# Patient Record
Sex: MALE | Race: WHITE | NOT HISPANIC OR LATINO | Employment: OTHER | URBAN - METROPOLITAN AREA
[De-identification: names, ages, dates, MRNs, and addresses within clinical notes are randomized per-mention and may not be internally consistent; named-entity substitution may affect disease eponyms.]

---

## 2017-01-09 RX ORDER — LOSARTAN POTASSIUM 100 MG/1
TABLET ORAL
Qty: 30 TABLET | Refills: 5 | Status: SHIPPED | OUTPATIENT
Start: 2017-01-09 | End: 2017-06-15 | Stop reason: SDUPTHER

## 2017-01-12 RX ORDER — RANOLAZINE 500 MG/1
TABLET, FILM COATED, EXTENDED RELEASE ORAL
Qty: 60 TABLET | Refills: 5 | Status: SHIPPED | OUTPATIENT
Start: 2017-01-12 | End: 2017-08-03

## 2017-01-25 ENCOUNTER — ANTICOAGULATION VISIT (OUTPATIENT)
Dept: PHARMACY | Facility: HOSPITAL | Age: 72
End: 2017-01-25

## 2017-01-25 DIAGNOSIS — I48.91 ATRIAL FIBRILLATION, UNSPECIFIED TYPE (HCC): Primary | ICD-10-CM

## 2017-01-25 LAB — INR PPP: 2 (ref 0.9–1.1)

## 2017-01-25 PROCEDURE — G0463 HOSPITAL OUTPT CLINIC VISIT: HCPCS

## 2017-01-25 PROCEDURE — 36416 COLLJ CAPILLARY BLOOD SPEC: CPT

## 2017-01-25 PROCEDURE — 85610 PROTHROMBIN TIME: CPT

## 2017-01-25 NOTE — MR AVS SNAPSHOT
Marco Antonio Montemayor   1/25/2017 3:00 PM   Anticoagulation Visit    Dept Phone:  [phone]   Encounter #:  [id number]    Provider:  [facility]   Department:  [facility]ICOAGUL[facility]IC                Your Full Care Plan              Your Updated Medication List          This list is accurate as of: 1/25/17  3:17 PM.  Always use your most recent med list.                amLODIPine 5 MG tablet   Commonly known as:  NORVASC   Take 1 tablet by mouth daily.       aspirin 81 MG tablet       atorvastatin 40 MG tablet   Commonly known as:  LIPITOR   TAKE ONE TABLET BY MOUTH DAILY       carvedilol 25 MG tablet   Commonly known as:  COREG   Take 1 tablet by mouth 2 (two) times a day.       cholecalciferol 1000 UNITS tablet   Commonly known as:  VITAMIN D3       doxazosin 4 MG tablet   Commonly known as:  CARDURA   TAKE ONE TABLET BY MOUTH TWICE A DAY       Iron 325 (65 FE) MG tablet       losartan 100 MG tablet   Commonly known as:  COZAAR   TAKE ONE TABLET BY MOUTH DAILY       RANEXA 500 MG 12 hr tablet   Generic drug:  ranolazine   TAKE ONE TABLET BY MOUTH TWICE A DAY       TIKOSYN 250 MCG capsule   Generic drug:  dofetilide   TAKE ONE CAPSULE BY MOUTH TWICE A DAY       torsemide 20 MG tablet   Commonly known as:  DEMADEX   TAKE ONE TABLET BY MOUTH TWICE A DAY       vitamin B-12 1000 MCG tablet   Commonly known as:  CYANOCOBALAMIN       warfarin 7.5 MG tablet   Commonly known as:  COUMADIN   TAKE ONE TABLET BY MOUTH EVERY EVENING OR AS DIRECTED               We Performed the Following     POC INR       You Were Diagnosed With        Codes Comments    Atrial fibrillation, unspecified type    -  Primary ICD-10-CM: I48.91  ICD-9-CM: 427.31       Instructions     None    Patient Instructions History      Anticoagulation Summary as of 1/25/2017     INR goal 2.0-3.0   Today's INR 2.00   Next INR check 2/22/2017    Indications   Atrial fibrillation [I48.91]         January 2017  Details    Sun Mon Tue Wed Thu Fri Sat     1               2               3               4               5               6               7                 8               9               10               11               12               13               14                 15               16               17               18               19               20               21                 22               23               24               25      7.5 mg   See details      26      3.75 mg         27      7.5 mg         28      3.75 mg           29      3.75 mg         30      7.5 mg         31      3.75 mg              Date Details   01/25 This INR check                 February 2017 Details    Sun Mon Tue Wed Thu Fri Sat        1      7.5 mg         2      3.75 mg         3      7.5 mg         4      3.75 mg           5      3.75 mg         6      7.5 mg         7      3.75 mg         8      7.5 mg         9      3.75 mg         10      7.5 mg         11      3.75 mg           12      3.75 mg         13      7.5 mg         14      3.75 mg         15      7.5 mg         16      3.75 mg         17      7.5 mg         18      3.75 mg           19      3.75 mg         20      7.5 mg         21      3.75 mg         22      7.5 mg         23               24               25                 26               27               28                    Date Details   No additional details    Date of next INR:  2/22/2017           Upcoming Appointments     Visit Type Date Time Department    ANTI [facility]AG - FOLLOW UP 1/25/2017  3:00 PM  [facility]    [facility] - FOLLOW UP 2/22/2017  2:30 PM  [facility]    FOLLOW UP 3/22/2017  9:15 AM [facility]    FOLLOW UP 6/27/2017  8:45 AM [facility]      MyChart Signup     Our records indicate that you have an active [employer] account.    You can view your After Visit Summary by going to [email or web address] and logging in with your  [employer] username and password.  If you don't have a [employer] username and password but a parent or guardian has access to your record, the parent or guardian should login with their own [employer] username and password and access your record to view the After Visit Summary.    If you have questions, you can email [email or web address] or call [phone] to talk to our [employer] staff.  Remember, [employer] is NOT to be used for urgent needs.  For medical emergencies, dial 911.               Other Info from Your Visit           Your Appointments     Feb 22, 2017  2:30 PM EST   Anti [facility]ag - Follow Up with [facility]   [facility] ANTICOAGULATION [facility]INIC (--)    [address] [place] [address] [address] [place] [id number]   [phone]            Mar 22, 2017  9:15 AM EDT   Follow Up with Dustin Mehta MD   [facility] CARDIOLOGY (--)    [address] [address]   [phone]           Arrive 15 minutes prior to appointment.            Jun 27, 2017  8:45 AM EDT   Follow Up with Johnathan Taylor MD   [facility] CARDIOLOGY (--)    [address] [place] [id number]   [phone]           Arrive 15 minutes prior to appointment.              Allergies     No Known Allergies      Vital Signs     Smoking Status                   Never Smoker           Problems and Diagnoses Noted     Atrial fibrillation (irregular heartbeat)      Results     POC INR      Component Value Standard Range & Units    INR 2.00 0.9 - 1.1

## 2017-02-22 ENCOUNTER — ANTICOAGULATION VISIT (OUTPATIENT)
Dept: PHARMACY | Facility: HOSPITAL | Age: 72
End: 2017-02-22

## 2017-02-22 DIAGNOSIS — I48.91 ATRIAL FIBRILLATION, UNSPECIFIED TYPE (HCC): ICD-10-CM

## 2017-02-22 LAB — INR PPP: 3.1 (ref 0.9–1.1)

## 2017-02-22 PROCEDURE — 36416 COLLJ CAPILLARY BLOOD SPEC: CPT

## 2017-02-22 PROCEDURE — G0463 HOSPITAL OUTPT CLINIC VISIT: HCPCS

## 2017-02-22 PROCEDURE — 85610 PROTHROMBIN TIME: CPT

## 2017-02-22 NOTE — PROGRESS NOTES
Anticoagulation Clinic Progress Note  Indication: afib  Referring Provider: Claudia Mehta  Initial Warfarin Start Date: 2007  Goal INR: 2-3  Current Drug Interactions: aspirin      Anticoagulation Clinic INR History:  Date 11/14 12/6 1/25 2/22        Total Weekly Dose 37.5 mg 37.5 mg 37.5mg 37.5 mg        INR 2.4 2.1 2.0 3.1            Clinic Interview:  Tablet Strength: pt has 7.5mg tablets  Current Dose: pt verified dose of 3.75mg daily except 7.5mg MWF  Patient Findings      Negatives Signs/symptoms of thrombosis, Signs/symptoms of bleeding, Laboratory test error suspected, Change in health, Change in alcohol use, Change in activity, Upcoming invasive procedure, Emergency department visit, Upcoming dental procedure, Missed doses, Extra doses, Change in medications, Change in diet/appetite, Hospital admission, Bruising, Other complaints             Plan:  1. INR is supratherapeutic today. Pt has not reported any change in diet or medications. Since was previously therapeutic x 2 months, will instruct patient to eat a salad tonight and continue current regimen of 7.5 mg MWF and 3.75 mg every other day.  2. RTC in 4 weeks after appt with Dr Mehta.  3. No recent changes to medications.  4. Pt declines refills.  5. Verbal and written information provided. Pt expresses understanding and has no further questions at this time.      Antonio Roblero, PharmD Candidate 2017  Ann Cowden, PharmD  2/22/2017  2:38 PM

## 2017-03-08 RX ORDER — DOFETILIDE 0.25 MG/1
CAPSULE ORAL
Qty: 60 CAPSULE | Refills: 5 | Status: SHIPPED | OUTPATIENT
Start: 2017-03-08 | End: 2017-09-08 | Stop reason: SDUPTHER

## 2017-03-08 RX ORDER — DOXAZOSIN MESYLATE 4 MG/1
TABLET ORAL
Qty: 60 TABLET | Refills: 4 | Status: SHIPPED | OUTPATIENT
Start: 2017-03-08 | End: 2017-06-15 | Stop reason: SDUPTHER

## 2017-03-22 ENCOUNTER — ANTICOAGULATION VISIT (OUTPATIENT)
Dept: PHARMACY | Facility: HOSPITAL | Age: 72
End: 2017-03-22

## 2017-03-22 ENCOUNTER — OFFICE VISIT (OUTPATIENT)
Dept: CARDIOLOGY | Facility: CLINIC | Age: 72
End: 2017-03-22

## 2017-03-22 VITALS
DIASTOLIC BLOOD PRESSURE: 75 MMHG | HEART RATE: 75 BPM | SYSTOLIC BLOOD PRESSURE: 139 MMHG | WEIGHT: 300 LBS | HEIGHT: 70 IN | BODY MASS INDEX: 42.95 KG/M2

## 2017-03-22 DIAGNOSIS — E66.01 MORBID OBESITY DUE TO EXCESS CALORIES (HCC): ICD-10-CM

## 2017-03-22 DIAGNOSIS — I11.9 HYPERTENSIVE HEART DISEASE WITHOUT HEART FAILURE: ICD-10-CM

## 2017-03-22 DIAGNOSIS — I10 ESSENTIAL HYPERTENSION: ICD-10-CM

## 2017-03-22 DIAGNOSIS — I49.5 SSS (SICK SINUS SYNDROME) (HCC): ICD-10-CM

## 2017-03-22 DIAGNOSIS — E78.5 DYSLIPIDEMIA: ICD-10-CM

## 2017-03-22 DIAGNOSIS — I48.0 PAROXYSMAL ATRIAL FIBRILLATION (HCC): Primary | ICD-10-CM

## 2017-03-22 LAB
INR PPP: 2.4 (ref 0.91–1.09)
PROTHROMBIN TIME: 28.4 SECONDS (ref 10–13.8)

## 2017-03-22 PROCEDURE — G0463 HOSPITAL OUTPT CLINIC VISIT: HCPCS

## 2017-03-22 PROCEDURE — 85610 PROTHROMBIN TIME: CPT

## 2017-03-22 PROCEDURE — 36416 COLLJ CAPILLARY BLOOD SPEC: CPT

## 2017-03-22 PROCEDURE — 93000 ELECTROCARDIOGRAM COMPLETE: CPT | Performed by: INTERNAL MEDICINE

## 2017-03-22 PROCEDURE — 99213 OFFICE O/P EST LOW 20 MIN: CPT | Performed by: INTERNAL MEDICINE

## 2017-03-22 NOTE — PROGRESS NOTES
Anticoagulation Clinic Progress Note  Indication: afib  Referring Provider: Claudia Mehta  Initial Warfarin Start Date: 2007  Goal INR: 2-3  Current Drug Interactions: aspirin      Anticoagulation Clinic INR History:  Date 11/14 12/6 1/25 2/22 3/22       Total Weekly Dose 37.5 mg 37.5 mg 37.5mg 37.5 mg 37.5 mg       INR 2.4 2.1 2.0 3.1 2.4           Clinic Interview:  Tablet Strength: pt has 7.5mg tablets  Current Dose: pt verified dose of 3.75mg daily except 7.5mg MWF  Patient Findings      Negatives Signs/symptoms of thrombosis, Signs/symptoms of bleeding, Laboratory test error suspected, Change in health, Change in alcohol use, Change in activity, Upcoming invasive procedure, Emergency department visit, Upcoming dental procedure, Missed doses, Extra doses, Change in medications, Change in diet/appetite, Hospital admission, Bruising, Other complaints             Plan:  1. INR is therapeutic today. Will instruct patient to continue current regimen of 3.75mg daily except 7.5 mg MWF.  2. RTC in 3 weeks after appt with Dr Martinez.   3. No recent changes to medications.  4. Pt declines refills.  5. Verbal and written information provided. Pt expresses understanding and has no further questions at this time.      Ann Cowden, PharmD  3/22/2017  9:43 AM

## 2017-03-22 NOTE — PROGRESS NOTES
Subjective:     Encounter Date:03/22/2017      Patient ID: Marco Antonio Montemayor is a 71 y.o.  white male,  from [place], [place].     PHYSICIAN: EDMUND Rosado MD  ELECTROPHYSIOLOGIST: Johnathan Taylor MD, [facility], [facility]  INTERVENTIONAL CARDIOLOGIST: Antonio Escamilla MD    Chief Complaint:   Chief Complaint   Patient presents with   • Palpitations     Problem List:  1. 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 Remote acceptable intravenous adenosine Quantitative SPECT gated Cardiolite study, LVEF 0.50, April 1999.  b. Echocardiogram showing mild LVH with estimated ejection fraction 0.58 with mild aortic valve cusp sclerosis, and mild TR, 09/23/2014.   c. Remote abnormal preoperative Quantitative SPECT gated Cardiolite study with mild “fixed” inferoposterior hypoperfusion and mild LV enlargement with mild global hypokinesia with reduced LVEF 0.40 in the setting of abnormal EKG with subsequent diagnostic coronary angiography demonstrating mild-to-moderate nonobstructive coronary artery atherosclerosis with mild compensated left ventricular dysfunction, LVEF 0.52, and continued medical therapy felt warranted, June 2006.  d. Remote hospitalization for symptomatic atrial fibrillation with rapid ventricular response and mild congestive heart failure requiring BRYANNA and DC cardioversion, June 2009.  e. Left heart catheterization with essentially normal coronary arteries with mild luminal irregularities with an EF of 0.60 by Dr. Escamilla for angina and elevated troponin, 06/05/2015.   f. Residual class I symptoms.  2. Chronic essential hypertension with recent progressive hypertension and blood pressure readings and normal selective bilateral renal angiography, June 2006.  3. Dyslipidemia.  4. Chronic atrial tachyarrhythmias:  a. Intermittent chronic paroxysmal atrial fibrillation with electrophysiologic study with RFA for atrial flutter, October 1999.  b. Recurrent apparent transient atrial  fibrillation, resolved with acceptable [facility] emergency department evaluation, June 2003.  c. Acceptable combination Doppler echocardiogram, July 2003, with apparent acceptable 24-hour Holter monitor, May 2006.  d. Recurrent asymptomatic atrial fibrillation with RVR, January 2007, subsequent Coumadin therapy and Tikosyn therapy with successful internal cardioversion of atrial fibrillation and marked bradyarrhythmias requiring DDDR pacemaker implant, [employer] [place] device, data not available, March 2007.  e. Recent hospitalization for symptomatic rapid atrial fibrillation/BRYANNA DC cardioversion with subsequent acceptable pacemaker interrogation, June/July 2009, as well as acceptable interrogation without reprogramming, July 2011.  f. Acceptable combination Doppler echocardiogram, December 2010, with residual class I symptoms.  g. Acceptable device interrogation following recent [facility] ED evaluation for chest pressure and shortness of breath, data deficit, May 2011, February 2013, February 2016, December 2016.  5. Morbid obesity, BMI 43.1.  6. Ichthyosis.  7. Chronic lower tract obstructive symptoms, probable BPH.  8. Dyslipidemia.  9. Nasal polyps with deviated nasal septum with apparent subsequent nasal septoplasty/polypectomy, data deficit, July 2006.  10. Abdominal pain with apparent small bowel obstruction with exploratory laparotomy.  11. Appendectomy with subsequent delayed hospitalization for bleeding peptic ulcer disease/probable duodenal ulcer, November 2003.  12. Recurrent asymptomatic atrial fibrillation with rapid ventricular response, July 2007, with subsequent Tikosyn therapy and DDDR pacemaker implant with intermittent recurrent breakthrough arrhythmias, including remote DC cardioversion, May 2008.  13. Obstructive sleep apnea with treatment.     No Known Allergies    Current Outpatient Prescriptions:   •  amLODIPine (NORVASC) 5 MG tablet, Take 1 tablet by mouth daily., Disp: 90 tablet, Rfl: 3  •  aspirin 81 MG  "tablet, Take 81 mg by mouth daily., Disp: , Rfl:   •  atorvastatin (LIPITOR) 40 MG tablet, TAKE ONE TABLET BY MOUTH DAILY, Disp: 90 tablet, Rfl: 1  •  carvedilol (COREG) 25 MG tablet, Take 1 tablet by mouth 2 (two) times a day., Disp: 60 tablet, Rfl: 11  •  cholecalciferol (VITAMIN D3) 1000 UNITS tablet, Take 1,000 Units by mouth daily., Disp: , Rfl:   •  dofetilide (TIKOSYN) 250 MCG capsule, TAKE ONE CAPSULE BY MOUTH TWICE A DAY, Disp: 60 capsule, Rfl: 5  •  doxazosin (CARDURA) 4 MG tablet, TAKE ONE TABLET BY MOUTH TWICE A DAY, Disp: 60 tablet, Rfl: 4  •  Ferrous Sulfate (IRON) 325 (65 FE) MG tablet, Take 65 mg by mouth Daily., Disp: , Rfl:   •  losartan (COZAAR) 100 MG tablet, TAKE ONE TABLET BY MOUTH DAILY, Disp: 30 tablet, Rfl: 5  •  PHARMACY TO DOSE WARFARIN, Continuous As Needed (patient's warfarin is being managed by the [facility] ([phone]))., Disp: , Rfl:   •  RANEXA 500 MG 12 hr tablet, TAKE ONE TABLET BY MOUTH TWICE A DAY, Disp: 60 tablet, Rfl: 5  •  torsemide (DEMADEX) 20 MG tablet, TAKE ONE TABLET BY MOUTH TWICE A DAY, Disp: 180 tablet, Rfl: 1  •  vitamin B-12 (CYANOCOBALAMIN) 1000 MCG tablet, Take 2,000 mcg by mouth daily., Disp: , Rfl:   •  warfarin (COUMADIN) 7.5 MG tablet, TAKE ONE TABLET BY MOUTH EVERY EVENING OR AS DIRECTED, Disp: 45 tablet, Rfl: 1    History of Present Illness Patient returns for scheduled 8-month followup. He is \"doing pretty fair,\" with no problems cardiac wise other than an occasional \"little flutter\" with his atrial fibrillation.  He had a pacemaker check with Dr. Taylor in December 2016, and he says that Dr. Taylor was \"a little concerned\" due to his frequency of atrial fibrillation being increased.  He gets a little shortness of breath and weakness when he has his episodes of atrial fibrillation, but he never knows what starts it. Patient otherwise denies any symptoms of chest pain, CHF, TIA, edema, or claudication.  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        ROS   Obtained and negative except as outlined in problem list and HPI.      ECG 12 Lead  Date/Time: 3/22/2017 10:45 AM  Performed by: PAULINE AUSTIN  Authorized by: PAULINE AUSTIN   Comparison: not compared with previous ECG   BPM: 75  Clinical impression: abnormal ECG  Comments: Nonspecific ST-T changes  QTc 410 ms               Objective:       Vitals:    03/22/17 0910 03/22/17 0913   BP: 136/67 139/75   BP Location: Left arm Left arm   Patient Position: Standing Sitting   Pulse: 75 75   Weight: 300 lb (136 kg)    Height: 70\" (177.8 cm)    BMI - 43.1  Last weight:  297 lbs.    Physical Exam   Constitutional: He appears well-developed and well-nourished.   HENT:   Head: Normocephalic and atraumatic.   Mouth/Throat: Oropharynx is clear and moist.   Neck: Neck supple. No JVD present. Carotid bruit is not present. No thyromegaly present.   Cardiovascular: Normal rate and regular rhythm.  Exam reveals no gallop, no S3 and no friction rub.    No murmur heard.  Pulses:       Carotid pulses are 1+ on the right side, and 1+ on the left side.       Radial pulses are 1+ on the right side, and 1+ on the left side.        Femoral pulses are 1+ on the right side, and 1+ on the left side.       Popliteal pulses are 1+ on the right side, and 1+ on the left side.        Dorsalis pedis pulses are 1+ on the right side, and 1+ on the left side.        Posterior tibial pulses are 1+ on the right side, and 1+ on the left side.   Pulmonary/Chest: Effort normal. He has decreased breath sounds.   Abdominal: Soft. He exhibits no mass. There is no hepatosplenomegaly. There is no tenderness.   Lymphadenopathy:     He has no cervical adenopathy.   Neurological: He is alert.   Skin: Skin is warm, dry and intact.   Left precordial pacemaker site nominal       Lab Review:   Results for orders placed or performed in " visit on 02/22/17   POC INR   Result Value Ref Range    INR 3.10 (A) 0.9 - 1.1           Assessment:   Overall continued acceptable course with no interim cardiopulmonary complaints with good functional status. We will defer additional diagnostic or therapeutic intervention from a cardiac perspective at this time.       Diagnosis Plan   1. Paroxysmal atrial fibrillation     2. SSS (sick sinus syndrome)     3. Essential hypertension     4. Hypertensive heart disease without heart failure     5. Morbid obesity due to excess calories     6. Dyslipidemia            Plan:     1. Patient to continue current medications and close follow up with the above providers.  2. Tentative cardiology follow up in November 2017, or patient may return sooner PRN.      Transcribed by Raeann Newton for Dr. Dustin Mehta at 9:25 AM on 03/22/2017      I, Dustin Mehta MD, [facility], personally performed the services described in this documentation as scribed by the above named individual in my presence, and it is both accurate and complete. At 10:46 AM on 03/22/2017

## 2017-04-11 ENCOUNTER — ANTICOAGULATION VISIT (OUTPATIENT)
Dept: PHARMACY | Facility: HOSPITAL | Age: 72
End: 2017-04-11

## 2017-04-11 ENCOUNTER — OFFICE VISIT (OUTPATIENT)
Dept: SLEEP MEDICINE | Facility: HOSPITAL | Age: 72
End: 2017-04-11

## 2017-04-11 VITALS
SYSTOLIC BLOOD PRESSURE: 142 MMHG | DIASTOLIC BLOOD PRESSURE: 72 MMHG | BODY MASS INDEX: 43.09 KG/M2 | OXYGEN SATURATION: 90 % | WEIGHT: 301 LBS | HEIGHT: 70 IN | HEART RATE: 75 BPM

## 2017-04-11 DIAGNOSIS — G47.33 SEVERE OBSTRUCTIVE SLEEP APNEA: ICD-10-CM

## 2017-04-11 DIAGNOSIS — G47.33 OBSTRUCTIVE SLEEP APNEA, ADULT: Primary | ICD-10-CM

## 2017-04-11 DIAGNOSIS — E66.01 MORBID OBESITY DUE TO EXCESS CALORIES (HCC): ICD-10-CM

## 2017-04-11 DIAGNOSIS — Z99.81 DEPENDENCE ON NOCTURNAL OXYGEN THERAPY: ICD-10-CM

## 2017-04-11 LAB
INR PPP: 2.6 (ref 0.91–1.09)
PROTHROMBIN TIME: 30.9 SECONDS (ref 10–13.8)

## 2017-04-11 PROCEDURE — 85610 PROTHROMBIN TIME: CPT

## 2017-04-11 PROCEDURE — 99214 OFFICE O/P EST MOD 30 MIN: CPT | Performed by: INTERNAL MEDICINE

## 2017-04-11 PROCEDURE — 36416 COLLJ CAPILLARY BLOOD SPEC: CPT

## 2017-04-11 PROCEDURE — G0463 HOSPITAL OUTPT CLINIC VISIT: HCPCS

## 2017-04-11 NOTE — PROGRESS NOTES
Subjective:     Chief Complaint:   Chief Complaint   Patient presents with   • Follow-up       HPI:    Marco Antonio Montemayor is a 72 y.o. male here for follow-up of obstructive sleep apnea.    He has seen Dr. Santiago in the past.  He is here for a follow-up visit.  He is on auto BiPAP  And oxygen and doing overall well.  Further details are as follows:    Since starting PAP sleep problem has: remained the same  Currently using PAP: yes Hours of usage during the night: 7    Amount of sleep per night : 7 hours  Average length of time it takes to fall asleep : 15 minutes  Number of awakenings per night : 2     He feels fatigue (tiredness, exhaustion, lethargy) in the daytime even when not sleepy? No  He feels sleepy (or struggles to stay awake) in the daytime? No    [place] Scale scored as 10/24.    Type of mask: full face mask    He (since starting PAP or since last visit) has problems with the following:   Pressure from the mask 0 - No Problem  Skin irritation from the mask 0 - No Problem  Mask coming off face 0 - No Problem  Air leaks from the mask 0 - No Problem  Dry mouth or throat 1 - Mild Problems  Nasal congestion 0 - No Problem    I reviewed his PAP download:  Average pressure: 12/11  Average AHI:  2  Average minutes in large leak per night: 5      Current medications are:   Current Outpatient Prescriptions:   •  amLODIPine (NORVASC) 5 MG tablet, Take 1 tablet by mouth daily., Disp: 90 tablet, Rfl: 3  •  aspirin 81 MG tablet, Take 81 mg by mouth daily., Disp: , Rfl:   •  atorvastatin (LIPITOR) 40 MG tablet, TAKE ONE TABLET BY MOUTH DAILY, Disp: 90 tablet, Rfl: 1  •  carvedilol (COREG) 25 MG tablet, Take 1 tablet by mouth 2 (two) times a day., Disp: 60 tablet, Rfl: 11  •  cholecalciferol (VITAMIN D3) 1000 UNITS tablet, Take 1,000 Units by mouth daily., Disp: , Rfl:   •  dofetilide (TIKOSYN) 250 MCG capsule, TAKE ONE CAPSULE BY MOUTH TWICE A DAY, Disp: 60 capsule, Rfl: 5  •  doxazosin (CARDURA) 4 MG tablet,  TAKE ONE TABLET BY MOUTH TWICE A DAY, Disp: 60 tablet, Rfl: 4  •  Ferrous Sulfate (IRON) 325 (65 FE) MG tablet, Take 65 mg by mouth Daily., Disp: , Rfl:   •  losartan (COZAAR) 100 MG tablet, TAKE ONE TABLET BY MOUTH DAILY, Disp: 30 tablet, Rfl: 5  •  PHARMACY TO DOSE WARFARIN, Continuous As Needed (patient's warfarin is being managed by the [facility] ([phone]))., Disp: , Rfl:   •  RANEXA 500 MG 12 hr tablet, TAKE ONE TABLET BY MOUTH TWICE A DAY, Disp: 60 tablet, Rfl: 5  •  torsemide (DEMADEX) 20 MG tablet, TAKE ONE TABLET BY MOUTH TWICE A DAY, Disp: 180 tablet, Rfl: 1  •  vitamin B-12 (CYANOCOBALAMIN) 1000 MCG tablet, Take 2,000 mcg by mouth daily., Disp: , Rfl:   •  warfarin (COUMADIN) 7.5 MG tablet, TAKE ONE TABLET BY MOUTH EVERY EVENING OR AS DIRECTED, Disp: 45 tablet, Rfl: 1.      The patient's relevant past medical, surgical, family and social history were reviewed and updated in Epic as appropriate.     ROS:    Review of Systems   HENT: Negative for congestion.    Respiratory: Positive for apnea.    Psychiatric/Behavioral: Positive for sleep disturbance.         Objective:    Physical Exam   Constitutional: He is oriented to person, place, and time. He appears well-developed and well-nourished.   HENT:   Head: Normocephalic and atraumatic.   Mouth/Throat: Oropharynx is clear and moist.   Class III airway   Neck: Neck supple. No thyromegaly present.   Cardiovascular: Normal rate and regular rhythm.  Exam reveals no gallop and no friction rub.    No murmur heard.  Pulmonary/Chest: Effort normal. No respiratory distress. He has no wheezes. He has no rales.   Musculoskeletal: He exhibits edema.   Chronic venous stasis changes and dry skin   Neurological: He is alert and oriented to person, place, and time.   Skin: Skin is warm and dry.   Psychiatric: He has a normal mood and affect. His behavior is normal.   Vitals reviewed.      Data:    Patient's PAP download was  personally reviewed including raw data and results.    Assessment:    Problem List Items Addressed This Visit        Pulmonary Problems    Severe obstructive sleep apnea    Overview     Auto BIPAP with O2            Other    Dependence on nocturnal oxygen therapy    Overview     7L with BIPAP         Morbid obesity      Other Visit Diagnoses     Obstructive sleep apnea, adult    -  Primary          He is tolerating BiPAP well.  His download is acceptable.  He denies any daytime somnolence and his sleep quality is good.  He has had no additional cardiac issues.  See no need to make any significant changes in his therapy.    Plan:     No change in PAP settings.  No change in his mask size or type.  Continued efforts at further weight loss.  I urged 7-8 hours of sleep per night on average.  I renewed supplies for the next year.  Follow-up scheduled.  If problems in the interim he knows how to contact us or his [employer] company.  Discussed the above in detail with the patient and any family present.        Signed by  Panfilo Martinez MD

## 2017-04-11 NOTE — PROGRESS NOTES
Anticoagulation Clinic Progress Note  Indication: afib  Referring Provider: Claudia Mehta  Initial Warfarin Start Date: 2007  Goal INR: 2-3  Current Drug Interactions: aspirin      Anticoagulation Clinic INR History:  Date 11/14 12/6 1/25 2/22 3/22 4/11      Total Weekly Dose 37.5 mg 37.5 mg 37.5 mg 37.5 mg 37.5 mg 37.5 mg      INR 2.4 2.1 2.0 3.1 2.4 2.6          Clinic Interview:  Tablet Strength: pt has 7.5mg tablets  Current Dose: pt verified dose of 3.75mg daily except 7.5mg MWF  Patient Findings      Negatives Signs/symptoms of thrombosis, Signs/symptoms of bleeding, Laboratory test error suspected, Change in health, Change in alcohol use, Change in activity, Upcoming invasive procedure, Emergency department visit, Upcoming dental procedure, Missed doses, Extra doses, Change in medications, Change in diet/appetite, Hospital admission, Bruising, Other complaints             Plan:  1. INR is therapeutic again today. Will instruct patient to continue current regimen of 3.75mg daily except 7.5 mg MWF.  2. RTC in 4 weeks.  3. No recent changes to medications.  4. Pt declines refills.  5. Verbal and written information provided. Pt expresses understanding and has no further questions at this time.      Ann Cowden, PharmD  4/11/2017  10:21 AM

## 2017-04-19 RX ORDER — WARFARIN SODIUM 7.5 MG/1
TABLET ORAL
Qty: 45 TABLET | Refills: 1 | Status: SHIPPED | OUTPATIENT
Start: 2017-04-19 | End: 2017-05-09 | Stop reason: SDUPTHER

## 2017-05-09 ENCOUNTER — ANTICOAGULATION VISIT (OUTPATIENT)
Dept: PHARMACY | Facility: HOSPITAL | Age: 72
End: 2017-05-09

## 2017-05-09 DIAGNOSIS — I48.91 ATRIAL FIBRILLATION, UNSPECIFIED TYPE (HCC): ICD-10-CM

## 2017-05-09 LAB
INR PPP: 2.7 (ref 0.91–1.09)
PROTHROMBIN TIME: 32.2 SECONDS (ref 10–13.8)

## 2017-05-09 PROCEDURE — G0463 HOSPITAL OUTPT CLINIC VISIT: HCPCS

## 2017-05-09 PROCEDURE — 36416 COLLJ CAPILLARY BLOOD SPEC: CPT

## 2017-05-09 PROCEDURE — 85610 PROTHROMBIN TIME: CPT

## 2017-05-09 RX ORDER — WARFARIN SODIUM 7.5 MG/1
TABLET ORAL
Qty: 30 TABLET | Refills: 3 | OUTPATIENT
Start: 2017-05-09 | End: 2018-01-25 | Stop reason: SDUPTHER

## 2017-06-08 ENCOUNTER — ANTICOAGULATION VISIT (OUTPATIENT)
Dept: PHARMACY | Facility: HOSPITAL | Age: 72
End: 2017-06-08

## 2017-06-08 LAB
INR PPP: 2.2 (ref 0.91–1.09)
PROTHROMBIN TIME: 26.1 SECONDS (ref 10–13.8)

## 2017-06-08 PROCEDURE — 85610 PROTHROMBIN TIME: CPT

## 2017-06-08 PROCEDURE — 36416 COLLJ CAPILLARY BLOOD SPEC: CPT

## 2017-06-08 PROCEDURE — G0463 HOSPITAL OUTPT CLINIC VISIT: HCPCS

## 2017-06-08 NOTE — PROGRESS NOTES
Anticoagulation Clinic Progress Note  Indication: afib  Referring Provider: Claudia Mehta  Initial Warfarin Start Date: 2007  Goal INR: 2-3  Current Drug Interactions: aspirin    Anticoagulation Clinic INR History:  Date 11/14 12/6 1/25 2/22 3/22 4/11 5/9 6/8    Total Weekly Dose 37.5 mg 37.5 mg 37.5 mg 37.5 mg 37.5 mg 37.5 mg 37.5 mg 37.5mg    INR 2.4 2.1 2.0 3.1 2.4 2.6 2.7 2.2    Notes                Clinic Interview:  Tablet Strength: pt has 7.5mg tablets  Current Dose: pt verified dose of 3.75mg daily except 7.5mg MWF  Patient Findings      Positives Upcoming invasive procedure     Negatives Signs/symptoms of thrombosis, Signs/symptoms of bleeding, Laboratory test error suspected, Change in health, Change in alcohol use, Change in activity, Emergency department visit, Upcoming dental procedure, Missed doses, Extra doses, Change in medications, Change in diet/appetite, Hospital admission, Bruising, Other complaints     Comments Patient had biopsy at end May at  [facility] with Dr. Yoanna Marks. 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 They are planning to have a more invasive procedure but patient has not made his appointment. Patient will call the clinic when he has an appointment scheduled. SELENAM with Guillermo at [facility] to determine if patient will require a dose hold prior to the procedure. Patient is seeing Jessica Dunaway with cardiology at the end of June.        Plan:  1. INR is therapeutic again today. Will instruct patient to continue current regimen of warfarin 3.75mg daily except 7.5 mg MWF.  2. RTC in 3 weeks when comes for appointment with Cardiology.   3. No recent changes to medications.  4. Pt denies refills.  5. Verbal and written information provided. Pt expresses understanding and has no further questions at this time.      Evelina Quan, PharmD  6/8/2017  8:38 AM

## 2017-06-15 RX ORDER — LOSARTAN POTASSIUM 100 MG/1
100 TABLET ORAL DAILY
Qty: 30 TABLET | Refills: 6 | Status: SHIPPED | OUTPATIENT
Start: 2017-06-15 | End: 2018-01-29 | Stop reason: SDUPTHER

## 2017-06-15 RX ORDER — DOXAZOSIN MESYLATE 4 MG/1
4 TABLET ORAL 2 TIMES DAILY
Qty: 60 TABLET | Refills: 6 | Status: SHIPPED | OUTPATIENT
Start: 2017-06-15 | End: 2018-04-18 | Stop reason: SDUPTHER

## 2017-06-18 DIAGNOSIS — I48.0 PAROXYSMAL ATRIAL FIBRILLATION (HCC): ICD-10-CM

## 2017-06-19 RX ORDER — CARVEDILOL 25 MG/1
TABLET ORAL
Qty: 60 TABLET | Refills: 5 | Status: SHIPPED | OUTPATIENT
Start: 2017-06-19 | End: 2017-06-22 | Stop reason: SDUPTHER

## 2017-06-22 DIAGNOSIS — I48.0 PAROXYSMAL ATRIAL FIBRILLATION (HCC): ICD-10-CM

## 2017-06-22 RX ORDER — CARVEDILOL 25 MG/1
25 TABLET ORAL 2 TIMES DAILY WITH MEALS
Qty: 180 TABLET | Refills: 3 | Status: SHIPPED | OUTPATIENT
Start: 2017-06-22 | End: 2018-07-05 | Stop reason: SDUPTHER

## 2017-06-27 NOTE — PROGRESS NOTES
Anticoagulation Clinic Progress Note  Indication: afib  Referring Provider: Claudia Mehta  Initial Warfarin Start Date: 2007  Goal INR: 2-3  Current Drug Interactions: aspirin      Anticoagulation Clinic INR History:  Date 11/14 12/6 1/25         Total Weekly Dose 37.5 mg 37.5 mg 37.5mg         INR 2.4 2.1 2.0             Clinic Interview:  Tablet Strength: pt has 7.5mg tablets  Current Dose: pt verified dose of 3.75mg daily except 7.5mg MWF  Patient Findings      Negatives Signs/symptoms of thrombosis, Signs/symptoms of bleeding, Laboratory test error suspected, Change in health, Change in alcohol use, Change in activity, Upcoming invasive procedure, Emergency department visit, Upcoming dental procedure, Missed doses, Extra doses, Change in medications, Change in diet/appetite, Hospital admission, Bruising, Other complaints             Plan:  1. INR is therapeutic again today. Pt takes warfarin in the orning. He took his higher dose this morning so INR will likely increase even more. Instructed pt to continue current dose of 3.75mg daily except for 7.5mg on MWF.  2. RTC in 4 weeks.  3. No recent changes to medications.  4. Pt declines refills.  5. Verbal and written information provided. Pt expresses understanding and has no further questions at this time.      Evelina Quan, PharmD  1/25/2017  3:09 PM      
94

## 2017-06-29 ENCOUNTER — OFFICE VISIT (OUTPATIENT)
Dept: CARDIOLOGY | Facility: CLINIC | Age: 72
End: 2017-06-29

## 2017-06-29 ENCOUNTER — ANTICOAGULATION VISIT (OUTPATIENT)
Dept: PHARMACY | Facility: HOSPITAL | Age: 72
End: 2017-06-29

## 2017-06-29 VITALS
SYSTOLIC BLOOD PRESSURE: 132 MMHG | DIASTOLIC BLOOD PRESSURE: 78 MMHG | WEIGHT: 297.8 LBS | HEART RATE: 72 BPM | BODY MASS INDEX: 44.11 KG/M2 | HEIGHT: 69 IN

## 2017-06-29 DIAGNOSIS — I48.91 ATRIAL FIBRILLATION, UNSPECIFIED TYPE (HCC): ICD-10-CM

## 2017-06-29 DIAGNOSIS — I49.5 SSS (SICK SINUS SYNDROME) (HCC): ICD-10-CM

## 2017-06-29 DIAGNOSIS — I48.0 PAROXYSMAL ATRIAL FIBRILLATION (HCC): Primary | ICD-10-CM

## 2017-06-29 LAB
INR PPP: 2.5 (ref 0.91–1.09)
PROTHROMBIN TIME: 29.7 SECONDS (ref 10–13.8)

## 2017-06-29 PROCEDURE — 36416 COLLJ CAPILLARY BLOOD SPEC: CPT

## 2017-06-29 PROCEDURE — 93280 PM DEVICE PROGR EVAL DUAL: CPT | Performed by: PHYSICIAN ASSISTANT

## 2017-06-29 PROCEDURE — 99213 OFFICE O/P EST LOW 20 MIN: CPT | Performed by: PHYSICIAN ASSISTANT

## 2017-06-29 PROCEDURE — 85610 PROTHROMBIN TIME: CPT

## 2017-06-29 PROCEDURE — G0463 HOSPITAL OUTPT CLINIC VISIT: HCPCS

## 2017-06-29 NOTE — PROGRESS NOTES
Anticoagulation Clinic Progress Note  Indication: afib  Referring Provider: Claudia Mehta  Initial Warfarin Start Date: 2007  Goal INR: 2-3  Current Drug Interactions: aspirin    Anticoagulation Clinic INR History:  Date 11/14 12/6 1/25 2/22 3/22 4/11 5/9 6/8 6/29   Total Weekly Dose 37.5 mg 37.5 mg 37.5 mg 37.5 mg 37.5 mg 37.5 mg 37.5 mg 37.5mg 37.5 mg   INR 2.4 2.1 2.0 3.1 2.4 2.6 2.7 2.2 2.5   Notes                Clinic Interview:  Tablet Strength: pt has 7.5mg tablets  Current Dose: pt verified dose of 3.75mg daily except 7.5mg MWF  Patient Findings      Positives Upcoming invasive procedure     Negatives Signs/symptoms of thrombosis, Signs/symptoms of bleeding, Laboratory test error suspected, Change in health, Change in alcohol use, Change in activity, Emergency department visit, Upcoming dental procedure, Missed doses, Extra doses, Change in medications, Change in diet/appetite, Hospital admission, Bruising, Other complaints     Comments Pt is still needing surgical procedure for skin cancer on right finger + left cheek ([facility], Dr. Yoanna Marks). He will call the clinic once this is scheduled. Do not think pt will need to hold warfarin, but will check back once specifics of procedure/date are determined.       Plan:  1. INR is therapeutic today. Will instruct patient to continue current regimen of warfarin 3.75mg daily except 7.5 mg MWF.  2. RTC in 5 weeks.  3. No recent changes to medications.  4. Pt declines refills.  5. Verbal and written information provided. Pt expresses understanding and has no further questions at this time.      Ann Cowden Mayer, PharmD  6/29/2017  12:14 PM

## 2017-06-29 NOTE — PROGRESS NOTES
Marco Antonio LITTLE Sim  1945  [phone]  [phone]    06/29/2017    [facility] CARDIOLOGY     Emil Rosado MD  [address]  [place] [address]    Chief Complaint   Patient presents with   • Atrial Fibrillation   • Slow Heart Rate     PROBLEM LIST:  1. Paroxysmal atrial fibrillation:  a. History of 2 external cardioversions to normal sinus rhythm, last being 08/19/1999.   b. Rythmol therapy since 1999, chronic Coumadin.   c. Echocardiogram on 07/14/2003 with LA 4.2, trace MR, TR. EF 68%.   d. Initiation of Tikosyn therapy, February 2007, after successful internal cardioversion of atrial fibrillation on 02/15/2007.  e. ECV restoring normal sinus rhythm on 05/08/2008.   f. Recurrent atrial fibrillation with subsequent external cardioversion, 6/08/2009.  g. Echocardiogram, 06/05/2015: EF 60% to 65%. Diastolic dysfunction. Left atrium mild to moderately dilated. No valvular abnormalities.  2. Atrial flutter:  a. EP study with RFA in October 1999, Dr. Rubalcava.   3. Hypertensive cardiovascular disease:  a. 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   4. Hypertension.   5. Dyslipidemia.   6. BPH.   7. ZULEIKA on CPAP/BiPAP  8. Peptic ulcer disease.   9. Surgical history:  a. Appendectomy.  b. Nasal polyp.  10. Acute kidney injury, April 2014.   Allergies  No Known Allergies    Current Medications    Current Outpatient Prescriptions:   •  amLODIPine (NORVASC) 5 MG tablet, Take 1 tablet by mouth daily., Disp: 90 tablet, Rfl: 3  •  aspirin 81 MG tablet, Take 81 mg by mouth daily., Disp: , Rfl:   •  atorvastatin (LIPITOR) 40 MG tablet, TAKE ONE TABLET BY MOUTH DAILY, Disp: 90 tablet, Rfl: 1  •  carvedilol (COREG) 25 MG tablet, Take 1 tablet by mouth 2 (Two) Times a Day With Meals., Disp: 180 tablet, Rfl: 3  •  cholecalciferol (VITAMIN D3) 1000 UNITS tablet, Take 1,000 Units by mouth daily., Disp: , Rfl:   •  dofetilide (TIKOSYN) 250 MCG capsule, TAKE ONE  "CAPSULE BY MOUTH TWICE A DAY, Disp: 60 capsule, Rfl: 5  •  doxazosin (CARDURA) 4 MG tablet, Take 1 tablet by mouth 2 (Two) Times a Day., Disp: 60 tablet, Rfl: 6  •  Ferrous Sulfate (IRON) 325 (65 FE) MG tablet, Take 65 mg by mouth Daily., Disp: , Rfl:   •  losartan (COZAAR) 100 MG tablet, Take 1 tablet by mouth Daily., Disp: 30 tablet, Rfl: 6  •  PHARMACY TO DOSE WARFARIN, Continuous As Needed (patient's warfarin is being managed by the [facility] ([phone]))., Disp: , Rfl:   •  RANEXA 500 MG 12 hr tablet, TAKE ONE TABLET BY MOUTH TWICE A DAY, Disp: 60 tablet, Rfl: 5  •  torsemide (DEMADEX) 20 MG tablet, TAKE ONE TABLET BY MOUTH TWICE A DAY, Disp: 180 tablet, Rfl: 1  •  vitamin B-12 (CYANOCOBALAMIN) 1000 MCG tablet, Take 2,000 mcg by mouth daily., Disp: , Rfl:   •  warfarin (COUMADIN) 7.5 MG tablet, Take 1/2 to 1 tablet by mouth daily, as directed by the anticoagulation pharmacist, Disp: 30 tablet, Rfl: 3    History of Present Illness   HPI    Pt presents for follow up of atrial fibrillation, bradycardia, and PM check. Since we last saw the pt, pt has felt a couple episodes of AF episodes but were not overall bothersome to him, no SOB, CP, LH, and dizziness. Denies any hospitalizations, ER visits, bleeding, or TIA/CVA symptoms. Overall feels well. INR levels have been normal. He has some skin cancers that need to be removed in the near future.     ROS:  General:  + fatigue, - weight gain or loss  Cardiovascular:  Denies CP, PND, syncope, near syncope, edema or palpitations.  Pulmonary:  Denies BLACKWELL, cough, or wheezing      Vitals:    06/29/17 1229   BP: 132/78   BP Location: Left arm   Patient Position: Sitting   Pulse: 72   Weight: 297 lb 12.8 oz (135 kg)   Height: 69\" (175.3 cm)     PE:  General: NAD  Neck: no JVD, no carotid bruits, no TM  Heart RRR, NL S1, S2, S4 present, no rubs, murmurs  Lungs: CTA, no wheezes, rhonchi, or rales  Abd: soft, non-tender, NL BS  Ext: No " musculoskeletal deformities, no edema, cyanosis, or clubbing  Psych: normal mood and affect    Diagnostic Data:    ECG 12 Lead  Date/Time: 6/29/2017 1:04 PM  Performed by: GEORGIANA CHIN  Authorized by: GEORGIANA CHIN   Rhythm: sinus rhythm and paced  BPM: 75          QTc is normal     PM check: normal function. Mode switch 8.4% compared to 10% in December. Changed RV pulse width from 0.8 to 1.0 ms. Battery 1 - 1.25 years.     1. Paroxysmal atrial fibrillation    2. SSS (sick sinus syndrome)          Plan:  1) Paroxysmal Atrial Fibrillation- 8.4% mode switched on Tikosyn compared to 10% in December. QT is ok today. Will stop Ranexa as it is contraindicated with Tikosyn.   Continue present medications.   2) Anticoagulation: CHADSVASc = 2  Continue warfarin  3) Bradycardia- normal PM function. Changes made as above  Wt loss, exercise, salt reduction    F/up in 8 months    [facility]  6/29/2017   1:04 PM  I saw this patient independently.

## 2017-08-03 ENCOUNTER — ANTICOAGULATION VISIT (OUTPATIENT)
Dept: PHARMACY | Facility: HOSPITAL | Age: 72
End: 2017-08-03

## 2017-08-03 DIAGNOSIS — I48.91 ATRIAL FIBRILLATION, UNSPECIFIED TYPE (HCC): ICD-10-CM

## 2017-08-03 LAB
INR PPP: 3 (ref 0.91–1.09)
PROTHROMBIN TIME: 36.5 SECONDS (ref 10–13.8)

## 2017-08-03 PROCEDURE — 36416 COLLJ CAPILLARY BLOOD SPEC: CPT

## 2017-08-03 PROCEDURE — 85610 PROTHROMBIN TIME: CPT

## 2017-08-03 PROCEDURE — G0463 HOSPITAL OUTPT CLINIC VISIT: HCPCS

## 2017-08-03 NOTE — PROGRESS NOTES
Anticoagulation Clinic Progress Note  Indication: afib  Referring Provider: Claudia Mehta  Initial Warfarin Start Date: 2007  Goal INR: 2-3  Current Drug Interactions: aspirin    Anticoagulation Clinic INR History:  Date 11/14 12/6 1/25 2/22 3/22 4/11 5/9 6/8 6/29 8/3      Total Weekly Dose 37.5 mg 37.5 mg 37.5 mg 37.5 mg 37.5 mg 37.5 mg 37.5 mg 37.5mg 37.5 mg 37.5 mg      INR 2.4 2.1 2.0 3.1 2.4 2.6 2.7 2.2 2.5 3.0      Notes                    Clinic Interview:  Tablet Strength: pt has 7.5mg tablets  Current Dose: pt verified dose of 3.75mg daily except 7.5mg MWF  Patient Findings      Positives Bruising     Negatives Signs/symptoms of thrombosis, Signs/symptoms of bleeding, Laboratory test error suspected, Change in health, Change in alcohol use, Change in activity, Upcoming invasive procedure, Emergency department visit, Upcoming dental procedure, Missed doses, Extra doses, Change in medications, Change in diet/appetite, Hospital admission, Other complaints     Comments Dermatology procedures on finger and cheek scheduled for 8/14/17 and 8/21/17. Operative cheek area was bleeding at night with use of CPAP machine. Do not anticipate stopping coumadin for skin procedures. Bruising on arm this morning from dog scratch.            Plan:  1. INR is therapeutic today. Will instruct patient to continue current regimen of warfarin 3.75mg daily except 7.5 mg MWF.  2. RTC in 4 weeks.  3. Patient recently stopped taking ranexa after starting tikosyn.  4. Pt declines refills.  5. Verbal and written information provided. Pt expresses understanding and has no further questions at this time.      Parker Mueller, PharmD  Pharmacy Resident  8/3/2017  12:13 PM

## 2017-08-31 ENCOUNTER — ANTICOAGULATION VISIT (OUTPATIENT)
Dept: PHARMACY | Facility: HOSPITAL | Age: 72
End: 2017-08-31

## 2017-08-31 DIAGNOSIS — I48.91 ATRIAL FIBRILLATION, UNSPECIFIED TYPE (HCC): ICD-10-CM

## 2017-08-31 LAB
INR PPP: 1.7 (ref 0.91–1.09)
PROTHROMBIN TIME: 20.2 SECONDS (ref 10–13.8)

## 2017-08-31 PROCEDURE — G0463 HOSPITAL OUTPT CLINIC VISIT: HCPCS

## 2017-08-31 PROCEDURE — 36416 COLLJ CAPILLARY BLOOD SPEC: CPT

## 2017-08-31 PROCEDURE — 85610 PROTHROMBIN TIME: CPT

## 2017-09-08 RX ORDER — ATORVASTATIN CALCIUM 40 MG/1
TABLET, FILM COATED ORAL
Qty: 90 TABLET | Refills: 0 | Status: SHIPPED | OUTPATIENT
Start: 2017-09-08 | End: 2017-12-15 | Stop reason: SDUPTHER

## 2017-09-08 RX ORDER — AMLODIPINE BESYLATE 5 MG/1
TABLET ORAL
Qty: 90 TABLET | Refills: 2 | Status: SHIPPED | OUTPATIENT
Start: 2017-09-08 | End: 2018-06-15 | Stop reason: SDUPTHER

## 2017-09-08 RX ORDER — DOFETILIDE 0.25 MG/1
CAPSULE ORAL
Qty: 60 CAPSULE | Refills: 4 | Status: SHIPPED | OUTPATIENT
Start: 2017-09-08 | End: 2018-02-05 | Stop reason: SDUPTHER

## 2017-09-14 ENCOUNTER — ANTICOAGULATION VISIT (OUTPATIENT)
Dept: PHARMACY | Facility: HOSPITAL | Age: 72
End: 2017-09-14

## 2017-09-14 DIAGNOSIS — I48.91 ATRIAL FIBRILLATION, UNSPECIFIED TYPE (HCC): ICD-10-CM

## 2017-09-14 LAB
INR PPP: 3.2 (ref 0.91–1.09)
PROTHROMBIN TIME: 38.6 SECONDS (ref 10–13.8)

## 2017-09-14 PROCEDURE — 36416 COLLJ CAPILLARY BLOOD SPEC: CPT

## 2017-09-14 PROCEDURE — G0463 HOSPITAL OUTPT CLINIC VISIT: HCPCS

## 2017-09-14 PROCEDURE — 85610 PROTHROMBIN TIME: CPT

## 2017-09-14 NOTE — PROGRESS NOTES
Anticoagulation Clinic Progress Note  Indication: afib  Referring Provider: Claudia Mehta  Initial Warfarin Start Date: 2007  Goal INR: 2-3  Current Drug Interactions: aspirin    Diet: leafy head lettuce salads every now and then (thinks non-iceberg)    Anticoagulation Clinic INR History:  Date 11/14 12/6 1/25 2/22 3/22 4/11 5/9 6/8 6/29 8/3 8/31 9/14    Total Weekly Dose 37.5 mg 37.5 mg 37.5 mg 37.5 mg 37.5 mg 37.5 mg 37.5 mg 37.5mg 37.5 mg 37.5 mg 26.25mg     INR 2.4 2.1 2.0 3.1 2.4 2.6 2.7 2.2 2.5 3.0 1.7     Notes                  Clinic Interview:  Tablet Strength: pt has 7.5mg tablets  Current Maintenance Dose: 3.75mg daily except 7.5mg MWF  Takes warfarin in the AM  Patient Findings      Negatives Signs/symptoms of thrombosis, Signs/symptoms of bleeding, Laboratory test error suspected, Change in health, Change in alcohol use, Change in activity, Upcoming invasive procedure, Emergency department visit, Upcoming dental procedure, Missed doses, Extra doses, Change in medications, Change in diet/appetite, Hospital admission, Bruising, Other complaints     Plan:  1. INR is slightly supratherapeutic today, uncertain the cause. Given prior INR stabillity, will instruct Mr. Montemayor to have an extra serving of GLV today and continue his current regimen of warfarin 3.75mg daily except 7.5 mg MWF.  2. RTC in 2 weeks to ensure INR normalizes. If it remains slightly elevated, will consider dose reduction at that time.  3. Pt denies recent medication changes, declines warfarin refills.  4. Verbal and written information provided. Pt expresses understanding and has no further questions at this time.    Ann Cowden Mayer, PharmD  9/14/2017  10:08 AM

## 2017-09-28 ENCOUNTER — ANTICOAGULATION VISIT (OUTPATIENT)
Dept: PHARMACY | Facility: HOSPITAL | Age: 72
End: 2017-09-28

## 2017-09-28 LAB
INR PPP: 2.3 (ref 0.91–1.09)
PROTHROMBIN TIME: 27.2 SECONDS (ref 10–13.8)

## 2017-09-28 PROCEDURE — 85610 PROTHROMBIN TIME: CPT

## 2017-09-28 PROCEDURE — G0463 HOSPITAL OUTPT CLINIC VISIT: HCPCS

## 2017-09-28 PROCEDURE — 36416 COLLJ CAPILLARY BLOOD SPEC: CPT

## 2017-09-28 NOTE — PROGRESS NOTES
Anticoagulation Clinic Progress Note  Indication: afib  Referring Provider: Claudia Mehta  Initial Warfarin Start Date: 2007  Goal INR: 2-3  Current Drug Interactions: aspirin    Diet: leafy head lettuce salads every now and then (thinks non-iceberg)    Anticoagulation Clinic INR History:  Date 11/14 12/6 1/25 2/22 3/22 4/11 5/9 6/8 6/29 8/3 8/31 9/14 9/28   Total Weekly Dose 37.5 mg 37.5 mg 37.5 mg 37.5 mg 37.5 mg 37.5 mg 37.5 mg 37.5mg 37.5 mg 37.5 mg 26.25mg 37.5mg 37.5mg   INR 2.4 2.1 2.0 3.1 2.4 2.6 2.7 2.2 2.5 3.0 1.7 3.2 2.3   Notes                  Clinic Interview:  Tablet Strength: pt has 7.5mg tablets  Current Maintenance Dose: 3.75mg daily except 7.5mg MWF  Takes warfarin in the AM  Patient Findings      Negatives Signs/symptoms of thrombosis, Signs/symptoms of bleeding, Laboratory test error suspected, Change in health, Change in alcohol use, Change in activity, Upcoming invasive procedure, Emergency department visit, Upcoming dental procedure, Missed doses, Extra doses, Change in medications, Change in diet/appetite, Hospital admission, Bruising, Other complaints             Plan:  1. INR is therapeutic today at 2.3. Instructed Mr. Montemayor to continue warfarin 3.75mg daily except 7.5 mg MWF.  2. RTC in ~3 weeks, given previous INR stability on home regimen.   3. Pt denies recent medication changes, declines warfarin refills.  4. Verbal and written information provided. Pt expresses understanding and has no further questions at this time.    Agustin Crockett, PharmD  Pharmacy Resident  9/28/2017  10:23 AM

## 2017-10-23 ENCOUNTER — ANTICOAGULATION VISIT (OUTPATIENT)
Dept: PHARMACY | Facility: HOSPITAL | Age: 72
End: 2017-10-23

## 2017-10-23 LAB
INR PPP: 2.5 (ref 0.91–1.09)
PROTHROMBIN TIME: 30.3 SECONDS (ref 10–13.8)

## 2017-10-23 PROCEDURE — G0463 HOSPITAL OUTPT CLINIC VISIT: HCPCS

## 2017-10-23 PROCEDURE — 85610 PROTHROMBIN TIME: CPT

## 2017-10-23 PROCEDURE — 36416 COLLJ CAPILLARY BLOOD SPEC: CPT

## 2017-10-23 NOTE — PROGRESS NOTES
Anticoagulation Clinic Progress Note  Indication: afib  Referring Provider: Claudia Mehta  Initial Warfarin Start Date: 2007  Goal INR: 2-3  Current Drug Interactions: aspirin    Diet: leafy head lettuce salads every now and then (thinks non-iceberg)    Anticoagulation Clinic INR History:  Date 11/14 12/6 1/25 2/22 3/22 4/11 5/9 6/8 6/29 8/3 8/31 9/14 9/28   Total Weekly Dose 37.5 mg 37.5 mg 37.5 mg 37.5 mg 37.5 mg 37.5 mg 37.5 mg 37.5mg 37.5 mg 37.5 mg 26.25mg 37.5mg 37.5mg   INR 2.4 2.1 2.0 3.1 2.4 2.6 2.7 2.2 2.5 3.0 1.7 3.2 2.3   Notes                  Date 10/23               Total Weekly Dose 37.5mg               INR 2.5               Notes                  Clinic Interview:  Tablet Strength: pt has 7.5mg tablets  Current Maintenance Dose: 3.75mg daily except 7.5mg MWF  Takes warfarin in the AM  Patient Findings      Negatives Signs/symptoms of thrombosis, Signs/symptoms of bleeding, Laboratory test error suspected, Change in health, Change in alcohol use, Change in activity, Upcoming invasive procedure, Emergency department visit, Upcoming dental procedure, Missed doses, Extra doses, Change in medications, Change in diet/appetite, Hospital admission, Bruising, Other complaints       Plan:  1. INR is therapeutic today at 2.5. Instructed Mr. Montemayor to continue warfarin 3.75mg daily except 7.5 mg MWF.  2. RTC in ~4 weeks, given previous INR stability on home regimen.   3. Pt denies recent medication changes, declines warfarin refills.  4. Verbal and written information provided. Pt expresses understanding and has no further questions at this time.      Evelina Quan, PharmD  10/23/2017  10:13 AM

## 2017-11-20 ENCOUNTER — ANTICOAGULATION VISIT (OUTPATIENT)
Dept: PHARMACY | Facility: HOSPITAL | Age: 72
End: 2017-11-20

## 2017-11-20 DIAGNOSIS — I48.91 ATRIAL FIBRILLATION, UNSPECIFIED TYPE (HCC): ICD-10-CM

## 2017-11-20 LAB
INR PPP: 1.8 (ref 0.91–1.09)
PROTHROMBIN TIME: 22 SECONDS (ref 10–13.8)

## 2017-11-20 PROCEDURE — G0463 HOSPITAL OUTPT CLINIC VISIT: HCPCS

## 2017-11-20 PROCEDURE — 36416 COLLJ CAPILLARY BLOOD SPEC: CPT

## 2017-11-20 PROCEDURE — 85610 PROTHROMBIN TIME: CPT

## 2017-11-20 NOTE — PROGRESS NOTES
Anticoagulation Clinic Progress Note  Indication: afib  Referring Provider: Claudia Mehta  Initial Warfarin Start Date: 2007  Goal INR: 2-3  Current Drug Interactions: aspirin    Diet: leafy head lettuce salads every now and then (thinks non-iceberg)    Anticoagulation Clinic INR History:  Date 11/14 12/6 1/25 2/22 3/22 4/11 5/9 6/8 6/29 8/3 8/31 9/14 9/28   Total Weekly Dose 37.5 mg 37.5 mg 37.5 mg 37.5 mg 37.5 mg 37.5 mg 37.5 mg 37.5mg 37.5 mg 37.5 mg 26.25mg 37.5 mg 37.5 mg   INR 2.4 2.1 2.0 3.1 2.4 2.6 2.7 2.2 2.5 3.0 1.7 3.2 2.3     Date 10/23 11/20              Total Weekly Dose 37.5mg 37.5 mg              INR 2.5 1.8              Notes                  Clinic Interview:  Tablet Strength: pt has 7.5mg tablets  Current Maintenance Dose: 3.75mg daily except 7.5mg MWF  Takes warfarin in the AM  Patient Findings      Negatives Signs/symptoms of thrombosis, Signs/symptoms of bleeding, Laboratory test error suspected, Change in health, Change in alcohol use, Change in activity, Upcoming invasive procedure, Emergency department visit, Upcoming dental procedure, Missed doses, Extra doses, Change in medications, Change in diet/appetite, Hospital admission, Bruising, Other complaints     Plan:  1. INR is slightly subtherapeutic today, uncertain the cause. Given past stability on current maintenance dose, instructed Mr. Montemayor to boost his dose to 7.5mg tomorrow (10% incr. this week), then continue warfarin 3.75mg daily except 7.5 mg MWF.  2. RTC in 2.5 weeks.  3. Pt denies recent medication changes, declines warfarin refills.  4. Verbal and written information provided. Pt expresses understanding and has no further questions at this time.    Shanell Arango [facility]  11/20/2017  10:19 AM

## 2017-11-29 ENCOUNTER — OFFICE VISIT (OUTPATIENT)
Dept: CARDIOLOGY | Facility: CLINIC | Age: 72
End: 2017-11-29

## 2017-11-29 VITALS
DIASTOLIC BLOOD PRESSURE: 77 MMHG | SYSTOLIC BLOOD PRESSURE: 143 MMHG | BODY MASS INDEX: 41.54 KG/M2 | WEIGHT: 290.2 LBS | HEART RATE: 75 BPM | HEIGHT: 70 IN

## 2017-11-29 DIAGNOSIS — R73.09 OTHER ABNORMAL GLUCOSE: ICD-10-CM

## 2017-11-29 DIAGNOSIS — I10 HYPERTENSION, UNSPECIFIED TYPE: Primary | ICD-10-CM

## 2017-11-29 DIAGNOSIS — I11.9 HYPERTENSIVE HEART DISEASE WITHOUT HEART FAILURE: ICD-10-CM

## 2017-11-29 DIAGNOSIS — D64.9 ANEMIA, UNSPECIFIED TYPE: ICD-10-CM

## 2017-11-29 DIAGNOSIS — E66.01 MORBID OBESITY (HCC): ICD-10-CM

## 2017-11-29 DIAGNOSIS — E78.5 DYSLIPIDEMIA: ICD-10-CM

## 2017-11-29 DIAGNOSIS — I25.10 CORONARY ARTERY DISEASE INVOLVING NATIVE CORONARY ARTERY OF NATIVE HEART WITHOUT ANGINA PECTORIS: ICD-10-CM

## 2017-11-29 DIAGNOSIS — G47.33 SEVERE OBSTRUCTIVE SLEEP APNEA: ICD-10-CM

## 2017-11-29 DIAGNOSIS — I48.0 PAROXYSMAL ATRIAL FIBRILLATION (HCC): ICD-10-CM

## 2017-11-29 PROCEDURE — 93000 ELECTROCARDIOGRAM COMPLETE: CPT | Performed by: INTERNAL MEDICINE

## 2017-11-29 PROCEDURE — 99214 OFFICE O/P EST MOD 30 MIN: CPT | Performed by: INTERNAL MEDICINE

## 2017-11-29 RX ORDER — ASPIRIN 81 MG/1
81 TABLET ORAL DAILY
COMMUNITY
End: 2019-02-06 | Stop reason: HOSPADM

## 2017-11-29 NOTE — PROGRESS NOTES
Subjective:     Encounter Date:11/29/2017    Patient ID: Marco Antonio Montemayor is a 72 y.o.  white male, retired /current [employer] , from [place], [place].      PHYSICIAN: EDMUND Rosado MD  ELECTROPHYSIOLOGIST: Johnathan Taylor MD, [facility], [facility]  INTERVENTIONAL CARDIOLOGIST: Antonio Escamilla MD, [facility]    Chief Complaint:   Chief Complaint   Patient presents with   • Atrial Fibrillation   • Coronary Artery Disease     Problem List:  1. 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 Remote acceptable intravenous adenosine Quantitative SPECT gated Cardiolite study, LVEF 0.50, April 1999.  b. Echocardiogram showing mild LVH with estimated ejection fraction 0.58 with mild aortic valve cusp sclerosis, and mild TR, 09/23/2014.   c. Remote abnormal preoperative Quantitative SPECT gated Cardiolite study with mild “fixed” inferoposterior hypoperfusion and mild LV enlargement with mild global hypokinesia with reduced LVEF 0.40 in the setting of abnormal EKG with subsequent diagnostic coronary angiography demonstrating mild-to-moderate nonobstructive coronary artery atherosclerosis with mild compensated left ventricular dysfunction, LVEF 0.52, and continued medical therapy felt warranted, June 2006.  d. Remote hospitalization for symptomatic atrial fibrillation with rapid ventricular response and mild congestive heart failure requiring BRYANNA and DC cardioversion, June 2009.  e. Left heart catheterization with essentially normal coronary arteries with mild luminal irregularities with an EF of 0.60 by Dr. Escamilla for angina and elevated troponin, 06/05/2015.   f. Residual class I symptoms.  2. Chronic essential hypertension with recent progressive hypertension and blood pressure readings and normal selective bilateral renal angiography, June 2006.  3. Dyslipidemia.  4. Chronic atrial tachyarrhythmias:  a. Intermittent chronic paroxysmal atrial fibrillation with electrophysiologic study with RFA  for atrial flutter, October 1999.  b. Recurrent apparent transient atrial fibrillation, resolved with acceptable [facility] emergency department evaluation, June 2003.  c. Acceptable combination Doppler echocardiogram, July 2003, with apparent acceptable 24-hour Holter monitor, May 2006.  d. Recurrent asymptomatic atrial fibrillation with RVR, January 2007, subsequent Coumadin therapy and Tikosyn therapy with successful internal cardioversion of atrial fibrillation and marked bradyarrhythmias requiring DDDR pacemaker implant, [place] device, data not available, March 2007.  e. Remote hospitalization for symptomatic rapid atrial fibrillation/BRYANNA DC cardioversion with subsequent acceptable pacemaker interrogation, June/July 2009, as well as acceptable interrogation without reprogramming, July 2011.  f. Acceptable combination Doppler echocardiogram, December 2010, with residual class I symptoms.  g. Acceptable device interrogation following [facility] ED evaluation for chest pressure and shortness of breath, data deficit, May 2011, February 2013, February 2016, December 2016.  h. Abnormal pacemaker assessment with 8.4% mode switch with battery voltage of 1-1.25 years, June 2017.  5. Morbid obesity, BMI 41.6.  6. Ichthyosis.  7. Chronic lower tract obstructive symptoms, probable BPH.  8. Dyslipidemia.  9. Nasal polyps with deviated nasal septum with apparent subsequent nasal septoplasty/polypectomy, data deficit, July 2006.  10. Abdominal pain with apparent small bowel obstruction with exploratory laparotomy - data deficit.  11. Appendectomy with subsequent delayed hospitalization for bleeding peptic ulcer disease/probable duodenal ulcer, November 2003.  12. Recurrent asymptomatic atrial fibrillation with rapid ventricular response, July 2007, with subsequent Tikosyn therapy and DDDR pacemaker implant with intermittent recurrent breakthrough arrhythmias, including remote DC cardioversion, May 2008.  13. Obstructive sleep apnea with  "treatment.     No Known Allergies      Current Outpatient Prescriptions:   •  amLODIPine (NORVASC) 5 MG tablet, TAKE ONE TABLET BY MOUTH DAILY, Disp: 90 tablet, Rfl: 2  •  aspirin 81 MG EC tablet, Take 81 mg by mouth Daily., Disp: , Rfl:   •  atorvastatin (LIPITOR) 40 MG tablet, TAKE ONE TABLET BY MOUTH DAILY, Disp: 90 tablet, Rfl: 0  •  carvedilol (COREG) 25 MG tablet, Take 1 tablet by mouth 2 (Two) Times a Day With Meals., Disp: 180 tablet, Rfl: 3  •  cholecalciferol (VITAMIN D3) 1000 UNITS tablet, Take 1,000 Units by mouth daily., Disp: , Rfl:   •  dofetilide (TIKOSYN) 250 MCG capsule, TAKE ONE CAPSULE BY MOUTH TWICE A DAY, Disp: 60 capsule, Rfl: 4  •  doxazosin (CARDURA) 4 MG tablet, Take 1 tablet by mouth 2 (Two) Times a Day., Disp: 60 tablet, Rfl: 6  •  Ferrous Sulfate (IRON) 325 (65 FE) MG tablet, Take 65 mg by mouth Daily., Disp: , Rfl:   •  losartan (COZAAR) 100 MG tablet, Take 1 tablet by mouth Daily., Disp: 30 tablet, Rfl: 6  •  PHARMACY TO DOSE WARFARIN, Continuous As Needed (patient's warfarin is being managed by the [facility] ([phone]))., Disp: , Rfl:   •  torsemide (DEMADEX) 20 MG tablet, TAKE ONE TABLET BY MOUTH TWICE A DAY, Disp: 180 tablet, Rfl: 1  •  vitamin B-12 (CYANOCOBALAMIN) 1000 MCG tablet, Take 2,000 mcg by mouth daily., Disp: , Rfl:   •  warfarin (COUMADIN) 7.5 MG tablet, Take 1/2 to 1 tablet by mouth daily, as directed by the anticoagulation pharmacist (Patient taking differently: 7.5 mg Mon, Wed & Fri 3.75 mg the other days), Disp: 30 tablet, Rfl: 3    HISTORY OF PRESENT ILLNESS: Patient returns for scheduled 8-month followup. He states that he is doing okay heart-wise; he has had no chest pain, tightness, or pressure.  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  He has been sleeping well.  He believes he had lab work drawn about a year ago, but " "we do not have those results; he is not scheduled to see Dr. Rosado anytime soon.  He has not had influenza immunization and says \"I'm not a flu shot person.\"  The importance of the flu shot is discussed with him.  He is up and about on a daily basis and has no symptoms from a cardiopulmonary perspective with his activities.  He works for [employer] about 3 days a week.  Patient otherwise denies chest pain, shortness of breath, PND, edema, palpitations, syncope or presyncope at this time.        ROS   Obtained and otherwise negative except as outlined in problem list and HPI.      ECG 12 Lead  Date/Time: 11/29/2017 10:35 AM  Performed by: PAULINE AUSTIN  Authorized by: PAULINE AUSTIN   Comparison: not compared with previous ECG   Rhythm: sinus rhythm  BPM: 75  Clinical impression: abnormal ECG  Comments: Nonspecific ST-T changes   ms  QTc 433 ms   ms               Objective:       Vitals:    11/29/17 0930 11/29/17 0933   BP: 141/71 143/77   BP Location: Right arm Right arm   Patient Position: Sitting Standing   Pulse: 75 75   Weight: 290 lb 3.2 oz (132 kg)    Height: 70\" (177.8 cm)      Body mass index is 41.64 kg/(m^2).   Last weight:  300 lbs.    Physical Exam   Constitutional: He is oriented to person, place, and time. He appears well-developed and well-nourished.   Neck: No JVD present. Carotid bruit is not present. No thyromegaly present.   Cardiovascular: Regular rhythm, S1 normal and S2 normal.  Exam reveals distant heart sounds. Exam reveals no gallop, no S3 and no friction rub.    Murmur heard.   Medium-pitched early systolic murmur is present with a grade of 2/6  at the lower left sternal border  Pulses:       Carotid pulses are 1+ on the right side, and 1+ on the left side.       Radial pulses are 1+ on the right side, and 1+ on the left side.        Femoral pulses are 1+ on the right side, and 1+ on the left side.       Popliteal pulses are 1+ on the right side, and 1+ on the left " side.        Dorsalis pedis pulses are 1+ on the right side, and 1+ on the left side.        Posterior tibial pulses are 1+ on the right side, and 1+ on the left side.   Pulmonary/Chest: Effort normal. He has decreased breath sounds. He has no wheezes. He has no rhonchi. He has no rales.   Nominal left precordial pacemaker site   Abdominal: Soft. He exhibits no mass. There is no hepatosplenomegaly. There is no tenderness. There is no guarding.   Bowel sounds audible x4   Musculoskeletal: Normal range of motion. He exhibits edema (trace to 1+ bipedal).   Lymphadenopathy:     He has no cervical adenopathy.   Neurological: He is alert and oriented to person, place, and time.   Skin: Skin is warm, dry and intact. No rash noted.   Vitals reviewed.        Lab Review:   Lab Results   Component Value Date    GLUCOSE 141 (H) 12/31/2015    BUN 26 (H) 12/31/2015    CREATININE 1.6 (H) 12/31/2015    EGFRIFNONA 22 (L) 04/02/2015    EGFRIFAFRI 25 (L) 04/02/2015    BCR 18 04/02/2015    CO2 30 12/31/2015    CALCIUM 8.4 (L) 12/31/2015    PROTENTOTREF 6.8 04/02/2015    ALBUMIN 3.8 06/04/2015    LABIL2 1.3 04/02/2015    AST 22 06/04/2015    ALT 16 06/04/2015       Lab Results   Component Value Date    WBC 7.29 06/04/2015    HGB 10.2 (L) 06/04/2015    HCT 31.8 (L) 06/04/2015    MCV 93.8 06/04/2015     06/04/2015       Lab Results   Component Value Date    HGBA1C 5.9 06/04/2015       Lab Results   Component Value Date    TRIG 70 06/05/2015     Lab Results   Component Value Date    HDL 28 (L) 06/05/2015       Lab Results   Component Value Date    BNP 57 06/04/2015           Assessment:   Overall continued acceptable course with no interim cardiopulmonary complaints with acceptable functional status. We will defer additional diagnostic or therapeutic intervention from a cardiac perspective at this time.  We discussed the importance of influenza immunization with the patient in the office today and told him that it is not too late to  have that done.       Diagnosis Plan   1. Hypertension, unspecified type  Comprehensive Metabolic Panel    Hemoglobin A1c   2. Anemia, unspecified type  CBC & Differential    Hemoglobin A1c   3. Morbid obesity  TSH    Hemoglobin A1c   4. Dyslipidemia  Lipid Panel    Hemoglobin A1c   5. Anemia, unspecified type   TSH   6. Coronary artery disease involving native coronary artery of native heart without angina pectoris  No recurrent angina pectoris or CHF.     7. Other abnormal glucose   Hemoglobin A1c   8. Paroxysmal atrial fibrillation  Acceptable control   9. Hypertensive heart disease without heart failure  No recurrent angina pectoris or CHF.     10. Severe obstructive sleep apnea  Continued compliance recommended          Plan:         1. Patient to continue current medications and close follow up with the above providers.  2. The following laboratory studies will be ordered today:   A. CMP   B. CBC   C. FLP   D. TSH  3. Influenza immunization is strongly recommended.  4. Tentative cardiology follow up in July or August 2018, or patient may return sooner PRN.  5. Follow up as scheduled with Dr. Taylor in March 2018.       Transcribed by Raeann Newton for Dr. Dustin Mehta at 9:40 AM on 11/29/2017      I, Dustin Mehta MD, [facility], personally performed the services described in this documentation as scribed by the above named individual in my presence, and it is both accurate and complete. At 10:33 AM on 11/29/2017

## 2017-12-04 ENCOUNTER — RESULTS ENCOUNTER (OUTPATIENT)
Dept: CARDIOLOGY | Facility: CLINIC | Age: 72
End: 2017-12-04

## 2017-12-04 DIAGNOSIS — D64.9 ANEMIA, UNSPECIFIED TYPE: ICD-10-CM

## 2017-12-04 DIAGNOSIS — E78.5 DYSLIPIDEMIA: ICD-10-CM

## 2017-12-04 DIAGNOSIS — R73.09 OTHER ABNORMAL GLUCOSE: ICD-10-CM

## 2017-12-04 DIAGNOSIS — I25.10 CORONARY ARTERY DISEASE INVOLVING NATIVE CORONARY ARTERY OF NATIVE HEART WITHOUT ANGINA PECTORIS: ICD-10-CM

## 2017-12-04 DIAGNOSIS — I10 HYPERTENSION, UNSPECIFIED TYPE: ICD-10-CM

## 2017-12-04 DIAGNOSIS — E66.01 MORBID OBESITY (HCC): ICD-10-CM

## 2017-12-08 ENCOUNTER — ANTICOAGULATION VISIT (OUTPATIENT)
Dept: PHARMACY | Facility: HOSPITAL | Age: 72
End: 2017-12-08

## 2017-12-08 ENCOUNTER — LAB REQUISITION (OUTPATIENT)
Dept: LAB | Facility: HOSPITAL | Age: 72
End: 2017-12-08

## 2017-12-08 DIAGNOSIS — I25.10 ATHEROSCLEROTIC HEART DISEASE OF NATIVE CORONARY ARTERY WITHOUT ANGINA PECTORIS: ICD-10-CM

## 2017-12-08 DIAGNOSIS — R73.09 OTHER ABNORMAL GLUCOSE: ICD-10-CM

## 2017-12-08 DIAGNOSIS — E78.5 HYPERLIPIDEMIA: ICD-10-CM

## 2017-12-08 DIAGNOSIS — I10 ESSENTIAL (PRIMARY) HYPERTENSION: ICD-10-CM

## 2017-12-08 DIAGNOSIS — I48.0 PAROXYSMAL ATRIAL FIBRILLATION (HCC): ICD-10-CM

## 2017-12-08 DIAGNOSIS — E66.01 MORBID (SEVERE) OBESITY DUE TO EXCESS CALORIES (HCC): ICD-10-CM

## 2017-12-08 DIAGNOSIS — Z00.00 ROUTINE GENERAL MEDICAL EXAMINATION AT A HEALTH CARE FACILITY: ICD-10-CM

## 2017-12-08 DIAGNOSIS — D64.9 ANEMIA: ICD-10-CM

## 2017-12-08 LAB
INR PPP: 2.5 (ref 0.91–1.09)
PROTHROMBIN TIME: 29.9 SECONDS (ref 10–13.8)

## 2017-12-08 PROCEDURE — 85610 PROTHROMBIN TIME: CPT

## 2017-12-08 PROCEDURE — 36416 COLLJ CAPILLARY BLOOD SPEC: CPT

## 2017-12-08 PROCEDURE — G0463 HOSPITAL OUTPT CLINIC VISIT: HCPCS

## 2017-12-08 PROCEDURE — 36415 COLL VENOUS BLD VENIPUNCTURE: CPT | Performed by: INTERNAL MEDICINE

## 2017-12-08 NOTE — PROGRESS NOTES
Anticoagulation Clinic Progress Note  Indication: afib  Referring Provider: Claudia Mehta  Initial Warfarin Start Date: 2007  Goal INR: 2-3  Current Drug Interactions: aspirin    Diet: leafy head lettuce salads every now and then (thinks non-iceberg)    Anticoagulation Clinic INR History:  Date 11/14 12/6 1/25 2/22 3/22 4/11 5/9 6/8 6/29 8/3 8/31 9/14 9/28   Total Weekly Dose 37.5 mg 37.5 mg 37.5 mg 37.5 mg 37.5 mg 37.5 mg 37.5 mg 37.5mg 37.5 mg 37.5 mg 26.25mg 37.5 mg 37.5 mg   INR 2.4 2.1 2.0 3.1 2.4 2.6 2.7 2.2 2.5 3.0 1.7 3.2 2.3     Date 10/23 11/20 12/8             Total Weekly Dose 37.5mg 37.5 mg 37.5 mg             INR 2.5 1.8 2.5             Notes                  Clinic Interview:  Tablet Strength: pt has 7.5mg tablets  Current Maintenance Dose: 3.75mg daily except 7.5mg MWF  Takes warfarin in the AM  Patient Findings      Negatives Signs/symptoms of thrombosis, Signs/symptoms of bleeding, Laboratory test error suspected, Change in health, Change in alcohol use, Change in activity, Upcoming invasive procedure, Emergency department visit, Upcoming dental procedure, Missed doses, Extra doses, Change in medications, Change in diet/appetite, Hospital admission, Bruising, Other complaints     Plan:  1. INR is therapeutic today. Instructed Mr. Montemayor continue warfarin 3.75mg daily except 7.5 mg MWF.  2. RTC in 4 weeks.  3. Pt denies recent medication changes, declines warfarin refills.  4. Verbal and written information provided. Pt expresses understanding with teach back and has no further questions at this time.    Shanell Arango [facility]  12/8/2017  10:26 AM

## 2017-12-09 LAB
ALBUMIN SERPL-MCNC: 3.7 G/DL (ref 3.2–4.8)
ALBUMIN/GLOB SERPL: 1.5 G/DL (ref 1.5–2.5)
ALP SERPL-CCNC: 95 U/L (ref 25–100)
ALT SERPL-CCNC: 15 U/L (ref 7–40)
AST SERPL-CCNC: 21 U/L (ref 0–33)
BASOPHILS # BLD AUTO: 0.02 10*3/MM3 (ref 0–0.2)
BASOPHILS NFR BLD AUTO: 0.3 % (ref 0–1)
BILIRUB SERPL-MCNC: 0.5 MG/DL (ref 0.3–1.2)
BUN SERPL-MCNC: 21 MG/DL (ref 9–23)
BUN/CREAT SERPL: 13.1 (ref 7–25)
CALCIUM SERPL-MCNC: 8.5 MG/DL (ref 8.7–10.4)
CHLORIDE SERPL-SCNC: 111 MMOL/L (ref 99–109)
CHOLEST SERPL-MCNC: 124 MG/DL (ref 0–200)
CO2 SERPL-SCNC: 28 MMOL/L (ref 20–31)
CREAT SERPL-MCNC: 1.6 MG/DL (ref 0.6–1.3)
EOSINOPHIL # BLD AUTO: 0.22 10*3/MM3 (ref 0–0.3)
EOSINOPHIL NFR BLD AUTO: 2.9 % (ref 0–3)
ERYTHROCYTE [DISTWIDTH] IN BLOOD BY AUTOMATED COUNT: 14.6 % (ref 11.3–14.5)
GFR SERPLBLD CREATININE-BSD FMLA CKD-EPI: 42 ML/MIN/1.73
GFR SERPLBLD CREATININE-BSD FMLA CKD-EPI: 51 ML/MIN/1.73
GLOBULIN SER CALC-MCNC: 2.5 GM/DL
GLUCOSE SERPL-MCNC: 118 MG/DL (ref 70–100)
HBA1C MFR BLD: 6.3 % (ref 4.8–5.6)
HCT VFR BLD AUTO: 33.6 % (ref 38.9–50.9)
HDLC SERPL-MCNC: 31 MG/DL (ref 40–60)
HGB BLD-MCNC: 10.6 G/DL (ref 13.1–17.5)
IMM GRANULOCYTES # BLD: 0.02 10*3/MM3 (ref 0–0.03)
IMM GRANULOCYTES NFR BLD: 0.3 % (ref 0–0.6)
LDLC SERPL CALC-MCNC: 72 MG/DL (ref 0–100)
LYMPHOCYTES # BLD AUTO: 1.43 10*3/MM3 (ref 0.6–4.8)
LYMPHOCYTES NFR BLD AUTO: 18.6 % (ref 24–44)
MCH RBC QN AUTO: 29.5 PG (ref 27–31)
MCHC RBC AUTO-ENTMCNC: 31.5 G/DL (ref 32–36)
MCV RBC AUTO: 93.6 FL (ref 80–99)
MONOCYTES # BLD AUTO: 0.6 10*3/MM3 (ref 0–1)
MONOCYTES NFR BLD AUTO: 7.8 % (ref 0–12)
NEUTROPHILS # BLD AUTO: 5.38 10*3/MM3 (ref 1.5–8.3)
NEUTROPHILS NFR BLD AUTO: 70.1 % (ref 41–71)
PLATELET # BLD AUTO: 204 10*3/MM3 (ref 150–450)
POTASSIUM SERPL-SCNC: 4.2 MMOL/L (ref 3.5–5.5)
PROT SERPL-MCNC: 6.2 G/DL (ref 5.7–8.2)
RBC # BLD AUTO: 3.59 10*6/MM3 (ref 4.2–5.76)
SODIUM SERPL-SCNC: 145 MMOL/L (ref 132–146)
TRIGL SERPL-MCNC: 104 MG/DL (ref 0–150)
TSH SERPL DL<=0.005 MIU/L-ACNC: 2.41 MIU/ML (ref 0.35–5.35)
VLDLC SERPL CALC-MCNC: 20.8 MG/DL
WBC # BLD AUTO: 7.67 10*3/MM3 (ref 3.5–10.8)

## 2017-12-11 ENCOUNTER — DOCUMENTATION (OUTPATIENT)
Dept: SLEEP MEDICINE | Facility: HOSPITAL | Age: 72
End: 2017-12-11

## 2017-12-11 NOTE — PROGRESS NOTES
Patient's wife called stating that [facility] had never received an order or notes from April follow-up with Dr Martinez.   Notes, insurance card sent [employer] with order faxed directly today, 12/11/17 per patient request.

## 2017-12-15 RX ORDER — ATORVASTATIN CALCIUM 40 MG/1
TABLET, FILM COATED ORAL
Qty: 90 TABLET | Refills: 0 | Status: SHIPPED | OUTPATIENT
Start: 2017-12-15 | End: 2018-03-19 | Stop reason: SDUPTHER

## 2018-01-05 ENCOUNTER — ANTICOAGULATION VISIT (OUTPATIENT)
Dept: PHARMACY | Facility: HOSPITAL | Age: 73
End: 2018-01-05

## 2018-01-05 DIAGNOSIS — I48.0 PAROXYSMAL ATRIAL FIBRILLATION (HCC): ICD-10-CM

## 2018-01-05 LAB
INR PPP: 2.3 (ref 0.91–1.09)
PROTHROMBIN TIME: 28.1 SECONDS (ref 10–13.8)

## 2018-01-05 PROCEDURE — G0463 HOSPITAL OUTPT CLINIC VISIT: HCPCS

## 2018-01-05 PROCEDURE — 85610 PROTHROMBIN TIME: CPT

## 2018-01-05 PROCEDURE — 36416 COLLJ CAPILLARY BLOOD SPEC: CPT

## 2018-01-05 NOTE — PROGRESS NOTES
Anticoagulation Clinic Progress Note  Indication: afib  Referring Provider: Claudia Mehta  Initial Warfarin Start Date: 2007  Goal INR: 2-3  Current Drug Interactions: aspirin    Diet: leafy head lettuce salads every now and then (thinks non-iceberg)    Anticoagulation Clinic INR History:  Date 11/14 12/6 1/25 2/22 3/22 4/11 5/9 6/8 6/29 8/3 8/31 9/14 9/28   Total Weekly Dose 37.5 mg 37.5 mg 37.5 mg 37.5 mg 37.5 mg 37.5 mg 37.5 mg 37.5mg 37.5 mg 37.5 mg 26.25mg 37.5 mg 37.5 mg   INR 2.4 2.1 2.0 3.1 2.4 2.6 2.7 2.2 2.5 3.0 1.7 3.2 2.3     Date 10/23 11/20 12/8 1/5/18            Total Weekly Dose 37.5mg 37.5 mg 37.5 mg 37.5 mg            INR 2.5 1.8 2.5 2.3              Clinic Interview:  Tablet Strength: pt has 7.5mg tablets  Current Maintenance Dose: 3.75mg daily except 7.5mg MWF  Takes warfarin in the AM  Patient Findings      Negatives Signs/symptoms of thrombosis, Signs/symptoms of bleeding, Laboratory test error suspected, Change in health, Change in alcohol use, Change in activity, Upcoming invasive procedure, Emergency department visit, Upcoming dental procedure, Missed doses, Extra doses, Change in medications, Change in diet/appetite, Hospital admission, Bruising, Other complaints     Comments Recent labwork reveals slight anemia, but this has been an issue previously. No treatment plan discussed, as of yet.      Plan:  1. INR is therapeutic again today, so instructed Mr. Montemayor to continue warfarin 3.75mg daily except 7.5 mg MWF.  2. RTC in 4 weeks.  3. Pt denies recent medication changes, declines warfarin refills.  4. Verbal and written information provided in clinic. Mr. Montemayor expresses understanding with teach back and has no further questions at this time.    Shanell Arango [facility]  1/5/2018  10:13 AM

## 2018-01-25 DIAGNOSIS — I48.91 ATRIAL FIBRILLATION, UNSPECIFIED TYPE (HCC): ICD-10-CM

## 2018-01-25 RX ORDER — WARFARIN SODIUM 7.5 MG/1
TABLET ORAL
Qty: 30 TABLET | Refills: 2 | Status: SHIPPED | OUTPATIENT
Start: 2018-01-25 | End: 2018-06-15 | Stop reason: SDUPTHER

## 2018-01-29 RX ORDER — LOSARTAN POTASSIUM 100 MG/1
TABLET ORAL
Qty: 30 TABLET | Refills: 5 | Status: SHIPPED | OUTPATIENT
Start: 2018-01-29 | End: 2018-08-03 | Stop reason: SDUPTHER

## 2018-02-02 ENCOUNTER — ANTICOAGULATION VISIT (OUTPATIENT)
Dept: PHARMACY | Facility: HOSPITAL | Age: 73
End: 2018-02-02

## 2018-02-02 DIAGNOSIS — I48.0 PAROXYSMAL ATRIAL FIBRILLATION (HCC): ICD-10-CM

## 2018-02-02 LAB
INR PPP: 2.1 (ref 0.91–1.09)
PROTHROMBIN TIME: 24.8 SECONDS (ref 10–13.8)

## 2018-02-02 PROCEDURE — 36416 COLLJ CAPILLARY BLOOD SPEC: CPT

## 2018-02-02 PROCEDURE — G0463 HOSPITAL OUTPT CLINIC VISIT: HCPCS

## 2018-02-02 PROCEDURE — 85610 PROTHROMBIN TIME: CPT

## 2018-02-02 NOTE — PROGRESS NOTES
Anticoagulation Clinic Progress Note  Indication: afib  Referring Provider: Claudia Mehta  Initial Warfarin Start Date: 2007  Goal INR: 2-3  Current Drug Interactions: aspirin    Diet: leafy head lettuce salads every now and then (thinks non-iceberg)    Anticoagulation Clinic INR History:  Date 11/14 12/6 1/25 2/22 3/22 4/11 5/9 6/8 6/29 8/3 8/31 9/14 9/28   Total Weekly Dose 37.5 mg 37.5 mg 37.5 mg 37.5 mg 37.5 mg 37.5 mg 37.5 mg 37.5mg 37.5 mg 37.5 mg 26.25mg 37.5 mg 37.5 mg   INR 2.4 2.1 2.0 3.1 2.4 2.6 2.7 2.2 2.5 3.0 1.7 3.2 2.3     Date 10/23 11/20 12/8 1/5 2/2           Total Weekly Dose 37.5mg 37.5 mg 37.5 mg 37.5 mg 37.5mg           INR 2.5 1.8 2.5 2.3 2.1             Clinic Interview:  Tablet Strength: pt has 7.5mg tablets  Current Maintenance Dose: 3.75mg daily except 7.5mg MWF  Takes warfarin in the AM  Patient Findings      Negatives Signs/symptoms of thrombosis, Signs/symptoms of bleeding, Laboratory test error suspected, Change in health, Change in alcohol use, Change in activity, Upcoming invasive procedure, Emergency department visit, Upcoming dental procedure, Missed doses, Extra doses, Change in medications, Change in diet/appetite, Hospital admission, Bruising, Other complaints     Comments Recent labwork reveals slight anemia, but this has been an issue previously. He reports that he has resumed his iron supplement. Discussed can cause stools to turn black. If notices changes, and is concerned for bleed can let sit in commode and may see some red. He does not report history of stomach bleeding.     Plan:  1. INR is therapeutic again today, so instructed Mr. Montemayor to continue warfarin 3.75mg daily except 7.5 mg MWF.  2. RTC in 4 weeks.  3. Pt denies recent medication changes, declines warfarin refills.  4. Verbal and written information provided in clinic. Mr. Montemayor expresses understanding with teach back and has no further questions at this time.    Evelina Quan,  PharmD  2/2/2018  10:12 AM

## 2018-02-05 RX ORDER — DOFETILIDE 0.25 MG/1
CAPSULE ORAL
Qty: 60 CAPSULE | Refills: 11 | Status: SHIPPED | OUTPATIENT
Start: 2018-02-05 | End: 2018-09-23 | Stop reason: HOSPADM

## 2018-03-01 ENCOUNTER — OFFICE VISIT (OUTPATIENT)
Dept: FAMILY MEDICINE CLINIC | Facility: CLINIC | Age: 73
End: 2018-03-01

## 2018-03-01 VITALS
SYSTOLIC BLOOD PRESSURE: 140 MMHG | TEMPERATURE: 100.1 F | BODY MASS INDEX: 41.61 KG/M2 | DIASTOLIC BLOOD PRESSURE: 80 MMHG | RESPIRATION RATE: 16 BRPM | OXYGEN SATURATION: 91 % | HEART RATE: 76 BPM | WEIGHT: 290 LBS

## 2018-03-01 DIAGNOSIS — N28.9 RENAL INSUFFICIENCY: ICD-10-CM

## 2018-03-01 DIAGNOSIS — J06.9 ACUTE URI: Primary | ICD-10-CM

## 2018-03-01 PROCEDURE — 99214 OFFICE O/P EST MOD 30 MIN: CPT | Performed by: FAMILY MEDICINE

## 2018-03-01 RX ORDER — AMOXICILLIN 500 MG/1
500 CAPSULE ORAL 3 TIMES DAILY
Qty: 30 CAPSULE | Refills: 0 | Status: SHIPPED | OUTPATIENT
Start: 2018-03-01 | End: 2018-03-11

## 2018-03-01 NOTE — PROGRESS NOTES
Subjective   Marco Antonio Montemayor is a 72 y.o. male.     History of Present Illness   Five days of head congestion, feverish. Some cough with phlem.  Not resting well even with C-pap. Took Tylenol and Cold and Flu decongestant.   Cardiology evaluation found mild anemia Hb 10.6, elevated creatinine 1.6 renal function fairly steady over past three years.  A1c 6.3%.  The following portions of the patient's history were reviewed and updated as appropriate: allergies, current medications, past family history, past medical history, past social history, past surgical history and problem list.    Review of Systems   Constitutional: Positive for fever.   HENT: Positive for congestion.    Respiratory: Positive for cough and shortness of breath.    Cardiovascular: Negative.    Gastrointestinal: Negative.    Musculoskeletal: Positive for myalgias.   Neurological: Positive for headaches.       Objective   Physical Exam   Constitutional: He appears well-developed and well-nourished.   HENT:   Right Ear: External ear normal.   Left Ear: External ear normal.   Nasal congestion   Neck: Neck supple.   Pulmonary/Chest: He has rhonchi in the left middle field.   Lymphadenopathy:     He has no cervical adenopathy.   Vitals reviewed.      Assessment/Plan   Marco Antonio was seen today for uri.    Diagnoses and all orders for this visit:    Acute URI  -     amoxicillin (AMOXIL) 500 MG capsule; Take 1 capsule by mouth 3 (Three) Times a Day for 10 days. Take 1 cap three times a day    Renal insufficiency      Consider colonoscopy to evaluate that system with the borderline anemia.  Avoid ibuprofen.

## 2018-03-02 ENCOUNTER — ANTICOAGULATION VISIT (OUTPATIENT)
Dept: PHARMACY | Facility: HOSPITAL | Age: 73
End: 2018-03-02

## 2018-03-02 DIAGNOSIS — I48.0 PAROXYSMAL ATRIAL FIBRILLATION (HCC): ICD-10-CM

## 2018-03-02 LAB
INR PPP: 1.7 (ref 0.91–1.09)
PROTHROMBIN TIME: 19.8 SECONDS (ref 10–13.8)

## 2018-03-02 PROCEDURE — G0463 HOSPITAL OUTPT CLINIC VISIT: HCPCS | Performed by: PHARMACIST

## 2018-03-02 PROCEDURE — 36416 COLLJ CAPILLARY BLOOD SPEC: CPT

## 2018-03-02 PROCEDURE — 85610 PROTHROMBIN TIME: CPT

## 2018-03-02 NOTE — PROGRESS NOTES
Anticoagulation Clinic Progress Note  Indication: afib  Referring Provider: Claudia Mehta  Initial Warfarin Start Date: 2007  Goal INR: 2-3  Current Drug Interactions: aspirin    Diet: leafy head lettuce salads every now and then (thinks non-iceberg)    Anticoagulation Clinic INR History:  Date 11/14 12/6 1/25 2/22 3/22 4/11 5/9 6/8 6/29 8/3 8/31 9/14 9/28   Total Weekly Dose 37.5 mg 37.5 mg 37.5 mg 37.5 mg 37.5 mg 37.5 mg 37.5 mg 37.5mg 37.5 mg 37.5 mg 26.25mg 37.5 mg 37.5 mg   INR 2.4 2.1 2.0 3.1 2.4 2.6 2.7 2.2 2.5 3.0 1.7 3.2 2.3     Date 10/23 11/20 12/8 1/5 2/2 3/2          Total Weekly Dose 37.5mg 37.5 mg 37.5 mg 37.5 mg 37.5mg 37.5 mg          INR 2.5 1.8 2.5 2.3 2.1 1.7          Notes      (boost), amox            Clinic Interview:  Tablet Strength: pt has 7.5mg tablets  Current Maintenance Dose: 3.75mg daily except 7.5mg MWF  Takes warfarin in the AM    Patient Findings      Positives Change in medications     Negatives Signs/symptoms of thrombosis, Signs/symptoms of bleeding, Laboratory test error suspected, Change in health, Change in alcohol use, Change in activity, Upcoming invasive procedure, Emergency department visit, Upcoming dental procedure, Missed doses, Extra doses, Change in diet/appetite, Hospital admission, Bruising, Other complaints     Comments Started amoxicillin for 10 days yesterday. He hasn't been feeling well and hasn't been eating quite as much in the past couple of days.     Plan:  1. INR is subtherapeutic today at 1.7, so instructed Mr. Montemayor to BOOST tomorrow's dose to 7.5 mg (10% for this week)  and continue warfarin 3.75mg daily except 7.5 mg MWF. If he continues to be subtherapeutic he may need an increase in maintenance dose. We will currently attribute to his acute illness, although it would usually cause more of an increase in INR rather than lowering it. The amoxicillin should not greatly interfere with warfarin or INR and will be for 10 days.  2. RTC in 2 weeks.  3.  Pt denies any other recent medication changes, declines warfarin refills.  4. Verbal and written information provided in clinic. Mr. Montemayor expresses understanding with teach back and has no further questions at this time.    Peng Melgar SANDHYA  3/2/2018  10:26 AM

## 2018-03-07 ENCOUNTER — TELEPHONE (OUTPATIENT)
Dept: CARDIOLOGY | Facility: CLINIC | Age: 73
End: 2018-03-07

## 2018-03-07 ENCOUNTER — OFFICE VISIT (OUTPATIENT)
Dept: CARDIOLOGY | Facility: CLINIC | Age: 73
End: 2018-03-07

## 2018-03-07 VITALS
DIASTOLIC BLOOD PRESSURE: 64 MMHG | SYSTOLIC BLOOD PRESSURE: 120 MMHG | BODY MASS INDEX: 41.52 KG/M2 | WEIGHT: 290 LBS | HEART RATE: 92 BPM | HEIGHT: 70 IN

## 2018-03-07 DIAGNOSIS — I49.5 SSS (SICK SINUS SYNDROME) (HCC): ICD-10-CM

## 2018-03-07 DIAGNOSIS — I10 ESSENTIAL HYPERTENSION: ICD-10-CM

## 2018-03-07 DIAGNOSIS — I48.19 PERSISTENT ATRIAL FIBRILLATION (HCC): Primary | ICD-10-CM

## 2018-03-07 PROCEDURE — 99213 OFFICE O/P EST LOW 20 MIN: CPT | Performed by: PHYSICIAN ASSISTANT

## 2018-03-07 PROCEDURE — 93280 PM DEVICE PROGR EVAL DUAL: CPT | Performed by: PHYSICIAN ASSISTANT

## 2018-03-07 NOTE — PROGRESS NOTES
Marco Antonio LITTLE Sim  1945  [phone]  [phone]    03/07/2018    [facility] CARDIOLOGY     Emil Rosado MD  [address]  [place] [address]    Chief Complaint   Patient presents with   • Atrial Fibrillation         PROBLEM LIST:  1. Paroxysmal atrial fibrillation:  a. History of 2 external cardioversions to normal sinus rhythm, last being 08/19/1999.   b. Rythmol therapy since 1999, chronic Coumadin.   c. Echocardiogram on 07/14/2003 with LA 4.2, trace MR, TR. EF 68%.   d. Initiation of Tikosyn therapy, February 2007, after successful internal cardioversion of atrial fibrillation on 02/15/2007.  e. ECV restoring normal sinus rhythm on 05/08/2008.   f. Recurrent atrial fibrillation with subsequent external cardioversion, 6/08/2009.  g. Echocardiogram, 06/05/2015: EF 60% to 65%. Diastolic dysfunction. Left atrium mild to moderately dilated. No valvular abnormalities.  2. Atrial flutter:  a. EP study with RFA in October 1999, Dr. Rubalcava.   3. Hypertensive cardiovascular disease:  a. LHC in June 2006 with nonobstructive CAD: EF 52%.   4. Hypertension.   5. Dyslipidemia.   6. BPH.   7. ZULEIKA on CPAP/BiPAP  8. Peptic ulcer disease.   9. Surgical history:  a. Appendectomy.  b. Nasal polyp.  10. Acute kidney injury, April 2014.     Allergies  No Known Allergies    Current Medications    Current Outpatient Prescriptions:   •  amLODIPine (NORVASC) 5 MG tablet, TAKE ONE TABLET BY MOUTH DAILY, Disp: 90 tablet, Rfl: 2  •  amoxicillin (AMOXIL) 500 MG capsule, Take 1 capsule by mouth 3 (Three) Times a Day for 10 days. Take 1 cap three times a day, Disp: 30 capsule, Rfl: 0  •  aspirin 81 MG EC tablet, Take 81 mg by mouth Daily., Disp: , Rfl:   •  atorvastatin (LIPITOR) 40 MG tablet, TAKE ONE TABLET BY MOUTH DAILY, Disp: 90 tablet, Rfl: 0  •  carvedilol (COREG) 25 MG tablet, Take 1 tablet by mouth 2 (Two) Times a Day With Meals., Disp: 180 tablet, Rfl: 3  •   cholecalciferol (VITAMIN D3) 1000 UNITS tablet, Take 1,000 Units by mouth daily., Disp: , Rfl:   •  dofetilide (TIKOSYN) 250 MCG capsule, TAKE ONE CAPSULE BY MOUTH TWICE A DAY, Disp: 60 capsule, Rfl: 11  •  doxazosin (CARDURA) 4 MG tablet, Take 1 tablet by mouth 2 (Two) Times a Day., Disp: 60 tablet, Rfl: 6  •  Ferrous Sulfate (IRON) 325 (65 FE) MG tablet, Take 65 mg by mouth Daily., Disp: , Rfl:   •  losartan (COZAAR) 100 MG tablet, TAKE ONE TABLET BY MOUTH DAILY, Disp: 30 tablet, Rfl: 5  •  PHARMACY TO DOSE WARFARIN, Continuous As Needed (patient's warfarin is being managed by the [facility] ([phone]))., Disp: , Rfl:   •  torsemide (DEMADEX) 20 MG tablet, TAKE ONE TABLET BY MOUTH TWICE A DAY, Disp: 180 tablet, Rfl: 1  •  vitamin B-12 (CYANOCOBALAMIN) 1000 MCG tablet, Take 2,000 mcg by mouth daily., Disp: , Rfl:   •  warfarin (COUMADIN) 7.5 MG tablet, TAKE ONE-HALF TO ONE TABLET BY MOUTH DAILY AS DIECTED BY ANTICOAGULATION PHARMACIST, Disp: 30 tablet, Rfl: 2    History of Present Illness   HPI    Pt presents for follow up of atrial fibrillation, bradycardia, and PM check. Since we last saw the pt, he has had some atrial fibrillation and in fact, is in atrial fibrillation today. He feels tired, fatigued, and SOB. He had an URI last week and started taking Amoxicillin. He states his atrial fibrillation started 12-18 hours after starting his antibiotic. He did not receive steroids. He has been coughing a lot the last week. He denies CP, LH, and dizziness/syncope. His INRs have been good until last Friday, it was 1.7. His coumadin was increased.  Denies any hospitalizations, ER visits, bleeding, or TIA/CVA symptoms. BP has been good for the most part.     ROS:  General:  + fatigue, - weight gain or loss  Cardiovascular:  Denies CP, PND, syncope, near syncope, edema or palpitations.  Pulmonary:  + BLACKWELL, + cough, + wheezing      Vitals:    03/07/18 1123   BP: 120/64   BP Location:  "Right arm   Patient Position: Sitting   Pulse: 92   Weight: 132 kg (290 lb)   Height: 177.8 cm (70\")     Body mass index is 41.61 kg/(m^2).  PE:  General: NAD. A & O x 3  Neck: no JVD, no carotid bruits, no TM  Heart irr irr, NL S1, S2, no rubs, murmurs  Lungs: CTA, no wheezes, rhonchi, or rales  Abd: soft, non-tender, NL BS  Ext: No musculoskeletal deformities, no edema, cyanosis, or clubbing  Psych: normal mood and affect    Diagnostic Data:    PM check: >99% A paced. 39% V paced. 15% atrial fibrillation. Has been in afib the last 5 days. 3 months til MARION.     Procedures    1. Persistent atrial fibrillation    2. SSS (sick sinus syndrome)    3. Essential hypertension          Plan:  1) Persistent AF - On tikoysn 250 mcg BID,  has been in afib the last 5 days possibly triggered by URI.   We will have him come for device check only on 3/16/18 when he gets his INR redrawn. If he does not convert on his own, he will require ECV.   Continue present medications.   2) Anticoagulation  Continue warfarin. INRs followed by [facility]. Most recent INR 1.7. Planned to have INR 3/16/18.   3) Bradycardia - normal PM function. 3 months til MARION. Schedule at same time as ECV (if needed) or if ECV not done, will schedule in 2 months.   4) HTN- controlled  Wt loss, exercise, salt reduction    F/up in 6 months    [facility]  3/7/2018   11:59 AM  I saw this patient independently.     "

## 2018-03-07 NOTE — TELEPHONE ENCOUNTER
Per Jessica Dunaway PAC, the patient will be coming for a device check only on 3/16/18. If he is still in a-fib he will need an ECV and generator change the next week. If no ECV needed, set up a generator change in 2 months.

## 2018-03-16 ENCOUNTER — CLINICAL SUPPORT NO REQUIREMENTS (OUTPATIENT)
Dept: CARDIOLOGY | Facility: CLINIC | Age: 73
End: 2018-03-16

## 2018-03-16 ENCOUNTER — ANTICOAGULATION VISIT (OUTPATIENT)
Dept: PHARMACY | Facility: HOSPITAL | Age: 73
End: 2018-03-16

## 2018-03-16 DIAGNOSIS — I48.91 ATRIAL FIBRILLATION, UNSPECIFIED TYPE (HCC): Primary | ICD-10-CM

## 2018-03-16 DIAGNOSIS — I48.19 PERSISTENT ATRIAL FIBRILLATION (HCC): ICD-10-CM

## 2018-03-16 LAB
INR PPP: 3.2 (ref 0.91–1.09)
PROTHROMBIN TIME: 38.9 SECONDS (ref 10–13.8)

## 2018-03-16 PROCEDURE — 36416 COLLJ CAPILLARY BLOOD SPEC: CPT

## 2018-03-16 PROCEDURE — 85610 PROTHROMBIN TIME: CPT

## 2018-03-16 PROCEDURE — G0463 HOSPITAL OUTPT CLINIC VISIT: HCPCS

## 2018-03-16 NOTE — PROGRESS NOTES
Anticoagulation Clinic Progress Note  Indication: afib  Referring Provider: Claudia Mehta  Initial Warfarin Start Date: 2007  Goal INR: 2-3  Current Drug Interactions: aspirin    Diet: leafy head lettuce salads every now and then (thinks non-iceberg)    Anticoagulation Clinic INR History:  Date 11/14 12/6 1/25 2/22 3/22 4/11 5/9 6/8 6/29 8/3 8/31 9/14 9/28   Total Weekly Dose 37.5 mg 37.5 mg 37.5 mg 37.5 mg 37.5 mg 37.5 mg 37.5 mg 37.5mg 37.5 mg 37.5 mg 26.25mg 37.5 mg 37.5 mg   INR 2.4 2.1 2.0 3.1 2.4 2.6 2.7 2.2 2.5 3.0 1.7 3.2 2.3     Date 10/23 11/20 12/8 1/5 2/2 3/2 3/16         Total Weekly Dose 37.5mg 37.5 mg 37.5 mg 37.5 mg 37.5mg 37.5 mg 37.5 mg         INR 2.5 1.8 2.5 2.3 2.1 1.7 3.2         Notes      (boost), amox amox           Clinic Interview:  Tablet Strength: pt has 7.5mg tablets  Current Maintenance Dose: 3.75mg daily except 7.5mg MWF  Takes warfarin in the AM  Patient Findings   Positives Change in medications, Change in diet/appetite   Negatives Signs/symptoms of thrombosis, Signs/symptoms of bleeding, Laboratory test error suspected, Change in health, Change in alcohol use, Change in activity, Upcoming invasive procedure, Emergency department visit, Upcoming dental procedure, Missed doses, Extra doses, Hospital admission, Bruising, Other complaints   Comments Mr. Montemayor is back in NSR as of last week -- he will therefore likely be scheduled for a PM generator change in about two months (he has follow up with cardiology immediately following this appt). He otherwise reports that he finished amoxicillin this Wednesday, and his diet is back to normal.      Plan:  1. INR is slightly SUPRAtherapeutic today, possibly a result of recent boost dose + abx. For now, instructed Mr. Montemayor to have an extra serving of GLV tonight, and continue his usual maintenance dose, warfarin 3.75mg daily except 7.5mg MWF.  2. RTC in 2 weeks to ensure INR back WNL.  3. Verbal and written information provided in  clinic. Mr. Montemayor expresses understanding with teach back and has no further questions at this time.    Shanell Arango [facility]  3/16/2018  9:58 AM

## 2018-03-19 RX ORDER — ATORVASTATIN CALCIUM 40 MG/1
TABLET, FILM COATED ORAL
Qty: 90 TABLET | Refills: 3 | Status: SHIPPED | OUTPATIENT
Start: 2018-03-19 | End: 2019-04-04 | Stop reason: SDUPTHER

## 2018-03-22 RX ORDER — TORSEMIDE 20 MG/1
20 TABLET ORAL 2 TIMES DAILY
Qty: 180 TABLET | Refills: 3 | Status: SHIPPED | OUTPATIENT
Start: 2018-03-22 | End: 2019-05-14

## 2018-03-30 ENCOUNTER — ANTICOAGULATION VISIT (OUTPATIENT)
Dept: PHARMACY | Facility: HOSPITAL | Age: 73
End: 2018-03-30

## 2018-03-30 DIAGNOSIS — I48.19 PERSISTENT ATRIAL FIBRILLATION (HCC): ICD-10-CM

## 2018-03-30 LAB
INR PPP: 2.1 (ref 0.91–1.09)
PROTHROMBIN TIME: 24.9 SECONDS (ref 10–13.8)

## 2018-03-30 PROCEDURE — G0463 HOSPITAL OUTPT CLINIC VISIT: HCPCS

## 2018-03-30 PROCEDURE — 85610 PROTHROMBIN TIME: CPT

## 2018-03-30 PROCEDURE — 36416 COLLJ CAPILLARY BLOOD SPEC: CPT

## 2018-03-30 NOTE — PROGRESS NOTES
Anticoagulation Clinic Progress Note  Indication: afib  Referring Provider: Claudia Mehta  Initial Warfarin Start Date: 2007  Goal INR: 2-3  Current Drug Interactions: aspirin    Diet: leafy head lettuce salads every now and then (thinks non-iceberg)    Anticoagulation Clinic INR History:  Date 11/14 12/6 1/25 2/22 3/22 4/11 5/9 6/8 6/29 8/3 8/31 9/14 9/28   Total Weekly Dose 37.5 mg 37.5 mg 37.5 mg 37.5 mg 37.5 mg 37.5 mg 37.5 mg 37.5mg 37.5 mg 37.5 mg 26.25mg 37.5 mg 37.5 mg   INR 2.4 2.1 2.0 3.1 2.4 2.6 2.7 2.2 2.5 3.0 1.7 3.2 2.3     Date 10/23 11/20 12/8 1/5 2/2 3/2 3/16 3/30        Total Weekly Dose 37.5mg 37.5 mg 37.5 mg 37.5 mg 37.5mg 37.5 mg 37.5 mg 37.5 mg        INR 2.5 1.8 2.5 2.3 2.1 1.7 3.2 2.1        Notes      (boost), amox amox           Clinic Interview:  Tablet Strength: pt has 7.5mg tablets  Current Maintenance Dose: 3.75mg daily except 7.5mg MWF    Patient Findings:  Negatives Signs/symptoms of thrombosis, Signs/symptoms of bleeding, Laboratory test error suspected, Change in health, Change in alcohol use, Change in activity, Upcoming invasive procedure, Emergency department visit, Upcoming dental procedure, Missed doses, Extra doses, Change in medications, Change in diet/appetite, Hospital admission, Bruising, Other complaints   Comments Mr. Montemayor had a serving of broccoli after his last appt, as instructed. His PM generator change has still yet to be scheduled.     Takes warfarin in the AM    Plan:  1. INR is therapeutic today, so instructed Mr. Montemayor to continue warfarin 3.75mg daily except 7.5mg MWF.  2. Given recent lability, request RTC in 2.5 weeks before appt with Dr. Martinez.  3. Verbal and written information provided in clinic. Mr. McEndre expresses understanding with teach back and has no further questions at this time.    Shanell Arango, [facility]  3/30/2018  10:27 AM

## 2018-04-05 ENCOUNTER — PREP FOR SURGERY (OUTPATIENT)
Dept: OTHER | Facility: HOSPITAL | Age: 73
End: 2018-04-05

## 2018-04-05 DIAGNOSIS — I48.0 PAROXYSMAL ATRIAL FIBRILLATION (HCC): Primary | ICD-10-CM

## 2018-04-05 RX ORDER — VANCOMYCIN 2 GRAM/500 ML IN 0.9 % SODIUM CHLORIDE INTRAVENOUS
2000
Status: CANCELLED | OUTPATIENT
Start: 2018-04-06 | End: 2018-04-07

## 2018-04-05 RX ORDER — SODIUM CHLORIDE 0.9 % (FLUSH) 0.9 %
1-10 SYRINGE (ML) INJECTION AS NEEDED
Status: CANCELLED | OUTPATIENT
Start: 2018-04-05

## 2018-04-05 RX ORDER — PROMETHAZINE HYDROCHLORIDE 25 MG/ML
12.5 INJECTION, SOLUTION INTRAMUSCULAR; INTRAVENOUS EVERY 4 HOURS PRN
Status: CANCELLED | OUTPATIENT
Start: 2018-04-05

## 2018-04-05 RX ORDER — ACETAMINOPHEN 325 MG/1
650 TABLET ORAL EVERY 4 HOURS PRN
Status: CANCELLED | OUTPATIENT
Start: 2018-04-05

## 2018-04-05 RX ORDER — NITROGLYCERIN 0.4 MG/1
0.4 TABLET SUBLINGUAL
Status: CANCELLED | OUTPATIENT
Start: 2018-04-05

## 2018-04-17 ENCOUNTER — OFFICE VISIT (OUTPATIENT)
Dept: SLEEP MEDICINE | Facility: HOSPITAL | Age: 73
End: 2018-04-17

## 2018-04-17 ENCOUNTER — ANTICOAGULATION VISIT (OUTPATIENT)
Dept: PHARMACY | Facility: HOSPITAL | Age: 73
End: 2018-04-17

## 2018-04-17 VITALS
DIASTOLIC BLOOD PRESSURE: 84 MMHG | OXYGEN SATURATION: 92 % | HEART RATE: 76 BPM | WEIGHT: 293 LBS | HEIGHT: 70 IN | SYSTOLIC BLOOD PRESSURE: 168 MMHG | BODY MASS INDEX: 41.95 KG/M2

## 2018-04-17 DIAGNOSIS — I48.19 PERSISTENT ATRIAL FIBRILLATION (HCC): ICD-10-CM

## 2018-04-17 DIAGNOSIS — Z99.81 DEPENDENCE ON NOCTURNAL OXYGEN THERAPY: ICD-10-CM

## 2018-04-17 DIAGNOSIS — E66.01 MORBID OBESITY (HCC): ICD-10-CM

## 2018-04-17 DIAGNOSIS — G47.33 SEVERE OBSTRUCTIVE SLEEP APNEA: ICD-10-CM

## 2018-04-17 LAB
INR PPP: 2.3 (ref 0.91–1.09)
PROTHROMBIN TIME: 27.5 SECONDS (ref 10–13.8)

## 2018-04-17 PROCEDURE — 85610 PROTHROMBIN TIME: CPT

## 2018-04-17 PROCEDURE — G0463 HOSPITAL OUTPT CLINIC VISIT: HCPCS

## 2018-04-17 PROCEDURE — 36416 COLLJ CAPILLARY BLOOD SPEC: CPT

## 2018-04-17 PROCEDURE — 99214 OFFICE O/P EST MOD 30 MIN: CPT | Performed by: INTERNAL MEDICINE

## 2018-04-17 NOTE — PROGRESS NOTES
Subjective:     Chief Complaint: No chief complaint on file.      HPI:    Marco Antonio Montemayor is a 73 y.o. male here for follow-up of obstructive sleep apnea.    He continues on auto BiPAP.  He utilizes a full facemask.  He continues to bleed in oxygen with his BiPAP at night.  This is documented to be at 7 L and his most recent chart though he does not remember what it is set on at home.  He says that [employer] has had difficulty getting documentation for medical necessity for his oxygen from our office for some reason.    Further details are as follows:    Since last visit sleep problem has: remained the same  Currently using PAP: yes Hours of usage during the night: 7    Amount of sleep per night : 7 hours  Average length of time it takes to fall asleep : 15 minutes  Number of awakenings per night : 2     He feels fatigue (tiredness, exhaustion, lethargy) in the daytime even when not sleepy? Infrequently  He feels sleepy (or struggles to stay awake) in the daytime? Occasionally     Carbon Scale scored as 11/24.    Type of mask: full face mask    He (since starting PAP or since last visit) has problems with the following:   Pressure from the mask 0 - No Problem  Skin irritation from the mask 0 - No Problem  Mask coming off face 0 - No Problem  Air leaks from the mask 1 - Mild Problems  Dry mouth or throat 1 - Mild Problems  Nasal congestion 0 - No Problem    I reviewed his PAP download:  Average pressure: 12/11   Average AHI:  2  Average minutes in large leak per night: 15      Current medications are:   Current Outpatient Prescriptions:   •  amLODIPine (NORVASC) 5 MG tablet, TAKE ONE TABLET BY MOUTH DAILY, Disp: 90 tablet, Rfl: 2  •  aspirin 81 MG EC tablet, Take 81 mg by mouth Daily., Disp: , Rfl:   •  atorvastatin (LIPITOR) 40 MG tablet, TAKE ONE TABLET BY MOUTH DAILY, Disp: 90 tablet, Rfl: 3  •  carvedilol (COREG) 25 MG tablet, Take 1 tablet by mouth 2 (Two) Times a Day With Meals., Disp: 180 tablet, Rfl: 3  •   cholecalciferol (VITAMIN D3) 1000 UNITS tablet, Take 1,000 Units by mouth daily., Disp: , Rfl:   •  dofetilide (TIKOSYN) 250 MCG capsule, TAKE ONE CAPSULE BY MOUTH TWICE A DAY, Disp: 60 capsule, Rfl: 11  •  doxazosin (CARDURA) 4 MG tablet, Take 1 tablet by mouth 2 (Two) Times a Day., Disp: 60 tablet, Rfl: 6  •  Ferrous Sulfate (IRON) 325 (65 FE) MG tablet, Take 65 mg by mouth Daily., Disp: , Rfl:   •  losartan (COZAAR) 100 MG tablet, TAKE ONE TABLET BY MOUTH DAILY, Disp: 30 tablet, Rfl: 5  •  torsemide (DEMADEX) 20 MG tablet, Take 1 tablet by mouth 2 (Two) Times a Day., Disp: 180 tablet, Rfl: 3  •  vitamin B-12 (CYANOCOBALAMIN) 1000 MCG tablet, Take 2,000 mcg by mouth daily., Disp: , Rfl:   •  warfarin (COUMADIN) 7.5 MG tablet, TAKE ONE-HALF TO ONE TABLET BY MOUTH DAILY AS DIECTED BY ANTICOAGULATION PHARMACIST, Disp: 30 tablet, Rfl: 2  •  PHARMACY TO DOSE WARFARIN, Continuous As Needed (patient's warfarin is being managed by the [facility] ([phone]))., Disp: , Rfl: .      The patient's relevant past medical, surgical, family and social history were reviewed and updated in Epic as appropriate.     ROS:    Review of Systems   Constitutional: Negative for fatigue.   Respiratory: Positive for apnea.    Psychiatric/Behavioral: Negative for sleep disturbance.         Objective:    Physical Exam   Constitutional: He is oriented to person, place, and time. He appears well-developed and well-nourished.   HENT:   Head: Normocephalic and atraumatic.   Mouth/Throat: Oropharynx is clear and moist.   Class IV airway  Arched bony palate   Neck: Neck supple. No thyromegaly present.   Cardiovascular: Normal rate and regular rhythm.  Exam reveals no gallop and no friction rub.    No murmur heard.  Pulmonary/Chest: Effort normal. No respiratory distress. He has no wheezes. He has no rales.   Musculoskeletal: He exhibits edema.   Chronic venous stasis changes and dry skin   Neurological: He is  alert and oriented to person, place, and time.   Skin: Skin is warm and dry.   Psychiatric: He has a normal mood and affect. His behavior is normal.   Vitals reviewed.      Data:    Patient's PAP download was personally reviewed including raw data and results.    Assessment:    Problem List Items Addressed This Visit        Pulmonary Problems    Severe obstructive sleep apnea    Overview     Auto BIPAP with O2            Other    Dependence on nocturnal oxygen therapy - Primary    Overview     7L with BIPAP         Morbid obesity      Other Visit Diagnoses    None.         Acceptable treatment of his sleep apnea with BiPAP as well as supplemental oxygen at night.  He uses a fullface mask which fits adequately.    Plan:     1. No change in auto BiPAP settings needed  2. Continue use of current fullface mask  3. Continue oxygen use at night.  Apparently his DME company needs medical certification which we will look into.  See no reason why this has not happened.  4. I renewed his orders and supplies for the next year  5. I told him to call our office if he has any problems with getting his oxygen covered.      Discussed in detail with the patient.  He will call prior to his follow up visit for any new problems.    Signed by  Panfilo Martinez MD

## 2018-04-17 NOTE — PROGRESS NOTES
Anticoagulation Clinic Progress Note  Indication: afib  Referring Provider: Claudia Mehta  Initial Warfarin Start Date: 2007  Goal INR: 2-3  Current Drug Interactions: aspirin    Diet: leafy head lettuce salads every now and then (thinks non-iceberg)    Anticoagulation Clinic INR History:  Date 11/14 12/6 1/25 2/22 3/22 4/11 5/9 6/8 6/29 8/3 8/31 9/14 9/28   Total Weekly Dose 37.5 mg 37.5 mg 37.5 mg 37.5 mg 37.5 mg 37.5 mg 37.5 mg 37.5mg 37.5 mg 37.5 mg 26.25mg 37.5 mg 37.5 mg   INR 2.4 2.1 2.0 3.1 2.4 2.6 2.7 2.2 2.5 3.0 1.7 3.2 2.3     Date 10/23 11/20 12/8 1/5 2/2 3/2 3/16 3/30 4/17       Total Weekly Dose 37.5mg 37.5 mg 37.5 mg 37.5 mg 37.5mg 37.5 mg 37.5 mg 37.5 mg 37.5mg       INR 2.5 1.8 2.5 2.3 2.1 1.7 3.2 2.1 2.3       Notes      (boost), amox amox           Clinic Interview:  Tablet Strength: pt has 7.5mg tablets  Current Maintenance Dose: 3.75mg daily except 7.5mg MWF    Patient Findings:  Negatives Signs/symptoms of thrombosis, Signs/symptoms of bleeding, Laboratory test error suspected, Change in health, Change in alcohol use, Change in activity, Upcoming invasive procedure, Emergency department visit, Upcoming dental procedure, Missed doses, Extra doses, Change in medications, Change in diet/appetite, Hospital admission, Bruising, Other complaints   Comments Pt denies any changes in medications or diet.      Plan:  1. INR is therapeutic today at 2.3, so instructed Mr. Montemayor to continue warfarin 3.75mg daily except 7.5mg MWF.  2. Repeat INR in ~4 weeks with PAT for PPM generator change.  3. Verbal and written information provided in clinic. Mr. Montemayor expresses understanding with teach back and has no further questions at this time.     Zev Felipe, Pharmacy Intern  4/17/2018  1:11 PM     Shanell RODGERS [facility], have reviewed the note in full and agree with the assessment and plan.  04/17/18  1:58 PM

## 2018-04-18 RX ORDER — DOXAZOSIN MESYLATE 4 MG/1
TABLET ORAL
Qty: 60 TABLET | Refills: 5 | Status: SHIPPED | OUTPATIENT
Start: 2018-04-18 | End: 2018-09-23 | Stop reason: HOSPADM

## 2018-05-17 ENCOUNTER — APPOINTMENT (OUTPATIENT)
Dept: PREADMISSION TESTING | Facility: HOSPITAL | Age: 73
End: 2018-05-17

## 2018-05-17 ENCOUNTER — ANTICOAGULATION VISIT (OUTPATIENT)
Dept: PHARMACY | Facility: HOSPITAL | Age: 73
End: 2018-05-17

## 2018-05-17 DIAGNOSIS — I48.19 PERSISTENT ATRIAL FIBRILLATION (HCC): ICD-10-CM

## 2018-05-17 DIAGNOSIS — I48.0 PAROXYSMAL ATRIAL FIBRILLATION (HCC): ICD-10-CM

## 2018-05-17 LAB
ANION GAP SERPL CALCULATED.3IONS-SCNC: 6 MMOL/L (ref 3–11)
BUN BLD-MCNC: 27 MG/DL (ref 9–23)
BUN/CREAT SERPL: 15 (ref 7–25)
CALCIUM SPEC-SCNC: 8.3 MG/DL (ref 8.7–10.4)
CHLORIDE SERPL-SCNC: 114 MMOL/L (ref 99–109)
CO2 SERPL-SCNC: 27 MMOL/L (ref 20–31)
CREAT BLD-MCNC: 1.8 MG/DL (ref 0.6–1.3)
DEPRECATED RDW RBC AUTO: 51.4 FL (ref 37–54)
ERYTHROCYTE [DISTWIDTH] IN BLOOD BY AUTOMATED COUNT: 15 % (ref 11.3–14.5)
GFR SERPL CREATININE-BSD FRML MDRD: 37 ML/MIN/1.73
GLUCOSE BLD-MCNC: 152 MG/DL (ref 70–100)
HBA1C MFR BLD: 6.2 % (ref 4.8–5.6)
HCT VFR BLD AUTO: 32.5 % (ref 38.9–50.9)
HGB BLD-MCNC: 10.1 G/DL (ref 13.1–17.5)
INR PPP: 2.06 (ref 0.91–1.09)
MCH RBC QN AUTO: 29.1 PG (ref 27–31)
MCHC RBC AUTO-ENTMCNC: 31.1 G/DL (ref 32–36)
MCV RBC AUTO: 93.7 FL (ref 80–99)
PLATELET # BLD AUTO: 177 10*3/MM3 (ref 150–450)
PMV BLD AUTO: 10.2 FL (ref 6–12)
POTASSIUM BLD-SCNC: 4.7 MMOL/L (ref 3.5–5.5)
PROTHROMBIN TIME: 21.6 SECONDS (ref 9.6–11.5)
RBC # BLD AUTO: 3.47 10*6/MM3 (ref 4.2–5.76)
SODIUM BLD-SCNC: 147 MMOL/L (ref 132–146)
WBC NRBC COR # BLD: 5.75 10*3/MM3 (ref 3.5–10.8)

## 2018-05-17 PROCEDURE — 83036 HEMOGLOBIN GLYCOSYLATED A1C: CPT | Performed by: NURSE PRACTITIONER

## 2018-05-17 PROCEDURE — 80048 BASIC METABOLIC PNL TOTAL CA: CPT | Performed by: NURSE PRACTITIONER

## 2018-05-17 PROCEDURE — 85027 COMPLETE CBC AUTOMATED: CPT | Performed by: NURSE PRACTITIONER

## 2018-05-17 PROCEDURE — 85610 PROTHROMBIN TIME: CPT | Performed by: NURSE PRACTITIONER

## 2018-05-17 PROCEDURE — 36415 COLL VENOUS BLD VENIPUNCTURE: CPT

## 2018-05-17 RX ORDER — DOCUSATE SODIUM 100 MG/1
100 CAPSULE, LIQUID FILLED ORAL DAILY
COMMUNITY
End: 2018-09-23 | Stop reason: HOSPADM

## 2018-05-17 NOTE — PROGRESS NOTES
Anticoagulation Clinic Progress Note  Indication: afib  Referring Provider: Claudia Mehta  Initial Warfarin Start Date: 2007  Goal INR: 2-3  Current Drug Interactions: aspirin    Diet: leafy head lettuce salads every now and then (thinks non-iceberg)    Anticoagulation Clinic INR History:  Date 11/14 12/6 1/25 2/22 3/22 4/11 5/9 6/8 6/29 8/3 8/31 9/14 9/28   Total Weekly Dose 37.5 mg 37.5 mg 37.5 mg 37.5 mg 37.5 mg 37.5 mg 37.5 mg 37.5mg 37.5 mg 37.5 mg 26.25mg 37.5 mg 37.5 mg   INR 2.4 2.1 2.0 3.1 2.4 2.6 2.7 2.2 2.5 3.0 1.7 3.2 2.3     Date 10/23 11/20 12/8 1/5 2/2 3/2 3/16 3/30 4/17 5/17      Total Weekly Dose 37.5mg 37.5 mg 37.5 mg 37.5 mg 37.5mg 37.5 mg 37.5 mg 37.5 mg 37.5mg 37.5 mg      INR 2.5 1.8 2.5 2.3 2.1 1.7 3.2 2.1 2.3 2.06      Notes      (boost), amox amox   PAT        Clinic Interview:  Tablet Strength: pt has 7.5mg tablets  Current Maintenance Dose: 3.75mg daily except 7.5mg MWF    Patient Findings:  Negatives Signs/symptoms of thrombosis, Signs/symptoms of bleeding, Laboratory test error suspected, Change in health, Change in alcohol use, Change in activity, Upcoming invasive procedure, Emergency department visit, Upcoming dental procedure, Missed doses, Extra doses, Change in medications, Change in diet/appetite, Hospital admission, Bruising, Other complaints   Comments Pt denies any changes in medications or diet.      Plan:  1. INR is therapeutic, so instructed Mr. Montemayor to continue warfarin 3.75mg daily except 7.5mg MWF.  2. RTC in 4 weeks.  3. Verbal information provided today over the phone. Mr. Montemayor expresses understanding with teach back and has no further questions at this time.     Shanell Arango [facility]  5/17/2018  10:18 AM

## 2018-05-17 NOTE — DISCHARGE INSTRUCTIONS
The following instructions given during Pre Admission Testing visit:    Do not eat or drink anything after MN except for sips of water with your a.m. Prescription meds unless otherwise instructed by your physician.    Glasses and jewelry may be worn, but dentures must be removed prior to cath/procedure.    Leave any items you consider valuable at home.    Family members may wait in [facility] waiting area during procedure.    Bring all medications in their original containers the day of procedure.    Bring photo ID and insurance cards on the day of procedure.    Need to make arrangements for transportation prior to discharge.    The following handouts were given:     Heart Cath pathway (if applicable)   Cardiac Cath booklet published by [facility]    OR appropriate [facility] procedure booklet    If applicable, pt instructed to bring CPAP mask and tubing the day of procedure.

## 2018-05-18 ENCOUNTER — HOSPITAL ENCOUNTER (OUTPATIENT)
Facility: HOSPITAL | Age: 73
Discharge: HOME | End: 2018-05-18
Attending: INTERNAL MEDICINE | Admitting: INTERNAL MEDICINE

## 2018-05-18 VITALS
BODY MASS INDEX: 42.32 KG/M2 | RESPIRATION RATE: 10 BRPM | TEMPERATURE: 98.2 F | HEART RATE: 69 BPM | DIASTOLIC BLOOD PRESSURE: 87 MMHG | OXYGEN SATURATION: 95 % | SYSTOLIC BLOOD PRESSURE: 157 MMHG | WEIGHT: 295.64 LBS | HEIGHT: 70 IN

## 2018-05-18 DIAGNOSIS — I48.19 PERSISTENT ATRIAL FIBRILLATION (HCC): Primary | ICD-10-CM

## 2018-05-18 DIAGNOSIS — I48.0 PAROXYSMAL ATRIAL FIBRILLATION (HCC): ICD-10-CM

## 2018-05-18 PROCEDURE — 25010000002 FENTANYL CITRATE (PF) 100 MCG/2ML SOLUTION: Performed by: INTERNAL MEDICINE

## 2018-05-18 PROCEDURE — 93010 ELECTROCARDIOGRAM REPORT: CPT | Performed by: INTERNAL MEDICINE

## 2018-05-18 PROCEDURE — 25010000002 MIDAZOLAM PER 1 MG: Performed by: INTERNAL MEDICINE

## 2018-05-18 PROCEDURE — 93005 ELECTROCARDIOGRAM TRACING: CPT | Performed by: NURSE PRACTITIONER

## 2018-05-18 PROCEDURE — 33228 REMV&REPLC PM GEN DUAL LEAD: CPT | Performed by: INTERNAL MEDICINE

## 2018-05-18 PROCEDURE — C1785 PMKR, DUAL, RATE-RESP: HCPCS | Performed by: INTERNAL MEDICINE

## 2018-05-18 PROCEDURE — 25010000002 VANCOMYCIN: Performed by: NURSE PRACTITIONER

## 2018-05-18 PROCEDURE — 94660 CPAP INITIATION&MGMT: CPT

## 2018-05-18 PROCEDURE — 25010000002 ONDANSETRON PER 1 MG: Performed by: INTERNAL MEDICINE

## 2018-05-18 DEVICE — GEN PM ASSURITY DR RF: Type: IMPLANTABLE DEVICE | Status: FUNCTIONAL

## 2018-05-18 RX ORDER — HYDROCODONE BITARTRATE AND ACETAMINOPHEN 5; 325 MG/1; MG/1
1 TABLET ORAL EVERY 4 HOURS PRN
Status: DISCONTINUED | OUTPATIENT
Start: 2018-05-18 | End: 2018-05-18 | Stop reason: HOSPADM

## 2018-05-18 RX ORDER — MIDAZOLAM HYDROCHLORIDE 1 MG/ML
INJECTION INTRAMUSCULAR; INTRAVENOUS AS NEEDED
Status: DISCONTINUED | OUTPATIENT
Start: 2018-05-18 | End: 2018-05-18 | Stop reason: HOSPADM

## 2018-05-18 RX ORDER — SODIUM CHLORIDE 0.9 % (FLUSH) 0.9 %
1-10 SYRINGE (ML) INJECTION AS NEEDED
Status: DISCONTINUED | OUTPATIENT
Start: 2018-05-18 | End: 2018-05-18 | Stop reason: HOSPADM

## 2018-05-18 RX ORDER — ONDANSETRON 2 MG/ML
INJECTION INTRAMUSCULAR; INTRAVENOUS AS NEEDED
Status: DISCONTINUED | OUTPATIENT
Start: 2018-05-18 | End: 2018-05-18 | Stop reason: HOSPADM

## 2018-05-18 RX ORDER — ACETAMINOPHEN 325 MG/1
650 TABLET ORAL EVERY 4 HOURS PRN
Status: DISCONTINUED | OUTPATIENT
Start: 2018-05-18 | End: 2018-05-18 | Stop reason: HOSPADM

## 2018-05-18 RX ORDER — PROMETHAZINE HYDROCHLORIDE 25 MG/ML
12.5 INJECTION, SOLUTION INTRAMUSCULAR; INTRAVENOUS EVERY 4 HOURS PRN
Status: DISCONTINUED | OUTPATIENT
Start: 2018-05-18 | End: 2018-05-18 | Stop reason: HOSPADM

## 2018-05-18 RX ORDER — ONDANSETRON 2 MG/ML
4 INJECTION INTRAMUSCULAR; INTRAVENOUS EVERY 6 HOURS PRN
Status: DISCONTINUED | OUTPATIENT
Start: 2018-05-18 | End: 2018-05-18 | Stop reason: HOSPADM

## 2018-05-18 RX ORDER — NITROGLYCERIN 0.4 MG/1
0.4 TABLET SUBLINGUAL
Status: DISCONTINUED | OUTPATIENT
Start: 2018-05-18 | End: 2018-05-18 | Stop reason: HOSPADM

## 2018-05-18 RX ORDER — SODIUM CHLORIDE 9 MG/ML
INJECTION, SOLUTION INTRAVENOUS CONTINUOUS PRN
Status: COMPLETED | OUTPATIENT
Start: 2018-05-18 | End: 2018-05-18

## 2018-05-18 RX ORDER — FENTANYL CITRATE 50 UG/ML
INJECTION, SOLUTION INTRAMUSCULAR; INTRAVENOUS AS NEEDED
Status: DISCONTINUED | OUTPATIENT
Start: 2018-05-18 | End: 2018-05-18 | Stop reason: HOSPADM

## 2018-05-18 RX ADMIN — VANCOMYCIN HYDROCHLORIDE 2000 MG: 10 INJECTION, POWDER, LYOPHILIZED, FOR SOLUTION INTRAVENOUS at 08:04

## 2018-05-18 NOTE — H&P
Cardiology H&P     Marco Antonio Montemayor  1945  [phone]  [phone]    05/18/18    DATE OF ADMISSION: 5/18/2018  [facility]    Emil Rosado MD  [address] / [facility] [id number]    Chief Complaint: Pacemaker generator change    Problem List:  1. 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 Remote acceptable intravenous adenosine Quantitative SPECT gated Cardiolite study, LVEF 0.50, April 1999.  b. Echocardiogram showing mild LVH with estimated ejection fraction 0.58 with mild aortic valve cusp sclerosis, and mild TR, 09/23/2014.   c. Remote abnormal preoperative Quantitative SPECT gated Cardiolite study with mild “fixed” inferoposterior hypoperfusion and mild LV enlargement with mild global hypokinesia with reduced LVEF 0.40 in the setting of abnormal EKG with subsequent diagnostic coronary angiography demonstrating mild-to-moderate nonobstructive coronary artery atherosclerosis with mild compensated left ventricular dysfunction, LVEF 0.52, and continued medical therapy felt warranted, June 2006.  d. Remote hospitalization for symptomatic atrial fibrillation with rapid ventricular response and mild congestive heart failure requiring BRYANNA and DC cardioversion, June 2009.  e. Left heart catheterization with essentially normal coronary arteries with mild luminal irregularities with an EF of 0.60 by Dr. Escamilla for angina and elevated troponin, 06/05/2015.   f. Residual class I symptoms.  2. Chronic essential hypertension with recent progressive hypertension and blood pressure readings and normal selective bilateral renal angiography, June 2006.  3. Dyslipidemia.  4. Chronic atrial tachyarrhythmias:  a. Intermittent chronic paroxysmal atrial fibrillation with electrophysiologic study with RFA for atrial flutter, October 1999.  b. Recurrent apparent transient atrial fibrillation, resolved with acceptable [facility] emergency department evaluation, June  2003.  c. Acceptable combination Doppler echocardiogram, July 2003, with apparent acceptable 24-hour Holter monitor, May 2006.  d. Recurrent asymptomatic atrial fibrillation with RVR, January 2007, subsequent Coumadin therapy and Tikosyn therapy with successful internal cardioversion of atrial fibrillation and marked bradyarrhythmias requiring DDDR pacemaker implant, [place] device, data not available, March 2007.  e. Remote hospitalization for symptomatic rapid atrial fibrillation/BRYANNA DC cardioversion with subsequent acceptable pacemaker interrogation, June/July 2009, as well as acceptable interrogation without reprogramming, July 2011.  f. Acceptable combination Doppler echocardiogram, December 2010, with residual class I symptoms.  g. Acceptable device interrogation following [facility] ED evaluation for chest pressure and shortness of breath, data deficit, May 2011, February 2013, February 2016, December 2016.  h. Abnormal pacemaker assessment with 8.4% mode switch with battery voltage of 1-1.25 years, June 2017.  5. Morbid obesity, BMI 41.6.  6. Ichthyosis.  7. Chronic lower tract obstructive symptoms, probable BPH.  8. Dyslipidemia.  9. Nasal polyps with deviated nasal septum with apparent subsequent nasal septoplasty/polypectomy, data deficit, July 2006.  10. Abdominal pain with apparent small bowel obstruction with exploratory laparotomy - data deficit.  11. Appendectomy with subsequent delayed hospitalization for bleeding peptic ulcer disease/probable duodenal ulcer, November 2003.  12. Recurrent asymptomatic atrial fibrillation with rapid ventricular response, July 2007, with subsequent Tikosyn therapy and DDDR pacemaker implant with intermittent recurrent breakthrough arrhythmias, including remote DC cardioversion, May 2008.  13. Obstructive sleep apnea with treatment.       History of Present Illness:   Patient is a 73-year-old  male with the above-noted past medical history who presents today to  undergo permanent pacemaker generator change.  He was last seen in the office in March where his device was noted to have 3 months until MARION.  He was also having atrial fibrillation at that time in the setting of a URI on antibiotics.  He states he converted back to normal sinus rhythm on his own and did not require external cardioversion.  He states he had one other episode of atrial fibrillation a few weeks ago which lasted a few days.  Again, he self converted to normal rhythm.  He denies any known trigger for his episode.  He is on chronic Coumadin therapy for his atrial fibrillation.  He gets his INR's checked with our anticoagulation clinic.  His last INR on 5/17 was therapeutic at 2.06.  He denies any bleeding issues on Coumadin.  He has noted mild, progressive shortness of breath with exertion and mild fatigue over the last few months.  He denies any complaints of chest pain, pressure, or tightness.  He denies any lower extremity swelling.  He denies any recent fevers, chills, infections, hospitalizations, or ER visits.  Blood pressures running 140's systolic at home.    No Known Allergies    Prior to Admission Medications     Prescriptions Last Dose Informant Patient Reported? Taking?    amLODIPine (NORVASC) 5 MG tablet 5/17/2018 Medication Bottle No Yes    TAKE ONE TABLET BY MOUTH DAILY    aspirin 81 MG EC tablet 5/18/2018 Medication Bottle Yes Yes    Take 81 mg by mouth Daily.    atorvastatin (LIPITOR) 40 MG tablet 5/17/2018 Medication Bottle No Yes    TAKE ONE TABLET BY MOUTH DAILY    carvedilol (COREG) 25 MG tablet 5/18/2018 Medication Bottle No Yes    Take 1 tablet by mouth 2 (Two) Times a Day With Meals.    cholecalciferol (VITAMIN D3) 1000 UNITS tablet 5/17/2018 Medication Bottle Yes Yes    Take 1,000 Units by mouth daily.    docusate sodium (COLACE) 100 MG capsule 5/17/2018 Medication Bottle Yes Yes    Take 100 mg by mouth Daily.    dofetilide (TIKOSYN) 250 MCG capsule 5/18/2018 Medication Bottle No  Yes    TAKE ONE CAPSULE BY MOUTH TWICE A DAY    doxazosin (CARDURA) 4 MG tablet 5/18/2018 Medication Bottle No Yes    TAKE ONE TABLET BY MOUTH TWICE A DAY    Ferrous Sulfate (IRON) 325 (65 FE) MG tablet 5/17/2018 Medication Bottle Yes Yes    Take 65 mg by mouth Daily.    losartan (COZAAR) 100 MG tablet 5/17/2018 Medication Bottle No Yes    TAKE ONE TABLET BY MOUTH DAILY    torsemide (DEMADEX) 20 MG tablet 5/17/2018 Medication Bottle No Yes    Take 1 tablet by mouth 2 (Two) Times a Day.    vitamin B-12 (CYANOCOBALAMIN) 1000 MCG tablet 5/17/2018 Medication Bottle Yes Yes    Take 1,000 mcg by mouth Daily.    warfarin (COUMADIN) 7.5 MG tablet 5/18/2018  No Yes    TAKE ONE-HALF TO ONE TABLET BY MOUTH DAILY AS DIECTED BY ANTICOAGULATION PHARMACIST    Patient taking differently:  TAKE ONE-HALF TO ONE TABLET BY MOUTH DAILY AS DIECTED BY ANTICOAGULATION PHARMACIST MW - whole tablet and other days just half tablet    PHARMACY TO DOSE WARFARIN   Yes No    Continuous As Needed (patient's warfarin is being managed by the [facility] ([phone])).            Current Facility-Administered Medications:   •  acetaminophen (TYLENOL) tablet 650 mg, 650 mg, Oral, Q4H PRN, JENNIFER Schmidt  •  nitroglycerin (NITROSTAT) SL tablet 0.4 mg, 0.4 mg, Sublingual, Q5 Min PRN, JENNIFER Schmidt  •  promethazine (PHENERGAN) injection 12.5 mg, 12.5 mg, Intravenous, Q4H PRN, JENNIFER Schmidt  •  sodium chloride 0.9 % flush 1-10 mL, 1-10 mL, Intravenous, PRN, JENNIFER Schmidt  •  vancomycin 2000 mg/500 mL 0.9% NS IVPB ([facility]), 2,000 mg, Intravenous, On Call to OR, JENNIFER Schmidt    Social History     Social History   • Marital status:      Social History Main Topics   • Smoking status: Never Smoker   • Smokeless tobacco: Never Used   • Alcohol use No   • Drug use: No   • Sexual activity: Defer     Other Topics Concern   • Not on file  "      Family History   Problem Relation Age of Onset   • Cancer Mother    • Arrhythmia Father    • Hypertension Father    • Heart attack Father        REVIEW OF SYSTEMS:   CONST:  + fatigue, no weight loss, fever, chills   HEENT:  No visual loss, blurred vision, double vision, yellow sclerae.                   No hearing loss, congestion, sore throat.   SKIN:      No rashes, urticaria, ulcers, sores.     RESP:     + BLACKWELL, hemoptysis, cough, sputum.   GI:           No anorexia, nausea, vomiting, diarrhea. No abdominal pain, melena.   :         No burning on urination, hematuria or increased frequency.  ENDO:    No diaphoresis, cold or heat intolerance. No polyuria or polydipsia.   NEURO:  No headache, dizziness, syncope, paralysis, ataxia, or parasthesias.                  No change in bowel or bladder control. No history of CVA/TIA  MUSC:    No muscle, back pain, joint pain or stiffness.   HEME:    No anemia, bleeding, bruising. No history of DVT/PE.  PSYCH:  No history of depression, anxiety    Vitals:    05/18/18 0635 05/18/18 0636   BP: 153/91 147/89   BP Location: Left arm Right arm   Patient Position: Lying Lying   Pulse:  67   Resp:  20   Temp:  98.2 °F (36.8 °C)   TempSrc:  Temporal Artery    SpO2:  94%   Weight:  134 kg (295 lb 10.2 oz)   Height:  177.8 cm (70\")         Vital Sign Min/Max for last 24 hours  Temp  Min: 98.2 °F (36.8 °C)  Max: 98.2 °F (36.8 °C)   BP  Min: 147/89  Max: 153/91   Pulse  Min: 67  Max: 67   Resp  Min: 20  Max: 20   SpO2  Min: 94 %  Max: 94 %   No Data Recorded    No intake or output data in the 24 hours ending 05/18/18 0716          Physical Exam:  GEN: Obese gentleman, No acute distress  HEENT: Normocephalic, Atraumatic, PERRLA, moist mucous membranes  NECK: supple, NO JVD, no thyromegaly, no lymphadenopathy  CARD: S1S2, RRR, no murmur, gallop, rub  LUNGS: Clear to auscultation, normal respiratory effort  ABDOMEN: Soft, nontender, normal bowel sounds  EXTREMITIES:No gross " deformities,  No clubbing, cyanosis, or edema  SKIN: Dry, flaky skin  NEURO: No focal deficits  PSYCHIATRIC: Normal affect and mood      Data:     Results from last 7 days  Lab Units 05/17/18  0928   WBC 10*3/mm3 5.75   HEMOGLOBIN g/dL 10.1*   HEMATOCRIT % 32.5*   PLATELETS 10*3/mm3 177       Results from last 7 days  Lab Units 05/17/18  0928   SODIUM mmol/L 147*   POTASSIUM mmol/L 4.7   CHLORIDE mmol/L 114*   CO2 mmol/L 27.0   BUN mg/dL 27*   CREATININE mg/dL 1.80*   GLUCOSE mg/dL 152*        Results from last 7 days  Lab Units 05/17/18  0928   HEMOGLOBIN A1C % 6.20*               Results from last 7 days  Lab Units 05/17/18  0928   PROTIME Seconds 21.6*   INR  2.06*                 No intake or output data in the 24 hours ending 05/18/18 0716    Chest X-Ray:  Imaging Results (last 24 hours)     ** No results found for the last 24 hours. **          Telemetry: A paced/NSR, HR 67 bpm    Assessment and Plan:   1. Symptomatic sinus bradycardia:  -Patient with marked bradycardia post cardioversion in 2007, now s/p dual-chamber permanent pacemaker implant.  His device is near MARION so we will proceed today with pacemaker generator change with Dr. Taylor.  The risks, benefits, and alternatives to the procedure have been reviewed and the patient wishes to proceed.    2.  Persistent atrial fibrillation:  - Overall, maintaining NSR, continue Tikosyn  - Chronic coumadin therapy, last INR therapeutic    3. Chronic kidney disease:  - Patient with slight bump in Cr to 1.8 per PAT labs, appears baseline around 1.6, will check 12 lead EKG to assess QTc as patient is on Tikosyn      [facility]  5/18/2018  7:16 AM      I, Johnathan Taylor MD, personally performed the services face to face as described and documented by the above named individual. I have made any necessary edits and it is both accurate and complete 5/18/2018  8:22 AM

## 2018-05-25 ENCOUNTER — OFFICE VISIT (OUTPATIENT)
Dept: CARDIOLOGY | Facility: CLINIC | Age: 73
End: 2018-05-25

## 2018-05-25 DIAGNOSIS — I48.19 PERSISTENT ATRIAL FIBRILLATION (HCC): ICD-10-CM

## 2018-05-25 PROCEDURE — 99024 POSTOP FOLLOW-UP VISIT: CPT | Performed by: INTERNAL MEDICINE

## 2018-05-25 NOTE — PROGRESS NOTES
2018    Marco Antonio Montemayor, : 1945    WOUND CHECK    B/P: 130/76 (Sitting) R ARM    Pulse: 71    Patient has fever: [] Temperature if indicated: 98.3    Wound Location: L Midclavicular chest    Dressing Removed [x]        Old Dressing Appearance:  Clean, dry []                 Old, bloody drainage [x]                            Moist, serous drainage []                Moist, thick yellow/green drainage []       Wound Appearance: Redness []                  Drainage []                  Culture obtained []        Color: N/A     Consistency: N/A     Amount: none         Gloves used, wound cleansed with sterile 4x4 and peroxide [x]       MD notified [] MD orders:     Antibiotic started []  If checked, type    Other:     Appointment for follow-up scheduled for 3 months post procedure [x]    Future Appointments  Date Time Provider Department Center   2018 10:00 AM ANTI COAG [facility] ACOAG None   2018 9:45 AM MD SONG [facility] None   2018 9:15 AM MD SONG [facility] None           Evy Goodrich MA, 18      MD Signature:______________________________ Completed By/Date:

## 2018-06-07 ENCOUNTER — PATIENT MESSAGE (OUTPATIENT)
Dept: CARDIOLOGY | Facility: CLINIC | Age: 73
End: 2018-06-07

## 2018-06-14 ENCOUNTER — ANTICOAGULATION VISIT (OUTPATIENT)
Dept: PHARMACY | Facility: HOSPITAL | Age: 73
End: 2018-06-14

## 2018-06-14 ENCOUNTER — APPOINTMENT (OUTPATIENT)
Dept: PHARMACY | Facility: HOSPITAL | Age: 73
End: 2018-06-14

## 2018-06-14 DIAGNOSIS — I48.19 PERSISTENT ATRIAL FIBRILLATION (HCC): ICD-10-CM

## 2018-06-14 LAB
INR PPP: 2.8 (ref 0.91–1.09)
PROTHROMBIN TIME: 33.9 SECONDS (ref 10–13.8)

## 2018-06-14 PROCEDURE — G0463 HOSPITAL OUTPT CLINIC VISIT: HCPCS

## 2018-06-14 PROCEDURE — 85610 PROTHROMBIN TIME: CPT

## 2018-06-14 PROCEDURE — 36416 COLLJ CAPILLARY BLOOD SPEC: CPT

## 2018-06-14 NOTE — PROGRESS NOTES
Anticoagulation Clinic Progress Note  Indication: afib  Referring Provider: Claudia Mehta  Initial Warfarin Start Date: 2007  Goal INR: 2-3  Current Drug Interactions: aspirin    Diet: leafy head lettuce salads every now and then (thinks non-iceberg)    Anticoagulation Clinic INR History:  Date 11/14 12/6 1/25 2/22 3/22 4/11 5/9 6/8 6/29 8/3 8/31 9/14 9/28   Total Weekly Dose 37.5 mg 37.5 mg 37.5 mg 37.5 mg 37.5 mg 37.5 mg 37.5 mg 37.5mg 37.5 mg 37.5 mg 26.25mg 37.5 mg 37.5 mg   INR 2.4 2.1 2.0 3.1 2.4 2.6 2.7 2.2 2.5 3.0 1.7 3.2 2.3     Date 10/23 11/20 12/8 1/5 2/2 3/2 3/16 3/30 4/17 5/17 6/14     Total Weekly Dose 37.5mg 37.5 mg 37.5 mg 37.5 mg 37.5mg 37.5 mg 37.5 mg 37.5 mg 37.5mg 37.5 mg 37.5 mg     INR 2.5 1.8 2.5 2.3 2.1 1.7 3.2 2.1 2.3 2.06 2.8     Notes      (boost), amox amox   PAT        Clinic Interview:  Tablet Strength: pt has 7.5mg tablets  Current Maintenance Dose: 3.75mg daily except 7.5mg MWF    Patient Findings:  Negatives Signs/symptoms of thrombosis, Signs/symptoms of bleeding, Laboratory test error suspected, Change in health, Change in alcohol use, Change in activity, Upcoming invasive procedure, Emergency department visit, Upcoming dental procedure, Missed doses, Extra doses, Change in medications, Change in diet/appetite, Hospital admission, Bruising, Other complaints     Plan:  1. INR is therapeutic, so instructed Mr. Montemayor to continue warfarin 3.75mg daily except 7.5mg MWF.  2. RTC in 5 weeks. If therapeutic again, push next follow up to match with cardiology appt 7/22 (Dr. Taylor).   3. Verbal and written information provided today in clinic. Mr. Montemayor expresses understanding with teach back and has no further questions at this time.    Ann Cowden Mayer, PharmD  6/14/2018  10:45 AM

## 2018-06-15 DIAGNOSIS — I48.91 ATRIAL FIBRILLATION, UNSPECIFIED TYPE (HCC): ICD-10-CM

## 2018-06-15 RX ORDER — AMLODIPINE BESYLATE 5 MG/1
TABLET ORAL
Qty: 90 TABLET | Refills: 1 | Status: SHIPPED | OUTPATIENT
Start: 2018-06-15 | End: 2019-01-02 | Stop reason: SDUPTHER

## 2018-06-15 RX ORDER — WARFARIN SODIUM 7.5 MG/1
TABLET ORAL
Qty: 30 TABLET | Refills: 1 | Status: SHIPPED | OUTPATIENT
Start: 2018-06-15 | End: 2018-09-10 | Stop reason: SDUPTHER

## 2018-07-05 DIAGNOSIS — I48.0 PAROXYSMAL ATRIAL FIBRILLATION (HCC): ICD-10-CM

## 2018-07-05 RX ORDER — CARVEDILOL 25 MG/1
TABLET ORAL
Qty: 180 TABLET | Refills: 2 | Status: SHIPPED | OUTPATIENT
Start: 2018-07-05 | End: 2019-04-21 | Stop reason: SDUPTHER

## 2018-07-19 ENCOUNTER — ANTICOAGULATION VISIT (OUTPATIENT)
Dept: PHARMACY | Facility: HOSPITAL | Age: 73
End: 2018-07-19

## 2018-07-19 DIAGNOSIS — I48.19 PERSISTENT ATRIAL FIBRILLATION (HCC): ICD-10-CM

## 2018-07-19 LAB
INR PPP: 3.5 (ref 0.91–1.09)
PROTHROMBIN TIME: 42.3 SECONDS (ref 10–13.8)

## 2018-07-19 PROCEDURE — 85610 PROTHROMBIN TIME: CPT

## 2018-07-19 PROCEDURE — 36416 COLLJ CAPILLARY BLOOD SPEC: CPT

## 2018-07-19 PROCEDURE — G0463 HOSPITAL OUTPT CLINIC VISIT: HCPCS

## 2018-07-19 NOTE — PROGRESS NOTES
Anticoagulation Clinic Progress Note  Indication: afib  Referring Provider: Claudia Mehta  Initial Warfarin Start Date: 2007  Goal INR: 2-3  Current Drug Interactions: aspirin    Diet: leafy head lettuce salads every now and then (thinks non-iceberg)    Anticoagulation Clinic INR History:  Date 11/14 12/6 1/25 2/22 3/22 4/11 5/9 6/8 6/29 8/3 8/31 9/14 9/28   Total Weekly Dose 37.5 mg 37.5 mg 37.5 mg 37.5 mg 37.5 mg 37.5 mg 37.5 mg 37.5mg 37.5 mg 37.5 mg 26.25mg 37.5 mg 37.5 mg   INR 2.4 2.1 2.0 3.1 2.4 2.6 2.7 2.2 2.5 3.0 1.7 3.2 2.3     Date 10/23 11/20 12/8 1/5 2/2 3/2 3/16 3/30 4/17 5/17 6/14 7/19    Total Weekly Dose 37.5mg 37.5 mg 37.5 mg 37.5 mg 37.5mg 37.5 mg 37.5 mg 37.5 mg 37.5mg 37.5 mg 37.5 mg 37.5mg    INR 2.5 1.8 2.5 2.3 2.1 1.7 3.2 2.1 2.3 2.06 2.8 3.5    Notes      (boost), amox amox   PAT        Clinic Interview:  Tablet Strength: pt has 7.5mg tablets  Current Maintenance Dose: 3.75mg daily except 7.5mg MWF    Patient Findings:  Positives:   Change in diet/appetite   Negatives:   Signs/symptoms of thrombosis, Signs/symptoms of bleeding, Laboratory test error suspected, Change in health, Change in alcohol use, Change in activity, Upcoming invasive procedure, Emergency department visit, Upcoming dental procedure, Missed doses, Extra doses, Change in medications, Hospital admission, Bruising, Other complaints   Comments:   Mr. Montemayor reports that hasn't been consistently eating GLV in a few weeks due to his changing work schedule. Previously he was eating green vegetables a couple times a week.      Plan:  1. INR is supratherapeutic today at 3.5. Instructed Mr. Montemayor to eat a serving of GLV tonight and to try to get some consistency back in diet, decrease dose to 3.75mg tomorrow then continue warfarin 3.75mg daily except 7.5mg MWF.  2. RTC in 2 weeks (8/1) after visit with Dr. Mehta.   3. Verbal and written information provided today in clinic. Mr. Montemayor expresses understanding with teach back  and has no further questions at this time.    Jennie Stephens, PharmD  Pharmacy Resident  7/19/2018  10:40 AM

## 2018-07-27 ENCOUNTER — CLINICAL SUPPORT NO REQUIREMENTS (OUTPATIENT)
Dept: CARDIOLOGY | Facility: CLINIC | Age: 73
End: 2018-07-27

## 2018-07-27 DIAGNOSIS — I48.0 PAROXYSMAL ATRIAL FIBRILLATION (HCC): ICD-10-CM

## 2018-07-27 PROCEDURE — 93294 REM INTERROG EVL PM/LDLS PM: CPT | Performed by: INTERNAL MEDICINE

## 2018-07-27 PROCEDURE — 93296 REM INTERROG EVL PM/IDS: CPT | Performed by: INTERNAL MEDICINE

## 2018-08-01 ENCOUNTER — OFFICE VISIT (OUTPATIENT)
Dept: CARDIOLOGY | Facility: CLINIC | Age: 73
End: 2018-08-01

## 2018-08-01 ENCOUNTER — ANTICOAGULATION VISIT (OUTPATIENT)
Dept: PHARMACY | Facility: HOSPITAL | Age: 73
End: 2018-08-01

## 2018-08-01 VITALS
DIASTOLIC BLOOD PRESSURE: 70 MMHG | HEIGHT: 70 IN | BODY MASS INDEX: 43.09 KG/M2 | HEART RATE: 70 BPM | SYSTOLIC BLOOD PRESSURE: 138 MMHG | WEIGHT: 301 LBS

## 2018-08-01 DIAGNOSIS — I48.0 PAROXYSMAL ATRIAL FIBRILLATION (HCC): ICD-10-CM

## 2018-08-01 DIAGNOSIS — I11.9 HYPERTENSIVE HEART DISEASE WITHOUT HEART FAILURE: Primary | ICD-10-CM

## 2018-08-01 DIAGNOSIS — E66.01 MORBID OBESITY (HCC): ICD-10-CM

## 2018-08-01 DIAGNOSIS — N18.30 STAGE 3 CHRONIC KIDNEY DISEASE (HCC): ICD-10-CM

## 2018-08-01 DIAGNOSIS — G47.33 SEVERE OBSTRUCTIVE SLEEP APNEA: ICD-10-CM

## 2018-08-01 DIAGNOSIS — I48.19 PERSISTENT ATRIAL FIBRILLATION (HCC): ICD-10-CM

## 2018-08-01 DIAGNOSIS — E78.5 DYSLIPIDEMIA: ICD-10-CM

## 2018-08-01 LAB
INR PPP: 2.7 (ref 0.91–1.09)
PROTHROMBIN TIME: 31.9 SECONDS (ref 10–13.8)

## 2018-08-01 PROCEDURE — G0463 HOSPITAL OUTPT CLINIC VISIT: HCPCS

## 2018-08-01 PROCEDURE — 85610 PROTHROMBIN TIME: CPT

## 2018-08-01 PROCEDURE — 36416 COLLJ CAPILLARY BLOOD SPEC: CPT

## 2018-08-01 PROCEDURE — 99214 OFFICE O/P EST MOD 30 MIN: CPT | Performed by: INTERNAL MEDICINE

## 2018-08-01 NOTE — PROGRESS NOTES
Subjective:     Encounter Date:08/01/2018    Patient ID: Marco Antonio Montemayor is a 73 y.o.  white male, retired /current [employer] , from [place], [place].      PHYSICIAN: EDMUND Rosado MD  ELECTROPHYSIOLOGIST: Johnathan Taylor MD, [facility], [facility]  INTERVENTIONAL CARDIOLOGIST: Antonio Escamilla MD, [facility]    Chief Complaint:   Chief Complaint   Patient presents with   • Atrial Fibrillation     Problem List:  1. 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 Remote acceptable intravenous adenosine Quantitative SPECT gated Cardiolite study, LVEF 0.50, April 1999.  b. Echocardiogram showing mild LVH with estimated ejection fraction 0.58 with mild aortic valve cusp sclerosis, and mild TR, 09/23/2014.   c. Remote abnormal preoperative Quantitative SPECT gated Cardiolite study with mild “fixed” inferoposterior hypoperfusion and mild LV enlargement with mild global hypokinesia with reduced LVEF 0.40 in the setting of abnormal EKG with subsequent diagnostic coronary angiography demonstrating mild-to-moderate nonobstructive coronary artery atherosclerosis with mild compensated left ventricular dysfunction, LVEF 0.52, and continued medical therapy felt warranted, June 2006.  d. Remote hospitalization for symptomatic atrial fibrillation with rapid ventricular response and mild congestive heart failure requiring BRYANNA and DC cardioversion, June 2009.  e. Left heart catheterization with essentially normal coronary arteries with mild luminal irregularities with an EF of 0.60 by Dr. Escamilla for angina and elevated troponin, 06/05/2015.   f. Residual class I symptoms.  2. Chronic essential hypertension with recent progressive hypertension and blood pressure readings and normal selective bilateral renal angiography, June 2006.  3. Dyslipidemia.  4. Chronic atrial tachyarrhythmias:  a. Intermittent chronic paroxysmal atrial fibrillation with electrophysiologic study with RFA for atrial flutter, October  1999.  b. Recurrent apparent transient atrial fibrillation, resolved with acceptable [facility] emergency department evaluation, June 2003.  c. Acceptable combination Doppler echocardiogram, July 2003, with apparent acceptable 24-hour Holter monitor, May 2006.  d. Recurrent asymptomatic atrial fibrillation with RVR, January 2007, subsequent Coumadin therapy and Tikosyn therapy with successful internal cardioversion of atrial fibrillation and marked bradyarrhythmias requiring DDDR pacemaker implant, [employer] [place] device, data not available, March 2007.  e. Remote hospitalization for symptomatic rapid atrial fibrillation/BRYANNA DC cardioversion with subsequent acceptable pacemaker interrogation, June/July 2009, as well as acceptable interrogation without reprogramming, July 2011.  f. Acceptable combination Doppler echocardiogram, December 2010, with residual class I symptoms.  g. Acceptable device interrogation following [facility] ED evaluation for chest pressure and shortness of breath, data deficit, May 2011, February 2013, February 2016, December 2016.  h. Abnormal pacemaker assessment with 8.4% mode switch with battery voltage of 1-1.25 years, June 2017, with abnormal PACEART assessment, July 2018, with 22% mode switch  5. Morbid obesity, BMI 43.2.  6. Ichthyosis.  7. Chronic lower tract obstructive symptoms, probable BPH.  8. Dyslipidemia.  9. Nasal polyps with deviated nasal septum with apparent subsequent nasal septoplasty/polypectomy, data deficit, July 2006.  10. Abdominal pain with apparent small bowel obstruction with exploratory laparotomy - data deficit.  11. Appendectomy with subsequent delayed hospitalization for bleeding peptic ulcer disease/probable duodenal ulcer, November 2003.  12. Recurrent asymptomatic atrial fibrillation with rapid ventricular response, July 2007, with subsequent Tikosyn therapy and DDDR pacemaker implant with intermittent recurrent breakthrough arrhythmias, including remote DC cardioversion, May  2008. 13. Obstructive sleep apnea with treatment.      No Known Allergies      Current Outpatient Prescriptions:   •  amLODIPine (NORVASC) 5 MG tablet, TAKE ONE TABLET BY MOUTH DAILY, Disp: 90 tablet, Rfl: 1  •  aspirin 81 MG EC tablet, Take 81 mg by mouth Daily., Disp: , Rfl:   •  atorvastatin (LIPITOR) 40 MG tablet, TAKE ONE TABLET BY MOUTH DAILY, Disp: 90 tablet, Rfl: 3  •  carvedilol (COREG) 25 MG tablet, TAKE ONE TABLET BY MOUTH TWICE A DAY WITH MEALS, Disp: 180 tablet, Rfl: 2  •  cholecalciferol (VITAMIN D3) 1000 UNITS tablet, Take 1,000 Units by mouth daily., Disp: , Rfl:   •  docusate sodium (COLACE) 100 MG capsule, Take 100 mg by mouth Daily., Disp: , Rfl:   •  dofetilide (TIKOSYN) 250 MCG capsule, TAKE ONE CAPSULE BY MOUTH TWICE A DAY, Disp: 60 capsule, Rfl: 11  •  doxazosin (CARDURA) 4 MG tablet, TAKE ONE TABLET BY MOUTH TWICE A DAY, Disp: 60 tablet, Rfl: 5  •  Ferrous Sulfate (IRON) 325 (65 FE) MG tablet, Take 65 mg by mouth Daily., Disp: , Rfl:   •  losartan (COZAAR) 100 MG tablet, TAKE ONE TABLET BY MOUTH DAILY, Disp: 30 tablet, Rfl: 5  •  PHARMACY TO DOSE WARFARIN, Continuous As Needed (patient's warfarin is being managed by the [facility] ([phone]))., Disp: , Rfl:   •  torsemide (DEMADEX) 20 MG tablet, Take 1 tablet by mouth 2 (Two) Times a Day., Disp: 180 tablet, Rfl: 3  •  vitamin B-12 (CYANOCOBALAMIN) 1000 MCG tablet, Take 1,000 mcg by mouth Daily., Disp: , Rfl:   •  warfarin (COUMADIN) 7.5 MG tablet, TAKE ONE-HALF TO ONE TABLET BY MOUTH DAILY AS DIECTED BY ANTICOAGULATION PHARMACIST, Disp: 30 tablet, Rfl: 1    HISTORY OF PRESENT ILLNESS: Patient returns for scheduled 8-month followup.  He states that he occasionally feels a little flutter in his heartbeat.  He has been able to be active, mowing his grass, and he does not have any symptoms from a cardiopulmonary perspective with his activities.  He is cautioned about weight gain and is advised that he  "should watch his caloric intake and remain active.  His lab results from May 2018 are discussed with him (see below), and he states that he has not been referred to see a nephrologist about his kidney function.  He is scheduled to have his pacemaker checked later this month and will follow up with Dr. Taylor at that time.  Patient otherwise denies chest pain, shortness of breath, PND, edema, palpitations, syncope or presyncope at this time on current activity schedule.  He is scheduled for influenza immunization and laboratory studies with Dr. Rosado in 2-3 months.        Review of Systems   Cardiovascular: Positive for irregular heartbeat (at times).   Skin: Positive for dry skin.      Obtained and otherwise negative except as outlined in problem list and HPI.    Procedures       Objective:       Vitals:    08/01/18 1002 08/01/18 1003   BP: 132/66 138/70   BP Location: Left arm Left arm   Patient Position: Sitting Standing   Pulse: 70    Weight: (!) 137 kg (301 lb)    Height: 177.8 cm (70\")      Body mass index is 43.19 kg/m².   Last weight:  290 lbs.    Physical Exam   Constitutional: He is oriented to person, place, and time. He appears well-developed and well-nourished.   Neck: No JVD present. Carotid bruit is not present. No thyromegaly present.   Cardiovascular: Regular rhythm, S1 normal and S2 normal.  Exam reveals distant heart sounds. Exam reveals no gallop, no S3 and no friction rub.    Murmur heard.   Medium-pitched early systolic murmur is present with a grade of 2/6  at the lower left sternal border  Pulses:       Carotid pulses are 1+ on the right side, and 1+ on the left side.       Radial pulses are 1+ on the right side, and 1+ on the left side.        Femoral pulses are 1+ on the right side, and 1+ on the left side.       Popliteal pulses are 1+ on the right side, and 1+ on the left side.        Dorsalis pedis pulses are 1+ on the right side, and 1+ on the left side.        Posterior tibial " pulses are 1+ on the right side, and 1+ on the left side.   Pulmonary/Chest: Effort normal. He has decreased breath sounds. He has no wheezes. He has no rhonchi. He has no rales.   Left precordial pacemaker site is nominal   Abdominal: Soft. He exhibits no mass. There is no hepatosplenomegaly. There is no tenderness. There is no guarding.   Bowel sounds audible x4   Musculoskeletal: Normal range of motion. He exhibits no edema.   Lymphadenopathy:     He has no cervical adenopathy.   Neurological: He is alert and oriented to person, place, and time.   Skin: Skin is warm, dry and intact. No rash noted.   Vitals reviewed.         Lab Review:   Lab Results   Component Value Date    GLUCOSE 152 (H) 05/17/2018    BUN 27 (H) 05/17/2018    CREATININE 1.80 (H) 05/17/2018    EGFRIFNONA 37 (L) 05/17/2018    EGFRIFAFRI 51 (L) 12/08/2017    BCR 15.0 05/17/2018    CO2 27.0 05/17/2018    CALCIUM 8.3 (L) 05/17/2018    PROTENTOTREF 6.2 12/08/2017    ALBUMIN 3.70 12/08/2017    LABIL2 1.5 12/08/2017    AST 21 12/08/2017    ALT 15 12/08/2017       Lab Results   Component Value Date    WBC 5.75 05/17/2018    HGB 10.1 (L) 05/17/2018    HCT 32.5 (L) 05/17/2018    MCV 93.7 05/17/2018     05/17/2018       Lab Results   Component Value Date    HGBA1C 6.20 (H) 05/17/2018       Lab Results   Component Value Date    TSH 2.409 12/08/2017     TOTAL CHOLESTEROL - 124 (12/08/2017)    Lab Results   Component Value Date    TRIG 104 12/08/2017    TRIG 70 06/05/2015     Lab Results   Component Value Date    HDL 31 (L) 12/08/2017    HDL 28 (L) 06/05/2015     Lab Results   Component Value Date    LDL 72 12/08/2017    LDL 64 06/05/2015       Lab Results   Component Value Date    BNP 57 06/04/2015           Assessment:   Overall continued acceptable course with no interim cardiopulmonary complaints with acceptable functional status. We will defer additional diagnostic or therapeutic intervention from a cardiac perspective at this time.        Diagnosis Plan   1. Hypertensive heart disease without heart failure  No recurrent angina pectoris or CHF; continue current treatment     2. Paroxysmal atrial fibrillation (CMS/HCC)  Followup as scheduled with Dr. Taylor later this month   3. Severe obstructive sleep apnea  Compliance stressed   4. Morbid obesity (CMS/HCC)  Caloric restriction and activity encouraged   5. Dyslipidemia  Continue current treatment   6. Stage 3 chronic kidney disease  May need to consider Nephrology consultation          Plan:         1. Patient to continue current medications and close follow up with the above providers.  2. Patient is encouraged to follow up with Dr. Taylor in a few weeks.  3. Influenza immunization in the fall is strongly encouraged.  4. Tentative cardiology follow up in March 2019, or patient may return sooner PRN.    Transcribed by Raeann Newton for Dr. Dustin Mehta at 10:12 AM on 08/01/2018      IDustin MD, [facility], personally performed the services described in this documentation as scribed by the above named individual in my presence, and it is both accurate and complete. At 10:19 AM on 08/01/2018

## 2018-08-01 NOTE — PROGRESS NOTES
Anticoagulation Clinic Progress Note  Indication: afib  Referring Provider: Tyson Roberts)  Initial Warfarin Start Date: 2007  Goal INR: 2-3  Current Drug Interactions: aspirin    Diet: leafy head lettuce salads every now and then (thinks non-iceberg)    Anticoagulation Clinic INR History:  Date 11/14 12/6 1/25 2/22 3/22 4/11 5/9 6/8 6/29 8/3 8/31 9/14 9/28   Total Weekly Dose 37.5 mg 37.5 mg 37.5 mg 37.5 mg 37.5 mg 37.5 mg 37.5 mg 37.5mg 37.5 mg 37.5 mg 26.25mg 37.5 mg 37.5 mg   INR 2.4 2.1 2.0 3.1 2.4 2.6 2.7 2.2 2.5 3.0 1.7 3.2 2.3     Date 10/23 11/20 12/8 1/5 2/2 3/2 3/16 3/30 4/17 5/17 6/14 7/19 8/1   Total Weekly Dose 37.5mg 37.5 mg 37.5 mg 37.5 mg 37.5mg 37.5 mg 37.5 mg 37.5 mg 37.5mg 37.5 mg 37.5 mg 37.5mg 37.5 mg   INR 2.5 1.8 2.5 2.3 2.1 1.7 3.2 2.1 2.3 2.06 2.8 3.5 2.7   Notes      (boost), amox amox   PAT        Clinic Interview:  Tablet Strength: pt has 7.5mg tablets  Current Maintenance Dose: 3.75mg daily except 7.5mg MWF    Patient Findings:  Negatives:   Signs/symptoms of thrombosis, Signs/symptoms of bleeding, Laboratory test error suspected, Change in health, Change in alcohol use, Change in activity, Upcoming invasive procedure, Emergency department visit, Upcoming dental procedure, Missed doses, Extra doses, Change in medications, Change in diet/appetite, Hospital admission, Bruising, Other complaints     Plan:  1. INR is therapeutic today, so instructed Mr. Montemayor to continue warfarin 3.75mg daily except 7.5mg MWF.  2. RTC in 3 weeks after appt with Dr. Taylor.  3. Verbal and written information provided today in clinic. Mr. Montemayor expresses understanding with teach back and has no further questions at this time.    Ann Cowden Mayer, PharmD  8/1/2018  10:43 AM

## 2018-08-03 RX ORDER — LOSARTAN POTASSIUM 100 MG/1
TABLET ORAL
Qty: 90 TABLET | Refills: 4 | Status: SHIPPED | OUTPATIENT
Start: 2018-08-03 | End: 2018-09-23 | Stop reason: HOSPADM

## 2018-08-22 ENCOUNTER — OFFICE VISIT (OUTPATIENT)
Dept: CARDIOLOGY | Facility: CLINIC | Age: 73
End: 2018-08-22

## 2018-08-22 ENCOUNTER — ANTICOAGULATION VISIT (OUTPATIENT)
Dept: PHARMACY | Facility: HOSPITAL | Age: 73
End: 2018-08-22

## 2018-08-22 VITALS
WEIGHT: 296 LBS | DIASTOLIC BLOOD PRESSURE: 88 MMHG | HEIGHT: 70 IN | SYSTOLIC BLOOD PRESSURE: 152 MMHG | HEART RATE: 70 BPM | BODY MASS INDEX: 42.37 KG/M2

## 2018-08-22 DIAGNOSIS — I48.3 TYPICAL ATRIAL FLUTTER (HCC): ICD-10-CM

## 2018-08-22 DIAGNOSIS — I48.19 PERSISTENT ATRIAL FIBRILLATION (HCC): ICD-10-CM

## 2018-08-22 DIAGNOSIS — Z95.0 PACEMAKER: ICD-10-CM

## 2018-08-22 DIAGNOSIS — I10 ESSENTIAL HYPERTENSION: ICD-10-CM

## 2018-08-22 DIAGNOSIS — I48.19 PERSISTENT ATRIAL FIBRILLATION (HCC): Primary | ICD-10-CM

## 2018-08-22 LAB
INR PPP: 2.7 (ref 0.91–1.09)
PROTHROMBIN TIME: 32.5 SECONDS (ref 10–13.8)

## 2018-08-22 PROCEDURE — 85610 PROTHROMBIN TIME: CPT

## 2018-08-22 PROCEDURE — G0463 HOSPITAL OUTPT CLINIC VISIT: HCPCS

## 2018-08-22 PROCEDURE — 99213 OFFICE O/P EST LOW 20 MIN: CPT | Performed by: PHYSICIAN ASSISTANT

## 2018-08-22 PROCEDURE — 93280 PM DEVICE PROGR EVAL DUAL: CPT | Performed by: PHYSICIAN ASSISTANT

## 2018-08-22 PROCEDURE — 36416 COLLJ CAPILLARY BLOOD SPEC: CPT

## 2018-08-22 NOTE — PROGRESS NOTES
Anticoagulation Clinic Progress Note  Indication: afib  Referring Provider: Tyson (Claudia)  Initial Warfarin Start Date: 2007  Goal INR: 2-3  Current Drug Interactions: aspirin    Diet: leafy head lettuce salads every now and then (thinks non-iceberg)    Anticoagulation Clinic INR History:  Date 11/14 12/6 1/25 2/22 3/22 4/11 5/9 6/8 6/29 8/3 8/31 9/14 9/28   Total Weekly Dose 37.5 mg 37.5 mg 37.5 mg 37.5 mg 37.5 mg 37.5 mg 37.5 mg 37.5mg 37.5 mg 37.5 mg 26.25mg 37.5 mg 37.5 mg   INR 2.4 2.1 2.0 3.1 2.4 2.6 2.7 2.2 2.5 3.0 1.7 3.2 2.3     Date 10/23 11/20 12/8 1/5 2/2 3/2 3/16 3/30 4/17 5/17 6/14 7/19 8/1 8/22 9/12   Total Weekly Dose 37.5mg 37.5 mg 37.5 mg 37.5 mg 37.5mg 37.5 mg 37.5 mg 37.5 mg 37.5mg 37.5 mg 37.5 mg 37.5mg 37.5 mg 37.5 mg 37.5 mg    INR 2.5 1.8 2.5 2.3 2.1 1.7 3.2 2.1 2.3 2.06 2.8 3.5 2.7 2.7    Notes      (boost), amox amox   PAT          Clinic Interview:  Verbal Release Authorization signed on 8/22/2018 -- may speak with Raeann Montemayor (Wife)    Tablet Strength: pt has 7.5mg tablets  Current Maintenance Dose: 3.75mg daily except 7.5mg MWF    Patient Findings   Positives:   Change in medications   Negatives:   Signs/symptoms of thrombosis, Signs/symptoms of bleeding, Laboratory test error suspected, Change in health, Change in alcohol use, Change in activity, Upcoming invasive procedure, Emergency department visit, Upcoming dental procedure, Missed doses, Extra doses, Change in diet/appetite, Hospital admission, Bruising, Other complaints   Comments:   Mr. Montemayor has had recent appointments with Dr. Mehta and Rei Domingo. Per Mr. Montemayor everything is good and they examined his pacemaker. There was a note from Jeronimo regarding changing the Amlodipine to 5 mg BID. Mr. Montemayor was aware of this change and made the change to his printed out medication list while in clinic today. His diet, activity, and appetite have all been consistent. He denied any missed doses, extra doses,  changes in other medications, or upcoming procedures.      Plan:  1. INR is therapeutic today at 2.7, so instructed Mr. Montemayor to continue warfarin 3.75mg daily except 7.5mg MWF.  2. RTC in 3 weeks, 9/12/18.  3. Verbal and written information provided today in clinic. Mr. Montemayor expresses understanding with teach back and has no further questions at this time.    Ivory Swift, Pharmacy Intern  8/22/2018  10:21 AM     I, Evelina Quan, PharmD, have reviewed the note in full and agree with the assessment and plan.  08/22/18  2:44 PM

## 2018-08-22 NOTE — PROGRESS NOTES
[facility] at [facility]   OFFICE NOTE      Marco Antonio Montemayor  1945  PCP: Emil Rosado MD    SUBJECTIVE:   Marco Antonio Montemayor is a 73 y.o. male seen for a follow up visit regarding the following: Afib, HTN, Pacemaker    CC:Afib.     Problem List:  1. 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 Remote acceptable intravenous adenosine Quantitative SPECT gated Cardiolite study, LVEF 0.50, April 1999.  b. Echocardiogram showing mild LVH with estimated ejection fraction 0.58 with mild aortic valve cusp sclerosis, and mild TR, 09/23/2014.   c. Remote abnormal preoperative Quantitative SPECT gated Cardiolite study with mild “fixed” inferoposterior hypoperfusion and mild LV enlargement with mild global hypokinesia with reduced LVEF 0.40 in the setting of abnormal EKG with subsequent diagnostic coronary angiography demonstrating mild-to-moderate nonobstructive coronary artery atherosclerosis with mild compensated left ventricular dysfunction, LVEF 0.52, and continued medical therapy felt warranted, June 2006.  d. Remote hospitalization for symptomatic atrial fibrillation with rapid ventricular response and mild congestive heart failure requiring BRYANNA and DC cardioversion, June 2009.  e. Left heart catheterization with essentially normal coronary arteries with mild luminal irregularities with an EF of 0.60 by Dr. Escamilla for angina and elevated troponin, 06/05/2015.   f. Residual class I symptoms.  2. Chronic essential hypertension with recent progressive hypertension and blood pressure readings and normal selective bilateral renal angiography, June 2006.  3. Dyslipidemia.  4. Chronic atrial tachyarrhythmias:  a. Intermittent chronic paroxysmal atrial fibrillation with electrophysiologic study with RFA for atrial flutter, October 1999.  b. Recurrent apparent transient atrial fibrillation, resolved with acceptable [facility] emergency department evaluation, June 2003.  c. Acceptable  combination Doppler echocardiogram, July 2003, with apparent acceptable 24-hour Holter monitor, May 2006.  d. Recurrent asymptomatic atrial fibrillation with RVR, January 2007, subsequent Coumadin therapy and Tikosyn therapy with successful internal cardioversion of atrial fibrillation and marked bradyarrhythmias requiring DDDR pacemaker implant, St. Chidi device, data not available, March 2007.  e. Remote hospitalization for symptomatic rapid atrial fibrillation/BRYANNA DC cardioversion with subsequent acceptable pacemaker interrogation, June/July 2009, as well as acceptable interrogation without reprogramming, July 2011.  f. Acceptable combination Doppler echocardiogram, December 2010, with residual class I symptoms.  g. Acceptable device interrogation following [facility] ED evaluation for chest pressure and shortness of breath, data deficit, May 2011, February 2013, February 2016, December 2016.  h. Abnormal pacemaker assessment with 8.4% mode switch with battery voltage of 1-1.25 years, June 2017.  i. Pacemaker Generator change Dr. aTylor 5/18/18  5. Morbid obesity, BMI 41.6.  6. Ichthyosis.  7. Chronic lower tract obstructive symptoms, probable BPH.  8. Dyslipidemia.  9. Nasal polyps with deviated nasal septum with apparent subsequent nasal septoplasty/polypectomy, data deficit, July 2006.  10. Abdominal pain with apparent small bowel obstruction with exploratory laparotomy - data deficit.  11. Appendectomy with subsequent delayed hospitalization for bleeding peptic ulcer disease/probable duodenal ulcer, November 2003.  12. Recurrent asymptomatic atrial fibrillation with rapid ventricular response, July 2007, with subsequent Tikosyn therapy and DDDR pacemaker implant with intermittent recurrent breakthrough arrhythmias, including remote DC cardioversion, May 2008.  13. Obstructive sleep apnea with treatment.      HPI:   Pleasant 73-year-old gentleman seen in follow-up regarding history of atrial fibrillation,  hypertension, severe bradycardia requiring pacemaker implantation.  He reports that he has not had any palpitations, dizziness, near syncope, or syncope.  He denies any chest pain or chest tightness.  He reports he has had problems with chronic diarrhea and feelings of weakness and fatigue.  He denies any chf symptoms. He states since starting Tikosyn this has helped in reducing episodes of Afib.     ROS:  Review of Symptoms:  General: no recent weight loss/gain,  + weakness and fatigue  Skin: Hx of ichthyosis  HEENT: + dizziness, lightheadedness, or vision changes  Respiratory: no cough or hemoptysis  Cardiovascular: + palpitations, and tachycardia  Gastrointestinal: + diarrhea  Urinary: no change in frequency or urgency  Peripheral Vascular: no claudication or leg cramps  Musculoskeletal: no muscle or joint pain/stiffness  Psychiatric: no depression or excessive stress  Neurological: no sensory or motor loss, no syncope  Hematologic: no anemia, easy bruising or bleeding  Endocrine: no thyroid problems, nor heat or cold intolerance    Cardiac PMH: (Old records have been reviewed and summarized below)      Past Medical History, Past Surgical History, Family history, Social History, and Medications were all reviewed with the patient today and updated as necessary.         No Known Allergies  Patient Active Problem List   Diagnosis   • Persistent atrial fibrillation (CMS/HCC)   • Coronary artery disease   • Dyslipidemia   • Hypertension   • Morbid obesity (CMS/HCC)   • Atrial flutter (CMS/HCC)   • Hypertensive heart disease without heart failure   • Severe obstructive sleep apnea   • Dependence on nocturnal oxygen therapy   • Other abnormal glucose    • Paroxysmal atrial fibrillation (CMS/HCC)   • Stage 3 chronic kidney disease   • Pacemaker     Past Medical History:   Diagnosis Date   • Abnormal ECG    • Arrhythmia    • Arthritis    • Atrial fibrillation (CMS/HCC)    • Cancer (CMS/HCC)     skin cancer removed from  "various locations    • Dry skin    • Edema    • Heart murmur    • Hematoma    • Hypertension    • Long term current use of anticoagulant    • ZULEIKA treated with BiPAP    • Sleep apnea     bipap- complaint with machine    • Stage 3 chronic kidney disease    • Tinnitus    • Wears glasses     readers     Past Surgical History:   Procedure Laterality Date   • ABLATION OF DYSRHYTHMIC FOCUS     • APPENDECTOMY     • CARDIAC ELECTROPHYSIOLOGY PROCEDURE N/A 5/18/2018    Procedure: PPM generator change - dual       St Chidi/ please schedule in 8 weeks;  Surgeon: Johnathan Taylor MD;  Location: [facility] INVASIVE LOCATION;  Service: Cardiovascular   • CATARACT EXTRACTION      bilat wiht lens    • COLONOSCOPY     • ENDOSCOPY     • SKIN CANCER EXCISION      various locations    • TONSILLECTOMY     • WISDOM TOOTH EXTRACTION       Family History   Problem Relation Age of Onset   • Cancer Mother    • Heart attack Mother    • Hypertension Mother    • Arrhythmia Father    • Hypertension Father    • Heart attack Father      Social History   Substance Use Topics   • Smoking status: Never Smoker   • Smokeless tobacco: Never Used   • Alcohol use No         PHYSICAL EXAM:    /88 (BP Location: Left arm, Patient Position: Sitting)   Pulse 70   Ht 177.8 cm (70\")   Wt 134 kg (296 lb)   BMI 42.47 kg/m²        Wt Readings from Last 5 Encounters:   08/22/18 134 kg (296 lb)   08/01/18 (!) 137 kg (301 lb)   05/18/18 134 kg (295 lb 10.2 oz)   04/17/18 133 kg (293 lb)   03/07/18 132 kg (290 lb)       BP Readings from Last 5 Encounters:   08/22/18 152/88   08/01/18 138/70   05/18/18 157/87   04/17/18 168/84   03/07/18 120/64       General appearance - Alert, well appearing, Obese and in no distress   Mental status - Affect appropriate to mood.  Eyes - Sclerae anicteric,  ENMT - Hearing grossly normal bilaterally, Dental hygiene good.  Neck - Carotids upstroke normal bilaterally, no bruits, no JVD.  Resp - Clear to auscultation, no wheezes, " rales or rhonchi, symmetric air entry.  Heart - Normal rate, regular rhythm, normal S1, S2, no murmurs, rubs, clicks or gallops.  GI - Soft, nontender, nondistended, no masses or organomegaly.  Neurological - Grossly intact - normal speech, no focal findings  Musculoskeletal - No joint tenderness, deformity or swelling, no muscular tenderness noted.  Extremities - Peripheral pulses normal, no pedal edema, no clubbing or cyanosis.  Skin - Normal coloration and turgor.  Psych -  oriented to person, place, and time.    Medical problems and test results were reviewed with the patient today.     Recent Results (from the past 672 hour(s))   POC Protime / INR    Collection Time: 08/01/18 10:34 AM   Result Value Ref Range    Protime 31.9 (H) 10.0 - 13.8 seconds    INR 2.7 (H) 0.91 - 1.09         EKG: (EKG has been independently visualized by me and summarized below)  5/18/18    ECG 12 Lead  Date/Time: 8/22/2018 9:37 AM  Performed by: SUDHAKAR BONE  Authorized by: SUDHAKAR BONE   Comparison: compared with previous ECG from 5/17/2018  Similar to previous ECG  Rhythm: sinus rhythm  Rate: normal  ST Segments: ST segments normal  T Waves: T waves normal  QRS axis: normal  Other: no other findings  Clinical impression: normal ECG          Device Interrogation:  St. MYRON pacemaker  dual-chamber device.  A paced A1 percent.  V paced 5.9%.  No P waves.  Threshold 0.75 V at 0.4 ms.  R-wave greater than 12.0 mV.  RV threshold 1.25 V at 0.6 ms.  Atrial impedance 380 ohms.  RV impedance 450 ohms.  Adipose 8.5 years remaining.  Most which 17%.  Longest episode 18 hours.    ASSESSMENT   1. Persistent Afib:  Tikosyn.  Patient reports that since Tikosyn use actually been better with not as many breakthrough episodes of atrial fibrillation.  2. HTN: Controlled per home recordings, consider increasing Norvasc 5mg BID  3. ZULEIKA: CPAP  4. Morbid obesity  5. Anticoagulation: Coumadin with our office.     PLAN  · Continue medical therapy  except increase Norvasc 5 mg BID  · Contiue Tikosyn . Acceptable EKG today.   · Anticoagulation: coumadin. Acceptable INR.   ·  Follow up in 8 month or sooner as needed.     8/22/2018  9:44 AM    Will Jeronimo SUMMERS

## 2018-09-10 DIAGNOSIS — I48.91 ATRIAL FIBRILLATION, UNSPECIFIED TYPE (HCC): ICD-10-CM

## 2018-09-10 RX ORDER — WARFARIN SODIUM 7.5 MG/1
TABLET ORAL
Qty: 30 TABLET | Refills: 0 | Status: SHIPPED | OUTPATIENT
Start: 2018-09-10 | End: 2018-11-08 | Stop reason: SDUPTHER

## 2018-09-12 ENCOUNTER — ANTICOAGULATION VISIT (OUTPATIENT)
Dept: PHARMACY | Facility: HOSPITAL | Age: 73
End: 2018-09-12

## 2018-09-12 DIAGNOSIS — I48.19 PERSISTENT ATRIAL FIBRILLATION (HCC): ICD-10-CM

## 2018-09-12 LAB
INR PPP: 3 (ref 0.91–1.09)
PROTHROMBIN TIME: 35.6 SECONDS (ref 10–13.8)

## 2018-09-12 PROCEDURE — G0463 HOSPITAL OUTPT CLINIC VISIT: HCPCS

## 2018-09-12 PROCEDURE — 85610 PROTHROMBIN TIME: CPT

## 2018-09-12 PROCEDURE — 36416 COLLJ CAPILLARY BLOOD SPEC: CPT

## 2018-09-12 RX ORDER — ACETAMINOPHEN 500 MG
1000 TABLET ORAL EVERY 6 HOURS PRN
COMMUNITY
End: 2020-08-11

## 2018-09-12 RX ORDER — LOPERAMIDE HYDROCHLORIDE 2 MG/1
2 CAPSULE ORAL 4 TIMES DAILY PRN
COMMUNITY
End: 2019-02-12

## 2018-09-12 NOTE — PROGRESS NOTES
Anticoagulation Clinic Progress Note  Indication: afib  Referring Provider: Tyson Roberts)  Initial Warfarin Start Date: 2007  Goal INR: 2-3  Current Drug Interactions: aspirin    Diet: leafy head lettuce salads every now and then (thinks non-iceberg)    Anticoagulation Clinic INR History:  Date 11/14 12/6 1/25 2/22 3/22 4/11 5/9 6/8 6/29 8/3 8/31 9/14 9/28   Total Weekly Dose 37.5 mg 37.5 mg 37.5 mg 37.5 mg 37.5 mg 37.5 mg 37.5 mg 37.5mg 37.5 mg 37.5 mg 26.25mg 37.5 mg 37.5 mg   INR 2.4 2.1 2.0 3.1 2.4 2.6 2.7 2.2 2.5 3.0 1.7 3.2 2.3     Date 10/23 11/20 12/8 1/5 2/2 3/2 3/16 3/30 4/17 5/17 6/14 7/19 8/1 8/22 9/12   Total Weekly Dose 37.5mg 37.5 mg 37.5 mg 37.5 mg 37.5mg 37.5 mg 37.5 mg 37.5 mg 37.5mg 37.5 mg 37.5 mg 37.5mg 37.5 mg 37.5 mg 37.5 mg    INR 2.5 1.8 2.5 2.3 2.1 1.7 3.2 2.1 2.3 2.06 2.8 3.5 2.7 2.7    Notes      (boost), amox amox   PAT          Clinic Interview:  Verbal Release Authorization signed on 8/22/2018 -- may speak with Raeann Montemayor (Wife)    Tablet Strength: pt has 7.5mg tablets  Current Maintenance Dose: 3.75mg daily except 7.5mg MWF    Patient Findings:  Positives:   Change in health, Change in medications   Negatives:   Signs/symptoms of thrombosis, Signs/symptoms of bleeding, Laboratory test error suspected, Change in alcohol use, Change in activity, Upcoming invasive procedure, Emergency department visit, Upcoming dental procedure, Missed doses, Extra doses, Change in diet/appetite, Hospital admission, Bruising, Other complaints   Comments:   Mr. Montemayor reports a sudden bout of diarrhea / GI upset -- started last night. He has not experienced any vomiting, and he does not note any blood in his stool. He has taken some loperamide + APAP for sx, but he otherwise denies changes. Discussed potential effects on INR (incr with GI losses and high doses of APAP).      Plan:  1. INR is therapeutic today at the upper end of goal. For now, instructed Mr. Montemayor to have a salad / broccoli  tonight and continue warfarin 3.75mg daily except 7.5mg MWF.  2. RTC in 4 weeks (sooner, if his GI bug does not resolve in the next 2-3 days).   3. Verbal and written information provided today in clinic. Mr. Montemayor expresses understanding with teach back and has no further questions at this time.    Ann Cowden Mayer, PharmD  9/12/2018  9:56 AM

## 2018-09-14 ENCOUNTER — OFFICE VISIT (OUTPATIENT)
Dept: FAMILY MEDICINE CLINIC | Facility: CLINIC | Age: 73
End: 2018-09-14

## 2018-09-14 ENCOUNTER — APPOINTMENT (OUTPATIENT)
Dept: GENERAL RADIOLOGY | Facility: HOSPITAL | Age: 73
End: 2018-09-14

## 2018-09-14 ENCOUNTER — HOSPITAL ENCOUNTER (INPATIENT)
Facility: HOSPITAL | Age: 73
LOS: 9 days | Discharge: HOME | End: 2018-09-23
Attending: EMERGENCY MEDICINE | Admitting: INTERNAL MEDICINE

## 2018-09-14 VITALS
TEMPERATURE: 98.4 F | BODY MASS INDEX: 41.9 KG/M2 | WEIGHT: 292 LBS | DIASTOLIC BLOOD PRESSURE: 70 MMHG | HEART RATE: 116 BPM | SYSTOLIC BLOOD PRESSURE: 134 MMHG | RESPIRATION RATE: 24 BRPM | OXYGEN SATURATION: 94 %

## 2018-09-14 DIAGNOSIS — D62 ACUTE BLOOD LOSS ANEMIA: ICD-10-CM

## 2018-09-14 DIAGNOSIS — K92.2 ACUTE LOWER GI BLEEDING: Primary | ICD-10-CM

## 2018-09-14 DIAGNOSIS — R06.02 SHORTNESS OF BREATH: ICD-10-CM

## 2018-09-14 DIAGNOSIS — R19.7 DIARRHEA, UNSPECIFIED TYPE: Primary | ICD-10-CM

## 2018-09-14 DIAGNOSIS — I49.9 IRREGULAR HEART RATE: ICD-10-CM

## 2018-09-14 PROBLEM — G47.33 SEVERE OBSTRUCTIVE SLEEP APNEA: Chronic | Status: ACTIVE | Noted: 2017-04-11

## 2018-09-14 PROBLEM — D64.9 ANEMIA: Status: ACTIVE | Noted: 2018-09-14

## 2018-09-14 PROBLEM — I50.32 CHRONIC DIASTOLIC CONGESTIVE HEART FAILURE (HCC): Status: ACTIVE | Noted: 2018-09-14

## 2018-09-14 PROBLEM — D64.9 ANEMIA: Chronic | Status: ACTIVE | Noted: 2018-09-14

## 2018-09-14 PROBLEM — N18.30 STAGE 3 CHRONIC KIDNEY DISEASE: Chronic | Status: ACTIVE | Noted: 2018-08-01

## 2018-09-14 PROBLEM — I48.0 PAROXYSMAL ATRIAL FIBRILLATION: Chronic | Status: ACTIVE | Noted: 2018-05-18

## 2018-09-14 PROBLEM — N17.9 AKI (ACUTE KIDNEY INJURY) (HCC): Status: ACTIVE | Noted: 2018-09-14

## 2018-09-14 PROBLEM — Z95.0 PACEMAKER: Chronic | Status: ACTIVE | Noted: 2018-08-22

## 2018-09-14 PROBLEM — Z99.81 DEPENDENCE ON NOCTURNAL OXYGEN THERAPY: Chronic | Status: ACTIVE | Noted: 2017-04-11

## 2018-09-14 PROBLEM — I50.32 CHRONIC DIASTOLIC CONGESTIVE HEART FAILURE: Chronic | Status: ACTIVE | Noted: 2018-09-14

## 2018-09-14 LAB
ABO GROUP BLD: NORMAL
ALBUMIN SERPL-MCNC: 3.48 G/DL (ref 3.2–4.8)
ALBUMIN/GLOB SERPL: 1.6 G/DL (ref 1.5–2.5)
ALP SERPL-CCNC: 77 U/L (ref 25–100)
ALT SERPL W P-5'-P-CCNC: 14 U/L (ref 7–40)
ANION GAP SERPL CALCULATED.3IONS-SCNC: 7 MMOL/L (ref 3–11)
AST SERPL-CCNC: 23 U/L (ref 0–33)
BASOPHILS # BLD AUTO: 0.01 10*3/MM3 (ref 0–0.2)
BASOPHILS NFR BLD AUTO: 0.3 % (ref 0–1)
BILIRUB SERPL-MCNC: 0.4 MG/DL (ref 0.3–1.2)
BLD GP AB SCN SERPL QL: NEGATIVE
BNP SERPL-MCNC: 146 PG/ML (ref 0–100)
BUN BLD-MCNC: 38 MG/DL (ref 9–23)
BUN/CREAT SERPL: 12.5 (ref 7–25)
CALCIUM SPEC-SCNC: 7.9 MG/DL (ref 8.7–10.4)
CHLORIDE SERPL-SCNC: 110 MMOL/L (ref 99–109)
CO2 SERPL-SCNC: 21 MMOL/L (ref 20–31)
CREAT BLD-MCNC: 3.03 MG/DL (ref 0.6–1.3)
DEPRECATED RDW RBC AUTO: 55 FL (ref 37–54)
DEVELOPER EXPIRATION DATE: ABNORMAL
DEVELOPER LOT NUMBER: ABNORMAL
EOSINOPHIL # BLD AUTO: 0.03 10*3/MM3 (ref 0–0.3)
EOSINOPHIL NFR BLD AUTO: 0.8 % (ref 0–3)
ERYTHROCYTE [DISTWIDTH] IN BLOOD BY AUTOMATED COUNT: 15.5 % (ref 11.3–14.5)
EXPIRATION DATE: ABNORMAL
FECAL OCCULT BLOOD SCREEN, POC: POSITIVE
GFR SERPL CREATININE-BSD FRML MDRD: 20 ML/MIN/1.73
GLOBULIN UR ELPH-MCNC: 2.2 GM/DL
GLUCOSE BLD-MCNC: 114 MG/DL (ref 70–100)
HCT VFR BLD AUTO: 26.5 % (ref 38.9–50.9)
HGB BLD-MCNC: 8 G/DL (ref 13.1–17.5)
HOLD SPECIMEN: NORMAL
HOLD SPECIMEN: NORMAL
IMM GRANULOCYTES # BLD: 0.01 10*3/MM3 (ref 0–0.03)
IMM GRANULOCYTES NFR BLD: 0.3 % (ref 0–0.6)
INR PPP: 2.15 (ref 0.91–1.09)
LYMPHOCYTES # BLD AUTO: 0.94 10*3/MM3 (ref 0.6–4.8)
LYMPHOCYTES NFR BLD AUTO: 25.1 % (ref 24–44)
Lab: ABNORMAL
MCH RBC QN AUTO: 29 PG (ref 27–31)
MCHC RBC AUTO-ENTMCNC: 30.2 G/DL (ref 32–36)
MCV RBC AUTO: 96 FL (ref 80–99)
MONOCYTES # BLD AUTO: 0.61 10*3/MM3 (ref 0–1)
MONOCYTES NFR BLD AUTO: 16.3 % (ref 0–12)
NEGATIVE CONTROL: NEGATIVE
NEUTROPHILS # BLD AUTO: 2.16 10*3/MM3 (ref 1.5–8.3)
NEUTROPHILS NFR BLD AUTO: 57.5 % (ref 41–71)
PLATELET # BLD AUTO: 177 10*3/MM3 (ref 150–450)
PMV BLD AUTO: 10.1 FL (ref 6–12)
POSITIVE CONTROL: POSITIVE
POTASSIUM BLD-SCNC: 3.9 MMOL/L (ref 3.5–5.5)
PROT SERPL-MCNC: 5.7 G/DL (ref 5.7–8.2)
PROTHROMBIN TIME: 22.6 SECONDS (ref 9.6–11.5)
RBC # BLD AUTO: 2.76 10*6/MM3 (ref 4.2–5.76)
RH BLD: POSITIVE
SODIUM BLD-SCNC: 138 MMOL/L (ref 132–146)
T&S EXPIRATION DATE: NORMAL
TROPONIN I SERPL-MCNC: 0.01 NG/ML (ref 0–0.07)
WBC NRBC COR # BLD: 3.75 10*3/MM3 (ref 3.5–10.8)
WHOLE BLOOD HOLD SPECIMEN: NORMAL
WHOLE BLOOD HOLD SPECIMEN: NORMAL

## 2018-09-14 PROCEDURE — 86850 RBC ANTIBODY SCREEN: CPT | Performed by: EMERGENCY MEDICINE

## 2018-09-14 PROCEDURE — 82270 OCCULT BLOOD FECES: CPT | Performed by: EMERGENCY MEDICINE

## 2018-09-14 PROCEDURE — 85025 COMPLETE CBC W/AUTO DIFF WBC: CPT | Performed by: EMERGENCY MEDICINE

## 2018-09-14 PROCEDURE — 99284 EMERGENCY DEPT VISIT MOD MDM: CPT

## 2018-09-14 PROCEDURE — 86901 BLOOD TYPING SEROLOGIC RH(D): CPT | Performed by: EMERGENCY MEDICINE

## 2018-09-14 PROCEDURE — 84484 ASSAY OF TROPONIN QUANT: CPT

## 2018-09-14 PROCEDURE — 99213 OFFICE O/P EST LOW 20 MIN: CPT | Performed by: NURSE PRACTITIONER

## 2018-09-14 PROCEDURE — 71045 X-RAY EXAM CHEST 1 VIEW: CPT

## 2018-09-14 PROCEDURE — 85610 PROTHROMBIN TIME: CPT | Performed by: EMERGENCY MEDICINE

## 2018-09-14 PROCEDURE — 86900 BLOOD TYPING SEROLOGIC ABO: CPT | Performed by: EMERGENCY MEDICINE

## 2018-09-14 PROCEDURE — 93000 ELECTROCARDIOGRAM COMPLETE: CPT | Performed by: NURSE PRACTITIONER

## 2018-09-14 PROCEDURE — 93005 ELECTROCARDIOGRAM TRACING: CPT | Performed by: EMERGENCY MEDICINE

## 2018-09-14 PROCEDURE — 86920 COMPATIBILITY TEST SPIN: CPT

## 2018-09-14 PROCEDURE — 99223 1ST HOSP IP/OBS HIGH 75: CPT | Performed by: FAMILY MEDICINE

## 2018-09-14 PROCEDURE — 83880 ASSAY OF NATRIURETIC PEPTIDE: CPT | Performed by: EMERGENCY MEDICINE

## 2018-09-14 PROCEDURE — 80053 COMPREHEN METABOLIC PANEL: CPT | Performed by: EMERGENCY MEDICINE

## 2018-09-14 RX ORDER — LANOLIN ALCOHOL/MO/W.PET/CERES
1000 CREAM (GRAM) TOPICAL DAILY
Status: DISCONTINUED | OUTPATIENT
Start: 2018-09-15 | End: 2018-09-23 | Stop reason: HOSPADM

## 2018-09-14 RX ORDER — CARVEDILOL 12.5 MG/1
25 TABLET ORAL 2 TIMES DAILY WITH MEALS
Status: DISCONTINUED | OUTPATIENT
Start: 2018-09-14 | End: 2018-09-23 | Stop reason: HOSPADM

## 2018-09-14 RX ORDER — MAGNESIUM SULFATE HEPTAHYDRATE 40 MG/ML
2 INJECTION, SOLUTION INTRAVENOUS AS NEEDED
Status: DISCONTINUED | OUTPATIENT
Start: 2018-09-14 | End: 2018-09-23 | Stop reason: HOSPADM

## 2018-09-14 RX ORDER — ASPIRIN 81 MG/1
81 TABLET ORAL DAILY
Status: DISCONTINUED | OUTPATIENT
Start: 2018-09-14 | End: 2018-09-23 | Stop reason: HOSPADM

## 2018-09-14 RX ORDER — MORPHINE SULFATE 2 MG/ML
1 INJECTION, SOLUTION INTRAMUSCULAR; INTRAVENOUS EVERY 4 HOURS PRN
Status: DISCONTINUED | OUTPATIENT
Start: 2018-09-14 | End: 2018-09-23 | Stop reason: HOSPADM

## 2018-09-14 RX ORDER — FERROUS SULFATE 325(65) MG
325 TABLET ORAL
Status: DISCONTINUED | OUTPATIENT
Start: 2018-09-15 | End: 2018-09-16

## 2018-09-14 RX ORDER — ACETAMINOPHEN 325 MG/1
650 TABLET ORAL EVERY 4 HOURS PRN
Status: DISCONTINUED | OUTPATIENT
Start: 2018-09-14 | End: 2018-09-23 | Stop reason: HOSPADM

## 2018-09-14 RX ORDER — NALOXONE HCL 0.4 MG/ML
0.4 VIAL (ML) INJECTION
Status: DISCONTINUED | OUTPATIENT
Start: 2018-09-14 | End: 2018-09-23 | Stop reason: HOSPADM

## 2018-09-14 RX ORDER — MAGNESIUM SULFATE HEPTAHYDRATE 40 MG/ML
4 INJECTION, SOLUTION INTRAVENOUS AS NEEDED
Status: DISCONTINUED | OUTPATIENT
Start: 2018-09-14 | End: 2018-09-23 | Stop reason: HOSPADM

## 2018-09-14 RX ORDER — WARFARIN SODIUM 7.5 MG/1
7.5 TABLET ORAL
Status: DISCONTINUED | OUTPATIENT
Start: 2018-09-17 | End: 2018-09-21

## 2018-09-14 RX ORDER — POTASSIUM CHLORIDE 7.45 MG/ML
10 INJECTION INTRAVENOUS
Status: DISCONTINUED | OUTPATIENT
Start: 2018-09-14 | End: 2018-09-18

## 2018-09-14 RX ORDER — SODIUM CHLORIDE 0.9 % (FLUSH) 0.9 %
10 SYRINGE (ML) INJECTION AS NEEDED
Status: DISCONTINUED | OUTPATIENT
Start: 2018-09-14 | End: 2018-09-23 | Stop reason: HOSPADM

## 2018-09-14 RX ORDER — ATORVASTATIN CALCIUM 40 MG/1
40 TABLET, FILM COATED ORAL DAILY
Status: DISCONTINUED | OUTPATIENT
Start: 2018-09-14 | End: 2018-09-23 | Stop reason: HOSPADM

## 2018-09-14 RX ORDER — WARFARIN SODIUM 7.5 MG/1
7.5 TABLET ORAL
Status: DISCONTINUED | OUTPATIENT
Start: 2018-09-14 | End: 2018-09-14

## 2018-09-14 RX ORDER — SODIUM CHLORIDE 0.9 % (FLUSH) 0.9 %
1-10 SYRINGE (ML) INJECTION AS NEEDED
Status: DISCONTINUED | OUTPATIENT
Start: 2018-09-14 | End: 2018-09-23 | Stop reason: HOSPADM

## 2018-09-14 RX ORDER — SODIUM CHLORIDE 9 MG/ML
100 INJECTION, SOLUTION INTRAVENOUS CONTINUOUS
Status: DISCONTINUED | OUTPATIENT
Start: 2018-09-14 | End: 2018-09-16

## 2018-09-14 RX ORDER — WARFARIN SODIUM 7.5 MG/1
3.75 TABLET ORAL
Status: DISCONTINUED | OUTPATIENT
Start: 2018-09-15 | End: 2018-09-21

## 2018-09-14 RX ORDER — POTASSIUM CHLORIDE 750 MG/1
40 CAPSULE, EXTENDED RELEASE ORAL AS NEEDED
Status: DISCONTINUED | OUTPATIENT
Start: 2018-09-14 | End: 2018-09-18

## 2018-09-14 RX ORDER — LOPERAMIDE HYDROCHLORIDE 2 MG/1
2 CAPSULE ORAL 4 TIMES DAILY PRN
Status: DISCONTINUED | OUTPATIENT
Start: 2018-09-14 | End: 2018-09-23 | Stop reason: HOSPADM

## 2018-09-14 RX ORDER — ONDANSETRON 2 MG/ML
4 INJECTION INTRAMUSCULAR; INTRAVENOUS EVERY 6 HOURS PRN
Status: DISCONTINUED | OUTPATIENT
Start: 2018-09-14 | End: 2018-09-23 | Stop reason: HOSPADM

## 2018-09-14 RX ORDER — POTASSIUM CHLORIDE 1.5 G/1.77G
40 POWDER, FOR SOLUTION ORAL AS NEEDED
Status: DISCONTINUED | OUTPATIENT
Start: 2018-09-14 | End: 2018-09-18

## 2018-09-14 RX ORDER — DOFETILIDE 0.25 MG/1
250 CAPSULE ORAL 2 TIMES DAILY
Status: DISCONTINUED | OUTPATIENT
Start: 2018-09-14 | End: 2018-09-18

## 2018-09-14 RX ORDER — SODIUM CHLORIDE 9 MG/ML
125 INJECTION, SOLUTION INTRAVENOUS CONTINUOUS
Status: DISCONTINUED | OUTPATIENT
Start: 2018-09-14 | End: 2018-09-14

## 2018-09-14 RX ADMIN — SODIUM CHLORIDE 125 ML/HR: 9 INJECTION, SOLUTION INTRAVENOUS at 15:25

## 2018-09-14 RX ADMIN — DOFETILIDE 250 MCG: 0.25 CAPSULE ORAL at 21:49

## 2018-09-14 RX ADMIN — ATORVASTATIN CALCIUM 40 MG: 40 TABLET, FILM COATED ORAL at 17:17

## 2018-09-14 RX ADMIN — SODIUM CHLORIDE 100 ML/HR: 9 INJECTION, SOLUTION INTRAVENOUS at 21:49

## 2018-09-14 RX ADMIN — SODIUM CHLORIDE 1000 ML: 9 INJECTION, SOLUTION INTRAVENOUS at 12:09

## 2018-09-14 RX ADMIN — SODIUM CHLORIDE 100 ML/HR: 9 INJECTION, SOLUTION INTRAVENOUS at 17:17

## 2018-09-14 RX ADMIN — LOPERAMIDE HYDROCHLORIDE 2 MG: 2 CAPSULE ORAL at 21:53

## 2018-09-14 RX ADMIN — SODIUM CHLORIDE 500 ML: 9 INJECTION, SOLUTION INTRAVENOUS at 14:06

## 2018-09-14 NOTE — PROGRESS NOTES
Subjective   Marco Antonio Montemayor is a 73 y.o. male.   Chief Complaint   Patient presents with   • Diarrhea      History of Present Illness   Patient is here for complaint of diarrhea since Tuesday night.   He started with really bad gas. And changed to diarrhea.   Has been taking anti diarrheal medication a couple times a day.   Heart rate feels is irregular. Has been short of breath, had chest pressure with ambulation.  Denies numbness, tingling. He did not want to go to ER. He reports Dr. Mehta is his cardiologist. He did not call the office.   Patient reports he has not been drinking enough and having a very poor appetite.     He is here with his wife. She reports she had to talk him into coming to the office.       Denies pain or pressure at this time.       The following portions of the patient's history were reviewed and updated as appropriate: allergies, current medications, past family history, past medical history, past social history, past surgical history and problem list.    Review of Systems   Constitutional: Positive for fatigue.   HENT: Positive for congestion.    Respiratory: Positive for shortness of breath.    Cardiovascular:        Chest pressure    Gastrointestinal: Positive for diarrhea. Negative for abdominal distention, constipation, nausea and vomiting.        Increase shortness of breath with change in heart rate  It is irregular.      Neurological: Positive for light-headedness.         Objective   No Known Allergies  Visit Vitals  /70 (BP Location: Left arm, Patient Position: Sitting)   Pulse 116   Temp 98.4 °F (36.9 °C) (Temporal Artery )   Resp 24   Wt 132 kg (292 lb)   SpO2 94%   BMI 41.90 kg/m²       ECG 12 Lead  Date/Time: 9/14/2018 10:29 AM  Performed by: MICHELE PEDRAZA  Authorized by: MICHELE PEDRAZA   Comparison: compared with previous ECG from 8/22/2018  Rhythm: atrial fibrillation and paced  Ectopy: PVCs  Rate: normal  Rate comments:   BPM: 99  T depression: V5, V6, I  and aVL  QRS axis: extreme  Clinical impression: abnormal ECG  Comments: See scanned EKG    EKG reflects: Atrial Fibrillation   Moderate T-wave abnormality, Consider lateral ischemia ( -0.1+ mV T wave in I/avL/V5/V6   Abnormal EKG.  Vent Rate:   99 BPM  UT int:          *  QRS dur:   101 ms  QT/QTc:    295/351  Ms   P-R-T axes:  *  35    109     Reviewed with colleague             Physical Exam   Cardiovascular: An irregularly irregular rhythm present. Tachycardia present.    Murmur heard.  Pulmonary/Chest: Effort normal. He has decreased breath sounds.   Skin: Skin is warm. Capillary refill takes 2 to 3 seconds. There is pallor.   Psychiatric: He has a normal mood and affect. His speech is normal and behavior is normal. Thought content normal.   Vitals reviewed.      Assessment/Plan   Marco Antonio was seen today for diarrhea.    Diagnoses and all orders for this visit:    Diarrhea, unspecified type  -     POCT CBC  -     Comprehensive Metabolic Panel    Irregular heart rate  -     ECG 12 Lead      EKG reflects: Atrial Fibrillation   Moderate T-wave abnormality, Consider lateral ischemia ( -0.1+ mV T wave in I/avL/V5/V6   Abnormal EKG.    Discussed the abnormal EKG.   Recommending patient to go to ER for further evaluation.   Patient and his spouse are agreeable. He does not want via ambulance.   He will be going via private vehicle.     Will cancel the CMP and CBC - they were send outs.   Patient was not in distress at the time of leaving the office.   Called ER - faxed copy of EKG.        Patient Instructions   Go to ER for further evaluation.         JENNIFER Gaspar

## 2018-09-15 LAB
ANION GAP SERPL CALCULATED.3IONS-SCNC: 4 MMOL/L (ref 3–11)
BUN BLD-MCNC: 40 MG/DL (ref 9–23)
BUN/CREAT SERPL: 13.1 (ref 7–25)
CA-I SERPL ISE-MCNC: 1.16 MMOL/L (ref 1.12–1.32)
CALCIUM SPEC-SCNC: 7.5 MG/DL (ref 8.7–10.4)
CHLORIDE SERPL-SCNC: 113 MMOL/L (ref 99–109)
CO2 SERPL-SCNC: 21 MMOL/L (ref 20–31)
CREAT BLD-MCNC: 3.05 MG/DL (ref 0.6–1.3)
GFR SERPL CREATININE-BSD FRML MDRD: 20 ML/MIN/1.73
GLUCOSE BLD-MCNC: 97 MG/DL (ref 70–100)
HCT VFR BLD AUTO: 23.8 % (ref 38.9–50.9)
HGB BLD-MCNC: 7.2 G/DL (ref 13.1–17.5)
INR PPP: 2.63 (ref 0.91–1.09)
IRON 24H UR-MRATE: 9 MCG/DL (ref 50–175)
IRON SATN MFR SERPL: 4 % (ref 20–50)
MAGNESIUM SERPL-MCNC: 2.1 MG/DL (ref 1.3–2.7)
POTASSIUM BLD-SCNC: 3.8 MMOL/L (ref 3.5–5.5)
PROTHROMBIN TIME: 27.6 SECONDS (ref 9.6–11.5)
SODIUM BLD-SCNC: 138 MMOL/L (ref 132–146)
TIBC SERPL-MCNC: 250 MCG/DL (ref 250–450)
TSH SERPL DL<=0.05 MIU/L-ACNC: 3.04 MIU/ML (ref 0.35–5.35)

## 2018-09-15 PROCEDURE — 80048 BASIC METABOLIC PNL TOTAL CA: CPT | Performed by: FAMILY MEDICINE

## 2018-09-15 PROCEDURE — 83540 ASSAY OF IRON: CPT | Performed by: FAMILY MEDICINE

## 2018-09-15 PROCEDURE — 85610 PROTHROMBIN TIME: CPT | Performed by: FAMILY MEDICINE

## 2018-09-15 PROCEDURE — 82746 ASSAY OF FOLIC ACID SERUM: CPT | Performed by: FAMILY MEDICINE

## 2018-09-15 PROCEDURE — 83735 ASSAY OF MAGNESIUM: CPT | Performed by: FAMILY MEDICINE

## 2018-09-15 PROCEDURE — 85014 HEMATOCRIT: CPT | Performed by: FAMILY MEDICINE

## 2018-09-15 PROCEDURE — 85018 HEMOGLOBIN: CPT | Performed by: FAMILY MEDICINE

## 2018-09-15 PROCEDURE — 94799 UNLISTED PULMONARY SVC/PX: CPT

## 2018-09-15 PROCEDURE — 93010 ELECTROCARDIOGRAM REPORT: CPT | Performed by: INTERNAL MEDICINE

## 2018-09-15 PROCEDURE — 84443 ASSAY THYROID STIM HORMONE: CPT | Performed by: FAMILY MEDICINE

## 2018-09-15 PROCEDURE — 82330 ASSAY OF CALCIUM: CPT | Performed by: FAMILY MEDICINE

## 2018-09-15 PROCEDURE — 94660 CPAP INITIATION&MGMT: CPT

## 2018-09-15 PROCEDURE — 82728 ASSAY OF FERRITIN: CPT | Performed by: FAMILY MEDICINE

## 2018-09-15 PROCEDURE — 82607 VITAMIN B-12: CPT | Performed by: FAMILY MEDICINE

## 2018-09-15 PROCEDURE — 83550 IRON BINDING TEST: CPT | Performed by: FAMILY MEDICINE

## 2018-09-15 PROCEDURE — 99232 SBSQ HOSP IP/OBS MODERATE 35: CPT | Performed by: INTERNAL MEDICINE

## 2018-09-15 RX ADMIN — CYANOCOBALAMIN TAB 1000 MCG 1000 MCG: 1000 TAB at 08:09

## 2018-09-15 RX ADMIN — FERROUS SULFATE TAB 325 MG (65 MG ELEMENTAL FE) 325 MG: 325 (65 FE) TAB at 08:09

## 2018-09-15 RX ADMIN — ATORVASTATIN CALCIUM 40 MG: 40 TABLET, FILM COATED ORAL at 08:10

## 2018-09-15 RX ADMIN — DOFETILIDE 250 MCG: 0.25 CAPSULE ORAL at 08:09

## 2018-09-15 RX ADMIN — LOPERAMIDE HYDROCHLORIDE 2 MG: 2 CAPSULE ORAL at 08:16

## 2018-09-15 RX ADMIN — LOPERAMIDE HYDROCHLORIDE 2 MG: 2 CAPSULE ORAL at 18:29

## 2018-09-15 RX ADMIN — WARFARIN SODIUM 3.75 MG: 7.5 TABLET ORAL at 18:29

## 2018-09-15 RX ADMIN — SODIUM CHLORIDE 100 ML/HR: 9 INJECTION, SOLUTION INTRAVENOUS at 13:53

## 2018-09-15 RX ADMIN — LOPERAMIDE HYDROCHLORIDE 2 MG: 2 CAPSULE ORAL at 13:53

## 2018-09-15 RX ADMIN — SODIUM CHLORIDE 100 ML/HR: 9 INJECTION, SOLUTION INTRAVENOUS at 23:03

## 2018-09-15 RX ADMIN — DOFETILIDE 250 MCG: 0.25 CAPSULE ORAL at 20:54

## 2018-09-15 RX ADMIN — CARVEDILOL 25 MG: 12.5 TABLET, FILM COATED ORAL at 18:29

## 2018-09-15 RX ADMIN — SODIUM CHLORIDE 100 ML/HR: 9 INJECTION, SOLUTION INTRAVENOUS at 05:17

## 2018-09-15 RX ADMIN — ASPIRIN 81 MG: 81 TABLET, COATED ORAL at 08:12

## 2018-09-16 LAB
AMORPH URATE CRY URNS QL MICRO: ABNORMAL /HPF
ANION GAP SERPL CALCULATED.3IONS-SCNC: 5 MMOL/L (ref 3–11)
BACTERIA UR QL AUTO: ABNORMAL /HPF
BILIRUB UR QL STRIP: NEGATIVE
BUN BLD-MCNC: 42 MG/DL (ref 9–23)
BUN/CREAT SERPL: 12 (ref 7–25)
CALCIUM SPEC-SCNC: 7.4 MG/DL (ref 8.7–10.4)
CHLORIDE SERPL-SCNC: 113 MMOL/L (ref 99–109)
CLARITY UR: ABNORMAL
CO2 SERPL-SCNC: 21 MMOL/L (ref 20–31)
COLOR UR: YELLOW
CREAT BLD-MCNC: 3.5 MG/DL (ref 0.6–1.3)
CREAT UR-MCNC: 267.2 MG/DL
DEPRECATED RDW RBC AUTO: 55.5 FL (ref 37–54)
EOSINOPHIL SPEC QL MICRO: 0 % EOS/100 CELLS (ref 0–0)
ERYTHROCYTE [DISTWIDTH] IN BLOOD BY AUTOMATED COUNT: 15.8 % (ref 11.3–14.5)
GFR SERPL CREATININE-BSD FRML MDRD: 17 ML/MIN/1.73
GLUCOSE BLD-MCNC: 107 MG/DL (ref 70–100)
GLUCOSE UR STRIP-MCNC: NEGATIVE MG/DL
HCT VFR BLD AUTO: 22.2 % (ref 38.9–50.9)
HCT VFR BLD AUTO: 25.7 % (ref 38.9–50.9)
HGB BLD-MCNC: 6.7 G/DL (ref 13.1–17.5)
HGB BLD-MCNC: 7.7 G/DL (ref 13.1–17.5)
HGB UR QL STRIP.AUTO: ABNORMAL
HYALINE CASTS UR QL AUTO: ABNORMAL /LPF
INR PPP: 2.68 (ref 0.91–1.09)
KETONES UR QL STRIP: ABNORMAL
LEUKOCYTE ESTERASE UR QL STRIP.AUTO: NEGATIVE
MCH RBC QN AUTO: 29 PG (ref 27–31)
MCHC RBC AUTO-ENTMCNC: 30.2 G/DL (ref 32–36)
MCV RBC AUTO: 96.1 FL (ref 80–99)
MUCOUS THREADS URNS QL MICRO: ABNORMAL /HPF
NITRITE UR QL STRIP: NEGATIVE
PH UR STRIP.AUTO: <=5 [PH] (ref 5–8)
PLATELET # BLD AUTO: 151 10*3/MM3 (ref 150–450)
PMV BLD AUTO: 9.7 FL (ref 6–12)
POTASSIUM BLD-SCNC: 4.3 MMOL/L (ref 3.5–5.5)
PROT UR QL STRIP: ABNORMAL
PROT UR-MCNC: 193 MG/DL (ref 1–14)
PROTHROMBIN TIME: 28.1 SECONDS (ref 9.6–11.5)
RBC # BLD AUTO: 2.31 10*6/MM3 (ref 4.2–5.76)
RBC # UR: ABNORMAL /HPF
REF LAB TEST METHOD: ABNORMAL
SODIUM BLD-SCNC: 139 MMOL/L (ref 132–146)
SODIUM UR-SCNC: 18 MMOL/L (ref 30–90)
SP GR UR STRIP: 1.02 (ref 1–1.03)
SQUAMOUS #/AREA URNS HPF: ABNORMAL /HPF
UROBILINOGEN UR QL STRIP: ABNORMAL
UUN 24H UR-MCNC: 545 MG/DL
WBC NRBC COR # BLD: 4.01 10*3/MM3 (ref 3.5–10.8)
WBC UR QL AUTO: ABNORMAL /HPF

## 2018-09-16 PROCEDURE — 85014 HEMATOCRIT: CPT | Performed by: INTERNAL MEDICINE

## 2018-09-16 PROCEDURE — 85610 PROTHROMBIN TIME: CPT | Performed by: FAMILY MEDICINE

## 2018-09-16 PROCEDURE — 80048 BASIC METABOLIC PNL TOTAL CA: CPT | Performed by: INTERNAL MEDICINE

## 2018-09-16 PROCEDURE — 99233 SBSQ HOSP IP/OBS HIGH 50: CPT | Performed by: INTERNAL MEDICINE

## 2018-09-16 PROCEDURE — 86900 BLOOD TYPING SEROLOGIC ABO: CPT

## 2018-09-16 PROCEDURE — 36430 TRANSFUSION BLD/BLD COMPNT: CPT

## 2018-09-16 PROCEDURE — 84156 ASSAY OF PROTEIN URINE: CPT | Performed by: INTERNAL MEDICINE

## 2018-09-16 PROCEDURE — 84540 ASSAY OF URINE/UREA-N: CPT | Performed by: INTERNAL MEDICINE

## 2018-09-16 PROCEDURE — 81001 URINALYSIS AUTO W/SCOPE: CPT | Performed by: INTERNAL MEDICINE

## 2018-09-16 PROCEDURE — P9040 RBC LEUKOREDUCED IRRADIATED: HCPCS

## 2018-09-16 PROCEDURE — 94660 CPAP INITIATION&MGMT: CPT

## 2018-09-16 PROCEDURE — 85018 HEMOGLOBIN: CPT | Performed by: INTERNAL MEDICINE

## 2018-09-16 PROCEDURE — 87205 SMEAR GRAM STAIN: CPT | Performed by: INTERNAL MEDICINE

## 2018-09-16 PROCEDURE — 82570 ASSAY OF URINE CREATININE: CPT | Performed by: INTERNAL MEDICINE

## 2018-09-16 PROCEDURE — 85027 COMPLETE CBC AUTOMATED: CPT | Performed by: INTERNAL MEDICINE

## 2018-09-16 PROCEDURE — 94799 UNLISTED PULMONARY SVC/PX: CPT

## 2018-09-16 PROCEDURE — 84300 ASSAY OF URINE SODIUM: CPT | Performed by: INTERNAL MEDICINE

## 2018-09-16 RX ORDER — SODIUM CHLORIDE, SODIUM LACTATE, POTASSIUM CHLORIDE, CALCIUM CHLORIDE 600; 310; 30; 20 MG/100ML; MG/100ML; MG/100ML; MG/100ML
100 INJECTION, SOLUTION INTRAVENOUS CONTINUOUS
Status: DISCONTINUED | OUTPATIENT
Start: 2018-09-16 | End: 2018-09-16

## 2018-09-16 RX ADMIN — ASPIRIN 81 MG: 81 TABLET, COATED ORAL at 08:14

## 2018-09-16 RX ADMIN — SODIUM CHLORIDE 100 ML/HR: 9 INJECTION, SOLUTION INTRAVENOUS at 08:14

## 2018-09-16 RX ADMIN — ATORVASTATIN CALCIUM 40 MG: 40 TABLET, FILM COATED ORAL at 08:14

## 2018-09-16 RX ADMIN — CARVEDILOL 25 MG: 12.5 TABLET, FILM COATED ORAL at 08:14

## 2018-09-16 RX ADMIN — CARVEDILOL 25 MG: 12.5 TABLET, FILM COATED ORAL at 17:21

## 2018-09-16 RX ADMIN — LOPERAMIDE HYDROCHLORIDE 2 MG: 2 CAPSULE ORAL at 20:47

## 2018-09-16 RX ADMIN — LOPERAMIDE HYDROCHLORIDE 2 MG: 2 CAPSULE ORAL at 17:21

## 2018-09-16 RX ADMIN — FERROUS SULFATE TAB 325 MG (65 MG ELEMENTAL FE) 325 MG: 325 (65 FE) TAB at 08:14

## 2018-09-16 RX ADMIN — DOFETILIDE 250 MCG: 0.25 CAPSULE ORAL at 20:47

## 2018-09-16 RX ADMIN — SODIUM CHLORIDE, POTASSIUM CHLORIDE, SODIUM LACTATE AND CALCIUM CHLORIDE 100 ML/HR: 600; 310; 30; 20 INJECTION, SOLUTION INTRAVENOUS at 14:52

## 2018-09-16 RX ADMIN — DOFETILIDE 250 MCG: 0.25 CAPSULE ORAL at 08:14

## 2018-09-16 RX ADMIN — WARFARIN SODIUM 3.75 MG: 7.5 TABLET ORAL at 17:21

## 2018-09-16 RX ADMIN — CYANOCOBALAMIN TAB 1000 MCG 1000 MCG: 1000 TAB at 08:14

## 2018-09-17 LAB
ALBUMIN SERPL-MCNC: 3.23 G/DL (ref 3.2–4.8)
ANION GAP SERPL CALCULATED.3IONS-SCNC: 4 MMOL/L (ref 3–11)
ANION GAP SERPL CALCULATED.3IONS-SCNC: 6 MMOL/L (ref 3–11)
BASOPHILS # BLD AUTO: 0.02 10*3/MM3 (ref 0–0.2)
BASOPHILS NFR BLD AUTO: 0.3 % (ref 0–1)
BUN BLD-MCNC: 44 MG/DL (ref 9–23)
BUN BLD-MCNC: 48 MG/DL (ref 9–23)
BUN/CREAT SERPL: 11.6 (ref 7–25)
BUN/CREAT SERPL: 12.8 (ref 7–25)
CALCIUM SPEC-SCNC: 7.3 MG/DL (ref 8.7–10.4)
CALCIUM SPEC-SCNC: 8 MG/DL (ref 8.7–10.4)
CHLORIDE SERPL-SCNC: 113 MMOL/L (ref 99–109)
CHLORIDE SERPL-SCNC: 115 MMOL/L (ref 99–109)
CO2 SERPL-SCNC: 20 MMOL/L (ref 20–31)
CO2 SERPL-SCNC: 20 MMOL/L (ref 20–31)
CREAT BLD-MCNC: 3.74 MG/DL (ref 0.6–1.3)
CREAT BLD-MCNC: 3.78 MG/DL (ref 0.6–1.3)
DEPRECATED RDW RBC AUTO: 62 FL (ref 37–54)
EOSINOPHIL # BLD AUTO: 0.11 10*3/MM3 (ref 0–0.3)
EOSINOPHIL NFR BLD AUTO: 1.8 % (ref 0–3)
ERYTHROCYTE [DISTWIDTH] IN BLOOD BY AUTOMATED COUNT: 17.9 % (ref 11.3–14.5)
FERRITIN SERPL-MCNC: 67 NG/ML (ref 22–322)
FOLATE SERPL-MCNC: 10.97 NG/ML (ref 3.2–20)
GFR SERPL CREATININE-BSD FRML MDRD: 16 ML/MIN/1.73
GFR SERPL CREATININE-BSD FRML MDRD: 16 ML/MIN/1.73
GLUCOSE BLD-MCNC: 115 MG/DL (ref 70–100)
GLUCOSE BLD-MCNC: 116 MG/DL (ref 70–100)
HCT VFR BLD AUTO: 24.4 % (ref 38.9–50.9)
HGB BLD-MCNC: 7.5 G/DL (ref 13.1–17.5)
IMM GRANULOCYTES # BLD: 0.14 10*3/MM3 (ref 0–0.03)
IMM GRANULOCYTES NFR BLD: 2.4 % (ref 0–0.6)
INR PPP: 2.16 (ref 0.91–1.09)
LYMPHOCYTES # BLD AUTO: 1.15 10*3/MM3 (ref 0.6–4.8)
LYMPHOCYTES NFR BLD AUTO: 19.3 % (ref 24–44)
MCH RBC QN AUTO: 29 PG (ref 27–31)
MCHC RBC AUTO-ENTMCNC: 30.7 G/DL (ref 32–36)
MCV RBC AUTO: 94.2 FL (ref 80–99)
MONOCYTES # BLD AUTO: 0.78 10*3/MM3 (ref 0–1)
MONOCYTES NFR BLD AUTO: 13.1 % (ref 0–12)
NEUTROPHILS # BLD AUTO: 3.89 10*3/MM3 (ref 1.5–8.3)
NEUTROPHILS NFR BLD AUTO: 65.5 % (ref 41–71)
PHOSPHATE SERPL-MCNC: 5.2 MG/DL (ref 2.4–5.1)
PLATELET # BLD AUTO: 184 10*3/MM3 (ref 150–450)
PMV BLD AUTO: 10.1 FL (ref 6–12)
POTASSIUM BLD-SCNC: 4.5 MMOL/L (ref 3.5–5.5)
POTASSIUM BLD-SCNC: 4.6 MMOL/L (ref 3.5–5.5)
PROTHROMBIN TIME: 22.7 SECONDS (ref 9.6–11.5)
RBC # BLD AUTO: 2.59 10*6/MM3 (ref 4.2–5.76)
SODIUM BLD-SCNC: 139 MMOL/L (ref 132–146)
SODIUM BLD-SCNC: 139 MMOL/L (ref 132–146)
VIT B12 BLD-MCNC: 387 PG/ML (ref 211–911)
WBC NRBC COR # BLD: 5.95 10*3/MM3 (ref 3.5–10.8)

## 2018-09-17 PROCEDURE — 94660 CPAP INITIATION&MGMT: CPT

## 2018-09-17 PROCEDURE — 85610 PROTHROMBIN TIME: CPT | Performed by: FAMILY MEDICINE

## 2018-09-17 PROCEDURE — 99233 SBSQ HOSP IP/OBS HIGH 50: CPT | Performed by: INTERNAL MEDICINE

## 2018-09-17 PROCEDURE — 85025 COMPLETE CBC W/AUTO DIFF WBC: CPT | Performed by: INTERNAL MEDICINE

## 2018-09-17 PROCEDURE — 80069 RENAL FUNCTION PANEL: CPT | Performed by: INTERNAL MEDICINE

## 2018-09-17 PROCEDURE — 94799 UNLISTED PULMONARY SVC/PX: CPT

## 2018-09-17 PROCEDURE — 80048 BASIC METABOLIC PNL TOTAL CA: CPT | Performed by: INTERNAL MEDICINE

## 2018-09-17 RX ADMIN — ATORVASTATIN CALCIUM 40 MG: 40 TABLET, FILM COATED ORAL at 08:45

## 2018-09-17 RX ADMIN — CARVEDILOL 25 MG: 12.5 TABLET, FILM COATED ORAL at 17:32

## 2018-09-17 RX ADMIN — DOFETILIDE 250 MCG: 0.25 CAPSULE ORAL at 08:45

## 2018-09-17 RX ADMIN — LOPERAMIDE HYDROCHLORIDE 2 MG: 2 CAPSULE ORAL at 05:05

## 2018-09-17 RX ADMIN — ASPIRIN 81 MG: 81 TABLET, COATED ORAL at 08:45

## 2018-09-17 RX ADMIN — DOFETILIDE 250 MCG: 0.25 CAPSULE ORAL at 21:25

## 2018-09-17 RX ADMIN — WARFARIN SODIUM 7.5 MG: 7.5 TABLET ORAL at 17:32

## 2018-09-17 RX ADMIN — CARVEDILOL 25 MG: 12.5 TABLET, FILM COATED ORAL at 08:45

## 2018-09-17 RX ADMIN — CYANOCOBALAMIN TAB 1000 MCG 1000 MCG: 1000 TAB at 08:45

## 2018-09-18 LAB
ABO + RH BLD: NORMAL
ABO + RH BLD: NORMAL
ANION GAP SERPL CALCULATED.3IONS-SCNC: 6 MMOL/L (ref 3–11)
BASOPHILS # BLD AUTO: 0.02 10*3/MM3 (ref 0–0.2)
BASOPHILS NFR BLD AUTO: 0.3 % (ref 0–1)
BH BB BLOOD EXPIRATION DATE: NORMAL
BH BB BLOOD EXPIRATION DATE: NORMAL
BH BB BLOOD TYPE BARCODE: 6200
BH BB BLOOD TYPE BARCODE: 6200
BH BB DISPENSE STATUS: NORMAL
BH BB DISPENSE STATUS: NORMAL
BH BB PRODUCT CODE: NORMAL
BH BB PRODUCT CODE: NORMAL
BH BB UNIT NUMBER: NORMAL
BH BB UNIT NUMBER: NORMAL
BUN BLD-MCNC: 49 MG/DL (ref 9–23)
BUN/CREAT SERPL: 12.2 (ref 7–25)
CALCIUM SPEC-SCNC: 8.2 MG/DL (ref 8.7–10.4)
CHLORIDE SERPL-SCNC: 115 MMOL/L (ref 99–109)
CO2 SERPL-SCNC: 20 MMOL/L (ref 20–31)
CREAT BLD-MCNC: 4.01 MG/DL (ref 0.6–1.3)
CROSSMATCH INTERPRETATION: NORMAL
CROSSMATCH INTERPRETATION: NORMAL
DEPRECATED RDW RBC AUTO: 59.8 FL (ref 37–54)
EOSINOPHIL # BLD AUTO: 0.08 10*3/MM3 (ref 0–0.3)
EOSINOPHIL NFR BLD AUTO: 1.1 % (ref 0–3)
ERYTHROCYTE [DISTWIDTH] IN BLOOD BY AUTOMATED COUNT: 17.1 % (ref 11.3–14.5)
GFR SERPL CREATININE-BSD FRML MDRD: 15 ML/MIN/1.73
GLUCOSE BLD-MCNC: 129 MG/DL (ref 70–100)
HCT VFR BLD AUTO: 24.5 % (ref 38.9–50.9)
HGB BLD-MCNC: 7.5 G/DL (ref 13.1–17.5)
IMM GRANULOCYTES # BLD: 0.25 10*3/MM3 (ref 0–0.03)
IMM GRANULOCYTES NFR BLD: 3.5 % (ref 0–0.6)
INR PPP: 1.96 (ref 0.91–1.09)
LYMPHOCYTES # BLD AUTO: 1.54 10*3/MM3 (ref 0.6–4.8)
LYMPHOCYTES NFR BLD AUTO: 21.8 % (ref 24–44)
MCH RBC QN AUTO: 28.8 PG (ref 27–31)
MCHC RBC AUTO-ENTMCNC: 30.6 G/DL (ref 32–36)
MCV RBC AUTO: 94.2 FL (ref 80–99)
MONOCYTES # BLD AUTO: 0.56 10*3/MM3 (ref 0–1)
MONOCYTES NFR BLD AUTO: 7.9 % (ref 0–12)
NEUTROPHILS # BLD AUTO: 4.86 10*3/MM3 (ref 1.5–8.3)
NEUTROPHILS NFR BLD AUTO: 68.9 % (ref 41–71)
PLATELET # BLD AUTO: 217 10*3/MM3 (ref 150–450)
PMV BLD AUTO: 9.6 FL (ref 6–12)
POTASSIUM BLD-SCNC: 4.6 MMOL/L (ref 3.5–5.5)
PROTHROMBIN TIME: 20.6 SECONDS (ref 9.6–11.5)
RBC # BLD AUTO: 2.6 10*6/MM3 (ref 4.2–5.76)
SODIUM BLD-SCNC: 141 MMOL/L (ref 132–146)
UNIT  ABO: NORMAL
UNIT  ABO: NORMAL
UNIT  RH: NORMAL
UNIT  RH: NORMAL
WBC NRBC COR # BLD: 7.06 10*3/MM3 (ref 3.5–10.8)

## 2018-09-18 PROCEDURE — 93010 ELECTROCARDIOGRAM REPORT: CPT | Performed by: INTERNAL MEDICINE

## 2018-09-18 PROCEDURE — 85610 PROTHROMBIN TIME: CPT | Performed by: FAMILY MEDICINE

## 2018-09-18 PROCEDURE — 85025 COMPLETE CBC W/AUTO DIFF WBC: CPT | Performed by: INTERNAL MEDICINE

## 2018-09-18 PROCEDURE — 99222 1ST HOSP IP/OBS MODERATE 55: CPT | Performed by: NURSE PRACTITIONER

## 2018-09-18 PROCEDURE — 99233 SBSQ HOSP IP/OBS HIGH 50: CPT | Performed by: INTERNAL MEDICINE

## 2018-09-18 PROCEDURE — 80048 BASIC METABOLIC PNL TOTAL CA: CPT | Performed by: INTERNAL MEDICINE

## 2018-09-18 PROCEDURE — 93005 ELECTROCARDIOGRAM TRACING: CPT | Performed by: NURSE PRACTITIONER

## 2018-09-18 PROCEDURE — 94799 UNLISTED PULMONARY SVC/PX: CPT

## 2018-09-18 PROCEDURE — 94660 CPAP INITIATION&MGMT: CPT

## 2018-09-18 RX ADMIN — CARVEDILOL 25 MG: 12.5 TABLET, FILM COATED ORAL at 17:59

## 2018-09-18 RX ADMIN — CARVEDILOL 25 MG: 12.5 TABLET, FILM COATED ORAL at 09:26

## 2018-09-18 RX ADMIN — ATORVASTATIN CALCIUM 40 MG: 40 TABLET, FILM COATED ORAL at 09:26

## 2018-09-18 RX ADMIN — WARFARIN SODIUM 3.75 MG: 7.5 TABLET ORAL at 17:59

## 2018-09-18 RX ADMIN — ACETAMINOPHEN 650 MG: 325 TABLET ORAL at 09:29

## 2018-09-18 RX ADMIN — DOFETILIDE 250 MCG: 0.25 CAPSULE ORAL at 09:25

## 2018-09-18 RX ADMIN — CYANOCOBALAMIN TAB 1000 MCG 1000 MCG: 1000 TAB at 09:26

## 2018-09-18 RX ADMIN — ASPIRIN 81 MG: 81 TABLET, COATED ORAL at 09:26

## 2018-09-19 LAB
ALBUMIN SERPL-MCNC: 3.74 G/DL (ref 3.2–4.8)
ANION GAP SERPL CALCULATED.3IONS-SCNC: 9 MMOL/L (ref 3–11)
BUN BLD-MCNC: 51 MG/DL (ref 9–23)
BUN/CREAT SERPL: 14.4 (ref 7–25)
CALCIUM SPEC-SCNC: 8.5 MG/DL (ref 8.7–10.4)
CHLORIDE SERPL-SCNC: 115 MMOL/L (ref 99–109)
CO2 SERPL-SCNC: 19 MMOL/L (ref 20–31)
CREAT BLD-MCNC: 3.55 MG/DL (ref 0.6–1.3)
GFR SERPL CREATININE-BSD FRML MDRD: 17 ML/MIN/1.73
GLUCOSE BLD-MCNC: 117 MG/DL (ref 70–100)
HCT VFR BLD AUTO: 24.6 % (ref 38.9–50.9)
HGB BLD-MCNC: 7.5 G/DL (ref 13.1–17.5)
INR PPP: 2.37 (ref 0.91–1.09)
PHOSPHATE SERPL-MCNC: 5.1 MG/DL (ref 2.4–5.1)
POTASSIUM BLD-SCNC: 4.7 MMOL/L (ref 3.5–5.5)
PROTHROMBIN TIME: 24.9 SECONDS (ref 9.6–11.5)
SODIUM BLD-SCNC: 143 MMOL/L (ref 132–146)

## 2018-09-19 PROCEDURE — 99233 SBSQ HOSP IP/OBS HIGH 50: CPT | Performed by: NURSE PRACTITIONER

## 2018-09-19 PROCEDURE — 99232 SBSQ HOSP IP/OBS MODERATE 35: CPT | Performed by: PHYSICIAN ASSISTANT

## 2018-09-19 PROCEDURE — 94799 UNLISTED PULMONARY SVC/PX: CPT

## 2018-09-19 PROCEDURE — 80069 RENAL FUNCTION PANEL: CPT | Performed by: INTERNAL MEDICINE

## 2018-09-19 PROCEDURE — 85014 HEMATOCRIT: CPT | Performed by: NURSE PRACTITIONER

## 2018-09-19 PROCEDURE — 85610 PROTHROMBIN TIME: CPT | Performed by: FAMILY MEDICINE

## 2018-09-19 PROCEDURE — 85018 HEMOGLOBIN: CPT | Performed by: NURSE PRACTITIONER

## 2018-09-19 RX ADMIN — CARVEDILOL 25 MG: 12.5 TABLET, FILM COATED ORAL at 18:20

## 2018-09-19 RX ADMIN — CARVEDILOL 25 MG: 12.5 TABLET, FILM COATED ORAL at 09:56

## 2018-09-19 RX ADMIN — ACETAMINOPHEN 650 MG: 325 TABLET ORAL at 07:33

## 2018-09-19 RX ADMIN — CYANOCOBALAMIN TAB 1000 MCG 1000 MCG: 1000 TAB at 09:57

## 2018-09-19 RX ADMIN — WARFARIN SODIUM 7.5 MG: 7.5 TABLET ORAL at 18:20

## 2018-09-19 RX ADMIN — ATORVASTATIN CALCIUM 40 MG: 40 TABLET, FILM COATED ORAL at 09:57

## 2018-09-19 RX ADMIN — ASPIRIN 81 MG: 81 TABLET, COATED ORAL at 09:56

## 2018-09-20 LAB
ANION GAP SERPL CALCULATED.3IONS-SCNC: 5 MMOL/L (ref 3–11)
BASOPHILS # BLD AUTO: 0.02 10*3/MM3 (ref 0–0.2)
BASOPHILS NFR BLD AUTO: 0.3 % (ref 0–1)
BUN BLD-MCNC: 52 MG/DL (ref 9–23)
BUN/CREAT SERPL: 16.6 (ref 7–25)
CALCIUM SPEC-SCNC: 8.4 MG/DL (ref 8.7–10.4)
CHLORIDE SERPL-SCNC: 117 MMOL/L (ref 99–109)
CO2 SERPL-SCNC: 21 MMOL/L (ref 20–31)
CREAT BLD-MCNC: 3.14 MG/DL (ref 0.6–1.3)
DEPRECATED RDW RBC AUTO: 56.4 FL (ref 37–54)
EOSINOPHIL # BLD AUTO: 0.25 10*3/MM3 (ref 0–0.3)
EOSINOPHIL NFR BLD AUTO: 3.2 % (ref 0–3)
ERYTHROCYTE [DISTWIDTH] IN BLOOD BY AUTOMATED COUNT: 16.2 % (ref 11.3–14.5)
GFR SERPL CREATININE-BSD FRML MDRD: 19 ML/MIN/1.73
GLUCOSE BLD-MCNC: 103 MG/DL (ref 70–100)
HCT VFR BLD AUTO: 27.1 % (ref 38.9–50.9)
HGB BLD-MCNC: 8.2 G/DL (ref 13.1–17.5)
IMM GRANULOCYTES # BLD: 0.11 10*3/MM3 (ref 0–0.03)
IMM GRANULOCYTES NFR BLD: 1.4 % (ref 0–0.6)
INR PPP: 2.98 (ref 0.91–1.09)
LYMPHOCYTES # BLD AUTO: 1.56 10*3/MM3 (ref 0.6–4.8)
LYMPHOCYTES NFR BLD AUTO: 20.2 % (ref 24–44)
MCH RBC QN AUTO: 28.7 PG (ref 27–31)
MCHC RBC AUTO-ENTMCNC: 30.3 G/DL (ref 32–36)
MCV RBC AUTO: 94.8 FL (ref 80–99)
MONOCYTES # BLD AUTO: 0.85 10*3/MM3 (ref 0–1)
MONOCYTES NFR BLD AUTO: 11 % (ref 0–12)
NEUTROPHILS # BLD AUTO: 4.95 10*3/MM3 (ref 1.5–8.3)
NEUTROPHILS NFR BLD AUTO: 63.9 % (ref 41–71)
PLATELET # BLD AUTO: 264 10*3/MM3 (ref 150–450)
PMV BLD AUTO: 9.3 FL (ref 6–12)
POTASSIUM BLD-SCNC: 4.7 MMOL/L (ref 3.5–5.5)
PROTHROMBIN TIME: 31.3 SECONDS (ref 9.6–11.5)
RBC # BLD AUTO: 2.86 10*6/MM3 (ref 4.2–5.76)
SODIUM BLD-SCNC: 143 MMOL/L (ref 132–146)
WBC NRBC COR # BLD: 7.74 10*3/MM3 (ref 3.5–10.8)

## 2018-09-20 PROCEDURE — 85610 PROTHROMBIN TIME: CPT | Performed by: FAMILY MEDICINE

## 2018-09-20 PROCEDURE — 99233 SBSQ HOSP IP/OBS HIGH 50: CPT | Performed by: NURSE PRACTITIONER

## 2018-09-20 PROCEDURE — 80048 BASIC METABOLIC PNL TOTAL CA: CPT | Performed by: INTERNAL MEDICINE

## 2018-09-20 PROCEDURE — 85025 COMPLETE CBC W/AUTO DIFF WBC: CPT | Performed by: INTERNAL MEDICINE

## 2018-09-20 PROCEDURE — 99232 SBSQ HOSP IP/OBS MODERATE 35: CPT | Performed by: NURSE PRACTITIONER

## 2018-09-20 RX ORDER — TORSEMIDE 20 MG/1
20 TABLET ORAL DAILY
Status: DISCONTINUED | OUTPATIENT
Start: 2018-09-20 | End: 2018-09-21

## 2018-09-20 RX ADMIN — WARFARIN SODIUM 3.75 MG: 7.5 TABLET ORAL at 17:40

## 2018-09-20 RX ADMIN — CARVEDILOL 25 MG: 12.5 TABLET, FILM COATED ORAL at 08:24

## 2018-09-20 RX ADMIN — CARVEDILOL 25 MG: 12.5 TABLET, FILM COATED ORAL at 17:40

## 2018-09-20 RX ADMIN — CYANOCOBALAMIN TAB 1000 MCG 1000 MCG: 1000 TAB at 08:24

## 2018-09-20 RX ADMIN — ASPIRIN 81 MG: 81 TABLET, COATED ORAL at 08:24

## 2018-09-20 RX ADMIN — TORSEMIDE 20 MG: 20 TABLET ORAL at 17:41

## 2018-09-20 RX ADMIN — ATORVASTATIN CALCIUM 40 MG: 40 TABLET, FILM COATED ORAL at 08:24

## 2018-09-21 LAB
ALBUMIN SERPL-MCNC: 3.44 G/DL (ref 3.2–4.8)
ANION GAP SERPL CALCULATED.3IONS-SCNC: 10 MMOL/L (ref 3–11)
BUN BLD-MCNC: 49 MG/DL (ref 9–23)
BUN/CREAT SERPL: 16.4 (ref 7–25)
CALCIUM SPEC-SCNC: 8.7 MG/DL (ref 8.7–10.4)
CHLORIDE SERPL-SCNC: 116 MMOL/L (ref 99–109)
CO2 SERPL-SCNC: 21 MMOL/L (ref 20–31)
CREAT BLD-MCNC: 2.99 MG/DL (ref 0.6–1.3)
GFR SERPL CREATININE-BSD FRML MDRD: 21 ML/MIN/1.73
GLUCOSE BLD-MCNC: 106 MG/DL (ref 70–100)
INR PPP: 3.18 (ref 0.91–1.09)
PHOSPHATE SERPL-MCNC: 4.5 MG/DL (ref 2.4–5.1)
POTASSIUM BLD-SCNC: 4.4 MMOL/L (ref 3.5–5.5)
PROTHROMBIN TIME: 33.4 SECONDS (ref 9.6–11.5)
SODIUM BLD-SCNC: 147 MMOL/L (ref 132–146)

## 2018-09-21 PROCEDURE — 94660 CPAP INITIATION&MGMT: CPT

## 2018-09-21 PROCEDURE — 99233 SBSQ HOSP IP/OBS HIGH 50: CPT | Performed by: INTERNAL MEDICINE

## 2018-09-21 PROCEDURE — 85610 PROTHROMBIN TIME: CPT | Performed by: FAMILY MEDICINE

## 2018-09-21 PROCEDURE — 94799 UNLISTED PULMONARY SVC/PX: CPT

## 2018-09-21 PROCEDURE — 80069 RENAL FUNCTION PANEL: CPT | Performed by: INTERNAL MEDICINE

## 2018-09-21 PROCEDURE — 99232 SBSQ HOSP IP/OBS MODERATE 35: CPT | Performed by: NURSE PRACTITIONER

## 2018-09-21 RX ORDER — WARFARIN SODIUM 5 MG/1
5 TABLET ORAL
Status: DISCONTINUED | OUTPATIENT
Start: 2018-09-21 | End: 2018-09-22

## 2018-09-21 RX ORDER — TORSEMIDE 20 MG/1
40 TABLET ORAL DAILY
Status: DISCONTINUED | OUTPATIENT
Start: 2018-09-22 | End: 2018-09-23 | Stop reason: HOSPADM

## 2018-09-21 RX ORDER — WARFARIN SODIUM 4 MG/1
4 TABLET ORAL
Status: CANCELLED | OUTPATIENT
Start: 2018-09-22

## 2018-09-21 RX ORDER — WARFARIN SODIUM 3 MG/1
3 TABLET ORAL
Status: CANCELLED | OUTPATIENT
Start: 2018-09-21

## 2018-09-21 RX ADMIN — WARFARIN SODIUM 5 MG: 5 TABLET ORAL at 17:00

## 2018-09-21 RX ADMIN — CARVEDILOL 25 MG: 12.5 TABLET, FILM COATED ORAL at 08:01

## 2018-09-21 RX ADMIN — ATORVASTATIN CALCIUM 40 MG: 40 TABLET, FILM COATED ORAL at 08:01

## 2018-09-21 RX ADMIN — TORSEMIDE 20 MG: 20 TABLET ORAL at 08:01

## 2018-09-21 RX ADMIN — ASPIRIN 81 MG: 81 TABLET, COATED ORAL at 08:01

## 2018-09-21 RX ADMIN — CYANOCOBALAMIN TAB 1000 MCG 1000 MCG: 1000 TAB at 08:01

## 2018-09-21 RX ADMIN — CARVEDILOL 25 MG: 12.5 TABLET, FILM COATED ORAL at 17:00

## 2018-09-22 LAB
ALBUMIN SERPL-MCNC: 3.52 G/DL (ref 3.2–4.8)
ANION GAP SERPL CALCULATED.3IONS-SCNC: 11 MMOL/L (ref 3–11)
BUN BLD-MCNC: 50 MG/DL (ref 9–23)
BUN/CREAT SERPL: 17.9 (ref 7–25)
CALCIUM SPEC-SCNC: 8.3 MG/DL (ref 8.7–10.4)
CHLORIDE SERPL-SCNC: 114 MMOL/L (ref 99–109)
CO2 SERPL-SCNC: 23 MMOL/L (ref 20–31)
CREAT BLD-MCNC: 2.79 MG/DL (ref 0.6–1.3)
GFR SERPL CREATININE-BSD FRML MDRD: 22 ML/MIN/1.73
GLUCOSE BLD-MCNC: 123 MG/DL (ref 70–100)
INR PPP: 3.19 (ref 0.91–1.09)
PHOSPHATE SERPL-MCNC: 4.3 MG/DL (ref 2.4–5.1)
POTASSIUM BLD-SCNC: 4.7 MMOL/L (ref 3.5–5.5)
PROTHROMBIN TIME: 33.5 SECONDS (ref 9.6–11.5)
SODIUM BLD-SCNC: 148 MMOL/L (ref 132–146)

## 2018-09-22 PROCEDURE — 80069 RENAL FUNCTION PANEL: CPT | Performed by: INTERNAL MEDICINE

## 2018-09-22 PROCEDURE — 99232 SBSQ HOSP IP/OBS MODERATE 35: CPT | Performed by: INTERNAL MEDICINE

## 2018-09-22 PROCEDURE — 85610 PROTHROMBIN TIME: CPT | Performed by: FAMILY MEDICINE

## 2018-09-22 RX ORDER — WARFARIN SODIUM 3 MG/1
3 TABLET ORAL
Status: DISCONTINUED | OUTPATIENT
Start: 2018-09-22 | End: 2018-09-23 | Stop reason: HOSPADM

## 2018-09-22 RX ORDER — WARFARIN SODIUM 7.5 MG/1
3.75 TABLET ORAL
Status: DISCONTINUED | OUTPATIENT
Start: 2018-09-22 | End: 2018-09-22

## 2018-09-22 RX ADMIN — ACETAMINOPHEN 650 MG: 325 TABLET ORAL at 21:20

## 2018-09-22 RX ADMIN — CARVEDILOL 25 MG: 12.5 TABLET, FILM COATED ORAL at 17:24

## 2018-09-22 RX ADMIN — CARVEDILOL 25 MG: 12.5 TABLET, FILM COATED ORAL at 08:24

## 2018-09-22 RX ADMIN — ASPIRIN 81 MG: 81 TABLET, COATED ORAL at 08:23

## 2018-09-22 RX ADMIN — CYANOCOBALAMIN TAB 1000 MCG 1000 MCG: 1000 TAB at 08:24

## 2018-09-22 RX ADMIN — ATORVASTATIN CALCIUM 40 MG: 40 TABLET, FILM COATED ORAL at 08:24

## 2018-09-22 RX ADMIN — TORSEMIDE 40 MG: 20 TABLET ORAL at 08:23

## 2018-09-22 RX ADMIN — WARFARIN SODIUM 3 MG: 3 TABLET ORAL at 17:24

## 2018-09-23 VITALS
SYSTOLIC BLOOD PRESSURE: 132 MMHG | TEMPERATURE: 98.3 F | BODY MASS INDEX: 41.89 KG/M2 | HEART RATE: 93 BPM | RESPIRATION RATE: 18 BRPM | OXYGEN SATURATION: 96 % | WEIGHT: 292.6 LBS | HEIGHT: 70 IN | DIASTOLIC BLOOD PRESSURE: 83 MMHG

## 2018-09-23 LAB
ANION GAP SERPL CALCULATED.3IONS-SCNC: 11 MMOL/L (ref 3–11)
BUN BLD-MCNC: 53 MG/DL (ref 9–23)
BUN/CREAT SERPL: 18.3 (ref 7–25)
CALCIUM SPEC-SCNC: 8.1 MG/DL (ref 8.7–10.4)
CHLORIDE SERPL-SCNC: 113 MMOL/L (ref 99–109)
CO2 SERPL-SCNC: 24 MMOL/L (ref 20–31)
CREAT BLD-MCNC: 2.89 MG/DL (ref 0.6–1.3)
GFR SERPL CREATININE-BSD FRML MDRD: 21 ML/MIN/1.73
GLUCOSE BLD-MCNC: 106 MG/DL (ref 70–100)
INR PPP: 3.14 (ref 0.91–1.09)
POTASSIUM BLD-SCNC: 4.5 MMOL/L (ref 3.5–5.5)
PROTHROMBIN TIME: 33 SECONDS (ref 9.6–11.5)
SODIUM BLD-SCNC: 148 MMOL/L (ref 132–146)

## 2018-09-23 PROCEDURE — 85610 PROTHROMBIN TIME: CPT | Performed by: FAMILY MEDICINE

## 2018-09-23 PROCEDURE — 94660 CPAP INITIATION&MGMT: CPT

## 2018-09-23 PROCEDURE — 80048 BASIC METABOLIC PNL TOTAL CA: CPT | Performed by: INTERNAL MEDICINE

## 2018-09-23 PROCEDURE — 99239 HOSP IP/OBS DSCHRG MGMT >30: CPT | Performed by: INTERNAL MEDICINE

## 2018-09-23 PROCEDURE — 94799 UNLISTED PULMONARY SVC/PX: CPT

## 2018-09-23 RX ADMIN — TORSEMIDE 40 MG: 20 TABLET ORAL at 10:00

## 2018-09-23 RX ADMIN — ASPIRIN 81 MG: 81 TABLET, COATED ORAL at 07:57

## 2018-09-23 RX ADMIN — CARVEDILOL 25 MG: 12.5 TABLET, FILM COATED ORAL at 07:57

## 2018-09-23 RX ADMIN — CYANOCOBALAMIN TAB 1000 MCG 1000 MCG: 1000 TAB at 07:57

## 2018-09-23 RX ADMIN — ATORVASTATIN CALCIUM 40 MG: 40 TABLET, FILM COATED ORAL at 07:57

## 2018-09-24 ENCOUNTER — READMISSION MANAGEMENT (OUTPATIENT)
Dept: CALL CENTER | Facility: HOSPITAL | Age: 73
End: 2018-09-24

## 2018-09-24 ENCOUNTER — TRANSITIONAL CARE MANAGEMENT TELEPHONE ENCOUNTER (OUTPATIENT)
Dept: FAMILY MEDICINE CLINIC | Facility: CLINIC | Age: 73
End: 2018-09-24

## 2018-09-24 NOTE — OUTREACH NOTE
Prep Survey      Responses   Facility patient discharged from?  [facility]   Is patient eligible?  Yes   Discharge diagnosis  EWA   Does the patient have one of the following disease processes/diagnoses(primary or secondary)?  Other   Does the patient have Home health ordered?  No   Is there a DME ordered?  No   Comments regarding appointments  call for apmt   Prep survey completed?  Yes          Selena Gandhi RN

## 2018-09-24 NOTE — OUTREACH NOTE
RUBEN call completed. Please see flow sheet for additional details.  Pt states he's doing ok, but fatigue only slightly improved.  No more diarrhea, appetite slightly better, drinking ok, but still c/o sensation of chest palpitations - stable from prior to discharge.  He has no questions re his discharge instructions, has been in contact w/ EP office for appt this week.  He was reminded to also make a nephrology appt w/in 2 wks.  He is holding losartan, cardura and tikosyn as directed at discharge. He confirms 9/27 PCP RUBEN appt.

## 2018-09-27 ENCOUNTER — OFFICE VISIT (OUTPATIENT)
Dept: FAMILY MEDICINE CLINIC | Facility: CLINIC | Age: 73
End: 2018-09-27

## 2018-09-27 VITALS
BODY MASS INDEX: 41.32 KG/M2 | RESPIRATION RATE: 16 BRPM | DIASTOLIC BLOOD PRESSURE: 78 MMHG | HEART RATE: 100 BPM | TEMPERATURE: 98.5 F | SYSTOLIC BLOOD PRESSURE: 126 MMHG | WEIGHT: 288 LBS | OXYGEN SATURATION: 94 %

## 2018-09-27 DIAGNOSIS — I10 ESSENTIAL HYPERTENSION: Primary | Chronic | ICD-10-CM

## 2018-09-27 DIAGNOSIS — N28.9 RENAL INSUFFICIENCY: ICD-10-CM

## 2018-09-27 DIAGNOSIS — I48.20 CHRONIC ATRIAL FIBRILLATION (HCC): ICD-10-CM

## 2018-09-27 LAB
BUN SERPL-MCNC: 40 MG/DL (ref 9–23)
BUN/CREAT SERPL: 15.3 (ref 7–25)
CALCIUM SERPL-MCNC: 8.3 MG/DL (ref 8.7–10.4)
CHLORIDE SERPL-SCNC: 108 MMOL/L (ref 99–109)
CO2 SERPL-SCNC: 28 MMOL/L (ref 20–31)
CREAT SERPL-MCNC: 2.62 MG/DL (ref 0.6–1.3)
GLUCOSE SERPL-MCNC: 138 MG/DL (ref 70–100)
POTASSIUM SERPL-SCNC: 4.4 MMOL/L (ref 3.5–5.5)
SODIUM SERPL-SCNC: 146 MMOL/L (ref 132–146)

## 2018-09-27 PROCEDURE — 99495 TRANSJ CARE MGMT MOD F2F 14D: CPT | Performed by: FAMILY MEDICINE

## 2018-09-27 PROCEDURE — 36415 COLL VENOUS BLD VENIPUNCTURE: CPT | Performed by: FAMILY MEDICINE

## 2018-09-27 NOTE — PROGRESS NOTES
Transitional Care Follow Up Visit  Subjective     Marco Antonio Montemayor is a 73 y.o. male who presents for a transitional care management visit.    Within 48 business hours after discharge our office contacted him via telephone to coordinate his care and needs.      I reviewed and discussed the details of that call along with the discharge summary, hospital problems, inpatient lab results, inpatient diagnostic studies, and consultation reports with Marco Antonio.     Current outpatient and discharge medications have been reconciled for the patient.    Date of TCM Phone Call 9/24/2018   [facility]   Date of Admission 9/14/2018   Date of Discharge 9/23/2018   Discharge Disposition Home or Self Care     Risk for Readmission (LACE) Score: 12 (9/23/2018  6:00 AM)    History of Present Illness   Course During Hospital Stay:  Acute diarrhea complicated by acute renal failure and atrial fibrillation.  Now anticoagulated. Followed by cardiology.   Appetite has returned.less leg swelling.  Not much stamina.  The following portions of the patient's history were reviewed and updated as appropriate: allergies, current medications, past family history, past medical history, past social history, past surgical history and problem list.    Review of Systems   HENT: Negative for congestion and sinus pressure.    Respiratory: Positive for chest tightness. Negative for cough.         Uses C-pap with oxygen at night   Cardiovascular: Positive for leg swelling.   Genitourinary: Negative for difficulty urinating.   Neurological: Negative for dizziness and light-headedness.       Objective   Physical Exam   Constitutional: He appears well-developed and well-nourished.   Neck: Neck supple. No JVD present.   Cardiovascular: An irregularly irregular rhythm present.   Pulmonary/Chest: Breath sounds normal.   Abdominal: Soft. There is no tenderness.   Musculoskeletal: He exhibits edema (1+ ankles).   Neurological: He is alert.    Vitals reviewed.      Assessment/Plan   Marco Antonio was seen today for diarrhea.    Diagnoses and all orders for this visit:    Essential hypertension  -     Basic Metabolic Panel    Renal insufficiency  -     Basic Metabolic Panel    Chronic atrial fibrillation (CMS/HCC)      To see nephrology in four days.  Cardiology to evaluate heart rhythm

## 2018-09-28 ENCOUNTER — READMISSION MANAGEMENT (OUTPATIENT)
Dept: CALL CENTER | Facility: HOSPITAL | Age: 73
End: 2018-09-28

## 2018-09-28 NOTE — OUTREACH NOTE
Medical Week 1 Survey      Responses   Facility patient discharged from?  [facility]   Does the patient have one of the following disease processes/diagnoses(primary or secondary)?  Other   Is there a successful TCM telephone encounter documented?  Yes          Fiona Plascencia RN

## 2018-10-01 ENCOUNTER — ANTICOAGULATION VISIT (OUTPATIENT)
Dept: PHARMACY | Facility: HOSPITAL | Age: 73
End: 2018-10-01

## 2018-10-01 ENCOUNTER — OFFICE VISIT (OUTPATIENT)
Dept: CARDIOLOGY | Facility: CLINIC | Age: 73
End: 2018-10-01

## 2018-10-01 VITALS
HEIGHT: 70 IN | OXYGEN SATURATION: 95 % | DIASTOLIC BLOOD PRESSURE: 84 MMHG | WEIGHT: 288 LBS | BODY MASS INDEX: 41.23 KG/M2 | HEART RATE: 92 BPM | SYSTOLIC BLOOD PRESSURE: 138 MMHG

## 2018-10-01 DIAGNOSIS — I10 ESSENTIAL HYPERTENSION: Chronic | ICD-10-CM

## 2018-10-01 DIAGNOSIS — I48.19 PERSISTENT ATRIAL FIBRILLATION (HCC): Primary | Chronic | ICD-10-CM

## 2018-10-01 DIAGNOSIS — I25.10 CORONARY ARTERY DISEASE INVOLVING NATIVE CORONARY ARTERY OF NATIVE HEART WITHOUT ANGINA PECTORIS: Chronic | ICD-10-CM

## 2018-10-01 DIAGNOSIS — G47.33 SEVERE OBSTRUCTIVE SLEEP APNEA: Chronic | ICD-10-CM

## 2018-10-01 DIAGNOSIS — I48.19 PERSISTENT ATRIAL FIBRILLATION (HCC): ICD-10-CM

## 2018-10-01 LAB
INR PPP: 3 (ref 0.91–1.09)
PROTHROMBIN TIME: 35.9 SECONDS (ref 10–13.8)

## 2018-10-01 PROCEDURE — 93280 PM DEVICE PROGR EVAL DUAL: CPT | Performed by: PHYSICIAN ASSISTANT

## 2018-10-01 PROCEDURE — 99214 OFFICE O/P EST MOD 30 MIN: CPT | Performed by: PHYSICIAN ASSISTANT

## 2018-10-01 PROCEDURE — G0463 HOSPITAL OUTPT CLINIC VISIT: HCPCS

## 2018-10-01 PROCEDURE — 36416 COLLJ CAPILLARY BLOOD SPEC: CPT

## 2018-10-01 PROCEDURE — 85610 PROTHROMBIN TIME: CPT

## 2018-10-01 RX ORDER — AMIODARONE HYDROCHLORIDE 200 MG/1
200 TABLET ORAL 2 TIMES DAILY
Qty: 60 TABLET | Refills: 6 | Status: ON HOLD | OUTPATIENT
Start: 2018-10-01 | End: 2019-02-05 | Stop reason: ALTCHOICE

## 2018-10-01 RX ORDER — WARFARIN SODIUM 2.5 MG/1
TABLET ORAL
Qty: 90 TABLET | Refills: 1 | Status: SHIPPED | OUTPATIENT
Start: 2018-10-01 | End: 2019-07-17 | Stop reason: SDUPTHER

## 2018-10-01 NOTE — PROGRESS NOTES
"Anticoagulation Clinic Progress Note  Indication: afib  Referring Provider: Tyson Roberts)  Initial Warfarin Start Date: 2007  Goal INR: 2-3  Current Drug Interactions: aspirin, amiodarone (start 10/1/18)    Diet: leafy head lettuce salads every now and then (thinks non-iceberg)    Anticoagulation Clinic INR History:  Date 11/14 12/6 1/25 2/22 3/22 4/11 5/9 6/8 6/29 8/3 8/31 9/14 9/28   Total Weekly Dose 37.5 mg 37.5 mg 37.5 mg 37.5 mg 37.5 mg 37.5 mg 37.5 mg 37.5mg 37.5 mg 37.5 mg 26.25mg 37.5 mg 37.5 mg   INR 2.4 2.1 2.0 3.1 2.4 2.6 2.7 2.2 2.5 3.0 1.7 3.2 2.3     Date 10/23 11/20 12/8 1/5 2/2 3/2 3/16 3/30 4/17 5/17 6/14 7/19 8/1 8/22 9/12   Total Weekly Dose 37.5mg 37.5 mg 37.5 mg 37.5 mg 37.5mg 37.5 mg 37.5 mg 37.5 mg 37.5mg 37.5 mg 37.5 mg 37.5mg 37.5 mg 37.5 mg 37.5 mg    INR 2.5 1.8 2.5 2.3 2.1 1.7 3.2 2.1 2.3 2.06 2.8 3.5 2.7 2.7 3.0   Notes      (boost), amox amox   PAT     diarrhea     Date 10/1                 Total Weekly Dose 26.25 mg                 INR 3.0                 Notes post- disch amio                  Clinic Interview:  Verbal Release Authorization signed on 8/22/2018 -- may speak with Raeann Sim (Wife)  Tablet Strength: pt has 7.5mg tablets  Current Maintenance Dose: 3.75mg daily except 7.5mg MWF    Patient Findings:  Positives:   Signs/symptoms of bleeding, Change in medications, Hospital admission   Negatives:   Signs/symptoms of thrombosis, Laboratory test error suspected, Change in health, Change in alcohol use, Change in activity, Upcoming invasive procedure, Emergency department visit, Upcoming dental procedure, Missed doses, Extra doses, Change in diet/appetite, Bruising, Other complaints   Comments:   Mr. Montemayor was admitted last week with afib and a 2-day history of watery diarrhea with elevated SCr. Per progress notes \"presume has prior dx'd iron deficiency and B12 deficiency anemia - Hg stable. No active bleeding. Hemoccult was positive, however, patient also on oral " "iron. Suspect dilutional; however, may require work-up with colonoscopy as patient reports having polyps in the past. Patient also reports some blood mixed in stool but has skin breakdown and suspect it is from this, will monitor closely, while considering GI eval if continues to fall - Iron studies consistent with iron deficiency anemia - continue B12 and FeSo4.\" Docusate, dofetilide, doxazosin, and losartan were all stopped on discharge, and Mr. Montemayor will be starting a loading dose of amiodarone today (200mg BID x 4 weeks). He denies significant change in his diet, but he does note that food doesn't seem to have been tasting as good as usual.      Plan:   1. INR is therapeutic at the upper end of goal. Mr. Montemayor had a recent admission -- discharged last Sunday on a (30%) lower warfarin maintenance dose. Given initiation of amiodarone today, will anticipate up to a 50-60% dose reduction in the coming weeks. For now, instructed Mr. Montemayor to continue warfarin 3.75mg daily (on track with dosing last week), then start to lower his dose to 2.5mg Saturday and Sunday (new rx called in for 2.5mg tablets).   2. RTC in one week to reassess.   3. Verbal and written information provided today in clinic. Mr. Montemayor expresses understanding with teach back and has no further questions at this time.    Ann Cowden Mayer, PharmD  10/1/2018  12:35 PM   "

## 2018-10-01 NOTE — PROGRESS NOTES
[facility]    OFFICE NOTE      Marco Antonio Montemayor  1945  PCP: Emil Rosado MD    SUBJECTIVE:   Marco Antonio Montemayor is a 73 y.o. male seen for a follow up visit regarding the following: afib, HTN, Tachyarrhythmia, St. Chidi PPM    CC: Afib.   Problem List:  1. 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 Remote acceptable intravenous adenosine Quantitative SPECT gated Cardiolite study, LVEF 0.50, April 1999.  b. Echocardiogram showing mild LVH with estimated ejection fraction 0.58 with mild aortic valve cusp sclerosis, and mild TR, 09/23/2014.   c. Remote abnormal preoperative Quantitative SPECT gated Cardiolite study with mild “fixed” inferoposterior hypoperfusion and mild LV enlargement with mild global hypokinesia with reduced LVEF 0.40 in the setting of abnormal EKG with subsequent diagnostic coronary angiography demonstrating mild-to-moderate nonobstructive coronary artery atherosclerosis with mild compensated left ventricular dysfunction, LVEF 0.52, and continued medical therapy felt warranted, June 2006.  d. Remote hospitalization for symptomatic atrial fibrillation with rapid ventricular response and mild congestive heart failure requiring BRYANNA and DC cardioversion, June 2009.  e. Left heart catheterization with essentially normal coronary arteries with mild luminal irregularities with an EF of 0.60 by Dr. Escamilla for angina and elevated troponin, 06/05/2015.   f. Residual class I symptoms.  2. Chronic essential hypertension with recent progressive hypertension and blood pressure readings and normal selective bilateral renal angiography, June 2006.  3. Dyslipidemia.  4. Chronic atrial tachyarrhythmias:  a. Intermittent chronic paroxysmal atrial fibrillation with electrophysiologic study with RFA for atrial flutter, October 1999.  b. Recurrent apparent transient atrial fibrillation, resolved with acceptable [facility] emergency department evaluation, June  2003.  c. Acceptable combination Doppler echocardiogram, July 2003, with apparent acceptable 24-hour Holter monitor, May 2006.  d. Recurrent asymptomatic atrial fibrillation with RVR, January 2007, subsequent Coumadin therapy and Tikosyn therapy with successful internal cardioversion of atrial fibrillation and marked bradyarrhythmias requiring DDDR pacemaker implant, St. Chidi device, data not available, March 2007.  e. Remote hospitalization for symptomatic rapid atrial fibrillation/BRYANNA DC cardioversion with subsequent acceptable pacemaker interrogation, June/July 2009, as well as acceptable interrogation without reprogramming, July 2011.  f. Acceptable combination Doppler echocardiogram, December 2010, with residual class I symptoms.  g. Acceptable device interrogation following [facility] ED evaluation for chest pressure and shortness of breath, data deficit, May 2011, February 2013, February 2016, December 2016.  h. Abnormal pacemaker assessment with 8.4% mode switch with battery voltage of 1-1.25 years, June 2017.  i. Pacemaker Generator change Dr. Taylor 5/18/18  5. Morbid obesity, BMI 41.6.  6. Ichthyosis.  7. Chronic lower tract obstructive symptoms, probable BPH.  8. Dyslipidemia.  9. Nasal polyps with deviated nasal septum with apparent subsequent nasal septoplasty/polypectomy, data deficit, July 2006.  10. Abdominal pain with apparent small bowel obstruction with exploratory laparotomy - data deficit.  11. Appendectomy with subsequent delayed hospitalization for bleeding peptic ulcer disease/probable duodenal ulcer, November 2003.  12. Recurrent asymptomatic atrial fibrillation with rapid ventricular response, July 2007, with subsequent Tikosyn therapy and DDDR pacemaker implant with intermittent recurrent breakthrough arrhythmias, including remote DC cardioversion, May 2008.  13. Obstructive sleep apnea with treatment.      HPI:   The patient is a 73-year-old who returns today for follow-up after having a  recent hospitalization on 09/14/2018 until 9/23/18 for 3-4 day history of watery stools, dehydration, acute renal insufficiency.  He also seen in consult for history of atrial fibrillation, hypertension, previous tachybradycardia syndrome and St. Chidi dual-chamber pacemaker.  On last visit in the hospital his Tikosyn dose was held secondary to acute renal insufficiency.  He is having breakthrough episodes of atrial fibrillation little more often than he had in the past.  He reports that he is still short of breath and fatigue since she's been remaining in atrial fibrillation.  He very rarely has episodes sustained symptomatically palpitations.  He denies any chest pain or chest tightness suggesting angina pectoris.  He denies any worsening heart failure symptoms.  He denies any dizziness near-syncope or syncope events.  He does inquire to when he can get back and rhythm menses he feels that he is mostly feels a lot better and his quality life is light better once she is maintaining sinus rhythm.  He reports that home his blood pressures well controlled.  He states she's tolerating his current medical therapy well.  He has had no issues on Coumadin with no bleeding.  His INRs have been therapeutic.    ROS:  Review of Symptoms:  General: no recent weight loss/gain, + weakness and  fatigue  Skin: no rashes, Dry skin, chronic.    HEENT: no dizziness, lightheadedness, or vision changes  Respiratory: no cough or hemoptysis  Cardiovascular: + palpitations, and tachycardia  Gastrointestinal: no black/tarry stools. +diarrhea   Urinary: no change in frequency or urgency  Peripheral Vascular: no claudication or leg cramps  Musculoskeletal: no muscle or joint pain/stiffness  Psychiatric: no depression or excessive stress  Neurological: no sensory or motor loss, no syncope  Hematologic: no anemia, easy bruising or bleeding  Endocrine: no thyroid problems, nor heat or cold intolerance    Cardiac PMH: (Old records have been  reviewed and summarized below)      Past Medical History, Past Surgical History, Family history, Social History, and Medications were all reviewed with the patient today and updated as necessary.       Current Outpatient Prescriptions:   •  acetaminophen (TYLENOL) 500 MG tablet, Take 1,000 mg by mouth Every 6 (Six) Hours As Needed for Mild Pain ., Disp: , Rfl:   •  amLODIPine (NORVASC) 5 MG tablet, TAKE ONE TABLET BY MOUTH DAILY, Disp: 90 tablet, Rfl: 1  •  aspirin 81 MG EC tablet, Take 81 mg by mouth Daily., Disp: , Rfl:   •  atorvastatin (LIPITOR) 40 MG tablet, TAKE ONE TABLET BY MOUTH DAILY, Disp: 90 tablet, Rfl: 3  •  carvedilol (COREG) 25 MG tablet, TAKE ONE TABLET BY MOUTH TWICE A DAY WITH MEALS, Disp: 180 tablet, Rfl: 2  •  cholecalciferol (VITAMIN D3) 1000 UNITS tablet, Take 1,000 Units by mouth daily., Disp: , Rfl:   •  Ferrous Sulfate (IRON) 325 (65 FE) MG tablet, Take 65 mg by mouth Daily., Disp: , Rfl:   •  loperamide (IMODIUM) 2 MG capsule, Take 2 mg by mouth 4 (Four) Times a Day As Needed for Diarrhea., Disp: , Rfl:   •  PHARMACY TO DOSE WARFARIN, Continuous As Needed (patient's warfarin is being managed by the [facility] ([phone]))., Disp: , Rfl:   •  torsemide (DEMADEX) 20 MG tablet, Take 1 tablet by mouth 2 (Two) Times a Day., Disp: 180 tablet, Rfl: 3  •  vitamin B-12 (CYANOCOBALAMIN) 1000 MCG tablet, Take 1,000 mcg by mouth Daily., Disp: , Rfl:   •  warfarin (COUMADIN) 7.5 MG tablet, TAKE ONE-HALF TO ONE TABLET BY MOUTH DAILY AS DIECTED BY ANTICOAGULATION PHARMACIST, Disp: 30 tablet, Rfl: 0      No Known Allergies  Patient Active Problem List   Diagnosis   • Persistent atrial fibrillation (CMS/HCC)   • Coronary artery disease   • Dyslipidemia   • Hypertension   • Morbid obesity (CMS/HCC)   • Atrial flutter (CMS/HCC)   • Hypertensive heart disease without heart failure   • Severe obstructive sleep apnea   • Dependence on nocturnal oxygen therapy   • Other  "abnormal glucose    • Stage 3 chronic kidney disease (CMS/HCC)   • Pacemaker   • EWA (acute kidney injury) (CMS/HCC)   • Anemia   • Diarrhea   • Chronic diastolic congestive heart failure (CMS/HCC)     Past Medical History:   Diagnosis Date   • Abnormal ECG    • Arrhythmia    • Arthritis    • Atrial fibrillation (CMS/HCC)    • Cancer (CMS/HCC)     skin cancer removed from various locations    • Chronic diastolic congestive heart failure (CMS/HCC) 9/14/2018   • Dry skin    • Edema    • Heart murmur    • Hematoma    • Hypertension    • Long term current use of anticoagulant    • ZULEIKA treated with BiPAP    • Sleep apnea     bipap- complaint with machine    • Stage 3 chronic kidney disease (CMS/HCC)    • Tinnitus    • Wears glasses     readers     Past Surgical History:   Procedure Laterality Date   • ABLATION OF DYSRHYTHMIC FOCUS     • APPENDECTOMY     • CARDIAC ELECTROPHYSIOLOGY PROCEDURE N/A 5/18/2018    Procedure: PPM generator change - dual       [facility] Chidi/ please schedule in 8 weeks;  Surgeon: Johnathan Taylor MD;  Location[facility];  Service: Cardiovascular   • CATARACT EXTRACTION      bilat wiht lens    • COLONOSCOPY     • ENDOSCOPY     • SKIN CANCER EXCISION      various locations    • TONSILLECTOMY     • WISDOM TOOTH EXTRACTION       Family History   Problem Relation Age of Onset   • Cancer Mother    • Heart attack Mother    • Hypertension Mother    • Arrhythmia Father    • Hypertension Father    • Heart attack Father      Social History   Substance Use Topics   • Smoking status: Never Smoker   • Smokeless tobacco: Never Used   • Alcohol use No         PHYSICAL EXAM:    /84 (BP Location: Left arm, Patient Position: Sitting)   Pulse 92   Ht 177.8 cm (70\")   Wt 131 kg (288 lb)   SpO2 95%   BMI 41.32 kg/m²        Wt Readings from Last 5 Encounters:   10/01/18 131 kg (288 lb)   09/27/18 131 kg (288 lb)   09/23/18 133 kg (292 lb 9.6 oz)   09/14/18 132 kg (292 lb)   08/22/18 134 kg (296 " lb)       BP Readings from Last 5 Encounters:   10/01/18 138/84   09/27/18 126/78   09/23/18 132/83   09/14/18 134/70   08/22/18 152/88       General appearance - Alert, well appearing, and in no distress   Mental status - Affect appropriate to mood.  Eyes - Sclerae anicteric,  ENMT - Hearing grossly normal bilaterally, Dental hygiene good.  Neck - Carotids upstroke normal bilaterally, no bruits, no JVD.  Resp - Clear to auscultation, no wheezes, rales or rhonchi, symmetric air entry.  Heart - IRIR, normal S1, S2, no murmurs, rubs, clicks or gallops.  GI - Soft, nontender, nondistended, no masses or organomegaly.  Neurological - Grossly intact - normal speech, no focal findings  Musculoskeletal - No joint tenderness, deformity or swelling, no muscular tenderness noted.  Extremities - Peripheral pulses normal, trace pedal edema, no clubbing or cyanosis.  Skin - Normal coloration and turgor. Ichyosis on Lower extremites  Psych -  oriented to person, place, and time.    Medical problems and test results were reviewed with the patient today.     Recent Results (from the past 672 hour(s))   POC Protime / INR    Collection Time: 09/12/18  9:58 AM   Result Value Ref Range    Protime 35.6 (H) 10.0 - 13.8 seconds    INR 3.0 (H) 0.91 - 1.09   Comprehensive Metabolic Panel    Collection Time: 09/14/18 11:46 AM   Result Value Ref Range    Glucose 114 (H) 70 - 100 mg/dL    BUN 38 (H) 9 - 23 mg/dL    Creatinine 3.03 (H) 0.60 - 1.30 mg/dL    Sodium 138 132 - 146 mmol/L    Potassium 3.9 3.5 - 5.5 mmol/L    Chloride 110 (H) 99 - 109 mmol/L    CO2 21.0 20.0 - 31.0 mmol/L    Calcium 7.9 (L) 8.7 - 10.4 mg/dL    Total Protein 5.7 5.7 - 8.2 g/dL    Albumin 3.48 3.20 - 4.80 g/dL    ALT (SGPT) 14 7 - 40 U/L    AST (SGOT) 23 0 - 33 U/L    Alkaline Phosphatase 77 25 - 100 U/L    Total Bilirubin 0.4 0.3 - 1.2 mg/dL    eGFR Non African Amer 20 (L) >60 mL/min/1.73    Globulin 2.2 gm/dL    A/G Ratio 1.6 1.5 - 2.5 g/dL    BUN/Creatinine Ratio  12.5 7.0 - 25.0    Anion Gap 7.0 3.0 - 11.0 mmol/L   BNP    Collection Time: 09/14/18 11:46 AM   Result Value Ref Range    .0 (H) 0.0 - 100.0 pg/mL   Protime-INR    Collection Time: 09/14/18 11:46 AM   Result Value Ref Range    Protime 22.6 (H) 9.6 - 11.5 Seconds    INR 2.15 (H) 0.91 - 1.09   Light Blue Top    Collection Time: 09/14/18 11:46 AM   Result Value Ref Range    Extra Tube hold for add-on    Green Top (Gel)    Collection Time: 09/14/18 11:46 AM   Result Value Ref Range    Extra Tube Hold for add-ons.    Lavender Top    Collection Time: 09/14/18 11:46 AM   Result Value Ref Range    Extra Tube hold for add-on    Gold Top - SST    Collection Time: 09/14/18 11:46 AM   Result Value Ref Range    Extra Tube Hold for add-ons.    CBC Auto Differential    Collection Time: 09/14/18 11:46 AM   Result Value Ref Range    WBC 3.75 3.50 - 10.80 10*3/mm3    RBC 2.76 (L) 4.20 - 5.76 10*6/mm3    Hemoglobin 8.0 (L) 13.1 - 17.5 g/dL    Hematocrit 26.5 (L) 38.9 - 50.9 %    MCV 96.0 80.0 - 99.0 fL    MCH 29.0 27.0 - 31.0 pg    MCHC 30.2 (L) 32.0 - 36.0 g/dL    RDW 15.5 (H) 11.3 - 14.5 %    RDW-SD 55.0 (H) 37.0 - 54.0 fl    MPV 10.1 6.0 - 12.0 fL    Platelets 177 150 - 450 10*3/mm3    Neutrophil % 57.5 41.0 - 71.0 %    Lymphocyte % 25.1 24.0 - 44.0 %    Monocyte % 16.3 (H) 0.0 - 12.0 %    Eosinophil % 0.8 0.0 - 3.0 %    Basophil % 0.3 0.0 - 1.0 %    Immature Grans % 0.3 0.0 - 0.6 %    Neutrophils, Absolute 2.16 1.50 - 8.30 10*3/mm3    Lymphocytes, Absolute 0.94 0.60 - 4.80 10*3/mm3    Monocytes, Absolute 0.61 0.00 - 1.00 10*3/mm3    Eosinophils, Absolute 0.03 0.00 - 0.30 10*3/mm3    Basophils, Absolute 0.01 0.00 - 0.20 10*3/mm3    Immature Grans, Absolute 0.01 0.00 - 0.03 10*3/mm3   POC Troponin, Rapid    Collection Time: 09/14/18 12:24 PM   Result Value Ref Range    Troponin I 0.01 0.00 - 0.07 ng/mL   Type & Screen    Collection Time: 09/14/18  1:22 PM   Result Value Ref Range    ABO Type A     RH type Positive      Antibody Screen Negative     T&S Expiration Date 9/17/2018 11:59:59 PM    POCT Occult Blood, stool    Collection Time: 09/14/18  1:27 PM   Result Value Ref Range    Fecal Occult Blood Positive (A) Negative    Lot Number [id number]     Expiration Date 1/21     DEVELOPER LOT NUMBER [id number]     DEVELOPER EXPIRATION DATE 3/19     Positive Control Positive Positive    Negative Control Negative Negative   Protime-INR    Collection Time: 09/15/18  3:45 AM   Result Value Ref Range    Protime 27.6 (H) 9.6 - 11.5 Seconds    INR 2.63 (H) 0.91 - 1.09   Basic Metabolic Panel    Collection Time: 09/15/18  3:45 AM   Result Value Ref Range    Glucose 97 70 - 100 mg/dL    BUN 40 (H) 9 - 23 mg/dL    Creatinine 3.05 (H) 0.60 - 1.30 mg/dL    Sodium 138 132 - 146 mmol/L    Potassium 3.8 3.5 - 5.5 mmol/L    Chloride 113 (H) 99 - 109 mmol/L    CO2 21.0 20.0 - 31.0 mmol/L    Calcium 7.5 (L) 8.7 - 10.4 mg/dL    eGFR Non African Amer 20 (L) >60 mL/min/1.73    BUN/Creatinine Ratio 13.1 7.0 - 25.0    Anion Gap 4.0 3.0 - 11.0 mmol/L   Calcium, Ionized    Collection Time: 09/15/18  3:45 AM   Result Value Ref Range    Ionized Calcium 1.16 1.12 - 1.32 mmol/L   Hemoglobin & Hematocrit, Blood    Collection Time: 09/15/18  3:45 AM   Result Value Ref Range    Hemoglobin 7.2 (L) 13.1 - 17.5 g/dL    Hematocrit 23.8 (L) 38.9 - 50.9 %   Ferritin    Collection Time: 09/15/18  3:45 AM   Result Value Ref Range    Ferritin 67.00 22.00 - 322.00 ng/mL   Iron Profile    Collection Time: 09/15/18  3:45 AM   Result Value Ref Range    Iron 9 (L) 50 - 175 mcg/dL    TIBC 250 250 - 450 mcg/dL    Iron Saturation 4 (L) 20 - 50 %   Magnesium    Collection Time: 09/15/18  3:45 AM   Result Value Ref Range    Magnesium 2.1 1.3 - 2.7 mg/dL   TSH    Collection Time: 09/15/18  3:45 AM   Result Value Ref Range    TSH 3.041 0.350 - 5.350 mIU/mL   Vitamin B12    Collection Time: 09/15/18  3:45 AM   Result Value Ref Range    Vitamin B-12 387 211 - 911 pg/mL   Folate    Collection  Time: 09/15/18  3:45 AM   Result Value Ref Range    Folate 10.97 3.20 - 20.00 ng/mL   Protime-INR    Collection Time: 09/16/18  5:26 AM   Result Value Ref Range    Protime 28.1 (H) 9.6 - 11.5 Seconds    INR 2.68 (H) 0.91 - 1.09   Basic Metabolic Panel    Collection Time: 09/16/18  5:26 AM   Result Value Ref Range    Glucose 107 (H) 70 - 100 mg/dL    BUN 42 (H) 9 - 23 mg/dL    Creatinine 3.50 (H) 0.60 - 1.30 mg/dL    Sodium 139 132 - 146 mmol/L    Potassium 4.3 3.5 - 5.5 mmol/L    Chloride 113 (H) 99 - 109 mmol/L    CO2 21.0 20.0 - 31.0 mmol/L    Calcium 7.4 (L) 8.7 - 10.4 mg/dL    eGFR Non African Amer 17 (L) >60 mL/min/1.73    BUN/Creatinine Ratio 12.0 7.0 - 25.0    Anion Gap 5.0 3.0 - 11.0 mmol/L   CBC (No Diff)    Collection Time: 09/16/18  5:26 AM   Result Value Ref Range    WBC 4.01 3.50 - 10.80 10*3/mm3    RBC 2.31 (L) 4.20 - 5.76 10*6/mm3    Hemoglobin 6.7 (L) 13.1 - 17.5 g/dL    Hematocrit 22.2 (L) 38.9 - 50.9 %    MCV 96.1 80.0 - 99.0 fL    MCH 29.0 27.0 - 31.0 pg    MCHC 30.2 (L) 32.0 - 36.0 g/dL    RDW 15.8 (H) 11.3 - 14.5 %    RDW-SD 55.5 (H) 37.0 - 54.0 fl    MPV 9.7 6.0 - 12.0 fL    Platelets 151 150 - 450 10*3/mm3   Urinalysis With Microscopic If Indicated (No Culture) - Urine, Clean Catch    Collection Time: 09/16/18  3:19 PM   Result Value Ref Range    Color, UA Yellow Yellow, Straw    Appearance, UA Cloudy (A) Clear    pH, UA <=5.0 5.0 - 8.0    Specific Gravity, UA 1.016 1.001 - 1.030    Glucose, UA Negative Negative    Ketones, UA Trace (A) Negative    Bilirubin, UA Negative Negative    Blood, UA Small (1+) (A) Negative    Protein, UA >=300 mg/dL (3+) (A) Negative    Leuk Esterase, UA Negative Negative    Nitrite, UA Negative Negative    Urobilinogen, UA 0.2 E.U./dL 0.2 - 1.0 E.U./dL   Creatinine, Urine, Random - Urine, Clean Catch    Collection Time: 09/16/18  3:19 PM   Result Value Ref Range    Creatinine, Urine 267.2 mg/dL   Eosinophil Smear - Urine, Urine, Clean Catch    Collection Time:  09/16/18  3:19 PM   Result Value Ref Range    Eosinophil Smear 0 0 - 0 % EOS/100 Cells   Protein, Urine, Random - Urine, Clean Catch    Collection Time: 09/16/18  3:19 PM   Result Value Ref Range    Total Protein, Urine 193.0 (H) 1.0 - 14.0 mg/dL   Urea Nitrogen, Urine - Urine, Clean Catch    Collection Time: 09/16/18  3:19 PM   Result Value Ref Range    Urea Nitrogen, Urine 545 mg/dL   Sodium, Urine, Random - Urine, Clean Catch    Collection Time: 09/16/18  3:19 PM   Result Value Ref Range    Sodium, Urine 18 (L) 30 - 90 mmol/L   Urinalysis, Microscopic Only - Urine, Clean Catch    Collection Time: 09/16/18  3:19 PM   Result Value Ref Range    RBC, UA 0-2 None Seen, 0-2 /HPF    WBC, UA 6-12 (A) None Seen, 0-2 /HPF    Bacteria, UA 3+ (A) None Seen, Trace /HPF    Squamous Epithelial Cells, UA 7-12 (A) None Seen, 0-2 /HPF    Hyaline Casts, UA 7-12 0 - 6 /LPF    Amorphous Crystals, UA Large/3+ None Seen /HPF    Mucus, UA Moderate/2+ (A) None Seen, Trace /HPF    Methodology Manual Light Microscopy    Hemoglobin & Hematocrit, Blood    Collection Time: 09/16/18  7:09 PM   Result Value Ref Range    Hemoglobin 7.7 (L) 13.1 - 17.5 g/dL    Hematocrit 25.7 (L) 38.9 - 50.9 %   Renal Function Panel    Collection Time: 09/17/18 12:04 AM   Result Value Ref Range    Glucose 116 (H) 70 - 100 mg/dL    BUN 48 (H) 9 - 23 mg/dL    Creatinine 3.74 (H) 0.60 - 1.30 mg/dL    Sodium 139 132 - 146 mmol/L    Potassium 4.5 3.5 - 5.5 mmol/L    Chloride 115 (H) 99 - 109 mmol/L    CO2 20.0 20.0 - 31.0 mmol/L    Calcium 7.3 (L) 8.7 - 10.4 mg/dL    Albumin 3.23 3.20 - 4.80 g/dL    Phosphorus 5.2 (H) 2.4 - 5.1 mg/dL    Anion Gap 4.0 3.0 - 11.0 mmol/L    BUN/Creatinine Ratio 12.8 7.0 - 25.0    eGFR Non African Amer 16 (L) >60 mL/min/1.73   Protime-INR    Collection Time: 09/17/18  6:20 AM   Result Value Ref Range    Protime 22.7 (H) 9.6 - 11.5 Seconds    INR 2.16 (H) 0.91 - 1.09   Basic Metabolic Panel    Collection Time: 09/17/18  6:20 AM   Result  Value Ref Range    Glucose 115 (H) 70 - 100 mg/dL    BUN 44 (H) 9 - 23 mg/dL    Creatinine 3.78 (H) 0.60 - 1.30 mg/dL    Sodium 139 132 - 146 mmol/L    Potassium 4.6 3.5 - 5.5 mmol/L    Chloride 113 (H) 99 - 109 mmol/L    CO2 20.0 20.0 - 31.0 mmol/L    Calcium 8.0 (L) 8.7 - 10.4 mg/dL    eGFR Non African Amer 16 (L) >60 mL/min/1.73    BUN/Creatinine Ratio 11.6 7.0 - 25.0    Anion Gap 6.0 3.0 - 11.0 mmol/L   CBC Auto Differential    Collection Time: 09/17/18  6:20 AM   Result Value Ref Range    WBC 5.95 3.50 - 10.80 10*3/mm3    RBC 2.59 (L) 4.20 - 5.76 10*6/mm3    Hemoglobin 7.5 (L) 13.1 - 17.5 g/dL    Hematocrit 24.4 (L) 38.9 - 50.9 %    MCV 94.2 80.0 - 99.0 fL    MCH 29.0 27.0 - 31.0 pg    MCHC 30.7 (L) 32.0 - 36.0 g/dL    RDW 17.9 (H) 11.3 - 14.5 %    RDW-SD 62.0 (H) 37.0 - 54.0 fl    MPV 10.1 6.0 - 12.0 fL    Platelets 184 150 - 450 10*3/mm3    Neutrophil % 65.5 41.0 - 71.0 %    Lymphocyte % 19.3 (L) 24.0 - 44.0 %    Monocyte % 13.1 (H) 0.0 - 12.0 %    Eosinophil % 1.8 0.0 - 3.0 %    Basophil % 0.3 0.0 - 1.0 %    Immature Grans % 2.4 (H) 0.0 - 0.6 %    Neutrophils, Absolute 3.89 1.50 - 8.30 10*3/mm3    Lymphocytes, Absolute 1.15 0.60 - 4.80 10*3/mm3    Monocytes, Absolute 0.78 0.00 - 1.00 10*3/mm3    Eosinophils, Absolute 0.11 0.00 - 0.30 10*3/mm3    Basophils, Absolute 0.02 0.00 - 0.20 10*3/mm3    Immature Grans, Absolute 0.14 (H) 0.00 - 0.03 10*3/mm3   Prepare RBC, 2 Units    Collection Time: 09/18/18  1:00 AM   Result Value Ref Range    Product Code Q4024H06     Unit Number [id number]     UNIT  ABO A     UNIT  RH POS     Crossmatch 1 Interpretation Compatible     Dispense Status PT     Blood Type APOS     Blood Expiration Date 201809202359     Blood Type Barcode 6200     Product Code U2820H16     Unit Number [id number]     UNIT  ABO A     UNIT  RH POS     Crossmatch 1 Interpretation Compatible     Dispense Status RE     Blood Type APOS     Blood Expiration Date [id number]     Blood Type Barcode  6200    Protime-INR    Collection Time: 09/18/18  5:48 AM   Result Value Ref Range    Protime 20.6 (H) 9.6 - 11.5 Seconds    INR 1.96 (H) 0.91 - 1.09   CBC Auto Differential    Collection Time: 09/18/18  9:14 AM   Result Value Ref Range    WBC 7.06 3.50 - 10.80 10*3/mm3    RBC 2.60 (L) 4.20 - 5.76 10*6/mm3    Hemoglobin 7.5 (L) 13.1 - 17.5 g/dL    Hematocrit 24.5 (L) 38.9 - 50.9 %    MCV 94.2 80.0 - 99.0 fL    MCH 28.8 27.0 - 31.0 pg    MCHC 30.6 (L) 32.0 - 36.0 g/dL    RDW 17.1 (H) 11.3 - 14.5 %    RDW-SD 59.8 (H) 37.0 - 54.0 fl    MPV 9.6 6.0 - 12.0 fL    Platelets 217 150 - 450 10*3/mm3    Neutrophil % 68.9 41.0 - 71.0 %    Lymphocyte % 21.8 (L) 24.0 - 44.0 %    Monocyte % 7.9 0.0 - 12.0 %    Eosinophil % 1.1 0.0 - 3.0 %    Basophil % 0.3 0.0 - 1.0 %    Immature Grans % 3.5 (H) 0.0 - 0.6 %    Neutrophils, Absolute 4.86 1.50 - 8.30 10*3/mm3    Lymphocytes, Absolute 1.54 0.60 - 4.80 10*3/mm3    Monocytes, Absolute 0.56 0.00 - 1.00 10*3/mm3    Eosinophils, Absolute 0.08 0.00 - 0.30 10*3/mm3    Basophils, Absolute 0.02 0.00 - 0.20 10*3/mm3    Immature Grans, Absolute 0.25 (H) 0.00 - 0.03 10*3/mm3   Basic Metabolic Panel    Collection Time: 09/18/18  9:14 AM   Result Value Ref Range    Glucose 129 (H) 70 - 100 mg/dL    BUN 49 (H) 9 - 23 mg/dL    Creatinine 4.01 (H) 0.60 - 1.30 mg/dL    Sodium 141 132 - 146 mmol/L    Potassium 4.6 3.5 - 5.5 mmol/L    Chloride 115 (H) 99 - 109 mmol/L    CO2 20.0 20.0 - 31.0 mmol/L    Calcium 8.2 (L) 8.7 - 10.4 mg/dL    eGFR Non African Amer 15 (L) >60 mL/min/1.73    BUN/Creatinine Ratio 12.2 7.0 - 25.0    Anion Gap 6.0 3.0 - 11.0 mmol/L   Protime-INR    Collection Time: 09/19/18  5:15 AM   Result Value Ref Range    Protime 24.9 (H) 9.6 - 11.5 Seconds    INR 2.37 (H) 0.91 - 1.09   Renal Function Panel    Collection Time: 09/19/18  5:15 AM   Result Value Ref Range    Glucose 117 (H) 70 - 100 mg/dL    BUN 51 (H) 9 - 23 mg/dL    Creatinine 3.55 (H) 0.60 - 1.30 mg/dL    Sodium 143 132 - 146  mmol/L    Potassium 4.7 3.5 - 5.5 mmol/L    Chloride 115 (H) 99 - 109 mmol/L    CO2 19.0 (L) 20.0 - 31.0 mmol/L    Calcium 8.5 (L) 8.7 - 10.4 mg/dL    Albumin 3.74 3.20 - 4.80 g/dL    Phosphorus 5.1 2.4 - 5.1 mg/dL    Anion Gap 9.0 3.0 - 11.0 mmol/L    BUN/Creatinine Ratio 14.4 7.0 - 25.0    eGFR Non African Amer 17 (L) >60 mL/min/1.73   Hemoglobin & Hematocrit, Blood    Collection Time: 09/19/18  1:13 PM   Result Value Ref Range    Hemoglobin 7.5 (L) 13.1 - 17.5 g/dL    Hematocrit 24.6 (L) 38.9 - 50.9 %   Protime-INR    Collection Time: 09/20/18  6:13 AM   Result Value Ref Range    Protime 31.3 (H) 9.6 - 11.5 Seconds    INR 2.98 (H) 0.91 - 1.09   Basic Metabolic Panel    Collection Time: 09/20/18  6:13 AM   Result Value Ref Range    Glucose 103 (H) 70 - 100 mg/dL    BUN 52 (H) 9 - 23 mg/dL    Creatinine 3.14 (H) 0.60 - 1.30 mg/dL    Sodium 143 132 - 146 mmol/L    Potassium 4.7 3.5 - 5.5 mmol/L    Chloride 117 (H) 99 - 109 mmol/L    CO2 21.0 20.0 - 31.0 mmol/L    Calcium 8.4 (L) 8.7 - 10.4 mg/dL    eGFR Non African Amer 19 (L) >60 mL/min/1.73    BUN/Creatinine Ratio 16.6 7.0 - 25.0    Anion Gap 5.0 3.0 - 11.0 mmol/L   CBC Auto Differential    Collection Time: 09/20/18  6:13 AM   Result Value Ref Range    WBC 7.74 3.50 - 10.80 10*3/mm3    RBC 2.86 (L) 4.20 - 5.76 10*6/mm3    Hemoglobin 8.2 (L) 13.1 - 17.5 g/dL    Hematocrit 27.1 (L) 38.9 - 50.9 %    MCV 94.8 80.0 - 99.0 fL    MCH 28.7 27.0 - 31.0 pg    MCHC 30.3 (L) 32.0 - 36.0 g/dL    RDW 16.2 (H) 11.3 - 14.5 %    RDW-SD 56.4 (H) 37.0 - 54.0 fl    MPV 9.3 6.0 - 12.0 fL    Platelets 264 150 - 450 10*3/mm3    Neutrophil % 63.9 41.0 - 71.0 %    Lymphocyte % 20.2 (L) 24.0 - 44.0 %    Monocyte % 11.0 0.0 - 12.0 %    Eosinophil % 3.2 (H) 0.0 - 3.0 %    Basophil % 0.3 0.0 - 1.0 %    Immature Grans % 1.4 (H) 0.0 - 0.6 %    Neutrophils, Absolute 4.95 1.50 - 8.30 10*3/mm3    Lymphocytes, Absolute 1.56 0.60 - 4.80 10*3/mm3    Monocytes, Absolute 0.85 0.00 - 1.00 10*3/mm3     Eosinophils, Absolute 0.25 0.00 - 0.30 10*3/mm3    Basophils, Absolute 0.02 0.00 - 0.20 10*3/mm3    Immature Grans, Absolute 0.11 (H) 0.00 - 0.03 10*3/mm3   Protime-INR    Collection Time: 09/21/18  4:59 AM   Result Value Ref Range    Protime 33.4 (H) 9.6 - 11.5 Seconds    INR 3.18 (H) 0.91 - 1.09   Renal Function Panel    Collection Time: 09/21/18  4:59 AM   Result Value Ref Range    Glucose 106 (H) 70 - 100 mg/dL    BUN 49 (H) 9 - 23 mg/dL    Creatinine 2.99 (H) 0.60 - 1.30 mg/dL    Sodium 147 (H) 132 - 146 mmol/L    Potassium 4.4 3.5 - 5.5 mmol/L    Chloride 116 (H) 99 - 109 mmol/L    CO2 21.0 20.0 - 31.0 mmol/L    Calcium 8.7 8.7 - 10.4 mg/dL    Albumin 3.44 3.20 - 4.80 g/dL    Phosphorus 4.5 2.4 - 5.1 mg/dL    Anion Gap 10.0 3.0 - 11.0 mmol/L    BUN/Creatinine Ratio 16.4 7.0 - 25.0    eGFR Non African Amer 21 (L) >60 mL/min/1.73   Protime-INR    Collection Time: 09/22/18  8:01 AM   Result Value Ref Range    Protime 33.5 (H) 9.6 - 11.5 Seconds    INR 3.19 (H) 0.91 - 1.09   Renal Function Panel    Collection Time: 09/22/18  8:01 AM   Result Value Ref Range    Glucose 123 (H) 70 - 100 mg/dL    BUN 50 (H) 9 - 23 mg/dL    Creatinine 2.79 (H) 0.60 - 1.30 mg/dL    Sodium 148 (H) 132 - 146 mmol/L    Potassium 4.7 3.5 - 5.5 mmol/L    Chloride 114 (H) 99 - 109 mmol/L    CO2 23.0 20.0 - 31.0 mmol/L    Calcium 8.3 (L) 8.7 - 10.4 mg/dL    Albumin 3.52 3.20 - 4.80 g/dL    Phosphorus 4.3 2.4 - 5.1 mg/dL    Anion Gap 11.0 3.0 - 11.0 mmol/L    BUN/Creatinine Ratio 17.9 7.0 - 25.0    eGFR Non African Amer 22 (L) >60 mL/min/1.73   Protime-INR    Collection Time: 09/23/18  5:21 AM   Result Value Ref Range    Protime 33.0 (H) 9.6 - 11.5 Seconds    INR 3.14 (H) 0.91 - 1.09   Basic Metabolic Panel    Collection Time: 09/23/18  5:21 AM   Result Value Ref Range    Glucose 106 (H) 70 - 100 mg/dL    BUN 53 (H) 9 - 23 mg/dL    Creatinine 2.89 (H) 0.60 - 1.30 mg/dL    Sodium 148 (H) 132 - 146 mmol/L    Potassium 4.5 3.5 - 5.5 mmol/L     Chloride 113 (H) 99 - 109 mmol/L    CO2 24.0 20.0 - 31.0 mmol/L    Calcium 8.1 (L) 8.7 - 10.4 mg/dL    eGFR Non African Amer 21 (L) >60 mL/min/1.73    BUN/Creatinine Ratio 18.3 7.0 - 25.0    Anion Gap 11.0 3.0 - 11.0 mmol/L   Basic Metabolic Panel    Collection Time: 09/27/18 11:26 AM   Result Value Ref Range    Glucose 138 (H) 70 - 100 mg/dL    BUN 40 (H) 9 - 23 mg/dL    Creatinine 2.62 (H) 0.60 - 1.30 mg/dL    eGFR Non African Am 24 (L) >60 mL/min/1.73    eGFR African Am 29 (L) >60 mL/min/1.73    BUN/Creatinine Ratio 15.3 7.0 - 25.0    Sodium 146 132 - 146 mmol/L    Potassium 4.4 3.5 - 5.5 mmol/L    Chloride 108 99 - 109 mmol/L    Total CO2 28.0 20.0 - 31.0 mmol/L    Calcium 8.3 (L) 8.7 - 10.4 mg/dL       Device Interrogation:  St. Chidi dual-chamber device.  A paced 63%.  RV paced 6.7%.  Patient's currently in A. fib.  R-wave grade 12.0 mV.  Atrial impedance 340 ohms.  RV impedance 440 ohms.  RV threshold 1.25 V at 0.6 ms.  Battery voltage is 3.01 V with 8 years remaining.  A. fib 38% a time now.    ASSESSMENT   1. PAF:   Tikosyn.  17% Afib on last interrogation August. Now persistent Afib.  Tikosyn discontinued secondary EWA. 9/18.   2. EWA: Scr 3.55 9/28/18, 2.89 few days ago.  Plan for consult with Dr. Rodriguez in one month.    3. HTN: Controlled  4. Chronic DHF:  Diuretics on hold, currently stable.   5. Tachybrady syndrome: Resolved  6. Anticoagulation: Coumadin  7.  St. Chidi pacemaker: Normal function    8. Diarrhea, resolving.     PLAN  · Start Amiodarone for symptomatic persistent Afib.  He will start Amiodarone 200 mg BID for one month than 200 mg daily.  · If he remains in Afib in one month we will consider ECV at that time.  · Continue Coumadin with consult with Pharmacy for coumadin dose adjustment on amiodoarone.  His INR has remained therapeutic for >1 month.   · Return In one month or sooner as needed.   1.10/1/2018  11:38 AM    Will Jeronimo SUMMERS

## 2018-10-04 ENCOUNTER — READMISSION MANAGEMENT (OUTPATIENT)
Dept: CALL CENTER | Facility: HOSPITAL | Age: 73
End: 2018-10-04

## 2018-10-04 NOTE — OUTREACH NOTE
Medical Week 2 Survey      Responses   Facility patient discharged from?  [place]   Does the patient have one of the following disease processes/diagnoses(primary or secondary)?  Other   Week 2 attempt successful?  No   Unsuccessful attempts  Attempt 1          Fiona Plascencia RN

## 2018-10-08 ENCOUNTER — ANTICOAGULATION VISIT (OUTPATIENT)
Dept: PHARMACY | Facility: HOSPITAL | Age: 73
End: 2018-10-08

## 2018-10-08 DIAGNOSIS — I48.19 PERSISTENT ATRIAL FIBRILLATION (HCC): ICD-10-CM

## 2018-10-08 LAB
INR PPP: 2.1 (ref 0.91–1.09)
PROTHROMBIN TIME: 24.7 SECONDS (ref 10–13.8)

## 2018-10-08 PROCEDURE — 85610 PROTHROMBIN TIME: CPT

## 2018-10-08 PROCEDURE — 36416 COLLJ CAPILLARY BLOOD SPEC: CPT

## 2018-10-08 PROCEDURE — G0463 HOSPITAL OUTPT CLINIC VISIT: HCPCS

## 2018-10-08 NOTE — PROGRESS NOTES
Anticoagulation Clinic Progress Note  Indication: afib  Referring Provider: Tyson Roberts)  Initial Warfarin Start Date: 2007  Goal INR: 2-3  Current Drug Interactions: aspirin, amiodarone (start 10/1/18)    Diet: leafy head lettuce salads every now and then (thinks non-iceberg)    Anticoagulation Clinic INR History:  Date 11/14 12/6 1/25 2/22 3/22 4/11 5/9 6/8 6/29 8/3 8/31 9/14 9/28   Total Weekly Dose 37.5 mg 37.5 mg 37.5 mg 37.5 mg 37.5 mg 37.5 mg 37.5 mg 37.5mg 37.5 mg 37.5 mg 26.25mg 37.5 mg 37.5 mg   INR 2.4 2.1 2.0 3.1 2.4 2.6 2.7 2.2 2.5 3.0 1.7 3.2 2.3     Date 10/23 11/20 12/8 1/5 2/2 3/2 3/16 3/30 4/17 5/17 6/14 7/19 8/1 8/22 9/12   Total Weekly Dose 37.5mg 37.5 mg 37.5 mg 37.5 mg 37.5mg 37.5 mg 37.5 mg 37.5 mg 37.5mg 37.5 mg 37.5 mg 37.5mg 37.5 mg 37.5 mg 37.5 mg    INR 2.5 1.8 2.5 2.3 2.1 1.7 3.2 2.1 2.3 2.06 2.8 3.5 2.7 2.7 3.0   Notes      (boost), amox amox   PAT     diarrhea     Date 10/1 10/8                Total Weekly Dose 26.25 mg 23.75mg                INR 3.0 2.1                Notes post- disch amio                  Clinic Interview:  Verbal Release Authorization signed on 8/22/2018 -- may speak with Raeann Sim (Wife)  Tablet Strength: pt has 7.5mg and 2.5mg tablets  Current Maintenance Dose: 3.75mg daily except 7.5mg MWF    Patient Findings:  Negatives:   Signs/symptoms of thrombosis, Signs/symptoms of bleeding, Laboratory test error suspected, Change in health, Change in alcohol use, Change in activity, Upcoming invasive procedure, Emergency department visit, Upcoming dental procedure, Missed doses, Extra doses, Change in medications, Change in diet/appetite, Hospital admission, Bruising, Other complaints   Comments:   Denies blood in stool, still on loading dose of amiodarone 200mg bid.      Plan:   1. INR is therapeutic today at 2.1, following 1 week of amiodarone. Dose has been reduced ~37% since starting amiodarone. For now, instructed Mr. Montemayor to continue warfarin 3.75mg  daily except 2.5mg twice a week, Sunday/Thursday.  2. RTC Friday this week to reassess.   3. Verbal and written information provided today in clinic. Mr. Montemayor expresses understanding with teach back and has no further questions at this time.    Pankaj Belle, PharmD Candidate 2019  10/8/2018  10:00 AM     This past week = 9.5% reduction from Mr. Montemayor's prior dosing regimen (lowered during admission one week prior, pre-amio).   IShanell, [facility], have reviewed the note in full and agree with the assessment and plan.  10/08/18  11:18 AM

## 2018-10-09 ENCOUNTER — READMISSION MANAGEMENT (OUTPATIENT)
Dept: CALL CENTER | Facility: HOSPITAL | Age: 73
End: 2018-10-09

## 2018-10-09 NOTE — OUTREACH NOTE
Medical Week 2 Survey      Responses   Facility patient discharged from?  [place]   Does the patient have one of the following disease processes/diagnoses(primary or secondary)?  Other   Week 2 attempt successful?  Yes   Call start time  [id number]   Call end time  1021   Meds reviewed with patient/caregiver?  Yes   Is the patient having any side effects they believe may be caused by any medication additions or changes?  No   Does the patient have all medications ordered at discharge?  Yes   Is the patient taking all medications as directed (includes completed medication regime)?  Yes   Does the patient have a primary care provider?   Yes   Does the patient have an appointment with their PCP within 7 days of discharge?  Yes   Has the patient kept scheduled appointments due by today?  Yes   Psychosocial issues?  No   Comments  states is feeling well, without complaints   Did the patient receive a copy of their discharge instructions?  Yes   Nursing interventions  Reviewed instructions with patient   What is the patient's perception of their health status since discharge?  Improving   Is the patient/caregiver able to teach back signs and symptoms related to disease process for when to call PCP?  Yes   Is the patient/caregiver able to teach back signs and symptoms related to disease process for when to call 911?  Yes   Is the patient/caregiver able to teach back the hierarchy of who to call/visit for symptoms/problems? PCP, Specialist, Home health nurse, Urgent Care, ED, 911  Yes   Week 2 Call Completed?  Yes   Graduated  Yes   Did the patient feel the follow up calls were helpful during their recovery period?  Yes   Was the number of calls appropriate?  Yes   Graduated/Revoked comments  all goals met, pt stable, without complaints, no further contact needed          Zahra Bledsoe RN

## 2018-10-10 ENCOUNTER — APPOINTMENT (OUTPATIENT)
Dept: PHARMACY | Facility: HOSPITAL | Age: 73
End: 2018-10-10

## 2018-10-12 ENCOUNTER — ANTICOAGULATION VISIT (OUTPATIENT)
Dept: PHARMACY | Facility: HOSPITAL | Age: 73
End: 2018-10-12

## 2018-10-12 DIAGNOSIS — I48.19 PERSISTENT ATRIAL FIBRILLATION (HCC): ICD-10-CM

## 2018-10-12 LAB
INR PPP: 1.6 (ref 0.91–1.09)
PROTHROMBIN TIME: 19.7 SECONDS (ref 10–13.8)

## 2018-10-12 PROCEDURE — 36416 COLLJ CAPILLARY BLOOD SPEC: CPT

## 2018-10-12 PROCEDURE — G0463 HOSPITAL OUTPT CLINIC VISIT: HCPCS

## 2018-10-12 PROCEDURE — 85610 PROTHROMBIN TIME: CPT

## 2018-10-12 NOTE — PROGRESS NOTES
Anticoagulation Clinic Progress Note  Indication: afib  Referring Provider: Tyson Roberts)  Initial Warfarin Start Date: 2007  Goal INR: 2-3  Current Drug Interactions: aspirin, amiodarone (start 10/1/18)  Diet: leafy head lettuce salads every now and then (thinks non-iceberg)  [id number]= 5 (HTN, Age, CHF, CAD)  Other: Anemic, CKD (stage 3)      Anticoagulation Clinic INR History:  Date 11/14 12/6 1/25 2/22 3/22 4/11 5/9 6/8 6/29 8/3 8/31 9/14 9/28   Total Weekly Dose 37.5 mg 37.5 mg 37.5 mg 37.5 mg 37.5 mg 37.5 mg 37.5 mg 37.5mg 37.5 mg 37.5 mg 26.25mg 37.5 mg 37.5 mg   INR 2.4 2.1 2.0 3.1 2.4 2.6 2.7 2.2 2.5 3.0 1.7 3.2 2.3     Date 10/23 11/20 12/8 1/5 2/2 3/2 3/16 3/30 4/17 5/17 6/14 7/19 8/1 8/22 9/12   Total Weekly Dose 37.5mg 37.5 mg 37.5 mg 37.5 mg 37.5mg 37.5 mg 37.5 mg 37.5 mg 37.5mg 37.5 mg 37.5 mg 37.5mg 37.5 mg 37.5 mg 37.5 mg    INR 2.5 1.8 2.5 2.3 2.1 1.7 3.2 2.1 2.3 2.06 2.8 3.5 2.7 2.7 3.0   Notes      (boost), amox amox   PAT     diarrhea     Date 10/1 10/8 10/12 10/17              Total Weekly Dose 26.25 mg 23.75mg 22.5mg 23.75mg              INR 3.0 2.1 1.6               Notes post- disch amio Wk 2 of amio Wk 3 amio                Clinic Interview:  Verbal Release Authorization signed on 8/22/2018 -- may speak with Raeann Montemayor (Wife)  Tablet Strength: pt has 7.5mg and 2.5mg tablets  Current Maintenance Dose: 3.75mg daily except 7.5mg MWF    Patient Findings:  Negatives:   Signs/symptoms of thrombosis, Signs/symptoms of bleeding, Laboratory test error suspected, Change in health, Change in alcohol use, Change in activity, Upcoming invasive procedure, Emergency department visit, Upcoming dental procedure, Missed doses, Extra doses, Change in medications, Change in diet/appetite, Hospital admission, Bruising, Other complaints       Plan:   1. INR is SUBtherapeutic today at 1.6, patient started amiodarone on 10/1 and since his dose has been reduced ~37%. Instructed Mitzy Sim to take  warfarin 3.75mg daily except 2.5mg ThuSun (5.5% inc).  2. RTC Wed next week for INR  3. Verbal and written information provided today in clinic. Mr. Montemayor expresses understanding with teach back and has no further questions at this time.    Pankaj Belle, PharmD Candidate 2019  10/12/2018  10:04 AM       IEvelina, PharmD, have reviewed the note in full and agree with the assessment and plan.  10/12/18  11:10 AM

## 2018-10-17 ENCOUNTER — ANTICOAGULATION VISIT (OUTPATIENT)
Dept: PHARMACY | Facility: HOSPITAL | Age: 73
End: 2018-10-17

## 2018-10-17 DIAGNOSIS — I48.19 PERSISTENT ATRIAL FIBRILLATION (HCC): ICD-10-CM

## 2018-10-17 LAB
INR PPP: 1.3 (ref 0.91–1.09)
PROTHROMBIN TIME: 15.4 SECONDS (ref 10–13.8)

## 2018-10-17 PROCEDURE — G0463 HOSPITAL OUTPT CLINIC VISIT: HCPCS

## 2018-10-17 PROCEDURE — 36416 COLLJ CAPILLARY BLOOD SPEC: CPT

## 2018-10-17 PROCEDURE — 85610 PROTHROMBIN TIME: CPT

## 2018-10-17 NOTE — PROGRESS NOTES
Anticoagulation Clinic Progress Note  Indication: afib  Referring Provider: Tyson Roberts)  Initial Warfarin Start Date: 2007  Goal INR: 2-3  Current Drug Interactions: aspirin, amiodarone (start 10/1/18)  Diet: leafy head lettuce salads every now and then (thinks non-iceberg)  [id number]= 5 (HTN, Age, CHF, CAD)  Other: Anemic, CKD (stage 3)      Anticoagulation Clinic INR History:  Date 11/14 12/6 1/25 2/22 3/22 4/11 5/9 6/8 6/29 8/3 8/31 9/14 9/28   Total Weekly Dose 37.5 mg 37.5 mg 37.5 mg 37.5 mg 37.5 mg 37.5 mg 37.5 mg 37.5mg 37.5 mg 37.5 mg 26.25mg 37.5 mg 37.5 mg   INR 2.4 2.1 2.0 3.1 2.4 2.6 2.7 2.2 2.5 3.0 1.7 3.2 2.3     Date 10/23 11/20 12/8 1/5 2/2 3/2 3/16 3/30 4/17 5/17 6/14 7/19 8/1 8/22 9/12   Total Weekly Dose 37.5mg 37.5 mg 37.5 mg 37.5 mg 37.5mg 37.5 mg 37.5 mg 37.5 mg 37.5mg 37.5 mg 37.5 mg 37.5mg 37.5 mg 37.5 mg 37.5 mg    INR 2.5 1.8 2.5 2.3 2.1 1.7 3.2 2.1 2.3 2.06 2.8 3.5 2.7 2.7 3.0   Notes      (boost), amox amox   PAT     diarrhea     Date 10/1 10/8 10/12 10/17              Total Weekly Dose 26.25 mg 23.75mg 22.5 mg 23.75mg              INR 3.0 2.1 1.6 1.3              Notes post- disch amio Wk 2 amio Wk 3 amio                Clinic Interview:  Verbal Release Authorization signed on 8/22/2018 -- may speak with Raeann Montemayor (Wife). [phone] (Home) *Preferred*  Tablet Strength: pt has 7.5mg and 2.5mg tablets  Current Maintenance Dose: 3.75mg daily except 7.5mg MWF    Patient Findings:  Negatives:   Signs/symptoms of thrombosis, Signs/symptoms of bleeding, Laboratory test error suspected, Change in health, Change in alcohol use, Change in activity, Upcoming invasive procedure, Emergency department visit, Upcoming dental procedure, Missed doses, Extra doses, Change in medications, Change in diet/appetite, Hospital admission, Bruising, Other complaints   Comments:   Mrs. Bernard reports that this is week 3 of amiodarone 200mg BID. He recalls no changes that could have contributed to  a decreased INR. He eats green beans and salad (mostly iceberg lettuce) about once weekly, last salad was last Thursday. However, he reports not eating many GLV due because he knows of the interaction with his medications and wants to get his INR within range.      Plan:   1. INR is SUBtherapeutic again today at 1.3. This INR is lower than the previous INR last Friday at 1.6. Patient started amiodarone on 10/1 and is now on week 3 of 4 of amiodarone 200mg BID before starting amiodarone 200mg daily (of note, 10/1 per Cardiology, If he remains in Afib in one month we will consider ECV at that time). Mr. Montemayor already had his warfarin 3.75mg dose this morning, instructed Mr. Montemayor to take an additional 1.25mg today (for a total of 5mg) then warfarin 3.75mg daily until repeat INR.  2. RTC next Monday, 10/22.  3. Verbal and written information provided today in clinic. Mr. Montemayor expresses understanding with teach back and has no further questions at this time.    Maryuri Mohamud, PharmD  Pharmacy Resident  10/17/2018  11:28 AM

## 2018-10-17 NOTE — PROGRESS NOTES
Anticoagulation Clinic Progress Note  Indication: afib  Referring Provider: Tyson Roberts)  Initial Warfarin Start Date: 2007  Goal INR: 2-3  Current Drug Interactions: aspirin, amiodarone (start 10/1/18)  Diet: leafy head lettuce salads every now and then (thinks non-iceberg)  [id number]= 5 (HTN, Age, CHF, CAD)  Other: Anemic, CKD (stage 3)      Anticoagulation Clinic INR History:  Date 11/14 12/6 1/25 2/22 3/22 4/11 5/9 6/8 6/29 8/3 8/31 9/14 9/28   Total Weekly Dose 37.5 mg 37.5 mg 37.5 mg 37.5 mg 37.5 mg 37.5 mg 37.5 mg 37.5mg 37.5 mg 37.5 mg 26.25mg 37.5 mg 37.5 mg   INR 2.4 2.1 2.0 3.1 2.4 2.6 2.7 2.2 2.5 3.0 1.7 3.2 2.3     Date 10/23 11/20 12/8 1/5 2/2 3/2 3/16 3/30 4/17 5/17 6/14 7/19 8/1 8/22 9/12   Total Weekly Dose 37.5mg 37.5 mg 37.5 mg 37.5 mg 37.5mg 37.5 mg 37.5 mg 37.5 mg 37.5mg 37.5 mg 37.5 mg 37.5mg 37.5 mg 37.5 mg 37.5 mg    INR 2.5 1.8 2.5 2.3 2.1 1.7 3.2 2.1 2.3 2.06 2.8 3.5 2.7 2.7 3.0   Notes      (boost), amox amox   PAT     diarrhea     Date 10/1 10/8 10/12 10/17              Total Weekly Dose 26.25 mg 23.75mg 22.5mg 23.75mg              INR 3.0 2.1 1.6 1.3              Notes post- disch amio Wk 2 of amio Wk 3 amio                Clinic Interview:  Verbal Release Authorization signed on 8/22/2018 -- may speak with Raeann Montemayor (Wife)  Tablet Strength: pt has 7.5mg and 2.5mg tablets  Current Maintenance Dose: 3.75mg daily except 7.5mg MWF    Patient Findings:  Negatives:   Signs/symptoms of thrombosis, Signs/symptoms of bleeding, Laboratory test error suspected, Change in health, Change in alcohol use, Change in activity, Upcoming invasive procedure, Emergency department visit, Upcoming dental procedure, Missed doses, Extra doses, Change in medications, Change in diet/appetite, Hospital admission, Bruising, Other complaints       Plan:   1. INR is _________ today at ________, patient started amiodarone on 10/1 and since his dose has been reduced ~37%. Instructed Mr. Montemayor to take  warfarin 3.75mg daily except 2.5mg ThuSun (5.5% inc).  2. RTC Wed next week for INR  3. Verbal and written information provided today in clinic. Mr. Montemayor expresses understanding with teach back and has no further questions at this time.

## 2018-10-22 ENCOUNTER — ANTICOAGULATION VISIT (OUTPATIENT)
Dept: PHARMACY | Facility: HOSPITAL | Age: 73
End: 2018-10-22

## 2018-10-22 DIAGNOSIS — I48.19 PERSISTENT ATRIAL FIBRILLATION (HCC): ICD-10-CM

## 2018-10-22 LAB
INR PPP: 1.6 (ref 0.91–1.09)
PROTHROMBIN TIME: 18.9 SECONDS (ref 10–13.8)

## 2018-10-22 PROCEDURE — G0463 HOSPITAL OUTPT CLINIC VISIT: HCPCS

## 2018-10-22 PROCEDURE — 85610 PROTHROMBIN TIME: CPT

## 2018-10-22 PROCEDURE — 36416 COLLJ CAPILLARY BLOOD SPEC: CPT

## 2018-10-22 NOTE — PROGRESS NOTES
Anticoagulation Clinic Progress Note  Indication: afib  Referring Provider: Tyson Roberts)  Initial Warfarin Start Date: 2007  Goal INR: 2-3  Current Drug Interactions: aspirin, amiodarone (start 10/1/18)  Diet: leafy head lettuce salads every now and then (thinks non-iceberg)  [id number]= 5 (HTN, Age, CHF, CAD)  Other: Anemic, CKD (stage 3)      Anticoagulation Clinic INR History:  Date 11/14 12/6 1/25 2/22 3/22 4/11 5/9 6/8 6/29 8/3 8/31 9/14 9/28   Total Weekly Dose 37.5 mg 37.5 mg 37.5 mg 37.5 mg 37.5 mg 37.5 mg 37.5 mg 37.5mg 37.5 mg 37.5 mg 26.25mg 37.5 mg 37.5 mg   INR 2.4 2.1 2.0 3.1 2.4 2.6 2.7 2.2 2.5 3.0 1.7 3.2 2.3     Date 10/23 11/20 12/8 1/5 2/2 3/2 3/16 3/30 4/17 5/17 6/14 7/19 8/1 8/22 9/12   Total Weekly Dose 37.5mg 37.5 mg 37.5 mg 37.5 mg 37.5mg 37.5 mg 37.5 mg 37.5 mg 37.5mg 37.5 mg 37.5 mg 37.5mg 37.5 mg 37.5 mg 37.5 mg    INR 2.5 1.8 2.5 2.3 2.1 1.7 3.2 2.1 2.3 2.06 2.8 3.5 2.7 2.7 3.0   Notes      (boost), amox amox   PAT     diarrhea     Date 10/1 10/8 10/12 10/17 10/22             Total Weekly Dose 26.25 mg 23.75mg 22.5 mg 23.75mg 27.5 mg             INR 3.0 2.1 1.6 1.3 1.6             Notes post- disch amio Wk 2 amio Wk 3 amio Wk 4 amio               Clinic Interview:  Verbal Release Authorization signed on 8/22/2018 -- may speak with Raeann Montemayor (Wife). [phone] (Home) *Preferred*  Tablet Strength: pt has 7.5mg and 2.5mg tablets  Current Maintenance Dose: 3.75mg daily except 7.5mg MWF    Patient Findings:  Negatives:   Signs/symptoms of thrombosis, Signs/symptoms of bleeding, Laboratory test error suspected, Change in health, Change in alcohol use, Change in activity, Upcoming invasive procedure, Emergency department visit, Upcoming dental procedure, Missed doses, Extra doses, Change in medications, Change in diet/appetite, Hospital admission, Bruising, Other complaints   Comments:   Mr. Montemayor reports that this is week 4 of amiodarone 200mg BID. He stated that his blood  pressure monitor shows minimal episodes of afib.  His BP has been running slightly lower than usual.  Denies changes      Plan:   1. INR is SUBtherapeutic again today at 1.6, although trending upward.  Instructed Mr. Montemayor to take warfarin 5mg boost dose today, then warfarin 3.75mg daily except warfarin 5 mg oral on Friday until repeat INR.   Patient started amiodarone on 10/1 and is now on week 4 of 4 of amiodarone 200mg BID before starting amiodarone 200mg daily (of note, 10/1 per Cardiology, If he remains in Afib in one month we will consider ECV at that time).  2. RTC next Monday, 10/29 per patient request.  He has a part time job and is usually off on Mondays.  3. Verbal and written information provided today in clinic. Mr. Montemayor expresses understanding with teach back and has no further questions at this time.    Charlotte Michael, PharmD  10/22/2018  9:15 AM

## 2018-10-29 ENCOUNTER — ANTICOAGULATION VISIT (OUTPATIENT)
Dept: PHARMACY | Facility: HOSPITAL | Age: 73
End: 2018-10-29

## 2018-10-29 DIAGNOSIS — I48.19 PERSISTENT ATRIAL FIBRILLATION (HCC): ICD-10-CM

## 2018-10-29 LAB
INR PPP: 2.8 (ref 0.91–1.09)
PROTHROMBIN TIME: 33.1 SECONDS (ref 10–13.8)

## 2018-10-29 PROCEDURE — G0463 HOSPITAL OUTPT CLINIC VISIT: HCPCS

## 2018-10-29 PROCEDURE — 36416 COLLJ CAPILLARY BLOOD SPEC: CPT

## 2018-10-29 PROCEDURE — 85610 PROTHROMBIN TIME: CPT

## 2018-10-29 NOTE — PROGRESS NOTES
Anticoagulation Clinic Progress Note  Indication: afib  Referring Provider: Tyson Roberts)  Initial Warfarin Start Date: 2007  Goal INR: 2-3  Current Drug Interactions: aspirin, amiodarone (start 10/1/18)  Diet: not eating at this point (10/29)  [id number]= 5 (HTN, Age, CHF, CAD)  Other: Anemic, CKD (stage 3)    Anticoagulation Clinic INR History:  Date 11/14 12/6 1/25 2/22 3/22 4/11 5/9 6/8 6/29 8/3 8/31 9/14 9/28   Total Weekly Dose 37.5 mg 37.5 mg 37.5 mg 37.5 mg 37.5 mg 37.5 mg 37.5 mg 37.5mg 37.5 mg 37.5 mg 26.25mg 37.5 mg 37.5 mg   INR 2.4 2.1 2.0 3.1 2.4 2.6 2.7 2.2 2.5 3.0 1.7 3.2 2.3     Date 10/23 11/20 12/8 1/5 2/2 3/2 3/16 3/30 4/17 5/17 6/14 7/19 8/1 8/22 9/12   Total Weekly Dose 37.5mg 37.5 mg 37.5 mg 37.5 mg 37.5mg 37.5 mg 37.5 mg 37.5 mg 37.5mg 37.5 mg 37.5 mg 37.5mg 37.5 mg 37.5 mg 37.5 mg    INR 2.5 1.8 2.5 2.3 2.1 1.7 3.2 2.1 2.3 2.06 2.8 3.5 2.7 2.7 3.0   Notes      (boost), amox amox   PAT     diarrhea     Date 10/1 10/8 10/12 10/17 10/22 10/29            Total Weekly Dose 26.25 mg 23.75mg 22.5 mg 23.75mg 27.5 mg 28.75            INR 3.0 2.1 1.6 1.3 1.6 2.8            Notes post- disch amio Wk 2 amio Wk 3 amio Wk 4 amio Wk 5 amio              Clinic Interview:  Verbal Release Authorization signed on 8/22/2018 -- may speak with Raeann Montemayor (Wife). [phone] (Home) *Preferred*  Tablet Strength: pt has 7.5mg and 2.5mg tablets  Current Maintenance Dose: 3.75mg daily except 7.5mg MWF    Patient Findings   Positives:   Change in medications   Negatives:   Signs/symptoms of thrombosis, Signs/symptoms of bleeding, Laboratory test error suspected, Change in health, Change in alcohol use, Change in activity, Upcoming invasive procedure, Emergency department visit, Upcoming dental procedure, Missed doses, Extra doses, Change in diet/appetite, Hospital admission, Bruising, Other complaints   Comments:   Mr. Montemayor reports that he continues amiodarone 200mg BID and will begin once daily on 11/1.         Plan:   1. INR is SUBtherapeutic again today at 1.6, although trending upward.  Instructed Mr. Montemayor to take warfarin 5mg boost dose today, then warfarin 3.75mg daily except warfarin 5 mg oral on Friday until repeat INR.   Patient started amiodarone on 10/1 and is now on week 4 of 4 of amiodarone 200mg BID before starting amiodarone 200mg daily (of note, 10/1 per Cardiology, If he remains in Afib in one month we will consider ECV at that time).   2. RTC next Monday, 11/5 when returns for appointment with Dr. Decker.  3. Verbal and written information provided today in clinic. Mr. Montemayor expresses understanding with teach back and has no further questions at this time.    Evelina Quan, PharmD  10/29/2018  10:28 AM

## 2018-11-04 NOTE — PROGRESS NOTES
[facility] at [facility]   OFFICE NOTE      Marco Antonio Montemayor  1945  PCP: Emil Rosado MD    SUBJECTIVE:   Marco Antonio Montemayor is a 73 y.o. male seen for a follow up visit regarding the following: Afib, HTN, Tachybrady syndrome, Saint Chidi pacemaker    CC:Afib  Problem List:  1. 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 Remote acceptable intravenous adenosine Quantitative SPECT gated Cardiolite study, LVEF 0.50, April 1999.  b. Echocardiogram showing mild LVH with estimated ejection fraction 0.58 with mild aortic valve cusp sclerosis, and mild TR, 09/23/2014.   c. Remote abnormal preoperative Quantitative SPECT gated Cardiolite study with mild “fixed” inferoposterior hypoperfusion and mild LV enlargement with mild global hypokinesia with reduced LVEF 0.40 in the setting of abnormal EKG with subsequent diagnostic coronary angiography demonstrating mild-to-moderate nonobstructive coronary artery atherosclerosis with mild compensated left ventricular dysfunction, LVEF 0.52, and continued medical therapy felt warranted, June 2006.  d. Remote hospitalization for symptomatic atrial fibrillation with rapid ventricular response and mild congestive heart failure requiring BRYANNA and DC cardioversion, June 2009.  e. Left heart catheterization with essentially normal coronary arteries with mild luminal irregularities with an EF of 0.60 by Dr. Escamilla for angina and elevated troponin, 06/05/2015.   f. Residual class I symptoms.  2. Chronic essential hypertension with recent progressive hypertension and blood pressure readings and normal selective bilateral renal angiography, June 2006.  3. Dyslipidemia.  4. Chronic atrial tachyarrhythmias:  a. Intermittent chronic paroxysmal atrial fibrillation with electrophysiologic study with RFA for atrial flutter, October 1999.  b. Recurrent apparent transient atrial fibrillation, resolved with acceptable [facility] emergency department evaluation, June  2003.  c. Acceptable combination Doppler echocardiogram, July 2003, with apparent acceptable 24-hour Holter monitor, May 2006.  d. Recurrent asymptomatic atrial fibrillation with RVR, January 2007, subsequent Coumadin therapy and Tikosyn therapy with successful internal cardioversion of atrial fibrillation and marked bradyarrhythmias requiring DDDR pacemaker implant, St. Chidi device, data not available, March 2007.  e. Remote hospitalization for symptomatic rapid atrial fibrillation/BRYANNA DC cardioversion with subsequent acceptable pacemaker interrogation, June/July 2009, as well as acceptable interrogation without reprogramming, July 2011.  f. Acceptable combination Doppler echocardiogram, December 2010, with residual class I symptoms.  g. Acceptable device interrogation following [facility] ED evaluation for chest pressure and shortness of breath, data deficit, May 2011, February 2013, February 2016, December 2016.  h. Abnormal pacemaker assessment with 8.4% mode switch with battery voltage of 1-1.25 years, June 2017.  i. Pacemaker Generator change Dr. Taylor 5/18/18  5. Morbid obesity, BMI 41.6.  6. Ichthyosis.  7. Chronic lower tract obstructive symptoms, probable BPH.  8. Dyslipidemia.  9. Nasal polyps with deviated nasal septum with apparent subsequent nasal septoplasty/polypectomy, data deficit, July 2006.  10. Abdominal pain with apparent small bowel obstruction with exploratory laparotomy - data deficit.  11. Appendectomy with subsequent delayed hospitalization for bleeding peptic ulcer disease/probable duodenal ulcer, November 2003.  12. Recurrent asymptomatic atrial fibrillation with rapid ventricular response, July 2007, with subsequent Tikosyn therapy and DDDR pacemaker implant with intermittent recurrent breakthrough arrhythmias, including remote DC cardioversion, May 2008.  13. Obstructive sleep apnea with treatment.      HPI:   The patient is a 73-year-old male who returns today for follow-up regarding  history of atrial fibrillation, Chronic DHF, HTN, Tachybradysyndrome, and St. Chidi pacemaker.   Since last visit with her office reports his blood pressure well-controlled, he states that he has not had any worsening fluid buildup.  In addition he feels that he's been staying in rhythm he does not feel any palpitations dizziness or near-syncope or significant events.  He did have a follow-up with his renal physician and he is due to for more lab work and a follow-up in a month.  He states overall he feels good.          ROS:  Review of Symptoms:  General: no recent weight loss/gain, weakness or fatigue  Skin: Ichthyosis  HEENT: no dizziness, lightheadedness, or vision changes  Respiratory: no cough or hemoptysis  Cardiovascular: no palpitations, and tachycardia  Gastrointestinal: no black/tarry stools or diarrhea  Urinary: no change in frequency or urgency  Peripheral Vascular: no claudication or leg cramps  Musculoskeletal: no muscle or joint pain/stiffness  Psychiatric: no depression or excessive stress  Neurological: no sensory or motor loss, no syncope  Hematologic: no anemia, easy bruising or bleeding  Endocrine: no thyroid problems, nor heat or cold intolerance    Cardiac PMH: (Old records have been reviewed and summarized below)      Past Medical History, Past Surgical History, Family history, Social History, and Medications were all reviewed with the patient today and updated as necessary.       Current Outpatient Prescriptions:   •  acetaminophen (TYLENOL) 500 MG tablet, Take 1,000 mg by mouth Every 6 (Six) Hours As Needed for Mild Pain ., Disp: , Rfl:   •  amiodarone (PACERONE) 200 MG tablet, Take 1 tablet by mouth 2 (Two) Times a Day. Take BID for one month then decrease to once daily., Disp: 60 tablet, Rfl: 6  •  amLODIPine (NORVASC) 5 MG tablet, TAKE ONE TABLET BY MOUTH DAILY, Disp: 90 tablet, Rfl: 1  •  aspirin 81 MG EC tablet, Take 81 mg by mouth Daily., Disp: , Rfl:   •  atorvastatin (LIPITOR) 40 MG  tablet, TAKE ONE TABLET BY MOUTH DAILY, Disp: 90 tablet, Rfl: 3  •  carvedilol (COREG) 25 MG tablet, TAKE ONE TABLET BY MOUTH TWICE A DAY WITH MEALS, Disp: 180 tablet, Rfl: 2  •  cholecalciferol (VITAMIN D3) 1000 UNITS tablet, Take 1,000 Units by mouth daily., Disp: , Rfl:   •  Ferrous Sulfate (IRON) 325 (65 FE) MG tablet, Take 65 mg by mouth Daily., Disp: , Rfl:   •  loperamide (IMODIUM) 2 MG capsule, Take 2 mg by mouth 4 (Four) Times a Day As Needed for Diarrhea., Disp: , Rfl:   •  PHARMACY TO DOSE WARFARIN, Continuous As Needed (patient's warfarin is being managed by the [facility] ([phone]))., Disp: , Rfl:   •  torsemide (DEMADEX) 20 MG tablet, Take 1 tablet by mouth 2 (Two) Times a Day., Disp: 180 tablet, Rfl: 3  •  vitamin B-12 (CYANOCOBALAMIN) 1000 MCG tablet, Take 1,000 mcg by mouth Daily., Disp: , Rfl:   •  warfarin (COUMADIN) 2.5 MG tablet, Take 1 tablet by mouth daily, as directed by the anticoagulation clinic., Disp: 90 tablet, Rfl: 1  •  warfarin (COUMADIN) 7.5 MG tablet, TAKE ONE-HALF TO ONE TABLET BY MOUTH DAILY AS DIECTED BY ANTICOAGULATION PHARMACIST, Disp: 30 tablet, Rfl: 0      No Known Allergies  Patient Active Problem List   Diagnosis   • Persistent atrial fibrillation (CMS/HCC)   • Coronary artery disease   • Dyslipidemia   • Hypertension   • Morbid obesity (CMS/HCC)   • Atrial flutter (CMS/HCC)   • Hypertensive heart disease without heart failure   • Severe obstructive sleep apnea   • Dependence on nocturnal oxygen therapy   • Other abnormal glucose    • Stage 3 chronic kidney disease (CMS/HCC)   • Pacemaker   • EWA (acute kidney injury) (CMS/HCC)   • Anemia   • Diarrhea   • Chronic diastolic congestive heart failure (CMS/HCC)     Past Medical History:   Diagnosis Date   • Abnormal ECG    • Arrhythmia    • Arthritis    • Atrial fibrillation (CMS/HCC)    • Cancer (CMS/HCC)     skin cancer removed from various locations    • Chronic diastolic congestive  heart failure (CMS/HCC) 9/14/2018   • Coronary artery disease    • Dry skin    • Edema    • Heart murmur    • Hematoma    • Hypertension    • Long term current use of anticoagulant    • ZULEIKA treated with BiPAP    • Sleep apnea     bipap- complaint with machine    • Stage 3 chronic kidney disease (CMS/HCC)    • Tinnitus    • Wears glasses     readers     Past Surgical History:   Procedure Laterality Date   • ABLATION OF DYSRHYTHMIC FOCUS     • APPENDECTOMY     • CARDIAC ELECTROPHYSIOLOGY PROCEDURE N/A 5/18/2018    Procedure: PPM generator change - dual       St Chidi/ please schedule in 8 weeks;  Surgeon: Johnathan Taylor MD;  Location: [facility] INVASIVE LOCATION;  Service: Cardiovascular   • CATARACT EXTRACTION      bilat wiht lens    • COLONOSCOPY     • ENDOSCOPY     • SKIN CANCER EXCISION      various locations    • TONSILLECTOMY     • WISDOM TOOTH EXTRACTION       Family History   Problem Relation Age of Onset   • Cancer Mother    • Heart attack Mother    • Hypertension Mother    • Arrhythmia Father    • Hypertension Father    • Heart attack Father      Social History   Substance Use Topics   • Smoking status: Never Smoker   • Smokeless tobacco: Never Used   • Alcohol use No         PHYSICAL EXAM:    There were no vitals taken for this visit.       Wt Readings from Last 5 Encounters:   10/01/18 131 kg (288 lb)   09/27/18 131 kg (288 lb)   09/23/18 133 kg (292 lb 9.6 oz)   09/14/18 132 kg (292 lb)   08/22/18 134 kg (296 lb)       BP Readings from Last 5 Encounters:   10/01/18 138/84   09/27/18 126/78   09/23/18 132/83   09/14/18 134/70   08/22/18 152/88       General appearance - Alert, well appearing, and in no distress   Mental status - Affect appropriate to mood.  Eyes - Sclerae anicteric,  ENMT - Hearing grossly normal bilaterally, Dental hygiene good.  Neck - Carotids upstroke normal bilaterally, no bruits, no JVD.  Resp - Clear to auscultation, no wheezes, rales or rhonchi, symmetric air entry.  Heart -  Normal rate, regular rhythm, normal S1, S2, no murmurs, rubs, clicks or gallops.  GI - Soft, nontender, nondistended, no masses or organomegaly.  Neurological - Grossly intact - normal speech, no focal findings  Musculoskeletal - No joint tenderness, deformity or swelling, no muscular tenderness noted.  Extremities - Peripheral pulses normal, One plus edema,   Skin - Normal coloration and turgor.scaling rash lower extremity.  Psych -  oriented to person, place, and time.    Medical problems and test results were reviewed with the patient today.     Recent Results (from the past 672 hour(s))   POC Protime / INR    Collection Time: 10/12/18  9:56 AM   Result Value Ref Range    Protime 19.7 (H) 10.0 - 13.8 seconds    INR 1.6 (H) 0.91 - 1.09   POC Protime / INR    Collection Time: 10/17/18 10:29 AM   Result Value Ref Range    Protime 15.4 (H) 10.0 - 13.8 seconds    INR 1.3 (H) 0.91 - 1.09   POC Protime / INR    Collection Time: 10/22/18  9:18 AM   Result Value Ref Range    Protime 18.9 (H) 10.0 - 13.8 seconds    INR 1.6 (H) 0.91 - 1.09   POC Protime / INR    Collection Time: 10/29/18 10:22 AM   Result Value Ref Range    Protime 33.1 (H) 10.0 - 13.8 seconds    INR 2.8 (H) 0.91 - 1.09         EKG: (EKG has been independently visualized by me and summarized below)    ECG 12 Lead  Date/Time: 11/5/2018 11:10 AM  Performed by: SUDHAKAR BONE  Authorized by: SUDHAKAR BONE   Comparison: compared with previous ECG from 9/18/2018  Similar to previous ECG  Rhythm: atrial fibrillation  Rate: normal  Conduction: conduction normal  ST Segments: ST segments normal  T Waves: T waves normal  QRS axis: normal  Other: no other findings  Clinical impression: dysrhythmia - atrial            Device Interrogation:  St. MYRON 70% paced.  Afib 96%/     ASSESSMENT   1. PAF:   Persistent Afib, currently on amiodarone.    2. EWA/CKD:   Stable  3. HTN: Controlled  4. Chronic DHF:  Diuretics on hold, currently stable.   5. Tachybrady  syndrome: Resolved  6. Anticoagulation: Coumadin, INR 2.9 10/29/18  7.  St. Chidi pacemaker: Normal function      PLAN  · Continue current medical therapy   · ECV  when INR >2 for one month  · CMP, TSH and free T4.     11/5/2018  10:22 AM    Will Jeronimo SUMMERS

## 2018-11-05 ENCOUNTER — OFFICE VISIT (OUTPATIENT)
Dept: CARDIOLOGY | Facility: CLINIC | Age: 73
End: 2018-11-05

## 2018-11-05 ENCOUNTER — ANTICOAGULATION VISIT (OUTPATIENT)
Dept: PHARMACY | Facility: HOSPITAL | Age: 73
End: 2018-11-05

## 2018-11-05 VITALS
WEIGHT: 285 LBS | HEIGHT: 70 IN | DIASTOLIC BLOOD PRESSURE: 78 MMHG | HEART RATE: 76 BPM | BODY MASS INDEX: 40.8 KG/M2 | SYSTOLIC BLOOD PRESSURE: 132 MMHG

## 2018-11-05 DIAGNOSIS — I48.19 PERSISTENT ATRIAL FIBRILLATION (HCC): Primary | Chronic | ICD-10-CM

## 2018-11-05 DIAGNOSIS — I48.19 PERSISTENT ATRIAL FIBRILLATION (HCC): ICD-10-CM

## 2018-11-05 DIAGNOSIS — I10 ESSENTIAL HYPERTENSION: Chronic | ICD-10-CM

## 2018-11-05 LAB
INR PPP: 2 (ref 0.91–1.09)
PROTHROMBIN TIME: 24.1 SECONDS (ref 10–13.8)

## 2018-11-05 PROCEDURE — 93000 ELECTROCARDIOGRAM COMPLETE: CPT | Performed by: PHYSICIAN ASSISTANT

## 2018-11-05 PROCEDURE — G0463 HOSPITAL OUTPT CLINIC VISIT: HCPCS

## 2018-11-05 PROCEDURE — 99214 OFFICE O/P EST MOD 30 MIN: CPT | Performed by: PHYSICIAN ASSISTANT

## 2018-11-05 PROCEDURE — 36416 COLLJ CAPILLARY BLOOD SPEC: CPT

## 2018-11-05 PROCEDURE — 85610 PROTHROMBIN TIME: CPT

## 2018-11-05 NOTE — PROGRESS NOTES
Anticoagulation Clinic Progress Note  Indication: afib  Referring Provider: Mehta (Tomassoni)  Initial Warfarin Start Date: 2007  Goal INR: 2-3  Current Drug Interactions: aspirin, amiodarone (start 10/1/18)  Diet: not eating at this point (10/29)  [id number]= 5 (HTN, Age, CHF, CAD)  Other: Anemic, CKD (stage 3)    Anticoagulation Clinic INR History:  Date 11/14 12/6 1/25 2/22 3/22 4/11 5/9 6/8 6/29 8/3 8/31 9/14 9/28   Total Weekly Dose 37.5 mg 37.5 mg 37.5 mg 37.5 mg 37.5 mg 37.5 mg 37.5 mg 37.5mg 37.5 mg 37.5 mg 26.25mg 37.5 mg 37.5 mg   INR 2.4 2.1 2.0 3.1 2.4 2.6 2.7 2.2 2.5 3.0 1.7 3.2 2.3     Date 10/23 11/20 12/8 1/5 2/2 3/2 3/16 3/30 4/17 5/17 6/14 7/19 8/1 8/22 9/12   Total Weekly Dose 37.5mg 37.5 mg 37.5 mg 37.5 mg 37.5mg 37.5 mg 37.5 mg 37.5 mg 37.5mg 37.5 mg 37.5 mg 37.5mg 37.5 mg 37.5 mg 37.5 mg    INR 2.5 1.8 2.5 2.3 2.1 1.7 3.2 2.1 2.3 2.06 2.8 3.5 2.7 2.7 3.0   Notes      (boost), amox amox   PAT     diarrhea     Date 10/1 10/8 10/12 10/17 10/22 10/29 11/5           Total Weekly Dose 26.25 mg 23.75mg 22.5  mg 23.75 mg 27.5 mg 28.75 mg 27.5 mg 28.75 mg          INR 3.0 2.1 1.6 1.3 1.6 2.8 2.0           Notes post- disch amio Wk 2 amio Wk 3 amio Wk 4 amio               Clinic Interview:  Verbal Release Authorization signed on 8/22/2018 -- may speak with Raeann Montemayor (Wife). [phone] (Home) *Preferred*  Tablet Strength: pt has 7.5mg and 2.5mg tablets  Current Maintenance Dose: 3.75mg daily except 7.5mg MWF    Patient Findings:  Negatives:   Signs/symptoms of thrombosis, Signs/symptoms of bleeding, Laboratory test error suspected, Change in health, Change in alcohol use, Change in activity, Upcoming invasive procedure, Emergency department visit, Upcoming dental procedure, Missed doses, Extra doses, Change in medications, Change in diet/appetite, Hospital admission, Bruising, Other complaints   Comments:   Mr. Montemayor has just seen PUJA Garcia -- he is still in afib. For now, he will  continue amio with the hopes that he will ultimately return to NSR. If not, he will likely undergo ECV in the near future. He is now taking amiodarone ONCE daily but notes no additional changes at this time. He denies signs of bleeding, and he denies signs of thrombosis / CVA.     Plan:   1. INR is therapeutic today at the lower end of goal. To avoid another subtherapeutic INR, recommend that Mr. Montemayor increase his dose back to 28.75mg weekly and take warfarin 3.75mg daily except 5mg MonFri this week.  2. Repeat INR next Monday with outside labs (for cardiology + nephrology -- he plans to go to the [facility]).   3. Verbal and written information provided today in clinic. Mr. Montemayor expresses understanding with teach back and has no further questions at this time.    Shanell Arango [facility]  11/5/2018  10:59 AM

## 2018-11-08 DIAGNOSIS — I48.91 ATRIAL FIBRILLATION, UNSPECIFIED TYPE (HCC): ICD-10-CM

## 2018-11-08 RX ORDER — WARFARIN SODIUM 7.5 MG/1
TABLET ORAL
Qty: 30 TABLET | Refills: 0 | Status: ON HOLD | OUTPATIENT
Start: 2018-11-08 | End: 2018-11-29

## 2018-11-12 LAB
INR PPP: 2.5 (ref 0.8–1.2)
PROTHROMBIN TIME: 25.2 SEC (ref 9.1–12)

## 2018-11-13 ENCOUNTER — ANTICOAGULATION VISIT (OUTPATIENT)
Dept: PHARMACY | Facility: HOSPITAL | Age: 73
End: 2018-11-13

## 2018-11-13 DIAGNOSIS — I48.19 PERSISTENT ATRIAL FIBRILLATION (HCC): ICD-10-CM

## 2018-11-13 LAB
ALBUMIN SERPL-MCNC: 3.8 G/DL (ref 3.5–4.8)
ALBUMIN/GLOB SERPL: 1.5 {RATIO} (ref 1.2–2.2)
ALP SERPL-CCNC: 91 IU/L (ref 39–117)
ALT SERPL-CCNC: 9 IU/L (ref 0–44)
AMBIG ABBREV CMP14 DEFAULT: NORMAL
AST SERPL-CCNC: 12 IU/L (ref 0–40)
BILIRUB SERPL-MCNC: 0.3 MG/DL (ref 0–1.2)
BUN SERPL-MCNC: 33 MG/DL (ref 8–27)
BUN/CREAT SERPL: 12 (ref 10–24)
CALCIUM SERPL-MCNC: 8.7 MG/DL (ref 8.6–10.2)
CHLORIDE SERPL-SCNC: 105 MMOL/L (ref 96–106)
CO2 SERPL-SCNC: 31 MMOL/L (ref 20–29)
CREAT SERPL-MCNC: 2.65 MG/DL (ref 0.76–1.27)
GLOBULIN SER CALC-MCNC: 2.5 G/DL (ref 1.5–4.5)
GLUCOSE SERPL-MCNC: 141 MG/DL (ref 65–99)
POTASSIUM SERPL-SCNC: 4.9 MMOL/L (ref 3.5–5.2)
PROT SERPL-MCNC: 6.3 G/DL (ref 6–8.5)
SODIUM SERPL-SCNC: 144 MMOL/L (ref 134–144)
T4 FREE SERPL-MCNC: 1.01 NG/DL (ref 0.82–1.77)
TSH SERPL DL<=0.005 MIU/L-ACNC: 11.02 UIU/ML (ref 0.45–4.5)

## 2018-11-13 NOTE — PROGRESS NOTES
Anticoagulation Clinic Progress Note  Indication: afib  Referring Provider: Mehta (Tomassoni)  Initial Warfarin Start Date: 2007  Goal INR: 2-3  Current Drug Interactions: aspirin, amiodarone (start 10/1/18)  Diet: not eating at this point (10/29)  [id number]= 5 (HTN, Age, CHF, CAD)  Other: Anemic, CKD (stage 3)    Anticoagulation Clinic INR History:  Date 11/14 12/6 1/25 2/22 3/22 4/11 5/9 6/8 6/29 8/3 8/31 9/14 9/28   Total Weekly Dose 37.5 mg 37.5 mg 37.5 mg 37.5 mg 37.5 mg 37.5 mg 37.5 mg 37.5mg 37.5 mg 37.5 mg 26.25mg 37.5 mg 37.5 mg   INR 2.4 2.1 2.0 3.1 2.4 2.6 2.7 2.2 2.5 3.0 1.7 3.2 2.3     Date 10/23 11/20 12/8 1/5 2/2 3/2 3/16 3/30 4/17 5/17 6/14 7/19 8/1 8/22 9/12   Total Weekly Dose 37.5mg 37.5 mg 37.5 mg 37.5 mg 37.5mg 37.5 mg 37.5 mg 37.5 mg 37.5mg 37.5 mg 37.5 mg 37.5mg 37.5 mg 37.5 mg 37.5 mg    INR 2.5 1.8 2.5 2.3 2.1 1.7 3.2 2.1 2.3 2.06 2.8 3.5 2.7 2.7 3.0   Notes      (boost), amox amox   PAT     diarrhea     Date 10/1 10/8 10/12 10/17 10/22 10/29 11/5 11/12          Total Weekly Dose 26.25 mg 23.75mg 22.5  mg 23.75 mg 27.5 mg 28.75 mg 27.5 mg 28.75 mg 28.75 mg         INR 3.0 2.1 1.6 1.3 1.6 2.8 2.0 2.5          Notes post- disch amio start 10/1                  Clinic Interview:  Verbal Release Authorization signed on 8/22/2018 -- may speak with Raeann Montemayor (Wife). [phone] (Home) *Preferred*  Tablet Strength: pt has 7.5mg and 2.5mg tablets    Patient Findings:  Negatives:  Signs/symptoms of thrombosis, Signs/symptoms of bleeding, Laboratory test error suspected, Change in health, Change in alcohol use, Change in activity, Upcoming invasive procedure, Emergency department visit, Upcoming dental procedure, Missed doses, Extra doses, Change in medications, Change in diet/appetite, Hospital admission, Bruising, Other complaints     Plan:   1. INR was therapeutic on 11/12 at 2.5. After consulting Shanell Arango, PharmD, instructed Mr. Montemayor to continue warfarin 3.75mg daily except 5mg  Neftali.  2. Repeat INR next Monday, 11/19, in clinic.  3. Verbal and written information provided today in clinic. Mr. Montemayor expresses understanding with teach back and has no further questions at this time.    Tara Hogan, Pharmacy Technician  11/13/2018  8:11 AM     I, Shanell Arango, [facility], have reviewed the note in full and agree with the assessment and plan.  11/13/18  10:02 AM

## 2018-11-14 ENCOUNTER — PREP FOR SURGERY (OUTPATIENT)
Dept: OTHER | Facility: HOSPITAL | Age: 73
End: 2018-11-14

## 2018-11-14 DIAGNOSIS — I48.19 PERSISTENT ATRIAL FIBRILLATION (HCC): Primary | ICD-10-CM

## 2018-11-14 RX ORDER — SODIUM CHLORIDE 0.9 % (FLUSH) 0.9 %
1-10 SYRINGE (ML) INJECTION AS NEEDED
Status: CANCELLED | OUTPATIENT
Start: 2018-11-14

## 2018-11-14 RX ORDER — ACETAMINOPHEN 325 MG/1
650 TABLET ORAL EVERY 4 HOURS PRN
Status: CANCELLED | OUTPATIENT
Start: 2018-11-14

## 2018-11-14 RX ORDER — PROMETHAZINE HYDROCHLORIDE 25 MG/ML
12.5 INJECTION, SOLUTION INTRAMUSCULAR; INTRAVENOUS EVERY 4 HOURS PRN
Status: CANCELLED | OUTPATIENT
Start: 2018-11-14

## 2018-11-14 RX ORDER — SODIUM CHLORIDE 0.9 % (FLUSH) 0.9 %
3 SYRINGE (ML) INJECTION EVERY 12 HOURS SCHEDULED
Status: CANCELLED | OUTPATIENT
Start: 2018-11-14

## 2018-11-14 RX ORDER — NITROGLYCERIN 0.4 MG/1
0.4 TABLET SUBLINGUAL
Status: CANCELLED | OUTPATIENT
Start: 2018-11-14

## 2018-11-19 ENCOUNTER — ANTICOAGULATION VISIT (OUTPATIENT)
Dept: PHARMACY | Facility: HOSPITAL | Age: 73
End: 2018-11-19

## 2018-11-19 DIAGNOSIS — I48.19 PERSISTENT ATRIAL FIBRILLATION (HCC): ICD-10-CM

## 2018-11-19 LAB
INR PPP: 2.4 (ref 0.91–1.09)
PROTHROMBIN TIME: 28.5 SECONDS (ref 10–13.8)

## 2018-11-19 PROCEDURE — G0463 HOSPITAL OUTPT CLINIC VISIT: HCPCS

## 2018-11-19 PROCEDURE — 36416 COLLJ CAPILLARY BLOOD SPEC: CPT

## 2018-11-19 PROCEDURE — 85610 PROTHROMBIN TIME: CPT

## 2018-11-19 NOTE — PROGRESS NOTES
Anticoagulation Clinic Progress Note  Indication: afib  Referring Provider: Mehta (Tomassoni)  Initial Warfarin Start Date: 2007  Goal INR: 2-3  Current Drug Interactions: aspirin, amiodarone (start 10/1/18)  Diet: not eating at this point (10/29)  [id number]: 5 (HTN, Age, CHF, CAD)  Other: anemic, CKD (stage 3)    Anticoagulation Clinic INR History:  Date 11/14 12/6 1/25 2/22 3/22 4/11 5/9 6/8 6/29 8/3 8/31 9/14 9/28   Total Weekly Dose 37.5 mg 37.5 mg 37.5 mg 37.5 mg 37.5 mg 37.5 mg 37.5 mg 37.5mg 37.5 mg 37.5 mg 26.25mg 37.5 mg 37.5 mg   INR 2.4 2.1 2.0 3.1 2.4 2.6 2.7 2.2 2.5 3.0 1.7 3.2 2.3     Date 10/23 11/20 12/8 1/5 2/2 3/2 3/16 3/30 4/17 5/17 6/14 7/19 8/1 8/22 9/12   Total Weekly Dose 37.5mg 37.5 mg 37.5 mg 37.5 mg 37.5mg 37.5 mg 37.5 mg 37.5 mg 37.5mg 37.5 mg 37.5 mg 37.5mg 37.5 mg 37.5 mg 37.5 mg    INR 2.5 1.8 2.5 2.3 2.1 1.7 3.2 2.1 2.3 2.06 2.8 3.5 2.7 2.7 3.0   Notes      (boost), amox amox   PAT     diarrhea     Date 10/1 10/8 10/12 10/17 10/22 10/29 11/5 11/12 11/19         Total Weekly Dose 26.25 mg 23.75mg 22.5  mg 23.75 mg 27.5 mg 28.75 mg 27.5 mg 28.75 mg 28.75 mg         INR 3.0 2.1 1.6 1.3 1.6 2.8 2.0 2.5 2.4         Notes post- disch amio start 10/1                  Clinic Interview:  Verbal Release Authorization signed on 8/22/2018 -- may speak with Raeann Montemayor (Wife). [phone] (Home) *Preferred*  Tablet Strength: pt has 7.5mg and 2.5mg tablets    Patient Findings:  Negatives:  Signs/symptoms of thrombosis, Signs/symptoms of bleeding, Laboratory test error suspected, Change in health, Change in alcohol use, Change in activity, Upcoming invasive procedure, Emergency department visit, Upcoming dental procedure, Missed doses, Extra doses, Change in medications, Change in diet/appetite, Hospital admission, Bruising, Other complaints     Plan:   1. INR is therapeutic, so instructed Mr. Montemayor to continue warfarin 3.75mg daily except 5mg MonFri.  2. RTC in two weeks.  3. Verbal and  written information provided today in clinic. Mr. Montemayor expresses understanding with teach back and has no further questions at this time.    Shanell Arango [facility]  11/19/2018  9:22 AM

## 2018-11-29 ENCOUNTER — ANTICOAGULATION VISIT (OUTPATIENT)
Dept: PHARMACY | Facility: HOSPITAL | Age: 73
End: 2018-11-29

## 2018-11-29 ENCOUNTER — HOSPITAL ENCOUNTER (OUTPATIENT)
Dept: CARDIOLOGY | Facility: HOSPITAL | Age: 73
Discharge: HOME | End: 2018-11-29
Attending: INTERNAL MEDICINE | Admitting: PHYSICIAN ASSISTANT

## 2018-11-29 VITALS
BODY MASS INDEX: 41.44 KG/M2 | DIASTOLIC BLOOD PRESSURE: 82 MMHG | SYSTOLIC BLOOD PRESSURE: 135 MMHG | WEIGHT: 289.46 LBS | HEIGHT: 70 IN | HEART RATE: 72 BPM | TEMPERATURE: 97.6 F | RESPIRATION RATE: 18 BRPM | OXYGEN SATURATION: 98 %

## 2018-11-29 DIAGNOSIS — I10 ESSENTIAL HYPERTENSION: ICD-10-CM

## 2018-11-29 DIAGNOSIS — I48.19 PERSISTENT ATRIAL FIBRILLATION (HCC): ICD-10-CM

## 2018-11-29 LAB
INR PPP: 2.76 (ref 0.91–1.09)
PROTHROMBIN TIME: 29 SECONDS (ref 9.6–11.5)

## 2018-11-29 PROCEDURE — 92960 CARDIOVERSION ELECTRIC EXT: CPT | Performed by: INTERNAL MEDICINE

## 2018-11-29 PROCEDURE — 93010 ELECTROCARDIOGRAM REPORT: CPT | Performed by: INTERNAL MEDICINE

## 2018-11-29 PROCEDURE — 93005 ELECTROCARDIOGRAM TRACING: CPT | Performed by: INTERNAL MEDICINE

## 2018-11-29 PROCEDURE — 92960 CARDIOVERSION ELECTRIC EXT: CPT

## 2018-11-29 PROCEDURE — 25010000002 MIDAZOLAM PER 1 MG: Performed by: INTERNAL MEDICINE

## 2018-11-29 PROCEDURE — 85610 PROTHROMBIN TIME: CPT | Performed by: NURSE PRACTITIONER

## 2018-11-29 RX ORDER — FLUMAZENIL 0.1 MG/ML
INJECTION INTRAVENOUS
Status: DISCONTINUED
Start: 2018-11-29 | End: 2018-11-29 | Stop reason: WASHOUT

## 2018-11-29 RX ORDER — PROMETHAZINE HYDROCHLORIDE 25 MG/ML
12.5 INJECTION, SOLUTION INTRAMUSCULAR; INTRAVENOUS EVERY 4 HOURS PRN
Status: DISCONTINUED | OUTPATIENT
Start: 2018-11-29 | End: 2018-11-29 | Stop reason: HOSPADM

## 2018-11-29 RX ORDER — SODIUM CHLORIDE 0.9 % (FLUSH) 0.9 %
3 SYRINGE (ML) INJECTION EVERY 12 HOURS SCHEDULED
Status: DISCONTINUED | OUTPATIENT
Start: 2018-11-29 | End: 2018-11-29 | Stop reason: HOSPADM

## 2018-11-29 RX ORDER — ACETAMINOPHEN 325 MG/1
650 TABLET ORAL EVERY 4 HOURS PRN
Status: DISCONTINUED | OUTPATIENT
Start: 2018-11-29 | End: 2018-11-29 | Stop reason: HOSPADM

## 2018-11-29 RX ORDER — MIDAZOLAM HYDROCHLORIDE 1 MG/ML
INJECTION INTRAMUSCULAR; INTRAVENOUS
Status: COMPLETED | OUTPATIENT
Start: 2018-11-29 | End: 2018-11-29

## 2018-11-29 RX ORDER — FENTANYL CITRATE 50 UG/ML
INJECTION, SOLUTION INTRAMUSCULAR; INTRAVENOUS
Status: DISCONTINUED
Start: 2018-11-29 | End: 2018-11-29 | Stop reason: WASHOUT

## 2018-11-29 RX ORDER — NITROGLYCERIN 0.4 MG/1
0.4 TABLET SUBLINGUAL
Status: DISCONTINUED | OUTPATIENT
Start: 2018-11-29 | End: 2018-11-29 | Stop reason: HOSPADM

## 2018-11-29 RX ORDER — NALOXONE HYDROCHLORIDE 0.4 MG/ML
INJECTION, SOLUTION INTRAMUSCULAR; INTRAVENOUS; SUBCUTANEOUS
Status: DISCONTINUED
Start: 2018-11-29 | End: 2018-11-29 | Stop reason: WASHOUT

## 2018-11-29 RX ORDER — MIDAZOLAM HYDROCHLORIDE 1 MG/ML
INJECTION INTRAMUSCULAR; INTRAVENOUS
Status: DISCONTINUED
Start: 2018-11-29 | End: 2018-11-29 | Stop reason: HOSPADM

## 2018-11-29 RX ORDER — SODIUM CHLORIDE 0.9 % (FLUSH) 0.9 %
1-10 SYRINGE (ML) INJECTION AS NEEDED
Status: DISCONTINUED | OUTPATIENT
Start: 2018-11-29 | End: 2018-11-29 | Stop reason: HOSPADM

## 2018-11-29 RX ADMIN — METHOHEXITAL SODIUM 20 MG: 500 INJECTION, POWDER, LYOPHILIZED, FOR SOLUTION INTRAMUSCULAR; INTRAVENOUS; RECTAL at 10:36

## 2018-11-29 RX ADMIN — METHOHEXITAL SODIUM 20 MG: 500 INJECTION, POWDER, LYOPHILIZED, FOR SOLUTION INTRAMUSCULAR; INTRAVENOUS; RECTAL at 10:32

## 2018-11-29 RX ADMIN — MIDAZOLAM HYDROCHLORIDE 1 MG: 1 INJECTION, SOLUTION INTRAMUSCULAR; INTRAVENOUS at 10:30

## 2018-11-29 NOTE — PROGRESS NOTES
Anticoagulation Clinic Progress Note  Indication: afib  Referring Provider: Tyson Roberts)  Initial Warfarin Start Date: 2007  Goal INR: 2-3  Current Drug Interactions: aspirin, amiodarone (start 10/1/18)  Diet: not eating at this point (10/29)  [id number]: 5 (HTN, Age, CHF, CAD)  Other: anemic, CKD (stage 3)    Anticoagulation Clinic INR History:  Date 11/14 12/6 1/25 2/22 3/22 4/11 5/9 6/8 6/29 8/3 8/31 9/14 9/28   Total Weekly Dose 37.5 mg 37.5 mg 37.5 mg 37.5 mg 37.5 mg 37.5 mg 37.5 mg 37.5mg 37.5 mg 37.5 mg 26.25mg 37.5 mg 37.5 mg   INR 2.4 2.1 2.0 3.1 2.4 2.6 2.7 2.2 2.5 3.0 1.7 3.2 2.3     Date 10/23 11/20 12/8 1/5 2/2 3/2 3/16 3/30 4/17 5/17 6/14 7/19 8/1 8/22 9/12   Total Weekly Dose 37.5mg 37.5 mg 37.5 mg 37.5 mg 37.5mg 37.5 mg 37.5 mg 37.5 mg 37.5mg 37.5 mg 37.5 mg 37.5mg 37.5 mg 37.5 mg 37.5 mg    INR 2.5 1.8 2.5 2.3 2.1 1.7 3.2 2.1 2.3 2.06 2.8 3.5 2.7 2.7 3.0   Notes      (boost), amox amox   PAT     diarrhea     Date 10/1 10/8 10/12 10/17 10/22 10/29 11/5 11/12 11/19 11/29        Total Weekly Dose 26.25 mg 23.75mg 22.5  mg 23.75 mg 27.5 mg 28.75 mg 27.5 mg 28.75 mg 28.75 mg 28.75 mg        INR 3.0 2.1 1.6 1.3 1.6 2.8 2.0 2.5 2.4 2.76        Notes post- disch amio start 10/1        PAT          Clinic Interview:  Verbal Release Authorization signed on 8/22/2018 -- may speak with Raeann Montemayor (Wife). [phone] (Home) *Preferred*  Tablet Strength: pt has 7.5mg and 2.5mg tablets    Patient Findings:  Negatives:  Signs/symptoms of thrombosis, Signs/symptoms of bleeding, Laboratory test error suspected, Change in health, Change in alcohol use, Change in activity, Upcoming invasive procedure, Emergency department visit, Upcoming dental procedure, Missed doses, Extra doses, Change in medications, Change in diet/appetite, Hospital admission, Bruising, Other complaints   Comments:  Mr. Montemayor had a cardioversion today -- all went well.      Plan:   1. INR is therapeutic again today, so instructed  Mr. Montemayor to continue warfarin 3.75mg daily except 5mg MonFri.  2. RTC in two weeks before iron infusion.   3. Verbal information provided over the phone. Mr. Montemayor expresses understanding with teach back and has no further questions at this time.    Shanell Arango [facility]  11/29/2018  1:55 PM

## 2018-12-03 ENCOUNTER — APPOINTMENT (OUTPATIENT)
Dept: PHARMACY | Facility: HOSPITAL | Age: 73
End: 2018-12-03

## 2018-12-10 ENCOUNTER — INFUSION (OUTPATIENT)
Dept: ONCOLOGY | Facility: HOSPITAL | Age: 73
End: 2018-12-10

## 2018-12-10 VITALS
BODY MASS INDEX: 39.8 KG/M2 | HEART RATE: 77 BPM | TEMPERATURE: 97.4 F | WEIGHT: 278 LBS | HEIGHT: 70 IN | DIASTOLIC BLOOD PRESSURE: 82 MMHG | RESPIRATION RATE: 16 BRPM | SYSTOLIC BLOOD PRESSURE: 157 MMHG

## 2018-12-10 DIAGNOSIS — D50.9 IRON DEFICIENCY ANEMIA, UNSPECIFIED IRON DEFICIENCY ANEMIA TYPE: Primary | ICD-10-CM

## 2018-12-10 DIAGNOSIS — N18.30 STAGE 3 CHRONIC KIDNEY DISEASE (HCC): ICD-10-CM

## 2018-12-10 PROCEDURE — 25010000002 FERUMOXYTOL 510 MG/17ML SOLUTION 510 MG VIAL: Performed by: INTERNAL MEDICINE

## 2018-12-10 PROCEDURE — 96374 THER/PROPH/DIAG INJ IV PUSH: CPT

## 2018-12-10 PROCEDURE — 96375 TX/PRO/DX INJ NEW DRUG ADDON: CPT

## 2018-12-10 RX ADMIN — FERUMOXYTOL 510 MG: 510 INJECTION INTRAVENOUS at 14:36

## 2018-12-13 ENCOUNTER — ANTICOAGULATION VISIT (OUTPATIENT)
Dept: PHARMACY | Facility: HOSPITAL | Age: 73
End: 2018-12-13

## 2018-12-13 ENCOUNTER — HOSPITAL ENCOUNTER (OUTPATIENT)
Dept: ONCOLOGY | Facility: HOSPITAL | Age: 73
Setting detail: INFUSION SERIES
Discharge: HOME | End: 2018-12-13

## 2018-12-13 VITALS
HEART RATE: 76 BPM | DIASTOLIC BLOOD PRESSURE: 69 MMHG | WEIGHT: 284 LBS | TEMPERATURE: 98 F | BODY MASS INDEX: 40.66 KG/M2 | RESPIRATION RATE: 16 BRPM | SYSTOLIC BLOOD PRESSURE: 129 MMHG | HEIGHT: 70 IN

## 2018-12-13 DIAGNOSIS — N18.30 STAGE 3 CHRONIC KIDNEY DISEASE (HCC): ICD-10-CM

## 2018-12-13 DIAGNOSIS — I48.19 PERSISTENT ATRIAL FIBRILLATION (HCC): ICD-10-CM

## 2018-12-13 DIAGNOSIS — D50.9 IRON DEFICIENCY ANEMIA, UNSPECIFIED IRON DEFICIENCY ANEMIA TYPE: Primary | ICD-10-CM

## 2018-12-13 LAB
INR PPP: 2.8 (ref 0.91–1.09)
PROTHROMBIN TIME: 33.5 SECONDS (ref 10–13.8)

## 2018-12-13 PROCEDURE — 96365 THER/PROPH/DIAG IV INF INIT: CPT

## 2018-12-13 PROCEDURE — 85610 PROTHROMBIN TIME: CPT

## 2018-12-13 PROCEDURE — 96374 THER/PROPH/DIAG INJ IV PUSH: CPT

## 2018-12-13 PROCEDURE — G0463 HOSPITAL OUTPT CLINIC VISIT: HCPCS

## 2018-12-13 PROCEDURE — 25010000002 FERUMOXYTOL 510 MG/17ML SOLUTION 510 MG VIAL: Performed by: INTERNAL MEDICINE

## 2018-12-13 PROCEDURE — 36416 COLLJ CAPILLARY BLOOD SPEC: CPT

## 2018-12-13 RX ADMIN — FERUMOXYTOL 510 MG: 510 INJECTION INTRAVENOUS at 14:56

## 2018-12-13 NOTE — PROGRESS NOTES
Anticoagulation Clinic Progress Note  Indication: afib  Referring Provider: Tyson Roberts)  Initial Warfarin Start Date: 2007  Goal INR: 2-3  Current Drug Interactions: aspirin, amiodarone (start 10/1/18), torsemide  Diet: not eating at this point (10/29)  [id number]: 5 (HTN, Age, CHF, CAD)  Other: anemic, CKD (stage 3)    Anticoagulation Clinic INR History:  Date 11/14 12/6 1/25 2/22 3/22 4/11 5/9 6/8 6/29 8/3 8/31 9/14 9/28   Total Weekly Dose 37.5 mg 37.5 mg 37.5 mg 37.5 mg 37.5 mg 37.5 mg 37.5 mg 37.5mg 37.5 mg 37.5 mg 26.25mg 37.5 mg 37.5 mg   INR 2.4 2.1 2.0 3.1 2.4 2.6 2.7 2.2 2.5 3.0 1.7 3.2 2.3     Date 10/23 11/20 12/8 1/5 2/2 3/2 3/16 3/30 4/17 5/17 6/14 7/19 8/1 8/22 9/12   Total Weekly Dose 37.5mg 37.5 mg 37.5 mg 37.5 mg 37.5mg 37.5 mg 37.5 mg 37.5 mg 37.5mg 37.5 mg 37.5 mg 37.5mg 37.5 mg 37.5 mg 37.5 mg    INR 2.5 1.8 2.5 2.3 2.1 1.7 3.2 2.1 2.3 2.06 2.8 3.5 2.7 2.7 3.0   Notes      (boost), amox amox   PAT     diarrhea     Date 10/1 10/8 10/12 10/17 10/22 10/29 11/5 11/12 11/19 11/29 12/13       Total Weekly Dose 26.25 mg 23.75mg 22.5  mg 23.75 mg 27.5 mg 28.75 mg 27.5 mg 28.75 mg 28.75 mg 28.75 mg 28.75       INR 3.0 2.1 1.6 1.3 1.6 2.8 2.0 2.5 2.4 2.76 2.8       Notes post- disch amio start 10/1        [facility]          Clinic Interview:  Verbal Release Authorization signed on 8/22/2018 -- may speak with Raeann Montemayor (Wife). [phone] (Home) *Preferred*  Tablet Strength: pt has 7.5mg and 2.5mg tablets    Patient Findings  Positives:  Change in medications   Negatives:  Signs/symptoms of thrombosis, Signs/symptoms of bleeding, Laboratory test error suspected, Change in health, Change in alcohol use, Change in activity, Upcoming invasive procedure, Emergency department visit, Upcoming dental procedure, Missed doses, Extra doses, Change in diet/appetite, Hospital admission, Bruising, Other complaints   Comments:  He is seeing a nephrologist to work on improving kidney function.  He stated that he  has been receiving an iron infusion.  He had one Monday and will receive another one today right after this appointment.   He stated he continues to feel better after ECV  Otherwise, denies changes.       Plan:   1. INR is therapeutic again today, so instructed Mr. Montemayor to continue warfarin 3.75mg daily except 5mg MonFri.  2. RTC in three weeks on 1/3/2019   3. Verbal and written information provided. Mr. Montemayor expresses understanding with teach back and has no further questions at this time.    Charlotte Michael, PharmD  12/13/2018  2:06 PM

## 2018-12-27 ENCOUNTER — OFFICE VISIT (OUTPATIENT)
Dept: FAMILY MEDICINE CLINIC | Facility: CLINIC | Age: 73
End: 2018-12-27

## 2018-12-27 VITALS
SYSTOLIC BLOOD PRESSURE: 140 MMHG | OXYGEN SATURATION: 95 % | TEMPERATURE: 97.5 F | DIASTOLIC BLOOD PRESSURE: 80 MMHG | HEART RATE: 75 BPM | RESPIRATION RATE: 16 BRPM | WEIGHT: 284 LBS | BODY MASS INDEX: 40.75 KG/M2

## 2018-12-27 DIAGNOSIS — I10 ESSENTIAL HYPERTENSION: Primary | Chronic | ICD-10-CM

## 2018-12-27 DIAGNOSIS — N18.30 STAGE 3 CHRONIC KIDNEY DISEASE (HCC): Chronic | ICD-10-CM

## 2018-12-27 PROCEDURE — 99213 OFFICE O/P EST LOW 20 MIN: CPT | Performed by: FAMILY MEDICINE

## 2018-12-27 NOTE — PROGRESS NOTES
Subjective   Marco Antonio Montemayor is a 73 y.o. male.     History of Present Illness   Sept Hospital stay with diarrhea and vomiting.  Treated for atrial fibrillation, renal insufiency with anemia.  Given iron infusion. Now followed by nephrology.   Cardiology treating heart with cardioversion and now taking amniderone for cardiac regulator.  No more swelling of legs.  Appetite good, supposed to be on salt restricted diet.  On anticoagulant (coumadin).  Taking iron, Vit  b 12 and D.  The following portions of the patient's history were reviewed and updated as appropriate: allergies, current medications, past family history, past medical history, past social history, past surgical history and problem list.    Review of Systems   Respiratory: Negative.    Cardiovascular: Negative for chest pain and leg swelling.   Gastrointestinal: Negative for abdominal distention.   Neurological: Negative for dizziness.       Objective   Physical Exam   Constitutional: He appears well-developed and well-nourished.   HENT:   Mouth/Throat: Oropharynx is clear and moist.   Neck: No JVD present. No thyromegaly present.   Cardiovascular: Normal heart sounds.   Pulmonary/Chest: Breath sounds normal.   Abdominal: He exhibits no distension.   Lymphadenopathy:     He has no cervical adenopathy.   Neurological: He is alert.   Vitals reviewed.      Assessment/Plan   Marco Antonio was seen today for atrial fibrillation.    Diagnoses and all orders for this visit:    Essential hypertension    Stage 3 chronic kidney disease (CMS/HCC)      To follow with nephrology in one week.

## 2019-01-02 ENCOUNTER — TRANSCRIBE ORDERS (OUTPATIENT)
Dept: ADMINISTRATIVE | Facility: HOSPITAL | Age: 74
End: 2019-01-02

## 2019-01-02 ENCOUNTER — TELEPHONE (OUTPATIENT)
Dept: CARDIOLOGY | Facility: CLINIC | Age: 74
End: 2019-01-02

## 2019-01-02 DIAGNOSIS — N18.30 CHRONIC KIDNEY DISEASE, STAGE III (MODERATE) (HCC): Primary | ICD-10-CM

## 2019-01-02 RX ORDER — AMLODIPINE BESYLATE 5 MG/1
TABLET ORAL
Qty: 90 TABLET | Refills: 0 | Status: SHIPPED | OUTPATIENT
Start: 2019-01-02 | End: 2019-03-06

## 2019-01-02 NOTE — TELEPHONE ENCOUNTER
Patient is having a kidney biopsy. [facility] is requesting the okay to hold aspirin 81 mg daily and coumadin for 7 days prior to procedure.      Please Advise.    Per KTS:     This will place patient at high risk for stroke, but is okay with him holding ASA and coumadin for 7 days for the necessity of the kidney biopsy.

## 2019-01-02 NOTE — TELEPHONE ENCOUNTER
Patient is having a kidney biopsy. [facility] is requesting the okay to hold aspirin 81 mg daily and coumadin for 7 days prior to procedure.     Please Advise.

## 2019-01-03 ENCOUNTER — ANTICOAGULATION VISIT (OUTPATIENT)
Dept: PHARMACY | Facility: HOSPITAL | Age: 74
End: 2019-01-03

## 2019-01-03 DIAGNOSIS — I48.19 PERSISTENT ATRIAL FIBRILLATION (HCC): ICD-10-CM

## 2019-01-03 LAB
INR PPP: 2.7 (ref 0.91–1.09)
PROTHROMBIN TIME: 32.5 SECONDS (ref 10–13.8)

## 2019-01-03 PROCEDURE — 85610 PROTHROMBIN TIME: CPT

## 2019-01-03 PROCEDURE — 36416 COLLJ CAPILLARY BLOOD SPEC: CPT

## 2019-01-03 PROCEDURE — G0463 HOSPITAL OUTPT CLINIC VISIT: HCPCS

## 2019-01-03 NOTE — PROGRESS NOTES
Anticoagulation Clinic Progress Note  Indication: afib  Referring Provider: Tyson Roberts)  Initial Warfarin Start Date: 2007  Goal INR: 2-3  Current Drug Interactions: aspirin, amiodarone (start 10/1/18), torsemide  Diet: not eating at this point (10/29)  [id number]: 5 (HTN, Age, CHF, CAD)  Other: anemic, CKD (stage 3)    Anticoagulation Clinic INR History:  Date 11/14 12/6 1/25 2/22 3/22 4/11 5/9 6/8 6/29 8/3 8/31 9/14 9/28   Total Weekly Dose 37.5 mg 37.5 mg 37.5 mg 37.5 mg 37.5 mg 37.5 mg 37.5 mg 37.5mg 37.5 mg 37.5 mg 26.25mg 37.5 mg 37.5 mg   INR 2.4 2.1 2.0 3.1 2.4 2.6 2.7 2.2 2.5 3.0 1.7 3.2 2.3     Date 10/23 11/20 12/8 1/5 2/2 3/2 3/16 3/30 4/17 5/17 6/14 7/19 8/1 8/22 9/12   Total Weekly Dose 37.5mg 37.5 mg 37.5 mg 37.5 mg 37.5mg 37.5 mg 37.5 mg 37.5 mg 37.5mg 37.5 mg 37.5 mg 37.5mg 37.5 mg 37.5 mg 37.5 mg    INR 2.5 1.8 2.5 2.3 2.1 1.7 3.2 2.1 2.3 2.06 2.8 3.5 2.7 2.7 3.0   Notes      (boost), amox amox   PAT     diarrhea     Date 10/1 10/8 10/12 10/17 10/22 10/29 11/5 11/12 11/19 11/29 12/13 1/3      Total Weekly Dose 26.25 mg 23.75mg 22.5  mg 23.75 mg 27.5 mg 28.75 mg 27.5 mg 28.75 mg 28.75 mg 28.75 mg 28.75 28.75mg      INR 3.0 2.1 1.6 1.3 1.6 2.8 2.0 2.5 2.4 2.76 2.8 2.7      Notes post- disch amio start 10/1        PAT          Clinic Interview:  Verbal Release Authorization signed on 8/22/2018 -- may speak with Raeann Montemayor (Wife). [phone] (Home) *Preferred*  Tablet Strength: pt has 7.5mg and 2.5mg tablets    Patient Findings     Positives:  Change in health, Upcoming invasive procedure   Negatives:  Signs/symptoms of thrombosis, Signs/symptoms of bleeding, Laboratory test error suspected, Change in alcohol use, Change in activity, Emergency department visit, Upcoming dental procedure, Missed doses, Extra doses, Change in medications, Change in diet/appetite, Hospital admission, Bruising, Other complaints   Comments:  Will be having a kidney biopsy on 1/22 and having an overnight stay  and plans to discharge on 1/23. Plan to stop warfarin and aspirin 7 days prior to the procedure (January 15th) which patient understands puts him at an increased risk of stroke. A post-operative plan for restarting warfarin has not been discussed yet. Iron infusion done on 12/10 & 12/13.     Dermatologist biopsied right ear and on the top of head yesterday 1/2. Awaiting results at this time.      Plan:   1. INR is therapeutic again today, so instructed Mr. Montemayor to continue warfarin 3.75mg daily except 5mg MonFri. Patient will start to hold warfarin on 1/15, 7 days prior to kidney biopsy, though the typical length of time that warfarin is held for such a procedure is 5 days. The 7 day hold has been cleared with Dr. Mehta's office through the [facility]. Patient has been informed that we may call him if we have new information regarding his warfarin perioperative plan.  2. RTC in four weeks on 1/30/2019, a week after the kidney biopsy scheduled 1/22. Will follow up with patient on 1/24 either through a quick clinic visit or by phone based on his inpatient INR on 1/23. No new INR is needed on 1/24.     3. Verbal and written information provided. Mr. Montemayor expresses understanding with teach back and has no further questions at this time.     Thanks,  Lamont Avila, PharmD  Pharmacy Resident  1/3/2019  10:26 AM      Addendum: Patient called and informed of the post-operative plan to boost dose with 5mg of warfarin x 2 days (1/23 and 1/24) and then resume his normal dosing scheme. He would also be receiving 5mg of warfarin on Friday 1/25 as part of the plan though this is his typical Friday dose. Also informed that his appointment was rescheduled for 1/30 @ 11:30 AM.

## 2019-01-10 RX ORDER — WARFARIN SODIUM 7.5 MG/1
TABLET ORAL
Qty: 30 TABLET | Refills: 0 | Status: SHIPPED | OUTPATIENT
Start: 2019-01-10 | End: 2019-03-25 | Stop reason: SDUPTHER

## 2019-01-21 PROBLEM — R00.1 BRADYCARDIA: Status: ACTIVE | Noted: 2019-01-21

## 2019-01-21 NOTE — PROGRESS NOTES
Marco Antonio Montemayor  1945    [phone] (work)      01/24/2019    [facility] CARDIOLOGY     Standiford, Emil Valle MD  [address]  [place] [address]    Chief Complaint   Patient presents with   • Atrial Fibrillation       Problem List:   1. 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 Remote acceptable intravenous adenosine Quantitative SPECT gated Cardiolite study, LVEF 0.50, April 1999.  b. Echocardiogram showing mild LVH with estimated ejection fraction 0.58 with mild aortic valve cusp sclerosis, and mild TR, 09/23/2014.   c. Remote abnormal preoperative Quantitative SPECT gated Cardiolite study with mild “fixed” inferoposterior hypoperfusion and mild LV enlargement with mild global hypokinesia with reduced LVEF 0.40 in the setting of abnormal EKG with subsequent diagnostic coronary angiography demonstrating mild-to-moderate nonobstructive coronary artery atherosclerosis with mild compensated left ventricular dysfunction, LVEF 0.52, and continued medical therapy felt warranted, June 2006.  d. Remote hospitalization for symptomatic atrial fibrillation with rapid ventricular response and mild congestive heart failure requiring BRYANNA and DC cardioversion, June 2009.  e. Left heart catheterization with essentially normal coronary arteries with mild luminal irregularities with an EF of 0.60 by Dr. Escamilla for angina and elevated troponin, 06/05/2015.   f. Residual class I symptoms.  2. Chronic essential hypertension with recent progressive hypertension and blood pressure readings and normal selective bilateral renal angiography, June 2006.  3. Dyslipidemia.  4. Chronic atrial tachyarrhythmias:  a. Intermittent chronic paroxysmal atrial fibrillation with electrophysiologic study with RFA for atrial flutter, October 1999.  b. Recurrent apparent transient atrial fibrillation, resolved with acceptable [facility] emergency department evaluation, June  2003.  c. Acceptable combination Doppler echocardiogram, July 2003, with apparent acceptable 24-hour Holter monitor, May 2006.  d. Recurrent asymptomatic atrial fibrillation with RVR, January 2007, subsequent Coumadin therapy and Tikosyn therapy with successful internal cardioversion of atrial fibrillation and marked bradyarrhythmias requiring DDDR pacemaker implant, St. Chidi device, data not available, March 2007.  e. Remote hospitalization for symptomatic rapid atrial fibrillation/BRYANNA DC cardioversion with subsequent acceptable pacemaker interrogation, June/July 2009, as well as acceptable interrogation without reprogramming, July 2011.  f. Acceptable combination Doppler echocardiogram, December 2010, with residual class I symptoms.  g. Acceptable device interrogation following [facility] ED evaluation for chest pressure and shortness of breath, data deficit, May 2011, February 2013, February 2016, December 2016.  h. Abnormal pacemaker assessment with 8.4% mode switch with battery voltage of 1-1.25 years, June 2017.  i. Pacemaker Generator change Dr. Taylor 5/18/18  j. S/p successful ECV 11/29/18  5. Morbid obesity, BMI 41.6.  6. Ichthyosis.  7. Chronic lower tract obstructive symptoms, probable BPH.  8. Dyslipidemia.  9. Nasal polyps with deviated nasal septum with apparent subsequent nasal septoplasty/polypectomy, data deficit, July 2006.  10. Abdominal pain with apparent small bowel obstruction with exploratory laparotomy - data deficit.  11. Appendectomy with subsequent delayed hospitalization for bleeding peptic ulcer disease/probable duodenal ulcer, November 2003.  12. Recurrent asymptomatic atrial fibrillation with rapid ventricular response, July 2007, with subsequent Tikosyn therapy and DDDR pacemaker implant with intermittent recurrent breakthrough arrhythmias, including remote DC cardioversion, May 2008.  13. Obstructive sleep apnea with treatment.    Allergies  No Known Allergies    Current  Medications    Current Outpatient Medications:   •  acetaminophen (TYLENOL) 500 MG tablet, Take 1,000 mg by mouth Every 6 (Six) Hours As Needed for Mild Pain ., Disp: , Rfl:   •  amiodarone (PACERONE) 200 MG tablet, Take 1 tablet by mouth 2 (Two) Times a Day. Take BID for one month then decrease to once daily. (Patient taking differently: Take 200 mg by mouth Daily. Take BID for one month then decrease to once daily.), Disp: 60 tablet, Rfl: 6  •  amLODIPine (NORVASC) 5 MG tablet, TAKE ONE TABLET BY MOUTH DAILY, Disp: 90 tablet, Rfl: 0  •  aspirin 81 MG EC tablet, Take 81 mg by mouth Daily., Disp: , Rfl:   •  atorvastatin (LIPITOR) 40 MG tablet, TAKE ONE TABLET BY MOUTH DAILY, Disp: 90 tablet, Rfl: 3  •  carvedilol (COREG) 25 MG tablet, TAKE ONE TABLET BY MOUTH TWICE A DAY WITH MEALS, Disp: 180 tablet, Rfl: 2  •  cholecalciferol (VITAMIN D3) 1000 UNITS tablet, Take 1,000 Units by mouth daily., Disp: , Rfl:   •  Ferrous Sulfate (IRON) 325 (65 FE) MG tablet, Take 65 mg by mouth Daily., Disp: , Rfl:   •  loperamide (IMODIUM) 2 MG capsule, Take 2 mg by mouth 4 (Four) Times a Day As Needed for Diarrhea., Disp: , Rfl:   •  PHARMACY TO DOSE WARFARIN, Continuous As Needed (patient's warfarin is being managed by the [facility] ([phone]))., Disp: , Rfl:   •  torsemide (DEMADEX) 20 MG tablet, Take 1 tablet by mouth 2 (Two) Times a Day., Disp: 180 tablet, Rfl: 3  •  vitamin B-12 (CYANOCOBALAMIN) 1000 MCG tablet, Take 1,000 mcg by mouth Daily., Disp: , Rfl:   •  warfarin (COUMADIN) 2.5 MG tablet, Take 1 tablet by mouth daily, as directed by the anticoagulation clinic. (Patient taking differently: 5 mg. 5 mg Monday and Friday, 3.75 Tuesday, Wednesday, Thursday, Saturday and Sunday), Disp: 90 tablet, Rfl: 1  •  warfarin (COUMADIN) 7.5 MG tablet, TAKE ONE-HALF TO ONE TABLET BY MOUTH DAILY AS DIECTED BY ANTICOAGULATION PHARMACIST, Disp: 30 tablet, Rfl: 0    History of Present Illness  "  HPI    Pt presents for follow up of atrial fibrillation s/p ECV in November. Since we last saw the pt, he has been in persistent atrial fibrillation for the last 44 days per device interrogation.  He was unaware he was in afib today and is completely asymptomatic.  He denies any c/o SOB, CP, LH, or dizziness. Denies any hospitalizations, ER visits, bleeding on warfarin, or TIA/CVA symptoms.  Last INR 2.7 on 1/3/19.  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  He needs to be off of his warfarin for 12-13 days.    ROS:  General:  Denies fatigue, weight gain or loss  Cardiovascular:  Denies CP, PND, syncope, near syncope, + mild edema or palpitations.  Pulmonary:  Denies BLACKWELL, cough, or wheezing      Vitals:    01/24/19 1439   BP: 132/80   BP Location: Right arm   Patient Position: Sitting   Pulse: 76   SpO2: 96%   Weight: 129 kg (284 lb)   Height: 177.8 cm (70\")     PE:  General: NAD  Neck: no JVD, no carotid bruits, no TM  Heart irr irr, NL S1, S2, no rubs, murmurs  Lungs: CTA, no wheezes, rhonchi, or rales  Abd: soft, non-tender, NL BS  Ext: No musculoskeletal deformities, trace LE edema, cyanosis, or clubbing  Psych: normal mood and affect    Diagnostic Data:    Device interrogation 1/24/19: St. Chidi PPM with normal function, 19% RA paced, 71% RV paced, 6.7-7.8 years battery life, 80% mode switch, persistent in nature for the last 44 days    Procedures    1. Persistent atrial fibrillation (CMS/HCC)    2. Bradycardia    3. Chronic diastolic congestive heart failure (CMS/HCC)    4. Essential hypertension          Plan:  1) Persistent atrial fibrillation:  - Tikosyn discontinued and now on amiodarone due to renal issues.  S/p ECV in November but has been back in atrial fibrillation for the last 44 days, all rate controlled.  Patient is asymptomatic and noted no change in symptoms when he was back in normal rhythm versus when he is in afib.  At this point, would discontinue amiodarone " and focus on rate control at this time which appears to be adequate as he has had no high ventricular rates.   Will plan for remote device interrogation in 1 month to assess heart rates off of amiodarone.    2) Anticoagulation:  - Continue warfarin, INR therapeutic today 2.9.  Follows with [facility]. Will discuss with Dr. Taylor regarding holding warfarin for 12-13 days so he can have his procedures completed.  May need bridge with Lovenox given persistent AF.    3) Bradycardia:  - S/p PPM implant, device with normal function.    4) Chronic diastolic CHF, NYHA class I-II:  - Well compensated    5) HTN:  - Well controlled  - Wt loss, exercise, salt reduction    F/up in 4 months    [facility]  1/24/2019  2:54 PM

## 2019-01-22 ENCOUNTER — APPOINTMENT (OUTPATIENT)
Dept: CT IMAGING | Facility: HOSPITAL | Age: 74
End: 2019-01-22
Attending: INTERNAL MEDICINE

## 2019-01-24 ENCOUNTER — TELEPHONE (OUTPATIENT)
Dept: PHARMACY | Facility: HOSPITAL | Age: 74
End: 2019-01-24

## 2019-01-24 ENCOUNTER — ANTICOAGULATION VISIT (OUTPATIENT)
Dept: PHARMACY | Facility: HOSPITAL | Age: 74
End: 2019-01-24

## 2019-01-24 ENCOUNTER — OFFICE VISIT (OUTPATIENT)
Dept: CARDIOLOGY | Facility: CLINIC | Age: 74
End: 2019-01-24

## 2019-01-24 VITALS
SYSTOLIC BLOOD PRESSURE: 132 MMHG | HEIGHT: 70 IN | HEART RATE: 76 BPM | OXYGEN SATURATION: 96 % | DIASTOLIC BLOOD PRESSURE: 80 MMHG | BODY MASS INDEX: 40.66 KG/M2 | WEIGHT: 284 LBS

## 2019-01-24 DIAGNOSIS — I10 ESSENTIAL HYPERTENSION: Chronic | ICD-10-CM

## 2019-01-24 DIAGNOSIS — R00.1 BRADYCARDIA: ICD-10-CM

## 2019-01-24 DIAGNOSIS — I48.19 PERSISTENT ATRIAL FIBRILLATION (HCC): Primary | Chronic | ICD-10-CM

## 2019-01-24 DIAGNOSIS — I48.19 PERSISTENT ATRIAL FIBRILLATION (HCC): ICD-10-CM

## 2019-01-24 DIAGNOSIS — I50.32 CHRONIC DIASTOLIC CONGESTIVE HEART FAILURE (HCC): Chronic | ICD-10-CM

## 2019-01-24 LAB
INR PPP: 2.9 (ref 0.91–1.09)
PROTHROMBIN TIME: 34.3 SECONDS (ref 10–13.8)

## 2019-01-24 PROCEDURE — 93280 PM DEVICE PROGR EVAL DUAL: CPT | Performed by: NURSE PRACTITIONER

## 2019-01-24 PROCEDURE — 36416 COLLJ CAPILLARY BLOOD SPEC: CPT

## 2019-01-24 PROCEDURE — 85610 PROTHROMBIN TIME: CPT

## 2019-01-24 PROCEDURE — G0463 HOSPITAL OUTPT CLINIC VISIT: HCPCS

## 2019-01-24 PROCEDURE — 99213 OFFICE O/P EST LOW 20 MIN: CPT | Performed by: NURSE PRACTITIONER

## 2019-01-24 NOTE — PROGRESS NOTES
Anticoagulation Clinic Progress Note  Indication: afib  Referring Provider: Tyson Roberts)  Initial Warfarin Start Date: 2007  Goal INR: 2-3  Current Drug Interactions: aspirin, amiodarone (start 10/1/18), torsemide, asa  Diet: not eating at this point (10/29)  [id number]: 5 (HTN, Age, CHF, CAD)  Other: anemic, CKD (stage 3)    Anticoagulation Clinic INR History:  Date 11/14 12/6 1/25 2/22 3/22 4/11 5/9 6/8 6/29 8/3 8/31 9/14 9/28   Total Weekly Dose 37.5 mg 37.5 mg 37.5 mg 37.5 mg 37.5 mg 37.5 mg 37.5 mg 37.5mg 37.5 mg 37.5 mg 26.25mg 37.5 mg 37.5 mg   INR 2.4 2.1 2.0 3.1 2.4 2.6 2.7 2.2 2.5 3.0 1.7 3.2 2.3     Date 10/23 11/20 12/8 1/5 2/2 3/2 3/16 3/30 4/17 5/17 6/14 7/19 8/1 8/22 9/12   Total Weekly Dose 37.5mg 37.5 mg 37.5 mg 37.5 mg 37.5mg 37.5 mg 37.5 mg 37.5 mg 37.5mg 37.5 mg 37.5 mg 37.5mg 37.5 mg 37.5 mg 37.5 mg    INR 2.5 1.8 2.5 2.3 2.1 1.7 3.2 2.1 2.3 2.06 2.8 3.5 2.7 2.7 3.0   Notes      (boost), amox amox   PAT     diarrhea     Date 10/1 10/8 10/12 10/17 10/22 10/29 11/5 11/12 11/19 11/29 12/13 1/3 1/24     Total Weekly Dose 26.25 mg 23.75mg 22.5  mg 23.75 mg 27.5 mg 28.75 mg 27.5 mg 28.75 mg 28.75 mg 28.75 mg 28.75 28.75mg 28.75 mg     INR 3.0 2.1 1.6 1.3 1.6 2.8 2.0 2.5 2.4 2.76 2.8 2.7 2.9     Notes post- disch amio start 10/1        PAT   Held x 7 days; resumed 1/22       Clinic Interview:  Verbal Release Authorization signed on 8/22/2018 -- may speak with Raeann Montemayor (Wife). [phone] (Home) *Preferred*  Tablet Strength: pt has 7.5mg and 2.5mg tablets     Patient Findings   Positives:  Other complaints   Negatives:  Signs/symptoms of thrombosis, Signs/symptoms of bleeding, Laboratory test error suspected, Change in health, Change in alcohol use, Change in activity, Upcoming invasive procedure, Emergency department visit, Upcoming dental procedure, Missed doses, Extra doses, Change in medications, Change in diet/appetite, Hospital admission, Bruising   Comments:  Mr. Montemayor was  scheduled for a renal biopsy on 1/22; however, he informed me that the plan had changed.     He now has 2 biopsies scheduled.  Today was his last scheduled dose of warfarin.   Contacted cardiology for assistance with perioperative plan.  Discussed in length with patient; we will follow up with him once final plan has been determined.   Provided pamphlet on self-injection of lovenox to patient.  He verbalized understanding.       Plan:   1. INR is therapeutic again today; however, today was Mr. Montemayor's prior to planned procedures.     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        Discussed with Lyssa Jason (MELINA); she will verify with Dr. Taylor and contact the clinic.   Note:  The 7 day hold that was to have started 1/15 had been cleared with Dr. Mehta's office through the nephrology associates; however, the plan has since been changed and now 2 procedures planned.       2. RTC in four weeks on 2/12, a week after the kidney biopsy scheduled  2/5.       Will follow up with patient on 2/6 either through a quick clinic visit or by phone based on his inpatient INR on 2/6.     3. Verbal and written information provided. Mr. Montemayor expresses understanding with teach back and has no further questions at this time.  4. Med rec reviewed with patient.  He will verify home dose of cyanocobalamin.     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  He is scheduled for ear cancer removal with reconstructive/ plastic surgery at [facility] on Wednesday, 1/30.   He has then been instructed to continue to hold his warfarin until his renal biopsy scheduled for Tuesday, 2/5, at [facility].     Suggested  post-op plan: (depending upon decision with regards to bridging)  Warfarin restart POD2 (February 6th) if ok with surgeon (? Dr. ANGELINA Young) at warfarin 5 mg oral daily through Saturday, February 9th, then resume home dose of warfarin 3.75 mg oral daily except 5 mg MonFri.

## 2019-01-24 NOTE — TELEPHONE ENCOUNTER
Mr. Montemayor just informed us of a new perioperative plan.    He stated that he took his last scheduled dose of warfarin today.    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    He has then been instructed to continue to hold his warfarin until his renal biopsy scheduled for Tuesday, 2/5, at [facility].     This will entail a minimum of 12 days off of warfarin.     He takes warfarin for atrial fibrillation with reported [id number]: 5 (HTN, Age, CHF, CAD)    Due to extended duration of hold and elevated CHADS2 score, please advise if desire bridging.    Discussed with Anabela Isaacs RN.    Thank you for your consideration,    Charlotte Michael, NighatD

## 2019-01-25 NOTE — TELEPHONE ENCOUNTER
LVM for Mr. Montemayor this morning. Will need to determine who his nephrologist is and if he has had recent labs drawn. Most recent Scr: was 2.65 from 11/2018 CrCl: 34.4

## 2019-01-25 NOTE — TELEPHONE ENCOUNTER
Received labs from  from 1/23/2019. Scr: 2.66mg/dL; Hgb: 10.4; Hct: 32.5mg. Crcl: 34.3mL/min. Based on patient's weight and Crcl lovenox 1mg/kg daily dosing is appropriate due to increase in exposure at steady state.     Will call in lovenox 150mg/1mL syringes with directions to administer 0.86mL under the skin once daily. Patient has received written information about how to inject lovenox injections. He has been advised to squeeze out to get correct amount and will discuss with pharmacist at  as well. He request sent to [id number] on [address]    Will email directions on which days to take Lovenox prior to procedures to [email or web address]. Procedure: Otolaryngologist Oksana Young at [facility] ([phone]- option 2) on Wednesday, 1/30 and renal biopsy, [facility] on 2/5.     1/25: Begin Lovenox 1mg/kg qd  1/26: Lovenox 1mg/kg qd  1/27: Lovenox 1mg/kg qd  1/28: Lovenox 1mg/kg qd  1/29: Lovenox 1mg/kg qd in the AM  1/30: Hold for procedure with otolaryngologist (confirmed plan with Hemalatha)  1/31: Lovenox 1mg/kg qd  2/1: Lovenox 1mg/kg qd  2/2: Lovenox 1mg/kg qd  2/3: Lovenox 1mg/kg qd  2/4: Lovenox 1mg/kg qd in AM  2/5: Hold; Renal Biopsy with [facility] Marino (confirmed plan with Dr. Chase)   2/6: resume Lovenox 1mg/kg qd in AM; Warfarin Boost 5mg  2/7: Lovenox 1mg/kg qd; Warfarin Boost 5mg  2/8: Lovenox 1mg/kg qd: Warfarin 5mg  2/9: Lovenox 1mg/kg qd; Warfarin 3.75mg  2/10: Lovenox 1mg/kg qd; Warfarin 3.75mg  2/11: Repeat INR    iVent placed.

## 2019-01-30 ENCOUNTER — APPOINTMENT (OUTPATIENT)
Dept: PHARMACY | Facility: HOSPITAL | Age: 74
End: 2019-01-30

## 2019-02-05 ENCOUNTER — HOSPITAL ENCOUNTER (OUTPATIENT)
Dept: CT IMAGING | Facility: HOSPITAL | Age: 74
Discharge: HOME | End: 2019-02-06
Attending: INTERNAL MEDICINE | Admitting: INTERNAL MEDICINE

## 2019-02-05 DIAGNOSIS — N18.30 CHRONIC KIDNEY DISEASE, STAGE III (MODERATE) (HCC): ICD-10-CM

## 2019-02-05 DIAGNOSIS — I48.19 PERSISTENT ATRIAL FIBRILLATION (HCC): Primary | Chronic | ICD-10-CM

## 2019-02-05 LAB
ABO GROUP BLD: NORMAL
ALBUMIN SERPL-MCNC: 3.77 G/DL (ref 3.2–4.8)
ANION GAP SERPL CALCULATED.3IONS-SCNC: 6 MMOL/L (ref 3–11)
APTT PPP: 31.8 SECONDS (ref 24–37)
BACTERIA UR QL AUTO: ABNORMAL /HPF
BILIRUB UR QL STRIP: NEGATIVE
BLD GP AB SCN SERPL QL: NEGATIVE
BUN BLD-MCNC: 39 MG/DL (ref 9–23)
BUN/CREAT SERPL: 14.8 (ref 7–25)
CALCIUM SPEC-SCNC: 8.6 MG/DL (ref 8.7–10.4)
CHLORIDE SERPL-SCNC: 110 MMOL/L (ref 99–109)
CLARITY UR: CLEAR
CLOSURE TME COLL+ADP BLD: 69 SECONDS (ref 54–123)
CLOSURE TME COLL+EPINEP BLD: 106 SECONDS (ref 72–192)
CO2 SERPL-SCNC: 24 MMOL/L (ref 20–31)
COLOR UR: YELLOW
CREAT BLD-MCNC: 2.64 MG/DL (ref 0.6–1.3)
CREAT UR-MCNC: 84.2 MG/DL
DEPRECATED RDW RBC AUTO: 54.7 FL (ref 37–54)
ERYTHROCYTE [DISTWIDTH] IN BLOOD BY AUTOMATED COUNT: 16.2 % (ref 11.3–14.5)
GFR SERPL CREATININE-BSD FRML MDRD: 24 ML/MIN/1.73
GLUCOSE BLD-MCNC: 126 MG/DL (ref 70–100)
GLUCOSE UR STRIP-MCNC: NEGATIVE MG/DL
HCT VFR BLD AUTO: 31.7 % (ref 38.9–50.9)
HGB BLD-MCNC: 10 G/DL (ref 13.1–17.5)
HGB UR QL STRIP.AUTO: ABNORMAL
HYALINE CASTS UR QL AUTO: ABNORMAL /LPF
INR PPP: 1.04 (ref 0.85–1.16)
KETONES UR QL STRIP: NEGATIVE
LEUKOCYTE ESTERASE UR QL STRIP.AUTO: NEGATIVE
MCH RBC QN AUTO: 29.2 PG (ref 27–31)
MCHC RBC AUTO-ENTMCNC: 31.5 G/DL (ref 32–36)
MCV RBC AUTO: 92.4 FL (ref 80–99)
NITRITE UR QL STRIP: NEGATIVE
PH UR STRIP.AUTO: 5.5 [PH] (ref 5–8)
PHOSPHATE SERPL-MCNC: 3.8 MG/DL (ref 2.4–5.1)
PLATELET # BLD AUTO: 176 10*3/MM3 (ref 150–450)
PMV BLD AUTO: 10 FL (ref 6–12)
POTASSIUM BLD-SCNC: 4.6 MMOL/L (ref 3.5–5.5)
PROT UR QL STRIP: ABNORMAL
PROT UR-MCNC: 197 MG/DL (ref 1–14)
PROTHROMBIN TIME: 13.1 SECONDS (ref 11.2–14.3)
RBC # BLD AUTO: 3.43 10*6/MM3 (ref 4.2–5.76)
RBC # UR: ABNORMAL /HPF
REF LAB TEST METHOD: ABNORMAL
RH BLD: POSITIVE
SODIUM BLD-SCNC: 140 MMOL/L (ref 132–146)
SP GR UR STRIP: 1.02 (ref 1–1.03)
SQUAMOUS #/AREA URNS HPF: ABNORMAL /HPF
T&S EXPIRATION DATE: NORMAL
UROBILINOGEN UR QL STRIP: ABNORMAL
WBC NRBC COR # BLD: 5.27 10*3/MM3 (ref 3.5–10.8)
WBC UR QL AUTO: ABNORMAL /HPF

## 2019-02-05 PROCEDURE — 85576 BLOOD PLATELET AGGREGATION: CPT | Performed by: INTERNAL MEDICINE

## 2019-02-05 PROCEDURE — 88305 TISSUE EXAM BY PATHOLOGIST: CPT | Performed by: INTERNAL MEDICINE

## 2019-02-05 PROCEDURE — 88348 ELECTRON MICROSCOPY DX: CPT | Performed by: INTERNAL MEDICINE

## 2019-02-05 PROCEDURE — 77012 CT SCAN FOR NEEDLE BIOPSY: CPT

## 2019-02-05 PROCEDURE — 86900 BLOOD TYPING SEROLOGIC ABO: CPT | Performed by: INTERNAL MEDICINE

## 2019-02-05 PROCEDURE — 85027 COMPLETE CBC AUTOMATED: CPT | Performed by: INTERNAL MEDICINE

## 2019-02-05 PROCEDURE — 88346 IMFLUOR 1ST 1ANTB STAIN PX: CPT | Performed by: INTERNAL MEDICINE

## 2019-02-05 PROCEDURE — 85610 PROTHROMBIN TIME: CPT | Performed by: INTERNAL MEDICINE

## 2019-02-05 PROCEDURE — 82570 ASSAY OF URINE CREATININE: CPT | Performed by: INTERNAL MEDICINE

## 2019-02-05 PROCEDURE — 80069 RENAL FUNCTION PANEL: CPT | Performed by: INTERNAL MEDICINE

## 2019-02-05 PROCEDURE — 86901 BLOOD TYPING SEROLOGIC RH(D): CPT | Performed by: INTERNAL MEDICINE

## 2019-02-05 PROCEDURE — 84156 ASSAY OF PROTEIN URINE: CPT | Performed by: INTERNAL MEDICINE

## 2019-02-05 PROCEDURE — 85730 THROMBOPLASTIN TIME PARTIAL: CPT | Performed by: INTERNAL MEDICINE

## 2019-02-05 PROCEDURE — 86850 RBC ANTIBODY SCREEN: CPT | Performed by: INTERNAL MEDICINE

## 2019-02-05 PROCEDURE — 88350 IMFLUOR EA ADDL 1ANTB STN PX: CPT | Performed by: INTERNAL MEDICINE

## 2019-02-05 PROCEDURE — 81001 URINALYSIS AUTO W/SCOPE: CPT | Performed by: INTERNAL MEDICINE

## 2019-02-05 PROCEDURE — G0378 HOSPITAL OBSERVATION PER HR: HCPCS

## 2019-02-05 PROCEDURE — 88313 SPECIAL STAINS GROUP 2: CPT | Performed by: INTERNAL MEDICINE

## 2019-02-05 RX ORDER — HYDROCODONE BITARTRATE AND ACETAMINOPHEN 5; 325 MG/1; MG/1
1 TABLET ORAL EVERY 4 HOURS PRN
Status: CANCELLED | OUTPATIENT
Start: 2019-02-05 | End: 2019-02-15

## 2019-02-05 RX ORDER — LIDOCAINE HYDROCHLORIDE 10 MG/ML
20 INJECTION, SOLUTION INFILTRATION; PERINEURAL ONCE
Status: COMPLETED | OUTPATIENT
Start: 2019-02-05 | End: 2019-02-05

## 2019-02-05 RX ORDER — LOPERAMIDE HYDROCHLORIDE 2 MG/1
2 CAPSULE ORAL 4 TIMES DAILY PRN
Status: DISCONTINUED | OUTPATIENT
Start: 2019-02-05 | End: 2019-02-06 | Stop reason: HOSPADM

## 2019-02-05 RX ORDER — SODIUM CHLORIDE 0.9 % (FLUSH) 0.9 %
3 SYRINGE (ML) INJECTION EVERY 12 HOURS SCHEDULED
Status: DISCONTINUED | OUTPATIENT
Start: 2019-02-05 | End: 2019-02-06 | Stop reason: HOSPADM

## 2019-02-05 RX ORDER — TORSEMIDE 20 MG/1
20 TABLET ORAL 2 TIMES DAILY
Status: DISCONTINUED | OUTPATIENT
Start: 2019-02-05 | End: 2019-02-06 | Stop reason: HOSPADM

## 2019-02-05 RX ORDER — SODIUM CHLORIDE 0.9 % (FLUSH) 0.9 %
3-10 SYRINGE (ML) INJECTION AS NEEDED
Status: DISCONTINUED | OUTPATIENT
Start: 2019-02-05 | End: 2019-02-06 | Stop reason: HOSPADM

## 2019-02-05 RX ORDER — CARVEDILOL 12.5 MG/1
25 TABLET ORAL 2 TIMES DAILY WITH MEALS
Status: DISCONTINUED | OUTPATIENT
Start: 2019-02-05 | End: 2019-02-06 | Stop reason: HOSPADM

## 2019-02-05 RX ORDER — ATORVASTATIN CALCIUM 40 MG/1
40 TABLET, FILM COATED ORAL DAILY
Status: DISCONTINUED | OUTPATIENT
Start: 2019-02-05 | End: 2019-02-06 | Stop reason: HOSPADM

## 2019-02-05 RX ORDER — LANOLIN ALCOHOL/MO/W.PET/CERES
1000 CREAM (GRAM) TOPICAL DAILY
Status: DISCONTINUED | OUTPATIENT
Start: 2019-02-05 | End: 2019-02-06 | Stop reason: HOSPADM

## 2019-02-05 RX ORDER — AMLODIPINE BESYLATE 5 MG/1
5 TABLET ORAL DAILY
Status: DISCONTINUED | OUTPATIENT
Start: 2019-02-06 | End: 2019-02-06 | Stop reason: HOSPADM

## 2019-02-05 RX ORDER — AMIODARONE HYDROCHLORIDE 200 MG/1
200 TABLET ORAL DAILY
Status: DISCONTINUED | OUTPATIENT
Start: 2019-02-05 | End: 2019-02-05

## 2019-02-05 RX ORDER — MELATONIN
1000 DAILY
Status: DISCONTINUED | OUTPATIENT
Start: 2019-02-05 | End: 2019-02-06 | Stop reason: HOSPADM

## 2019-02-05 RX ORDER — FERROUS SULFATE 325(65) MG
325 TABLET ORAL
Status: DISCONTINUED | OUTPATIENT
Start: 2019-02-05 | End: 2019-02-06 | Stop reason: HOSPADM

## 2019-02-05 RX ADMIN — CARVEDILOL 25 MG: 12.5 TABLET, FILM COATED ORAL at 19:25

## 2019-02-05 RX ADMIN — LIDOCAINE HYDROCHLORIDE 20 ML: 10 INJECTION, SOLUTION INFILTRATION; PERINEURAL at 12:36

## 2019-02-05 RX ADMIN — ATORVASTATIN CALCIUM 40 MG: 40 TABLET, FILM COATED ORAL at 20:53

## 2019-02-05 RX ADMIN — SODIUM CHLORIDE, PRESERVATIVE FREE 3 ML: 5 INJECTION INTRAVENOUS at 20:53

## 2019-02-05 RX ADMIN — TORSEMIDE 20 MG: 20 TABLET ORAL at 20:53

## 2019-02-05 NOTE — PROCEDURES
"Renal  Biopsy Procedure Note    Procedure: Proteinuria   Pre-operative Diagnosis: Proteinuria    Pre-Procedure Physical:  The following portions of the patient's history were reviewed and updated as appropriate: allergies, current medications, past family history, past medical history, past social history, past surgical history and problem list.    /88   Pulse 79   Temp 97.6 °F (36.4 °C)   Resp 20   Ht 177.8 cm (70\")   Wt 128 kg (282 lb 9.6 oz)   SpO2 99%   BMI 40.55 kg/m²     Airway:  normal  Heart:  normal S1 and S2  Lungs:  clear  Abdomen:  soft, nontender, normal bowel sounds  Mental Status:  awake and alert; oriented to person, place, and time        Procedure Details     Informed consent was obtained for the procedure. Risks of perforation, hemorrhage, adverse drug reaction and aspiration were discussed. Based on the pre-procedure assessment, including review of the patient's medical history, medications, allergies, and review of systems, he had been deemed to be an appropriate candidate for renal biopsy.The patient was monitored continuously with ECG tracing, pulse oximetry, blood pressure monitoring, and direct observations.  The procedure was done in the radiology department following CT  localization of the area for optimal renal biopsy. The patient was placed in the prone position. The skin over the left flank area was cleansed with Prepodyne solution and locally infiltrated with 1% lidocaine. After a small opening was made in the skin with a #11 Bard-Alfonso blade, biopsy needle was used to obtain a good core of renal tissue.      Specimens:   ID Type Source Tests Collected by Time   A :  Tissue Kidney, Left TISSUE PATHOLOGY EXAM Karen Peter 2/5/2019 1247              Complications:  None; patient tolerated the procedure well.           Disposition: PACU - hemodynamically stable.           Condition: stable    Attending Attestation: I performed the procedure.    Impression:  "   Successful liver biopsy    Recommendations:  1. Await biopsy results  2. May use lortab for pain.

## 2019-02-05 NOTE — H&P
Patient Care Team:  Emil Rosado MD as PCP - General (Family Medicine)  Shanell Arango, [facility] as Pharmacist (Pharmacy)  Evelina Quan, PharmD as Pharmacist (Pharmacy)  Peng Melgar [facility] as Pharmacist (Pharmacy)    Chief complaint Proteinuria     Subjective     Patient is a 73 y.o. male presents for elective CT guided renal biopsy. He denies taking any Blood thinner for last 7 days. He has no complaints. Denies fever, chills, rigors, headache, nausea, vomiting, diarrhea, hematuria and hemoptysis. No further complaints.    Review of Systems  Pertinent items are noted in HPI    History  Past Medical History:   Diagnosis Date   • Abnormal ECG    • Arrhythmia    • Arthritis    • Atrial fibrillation (CMS/HCC)    • Cancer (CMS/HCC)     skin cancer removed from various locations    • Chronic diastolic congestive heart failure (CMS/HCC) 9/14/2018   • Coronary artery disease    • Dry skin    • Edema    • Heart murmur    • Hematoma    • Hypertension    • Long term current use of anticoagulant    • ZULEIKA treated with BiPAP    • Sleep apnea     bipap- complaint with machine    • Stage 3 chronic kidney disease (CMS/HCC)    • Tinnitus    • Wears glasses     readers     Past Surgical History:   Procedure Laterality Date   • ABLATION OF DYSRHYTHMIC FOCUS     • APPENDECTOMY     • CARDIAC ELECTROPHYSIOLOGY PROCEDURE N/A 5/18/2018    Procedure: PPM generator change - dual       [facility]/ please schedule in 8 weeks;  Surgeon: Johnathan Taylor MD;  Location: [facility] INVASIVE LOCATION;  Service: Cardiovascular   • CATARACT EXTRACTION      bilat wiht lens    • COLONOSCOPY     • ENDOSCOPY     • SKIN CANCER EXCISION      various locations    • TONSILLECTOMY     • WISDOM TOOTH EXTRACTION       Family History   Problem Relation Age of Onset   • Cancer Mother    • Heart attack Mother    • Hypertension Mother    • Arrhythmia Father    • Hypertension Father    • Heart attack Father      Social History     Tobacco Use   •  Smoking status: Never Smoker   • Smokeless tobacco: Never Used   Substance Use Topics   • Alcohol use: No   • Drug use: No     Medications Prior to Admission   Medication Sig Dispense Refill Last Dose   • acetaminophen (TYLENOL) 500 MG tablet Take 1,000 mg by mouth Every 6 (Six) Hours As Needed for Mild Pain .   2/4/2019 at Unknown time   • amLODIPine (NORVASC) 5 MG tablet TAKE ONE TABLET BY MOUTH DAILY 90 tablet 0 2/5/2019 at Unknown time   • aspirin 81 MG EC tablet Take 81 mg by mouth Daily.   Past Week at Unknown time   • atorvastatin (LIPITOR) 40 MG tablet TAKE ONE TABLET BY MOUTH DAILY 90 tablet 3 2/4/2019 at Unknown time   • carvedilol (COREG) 25 MG tablet TAKE ONE TABLET BY MOUTH TWICE A DAY WITH MEALS 180 tablet 2 2/5/2019 at Unknown time   • cholecalciferol (VITAMIN D3) 1000 UNITS tablet Take 1,000 Units by mouth daily.   2/4/2019 at Unknown time   • enoxaparin (LOVENOX) 150 MG/ML injection Inject 0.86 mL under the skin into the appropriate area as directed Daily. Must squeeze out 0.14mL to get 0.86mL prior to administration 20 mL 0 2/4/2019 at Unknown time   • Ferrous Sulfate (IRON) 325 (65 FE) MG tablet Take 65 mg by mouth Daily.   2/4/2019 at Unknown time   • loperamide (IMODIUM) 2 MG capsule Take 2 mg by mouth 4 (Four) Times a Day As Needed for Diarrhea.   2/4/2019 at Unknown time   • torsemide (DEMADEX) 20 MG tablet Take 1 tablet by mouth 2 (Two) Times a Day. 180 tablet 3 2/4/2019 at Unknown time   • vitamin B-12 (CYANOCOBALAMIN) 1000 MCG tablet Take 1,000 mcg by mouth Daily.   2/4/2019 at Unknown time   • warfarin (COUMADIN) 7.5 MG tablet TAKE ONE-HALF TO ONE TABLET BY MOUTH DAILY AS DIECTED BY ANTICOAGULATION PHARMACIST 30 tablet 0 Taking   • amiodarone (PACERONE) 200 MG tablet Take 1 tablet by mouth 2 (Two) Times a Day. Take BID for one month then decrease to once daily. (Patient taking differently: Take 200 mg by mouth Daily. Take BID for one month then decrease to once daily.) 60 tablet 6 Taking    • PHARMACY TO DOSE WARFARIN Continuous As Needed (patient's warfarin is being managed by the [facility] ([phone])).   Taking   • warfarin (COUMADIN) 2.5 MG tablet Take 1 tablet by mouth daily, as directed by the anticoagulation clinic. (Patient taking differently: 5 mg. 5 mg Monday and Friday, 3.75 Tuesday, Wednesday, Thursday, Saturday and Sunday) 90 tablet 1 Taking     Allergies:  Patient has no known allergies.    Objective      Vital Signs  Temp:  [97.6 °F (36.4 °C)] 97.6 °F (36.4 °C)  Heart Rate:  [74-79] 79  Resp:  [20] 20  BP: (113-146)/(71-89) 146/88    No intake/output data recorded.  No intake/output data recorded.    Physical Exam:      General Appearance:    Alert, cooperative, in no acute distress   Head:    Normocephalic, without obvious abnormality, atraumatic   Eyes:            Lids and lashes normal, conjunctivae and sclerae normal, no   icterus, no pallor, corneas clear, PERRLA   Ears:    Ears appear intact with no abnormalities noted   Throat:   No oral lesions, no thrush, oral mucosa moist   Neck:   No adenopathy, supple, trachea midline, no thyromegaly, no   carotid bruit, no JVD       Lungs:     Clear to auscultation,respirations regular, even and                  unlabored    Heart:    Regular rhythm and normal rate, normal S1 and S2, no            murmur, no gallop, no rub, no click   Chest Wall:    No abnormalities observed   Abdomen:     Normal bowel sounds, no masses, no organomegaly, soft        non-tender, non-distended, no guarding, no rebound                tenderness   Rectal:     Deferred   Extremities:   Moves all extremities well, no edema, no cyanosis, no             redness   Pulses:   Pulses palpable and equal bilaterally   Skin:   No bleeding, bruising or rash   Lymph nodes:   No palpable adenopathy   Neurologic:   Cranial nerves 2 - 12 grossly intact, sensation intact, DTR       present and equal bilaterally       Results Review:   I  reviewed the patient's new clinical results.    WBC WBC   Date Value Ref Range Status   02/05/2019 5.27 3.50 - 10.80 10*3/mm3 Final      HGB Hemoglobin   Date Value Ref Range Status   02/05/2019 10.0 (L) 13.1 - 17.5 g/dL Final      HCT Hematocrit   Date Value Ref Range Status   02/05/2019 31.7 (L) 38.9 - 50.9 % Final      Platlets No results found for: LABPLAT   MCV MCV   Date Value Ref Range Status   02/05/2019 92.4 80.0 - 99.0 fL Final          Sodium Sodium   Date Value Ref Range Status   02/05/2019 140 132 - 146 mmol/L Final      Potassium Potassium   Date Value Ref Range Status   02/05/2019 4.6 3.5 - 5.5 mmol/L Final      Chloride Chloride   Date Value Ref Range Status   02/05/2019 110 (H) 99 - 109 mmol/L Final      CO2 CO2   Date Value Ref Range Status   02/05/2019 24.0 20.0 - 31.0 mmol/L Final      BUN BUN   Date Value Ref Range Status   02/05/2019 39 (H) 9 - 23 mg/dL Final      Creatinine Creatinine   Date Value Ref Range Status   02/05/2019 2.64 (H) 0.60 - 1.30 mg/dL Final      Calcium Calcium   Date Value Ref Range Status   02/05/2019 8.6 (L) 8.7 - 10.4 mg/dL Final      PO4 No results found for: CAPO4   Albumin Albumin   Date Value Ref Range Status   02/05/2019 3.77 3.20 - 4.80 g/dL Final      Magnesium No results found for: MG   Uric Acid No results found for: URICACID       Assessment/Plan       Chronic kidney disease, stage III (moderate) (CMS/HCC)    1- Proteinuria   2- CKD     Plan:  - CT guided renal biopsy   - Continue with home meds.   - serial Hemoglobin after procedure     I discussed the patients findings and my recommendations with patient and nursing staff.    Ishan Chase MD  02/05/19  1:01 PM      Time: More than 50% of time spent in counseling and coordination of care:  Total face-to-face/floor time 25 min.  Time spent in counseling 15 min. Counseling included the following topics: Risk benefit and need of renal biopsy. Explained the procedure of renal biopsy

## 2019-02-06 ENCOUNTER — TELEPHONE (OUTPATIENT)
Dept: INFUSION THERAPY | Facility: HOSPITAL | Age: 74
End: 2019-02-06

## 2019-02-06 VITALS
DIASTOLIC BLOOD PRESSURE: 81 MMHG | RESPIRATION RATE: 18 BRPM | WEIGHT: 282.6 LBS | HEIGHT: 70 IN | SYSTOLIC BLOOD PRESSURE: 130 MMHG | TEMPERATURE: 97.3 F | HEART RATE: 77 BPM | OXYGEN SATURATION: 95 % | BODY MASS INDEX: 40.46 KG/M2

## 2019-02-06 PROBLEM — N17.9 AKI (ACUTE KIDNEY INJURY): Status: RESOLVED | Noted: 2018-09-14 | Resolved: 2019-02-06

## 2019-02-06 LAB
ALBUMIN SERPL-MCNC: 3.78 G/DL (ref 3.2–4.8)
ANION GAP SERPL CALCULATED.3IONS-SCNC: 6 MMOL/L (ref 3–11)
BASOPHILS # BLD AUTO: 0.04 10*3/MM3 (ref 0–0.2)
BASOPHILS NFR BLD AUTO: 0.6 % (ref 0–1)
BUN BLD-MCNC: 36 MG/DL (ref 9–23)
BUN/CREAT SERPL: 14.9 (ref 7–25)
CALCIUM SPEC-SCNC: 8.8 MG/DL (ref 8.7–10.4)
CHLORIDE SERPL-SCNC: 110 MMOL/L (ref 99–109)
CO2 SERPL-SCNC: 25 MMOL/L (ref 20–31)
CREAT BLD-MCNC: 2.41 MG/DL (ref 0.6–1.3)
DEPRECATED RDW RBC AUTO: 57.5 FL (ref 37–54)
EOSINOPHIL # BLD AUTO: 0.23 10*3/MM3 (ref 0–0.3)
EOSINOPHIL NFR BLD AUTO: 3.6 % (ref 0–3)
ERYTHROCYTE [DISTWIDTH] IN BLOOD BY AUTOMATED COUNT: 16.7 % (ref 11.3–14.5)
GFR SERPL CREATININE-BSD FRML MDRD: 26 ML/MIN/1.73
GLUCOSE BLD-MCNC: 118 MG/DL (ref 70–100)
HCT VFR BLD AUTO: 33 % (ref 38.9–50.9)
HGB BLD-MCNC: 10.3 G/DL (ref 13.1–17.5)
IMM GRANULOCYTES # BLD AUTO: 0.01 10*3/MM3 (ref 0–0.03)
IMM GRANULOCYTES NFR BLD AUTO: 0.2 % (ref 0–0.6)
LYMPHOCYTES # BLD AUTO: 1.28 10*3/MM3 (ref 0.6–4.8)
LYMPHOCYTES NFR BLD AUTO: 19.9 % (ref 24–44)
MCH RBC QN AUTO: 29.4 PG (ref 27–31)
MCHC RBC AUTO-ENTMCNC: 31.2 G/DL (ref 32–36)
MCV RBC AUTO: 94.3 FL (ref 80–99)
MONOCYTES # BLD AUTO: 0.47 10*3/MM3 (ref 0–1)
MONOCYTES NFR BLD AUTO: 7.3 % (ref 0–12)
NEUTROPHILS # BLD AUTO: 4.4 10*3/MM3 (ref 1.5–8.3)
NEUTROPHILS NFR BLD AUTO: 68.6 % (ref 41–71)
PHOSPHATE SERPL-MCNC: 3.5 MG/DL (ref 2.4–5.1)
PLATELET # BLD AUTO: 196 10*3/MM3 (ref 150–450)
PMV BLD AUTO: 10.5 FL (ref 6–12)
POTASSIUM BLD-SCNC: 4.2 MMOL/L (ref 3.5–5.5)
RBC # BLD AUTO: 3.5 10*6/MM3 (ref 4.2–5.76)
SODIUM BLD-SCNC: 141 MMOL/L (ref 132–146)
WBC NRBC COR # BLD: 6.42 10*3/MM3 (ref 3.5–10.8)

## 2019-02-06 PROCEDURE — G0378 HOSPITAL OBSERVATION PER HR: HCPCS

## 2019-02-06 PROCEDURE — 80069 RENAL FUNCTION PANEL: CPT | Performed by: INTERNAL MEDICINE

## 2019-02-06 PROCEDURE — 85025 COMPLETE CBC W/AUTO DIFF WBC: CPT | Performed by: INTERNAL MEDICINE

## 2019-02-06 RX ADMIN — AMLODIPINE BESYLATE 5 MG: 5 TABLET ORAL at 08:57

## 2019-02-06 RX ADMIN — ATORVASTATIN CALCIUM 40 MG: 40 TABLET, FILM COATED ORAL at 08:56

## 2019-02-06 RX ADMIN — Medication 325 MG: at 08:56

## 2019-02-06 RX ADMIN — TORSEMIDE 20 MG: 20 TABLET ORAL at 08:57

## 2019-02-06 RX ADMIN — CARVEDILOL 25 MG: 12.5 TABLET, FILM COATED ORAL at 08:56

## 2019-02-06 RX ADMIN — CYANOCOBALAMIN TAB 1000 MCG 1000 MCG: 1000 TAB at 08:56

## 2019-02-06 RX ADMIN — VITAMIN D, TAB 1000IU (100/BT) 1000 UNITS: 25 TAB at 08:56

## 2019-02-06 NOTE — NURSING NOTE
Patient was aware of renal biopsy precautions.  Found patient sitting up in bed.  Again explained to him that he was not to get up until 0800 this morning per Dr. Vivar.  Patient stated he was fine and would lay back down in a few minutes.

## 2019-02-06 NOTE — DISCHARGE SUMMARY
Date of Discharge:  2/6/2019    Discharge Diagnosis: Proteinuria      Presenting Problem/History of Present Illness  Chronic kidney disease, stage III (moderate) (CMS/HCC) [N18.3]  Chronic kidney disease, stage III (moderate) (CMS/HCC) [N18.3]  Chronic kidney disease, stage III (moderate) (CMS/HCC) [N18.3]     Proteinuria     Hospital Course  Patient is a 73 y.o. male presented with CKD stage III and Proteinuria. He was here for elective CT guided renal biopsy. Procedure was done yesterday. No acute complication during and after the procedure. He was observed overnight. He has no complaints. Labs reviewed. He will be discharged to home in stable condition. He is to follow instruction about anticoagulation as planned by anticoagulation clinic. He is to resume bridging with Lovenox and warfarin.     Procedures Performed  Renal biopsy   -------------------  02/05 1306 Note By: Ishan Chase MD    Consults:   Consults     No orders found for last 30 day(s).          Pertinent Test Results: labs: Reviewed    Condition on Discharge:  Stable.     Vital Signs  Temp:  [97.3 °F (36.3 °C)-98.8 °F (37.1 °C)] 97.3 °F (36.3 °C)  Heart Rate:  [74-91] 77  Resp:  [16-20] 18  BP: (115-146)/() 130/81    Physical Exam:     General Appearance:    Alert, cooperative, in no acute distress   Head:    Normocephalic, without obvious abnormality, atraumatic               Neck:   No adenopathy, supple, trachea midline, no thyromegaly, no   carotid bruit, no JVD       Lungs:     Clear to auscultation,respirations regular, even and                  unlabored    Heart:    Regular rhythm and normal rate, normal S1 and S2, no            murmur, no gallop, no rub, no click       Abdomen:     Normal bowel sounds, no masses, no organomegaly, soft        non-tender, non-distended, no guarding, no rebound                tenderness       Extremities:   Moves all extremities well, no edema, no cyanosis, no             redness                Neurologic:   Cranial nerves 2 - 12 grossly intact, sensation intact, DTR       present and equal bilaterally       Discharge Disposition  Home or Self Care    Discharge Medications     Discharge Medications      Changes to Medications      Instructions Start Date   warfarin 2.5 MG tablet  Commonly known as:  COUMADIN  What changed:    · how much to take  · additional instructions   Take 1 tablet by mouth daily, as directed by the anticoagulation clinic.      warfarin 7.5 MG tablet  Commonly known as:  COUMADIN  What changed:  Another medication with the same name was changed. Make sure you understand how and when to take each.   TAKE ONE-HALF TO ONE TABLET BY MOUTH DAILY AS DIECTED BY ANTICOAGULATION PHARMACIST         Continue These Medications      Instructions Start Date   acetaminophen 500 MG tablet  Commonly known as:  TYLENOL   1,000 mg, Oral, Every 6 Hours PRN      amLODIPine 5 MG tablet  Commonly known as:  NORVASC   TAKE ONE TABLET BY MOUTH DAILY      atorvastatin 40 MG tablet  Commonly known as:  LIPITOR   TAKE ONE TABLET BY MOUTH DAILY      carvedilol 25 MG tablet  Commonly known as:  COREG   TAKE ONE TABLET BY MOUTH TWICE A DAY WITH MEALS      cholecalciferol 1000 units tablet  Commonly known as:  VITAMIN D3   1,000 Units, Oral, Daily      enoxaparin 150 MG/ML injection  Commonly known as:  LOVENOX   1 mg/kg, Subcutaneous, Daily, Must squeeze out 0.14mL to get 0.86mL prior to administration      Iron 325 (65 Fe) MG tablet   65 mg, Oral, Daily      loperamide 2 MG capsule  Commonly known as:  IMODIUM   2 mg, Oral, 4 Times Daily PRN      PHARMACY TO DOSE WARFARIN   Does not apply, Continuous PRN      torsemide 20 MG tablet  Commonly known as:  DEMADEX   20 mg, Oral, 2 Times Daily      vitamin B-12 1000 MCG tablet  Commonly known as:  CYANOCOBALAMIN   1,000 mcg, Oral, Daily         Stop These Medications    aspirin 81 MG EC tablet            Discharge Diet:     Activity at Discharge:   Activity  Instructions     Activity as Tolerated            Follow-up Appointments  Future Appointments   Date Time Provider Department Center   2/12/2019 10:45 AM ANTI COAG CLINIC  [facility] None   3/6/2019  9:45 AM Dustin Mehta MD [facility] None   5/1/2019 11:00 AM Johnathan Taylor MD [facility] None     Additional Instructions for the Follow-ups that You Need to Schedule     Protime-INR    Feb 11, 2019 (Approximate)      Is Patient on anti-coag:  Yes               Test Results Pending at Discharge   Order Current Status    Tissue Pathology Exam In process           Ishan Chase MD  02/06/19  10:48 AM    Time: Discharge 35 min

## 2019-02-06 NOTE — PROGRESS NOTES
Continued Stay Note  [facility]     Patient Name: Marco Antonio Montemayor  MRN: [id number]  Today's Date: 2/6/2019    Admit Date: 2/5/2019    Discharge Plan     Row Name 02/06/19 1211       Plan    Plan  Plan is home with spouse at discharge    Patient/Family in Agreement with Plan  yes    Plan Comments  Met with patient at bedside.  Lives with spouse in single story house with 2 steps at front door.  Independent with ADL.  Has CPAP, not other DME.  PCP is Rick Melgoza.  No discharge needs identified.      Final Discharge Disposition Code  01 - home or self-care        Discharge Codes    No documentation.       Expected Discharge Date and Time     Expected Discharge Date Expected Discharge Time    Feb 6, 2019             Dena Thomas RN

## 2019-02-06 NOTE — PLAN OF CARE
Problem: Kidney Disease, Chronic/End Stage Renal Disease (Adult)  Goal: Signs and Symptoms of Listed Potential Problems Will be Absent, Minimized or Managed (Kidney Disease, Chronic/End Stage Renal Disease)  Outcome: Ongoing (interventions implemented as appropriate)

## 2019-02-06 NOTE — PLAN OF CARE
Problem: Patient Care Overview  Goal: Plan of Care Review  Outcome: Ongoing (interventions implemented as appropriate)   02/06/19 3062   Coping/Psychosocial   Plan of Care Reviewed With patient   Plan of Care Review   Progress improving   OTHER   Outcome Summary Patient slept well throughout shift. Applied 2 L NC for sleep. VSS; UOP adequate. WIll continue to monitor.

## 2019-02-06 NOTE — PROGRESS NOTES
Case Management Discharge Note    Final Note: Met with patient at bedside.  Lives with spouse in single story house with 2 steps at front door.  Independent with ADL.  Has CPAP, not other DME.  PCP is Rick Melgoza.  No discharge needs identified.      Destination      No service has been selected for the patient.      Durable Medical Equipment      No service has been selected for the patient.      Dialysis/Infusion      No service has been selected for the patient.      Home Medical Care      No service has been selected for the patient.      Community Resources      No service has been selected for the patient.             Final Discharge Disposition Code: 01 - home or self-care

## 2019-02-12 ENCOUNTER — ANTICOAGULATION VISIT (OUTPATIENT)
Dept: PHARMACY | Facility: HOSPITAL | Age: 74
End: 2019-02-12

## 2019-02-12 DIAGNOSIS — I48.19 PERSISTENT ATRIAL FIBRILLATION (HCC): ICD-10-CM

## 2019-02-12 LAB
INR PPP: 1.7 (ref 0.91–1.09)
PROTHROMBIN TIME: 19.8 SECONDS (ref 10–13.8)

## 2019-02-12 PROCEDURE — 36416 COLLJ CAPILLARY BLOOD SPEC: CPT

## 2019-02-12 PROCEDURE — G0463 HOSPITAL OUTPT CLINIC VISIT: HCPCS

## 2019-02-12 PROCEDURE — 85610 PROTHROMBIN TIME: CPT

## 2019-02-12 NOTE — PROGRESS NOTES
Anticoagulation Clinic Progress Note  Indication: afib  Referring Provider: Tyson Roberts)  Initial Warfarin Start Date: 2007  Goal INR: 2-3  Current Drug Interactions: aspirin, amiodarone (start 10/1/18), torsemide  Diet: not eating at this point (10/29)  [id number]: 5 (HTN, Age, CHF, CAD)  Other: anemic, CKD (stage 3)     Anticoagulation Clinic INR History:  Date 11/14 12/6 1/25 2/22 3/22 4/11 5/9 6/8 6/29 8/3 8/31 9/14 9/28   Total Weekly Dose 37.5 mg 37.5 mg 37.5 mg 37.5 mg 37.5 mg 37.5 mg 37.5 mg 37.5mg 37.5 mg 37.5 mg 26.25mg 37.5 mg 37.5 mg   INR 2.4 2.1 2.0 3.1 2.4 2.6 2.7 2.2 2.5 3.0 1.7 3.2 2.3     Date 10/23 11/20 12/8 1/5 2/2 3/2 3/16 3/30 4/17 5/17 6/14 7/19 8/1 8/22 9/12   Total Weekly Dose 37.5mg 37.5 mg 37.5 mg 37.5 mg 37.5mg 37.5 mg 37.5 mg 37.5 mg 37.5mg 37.5 mg 37.5 mg 37.5mg 37.5 mg 37.5 mg 37.5 mg    INR 2.5 1.8 2.5 2.3 2.1 1.7 3.2 2.1 2.3 2.06 2.8 3.5 2.7 2.7 3.0   Notes      (boost), amox amox   PAT     diarrhea     Date 10/1 10/8 10/12 10/17 10/22 10/29 11/5 11/12 11/19 11/29 12/13 1/3 1/24 2/12    Total Weekly Dose 26.25 mg 23.75mg 22.5  mg 23.75 mg 27.5 mg 28.75 mg 27.5 mg 28.75 mg 28.75 mg 28.75 mg 28.75 28.75mg 28.75 mg 17.5 mg    INR 3.0 2.1 1.6 1.3 1.6 2.8 2.0 2.5 2.4 2.76 2.8 2.7 2.9 1.7    Notes post- disch amio start 10/1        PAT   Held x 7 days; resumed 1/22       Clinic Interview:  Verbal Release Authorization signed on 8/22/2018 -- may speak with Raeann Montemayor (Wife). [phone] (Home) *Preferred*  Tablet Strength: pt has 7.5mg and 2.5mg tablets    Patient Findings:  Positives:  Change in medications, Change in diet/appetite   Negatives:  Signs/symptoms of thrombosis, Signs/symptoms of bleeding, Laboratory test error suspected, Change in health, Change in alcohol use, Change in activity, Upcoming invasive procedure, Emergency department visit, Upcoming dental procedure, Missed doses, Extra doses, Hospital admission, Bruising, Other complaints   Comments:  Mr. Montemayor's  procedures went well -- he reports he restarted warfarin 4 days ago (vs 6 days ago, per our schedule). He has been using Lovenox as instructed, and he denies issues with bleeding or abnormal bruising at this time. He has been instructed to stop amlodipine, BUT if BP is above 140/90 consistently, then he is instructed to restart it. If his SBP is less than 100, he is also instructed to HOLD his evening dose of torsemide (these instructions provided today per Dr. Judi Mena with [facility]). Mr. Montemayor checks his BP about once daily. Dr. Chase also stopped aspirin 81mg when Mr. Montemayor was discharged last week (2/6/19).   Mr. Montemayor is trying to make some dietary modifications (renal diet) -- he is eating less red meat, more fish; less Na, K, Phos. He has been doing this since early Nov.     Plan:   1. INR is subtherapeutic today post-procedure. Instructed Mr. Montemayor to take warfarin 3.75mg daily except 5mg TuesFri this week; take 3 more doses of Lovenox (TuesWedThurs -- he'll then have 2 doses remaining).  2. RTC on Monday to reassess.  3. Verbal and written information provided. Mr. Montemayor expresses understanding with teach back and has no further questions at this time.     Ann Cowden Mayer, PharmD  2/12/2019  10:33 AM

## 2019-02-18 ENCOUNTER — ANTICOAGULATION VISIT (OUTPATIENT)
Dept: PHARMACY | Facility: HOSPITAL | Age: 74
End: 2019-02-18

## 2019-02-18 DIAGNOSIS — I48.19 PERSISTENT ATRIAL FIBRILLATION (HCC): ICD-10-CM

## 2019-02-18 LAB
INR PPP: 1.5 (ref 0.91–1.09)
LAB AP CASE REPORT: NORMAL
PATH REPORT.FINAL DX SPEC: NORMAL
PROTHROMBIN TIME: 17.4 SECONDS (ref 10–13.8)

## 2019-02-18 PROCEDURE — 85610 PROTHROMBIN TIME: CPT

## 2019-02-18 PROCEDURE — 36416 COLLJ CAPILLARY BLOOD SPEC: CPT

## 2019-02-18 PROCEDURE — G0463 HOSPITAL OUTPT CLINIC VISIT: HCPCS

## 2019-02-18 NOTE — PROGRESS NOTES
Anticoagulation Clinic Progress Note  Indication: afib  Referring Provider: Tyson Roberts)  Initial Warfarin Start Date: 2007  Goal INR: 2-3  Current Drug Interactions: aspirin, amiodarone (start 10/1/18), torsemide  Diet: not eating at this point (10/29)  [id number]: 5 (HTN, Age, CHF, CAD)  Other: anemic, CKD (stage 3)     Anticoagulation Clinic INR History:  Date 11/14 12/6 1/25 2/22 3/22 4/11 5/9 6/8 6/29 8/3 8/31 9/14 9/28   Total Weekly Dose 37.5 mg 37.5 mg 37.5 mg 37.5 mg 37.5 mg 37.5 mg 37.5 mg 37.5mg 37.5 mg 37.5 mg 26.25mg 37.5 mg 37.5 mg   INR 2.4 2.1 2.0 3.1 2.4 2.6 2.7 2.2 2.5 3.0 1.7 3.2 2.3     Date 10/23 11/20 12/8 1/5 2/2 3/2 3/16 3/30 4/17 5/17 6/14 7/19 8/1 8/22 9/12   Total Weekly Dose 37.5mg 37.5 mg 37.5 mg 37.5 mg 37.5mg 37.5 mg 37.5 mg 37.5 mg 37.5mg 37.5 mg 37.5 mg 37.5mg 37.5 mg 37.5 mg 37.5 mg    INR 2.5 1.8 2.5 2.3 2.1 1.7 3.2 2.1 2.3 2.06 2.8 3.5 2.7 2.7 3.0   Notes      (boost), amox amox   PAT     diarrhea     Date 10/1 10/8 10/12 10/17 10/22 10/29 11/5 11/12 11/19 11/29 12/13 1/3 1/24 2/12 2/18   Total Weekly Dose 26.25 mg 23.75mg 22.5  mg 23.75 mg 27.5 mg 28.75 mg 27.5 mg 28.75 mg 28.75 mg 28.75 mg 28.75 28.75mg 28.75 mg 17.5 mg 28.75mg   INR 3.0 2.1 1.6 1.3 1.6 2.8 2.0 2.5 2.4 2.76 2.8 2.7 2.9 1.7 1.5   Notes post- disch amio start 10/1        PAT   Held x 7 days; resumed 1/22 Asa dc'd torsemide reduced,      Clinic Interview:  Verbal Release Authorization signed on 8/22/2018 -- may speak with Raeann Montemayor (Wife). [phone] (Home) *Preferred*  Tablet Strength: pt has 7.5mg and 2.5mg tablets    Patient Findings   Positives:  Change in medications, Change in diet/appetite   Negatives:  Signs/symptoms of thrombosis, Signs/symptoms of bleeding, Laboratory test error suspected, Change in health, Change in alcohol use, Change in activity, Upcoming invasive procedure, Emergency department visit, Upcoming dental procedure, Missed doses, Extra doses, Hospital admission, Bruising,  Other complaints   Comments:  Dr. Chase also stopped aspirin 81mg when Mr. Montemayor was discharged last week (2/6/19).  On 2/12, Dr. Judi Mena with [facility] then reduced his torsemide to once daily and stopped his amlodipine.     He confirmed that he took an enoxaparin injection daily for 3 days last week, 2/12 -2/14.   Due to having CKD III, he has also been changing his diet quite a bit, including reducing sodium.  He stated that he has lost about 30 pounds since September.  He stated that he is careful about his intake of foods containing vitamin K.  He did have a salad on Valentine's Day.         Plan:   1. INR is subtherapeutic again today. He is post-procedure with multiple med changes. Instructed Mr. Montemayor to boost warfarin 5 mg oral daily except warfarin 3.75 mg on Thursday prior to recheck on Friday.  Instructed Mr. Montemayor to take 2 more doses of Lovenox (MonTues -- last of his supply)  May consider increasing maintenance dose to warfarin 3.75 mg daily except warfarin 5 mg on MonWedFri (30 mg/week, 4.3%inc)  2. RTC on Friday to reassess.  3. Verbal and written information provided. Mr. Montemayor expresses understanding with teach back and has no further questions at this time.     Charlotte Michael, PharmD  2/18/2019  11:41 AM

## 2019-02-22 ENCOUNTER — ANTICOAGULATION VISIT (OUTPATIENT)
Dept: PHARMACY | Facility: HOSPITAL | Age: 74
End: 2019-02-22

## 2019-02-22 DIAGNOSIS — I48.19 PERSISTENT ATRIAL FIBRILLATION (HCC): ICD-10-CM

## 2019-02-22 LAB
INR PPP: 1.6 (ref 0.91–1.09)
PROTHROMBIN TIME: 19.1 SECONDS (ref 10–13.8)

## 2019-02-22 PROCEDURE — G0463 HOSPITAL OUTPT CLINIC VISIT: HCPCS | Performed by: PHARMACIST

## 2019-02-22 PROCEDURE — 85610 PROTHROMBIN TIME: CPT

## 2019-02-22 PROCEDURE — 36416 COLLJ CAPILLARY BLOOD SPEC: CPT

## 2019-02-22 NOTE — PROGRESS NOTES
Anticoagulation Clinic Progress Note  Indication: afib  Referring Provider: Tyson Roberts)  Initial Warfarin Start Date: 2007  Goal INR: 2-3  Current Drug Interactions: aspirin, amiodarone (start 10/1/18), torsemide  Diet: not eating at this point (10/29)  [id number]: 5 (HTN, Age, CHF, CAD)  Other: anemic, CKD (stage 3)     Anticoagulation Clinic INR History:  Date 11/14 12/6 1/25 2/22 3/22 4/11 5/9 6/8 6/29 8/3 8/31 9/14 9/28   Total Weekly Dose 37.5 mg 37.5 mg 37.5 mg 37.5 mg 37.5 mg 37.5 mg 37.5 mg 37.5mg 37.5 mg 37.5 mg 26.25mg 37.5 mg 37.5 mg   INR 2.4 2.1 2.0 3.1 2.4 2.6 2.7 2.2 2.5 3.0 1.7 3.2 2.3     Date 10/23 11/20 12/8 1/5 2/2 3/2 3/16 3/30 4/17 5/17 6/14 7/19 8/1 8/22 9/12   Total Weekly Dose 37.5mg 37.5 mg 37.5 mg 37.5 mg 37.5mg 37.5 mg 37.5 mg 37.5 mg 37.5mg 37.5 mg 37.5 mg 37.5mg 37.5 mg 37.5 mg 37.5 mg    INR 2.5 1.8 2.5 2.3 2.1 1.7 3.2 2.1 2.3 2.06 2.8 3.5 2.7 2.7 3.0   Notes      (boost), amox amox   PAT     diarrhea     Date 10/1 10/8 10/12 10/17 10/22 10/29 11/5 11/12 11/19 11/29 12/13 1/3 1/24 2/12 2/18   Total Weekly Dose 26.25 mg 23.75mg 22.5  mg 23.75 mg 27.5 mg 28.75 mg 27.5 mg 28.75 mg 28.75 mg 28.75 mg 28.75 28.75mg 28.75 mg 17.5 mg 28.75mg   INR 3.0 2.1 1.6 1.3 1.6 2.8 2.0 2.5 2.4 2.76 2.8 2.7 2.9 1.7 1.5   Notes post- disch amio start 10/1        PAT   Held x 7 days; resumed 1/22 Asa dc'd torsemide reduced,      Date 2/22               Total Weekly Dose 31.75mg               INR 1.6               Notes                      Clinic Interview:  Verbal Release Authorization signed on 8/22/2018 -- may speak with Raeann Montemayor (Wife). [phone] (Home) *Preferred*  Tablet Strength: pt has 7.5mg and 2.5mg tablets    Patient Findings   Positives:  Change in medications   Negatives:  Signs/symptoms of thrombosis, Signs/symptoms of bleeding, Laboratory test error suspected, Change in health, Change in alcohol use, Change in activity, Upcoming invasive procedure, Emergency department  visit, Upcoming dental procedure, Missed doses, Extra doses, Change in diet/appetite, Hospital admission, Bruising, Other complaints   Comments:  Dr. Chase also stopped aspirin 81mg when Mr. Montemayor was discharged last week (2/6/19).  On 2/12, Dr. Judi Mena with [facility] then reduced his torsemide to once daily and stopped his amlodipine.     He confirmed that he took an enoxaparin injection daily for 3 days last week, 2/12 -2/14.   Patient states he used lovenox shots Monday and Tuesday of this week (2/18-2/19). He has been avoiding LGV from his diet for about 1 month. Patient is out of Lovenox and would like a new prescription called in to [facility] He states that due to the high price if we could just call in a prescription for 3 syringes with refills so that he does not have any extra that go to waste. Patient also states he normally takes his warfarin in the AM     Takes Warfarin in the AM.    Plan:   1. INR is subtherapeutic again today at 1.6 and has been low since his procedure. Instructed Mr. Montemayor to boost warfarin 5 mg oral daily on Friday, Saturday, Sunday, and Monday (2/22-2/25). Patient also agreed to use Lovenox on Friday-Sunday (2/22-2/24). Patient normally takes his warfarin in the AM so I instructed him to wait to take his dose on Tuesday 2/26 until after his visit to the clinic at 9:30am.   2. RTC on Tuesday 2/26 to reassess.  3. Verbal and written information provided. Mr. Montemayor expresses understanding with teach back and has no further questions at this time.  4. Patient needs refills of Lovenox called in to [facility]. He requests if we only call in 3 syringes with refills instead of entire box due to the high cost and they do not go to waste if possible.        Bereket Almanzar, PharmD Candidate 2019  2/22/2019  9:46 AM     Sent lovenox to [facility] #3 with 2 refills.  I, Peng Melgar, [facility], have reviewed the note in full and agree  with the assessment and plan.  02/22/19  11:11 AM

## 2019-02-25 ENCOUNTER — CLINICAL SUPPORT NO REQUIREMENTS (OUTPATIENT)
Dept: CARDIOLOGY | Facility: CLINIC | Age: 74
End: 2019-02-25

## 2019-02-25 DIAGNOSIS — I48.19 PERSISTENT ATRIAL FIBRILLATION (HCC): Chronic | ICD-10-CM

## 2019-02-25 DIAGNOSIS — R00.1 BRADYCARDIA: ICD-10-CM

## 2019-02-25 PROCEDURE — 93296 REM INTERROG EVL PM/IDS: CPT | Performed by: NURSE PRACTITIONER

## 2019-02-25 PROCEDURE — 93294 REM INTERROG EVL PM/LDLS PM: CPT | Performed by: NURSE PRACTITIONER

## 2019-02-26 ENCOUNTER — ANTICOAGULATION VISIT (OUTPATIENT)
Dept: PHARMACY | Facility: HOSPITAL | Age: 74
End: 2019-02-26

## 2019-02-26 DIAGNOSIS — I48.19 PERSISTENT ATRIAL FIBRILLATION (HCC): ICD-10-CM

## 2019-02-26 LAB
INR PPP: 2 (ref 0.91–1.09)
PROTHROMBIN TIME: 24.1 SECONDS (ref 10–13.8)

## 2019-02-26 PROCEDURE — G0463 HOSPITAL OUTPT CLINIC VISIT: HCPCS

## 2019-02-26 PROCEDURE — 85610 PROTHROMBIN TIME: CPT

## 2019-02-26 PROCEDURE — 36416 COLLJ CAPILLARY BLOOD SPEC: CPT

## 2019-02-26 NOTE — PROGRESS NOTES
Anticoagulation Clinic Progress Note  Indication: afib  Referring Provider: Tyson Roberts)  Initial Warfarin Start Date: 2007  Goal INR: 2-3  Current Drug Interactions: aspirin, torsemide  Diet: not eating at this point (10/29)  [id number]: 5 (HTN, Age, CHF, CAD)  Other: anemic, CKD (stage 3) May need bridge with Lovenox given persistent AF (per cardio 1/24/2019)    Anticoagulation Clinic INR History:  Date 11/14 12/6 1/25 2/22 3/22 4/11 5/9 6/8 6/29 8/3 8/31 9/14 9/28   Total Weekly Dose 37.5 mg 37.5 mg 37.5 mg 37.5 mg 37.5 mg 37.5 mg 37.5 mg 37.5mg 37.5 mg 37.5 mg 26.25mg 37.5 mg 37.5 mg   INR 2.4 2.1 2.0 3.1 2.4 2.6 2.7 2.2 2.5 3.0 1.7 3.2 2.3     Date 10/23 11/20 12/8 1/5 2/2 3/2 3/16 3/30 4/17 5/17 6/14 7/19 8/1 8/22 9/12   Total Weekly Dose 37.5mg 37.5 mg 37.5 mg 37.5 mg 37.5mg 37.5 mg 37.5 mg 37.5 mg 37.5mg 37.5 mg 37.5 mg 37.5mg 37.5 mg 37.5 mg 37.5 mg    INR 2.5 1.8 2.5 2.3 2.1 1.7 3.2 2.1 2.3 2.06 2.8 3.5 2.7 2.7 3.0   Notes      (boost), amox amox   PAT     diarrhea     Date 10/1 10/8 10/12 10/17 10/22 10/29 11/5 11/12 11/19 11/29 12/13 1/3 1/24 2/12 2/18   Total Weekly Dose 26.25 mg 23.75mg 22.5  mg 23.75 mg 27.5 mg 28.75 mg 27.5 mg 28.75 mg 28.75 mg 28.75 mg 28.75 28.75mg 28.75 mg 17.5 mg 28.75mg   INR 3.0 2.1 1.6 1.3 1.6 2.8 2.0 2.5 2.4 2.76 2.8 2.7 2.9 1.7 1.5   Notes post- disch amio start 10/1        PAT   Held x 7 days; resumed 1/22; D/C amio Asa dc'd torsemide reduced,      Date 2/22 2/26              Total Weekly Dose 31.75 mg 33.75              INR 1.6 2.0              Notes  [facility]                Clinic Interview:  Verbal Release Authorization signed on 8/22/2018 -- may speak with Raeann Montemayor (Wife). [phone] (Home) *Preferred*  Tablet Strength: pt has 7.5mg and 2.5mg tablets    Patient Findings   Positives:  Change in medications   Negatives:  Signs/symptoms of thrombosis, Signs/symptoms of bleeding, Laboratory test error suspected, Change in health, Change in alcohol use, Change  in activity, Upcoming invasive procedure, Emergency department visit, Upcoming dental procedure, Missed doses, Extra doses, Change in diet/appetite, Hospital admission, Bruising, Other complaints   Comments:  Patient discontinued amiodarone at the end of January per the cardiology note on 1/24/19. 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 No other complications at this point.   Takes Warfarin in the AM.    Plan:   1. INR is therapeutic today following dose increase after discontinuation of amiodarone on 1/24. Will continue to increase dose back to previous therapeutic dose of 37.5mg/week. At this time, will increase his dose to 5mg daily.    2. RTC on Wednesday 3/6 with appointment with Dr. Mehta.  3. Verbal and written information provided. Mr. Montemayor expresses understanding with teach back and has no further questions at this time.    Evelina Quan, PharmD  2/26/2019  9:51 AM

## 2019-03-06 ENCOUNTER — ANTICOAGULATION VISIT (OUTPATIENT)
Dept: PHARMACY | Facility: HOSPITAL | Age: 74
End: 2019-03-06

## 2019-03-06 ENCOUNTER — OFFICE VISIT (OUTPATIENT)
Dept: CARDIOLOGY | Facility: CLINIC | Age: 74
End: 2019-03-06

## 2019-03-06 VITALS
HEIGHT: 70 IN | SYSTOLIC BLOOD PRESSURE: 133 MMHG | BODY MASS INDEX: 39.51 KG/M2 | WEIGHT: 276 LBS | DIASTOLIC BLOOD PRESSURE: 74 MMHG | HEART RATE: 77 BPM

## 2019-03-06 DIAGNOSIS — I10 ESSENTIAL HYPERTENSION: Chronic | ICD-10-CM

## 2019-03-06 DIAGNOSIS — I25.9 ISCHEMIC HEART DISEASE: ICD-10-CM

## 2019-03-06 DIAGNOSIS — I48.19 PERSISTENT ATRIAL FIBRILLATION (HCC): ICD-10-CM

## 2019-03-06 DIAGNOSIS — E78.5 DYSLIPIDEMIA: Chronic | ICD-10-CM

## 2019-03-06 DIAGNOSIS — N18.4 STAGE 4 CHRONIC KIDNEY DISEASE (HCC): ICD-10-CM

## 2019-03-06 DIAGNOSIS — I11.0 HYPERTENSIVE HEART DISEASE WITH CHRONIC COMBINED SYSTOLIC AND DIASTOLIC CONGESTIVE HEART FAILURE (HCC): ICD-10-CM

## 2019-03-06 DIAGNOSIS — I50.42 HYPERTENSIVE HEART DISEASE WITH CHRONIC COMBINED SYSTOLIC AND DIASTOLIC CONGESTIVE HEART FAILURE (HCC): ICD-10-CM

## 2019-03-06 DIAGNOSIS — I48.19 PERSISTENT ATRIAL FIBRILLATION (HCC): Primary | Chronic | ICD-10-CM

## 2019-03-06 LAB
INR PPP: 2.8 (ref 0.91–1.09)
PROTHROMBIN TIME: 33.6 SECONDS (ref 10–13.8)

## 2019-03-06 PROCEDURE — G0463 HOSPITAL OUTPT CLINIC VISIT: HCPCS

## 2019-03-06 PROCEDURE — 36416 COLLJ CAPILLARY BLOOD SPEC: CPT

## 2019-03-06 PROCEDURE — 99213 OFFICE O/P EST LOW 20 MIN: CPT | Performed by: INTERNAL MEDICINE

## 2019-03-06 PROCEDURE — 85610 PROTHROMBIN TIME: CPT

## 2019-03-06 NOTE — PROGRESS NOTES
Anticoagulation Clinic Progress Note  Indication: afib  Referring Provider: Tyson Roberts)  Initial Warfarin Start Date: 2007  Goal INR: 2-3  Current Drug Interactions: aspirin, torsemide  Diet: not eating Vit K foods at this point (3/6/19)  [id number]: 5 (HTN, Age, CHF, CAD)  Other: anemic, CKD (stage 3) May need bridge with Lovenox given persistent AF (per cardio 1/24/2019)    Anticoagulation Clinic INR History:  Date 11/14 12/6 1/25 2/22 3/22 4/11 5/9 6/8 6/29 8/3 8/31 9/14 9/28   Total Weekly Dose 37.5 mg 37.5 mg 37.5 mg 37.5 mg 37.5 mg 37.5 mg 37.5 mg 37.5mg 37.5 mg 37.5 mg 26.25mg 37.5 mg 37.5 mg   INR 2.4 2.1 2.0 3.1 2.4 2.6 2.7 2.2 2.5 3.0 1.7 3.2 2.3     Date 10/23 11/20 12/8 1/5 2/2 3/2 3/16 3/30 4/17 5/17 6/14 7/19 8/1 8/22 9/12   Total Weekly Dose 37.5mg 37.5 mg 37.5 mg 37.5 mg 37.5mg 37.5 mg 37.5 mg 37.5 mg 37.5mg 37.5 mg 37.5 mg 37.5mg 37.5 mg 37.5 mg 37.5 mg    INR 2.5 1.8 2.5 2.3 2.1 1.7 3.2 2.1 2.3 2.06 2.8 3.5 2.7 2.7 3.0   Notes      (boost), amox amox   PAT     diarrhea     Date 10/1 10/8 10/12 10/17 10/22 10/29 11/5 11/12 11/19 11/29 12/13 1/3 1/24 2/12 2/18   Total Weekly Dose 26.25 mg 23.75mg 22.5  mg 23.75 mg 27.5 mg 28.75 mg 27.5 mg 28.75 mg 28.75 mg 28.75 mg 28.75 28.75mg 28.75 mg 17.5 mg 28.75mg   INR 3.0 2.1 1.6 1.3 1.6 2.8 2.0 2.5 2.4 2.76 2.8 2.7 2.9 1.7 1.5   Notes post- disch amio start 10/1        PAT   Held x 7 days; resumed 1/22; D/C amio Asa dc'd torsemide reduced,      Date 2/22 2/26 3/6             Total Weekly Dose 31.75 mg 33.75 35mg             INR 1.6 2.0 2.8             Notes  [facility]                Clinic Interview:  Verbal Release Authorization signed on 8/22/2018 -- may speak with Raeann Montemayor (Wife). [phone] (Home) *Preferred*  Tablet Strength: pt has 7.5mg and 2.5mg tablets    Patient Findings   Negatives:  Signs/symptoms of thrombosis, Signs/symptoms of bleeding, Laboratory test error suspected, Change in health, Change in alcohol use, Change in activity,  Upcoming invasive procedure, Emergency department visit, Upcoming dental procedure, Missed doses, Extra doses, Change in medications, Change in diet/appetite, Hospital admission, Bruising, Other complaints   Comments:  Mr. Montemayor saw Dr. Mehta today and will return in 6 months. No changes at this time. He is attempting to loose weight but denies changes to GLV. He reports all patient findings to be negative.      Takes Warfarin in the AM.    Plan:   1. INR is therapeutic today following dose increase after discontinuation of amiodarone on 1/24. Of note prior to use of amiodarone patient was therapeutic on maintenance dose reaching 35.7mg/week. However, given upward trend in INR, will decrease his dose to 5mg daily except 3.75mg Wed.    2. RTC on Friday 3/15.   3. Verbal and written information provided in the clinic. Mr. Montemayor expresses understanding with teach back and has no further questions at this time.    Evelina Quan, PharmD  3/6/2019  10:52 AM

## 2019-03-15 ENCOUNTER — ANTICOAGULATION VISIT (OUTPATIENT)
Dept: PHARMACY | Facility: HOSPITAL | Age: 74
End: 2019-03-15

## 2019-03-15 DIAGNOSIS — I48.19 PERSISTENT ATRIAL FIBRILLATION (HCC): ICD-10-CM

## 2019-03-15 LAB
INR PPP: 2.8 (ref 0.91–1.09)
PROTHROMBIN TIME: 34.1 SECONDS (ref 10–13.8)

## 2019-03-15 PROCEDURE — G0463 HOSPITAL OUTPT CLINIC VISIT: HCPCS

## 2019-03-15 PROCEDURE — 36416 COLLJ CAPILLARY BLOOD SPEC: CPT

## 2019-03-15 PROCEDURE — 85610 PROTHROMBIN TIME: CPT

## 2019-03-15 NOTE — PROGRESS NOTES
Anticoagulation Clinic Progress Note  Indication: afib  Referring Provider: Tyson Roberts)  Initial Warfarin Start Date: 2007  Goal INR: 2-3  Current Drug Interactions: aspirin, torsemide  Diet: not eating Vit K foods at this point (3/6/19)  [id number]: 5 (HTN, Age, CHF, CAD)  Other: anemic, CKD (stage 3) May need bridge with Lovenox given persistent AF (per cardio 1/24/2019)    Anticoagulation Clinic INR History:  Date 11/14 12/6 1/25 2/22 3/22 4/11 5/9 6/8 6/29 8/3 8/31 9/14 9/28   Total Weekly Dose 37.5 mg 37.5 mg 37.5 mg 37.5 mg 37.5 mg 37.5 mg 37.5 mg 37.5mg 37.5 mg 37.5 mg 26.25mg 37.5 mg 37.5 mg   INR 2.4 2.1 2.0 3.1 2.4 2.6 2.7 2.2 2.5 3.0 1.7 3.2 2.3     Date 10/23 11/20 12/8 1/5 2/2 3/2 3/16 3/30 4/17 5/17 6/14 7/19 8/1 8/22 9/12   Total Weekly Dose 37.5mg 37.5 mg 37.5 mg 37.5 mg 37.5mg 37.5 mg 37.5 mg 37.5 mg 37.5mg 37.5 mg 37.5 mg 37.5mg 37.5 mg 37.5 mg 37.5 mg    INR 2.5 1.8 2.5 2.3 2.1 1.7 3.2 2.1 2.3 2.06 2.8 3.5 2.7 2.7 3.0   Notes      (boost), amox amox   PAT     diarrhea     Date 10/1 10/8 10/12 10/17 10/22 10/29 11/5 11/12 11/19 11/29 12/13 1/3 1/24 2/12 2/18   Total Weekly Dose 26.25 mg 23.75mg 22.5  mg 23.75 mg 27.5 mg 28.75 mg 27.5 mg 28.75 mg 28.75 mg 28.75 mg 28.75 28.75mg 28.75 mg 17.5 mg 28.75mg   INR 3.0 2.1 1.6 1.3 1.6 2.8 2.0 2.5 2.4 2.76 2.8 2.7 2.9 1.7 1.5   Notes post- disch amio start 10/1        PAT   held x 7 days; resumed 1/22; D/C amio asa dc'd torsemide reduced,      Date 2/22 2/26 3/6 3/15            Total Weekly Dose 31.75 mg 33.75 mg 35mg 33.75 mg            INR 1.6 2.0 2.8 2.8            Notes  [facility]                Clinic Interview:  Verbal Release Authorization signed on 8/22/2018 -- may speak with Raeann Montemayor (Wife). [phone] (Home) *Preferred*  Tablet Strength: pt has 7.5mg and 2.5mg tablets    Patient Findings:  Negatives:  Signs/symptoms of thrombosis, Signs/symptoms of bleeding, Laboratory test error suspected, Change in health, Change in alcohol use,  Change in activity, Upcoming invasive procedure, Emergency department visit, Upcoming dental procedure, Missed doses, Extra doses, Change in medications, Change in diet/appetite, Hospital admission, Bruising, Other complaints   Comments:  No changes from last encounter.      Takes warfarin in the AM    Plan:   1. INR is therapeutic today at 2.8, so instructed Mr. Montemayor to continue warfarin 5mg daily except 3.75mg Wed.    2. RTC in 2 weeks to ensure INR remains WNL.   3. Verbal and written information provided in the clinic. Mr. Montemayor expresses understanding with teach back and has no further questions at this time.    Bereket Almanzar, PharmD Candidate 2019  3/15/2019  10:51 AM     IShanell, [facility], have reviewed the note in full and agree with the assessment and plan.  03/15/19  11:05 AM

## 2019-03-25 RX ORDER — WARFARIN SODIUM 7.5 MG/1
TABLET ORAL
Qty: 90 TABLET | Refills: 1 | Status: SHIPPED | OUTPATIENT
Start: 2019-03-25 | End: 2019-05-01

## 2019-03-29 ENCOUNTER — ANTICOAGULATION VISIT (OUTPATIENT)
Dept: PHARMACY | Facility: HOSPITAL | Age: 74
End: 2019-03-29

## 2019-03-29 DIAGNOSIS — I48.19 PERSISTENT ATRIAL FIBRILLATION (HCC): ICD-10-CM

## 2019-03-29 LAB
INR PPP: 3.1 (ref 0.91–1.09)
PROTHROMBIN TIME: 36.7 SECONDS (ref 10–13.8)

## 2019-03-29 PROCEDURE — 85610 PROTHROMBIN TIME: CPT

## 2019-03-29 PROCEDURE — G0463 HOSPITAL OUTPT CLINIC VISIT: HCPCS

## 2019-03-29 PROCEDURE — 36416 COLLJ CAPILLARY BLOOD SPEC: CPT

## 2019-03-29 NOTE — PROGRESS NOTES
Anticoagulation Clinic Progress Note  Indication: afib  Referring Provider: Tyson Roberts)  Initial Warfarin Start Date: 2007  Goal INR: 2-3  Current Drug Interactions: aspirin, torsemide  Diet: not eating Vit K foods at this point (3/6/19)  [id number]: 5 (HTN, Age, CHF, CAD)  Other: anemic, CKD (stage 3) May need bridge with Lovenox given persistent AF (per cardio 1/24/2019)    Anticoagulation Clinic INR History:  Date 11/14 12/6 1/25 2/22 3/22 4/11 5/9 6/8 6/29 8/3 8/31 9/14 9/28   Total Weekly Dose 37.5 mg 37.5 mg 37.5 mg 37.5 mg 37.5 mg 37.5 mg 37.5 mg 37.5mg 37.5 mg 37.5 mg 26.25mg 37.5 mg 37.5 mg   INR 2.4 2.1 2.0 3.1 2.4 2.6 2.7 2.2 2.5 3.0 1.7 3.2 2.3     Date 10/23 11/20 12/8 1/5 2/2 3/2 3/16 3/30 4/17 5/17 6/14 7/19 8/1 8/22 9/12   Total Weekly Dose 37.5mg 37.5 mg 37.5 mg 37.5 mg 37.5mg 37.5 mg 37.5 mg 37.5 mg 37.5mg 37.5 mg 37.5 mg 37.5mg 37.5 mg 37.5 mg 37.5 mg    INR 2.5 1.8 2.5 2.3 2.1 1.7 3.2 2.1 2.3 2.06 2.8 3.5 2.7 2.7 3.0   Notes      (boost), amox amox   PAT     diarrhea     Date 10/1 10/8 10/12 10/17 10/22 10/29 11/5 11/12 11/19 11/29 12/13 1/3 1/24 2/12 2/18   Total Weekly Dose 26.25 mg 23.75mg 22.5  mg 23.75 mg 27.5 mg 28.75 mg 27.5 mg 28.75 mg 28.75 mg 28.75 mg 28.75 28.75mg 28.75 mg 17.5 mg 28.75mg   INR 3.0 2.1 1.6 1.3 1.6 2.8 2.0 2.5 2.4 2.76 2.8 2.7 2.9 1.7 1.5   Notes post- disch amio start 10/1        PAT   held x 7 days; resumed 1/22; D/C amio asa dc'd torsemide reduced,      Date 2/22 2/26 3/6 3/15 3/29           Total Weekly Dose 31.75 mg 33.75 mg 35mg 33.75 mg 33.75 mg           INR 1.6 2.0 2.8 2.8 3.1           Notes  [facility]                Clinic Interview:  Verbal Release Authorization signed on 8/22/2018 -- may speak with Raeann Montemayor (Wife). [phone] (Home) *Preferred*  Tablet Strength: pt has 7.5mg and 2.5mg tablets    Patient Findings:  Negatives:  Signs/symptoms of thrombosis, Signs/symptoms of bleeding, Laboratory test error suspected, Change in health, Change in  alcohol use, Change in activity, Upcoming invasive procedure, Emergency department visit, Upcoming dental procedure, Missed doses, Extra doses, Change in medications, Change in diet/appetite, Hospital admission, Bruising, Other complaints   Comments:  Mr. Montemayor's biopsy spot is healing well -- no problems with bleeding, some occasional drainage.      Takes warfarin in the AM    Plan:   1. INR is slightly supratherapeutic today. Instructed Mr. Montemayor to have a serving of vitamin K tonight (salad vs green beans) and continue warfarin 5mg daily except 3.75mg Wed.    2. RTC in two weeks to reassess.  3. Verbal and written information provided in the clinic. Mr. Montemayor expresses understanding with teach back and has no further questions at this time.    Ann Cowden Mayer, PharmD  3/29/2019  10:49 AM

## 2019-04-04 ENCOUNTER — RESULTS ENCOUNTER (OUTPATIENT)
Dept: CARDIOLOGY | Facility: CLINIC | Age: 74
End: 2019-04-04

## 2019-04-04 DIAGNOSIS — E78.5 DYSLIPIDEMIA: ICD-10-CM

## 2019-04-04 DIAGNOSIS — E78.5 DYSLIPIDEMIA: Primary | ICD-10-CM

## 2019-04-04 RX ORDER — ATORVASTATIN CALCIUM 40 MG/1
TABLET, FILM COATED ORAL
Qty: 30 TABLET | Refills: 1 | Status: SHIPPED | OUTPATIENT
Start: 2019-04-04 | End: 2019-06-09 | Stop reason: SDUPTHER

## 2019-04-12 ENCOUNTER — ANTICOAGULATION VISIT (OUTPATIENT)
Dept: PHARMACY | Facility: HOSPITAL | Age: 74
End: 2019-04-12

## 2019-04-12 DIAGNOSIS — I48.19 PERSISTENT ATRIAL FIBRILLATION (HCC): ICD-10-CM

## 2019-04-12 LAB
INR PPP: 3.1 (ref 0.91–1.09)
PROTHROMBIN TIME: 37.2 SECONDS (ref 10–13.8)

## 2019-04-12 PROCEDURE — 36416 COLLJ CAPILLARY BLOOD SPEC: CPT

## 2019-04-12 PROCEDURE — G0463 HOSPITAL OUTPT CLINIC VISIT: HCPCS

## 2019-04-12 PROCEDURE — 85610 PROTHROMBIN TIME: CPT

## 2019-04-12 NOTE — PROGRESS NOTES
Anticoagulation Clinic Progress Note  Indication: afib  Referring Provider: Tyson Roberts)  Initial Warfarin Start Date: 2007  Goal INR: 2-3  Current Drug Interactions: aspirin, torsemide  Diet: not eating Vit K foods at this point (3/6/19)  [id number]: 5 (HTN, Age, CHF, CAD)  Other: anemic, CKD (stage 3) May need bridge with Lovenox given persistent AF (per cardio 1/24/2019)    Anticoagulation Clinic INR History:  Date 11/14 12/6 1/25 2/22 3/22 4/11 5/9 6/8 6/29 8/3 8/31 9/14 9/28   Total Weekly Dose 37.5 mg 37.5 mg 37.5 mg 37.5 mg 37.5 mg 37.5 mg 37.5 mg 37.5mg 37.5 mg 37.5 mg 26.25mg 37.5 mg 37.5 mg   INR 2.4 2.1 2.0 3.1 2.4 2.6 2.7 2.2 2.5 3.0 1.7 3.2 2.3     Date 10/23 11/20 12/8 1/5 2/2 3/2 3/16 3/30 4/17 5/17 6/14 7/19 8/1 8/22 9/12   Total Weekly Dose 37.5mg 37.5 mg 37.5 mg 37.5 mg 37.5mg 37.5 mg 37.5 mg 37.5 mg 37.5mg 37.5 mg 37.5 mg 37.5mg 37.5 mg 37.5 mg 37.5 mg    INR 2.5 1.8 2.5 2.3 2.1 1.7 3.2 2.1 2.3 2.06 2.8 3.5 2.7 2.7 3.0   Notes      (boost), amox amox   PAT     diarrhea     Date 10/1 10/8 10/12 10/17 10/22 10/29 11/5 11/12 11/19 11/29 12/13 1/3 1/24 2/12 2/18   Total Weekly Dose 26.25 mg 23.75mg 22.5  mg 23.75 mg 27.5 mg 28.75 mg 27.5 mg 28.75 mg 28.75 mg 28.75 mg 28.75 28.75mg 28.75 mg 17.5 mg 28.75mg   INR 3.0 2.1 1.6 1.3 1.6 2.8 2.0 2.5 2.4 2.76 2.8 2.7 2.9 1.7 1.5   Notes post- disch amio start 10/1        PAT   held x 7 days; resumed 1/22; D/C amio asa dc'd torsemide reduced,      Date 2/22 2/26 3/6 3/15 3/29 4/12         Total Weekly Dose 31.75 mg 33.75 mg 35mg 33.75 mg 33.75 mg 33.75 mg         INR 1.6 2.0 2.8 2.8 3.1 3.1         Notes  [facility]               Clinic Interview:  Verbal Release Authorization signed on 8/22/2018 -- may speak with Raeann Montemayor (Wife). [phone] (Home) *Preferred*  Tablet Strength: pt has 7.5mg and 2.5mg tablets    Patient Findings:   Positives:  Change in diet/appetite   Negatives:  Signs/symptoms of thrombosis, Signs/symptoms of bleeding, Laboratory  test error suspected, Change in health, Change in alcohol use, Change in activity, Upcoming invasive procedure, Emergency department visit, Upcoming dental procedure, Missed doses, Extra doses, Change in medications, Hospital admission, Bruising, Other complaints   Comments:  Mr. Montemayor reports no changes since last encounter. His GLV intake varies week to week.      Takes warfarin in the AM    Plan:   1. INR is slightly supratherapeutic again today at 3.1. Instructed Mr. Montemayor to decrease dose to warfarin 5mg daily except 3.75mg Wed/Sat. 3.7% decrease in weekly regimen since INRs have been on the top end of goal on 33.75mg/wk and patient is not sure he will consistently eat an extra serving of GLV to get INR into goal range.  2. RTC in two weeks to reassess.  3. Verbal and written information provided in the clinic. Mr. Montemayor expresses understanding with teach back and has no further questions at this time.  4. Patient denies the need for refills at this time.     Jennie Stephens, PharmD  Pharmacy Resident  4/12/2019  11:26 AM

## 2019-04-21 DIAGNOSIS — I48.0 PAROXYSMAL ATRIAL FIBRILLATION (HCC): ICD-10-CM

## 2019-04-22 RX ORDER — CARVEDILOL 25 MG/1
25 TABLET ORAL 2 TIMES DAILY WITH MEALS
Qty: 180 TABLET | Refills: 2 | Status: SHIPPED | OUTPATIENT
Start: 2019-04-22 | End: 2019-08-10 | Stop reason: SDUPTHER

## 2019-04-26 ENCOUNTER — ANTICOAGULATION VISIT (OUTPATIENT)
Dept: PHARMACY | Facility: HOSPITAL | Age: 74
End: 2019-04-26

## 2019-04-26 DIAGNOSIS — I48.19 PERSISTENT ATRIAL FIBRILLATION (HCC): ICD-10-CM

## 2019-04-26 LAB
INR PPP: 2.8 (ref 0.91–1.09)
PROTHROMBIN TIME: 33.8 SECONDS (ref 10–13.8)

## 2019-04-26 PROCEDURE — 85610 PROTHROMBIN TIME: CPT

## 2019-04-26 PROCEDURE — G0463 HOSPITAL OUTPT CLINIC VISIT: HCPCS

## 2019-04-26 PROCEDURE — 36416 COLLJ CAPILLARY BLOOD SPEC: CPT

## 2019-04-26 NOTE — PROGRESS NOTES
Anticoagulation Clinic Progress Note  Indication: afib  Referring Provider: Tyson Roberts)  Initial Warfarin Start Date: 2007  Goal INR: 2-3  Current Drug Interactions: aspirin, torsemide  Diet: not eating Vit K foods at this point (3/6/19)  [id number]: 5 (HTN, Age, CHF, CAD)  Other: anemic, CKD (stage 3) May need bridge with Lovenox given persistent AF (per cardio 1/24/2019)    Anticoagulation Clinic INR History:  Date 11/14 12/6 1/25 2/22 3/22 4/11 5/9 6/8 6/29 8/3 8/31 9/14 9/28   Total Weekly Dose 37.5 mg 37.5 mg 37.5 mg 37.5 mg 37.5 mg 37.5 mg 37.5 mg 37.5mg 37.5 mg 37.5 mg 26.25mg 37.5 mg 37.5 mg   INR 2.4 2.1 2.0 3.1 2.4 2.6 2.7 2.2 2.5 3.0 1.7 3.2 2.3     Date 10/23 11/20 12/8 1/5 2/2 3/2 3/16 3/30 4/17 5/17 6/14 7/19 8/1 8/22 9/12   Total Weekly Dose 37.5mg 37.5 mg 37.5 mg 37.5 mg 37.5mg 37.5 mg 37.5 mg 37.5 mg 37.5mg 37.5 mg 37.5 mg 37.5mg 37.5 mg 37.5 mg 37.5 mg    INR 2.5 1.8 2.5 2.3 2.1 1.7 3.2 2.1 2.3 2.06 2.8 3.5 2.7 2.7 3.0   Notes      (boost), amox amox   PAT     diarrhea     Date 10/1 10/8 10/12 10/17 10/22 10/29 11/5 11/12 11/19 11/29 12/13 1/3 1/24 2/12 2/18   Total Weekly Dose 26.25 mg 23.75mg 22.5  mg 23.75 mg 27.5 mg 28.75 mg 27.5 mg 28.75 mg 28.75 mg 28.75 mg 28.75 28.75mg 28.75 mg 17.5 mg 28.75mg   INR 3.0 2.1 1.6 1.3 1.6 2.8 2.0 2.5 2.4 2.76 2.8 2.7 2.9 1.7 1.5   Notes post- disch amio start 10/1        PAT   held x 7 days; resumed 1/22; D/C amio asa dc'd torsemide reduced,      Date 2/22 2/26 3/6 3/15 3/29 4/12 4/26        Total Weekly Dose 31.75 mg 33.75 mg 35mg 33.75 mg 33.75 mg 33.75 mg 32.5 mg 32.5 mg       INR 1.6 2.0 2.8 2.8 3.1 3.1 2.8        Notes  [facility]               Clinic Interview:  Verbal Release Authorization signed on 8/22/2018 -- may speak with Raeann Montemayor (Wife). [phone] (Home) *Preferred*  Tablet Strength: pt has 7.5mg and 2.5mg tablets    Patient Findings:   Negatives:  Signs/symptoms of thrombosis, Signs/symptoms of bleeding, Laboratory test error  suspected, Change in health, Change in alcohol use, Change in activity, Upcoming invasive procedure, Emergency department visit, Upcoming dental procedure, Missed doses, Extra doses, Change in medications, Change in diet/appetite, Hospital admission, Bruising, Other complaints     Takes warfarin in the AM    Plan:   1. INR is therapeutic today at 2.8. Instructed Mr. Montemayor to continue recently decreased dose of warfarin 5mg daily except 3.75mg Wed/Sat. .  2. RTC in two weeks to reassess.  3. Verbal and written information provided in the clinic. Mr. Montemayor expresses understanding with teach back and has no further questions at this time.    Jonathan Patel, PharmD Candidate  4/26/2019  10:19 AM    I, Shanell Arango, [facility], have reviewed the note in full and agree with the assessment and plan.  04/26/19  11:03 AM

## 2019-05-01 ENCOUNTER — OFFICE VISIT (OUTPATIENT)
Dept: CARDIOLOGY | Facility: CLINIC | Age: 74
End: 2019-05-01

## 2019-05-01 VITALS
SYSTOLIC BLOOD PRESSURE: 118 MMHG | OXYGEN SATURATION: 94 % | HEART RATE: 76 BPM | WEIGHT: 288.6 LBS | DIASTOLIC BLOOD PRESSURE: 66 MMHG | BODY MASS INDEX: 41.32 KG/M2 | HEIGHT: 70 IN

## 2019-05-01 DIAGNOSIS — I10 ESSENTIAL HYPERTENSION: Chronic | ICD-10-CM

## 2019-05-01 DIAGNOSIS — I48.20 CHRONIC ATRIAL FIBRILLATION (HCC): Primary | ICD-10-CM

## 2019-05-01 DIAGNOSIS — R00.1 BRADYCARDIA: ICD-10-CM

## 2019-05-01 PROCEDURE — 99213 OFFICE O/P EST LOW 20 MIN: CPT | Performed by: INTERNAL MEDICINE

## 2019-05-01 PROCEDURE — 93279 PRGRMG DEV EVAL PM/LDLS PM: CPT | Performed by: INTERNAL MEDICINE

## 2019-05-01 NOTE — PROGRESS NOTES
Marco Antonio Montemayor  1945  [phone]    05/01/2019    [facility] CARDIOLOGY     Standiford, Emil Valle MD  [address]  [facility]ly [facility] [address]    Chief Complaint   Patient presents with   • Atrial Fibrillation       Problem List:  1. 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 Remote acceptable intravenous adenosine Quantitative SPECT gated Cardiolite study, LVEF 0.50, April 1999.  b. Echocardiogram showing mild LVH with estimated ejection fraction 0.58 with mild aortic valve cusp sclerosis, and mild TR, 09/23/2014.   c. Remote abnormal preoperative Quantitative SPECT gated Cardiolite study with mild “fixed” inferoposterior hypoperfusion and mild LV enlargement with mild global hypokinesia with reduced LVEF 0.40 in the setting of abnormal EKG with subsequent diagnostic coronary angiography demonstrating mild-to-moderate nonobstructive coronary artery atherosclerosis with mild compensated left ventricular dysfunction, LVEF 0.52, and continued medical therapy felt warranted, June 2006.  d. Remote hospitalization for symptomatic atrial fibrillation with rapid ventricular response and mild congestive heart failure requiring BRYANNA and DC cardioversion, June 2009.  e. Left heart catheterization with essentially normal coronary arteries with mild luminal irregularities with an EF of 0.60 by Dr. Escamilla for angina and elevated troponin, 06/05/2015.   f. Residual class I symptoms.  2. Chronic essential hypertension with recent progressive hypertension and blood pressure readings and normal selective bilateral renal angiography, June 2006.  3. Dyslipidemia.  4. Chronic atrial tachyarrhythmias:  a. Intermittent chronic paroxysmal atrial fibrillation with electrophysiologic study with RFA for atrial flutter, October 1999.  b. Recurrent apparent transient atrial fibrillation, resolved with acceptable [facility] emergency department evaluation, June 2003.  c. Acceptable  combination Doppler echocardiogram, July 2003, with apparent acceptable 24-hour Holter monitor, May 2006.  d. Recurrent asymptomatic atrial fibrillation with RVR, January 2007, subsequent Coumadin therapy and Tikosyn therapy with successful internal cardioversion of atrial fibrillation and marked bradyarrhythmias requiring DDDR pacemaker implant, [employer] [place] device, data not available, March 2007.  e. Remote hospitalization for symptomatic rapid atrial fibrillation/BRYANNA DC cardioversion with subsequent acceptable pacemaker interrogation, June/July 2009, as well as acceptable interrogation without reprogramming, July 2011.  f. Acceptable combination Doppler echocardiogram, December 2010, with residual class I symptoms.  g. Acceptable device interrogation following [facility] ED evaluation for chest pressure and shortness of breath, data deficit, May 2011, February 2013, February 2016, December 2016.  h. Abnormal pacemaker assessment with 8.4% mode switch with battery voltage of 1-1.25 years, June 2017, with abnormal PACEART assessment, July 2018, with 22% mode switch  5. Moderate obesity, BMI 39.6.  6. Ichthyosis.  7. Chronic lower tract obstructive symptoms, probable BPH.  8. Dyslipidemia.  9. Nasal polyps with deviated nasal septum with apparent subsequent nasal septoplasty/polypectomy, data deficit, July 2006.  10. Abdominal pain with apparent small bowel obstruction with exploratory laparotomy - data deficit.  11. Appendectomy with subsequent delayed hospitalization for bleeding peptic ulcer disease/probable duodenal ulcer, November 2003.  12. Recurrent asymptomatic atrial fibrillation with rapid ventricular response, July 2007, with subsequent Tikosyn therapy and DDDR pacemaker implant with intermittent recurrent breakthrough arrhythmias, including remote DC cardioversion, May 2008, with subsequent recurrent DC cardioversion, autumn 2018, with recurrent atrial fibrillation and discontinuation of formal antiarrhythmic  therapy - data deficit  13. Obstructive sleep apnea with treatment.   14. Stage 4 chronic kidney disease with recent apparent documented proteinuria and renal biopsy (February 2019)        Allergies  No Known Allergies    Current Medications    Current Outpatient Medications:   •  acetaminophen (TYLENOL) 500 MG tablet, Take 1,000 mg by mouth Every 6 (Six) Hours As Needed for Mild Pain ., Disp: , Rfl:   •  atorvastatin (LIPITOR) 40 MG tablet, TAKE ONE TABLET BY MOUTH DAILY, Disp: 30 tablet, Rfl: 1  •  carvedilol (COREG) 25 MG tablet, Take 1 tablet by mouth 2 (Two) Times a Day With Meals., Disp: 180 tablet, Rfl: 2  •  cholecalciferol (VITAMIN D3) 1000 UNITS tablet, Take 1,000 Units by mouth daily., Disp: , Rfl:   •  Ferrous Sulfate (IRON) 325 (65 FE) MG tablet, Take 65 mg by mouth Daily., Disp: , Rfl:   •  PHARMACY TO DOSE WARFARIN, Continuous As Needed (patient's warfarin is being managed by the [facility] ([phone]))., Disp: , Rfl:   •  torsemide (DEMADEX) 20 MG tablet, Take 1 tablet by mouth 2 (Two) Times a Day. (Patient taking differently: Take 20 mg by mouth Daily.), Disp: 180 tablet, Rfl: 3  •  vitamin B-12 (CYANOCOBALAMIN) 1000 MCG tablet, Take 1,000 mcg by mouth Daily., Disp: , Rfl:   •  warfarin (COUMADIN) 2.5 MG tablet, Take 1 tablet by mouth daily, as directed by the anticoagulation clinic. (Patient taking differently: 5 mg. 5 mg Monday and Friday, 3.75 Tuesday, Wednesday, Thursday, Saturday and Sunday), Disp: 90 tablet, Rfl: 1    History of Present Illness     Pt presents for follow up of atrial fibrillation, bradycardia with PM check, chronic diastolic CHF, and HTN. Since we last saw the pt in January, he has been off Amiodarone as it was stopped at that office visit. Since then, he does not feel any different. He states that he does not feel his atrial fibrillation and is able to do most anything he wants to do. He has some mild BLACKWELL with walking long distances.   "He denies CP, LH, and dizziness, syncope. In was admitted to our facility in February for a kidney biopsy. He was found to have scar tissue, benign. He denies ER visits, bleeding issues on coumadin, or TIA/CVA symptoms. Overall feels well for the most part. INRs have been therapeutic for the most part. He did have some interruption for his kidney biopsy. BP is well controlled.     ROS:  General:  Denies fatigue, + weight gain - loss  Cardiovascular:  Denies CP, PND, syncope, near syncope,-  edema or palpitations.  Pulmonary:  + mild BLACKWELL, - cough, or wheezing      Vitals:    05/01/19 1105   BP: 118/66   BP Location: Right arm   Patient Position: Sitting   Pulse: 76   SpO2: 94%   Weight: 131 kg (288 lb 9.6 oz)   Height: 177.8 cm (70\")     Body mass index is 41.41 kg/m².  PE:  General: NAD  Neck: no JVD, no carotid bruits, no TM  Heart irreg irreg rate and rhythm, NL S1, S2, no rubs, murmurs  Lungs: CTA, no wheezes, rhonchi, or rales  Abd: soft, non-tender, NL BS  Ext: No musculoskeletal deformities, no edema, cyanosis, or clubbing  Psych: normal mood and affect    Diagnostic Data:    PM Manual Interrogation: normal function. Chronic atrial fibrillation. V paced 69%. 8 years on battery.     Procedures    1. Chronic atrial fibrillation (CMS/HCC)    2. Bradycardia    3. Essential hypertension          Plan:  1) Chronic Atrial Fibrillation:    - rate controlled and overall asymptomatic  Continue present medications.   2) Anticoagulation  Continue warfarin  3) Bradycardia:  - normal PM function with 8 years on battery  4) HTN  - controlled  Wt loss, exercise, salt reduction    F/up in 12 months    Scribed for Johnathan Taylor MD by Jessica Dunaway PA-C. 5/1/2019  11:26 AM     I, Johnathan Taylor MD, personally performed the services described in this documentation as scribed by the above named individual in my presence, and it is both accurate and complete.  5/1/2019  11:36 AM    "

## 2019-05-10 ENCOUNTER — APPOINTMENT (OUTPATIENT)
Dept: PHARMACY | Facility: HOSPITAL | Age: 74
End: 2019-05-10

## 2019-05-14 ENCOUNTER — ANTICOAGULATION VISIT (OUTPATIENT)
Dept: PHARMACY | Facility: HOSPITAL | Age: 74
End: 2019-05-14

## 2019-05-14 LAB
INR PPP: 2.5 (ref 0.91–1.09)
PROTHROMBIN TIME: 30.6 SECONDS (ref 10–13.8)

## 2019-05-14 PROCEDURE — 85610 PROTHROMBIN TIME: CPT

## 2019-05-14 PROCEDURE — 36416 COLLJ CAPILLARY BLOOD SPEC: CPT

## 2019-05-14 PROCEDURE — G0463 HOSPITAL OUTPT CLINIC VISIT: HCPCS

## 2019-05-14 RX ORDER — TORSEMIDE 20 MG/1
20 TABLET ORAL DAILY
COMMUNITY
End: 2019-10-01

## 2019-05-14 NOTE — PROGRESS NOTES
Anticoagulation Clinic Progress Note  Indication: afib  Referring Provider: Tyson Roberts)  Initial Warfarin Start Date: 2007  Goal INR: 2-3  Current Drug Interactions: aspirin, torsemide  Diet: not eating Vit K foods at this point (3/6/19)  [id number]: 5 (HTN, Age, CHF, CAD)  Other: anemic, CKD (stage 3) May need bridge with Lovenox given persistent AF (per cardio 1/24/2019)    Anticoagulation Clinic INR History:  Date 11/14 12/6 1/25 2/22 3/22 4/11 5/9 6/8 6/29 8/3 8/31 9/14 9/28   Total Weekly Dose 37.5 mg 37.5 mg 37.5 mg 37.5 mg 37.5 mg 37.5 mg 37.5 mg 37.5mg 37.5 mg 37.5 mg 26.25mg 37.5 mg 37.5 mg   INR 2.4 2.1 2.0 3.1 2.4 2.6 2.7 2.2 2.5 3.0 1.7 3.2 2.3     Date 10/23 11/20 12/8 1/5 2/2 3/2 3/16 3/30 4/17 5/17 6/14 7/19 8/1 8/22 9/12   Total Weekly Dose 37.5mg 37.5 mg 37.5 mg 37.5 mg 37.5mg 37.5 mg 37.5 mg 37.5 mg 37.5mg 37.5 mg 37.5 mg 37.5mg 37.5 mg 37.5 mg 37.5 mg    INR 2.5 1.8 2.5 2.3 2.1 1.7 3.2 2.1 2.3 2.06 2.8 3.5 2.7 2.7 3.0   Notes      (boost), amox amox   PAT     diarrhea     Date 10/1 10/8 10/12 10/17 10/22 10/29 11/5 11/12 11/19 11/29 12/13 1/3 1/24 2/12 2/18   Total Weekly Dose 26.25 mg 23.75mg 22.5  mg 23.75 mg 27.5 mg 28.75 mg 27.5 mg 28.75 mg 28.75 mg 28.75 mg 28.75 28.75mg 28.75 mg 17.5 mg 28.75mg   INR 3.0 2.1 1.6 1.3 1.6 2.8 2.0 2.5 2.4 2.76 2.8 2.7 2.9 1.7 1.5   Notes post- disch amio start 10/1        PAT   held x 7 days; resumed 1/22; D/C amio asa dc'd torsemide reduced,      Date 2/22 2/26 3/6 3/15 3/29 4/12 4/26 5/14       Total Weekly Dose 31.75 mg 33.75 mg 35mg 33.75 mg 33.75 mg 33.75 mg 32.5 mg 32.5 mg       INR 1.6 2.0 2.8 2.8 3.1 3.1 2.8 2.5       Notes  enox               Clinic Interview:  Verbal Release Authorization signed on 8/22/2018 -- may speak with Raeann Montemayor (Wife). [phone] (Home) *Preferred*  Tablet Strength: pt has 7.5mg and 2.5mg tablets    Patient Findings:   Negatives:  Signs/symptoms of thrombosis, Signs/symptoms of bleeding, Laboratory test error  suspected, Change in health, Change in alcohol use, Change in activity, Upcoming invasive procedure, Emergency department visit, Upcoming dental procedure, Missed doses, Extra doses, Change in medications, Change in diet/appetite, Hospital admission, Bruising, Other complaints     Takes warfarin in the AM    Plan:   1. INR is therapeutic again today, so instructed Mr. Montemayor to continue warfarin 5mg daily except 3.75mg Wed/Sat.  2. RTC in four weeks.  3. Verbal and written information provided in the clinic. Mr. Montemayor expresses understanding with teach back and has no further questions at this time.    Ann Cowden Mayer, PharmD  5/14/2019  10:05 AM

## 2019-05-29 ENCOUNTER — CLINICAL SUPPORT NO REQUIREMENTS (OUTPATIENT)
Dept: CARDIOLOGY | Facility: CLINIC | Age: 74
End: 2019-05-29

## 2019-05-29 DIAGNOSIS — I48.20 CHRONIC ATRIAL FIBRILLATION (HCC): Primary | ICD-10-CM

## 2019-05-29 DIAGNOSIS — R00.1 BRADYCARDIA: ICD-10-CM

## 2019-05-29 PROCEDURE — 93296 REM INTERROG EVL PM/IDS: CPT | Performed by: INTERNAL MEDICINE

## 2019-05-29 PROCEDURE — 93294 REM INTERROG EVL PM/LDLS PM: CPT | Performed by: INTERNAL MEDICINE

## 2019-06-11 ENCOUNTER — ANTICOAGULATION VISIT (OUTPATIENT)
Dept: PHARMACY | Facility: HOSPITAL | Age: 74
End: 2019-06-11

## 2019-06-11 ENCOUNTER — OFFICE VISIT (OUTPATIENT)
Dept: SLEEP MEDICINE | Facility: HOSPITAL | Age: 74
End: 2019-06-11

## 2019-06-11 VITALS
HEART RATE: 82 BPM | DIASTOLIC BLOOD PRESSURE: 63 MMHG | WEIGHT: 274.2 LBS | SYSTOLIC BLOOD PRESSURE: 123 MMHG | BODY MASS INDEX: 39.25 KG/M2 | OXYGEN SATURATION: 96 % | HEIGHT: 70 IN

## 2019-06-11 DIAGNOSIS — Z99.81 DEPENDENCE ON NOCTURNAL OXYGEN THERAPY: Primary | ICD-10-CM

## 2019-06-11 DIAGNOSIS — G47.33 SEVERE OBSTRUCTIVE SLEEP APNEA: ICD-10-CM

## 2019-06-11 LAB
INR PPP: 2.2 (ref 0.91–1.09)
PROTHROMBIN TIME: 26.1 SECONDS (ref 10–13.8)

## 2019-06-11 PROCEDURE — 36416 COLLJ CAPILLARY BLOOD SPEC: CPT

## 2019-06-11 PROCEDURE — 99213 OFFICE O/P EST LOW 20 MIN: CPT | Performed by: NURSE PRACTITIONER

## 2019-06-11 PROCEDURE — 85610 PROTHROMBIN TIME: CPT

## 2019-06-11 PROCEDURE — G0463 HOSPITAL OUTPT CLINIC VISIT: HCPCS

## 2019-06-11 RX ORDER — ATORVASTATIN CALCIUM 40 MG/1
40 TABLET, FILM COATED ORAL DAILY
Qty: 30 TABLET | Refills: 1 | Status: SHIPPED | OUTPATIENT
Start: 2019-06-11 | End: 2019-07-13 | Stop reason: SDUPTHER

## 2019-06-11 NOTE — PATIENT INSTRUCTIONS
Sleep Apnea  Sleep apnea is a condition that affects breathing. People with sleep apnea have moments during sleep when their breathing pauses briefly or gets shallow. Sleep apnea can cause these symptoms:  · Trouble staying asleep.  · Sleepiness or tiredness during the day.  · Irritability.  · Loud snoring.  · Morning headaches.  · Trouble concentrating.  · Forgetting things.  · Less interest in sex.  · Being sleepy for no reason.  · Mood swings.  · Personality changes.  · Depression.  · Waking up a lot during the night to pee (urinate).  · Dry mouth.  · Sore throat.    Follow these instructions at home:  · Make any changes in your routine that your doctor recommends.  · Eat a healthy, well-balanced diet.  · Take over-the-counter and prescription medicines only as told by your doctor.  · Avoid using alcohol, calming medicines (sedatives), and narcotic medicines.  · Take steps to lose weight if you are overweight.  · If you were given a machine (device) to use while you sleep, use it only as told by your doctor.  · Do not use any tobacco products, such as cigarettes, chewing tobacco, and e-cigarettes. If you need help quitting, ask your doctor.  · Keep all follow-up visits as told by your doctor. This is important.  Contact a doctor if:  · The machine that you were given to use during sleep is uncomfortable or does not seem to be working.  · Your symptoms do not get better.  · Your symptoms get worse.  Get help right away if:  · Your chest hurts.  · You have trouble breathing in enough air (shortness of breath).  · You have an uncomfortable feeling in your back, arms, or stomach.  · You have trouble talking.  · One side of your body feels weak.  · A part of your face is hanging down (drooping).  These symptoms may be an emergency. Do not wait to see if the symptoms will go away. Get medical help right away. Call your local emergency services (911 in the [place] Do not drive yourself to the hospital.  This information  is not intended to replace advice given to you by your health care provider. Make sure you discuss any questions you have with your health care provider.  Document Released: 09/26/2009 Document Revised: 07/16/2018 Document Reviewed: 09/26/2016  [employer] Interactive Patient Education © 2019 [employer]

## 2019-06-11 NOTE — PROGRESS NOTES
Anticoagulation Clinic Progress Note  Indication: afib  Referring Provider: Tyson Roberts)  Initial Warfarin Start Date: 2007  Goal INR: 2-3  Current Drug Interactions: aspirin, torsemide  Diet: not eating Vit K foods at this point (3/6/19)  [id number]: 5 (HTN, Age, CHF, CAD)  Other: anemic, CKD (stage 3) May need bridge with Lovenox given persistent AF (per cardio 1/24/2019)    Anticoagulation Clinic INR History:  Date 10/23 11/20 12/8 1/5 2/2 3/2 3/16 3/30 4/17 5/17 6/14 7/19 8/1 8/22 9/12   Total Weekly Dose 37.5mg 37.5 mg 37.5 mg 37.5 mg 37.5mg 37.5 mg 37.5 mg 37.5 mg 37.5mg 37.5 mg 37.5 mg 37.5mg 37.5 mg 37.5 mg 37.5 mg    INR 2.5 1.8 2.5 2.3 2.1 1.7 3.2 2.1 2.3 2.06 2.8 3.5 2.7 2.7 3.0   Notes      (boost), amox amox   PAT     diarrhea     Date 10/1 10/8 10/12 10/17 10/22 10/29 11/5 11/12 11/19 11/29 12/13 1/3 1/24 2/12 2/18   Total Weekly Dose 26.25 mg 23.75mg 22.5  mg 23.75 mg 27.5 mg 28.75 mg 27.5 mg 28.75 mg 28.75 mg 28.75 mg 28.75 28.75mg 28.75 mg 17.5 mg 28.75mg   INR 3.0 2.1 1.6 1.3 1.6 2.8 2.0 2.5 2.4 2.76 2.8 2.7 2.9 1.7 1.5   Notes post- disch amio start 10/1        PAT   held x 7 days; resumed 1/22; D/C amio asa dc'd torsemide reduced,      Date 2/22 2/26 3/6 3/15 3/29 4/12 4/26 5/14 6/11      Total Weekly Dose 31.75 mg 33.75 mg 35mg 33.75 mg 33.75 mg 33.75 mg 32.5 mg 32.5 mg 32.5 mg      INR 1.6 2.0 2.8 2.8 3.1 3.1 2.8 2.5 2.2      Notes  enox               Clinic Interview:  Verbal Release Authorization signed on 8/22/2018 -- may speak with Raeann Montemayor (Wife). [phone] (Home) *Preferred*  Tablet Strength: pt has 7.5mg and 2.5mg tablets    Patient Findings:   Negatives:  Signs/symptoms of thrombosis, Signs/symptoms of bleeding, Laboratory test error suspected, Change in health, Change in alcohol use, Change in activity, Upcoming invasive procedure, Emergency department visit, Upcoming dental procedure, Missed doses, Extra doses, Change in medications, Change in diet/appetite, Hospital  admission, Bruising, Other complaints     Takes warfarin in the AM    Plan:   1. INR is therapeutic today, so instructed Mr. Montemayor to continue warfarin 5mg daily except 3.75mg Wed/Sat.  2. RTC in four weeks.  3. Verbal and written information provided in the clinic. Mr. Montemayor expresses understanding with teach back and has no further questions at this time.    Ann Cowden Mayer, PharmD  6/11/2019  10:25 AM

## 2019-06-11 NOTE — PROGRESS NOTES
Subjective: Follow-up        Chief Complaint:   Chief Complaint   Patient presents with   • Follow-up       HPI:    Marco Antonio Montemayor is a 74 y.o. male here for follow-up of sleep apnea.  Patient was last seen 4/17/2018 patient is on BiPAP therapy as well as nocturnal oxygen for nocturnal hypoxemia.  Patient seems to be on 9 L that bleeds into his BiPAP.  Patient is sleeping 6 to 8 hours nightly and does feel refreshed upon awakening.  Patient has an [place] score of 13/24.  Patient has no concerns or complaints today and wishes to continue BiPAP.    Past Medical History:   Diagnosis Date   • Abnormal ECG    • Arrhythmia    • Arthritis    • Atrial fibrillation (CMS/HCC)    • Cancer (CMS/HCC)     skin cancer removed from various locations    • Chronic diastolic congestive heart failure (CMS/HCC) 9/14/2018   • Coronary artery disease    • Dry skin    • Edema    • Heart murmur    • Hematoma    • Hypertension    • Long term current use of anticoagulant    • ZULEIKA treated with BiPAP    • Persistent atrial fibrillation (CMS/HCC) 7/7/2016     Patient notes dyspnea, fatigue and palpitations during episodes of afib.  multiple external cardioversionsChads vasc 2, anticoagulated with Coumadin   • Sleep apnea     bipap- complaint with machine    • Stage 3 chronic kidney disease (CMS/HCC)    • Tinnitus    • Wears glasses     readers         Current medications are:   Current Outpatient Medications:   •  acetaminophen (TYLENOL) 500 MG tablet, Take 1,000 mg by mouth Every 6 (Six) Hours As Needed for Mild Pain ., Disp: , Rfl:   •  atorvastatin (LIPITOR) 40 MG tablet, TAKE ONE TABLET BY MOUTH DAILY, Disp: 30 tablet, Rfl: 1  •  carvedilol (COREG) 25 MG tablet, Take 1 tablet by mouth 2 (Two) Times a Day With Meals., Disp: 180 tablet, Rfl: 2  •  cholecalciferol (VITAMIN D3) 1000 UNITS tablet, Take 1,000 Units by mouth daily., Disp: , Rfl:   •  Ferrous Sulfate (IRON) 325 (65 FE) MG tablet, Take 65 mg by mouth Daily., Disp: , Rfl:   •   PHARMACY TO DOSE WARFARIN, Continuous As Needed (patient's warfarin is being managed by the [facility] ([phone]))., Disp: , Rfl:   •  torsemide (DEMADEX) 20 MG tablet, Take 20 mg by mouth Daily., Disp: , Rfl:   •  vitamin B-12 (CYANOCOBALAMIN) 1000 MCG tablet, Take 1,000 mcg by mouth Daily., Disp: , Rfl:   •  warfarin (COUMADIN) 2.5 MG tablet, Take 1 tablet by mouth daily, as directed by the anticoagulation clinic. (Patient taking differently: 5 mg. 5 mg Monday and Friday, 3.75 Tuesday, Wednesday, Thursday, Saturday and Sunday), Disp: 90 tablet, Rfl: 1.      The patient's relevant past medical, surgical, family and social history were reviewed and updated in Epic as appropriate.       Review of Systems   HENT: Positive for tinnitus.    Eyes: Positive for visual disturbance.   Respiratory: Positive for apnea and shortness of breath.    Cardiovascular: Positive for palpitations and leg swelling.   Psychiatric/Behavioral: Positive for sleep disturbance.         Objective:    Physical Exam   Constitutional: He is oriented to person, place, and time. He appears well-developed and well-nourished.   HENT:   Head: Normocephalic and atraumatic.   Mouth/Throat: Oropharynx is clear and moist.   Mallampati 4 anatomy   Eyes: Conjunctivae are normal.   Neck: Neck supple. No thyromegaly present.   Cardiovascular: Normal rate and regular rhythm.   Pulmonary/Chest: Effort normal and breath sounds normal. He has no wheezes (oxygen).   Lymphadenopathy:     He has no cervical adenopathy.   Neurological: He is alert and oriented to person, place, and time.   Skin: Skin is warm and dry.   Psychiatric: He has a normal mood and affect. His behavior is normal. Judgment and thought content normal.   Nursing note and vitals reviewed.  180/1 80 days of use.  Greater than 4-hour use 97.2%.  90% IPAP 10.8.  9% EPAP 9.6.  AHI 1.6.  Download reviewed with patient.      ASSESSMENT/PLAN    Marco Antonio was seen  today for follow-up.    Diagnoses and all orders for this visit:    Dependence on nocturnal oxygen therapy  -     CPAP Therapy    Severe obstructive sleep apnea  -     CPAP Therapy            1. Counseled patient regarding multimodal approach with healthy nutrition, healthy sleep, regular physical activity, social activities, counseling, and medications. Encouraged to practice lateral sleep position. Avoid alcohol and sedatives close to bedtime.  2.   Pap supplies refilled x1 year.  Return to clinic 1 year or sooner if symptoms warrant.  Continue oxygen to bleed into BiPAP.  I have reviewed the results of my evaluation and impression and discussed my recommendations in detail with the patient.      Signed by  Airam Koroma, JENNIFER    June 11, 2019      CC: Emil Rosado MD          No ref. provider found

## 2019-07-01 RX ORDER — TORSEMIDE 20 MG/1
TABLET ORAL
Qty: 180 TABLET | Refills: 2 | Status: SHIPPED | OUTPATIENT
Start: 2019-07-01 | End: 2020-04-30 | Stop reason: SDUPTHER

## 2019-07-09 ENCOUNTER — ANTICOAGULATION VISIT (OUTPATIENT)
Dept: PHARMACY | Facility: HOSPITAL | Age: 74
End: 2019-07-09

## 2019-07-09 DIAGNOSIS — I48.20 CHRONIC ATRIAL FIBRILLATION (HCC): ICD-10-CM

## 2019-07-09 LAB
INR PPP: 2.4 (ref 0.91–1.09)
PROTHROMBIN TIME: 29.3 SECONDS (ref 10–13.8)

## 2019-07-09 PROCEDURE — G0463 HOSPITAL OUTPT CLINIC VISIT: HCPCS | Performed by: PHARMACIST

## 2019-07-09 PROCEDURE — 85610 PROTHROMBIN TIME: CPT

## 2019-07-09 PROCEDURE — 36416 COLLJ CAPILLARY BLOOD SPEC: CPT

## 2019-07-15 RX ORDER — ATORVASTATIN CALCIUM 40 MG/1
40 TABLET, FILM COATED ORAL DAILY
Qty: 90 TABLET | Refills: 0 | Status: SHIPPED | OUTPATIENT
Start: 2019-07-15 | End: 2019-10-12 | Stop reason: SDUPTHER

## 2019-07-18 RX ORDER — WARFARIN SODIUM 2.5 MG/1
TABLET ORAL
Qty: 180 TABLET | Refills: 1 | Status: SHIPPED | OUTPATIENT
Start: 2019-07-18 | End: 2020-12-10

## 2019-07-18 RX ORDER — WARFARIN SODIUM 2.5 MG/1
TABLET ORAL
Qty: 180 TABLET | Refills: 1 | Status: SHIPPED | OUTPATIENT
Start: 2019-07-18 | End: 2019-07-18 | Stop reason: SDUPTHER

## 2019-08-06 ENCOUNTER — ANTICOAGULATION VISIT (OUTPATIENT)
Dept: PHARMACY | Facility: HOSPITAL | Age: 74
End: 2019-08-06

## 2019-08-06 LAB
INR PPP: 2.5 (ref 0.91–1.09)
PROTHROMBIN TIME: 29.7 SECONDS (ref 10–13.8)

## 2019-08-06 PROCEDURE — G0463 HOSPITAL OUTPT CLINIC VISIT: HCPCS

## 2019-08-06 PROCEDURE — 36416 COLLJ CAPILLARY BLOOD SPEC: CPT

## 2019-08-06 PROCEDURE — 85610 PROTHROMBIN TIME: CPT

## 2019-08-06 NOTE — PROGRESS NOTES
Anticoagulation Clinic Progress Note  Indication: afib  Referring Provider: Tyson Roberts)  Initial Warfarin Start Date: 2007  Goal INR: 2-3  Current Drug Interactions:  torsemide  Diet: not eating Vit K foods at this point (3/6/19)  [id number]: 5 (HTN, Age, CHF, CAD)  Other: anemic, CKD (stage 3) May need bridge with Lovenox given persistent AF (per cardio 1/24/2019)    Anticoagulation Clinic INR History:  Date 10/23 11/20 12/8 1/5 2/2 3/2 3/16 3/30 4/17 5/17 6/14 7/19 8/1 8/22 9/12   Total Weekly Dose 37.5mg 37.5 mg 37.5 mg 37.5 mg 37.5mg 37.5 mg 37.5 mg 37.5 mg 37.5mg 37.5 mg 37.5 mg 37.5mg 37.5 mg 37.5 mg 37.5 mg    INR 2.5 1.8 2.5 2.3 2.1 1.7 3.2 2.1 2.3 2.06 2.8 3.5 2.7 2.7 3.0   Notes      (boost), amox amox   PAT     diarrhea     Date 10/1 10/8 10/12 10/17 10/22 10/29 11/5 11/12 11/19 11/29 12/13 1/3 1/24 2/12 2/18   Total Weekly Dose 26.25 mg 23.75mg 22.5  mg 23.75 mg 27.5 mg 28.75 mg 27.5 mg 28.75 mg 28.75 mg 28.75 mg 28.75 28.75mg 28.75 mg 17.5 mg 28.75mg   INR 3.0 2.1 1.6 1.3 1.6 2.8 2.0 2.5 2.4 2.76 2.8 2.7 2.9 1.7 1.5   Notes post- disch amio start 10/1        PAT   held x 7 days; resumed 1/22; D/C amio asa dc'd torsemide reduced,      Date 2/22 2/26 3/6 3/15 3/29 4/12 4/26 5/14 6/11 7/9 8/6    Total Weekly Dose 31.75 mg 33.75 mg 35mg 33.75 mg 33.75 mg 33.75 mg 32.5 mg 32.5 mg 32.5 mg 32.5 mg 32.5 mg    INR 1.6 2.0 2.8 2.8 3.1 3.1 2.8 2.5 2.2 2.4 2.5    Notes  enox               Clinic Interview:  Verbal Release Authorization signed on 8/22/2018 -- may speak with Raeann Montemayor (Wife). [phone] (Home) *Preferred*  Tablet Strength: pt has 7.5mg and 2.5mg tablets    Patient Findings   Negatives:  Signs/symptoms of thrombosis, Signs/symptoms of bleeding, Laboratory test error suspected, Change in health, Change in alcohol use, Change in activity, Upcoming invasive procedure, Emergency department visit, Upcoming dental procedure, Missed doses, Extra doses, Change in medications, Change in  diet/appetite, Hospital admission, Bruising, Other complaints   Comments:  He stated that his granddaughter starts at [employer] in a couple weeks.   Otherwise, denies changes     Takes warfarin in the AM    Plan:   1. INR is therapeutic today at 2.5, so instructed Mr. Montemayor to continue warfarin 5mg oral daily except 3.75mg Wed/Sat.  2. RTC in four weeks on 9/3.  3. Verbal and written information provided in the clinic. Mr. Montemayor expresses understanding with teach back and has no further questions at this time.    Charlotte Michael, PharmD  8/6/2019  10:26 AM

## 2019-08-10 DIAGNOSIS — I48.0 PAROXYSMAL ATRIAL FIBRILLATION (HCC): ICD-10-CM

## 2019-08-12 RX ORDER — CARVEDILOL 25 MG/1
25 TABLET ORAL 2 TIMES DAILY WITH MEALS
Qty: 180 TABLET | Refills: 3 | Status: SHIPPED | OUTPATIENT
Start: 2019-08-12 | End: 2019-11-15 | Stop reason: SDUPTHER

## 2019-09-03 ENCOUNTER — ANTICOAGULATION VISIT (OUTPATIENT)
Dept: PHARMACY | Facility: HOSPITAL | Age: 74
End: 2019-09-03

## 2019-09-03 LAB
INR PPP: 2.4 (ref 0.91–1.09)
PROTHROMBIN TIME: 29.1 SECONDS (ref 10–13.8)

## 2019-09-03 PROCEDURE — 85610 PROTHROMBIN TIME: CPT

## 2019-09-03 PROCEDURE — 36416 COLLJ CAPILLARY BLOOD SPEC: CPT

## 2019-09-03 PROCEDURE — G0463 HOSPITAL OUTPT CLINIC VISIT: HCPCS

## 2019-09-03 RX ORDER — AMLODIPINE BESYLATE 5 MG/1
5 TABLET ORAL DAILY
COMMUNITY
End: 2019-09-11

## 2019-09-03 NOTE — PROGRESS NOTES
Anticoagulation Clinic Progress Note  Indication: afib  Referring Provider: Tyson Roberts)  Initial Warfarin Start Date: 2007  Goal INR: 2-3  Current Drug Interactions:  torsemide  Diet: not eating Vit K foods at this point (3/6/19)  [id number]: 5 (HTN, Age, CHF, CAD)  Other: anemic, CKD (stage 3) May need bridge with Lovenox given persistent AF (per cardio 1/24/2019)    Anticoagulation Clinic INR History:  Date 10/23 11/20 12/8 1/5 2/2 3/2 3/16 3/30 4/17 5/17 6/14 7/19 8/1 8/22 9/12   Total Weekly Dose 37.5mg 37.5 mg 37.5 mg 37.5 mg 37.5mg 37.5 mg 37.5 mg 37.5 mg 37.5mg 37.5 mg 37.5 mg 37.5mg 37.5 mg 37.5 mg 37.5 mg    INR 2.5 1.8 2.5 2.3 2.1 1.7 3.2 2.1 2.3 2.06 2.8 3.5 2.7 2.7 3.0   Notes      (boost), amox amox   PAT     diarrhea     Date 10/1 10/8 10/12 10/17 10/22 10/29 11/5 11/12 11/19 11/29 12/13 1/3 1/24 2/12 2/18   Total Weekly Dose 26.25 mg 23.75mg 22.5  mg 23.75 mg 27.5 mg 28.75 mg 27.5 mg 28.75 mg 28.75 mg 28.75 mg 28.75 28.75mg 28.75 mg 17.5 mg 28.75mg   INR 3.0 2.1 1.6 1.3 1.6 2.8 2.0 2.5 2.4 2.76 2.8 2.7 2.9 1.7 1.5   Notes post- disch amio start 10/1        PAT   held x 7 days; resumed 1/22; D/C amio asa dc'd torsemide reduced,      Date 2/22 2/26 3/6 3/15 3/29 4/12 4/26 5/14 6/11 7/9 8/6 9/3   Total Weekly Dose 31.75 mg 33.75 mg 35mg 33.75 mg 33.75 mg 33.75 mg 32.5 mg 32.5 mg 32.5 mg 32.5 mg 32.5 mg 32.5mg   INR 1.6 2.0 2.8 2.8 3.1 3.1 2.8 2.5 2.2 2.4 2.5 2.4   Notes  enox               Clinic Interview:  Verbal Release Authorization signed on 8/22/2018 -- may speak with Raeann Montemayor (Wife). [phone] (Home) *Preferred*  Tablet Strength: pt has 7.5mg and 2.5mg tablets    Patient Findings   Negatives:  Signs/symptoms of thrombosis, Signs/symptoms of bleeding, Laboratory test error suspected, Change in health, Change in alcohol use, Change in activity, Upcoming invasive procedure, Emergency department visit, Upcoming dental procedure, Missed doses, Extra doses, Change in medications,  Change in diet/appetite, Hospital admission, Bruising, Other complaints   Comments:  He is going to see Dr. Mehta next week. Otherwise, denies changes   Takes warfarin in the AM    Plan:   1. INR is therapeutic today at 2.4, so instructed Mr. Montemayor to continue warfarin 5mg oral daily except 3.75mg Wed/Sat.  2. RTC in four weeks on 10/1.  3. Verbal and written information provided in the clinic. Mr. Montemayor expresses understanding with teach back and has no further questions at this time.  4. Called [facility] pharmacy to confirm Amlodipine 5mg- not currently on patient profile. They confirmed that Dr. Rei Daiz has been filling this for him, and they will fill another refill today.    Evelina Quan, PharmD  9/3/2019  10:30 AM

## 2019-09-11 ENCOUNTER — OFFICE VISIT (OUTPATIENT)
Dept: CARDIOLOGY | Facility: CLINIC | Age: 74
End: 2019-09-11

## 2019-09-11 VITALS
WEIGHT: 276 LBS | DIASTOLIC BLOOD PRESSURE: 72 MMHG | BODY MASS INDEX: 39.51 KG/M2 | HEART RATE: 83 BPM | SYSTOLIC BLOOD PRESSURE: 123 MMHG | HEIGHT: 70 IN

## 2019-09-11 DIAGNOSIS — I10 ESSENTIAL HYPERTENSION: Primary | Chronic | ICD-10-CM

## 2019-09-11 DIAGNOSIS — N18.4 STAGE 4 CHRONIC KIDNEY DISEASE (HCC): ICD-10-CM

## 2019-09-11 DIAGNOSIS — I48.20 CHRONIC ATRIAL FIBRILLATION (HCC): ICD-10-CM

## 2019-09-11 DIAGNOSIS — E78.5 DYSLIPIDEMIA: Chronic | ICD-10-CM

## 2019-09-11 DIAGNOSIS — I50.32 CHRONIC DIASTOLIC CONGESTIVE HEART FAILURE (HCC): Chronic | ICD-10-CM

## 2019-09-11 DIAGNOSIS — G47.33 SEVERE OBSTRUCTIVE SLEEP APNEA: Chronic | ICD-10-CM

## 2019-09-11 PROCEDURE — 99214 OFFICE O/P EST MOD 30 MIN: CPT | Performed by: INTERNAL MEDICINE

## 2019-09-11 NOTE — PROGRESS NOTES
Subjective:     Encounter Date:09/11/2019    Patient ID: Marco Antonio Montemayor is a 74 y.o.  white male, retired /current [employer] , from [place], [place].      PHYSICIAN: EDMUND Rosado MD  ELECTROPHYSIOLOGIST: Johnathan Taylor MD, [facility], [facility]  INTERVENTIONAL CARDIOLOGIST: Antonio Escamilla MD, [facility]  NEPHROLOGIST:  [facility]  DERMATOLOGIST:  Yoanna Marks MD/Oksana Young MD([facility])     Chief Complaint:   Chief Complaint   Patient presents with   • Atrial Fibrillation   • Hypertension     Problem List:  1. 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 Remote acceptable intravenous adenosine Quantitative SPECT gated Cardiolite study, LVEF 0.50, April 1999.  b. Echocardiogram showing mild LVH with estimated ejection fraction 0.58 with mild aortic valve cusp sclerosis, and mild TR, 09/23/2014.   c. Remote abnormal preoperative Quantitative SPECT gated Cardiolite study with mild “fixed” inferoposterior hypoperfusion and mild LV enlargement with mild global hypokinesia with reduced LVEF 0.40 in the setting of abnormal EKG with subsequent diagnostic coronary angiography demonstrating mild-to-moderate nonobstructive coronary artery atherosclerosis with mild compensated left ventricular dysfunction, LVEF 0.52, and continued medical therapy felt warranted, June 2006.  d. Remote hospitalization for symptomatic atrial fibrillation with rapid ventricular response and mild congestive heart failure requiring BRYANNA and DC cardioversion, June 2009.  e. Left heart catheterization with essentially normal coronary arteries with mild luminal irregularities with an EF of 0.60 by Dr. Escamilla for angina and elevated troponin, 06/05/2015.   f. Residual class I symptoms.  2. Chronic essential hypertension with recent progressive hypertension and blood pressure readings and normal selective bilateral renal angiography, June 2006.  3. Dyslipidemia.  4. Chronic atrial  tachyarrhythmias:  a. Intermittent chronic paroxysmal atrial fibrillation with electrophysiologic study with RFA for atrial flutter, October 1999.  b. Recurrent apparent transient atrial fibrillation, resolved with acceptable [facility] emergency department evaluation, June 2003.  c. Acceptable combination Doppler echocardiogram, July 2003, with apparent acceptable 24-hour Holter monitor, May 2006.  d. Recurrent asymptomatic atrial fibrillation with RVR, January 2007, subsequent Coumadin therapy and Tikosyn therapy with successful internal cardioversion of atrial fibrillation and marked bradyarrhythmias requiring DDDR pacemaker implant, [employer] [place] device, data not available, March 2007.  e. Remote hospitalization for symptomatic rapid atrial fibrillation/BRYANNA DC cardioversion with subsequent acceptable pacemaker interrogation, June/July 2009, as well as acceptable interrogation without reprogramming, July 2011.  f. Acceptable combination Doppler echocardiogram, December 2010, with residual class I symptoms.  g. Acceptable device interrogation following [facility] ED evaluation for chest pressure and shortness of breath, data deficit, May 2011, February 2013, February 2016, December 2016.  h. Abnormal pacemaker assessment with 8.4% mode switch with battery voltage of 1-1.25 years, June 2017, with abnormal PACEART assessment, July 2018, with 22% mode switch, with subsequent interrogation demonstrating chronic atrial fibrillation, May 2019.  5. Moderate obesity, BMI 39.6.  6. Ichthyosis.  7. Chronic lower tract obstructive symptoms, probable BPH.  8. Dyslipidemia.  9. Nasal polyps with deviated nasal septum with apparent subsequent nasal septoplasty/polypectomy, data deficit, July 2006.  10. Abdominal pain with apparent small bowel obstruction with exploratory laparotomy - data deficit.  11. Appendectomy with subsequent delayed hospitalization for bleeding peptic ulcer disease/probable duodenal ulcer, November 2003.  12. Recurrent  asymptomatic atrial fibrillation with rapid ventricular response, July 2007, with subsequent Tikosyn therapy and DDDR pacemaker implant with intermittent recurrent breakthrough arrhythmias, including remote DC cardioversion, May 2008, with subsequent recurrent DC cardioversion, autumn 2018, with recurrent atrial fibrillation and discontinuation of formal antiarrhythmic therapy - data deficit  13. Obstructive sleep apnea with treatment.   14. Stage 4 chronic kidney disease with recent apparent documented proteinuria and renal biopsy (February 2019)     No Known Allergies      Current Outpatient Medications:   •  acetaminophen (TYLENOL) 500 MG tablet, Take 1,000 mg by mouth Every 6 (Six) Hours As Needed for Mild Pain ., Disp: , Rfl:   •  atorvastatin (LIPITOR) 40 MG tablet, Take 1 tablet by mouth Daily., Disp: 90 tablet, Rfl: 0  •  carvedilol (COREG) 25 MG tablet, Take 1 tablet by mouth 2 (Two) Times a Day With Meals., Disp: 180 tablet, Rfl: 3  •  cholecalciferol (VITAMIN D3) 1000 UNITS tablet, Take 1,000 Units by mouth daily., Disp: , Rfl:   •  Ferrous Sulfate (IRON) 325 (65 FE) MG tablet, Take 65 mg by mouth Daily., Disp: , Rfl:   •  PHARMACY TO DOSE WARFARIN, Continuous As Needed (patient's warfarin is being managed by the  Anticoagulation Clinic ([phone]))., Disp: , Rfl:   •  torsemide (DEMADEX) 20 MG tablet, TAKE ONE TABLET BY MOUTH TWICE A DAY, Disp: 180 tablet, Rfl: 2  •  vitamin B-12 (CYANOCOBALAMIN) 1000 MCG tablet, Take 1,000 mcg by mouth Daily., Disp: , Rfl:   •  warfarin (COUMADIN) 2.5 MG tablet, Take 1 to 2 tablets by mouth daily, as directed by the anticoagulation clinic., Disp: 180 tablet, Rfl: 1  •  torsemide (DEMADEX) 20 MG tablet, Take 20 mg by mouth Daily., Disp: , Rfl:     HISTORY OF PRESENT ILLNESS: Patient returns for scheduled 6-month followup.  He had followup with Dr. Taylor as scheduled in May 2019, with abnormal acceptable pacemaker interrogation and no  "changes made at that time.  In the interim, he has had a Mohs procedure on a \"little spot\" on the top of his ear that has healed up well.  He has followup with his dermatologist coming up.  He states \"there are times I'm feeling good and times I'm feeling weak.\"  He thinks his rhythm is off occasionally, but Dr. Taylor told him there is no medicine they can give him to straighten out his rhythm.  He has followup with his nephrologist on Monday, September 16, 2019, and he is hoping that his kidney function is stable.  He continues to work for [employer] out of the [place] and was down in [place] yesterday.  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  He usually tries to get a flu shot in the fall; the importance of influenza immunization is discussed with him.  Patient otherwise denies chest pain, shortness of breath, PND, edema, palpitations, syncope or presyncope at this time on current activity schedule.        Review of Systems   Cardiovascular: Positive for irregular heartbeat.   Skin: Positive for dry skin and skin cancer.      All other systems reviewed and otherwise negative.    Procedures       Objective:       Vitals:    09/11/19 1451 09/11/19 1452   BP: 132/78 123/72   BP Location: Left arm Left arm   Patient Position: Sitting Standing   Pulse: 82 83   Weight: 125 kg (276 lb)    Height: 177.8 cm (70\")      Body mass index is 39.6 kg/m².   Last weight:  276 lbs.    Physical Exam   Constitutional: He is oriented to person, place, and time. He appears well-developed and well-nourished.   Neck: No JVD present. Carotid bruit is not present. No thyromegaly present.   Cardiovascular: S1 normal and S2 normal. An irregularly irregular rhythm present. Exam reveals no gallop, no S3 and no friction rub.   Murmur heard.   Harsh early systolic murmur is present with a grade " of 2/6 at the upper right sternal border.  Pulses:       Carotid pulses are 1+ on the right side, and 1+ on the left side.       Radial pulses are 1+ on the right side, and 1+ on the left side.        Femoral pulses are 1+ on the right side, and 1+ on the left side.       Popliteal pulses are 1+ on the right side, and 1+ on the left side.        Dorsalis pedis pulses are 1+ on the right side, and 1+ on the left side.        Posterior tibial pulses are 1+ on the right side, and 1+ on the left side.   Pulmonary/Chest: Effort normal. He has decreased breath sounds. He has no wheezes. He has no rhonchi. He has no rales.   Nominal left precordial pacemaker site   Abdominal: Soft. He exhibits no mass. There is no hepatosplenomegaly. There is no tenderness. There is no guarding.   Bowel sounds audible x4   Musculoskeletal: Normal range of motion. He exhibits no edema.   Lymphadenopathy:     He has no cervical adenopathy.   Neurological: He is alert and oriented to person, place, and time.   Skin: Skin is warm, dry and intact. No rash noted.   Vitals reviewed.        Lab Review: No recent laboratory results available for review today  Lab Results   Component Value Date    GLUCOSE 118 (H) 02/06/2019    BUN 36 (H) 02/06/2019    CREATININE 2.41 (H) 02/06/2019    EGFRIFNONA 26 (L) 02/06/2019    EGFRIFAFRI 26 (L) 11/12/2018    BCR 14.9 02/06/2019    CO2 25.0 02/06/2019    CALCIUM 8.8 02/06/2019    PROTENTOTREF 6.3 11/12/2018    ALBUMIN 3.78 02/06/2019    LABIL2 1.5 11/12/2018    AST 12 11/12/2018    ALT 9 11/12/2018       Lab Results   Component Value Date    WBC 6.42 02/06/2019    HGB 10.3 (L) 02/06/2019    HCT 33.0 (L) 02/06/2019    MCV 94.3 02/06/2019     02/06/2019       Lab Results   Component Value Date    HGBA1C 6.20 (H) 05/17/2018       Lab Results   Component Value Date    TSH 11.020 (H) 11/12/2018     Lab Results   Component Value Date    TRIG 104 12/08/2017    TRIG 70 06/05/2015     Lab Results   Component  Value Date    HDL 31 (L) 12/08/2017    HDL 28 (L) 06/05/2015     Lab Results   Component Value Date    LDL 72 12/08/2017    LDL 64 06/05/2015       Lab Results   Component Value Date    .0 (H) 09/14/2018           Assessment:   Overall continued acceptable course with no new interim cardiopulmonary complaints with acceptable functional status. We will defer additional diagnostic or therapeutic intervention from a cardiac perspective at this time.       Diagnosis Plan   1. Essential hypertension  Acceptable control; Continue current treatment.    2. Chronic diastolic congestive heart failure (CMS/HCC)  Acceptable control; Continue current treatment.    3. Chronic atrial fibrillation (CMS/HCC)  Continue rate control and anticoagulation   4. Severe obstructive sleep apnea  Compliance stressed   5. Stage 4 chronic kidney disease (CMS/[facility])  No data to review; continue close followup and monitoring with Nephrology   6. Dyslipidemia  No data to review; continue atorvastatin          Plan:         1. Patient to continue current medications and close follow up with the above providers.  2. Influenza immunization in the fall is strongly recommended.  3. Tentative cardiology follow up in April 2020, or patient may return sooner PRN.    Transcribed by Raeann Newton for Dr. Dustin Mehta at 3:02 PM on 09/11/2019    IDustin MD, [facility], personally performed the services described in this documentation as scribed by the above named individual in my presence, and it is both accurate and complete. At 3:28 PM on 09/11/2019

## 2019-09-17 ENCOUNTER — CLINICAL SUPPORT NO REQUIREMENTS (OUTPATIENT)
Dept: CARDIOLOGY | Facility: CLINIC | Age: 74
End: 2019-09-17

## 2019-09-17 DIAGNOSIS — R00.1 BRADYCARDIA: ICD-10-CM

## 2019-09-17 DIAGNOSIS — I48.20 CHRONIC ATRIAL FIBRILLATION (HCC): ICD-10-CM

## 2019-09-17 PROCEDURE — 93294 REM INTERROG EVL PM/LDLS PM: CPT | Performed by: INTERNAL MEDICINE

## 2019-09-17 PROCEDURE — 93296 REM INTERROG EVL PM/IDS: CPT | Performed by: INTERNAL MEDICINE

## 2019-09-23 RX ORDER — LISINOPRIL 2.5 MG/1
5 TABLET ORAL DAILY
COMMUNITY
Start: 2019-10-17 | End: 2019-10-30

## 2019-10-01 ENCOUNTER — ANTICOAGULATION VISIT (OUTPATIENT)
Dept: PHARMACY | Facility: HOSPITAL | Age: 74
End: 2019-10-01

## 2019-10-01 LAB
INR PPP: 2 (ref 0.91–1.09)
PROTHROMBIN TIME: 24.4 SECONDS (ref 10–13.8)

## 2019-10-01 PROCEDURE — G0463 HOSPITAL OUTPT CLINIC VISIT: HCPCS

## 2019-10-01 PROCEDURE — 85610 PROTHROMBIN TIME: CPT

## 2019-10-01 PROCEDURE — 36416 COLLJ CAPILLARY BLOOD SPEC: CPT

## 2019-10-01 NOTE — PROGRESS NOTES
Anticoagulation Clinic Progress Note  Indication: afib  Referring Provider: Tyson Roberts)  Initial Warfarin Start Date: 2007  Goal INR: 2-3  Current Drug Interactions:  torsemide  Diet: not eating Vit K foods at this point (3/6/19)  [id number]: 5 (HTN, Age, CHF, CAD)  Other: anemic, CKD (stage 3) May need bridge with Lovenox given persistent AF (per cardio 1/24/2019)    Anticoagulation Clinic INR History:  Date 10/23 11/20 12/8 1/5 2/2 3/2 3/16 3/30 4/17 5/17 6/14 7/19 8/1 8/22 9/12   Total Weekly Dose 37.5mg 37.5 mg 37.5 mg 37.5 mg 37.5mg 37.5 mg 37.5 mg 37.5 mg 37.5mg 37.5 mg 37.5 mg 37.5mg 37.5 mg 37.5 mg 37.5 mg    INR 2.5 1.8 2.5 2.3 2.1 1.7 3.2 2.1 2.3 2.06 2.8 3.5 2.7 2.7 3.0   Notes      (boost), amox amox   PAT     diarrhea     Date 10/1 10/8 10/12 10/17 10/22 10/29 11/5 11/12 11/19 11/29 12/13 1/3 1/24 2/12 2/18   Total Weekly Dose 26.25 mg 23.75mg 22.5  mg 23.75 mg 27.5 mg 28.75 mg 27.5 mg 28.75 mg 28.75 mg 28.75 mg 28.75 28.75mg 28.75 mg 17.5 mg 28.75mg   INR 3.0 2.1 1.6 1.3 1.6 2.8 2.0 2.5 2.4 2.76 2.8 2.7 2.9 1.7 1.5   Notes post- disch amio start 10/1        PAT   held x 7 days; resumed 1/22; D/C amio asa dc'd torsemide reduced,      Date 2/22 2/26 3/6 3/15 3/29 4/12 4/26 5/14 6/11 7/9 8/6 9/3 10/1    Total Weekly Dose 31.75 mg 33.75 mg 35mg 33.75 mg 33.75 mg 33.75 mg 32.5 mg 32.5 mg 32.5 mg 32.5 mg 32.5 mg 32.5 mg 32.5 mg 33.75 mg   INR 1.6 2.0 2.8 2.8 3.1 3.1 2.8 2.5 2.2 2.4 2.5 2.4 2.0    Notes  enox                 Clinic Interview:  Verbal Release Authorization signed on 8/22/2018 -- may speak with Raeann Montemayor (Wife). [phone] (Home) *Preferred*  Tablet Strength: pt has 7.5mg and 2.5mg tablets    Patient Findings   Positives:  Change in medications   Negatives:  Signs/symptoms of thrombosis, Signs/symptoms of bleeding, Laboratory test error suspected, Change in health, Change in alcohol use, Change in activity, Upcoming invasive procedure, Emergency department visit, Upcoming dental  procedure, Missed doses, Extra doses, Change in diet/appetite, Hospital admission, Bruising, Other complaints   Comments:  Mr. Montemayor has stopped taking amlodipine and is now taking lisinopril, instead. He will have labwork again on 10/10, then follow up with his nephrologist on 10/17 to determine the safety of continuing lisinopril / increasing the dose.    Takes warfarin in the AM    Plan:   1. INR is therapeutic today at the [facility]. Instructed Mr. Montemayor to increase his dose (about 4%) to warfarin 5mg oral daily except 3.75mg Saturday, to target an INR closer to mid-range.  2. RTC in two weeks, after nephrology follow up.   3. Verbal and written information provided in the clinic. Mr. Montemayor expresses understanding with teach back and has no further questions at this time.    Shaenll Arango [facility]  10/1/2019  10:03 AM

## 2019-10-02 ENCOUNTER — LAB REQUISITION (OUTPATIENT)
Dept: LAB | Facility: HOSPITAL | Age: 74
End: 2019-10-02

## 2019-10-02 DIAGNOSIS — Z00.00 ROUTINE GENERAL MEDICAL EXAMINATION AT A HEALTH CARE FACILITY: ICD-10-CM

## 2019-10-02 PROCEDURE — 87205 SMEAR GRAM STAIN: CPT | Performed by: DERMATOLOGY

## 2019-10-02 PROCEDURE — 87070 CULTURE OTHR SPECIMN AEROBIC: CPT | Performed by: DERMATOLOGY

## 2019-10-04 LAB
BACTERIA SPEC AEROBE CULT: NORMAL
GRAM STN SPEC: NORMAL
GRAM STN SPEC: NORMAL

## 2019-10-14 DIAGNOSIS — E78.5 DYSLIPIDEMIA: Primary | Chronic | ICD-10-CM

## 2019-10-14 RX ORDER — ATORVASTATIN CALCIUM 40 MG/1
40 TABLET, FILM COATED ORAL DAILY
Qty: 30 TABLET | Refills: 0 | Status: SHIPPED | OUTPATIENT
Start: 2019-10-14 | End: 2019-11-15 | Stop reason: SDUPTHER

## 2019-10-14 NOTE — PROGRESS NOTES
Pt requested atorvastatin refill. Pt needs FLP and CMP.  FLP order is active from April 2019. Ordering CMP. Attempted to call pt, no answer, no VM available. Will put note on one month refill for atorvastatin sent to pharmacy.

## 2019-10-17 ENCOUNTER — ANTICOAGULATION VISIT (OUTPATIENT)
Dept: PHARMACY | Facility: HOSPITAL | Age: 74
End: 2019-10-17

## 2019-10-17 LAB
INR PPP: 3.7 (ref 0.91–1.09)
PROTHROMBIN TIME: 44.1 SECONDS (ref 10–13.8)

## 2019-10-17 PROCEDURE — G0463 HOSPITAL OUTPT CLINIC VISIT: HCPCS

## 2019-10-17 PROCEDURE — 36416 COLLJ CAPILLARY BLOOD SPEC: CPT

## 2019-10-17 PROCEDURE — 85610 PROTHROMBIN TIME: CPT

## 2019-10-17 NOTE — PROGRESS NOTES
Anticoagulation Clinic Progress Note  Indication: afib  Referring Provider: Tyson Roberts)  Initial Warfarin Start Date: 2007  Goal INR: 2-3  Current Drug Interactions:  torsemide  Diet: not eating Vit K foods at this point (3/6/19)  [id number]: 5 (HTN, Age, CHF, CAD)  Other: anemic, CKD (stage 3) May need bridge with Lovenox given persistent AF (per cardio 1/24/2019)    Anticoagulation Clinic INR History:  Date 10/23 11/20 12/8 1/5 2/2 3/2 3/16 3/30 4/17 5/17 6/14 7/19 8/1 8/22 9/12   Total Weekly Dose 37.5mg 37.5 mg 37.5 mg 37.5 mg 37.5mg 37.5 mg 37.5 mg 37.5 mg 37.5mg 37.5 mg 37.5 mg 37.5mg 37.5 mg 37.5 mg 37.5 mg    INR 2.5 1.8 2.5 2.3 2.1 1.7 3.2 2.1 2.3 2.06 2.8 3.5 2.7 2.7 3.0   Notes      (boost), amox amox   PAT     diarrhea     Date 10/1 10/8 10/12 10/17 10/22 10/29 11/5 11/12 11/19 11/29 12/13 1/3 1/24 2/12 2/18   Total Weekly Dose 26.25 mg 23.75mg 22.5  mg 23.75 mg 27.5 mg 28.75 mg 27.5 mg 28.75 mg 28.75 mg 28.75 mg 28.75 28.75mg 28.75 mg 17.5 mg 28.75mg   INR 3.0 2.1 1.6 1.3 1.6 2.8 2.0 2.5 2.4 2.76 2.8 2.7 2.9 1.7 1.5   Notes post- disch amio start 10/1        PAT   held x 7 days; resumed 1/22; D/C amio asa dc'd torsemide reduced,      Date 2/22 2/26 3/6 3/15 3/29 4/12 4/26 5/14 6/11 7/9 8/6 9/3 10/1 10/17   Total Weekly Dose 31.75 mg 33.75 mg 35mg 33.75 mg 33.75 mg 33.75 mg 32.5 mg 32.5 mg 32.5 mg 32.5 mg 32.5 mg 32.5 mg 32.5 mg 33.75 mg   INR 1.6 2.0 2.8 2.8 3.1 3.1 2.8 2.5 2.2 2.4 2.5 2.4 2.0 3.7   Notes  enox               Takes warfarin in the am    Clinic Interview:  Verbal Release Authorization signed on 8/22/2018 -- may speak with Raeann Montemayor (Wife). [phone] (Home) *Preferred*  Tablet Strength: pt has 7.5mg and 2.5mg tablets    Patient Findings   Positives:  Change in medications   Negatives:  Signs/symptoms of thrombosis, Signs/symptoms of bleeding, Laboratory test error suspected, Change in health, Change in alcohol use, Change in activity, Upcoming invasive procedure, Emergency  department visit, Upcoming dental procedure, Missed doses, Extra doses, Change in diet/appetite, Hospital admission, Bruising, Other complaints   Comments:  He has been taking more acetaminophen due to back pain (DDI, may result in an increased risk of bleeding)   He saw his nephrologist prior to this appointment.  They will increase his lisinopril to 5 mg oral daily.   Otherwise, denies changes      Plan:   1. INR is now supra therapeutic today at 3.7 after only 3.7% increase in maintenance dose at last encounter.  As Mr. Montemayor had already taken his dose today, instructed to take warfarin 3.75 mg tomorrow then on Saturday, begin warfarin 5mg oral daily except 3.75mg WedSat.  2. RTC in two weeks on 10/30.  He will wait to take his dose of warfarin until after appointment  3. Verbal and written information provided in the clinic. Mr. Montemayor expresses understanding with teach back and has no further questions at this time.    Charlotte Michael, PharmD  10/17/2019  11:50 AM

## 2019-10-19 ENCOUNTER — RESULTS ENCOUNTER (OUTPATIENT)
Dept: CARDIOLOGY | Facility: CLINIC | Age: 74
End: 2019-10-19

## 2019-10-19 DIAGNOSIS — E78.5 DYSLIPIDEMIA: Chronic | ICD-10-CM

## 2019-10-30 ENCOUNTER — ANTICOAGULATION VISIT (OUTPATIENT)
Dept: PHARMACY | Facility: HOSPITAL | Age: 74
End: 2019-10-30

## 2019-10-30 ENCOUNTER — TELEPHONE (OUTPATIENT)
Dept: PHARMACY | Facility: HOSPITAL | Age: 74
End: 2019-10-30

## 2019-10-30 LAB
INR PPP: 2.3 (ref 0.91–1.09)
PROTHROMBIN TIME: 27 SECONDS (ref 10–13.8)

## 2019-10-30 PROCEDURE — 36416 COLLJ CAPILLARY BLOOD SPEC: CPT

## 2019-10-30 PROCEDURE — G0463 HOSPITAL OUTPT CLINIC VISIT: HCPCS

## 2019-10-30 PROCEDURE — 85610 PROTHROMBIN TIME: CPT

## 2019-10-30 RX ORDER — LISINOPRIL 5 MG/1
5 TABLET ORAL DAILY
COMMUNITY
End: 2021-04-08 | Stop reason: SDUPTHER

## 2019-10-30 NOTE — TELEPHONE ENCOUNTER
Dr. Mehta,    Please see the attached labs for Mr. Montemayor (10/7/19).  Note: SCr 2.63.    Thank you,  Shanell

## 2019-10-30 NOTE — PROGRESS NOTES
Anticoagulation Clinic Progress Note  Indication: afib  Referring Provider: Tyson Roberts)  Initial Warfarin Start Date: 2007  Goal INR: 2-3  Current Drug Interactions:  torsemide  Diet: not eating Vit K foods at this point (3/6/19)  [id number]: 5 (HTN, Age, CHF, CAD)  Other: anemic, CKD (stage 3) May need bridge with Lovenox given persistent AF (per cardio 1/24/2019)    Anticoagulation Clinic INR History:  Date 10/23 11/20 12/8 1/5 2/2 3/2 3/16 3/30 4/17 5/17 6/14 7/19 8/1 8/22 9/12   Total Weekly Dose 37.5mg 37.5 mg 37.5 mg 37.5 mg 37.5mg 37.5 mg 37.5 mg 37.5 mg 37.5mg 37.5 mg 37.5 mg 37.5mg 37.5 mg 37.5 mg 37.5 mg    INR 2.5 1.8 2.5 2.3 2.1 1.7 3.2 2.1 2.3 2.06 2.8 3.5 2.7 2.7 3.0   Notes      (boost), amox amox   PAT     diarrhea     Date 10/1 10/8 10/12 10/17 10/22 10/29 11/5 11/12 11/19 11/29 12/13 1/3 1/24 2/12 2/18   Total Weekly Dose 26.25 mg 23.75mg 22.5  mg 23.75 mg 27.5 mg 28.75 mg 27.5 mg 28.75 mg 28.75 mg 28.75 mg 28.75 28.75mg 28.75 mg 17.5 mg 28.75mg   INR 3.0 2.1 1.6 1.3 1.6 2.8 2.0 2.5 2.4 2.76 2.8 2.7 2.9 1.7 1.5   Notes post- disch amio start 10/1        PAT   held x 7 days; resumed 1/22; D/C amio asa dc'd torsemide reduced,      Date 2/22 2/26 3/6 3/15 3/29 4/12 4/26 5/14 6/11 7/9 8/6 9/3 10/1 10/17   Total Weekly Dose 31.75 mg 33.75 mg 35mg 33.75 mg 33.75 mg 33.75 mg 32.5 mg 32.5 mg 32.5 mg 32.5 mg 32.5 mg 32.5 mg 32.5 mg 33.75 mg   INR 1.6 2.0 2.8 2.8 3.1 3.1 2.8 2.5 2.2 2.4 2.5 2.4 2.0 3.7   Notes  enox                 Date 10/30                Total Weekly Dose 32.5 mg 32.5 mg               INR 2.3                Notes                 Takes warfarin in the am    Clinic Interview:  Verbal Release Authorization signed on 8/22/2018 -- may speak with Raeann Montemayor (Wife). [phone] (Home) *Preferred*  Tablet Strength: pt has 7.5mg and 2.5mg tablets    Patient Findings:  Negatives:  Signs/symptoms of thrombosis, Signs/symptoms of bleeding, Laboratory test error suspected, Change in  health, Change in alcohol use, Change in activity, Upcoming invasive procedure, Emergency department visit, Upcoming dental procedure, Missed doses, Extra doses, Change in medications, Change in diet/appetite, Hospital admission, Bruising, Other complaints   Comments:  Mr. Montemayor notes his BP is still running a bit high, despite recent increase in lisinopril dose.     Plan:   1. INR is therapeutic today, so instructed Mr. Montemayor to continue warfarin 5mg oral daily except 3.75mg WedSat.  2. RTC in three weeks.  3. Verbal and written information provided in the clinic. Mr. Montemayor expresses understanding with teach back and has no further questions at this time.    Shanell Arango [facility]  10/30/2019  9:59 AM

## 2019-11-15 DIAGNOSIS — I48.0 PAROXYSMAL ATRIAL FIBRILLATION (HCC): ICD-10-CM

## 2019-11-15 RX ORDER — ATORVASTATIN CALCIUM 40 MG/1
40 TABLET, FILM COATED ORAL DAILY
Qty: 30 TABLET | Refills: 0 | Status: SHIPPED | OUTPATIENT
Start: 2019-11-15 | End: 2019-12-11 | Stop reason: SDUPTHER

## 2019-11-18 RX ORDER — CARVEDILOL 25 MG/1
25 TABLET ORAL 2 TIMES DAILY WITH MEALS
Qty: 180 TABLET | Refills: 3 | Status: SHIPPED | OUTPATIENT
Start: 2019-11-18 | End: 2020-02-21 | Stop reason: SDUPTHER

## 2019-11-20 ENCOUNTER — ANTICOAGULATION VISIT (OUTPATIENT)
Dept: PHARMACY | Facility: HOSPITAL | Age: 74
End: 2019-11-20

## 2019-11-20 DIAGNOSIS — I48.19 PERSISTENT ATRIAL FIBRILLATION (HCC): ICD-10-CM

## 2019-11-20 LAB
INR PPP: 2.3 (ref 0.91–1.09)
PROTHROMBIN TIME: 28.1 SECONDS (ref 10–13.8)

## 2019-11-20 PROCEDURE — G0463 HOSPITAL OUTPT CLINIC VISIT: HCPCS | Performed by: PHARMACIST

## 2019-11-20 PROCEDURE — 36416 COLLJ CAPILLARY BLOOD SPEC: CPT

## 2019-11-20 PROCEDURE — 85610 PROTHROMBIN TIME: CPT

## 2019-11-20 NOTE — PROGRESS NOTES
Anticoagulation Clinic Progress Note  Indication: afib  Referring Provider: Tyson Roberts)  Initial Warfarin Start Date: 2007  Goal INR: 2-3  Current Drug Interactions:  torsemide  Diet: not eating Vit K foods at this point (3/6/19)  [id number]: 5 (HTN, Age, CHF, CAD)  Other: anemic, CKD (stage 3) May need bridge with Lovenox given persistent AF (per cardio 1/24/2019)    Anticoagulation Clinic INR History:  Date 10/23 11/20 12/8 1/5 2/2 3/2 3/16 3/30 4/17 5/17 6/14 7/19 8/1 8/22 9/12   Total Weekly Dose 37.5mg 37.5 mg 37.5 mg 37.5 mg 37.5mg 37.5 mg 37.5 mg 37.5 mg 37.5mg 37.5 mg 37.5 mg 37.5mg 37.5 mg 37.5 mg 37.5 mg    INR 2.5 1.8 2.5 2.3 2.1 1.7 3.2 2.1 2.3 2.06 2.8 3.5 2.7 2.7 3.0   Notes      (boost), amox amox   PAT     diarrhea     Date 10/1 10/8 10/12 10/17 10/22 10/29 11/5 11/12 11/19 11/29 12/13 1/3 1/24 2/12 2/18   Total Weekly Dose 26.25 mg 23.75mg 22.5  mg 23.75 mg 27.5 mg 28.75 mg 27.5 mg 28.75 mg 28.75 mg 28.75 mg 28.75 28.75mg 28.75 mg 17.5 mg 28.75mg   INR 3.0 2.1 1.6 1.3 1.6 2.8 2.0 2.5 2.4 2.76 2.8 2.7 2.9 1.7 1.5   Notes post- disch amio start 10/1        PAT   held x 7 days; resumed 1/22; D/C amio asa dc'd torsemide reduced,      Date 2/22 2/26 3/6 3/15 3/29 4/12 4/26 5/14 6/11 7/9 8/6 9/3 10/1 10/17   Total Weekly Dose 31.75 mg 33.75 mg 35mg 33.75 mg 33.75 mg 33.75 mg 32.5 mg 32.5 mg 32.5 mg 32.5 mg 32.5 mg 32.5 mg 32.5 mg 33.75 mg   INR 1.6 2.0 2.8 2.8 3.1 3.1 2.8 2.5 2.2 2.4 2.5 2.4 2.0 3.7   Notes  enox                 Date 10/30 11/20               Total Weekly Dose 32.5 mg 32.5 mg               INR 2.3 2.3               Notes                 Takes warfarin in the am    Clinic Interview:  Verbal Release Authorization signed on 8/22/2018 -- may speak with Raeann Montemayor (Wife). [phone] (Home) *Preferred*  Tablet Strength: pt has 7.5mg and 2.5mg tablets    Patient Findings:  Negatives:  Signs/symptoms of thrombosis, Signs/symptoms of bleeding, Laboratory test error suspected, Change  in health, Change in alcohol use, Change in activity, Upcoming invasive procedure, Emergency department visit, Upcoming dental procedure, Missed doses, Extra doses, Change in medications, Change in diet/appetite, Hospital admission, Bruising, Other complaints   Comments:  Mr. Montemayor notes his BP is still running a bit high, despite recent increase in lisinopril dose.     Plan:   1. INR is therapeutic today, so instructed Mr. Montemayor to continue warfarin 5mg oral daily except 3.75mg WedSat.  2. RTC in four weeks.  3. Verbal and written information provided in the clinic. Mr. Montemayor expresses understanding with teach back and has no further questions at this time.    Peng Melgar [facility]  11/20/2019  10:24 AM

## 2019-12-11 RX ORDER — ATORVASTATIN CALCIUM 40 MG/1
40 TABLET, FILM COATED ORAL DAILY
Qty: 30 TABLET | Refills: 0 | Status: SHIPPED | OUTPATIENT
Start: 2019-12-11 | End: 2020-02-21 | Stop reason: SDUPTHER

## 2019-12-17 ENCOUNTER — ANTICOAGULATION VISIT (OUTPATIENT)
Dept: PHARMACY | Facility: HOSPITAL | Age: 74
End: 2019-12-17

## 2019-12-17 ENCOUNTER — CLINICAL SUPPORT NO REQUIREMENTS (OUTPATIENT)
Dept: CARDIOLOGY | Facility: CLINIC | Age: 74
End: 2019-12-17

## 2019-12-17 DIAGNOSIS — R00.1 BRADYCARDIA: ICD-10-CM

## 2019-12-17 LAB
INR PPP: 2.1 (ref 0.91–1.09)
PROTHROMBIN TIME: 25.8 SECONDS (ref 10–13.8)

## 2019-12-17 PROCEDURE — 85610 PROTHROMBIN TIME: CPT

## 2019-12-17 PROCEDURE — 93296 REM INTERROG EVL PM/IDS: CPT | Performed by: INTERNAL MEDICINE

## 2019-12-17 PROCEDURE — 36416 COLLJ CAPILLARY BLOOD SPEC: CPT

## 2019-12-17 PROCEDURE — 93294 REM INTERROG EVL PM/LDLS PM: CPT | Performed by: INTERNAL MEDICINE

## 2019-12-17 PROCEDURE — G0463 HOSPITAL OUTPT CLINIC VISIT: HCPCS

## 2020-01-14 ENCOUNTER — ANTICOAGULATION VISIT (OUTPATIENT)
Dept: PHARMACY | Facility: HOSPITAL | Age: 75
End: 2020-01-14

## 2020-01-14 LAB
INR PPP: 1.9 (ref 0.91–1.09)
PROTHROMBIN TIME: 22.4 SECONDS (ref 10–13.8)

## 2020-01-14 PROCEDURE — 85610 PROTHROMBIN TIME: CPT

## 2020-01-14 PROCEDURE — G0463 HOSPITAL OUTPT CLINIC VISIT: HCPCS

## 2020-01-14 PROCEDURE — 36416 COLLJ CAPILLARY BLOOD SPEC: CPT

## 2020-01-14 NOTE — PROGRESS NOTES
Anticoagulation Clinic Progress Note  Indication: afib  Referring Provider: Tyson (next appointment: 4/15/2020) (Claudia 5/20/2020)  Initial Warfarin Start Date: 2007  Goal INR: 2-3  Current Drug Interactions:  torsemide  Diet: not eating Vit K foods at this point (3/6/19)  [id number]: 5 (HTN, Age, CHF, CAD)  Other: anemic, CKD (stage 3) May need bridge with Lovenox given persistent AF (per cardio 1/24/2019), Scr was 2.38 on 12/10/19    Anticoagulation Clinic INR History:  Date 10/23 11/20 12/8 1/5/18 2/2 3/2 3/16 3/30 4/17 5/17 6/14 7/19 8/1 8/22 9/12   Total Weekly Dose 37.5mg 37.5 mg 37.5 mg 37.5 mg 37.5mg 37.5 mg 37.5 mg 37.5 mg 37.5mg 37.5 mg 37.5 mg 37.5mg 37.5 mg 37.5 mg 37.5 mg    INR 2.5 1.8 2.5 2.3 2.1 1.7 3.2 2.1 2.3 2.06 2.8 3.5 2.7 2.7 3.0   Notes      (boost), amox amox   PAT     diarrhea     Date 10/1 10/8 10/12 10/17 10/22 10/29 11/5 11/12 11/19 11/29 12/13 1/3 1/24/19 2/12 2/18   Total Weekly Dose 26.25 mg 23.75mg 22.5  mg 23.75 mg 27.5 mg 28.75 mg 27.5 mg 28.75 mg 28.75 mg 28.75 mg 28.75 28.75mg 28.75 mg 17.5 mg 28.75mg   INR 3.0 2.1 1.6 1.3 1.6 2.8 2.0 2.5 2.4 2.76 2.8 2.7 2.9 1.7 1.5   Notes post- disch amio start 10/1        PAT   held x 7 days; resumed 1/22; D/C amio asa dc'd torsemide reduced,      Date 2/22 2/26 3/6 3/15 3/29 4/12 4/26 5/14 6/11 7/9 8/6 9/3 10/1 10/17   Total Weekly Dose 31.75 mg 33.75 mg 35mg 33.75 mg 33.75 mg 33.75 mg 32.5 mg 32.5 mg 32.5 mg 32.5 mg 32.5 mg 32.5 mg 32.5 mg 33.75 mg   INR 1.6 2.0 2.8 2.8 3.1 3.1 2.8 2.5 2.2 2.4 2.5 2.4 2.0 3.7   Notes  enox                 Date 10/30 11/20 12/17 1/14/20             Total Weekly Dose 32.5 mg 32.5 mg 32.5 mg 32.5 mg             INR 2.3 2.3 2.1 1.9             Notes                 Takes warfarin in the am    Clinic Interview:  Verbal Release Authorization signed on 8/22/2018 -- may speak with Raeann Montemayor (Wife). [phone] (Home) *Preferred*  Tablet Strength: pt has 7.5mg and 2.5mg tablets    Patient  Findings:  Negatives:  Signs/symptoms of thrombosis, Signs/symptoms of bleeding, Laboratory test error suspected, Change in health, Change in alcohol use, Change in activity, Upcoming invasive procedure, Emergency department visit, Upcoming dental procedure, Missed doses, Extra doses, Change in medications, Change in diet/appetite, Hospital admission, Bruising, Other complaints   Comments:  Marco Antonio Montemayor denies s/sx of bleeding or bruising out of the ordinary. He stated that his diet has stayed consistent. He reports that he has not started any new medications or stopped any medications. He has been compliant with his warfarin since the last encounter.       Plan:   1. INR is SUBtherapeutic today. Given patient has been therapeutic on this dose previously, instructed Mr. Montemayor to boost tomorrow's dose of warfarin to 5 mg then continue with warfarin 5mg oral daily except 3.75mg WedSat.  2. RTC in four weeks. 2/11/20  3. Verbal and written information provided in the clinic. Mr. Montemayor expresses understanding with teach back and has no further questions at this time.    Geo Crowe, Pharmacy Intern  1/14/2020  9:23 AM     IEvelina, PharmD, have reviewed the note in full and agree with the assessment and plan.  01/14/20  10:10 AM

## 2020-02-11 ENCOUNTER — ANTICOAGULATION VISIT (OUTPATIENT)
Dept: PHARMACY | Facility: HOSPITAL | Age: 75
End: 2020-02-11

## 2020-02-11 LAB
INR PPP: 2.2 (ref 0.91–1.09)
PROTHROMBIN TIME: 25.9 SECONDS (ref 10–13.8)

## 2020-02-11 PROCEDURE — 85610 PROTHROMBIN TIME: CPT

## 2020-02-11 PROCEDURE — 36416 COLLJ CAPILLARY BLOOD SPEC: CPT

## 2020-02-11 PROCEDURE — G0463 HOSPITAL OUTPT CLINIC VISIT: HCPCS

## 2020-02-11 NOTE — PROGRESS NOTES
Anticoagulation Clinic Progress Note  Indication: afib  Referring Provider: Tyson (next appointment: 4/15/2020) (Claudia 5/20/2020)  Initial Warfarin Start Date: 2007  Goal INR: 2-3  Current Drug Interactions:  torsemide  Diet: not eating Vit K foods at this point (3/6/19)  [id number]: 5 (HTN, Age, CHF, CAD)  Other: anemic, CKD (stage 3) May need bridge with Lovenox given persistent AF (per cardio 1/24/2019), Scr was 2.38 on 12/10/19    Anticoagulation Clinic INR History:  Date 10/23 11/20 12/8 1/5/18 2/2 3/2 3/16 3/30 4/17 5/17 6/14 7/19 8/1 8/22 9/12   Total Weekly Dose 37.5mg 37.5 mg 37.5 mg 37.5 mg 37.5mg 37.5 mg 37.5 mg 37.5 mg 37.5mg 37.5 mg 37.5 mg 37.5mg 37.5 mg 37.5 mg 37.5 mg    INR 2.5 1.8 2.5 2.3 2.1 1.7 3.2 2.1 2.3 2.06 2.8 3.5 2.7 2.7 3.0   Notes      (boost), amox amox   PAT     diarrhea     Date 10/1 10/8 10/12 10/17 10/22 10/29 11/5 11/12 11/19 11/29 12/13 1/3 1/24/19 2/12 2/18   Total Weekly Dose 26.25 mg 23.75mg 22.5  mg 23.75 mg 27.5 mg 28.75 mg 27.5 mg 28.75 mg 28.75 mg 28.75 mg 28.75 28.75mg 28.75 mg 17.5 mg 28.75mg   INR 3.0 2.1 1.6 1.3 1.6 2.8 2.0 2.5 2.4 2.76 2.8 2.7 2.9 1.7 1.5   Notes post- disch amio start 10/1        PAT   held x 7 days; resumed 1/22; D/C amio asa dc'd torsemide reduced,      Date 2/22 2/26 3/6 3/15 3/29 4/12 4/26 5/14 6/11 7/9 8/6 9/3 10/1 10/17   Total Weekly Dose 31.75 mg 33.75 mg 35mg 33.75 mg 33.75 mg 33.75 mg 32.5 mg 32.5 mg 32.5 mg 32.5 mg 32.5 mg 32.5 mg 32.5 mg 33.75 mg   INR 1.6 2.0 2.8 2.8 3.1 3.1 2.8 2.5 2.2 2.4 2.5 2.4 2.0 3.7   Notes  enox                 Date 10/30 11/20 12/17 1/14/20 2/11            Total Weekly Dose 32.5 mg 32.5 mg 32.5 mg 32.5 mg 32.5mg            INR 2.3 2.3 2.1 1.9 2.2            Notes                 Takes warfarin in the am    Clinic Interview:  Verbal Release Authorization signed on 8/22/2018 -- may speak with Raeann Montemayor (Wife). [phone] (Home) *Preferred*  Tablet Strength: pt has 7.5mg and 2.5mg tablets    Patient  Findings:  Negatives:  Signs/symptoms of thrombosis, Signs/symptoms of bleeding, Laboratory test error suspected, Change in health, Change in alcohol use, Change in activity, Upcoming invasive procedure, Emergency department visit, Upcoming dental procedure, Missed doses, Extra doses, Change in medications, Change in diet/appetite, Hospital admission, Bruising, Other complaints   Comments:  Patient denies all new findings; still avoids GLV.     Plan:   1. INR is therapeutic today at 2.2. Instructed Mr. Montemayor to continue with warfarin 5mg oral daily except 3.75mg WedSat.  2. RTC in ~four weeks 03/11/20.   3. Verbal and written information provided in the clinic. Mr. Montemayor expresses understanding with teach back and has no further questions at this time.    Bridgette Mckenna, Pharmacy Intern   02/11/20  9:33 AM     I, Evelina Quan, PharmD, have reviewed the note in full and agree with the assessment and plan.  02/11/20  10:07 AM

## 2020-02-21 DIAGNOSIS — I48.0 PAROXYSMAL ATRIAL FIBRILLATION (HCC): ICD-10-CM

## 2020-02-21 RX ORDER — ATORVASTATIN CALCIUM 40 MG/1
40 TABLET, FILM COATED ORAL DAILY
Qty: 30 TABLET | Refills: 0 | Status: SHIPPED | OUTPATIENT
Start: 2020-02-21 | End: 2020-03-22 | Stop reason: SDUPTHER

## 2020-02-21 RX ORDER — CARVEDILOL 25 MG/1
25 TABLET ORAL 2 TIMES DAILY WITH MEALS
Qty: 180 TABLET | Refills: 3 | Status: SHIPPED | OUTPATIENT
Start: 2020-02-21 | End: 2020-05-26 | Stop reason: SDUPTHER

## 2020-03-11 ENCOUNTER — ANTICOAGULATION VISIT (OUTPATIENT)
Dept: PHARMACY | Facility: HOSPITAL | Age: 75
End: 2020-03-11

## 2020-03-11 LAB
INR PPP: 2.1 (ref 0.91–1.09)
PROTHROMBIN TIME: 25 SECONDS (ref 10–13.8)

## 2020-03-11 PROCEDURE — 36416 COLLJ CAPILLARY BLOOD SPEC: CPT

## 2020-03-11 PROCEDURE — 85610 PROTHROMBIN TIME: CPT

## 2020-03-11 PROCEDURE — G0463 HOSPITAL OUTPT CLINIC VISIT: HCPCS

## 2020-03-11 NOTE — PROGRESS NOTES
Anticoagulation Clinic Progress Note  Indication: afib  Referring Provider: Tyson (next appointment: 4/15/2020) (Claudia 5/20/2020)  Initial Warfarin Start Date: 2007  Goal INR: 2-3  Current Drug Interactions:  torsemide  Diet: not eating Vit K foods at this point (3/6/19)  [id number]: 5 (HTN, Age, CHF, CAD)  Other: anemic, CKD (stage 3) May need bridge with Lovenox given persistent AF (per cardio 1/24/2019), Scr was 2.38 on 12/10/19    Anticoagulation Clinic INR History:  Date 10/23 11/20 12/8 1/5/18 2/2 3/2 3/16 3/30 4/17 5/17 6/14 7/19 8/1 8/22 9/12   Total Weekly Dose 37.5mg 37.5 mg 37.5 mg 37.5 mg 37.5mg 37.5 mg 37.5 mg 37.5 mg 37.5mg 37.5 mg 37.5 mg 37.5mg 37.5 mg 37.5 mg 37.5 mg    INR 2.5 1.8 2.5 2.3 2.1 1.7 3.2 2.1 2.3 2.06 2.8 3.5 2.7 2.7 3.0   Notes      (boost), amox amox   PAT     diarrhea     Date 10/1 10/8 10/12 10/17 10/22 10/29 11/5 11/12 11/19 11/29 12/13 1/3 1/24/19 2/12 2/18   Total Weekly Dose 26.25 mg 23.75mg 22.5  mg 23.75 mg 27.5 mg 28.75 mg 27.5 mg 28.75 mg 28.75 mg 28.75 mg 28.75 28.75mg 28.75 mg 17.5 mg 28.75mg   INR 3.0 2.1 1.6 1.3 1.6 2.8 2.0 2.5 2.4 2.76 2.8 2.7 2.9 1.7 1.5   Notes post- disch amio start 10/1        PAT   held x 7 days; resumed 1/22; D/C amio asa dc'd torsemide reduced,      Date 2/22 2/26 3/6 3/15 3/29 4/12 4/26 5/14 6/11 7/9 8/6 9/3 10/1 10/17   Total Weekly Dose 31.75 mg 33.75 mg 35mg 33.75 mg 33.75 mg 33.75 mg 32.5 mg 32.5 mg 32.5 mg 32.5 mg 32.5 mg 32.5 mg 32.5 mg 33.75 mg   INR 1.6 2.0 2.8 2.8 3.1 3.1 2.8 2.5 2.2 2.4 2.5 2.4 2.0 3.7   Notes  enox                 Date 10/30 11/20 12/17 1/14/20 2/11 3/11           Total Weekly Dose 32.5 mg 32.5 mg 32.5 mg 32.5 mg 32.5mg 32.5mg 32.5mg          INR 2.3 2.3 2.1 1.9 2.2 2.1           Notes                 Takes warfarin in the am    Clinic Interview:  Verbal Release Authorization signed on 8/22/2018 -- may speak with Raeann Talbotjonna (Wife). [phone] (Home) *Preferred*  Tablet Strength: pt has 7.5mg and 2.5mg  tablets    Patient Findings:  Negatives:  Signs/symptoms of thrombosis, Signs/symptoms of bleeding, Laboratory test error suspected, Change in health, Change in alcohol use, Change in activity, Upcoming invasive procedure, Emergency department visit, Upcoming dental procedure, Missed doses, Extra doses, Change in medications, Change in diet/appetite, Hospital admission, Bruising, Other complaints     Plan:   1. INR is therapeutic today at 2.1. Instructed Mr. Montemayor to continue with warfarin 5mg oral daily except 3.75mg WedSat.  2. RTC in ~four weeks. 4/8/20.  3. Verbal and written information provided in the clinic. Mr. Montemayor expresses understanding with teach back and has no further questions at this time.    Bridgette Mckenna, Pharmacy Intern   03/11/20  11:06     I, Charlotte Michael, PharmD, have reviewed the note in full and agree with the assessment and plan.  03/11/20  11:36

## 2020-03-23 RX ORDER — ATORVASTATIN CALCIUM 40 MG/1
40 TABLET, FILM COATED ORAL DAILY
Qty: 90 TABLET | Refills: 0 | Status: SHIPPED | OUTPATIENT
Start: 2020-03-23 | End: 2020-06-21 | Stop reason: SDUPTHER

## 2020-03-29 ENCOUNTER — CLINICAL SUPPORT NO REQUIREMENTS (OUTPATIENT)
Dept: CARDIOLOGY | Facility: CLINIC | Age: 75
End: 2020-03-29

## 2020-03-29 DIAGNOSIS — R00.1 BRADYCARDIA: ICD-10-CM

## 2020-03-29 PROCEDURE — 93296 REM INTERROG EVL PM/IDS: CPT | Performed by: INTERNAL MEDICINE

## 2020-03-29 PROCEDURE — 93294 REM INTERROG EVL PM/LDLS PM: CPT | Performed by: INTERNAL MEDICINE

## 2020-03-30 RX ORDER — WARFARIN SODIUM 7.5 MG/1
TABLET ORAL
Qty: 90 TABLET | Refills: 3 | Status: SHIPPED | OUTPATIENT
Start: 2020-03-30 | End: 2020-10-03 | Stop reason: SDUPTHER

## 2020-04-08 ENCOUNTER — LAB REQUISITION (OUTPATIENT)
Dept: LAB | Facility: HOSPITAL | Age: 75
End: 2020-04-08

## 2020-04-08 ENCOUNTER — ANTICOAGULATION VISIT (OUTPATIENT)
Dept: PHARMACY | Facility: HOSPITAL | Age: 75
End: 2020-04-08

## 2020-04-08 DIAGNOSIS — Z00.00 ROUTINE GENERAL MEDICAL EXAMINATION AT A HEALTH CARE FACILITY: ICD-10-CM

## 2020-04-08 LAB
ALBUMIN SERPL-MCNC: 4 G/DL (ref 3.5–5.2)
ALBUMIN/GLOB SERPL: 1.3 G/DL
ALP SERPL-CCNC: 81 U/L (ref 39–117)
ALT SERPL W P-5'-P-CCNC: 9 U/L (ref 1–41)
ANION GAP SERPL CALCULATED.3IONS-SCNC: 12 MMOL/L (ref 5–15)
AST SERPL-CCNC: 13 U/L (ref 1–40)
BILIRUB SERPL-MCNC: 0.3 MG/DL (ref 0.2–1.2)
BUN BLD-MCNC: 55 MG/DL (ref 8–23)
BUN/CREAT SERPL: 20.2 (ref 7–25)
CALCIUM SPEC-SCNC: 8.8 MG/DL (ref 8.6–10.5)
CHLORIDE SERPL-SCNC: 113 MMOL/L (ref 98–107)
CO2 SERPL-SCNC: 23 MMOL/L (ref 22–29)
CREAT BLD-MCNC: 2.72 MG/DL (ref 0.76–1.27)
GFR SERPL CREATININE-BSD FRML MDRD: 23 ML/MIN/1.73
GLOBULIN UR ELPH-MCNC: 3.1 GM/DL
GLUCOSE BLD-MCNC: 140 MG/DL (ref 65–99)
INR PPP: 2.1 (ref 0.91–1.09)
POTASSIUM BLD-SCNC: 5.1 MMOL/L (ref 3.5–5.2)
PROT SERPL-MCNC: 7.1 G/DL (ref 6–8.5)
PROTHROMBIN TIME: 25.2 SECONDS (ref 10–13.8)
SODIUM BLD-SCNC: 148 MMOL/L (ref 136–145)

## 2020-04-08 PROCEDURE — 36416 COLLJ CAPILLARY BLOOD SPEC: CPT

## 2020-04-08 PROCEDURE — G0463 HOSPITAL OUTPT CLINIC VISIT: HCPCS

## 2020-04-08 PROCEDURE — 85610 PROTHROMBIN TIME: CPT

## 2020-04-08 PROCEDURE — 80053 COMPREHEN METABOLIC PANEL: CPT | Performed by: INTERNAL MEDICINE

## 2020-04-08 NOTE — PROGRESS NOTES
Anticoagulation Clinic Progress Note  Indication: afib  Referring Provider: Tyson (next appointment: 4/15/2020) (Claudia 5/20/2020)  Initial Warfarin Start Date: 2007  Goal INR: 2-3  Current Drug Interactions:  torsemide  Diet: not eating Vit K foods at this point (3/6/19)  [id number]: 5 (HTN, Age, CHF, CAD)  Other: anemic, CKD (stage 3) May need bridge with Lovenox given persistent AF (per cardio 1/24/2019), Scr was 2.38 on 12/10/19    Anticoagulation Clinic INR History:  Date 10/23 11/20 12/8 1/5/18 2/2 3/2 3/16 3/30 4/17 5/17 6/14 7/19 8/1 8/22 9/12   Total Weekly Dose 37.5mg 37.5 mg 37.5 mg 37.5 mg 37.5mg 37.5 mg 37.5 mg 37.5 mg 37.5mg 37.5 mg 37.5 mg 37.5mg 37.5 mg 37.5 mg 37.5 mg    INR 2.5 1.8 2.5 2.3 2.1 1.7 3.2 2.1 2.3 2.06 2.8 3.5 2.7 2.7 3.0   Notes      (boost), amox amox   PAT     diarrhea     Date 10/1 10/8 10/12 10/17 10/22 10/29 11/5 11/12 11/19 11/29 12/13 1/3 1/24/19 2/12 2/18   Total Weekly Dose 26.25 mg 23.75mg 22.5  mg 23.75 mg 27.5 mg 28.75 mg 27.5 mg 28.75 mg 28.75 mg 28.75 mg 28.75 28.75mg 28.75 mg 17.5 mg 28.75mg   INR 3.0 2.1 1.6 1.3 1.6 2.8 2.0 2.5 2.4 2.76 2.8 2.7 2.9 1.7 1.5   Notes post- disch amio start 10/1        PAT   held x 7 days; resumed 1/22; D/C amio asa dc'd torsemide reduced,      Date 2/22 2/26 3/6 3/15 3/29 4/12 4/26 5/14 6/11 7/9 8/6 9/3 10/1 10/17   Total Weekly Dose 31.75 mg 33.75 mg 35mg 33.75 mg 33.75 mg 33.75 mg 32.5 mg 32.5 mg 32.5 mg 32.5 mg 32.5 mg 32.5 mg 32.5 mg 33.75 mg   INR 1.6 2.0 2.8 2.8 3.1 3.1 2.8 2.5 2.2 2.4 2.5 2.4 2.0 3.7   Notes  enox                 Date 10/30 11/20 12/17 1/14/20 2/11 3/11 4/8          Total Weekly Dose 32.5 mg 32.5 mg 32.5 mg 32.5 mg 32.5mg 32.5mg 32.5mg          INR 2.3 2.3 2.1 1.9 2.2 2.1 2.1          Notes                 Takes warfarin in the am    Clinic Interview:  Verbal Release Authorization signed on 8/22/2018 -- may speak with Raeann Montemayor (Wife). [phone] (Home) *Preferred*  Tablet Strength: pt has 7.5mg and  2.5mg tablets    Patient Findings:    Negatives:  Signs/symptoms of thrombosis, Signs/symptoms of bleeding, Laboratory test error suspected, Change in health, Change in alcohol use, Change in activity, Upcoming invasive procedure, Emergency department visit, Upcoming dental procedure, Missed doses, Extra doses, Change in medications, Change in diet/appetite, Hospital admission, Bruising, Other complaints   Comments:  Has been taking 3.75mg Tuesday rather than Wednesday for same total weekly dose         Plan:   1. INR is therapeutic today at 2.1. Instructed Mr. Montemayor to continue with warfarin 5mg oral daily except 3.75mg TuesSat.  2. RTC in ~four weeks.   3. Verbal and written information provided in the clinic. Mr. Montemayor expresses understanding with teach back and has no further questions at this time.    Kristine Sutton, PharmD.  04/08/20   10:04

## 2020-04-14 ENCOUNTER — OFFICE VISIT (OUTPATIENT)
Dept: CARDIOLOGY | Facility: CLINIC | Age: 75
End: 2020-04-14

## 2020-04-14 VITALS
BODY MASS INDEX: 38.8 KG/M2 | SYSTOLIC BLOOD PRESSURE: 141 MMHG | WEIGHT: 271 LBS | HEART RATE: 89 BPM | DIASTOLIC BLOOD PRESSURE: 81 MMHG | HEIGHT: 70 IN

## 2020-04-14 DIAGNOSIS — N18.4 STAGE 4 CHRONIC KIDNEY DISEASE (HCC): ICD-10-CM

## 2020-04-14 DIAGNOSIS — E66.01 MORBID OBESITY (HCC): Chronic | ICD-10-CM

## 2020-04-14 DIAGNOSIS — I10 ESSENTIAL HYPERTENSION: Chronic | ICD-10-CM

## 2020-04-14 DIAGNOSIS — R79.89 ELEVATED TSH: Primary | ICD-10-CM

## 2020-04-14 DIAGNOSIS — I11.0 HYPERTENSIVE HEART DISEASE WITH CHRONIC COMBINED SYSTOLIC AND DIASTOLIC CONGESTIVE HEART FAILURE (HCC): Primary | ICD-10-CM

## 2020-04-14 DIAGNOSIS — I49.5 SICK SINUS SYNDROME (HCC): ICD-10-CM

## 2020-04-14 DIAGNOSIS — E78.5 DYSLIPIDEMIA: Chronic | ICD-10-CM

## 2020-04-14 DIAGNOSIS — I50.42 HYPERTENSIVE HEART DISEASE WITH CHRONIC COMBINED SYSTOLIC AND DIASTOLIC CONGESTIVE HEART FAILURE (HCC): Primary | ICD-10-CM

## 2020-04-14 DIAGNOSIS — I48.20 CHRONIC ATRIAL FIBRILLATION (HCC): ICD-10-CM

## 2020-04-14 DIAGNOSIS — G47.33 SEVERE OBSTRUCTIVE SLEEP APNEA: Chronic | ICD-10-CM

## 2020-04-14 DIAGNOSIS — R79.89 ELEVATED TSH: ICD-10-CM

## 2020-04-14 PROCEDURE — 99213 OFFICE O/P EST LOW 20 MIN: CPT | Performed by: INTERNAL MEDICINE

## 2020-04-14 NOTE — PROGRESS NOTES
Subjective:     Encounter Date:04/14/2020    Patient ID: Marco Antonio Montemayor is a 75 y.o.  white male, retired /currently laid off from being a  for [employer], from [place], [place].      PHYSICIAN: EDMUND Rosado MD  ELECTROPHYSIOLOGIST: Johnathan Taylor MD, [facility], [facility]  INTERVENTIONAL CARDIOLOGIST: Antonio Escamilla MD, [facility]  NEPHROLOGIST:  [facility]  DERMATOLOGIST:  Yoanna Marks MD/Oksana Young MD([facility])    Chief Complaint:   Chief Complaint   Patient presents with   • Atrial Fibrillation     f/u   • Hypertension     Problem List:  1. 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 Remote acceptable intravenous adenosine Quantitative SPECT gated Cardiolite study, LVEF 0.50, April 1999.  b. Echocardiogram showing mild LVH with estimated ejection fraction 0.58 with mild aortic valve cusp sclerosis, and mild TR, 09/23/2014.   c. Remote abnormal preoperative Quantitative SPECT gated Cardiolite study with mild “fixed” inferoposterior hypoperfusion and mild LV enlargement with mild global hypokinesia with reduced LVEF 0.40 in the setting of abnormal EKG with subsequent diagnostic coronary angiography demonstrating mild-to-moderate nonobstructive coronary artery atherosclerosis with mild compensated left ventricular dysfunction, LVEF 0.52, and continued medical therapy felt warranted, June 2006.  d. Remote hospitalization for symptomatic atrial fibrillation with rapid ventricular response and mild congestive heart failure requiring BRYANNA and DC cardioversion, June 2009.  e. Left heart catheterization with essentially normal coronary arteries with mild luminal irregularities with an EF of 0.60 by Dr. Escamilla for angina and elevated troponin, 06/05/2015.   f. Residual class I symptoms.  2. Chronic essential hypertension with recent progressive hypertension and blood pressure readings and normal selective bilateral renal angiography, June  2006.  3. Dyslipidemia.  4. Chronic atrial tachyarrhythmias:  a. Intermittent chronic paroxysmal atrial fibrillation with electrophysiologic study with RFA for atrial flutter, October 1999.  b. Recurrent apparent transient atrial fibrillation, resolved with acceptable [facility] emergency department evaluation, June 2003.  c. Acceptable combination Doppler echocardiogram, July 2003, with apparent acceptable 24-hour Holter monitor, May 2006.  d. Recurrent asymptomatic atrial fibrillation with RVR, January 2007, subsequent Coumadin therapy and Tikosyn therapy with successful internal cardioversion of atrial fibrillation and marked bradyarrhythmias requiring DDDR pacemaker implant, St. Chidi device, data not available, March 2007.  e. Remote hospitalization for symptomatic rapid atrial fibrillation/BRYANNA DC cardioversion with subsequent acceptable pacemaker interrogation, June/July 2009, as well as acceptable interrogation without reprogramming, July 2011.  f. Acceptable combination Doppler echocardiogram, December 2010, with residual class I symptoms.  g. Acceptable device interrogation following [facility] ED evaluation for chest pressure and shortness of breath, data deficit, May 2011, February 2013, February 2016, December 2016.  h. Abnormal pacemaker assessment with 8.4% mode switch with battery voltage of 1-1.25 years, June 2017, with abnormal PACEART assessment, July 2018, with 22% mode switch, with subsequent interrogation demonstrating chronic atrial fibrillation, May 2019.  5. Moderate obesity, BMI 38.9.  6. Ichthyosis.  7. Chronic lower tract obstructive symptoms, probable BPH.  8. Dyslipidemia.  9. Nasal polyps with deviated nasal septum with apparent subsequent nasal septoplasty/polypectomy, data deficit, July 2006.  10. Abdominal pain with apparent small bowel obstruction with exploratory laparotomy - data deficit.  11. Appendectomy with subsequent delayed hospitalization for bleeding peptic ulcer disease/probable  duodenal ulcer, November 2003.  12. Recurrent asymptomatic atrial fibrillation with rapid ventricular response, July 2007, with subsequent Tikosyn therapy and DDDR pacemaker implant with intermittent recurrent breakthrough arrhythmias, including remote DC cardioversion, May 2008, with subsequent recurrent DC cardioversion, autumn 2018, with recurrent atrial fibrillation and discontinuation of formal antiarrhythmic therapy - data deficit  13. Obstructive sleep apnea with treatment.   14. Stage 4 chronic kidney disease with recent apparent documented proteinuria and renal biopsy (February 2019)  15. Elevated TSH, November 2018    No Known Allergies    Current Outpatient Medications:   •  acetaminophen (TYLENOL) 500 MG tablet, Take 1,000 mg by mouth Every 6 (Six) Hours As Needed for Mild Pain ., Disp: , Rfl:   •  atorvastatin (LIPITOR) 40 MG tablet, Take 1 tablet by mouth Daily. Need labs for further refills. Call office for lab orders, Disp: 90 tablet, Rfl: 0  •  carvedilol (COREG) 25 MG tablet, Take 1 tablet by mouth 2 (Two) Times a Day With Meals., Disp: 180 tablet, Rfl: 3  •  cholecalciferol (VITAMIN D3) 1000 UNITS tablet, Take 1,000 Units by mouth daily., Disp: , Rfl:   •  Ferrous Sulfate (IRON) 325 (65 FE) MG tablet, Take 65 mg by mouth Daily., Disp: , Rfl:   •  lisinopril (PRINIVIL,ZESTRIL) 5 MG tablet, Take 5 mg by mouth Daily., Disp: , Rfl:   •  PHARMACY TO DOSE WARFARIN, Continuous As Needed (patient's warfarin is being managed by the [facility] ([phone]))., Disp: , Rfl:   •  torsemide (DEMADEX) 20 MG tablet, TAKE ONE TABLET BY MOUTH TWICE A DAY, Disp: 180 tablet, Rfl: 2  •  vitamin B-12 (CYANOCOBALAMIN) 1000 MCG tablet, Take 1,000 mcg by mouth Daily., Disp: , Rfl:   •  warfarin (COUMADIN) 2.5 MG tablet, Take 1 to 2 tablets by mouth daily, as directed by the anticoagulation clinic., Disp: 180 tablet, Rfl: 1  •  warfarin (COUMADIN) 7.5 MG tablet, TAKE ONE-HALF TO ONE  "TABLET BY MOUTH DAILY AS DIRECTED, Disp: 90 tablet, Rfl: 3    History of Present Illness: Patient is followed up via telephone for a requested 7-month visit.  He has done well since last being seen in our office.  He worked at [employer] up until 03/20/2020 but is no longer working now.  He has not had any ER visits, hospitalizations, surgeries, or serious illnesses in the interim.  He is up and about on a daily basis and has no chest pain, tightness, or pressure with his activities.  He had a good birthday celebration last week with his wife, even though it was limited due to the COVID-19 restrictions.  His TSH was elevated in November 2018, and he indicates that this level has not been checked since that time.  We will send him a lab slip to have this rechecked.  He had his kidney function checked last week, and he was told by his nephrologist to \"sit tight.\"  Patient otherwise denies chest pain, shortness of breath, PND, edema, palpitations, syncope, or presyncope at this time.      ROS   Obtained and negative except as outlined in problem list and HPI.    Procedures       Objective:       Vitals:    04/14/20 0922   BP: 141/81   BP Location: Left arm   Patient Position: Sitting   Pulse: 89   Weight: 123 kg (271 lb)   Height: 177.8 cm (70\")     Body mass index is 38.88 kg/m².   Last weight:  276 lb.    PHYSICAL EXAM:  Physical exam not able to be performed due to telehealth visit in view of coronavirus.      Lab Review:   Lab Results   Component Value Date    GLUCOSE 140 (H) 04/08/2020    BUN 55 (H) 04/08/2020    CREATININE 2.72 (H) 04/08/2020    EGFRIFNONA 23 (L) 04/08/2020    EGFRIFAFRI 26 (L) 11/12/2018    BCR 20.2 04/08/2020    CO2 23.0 04/08/2020    CALCIUM 8.8 04/08/2020    PROTENTOTREF 6.3 11/12/2018    ALBUMIN 4.00 04/08/2020    LABIL2 1.5 11/12/2018    AST 13 04/08/2020    ALT 9 04/08/2020   Sodium - 148  Potassium - 5.1  Chloride - 113    Lab Results   Component Value Date    WBC 6.42 02/06/2019    HGB " 10.3 (L) 02/06/2019    HCT 33.0 (L) 02/06/2019    MCV 94.3 02/06/2019     02/06/2019       Lab Results   Component Value Date    HGBA1C 6.20 (H) 05/17/2018       Lab Results   Component Value Date    TSH 11.020 (H) 11/12/2018       Lab Results   Component Value Date    TRIG 104 12/08/2017    TRIG 70 06/05/2015     Lab Results   Component Value Date    HDL 31 (L) 12/08/2017    HDL 28 (L) 06/05/2015     This patient has consented to a telehealth visit via telephone. The visit was scheduled as a telephone visit to comply with patient safety concerns in accordance with CDC recommendations.  All vitals recorded within this visit are reported by the patient.  I spent  25 minutes in total including but not limited to the 10 minutes spent in direct conversation with this patient.           Assessment:       Overall continued acceptable course with no new interim cardiopulmonary complaints with good functional status. We will defer additional diagnostic or therapeutic intervention from a cardiac perspective at this time.  He does not need any refills of his medications at this time.     Diagnosis Plan   1. Hypertensive heart disease with chronic combined systolic and diastolic congestive heart failure (CMS/HCC)  No recurrent angina pectoris or CHF on current activity schedule; continue current treatment     2. Chronic atrial fibrillation  Continue current treatment and followup and monitoring with Dr. Taylor   3. Essential hypertension  Acceptable control; Continue current treatment.    4. Severe obstructive sleep apnea  Compliance stressed   5. Stage 4 chronic kidney disease (CMS/HCC)  Stable severe impairment; continue close followup and monitoring with [facility]   6. Dyslipidemia  No data to review; continue atorvastatin and assess FLP   7. Sick sinus syndrome (CMS/HCC)  Continue close followup and monitoring with Dr. Taylor with goal INR 2-3 on warfarin   8. Elevated TSH  Needs thyroid profile           Plan:         1. Patient to continue current medications and close follow up with the above providers.  2. Lab slips will be sent to the patient for an FLP and a thyroid profile.  3. Tentative cardiology follow up in October 2020, or patient may return sooner PRN.    Transcribed by Raeann Newton for Dr. Dustin Mehta at 9:23 AM on 04/14/2020    I, Dustin Mehta MD, [facility], personally performed the services described in this documentation as scribed by the above named individual in my presence, and it is both accurate and complete. At 9:35 AM on 04/14/2020

## 2020-04-21 LAB
ALBUMIN SERPL-MCNC: 4 G/DL (ref 3.5–5.2)
ALBUMIN/GLOB SERPL: 1.3 G/DL
ALP SERPL-CCNC: 81 U/L (ref 39–117)
ALT SERPL-CCNC: 9 U/L (ref 1–41)
AST SERPL-CCNC: 13 U/L (ref 1–40)
BILIRUB SERPL-MCNC: 0.3 MG/DL (ref 0.2–1.2)
BUN SERPL-MCNC: 55 MG/DL (ref 8–23)
BUN/CREAT SERPL: 20.2 (ref 7–25)
CALCIUM SERPL-MCNC: 8.8 MG/DL (ref 8.6–10.5)
CHLORIDE SERPL-SCNC: 113 MMOL/L (ref 98–107)
CO2 SERPL-SCNC: 23 MMOL/L (ref 22–29)
CREAT SERPL-MCNC: 2.72 MG/DL (ref 0.76–1.27)
GLOBULIN SER CALC-MCNC: 3.1 GM/DL
GLUCOSE SERPL-MCNC: 140 MG/DL (ref 65–99)
POTASSIUM SERPL-SCNC: 5.1 MMOL/L (ref 3.5–5.2)
PROT SERPL-MCNC: 7.1 G/DL (ref 6–8.5)
SODIUM SERPL-SCNC: 148 MMOL/L (ref 136–145)

## 2020-04-30 RX ORDER — TORSEMIDE 20 MG/1
20 TABLET ORAL 2 TIMES DAILY
Qty: 180 TABLET | Refills: 2 | Status: SHIPPED | OUTPATIENT
Start: 2020-04-30 | End: 2021-04-08 | Stop reason: SDUPTHER

## 2020-05-06 ENCOUNTER — ANTICOAGULATION VISIT (OUTPATIENT)
Dept: PHARMACY | Facility: HOSPITAL | Age: 75
End: 2020-05-06

## 2020-05-06 ENCOUNTER — LAB REQUISITION (OUTPATIENT)
Dept: LAB | Facility: HOSPITAL | Age: 75
End: 2020-05-06

## 2020-05-06 DIAGNOSIS — Z00.00 ROUTINE GENERAL MEDICAL EXAMINATION AT A HEALTH CARE FACILITY: ICD-10-CM

## 2020-05-06 LAB
CHOLEST SERPL-MCNC: 115 MG/DL (ref 0–200)
HDLC SERPL-MCNC: 33 MG/DL (ref 40–60)
INR PPP: 2.2 (ref 0.91–1.09)
LDLC SERPL CALC-MCNC: 63 MG/DL (ref 0–100)
LDLC/HDLC SERPL: 1.9 {RATIO}
PROTHROMBIN TIME: 26.8 SECONDS (ref 10–13.8)
T3 SERPL-MCNC: 80.7 NG/DL (ref 80–200)
T4 FREE SERPL-MCNC: 0.99 NG/DL (ref 0.93–1.7)
TRIGL SERPL-MCNC: 96 MG/DL (ref 0–150)
TSH SERPL DL<=0.05 MIU/L-ACNC: 2.71 UIU/ML (ref 0.27–4.2)
VLDLC SERPL-MCNC: 19.2 MG/DL

## 2020-05-06 PROCEDURE — 84443 ASSAY THYROID STIM HORMONE: CPT | Performed by: INTERNAL MEDICINE

## 2020-05-06 PROCEDURE — 85610 PROTHROMBIN TIME: CPT

## 2020-05-06 PROCEDURE — 36416 COLLJ CAPILLARY BLOOD SPEC: CPT

## 2020-05-06 PROCEDURE — 84480 ASSAY TRIIODOTHYRONINE (T3): CPT | Performed by: INTERNAL MEDICINE

## 2020-05-06 PROCEDURE — 84439 ASSAY OF FREE THYROXINE: CPT | Performed by: INTERNAL MEDICINE

## 2020-05-06 PROCEDURE — 80061 LIPID PANEL: CPT | Performed by: INTERNAL MEDICINE

## 2020-05-06 PROCEDURE — G0463 HOSPITAL OUTPT CLINIC VISIT: HCPCS

## 2020-05-06 NOTE — PROGRESS NOTES
Anticoagulation Clinic Progress Note  Indication: afib  Referring Provider: Tyson (next appointment: 4/15/2020) (Claudia 5/20/2020)  Initial Warfarin Start Date: 2007  Goal INR: 2-3  Current Drug Interactions:  torsemide  Diet: not eating Vit K foods at this point (3/6/19)  [id number]: 5 (HTN, Age, CHF, CAD)  Other: anemic, CKD (stage 3) May need bridge with Lovenox given persistent AF (per cardio 1/24/2019), Scr was 2.38 on 12/10/19    Anticoagulation Clinic INR History:  Date 10/23 11/20 12/8 1/5/18 2/2 3/2 3/16 3/30 4/17 5/17 6/14 7/19 8/1 8/22 9/12   Total Weekly Dose 37.5mg 37.5 mg 37.5 mg 37.5 mg 37.5mg 37.5 mg 37.5 mg 37.5 mg 37.5mg 37.5 mg 37.5 mg 37.5mg 37.5 mg 37.5 mg 37.5 mg    INR 2.5 1.8 2.5 2.3 2.1 1.7 3.2 2.1 2.3 2.06 2.8 3.5 2.7 2.7 3.0   Notes      (boost), amox amox   PAT     diarrhea     Date 10/1 10/8 10/12 10/17 10/22 10/29 11/5 11/12 11/19 11/29 12/13 1/3 1/24/19 2/12 2/18   Total Weekly Dose 26.25 mg 23.75mg 22.5  mg 23.75 mg 27.5 mg 28.75 mg 27.5 mg 28.75 mg 28.75 mg 28.75 mg 28.75 28.75mg 28.75 mg 17.5 mg 28.75mg   INR 3.0 2.1 1.6 1.3 1.6 2.8 2.0 2.5 2.4 2.76 2.8 2.7 2.9 1.7 1.5   Notes post- disch amio start 10/1        PAT   held x 7 days; resumed 1/22; D/C amio asa dc'd torsemide reduced,      Date 2/22 2/26 3/6 3/15 3/29 4/12 4/26 5/14 6/11 7/9 8/6 9/3 10/1 10/17   Total Weekly Dose 31.75 mg 33.75 mg 35mg 33.75 mg 33.75 mg 33.75 mg 32.5 mg 32.5 mg 32.5 mg 32.5 mg 32.5 mg 32.5 mg 32.5 mg 33.75 mg   INR 1.6 2.0 2.8 2.8 3.1 3.1 2.8 2.5 2.2 2.4 2.5 2.4 2.0 3.7   Notes  enox                 Date 10/30 11/20 12/17 1/14/20 2/11 3/11 4/8 5/6         Total Weekly Dose 32.5 mg 32.5 mg 32.5 mg 32.5 mg 32.5mg 32.5mg 32.5mg 32.5         INR 2.3 2.3 2.1 1.9 2.2 2.1 2.1 2.2         Notes                 Takes warfarin in the am    Clinic Interview:  Verbal Release Authorization signed on 8/22/2018 -- may speak with Raeann Talbotjonna (Wife). [phone] (Home) *Preferred*  Tablet Strength: pt has  7.5mg and 2.5mg tablets    Patient Findings:      Negatives:  Signs/symptoms of thrombosis, Signs/symptoms of bleeding, Laboratory test error suspected, Change in health, Change in alcohol use, Change in activity, Upcoming invasive procedure, Emergency department visit, Upcoming dental procedure, Missed doses, Extra doses, Change in medications, Change in diet/appetite, Hospital admission, Bruising, Other complaints       Plan:   1. INR is therapeutic today at 2.2. Instructed Mr. Montemayor to continue with warfarin 5mg oral daily except 3.75mg TuesSat.  2. RTC in 5 weeks, patient will be at [facility] for sleep study and prefers to keep appt the same day  3. Verbal and written information provided in the clinic. Mr. Montemayor expresses understanding with teach back and has no further questions at this time.    Kristine Sutton, PharmD.  05/06/20   10:28

## 2020-05-14 LAB
CHOLEST SERPL-MCNC: 115 MG/DL (ref 0–200)
HDLC SERPL-MCNC: 33 MG/DL (ref 40–60)
LDLC SERPL CALC-MCNC: 63 MG/DL (ref 0–99)
T3 SERPL-MCNC: 80.7 NG/DL (ref 80–200)
T4 FREE SERPL-MCNC: 0.99 NG/DL (ref 0.89–1.76)
TRIGL SERPL-MCNC: 96 MG/DL (ref 0–150)
TSH SERPL DL<=0.005 MIU/L-ACNC: 2.71 MIU/ML (ref 0.27–4.2)
VLDLC SERPL CALC-MCNC: 19 MG/DL

## 2020-05-26 DIAGNOSIS — I48.0 PAROXYSMAL ATRIAL FIBRILLATION (HCC): ICD-10-CM

## 2020-05-26 RX ORDER — CARVEDILOL 25 MG/1
25 TABLET ORAL 2 TIMES DAILY WITH MEALS
Qty: 180 TABLET | Refills: 3 | Status: SHIPPED | OUTPATIENT
Start: 2020-05-26 | End: 2020-08-27 | Stop reason: SDUPTHER

## 2020-06-04 ENCOUNTER — OFFICE VISIT (OUTPATIENT)
Dept: CARDIOLOGY | Facility: CLINIC | Age: 75
End: 2020-06-04

## 2020-06-04 VITALS
DIASTOLIC BLOOD PRESSURE: 80 MMHG | WEIGHT: 280.4 LBS | BODY MASS INDEX: 40.14 KG/M2 | HEART RATE: 96 BPM | HEIGHT: 70 IN | SYSTOLIC BLOOD PRESSURE: 130 MMHG | OXYGEN SATURATION: 94 %

## 2020-06-04 DIAGNOSIS — I10 ESSENTIAL HYPERTENSION: Chronic | ICD-10-CM

## 2020-06-04 DIAGNOSIS — I48.20 CHRONIC ATRIAL FIBRILLATION (HCC): Primary | ICD-10-CM

## 2020-06-04 DIAGNOSIS — I49.5 SICK SINUS SYNDROME (HCC): ICD-10-CM

## 2020-06-04 DIAGNOSIS — I50.32 CHRONIC DIASTOLIC CONGESTIVE HEART FAILURE (HCC): Chronic | ICD-10-CM

## 2020-06-04 PROCEDURE — 99213 OFFICE O/P EST LOW 20 MIN: CPT | Performed by: INTERNAL MEDICINE

## 2020-06-04 PROCEDURE — 93280 PM DEVICE PROGR EVAL DUAL: CPT | Performed by: INTERNAL MEDICINE

## 2020-06-04 NOTE — PROGRESS NOTES
Marco Antonio Montemayor  1945  [phone]    06/04/2020    [facility] CARDIOLOGY     Standiford, Emil Valle MD  [address]  [place] [address]    Chief Complaint   Patient presents with   • Atrial Fibrillation   • Slow Heart Rate       Problem List:  1. Chronic atrial fibrillation  a. Intermittent chronic paroxysmal atrial fibrillation with electrophysiologic study with RFA for atrial flutter, October 1999.  b. Recurrent apparent transient atrial fibrillation, resolved with acceptable [facility] emergency department evaluation, June 2003.  c. Acceptable combination Doppler echocardiogram, July 2003, with apparent acceptable 24-hour Holter monitor, May 2006.  d. Recurrent asymptomatic atrial fibrillation with RVR, January 2007, subsequent Coumadin therapy and Tikosyn therapy with successful internal cardioversion of atrial fibrillation and marked bradyarrhythmias requiring DDDR pacemaker implant, St. Chidi device, data not available, March 2007.  e. Remote hospitalization for symptomatic rapid atrial fibrillation/BRYANNA DC cardioversion with subsequent acceptable pacemaker interrogation, June/July 2009, as well as acceptable interrogation without reprogramming, July 2011.  f. Acceptable combination Doppler echocardiogram, December 2010, with residual class I symptoms.  g. Acceptable device interrogation following [facility] ED evaluation for chest pressure and shortness of breath, data deficit, May 2011, February 2013, February 2016, December 2016.  h. Abnormal pacemaker assessment with 8.4% mode switch with battery voltage of 1-1.25 years, June 2017, with abnormal PACEART assessment, July 2018, with 22% mode switch  2. 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 Remote acceptable intravenous adenosine Quantitative SPECT gated Cardiolite study, LVEF 0.50, April 1999.  b. Echocardiogram showing mild LVH with estimated ejection fraction 0.58 with mild aortic valve cusp  sclerosis, and mild TR, 09/23/2014.   c. Remote abnormal preoperative Quantitative SPECT gated Cardiolite study with mild “fixed” inferoposterior hypoperfusion and mild LV enlargement with mild global hypokinesia with reduced LVEF 0.40 in the setting of abnormal EKG with subsequent diagnostic coronary angiography demonstrating mild-to-moderate nonobstructive coronary artery atherosclerosis with mild compensated left ventricular dysfunction, LVEF 0.52, and continued medical therapy felt warranted, June 2006.  d. Remote hospitalization for symptomatic atrial fibrillation with rapid ventricular response and mild congestive heart failure requiring BRYANNA and DC cardioversion, June 2009.  e. Left heart catheterization with essentially normal coronary arteries with mild luminal irregularities with an EF of 0.60 by Dr. Escamilla for angina and elevated troponin, 06/05/2015.   f. Residual class I symptoms.  3. Chronic essential hypertension with recent progressive hypertension and blood pressure readings and normal selective bilateral renal angiography, June 2006.  4. Dyslipidemia.  5. Moderate obesity, BMI 39.6.  6. Ichthyosis.  7. Chronic lower tract obstructive symptoms, probable BPH.  8. Dyslipidemia.  9. Nasal polyps with deviated nasal septum with apparent subsequent nasal septoplasty/polypectomy, data deficit, July 2006.  10. Abdominal pain with apparent small bowel obstruction with exploratory laparotomy - data deficit.  11. Appendectomy with subsequent delayed hospitalization for bleeding peptic ulcer disease/probable duodenal ulcer, November 2003.  12. Recurrent asymptomatic atrial fibrillation with rapid ventricular response, July 2007, with subsequent Tikosyn therapy and DDDR pacemaker implant with intermittent recurrent breakthrough arrhythmias, including remote DC cardioversion, May 2008, with subsequent recurrent DC cardioversion, autumn 2018, with recurrent atrial fibrillation and discontinuation of formal  antiarrhythmic therapy - data deficit  13. Obstructive sleep apnea with treatment.   14. Stage 4 chronic kidney disease with recent apparent documented proteinuria and renal biopsy (February 2019)    Allergies  No Known Allergies    Current Medications    Current Outpatient Medications:   •  acetaminophen (TYLENOL) 500 MG tablet, Take 1,000 mg by mouth Every 6 (Six) Hours As Needed for Mild Pain ., Disp: , Rfl:   •  atorvastatin (LIPITOR) 40 MG tablet, Take 1 tablet by mouth Daily. Need labs for further refills. Call office for lab orders, Disp: 90 tablet, Rfl: 0  •  carvedilol (COREG) 25 MG tablet, Take 1 tablet by mouth 2 (Two) Times a Day With Meals., Disp: 180 tablet, Rfl: 3  •  cholecalciferol (VITAMIN D3) 1000 UNITS tablet, Take 1,000 Units by mouth daily., Disp: , Rfl:   •  Ferrous Sulfate (IRON) 325 (65 FE) MG tablet, Take 65 mg by mouth Daily., Disp: , Rfl:   •  lisinopril (PRINIVIL,ZESTRIL) 5 MG tablet, Take 5 mg by mouth Daily., Disp: , Rfl:   •  PHARMACY TO DOSE WARFARIN, Continuous As Needed (patient's warfarin is being managed by the [facility] ([phone]))., Disp: , Rfl:   •  torsemide (DEMADEX) 20 MG tablet, Take 1 tablet by mouth 2 (Two) Times a Day., Disp: 180 tablet, Rfl: 2  •  vitamin B-12 (CYANOCOBALAMIN) 1000 MCG tablet, Take 1,000 mcg by mouth Daily., Disp: , Rfl:   •  warfarin (COUMADIN) 2.5 MG tablet, Take 1 to 2 tablets by mouth daily, as directed by the anticoagulation clinic., Disp: 180 tablet, Rfl: 1  •  warfarin (COUMADIN) 7.5 MG tablet, TAKE ONE-HALF TO ONE TABLET BY MOUTH DAILY AS DIRECTED, Disp: 90 tablet, Rfl: 3    History of Present Illness     Pt presents for follow up of AF/bradycardia. Since we last saw the pt, pt denies any palps, SOB, CP, LH, and dizziness. Denies any hospitalizations, ER visits, bleeding, or TIA/CVA symptoms. Overall feels well. BP stable at home. HR 85-95 bpm. Pt INR have been stable.     ROS:  General:  + fatigue, No  "weight gain or loss  Cardiovascular:  Denies CP, PND, syncope, near syncope, edema or palpitations.  Pulmonary:  + chronic moderate BLACKWELL, No cough, or wheezing      Vitals:    06/04/20 0934   BP: 130/80   BP Location: Left arm   Patient Position: Sitting   Pulse: 96   SpO2: 94%   Weight: 127 kg (280 lb 6.4 oz)   Height: 177.8 cm (70\")     Body mass index is 40.23 kg/m².  PE:  General: NAD  Neck: no JVD, no carotid bruits, no TM  Heart irreg irreg rate and rhythm  NL S1, S2,  no rubs, murmurs  Lungs: CTA, no wheezes, rhonchi, or rales  Abd: soft, non-tender, NL BS  Ext: No musculoskeletal deformities, no edema, cyanosis, or clubbing  Psych: normal mood and affect    Diagnostic Data:    PM interrogation: NL PM fxn, 47% RV paced, 100% AF, HR well controlled    Procedures    1. Chronic atrial fibrillation    2. Sick sinus syndrome (CMS/HCC)    3. Essential hypertension    4. Chronic diastolic congestive heart failure (CMS/HCC)          Plan:  1) Chronic Atrial Fibrillation:  continue to monitor HR at home   - rate controlled and overall asymptomatic  Continue present medications.   2) Anticoagulation  Continue warfarin  3) Bradycardia:  - normal PM function   4) HTN: needs wt loss and exercise with diet restrictions  - ok per Dr Mehta  Wt loss, exercise, salt reduction    F/up in 12 months      "

## 2020-06-08 NOTE — PROGRESS NOTES
Anticoagulation Clinic Progress Note  Indication: afib  Referring Provider: Tyson Roberts)  Initial Warfarin Start Date: 2007  Goal INR: 2-3  Current Drug Interactions:  torsemide  Diet: not eating Vit K foods at this point (3/6/19)  [id number]: 5 (HTN, Age, CHF, CAD)  Other: anemic, CKD (stage 3) May need bridge with Lovenox given persistent AF (per cardio 1/24/2019), Scr was 2.38 on 12/10/19    Anticoagulation Clinic INR History:  Date 10/23 11/20 12/8 1/5 2/2 3/2 3/16 3/30 4/17 5/17 6/14 7/19 8/1 8/22 9/12   Total Weekly Dose 37.5mg 37.5 mg 37.5 mg 37.5 mg 37.5mg 37.5 mg 37.5 mg 37.5 mg 37.5mg 37.5 mg 37.5 mg 37.5mg 37.5 mg 37.5 mg 37.5 mg    INR 2.5 1.8 2.5 2.3 2.1 1.7 3.2 2.1 2.3 2.06 2.8 3.5 2.7 2.7 3.0   Notes      (boost), amox amox   PAT     diarrhea     Date 10/1 10/8 10/12 10/17 10/22 10/29 11/5 11/12 11/19 11/29 12/13 1/3 1/24 2/12 2/18   Total Weekly Dose 26.25 mg 23.75mg 22.5  mg 23.75 mg 27.5 mg 28.75 mg 27.5 mg 28.75 mg 28.75 mg 28.75 mg 28.75 28.75mg 28.75 mg 17.5 mg 28.75mg   INR 3.0 2.1 1.6 1.3 1.6 2.8 2.0 2.5 2.4 2.76 2.8 2.7 2.9 1.7 1.5   Notes post- disch amio start 10/1        PAT   held x 7 days; resumed 1/22; D/C amio asa dc'd torsemide reduced,      Date 2/22 2/26 3/6 3/15 3/29 4/12 4/26 5/14 6/11 7/9 8/6 9/3 10/1 10/17   Total Weekly Dose 31.75 mg 33.75 mg 35mg 33.75 mg 33.75 mg 33.75 mg 32.5 mg 32.5 mg 32.5 mg 32.5 mg 32.5 mg 32.5 mg 32.5 mg 33.75 mg   INR 1.6 2.0 2.8 2.8 3.1 3.1 2.8 2.5 2.2 2.4 2.5 2.4 2.0 3.7   Notes  enox                 Date 10/30 11/20 12/17              Total Weekly Dose 32.5 mg 32.5 mg 32.5 mg              INR 2.3 2.3 2.1              Notes                 Takes warfarin in the am    Clinic Interview:  Verbal Release Authorization signed on 8/22/2018 -- may speak with Raeann Montemayor (Wife). [phone] (Home) *Preferred*  Tablet Strength: pt has 7.5mg and 2.5mg tablets    Patient Findings:  Negatives:  Signs/symptoms of thrombosis, Signs/symptoms of  bleeding, Laboratory test error suspected, Change in health, Change in alcohol use, Change in activity, Upcoming invasive procedure, Emergency department visit, Upcoming dental procedure, Missed doses, Extra doses, Change in medications, Change in diet/appetite, Hospital admission, Bruising, Other complaints   Comments:  Pt. States he takes 1-2 APAP a week          Plan:   1. INR is therapeutic today, so instructed Mr. Montemayor to continue warfarin 5mg oral daily except 3.75mg WedSat.  2. RTC in four weeks. 1/14/20  3. Verbal and written information provided in the clinic. Mr. Montemayor expresses understanding with teach back and has no further questions at this time.    Geo Crowe, Pharmacy Intern  12/17/2019  10:56 AM     I, Evelina Quan, PharmD, assisted in the patient interview. I have reviewed the note in full and agree with the assessment and plan.  12/17/19  5:22 PM     Length To Time In Minutes Device Was In Place: 10

## 2020-06-11 ENCOUNTER — ANTICOAGULATION VISIT (OUTPATIENT)
Dept: PHARMACY | Facility: HOSPITAL | Age: 75
End: 2020-06-11

## 2020-06-11 LAB
INR PPP: 2.2 (ref 0.91–1.09)
PROTHROMBIN TIME: 26 SECONDS (ref 10–13.8)

## 2020-06-11 PROCEDURE — 36416 COLLJ CAPILLARY BLOOD SPEC: CPT

## 2020-06-11 PROCEDURE — G0463 HOSPITAL OUTPT CLINIC VISIT: HCPCS

## 2020-06-11 PROCEDURE — 85610 PROTHROMBIN TIME: CPT

## 2020-06-11 NOTE — PROGRESS NOTES
Anticoagulation Clinic Progress Note  Indication: afib  Referring Provider: Tyson (next appointment: 4/15/2020) (Claudia 5/20/2020)  Initial Warfarin Start Date: 2007  Goal INR: 2-3  Current Drug Interactions:  torsemide  Diet: not eating Vit K foods at this point (3/6/19)  [id number]: 5 (HTN, Age, CHF, CAD)  Other: anemic, CKD (stage 3) May need bridge with Lovenox given persistent AF (per cardio 1/24/2019), Scr was 2.38 on 12/10/19    Anticoagulation Clinic INR History:  Date 10/23 11/20 12/8 1/5/18 2/2 3/2 3/16 3/30 4/17 5/17 6/14 7/19 8/1 8/22 9/12   Total Weekly Dose 37.5mg 37.5 mg 37.5 mg 37.5 mg 37.5mg 37.5 mg 37.5 mg 37.5 mg 37.5mg 37.5 mg 37.5 mg 37.5mg 37.5 mg 37.5 mg 37.5 mg    INR 2.5 1.8 2.5 2.3 2.1 1.7 3.2 2.1 2.3 2.06 2.8 3.5 2.7 2.7 3.0   Notes      (boost), amox amox   PAT     diarrhea     Date 10/1 10/8 10/12 10/17 10/22 10/29 11/5 11/12 11/19 11/29 12/13 1/3 1/24/19 2/12 2/18   Total Weekly Dose 26.25 mg 23.75mg 22.5  mg 23.75 mg 27.5 mg 28.75 mg 27.5 mg 28.75 mg 28.75 mg 28.75 mg 28.75 28.75mg 28.75 mg 17.5 mg 28.75mg   INR 3.0 2.1 1.6 1.3 1.6 2.8 2.0 2.5 2.4 2.76 2.8 2.7 2.9 1.7 1.5   Notes post- disch amio start 10/1        PAT   held x 7 days; resumed 1/22; D/C amio asa dc'd torsemide reduced,      Date 2/22 2/26 3/6 3/15 3/29 4/12 4/26 5/14 6/11 7/9 8/6 9/3 10/1 10/17   Total Weekly Dose 31.75 mg 33.75 mg 35mg 33.75 mg 33.75 mg 33.75 mg 32.5 mg 32.5 mg 32.5 mg 32.5 mg 32.5 mg 32.5 mg 32.5 mg 33.75 mg   INR 1.6 2.0 2.8 2.8 3.1 3.1 2.8 2.5 2.2 2.4 2.5 2.4 2.0 3.7   Notes  enox                 Date 10/30 11/20 12/17 1/14/20 2/11 3/11 4/8 5/6 6/11        Total Weekly Dose 32.5 mg 32.5 mg 32.5 mg 32.5 mg 32.5mg 32.5mg 32.5mg 32.5mg 32.5mg        INR 2.3 2.3 2.1 1.9 2.2 2.1 2.1 2.2 2.2        Notes                 Takes warfarin in the am    Clinic Interview:  Verbal Release Authorization signed on 8/22/2018 -- may speak with Raeann Montemayor (Wife). [phone] (Home) *Preferred*  Tablet  Strength: pt has 7.5mg and 2.5mg tablets    Patient Findings:      Negatives:  Signs/symptoms of thrombosis, Signs/symptoms of bleeding, Laboratory test error suspected, Change in health, Change in alcohol use, Change in activity, Upcoming invasive procedure, Emergency department visit, Upcoming dental procedure, Missed doses, Extra doses, Change in medications, Change in diet/appetite, Hospital admission, Bruising, Other complaints   Comments:  No changes from appt with Dr Taylor         Plan:   1. INR is therapeutic today at 2.2. Instructed Mr. Montemayor to continue with warfarin 5mg oral daily except 3.75mg TuesSat.  2. RTC in 4 weeks  3. Verbal and written information provided in the clinic. Mr. Montemayor expresses understanding with teach back and has no further questions at this time.    Kristine Sutton, PharmD.  06/11/20   11:22

## 2020-06-12 ENCOUNTER — HOSPITAL ENCOUNTER (EMERGENCY)
Facility: HOSPITAL | Age: 75
Discharge: HOME | End: 2020-06-12
Attending: EMERGENCY MEDICINE | Admitting: EMERGENCY MEDICINE

## 2020-06-12 ENCOUNTER — APPOINTMENT (OUTPATIENT)
Dept: CT IMAGING | Facility: HOSPITAL | Age: 75
End: 2020-06-12

## 2020-06-12 ENCOUNTER — APPOINTMENT (OUTPATIENT)
Dept: GENERAL RADIOLOGY | Facility: HOSPITAL | Age: 75
End: 2020-06-12

## 2020-06-12 VITALS
TEMPERATURE: 98 F | SYSTOLIC BLOOD PRESSURE: 164 MMHG | BODY MASS INDEX: 39.37 KG/M2 | DIASTOLIC BLOOD PRESSURE: 105 MMHG | WEIGHT: 275 LBS | OXYGEN SATURATION: 94 % | HEART RATE: 81 BPM | HEIGHT: 70 IN | RESPIRATION RATE: 20 BRPM

## 2020-06-12 DIAGNOSIS — M54.12 CERVICAL RADICULAR PAIN: ICD-10-CM

## 2020-06-12 DIAGNOSIS — M50.30 DDD (DEGENERATIVE DISC DISEASE), CERVICAL: ICD-10-CM

## 2020-06-12 DIAGNOSIS — M54.2 NECK PAIN ON LEFT SIDE: Primary | ICD-10-CM

## 2020-06-12 PROCEDURE — 99283 EMERGENCY DEPT VISIT LOW MDM: CPT

## 2020-06-12 PROCEDURE — 73030 X-RAY EXAM OF SHOULDER: CPT

## 2020-06-12 PROCEDURE — 72125 CT NECK SPINE W/O DYE: CPT

## 2020-06-12 RX ORDER — LIDOCAINE 50 MG/G
1 PATCH TOPICAL
Status: DISCONTINUED | OUTPATIENT
Start: 2020-06-12 | End: 2020-06-12 | Stop reason: HOSPADM

## 2020-06-12 RX ORDER — METHYLPREDNISOLONE 4 MG/1
TABLET ORAL
Qty: 21 TABLET | Refills: 0 | Status: SHIPPED | OUTPATIENT
Start: 2020-06-12 | End: 2020-08-11

## 2020-06-12 RX ORDER — HYDROCODONE BITARTRATE AND ACETAMINOPHEN 5; 325 MG/1; MG/1
1 TABLET ORAL EVERY 6 HOURS PRN
Qty: 12 TABLET | Refills: 0 | Status: SHIPPED | OUTPATIENT
Start: 2020-06-12 | End: 2020-08-11

## 2020-06-12 RX ADMIN — LIDOCAINE 1 PATCH: 50 PATCH CUTANEOUS at 15:20

## 2020-06-12 NOTE — ED PROVIDER NOTES
Subjective   This is a very pleasant 75-year-old male who sees Dr. Rosado.  He is right-handed.  He still works as a .    He comes the emergency room today with neck pain with radiation down into his left shoulder and left upper arm.  This began insidiously about a week ago and is gotten worse and it is preventing him from sleeping at night.  He has poor renal function and cannot take nonsteroidals and really has not taken any medication for this.    He can think of no precipitating event or trauma to cause this and he is never had anything like this in the past he has no history of neck problems that he knows of.    He is not been sick in any other way really denies chest pain or shortness of breath.  He has had no fevers chills runny nose sore throat or cough.  He is on chronic Coumadin therapy and has not noticed any blood per any orifice.  He has chronic ichthyosis has had no rash or any other changes besides his chronic issues.          Review of Systems   Constitutional: Negative for chills.   HENT: Negative for dental problem and ear pain.    Respiratory: Negative for cough, choking, chest tightness and shortness of breath.    Cardiovascular: Negative for chest pain.   Gastrointestinal: Negative for abdominal distention and blood in stool.   Endocrine: Negative for cold intolerance.   Musculoskeletal: Positive for arthralgias and neck pain.   Neurological: Positive for numbness. Negative for dizziness, light-headedness and headaches.       Past Medical History:   Diagnosis Date   • Abnormal ECG    • Arrhythmia    • Arthritis    • Atrial fibrillation (CMS/HCC)    • Cancer (CMS/HCC)     skin cancer removed from various locations    • Chronic diastolic congestive heart failure (CMS/HCC) 9/14/2018   • Coronary artery disease    • Dry skin    • Edema    • Heart murmur    • Hematoma    • Hypertension    • Long term current use of anticoagulant    • ZULEIKA treated with BiPAP    • Persistent atrial  fibrillation (CMS/HCC) 7/7/2016     Patient notes dyspnea, fatigue and palpitations during episodes of afib.  multiple external cardioversionsChads vasc 2, anticoagulated with Coumadin   • Sleep apnea     bipap- complaint with machine    • Stage 3 chronic kidney disease (CMS/HCC)    • Tinnitus    • Wears glasses     readers     Problem List:  1. Chronic atrial fibrillation  a. Intermittent chronic paroxysmal atrial fibrillation with electrophysiologic study with RFA for atrial flutter, October 1999.  b. Recurrent apparent transient atrial fibrillation, resolved with acceptable [facility] emergency department evaluation, June 2003.  c. Acceptable combination Doppler echocardiogram, July 2003, with apparent acceptable 24-hour Holter monitor, May 2006.  d. Recurrent asymptomatic atrial fibrillation with RVR, January 2007, subsequent Coumadin therapy and Tikosyn therapy with successful internal cardioversion of atrial fibrillation and marked bradyarrhythmias requiring DDDR pacemaker implant, [facility] device, data not available, March 2007.  e. Remote hospitalization for symptomatic rapid atrial fibrillation/BRYANNA DC cardioversion with subsequent acceptable pacemaker interrogation, June/July 2009, as well as acceptable interrogation without reprogramming, July 2011.  f. Acceptable combination Doppler echocardiogram, December 2010, with residual class I symptoms.  g. Acceptable device interrogation following [facility] ED evaluation for chest pressure and shortness of breath, data deficit, May 2011, February 2013, February 2016, December 2016.  h. Abnormal pacemaker assessment with 8.4% mode switch with battery voltage of 1-1.25 years, June 2017, with abnormal PACEART assessment, July 2018, with 22% mode switch  2. 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 Remote acceptable intravenous adenosine Quantitative SPECT gated Cardiolite study, LVEF 0.50, April 1999.  b. Echocardiogram showing mild LVH with estimated ejection fraction  0.58 with mild aortic valve cusp sclerosis, and mild TR, 09/23/2014.   c. Remote abnormal preoperative Quantitative SPECT gated Cardiolite study with mild “fixed” inferoposterior hypoperfusion and mild LV enlargement with mild global hypokinesia with reduced LVEF 0.40 in the setting of abnormal EKG with subsequent diagnostic coronary angiography demonstrating mild-to-moderate nonobstructive coronary artery atherosclerosis with mild compensated left ventricular dysfunction, LVEF 0.52, and continued medical therapy felt warranted, June 2006.  d. Remote hospitalization for symptomatic atrial fibrillation with rapid ventricular response and mild congestive heart failure requiring BRYANNA and DC cardioversion, June 2009.  e. Left heart catheterization with essentially normal coronary arteries with mild luminal irregularities with an EF of 0.60 by Dr. Escamilla for angina and elevated troponin, 06/05/2015.   f. Residual class I symptoms.  3. Chronic essential hypertension with recent progressive hypertension and blood pressure readings and normal selective bilateral renal angiography, June 2006.  4. Dyslipidemia.  5. Moderate obesity, BMI 39.6.  6. Ichthyosis.  7. Chronic lower tract obstructive symptoms, probable BPH.  8. Dyslipidemia.  9. Nasal polyps with deviated nasal septum with apparent subsequent nasal septoplasty/polypectomy, data deficit, July 2006.  10. Abdominal pain with apparent small bowel obstruction with exploratory laparotomy - data deficit.  11. Appendectomy with subsequent delayed hospitalization for bleeding peptic ulcer disease/probable duodenal ulcer, November 2003.  12. Recurrent asymptomatic atrial fibrillation with rapid ventricular response, July 2007, with subsequent Tikosyn therapy and DDDR pacemaker implant with intermittent recurrent breakthrough arrhythmias, including remote DC cardioversion, May 2008, with subsequent recurrent DC cardioversion, autumn 2018, with recurrent atrial fibrillation and  discontinuation of formal antiarrhythmic therapy - data deficit  13. Obstructive sleep apnea with treatment.   14. Stage 4 chronic kidney disease with recent apparent documented proteinuria and renal biopsy (February 2019)  No Known Allergies    Past Surgical History:   Procedure Laterality Date   • ABLATION OF DYSRHYTHMIC FOCUS     • APPENDECTOMY     • CARDIAC ELECTROPHYSIOLOGY PROCEDURE N/A 5/18/2018    Procedure: PPM generator change - dual       [facility]/ please schedule in 8 weeks;  Surgeon: Johnathan Taylor MD;  Location: [facility] INVASIVE LOCATION;  Service: Cardiovascular   • CATARACT EXTRACTION      bilat wiht lens    • COLONOSCOPY     • ENDOSCOPY     • SKIN CANCER EXCISION      various locations    • TONSILLECTOMY     • WISDOM TOOTH EXTRACTION         Family History   Problem Relation Age of Onset   • Cancer Mother    • Heart attack Mother    • Hypertension Mother    • Arrhythmia Father    • Hypertension Father    • Heart attack Father        Social History     Socioeconomic History   • Marital status:      Spouse name: Not on file   • Number of children: Not on file   • Years of education: Not on file   • Highest education level: Not on file   Tobacco Use   • Smoking status: Never Smoker   • Smokeless tobacco: Never Used   Substance and Sexual Activity   • Alcohol use: No   • Drug use: No   • Sexual activity: Defer           Objective   Physical Exam   Constitutional: He is oriented to person, place, and time.   Pleasant older man in no acute distress.   HENT:   Head: Normocephalic and atraumatic.   Right Ear: External ear normal.   Left Ear: External ear normal.   Nose: Nose normal.   Mouth/Throat: Oropharynx is clear and moist.   Eyes: Pupils are equal, round, and reactive to light. Conjunctivae and EOM are normal.   Neck: No JVD present. No tracheal deviation present. No thyromegaly present.   Neck is normal in appearance except for dry skin.  He has no rash.  His trapezius and rhomboids are  "nontender.  His axilla are without masses.  He can flex and extend his neck but when he side bends it increases pain in his right or left.  Compression and distraction of the neck do not cause any symptoms.   Cardiovascular: Intact distal pulses.   Pulmonary/Chest: Effort normal.   Abdominal:   Moderately obese   Musculoskeletal:   He has good pulses in his extremities and he has no evidence of muscle wasting.  Range of motion of the shoulders are good is really his neck that he has difficult to moving especially for turning and side bending.   Neurological: He is alert and oriented to person, place, and time.   Face is symmetric, voice is strong, tongue is midline.  Vision, hearing, and speech are all preserved   Skin: Skin is warm and dry. Capillary refill takes less than 2 seconds.   Psychiatric: He has a normal mood and affect. His behavior is normal. Judgment and thought content normal.   Nursing note and vitals reviewed.      Procedures           ED Course      No results found for this or any previous visit (from the past 24 hour(s)).  Note: In addition to lab results from this visit, the labs listed above may include labs taken at another facility or during a different encounter within the last 24 hours. Please correlate lab times with ED admission and discharge times for further clarification of the services performed during this visit.    CT Cervical Spine Without Contrast   Preliminary Result   Minimal multilevel degenerative changes seen within the   cervical spine with no fracture or dislocation.              XR Shoulder 2+ View Left   Preliminary Result   No acute fracture or dislocation.                Vitals:    06/12/20 1237 06/12/20 1256   BP:  (!) 164/105   BP Location:  Left arm   Pulse:  81   Resp:  20   Temp: 98 °F (36.7 °C)    SpO2:  94%   Weight: 125 kg (275 lb)    Height: 177.8 cm (70\")      Medications   lidocaine (LIDODERM) 5 % 1 patch (1 patch Transdermal Medication Applied 6/12/20 1520) "     ECG/EMG Results (last 24 hours)     ** No results found for the last 24 hours. **        No orders to display                                          MDM  Number of Diagnoses or Management Options  Cervical radicular pain:   DDD (degenerative disc disease), cervical:   Neck pain on left side:   Diagnosis management comments:       I have reviewed all available studies at the bedside with the patient.  His x-ray of the left shoulder is unremarkable CT of the cervical spine shows mild degenerative changes.  I suspect that some degenerative change versus herniated disc is causing a left-sided cervical radiculopathy.  Hopefully he will improve with conservative therapy.  We will do a Lidoderm patch on his neck and since he cannot take nonsteroidals due to his renal dysfunction I will put him on a short course of some Lortab for pain unrelieved by Tylenol.  EDMUND Lomeli #[id number] is reviewed and is bland.  Also he has had no problems with his glucose since I will put him on a Medrol Dosepak.  I will refer him to physical therapy.  If he has worsening of his symptoms I will refer him to neurosurgery as well.    All are agreeable with the plan       Amount and/or Complexity of Data Reviewed  Tests in the radiology section of CPT®: reviewed        Final diagnoses:   Neck pain on left side   Cervical radicular pain   DDD (degenerative disc disease), cervical            Maciel Schwartz MD  06/12/20 8163

## 2020-06-22 RX ORDER — ATORVASTATIN CALCIUM 40 MG/1
40 TABLET, FILM COATED ORAL DAILY
Qty: 90 TABLET | Refills: 1 | Status: SHIPPED | OUTPATIENT
Start: 2020-06-22 | End: 2020-11-08 | Stop reason: SDUPTHER

## 2020-07-01 ENCOUNTER — HOSPITAL ENCOUNTER (EMERGENCY)
Facility: HOSPITAL | Age: 75
Discharge: HOME | End: 2020-07-01
Attending: EMERGENCY MEDICINE | Admitting: EMERGENCY MEDICINE

## 2020-07-01 ENCOUNTER — APPOINTMENT (OUTPATIENT)
Dept: GENERAL RADIOLOGY | Facility: HOSPITAL | Age: 75
End: 2020-07-01

## 2020-07-01 VITALS
DIASTOLIC BLOOD PRESSURE: 70 MMHG | RESPIRATION RATE: 18 BRPM | OXYGEN SATURATION: 100 % | HEIGHT: 70 IN | BODY MASS INDEX: 38.65 KG/M2 | TEMPERATURE: 98 F | HEART RATE: 62 BPM | SYSTOLIC BLOOD PRESSURE: 116 MMHG | WEIGHT: 270 LBS

## 2020-07-01 DIAGNOSIS — S86.912A KNEE STRAIN, LEFT, INITIAL ENCOUNTER: Primary | ICD-10-CM

## 2020-07-01 PROCEDURE — 99283 EMERGENCY DEPT VISIT LOW MDM: CPT

## 2020-07-01 PROCEDURE — 73560 X-RAY EXAM OF KNEE 1 OR 2: CPT

## 2020-07-01 NOTE — DISCHARGE INSTRUCTIONS
Rest.  Elevate and apply ice bag off/on.  Ace wrap as needed.  Tylenol as directed for pain.  Voltarin gel massaged into knee as directed.  Call Dr. Coker for follow up appointment.  Return if acutely worse.

## 2020-07-01 NOTE — ED PROVIDER NOTES
Subjective   75-year-old male presents the emergency department with complaints of left knee pain.  The patient states that he has had some soreness in the left knee for about the past month.  Yesterday, while pushing himself up into the car, he developed increased pain in the knee.  Today, he has increased pain with weightbearing.  The patient denies any other complaint.  Past medical history of atrial fibrillation (on Coumadin) chronic kidney disease stage IV and pacemaker placement.          Review of Systems   Constitutional: Negative for chills and fever.   Respiratory: Negative for cough and shortness of breath.    Cardiovascular: Negative for chest pain.   Gastrointestinal: Negative for abdominal pain, nausea and vomiting.   Genitourinary: Negative for dysuria.   Musculoskeletal: Positive for arthralgias (Left knee).   Skin: Negative for rash.   Allergic/Immunologic: Negative for immunocompromised state.   Neurological: Negative for headaches.   Hematological: Negative.    Psychiatric/Behavioral: Negative.        Past Medical History:   Diagnosis Date   • Abnormal ECG    • Arrhythmia    • Arthritis    • Atrial fibrillation (CMS/HCC)    • Cancer (CMS/HCC)     skin cancer removed from various locations    • Chronic diastolic congestive heart failure (CMS/HCC) 9/14/2018   • Coronary artery disease    • Dry skin    • Edema    • Heart murmur    • Hematoma    • Hypertension    • Long term current use of anticoagulant    • ZULEIKA treated with BiPAP    • Persistent atrial fibrillation (CMS/HCC) 7/7/2016     Patient notes dyspnea, fatigue and palpitations during episodes of afib.  multiple external cardioversionsChads vasc 2, anticoagulated with Coumadin   • Sleep apnea     bipap- complaint with machine    • Stage 3 chronic kidney disease (CMS/HCC)    • Tinnitus    • Wears glasses     readers       No Known Allergies    Past Surgical History:   Procedure Laterality Date   • ABLATION OF DYSRHYTHMIC FOCUS     •  APPENDECTOMY     • CARDIAC ELECTROPHYSIOLOGY PROCEDURE N/A 5/18/2018    Procedure: PPM generator change - dual       [facility]/ please schedule in 8 weeks;  Surgeon: Johnahtan Taylor MD;  Location: [facility] INVASIVE LOCATION;  Service: Cardiovascular   • CATARACT EXTRACTION      bilat wiht lens    • COLONOSCOPY     • ENDOSCOPY     • SKIN CANCER EXCISION      various locations    • TONSILLECTOMY     • WISDOM TOOTH EXTRACTION         Family History   Problem Relation Age of Onset   • Cancer Mother    • Heart attack Mother    • Hypertension Mother    • Arrhythmia Father    • Hypertension Father    • Heart attack Father        Social History     Socioeconomic History   • Marital status:      Spouse name: Not on file   • Number of children: Not on file   • Years of education: Not on file   • Highest education level: Not on file   Tobacco Use   • Smoking status: Never Smoker   • Smokeless tobacco: Never Used   Substance and Sexual Activity   • Alcohol use: No   • Drug use: No   • Sexual activity: Defer           Objective   Physical Exam   Constitutional: He is oriented to person, place, and time. He appears well-developed and well-nourished. No distress.   HENT:   Head: Normocephalic.   Eyes: Pupils are equal, round, and reactive to light.   Neck: Normal range of motion. Neck supple.   Cardiovascular: Normal rate, regular rhythm, normal heart sounds and intact distal pulses.   No murmur heard.  Pulmonary/Chest: Effort normal and breath sounds normal. No respiratory distress.   Abdominal: Soft. Bowel sounds are normal. There is no tenderness.   Musculoskeletal:   Moderate tenderness on palpation over the medial aspect of the left knee.  No swelling or deformity.  No laxity on stressing.   Neurological: He is alert and oriented to person, place, and time.   Skin: Skin is warm.   Psychiatric: He has a normal mood and affect.       Procedures           ED Course      X-rays of the left knee show tricompartmental DJD.   No fracture or effusion is seen.  The patient will be discharged home with an Ace wrap.  I will prescribe diclofenac gel and have him continue using Tylenol.  I will refer him to orthopedics for follow-up.  DISCHARGE    Patient discharged in stable condition.    Reviewed implications of results, diagnosis, meds, responsibility to follow up, warning signs and symptoms of possible worsening, potential complications and reasons to return to ER.    Patient/Family voiced understanding of above instructions.    Discussed plan for discharge, as there is no emergent indication for admission.  Pt/family is agreeable and understands need for follow up and possible repeat testing.  Pt/family is aware that discharge does not mean that nothing is wrong but that it indicates no emergency is currently present that requires admission and they must continue care with follow-up as given below or with a physician of their choice.     FOLLOW-UP  Doni Coker MD  [address] [place] [address]  [phone]      call for follow up appointment    [facility] Emergency Department  [address] [facility] [address] [phone]    If symptoms worsen         Medication List      New Prescriptions    diclofenac 1 % gel gel  Commonly known as:  VOLTAREN  Apply 4 g topically to the appropriate area as directed 3 (Three) Times a   Day.                                               MDM    Final diagnoses:   Knee strain, left, initial encounter            Marco Antonio Jose, PA  07/01/20 7027

## 2020-07-02 PROCEDURE — U0003 INFECTIOUS AGENT DETECTION BY NUCLEIC ACID (DNA OR RNA); SEVERE ACUTE RESPIRATORY SYNDROME CORONAVIRUS 2 (SARS-COV-2) (CORONAVIRUS DISEASE [COVID-19]), AMPLIFIED PROBE TECHNIQUE, MAKING USE OF HIGH THROUGHPUT TECHNOLOGIES AS DESCRIBED BY CMS-2020-01-R: HCPCS | Performed by: NURSE PRACTITIONER

## 2020-07-03 ENCOUNTER — TELEPHONE (OUTPATIENT)
Dept: URGENT CARE | Facility: CLINIC | Age: 75
End: 2020-07-03

## 2020-07-09 ENCOUNTER — ANTICOAGULATION VISIT (OUTPATIENT)
Dept: PHARMACY | Facility: HOSPITAL | Age: 75
End: 2020-07-09

## 2020-07-09 LAB
INR PPP: 2.3 (ref 0.91–1.09)
PROTHROMBIN TIME: 27.3 SECONDS (ref 10–13.8)

## 2020-07-09 PROCEDURE — 85610 PROTHROMBIN TIME: CPT

## 2020-07-09 PROCEDURE — G0463 HOSPITAL OUTPT CLINIC VISIT: HCPCS

## 2020-07-09 PROCEDURE — 36416 COLLJ CAPILLARY BLOOD SPEC: CPT

## 2020-07-09 NOTE — PROGRESS NOTES
Anticoagulation Clinic Progress Note  Indication: afib  Referring Provider: Tyson (next appointment: 4/15/2020) (Claudia 5/20/2020)  Initial Warfarin Start Date: 2007  Goal INR: 2-3  Current Drug Interactions:  torsemide  Diet: not eating Vit K foods at this point (3/6/19)  [id number]: 5 (HTN, Age, CHF, CAD)  Other: anemic, CKD (stage 3) May need bridge with Lovenox given persistent AF (per cardio 1/24/2019), Scr was 2.38 on 12/10/19    Anticoagulation Clinic INR History:  Date 10/23 11/20 12/8 1/5/18 2/2 3/2 3/16 3/30 4/17 5/17 6/14 7/19 8/1 8/22 9/12   Total Weekly Dose 37.5mg 37.5 mg 37.5 mg 37.5 mg 37.5mg 37.5 mg 37.5 mg 37.5 mg 37.5mg 37.5 mg 37.5 mg 37.5mg 37.5 mg 37.5 mg 37.5 mg    INR 2.5 1.8 2.5 2.3 2.1 1.7 3.2 2.1 2.3 2.06 2.8 3.5 2.7 2.7 3.0   Notes      (boost), amox amox   PAT     diarrhea     Date 10/1 10/8 10/12 10/17 10/22 10/29 11/5 11/12 11/19 11/29 12/13 1/3 1/24/19 2/12 2/18   Total Weekly Dose 26.25 mg 23.75mg 22.5  mg 23.75 mg 27.5 mg 28.75 mg 27.5 mg 28.75 mg 28.75 mg 28.75 mg 28.75 28.75mg 28.75 mg 17.5 mg 28.75mg   INR 3.0 2.1 1.6 1.3 1.6 2.8 2.0 2.5 2.4 2.76 2.8 2.7 2.9 1.7 1.5   Notes post- disch amio start 10/1        PAT   held x 7 days; resumed 1/22; D/C amio asa dc'd torsemide reduced,      Date 2/22 2/26 3/6 3/15 3/29 4/12 4/26 5/14 6/11 7/9 8/6 9/3 10/1 10/17   Total Weekly Dose 31.75 mg 33.75 mg 35mg 33.75 mg 33.75 mg 33.75 mg 32.5 mg 32.5 mg 32.5 mg 32.5 mg 32.5 mg 32.5 mg 32.5 mg 33.75 mg   INR 1.6 2.0 2.8 2.8 3.1 3.1 2.8 2.5 2.2 2.4 2.5 2.4 2.0 3.7   Notes  enox                 Date 10/30 11/20 12/17 1/14/20 2/11 3/11 4/8 5/6 6/11 7/9       Total Weekly Dose 32.5 mg 32.5 mg 32.5 mg 32.5 mg 32.5mg 32.5mg 32.5mg 32.5mg 32.5mg 32.5 mg       INR 2.3 2.3 2.1 1.9 2.2 2.1 2.1 2.2 2.2 2.3       Notes                 Takes warfarin in the am    Clinic Interview:  Verbal Release Authorization signed on 8/22/2018 -- may speak with Raeann Montemayor (Wife). [phone] (Home)  *Preferred*  Tablet Strength: pt has 7.5mg and 2.5mg tablets    Patient Findings:      Negatives:  Signs/symptoms of thrombosis, Signs/symptoms of bleeding, Laboratory test error suspected, Change in health, Change in alcohol use, Change in activity, Upcoming invasive procedure, Emergency department visit, Upcoming dental procedure, Missed doses, Extra doses, Change in medications, Change in diet/appetite, Hospital admission, Bruising, Other complaints   Comments:  Patient reports sometimes switching the Friday and Saturday 3.75 mg dose.         Plan:   1. INR is therapeutic today at 2.3 from 2.2 on 6/11. Instructed Mr. Montemayor to continue with warfarin 5mg oral daily except 3.75mg TuesSat.  2. RTC in 4 weeks on 8/6.  3. Verbal and written information provided in the clinic. Mr. Montemayor expresses understanding with teach back and has no further questions at this time.    Jaswant Draper, Pharm D  Pharmacy Resident   7/9/2020  10:46      IKristine, [facility], have reviewed the note in full and agree with the assessment and plan.  07/09/20  11:00

## 2020-08-06 ENCOUNTER — ANTICOAGULATION VISIT (OUTPATIENT)
Dept: PHARMACY | Facility: HOSPITAL | Age: 75
End: 2020-08-06

## 2020-08-06 DIAGNOSIS — I48.20 CHRONIC ATRIAL FIBRILLATION (HCC): ICD-10-CM

## 2020-08-06 LAB
INR PPP: 2.7 (ref 0.91–1.09)
INR PPP: 2.7 (ref 0.9–1.1)
PROTHROMBIN TIME: 32.2 SECONDS (ref 10–13.8)

## 2020-08-06 PROCEDURE — 85610 PROTHROMBIN TIME: CPT

## 2020-08-06 PROCEDURE — 36416 COLLJ CAPILLARY BLOOD SPEC: CPT

## 2020-08-06 PROCEDURE — 85610 PROTHROMBIN TIME: CPT | Performed by: PHARMACIST

## 2020-08-06 PROCEDURE — 36416 COLLJ CAPILLARY BLOOD SPEC: CPT | Performed by: PHARMACIST

## 2020-08-06 PROCEDURE — G0463 HOSPITAL OUTPT CLINIC VISIT: HCPCS | Performed by: PHARMACIST

## 2020-08-06 NOTE — PROGRESS NOTES
Anticoagulation Clinic Progress Note  Indication: afib  Referring Provider: Tyson (next appointment: 4/15/2020) (Claudia 5/20/2020)  Initial Warfarin Start Date: 2007  Goal INR: 2-3  Current Drug Interactions:  torsemide  Diet: not eating Vit K foods at this point (3/6/19)  [id number]: 5 (HTN, Age, CHF, CAD)  Other: anemic, CKD (stage 3) May need bridge with Lovenox given persistent AF (per cardio 1/24/2019), Scr was 2.38 on 12/10/19    Anticoagulation Clinic INR History:  Date 10/23 11/20 12/8 1/5/18 2/2 3/2 3/16 3/30 4/17 5/17 6/14 7/19 8/1 8/22 9/12   Total Weekly Dose 37.5mg 37.5 mg 37.5 mg 37.5 mg 37.5mg 37.5 mg 37.5 mg 37.5 mg 37.5mg 37.5 mg 37.5 mg 37.5mg 37.5 mg 37.5 mg 37.5 mg    INR 2.5 1.8 2.5 2.3 2.1 1.7 3.2 2.1 2.3 2.06 2.8 3.5 2.7 2.7 3.0   Notes      (boost), amox amox   PAT     diarrhea     Date 10/1 10/8 10/12 10/17 10/22 10/29 11/5 11/12 11/19 11/29 12/13 1/3 1/24/19 2/12 2/18   Total Weekly Dose 26.25 mg 23.75mg 22.5  mg 23.75 mg 27.5 mg 28.75 mg 27.5 mg 28.75 mg 28.75 mg 28.75 mg 28.75 28.75mg 28.75 mg 17.5 mg 28.75mg   INR 3.0 2.1 1.6 1.3 1.6 2.8 2.0 2.5 2.4 2.76 2.8 2.7 2.9 1.7 1.5   Notes post- disch amio start 10/1        PAT   held x 7 days; resumed 1/22; D/C amio asa dc'd torsemide reduced,      Date 2/22 2/26 3/6 3/15 3/29 4/12 4/26 5/14 6/11 7/9 8/6 9/3 10/1 10/17   Total Weekly Dose 31.75 mg 33.75 mg 35mg 33.75 mg 33.75 mg 33.75 mg 32.5 mg 32.5 mg 32.5 mg 32.5 mg 32.5 mg 32.5 mg 32.5 mg 33.75 mg   INR 1.6 2.0 2.8 2.8 3.1 3.1 2.8 2.5 2.2 2.4 2.5 2.4 2.0 3.7   Notes  enox                 Date 10/30 11/20 12/17 1/14/20 2/11 3/11 4/8 5/6 6/11 7/9 8/6      Total Weekly Dose 32.5 mg 32.5 mg 32.5 mg 32.5 mg 32.5mg 32.5mg 32.5mg 32.5mg 32.5mg 32.5 mg 32.5 mg      INR 2.3 2.3 2.1 1.9 2.2 2.1 2.1 2.2 2.2 2.3 2.7      Notes                 Takes warfarin in the am    Clinic Interview:  Verbal Release Authorization signed on 8/22/2018 -- may speak with Raeann Montemayor (Wife). [phone] (Home)  *Preferred*  Tablet Strength: pt has 7.5mg and 2.5mg tablets    Patient Findings:  Negatives:  Signs/symptoms of thrombosis, Signs/symptoms of bleeding, Laboratory test error suspected, Change in health, Change in alcohol use, Change in activity, Upcoming invasive procedure, Emergency department visit, Upcoming dental procedure, Missed doses, Extra doses, Change in medications, Change in diet/appetite, Hospital admission, Bruising, Other complaints   Comments:  Wellness appointment next week. He will notify us for any changes in medications.       Plan:   1. INR is therapeutic today at 2.7 from 2.3 on 7/3. Instructed Mr. Montemayor to continue with warfarin 5mg oral daily except 3.75mg TuesSat.  2. RTC in 4 weeks on 9/3.  3. Verbal and written information provided in the clinic. Mr. Montemayor expresses understanding with teach back and has no further questions at this time.    Peng Melgar, Pharm D  8/6/2020  10:22

## 2020-08-11 ENCOUNTER — RESULTS ENCOUNTER (OUTPATIENT)
Dept: FAMILY MEDICINE CLINIC | Facility: CLINIC | Age: 75
End: 2020-08-11

## 2020-08-11 ENCOUNTER — OFFICE VISIT (OUTPATIENT)
Dept: FAMILY MEDICINE CLINIC | Facility: CLINIC | Age: 75
End: 2020-08-11

## 2020-08-11 VITALS
SYSTOLIC BLOOD PRESSURE: 140 MMHG | RESPIRATION RATE: 25 BRPM | HEART RATE: 78 BPM | HEIGHT: 70 IN | BODY MASS INDEX: 40.09 KG/M2 | TEMPERATURE: 97.8 F | WEIGHT: 280 LBS | OXYGEN SATURATION: 96 % | DIASTOLIC BLOOD PRESSURE: 80 MMHG

## 2020-08-11 DIAGNOSIS — Z00.00 MEDICARE ANNUAL WELLNESS VISIT, SUBSEQUENT: Primary | ICD-10-CM

## 2020-08-11 DIAGNOSIS — I10 ESSENTIAL HYPERTENSION: ICD-10-CM

## 2020-08-11 DIAGNOSIS — Z12.11 COLON CANCER SCREENING: ICD-10-CM

## 2020-08-11 DIAGNOSIS — N18.4 CHRONIC RENAL INSUFFICIENCY, STAGE 4 (SEVERE) (HCC): ICD-10-CM

## 2020-08-11 DIAGNOSIS — E78.2 MIXED HYPERLIPIDEMIA: ICD-10-CM

## 2020-08-11 PROCEDURE — 96160 PT-FOCUSED HLTH RISK ASSMT: CPT | Performed by: FAMILY MEDICINE

## 2020-08-11 PROCEDURE — G0439 PPPS, SUBSEQ VISIT: HCPCS | Performed by: FAMILY MEDICINE

## 2020-08-11 NOTE — PATIENT INSTRUCTIONS
Advance Care Planning and Advance Directives     You make decisions on a daily basis - decisions about where you want to live, your career, your home, your life. Perhaps one of the most important decisions you face is your choice for future medical care. Take time to talk with your family and your healthcare team and start planning today.  Advance Care Planning is a process that can help you:  · Understand possible future healthcare decisions in light of your own experiences  · Reflect on those decision in light of your goals and values  · Discuss your decisions with those closest to you and the healthcare professionals that care for you  · Make a plan by creating a document that reflects your wishes    Surrogate Decision Maker  In the event of a medical emergency, which has left you unable to communicate or to make your own decisions, you would need someone to make decisions for you.  It is important to discuss your preferences for medical treatment with this person while you are in good health.     Qualities of a surrogate decision maker:  • Willing to take on this role and responsibility  • Knows what you want for future medical care  • Willing to follow your wishes even if they don't agree with them  • Able to make difficult medical decisions under stressful circumstances    Advance Directives  These are legal documents you can create that will guide your healthcare team and decision maker(s) when needed. These documents can be stored in the electronic medical record.    · Living Will - a legal document to guide your care if you have a terminal condition or a serious illness and are unable to communicate. States vary by statute in document names/types, but most forms may include one or more of the following:        -  Directions regarding life-prolonging treatments        -  Directions regarding artificially provided nutrition/hydration        -  Choosing a healthcare decision maker        -  Direction  regarding organ/tissue donation    · Durable Power of  for Healthcare - this document names an -in-fact to make medical decisions for you, but it may also allow this person to make personal and financial decisions for you. Please seek the advice of an  if you need this type of document.    **Advance Directives are not required and no one may discriminate against you if you do not sign one.    Medical Orders  Many states allow specific forms/orders signed by your physician to record your wishes for medical treatment in your current state of health. This form, signed in personal communication with your physician, addresses resuscitation and other medical interventions that you may or may not want.      For more information or to schedule a time with a [facility] Advance Care Planning Facilitator contact: [facility]ACP or call [phone] and someone will contact you directly.    Advance Care Planning and Advance Directives     You make decisions on a daily basis - decisions about where you want to live, your career, your home, your life. Perhaps one of the most important decisions you face is your choice for future medical care. Take time to talk with your family and your healthcare team and start planning today.  Advance Care Planning is a process that can help you:  · Understand possible future healthcare decisions in light of your own experiences  · Reflect on those decision in light of your goals and values  · Discuss your decisions with those closest to you and the healthcare professionals that care for you  · Make a plan by creating a document that reflects your wishes    Surrogate Decision Maker  In the event of a medical emergency, which has left you unable to communicate or to make your own decisions, you would need someone to make decisions for you.  It is important to discuss your preferences for medical treatment with this person while you are in good health.      Qualities of a surrogate decision maker:  • Willing to take on this role and responsibility  • Knows what you want for future medical care  • Willing to follow your wishes even if they don't agree with them  • Able to make difficult medical decisions under stressful circumstances    Advance Directives  These are legal documents you can create that will guide your healthcare team and decision maker(s) when needed. These documents can be stored in the electronic medical record.    · Living Will - a legal document to guide your care if you have a terminal condition or a serious illness and are unable to communicate. States vary by statute in document names/types, but most forms may include one or more of the following:        -  Directions regarding life-prolonging treatments        -  Directions regarding artificially provided nutrition/hydration        -  Choosing a healthcare decision maker        -  Direction regarding organ/tissue donation    · Durable Power of  for Healthcare - this document names an -in-fact to make medical decisions for you, but it may also allow this person to make personal and financial decisions for you. Please seek the advice of an  if you need this type of document.    **Advance Directives are not required and no one may discriminate against you if you do not sign one.    Medical Orders  Many states allow specific forms/orders signed by your physician to record your wishes for medical treatment in your current state of health. This form, signed in personal communication with your physician, addresses resuscitation and other medical interventions that you may or may not want.      For more information or to schedule a time with a [facility] Advance Care Planning Facilitator contact: [facility].[place] [facility]/ACP or call [phone] and someone will contact you directly.

## 2020-08-11 NOTE — PROGRESS NOTES
Subsequent Medicare Wellness Visit   The ABC's of the Annual Wellness Visit    Chief Complaint   Patient presents with   • Establish Care       HPI:  Marco Antonio Montemayor, -1945, is a 75 y.o. male who presents for a Subsequent Medicare Wellness Visit.  Pt has had recent labs and continues to follow with nephrology, cardiology, anticoag clinic.    Recent Hospitalizations:  No hospitalization(s) within the last year..    Current Medical Providers:  Patient Care Team:  Shanell Arango, PharmD as Pharmacist (Pharmacy)  Evelina Quan, PharmD as Pharmacist (Pharmacy)  Peng Melgar, PharmD as Pharmacist (Pharmacy)  Dustin Mehta MD as Consulting Physician (Cardiology)  Johnathan Taylor MD as Consulting Physician (Cardiac Electrophysiology)  Chato Bo MD as Consulting Physician (Nephrology)    Health Habits and Functional and Cognitive Screening and Depression Screening:  Functional & Cognitive Status 2020   Do you have difficulty preparing food and eating? No   Do you have difficulty bathing yourself, getting dressed or grooming yourself? No   Do you have difficulty using the toilet? No   Do you have difficulty moving around from place to place? No   Do you have trouble with steps or getting out of a bed or a chair? No   Current Diet Well Balanced Diet   Dental Exam Not up to date   Eye Exam Not up to date   Exercise (times per week) 3 times per week   Current Exercise Activities Include Walking   Do you need help using the phone?  No   Are you deaf or do you have serious difficulty hearing?  No   Do you need help with transportation? No   Do you need help shopping? No   Do you need help preparing meals?  No   Do you need help with housework?  No   Do you need help with laundry? No   Do you need help taking your medications? No   Do you need help managing money? No   Do you ever drive or ride in a car without wearing a seat belt? No   Have you felt unusual stress, anger or loneliness  in the last month? No   Who do you live with? Spouse   If you need help, do you have trouble finding someone available to you? No   Have you been bothered in the last four weeks by sexual problems? No   Do you have difficulty concentrating, remembering or making decisions? No       Compared to one year ago, the patient feels his physical health is the same and his mental health is the same.    Depression Screen:  PHQ-2/PHQ-9 Depression Screening 8/11/2020   Little interest or pleasure in doing things 0   Feeling down, depressed, or hopeless 0   Trouble falling or staying asleep, or sleeping too much 0   Feeling tired or having little energy 0   Poor appetite or overeating 0   Feeling bad about yourself - or that you are a failure or have let yourself or your family down 0   Trouble concentrating on things, such as reading the newspaper or watching television 0   Moving or speaking so slowly that other people could have noticed. Or the opposite - being so fidgety or restless that you have been moving around a lot more than usual 0   Thoughts that you would be better off dead, or of hurting yourself in some way 0   Total Score 0   If you checked off any problems, how difficult have these problems made it for you to do your work, take care of things at home, or get along with other people? Not difficult at all         Past Medical/Family/Social History:  The following portions of the patient's history were reviewed and updated as appropriate: allergies, current medications, past family history, past medical history, past social history, past surgical history and problem list.    No Known Allergies      Current Outpatient Medications:   •  atorvastatin (LIPITOR) 40 MG tablet, Take 1 tablet by mouth Daily. Need labs for further refills. Call office for lab orders, Disp: 90 tablet, Rfl: 1  •  carvedilol (COREG) 25 MG tablet, Take 1 tablet by mouth 2 (Two) Times a Day With Meals., Disp: 180 tablet, Rfl: 3  •   cholecalciferol (VITAMIN D3) 1000 UNITS tablet, Take 1,000 Units by mouth daily., Disp: , Rfl:   •  Ferrous Sulfate (IRON) 325 (65 FE) MG tablet, Take 65 mg by mouth Daily., Disp: , Rfl:   •  lisinopril (PRINIVIL,ZESTRIL) 5 MG tablet, Take 5 mg by mouth Daily., Disp: , Rfl:   •  PHARMACY TO DOSE WARFARIN, Continuous As Needed (patient's warfarin is being managed by the [facility] ([phone]))., Disp: , Rfl:   •  torsemide (DEMADEX) 20 MG tablet, Take 1 tablet by mouth 2 (Two) Times a Day., Disp: 180 tablet, Rfl: 2  •  vitamin B-12 (CYANOCOBALAMIN) 1000 MCG tablet, Take 1,000 mcg by mouth Daily., Disp: , Rfl:   •  warfarin (COUMADIN) 2.5 MG tablet, Take 1 to 2 tablets by mouth daily, as directed by the anticoagulation clinic., Disp: 180 tablet, Rfl: 1  •  warfarin (COUMADIN) 7.5 MG tablet, TAKE ONE-HALF TO ONE TABLET BY MOUTH DAILY AS DIRECTED, Disp: 90 tablet, Rfl: 3    Aspirin use counseling: Does not need ASA (and currently is not on it)    Current medication list contains high risk medications. No harmful drug interactions have been identified.  Plan of action follows with PharmD    Family History   Problem Relation Age of Onset   • Cancer Mother    • Heart attack Mother    • Hypertension Mother    • Arrhythmia Father    • Hypertension Father    • Heart attack Father        Social History     Tobacco Use   • Smoking status: Never Smoker   • Smokeless tobacco: Never Used   Substance Use Topics   • Alcohol use: No       Past Surgical History:   Procedure Laterality Date   • ABLATION OF DYSRHYTHMIC FOCUS     • APPENDECTOMY     • CARDIAC ELECTROPHYSIOLOGY PROCEDURE N/A 5/18/2018    Procedure: PPM generator change - dual       [facility]/ please schedule in 8 weeks;  Surgeon: Johnathan Taylor MD;  Location: [facility] INVASIVE LOCATION;  Service: Cardiovascular   • CATARACT EXTRACTION      bilat wiht lens    • COLONOSCOPY     • ENDOSCOPY     • SKIN CANCER EXCISION      various  locations    • TONSILLECTOMY     • WISDOM TOOTH EXTRACTION         Patient Active Problem List   Diagnosis   • Coronary artery disease   • Dyslipidemia   • Hypertension   • Morbid obesity (CMS/HCC)   • Atrial flutter (CMS/HCC)   • Hypertensive heart disease with congestive heart failure (CMS/HCC)   • Severe obstructive sleep apnea   • Dependence on nocturnal oxygen therapy   • Other abnormal glucose    • Stage 4 chronic kidney disease (CMS/HCC)   • Pacemaker   • Anemia   • Diarrhea   • Chronic diastolic congestive heart failure (CMS/HCC)   • Bradycardia   • Chronic kidney disease, stage III (moderate) (CMS/HCC)   • Chronic atrial fibrillation (CMS/HCC)   • Sick sinus syndrome (CMS/HCC)   • Elevated TSH       Review of Systems   Constitutional: Negative.  Negative for activity change, fatigue, fever and unexpected weight change.   HENT: Negative.  Negative for congestion, sneezing and sore throat.    Eyes: Negative.  Negative for visual disturbance.   Respiratory: Negative.  Negative for cough, chest tightness, shortness of breath and wheezing.    Cardiovascular: Negative.  Negative for chest pain, palpitations and leg swelling.   Gastrointestinal: Negative.  Negative for abdominal distention, abdominal pain, blood in stool, constipation, diarrhea and nausea.   Endocrine: Negative.  Negative for cold intolerance and heat intolerance.   Genitourinary: Negative.  Negative for dysuria, frequency and urgency.   Musculoskeletal: Negative.  Negative for arthralgias and myalgias.   Skin: Negative.  Negative for rash.   Allergic/Immunologic: Negative.    Neurological: Negative.  Negative for dizziness, syncope and numbness.   Hematological: Negative.  Negative for adenopathy.   Psychiatric/Behavioral: Negative.  Negative for suicidal ideas. The patient is not nervous/anxious.        Objective     Vitals:    08/11/20 1151   BP: 140/80   Pulse: 78   Resp: 25   Temp: 97.8 °F (36.6 °C)   SpO2: 96%   Weight: 127 kg (280 lb)  "  Height: 177.8 cm (70\")   PainSc: 0-No pain       Patient's Body mass index is 40.18 kg/m². BMI is above normal parameters. Recommendations include: nutrition counseling.      Hearing Screening Comments: Normal hearing  Vision Screening Comments: Per ophth    The patient has no evidence of cognitve impairment.     Physical Exam   Constitutional: He is oriented to person, place, and time. He appears well-developed and well-nourished. No distress.   HENT:   Right Ear: External ear normal.   Left Ear: External ear normal.   Nose: Nose normal.   Mouth/Throat: Oropharynx is clear and moist.   Eyes: Pupils are equal, round, and reactive to light. Conjunctivae and EOM are normal.   Neck: Normal range of motion. Neck supple. No thyromegaly present.   Cardiovascular: Normal rate, regular rhythm and normal heart sounds.   No murmur heard.  Pulmonary/Chest: Effort normal and breath sounds normal. No respiratory distress. He has no wheezes.   Abdominal: Soft. Bowel sounds are normal. He exhibits no distension and no mass. There is no tenderness. There is no rebound and no guarding. No hernia.   Musculoskeletal: Normal range of motion. He exhibits no edema or tenderness.   Lymphadenopathy:     He has no cervical adenopathy.   Neurological: He is alert and oriented to person, place, and time. He has normal reflexes.   Skin: Skin is warm and dry. No rash noted. He is not diaphoretic. No erythema. No pallor.   Psychiatric: He has a normal mood and affect. His behavior is normal. Judgment and thought content normal.   Nursing note and vitals reviewed.      Recent Lab Results:  Lab Results   Component Value Date     (H) 04/08/2020     Lab Results   Component Value Date    CHOL 115 05/06/2020    TRIG 96 05/06/2020    HDL 33 (L) 05/06/2020    VLDL 19.2 05/06/2020    LDLHDL 1.90 05/06/2020       Assessment/Plan   Age-appropriate Screening Schedule:  Refer to the list below for future screening recommendations based on patient's " age, sex and/or medical conditions.      Health Maintenance   Topic Date Due   • TDAP/TD VACCINES (1 - Tdap) 04/06/1956   • ZOSTER VACCINE (1 of 2) 04/06/1993   • COLONOSCOPY  04/04/2017   • INFLUENZA VACCINE  08/01/2020   • LIPID PANEL  05/06/2021       Medicare Risks and Personalized Health Plan:  Cardiovascular risk  Colon Cancer Screening  Diabetic Lab Screening   Polypharmacy      CMS-Preventive Services Quick Reference  Medicare Preventive Services Addressed:  Annual Wellness Visit (AWV)  Cardiovascular Disease Screening Tests (may do this order every 5 years in beneficiaries without signs or symptoms of cardiovascular disease)  Diabetes Screening-Lab Order for either glucose quantitative blood (except reagent strip), glucose;post glucose dose(includes glucose), or glucose tolerance test-3 specimens(includes glucose)    Advance Care Planning:  ACP discussion was held with the patient during this visit. Patient has an advance directive (not in EMR), copy requested.    Diagnoses and all orders for this visit:    1. Medicare annual wellness visit, subsequent (Primary)    2. Colon cancer screening  -     Cologuard - Stool, Per Rectum; Future    3. Essential hypertension    4. Mixed hyperlipidemia    5. Chronic renal insufficiency, stage 4 (severe) (CMS/[facility])      Patient is here for health maintenance visit.  Discussed diet and adequate exercise.  Screening lab work discussed.  Immunizations reviewed.  Advice and education regarding nutrition, exercise, dental evaluations, routine eye examinations, reproductive health, cardiovascular risk reduction, sunscreen use, self skin examination, and seatbelt use was given today.  Annual wellness evaluations recommended.  An After Visit Summary and PPPS with all of these plans were given to the patient.      Follow Up:  No follow-ups on file.

## 2020-08-11 NOTE — PROGRESS NOTES
"Subjective   Marco Antonio Montemayor is a 75 y.o. male.     History of Present Illness     The following portions of the patient's history were reviewed and updated as appropriate: {history reviewed:20406::\"allergies\",\"current medications\",\"past family history\",\"past medical history\",\"past social history\",\"past surgical history\",\"problem list\"}.  Vitals:    08/11/20 1151   BP: 140/80   Pulse: 78   Resp: 25   Temp: 97.8 °F (36.6 °C)   SpO2: 96%     Body mass index is 40.18 kg/m².  Review of Systems    Objective   Physical Exam        Assessment/Plan   {Assess/PlanSmartLinks:17037}           "

## 2020-08-27 DIAGNOSIS — I48.0 PAROXYSMAL ATRIAL FIBRILLATION (HCC): ICD-10-CM

## 2020-08-27 RX ORDER — CARVEDILOL 25 MG/1
25 TABLET ORAL 2 TIMES DAILY WITH MEALS
Qty: 180 TABLET | Refills: 3 | Status: SHIPPED | OUTPATIENT
Start: 2020-08-27 | End: 2021-04-08 | Stop reason: SDUPTHER

## 2020-08-28 ENCOUNTER — TELEPHONE (OUTPATIENT)
Dept: FAMILY MEDICINE CLINIC | Facility: CLINIC | Age: 75
End: 2020-08-28

## 2020-08-28 DIAGNOSIS — R19.5 POSITIVE COLORECTAL CANCER SCREENING USING COLOGUARD TEST: ICD-10-CM

## 2020-08-28 DIAGNOSIS — Z12.11 COLON CANCER SCREENING: Primary | ICD-10-CM

## 2020-09-03 ENCOUNTER — ANTICOAGULATION VISIT (OUTPATIENT)
Dept: PHARMACY | Facility: HOSPITAL | Age: 75
End: 2020-09-03

## 2020-09-03 LAB
INR PPP: 1.6 (ref 0.91–1.09)
PROTHROMBIN TIME: 19.3 SECONDS (ref 10–13.8)

## 2020-09-03 PROCEDURE — 85610 PROTHROMBIN TIME: CPT

## 2020-09-03 PROCEDURE — 36416 COLLJ CAPILLARY BLOOD SPEC: CPT

## 2020-09-03 PROCEDURE — G0463 HOSPITAL OUTPT CLINIC VISIT: HCPCS

## 2020-09-03 NOTE — PROGRESS NOTES
Anticoagulation Clinic Progress Note  Indication: afib  Referring Provider: Tyson (next appointment: 4/15/2020) (Claudia 5/20/2020)  Initial Warfarin Start Date: 2007  Goal INR: 2-3  Current Drug Interactions:  torsemide  Diet: not eating Vit K foods at this point (3/6/19)  [id number]: 5 (HTN, Age, CHF, CAD)  Other: anemic, CKD (stage 3) May need bridge with Lovenox given persistent AF (per cardio 1/24/2019), Scr was 2.38 on 12/10/19    Anticoagulation Clinic INR History:  Date 10/23 11/20 12/8 1/5/18 2/2 3/2 3/16 3/30 4/17 5/17 6/14 7/19 8/1 8/22 9/12   Total Weekly Dose 37.5mg 37.5 mg 37.5 mg 37.5 mg 37.5mg 37.5 mg 37.5 mg 37.5 mg 37.5mg 37.5 mg 37.5 mg 37.5mg 37.5 mg 37.5 mg 37.5 mg    INR 2.5 1.8 2.5 2.3 2.1 1.7 3.2 2.1 2.3 2.06 2.8 3.5 2.7 2.7 3.0   Notes      (boost), amox amox   PAT     diarrhea     Date 10/1 10/8 10/12 10/17 10/22 10/29 11/5 11/12 11/19 11/29 12/13 1/3 1/24/19 2/12 2/18   Total Weekly Dose 26.25 mg 23.75mg 22.5  mg 23.75 mg 27.5 mg 28.75 mg 27.5 mg 28.75 mg 28.75 mg 28.75 mg 28.75 28.75mg 28.75 mg 17.5 mg 28.75mg   INR 3.0 2.1 1.6 1.3 1.6 2.8 2.0 2.5 2.4 2.76 2.8 2.7 2.9 1.7 1.5   Notes post- disch amio start 10/1        PAT   held x 7 days; resumed 1/22; D/C amio asa dc'd torsemide reduced,      Date 2/22 2/26 3/6 3/15 3/29 4/12 4/26 5/14 6/11 7/9 8/6 9/3 10/1 10/17   Total Weekly Dose 31.75 mg 33.75 mg 35mg 33.75 mg 33.75 mg 33.75 mg 32.5 mg 32.5 mg 32.5 mg 32.5 mg 32.5 mg 32.5 mg 32.5 mg 33.75 mg   INR 1.6 2.0 2.8 2.8 3.1 3.1 2.8 2.5 2.2 2.4 2.5 2.4 2.0 3.7   Notes  enox                 Date 10/30 11/20 12/17 1/14/20 2/11 3/11 4/8 5/6 6/11 7/9 8/6 9/3     Total Weekly Dose 32.5 mg 32.5 mg 32.5 mg 32.5 mg 32.5mg 32.5mg 32.5mg 32.5mg 32.5mg 32.5 mg 32.5 mg 32.5mg     INR 2.3 2.3 2.1 1.9 2.2 2.1 2.1 2.2 2.2 2.3 2.7 1.6     Notes            ??     Takes warfarin in the am    Clinic Interview:  Verbal Release Authorization signed on 8/22/2018 -- may speak with Raeann Montemayor (Wife).  [phone] (Home) *Preferred*  Tablet Strength: pt has 7.5mg and 2.5mg tablets    Patient Findings:      Positives:  Upcoming invasive procedure   Negatives:  Signs/symptoms of thrombosis, Signs/symptoms of bleeding, Laboratory test error suspected, Change in health, Change in alcohol use, Change in activity, Emergency department visit, Upcoming dental procedure, Missed doses, Extra doses, Change in medications, Change in diet/appetite, Hospital admission, Bruising, Other complaints   Comments:  Patient has upcoming colonoscopy, unscheduled at this time. He had a colgard test which was positive. Waiting on referral, will inform clinic when this is scheduled.            Plan:   1. INR is SUBtherapeutic today at 1.6, no discernable cause. Instructed Mr. Montemayor to BOOST tonight's dose to 7.5mg then continue with warfarin 5mg oral daily except 3.75mg TuesSat.  2. RTC in ~1.5 weeks to ensure WNL  3. Verbal and written information provided in the clinic. Mr. Montemayor expresses understanding with teach back and has no further questions at this time.      Kristine Sutton, NighatD.  09/03/20   10:19

## 2020-09-08 ENCOUNTER — TELEPHONE (OUTPATIENT)
Dept: FAMILY MEDICINE CLINIC | Facility: CLINIC | Age: 75
End: 2020-09-08

## 2020-09-08 NOTE — TELEPHONE ENCOUNTER
PTS WIFE CALLED REQUESTING AN UPDATE ON REFERRAL FOR COLONOSCOPY    PLEASE ADVISE: [phone]

## 2020-09-14 ENCOUNTER — ANTICOAGULATION VISIT (OUTPATIENT)
Dept: PHARMACY | Facility: HOSPITAL | Age: 75
End: 2020-09-14

## 2020-09-14 ENCOUNTER — TELEPHONE (OUTPATIENT)
Dept: PHARMACY | Facility: HOSPITAL | Age: 75
End: 2020-09-14

## 2020-09-14 LAB
INR PPP: 2.2 (ref 0.91–1.09)
PROTHROMBIN TIME: 26.4 SECONDS (ref 10–13.8)

## 2020-09-14 PROCEDURE — 85610 PROTHROMBIN TIME: CPT

## 2020-09-14 PROCEDURE — G0463 HOSPITAL OUTPT CLINIC VISIT: HCPCS

## 2020-09-14 PROCEDURE — 36416 COLLJ CAPILLARY BLOOD SPEC: CPT

## 2020-09-14 NOTE — PROGRESS NOTES
Anticoagulation Clinic Progress Note  Indication: afib  Referring Provider: Tyson (next appointment: 4/15/2020) (Claudia 5/20/2020)  Initial Warfarin Start Date: 2007  Goal INR: 2-3  Current Drug Interactions:  torsemide  Diet: not eating Vit K foods at this point (3/6/19)  [id number]: 5 (HTN, Age, CHF, CAD)  Other: anemic, CKD (stage 3) May need bridge with Lovenox given persistent AF (per cardio 1/24/2019), Scr was 2.38 on 12/10/19    Anticoagulation Clinic INR History:  Date 10/23 11/20 12/8 1/5/18 2/2 3/2 3/16 3/30 4/17 5/17 6/14 7/19 8/1 8/22 9/12   Total Weekly Dose 37.5mg 37.5 mg 37.5 mg 37.5 mg 37.5mg 37.5 mg 37.5 mg 37.5 mg 37.5mg 37.5 mg 37.5 mg 37.5mg 37.5 mg 37.5 mg 37.5 mg    INR 2.5 1.8 2.5 2.3 2.1 1.7 3.2 2.1 2.3 2.06 2.8 3.5 2.7 2.7 3.0   Notes      (boost), amox amox   PAT     diarrhea     Date 10/1 10/8 10/12 10/17 10/22 10/29 11/5 11/12 11/19 11/29 12/13 1/3 1/24/19 2/12 2/18   Total Weekly Dose 26.25 mg 23.75mg 22.5  mg 23.75 mg 27.5 mg 28.75 mg 27.5 mg 28.75 mg 28.75 mg 28.75 mg 28.75 28.75mg 28.75 mg 17.5 mg 28.75mg   INR 3.0 2.1 1.6 1.3 1.6 2.8 2.0 2.5 2.4 2.76 2.8 2.7 2.9 1.7 1.5   Notes post- disch amio start 10/1        PAT   held x 7 days; resumed 1/22; D/C amio asa dc'd torsemide reduced,      Date 2/22 2/26 3/6 3/15 3/29 4/12 4/26 5/14 6/11 7/9 8/6 9/3 10/1 10/17   Total Weekly Dose 31.75 mg 33.75 mg 35mg 33.75 mg 33.75 mg 33.75 mg 32.5 mg 32.5 mg 32.5 mg 32.5 mg 32.5 mg 32.5 mg 32.5 mg 33.75 mg   INR 1.6 2.0 2.8 2.8 3.1 3.1 2.8 2.5 2.2 2.4 2.5 2.4 2.0 3.7   Notes  enox                 Date 10/30 11/20 12/17 1/14/20 2/11 3/11 4/8 5/6 6/11 7/9 8/6 9/3 9/14    Total Weekly Dose 32.5 mg 32.5 mg 32.5 mg 32.5 mg 32.5mg 32.5mg 32.5mg 32.5mg 32.5mg 32.5 mg 32.5 mg 32.5mg 32.5mg    INR 2.3 2.3 2.1 1.9 2.2 2.1 2.1 2.2 2.2 2.3 2.7 1.6 2.2    Notes            ??  5 day hold   Takes warfarin in the am    Clinic Interview:  Verbal Release Authorization signed on 8/22/2018 -- may speak with  Raeann Montemayor (Wife). [phone] (Home) *Preferred*  Tablet Strength: pt has 7.5mg and 2.5mg tablets    Patient Findings:  Positives:  Upcoming invasive procedure   Negatives:  Signs/symptoms of thrombosis, Signs/symptoms of bleeding, Laboratory test error suspected, Change in health, Change in alcohol use, Change in activity, Emergency department visit, Upcoming dental procedure, Missed doses, Extra doses, Change in medications, Change in diet/appetite, Hospital admission, Bruising, Other complaints   Comments:  Planned colonoscopy on 9/25 Friday 10 am, instructed Mr. Bernard to:   9/20: Hold warfarin 5 days prior to procedure.   9/25: day of procedure. Resume warfarin 5mg at night if cleared by physician.   9/26: Take a boosted dose of warfarin 7.5mg then resume maintenance dose   10/1: recheck INR     Reports keep 1x serving of GLV weekly.   Otherwise, other findings negative.   1 serving GLV weekly.       Plan:   1. INR is therapeutic today at 2.2. Instructed Mr. Montemayor to continue with warfarin 5mg oral daily except 3.75mg TuesSat until begins hold. (Have discussed perioperative plan with Mr. Montemayor)  2. RTC in 2 weeks to ensure WNL, 10/1  3. Verbal and written information provided in the clinic. Mr. Montemayor expresses understanding with teach back and has no further questions at this time.      Pauly Sommer, Pharmacy Intern.  09/14/20   11:05 EDT    I, Charlotte Michael, PharmD, have reviewed the note in full and agree with the assessment and plan.  09/14/20  11:42 EDT

## 2020-09-16 RX ORDER — SODIUM, POTASSIUM,MAG SULFATES 17.5-3.13G
2 SOLUTION, RECONSTITUTED, ORAL ORAL TAKE AS DIRECTED
Qty: 354 ML | Refills: 0 | Status: SHIPPED | OUTPATIENT
Start: 2020-09-16 | End: 2020-11-25

## 2020-09-22 ENCOUNTER — APPOINTMENT (OUTPATIENT)
Dept: PREADMISSION TESTING | Facility: HOSPITAL | Age: 75
End: 2020-09-22

## 2020-09-22 PROCEDURE — U0004 COV-19 TEST NON-CDC HGH THRU: HCPCS

## 2020-09-22 PROCEDURE — C9803 HOPD COVID-19 SPEC COLLECT: HCPCS

## 2020-09-23 LAB — SARS-COV-2 RNA NOSE QL NAA+PROBE: NOT DETECTED

## 2020-09-25 ENCOUNTER — OUTSIDE FACILITY SERVICE (OUTPATIENT)
Dept: GASTROENTEROLOGY | Facility: CLINIC | Age: 75
End: 2020-09-25

## 2020-09-25 PROCEDURE — 45385 COLONOSCOPY W/LESION REMOVAL: CPT | Performed by: INTERNAL MEDICINE

## 2020-09-25 PROCEDURE — 88305 TISSUE EXAM BY PATHOLOGIST: CPT | Performed by: INTERNAL MEDICINE

## 2020-09-26 ENCOUNTER — LAB REQUISITION (OUTPATIENT)
Dept: LAB | Facility: HOSPITAL | Age: 75
End: 2020-09-26

## 2020-09-26 DIAGNOSIS — Z12.11 ENCOUNTER FOR SCREENING FOR MALIGNANT NEOPLASM OF COLON: ICD-10-CM

## 2020-09-29 LAB
CYTO UR: NORMAL
LAB AP CASE REPORT: NORMAL
LAB AP CLINICAL INFORMATION: NORMAL
PATH REPORT.FINAL DX SPEC: NORMAL
PATH REPORT.GROSS SPEC: NORMAL

## 2020-09-30 ENCOUNTER — HOSPITAL ENCOUNTER (OUTPATIENT)
Dept: ONCOLOGY | Facility: HOSPITAL | Age: 75
Discharge: HOME | End: 2020-09-30
Admitting: INTERNAL MEDICINE

## 2020-09-30 ENCOUNTER — ANTICOAGULATION VISIT (OUTPATIENT)
Dept: PHARMACY | Facility: HOSPITAL | Age: 75
End: 2020-09-30

## 2020-09-30 VITALS
BODY MASS INDEX: 39.94 KG/M2 | WEIGHT: 279 LBS | DIASTOLIC BLOOD PRESSURE: 88 MMHG | SYSTOLIC BLOOD PRESSURE: 174 MMHG | RESPIRATION RATE: 20 BRPM | HEIGHT: 70 IN | TEMPERATURE: 97.7 F | HEART RATE: 90 BPM

## 2020-09-30 DIAGNOSIS — N18.4 STAGE 4 CHRONIC KIDNEY DISEASE (HCC): Primary | ICD-10-CM

## 2020-09-30 LAB
CREAT SERPL-MCNC: 2.83 MG/DL (ref 0.76–1.27)
FERRITIN SERPL-MCNC: 202.6 NG/ML (ref 30–400)
GFR SERPL CREATININE-BSD FRML MDRD: 22 ML/MIN/1.73
HCT VFR BLD AUTO: 27.7 % (ref 37.5–51)
HGB BLD-MCNC: 9.2 G/DL (ref 13–17.7)
INR PPP: 1.8 (ref 0.91–1.09)
IRON 24H UR-MRATE: 33 MCG/DL (ref 59–158)
IRON SATN MFR SERPL: 12 % (ref 20–50)
POTASSIUM SERPL-SCNC: 4.3 MMOL/L (ref 3.5–5.2)
PROTHROMBIN TIME: 22.1 SECONDS (ref 10–13.8)
TIBC SERPL-MCNC: 283 MCG/DL (ref 298–536)
TRANSFERRIN SERPL-MCNC: 190 MG/DL (ref 200–360)

## 2020-09-30 PROCEDURE — 36415 COLL VENOUS BLD VENIPUNCTURE: CPT

## 2020-09-30 PROCEDURE — 84466 ASSAY OF TRANSFERRIN: CPT | Performed by: INTERNAL MEDICINE

## 2020-09-30 PROCEDURE — 25010000002 EPOETIN ALFA-EPBX 10000 UNIT/ML SOLUTION: Performed by: INTERNAL MEDICINE

## 2020-09-30 PROCEDURE — 96372 THER/PROPH/DIAG INJ SC/IM: CPT

## 2020-09-30 PROCEDURE — 84132 ASSAY OF SERUM POTASSIUM: CPT | Performed by: INTERNAL MEDICINE

## 2020-09-30 PROCEDURE — 36416 COLLJ CAPILLARY BLOOD SPEC: CPT

## 2020-09-30 PROCEDURE — 82565 ASSAY OF CREATININE: CPT | Performed by: INTERNAL MEDICINE

## 2020-09-30 PROCEDURE — 85018 HEMOGLOBIN: CPT | Performed by: INTERNAL MEDICINE

## 2020-09-30 PROCEDURE — 83540 ASSAY OF IRON: CPT | Performed by: INTERNAL MEDICINE

## 2020-09-30 PROCEDURE — 85610 PROTHROMBIN TIME: CPT

## 2020-09-30 PROCEDURE — G0463 HOSPITAL OUTPT CLINIC VISIT: HCPCS

## 2020-09-30 PROCEDURE — 85014 HEMATOCRIT: CPT | Performed by: INTERNAL MEDICINE

## 2020-09-30 PROCEDURE — 82728 ASSAY OF FERRITIN: CPT | Performed by: INTERNAL MEDICINE

## 2020-09-30 RX ADMIN — EPOETIN ALFA-EPBX 20000 UNITS: 10000 INJECTION, SOLUTION INTRAVENOUS; SUBCUTANEOUS at 16:03

## 2020-09-30 NOTE — PROGRESS NOTES
Anticoagulation Clinic Progress Note  Indication: afib  Referring Provider: Tyson (next appointment: 4/15/2020) (Claudia 5/20/2020)  Initial Warfarin Start Date: 2007  Goal INR: 2-3  Current Drug Interactions:  torsemide  Diet: not eating Vit K foods at this point (3/6/19)  [id number]: 5 (HTN, Age, CHF, CAD)  Other: anemic, CKD (stage 3) May need bridge with Lovenox given persistent AF (per cardio 1/24/2019), Scr was 2.38 on 12/10/19    Anticoagulation Clinic INR History:  Date 10/23 11/20 12/8 1/5/18 2/2 3/2 3/16 3/30 4/17 5/17 6/14 7/19 8/1 8/22 9/12   Total Weekly Dose 37.5mg 37.5 mg 37.5 mg 37.5 mg 37.5mg 37.5 mg 37.5 mg 37.5 mg 37.5mg 37.5 mg 37.5 mg 37.5mg 37.5 mg 37.5 mg 37.5 mg    INR 2.5 1.8 2.5 2.3 2.1 1.7 3.2 2.1 2.3 2.06 2.8 3.5 2.7 2.7 3.0   Notes      (boost), amox amox   PAT     diarrhea     Date 10/1 10/8 10/12 10/17 10/22 10/29 11/5 11/12 11/19 11/29 12/13 1/3 1/24/19 2/12 2/18   Total Weekly Dose 26.25 mg 23.75mg 22.5  mg 23.75 mg 27.5 mg 28.75 mg 27.5 mg 28.75 mg 28.75 mg 28.75 mg 28.75 28.75mg 28.75 mg 17.5 mg 28.75mg   INR 3.0 2.1 1.6 1.3 1.6 2.8 2.0 2.5 2.4 2.76 2.8 2.7 2.9 1.7 1.5   Notes post- disch amio start 10/1        PAT   held x 7 days; resumed 1/22; D/C amio asa dc'd torsemide reduced,      Date 2/22 2/26 3/6 3/15 3/29 4/12 4/26 5/14 6/11 7/9 8/6 9/3 10/1 10/17   Total Weekly Dose 31.75 mg 33.75 mg 35mg 33.75 mg 33.75 mg 33.75 mg 32.5 mg 32.5 mg 32.5 mg 32.5 mg 32.5 mg 32.5 mg 32.5 mg 33.75 mg   INR 1.6 2.0 2.8 2.8 3.1 3.1 2.8 2.5 2.2 2.4 2.5 2.4 2.0 3.7   Notes  enox                 Date 10/30 11/20 12/17 1/14/20 2/11 3/11 4/8 5/6 6/11 7/9 8/6 9/3 9/14 9/30   Total Weekly Dose 32.5 mg 32.5 mg 32.5 mg 32.5 mg 32.5mg 32.5mg 32.5mg 32.5mg 32.5mg 32.5 mg 32.5 mg 32.5mg 32.5mg 26.25 mg   INR 2.3 2.3 2.1 1.9 2.2 2.1 2.1 2.2 2.2 2.3 2.7 1.6 2.2  1.8   Notes            ??  5 day hold   Takes warfarin in the am    Clinic Interview:  Verbal Release Authorization signed on 8/22/2018 -- may  speak with Raeann Montemayor (Wife). [phone] (Home) *Preferred*  Tablet Strength: pt has 7.5mg and 2.5mg tablets    Patient Findings  Positives:  Change in medications   Negatives:  Signs/symptoms of thrombosis, Signs/symptoms of bleeding, Laboratory test error suspected, Change in health, Change in alcohol use, Change in activity, Upcoming invasive procedure, Emergency department visit, Upcoming dental procedure, Missed doses, Extra doses, Change in diet/appetite, Hospital admission, Bruising, Other complaints   Comments:  Retacrit to be administered today.  He isn't sure how often he will receive.  His RBC on 9/14 was 3.02   He stated that he had two polyps removed during colonoscopy.   He could not recall if he restarted his warfarin the night of the procedure or the next day, but thought he probably did take it that evening.    Otherwise, above findings negative.     Plan:     1. INR is sub therapeutic today at 1.8 after 5 held doses, one boosted dose. Instructed Mr. Montemayor to continue warfarin 5mg oral daily except 3.75mg TuesSat until recheck  2. RTC in 2 weeks on 10/14  3. Verbal and written information provided in the clinic. Mr. Montemayor expresses understanding with teach back and has no further questions at this time.      Charlotte Michael, PharmD  09/30/20   14:55 EDT

## 2020-10-01 ENCOUNTER — APPOINTMENT (OUTPATIENT)
Dept: PHARMACY | Facility: HOSPITAL | Age: 75
End: 2020-10-01

## 2020-10-05 RX ORDER — WARFARIN SODIUM 7.5 MG/1
TABLET ORAL
Qty: 90 TABLET | Refills: 3 | Status: SHIPPED | OUTPATIENT
Start: 2020-10-05 | End: 2021-10-17 | Stop reason: SDUPTHER

## 2020-10-07 ENCOUNTER — HOSPITAL ENCOUNTER (OUTPATIENT)
Dept: ONCOLOGY | Facility: HOSPITAL | Age: 75
Discharge: HOME | End: 2020-10-07
Admitting: INTERNAL MEDICINE

## 2020-10-07 VITALS
HEIGHT: 70 IN | BODY MASS INDEX: 39.51 KG/M2 | HEART RATE: 94 BPM | SYSTOLIC BLOOD PRESSURE: 146 MMHG | RESPIRATION RATE: 20 BRPM | TEMPERATURE: 97.9 F | WEIGHT: 276 LBS | DIASTOLIC BLOOD PRESSURE: 79 MMHG

## 2020-10-07 DIAGNOSIS — N18.4 STAGE 4 CHRONIC KIDNEY DISEASE (HCC): Primary | ICD-10-CM

## 2020-10-07 LAB
CREAT SERPL-MCNC: 2.62 MG/DL (ref 0.76–1.27)
GFR SERPL CREATININE-BSD FRML MDRD: 24 ML/MIN/1.73
HCT VFR BLD AUTO: 29.1 % (ref 37.5–51)
HGB BLD-MCNC: 9.3 G/DL (ref 13–17.7)
POTASSIUM SERPL-SCNC: 4.2 MMOL/L (ref 3.5–5.2)

## 2020-10-07 PROCEDURE — 84132 ASSAY OF SERUM POTASSIUM: CPT | Performed by: INTERNAL MEDICINE

## 2020-10-07 PROCEDURE — 85014 HEMATOCRIT: CPT | Performed by: INTERNAL MEDICINE

## 2020-10-07 PROCEDURE — 36415 COLL VENOUS BLD VENIPUNCTURE: CPT

## 2020-10-07 PROCEDURE — 96372 THER/PROPH/DIAG INJ SC/IM: CPT

## 2020-10-07 PROCEDURE — 85018 HEMOGLOBIN: CPT | Performed by: INTERNAL MEDICINE

## 2020-10-07 PROCEDURE — 82565 ASSAY OF CREATININE: CPT | Performed by: INTERNAL MEDICINE

## 2020-10-07 PROCEDURE — 25010000002 EPOETIN ALFA-EPBX 10000 UNIT/ML SOLUTION: Performed by: INTERNAL MEDICINE

## 2020-10-07 RX ADMIN — EPOETIN ALFA-EPBX 20000 UNITS: 10000 INJECTION, SOLUTION INTRAVENOUS; SUBCUTANEOUS at 13:59

## 2020-10-14 ENCOUNTER — ANTICOAGULATION VISIT (OUTPATIENT)
Dept: PHARMACY | Facility: HOSPITAL | Age: 75
End: 2020-10-14

## 2020-10-14 ENCOUNTER — HOSPITAL ENCOUNTER (OUTPATIENT)
Dept: ONCOLOGY | Facility: HOSPITAL | Age: 75
Discharge: HOME | End: 2020-10-14
Admitting: INTERNAL MEDICINE

## 2020-10-14 VITALS
TEMPERATURE: 97.9 F | SYSTOLIC BLOOD PRESSURE: 175 MMHG | RESPIRATION RATE: 20 BRPM | HEIGHT: 70 IN | BODY MASS INDEX: 39.94 KG/M2 | HEART RATE: 98 BPM | DIASTOLIC BLOOD PRESSURE: 82 MMHG | WEIGHT: 279 LBS

## 2020-10-14 DIAGNOSIS — N18.4 STAGE 4 CHRONIC KIDNEY DISEASE (HCC): Primary | ICD-10-CM

## 2020-10-14 LAB
CREAT SERPL-MCNC: 2.79 MG/DL (ref 0.76–1.27)
GFR SERPL CREATININE-BSD FRML MDRD: 22 ML/MIN/1.73
HCT VFR BLD AUTO: 31.5 % (ref 37.5–51)
HGB BLD-MCNC: 9.7 G/DL (ref 13–17.7)
INR PPP: 2 (ref 0.91–1.09)
POTASSIUM SERPL-SCNC: 4.2 MMOL/L (ref 3.5–5.2)
PROTHROMBIN TIME: 23.4 SECONDS (ref 10–13.8)

## 2020-10-14 PROCEDURE — 36415 COLL VENOUS BLD VENIPUNCTURE: CPT

## 2020-10-14 PROCEDURE — 96372 THER/PROPH/DIAG INJ SC/IM: CPT

## 2020-10-14 PROCEDURE — G0463 HOSPITAL OUTPT CLINIC VISIT: HCPCS

## 2020-10-14 PROCEDURE — 84132 ASSAY OF SERUM POTASSIUM: CPT | Performed by: INTERNAL MEDICINE

## 2020-10-14 PROCEDURE — 25010000002 EPOETIN ALFA-EPBX 10000 UNIT/ML SOLUTION: Performed by: INTERNAL MEDICINE

## 2020-10-14 PROCEDURE — 85014 HEMATOCRIT: CPT | Performed by: INTERNAL MEDICINE

## 2020-10-14 PROCEDURE — 85018 HEMOGLOBIN: CPT | Performed by: INTERNAL MEDICINE

## 2020-10-14 PROCEDURE — 36416 COLLJ CAPILLARY BLOOD SPEC: CPT

## 2020-10-14 PROCEDURE — 85610 PROTHROMBIN TIME: CPT

## 2020-10-14 PROCEDURE — 82565 ASSAY OF CREATININE: CPT | Performed by: INTERNAL MEDICINE

## 2020-10-14 RX ADMIN — EPOETIN ALFA-EPBX 20000 UNITS: 10000 INJECTION, SOLUTION INTRAVENOUS; SUBCUTANEOUS at 08:33

## 2020-10-14 NOTE — PROGRESS NOTES
Anticoagulation Clinic Progress Note  Indication: afib  Referring Provider: Tyson (next appointment: 4/15/2020) (Claudia 5/20/2020)  Initial Warfarin Start Date: 2007  Goal INR: 2-3  Current Drug Interactions:  torsemide  Diet: not eating Vit K foods at this point (3/6/19)  [id number]: 5 (HTN, Age, CHF, CAD)  Other: anemic, CKD (stage 3) May need bridge with Lovenox given persistent AF (per cardio 1/24/2019), Scr was 2.38 on 12/10/19    Anticoagulation Clinic INR History:  Date 10/23 11/20 12/8 1/5/18 2/2 3/2 3/16 3/30 4/17 5/17 6/14 7/19 8/1 8/22 9/12   Total Weekly Dose 37.5mg 37.5 mg 37.5 mg 37.5 mg 37.5mg 37.5 mg 37.5 mg 37.5 mg 37.5mg 37.5 mg 37.5 mg 37.5mg 37.5 mg 37.5 mg 37.5 mg    INR 2.5 1.8 2.5 2.3 2.1 1.7 3.2 2.1 2.3 2.06 2.8 3.5 2.7 2.7 3.0   Notes      (boost), amox amox   PAT     diarrhea     Date 10/1 10/8 10/12 10/17 10/22 10/29 11/5 11/12 11/19 11/29 12/13 1/3 1/24/19 2/12 2/18   Total Weekly Dose 26.25 mg 23.75mg 22.5  mg 23.75 mg 27.5 mg 28.75 mg 27.5 mg 28.75 mg 28.75 mg 28.75 mg 28.75 28.75mg 28.75 mg 17.5 mg 28.75mg   INR 3.0 2.1 1.6 1.3 1.6 2.8 2.0 2.5 2.4 2.76 2.8 2.7 2.9 1.7 1.5   Notes post- disch amio start 10/1        PAT   held x 7 days; resumed 1/22; D/C amio asa dc'd torsemide reduced,      Date 2/22 2/26 3/6 3/15 3/29 4/12 4/26 5/14 6/11 7/9 8/6 9/3 10/1 10/17   Total Weekly Dose 31.75 mg 33.75 mg 35mg 33.75 mg 33.75 mg 33.75 mg 32.5 mg 32.5 mg 32.5 mg 32.5 mg 32.5 mg 32.5 mg 32.5 mg 33.75 mg   INR 1.6 2.0 2.8 2.8 3.1 3.1 2.8 2.5 2.2 2.4 2.5 2.4 2.0 3.7   Notes  enox                 Date 10/30 11/20 12/17 1/14/20 2/11 3/11 4/8 5/6 6/11 7/9 8/6 9/3 9/14 9/30   Total Weekly Dose 32.5 mg 32.5 mg 32.5 mg 32.5 mg 32.5mg 32.5mg 32.5mg 32.5mg 32.5mg 32.5 mg 32.5 mg 32.5mg 32.5mg 26.25 mg   INR 2.3 2.3 2.1 1.9 2.2 2.1 2.1 2.2 2.2 2.3 2.7 1.6 2.2  1.8   Notes            ??  5 day hold     Date 10/14                Total Weekly Dose 32.5 mg                INR 2.0                Notes                    Takes warfarin in the am    Clinic Interview:  Verbal Release Authorization signed on 8/22/2018 -- may speak with Raeann Montemayor (Wife). [phone] (Home) *Preferred*  Tablet Strength: pt has 7.5mg and 2.5mg tablets    Patient Findings  Negatives:  Signs/symptoms of thrombosis, Signs/symptoms of bleeding, Laboratory test error suspected, Change in health, Change in alcohol use, Change in activity, Upcoming invasive procedure, Emergency department visit, Upcoming dental procedure, Missed doses, Extra doses, Change in medications, Change in diet/appetite, Hospital admission, Bruising, Other complaints   Comments:  Retacrit to be administered today again today Hgb: 9.7. He will have next shot on 10/21. Weekly until his Hgb returns WNL. Otherwise, above findings negative.     Plan:   1. INR is therapeutic today at 2.0. Instructed Mr. Montemayor to continue warfarin 5mg oral daily except 3.75mg TuesSat until recheck  2. RTC in 3 weeks on 11/4 to ensure still WNL.  3. Verbal and written information provided in the clinic. Mr. Montemayor expresses understanding with teach back and has no further questions at this time.      Evelina Quan, PharmD  10/14/20   09:27 EDT

## 2020-10-21 ENCOUNTER — HOSPITAL ENCOUNTER (OUTPATIENT)
Dept: ONCOLOGY | Facility: HOSPITAL | Age: 75
Discharge: HOME | End: 2020-10-21
Admitting: INTERNAL MEDICINE

## 2020-10-21 VITALS
RESPIRATION RATE: 18 BRPM | SYSTOLIC BLOOD PRESSURE: 165 MMHG | DIASTOLIC BLOOD PRESSURE: 88 MMHG | BODY MASS INDEX: 39.94 KG/M2 | TEMPERATURE: 97.7 F | HEART RATE: 75 BPM | WEIGHT: 279 LBS | HEIGHT: 70 IN

## 2020-10-21 DIAGNOSIS — N18.4 STAGE 4 CHRONIC KIDNEY DISEASE (HCC): Primary | ICD-10-CM

## 2020-10-21 LAB
CREAT SERPL-MCNC: 2.7 MG/DL (ref 0.76–1.27)
GFR SERPL CREATININE-BSD FRML MDRD: 23 ML/MIN/1.73
HCT VFR BLD AUTO: 30.7 % (ref 37.5–51)
HGB BLD-MCNC: 9.4 G/DL (ref 13–17.7)
POTASSIUM SERPL-SCNC: 4.3 MMOL/L (ref 3.5–5.2)

## 2020-10-21 PROCEDURE — 96372 THER/PROPH/DIAG INJ SC/IM: CPT

## 2020-10-21 PROCEDURE — 84132 ASSAY OF SERUM POTASSIUM: CPT | Performed by: INTERNAL MEDICINE

## 2020-10-21 PROCEDURE — 85018 HEMOGLOBIN: CPT | Performed by: INTERNAL MEDICINE

## 2020-10-21 PROCEDURE — 25010000002 EPOETIN ALFA-EPBX 10000 UNIT/ML SOLUTION: Performed by: INTERNAL MEDICINE

## 2020-10-21 PROCEDURE — 85014 HEMATOCRIT: CPT | Performed by: INTERNAL MEDICINE

## 2020-10-21 PROCEDURE — 82565 ASSAY OF CREATININE: CPT | Performed by: INTERNAL MEDICINE

## 2020-10-21 PROCEDURE — 36415 COLL VENOUS BLD VENIPUNCTURE: CPT

## 2020-10-21 RX ADMIN — EPOETIN ALFA-EPBX 20000 UNITS: 10000 INJECTION, SOLUTION INTRAVENOUS; SUBCUTANEOUS at 15:51

## 2020-10-28 ENCOUNTER — HOSPITAL ENCOUNTER (OUTPATIENT)
Dept: ONCOLOGY | Facility: HOSPITAL | Age: 75
Discharge: HOME | End: 2020-10-28
Admitting: INTERNAL MEDICINE

## 2020-10-28 VITALS
HEIGHT: 70 IN | RESPIRATION RATE: 18 BRPM | DIASTOLIC BLOOD PRESSURE: 89 MMHG | HEART RATE: 85 BPM | SYSTOLIC BLOOD PRESSURE: 173 MMHG | TEMPERATURE: 97.5 F | WEIGHT: 276 LBS | BODY MASS INDEX: 39.51 KG/M2

## 2020-10-28 DIAGNOSIS — N18.4 STAGE 4 CHRONIC KIDNEY DISEASE (HCC): Primary | ICD-10-CM

## 2020-10-28 LAB
CREAT SERPL-MCNC: 2.48 MG/DL (ref 0.76–1.27)
FERRITIN SERPL-MCNC: 113.5 NG/ML (ref 30–400)
GFR SERPL CREATININE-BSD FRML MDRD: 25 ML/MIN/1.73
HCT VFR BLD AUTO: 31.7 % (ref 37.5–51)
HGB BLD-MCNC: 9.8 G/DL (ref 13–17.7)
IRON 24H UR-MRATE: 41 MCG/DL (ref 59–158)
IRON SATN MFR SERPL: 13 % (ref 20–50)
POTASSIUM SERPL-SCNC: 4.5 MMOL/L (ref 3.5–5.2)
TIBC SERPL-MCNC: 320 MCG/DL (ref 298–536)
TRANSFERRIN SERPL-MCNC: 215 MG/DL (ref 200–360)

## 2020-10-28 PROCEDURE — 82565 ASSAY OF CREATININE: CPT | Performed by: INTERNAL MEDICINE

## 2020-10-28 PROCEDURE — 84132 ASSAY OF SERUM POTASSIUM: CPT | Performed by: INTERNAL MEDICINE

## 2020-10-28 PROCEDURE — 82728 ASSAY OF FERRITIN: CPT | Performed by: INTERNAL MEDICINE

## 2020-10-28 PROCEDURE — 84466 ASSAY OF TRANSFERRIN: CPT | Performed by: INTERNAL MEDICINE

## 2020-10-28 PROCEDURE — 25010000002 EPOETIN ALFA-EPBX 10000 UNIT/ML SOLUTION: Performed by: INTERNAL MEDICINE

## 2020-10-28 PROCEDURE — 85014 HEMATOCRIT: CPT | Performed by: INTERNAL MEDICINE

## 2020-10-28 PROCEDURE — 96372 THER/PROPH/DIAG INJ SC/IM: CPT

## 2020-10-28 PROCEDURE — 85018 HEMOGLOBIN: CPT | Performed by: INTERNAL MEDICINE

## 2020-10-28 PROCEDURE — 83540 ASSAY OF IRON: CPT | Performed by: INTERNAL MEDICINE

## 2020-10-28 RX ADMIN — EPOETIN ALFA-EPBX 20000 UNITS: 10000 INJECTION, SOLUTION INTRAVENOUS; SUBCUTANEOUS at 14:21

## 2020-11-04 ENCOUNTER — HOSPITAL ENCOUNTER (OUTPATIENT)
Dept: ONCOLOGY | Facility: HOSPITAL | Age: 75
Discharge: HOME | End: 2020-11-04
Admitting: INTERNAL MEDICINE

## 2020-11-04 ENCOUNTER — ANTICOAGULATION VISIT (OUTPATIENT)
Dept: PHARMACY | Facility: HOSPITAL | Age: 75
End: 2020-11-04

## 2020-11-04 VITALS
WEIGHT: 281 LBS | HEART RATE: 94 BPM | BODY MASS INDEX: 40.23 KG/M2 | DIASTOLIC BLOOD PRESSURE: 80 MMHG | TEMPERATURE: 97.3 F | RESPIRATION RATE: 16 BRPM | SYSTOLIC BLOOD PRESSURE: 169 MMHG | HEIGHT: 70 IN

## 2020-11-04 DIAGNOSIS — N18.4 STAGE 4 CHRONIC KIDNEY DISEASE (HCC): Primary | ICD-10-CM

## 2020-11-04 LAB
CREAT SERPL-MCNC: 2.74 MG/DL (ref 0.76–1.27)
GFR SERPL CREATININE-BSD FRML MDRD: 23 ML/MIN/1.73
HCT VFR BLD AUTO: 32.8 % (ref 37.5–51)
HGB BLD-MCNC: 10.1 G/DL (ref 13–17.7)
INR PPP: 1.6 (ref 0.91–1.09)
POTASSIUM SERPL-SCNC: 4.4 MMOL/L (ref 3.5–5.2)
PROTHROMBIN TIME: 18.7 SECONDS (ref 10–13.8)

## 2020-11-04 PROCEDURE — 84132 ASSAY OF SERUM POTASSIUM: CPT | Performed by: INTERNAL MEDICINE

## 2020-11-04 PROCEDURE — 25010000002 EPOETIN ALFA-EPBX 10000 UNIT/ML SOLUTION: Performed by: INTERNAL MEDICINE

## 2020-11-04 PROCEDURE — 85610 PROTHROMBIN TIME: CPT

## 2020-11-04 PROCEDURE — 96372 THER/PROPH/DIAG INJ SC/IM: CPT

## 2020-11-04 PROCEDURE — 85014 HEMATOCRIT: CPT | Performed by: INTERNAL MEDICINE

## 2020-11-04 PROCEDURE — 36415 COLL VENOUS BLD VENIPUNCTURE: CPT

## 2020-11-04 PROCEDURE — G0463 HOSPITAL OUTPT CLINIC VISIT: HCPCS

## 2020-11-04 PROCEDURE — 36416 COLLJ CAPILLARY BLOOD SPEC: CPT

## 2020-11-04 PROCEDURE — 82565 ASSAY OF CREATININE: CPT | Performed by: INTERNAL MEDICINE

## 2020-11-04 PROCEDURE — 85018 HEMOGLOBIN: CPT | Performed by: INTERNAL MEDICINE

## 2020-11-04 RX ADMIN — EPOETIN ALFA-EPBX 20000 UNITS: 10000 INJECTION, SOLUTION INTRAVENOUS; SUBCUTANEOUS at 08:50

## 2020-11-04 NOTE — PROGRESS NOTES
Anticoagulation Clinic Progress Note  Indication: afib  Referring Provider: Tyson (next appointment: 4/15/2020) (Claudia 5/20/2020)  Initial Warfarin Start Date: 2007  Goal INR: 2-3  Current Drug Interactions:  torsemide  Diet: eats salads 2x weekly 11/4/20  [id number]: 5 (HTN, Age, CHF, CAD)  Other: anemic, CKD (stage 3) May need bridge with Lovenox given persistent AF (per cardio 1/24/2019), Scr was 2.38 on 12/10/19    Anticoagulation Clinic INR History:  Date 10/23 11/20 12/8 1/5/18 2/2 3/2 3/16 3/30 4/17 5/17 6/14 7/19 8/1 8/22 9/12   Total Weekly Dose 37.5mg 37.5 mg 37.5 mg 37.5 mg 37.5mg 37.5 mg 37.5 mg 37.5 mg 37.5mg 37.5 mg 37.5 mg 37.5mg 37.5 mg 37.5 mg 37.5 mg    INR 2.5 1.8 2.5 2.3 2.1 1.7 3.2 2.1 2.3 2.06 2.8 3.5 2.7 2.7 3.0   Notes      (boost), amox amox   PAT     diarrhea     Date 10/1 10/8 10/12 10/17 10/22 10/29 11/5 11/12 11/19 11/29 12/13 1/3 1/24/19 2/12 2/18   Total Weekly Dose 26.25 mg 23.75mg 22.5  mg 23.75 mg 27.5 mg 28.75 mg 27.5 mg 28.75 mg 28.75 mg 28.75 mg 28.75 28.75mg 28.75 mg 17.5 mg 28.75mg   INR 3.0 2.1 1.6 1.3 1.6 2.8 2.0 2.5 2.4 2.76 2.8 2.7 2.9 1.7 1.5   Notes post- disch amio start 10/1        PAT   held x 7 days; resumed 1/22; D/C amio asa dc'd torsemide reduced,      Date 2/22 2/26 3/6 3/15 3/29 4/12 4/26 5/14 6/11 7/9 8/6 9/3 10/1 10/17   Total Weekly Dose 31.75 mg 33.75 mg 35mg 33.75 mg 33.75 mg 33.75 mg 32.5 mg 32.5 mg 32.5 mg 32.5 mg 32.5 mg 32.5 mg 32.5 mg 33.75 mg   INR 1.6 2.0 2.8 2.8 3.1 3.1 2.8 2.5 2.2 2.4 2.5 2.4 2.0 3.7   Notes  enox                 Date 10/30 11/20 12/17 1/14/20 2/11 3/11 4/8 5/6 6/11 7/9 8/6 9/3 9/14 9/30   Total Weekly Dose 32.5 mg 32.5 mg 32.5 mg 32.5 mg 32.5mg 32.5mg 32.5mg 32.5mg 32.5mg 32.5 mg 32.5 mg 32.5mg 32.5mg 26.25 mg   INR 2.3 2.3 2.1 1.9 2.2 2.1 2.1 2.2 2.2 2.3 2.7 1.6 2.2  1.8   Notes            ??  5 day hold     Date 10/14 11/4               Total Weekly Dose 32.5 mg 32.5mg               INR 2.0 1.6               Notes                    Takes warfarin in the am    Clinic Interview:  Verbal Release Authorization signed on 8/22/2018 -- may speak with Raeann Montemayor (Wife). [phone] (Home) *Preferred*  Tablet Strength: pt has 7.5mg and 2.5mg tablets    Patient Findings  Negatives:  Signs/symptoms of thrombosis, Signs/symptoms of bleeding, Laboratory test error suspected, Change in health, Change in alcohol use, Change in activity, Upcoming invasive procedure, Emergency department visit, Upcoming dental procedure, Missed doses, Extra doses, Change in medications, Change in diet/appetite, Hospital admission, Bruising, Other complaints   Comments:  Mr. Montemayor denies any medication changes, missed doses, or changes in GLV intake.        Plan:   1. INR is SUBtherapeutic today at 1.6. Instructed Mr. Montemayor to boost tonight's dose to 7.5mg then continue warfarin 5mg oral daily except 3.75mg TuesSat until recheck.  2. RTC in 1 weeks on 11/11. Mr. Montemayor will come to the clinic after his weekly injection at the infusion center.   3. Verbal and written information provided in the clinic. Mr. Montemayor expresses understanding with teach back and has no further questions at this time.     Darleen Mora, PharmD  Pharmacy Resident  11/4/2020  09:40 EST

## 2020-11-09 RX ORDER — ATORVASTATIN CALCIUM 40 MG/1
40 TABLET, FILM COATED ORAL DAILY
Qty: 90 TABLET | Refills: 1 | Status: SHIPPED | OUTPATIENT
Start: 2020-11-09 | End: 2021-04-08 | Stop reason: SDUPTHER

## 2020-11-11 ENCOUNTER — ANTICOAGULATION VISIT (OUTPATIENT)
Dept: PHARMACY | Facility: HOSPITAL | Age: 75
End: 2020-11-11

## 2020-11-11 ENCOUNTER — HOSPITAL ENCOUNTER (OUTPATIENT)
Dept: ONCOLOGY | Facility: HOSPITAL | Age: 75
Discharge: HOME | End: 2020-11-11
Admitting: INTERNAL MEDICINE

## 2020-11-11 VITALS
HEART RATE: 95 BPM | RESPIRATION RATE: 18 BRPM | DIASTOLIC BLOOD PRESSURE: 88 MMHG | WEIGHT: 280 LBS | TEMPERATURE: 97.3 F | SYSTOLIC BLOOD PRESSURE: 158 MMHG | BODY MASS INDEX: 40.18 KG/M2

## 2020-11-11 DIAGNOSIS — N18.4 STAGE 4 CHRONIC KIDNEY DISEASE (HCC): Primary | ICD-10-CM

## 2020-11-11 LAB
CREAT SERPL-MCNC: 2.69 MG/DL (ref 0.76–1.27)
GFR SERPL CREATININE-BSD FRML MDRD: 23 ML/MIN/1.73
HCT VFR BLD AUTO: 33.5 % (ref 37.5–51)
HGB BLD-MCNC: 10 G/DL (ref 13–17.7)
INR PPP: 2.1 (ref 0.91–1.09)
POTASSIUM SERPL-SCNC: 4.3 MMOL/L (ref 3.5–5.2)
PROTHROMBIN TIME: 25.1 SECONDS (ref 10–13.8)

## 2020-11-11 PROCEDURE — 85610 PROTHROMBIN TIME: CPT

## 2020-11-11 PROCEDURE — 85018 HEMOGLOBIN: CPT | Performed by: INTERNAL MEDICINE

## 2020-11-11 PROCEDURE — G0463 HOSPITAL OUTPT CLINIC VISIT: HCPCS

## 2020-11-11 PROCEDURE — 36415 COLL VENOUS BLD VENIPUNCTURE: CPT

## 2020-11-11 PROCEDURE — 82565 ASSAY OF CREATININE: CPT | Performed by: INTERNAL MEDICINE

## 2020-11-11 PROCEDURE — 25010000002 EPOETIN ALFA-EPBX 10000 UNIT/ML SOLUTION: Performed by: INTERNAL MEDICINE

## 2020-11-11 PROCEDURE — 96372 THER/PROPH/DIAG INJ SC/IM: CPT

## 2020-11-11 PROCEDURE — 85014 HEMATOCRIT: CPT | Performed by: INTERNAL MEDICINE

## 2020-11-11 PROCEDURE — 36416 COLLJ CAPILLARY BLOOD SPEC: CPT

## 2020-11-11 PROCEDURE — 84132 ASSAY OF SERUM POTASSIUM: CPT | Performed by: INTERNAL MEDICINE

## 2020-11-11 RX ADMIN — EPOETIN ALFA-EPBX 20000 UNITS: 10000 INJECTION, SOLUTION INTRAVENOUS; SUBCUTANEOUS at 11:33

## 2020-11-11 NOTE — PROGRESS NOTES
Anticoagulation Clinic Progress Note  Indication: afib  Referring Provider: Tyson (next appointment: 4/15/2020) (Claudia 5/20/2020)  Initial Warfarin Start Date: 2007  Goal INR: 2-3  Current Drug Interactions:  torsemide  Diet: eats salads 2x weekly 11/4/20  [id number]: 5 (HTN, Age, CHF, CAD)  Other: anemic, CKD (stage 3) May need bridge with Lovenox given persistent AF (per cardio 1/24/2019), Scr was 2.38 on 12/10/19    Anticoagulation Clinic INR History:  Date 10/23 11/20 12/8 1/5/18 2/2 3/2 3/16 3/30 4/17 5/17 6/14 7/19 8/1 8/22 9/12   Total Weekly Dose 37.5mg 37.5 mg 37.5 mg 37.5 mg 37.5mg 37.5 mg 37.5 mg 37.5 mg 37.5mg 37.5 mg 37.5 mg 37.5mg 37.5 mg 37.5 mg 37.5 mg    INR 2.5 1.8 2.5 2.3 2.1 1.7 3.2 2.1 2.3 2.06 2.8 3.5 2.7 2.7 3.0   Notes      (boost), amox amox   PAT     diarrhea     Date 10/1 10/8 10/12 10/17 10/22 10/29 11/5 11/12 11/19 11/29 12/13 1/3 1/24/19 2/12 2/18   Total Weekly Dose 26.25 mg 23.75mg 22.5  mg 23.75 mg 27.5 mg 28.75 mg 27.5 mg 28.75 mg 28.75 mg 28.75 mg 28.75 28.75mg 28.75 mg 17.5 mg 28.75mg   INR 3.0 2.1 1.6 1.3 1.6 2.8 2.0 2.5 2.4 2.76 2.8 2.7 2.9 1.7 1.5   Notes post- disch amio start 10/1        PAT   held x 7 days; resumed 1/22; D/C amio asa dc'd torsemide reduced,      Date 2/22 2/26 3/6 3/15 3/29 4/12 4/26 5/14 6/11 7/9 8/6 9/3 10/1 10/17   Total Weekly Dose 31.75 mg 33.75 mg 35mg 33.75 mg 33.75 mg 33.75 mg 32.5 mg 32.5 mg 32.5 mg 32.5 mg 32.5 mg 32.5 mg 32.5 mg 33.75 mg   INR 1.6 2.0 2.8 2.8 3.1 3.1 2.8 2.5 2.2 2.4 2.5 2.4 2.0 3.7   Notes  enox                 Date 10/30 11/20 12/17 1/14/20 2/11 3/11 4/8 5/6 6/11 7/9 8/6 9/3 9/14 9/30   Total Weekly Dose 32.5 mg 32.5 mg 32.5 mg 32.5 mg 32.5mg 32.5mg 32.5mg 32.5mg 32.5mg 32.5 mg 32.5 mg 32.5mg 32.5mg 26.25 mg   INR 2.3 2.3 2.1 1.9 2.2 2.1 2.1 2.2 2.2 2.3 2.7 1.6 2.2  1.8   Notes            ??  5 day hold     Date 10/14 11/4 11/11              Total Weekly Dose 32.5 mg 32.5mg 35mg              INR 2.0 1.6 2.1               Notes   1x boost                Takes warfarin in the am    Clinic Interview:  Verbal Release Authorization signed on 8/22/2018 -- may speak with Raeann Montemayor (Wife). [phone] (Home) *Preferred*  Tablet Strength: pt has 7.5mg and 2.5mg tablets    Patient Findings  Negatives:  Signs/symptoms of thrombosis, Signs/symptoms of bleeding, Laboratory test error suspected, Change in health, Change in alcohol use, Change in activity, Upcoming invasive procedure, Emergency department visit, Upcoming dental procedure, Missed doses, Extra doses, Change in medications, Change in diet/appetite, Hospital admission, Bruising, Other complaints         Plan:   1. INR is therapeutic today at 2.1. Considering hx of stability on this dose,  Instructed Mr. Montemayor to continue warfarin 5mg oral daily except 3.75mg TuesSat until recheck.  2. RTC in 2 weeks to ensure WNL then can extend back to q4w. Mr. Montemayor will come to the clinic after his weekly injection at the infusion center.   3. Verbal and written information provided in the clinic. Mr. Montemayor expresses understanding with teach back and has no further questions at this time.       Kristine Sutton, PharmD.  11/11/20   12:00 EST

## 2020-11-18 ENCOUNTER — HOSPITAL ENCOUNTER (OUTPATIENT)
Dept: ONCOLOGY | Facility: HOSPITAL | Age: 75
Discharge: HOME | End: 2020-11-18
Admitting: INTERNAL MEDICINE

## 2020-11-18 VITALS
WEIGHT: 281 LBS | TEMPERATURE: 97.2 F | SYSTOLIC BLOOD PRESSURE: 170 MMHG | DIASTOLIC BLOOD PRESSURE: 80 MMHG | HEART RATE: 84 BPM | BODY MASS INDEX: 40.23 KG/M2 | HEIGHT: 70 IN | RESPIRATION RATE: 16 BRPM

## 2020-11-18 DIAGNOSIS — N18.4 STAGE 4 CHRONIC KIDNEY DISEASE (HCC): Primary | ICD-10-CM

## 2020-11-18 LAB
HCT VFR BLD AUTO: 27.4 % (ref 37.5–51)
HGB BLD-MCNC: 8.1 G/DL (ref 13–17.7)

## 2020-11-18 PROCEDURE — 85018 HEMOGLOBIN: CPT | Performed by: INTERNAL MEDICINE

## 2020-11-18 PROCEDURE — 96372 THER/PROPH/DIAG INJ SC/IM: CPT

## 2020-11-18 PROCEDURE — 25010000002 EPOETIN ALFA-EPBX 10000 UNIT/ML SOLUTION: Performed by: INTERNAL MEDICINE

## 2020-11-18 PROCEDURE — 85014 HEMATOCRIT: CPT | Performed by: INTERNAL MEDICINE

## 2020-11-18 RX ADMIN — EPOETIN ALFA-EPBX 20000 UNITS: 10000 INJECTION, SOLUTION INTRAVENOUS; SUBCUTANEOUS at 13:03

## 2020-11-25 ENCOUNTER — HOSPITAL ENCOUNTER (OUTPATIENT)
Dept: ONCOLOGY | Facility: HOSPITAL | Age: 75
Discharge: HOME | End: 2020-11-25
Admitting: INTERNAL MEDICINE

## 2020-11-25 ENCOUNTER — ANTICOAGULATION VISIT (OUTPATIENT)
Dept: PHARMACY | Facility: HOSPITAL | Age: 75
End: 2020-11-25

## 2020-11-25 VITALS
BODY MASS INDEX: 40.66 KG/M2 | TEMPERATURE: 97.4 F | DIASTOLIC BLOOD PRESSURE: 80 MMHG | SYSTOLIC BLOOD PRESSURE: 149 MMHG | HEIGHT: 70 IN | WEIGHT: 284 LBS | HEART RATE: 104 BPM | RESPIRATION RATE: 20 BRPM

## 2020-11-25 DIAGNOSIS — N18.4 STAGE 4 CHRONIC KIDNEY DISEASE (HCC): Primary | ICD-10-CM

## 2020-11-25 LAB
CREAT SERPL-MCNC: 2.65 MG/DL (ref 0.76–1.27)
FERRITIN SERPL-MCNC: 335.8 NG/ML (ref 30–400)
GFR SERPL CREATININE-BSD FRML MDRD: 24 ML/MIN/1.73
HCT VFR BLD AUTO: 35.5 % (ref 37.5–51)
HGB BLD-MCNC: 10.6 G/DL (ref 13–17.7)
INR PPP: 1.8 (ref 0.91–1.09)
IRON 24H UR-MRATE: 48 MCG/DL (ref 59–158)
IRON SATN MFR SERPL: 17 % (ref 20–50)
POTASSIUM SERPL-SCNC: 4.3 MMOL/L (ref 3.5–5.2)
PROTHROMBIN TIME: 21.5 SECONDS (ref 10–13.8)
TIBC SERPL-MCNC: 289 MCG/DL (ref 298–536)
TRANSFERRIN SERPL-MCNC: 194 MG/DL (ref 200–360)

## 2020-11-25 PROCEDURE — 82565 ASSAY OF CREATININE: CPT | Performed by: INTERNAL MEDICINE

## 2020-11-25 PROCEDURE — 85018 HEMOGLOBIN: CPT | Performed by: INTERNAL MEDICINE

## 2020-11-25 PROCEDURE — 85610 PROTHROMBIN TIME: CPT

## 2020-11-25 PROCEDURE — 36416 COLLJ CAPILLARY BLOOD SPEC: CPT

## 2020-11-25 PROCEDURE — 85014 HEMATOCRIT: CPT | Performed by: INTERNAL MEDICINE

## 2020-11-25 PROCEDURE — G0463 HOSPITAL OUTPT CLINIC VISIT: HCPCS

## 2020-11-25 PROCEDURE — 25010000002 EPOETIN ALFA-EPBX 10000 UNIT/ML SOLUTION: Performed by: INTERNAL MEDICINE

## 2020-11-25 PROCEDURE — 83540 ASSAY OF IRON: CPT | Performed by: INTERNAL MEDICINE

## 2020-11-25 PROCEDURE — 96372 THER/PROPH/DIAG INJ SC/IM: CPT

## 2020-11-25 PROCEDURE — 84466 ASSAY OF TRANSFERRIN: CPT | Performed by: INTERNAL MEDICINE

## 2020-11-25 PROCEDURE — 36415 COLL VENOUS BLD VENIPUNCTURE: CPT

## 2020-11-25 PROCEDURE — 84132 ASSAY OF SERUM POTASSIUM: CPT | Performed by: INTERNAL MEDICINE

## 2020-11-25 PROCEDURE — 82728 ASSAY OF FERRITIN: CPT | Performed by: INTERNAL MEDICINE

## 2020-11-25 RX ADMIN — EPOETIN ALFA-EPBX 20000 UNITS: 10000 INJECTION, SOLUTION INTRAVENOUS; SUBCUTANEOUS at 09:53

## 2020-11-25 NOTE — PROGRESS NOTES
Anticoagulation Clinic Progress Note  Indication: afib  Referring Provider: Tyson (next appointment: 4/15/2020) (Claudia 5/20/2020)  Initial Warfarin Start Date: 2007  Goal INR: 2-3  Current Drug Interactions:  torsemide  Diet: eats salads 2x weekly 11/4/20  [id number]: 5 (HTN, Age, CHF, CAD)  Other: anemic, CKD (stage 3) May need bridge with Lovenox given persistent AF (per cardio 1/24/2019), Scr was 2.38 on 12/10/19    Anticoagulation Clinic INR History:  Date 10/23 11/20 12/8 1/5/18 2/2 3/2 3/16 3/30 4/17 5/17 6/14 7/19 8/1 8/22 9/12   Total Weekly Dose 37.5mg 37.5 mg 37.5 mg 37.5 mg 37.5mg 37.5 mg 37.5 mg 37.5 mg 37.5mg 37.5 mg 37.5 mg 37.5mg 37.5 mg 37.5 mg 37.5 mg    INR 2.5 1.8 2.5 2.3 2.1 1.7 3.2 2.1 2.3 2.06 2.8 3.5 2.7 2.7 3.0   Notes      (boost), amox amox   PAT     diarrhea     Date 10/1 10/8 10/12 10/17 10/22 10/29 11/5 11/12 11/19 11/29 12/13 1/3 1/24/19 2/12 2/18   Total Weekly Dose 26.25 mg 23.75mg 22.5  mg 23.75 mg 27.5 mg 28.75 mg 27.5 mg 28.75 mg 28.75 mg 28.75 mg 28.75 28.75mg 28.75 mg 17.5 mg 28.75mg   INR 3.0 2.1 1.6 1.3 1.6 2.8 2.0 2.5 2.4 2.76 2.8 2.7 2.9 1.7 1.5   Notes post- disch amio start 10/1        PAT   held x 7 days; resumed 1/22; D/C amio asa dc'd torsemide reduced,      Date 2/22 2/26 3/6 3/15 3/29 4/12 4/26 5/14 6/11 7/9 8/6 9/3 10/1 10/17   Total Weekly Dose 31.75 mg 33.75 mg 35mg 33.75 mg 33.75 mg 33.75 mg 32.5 mg 32.5 mg 32.5 mg 32.5 mg 32.5 mg 32.5 mg 32.5 mg 33.75 mg   INR 1.6 2.0 2.8 2.8 3.1 3.1 2.8 2.5 2.2 2.4 2.5 2.4 2.0 3.7   Notes  [facility]                 Date 10/30 11/20 12/17 1/14/20 2/11 3/11 4/8 5/6 6/11 7/9 8/6 9/3 9/14 9/30   Total Weekly Dose 32.5 mg 32.5 mg 32.5 mg 32.5 mg 32.5mg 32.5mg 32.5mg 32.5mg 32.5mg 32.5 mg 32.5 mg 32.5mg 32.5mg 26.25 mg   INR 2.3 2.3 2.1 1.9 2.2 2.1 2.1 2.2 2.2 2.3 2.7 1.6 2.2  1.8   Notes            ??  5 day hold     Date 10/14 11/4 11/11 11/25             Total Weekly Dose 32.5 mg 32.5mg 35mg 32.5mg             INR 2.0 1.6 2.1 1.8              Notes   1x boost GLV               Takes warfarin in the am    Clinic Interview:  Verbal Release Authorization signed on 8/22/2018 -- may speak with Raeann Montemayor (Wife). [phone] (Home) *Preferred*  Tablet Strength: pt has 7.5mg and 2.5mg tablets    Patient Findings    Positives:  Change in diet/appetite   Negatives:  Signs/symptoms of thrombosis, Signs/symptoms of bleeding, Laboratory test error suspected, Change in health, Change in alcohol use, Change in activity, Upcoming invasive procedure, Emergency department visit, Upcoming dental procedure, Missed doses, Extra doses, Change in medications, Hospital admission, Bruising, Other complaints   Comments:  Had a large salad this week, unusual for his diet. Also took this mornings dose ~2 hours late. Reports Hgb this morning was 10.6, planned infusions until >11       Plan:   1. INR is subtherapeutic today at 1.8.  Instructed Mr. Montemayor to slightly increase warfarin 5mg oral daily except 3.75mg Tues until recheck.  2. RTC in 2 weeks to ensure WNL  Mr. Montemayor will come to the clinic after his weekly injection at the infusion center.   3. Verbal and written information provided in the clinic. Mr. Montemayor expresses understanding with teach back and has no further questions at this time.     Kristine Sutton, PharmD.  11/25/20   10:35 EST

## 2020-12-02 ENCOUNTER — HOSPITAL ENCOUNTER (OUTPATIENT)
Dept: ONCOLOGY | Facility: HOSPITAL | Age: 75
Discharge: HOME | End: 2020-12-02
Admitting: INTERNAL MEDICINE

## 2020-12-02 VITALS
HEART RATE: 88 BPM | WEIGHT: 283 LBS | RESPIRATION RATE: 18 BRPM | TEMPERATURE: 97.6 F | DIASTOLIC BLOOD PRESSURE: 68 MMHG | BODY MASS INDEX: 40.52 KG/M2 | HEIGHT: 70 IN | SYSTOLIC BLOOD PRESSURE: 139 MMHG

## 2020-12-02 DIAGNOSIS — N18.4 STAGE 4 CHRONIC KIDNEY DISEASE (HCC): Primary | ICD-10-CM

## 2020-12-02 LAB
CREAT SERPL-MCNC: 2.57 MG/DL (ref 0.76–1.27)
GFR SERPL CREATININE-BSD FRML MDRD: 24 ML/MIN/1.73
HCT VFR BLD AUTO: 34.8 % (ref 37.5–51)
HGB BLD-MCNC: 10.6 G/DL (ref 13–17.7)
POTASSIUM SERPL-SCNC: 4.5 MMOL/L (ref 3.5–5.2)

## 2020-12-02 PROCEDURE — 85018 HEMOGLOBIN: CPT | Performed by: INTERNAL MEDICINE

## 2020-12-02 PROCEDURE — 36415 COLL VENOUS BLD VENIPUNCTURE: CPT

## 2020-12-02 PROCEDURE — 25010000002 EPOETIN ALFA-EPBX 10000 UNIT/ML SOLUTION: Performed by: INTERNAL MEDICINE

## 2020-12-02 PROCEDURE — 85014 HEMATOCRIT: CPT | Performed by: INTERNAL MEDICINE

## 2020-12-02 PROCEDURE — 84132 ASSAY OF SERUM POTASSIUM: CPT | Performed by: INTERNAL MEDICINE

## 2020-12-02 PROCEDURE — 96372 THER/PROPH/DIAG INJ SC/IM: CPT

## 2020-12-02 PROCEDURE — 82565 ASSAY OF CREATININE: CPT | Performed by: INTERNAL MEDICINE

## 2020-12-02 RX ADMIN — EPOETIN ALFA-EPBX 20000 UNITS: 10000 INJECTION, SOLUTION INTRAVENOUS; SUBCUTANEOUS at 12:23

## 2020-12-09 ENCOUNTER — ANTICOAGULATION VISIT (OUTPATIENT)
Dept: PHARMACY | Facility: HOSPITAL | Age: 75
End: 2020-12-09

## 2020-12-09 ENCOUNTER — LAB REQUISITION (OUTPATIENT)
Dept: LAB | Facility: HOSPITAL | Age: 75
End: 2020-12-09

## 2020-12-09 ENCOUNTER — HOSPITAL ENCOUNTER (OUTPATIENT)
Dept: ONCOLOGY | Facility: HOSPITAL | Age: 75
Discharge: HOME | End: 2020-12-09
Admitting: INTERNAL MEDICINE

## 2020-12-09 VITALS
DIASTOLIC BLOOD PRESSURE: 80 MMHG | TEMPERATURE: 96.9 F | BODY MASS INDEX: 40.32 KG/M2 | WEIGHT: 281 LBS | RESPIRATION RATE: 18 BRPM | SYSTOLIC BLOOD PRESSURE: 165 MMHG | HEART RATE: 83 BPM

## 2020-12-09 DIAGNOSIS — N18.4 STAGE 4 CHRONIC KIDNEY DISEASE (HCC): Primary | ICD-10-CM

## 2020-12-09 DIAGNOSIS — Z00.00 ROUTINE GENERAL MEDICAL EXAMINATION AT A HEALTH CARE FACILITY: ICD-10-CM

## 2020-12-09 LAB
CREAT SERPL-MCNC: 2.49 MG/DL (ref 0.76–1.27)
CREAT SERPL-MCNC: 2.53 MG/DL (ref 0.76–1.27)
GFR SERPL CREATININE-BSD FRML MDRD: 25 ML/MIN/1.73
GFR SERPL CREATININE-BSD FRML MDRD: 25 ML/MIN/1.73
HCT VFR BLD AUTO: 34.5 % (ref 37.5–51)
HGB BLD-MCNC: 10.5 G/DL (ref 13–17.7)
INR PPP: 1.8 (ref 0.91–1.09)
POTASSIUM SERPL-SCNC: 4.4 MMOL/L (ref 3.5–5.2)
POTASSIUM SERPL-SCNC: 4.5 MMOL/L (ref 3.5–5.2)
PROTHROMBIN TIME: 21.6 SECONDS (ref 10–13.8)

## 2020-12-09 PROCEDURE — 86003 ALLG SPEC IGE CRUDE XTRC EA: CPT | Performed by: INTERNAL MEDICINE

## 2020-12-09 PROCEDURE — 82565 ASSAY OF CREATININE: CPT | Performed by: INTERNAL MEDICINE

## 2020-12-09 PROCEDURE — 96372 THER/PROPH/DIAG INJ SC/IM: CPT

## 2020-12-09 PROCEDURE — 36416 COLLJ CAPILLARY BLOOD SPEC: CPT

## 2020-12-09 PROCEDURE — 84132 ASSAY OF SERUM POTASSIUM: CPT | Performed by: INTERNAL MEDICINE

## 2020-12-09 PROCEDURE — 25010000002 EPOETIN ALFA-EPBX 10000 UNIT/ML SOLUTION: Performed by: INTERNAL MEDICINE

## 2020-12-09 PROCEDURE — 85610 PROTHROMBIN TIME: CPT

## 2020-12-09 PROCEDURE — 85018 HEMOGLOBIN: CPT | Performed by: INTERNAL MEDICINE

## 2020-12-09 PROCEDURE — G0463 HOSPITAL OUTPT CLINIC VISIT: HCPCS

## 2020-12-09 PROCEDURE — 85014 HEMATOCRIT: CPT | Performed by: INTERNAL MEDICINE

## 2020-12-09 RX ADMIN — EPOETIN ALFA-EPBX 20000 UNITS: 10000 INJECTION, SOLUTION INTRAVENOUS; SUBCUTANEOUS at 12:51

## 2020-12-09 NOTE — PROGRESS NOTES
Anticoagulation Clinic Progress Note  Indication: afib  Referring Provider: Tyson (next appointment: 4/15/2020) (Claudia 5/20/2020)  Initial Warfarin Start Date: 2007  Goal INR: 2-3  Current Drug Interactions:  torsemide  Diet: eats salads 2x weekly 11/4/20  [id number]: 5 (HTN, Age, CHF, CAD)  Other: anemic, CKD (stage 3) May need bridge with Lovenox given persistent AF (per cardio 1/24/2019), Scr was 2.38 on 12/10/19    Anticoagulation Clinic INR History:  Date 10/23 11/20 12/8 1/5/18 2/2 3/2 3/16 3/30 4/17 5/17 6/14 7/19 8/1 8/22 9/12   Total Weekly Dose 37.5mg 37.5 mg 37.5 mg 37.5 mg 37.5mg 37.5 mg 37.5 mg 37.5 mg 37.5mg 37.5 mg 37.5 mg 37.5mg 37.5 mg 37.5 mg 37.5 mg    INR 2.5 1.8 2.5 2.3 2.1 1.7 3.2 2.1 2.3 2.06 2.8 3.5 2.7 2.7 3.0   Notes      (boost), amox amox   PAT     diarrhea     Date 10/1 10/8 10/12 10/17 10/22 10/29 11/5 11/12 11/19 11/29 12/13 1/3 1/24/19 2/12 2/18   Total Weekly Dose 26.25 mg 23.75mg 22.5  mg 23.75 mg 27.5 mg 28.75 mg 27.5 mg 28.75 mg 28.75 mg 28.75 mg 28.75 28.75mg 28.75 mg 17.5 mg 28.75mg   INR 3.0 2.1 1.6 1.3 1.6 2.8 2.0 2.5 2.4 2.76 2.8 2.7 2.9 1.7 1.5   Notes post- disch amio start 10/1        PAT   held x 7 days; resumed 1/22; D/C amio asa dc'd torsemide reduced,      Date 2/22 2/26 3/6 3/15 3/29 4/12 4/26 5/14 6/11 7/9 8/6 9/3 10/1 10/17   Total Weekly Dose 31.75 mg 33.75 mg 35mg 33.75 mg 33.75 mg 33.75 mg 32.5 mg 32.5 mg 32.5 mg 32.5 mg 32.5 mg 32.5 mg 32.5 mg 33.75 mg   INR 1.6 2.0 2.8 2.8 3.1 3.1 2.8 2.5 2.2 2.4 2.5 2.4 2.0 3.7   Notes  enox                 Date 10/30 11/20 12/17 1/14/20 2/11 3/11 4/8 5/6 6/11 7/9 8/6 9/3 9/14 9/30   Total Weekly Dose 32.5 mg 32.5 mg 32.5 mg 32.5 mg 32.5mg 32.5mg 32.5mg 32.5mg 32.5mg 32.5 mg 32.5 mg 32.5mg 32.5mg 26.25 mg   INR 2.3 2.3 2.1 1.9 2.2 2.1 2.1 2.2 2.2 2.3 2.7 1.6 2.2  1.8   Notes            ??  5 day hold     Date 10/14 11/4 11/11 11/25 12/9            Total Weekly Dose 32.5 mg 32.5mg 35mg 32.5mg 33.75 mg            INR 2.0  1.6 2.1 1.8 1.8            Notes   1x boost GLV boostx1 boostx1             Takes warfarin in the am    Clinic Interview:  Verbal Release Authorization signed on 8/22/2018 -- may speak with Raeann Montemayor (Wife). [phone] (Home) *Preferred*  Tablet Strength: pt has 7.5mg and 2.5mg tablets    Patient Findings  Negatives:  Signs/symptoms of thrombosis, Signs/symptoms of bleeding, Laboratory test error suspected, Change in health, Change in alcohol use, Change in activity, Upcoming invasive procedure, Emergency department visit, Upcoming dental procedure, Missed doses, Extra doses, Change in medications, Change in diet/appetite, Hospital admission, Bruising, Other complaints   Comments:  Mr. Montemayor report he had no greens this week and confirmed he took higher dose. He denies any missed doses.          Plan:   1. INR is sub therapeutic again today at 1.8, following dose increase and no GLV. Given lack of response to smaller increase, instructed Mr. Montemayor to boost tonight's dose to 7.5mg then continue recently increased warfarin 5mg oral daily except 3.75mg Tues until recheck (36.25mg by next week, ~7% increase from last week).  2. RTC in 1 week on 12/16 to ensure WNL  Mr. Montemayor will come to the clinic before his weekly injection at the infusion center.   3. Verbal and written information provided in the clinic. Mr. Montemayor expresses understanding with teach back and has no further questions at this time.     Corin De La O, PharmD Candidate 2021 12/09/2020  12:16 Charlotte ALMENDAREZ, PharmD, have reviewed the note in full and agree with the assessment and plan. Would consider increasing maintenance regimen to 5 mg oral daily at next encounter depending upon INR.  12/09/20  12:29 EST

## 2020-12-10 RX ORDER — WARFARIN SODIUM 2.5 MG/1
TABLET ORAL
Qty: 180 TABLET | Refills: 0 | Status: SHIPPED | OUTPATIENT
Start: 2020-12-10 | End: 2021-10-07 | Stop reason: SDUPTHER

## 2020-12-10 NOTE — TELEPHONE ENCOUNTER
Refilled Rx. Last seen 4/2020 by Dr. Mehta. Pt was to follow up in 6 months- follow up is not scheduled. Please discuss at next appointment with pt. Can be transferred to cardiology for reschedule.

## 2020-12-15 LAB
Lab: 1
VENISON IGE QN: 0.47 KU/L

## 2020-12-16 ENCOUNTER — HOSPITAL ENCOUNTER (OUTPATIENT)
Dept: ONCOLOGY | Facility: HOSPITAL | Age: 75
Discharge: HOME | End: 2020-12-16
Admitting: INTERNAL MEDICINE

## 2020-12-16 ENCOUNTER — ANTICOAGULATION VISIT (OUTPATIENT)
Dept: PHARMACY | Facility: HOSPITAL | Age: 75
End: 2020-12-16

## 2020-12-16 VITALS
SYSTOLIC BLOOD PRESSURE: 141 MMHG | RESPIRATION RATE: 16 BRPM | HEIGHT: 70 IN | DIASTOLIC BLOOD PRESSURE: 82 MMHG | TEMPERATURE: 97.7 F | HEART RATE: 90 BPM | BODY MASS INDEX: 40.23 KG/M2 | WEIGHT: 281 LBS

## 2020-12-16 DIAGNOSIS — N18.4 STAGE 4 CHRONIC KIDNEY DISEASE (HCC): Primary | ICD-10-CM

## 2020-12-16 LAB
CREAT SERPL-MCNC: 2.8 MG/DL (ref 0.76–1.27)
GFR SERPL CREATININE-BSD FRML MDRD: 22 ML/MIN/1.73
HCT VFR BLD AUTO: 34.9 % (ref 37.5–51)
HGB BLD-MCNC: 10.7 G/DL (ref 13–17.7)
INR PPP: 2.5 (ref 0.91–1.09)
POTASSIUM SERPL-SCNC: 4.6 MMOL/L (ref 3.5–5.2)
PROTHROMBIN TIME: 30.5 SECONDS (ref 10–13.8)

## 2020-12-16 PROCEDURE — 82565 ASSAY OF CREATININE: CPT | Performed by: INTERNAL MEDICINE

## 2020-12-16 PROCEDURE — 36415 COLL VENOUS BLD VENIPUNCTURE: CPT

## 2020-12-16 PROCEDURE — 84132 ASSAY OF SERUM POTASSIUM: CPT | Performed by: INTERNAL MEDICINE

## 2020-12-16 PROCEDURE — 85610 PROTHROMBIN TIME: CPT

## 2020-12-16 PROCEDURE — 96372 THER/PROPH/DIAG INJ SC/IM: CPT

## 2020-12-16 PROCEDURE — 85018 HEMOGLOBIN: CPT | Performed by: INTERNAL MEDICINE

## 2020-12-16 PROCEDURE — 25010000002 EPOETIN ALFA-EPBX 10000 UNIT/ML SOLUTION: Performed by: INTERNAL MEDICINE

## 2020-12-16 PROCEDURE — 36416 COLLJ CAPILLARY BLOOD SPEC: CPT

## 2020-12-16 PROCEDURE — 85014 HEMATOCRIT: CPT | Performed by: INTERNAL MEDICINE

## 2020-12-16 PROCEDURE — G0463 HOSPITAL OUTPT CLINIC VISIT: HCPCS

## 2020-12-16 RX ADMIN — EPOETIN ALFA-EPBX 20000 UNITS: 10000 INJECTION, SOLUTION INTRAVENOUS; SUBCUTANEOUS at 13:00

## 2020-12-16 NOTE — PROGRESS NOTES
Anticoagulation Clinic Progress Note  Indication: afib  Referring Provider: Tyson (next appointment: 4/15/2020) (Claudia 5/20/2020)  Initial Warfarin Start Date: 2007  Goal INR: 2-3  Current Drug Interactions:  torsemide  Diet: eats salads 2x weekly 11/4/20  [id number]: 5 (HTN, Age, CHF, CAD)  Other: anemic, CKD (stage 3) May need bridge with Lovenox given persistent AF (per cardio 1/24/2019), Scr was 2.38 on 12/10/19     Anticoagulation Clinic INR History:  Date 10/23 11/20 12/8 1/5/18 2/2 3/2 3/16 3/30 4/17 5/17 6/14 7/19 8/1 8/22 9/12   Total Weekly Dose 37.5mg 37.5 mg 37.5 mg 37.5 mg 37.5mg 37.5 mg 37.5 mg 37.5 mg 37.5mg 37.5 mg 37.5 mg 37.5mg 37.5 mg 37.5 mg 37.5 mg    INR 2.5 1.8 2.5 2.3 2.1 1.7 3.2 2.1 2.3 2.06 2.8 3.5 2.7 2.7 3.0   Notes      (boost), amox amox   PAT     diarrhea     Date 10/1 10/8 10/12 10/17 10/22 10/29 11/5 11/12 11/19 11/29 12/13 1/3 1/24/19 2/12 2/18   Total Weekly Dose 26.25 mg 23.75mg 22.5  mg 23.75 mg 27.5 mg 28.75 mg 27.5 mg 28.75 mg 28.75 mg 28.75 mg 28.75 28.75mg 28.75 mg 17.5 mg 28.75mg   INR 3.0 2.1 1.6 1.3 1.6 2.8 2.0 2.5 2.4 2.76 2.8 2.7 2.9 1.7 1.5   Notes post- disch amio start 10/1        PAT   held x 7 days; resumed 1/22; D/C amio asa dc'd torsemide reduced,      Date 2/22 2/26 3/6 3/15 3/29 4/12 4/26 5/14 6/11 7/9 8/6 9/3 10/1 10/17   Total Weekly Dose 31.75 mg 33.75 mg 35mg 33.75 mg 33.75 mg 33.75 mg 32.5 mg 32.5 mg 32.5 mg 32.5 mg 32.5 mg 32.5 mg 32.5 mg 33.75 mg   INR 1.6 2.0 2.8 2.8 3.1 3.1 2.8 2.5 2.2 2.4 2.5 2.4 2.0 3.7   Notes  enox                 Date 10/30 11/20 12/17 1/14/20 2/11 3/11 4/8 5/6 6/11 7/9 8/6 9/3 9/14 9/30   Total Weekly Dose 32.5 mg 32.5 mg 32.5 mg 32.5 mg 32.5mg 32.5mg 32.5mg 32.5mg 32.5mg 32.5 mg 32.5 mg 32.5mg 32.5mg 26.25 mg   INR 2.3 2.3 2.1 1.9 2.2 2.1 2.1 2.2 2.2 2.3 2.7 1.6 2.2  1.8   Notes            ??  5 day hold     Date 10/14 11/4 11/11 11/25 12/9 12/16           Total Weekly Dose 32.5 mg 32.5mg 35mg 32.5mg 33.75 mg 36.25 mg 35 mg           INR 2.0 1.6 2.1 1.8 1.8 2.5           Notes   1x boost GLV boostx1 boostx1             Takes warfarin in the am    Clinic Interview:  Verbal Release Authorization signed on 8/22/2018 -- may speak with Raeann Montemayor (Wife). [phone] (Home) *Preferred*  Tablet Strength: pt has 7.5mg and 2.5mg tablets    Patient Findings    Negatives:  Signs/symptoms of thrombosis, Signs/symptoms of bleeding, Laboratory test error suspected, Change in health, Change in alcohol use, Change in activity, Upcoming invasive procedure, Emergency department visit, Upcoming dental procedure, Missed doses, Extra doses, Change in medications, Change in diet/appetite, Hospital admission, Bruising, Other complaints   Comments:  Small salad on Sunday, only eats GLV occasionally.      Plan:   1. INR is therapeutic today at 2.5, following dose increase and small amount of GLV. I instructed Mr. Montemayor to increase dose of warfarin 5mg oral daily until recheck (35mg weekly).  2. RTC in 2 weeks on 12/30 to ensure WNL  Mr. Montemayor will come to the clinic before his weekly injection at the infusion center.   3. Verbal and written information provided in the clinic. Mr. Montemayor expresses understanding with teach back and has no further questions at this time.     Judi Wright, PharmD.  Pharmacy Resident  12/16/2020 12:13 EST    Agree with 3.7% dose increase of maintenance dose. Will ultimately receive less than this week.  I, Evelina Quan, PharmD, have reviewed the note in full and agree with the assessment and plan.  12/16/20  14:26 EST

## 2020-12-23 ENCOUNTER — HOSPITAL ENCOUNTER (OUTPATIENT)
Dept: ONCOLOGY | Facility: HOSPITAL | Age: 75
Discharge: HOME | End: 2020-12-23
Admitting: INTERNAL MEDICINE

## 2020-12-23 VITALS
TEMPERATURE: 97.4 F | BODY MASS INDEX: 40.52 KG/M2 | DIASTOLIC BLOOD PRESSURE: 86 MMHG | RESPIRATION RATE: 18 BRPM | WEIGHT: 283 LBS | HEIGHT: 70 IN | SYSTOLIC BLOOD PRESSURE: 173 MMHG | HEART RATE: 85 BPM

## 2020-12-23 DIAGNOSIS — N18.4 STAGE 4 CHRONIC KIDNEY DISEASE (HCC): Primary | ICD-10-CM

## 2020-12-23 LAB
CREAT SERPL-MCNC: 2.62 MG/DL (ref 0.76–1.27)
FERRITIN SERPL-MCNC: 104.1 NG/ML (ref 30–400)
GFR SERPL CREATININE-BSD FRML MDRD: 24 ML/MIN/1.73
HCT VFR BLD AUTO: 34.6 % (ref 37.5–51)
HGB BLD-MCNC: 10.4 G/DL (ref 13–17.7)
IRON 24H UR-MRATE: 39 MCG/DL (ref 59–158)
IRON SATN MFR SERPL: 13 % (ref 20–50)
POTASSIUM SERPL-SCNC: 4.4 MMOL/L (ref 3.5–5.2)
TIBC SERPL-MCNC: 289 MCG/DL (ref 298–536)
TRANSFERRIN SERPL-MCNC: 194 MG/DL (ref 200–360)

## 2020-12-23 PROCEDURE — 85018 HEMOGLOBIN: CPT | Performed by: INTERNAL MEDICINE

## 2020-12-23 PROCEDURE — 84132 ASSAY OF SERUM POTASSIUM: CPT | Performed by: INTERNAL MEDICINE

## 2020-12-23 PROCEDURE — 82565 ASSAY OF CREATININE: CPT | Performed by: INTERNAL MEDICINE

## 2020-12-23 PROCEDURE — 85014 HEMATOCRIT: CPT | Performed by: INTERNAL MEDICINE

## 2020-12-23 PROCEDURE — 25010000002 EPOETIN ALFA-EPBX 10000 UNIT/ML SOLUTION: Performed by: INTERNAL MEDICINE

## 2020-12-23 PROCEDURE — 83540 ASSAY OF IRON: CPT | Performed by: INTERNAL MEDICINE

## 2020-12-23 PROCEDURE — 84466 ASSAY OF TRANSFERRIN: CPT | Performed by: INTERNAL MEDICINE

## 2020-12-23 PROCEDURE — 96372 THER/PROPH/DIAG INJ SC/IM: CPT

## 2020-12-23 PROCEDURE — 82728 ASSAY OF FERRITIN: CPT | Performed by: INTERNAL MEDICINE

## 2020-12-23 RX ADMIN — EPOETIN ALFA-EPBX 20000 UNITS: 10000 INJECTION, SOLUTION INTRAVENOUS; SUBCUTANEOUS at 12:46

## 2020-12-28 LAB
CREAT SERPL-MCNC: 2.53 MG/DL (ref 0.76–1.27)
HCT VFR BLD AUTO: NORMAL %
HGB BLD-MCNC: NORMAL G/DL
Lab: 1
POTASSIUM SERPL-SCNC: 4.5 MMOL/L (ref 3.5–5.2)
VENISON IGE QN: 0.47 KU/L

## 2020-12-30 ENCOUNTER — ANTICOAGULATION VISIT (OUTPATIENT)
Dept: PHARMACY | Facility: HOSPITAL | Age: 75
End: 2020-12-30

## 2020-12-30 ENCOUNTER — HOSPITAL ENCOUNTER (OUTPATIENT)
Dept: ONCOLOGY | Facility: HOSPITAL | Age: 75
Discharge: HOME | End: 2020-12-30
Admitting: INTERNAL MEDICINE

## 2020-12-30 VITALS
BODY MASS INDEX: 40.52 KG/M2 | TEMPERATURE: 97.4 F | HEART RATE: 92 BPM | DIASTOLIC BLOOD PRESSURE: 77 MMHG | WEIGHT: 283 LBS | RESPIRATION RATE: 18 BRPM | SYSTOLIC BLOOD PRESSURE: 146 MMHG | HEIGHT: 70 IN

## 2020-12-30 DIAGNOSIS — N18.4 STAGE 4 CHRONIC KIDNEY DISEASE (HCC): Primary | ICD-10-CM

## 2020-12-30 LAB
CREAT SERPL-MCNC: 2.67 MG/DL (ref 0.76–1.27)
GFR SERPL CREATININE-BSD FRML MDRD: 23 ML/MIN/1.73
HCT VFR BLD AUTO: 34.4 % (ref 37.5–51)
HGB BLD-MCNC: 10.2 G/DL (ref 13–17.7)
INR PPP: 2 (ref 0.91–1.09)
POTASSIUM SERPL-SCNC: 4.5 MMOL/L (ref 3.5–5.2)
PROTHROMBIN TIME: 24.2 SECONDS (ref 10–13.8)

## 2020-12-30 PROCEDURE — 96372 THER/PROPH/DIAG INJ SC/IM: CPT

## 2020-12-30 PROCEDURE — 85018 HEMOGLOBIN: CPT | Performed by: INTERNAL MEDICINE

## 2020-12-30 PROCEDURE — 82565 ASSAY OF CREATININE: CPT | Performed by: INTERNAL MEDICINE

## 2020-12-30 PROCEDURE — 85014 HEMATOCRIT: CPT | Performed by: INTERNAL MEDICINE

## 2020-12-30 PROCEDURE — 85610 PROTHROMBIN TIME: CPT

## 2020-12-30 PROCEDURE — 36415 COLL VENOUS BLD VENIPUNCTURE: CPT

## 2020-12-30 PROCEDURE — 36416 COLLJ CAPILLARY BLOOD SPEC: CPT

## 2020-12-30 PROCEDURE — 25010000002 EPOETIN ALFA-EPBX 10000 UNIT/ML SOLUTION: Performed by: INTERNAL MEDICINE

## 2020-12-30 PROCEDURE — G0463 HOSPITAL OUTPT CLINIC VISIT: HCPCS

## 2020-12-30 PROCEDURE — 84132 ASSAY OF SERUM POTASSIUM: CPT | Performed by: INTERNAL MEDICINE

## 2020-12-30 RX ADMIN — EPOETIN ALFA-EPBX 20000 UNITS: 10000 INJECTION, SOLUTION INTRAVENOUS; SUBCUTANEOUS at 12:59

## 2020-12-30 NOTE — PROGRESS NOTES
Anticoagulation Clinic Progress Note  Indication: afib  Referring Provider: Tyson (next appointment: 4/15/2020) (Claudia 5/20/2020)  Initial Warfarin Start Date: 2007  Goal INR: 2-3  Current Drug Interactions:  torsemide  Diet: eats salads 2x weekly 11/4/20  [id number]: 5 (HTN, Age, CHF, CAD)  Other: anemic, CKD (stage 3) May need bridge with Lovenox given persistent AF (per cardio 1/24/2019), Scr was 2.38 on 12/10/19     Anticoagulation Clinic INR History:  Date 10/23 11/20 12/8 1/5/18 2/2 3/2 3/16 3/30 4/17 5/17 6/14 7/19 8/1 8/22 9/12   Total Weekly Dose 37.5mg 37.5 mg 37.5 mg 37.5 mg 37.5mg 37.5 mg 37.5 mg 37.5 mg 37.5mg 37.5 mg 37.5 mg 37.5mg 37.5 mg 37.5 mg 37.5 mg    INR 2.5 1.8 2.5 2.3 2.1 1.7 3.2 2.1 2.3 2.06 2.8 3.5 2.7 2.7 3.0   Notes      (boost), amox amox   PAT     diarrhea     Date 10/1 10/8 10/12 10/17 10/22 10/29 11/5 11/12 11/19 11/29 12/13 1/3 1/24/19 2/12 2/18   Total Weekly Dose 26.25 mg 23.75mg 22.5  mg 23.75 mg 27.5 mg 28.75 mg 27.5 mg 28.75 mg 28.75 mg 28.75 mg 28.75 28.75mg 28.75 mg 17.5 mg 28.75mg   INR 3.0 2.1 1.6 1.3 1.6 2.8 2.0 2.5 2.4 2.76 2.8 2.7 2.9 1.7 1.5   Notes post- disch amio start 10/1        PAT   held x 7 days; resumed 1/22; D/C amio asa dc'd torsemide reduced,      Date 2/22 2/26 3/6 3/15 3/29 4/12 4/26 5/14 6/11 7/9 8/6 9/3 10/1 10/17   Total Weekly Dose 31.75 mg 33.75 mg 35mg 33.75 mg 33.75 mg 33.75 mg 32.5 mg 32.5 mg 32.5 mg 32.5 mg 32.5 mg 32.5 mg 32.5 mg 33.75 mg   INR 1.6 2.0 2.8 2.8 3.1 3.1 2.8 2.5 2.2 2.4 2.5 2.4 2.0 3.7   Notes  enox                 Date 10/30 11/20 12/17 1/14/20 2/11 3/11 4/8 5/6 6/11 7/9 8/6 9/3 9/14 9/30   Total Weekly Dose 32.5 mg 32.5 mg 32.5 mg 32.5 mg 32.5mg 32.5mg 32.5mg 32.5mg 32.5mg 32.5 mg 32.5 mg 32.5mg 32.5mg 26.25 mg   INR 2.3 2.3 2.1 1.9 2.2 2.1 2.1 2.2 2.2 2.3 2.7 1.6 2.2  1.8   Notes            ??  5 day hold     Date 10/14 11/4 11/11 11/25 12/9 12/16 12/30          Total Weekly Dose 32.5 mg 32.5mg 35mg 32.5mg 33.75 mg 36.25 mg  33.75 mg          INR 2.0 1.6 2.1 1.8 1.8 2.5 2.0          Notes   1x boost GLV boostx1 boostx1             Takes warfarin in the am    Clinic Interview:  Verbal Release Authorization signed on 8/22/2018 -- may speak with Raeann Montemayor (Wife). [phone] (Home) *Preferred*  Tablet Strength: pt has 7.5mg and 2.5mg tablets    Patient Findings  Positives:  Missed doses   Negatives:  Signs/symptoms of thrombosis, Signs/symptoms of bleeding, Laboratory test error suspected, Change in health, Change in alcohol use, Change in activity, Upcoming invasive procedure, Emergency department visit, Upcoming dental procedure, Extra doses, Change in medications, Change in diet/appetite, Hospital admission, Bruising, Other complaints   Comments:  Mr. Montemayor forgot and has continued 3.75mg on Tuesday instead of 5mg. He continues procrit injections.     Plan:   1. INR is therapeutic today at 2.0. Instructed Mr. Montemayor to increase dose of warfarin 5mg oral daily until recheck (35mg weekly) as was intended to do last encounter.  2. RTC in 2 weeks on 1/13/2020 to ensure WNL  Mr. Montemayor will come to the clinic before his weekly injection at the infusion center.   3. Verbal and written information provided in the clinic. Mr. Montemayor expresses understanding with teach back and has no further questions at this time.     Evelina Quan, PharmD  12/30/20  11:55 EST

## 2021-01-06 ENCOUNTER — HOSPITAL ENCOUNTER (OUTPATIENT)
Dept: ONCOLOGY | Facility: HOSPITAL | Age: 76
Discharge: HOME | End: 2021-01-06
Admitting: INTERNAL MEDICINE

## 2021-01-06 VITALS
BODY MASS INDEX: 38.94 KG/M2 | TEMPERATURE: 97.4 F | DIASTOLIC BLOOD PRESSURE: 87 MMHG | RESPIRATION RATE: 20 BRPM | WEIGHT: 271.4 LBS | SYSTOLIC BLOOD PRESSURE: 172 MMHG | HEART RATE: 82 BPM

## 2021-01-06 DIAGNOSIS — N18.4 STAGE 4 CHRONIC KIDNEY DISEASE (HCC): ICD-10-CM

## 2021-01-06 LAB
CREAT SERPL-MCNC: 2.91 MG/DL (ref 0.76–1.27)
GFR SERPL CREATININE-BSD FRML MDRD: 21 ML/MIN/1.73
HCT VFR BLD AUTO: 36.8 % (ref 37.5–51)
HGB BLD-MCNC: 11.4 G/DL (ref 13–17.7)
POTASSIUM SERPL-SCNC: 4.2 MMOL/L (ref 3.5–5.2)

## 2021-01-06 PROCEDURE — 36415 COLL VENOUS BLD VENIPUNCTURE: CPT

## 2021-01-06 PROCEDURE — 82565 ASSAY OF CREATININE: CPT | Performed by: INTERNAL MEDICINE

## 2021-01-06 PROCEDURE — 85014 HEMATOCRIT: CPT | Performed by: INTERNAL MEDICINE

## 2021-01-06 PROCEDURE — 84132 ASSAY OF SERUM POTASSIUM: CPT | Performed by: INTERNAL MEDICINE

## 2021-01-06 PROCEDURE — 85018 HEMOGLOBIN: CPT | Performed by: INTERNAL MEDICINE

## 2021-01-13 ENCOUNTER — ANTICOAGULATION VISIT (OUTPATIENT)
Dept: PHARMACY | Facility: HOSPITAL | Age: 76
End: 2021-01-13

## 2021-01-13 LAB
INR PPP: 3 (ref 0.91–1.09)
PROTHROMBIN TIME: 35.6 SECONDS (ref 10–13.8)

## 2021-01-13 PROCEDURE — 36416 COLLJ CAPILLARY BLOOD SPEC: CPT

## 2021-01-13 PROCEDURE — 85610 PROTHROMBIN TIME: CPT

## 2021-01-13 PROCEDURE — G0463 HOSPITAL OUTPT CLINIC VISIT: HCPCS

## 2021-01-13 NOTE — PROGRESS NOTES
Anticoagulation Clinic Progress Note  Indication: afib  Referring Provider: Tyson (next appointment: 4/15/2020) (Claudia 5/20/2020)  Initial Warfarin Start Date: 2007  Goal INR: 2-3  Current Drug Interactions:  torsemide  Diet: eats salads 2x weekly 11/4/20  [id number]: 5 (HTN, Age, CHF, CAD)  Other: anemic, CKD (stage 3) May need bridge with Lovenox given persistent AF (per cardio 1/24/2019), Scr was 2.38 on 12/10/19     Anticoagulation Clinic INR History:  Date 10/23 11/20 12/8 1/5/18 2/2 3/2 3/16 3/30 4/17 5/17 6/14 7/19 8/1 8/22 9/12   Total Weekly Dose 37.5mg 37.5 mg 37.5 mg 37.5 mg 37.5mg 37.5 mg 37.5 mg 37.5 mg 37.5mg 37.5 mg 37.5 mg 37.5mg 37.5 mg 37.5 mg 37.5 mg    INR 2.5 1.8 2.5 2.3 2.1 1.7 3.2 2.1 2.3 2.06 2.8 3.5 2.7 2.7 3.0   Notes      (boost), amox amox   PAT     diarrhea     Date 10/1 10/8 10/12 10/17 10/22 10/29 11/5 11/12 11/19 11/29 12/13 1/3 1/24/19 2/12 2/18   Total Weekly Dose 26.25 mg 23.75mg 22.5  mg 23.75 mg 27.5 mg 28.75 mg 27.5 mg 28.75 mg 28.75 mg 28.75 mg 28.75 28.75mg 28.75 mg 17.5 mg 28.75mg   INR 3.0 2.1 1.6 1.3 1.6 2.8 2.0 2.5 2.4 2.76 2.8 2.7 2.9 1.7 1.5   Notes post- disch amio start 10/1        PAT   held x 7 days; resumed 1/22; D/C amio asa dc'd torsemide reduced,      Date 2/22 2/26 3/6 3/15 3/29 4/12 4/26 5/14 6/11 7/9 8/6 9/3 10/1 10/17   Total Weekly Dose 31.75 mg 33.75 mg 35mg 33.75 mg 33.75 mg 33.75 mg 32.5 mg 32.5 mg 32.5 mg 32.5 mg 32.5 mg 32.5 mg 32.5 mg 33.75 mg   INR 1.6 2.0 2.8 2.8 3.1 3.1 2.8 2.5 2.2 2.4 2.5 2.4 2.0 3.7   Notes  enox                 Date 10/30 11/20 12/17 1/14/20 2/11 3/11 4/8 5/6 6/11 7/9 8/6 9/3 9/14 9/30   Total Weekly Dose 32.5 mg 32.5 mg 32.5 mg 32.5 mg 32.5mg 32.5mg 32.5mg 32.5mg 32.5mg 32.5 mg 32.5 mg 32.5mg 32.5mg 26.25 mg   INR 2.3 2.3 2.1 1.9 2.2 2.1 2.1 2.2 2.2 2.3 2.7 1.6 2.2  1.8   Notes            ??  5 day hold     Date 10/14 11/4 11/11 11/25 12/9 12/16 12/30 1/13         Total Weekly Dose 32.5 mg 32.5mg 35mg 32.5mg 33.75 mg 36.25  mg 33.75 mg 35mg         INR 2.0 1.6 2.1 1.8 1.8 2.5 2.0 3.0         Notes   1x boost GLV boostx1 boostx1             Takes warfarin in the am    Clinic Interview:  Verbal Release Authorization signed on 8/22/2018 -- may speak with Raeann Montemayor (Wife). [phone] (Home) *Preferred*  Tablet Strength: pt has 7.5mg and 2.5mg tablets    Patient Findings  Positives:  Change in medications   Negatives:  Signs/symptoms of thrombosis, Signs/symptoms of bleeding, Laboratory test error suspected, Change in health, Change in alcohol use, Change in activity, Upcoming invasive procedure, Emergency department visit, Upcoming dental procedure, Missed doses, Extra doses, Change in diet/appetite, Hospital admission, Bruising, Other complaints   Comments:  Has stopped procrit injection due to Hg >11. Will have this rechecked 2/3         Plan:   1. INR is therapeutic today at 3.0 ([facility]). Considering large increase in 2 weeks, Instructed Mr. Montemayor to decrease dose of warfarin 5mg oral daily except 3.75mg Wednesdays until recheck   2. RTC in 3 weeks per pt preference to align with next appt  3. Verbal and written information provided in the clinic. Mr. Montemayor expresses understanding with teach back and has no further questions at this time.     Kristine Sutton, PharmD.  01/13/21   11:52 EST

## 2021-02-03 ENCOUNTER — ANTICOAGULATION VISIT (OUTPATIENT)
Dept: PHARMACY | Facility: HOSPITAL | Age: 76
End: 2021-02-03

## 2021-02-03 ENCOUNTER — HOSPITAL ENCOUNTER (OUTPATIENT)
Dept: ONCOLOGY | Facility: HOSPITAL | Age: 76
Discharge: HOME | End: 2021-02-03
Admitting: INTERNAL MEDICINE

## 2021-02-03 VITALS
DIASTOLIC BLOOD PRESSURE: 78 MMHG | RESPIRATION RATE: 20 BRPM | BODY MASS INDEX: 38.74 KG/M2 | TEMPERATURE: 97.3 F | SYSTOLIC BLOOD PRESSURE: 154 MMHG | WEIGHT: 270 LBS | HEART RATE: 94 BPM

## 2021-02-03 DIAGNOSIS — N18.4 STAGE 4 CHRONIC KIDNEY DISEASE (HCC): Primary | ICD-10-CM

## 2021-02-03 LAB
CREAT SERPL-MCNC: 3.04 MG/DL (ref 0.76–1.27)
FERRITIN SERPL-MCNC: 238.1 NG/ML (ref 30–400)
GFR SERPL CREATININE-BSD FRML MDRD: 20 ML/MIN/1.73
HCT VFR BLD AUTO: 33.5 % (ref 37.5–51)
HGB BLD-MCNC: 10.3 G/DL (ref 13–17.7)
INR PPP: 2.1 (ref 0.91–1.09)
IRON 24H UR-MRATE: 71 MCG/DL (ref 59–158)
IRON SATN MFR SERPL: 23 % (ref 20–50)
POTASSIUM SERPL-SCNC: 4.8 MMOL/L (ref 3.5–5.2)
PROTHROMBIN TIME: 25.6 SECONDS (ref 10–13.8)
TIBC SERPL-MCNC: 305 MCG/DL (ref 298–536)
TRANSFERRIN SERPL-MCNC: 205 MG/DL (ref 200–360)

## 2021-02-03 PROCEDURE — 36416 COLLJ CAPILLARY BLOOD SPEC: CPT

## 2021-02-03 PROCEDURE — 85014 HEMATOCRIT: CPT | Performed by: INTERNAL MEDICINE

## 2021-02-03 PROCEDURE — 84466 ASSAY OF TRANSFERRIN: CPT | Performed by: INTERNAL MEDICINE

## 2021-02-03 PROCEDURE — G0463 HOSPITAL OUTPT CLINIC VISIT: HCPCS

## 2021-02-03 PROCEDURE — 82565 ASSAY OF CREATININE: CPT | Performed by: INTERNAL MEDICINE

## 2021-02-03 PROCEDURE — 82728 ASSAY OF FERRITIN: CPT | Performed by: INTERNAL MEDICINE

## 2021-02-03 PROCEDURE — 83540 ASSAY OF IRON: CPT | Performed by: INTERNAL MEDICINE

## 2021-02-03 PROCEDURE — 85018 HEMOGLOBIN: CPT | Performed by: INTERNAL MEDICINE

## 2021-02-03 PROCEDURE — 25010000002 EPOETIN ALFA-EPBX 10000 UNIT/ML SOLUTION: Performed by: INTERNAL MEDICINE

## 2021-02-03 PROCEDURE — 85610 PROTHROMBIN TIME: CPT

## 2021-02-03 PROCEDURE — 84132 ASSAY OF SERUM POTASSIUM: CPT | Performed by: INTERNAL MEDICINE

## 2021-02-03 PROCEDURE — 96372 THER/PROPH/DIAG INJ SC/IM: CPT

## 2021-02-03 RX ADMIN — EPOETIN ALFA-EPBX 20000 UNITS: 10000 INJECTION, SOLUTION INTRAVENOUS; SUBCUTANEOUS at 14:43

## 2021-02-03 NOTE — PROGRESS NOTES
Anticoagulation Clinic Progress Note  Indication: afib  Referring Provider: Tyson (next appointment: 4/15/2020) (Claudia 5/20/2020)  Initial Warfarin Start Date: 2007  Goal INR: 2-3  Current Drug Interactions:  torsemide  Diet: eats salads 2x weekly 11/4/20  [id number]: 5 (HTN, Age, CHF, CAD)  Other: anemic, CKD (stage 3) May need bridge with Lovenox given persistent AF (per cardio 1/24/2019), Scr was 2.38 on 12/10/19     Anticoagulation Clinic INR History:  Date 10/23 11/20 12/8 1/5/18 2/2 3/2 3/16 3/30 4/17 5/17 6/14 7/19 8/1 8/22 9/12   Total Weekly Dose 37.5mg 37.5 mg 37.5 mg 37.5 mg 37.5mg 37.5 mg 37.5 mg 37.5 mg 37.5mg 37.5 mg 37.5 mg 37.5mg 37.5 mg 37.5 mg 37.5 mg    INR 2.5 1.8 2.5 2.3 2.1 1.7 3.2 2.1 2.3 2.06 2.8 3.5 2.7 2.7 3.0   Notes      (boost), amox amox   PAT     diarrhea     Date 10/1 10/8 10/12 10/17 10/22 10/29 11/5 11/12 11/19 11/29 12/13 1/3 1/24/19 2/12 2/18   Total Weekly Dose 26.25 mg 23.75mg 22.5  mg 23.75 mg 27.5 mg 28.75 mg 27.5 mg 28.75 mg 28.75 mg 28.75 mg 28.75 28.75mg 28.75 mg 17.5 mg 28.75mg   INR 3.0 2.1 1.6 1.3 1.6 2.8 2.0 2.5 2.4 2.76 2.8 2.7 2.9 1.7 1.5   Notes post- disch amio start 10/1        PAT   held x 7 days; resumed 1/22; D/C amio asa dc'd torsemide reduced,      Date 2/22 2/26 3/6 3/15 3/29 4/12 4/26 5/14 6/11 7/9 8/6 9/3 10/1 10/17   Total Weekly Dose 31.75 mg 33.75 mg 35mg 33.75 mg 33.75 mg 33.75 mg 32.5 mg 32.5 mg 32.5 mg 32.5 mg 32.5 mg 32.5 mg 32.5 mg 33.75 mg   INR 1.6 2.0 2.8 2.8 3.1 3.1 2.8 2.5 2.2 2.4 2.5 2.4 2.0 3.7   Notes  enox                 Date 10/30 11/20 12/17 1/14/20 2/11 3/11 4/8 5/6 6/11 7/9 8/6 9/3 9/14 9/30   Total Weekly Dose 32.5 mg 32.5 mg 32.5 mg 32.5 mg 32.5mg 32.5mg 32.5mg 32.5mg 32.5mg 32.5 mg 32.5 mg 32.5mg 32.5mg 26.25 mg   INR 2.3 2.3 2.1 1.9 2.2 2.1 2.1 2.2 2.2 2.3 2.7 1.6 2.2  1.8   Notes            ??  5 day hold     Date 10/14 11/4 11/11 11/25 12/9 12/16 12/30 1/13 2/3        Total Weekly Dose 32.5 mg 32.5mg 35mg 32.5mg 33.75 mg  36.25 mg 33.75 mg 35mg 33.75        INR 2.0 1.6 2.1 1.8 1.8 2.5 2.0 3.0 2.1        Notes   1x boost GLV boostx1 boostx1             Takes warfarin in the am    Clinic Interview:  Verbal Release Authorization signed on 8/22/2018 -- may speak with Raeann Montemayor (Wife). [phone] (Home) *Preferred*  Tablet Strength: pt has 7.5mg and 2.5mg tablets    Patient Findings        Plan:   1. INR is therapeutic today at 2.1.  Instructed Mr. Montemayor to continue warfarin 5mg oral daily except 3.75mg Wednesdays until recheck   2. RTC in 3 weeks per pt preference to align with next appt  3. Verbal and written information provided in the clinic. Mr. Montemayor expresses understanding with teach back and has no further questions at this time.

## 2021-02-03 NOTE — PROGRESS NOTES
Anticoagulation Clinic Progress Note  Indication: afib  Referring Provider: Tyson (next appointment: 4/15/2020) (Claudia 5/20/2020)  Initial Warfarin Start Date: 2007  Goal INR: 2-3  Current Drug Interactions:  torsemide  Diet: eats salads 2x weekly 11/4/20  [id number]: 5 (HTN, Age, CHF, CAD)  Other: anemic, CKD (stage 3) May need bridge with Lovenox given persistent AF (per cardio 1/24/2019), Scr was 2.38 on 12/10/19     Anticoagulation Clinic INR History:  Date 10/23 11/20 12/8 1/5/18 2/2 3/2 3/16 3/30 4/17 5/17 6/14 7/19 8/1 8/22 9/12   Total Weekly Dose 37.5mg 37.5 mg 37.5 mg 37.5 mg 37.5mg 37.5 mg 37.5 mg 37.5 mg 37.5mg 37.5 mg 37.5 mg 37.5mg 37.5 mg 37.5 mg 37.5 mg    INR 2.5 1.8 2.5 2.3 2.1 1.7 3.2 2.1 2.3 2.06 2.8 3.5 2.7 2.7 3.0   Notes      (boost), amox amox   PAT     diarrhea     Date 10/1 10/8 10/12 10/17 10/22 10/29 11/5 11/12 11/19 11/29 12/13 1/3 1/24/19 2/12 2/18   Total Weekly Dose 26.25 mg 23.75mg 22.5  mg 23.75 mg 27.5 mg 28.75 mg 27.5 mg 28.75 mg 28.75 mg 28.75 mg 28.75 28.75mg 28.75 mg 17.5 mg 28.75mg   INR 3.0 2.1 1.6 1.3 1.6 2.8 2.0 2.5 2.4 2.76 2.8 2.7 2.9 1.7 1.5   Notes post- disch amio start 10/1        PAT   held x 7 days; resumed 1/22; D/C amio asa dc'd torsemide reduced,      Date 2/22 2/26 3/6 3/15 3/29 4/12 4/26 5/14 6/11 7/9 8/6 9/3 10/1 10/17   Total Weekly Dose 31.75 mg 33.75 mg 35mg 33.75 mg 33.75 mg 33.75 mg 32.5 mg 32.5 mg 32.5 mg 32.5 mg 32.5 mg 32.5 mg 32.5 mg 33.75 mg   INR 1.6 2.0 2.8 2.8 3.1 3.1 2.8 2.5 2.2 2.4 2.5 2.4 2.0 3.7   Notes  enox                 Date 10/30 11/20 12/17 1/14/20 2/11 3/11 4/8 5/6 6/11 7/9 8/6 9/3 9/14 9/30   Total Weekly Dose 32.5 mg 32.5 mg 32.5 mg 32.5 mg 32.5mg 32.5mg 32.5mg 32.5mg 32.5mg 32.5 mg 32.5 mg 32.5mg 32.5mg 26.25 mg   INR 2.3 2.3 2.1 1.9 2.2 2.1 2.1 2.2 2.2 2.3 2.7 1.6 2.2  1.8   Notes            ??  5 day hold     Date 10/14 11/4 11/11 11/25 12/9 12/16 12/30 1/13 2/3        Total Weekly Dose 32.5 mg 32.5mg 35mg 32.5mg 33.75 mg  36.25 mg 33.75 mg 35mg 33.75mg        INR 2.0 1.6 2.1 1.8 1.8 2.5 2.0 3.0 2.1        Notes   1x boost GLV boostx1 boostx1             Takes warfarin in the am    Clinic Interview:  Verbal Release Authorization signed on 8/22/2018 -- may speak with Raeann Montemayor (Wife). [phone] (Home) *Preferred*  Tablet Strength: pt has 7.5mg and 2.5mg tablets    Patient Findings  Negatives:  Signs/symptoms of thrombosis, Signs/symptoms of bleeding, Laboratory test error suspected, Change in health, Change in alcohol use, Change in activity, Upcoming invasive procedure, Emergency department visit, Upcoming dental procedure, Missed doses, Extra doses, Change in medications, Change in diet/appetite, Hospital admission, Bruising, Other complaints   Comments:  Has stopped procrit injection due to Hg >11 1/6/2021. Will have this rechecked today.      Plan:   1. INR is therapeutic today at 3.0 (UL[facility]). Instructed Mr. Montemayor to decrease dose of warfarin 5mg oral daily except 3.75mg Wednesdays until recheck   2. RTC in 4 weeks per pt preference to align with next appt  3. Verbal and written information provided in the clinic. Mr. Montemayor expresses understanding with teach back and has no further questions at this time.     Evelina Quan, PharmD  02/03/21   14:04 EST

## 2021-02-10 ENCOUNTER — HOSPITAL ENCOUNTER (OUTPATIENT)
Dept: ONCOLOGY | Facility: HOSPITAL | Age: 76
Discharge: HOME | End: 2021-02-10
Admitting: INTERNAL MEDICINE

## 2021-02-10 VITALS
HEART RATE: 92 BPM | BODY MASS INDEX: 38.65 KG/M2 | SYSTOLIC BLOOD PRESSURE: 138 MMHG | WEIGHT: 270 LBS | TEMPERATURE: 97.7 F | DIASTOLIC BLOOD PRESSURE: 76 MMHG | HEIGHT: 70 IN | RESPIRATION RATE: 16 BRPM

## 2021-02-10 DIAGNOSIS — N18.4 STAGE 4 CHRONIC KIDNEY DISEASE (HCC): Primary | ICD-10-CM

## 2021-02-10 LAB
CREAT SERPL-MCNC: 2.83 MG/DL (ref 0.76–1.27)
GFR SERPL CREATININE-BSD FRML MDRD: 22 ML/MIN/1.73
HCT VFR BLD AUTO: 33.4 % (ref 37.5–51)
HGB BLD-MCNC: 10.4 G/DL (ref 13–17.7)
POTASSIUM SERPL-SCNC: 4.5 MMOL/L (ref 3.5–5.2)

## 2021-02-10 PROCEDURE — 36415 COLL VENOUS BLD VENIPUNCTURE: CPT

## 2021-02-10 PROCEDURE — 85014 HEMATOCRIT: CPT | Performed by: INTERNAL MEDICINE

## 2021-02-10 PROCEDURE — 96372 THER/PROPH/DIAG INJ SC/IM: CPT

## 2021-02-10 PROCEDURE — 82565 ASSAY OF CREATININE: CPT | Performed by: INTERNAL MEDICINE

## 2021-02-10 PROCEDURE — 85018 HEMOGLOBIN: CPT | Performed by: INTERNAL MEDICINE

## 2021-02-10 PROCEDURE — 25010000002 EPOETIN ALFA-EPBX 10000 UNIT/ML SOLUTION: Performed by: INTERNAL MEDICINE

## 2021-02-10 PROCEDURE — 84132 ASSAY OF SERUM POTASSIUM: CPT | Performed by: INTERNAL MEDICINE

## 2021-02-10 RX ADMIN — EPOETIN ALFA-EPBX 20000 UNITS: 10000 INJECTION, SOLUTION INTRAVENOUS; SUBCUTANEOUS at 13:40

## 2021-02-17 ENCOUNTER — HOSPITAL ENCOUNTER (OUTPATIENT)
Dept: ONCOLOGY | Facility: HOSPITAL | Age: 76
Discharge: HOME | End: 2021-02-17
Admitting: INTERNAL MEDICINE

## 2021-02-17 VITALS
SYSTOLIC BLOOD PRESSURE: 121 MMHG | RESPIRATION RATE: 18 BRPM | DIASTOLIC BLOOD PRESSURE: 66 MMHG | HEIGHT: 70 IN | TEMPERATURE: 97.7 F | HEART RATE: 86 BPM | BODY MASS INDEX: 38.37 KG/M2 | WEIGHT: 268 LBS

## 2021-02-17 DIAGNOSIS — N18.4 STAGE 4 CHRONIC KIDNEY DISEASE (HCC): Primary | ICD-10-CM

## 2021-02-17 LAB
CREAT SERPL-MCNC: 3.15 MG/DL (ref 0.76–1.27)
GFR SERPL CREATININE-BSD FRML MDRD: 19 ML/MIN/1.73
HCT VFR BLD AUTO: 30.7 % (ref 37.5–51)
HGB BLD-MCNC: 10.1 G/DL (ref 13–17.7)
POTASSIUM SERPL-SCNC: 4.7 MMOL/L (ref 3.5–5.2)

## 2021-02-17 PROCEDURE — 82565 ASSAY OF CREATININE: CPT | Performed by: INTERNAL MEDICINE

## 2021-02-17 PROCEDURE — 96372 THER/PROPH/DIAG INJ SC/IM: CPT

## 2021-02-17 PROCEDURE — 84132 ASSAY OF SERUM POTASSIUM: CPT | Performed by: INTERNAL MEDICINE

## 2021-02-17 PROCEDURE — 25010000002 EPOETIN ALFA-EPBX 10000 UNIT/ML SOLUTION: Performed by: INTERNAL MEDICINE

## 2021-02-17 PROCEDURE — 85014 HEMATOCRIT: CPT | Performed by: INTERNAL MEDICINE

## 2021-02-17 PROCEDURE — 85018 HEMOGLOBIN: CPT | Performed by: INTERNAL MEDICINE

## 2021-02-17 RX ADMIN — EPOETIN ALFA-EPBX 20000 UNITS: 10000 INJECTION, SOLUTION INTRAVENOUS; SUBCUTANEOUS at 13:56

## 2021-02-24 ENCOUNTER — HOSPITAL ENCOUNTER (OUTPATIENT)
Dept: ONCOLOGY | Facility: HOSPITAL | Age: 76
Discharge: HOME | End: 2021-02-24
Admitting: INTERNAL MEDICINE

## 2021-02-24 VITALS
BODY MASS INDEX: 38.65 KG/M2 | DIASTOLIC BLOOD PRESSURE: 87 MMHG | TEMPERATURE: 97.8 F | HEIGHT: 70 IN | SYSTOLIC BLOOD PRESSURE: 157 MMHG | HEART RATE: 95 BPM | RESPIRATION RATE: 20 BRPM | WEIGHT: 270 LBS

## 2021-02-24 DIAGNOSIS — N18.4 STAGE 4 CHRONIC KIDNEY DISEASE (HCC): Primary | ICD-10-CM

## 2021-02-24 LAB
CREAT SERPL-MCNC: 3.11 MG/DL (ref 0.76–1.27)
GFR SERPL CREATININE-BSD FRML MDRD: 20 ML/MIN/1.73
HCT VFR BLD AUTO: 32.9 % (ref 37.5–51)
HGB BLD-MCNC: 10.4 G/DL (ref 13–17.7)
POTASSIUM SERPL-SCNC: 4.6 MMOL/L (ref 3.5–5.2)

## 2021-02-24 PROCEDURE — 85014 HEMATOCRIT: CPT | Performed by: INTERNAL MEDICINE

## 2021-02-24 PROCEDURE — 96372 THER/PROPH/DIAG INJ SC/IM: CPT

## 2021-02-24 PROCEDURE — 36415 COLL VENOUS BLD VENIPUNCTURE: CPT

## 2021-02-24 PROCEDURE — 25010000002 EPOETIN ALFA-EPBX 10000 UNIT/ML SOLUTION: Performed by: INTERNAL MEDICINE

## 2021-02-24 PROCEDURE — 85018 HEMOGLOBIN: CPT | Performed by: INTERNAL MEDICINE

## 2021-02-24 PROCEDURE — 82565 ASSAY OF CREATININE: CPT | Performed by: INTERNAL MEDICINE

## 2021-02-24 PROCEDURE — 84132 ASSAY OF SERUM POTASSIUM: CPT | Performed by: INTERNAL MEDICINE

## 2021-02-24 RX ADMIN — EPOETIN ALFA-EPBX 20000 UNITS: 10000 INJECTION, SOLUTION INTRAVENOUS; SUBCUTANEOUS at 14:34

## 2021-03-03 ENCOUNTER — ANTICOAGULATION VISIT (OUTPATIENT)
Dept: PHARMACY | Facility: HOSPITAL | Age: 76
End: 2021-03-03

## 2021-03-03 ENCOUNTER — HOSPITAL ENCOUNTER (OUTPATIENT)
Dept: ONCOLOGY | Facility: HOSPITAL | Age: 76
Discharge: HOME | End: 2021-03-03
Admitting: INTERNAL MEDICINE

## 2021-03-03 VITALS
SYSTOLIC BLOOD PRESSURE: 124 MMHG | TEMPERATURE: 97.4 F | DIASTOLIC BLOOD PRESSURE: 67 MMHG | HEIGHT: 70 IN | HEART RATE: 83 BPM | RESPIRATION RATE: 16 BRPM | BODY MASS INDEX: 38.8 KG/M2 | WEIGHT: 271 LBS

## 2021-03-03 DIAGNOSIS — N18.4 STAGE 4 CHRONIC KIDNEY DISEASE (HCC): Primary | ICD-10-CM

## 2021-03-03 LAB
CREAT SERPL-MCNC: 3.04 MG/DL (ref 0.76–1.27)
FERRITIN SERPL-MCNC: 102.1 NG/ML (ref 30–400)
GFR SERPL CREATININE-BSD FRML MDRD: 20 ML/MIN/1.73
HCT VFR BLD AUTO: 35.4 % (ref 37.5–51)
HGB BLD-MCNC: 11.1 G/DL (ref 13–17.7)
INR PPP: 2.4 (ref 0.91–1.09)
IRON 24H UR-MRATE: 37 MCG/DL (ref 59–158)
IRON SATN MFR SERPL: 13 % (ref 20–50)
POTASSIUM SERPL-SCNC: 4.2 MMOL/L (ref 3.5–5.2)
PROTHROMBIN TIME: 28.9 SECONDS (ref 10–13.8)
TIBC SERPL-MCNC: 289 MCG/DL (ref 298–536)
TRANSFERRIN SERPL-MCNC: 194 MG/DL (ref 200–360)

## 2021-03-03 PROCEDURE — 85018 HEMOGLOBIN: CPT | Performed by: INTERNAL MEDICINE

## 2021-03-03 PROCEDURE — 84466 ASSAY OF TRANSFERRIN: CPT | Performed by: INTERNAL MEDICINE

## 2021-03-03 PROCEDURE — 36416 COLLJ CAPILLARY BLOOD SPEC: CPT

## 2021-03-03 PROCEDURE — 84132 ASSAY OF SERUM POTASSIUM: CPT | Performed by: INTERNAL MEDICINE

## 2021-03-03 PROCEDURE — 83540 ASSAY OF IRON: CPT | Performed by: INTERNAL MEDICINE

## 2021-03-03 PROCEDURE — 85014 HEMATOCRIT: CPT | Performed by: INTERNAL MEDICINE

## 2021-03-03 PROCEDURE — 82565 ASSAY OF CREATININE: CPT | Performed by: INTERNAL MEDICINE

## 2021-03-03 PROCEDURE — 82728 ASSAY OF FERRITIN: CPT | Performed by: INTERNAL MEDICINE

## 2021-03-03 PROCEDURE — 85610 PROTHROMBIN TIME: CPT

## 2021-03-03 PROCEDURE — G0463 HOSPITAL OUTPT CLINIC VISIT: HCPCS

## 2021-03-03 PROCEDURE — 36415 COLL VENOUS BLD VENIPUNCTURE: CPT

## 2021-03-03 NOTE — PROGRESS NOTES
Anticoagulation Clinic Progress Note  Indication: afib  Referring Provider: Tyson (next appointment: 4/15/2020) (Claudia 5/20/2020)  Initial Warfarin Start Date: 2007  Goal INR: 2-3  Current Drug Interactions:  torsemide  Diet: eats salads 2x weekly 11/4/20  [id number]: 5 (HTN, Age, CHF, CAD)  Other: anemic, CKD (stage 3) May need bridge with Lovenox given persistent AF (per cardio 1/24/2019), Scr was 2.38 on 12/10/19     Anticoagulation Clinic INR History:  Date 10/23 11/20 12/8 1/5/18 2/2 3/2 3/16 3/30 4/17 5/17 6/14 7/19 8/1 8/22 9/12   Total Weekly Dose 37.5mg 37.5 mg 37.5 mg 37.5 mg 37.5mg 37.5 mg 37.5 mg 37.5 mg 37.5mg 37.5 mg 37.5 mg 37.5mg 37.5 mg 37.5 mg 37.5 mg    INR 2.5 1.8 2.5 2.3 2.1 1.7 3.2 2.1 2.3 2.06 2.8 3.5 2.7 2.7 3.0   Notes      (boost), amox amox   PAT     diarrhea     Date 10/1 10/8 10/12 10/17 10/22 10/29 11/5 11/12 11/19 11/29 12/13 1/3 1/24/19 2/12 2/18   Total Weekly Dose 26.25 mg 23.75mg 22.5  mg 23.75 mg 27.5 mg 28.75 mg 27.5 mg 28.75 mg 28.75 mg 28.75 mg 28.75 28.75mg 28.75 mg 17.5 mg 28.75mg   INR 3.0 2.1 1.6 1.3 1.6 2.8 2.0 2.5 2.4 2.76 2.8 2.7 2.9 1.7 1.5   Notes post- disch amio start 10/1        PAT   held x 7 days; resumed 1/22; D/C amio asa dc'd torsemide reduced,      Date 2/22 2/26 3/6 3/15 3/29 4/12 4/26 5/14 6/11 7/9 8/6 9/3 10/1 10/17   Total Weekly Dose 31.75 mg 33.75 mg 35mg 33.75 mg 33.75 mg 33.75 mg 32.5 mg 32.5 mg 32.5 mg 32.5 mg 32.5 mg 32.5 mg 32.5 mg 33.75 mg   INR 1.6 2.0 2.8 2.8 3.1 3.1 2.8 2.5 2.2 2.4 2.5 2.4 2.0 3.7   Notes  enox                 Date 10/30 11/20 12/17 1/14/20 2/11 3/11 4/8 5/6 6/11 7/9 8/6 9/3 9/14 9/30   Total Weekly Dose 32.5 mg 32.5 mg 32.5 mg 32.5 mg 32.5mg 32.5mg 32.5mg 32.5mg 32.5mg 32.5 mg 32.5 mg 32.5mg 32.5mg 26.25 mg   INR 2.3 2.3 2.1 1.9 2.2 2.1 2.1 2.2 2.2 2.3 2.7 1.6 2.2  1.8   Notes            ??  5 day hold     Date 10/14 11/4 11/11 11/25 12/9 12/16 12/30 1/13 2/3 3/3       Total Weekly Dose 32.5 mg 32.5mg 35mg 32.5mg 33.75 mg  36.25 mg 33.75 mg 35mg 33.75 33.75mg       INR 2.0 1.6 2.1 1.8 1.8 2.5 2.0 3.0 2.1 2.4       Notes   1x boost GLV boostx1 boostx1             Takes warfarin in the am    Clinic Interview:  Verbal Release Authorization signed on 8/22/2018 -- may speak with Raeann Montemayor (Wife). [phone] (Home) *Preferred*  Tablet Strength: pt has 7.5mg and 2.5mg tablets    Patient Findings    Negatives:  Signs/symptoms of thrombosis, Signs/symptoms of bleeding, Laboratory test error suspected, Change in health, Change in alcohol use, Change in activity, Upcoming invasive procedure, Emergency department visit, Upcoming dental procedure, Missed doses, Extra doses, Change in medications, Change in diet/appetite, Hospital admission, Bruising, Other complaints         Plan:   1. INR is therapeutic today at 2.4.  Instructed Mr. Montemayor to continue warfarin 5mg oral daily except 3.75mg Wednesdays until recheck   2. RTC in 4 weeks, 3/31  3. Verbal and written information provided in the clinic. Mr. Montemayor expresses understanding with teach back and has no further questions at this time.       Kristine Sutton, NighatD.  03/03/21   11:02 EST

## 2021-03-10 ENCOUNTER — HOSPITAL ENCOUNTER (OUTPATIENT)
Dept: ONCOLOGY | Facility: HOSPITAL | Age: 76
Discharge: HOME | End: 2021-03-10
Admitting: INTERNAL MEDICINE

## 2021-03-10 VITALS
DIASTOLIC BLOOD PRESSURE: 82 MMHG | BODY MASS INDEX: 38.94 KG/M2 | HEIGHT: 70 IN | RESPIRATION RATE: 18 BRPM | WEIGHT: 272 LBS | SYSTOLIC BLOOD PRESSURE: 141 MMHG | TEMPERATURE: 97.5 F | HEART RATE: 91 BPM

## 2021-03-10 DIAGNOSIS — N18.4 STAGE 4 CHRONIC KIDNEY DISEASE (HCC): Primary | ICD-10-CM

## 2021-03-10 LAB
CREAT SERPL-MCNC: 2.91 MG/DL (ref 0.76–1.27)
GFR SERPL CREATININE-BSD FRML MDRD: 21 ML/MIN/1.73
HCT VFR BLD AUTO: 34.2 % (ref 37.5–51)
HGB BLD-MCNC: 10.8 G/DL (ref 13–17.7)
POTASSIUM SERPL-SCNC: 4.7 MMOL/L (ref 3.5–5.2)

## 2021-03-10 PROCEDURE — 82565 ASSAY OF CREATININE: CPT | Performed by: INTERNAL MEDICINE

## 2021-03-10 PROCEDURE — 85014 HEMATOCRIT: CPT | Performed by: INTERNAL MEDICINE

## 2021-03-10 PROCEDURE — 96372 THER/PROPH/DIAG INJ SC/IM: CPT

## 2021-03-10 PROCEDURE — 25010000002 EPOETIN ALFA-EPBX 10000 UNIT/ML SOLUTION: Performed by: INTERNAL MEDICINE

## 2021-03-10 PROCEDURE — 85018 HEMOGLOBIN: CPT | Performed by: INTERNAL MEDICINE

## 2021-03-10 PROCEDURE — 84132 ASSAY OF SERUM POTASSIUM: CPT | Performed by: INTERNAL MEDICINE

## 2021-03-10 RX ADMIN — EPOETIN ALFA-EPBX 20000 UNITS: 10000 INJECTION, SOLUTION INTRAVENOUS; SUBCUTANEOUS at 11:51

## 2021-03-17 ENCOUNTER — HOSPITAL ENCOUNTER (OUTPATIENT)
Dept: ONCOLOGY | Facility: HOSPITAL | Age: 76
Discharge: HOME | End: 2021-03-17
Admitting: INTERNAL MEDICINE

## 2021-03-17 VITALS
TEMPERATURE: 97.2 F | HEART RATE: 89 BPM | HEIGHT: 70 IN | SYSTOLIC BLOOD PRESSURE: 138 MMHG | BODY MASS INDEX: 39.08 KG/M2 | RESPIRATION RATE: 20 BRPM | WEIGHT: 273 LBS | DIASTOLIC BLOOD PRESSURE: 74 MMHG

## 2021-03-17 DIAGNOSIS — N18.4 STAGE 4 CHRONIC KIDNEY DISEASE (HCC): Primary | ICD-10-CM

## 2021-03-17 LAB
CREAT SERPL-MCNC: 3.14 MG/DL (ref 0.76–1.27)
GFR SERPL CREATININE-BSD FRML MDRD: 19 ML/MIN/1.73
HCT VFR BLD AUTO: 34.9 % (ref 37.5–51)
HGB BLD-MCNC: 10.7 G/DL (ref 13–17.7)
POTASSIUM SERPL-SCNC: 4.4 MMOL/L (ref 3.5–5.2)

## 2021-03-17 PROCEDURE — 96372 THER/PROPH/DIAG INJ SC/IM: CPT

## 2021-03-17 PROCEDURE — 36415 COLL VENOUS BLD VENIPUNCTURE: CPT

## 2021-03-17 PROCEDURE — 85018 HEMOGLOBIN: CPT | Performed by: INTERNAL MEDICINE

## 2021-03-17 PROCEDURE — 25010000002 EPOETIN ALFA-EPBX 10000 UNIT/ML SOLUTION: Performed by: INTERNAL MEDICINE

## 2021-03-17 PROCEDURE — 82565 ASSAY OF CREATININE: CPT | Performed by: INTERNAL MEDICINE

## 2021-03-17 PROCEDURE — 84132 ASSAY OF SERUM POTASSIUM: CPT | Performed by: INTERNAL MEDICINE

## 2021-03-17 PROCEDURE — 85014 HEMATOCRIT: CPT | Performed by: INTERNAL MEDICINE

## 2021-03-17 RX ADMIN — EPOETIN ALFA-EPBX 20000 UNITS: 10000 INJECTION, SOLUTION INTRAVENOUS; SUBCUTANEOUS at 11:50

## 2021-03-24 ENCOUNTER — HOSPITAL ENCOUNTER (OUTPATIENT)
Dept: ONCOLOGY | Facility: HOSPITAL | Age: 76
Discharge: HOME | End: 2021-03-24
Admitting: INTERNAL MEDICINE

## 2021-03-24 VITALS
HEIGHT: 70 IN | RESPIRATION RATE: 18 BRPM | HEART RATE: 97 BPM | DIASTOLIC BLOOD PRESSURE: 69 MMHG | WEIGHT: 271 LBS | SYSTOLIC BLOOD PRESSURE: 126 MMHG | BODY MASS INDEX: 38.8 KG/M2 | TEMPERATURE: 97.1 F

## 2021-03-24 DIAGNOSIS — N18.4 STAGE 4 CHRONIC KIDNEY DISEASE (HCC): Primary | ICD-10-CM

## 2021-03-24 LAB
CREAT SERPL-MCNC: 3.24 MG/DL (ref 0.76–1.27)
GFR SERPL CREATININE-BSD FRML MDRD: 19 ML/MIN/1.73
HCT VFR BLD AUTO: 33.8 % (ref 37.5–51)
HGB BLD-MCNC: 10.9 G/DL (ref 13–17.7)
POTASSIUM SERPL-SCNC: 4.5 MMOL/L (ref 3.5–5.2)

## 2021-03-24 PROCEDURE — 96372 THER/PROPH/DIAG INJ SC/IM: CPT

## 2021-03-24 PROCEDURE — 85018 HEMOGLOBIN: CPT | Performed by: INTERNAL MEDICINE

## 2021-03-24 PROCEDURE — 84132 ASSAY OF SERUM POTASSIUM: CPT | Performed by: INTERNAL MEDICINE

## 2021-03-24 PROCEDURE — 82565 ASSAY OF CREATININE: CPT | Performed by: INTERNAL MEDICINE

## 2021-03-24 PROCEDURE — 36415 COLL VENOUS BLD VENIPUNCTURE: CPT

## 2021-03-24 PROCEDURE — 85014 HEMATOCRIT: CPT | Performed by: INTERNAL MEDICINE

## 2021-03-24 PROCEDURE — 25010000002 EPOETIN ALFA-EPBX 10000 UNIT/ML SOLUTION: Performed by: INTERNAL MEDICINE

## 2021-03-24 RX ADMIN — EPOETIN ALFA-EPBX 20000 UNITS: 10000 INJECTION, SOLUTION INTRAVENOUS; SUBCUTANEOUS at 11:53

## 2021-03-31 ENCOUNTER — HOSPITAL ENCOUNTER (OUTPATIENT)
Dept: ONCOLOGY | Facility: HOSPITAL | Age: 76
Discharge: HOME | End: 2021-03-31
Admitting: INTERNAL MEDICINE

## 2021-03-31 ENCOUNTER — ANTICOAGULATION VISIT (OUTPATIENT)
Dept: PHARMACY | Facility: HOSPITAL | Age: 76
End: 2021-03-31

## 2021-03-31 VITALS
DIASTOLIC BLOOD PRESSURE: 67 MMHG | HEART RATE: 91 BPM | TEMPERATURE: 97.5 F | WEIGHT: 273 LBS | BODY MASS INDEX: 39.08 KG/M2 | RESPIRATION RATE: 16 BRPM | HEIGHT: 70 IN | SYSTOLIC BLOOD PRESSURE: 135 MMHG

## 2021-03-31 DIAGNOSIS — N18.4 STAGE 4 CHRONIC KIDNEY DISEASE (HCC): Primary | ICD-10-CM

## 2021-03-31 LAB
CREAT SERPL-MCNC: 3.61 MG/DL (ref 0.76–1.27)
FERRITIN SERPL-MCNC: 115.4 NG/ML (ref 30–400)
GFR SERPL CREATININE-BSD FRML MDRD: 16 ML/MIN/1.73
HCT VFR BLD AUTO: 34.4 % (ref 37.5–51)
HGB BLD-MCNC: 10.9 G/DL (ref 13–17.7)
INR PPP: 2.3 (ref 0.91–1.09)
IRON 24H UR-MRATE: 45 MCG/DL (ref 59–158)
IRON SATN MFR SERPL: 15 % (ref 20–50)
POTASSIUM SERPL-SCNC: 4.9 MMOL/L (ref 3.5–5.2)
PROTHROMBIN TIME: 27.8 SECONDS (ref 10–13.8)
TIBC SERPL-MCNC: 304 MCG/DL (ref 298–536)
TRANSFERRIN SERPL-MCNC: 204 MG/DL (ref 200–360)

## 2021-03-31 PROCEDURE — 84132 ASSAY OF SERUM POTASSIUM: CPT | Performed by: INTERNAL MEDICINE

## 2021-03-31 PROCEDURE — 85610 PROTHROMBIN TIME: CPT

## 2021-03-31 PROCEDURE — 36416 COLLJ CAPILLARY BLOOD SPEC: CPT

## 2021-03-31 PROCEDURE — 84466 ASSAY OF TRANSFERRIN: CPT | Performed by: INTERNAL MEDICINE

## 2021-03-31 PROCEDURE — 96372 THER/PROPH/DIAG INJ SC/IM: CPT

## 2021-03-31 PROCEDURE — 25010000002 EPOETIN ALFA-EPBX 10000 UNIT/ML SOLUTION: Performed by: INTERNAL MEDICINE

## 2021-03-31 PROCEDURE — G0463 HOSPITAL OUTPT CLINIC VISIT: HCPCS

## 2021-03-31 PROCEDURE — 82565 ASSAY OF CREATININE: CPT | Performed by: INTERNAL MEDICINE

## 2021-03-31 PROCEDURE — 85018 HEMOGLOBIN: CPT | Performed by: INTERNAL MEDICINE

## 2021-03-31 PROCEDURE — 85014 HEMATOCRIT: CPT | Performed by: INTERNAL MEDICINE

## 2021-03-31 PROCEDURE — 83540 ASSAY OF IRON: CPT | Performed by: INTERNAL MEDICINE

## 2021-03-31 PROCEDURE — 82728 ASSAY OF FERRITIN: CPT | Performed by: INTERNAL MEDICINE

## 2021-03-31 RX ADMIN — EPOETIN ALFA-EPBX 20000 UNITS: 10000 INJECTION, SOLUTION INTRAVENOUS; SUBCUTANEOUS at 10:24

## 2021-03-31 NOTE — PROGRESS NOTES
Anticoagulation Clinic Progress Note  Indication: afib  Referring Provider: Tyson (next appointment: 4/15/2020) (Claudia 5/20/2020)  Initial Warfarin Start Date: 2007  Goal INR: 2-3  Current Drug Interactions:  torsemide  Diet: eats salads 2x weekly 11/4/20  [id number]: 5 (HTN, Age, CHF, CAD)  Other: anemic, CKD (stage 3) May need bridge with Lovenox given persistent AF (per cardio 1/24/2019), Scr was 2.38 on 12/10/19     Anticoagulation Clinic INR History:  Date 10/23 11/20 12/8 1/5/18 2/2 3/2 3/16 3/30 4/17 5/17 6/14 7/19 8/1 8/22 9/12   Total Weekly Dose 37.5mg 37.5 mg 37.5 mg 37.5 mg 37.5mg 37.5 mg 37.5 mg 37.5 mg 37.5mg 37.5 mg 37.5 mg 37.5mg 37.5 mg 37.5 mg 37.5 mg    INR 2.5 1.8 2.5 2.3 2.1 1.7 3.2 2.1 2.3 2.06 2.8 3.5 2.7 2.7 3.0   Notes      (boost), amox amox   PAT     diarrhea     Date 10/1 10/8 10/12 10/17 10/22 10/29 11/5 11/12 11/19 11/29 12/13 1/3 1/24/19 2/12 2/18   Total Weekly Dose 26.25 mg 23.75mg 22.5  mg 23.75 mg 27.5 mg 28.75 mg 27.5 mg 28.75 mg 28.75 mg 28.75 mg 28.75 28.75mg 28.75 mg 17.5 mg 28.75mg   INR 3.0 2.1 1.6 1.3 1.6 2.8 2.0 2.5 2.4 2.76 2.8 2.7 2.9 1.7 1.5   Notes post- disch amio start 10/1        PAT   held x 7 days; resumed 1/22; D/C amio asa dc'd torsemide reduced,      Date 2/22 2/26 3/6 3/15 3/29 4/12 4/26 5/14 6/11 7/9 8/6 9/3 10/1 10/17   Total Weekly Dose 31.75 mg 33.75 mg 35mg 33.75 mg 33.75 mg 33.75 mg 32.5 mg 32.5 mg 32.5 mg 32.5 mg 32.5 mg 32.5 mg 32.5 mg 33.75 mg   INR 1.6 2.0 2.8 2.8 3.1 3.1 2.8 2.5 2.2 2.4 2.5 2.4 2.0 3.7   Notes  enox                 Date 10/30 11/20 12/17 1/14/20 2/11 3/11 4/8 5/6 6/11 7/9 8/6 9/3 9/14 9/30   Total Weekly Dose 32.5 mg 32.5 mg 32.5 mg 32.5 mg 32.5mg 32.5mg 32.5mg 32.5mg 32.5mg 32.5 mg 32.5 mg 32.5mg 32.5mg 26.25 mg   INR 2.3 2.3 2.1 1.9 2.2 2.1 2.1 2.2 2.2 2.3 2.7 1.6 2.2  1.8   Notes            ??  5 day hold     Date 10/14 11/4 11/11 11/25 12/9 12/16 12/30 1/13 2/3 3/3 3/31      Total Weekly Dose 32.5 mg 32.5mg 35mg 32.5mg  33.75 mg 36.25 mg 33.75 mg 35mg 33.75 33.75mg 33.75mg      INR 2.0 1.6 2.1 1.8 1.8 2.5 2.0 3.0 2.1 2.4 2.3      Notes   1x boost GLV boostx1 boostx1             Takes warfarin in the am    Clinic Interview:  Verbal Release Authorization signed on 8/22/2018 -- may speak with Raeann Montemayor (Wife). [phone] (Home) *Preferred*  Tablet Strength: pt has 7.5mg and 2.5mg tablets    Patient Findings      Negatives:  Signs/symptoms of thrombosis, Signs/symptoms of bleeding, Laboratory test error suspected, Change in health, Change in alcohol use, Change in activity, Upcoming invasive procedure, Emergency department visit, Upcoming dental procedure, Missed doses, Extra doses, Change in medications, Change in diet/appetite, Hospital admission, Bruising, Other complaints       Plan:   1. INR is therapeutic today at 2.3.  Instructed Mr. Montemayor to continue warfarin 5mg oral daily except 3.75mg Wednesdays until recheck   2. RTC in 4 weeks  3. Verbal and written information provided in the clinic. Mr. Montemayor expresses understanding with teach back and has no further questions at this time.   4. Due to see Dr Metha, however pt reports he checked and Dr Mehta does not have openings until August, nurse is trying to get him in sooner      Kristine Sutton, NighatD.  03/31/21   10:55 EDT

## 2021-04-05 NOTE — PROGRESS NOTES
Subjective:     Encounter Date:04/08/2021      Patient ID: Marco Antonio Montemayor is a 76 y.o.   white male, retired /currently laid off from being a  for [employer], from [place], [place].      PHYSICIAN: EDMUND Rosado MD  ELECTROPHYSIOLOGIST: Johnathan Taylor MD, [facility], [facility]  INTERVENTIONAL CARDIOLOGIST: Antonio Escamilla MD, [facility]  NEPHROLOGIST:  [facility]  DERMATOLOGIST:  Yoanna Marks MD/Oksana Young MD([facility]) .    Chief Complaint:   Chief Complaint   Patient presents with   • Hypertensive heart disease with chronic combined systolic an     Problem List:  1. 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 Remote acceptable intravenous adenosine Quantitative SPECT gated Cardiolite study, LVEF 0.50, April 1999.  b. Echocardiogram showing mild LVH with estimated ejection fraction 0.58 with mild aortic valve cusp sclerosis, and mild TR, 09/23/2014.   c. Remote abnormal preoperative Quantitative SPECT gated Cardiolite study with mild “fixed” inferoposterior hypoperfusion and mild LV enlargement with mild global hypokinesia with reduced LVEF 0.40 in the setting of abnormal EKG with subsequent diagnostic coronary angiography demonstrating mild-to-moderate nonobstructive coronary artery atherosclerosis with mild compensated left ventricular dysfunction, LVEF 0.52, and continued medical therapy felt warranted, June 2006.  d. Remote hospitalization for symptomatic atrial fibrillation with rapid ventricular response and mild congestive heart failure requiring BRYANNA and DC cardioversion, June 2009.  e. Left heart catheterization with essentially normal coronary arteries with mild luminal irregularities with an EF of 0.60 by Dr. Escamilla for angina and elevated troponin, 06/05/2015.   f. Residual class I symptoms.  2. Chronic essential hypertension with recent progressive hypertension and blood pressure readings and normal selective bilateral  renal angiography, June 2006.  3. Dyslipidemia.  4. Chronic atrial tachyarrhythmias:  a. Intermittent chronic paroxysmal atrial fibrillation with electrophysiologic study with RFA for atrial flutter, October 1999.  b. Recurrent apparent transient atrial fibrillation, resolved with acceptable [facility] emergency department evaluation, June 2003.  c. Acceptable combination Doppler echocardiogram, July 2003, with apparent acceptable 24-hour Holter monitor, May 2006.  d. Recurrent asymptomatic atrial fibrillation with RVR, January 2007, subsequent Coumadin therapy and Tikosyn therapy with successful internal cardioversion of atrial fibrillation and marked bradyarrhythmias requiring DDDR pacemaker implant, [employer] device, data not available, March 2007.  e. Remote hospitalization for symptomatic rapid atrial fibrillation/BRYANNA DC cardioversion with subsequent acceptable pacemaker interrogation, June/July 2009, as well as acceptable interrogation without reprogramming, July 2011.  f. Acceptable combination Doppler echocardiogram, December 2010, with residual class I symptoms.  g. Acceptable device interrogation following [facility] ED evaluation for chest pressure and shortness of breath, data deficit, May 2011, February 2013, February 2016, December 2016.  h. Abnormal pacemaker assessment with 8.4% mode switch with battery voltage of 1-1.25 years, June 2017, with abnormal PACEART assessment, July 2018, with 22% mode switch, with subsequent interrogation demonstrating chronic atrial fibrillation, May 2019, remote device interrogation March 2021 demonstrated 89% atrial fib burden, 8.42 battery longevity.  5. Moderate obesity, BMI 38.88.  6. Ichthyosis.  7. Chronic lower tract obstructive symptoms, probable BPH.  8. Dyslipidemia.  9. Nasal polyps with deviated nasal septum with apparent subsequent nasal septoplasty/polypectomy, data deficit, July 2006.  10. Abdominal pain with apparent small bowel obstruction with exploratory laparotomy  - data deficit.  11. Appendectomy with subsequent delayed hospitalization for bleeding peptic ulcer disease/probable duodenal ulcer, November 2003.  12. Recurrent asymptomatic atrial fibrillation with rapid ventricular response, July 2007, with subsequent Tikosyn therapy and DDDR pacemaker implant with intermittent recurrent breakthrough arrhythmias, including remote DC cardioversion, May 2008, with subsequent recurrent DC cardioversion, autumn 2018, with recurrent atrial fibrillation and discontinuation of formal antiarrhythmic therapy - data deficit  13. Obstructive sleep apnea, compliant with CPAP  14. Stage 4 chronic kidney disease with recent apparent documented proteinuria and renal biopsy (February 2019)  15. Elevated TSH, November 2018  16.  Chronic anemia with epoetin injections Fall 2020, Spring 2021    No Known Allergies      Current Outpatient Medications:   •  atorvastatin (LIPITOR) 40 MG tablet, Take 1 tablet by mouth Daily. Need labs for further refills. Call office for lab orders, Disp: 90 tablet, Rfl: 1  •  calcitriol (ROCALTROL) 0.25 MCG capsule, Take 1 capsule by mouth 3 (Three) Times a Week. Monday, wed, friday, Disp: , Rfl:   •  carvedilol (COREG) 25 MG tablet, Take 1 tablet by mouth 2 (Two) Times a Day With Meals., Disp: 180 tablet, Rfl: 3  •  cholecalciferol (VITAMIN D3) 1000 UNITS tablet, Take 1,000 Units by mouth daily., Disp: , Rfl:   •  Ferrous Sulfate (IRON) 325 (65 FE) MG tablet, Take 65 mg by mouth Daily., Disp: , Rfl:   •  lisinopril (PRINIVIL,ZESTRIL) 5 MG tablet, Take 5 mg by mouth Daily., Disp: , Rfl:   •  PHARMACY TO DOSE WARFARIN, Continuous As Needed (patient's warfarin is being managed by [facility] ([phone]))., Disp: , Rfl:   •  torsemide (DEMADEX) 20 MG tablet, Take 1 tablet by mouth 2 (Two) Times a Day., Disp: 180 tablet, Rfl: 2  •  vitamin B-12 (CYANOCOBALAMIN) 1000 MCG tablet, Take 1,000 mcg by mouth Daily., Disp: , Rfl:   •   warfarin (COUMADIN) 2.5 MG tablet, TAKE 1-1.5 TABLET(S) BY MOUTH DAILY AS DIRECTED BY THE ANTICOAGULATION CLINIC, Disp: 180 tablet, Rfl: 0  •  warfarin (COUMADIN) 7.5 MG tablet, TAKE ONE-HALF TO ONE TABLET BY MOUTH DAILY AS DIRECTED, Disp: 90 tablet, Rfl: 3  No current facility-administered medications for this visit.    HISTORY OF PRESENT ILLNESS:  The patient is here after a 1 year hiatus.  The patient is chronically in atrial fibrillation and had his last device interrogation in office June 2020 when he saw Dr. Taylor.  The patient's last INR was 2.3 on 3/31/2021 and he is managed by the anticoagulation clinic.  His remote device interrogation March 2021 demonstrated 89% atrial fib burden, 8.42 battery longevity. The patient had a birthday two days ago and he went out to dinner and his wife made him a pineapple upside down cake.  Whenever the patient has his blood pressure checked it is normally around 135 systolic.  The patient has been getting weekly CBC checks because his hemoglobin is always low.  He usually has to have epoetin injections.  The patient states that his hemoglobin is rarely above 11.  He is supposed to see Dr. Taylor in June 2021 for an in office device check.  The patient denies any chest pain, shortness of breath, dizziness, presyncope, or syncope.  He is unaware of his atrial fibrillation.  He has not had his Covid vaccinations and states that he generally does not taking vaccinations and has also not had his pneumococcal or influenza vaccinations either.  The patient is able to do his daily activities and yard work without any cardiopulmonary complaints.  He is compliant with his CPAP nightly.  The patient has not had any hospitalizations, surgeries, or new diagnoses since he was last seen.    Review of Systems   Hematologic/Lymphatic:        Chronic anemia   All other systems reviewed and are negative.     All other systems reviewed and otherwise negative.    Procedures      "  Objective:       Vitals:    04/08/21 1101   BP: 164/90   BP Location: Left arm   Patient Position: Sitting   Cuff Size: Adult   Pulse: 91   Temp: 97.1 °F (36.2 °C)   SpO2: 96%   Weight: 123 kg (271 lb)   Height: 177.8 cm (70\")   Recheck blood pressure left arm sitting was 138/78  Body mass index is 38.88 kg/m².  Last weight April 2020 was 271 pounds  Constitutional:       Appearance: Healthy appearance. Not in distress.   Neck:      Vascular: No JVR. JVD normal.   Pulmonary:      Effort: Pulmonary effort is normal.      Breath sounds: Decreased breath sounds present. No wheezing. No rhonchi. No rales.   Chest:      Chest wall: Not tender to palpatation.   Cardiovascular:      PMI at left midclavicular line. Normal rate. Irregularly irregular rhythm. Normal S1. Normal S2.      Murmurs: There is a grade 2/6 mid frequency harsh early systolic murmur at the URSB.      No gallop. No click. No rub.   Pulses:     Dorsalis pedis: 1+ bilaterally.     Posterior tibial: 1+ bilaterally.  Edema:     Peripheral edema absent.   Abdominal:      General: Bowel sounds are normal.      Palpations: Abdomen is soft.      Tenderness: There is no abdominal tenderness.   Musculoskeletal: Normal range of motion.         General: No tenderness. Skin:     General: Skin is warm and dry.      Comments: Left precordial pacemaker site nominal   Neurological:      General: No focal deficit present.      Mental Status: Alert and oriented to person, place and time.           Lab Review:   Lab Results   Component Value Date    GLUCOSE 140 (H) 04/08/2020    BUN 55 (H) 04/08/2020    CREATININE 3.70 (H) 04/07/2021    EGFRIFNONA 16 (L) 04/07/2021    EGFRIFAFRI 26 (L) 11/12/2018    BCR 20.2 04/08/2020    CO2 23.0 04/08/2020    CALCIUM 8.8 04/08/2020    PROTENTOTREF 7.1 04/08/2020    ALBUMIN 4.00 04/08/2020    LABIL2 1.3 04/08/2020    AST 13 04/08/2020    ALT 9 04/08/2020       Lab Results   Component Value Date    WBC 6.42 02/06/2019    HGB 10.8 (L) " 04/07/2021    HCT 34.7 (L) 04/07/2021    MCV 94.3 02/06/2019     02/06/2019       Lab Results   Component Value Date    HGBA1C 6.20 (H) 05/17/2018       Lab Results   Component Value Date    TSH 2.710 05/06/2020       Lab Results   Component Value Date    CHOL 115 05/06/2020     Lab Results   Component Value Date    TRIG 96 05/06/2020    TRIG 96 05/06/2020     Lab Results   Component Value Date    HDL 33 (L) 05/06/2020    HDL 33 (L) 05/06/2020     Lab Results   Component Value Date    LDL 63 05/06/2020    LDL 63 05/06/2020         Lab Results   Component Value Date    .0 (H) 09/14/2018   Iron profile 3/31/2021: Iron 45, iron saturation 15, transferrin 204, TIBC 304    Remote [employer] pacemaker interrogation 3/29/2021: 8.42 battery longevity, 1% atrial pacing, 42% ventricular pacing, atrial fibrillation burden 89%.    Advance Care Planning   ACP discussion was held with the patient during this visit. Patient has an advance directive (not in EMR), copy requested.          Assessment:     Overall continued acceptable course with no new interim cardiopulmonary complaints with acceptable functional status on limited activity.  The patient will get routine laboratory testing next time he goes to his hematologist and understands that he will need to be fasting.  I encouraged the patient to obtain his Covid vaccinations.  He will follow-up with Dr. Taylor in June 2021 for an in office device check.  His remote device interrogation was acceptable March 2021.       Diagnosis Plan   1. Coronary artery disease involving native coronary artery of native heart without angina pectoris  No recurrent angina pectoris or CHF on current activity schedule; continue current treatment   2. Essential hypertension  Controlled, continue current cardiac medications    Comprehensive Metabolic Panel    CBC & Differential    TSH   3. Mixed hyperlipidemia  Lipid Panel, continue atorvastatin   4. Atrial fibrillation,  chronic (CMS/HCC)   Follow-up with Dr. Taylor June 2021, acceptable remote device interrogation March 2021   6. Morbid obesity (CMS/HCC)  Increase physical activity as tolerated   7. Severe obstructive sleep apnea  Encouraged continued compliance          Plan:         1. Patient to continue current medications and close follow up with the above providers.  2. Tentative cardiology follow up in October 2021 or patient may return sooner PRN.  3. CBC, CMP, FLP. TSH      Electronically signed by JENNIFER Nava, 04/08/21, 11:37 AM EDT.

## 2021-04-07 ENCOUNTER — HOSPITAL ENCOUNTER (OUTPATIENT)
Dept: ONCOLOGY | Facility: HOSPITAL | Age: 76
Discharge: HOME | End: 2021-04-07
Admitting: INTERNAL MEDICINE

## 2021-04-07 VITALS
WEIGHT: 273 LBS | HEIGHT: 70 IN | BODY MASS INDEX: 39.08 KG/M2 | RESPIRATION RATE: 20 BRPM | DIASTOLIC BLOOD PRESSURE: 68 MMHG | TEMPERATURE: 97.3 F | SYSTOLIC BLOOD PRESSURE: 135 MMHG

## 2021-04-07 DIAGNOSIS — N18.4 STAGE 4 CHRONIC KIDNEY DISEASE (HCC): Primary | ICD-10-CM

## 2021-04-07 LAB
CREAT SERPL-MCNC: 3.7 MG/DL (ref 0.76–1.27)
GFR SERPL CREATININE-BSD FRML MDRD: 16 ML/MIN/1.73
HCT VFR BLD AUTO: 34.7 % (ref 37.5–51)
HGB BLD-MCNC: 10.8 G/DL (ref 13–17.7)
POTASSIUM SERPL-SCNC: 4.8 MMOL/L (ref 3.5–5.2)

## 2021-04-07 PROCEDURE — 84132 ASSAY OF SERUM POTASSIUM: CPT | Performed by: INTERNAL MEDICINE

## 2021-04-07 PROCEDURE — 36415 COLL VENOUS BLD VENIPUNCTURE: CPT

## 2021-04-07 PROCEDURE — 96372 THER/PROPH/DIAG INJ SC/IM: CPT

## 2021-04-07 PROCEDURE — 85018 HEMOGLOBIN: CPT | Performed by: INTERNAL MEDICINE

## 2021-04-07 PROCEDURE — 82565 ASSAY OF CREATININE: CPT | Performed by: INTERNAL MEDICINE

## 2021-04-07 PROCEDURE — 85014 HEMATOCRIT: CPT | Performed by: INTERNAL MEDICINE

## 2021-04-07 PROCEDURE — 25010000002 EPOETIN ALFA-EPBX 10000 UNIT/ML SOLUTION: Performed by: INTERNAL MEDICINE

## 2021-04-07 RX ADMIN — EPOETIN ALFA-EPBX 20000 UNITS: 10000 INJECTION, SOLUTION INTRAVENOUS; SUBCUTANEOUS at 13:14

## 2021-04-08 ENCOUNTER — OFFICE VISIT (OUTPATIENT)
Dept: CARDIOLOGY | Facility: CLINIC | Age: 76
End: 2021-04-08

## 2021-04-08 VITALS
WEIGHT: 271 LBS | HEIGHT: 70 IN | SYSTOLIC BLOOD PRESSURE: 164 MMHG | OXYGEN SATURATION: 96 % | DIASTOLIC BLOOD PRESSURE: 90 MMHG | HEART RATE: 91 BPM | BODY MASS INDEX: 38.8 KG/M2 | TEMPERATURE: 97.1 F

## 2021-04-08 DIAGNOSIS — E78.2 MIXED HYPERLIPIDEMIA: ICD-10-CM

## 2021-04-08 DIAGNOSIS — I48.20 CHRONIC ATRIAL FIBRILLATION (HCC): ICD-10-CM

## 2021-04-08 DIAGNOSIS — E66.01 MORBID OBESITY (HCC): Chronic | ICD-10-CM

## 2021-04-08 DIAGNOSIS — I10 ESSENTIAL HYPERTENSION: ICD-10-CM

## 2021-04-08 DIAGNOSIS — G47.33 SEVERE OBSTRUCTIVE SLEEP APNEA: Chronic | ICD-10-CM

## 2021-04-08 DIAGNOSIS — I25.10 CORONARY ARTERY DISEASE INVOLVING NATIVE CORONARY ARTERY OF NATIVE HEART WITHOUT ANGINA PECTORIS: Primary | Chronic | ICD-10-CM

## 2021-04-08 DIAGNOSIS — I48.0 PAROXYSMAL ATRIAL FIBRILLATION (HCC): ICD-10-CM

## 2021-04-08 PROCEDURE — 99214 OFFICE O/P EST MOD 30 MIN: CPT | Performed by: NURSE PRACTITIONER

## 2021-04-08 PROCEDURE — 93294 REM INTERROG EVL PM/LDLS PM: CPT | Performed by: INTERNAL MEDICINE

## 2021-04-08 PROCEDURE — 93296 REM INTERROG EVL PM/IDS: CPT | Performed by: INTERNAL MEDICINE

## 2021-04-08 RX ORDER — LISINOPRIL 5 MG/1
5 TABLET ORAL DAILY
Qty: 90 TABLET | Refills: 3 | Status: SHIPPED | OUTPATIENT
Start: 2021-04-08 | End: 2021-10-16 | Stop reason: SDUPTHER

## 2021-04-08 RX ORDER — CALCITRIOL 0.25 UG/1
1 CAPSULE, LIQUID FILLED ORAL 3 TIMES WEEKLY
COMMUNITY
Start: 2021-03-10 | End: 2023-01-12

## 2021-04-08 RX ORDER — ATORVASTATIN CALCIUM 40 MG/1
40 TABLET, FILM COATED ORAL DAILY
Qty: 90 TABLET | Refills: 1 | Status: SHIPPED | OUTPATIENT
Start: 2021-04-08 | End: 2021-09-24 | Stop reason: SDUPTHER

## 2021-04-08 RX ORDER — CARVEDILOL 25 MG/1
25 TABLET ORAL 2 TIMES DAILY WITH MEALS
Qty: 180 TABLET | Refills: 3 | Status: SHIPPED | OUTPATIENT
Start: 2021-04-08 | End: 2021-07-29 | Stop reason: SDUPTHER

## 2021-04-08 RX ORDER — TORSEMIDE 20 MG/1
20 TABLET ORAL 2 TIMES DAILY
Qty: 180 TABLET | Refills: 3 | Status: SHIPPED | OUTPATIENT
Start: 2021-04-08 | End: 2021-06-16

## 2021-04-14 ENCOUNTER — LAB (OUTPATIENT)
Dept: LAB | Facility: HOSPITAL | Age: 76
End: 2021-04-14

## 2021-04-14 ENCOUNTER — HOSPITAL ENCOUNTER (OUTPATIENT)
Dept: ONCOLOGY | Facility: HOSPITAL | Age: 76
Discharge: HOME | End: 2021-04-14

## 2021-04-14 VITALS
HEART RATE: 86 BPM | WEIGHT: 276 LBS | SYSTOLIC BLOOD PRESSURE: 135 MMHG | HEIGHT: 70 IN | RESPIRATION RATE: 20 BRPM | TEMPERATURE: 97.6 F | BODY MASS INDEX: 39.51 KG/M2 | DIASTOLIC BLOOD PRESSURE: 76 MMHG

## 2021-04-14 DIAGNOSIS — N18.4 STAGE 4 CHRONIC KIDNEY DISEASE (HCC): Primary | ICD-10-CM

## 2021-04-14 DIAGNOSIS — I10 ESSENTIAL HYPERTENSION, MALIGNANT: Primary | ICD-10-CM

## 2021-04-14 DIAGNOSIS — E78.2 MIXED HYPERLIPIDEMIA: ICD-10-CM

## 2021-04-14 LAB
ALBUMIN SERPL-MCNC: 3.7 G/DL (ref 3.5–5.2)
ALBUMIN/GLOB SERPL: 1.7 G/DL
ALP SERPL-CCNC: 82 U/L (ref 39–117)
ALT SERPL W P-5'-P-CCNC: 11 U/L (ref 1–41)
ANION GAP SERPL CALCULATED.3IONS-SCNC: 11 MMOL/L (ref 5–15)
AST SERPL-CCNC: 12 U/L (ref 1–40)
BILIRUB SERPL-MCNC: 0.4 MG/DL (ref 0–1.2)
BUN SERPL-MCNC: 66 MG/DL (ref 8–23)
BUN/CREAT SERPL: 20.3 (ref 7–25)
CALCIUM SPEC-SCNC: 8.3 MG/DL (ref 8.6–10.5)
CHLORIDE SERPL-SCNC: 116 MMOL/L (ref 98–107)
CHOLEST SERPL-MCNC: 97 MG/DL (ref 0–200)
CO2 SERPL-SCNC: 18 MMOL/L (ref 22–29)
CREAT SERPL-MCNC: 3.25 MG/DL (ref 0.76–1.27)
ERYTHROCYTE [DISTWIDTH] IN BLOOD BY AUTOMATED COUNT: 17.8 % (ref 12.3–15.4)
GFR SERPL CREATININE-BSD FRML MDRD: 19 ML/MIN/1.73
GLOBULIN UR ELPH-MCNC: 2.2 GM/DL
GLUCOSE SERPL-MCNC: 110 MG/DL (ref 65–99)
HCT VFR BLD AUTO: 33.5 % (ref 37.5–51)
HDLC SERPL-MCNC: 32 MG/DL (ref 40–60)
HGB BLD-MCNC: 10.5 G/DL (ref 13–17.7)
LDLC SERPL CALC-MCNC: 50 MG/DL (ref 0–100)
LDLC/HDLC SERPL: 1.61 {RATIO}
LYMPHOCYTES # BLD AUTO: 1.2 10*3/MM3 (ref 0.7–3.1)
LYMPHOCYTES NFR BLD AUTO: 18.3 % (ref 19.6–45.3)
MCH RBC QN AUTO: 29.7 PG (ref 26.6–33)
MCHC RBC AUTO-ENTMCNC: 31.4 G/DL (ref 31.5–35.7)
MCV RBC AUTO: 94.5 FL (ref 79–97)
MONOCYTES # BLD AUTO: 0.4 10*3/MM3 (ref 0.1–0.9)
MONOCYTES NFR BLD AUTO: 6.8 % (ref 5–12)
NEUTROPHILS NFR BLD AUTO: 4.9 10*3/MM3 (ref 1.7–7)
NEUTROPHILS NFR BLD AUTO: 74.9 % (ref 42.7–76)
PLATELET # BLD AUTO: 192 10*3/MM3 (ref 140–450)
PMV BLD AUTO: 6.7 FL (ref 6–12)
POTASSIUM SERPL-SCNC: 4.7 MMOL/L (ref 3.5–5.2)
PROT SERPL-MCNC: 5.9 G/DL (ref 6–8.5)
RBC # BLD AUTO: 3.55 10*6/MM3 (ref 4.14–5.8)
SODIUM SERPL-SCNC: 145 MMOL/L (ref 136–145)
TRIGL SERPL-MCNC: 68 MG/DL (ref 0–150)
TSH SERPL DL<=0.05 MIU/L-ACNC: 3.4 UIU/ML (ref 0.27–4.2)
VLDLC SERPL-MCNC: 15 MG/DL (ref 5–40)
WBC # BLD AUTO: 6.6 10*3/MM3 (ref 3.4–10.8)

## 2021-04-14 PROCEDURE — 80053 COMPREHEN METABOLIC PANEL: CPT

## 2021-04-14 PROCEDURE — 84443 ASSAY THYROID STIM HORMONE: CPT

## 2021-04-14 PROCEDURE — 36415 COLL VENOUS BLD VENIPUNCTURE: CPT

## 2021-04-14 PROCEDURE — 96372 THER/PROPH/DIAG INJ SC/IM: CPT

## 2021-04-14 PROCEDURE — 80061 LIPID PANEL: CPT

## 2021-04-14 PROCEDURE — 85025 COMPLETE CBC W/AUTO DIFF WBC: CPT | Performed by: INTERNAL MEDICINE

## 2021-04-14 PROCEDURE — 25010000002 EPOETIN ALFA-EPBX 10000 UNIT/ML SOLUTION: Performed by: INTERNAL MEDICINE

## 2021-04-14 RX ADMIN — EPOETIN ALFA-EPBX 20000 UNITS: 10000 INJECTION, SOLUTION INTRAVENOUS; SUBCUTANEOUS at 11:59

## 2021-04-21 ENCOUNTER — HOSPITAL ENCOUNTER (OUTPATIENT)
Dept: ONCOLOGY | Facility: HOSPITAL | Age: 76
Discharge: HOME | End: 2021-04-21
Admitting: INTERNAL MEDICINE

## 2021-04-21 VITALS
BODY MASS INDEX: 39.22 KG/M2 | WEIGHT: 274 LBS | HEART RATE: 89 BPM | TEMPERATURE: 97.9 F | HEIGHT: 70 IN | RESPIRATION RATE: 16 BRPM | SYSTOLIC BLOOD PRESSURE: 139 MMHG | DIASTOLIC BLOOD PRESSURE: 75 MMHG

## 2021-04-21 DIAGNOSIS — N18.4 STAGE 4 CHRONIC KIDNEY DISEASE (HCC): Primary | ICD-10-CM

## 2021-04-21 LAB
CREAT SERPL-MCNC: 3.56 MG/DL (ref 0.76–1.27)
GFR SERPL CREATININE-BSD FRML MDRD: 17 ML/MIN/1.73
HCT VFR BLD AUTO: 36.5 % (ref 37.5–51)
HGB BLD-MCNC: 11.3 G/DL (ref 13–17.7)
POTASSIUM SERPL-SCNC: 4.7 MMOL/L (ref 3.5–5.2)

## 2021-04-21 PROCEDURE — 36415 COLL VENOUS BLD VENIPUNCTURE: CPT

## 2021-04-21 PROCEDURE — 84132 ASSAY OF SERUM POTASSIUM: CPT | Performed by: INTERNAL MEDICINE

## 2021-04-21 PROCEDURE — 85018 HEMOGLOBIN: CPT | Performed by: INTERNAL MEDICINE

## 2021-04-21 PROCEDURE — 85014 HEMATOCRIT: CPT | Performed by: INTERNAL MEDICINE

## 2021-04-21 PROCEDURE — 82565 ASSAY OF CREATININE: CPT | Performed by: INTERNAL MEDICINE

## 2021-04-28 ENCOUNTER — ANTICOAGULATION VISIT (OUTPATIENT)
Dept: PHARMACY | Facility: HOSPITAL | Age: 76
End: 2021-04-28

## 2021-04-28 LAB
INR PPP: 3 (ref 0.91–1.09)
PROTHROMBIN TIME: 35.8 SECONDS (ref 10–13.8)

## 2021-04-28 PROCEDURE — 36416 COLLJ CAPILLARY BLOOD SPEC: CPT

## 2021-04-28 PROCEDURE — 85610 PROTHROMBIN TIME: CPT

## 2021-04-28 PROCEDURE — G0463 HOSPITAL OUTPT CLINIC VISIT: HCPCS

## 2021-04-28 NOTE — PROGRESS NOTES
Anticoagulation Clinic Progress Note  Indication: afib  Referring Provider: Tyson (next appointment: 4/15/2020) (Claudia 5/20/2020)  Initial Warfarin Start Date: 2007  Goal INR: 2-3  Current Drug Interactions:  torsemide  Diet: eats salads 2x weekly 11/4/20  [id number]: 5 (HTN, Age, CHF, CAD)  Other: anemic, CKD (stage 3) May need bridge with Lovenox given persistent AF (per cardio 1/24/2019), Scr was 2.38 on 12/10/19     Anticoagulation Clinic INR History:  Date 10/23 11/20 12/8 1/5/18 2/2 3/2 3/16 3/30 4/17 5/17 6/14 7/19 8/1 8/22 9/12   Total Weekly Dose 37.5mg 37.5 mg 37.5 mg 37.5 mg 37.5mg 37.5 mg 37.5 mg 37.5 mg 37.5mg 37.5 mg 37.5 mg 37.5mg 37.5 mg 37.5 mg 37.5 mg    INR 2.5 1.8 2.5 2.3 2.1 1.7 3.2 2.1 2.3 2.06 2.8 3.5 2.7 2.7 3.0   Notes      (boost), amox amox   PAT     diarrhea     Date 10/1 10/8 10/12 10/17 10/22 10/29 11/5 11/12 11/19 11/29 12/13 1/3 1/24/19 2/12 2/18   Total Weekly Dose 26.25 mg 23.75mg 22.5  mg 23.75 mg 27.5 mg 28.75 mg 27.5 mg 28.75 mg 28.75 mg 28.75 mg 28.75 28.75mg 28.75 mg 17.5 mg 28.75mg   INR 3.0 2.1 1.6 1.3 1.6 2.8 2.0 2.5 2.4 2.76 2.8 2.7 2.9 1.7 1.5   Notes post- disch amio start 10/1        PAT   held x 7 days; resumed 1/22; D/C amio asa dc'd torsemide reduced,      Date 2/22 2/26 3/6 3/15 3/29 4/12 4/26 5/14 6/11 7/9 8/6 9/3 10/1 10/17   Total Weekly Dose 31.75 mg 33.75 mg 35mg 33.75 mg 33.75 mg 33.75 mg 32.5 mg 32.5 mg 32.5 mg 32.5 mg 32.5 mg 32.5 mg 32.5 mg 33.75 mg   INR 1.6 2.0 2.8 2.8 3.1 3.1 2.8 2.5 2.2 2.4 2.5 2.4 2.0 3.7   Notes  enox                 Date 10/30 11/20 12/17 1/14/20 2/11 3/11 4/8 5/6 6/11 7/9 8/6 9/3 9/14 9/30   Total Weekly Dose 32.5 mg 32.5 mg 32.5 mg 32.5 mg 32.5mg 32.5mg 32.5mg 32.5mg 32.5mg 32.5 mg 32.5 mg 32.5mg 32.5mg 26.25 mg   INR 2.3 2.3 2.1 1.9 2.2 2.1 2.1 2.2 2.2 2.3 2.7 1.6 2.2  1.8   Notes            ??  5 day hold     Date 10/14 11/4 11/11 11/25 12/9 12/16 12/30 1/13 2/3 3/3 3/31 4/28     Total Weekly Dose 32.5 mg 32.5mg 35mg 32.5mg  33.75 mg 36.25 mg 33.75 mg 35mg 33.75 33.75mg 33.75mg 33.75mg     INR 2.0 1.6 2.1 1.8 1.8 2.5 2.0 3.0 2.1 2.4 2.3 3.0     Notes   1x boost GLV boostx1 boostx1             Takes warfarin in the am    Clinic Interview:  Verbal Release Authorization signed on 8/22/2018 -- may speak with Raeann Montemayor (Wife). [phone] (Home) *Preferred*  Tablet Strength: pt has 7.5mg and 2.5mg tablets    Patient Findings      Negatives:  Signs/symptoms of thrombosis, Signs/symptoms of bleeding, Laboratory test error suspected, Change in health, Change in alcohol use, Change in activity, Upcoming invasive procedure, Emergency department visit, Upcoming dental procedure, Missed doses, Extra doses, Change in medications, Change in diet/appetite, Hospital admission, Bruising, Other complaints    Continues GLV 2-3x weekly       Plan:   1. INR is therapeutic today at 3.0 (UL[facility]).  Considering due for lower dose tonight, Instructed Mr. Montemayor to continue warfarin 5mg oral daily except 3.75mg Wednesdays until recheck   2. RTC in 4 weeks  3. Verbal and written information provided in the clinic. Mr. Montemayor expresses understanding with teach back and has no further questions at this time.   4. Due to see Dr Mehta, however pt reports he checked and Dr Mehta does not have openings until August, nurse is trying to get him in sooner    Kristine Sutton, NighatD.  04/28/21   11:12 EDT

## 2021-05-19 ENCOUNTER — HOSPITAL ENCOUNTER (OUTPATIENT)
Dept: ONCOLOGY | Facility: HOSPITAL | Age: 76
Discharge: HOME | End: 2021-05-19
Admitting: INTERNAL MEDICINE

## 2021-05-19 VITALS
DIASTOLIC BLOOD PRESSURE: 83 MMHG | SYSTOLIC BLOOD PRESSURE: 155 MMHG | HEIGHT: 70 IN | WEIGHT: 277 LBS | BODY MASS INDEX: 39.65 KG/M2 | TEMPERATURE: 96.8 F | HEART RATE: 83 BPM | RESPIRATION RATE: 20 BRPM

## 2021-05-19 DIAGNOSIS — N18.4 STAGE 4 CHRONIC KIDNEY DISEASE (HCC): Primary | ICD-10-CM

## 2021-05-19 LAB
CREAT SERPL-MCNC: 3.06 MG/DL (ref 0.76–1.27)
FERRITIN SERPL-MCNC: 247.7 NG/ML (ref 30–400)
GFR SERPL CREATININE-BSD FRML MDRD: 20 ML/MIN/1.73
HCT VFR BLD AUTO: 31.2 % (ref 37.5–51)
HGB BLD-MCNC: 9.7 G/DL (ref 13–17.7)
IRON 24H UR-MRATE: 61 MCG/DL (ref 59–158)
IRON SATN MFR SERPL: 21 % (ref 20–50)
POTASSIUM SERPL-SCNC: 4.6 MMOL/L (ref 3.5–5.2)
TIBC SERPL-MCNC: 292 MCG/DL (ref 298–536)
TRANSFERRIN SERPL-MCNC: 196 MG/DL (ref 200–360)

## 2021-05-19 PROCEDURE — 85014 HEMATOCRIT: CPT | Performed by: INTERNAL MEDICINE

## 2021-05-19 PROCEDURE — 25010000002 EPOETIN ALFA-EPBX 10000 UNIT/ML SOLUTION: Performed by: INTERNAL MEDICINE

## 2021-05-19 PROCEDURE — 83540 ASSAY OF IRON: CPT | Performed by: INTERNAL MEDICINE

## 2021-05-19 PROCEDURE — 96372 THER/PROPH/DIAG INJ SC/IM: CPT

## 2021-05-19 PROCEDURE — 84466 ASSAY OF TRANSFERRIN: CPT | Performed by: INTERNAL MEDICINE

## 2021-05-19 PROCEDURE — 85018 HEMOGLOBIN: CPT | Performed by: INTERNAL MEDICINE

## 2021-05-19 PROCEDURE — 84132 ASSAY OF SERUM POTASSIUM: CPT | Performed by: INTERNAL MEDICINE

## 2021-05-19 PROCEDURE — 36415 COLL VENOUS BLD VENIPUNCTURE: CPT

## 2021-05-19 PROCEDURE — 82728 ASSAY OF FERRITIN: CPT | Performed by: INTERNAL MEDICINE

## 2021-05-19 PROCEDURE — 82565 ASSAY OF CREATININE: CPT | Performed by: INTERNAL MEDICINE

## 2021-05-19 RX ADMIN — EPOETIN ALFA-EPBX 20000 UNITS: 10000 INJECTION, SOLUTION INTRAVENOUS; SUBCUTANEOUS at 12:01

## 2021-05-26 ENCOUNTER — ANTICOAGULATION VISIT (OUTPATIENT)
Dept: PHARMACY | Facility: HOSPITAL | Age: 76
End: 2021-05-26

## 2021-05-26 ENCOUNTER — HOSPITAL ENCOUNTER (OUTPATIENT)
Dept: ONCOLOGY | Facility: HOSPITAL | Age: 76
Discharge: HOME | End: 2021-05-26
Admitting: INTERNAL MEDICINE

## 2021-05-26 VITALS
TEMPERATURE: 97.9 F | HEIGHT: 70 IN | SYSTOLIC BLOOD PRESSURE: 140 MMHG | HEART RATE: 78 BPM | BODY MASS INDEX: 39.08 KG/M2 | RESPIRATION RATE: 16 BRPM | WEIGHT: 273 LBS | DIASTOLIC BLOOD PRESSURE: 69 MMHG

## 2021-05-26 DIAGNOSIS — N18.4 STAGE 4 CHRONIC KIDNEY DISEASE (HCC): Primary | ICD-10-CM

## 2021-05-26 LAB
CREAT SERPL-MCNC: 3.19 MG/DL (ref 0.76–1.27)
GFR SERPL CREATININE-BSD FRML MDRD: 19 ML/MIN/1.73
HCT VFR BLD AUTO: 31.8 % (ref 37.5–51)
HGB BLD-MCNC: 10 G/DL (ref 13–17.7)
INR PPP: 2.1 (ref 0.91–1.09)
POTASSIUM SERPL-SCNC: 4.3 MMOL/L (ref 3.5–5.2)
PROTHROMBIN TIME: 25.8 SECONDS (ref 10–13.8)

## 2021-05-26 PROCEDURE — 25010000002 EPOETIN ALFA-EPBX 10000 UNIT/ML SOLUTION: Performed by: INTERNAL MEDICINE

## 2021-05-26 PROCEDURE — 85014 HEMATOCRIT: CPT | Performed by: INTERNAL MEDICINE

## 2021-05-26 PROCEDURE — G0463 HOSPITAL OUTPT CLINIC VISIT: HCPCS

## 2021-05-26 PROCEDURE — 36415 COLL VENOUS BLD VENIPUNCTURE: CPT

## 2021-05-26 PROCEDURE — 85018 HEMOGLOBIN: CPT | Performed by: INTERNAL MEDICINE

## 2021-05-26 PROCEDURE — 85610 PROTHROMBIN TIME: CPT

## 2021-05-26 PROCEDURE — 84132 ASSAY OF SERUM POTASSIUM: CPT | Performed by: INTERNAL MEDICINE

## 2021-05-26 PROCEDURE — 36416 COLLJ CAPILLARY BLOOD SPEC: CPT

## 2021-05-26 PROCEDURE — 82565 ASSAY OF CREATININE: CPT | Performed by: INTERNAL MEDICINE

## 2021-05-26 PROCEDURE — 96372 THER/PROPH/DIAG INJ SC/IM: CPT

## 2021-05-26 RX ADMIN — EPOETIN ALFA-EPBX 20000 UNITS: 10000 INJECTION, SOLUTION INTRAVENOUS; SUBCUTANEOUS at 10:24

## 2021-05-26 NOTE — PROGRESS NOTES
Anticoagulation Clinic Progress Note  Indication: afib  Referring Provider: Tyson (next appointment: 10/15/2021) (Claudia 6/16/21)  Initial Warfarin Start Date: 2007  Goal INR: 2-3  Current Drug Interactions:  torsemide  Diet: eats salads 2x weekly 11/4/20  [id number]: 5 (HTN, Age, CHF, CAD)  Other: anemic, CKD (stage 3) May need bridge with Lovenox given persistent AF (per cardio 1/24/2019), Scr was 2.38 on 12/10/19     Anticoagulation Clinic INR History:  Date 10/23 11/20 12/8 1/5/18 2/2 3/2 3/16 3/30 4/17 5/17 6/14 7/19 8/1 8/22 9/12   Total Weekly Dose 37.5mg 37.5 mg 37.5 mg 37.5 mg 37.5mg 37.5 mg 37.5 mg 37.5 mg 37.5mg 37.5 mg 37.5 mg 37.5mg 37.5 mg 37.5 mg 37.5 mg    INR 2.5 1.8 2.5 2.3 2.1 1.7 3.2 2.1 2.3 2.06 2.8 3.5 2.7 2.7 3.0   Notes      (boost), amox amox   PAT     diarrhea     Date 10/1 10/8 10/12 10/17 10/22 10/29 11/5 11/12 11/19 11/29 12/13 1/3 1/24/19 2/12 2/18   Total Weekly Dose 26.25 mg 23.75mg 22.5  mg 23.75 mg 27.5 mg 28.75 mg 27.5 mg 28.75 mg 28.75 mg 28.75 mg 28.75 28.75mg 28.75 mg 17.5 mg 28.75mg   INR 3.0 2.1 1.6 1.3 1.6 2.8 2.0 2.5 2.4 2.76 2.8 2.7 2.9 1.7 1.5   Notes post- disch amio start 10/1        PAT   held x 7 days; resumed 1/22; D/C amio asa dc'd torsemide reduced,      Date 2/22 2/26 3/6 3/15 3/29 4/12 4/26 5/14 6/11 7/9 8/6 9/3 10/1 10/17   Total Weekly Dose 31.75 mg 33.75 mg 35mg 33.75 mg 33.75 mg 33.75 mg 32.5 mg 32.5 mg 32.5 mg 32.5 mg 32.5 mg 32.5 mg 32.5 mg 33.75 mg   INR 1.6 2.0 2.8 2.8 3.1 3.1 2.8 2.5 2.2 2.4 2.5 2.4 2.0 3.7   Notes  enox                 Date 10/30 11/20 12/17 1/14/20 2/11 3/11 4/8 5/6 6/11 7/9 8/6 9/3 9/14 9/30   Total Weekly Dose 32.5 mg 32.5 mg 32.5 mg 32.5 mg 32.5mg 32.5mg 32.5mg 32.5mg 32.5mg 32.5 mg 32.5 mg 32.5mg 32.5mg 26.25 mg   INR 2.3 2.3 2.1 1.9 2.2 2.1 2.1 2.2 2.2 2.3 2.7 1.6 2.2  1.8   Notes            ??  5 day hold     Date 10/14 11/4 11/11 11/25 12/9 12/16 12/30 1/13 2/3 3/3 3/31 4/28 5/26    Total Weekly Dose 32.5 mg 32.5mg 35mg  32.5mg 33.75 mg 36.25 mg 33.75 mg 35mg 33.75 33.75mg 33.75mg 33.75 mg 33.75 mg    INR 2.0 1.6 2.1 1.8 1.8 2.5 2.0 3.0 2.1 2.4 2.3 3.0 2.1    Notes   1x boost GLV boostx1 boostx1             Takes warfarin in the am    Clinic Interview:  Verbal Release Authorization signed on 8/22/2018 -- may speak with Raeann Montemayor (Wife). [phone] (Home) *Preferred*  Tablet Strength: pt has 7.5mg and 2.5mg tablets    Patient Findings  Negatives:  Signs/symptoms of thrombosis, Signs/symptoms of bleeding, Laboratory test error suspected, Change in health, Change in alcohol use, Change in activity, Upcoming invasive procedure, Emergency department visit, Upcoming dental procedure, Missed doses, Extra doses, Change in medications, Change in diet/appetite, Hospital admission, Bruising, Other complaints   Comments:  Above findings negative    He continues to eat a salad about twice weekly.     Plan:   1. INR is therapeutic today at 2.1.  Instructed Mr. Montemayor to continue warfarin 5mg oral daily except 3.75mg Wednesdays until recheck   2. RTC in 4 weeks  3. Verbal and written information provided in the clinic. Mr. Montemayor expresses understanding with teach back and has no further questions at this time.   4. Due to see Dr Mehta, however pt reports he checked and Dr Mehta does not have openings until August, nurse is trying to get him in sooner    Kristine Sutton, NighatD.  05/26/21   10:48 EDT

## 2021-06-02 ENCOUNTER — HOSPITAL ENCOUNTER (OUTPATIENT)
Dept: ONCOLOGY | Facility: HOSPITAL | Age: 76
Discharge: HOME | End: 2021-06-02
Admitting: INTERNAL MEDICINE

## 2021-06-02 VITALS
RESPIRATION RATE: 18 BRPM | TEMPERATURE: 97.3 F | HEART RATE: 85 BPM | WEIGHT: 280 LBS | DIASTOLIC BLOOD PRESSURE: 83 MMHG | HEIGHT: 70 IN | BODY MASS INDEX: 40.09 KG/M2 | SYSTOLIC BLOOD PRESSURE: 138 MMHG

## 2021-06-02 DIAGNOSIS — N18.4 STAGE 4 CHRONIC KIDNEY DISEASE (HCC): Primary | ICD-10-CM

## 2021-06-02 LAB
CREAT SERPL-MCNC: 2.85 MG/DL (ref 0.76–1.27)
FERRITIN SERPL-MCNC: 158.7 NG/ML (ref 30–400)
GFR SERPL CREATININE-BSD FRML MDRD: 22 ML/MIN/1.73
HCT VFR BLD AUTO: 29.6 % (ref 37.5–51)
HGB BLD-MCNC: 9.2 G/DL (ref 13–17.7)
IRON 24H UR-MRATE: 52 MCG/DL (ref 59–158)
IRON SATN MFR SERPL: 19 % (ref 20–50)
POTASSIUM SERPL-SCNC: 4.5 MMOL/L (ref 3.5–5.2)
TIBC SERPL-MCNC: 280 MCG/DL (ref 298–536)
TRANSFERRIN SERPL-MCNC: 188 MG/DL (ref 200–360)

## 2021-06-02 PROCEDURE — 85014 HEMATOCRIT: CPT | Performed by: INTERNAL MEDICINE

## 2021-06-02 PROCEDURE — 84466 ASSAY OF TRANSFERRIN: CPT | Performed by: INTERNAL MEDICINE

## 2021-06-02 PROCEDURE — 83540 ASSAY OF IRON: CPT | Performed by: INTERNAL MEDICINE

## 2021-06-02 PROCEDURE — 25010000002 EPOETIN ALFA-EPBX 10000 UNIT/ML SOLUTION: Performed by: INTERNAL MEDICINE

## 2021-06-02 PROCEDURE — 85018 HEMOGLOBIN: CPT | Performed by: INTERNAL MEDICINE

## 2021-06-02 PROCEDURE — 84132 ASSAY OF SERUM POTASSIUM: CPT | Performed by: INTERNAL MEDICINE

## 2021-06-02 PROCEDURE — 36415 COLL VENOUS BLD VENIPUNCTURE: CPT

## 2021-06-02 PROCEDURE — 96372 THER/PROPH/DIAG INJ SC/IM: CPT

## 2021-06-02 PROCEDURE — 82728 ASSAY OF FERRITIN: CPT | Performed by: INTERNAL MEDICINE

## 2021-06-02 PROCEDURE — 82565 ASSAY OF CREATININE: CPT | Performed by: INTERNAL MEDICINE

## 2021-06-02 RX ADMIN — EPOETIN ALFA-EPBX 20000 UNITS: 10000 INJECTION, SOLUTION INTRAVENOUS; SUBCUTANEOUS at 10:19

## 2021-06-02 NOTE — ADDENDUM NOTE
Encounter addended by: Raeann Montenegro RN on: 6/2/2021 10:23 AM   Actions taken: MAR administration accepted, Flowsheet accepted

## 2021-06-09 ENCOUNTER — HOSPITAL ENCOUNTER (OUTPATIENT)
Dept: ONCOLOGY | Facility: HOSPITAL | Age: 76
Discharge: HOME | End: 2021-06-09
Admitting: INTERNAL MEDICINE

## 2021-06-09 VITALS
HEART RATE: 84 BPM | RESPIRATION RATE: 18 BRPM | DIASTOLIC BLOOD PRESSURE: 79 MMHG | BODY MASS INDEX: 40.52 KG/M2 | SYSTOLIC BLOOD PRESSURE: 167 MMHG | HEIGHT: 70 IN | TEMPERATURE: 97.4 F | WEIGHT: 283 LBS

## 2021-06-09 DIAGNOSIS — N18.4 STAGE 4 CHRONIC KIDNEY DISEASE (HCC): Primary | ICD-10-CM

## 2021-06-09 LAB
CREAT SERPL-MCNC: 3.21 MG/DL (ref 0.76–1.27)
GFR SERPL CREATININE-BSD FRML MDRD: 19 ML/MIN/1.73
HCT VFR BLD AUTO: 31.4 % (ref 37.5–51)
HGB BLD-MCNC: 9.8 G/DL (ref 13–17.7)
POTASSIUM SERPL-SCNC: 4.5 MMOL/L (ref 3.5–5.2)

## 2021-06-09 PROCEDURE — 96372 THER/PROPH/DIAG INJ SC/IM: CPT

## 2021-06-09 PROCEDURE — 85014 HEMATOCRIT: CPT | Performed by: INTERNAL MEDICINE

## 2021-06-09 PROCEDURE — 25010000002 EPOETIN ALFA-EPBX 10000 UNIT/ML SOLUTION: Performed by: INTERNAL MEDICINE

## 2021-06-09 PROCEDURE — 36415 COLL VENOUS BLD VENIPUNCTURE: CPT

## 2021-06-09 PROCEDURE — 84132 ASSAY OF SERUM POTASSIUM: CPT | Performed by: INTERNAL MEDICINE

## 2021-06-09 PROCEDURE — 85018 HEMOGLOBIN: CPT | Performed by: INTERNAL MEDICINE

## 2021-06-09 PROCEDURE — 82565 ASSAY OF CREATININE: CPT | Performed by: INTERNAL MEDICINE

## 2021-06-09 RX ADMIN — EPOETIN ALFA-EPBX 20000 UNITS: 10000 INJECTION, SOLUTION INTRAVENOUS; SUBCUTANEOUS at 11:45

## 2021-06-16 ENCOUNTER — HOSPITAL ENCOUNTER (OUTPATIENT)
Dept: ONCOLOGY | Facility: HOSPITAL | Age: 76
Discharge: HOME | End: 2021-06-16
Admitting: INTERNAL MEDICINE

## 2021-06-16 ENCOUNTER — OFFICE VISIT (OUTPATIENT)
Dept: CARDIOLOGY | Facility: CLINIC | Age: 76
End: 2021-06-16

## 2021-06-16 VITALS
WEIGHT: 279 LBS | OXYGEN SATURATION: 95 % | BODY MASS INDEX: 39.94 KG/M2 | HEIGHT: 70 IN | SYSTOLIC BLOOD PRESSURE: 142 MMHG | HEART RATE: 88 BPM | DIASTOLIC BLOOD PRESSURE: 86 MMHG

## 2021-06-16 VITALS
BODY MASS INDEX: 39.94 KG/M2 | HEART RATE: 88 BPM | TEMPERATURE: 97.3 F | RESPIRATION RATE: 20 BRPM | WEIGHT: 279 LBS | HEIGHT: 70 IN

## 2021-06-16 DIAGNOSIS — R00.1 BRADYCARDIA: ICD-10-CM

## 2021-06-16 DIAGNOSIS — I48.21 PERMANENT ATRIAL FIBRILLATION (HCC): Primary | ICD-10-CM

## 2021-06-16 DIAGNOSIS — I10 ESSENTIAL HYPERTENSION: Chronic | ICD-10-CM

## 2021-06-16 DIAGNOSIS — N18.4 STAGE 4 CHRONIC KIDNEY DISEASE (HCC): Primary | ICD-10-CM

## 2021-06-16 LAB
CREAT SERPL-MCNC: 2.95 MG/DL (ref 0.76–1.27)
GFR SERPL CREATININE-BSD FRML MDRD: 21 ML/MIN/1.73
HCT VFR BLD AUTO: 30.5 % (ref 37.5–51)
HGB BLD-MCNC: 9.8 G/DL (ref 13–17.7)
POTASSIUM SERPL-SCNC: 4.6 MMOL/L (ref 3.5–5.2)

## 2021-06-16 PROCEDURE — 99213 OFFICE O/P EST LOW 20 MIN: CPT | Performed by: INTERNAL MEDICINE

## 2021-06-16 PROCEDURE — 82565 ASSAY OF CREATININE: CPT | Performed by: INTERNAL MEDICINE

## 2021-06-16 PROCEDURE — 93280 PM DEVICE PROGR EVAL DUAL: CPT | Performed by: INTERNAL MEDICINE

## 2021-06-16 PROCEDURE — 84132 ASSAY OF SERUM POTASSIUM: CPT | Performed by: INTERNAL MEDICINE

## 2021-06-16 PROCEDURE — 85014 HEMATOCRIT: CPT | Performed by: INTERNAL MEDICINE

## 2021-06-16 PROCEDURE — 96372 THER/PROPH/DIAG INJ SC/IM: CPT

## 2021-06-16 PROCEDURE — 25010000002 EPOETIN ALFA-EPBX 10000 UNIT/ML SOLUTION: Performed by: INTERNAL MEDICINE

## 2021-06-16 PROCEDURE — 36415 COLL VENOUS BLD VENIPUNCTURE: CPT

## 2021-06-16 PROCEDURE — 85018 HEMOGLOBIN: CPT | Performed by: INTERNAL MEDICINE

## 2021-06-16 RX ORDER — ACETAMINOPHEN 500 MG
500 TABLET ORAL EVERY 6 HOURS PRN
COMMUNITY

## 2021-06-16 RX ORDER — KETOCONAZOLE 20 MG/ML
1 SHAMPOO TOPICAL AS NEEDED
Status: ON HOLD | COMMUNITY
Start: 2021-04-12 | End: 2022-11-03

## 2021-06-16 RX ORDER — TORSEMIDE 20 MG/1
20 TABLET ORAL DAILY
COMMUNITY
End: 2021-12-03 | Stop reason: SDUPTHER

## 2021-06-16 RX ADMIN — EPOETIN ALFA-EPBX 20000 UNITS: 10000 INJECTION, SOLUTION INTRAVENOUS; SUBCUTANEOUS at 12:50

## 2021-06-16 NOTE — PROGRESS NOTES
Marco Antonio Montemayor  1945  [phone]      06/16/2021      [facility] CARDIOLOGY     Provider, No Known  [place] [address]    Chief Complaint   Patient presents with   • Chronic atrial fibrillation (CMS/HCC)       Problem List:  1. Permanent atrial fibrillation  a. Intermittent chronic paroxysmal atrial fibrillation with electrophysiologic study with RFA for atrial flutter, October 1999.  b. Recurrent apparent transient atrial fibrillation, resolved with acceptable [facility] emergency department evaluation, June 2003.  c. Acceptable combination Doppler echocardiogram, July 2003, with apparent acceptable 24-hour Holter monitor, May 2006.  d. Recurrent asymptomatic atrial fibrillation with RVR, January 2007, subsequent Coumadin therapy and Tikosyn therapy with successful internal cardioversion of atrial fibrillation and marked bradyarrhythmias requiring DDDR pacemaker implant, [employer] [place] device, data not available, March 2007.  e. Remote hospitalization for symptomatic rapid atrial fibrillation/BRYANNA DC cardioversion with subsequent acceptable pacemaker interrogation, June/July 2009, as well as acceptable interrogation without reprogramming, July 2011.  f. Acceptable combination Doppler echocardiogram, December 2010, with residual class I symptoms.  g. Acceptable device interrogation following [facility] ED evaluation for chest pressure and shortness of breath, data deficit, May 2011, February 2013, February 2016, December 2016.  h. Abnormal pacemaker assessment with 8.4% mode switch with battery voltage of 1-1.25 years, June 2017, with abnormal PACEART assessment, July 2018, with 22% mode switch  2. 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 Remote acceptable intravenous adenosine Quantitative SPECT gated Cardiolite study, LVEF 0.50, April 1999.  b. Echocardiogram showing mild LVH with estimated ejection fraction 0.58 with mild aortic valve cusp sclerosis, and mild TR,  09/23/2014.   c. Remote abnormal preoperative Quantitative SPECT gated Cardiolite study with mild “fixed” inferoposterior hypoperfusion and mild LV enlargement with mild global hypokinesia with reduced LVEF 0.40 in the setting of abnormal EKG with subsequent diagnostic coronary angiography demonstrating mild-to-moderate nonobstructive coronary artery atherosclerosis with mild compensated left ventricular dysfunction, LVEF 0.52, and continued medical therapy felt warranted, June 2006.  d. Remote hospitalization for symptomatic atrial fibrillation with rapid ventricular response and mild congestive heart failure requiring BRYANNA and DC cardioversion, June 2009.  e. Left heart catheterization with essentially normal coronary arteries with mild luminal irregularities with an EF of 0.60 by Dr. Escamilla for angina and elevated troponin, 06/05/2015.   f. Residual class I symptoms.  3. Chronic essential hypertension with recent progressive hypertension and blood pressure readings and normal selective bilateral renal angiography, June 2006.  4. Dyslipidemia.  5. Moderate obesity, BMI 39.6.  6. Ichthyosis.  7. Chronic lower tract obstructive symptoms, probable BPH.  8. Dyslipidemia.  9. Nasal polyps with deviated nasal septum with apparent subsequent nasal septoplasty/polypectomy, data deficit, July 2006.  10. Abdominal pain with apparent small bowel obstruction with exploratory laparotomy - data deficit.  11. Appendectomy with subsequent delayed hospitalization for bleeding peptic ulcer disease/probable duodenal ulcer, November 2003.  12. Recurrent asymptomatic atrial fibrillation with rapid ventricular response, July 2007, with subsequent Tikosyn therapy and DDDR pacemaker implant with intermittent recurrent breakthrough arrhythmias, including remote DC cardioversion, May 2008, with subsequent recurrent DC cardioversion, autumn 2018, with recurrent atrial fibrillation and discontinuation of formal antiarrhythmic therapy - data  deficit  13. Obstructive sleep apnea with treatment.   14. Stage 4 chronic kidney disease with recent apparent documented proteinuria and renal biopsy (February 2019)       Allergies  No Known Allergies    Current Medications    Current Outpatient Medications:   •  acetaminophen (TYLENOL) 500 MG tablet, Take 500 mg by mouth Every 6 (Six) Hours As Needed for Mild Pain ., Disp: , Rfl:   •  atorvastatin (LIPITOR) 40 MG tablet, Take 1 tablet by mouth Daily. Need labs for further refills. Call office for lab orders, Disp: 90 tablet, Rfl: 1  •  calcitriol (ROCALTROL) 0.25 MCG capsule, Take 1 capsule by mouth 3 (Three) Times a Week. Monday, wed, friday, Disp: , Rfl:   •  carvedilol (COREG) 25 MG tablet, Take 1 tablet by mouth 2 (Two) Times a Day With Meals., Disp: 180 tablet, Rfl: 3  •  cholecalciferol (VITAMIN D3) 1000 UNITS tablet, Take 1,000 Units by mouth daily., Disp: , Rfl:   •  Ferrous Sulfate (IRON) 325 (65 FE) MG tablet, Take 65 mg by mouth Daily., Disp: , Rfl:   •  lisinopril (PRINIVIL,ZESTRIL) 5 MG tablet, Take 1 tablet by mouth Daily., Disp: 90 tablet, Rfl: 3  •  PHARMACY TO DOSE WARFARIN, Continuous As Needed (patient's warfarin is being managed by the [facility] ([phone]))., Disp: , Rfl:   •  torsemide (DEMADEX) 20 MG tablet, Take 20 mg by mouth Daily., Disp: , Rfl:   •  vitamin B-12 (CYANOCOBALAMIN) 1000 MCG tablet, Take 1,000 mcg by mouth Daily., Disp: , Rfl:   •  warfarin (COUMADIN) 2.5 MG tablet, TAKE 1-1.5 TABLET(S) BY MOUTH DAILY AS DIRECTED BY THE ANTICOAGULATION CLINIC, Disp: 180 tablet, Rfl: 0  •  warfarin (COUMADIN) 7.5 MG tablet, TAKE ONE-HALF TO ONE TABLET BY MOUTH DAILY AS DIRECTED, Disp: 90 tablet, Rfl: 3  •  ketoconazole (NIZORAL) 2 % shampoo, Apply 1 application topically to the appropriate area as directed As Needed., Disp: , Rfl:     History of Present Illness     Pt presents for follow up of AF/SSS/HTN. Since the pt has seen us in clinic last, pt  "denies any syncope, palps, CP, LH, and dizziness. Has good and bad days. + BLACKWELL chronic in nature. Denies any hospitalizations, ER visits, bleeding, or TIA/CVA symptoms. Overall feels Ok. BP ok at home. HR stable as well. PT INR have been stable    ROS:  General:  + fatigue, + weight gain   Cardiovascular:  Denies CP, PND, syncope, near syncope, edema or palpitations.  Pulmonary:  + BLACKWELL, No cough, or wheezing    Vitals:    06/16/21 1135   BP: 142/86   BP Location: Left arm   Patient Position: Sitting   Cuff Size: Large Adult   Pulse: 88   SpO2: 95%   Weight: 127 kg (279 lb)   Height: 177.8 cm (70\")       PE:  General: NAD  Neck: no JVD, no carotid bruits, no TM  Heart: RRR, NL S1, S2, S4 present, no rubs, murmurs  Lungs: CTA, no wheezes, rhonchi, or rales  Abd: soft, non-tender, NL BS  Ext: No musculoskeletal deformities, no edema, cyanosis, or clubbing  Psych: normal mood and affect    Diagnostic Data:  Procedures      1. Permanent atrial fibrillation (CMS/HCC)    2. Bradycardia    3. Essential hypertension        PM Interrogation: NL PM fxn, Nl battery fxn, 100% AF, 43% paced in RV      Plan:  1) Permanent Atrial Fibrillation:  continue to monitor HR at home   - rate controlled and overall asymptomatic  Continue present medications.   2) Anticoagulation  Continue warfarin  3) Bradycardia:  - normal PM function   4) HTN: needs wt loss and exercise with diet restrictions  - ok per Dr Mehta  Wt loss, exercise, salt reduction    F/up in 12 months      "

## 2021-06-23 ENCOUNTER — HOSPITAL ENCOUNTER (OUTPATIENT)
Dept: ONCOLOGY | Facility: HOSPITAL | Age: 76
Discharge: HOME | End: 2021-06-23
Admitting: INTERNAL MEDICINE

## 2021-06-23 ENCOUNTER — ANTICOAGULATION VISIT (OUTPATIENT)
Dept: PHARMACY | Facility: HOSPITAL | Age: 76
End: 2021-06-23

## 2021-06-23 VITALS
RESPIRATION RATE: 20 BRPM | WEIGHT: 275 LBS | TEMPERATURE: 96.8 F | HEART RATE: 79 BPM | BODY MASS INDEX: 39.37 KG/M2 | DIASTOLIC BLOOD PRESSURE: 74 MMHG | SYSTOLIC BLOOD PRESSURE: 138 MMHG | HEIGHT: 70 IN

## 2021-06-23 DIAGNOSIS — N18.4 STAGE 4 CHRONIC KIDNEY DISEASE (HCC): Primary | ICD-10-CM

## 2021-06-23 LAB
CREAT SERPL-MCNC: 2.75 MG/DL (ref 0.76–1.27)
GFR SERPL CREATININE-BSD FRML MDRD: 22 ML/MIN/1.73
HCT VFR BLD AUTO: 32.5 % (ref 37.5–51)
HGB BLD-MCNC: 10.2 G/DL (ref 13–17.7)
INR PPP: 2.2 (ref 0.91–1.09)
POTASSIUM SERPL-SCNC: 4.7 MMOL/L (ref 3.5–5.2)
PROTHROMBIN TIME: 26.4 SECONDS (ref 10–13.8)

## 2021-06-23 PROCEDURE — 85610 PROTHROMBIN TIME: CPT

## 2021-06-23 PROCEDURE — 36416 COLLJ CAPILLARY BLOOD SPEC: CPT

## 2021-06-23 PROCEDURE — 25010000002 EPOETIN ALFA-EPBX 10000 UNIT/ML SOLUTION: Performed by: INTERNAL MEDICINE

## 2021-06-23 PROCEDURE — 96372 THER/PROPH/DIAG INJ SC/IM: CPT

## 2021-06-23 PROCEDURE — 82565 ASSAY OF CREATININE: CPT | Performed by: INTERNAL MEDICINE

## 2021-06-23 PROCEDURE — 85014 HEMATOCRIT: CPT | Performed by: INTERNAL MEDICINE

## 2021-06-23 PROCEDURE — 85018 HEMOGLOBIN: CPT | Performed by: INTERNAL MEDICINE

## 2021-06-23 PROCEDURE — 84132 ASSAY OF SERUM POTASSIUM: CPT | Performed by: INTERNAL MEDICINE

## 2021-06-23 PROCEDURE — G0463 HOSPITAL OUTPT CLINIC VISIT: HCPCS

## 2021-06-23 PROCEDURE — 36415 COLL VENOUS BLD VENIPUNCTURE: CPT

## 2021-06-23 RX ADMIN — EPOETIN ALFA-EPBX 20000 UNITS: 10000 INJECTION, SOLUTION INTRAVENOUS; SUBCUTANEOUS at 13:03

## 2021-06-23 NOTE — PROGRESS NOTES
Anticoagulation Clinic Progress Note  Indication: afib  Referring Provider: Tyson (next appointment: 10/15/2021) (Claudia 6/16/21)  Initial Warfarin Start Date: 2007  Goal INR: 2-3  Current Drug Interactions:  torsemide  Diet: eats salads 2x weekly 11/4/20  [id number]: 5 (HTN, Age, CHF, CAD)  Other: anemic, CKD (stage 3) May need bridge with Lovenox given persistent AF (per cardio 1/24/2019), Scr was 2.38 on 12/10/19     Anticoagulation Clinic INR History:  Date 10/23 11/20 12/8 1/5/18 2/2 3/2 3/16 3/30 4/17 5/17 6/14 7/19 8/1 8/22 9/12   Total Weekly Dose 37.5mg 37.5 mg 37.5 mg 37.5 mg 37.5mg 37.5 mg 37.5 mg 37.5 mg 37.5mg 37.5 mg 37.5 mg 37.5mg 37.5 mg 37.5 mg 37.5 mg    INR 2.5 1.8 2.5 2.3 2.1 1.7 3.2 2.1 2.3 2.06 2.8 3.5 2.7 2.7 3.0   Notes      (boost), amox amox   PAT     diarrhea     Date 10/1 10/8 10/12 10/17 10/22 10/29 11/5 11/12 11/19 11/29 12/13 1/3 1/24/19 2/12 2/18   Total Weekly Dose 26.25 mg 23.75mg 22.5  mg 23.75 mg 27.5 mg 28.75 mg 27.5 mg 28.75 mg 28.75 mg 28.75 mg 28.75 28.75mg 28.75 mg 17.5 mg 28.75mg   INR 3.0 2.1 1.6 1.3 1.6 2.8 2.0 2.5 2.4 2.76 2.8 2.7 2.9 1.7 1.5   Notes post- disch amio start 10/1        PAT   held x 7 days; resumed 1/22; D/C amio asa dc'd torsemide reduced,      Date 2/22 2/26 3/6 3/15 3/29 4/12 4/26 5/14 6/11 7/9 8/6 9/3 10/1 10/17   Total Weekly Dose 31.75 mg 33.75 mg 35mg 33.75 mg 33.75 mg 33.75 mg 32.5 mg 32.5 mg 32.5 mg 32.5 mg 32.5 mg 32.5 mg 32.5 mg 33.75 mg   INR 1.6 2.0 2.8 2.8 3.1 3.1 2.8 2.5 2.2 2.4 2.5 2.4 2.0 3.7   Notes  enox                 Date 10/30 11/20 12/17 1/14/20 2/11 3/11 4/8 5/6 6/11 7/9 8/6 9/3 9/14 9/30   Total Weekly Dose 32.5 mg 32.5 mg 32.5 mg 32.5 mg 32.5mg 32.5mg 32.5mg 32.5mg 32.5mg 32.5 mg 32.5 mg 32.5mg 32.5mg 26.25 mg   INR 2.3 2.3 2.1 1.9 2.2 2.1 2.1 2.2 2.2 2.3 2.7 1.6 2.2  1.8   Notes            ??  5 day hold     Date 10/14 11/4 11/11 11/25 12/9 12/16 12/30 1/13 2/3 3/3 3/31 4/28 5/26 6/23   Total Weekly Dose 32.5 mg 32.5mg 35mg  32.5mg 33.75 mg 36.25 mg 33.75 mg 35mg 33.75 33.75mg 33.75mg 33.75 mg 33.75 mg 33.75mg   INR 2.0 1.6 2.1 1.8 1.8 2.5 2.0 3.0 2.1 2.4 2.3 3.0 2.1 2.2   Notes   1x boost GLV boostx1 boostx1             Takes warfarin in the am    Clinic Interview:  Verbal Release Authorization signed on 8/22/2018 -- may speak with Raeann Montemayor (Wife). [phone] (Home) *Preferred*  Tablet Strength: pt has 7.5mg and 2.5mg tablets    Patient Findings  Negatives:  Signs/symptoms of thrombosis, Signs/symptoms of bleeding, Laboratory test error suspected, Change in health, Change in alcohol use, Change in activity, Upcoming invasive procedure, Emergency department visit, Upcoming dental procedure, Missed doses, Extra doses, Change in medications, Change in diet/appetite, Hospital admission, Bruising, Other complaints   Comments:  Patient saw Dr. Taylor- no changes at this time.     Plan:   1. INR is therapeutic today at 2.1.  Instructed Mr. Montemayor to continue warfarin 5mg oral daily except 3.75mg Wednesdays until recheck   2. RTC in 4 weeks  3. Verbal and written information provided in the clinic. Mr. Montemayor expresses understanding with teach back and has no further questions at this time.   4. Due to see Dr Mehta, however pt reports he checked and Dr Mehta does not have openings until August, nurse is trying to get him in sooner    Evelina Quan, PharmD  06/23/21   11:34 EDT

## 2021-06-30 ENCOUNTER — HOSPITAL ENCOUNTER (OUTPATIENT)
Dept: ONCOLOGY | Facility: HOSPITAL | Age: 76
Discharge: HOME | End: 2021-06-30
Admitting: INTERNAL MEDICINE

## 2021-06-30 VITALS
DIASTOLIC BLOOD PRESSURE: 73 MMHG | WEIGHT: 276 LBS | BODY MASS INDEX: 39.51 KG/M2 | TEMPERATURE: 97 F | HEIGHT: 70 IN | HEART RATE: 81 BPM | SYSTOLIC BLOOD PRESSURE: 144 MMHG | RESPIRATION RATE: 16 BRPM

## 2021-06-30 DIAGNOSIS — N18.4 STAGE 4 CHRONIC KIDNEY DISEASE (HCC): Primary | ICD-10-CM

## 2021-06-30 LAB
CREAT SERPL-MCNC: 2.91 MG/DL (ref 0.76–1.27)
FERRITIN SERPL-MCNC: 109.5 NG/ML (ref 30–400)
GFR SERPL CREATININE-BSD FRML MDRD: 21 ML/MIN/1.73
HCT VFR BLD AUTO: 32.2 % (ref 37.5–51)
HGB BLD-MCNC: 10.1 G/DL (ref 13–17.7)
IRON 24H UR-MRATE: 53 MCG/DL (ref 59–158)
IRON SATN MFR SERPL: 18 % (ref 20–50)
POTASSIUM SERPL-SCNC: 4.4 MMOL/L (ref 3.5–5.2)
TIBC SERPL-MCNC: 292 MCG/DL (ref 298–536)
TRANSFERRIN SERPL-MCNC: 196 MG/DL (ref 200–360)

## 2021-06-30 PROCEDURE — 82565 ASSAY OF CREATININE: CPT | Performed by: INTERNAL MEDICINE

## 2021-06-30 PROCEDURE — 25010000002 EPOETIN ALFA-EPBX 10000 UNIT/ML SOLUTION: Performed by: INTERNAL MEDICINE

## 2021-06-30 PROCEDURE — 83540 ASSAY OF IRON: CPT | Performed by: INTERNAL MEDICINE

## 2021-06-30 PROCEDURE — 36415 COLL VENOUS BLD VENIPUNCTURE: CPT

## 2021-06-30 PROCEDURE — 85018 HEMOGLOBIN: CPT | Performed by: INTERNAL MEDICINE

## 2021-06-30 PROCEDURE — 96372 THER/PROPH/DIAG INJ SC/IM: CPT

## 2021-06-30 PROCEDURE — 82728 ASSAY OF FERRITIN: CPT | Performed by: INTERNAL MEDICINE

## 2021-06-30 PROCEDURE — 85014 HEMATOCRIT: CPT | Performed by: INTERNAL MEDICINE

## 2021-06-30 PROCEDURE — 84466 ASSAY OF TRANSFERRIN: CPT | Performed by: INTERNAL MEDICINE

## 2021-06-30 PROCEDURE — 84132 ASSAY OF SERUM POTASSIUM: CPT | Performed by: INTERNAL MEDICINE

## 2021-06-30 RX ADMIN — EPOETIN ALFA-EPBX 20000 UNITS: 10000 INJECTION, SOLUTION INTRAVENOUS; SUBCUTANEOUS at 10:48

## 2021-07-07 ENCOUNTER — HOSPITAL ENCOUNTER (OUTPATIENT)
Dept: ONCOLOGY | Facility: HOSPITAL | Age: 76
Discharge: HOME | End: 2021-07-07
Admitting: INTERNAL MEDICINE

## 2021-07-07 VITALS
TEMPERATURE: 98.2 F | SYSTOLIC BLOOD PRESSURE: 147 MMHG | BODY MASS INDEX: 40.37 KG/M2 | WEIGHT: 282 LBS | HEIGHT: 70 IN | RESPIRATION RATE: 20 BRPM | DIASTOLIC BLOOD PRESSURE: 82 MMHG | HEART RATE: 93 BPM

## 2021-07-07 DIAGNOSIS — N18.4 STAGE 4 CHRONIC KIDNEY DISEASE (HCC): Primary | ICD-10-CM

## 2021-07-07 LAB
CREAT SERPL-MCNC: 2.86 MG/DL (ref 0.76–1.27)
GFR SERPL CREATININE-BSD FRML MDRD: 21 ML/MIN/1.73
HCT VFR BLD AUTO: 32.4 % (ref 37.5–51)
HGB BLD-MCNC: 10.1 G/DL (ref 13–17.7)
POTASSIUM SERPL-SCNC: 4.5 MMOL/L (ref 3.5–5.2)

## 2021-07-07 PROCEDURE — 82565 ASSAY OF CREATININE: CPT | Performed by: INTERNAL MEDICINE

## 2021-07-07 PROCEDURE — 36415 COLL VENOUS BLD VENIPUNCTURE: CPT

## 2021-07-07 PROCEDURE — 96372 THER/PROPH/DIAG INJ SC/IM: CPT

## 2021-07-07 PROCEDURE — 25010000002 EPOETIN ALFA-EPBX 10000 UNIT/ML SOLUTION: Performed by: INTERNAL MEDICINE

## 2021-07-07 PROCEDURE — 85014 HEMATOCRIT: CPT | Performed by: INTERNAL MEDICINE

## 2021-07-07 PROCEDURE — 84132 ASSAY OF SERUM POTASSIUM: CPT | Performed by: INTERNAL MEDICINE

## 2021-07-07 PROCEDURE — 85018 HEMOGLOBIN: CPT | Performed by: INTERNAL MEDICINE

## 2021-07-07 RX ADMIN — EPOETIN ALFA-EPBX 20000 UNITS: 10000 INJECTION, SOLUTION INTRAVENOUS; SUBCUTANEOUS at 09:12

## 2021-07-08 PROCEDURE — 93294 REM INTERROG EVL PM/LDLS PM: CPT | Performed by: INTERNAL MEDICINE

## 2021-07-08 PROCEDURE — 93296 REM INTERROG EVL PM/IDS: CPT | Performed by: INTERNAL MEDICINE

## 2021-07-14 ENCOUNTER — HOSPITAL ENCOUNTER (OUTPATIENT)
Dept: ONCOLOGY | Facility: HOSPITAL | Age: 76
Discharge: HOME | End: 2021-07-14
Admitting: INTERNAL MEDICINE

## 2021-07-14 VITALS
HEIGHT: 70 IN | DIASTOLIC BLOOD PRESSURE: 74 MMHG | BODY MASS INDEX: 39.51 KG/M2 | RESPIRATION RATE: 16 BRPM | WEIGHT: 276 LBS | HEART RATE: 83 BPM | TEMPERATURE: 97 F | SYSTOLIC BLOOD PRESSURE: 143 MMHG

## 2021-07-14 DIAGNOSIS — N18.4 STAGE 4 CHRONIC KIDNEY DISEASE (HCC): Primary | ICD-10-CM

## 2021-07-14 LAB
CREAT SERPL-MCNC: 3.14 MG/DL (ref 0.76–1.27)
GFR SERPL CREATININE-BSD FRML MDRD: 19 ML/MIN/1.73
HCT VFR BLD AUTO: 34.3 % (ref 37.5–51)
HGB BLD-MCNC: 10.7 G/DL (ref 13–17.7)
POTASSIUM SERPL-SCNC: 4.5 MMOL/L (ref 3.5–5.2)

## 2021-07-14 PROCEDURE — 85018 HEMOGLOBIN: CPT | Performed by: INTERNAL MEDICINE

## 2021-07-14 PROCEDURE — 85014 HEMATOCRIT: CPT | Performed by: INTERNAL MEDICINE

## 2021-07-14 PROCEDURE — 36415 COLL VENOUS BLD VENIPUNCTURE: CPT

## 2021-07-14 PROCEDURE — 96372 THER/PROPH/DIAG INJ SC/IM: CPT

## 2021-07-14 PROCEDURE — 25010000002 EPOETIN ALFA-EPBX 10000 UNIT/ML SOLUTION: Performed by: INTERNAL MEDICINE

## 2021-07-14 PROCEDURE — 84132 ASSAY OF SERUM POTASSIUM: CPT | Performed by: INTERNAL MEDICINE

## 2021-07-14 PROCEDURE — 82565 ASSAY OF CREATININE: CPT | Performed by: INTERNAL MEDICINE

## 2021-07-14 RX ADMIN — EPOETIN ALFA-EPBX 20000 UNITS: 10000 INJECTION, SOLUTION INTRAVENOUS; SUBCUTANEOUS at 10:31

## 2021-07-21 ENCOUNTER — ANTICOAGULATION VISIT (OUTPATIENT)
Dept: PHARMACY | Facility: HOSPITAL | Age: 76
End: 2021-07-21

## 2021-07-21 ENCOUNTER — HOSPITAL ENCOUNTER (OUTPATIENT)
Dept: ONCOLOGY | Facility: HOSPITAL | Age: 76
Discharge: HOME | End: 2021-07-21
Admitting: INTERNAL MEDICINE

## 2021-07-21 VITALS
DIASTOLIC BLOOD PRESSURE: 66 MMHG | TEMPERATURE: 97.6 F | HEART RATE: 82 BPM | HEIGHT: 70 IN | BODY MASS INDEX: 40 KG/M2 | WEIGHT: 279.4 LBS | RESPIRATION RATE: 18 BRPM | SYSTOLIC BLOOD PRESSURE: 123 MMHG

## 2021-07-21 DIAGNOSIS — N18.4 STAGE 4 CHRONIC KIDNEY DISEASE (HCC): Primary | ICD-10-CM

## 2021-07-21 LAB
CREAT SERPL-MCNC: 3.22 MG/DL (ref 0.76–1.27)
GFR SERPL CREATININE-BSD FRML MDRD: 19 ML/MIN/1.73
HCT VFR BLD AUTO: 33.2 % (ref 37.5–51)
HGB BLD-MCNC: 10.3 G/DL (ref 13–17.7)
INR PPP: 2.9 (ref 0.91–1.09)
POTASSIUM SERPL-SCNC: 4.3 MMOL/L (ref 3.5–5.2)
PROTHROMBIN TIME: 34.6 SECONDS (ref 10–13.8)

## 2021-07-21 PROCEDURE — 82565 ASSAY OF CREATININE: CPT | Performed by: INTERNAL MEDICINE

## 2021-07-21 PROCEDURE — 85018 HEMOGLOBIN: CPT | Performed by: INTERNAL MEDICINE

## 2021-07-21 PROCEDURE — 85610 PROTHROMBIN TIME: CPT

## 2021-07-21 PROCEDURE — G0463 HOSPITAL OUTPT CLINIC VISIT: HCPCS

## 2021-07-21 PROCEDURE — 36416 COLLJ CAPILLARY BLOOD SPEC: CPT

## 2021-07-21 PROCEDURE — 36415 COLL VENOUS BLD VENIPUNCTURE: CPT

## 2021-07-21 PROCEDURE — 25010000002 EPOETIN ALFA-EPBX 10000 UNIT/ML SOLUTION: Performed by: INTERNAL MEDICINE

## 2021-07-21 PROCEDURE — 85014 HEMATOCRIT: CPT | Performed by: INTERNAL MEDICINE

## 2021-07-21 PROCEDURE — 84132 ASSAY OF SERUM POTASSIUM: CPT | Performed by: INTERNAL MEDICINE

## 2021-07-21 PROCEDURE — 96372 THER/PROPH/DIAG INJ SC/IM: CPT

## 2021-07-21 RX ADMIN — EPOETIN ALFA-EPBX 20000 UNITS: 10000 INJECTION, SOLUTION INTRAVENOUS; SUBCUTANEOUS at 10:43

## 2021-07-21 NOTE — PROGRESS NOTES
Anticoagulation Clinic Progress Note  Indication: afib  Referring Provider: Tyson (next appointment: 10/15/2021) (Claudia 6/16/21)  Initial Warfarin Start Date: 2007  Goal INR: 2-3  Current Drug Interactions:  torsemide  Diet: eats salads 2x weekly 11/4/20  [id number]: 5 (HTN, Age, CHF, CAD)  Other: anemic, CKD (stage 3) May need bridge with Lovenox given persistent AF (per cardio 1/24/2019), Scr was 2.38 on 12/10/19     Anticoagulation Clinic INR History:  Date 10/23 11/20 12/8 1/5/18 2/2 3/2 3/16 3/30 4/17 5/17 6/14 7/19 8/1 8/22 9/12   Total Weekly Dose 37.5mg 37.5 mg 37.5 mg 37.5 mg 37.5mg 37.5 mg 37.5 mg 37.5 mg 37.5mg 37.5 mg 37.5 mg 37.5mg 37.5 mg 37.5 mg 37.5 mg    INR 2.5 1.8 2.5 2.3 2.1 1.7 3.2 2.1 2.3 2.06 2.8 3.5 2.7 2.7 3.0   Notes      (boost), amox amox   PAT     diarrhea     Date 10/1 10/8 10/12 10/17 10/22 10/29 11/5 11/12 11/19 11/29 12/13 1/3 1/24/19 2/12 2/18   Total Weekly Dose 26.25 mg 23.75mg 22.5  mg 23.75 mg 27.5 mg 28.75 mg 27.5 mg 28.75 mg 28.75 mg 28.75 mg 28.75 28.75mg 28.75 mg 17.5 mg 28.75mg   INR 3.0 2.1 1.6 1.3 1.6 2.8 2.0 2.5 2.4 2.76 2.8 2.7 2.9 1.7 1.5   Notes post- disch amio start 10/1        PAT   held x 7 days; resumed 1/22; D/C amio asa dc'd torsemide reduced,      Date 2/22 2/26 3/6 3/15 3/29 4/12 4/26 5/14 6/11 7/9 8/6 9/3 10/1 10/17   Total Weekly Dose 31.75 mg 33.75 mg 35mg 33.75 mg 33.75 mg 33.75 mg 32.5 mg 32.5 mg 32.5 mg 32.5 mg 32.5 mg 32.5 mg 32.5 mg 33.75 mg   INR 1.6 2.0 2.8 2.8 3.1 3.1 2.8 2.5 2.2 2.4 2.5 2.4 2.0 3.7   Notes  enox                 Date 10/30 11/20 12/17 1/14/20 2/11 3/11 4/8 5/6 6/11 7/9 8/6 9/3 9/14 9/30   Total Weekly Dose 32.5 mg 32.5 mg 32.5 mg 32.5 mg 32.5mg 32.5mg 32.5mg 32.5mg 32.5mg 32.5 mg 32.5 mg 32.5mg 32.5mg 26.25 mg   INR 2.3 2.3 2.1 1.9 2.2 2.1 2.1 2.2 2.2 2.3 2.7 1.6 2.2  1.8   Notes            ??  5 day hold     Date 10/14 11/4 11/11 11/25 12/9 12/16 12/30 1/13 2/3 3/3 3/31 4/28 5/26 6/23   Total Weekly Dose 32.5 mg 32.5mg 35mg  32.5mg 33.75 mg 36.25 mg 33.75 mg 35mg 33.75 33.75mg 33.75mg 33.75 mg 33.75 mg 33.75mg   INR 2.0 1.6 2.1 1.8 1.8 2.5 2.0 3.0 2.1 2.4 2.3 3.0 2.1 2.2   Notes   1x boost GLV boostx1 boostx1             Date 7/21             Total Weekly Dose 33.75mg             INR 2.9             Notes                    Takes warfarin in the am    Clinic Interview:  Verbal Release Authorization signed on 8/22/2018 -- may speak with Raeann Montemayor (Wife). [phone] (Home) *Preferred*  Tablet Strength: pt has 7.5mg and 2.5mg tablets        Plan:   1. INR is therapeutic today at 2.9.  Instructed Mr. Montemayor to continue warfarin 5mg oral daily except 3.75mg Wednesdays until recheck   2. RTC in 6 weeks  3. Verbal and written information provided in the clinic. Mr. Montemayor expresses understanding with teach back and has no further questions at this time.       Maciel Bojorquez, PharmD  07/21/21   11:05 EDT

## 2021-07-28 ENCOUNTER — HOSPITAL ENCOUNTER (OUTPATIENT)
Dept: ONCOLOGY | Facility: HOSPITAL | Age: 76
Discharge: HOME | End: 2021-07-28
Admitting: INTERNAL MEDICINE

## 2021-07-28 VITALS
TEMPERATURE: 97.3 F | HEIGHT: 70 IN | RESPIRATION RATE: 20 BRPM | SYSTOLIC BLOOD PRESSURE: 137 MMHG | DIASTOLIC BLOOD PRESSURE: 70 MMHG | BODY MASS INDEX: 39.94 KG/M2 | HEART RATE: 87 BPM | WEIGHT: 279 LBS

## 2021-07-28 DIAGNOSIS — N18.4 STAGE 4 CHRONIC KIDNEY DISEASE (HCC): Primary | ICD-10-CM

## 2021-07-28 LAB
CREAT SERPL-MCNC: 3.17 MG/DL (ref 0.76–1.27)
FERRITIN SERPL-MCNC: 112.6 NG/ML (ref 30–400)
GFR SERPL CREATININE-BSD FRML MDRD: 19 ML/MIN/1.73
HCT VFR BLD AUTO: 34.8 % (ref 37.5–51)
HGB BLD-MCNC: 10.9 G/DL (ref 13–17.7)
IRON 24H UR-MRATE: 33 MCG/DL (ref 59–158)
IRON SATN MFR SERPL: 11 % (ref 20–50)
POTASSIUM SERPL-SCNC: 4.2 MMOL/L (ref 3.5–5.2)
TIBC SERPL-MCNC: 308 MCG/DL (ref 298–536)
TRANSFERRIN SERPL-MCNC: 207 MG/DL (ref 200–360)

## 2021-07-28 PROCEDURE — 96372 THER/PROPH/DIAG INJ SC/IM: CPT

## 2021-07-28 PROCEDURE — 25010000002 EPOETIN ALFA-EPBX 10000 UNIT/ML SOLUTION: Performed by: INTERNAL MEDICINE

## 2021-07-28 PROCEDURE — 83540 ASSAY OF IRON: CPT | Performed by: INTERNAL MEDICINE

## 2021-07-28 PROCEDURE — 36415 COLL VENOUS BLD VENIPUNCTURE: CPT

## 2021-07-28 PROCEDURE — 84466 ASSAY OF TRANSFERRIN: CPT | Performed by: INTERNAL MEDICINE

## 2021-07-28 PROCEDURE — 84132 ASSAY OF SERUM POTASSIUM: CPT | Performed by: INTERNAL MEDICINE

## 2021-07-28 PROCEDURE — 82565 ASSAY OF CREATININE: CPT | Performed by: INTERNAL MEDICINE

## 2021-07-28 PROCEDURE — 85014 HEMATOCRIT: CPT | Performed by: INTERNAL MEDICINE

## 2021-07-28 PROCEDURE — 82728 ASSAY OF FERRITIN: CPT | Performed by: INTERNAL MEDICINE

## 2021-07-28 PROCEDURE — 85018 HEMOGLOBIN: CPT | Performed by: INTERNAL MEDICINE

## 2021-07-28 RX ADMIN — EPOETIN ALFA-EPBX 20000 UNITS: 10000 INJECTION, SOLUTION INTRAVENOUS; SUBCUTANEOUS at 10:33

## 2021-07-29 DIAGNOSIS — I48.0 PAROXYSMAL ATRIAL FIBRILLATION (HCC): ICD-10-CM

## 2021-07-30 RX ORDER — CARVEDILOL 25 MG/1
25 TABLET ORAL 2 TIMES DAILY WITH MEALS
Qty: 180 TABLET | Refills: 3 | Status: SHIPPED | OUTPATIENT
Start: 2021-07-30 | End: 2021-10-30 | Stop reason: SDUPTHER

## 2021-08-04 ENCOUNTER — HOSPITAL ENCOUNTER (OUTPATIENT)
Dept: ONCOLOGY | Facility: HOSPITAL | Age: 76
Discharge: HOME | End: 2021-08-04
Admitting: INTERNAL MEDICINE

## 2021-08-04 VITALS
WEIGHT: 276 LBS | DIASTOLIC BLOOD PRESSURE: 62 MMHG | BODY MASS INDEX: 39.51 KG/M2 | RESPIRATION RATE: 16 BRPM | HEIGHT: 70 IN | SYSTOLIC BLOOD PRESSURE: 120 MMHG | TEMPERATURE: 97.9 F | HEART RATE: 87 BPM

## 2021-08-04 DIAGNOSIS — N18.4 STAGE 4 CHRONIC KIDNEY DISEASE (HCC): ICD-10-CM

## 2021-08-04 LAB
CREAT SERPL-MCNC: 3.24 MG/DL (ref 0.76–1.27)
GFR SERPL CREATININE-BSD FRML MDRD: 19 ML/MIN/1.73
HCT VFR BLD AUTO: 34.6 % (ref 37.5–51)
HGB BLD-MCNC: 11.1 G/DL (ref 13–17.7)
POTASSIUM SERPL-SCNC: 4.3 MMOL/L (ref 3.5–5.2)

## 2021-08-04 PROCEDURE — 85018 HEMOGLOBIN: CPT | Performed by: INTERNAL MEDICINE

## 2021-08-04 PROCEDURE — 85014 HEMATOCRIT: CPT | Performed by: INTERNAL MEDICINE

## 2021-08-04 PROCEDURE — 84132 ASSAY OF SERUM POTASSIUM: CPT | Performed by: INTERNAL MEDICINE

## 2021-08-04 PROCEDURE — 36415 COLL VENOUS BLD VENIPUNCTURE: CPT

## 2021-08-04 PROCEDURE — 82565 ASSAY OF CREATININE: CPT | Performed by: INTERNAL MEDICINE

## 2021-09-01 ENCOUNTER — ANTICOAGULATION VISIT (OUTPATIENT)
Dept: PHARMACY | Facility: HOSPITAL | Age: 76
End: 2021-09-01

## 2021-09-01 ENCOUNTER — HOSPITAL ENCOUNTER (OUTPATIENT)
Dept: ONCOLOGY | Facility: HOSPITAL | Age: 76
Discharge: HOME | End: 2021-09-01
Admitting: INTERNAL MEDICINE

## 2021-09-01 VITALS
DIASTOLIC BLOOD PRESSURE: 71 MMHG | BODY MASS INDEX: 40.23 KG/M2 | WEIGHT: 281 LBS | SYSTOLIC BLOOD PRESSURE: 146 MMHG | HEART RATE: 83 BPM | RESPIRATION RATE: 16 BRPM | TEMPERATURE: 97.2 F | HEIGHT: 70 IN

## 2021-09-01 DIAGNOSIS — D50.9 IRON DEFICIENCY ANEMIA, UNSPECIFIED IRON DEFICIENCY ANEMIA TYPE: ICD-10-CM

## 2021-09-01 DIAGNOSIS — N18.4 STAGE 4 CHRONIC KIDNEY DISEASE (HCC): Primary | ICD-10-CM

## 2021-09-01 LAB
CREAT SERPL-MCNC: 3.61 MG/DL (ref 0.76–1.27)
FERRITIN SERPL-MCNC: 231.1 NG/ML (ref 30–400)
GFR SERPL CREATININE-BSD FRML MDRD: 16 ML/MIN/1.73
HCT VFR BLD AUTO: 30 % (ref 37.5–51)
HGB BLD-MCNC: 9.5 G/DL (ref 13–17.7)
INR PPP: 2.5 (ref 0.91–1.09)
IRON 24H UR-MRATE: 51 MCG/DL (ref 59–158)
IRON SATN MFR SERPL: 17 % (ref 20–50)
POTASSIUM SERPL-SCNC: 4.5 MMOL/L (ref 3.5–5.2)
PROTHROMBIN TIME: 30.2 SECONDS (ref 10–13.8)
TIBC SERPL-MCNC: 294 MCG/DL (ref 298–536)
TRANSFERRIN SERPL-MCNC: 197 MG/DL (ref 200–360)

## 2021-09-01 PROCEDURE — 84132 ASSAY OF SERUM POTASSIUM: CPT | Performed by: INTERNAL MEDICINE

## 2021-09-01 PROCEDURE — 82728 ASSAY OF FERRITIN: CPT | Performed by: INTERNAL MEDICINE

## 2021-09-01 PROCEDURE — 83540 ASSAY OF IRON: CPT | Performed by: INTERNAL MEDICINE

## 2021-09-01 PROCEDURE — 85014 HEMATOCRIT: CPT | Performed by: INTERNAL MEDICINE

## 2021-09-01 PROCEDURE — 85018 HEMOGLOBIN: CPT | Performed by: INTERNAL MEDICINE

## 2021-09-01 PROCEDURE — 96372 THER/PROPH/DIAG INJ SC/IM: CPT

## 2021-09-01 PROCEDURE — 36415 COLL VENOUS BLD VENIPUNCTURE: CPT

## 2021-09-01 PROCEDURE — 25010000002 EPOETIN ALFA-EPBX 10000 UNIT/ML SOLUTION: Performed by: INTERNAL MEDICINE

## 2021-09-01 PROCEDURE — 82565 ASSAY OF CREATININE: CPT | Performed by: INTERNAL MEDICINE

## 2021-09-01 PROCEDURE — 36416 COLLJ CAPILLARY BLOOD SPEC: CPT

## 2021-09-01 PROCEDURE — 85610 PROTHROMBIN TIME: CPT

## 2021-09-01 PROCEDURE — 84466 ASSAY OF TRANSFERRIN: CPT | Performed by: INTERNAL MEDICINE

## 2021-09-01 RX ADMIN — EPOETIN ALFA-EPBX 20000 UNITS: 10000 INJECTION, SOLUTION INTRAVENOUS; SUBCUTANEOUS at 13:32

## 2021-09-01 NOTE — PROGRESS NOTES
Anticoagulation Clinic Progress Note  Indication: afib  Referring Provider: Tyson (next appointment: 10/15/2021)   Initial Warfarin Start Date: 2007  Goal INR: 2-3  Current Drug Interactions:  torsemide  Diet: eats salads 2x weekly 11/4/20  [id number]: 5 (HTN, Age, CHF, CAD)  Other: anemic, CKD (stage 3) May need bridge with Lovenox given persistent AF (per cardio 1/24/2019), Scr was 2.38 on 12/10/19     Anticoagulation Clinic INR History:  Date 10/23 11/20 12/8 1/5/18 2/2 3/2 3/16 3/30 4/17 5/17 6/14 7/19 8/1 8/22 9/12   Total Weekly Dose 37.5mg 37.5 mg 37.5 mg 37.5 mg 37.5mg 37.5 mg 37.5 mg 37.5 mg 37.5mg 37.5 mg 37.5 mg 37.5mg 37.5 mg 37.5 mg 37.5 mg    INR 2.5 1.8 2.5 2.3 2.1 1.7 3.2 2.1 2.3 2.06 2.8 3.5 2.7 2.7 3.0   Notes      (boost), amox amox   PAT     diarrhea     Date 10/1 10/8 10/12 10/17 10/22 10/29 11/5 11/12 11/19 11/29 12/13 1/3 1/24/19 2/12 2/18   Total Weekly Dose 26.25 mg 23.75mg 22.5  mg 23.75 mg 27.5 mg 28.75 mg 27.5 mg 28.75 mg 28.75 mg 28.75 mg 28.75 28.75mg 28.75 mg 17.5 mg 28.75mg   INR 3.0 2.1 1.6 1.3 1.6 2.8 2.0 2.5 2.4 2.76 2.8 2.7 2.9 1.7 1.5   Notes post- disch amio start 10/1        PAT   held x 7 days; resumed 1/22; D/C amio asa dc'd torsemide reduced,      Date 2/22 2/26 3/6 3/15 3/29 4/12 4/26 5/14 6/11 7/9 8/6 9/3 10/1 10/17   Total Weekly Dose 31.75 mg 33.75 mg 35mg 33.75 mg 33.75 mg 33.75 mg 32.5 mg 32.5 mg 32.5 mg 32.5 mg 32.5 mg 32.5 mg 32.5 mg 33.75 mg   INR 1.6 2.0 2.8 2.8 3.1 3.1 2.8 2.5 2.2 2.4 2.5 2.4 2.0 3.7   Notes  enox                 Date 10/30 11/20 12/17 1/14/20 2/11 3/11 4/8 5/6 6/11 7/9 8/6 9/3 9/14 9/30   Total Weekly Dose 32.5 mg 32.5 mg 32.5 mg 32.5 mg 32.5mg 32.5mg 32.5mg 32.5mg 32.5mg 32.5 mg 32.5 mg 32.5mg 32.5mg 26.25 mg   INR 2.3 2.3 2.1 1.9 2.2 2.1 2.1 2.2 2.2 2.3 2.7 1.6 2.2  1.8   Notes            ??  5 day hold     Date 10/14 11/4 11/11 11/25 12/9 12/16 12/30 1/13 2/3 3/3 3/31 4/28 5/26 6/23   Total Weekly Dose 32.5 mg 32.5mg 35mg 32.5mg 33.75 mg  36.25 mg 33.75 mg 35mg 33.75 33.75mg 33.75mg 33.75 mg 33.75 mg 33.75mg   INR 2.0 1.6 2.1 1.8 1.8 2.5 2.0 3.0 2.1 2.4 2.3 3.0 2.1 2.2   Notes   1x boost GLV boostx1 boostx1             Date 7/21 9/1            Total Weekly Dose 33.75mg 33.75mg            INR 2.9 2.5            Notes                    Takes warfarin in the am    Clinic Interview:  Verbal Release Authorization signed on 8/22/2018 -- may speak with Raeann Montemayor (Wife). [phone] (Home) *Preferred*  Tablet Strength: pt has 7.5mg and 2.5mg tablets    Negatives:  Signs/symptoms of thrombosis, Signs/symptoms of bleeding, Laboratory test error suspected, Change in health, Change in alcohol use, Change in activity, Upcoming invasive procedure, Emergency department visit, Upcoming dental procedure, Missed doses, Extra doses, Change in medications, Change in diet/appetite, Hospital admission, Bruising, Other complaints   Comments:  States no changes to diet or medications. Denies bleeding/bruising. No upcoming procedures.     Plan:   1. INR is therapeutic today at 2.5 (goal 2-3).  Instructed Mr. Montemayor to continue warfarin 5mg oral daily except 3.75mg Wednesdays until recheck   2. RTC in 6 weeks, 10/13.  3. Verbal and written information provided in the clinic. Mr. Montemayor expresses understanding with teach back and has no further questions at this time.     Ely Correa, Pharmacy Intern  09/01/21   11:09 EDT      I, Charlotte Michael, PharmD, have reviewed the note in full and agree with the assessment and plan.  09/01/21  11:43 EDT

## 2021-09-08 ENCOUNTER — HOSPITAL ENCOUNTER (OUTPATIENT)
Dept: ONCOLOGY | Facility: HOSPITAL | Age: 76
Discharge: HOME | End: 2021-09-08
Admitting: INTERNAL MEDICINE

## 2021-09-08 VITALS
BODY MASS INDEX: 40.23 KG/M2 | SYSTOLIC BLOOD PRESSURE: 132 MMHG | HEIGHT: 70 IN | TEMPERATURE: 97.4 F | DIASTOLIC BLOOD PRESSURE: 67 MMHG | HEART RATE: 81 BPM | WEIGHT: 281 LBS | RESPIRATION RATE: 16 BRPM

## 2021-09-08 DIAGNOSIS — D50.9 IRON DEFICIENCY ANEMIA, UNSPECIFIED IRON DEFICIENCY ANEMIA TYPE: ICD-10-CM

## 2021-09-08 DIAGNOSIS — N18.4 STAGE 4 CHRONIC KIDNEY DISEASE (HCC): Primary | ICD-10-CM

## 2021-09-08 LAB
CREAT SERPL-MCNC: 3.52 MG/DL (ref 0.76–1.27)
GFR SERPL CREATININE-BSD FRML MDRD: 17 ML/MIN/1.73
HCT VFR BLD AUTO: 30.5 % (ref 37.5–51)
HGB BLD-MCNC: 9.5 G/DL (ref 13–17.7)
POTASSIUM SERPL-SCNC: 4.5 MMOL/L (ref 3.5–5.2)

## 2021-09-08 PROCEDURE — 85018 HEMOGLOBIN: CPT | Performed by: INTERNAL MEDICINE

## 2021-09-08 PROCEDURE — 85014 HEMATOCRIT: CPT | Performed by: INTERNAL MEDICINE

## 2021-09-08 PROCEDURE — 96372 THER/PROPH/DIAG INJ SC/IM: CPT

## 2021-09-08 PROCEDURE — 82565 ASSAY OF CREATININE: CPT | Performed by: INTERNAL MEDICINE

## 2021-09-08 PROCEDURE — 25010000002 EPOETIN ALFA-EPBX 10000 UNIT/ML SOLUTION: Performed by: INTERNAL MEDICINE

## 2021-09-08 PROCEDURE — 84132 ASSAY OF SERUM POTASSIUM: CPT | Performed by: INTERNAL MEDICINE

## 2021-09-08 PROCEDURE — 36415 COLL VENOUS BLD VENIPUNCTURE: CPT

## 2021-09-08 RX ADMIN — EPOETIN ALFA-EPBX 20000 UNITS: 10000 INJECTION, SOLUTION INTRAVENOUS; SUBCUTANEOUS at 14:45

## 2021-09-09 NOTE — PROGRESS NOTES
Subjective:     Encounter Date:03/06/2019    Patient ID: Marco Antonio Montemayor is a 73 y.o.  white male, retired /current [employer] , from [place], [place].      PHYSICIAN: EDMUND Rosado MD  ELECTROPHYSIOLOGIST: Johnathan Taylor MD, [facility], [facility]  INTERVENTIONAL CARDIOLOGIST: Antonio Escamilla MD, [facility]  NEPHROLOGIST:  [facility]  DERMATOLOGIST:  Yoanna Marks MD/Oksana Young MD([facility])    Chief Complaint:   Chief Complaint   Patient presents with   • Hypertension   • Atrial Fibrillation     Problem List:  1. 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 Remote acceptable intravenous adenosine Quantitative SPECT gated Cardiolite study, LVEF 0.50, April 1999.  b. Echocardiogram showing mild LVH with estimated ejection fraction 0.58 with mild aortic valve cusp sclerosis, and mild TR, 09/23/2014.   c. Remote abnormal preoperative Quantitative SPECT gated Cardiolite study with mild “fixed” inferoposterior hypoperfusion and mild LV enlargement with mild global hypokinesia with reduced LVEF 0.40 in the setting of abnormal EKG with subsequent diagnostic coronary angiography demonstrating mild-to-moderate nonobstructive coronary artery atherosclerosis with mild compensated left ventricular dysfunction, LVEF 0.52, and continued medical therapy felt warranted, June 2006.  d. Remote hospitalization for symptomatic atrial fibrillation with rapid ventricular response and mild congestive heart failure requiring BRYANNA and DC cardioversion, June 2009.  e. Left heart catheterization with essentially normal coronary arteries with mild luminal irregularities with an EF of 0.60 by Dr. Escamilla for angina and elevated troponin, 06/05/2015.   f. Residual class I symptoms.  2. Chronic essential hypertension with recent progressive hypertension and blood pressure readings and normal selective bilateral renal angiography, June 2006.  3. Dyslipidemia.  4. Chronic atrial  tachyarrhythmias:  a. Intermittent chronic paroxysmal atrial fibrillation with electrophysiologic study with RFA for atrial flutter, October 1999.  b. Recurrent apparent transient atrial fibrillation, resolved with acceptable [facility] emergency department evaluation, June 2003.  c. Acceptable combination Doppler echocardiogram, July 2003, with apparent acceptable 24-hour Holter monitor, May 2006.  d. Recurrent asymptomatic atrial fibrillation with RVR, January 2007, subsequent Coumadin therapy and Tikosyn therapy with successful internal cardioversion of atrial fibrillation and marked bradyarrhythmias requiring DDDR pacemaker implant, [employer] [place] device, data not available, March 2007.  e. Remote hospitalization for symptomatic rapid atrial fibrillation/BRYANNA DC cardioversion with subsequent acceptable pacemaker interrogation, June/July 2009, as well as acceptable interrogation without reprogramming, July 2011.  f. Acceptable combination Doppler echocardiogram, December 2010, with residual class I symptoms.  g. Acceptable device interrogation following [facility] ED evaluation for chest pressure and shortness of breath, data deficit, May 2011, February 2013, February 2016, December 2016.  h. Abnormal pacemaker assessment with 8.4% mode switch with battery voltage of 1-1.25 years, June 2017, with abnormal PACEART assessment, July 2018, with 22% mode switch  5. Moderate obesity, BMI 39.6.  6. Ichthyosis.  7. Chronic lower tract obstructive symptoms, probable BPH.  8. Dyslipidemia.  9. Nasal polyps with deviated nasal septum with apparent subsequent nasal septoplasty/polypectomy, data deficit, July 2006.  10. Abdominal pain with apparent small bowel obstruction with exploratory laparotomy - data deficit.  11. Appendectomy with subsequent delayed hospitalization for bleeding peptic ulcer disease/probable duodenal ulcer, November 2003.  12. Recurrent asymptomatic atrial fibrillation with rapid ventricular response, July 2007, with  subsequent Tikosyn therapy and DDDR pacemaker implant with intermittent recurrent breakthrough arrhythmias, including remote DC cardioversion, May 2008, with subsequent recurrent DC cardioversion, autumn 2018, with recurrent atrial fibrillation and discontinuation of formal antiarrhythmic therapy - data deficit  13. Obstructive sleep apnea with treatment.   14. Stage 4 chronic kidney disease with recent apparent documented proteinuria and renal biopsy (February 2019)       No Known Allergies      Current Outpatient Medications:   •  acetaminophen (TYLENOL) 500 MG tablet, Take 1,000 mg by mouth Every 6 (Six) Hours As Needed for Mild Pain ., Disp: , Rfl:   •  atorvastatin (LIPITOR) 40 MG tablet, TAKE ONE TABLET BY MOUTH DAILY, Disp: 90 tablet, Rfl: 3  •  carvedilol (COREG) 25 MG tablet, TAKE ONE TABLET BY MOUTH TWICE A DAY WITH MEALS, Disp: 180 tablet, Rfl: 2  •  cholecalciferol (VITAMIN D3) 1000 UNITS tablet, Take 1,000 Units by mouth daily., Disp: , Rfl:   •  Ferrous Sulfate (IRON) 325 (65 FE) MG tablet, Take 65 mg by mouth Daily., Disp: , Rfl:   •  PHARMACY TO DOSE WARFARIN, Continuous As Needed (patient's warfarin is being managed by the [facility] ([phone]))., Disp: , Rfl:   •  torsemide (DEMADEX) 20 MG tablet, Take 1 tablet by mouth 2 (Two) Times a Day. (Patient taking differently: Take 20 mg by mouth Daily.), Disp: 180 tablet, Rfl: 3  •  vitamin B-12 (CYANOCOBALAMIN) 1000 MCG tablet, Take 1,000 mcg by mouth Daily., Disp: , Rfl:   •  warfarin (COUMADIN) 2.5 MG tablet, Take 1 tablet by mouth daily, as directed by the anticoagulation clinic. (Patient taking differently: 5 mg. 5 mg Monday and Friday, 3.75 Tuesday, Wednesday, Thursday, Saturday and Sunday), Disp: 90 tablet, Rfl: 1  •  warfarin (COUMADIN) 7.5 MG tablet, TAKE ONE-HALF TO ONE TABLET BY MOUTH DAILY AS DIECTED BY ANTICOAGULATION PHARMACIST, Disp: 30 tablet, Rfl: 0    HISTORY OF PRESENT ILLNESS: Patient returns  "for scheduled 7-month followup.  He presented to the [facility] ED in mid-September 2018 with palpitations and severe uncontrollable diarrhea and was admitted for 10 days due to acute kidney injury and anemia.  In November 2018, he underwent external cardioversion by Dr. Taylor.  In February 2019, the patient was admitted for CT-guided renal biopsy due to stage 4 chronic kidney disease and proteinuria.  He says that he was told he has stage 4 chronic kidney disease, but \"on the low end.\"  He was told he has scar tissue in his kidneys which is limiting the function.  The patient says that the last time he saw the pacemaker tech, \"they took me off all cardiac rhythm medicines\" because they were not working. He notices that he is short of breath and weak, but he does not notice his heart pounding or fluttering.  He has not noticed any chest pain or tightness.  He continues to work for [employer] 2-3 days a week.  He states that he had labs drawn by [facility] but does not have those results - data deficit.  Patient otherwise denies chest pain, severe shortness of breath, PND, marked edema, palpitations, syncope or presyncope at this time on limited activity.        Review of Systems   Cardiovascular: Positive for irregular heartbeat.   Skin: Positive for dry skin.      Obtained and otherwise negative except as outlined in problem list and HPI.    Procedures       Objective:       Vitals:    03/06/19 0957 03/06/19 0958   BP: 139/83 133/74   BP Location: Right arm Right arm   Patient Position: Sitting Standing   Pulse: 75 77   Weight: 125 kg (276 lb)    Height: 177.8 cm (70\")      Body mass index is 39.6 kg/m².   Last weight:  301 lbs.    Physical Exam   Constitutional: He is oriented to person, place, and time. He appears well-developed and well-nourished.   Neck: No JVD present. Carotid bruit is not present. No thyromegaly present.   Cardiovascular: Regular rhythm, S1 normal and S2 normal. Exam reveals distant " heart sounds. Exam reveals no gallop, no S3 and no friction rub.   Murmur heard.   Medium-pitched early systolic murmur is present with a grade of 2/6 at the lower left sternal border.  Pulses:       Carotid pulses are 1+ on the right side, and 1+ on the left side.       Radial pulses are 1+ on the right side, and 1+ on the left side.        Femoral pulses are 1+ on the right side, and 1+ on the left side.       Popliteal pulses are 1+ on the right side, and 1+ on the left side.        Dorsalis pedis pulses are 1+ on the right side, and 1+ on the left side.        Posterior tibial pulses are 1+ on the right side, and 1+ on the left side.   Pulmonary/Chest: Effort normal. He has decreased breath sounds. He has no wheezes. He has no rhonchi. He has no rales.   Left precordial pacemaker site is nominal   Abdominal: Soft. He exhibits no mass. There is no hepatosplenomegaly. There is no tenderness. There is no guarding.   Bowel sounds audible x4   Musculoskeletal: Normal range of motion. He exhibits no edema.   Lymphadenopathy:     He has no cervical adenopathy.   Neurological: He is alert and oriented to person, place, and time.   Skin: Skin is warm, dry and intact. No rash noted.   Vitals reviewed.        Lab Review:   Lab Results   Component Value Date    GLUCOSE 118 (H) 02/06/2019    BUN 36 (H) 02/06/2019    CREATININE 2.41 (H) 02/06/2019    EGFRIFNONA 26 (L) 02/06/2019    EGFRIFAFRI 26 (L) 11/12/2018    BCR 14.9 02/06/2019    CO2 25.0 02/06/2019    CALCIUM 8.8 02/06/2019    PROTENTOTREF 6.3 11/12/2018    ALBUMIN 3.78 02/06/2019    LABIL2 1.5 11/12/2018    AST 12 11/12/2018    ALT 9 11/12/2018   Sodium - 141  Potassium - 4.2  Chloride - 110    Lab Results   Component Value Date    WBC 6.42 02/06/2019    HGB 10.3 (L) 02/06/2019    HCT 33.0 (L) 02/06/2019    MCV 94.3 02/06/2019     02/06/2019       Lab Results   Component Value Date    HGBA1C 6.20 (H) 05/17/2018       Lab Results   Component Value Date    TSH  11.020 (H) 11/12/2018       Lab Results   Component Value Date    TRIG 104 12/08/2017    TRIG 70 06/05/2015     Lab Results   Component Value Date    HDL 31 (L) 12/08/2017    HDL 28 (L) 06/05/2015     Lab Results   Component Value Date    LDL 72 12/08/2017    LDL 64 06/05/2015       Lab Results   Component Value Date    .0 (H) 09/14/2018     External cardioversion, 11/29/2018:  · Post cardioversion the patient displayed a sinus rhythm.  · The cardioversion was successful.    CT needle biopsy, kidney, 02/05/2019:  FINDINGS: By report, biopsy was performed by Dr. Chase.  Preliminary scanning shows some bilateral renal cysts, mostly on the right, and mild renal cortical atrophy. Subsequent images show skin marker superimposed over the left lower renal pole and subsequent advancement of the biopsy needle to the dorsal margin lower pole. Post procedure image shows trace fat stranding along the needle tract, but no significant hematoma. Please see the procedure report for full details.     IMPRESSION: CT provided for left renal biopsy.    Kidney biopsy pathology, 02/05/2019:  Relatively small sample with findings, consistent with severe arterionephrosclerosis. Focal segmental glomerulosclerosis, favor secondary. Glomerular basement membrane thickening.      Assessment:   Overall continued acceptable course with no interim cardiopulmonary complaints with marginal but stable functional status. We will defer additional diagnostic or therapeutic intervention from a cardiac perspective at this time.       Diagnosis Plan   1. Persistent atrial fibrillation (CMS/HCC)  Per Dr. Taylor   2. Ischemic heart disease  No recurrent angina pectoris or CHF on current activity schedule; continue current treatment     3. Hypertensive heart disease with chronic combined systolic and diastolic congestive heart failure (CMS/HCC)  No recurrent angina pectoris or CHF on current activity schedule; continue current treatment     4.  Essential hypertension  Acceptable control; continue current treatment   5. Dyslipidemia  No data to review   6. Stage 4 chronic kidney disease (CMS/HCC)  Per [facility]          Plan:         1. Patient to continue current medications and close follow up with the above providers.  2. Patient is encouraged to keep his appointment with Dr. Taylor in May 2019.  3. Tentative cardiology follow up in September 2019, or patient may return sooner PRN.    Transcribed by aReann Newton for Dr. Dustin Mehta at 10:05 AM on 03/06/2019    I, Dustin Mehta MD, [facility], personally performed the services described in this documentation as scribed by the above named individual in my presence, and it is both accurate and complete. At 10:21 AM on 03/06/2019            Unknown

## 2021-09-15 ENCOUNTER — HOSPITAL ENCOUNTER (OUTPATIENT)
Dept: ONCOLOGY | Facility: HOSPITAL | Age: 76
Discharge: HOME | End: 2021-09-15
Admitting: INTERNAL MEDICINE

## 2021-09-15 VITALS
TEMPERATURE: 97.5 F | HEIGHT: 70 IN | SYSTOLIC BLOOD PRESSURE: 137 MMHG | HEART RATE: 80 BPM | RESPIRATION RATE: 16 BRPM | DIASTOLIC BLOOD PRESSURE: 69 MMHG | BODY MASS INDEX: 40.37 KG/M2 | WEIGHT: 282 LBS

## 2021-09-15 DIAGNOSIS — N18.4 STAGE 4 CHRONIC KIDNEY DISEASE (HCC): Primary | ICD-10-CM

## 2021-09-15 DIAGNOSIS — D50.9 IRON DEFICIENCY ANEMIA, UNSPECIFIED IRON DEFICIENCY ANEMIA TYPE: ICD-10-CM

## 2021-09-15 LAB
CREAT SERPL-MCNC: 3.43 MG/DL (ref 0.76–1.27)
GFR SERPL CREATININE-BSD FRML MDRD: 17 ML/MIN/1.73
HCT VFR BLD AUTO: 31.1 % (ref 37.5–51)
HGB BLD-MCNC: 9.5 G/DL (ref 13–17.7)
POTASSIUM SERPL-SCNC: 4.7 MMOL/L (ref 3.5–5.2)

## 2021-09-15 PROCEDURE — 85018 HEMOGLOBIN: CPT | Performed by: INTERNAL MEDICINE

## 2021-09-15 PROCEDURE — 82565 ASSAY OF CREATININE: CPT | Performed by: INTERNAL MEDICINE

## 2021-09-15 PROCEDURE — 25010000002 EPOETIN ALFA-EPBX 10000 UNIT/ML SOLUTION: Performed by: INTERNAL MEDICINE

## 2021-09-15 PROCEDURE — 84132 ASSAY OF SERUM POTASSIUM: CPT | Performed by: INTERNAL MEDICINE

## 2021-09-15 PROCEDURE — 85014 HEMATOCRIT: CPT | Performed by: INTERNAL MEDICINE

## 2021-09-15 PROCEDURE — 36415 COLL VENOUS BLD VENIPUNCTURE: CPT

## 2021-09-15 PROCEDURE — 96372 THER/PROPH/DIAG INJ SC/IM: CPT

## 2021-09-15 RX ADMIN — EPOETIN ALFA-EPBX 20000 UNITS: 10000 INJECTION, SOLUTION INTRAVENOUS; SUBCUTANEOUS at 11:43

## 2021-09-22 ENCOUNTER — HOSPITAL ENCOUNTER (OUTPATIENT)
Dept: ONCOLOGY | Facility: HOSPITAL | Age: 76
Discharge: HOME | End: 2021-09-22
Admitting: INTERNAL MEDICINE

## 2021-09-22 VITALS
RESPIRATION RATE: 18 BRPM | HEART RATE: 75 BPM | DIASTOLIC BLOOD PRESSURE: 79 MMHG | WEIGHT: 285 LBS | BODY MASS INDEX: 40.8 KG/M2 | TEMPERATURE: 97.1 F | SYSTOLIC BLOOD PRESSURE: 169 MMHG | HEIGHT: 70 IN

## 2021-09-22 DIAGNOSIS — D50.9 IRON DEFICIENCY ANEMIA, UNSPECIFIED IRON DEFICIENCY ANEMIA TYPE: ICD-10-CM

## 2021-09-22 DIAGNOSIS — N18.4 STAGE 4 CHRONIC KIDNEY DISEASE (HCC): ICD-10-CM

## 2021-09-22 DIAGNOSIS — N18.4 STAGE 4 CHRONIC KIDNEY DISEASE (HCC): Primary | ICD-10-CM

## 2021-09-22 LAB
CREAT SERPL-MCNC: 3.46 MG/DL (ref 0.76–1.27)
FERRITIN SERPL-MCNC: 136 NG/ML (ref 30–400)
GFR SERPL CREATININE-BSD FRML MDRD: 17 ML/MIN/1.73
HCT VFR BLD AUTO: 32.6 % (ref 37.5–51)
HGB BLD-MCNC: 9.9 G/DL (ref 13–17.7)
IRON 24H UR-MRATE: 42 MCG/DL (ref 59–158)
IRON SATN MFR SERPL: 14 % (ref 20–50)
POTASSIUM SERPL-SCNC: 4.4 MMOL/L (ref 3.5–5.2)
TIBC SERPL-MCNC: 295 MCG/DL (ref 298–536)
TRANSFERRIN SERPL-MCNC: 198 MG/DL (ref 200–360)

## 2021-09-22 PROCEDURE — 84466 ASSAY OF TRANSFERRIN: CPT | Performed by: INTERNAL MEDICINE

## 2021-09-22 PROCEDURE — 85018 HEMOGLOBIN: CPT | Performed by: INTERNAL MEDICINE

## 2021-09-22 PROCEDURE — 82728 ASSAY OF FERRITIN: CPT | Performed by: INTERNAL MEDICINE

## 2021-09-22 PROCEDURE — 25010000002 EPOETIN ALFA-EPBX 10000 UNIT/ML SOLUTION: Performed by: INTERNAL MEDICINE

## 2021-09-22 PROCEDURE — 84132 ASSAY OF SERUM POTASSIUM: CPT | Performed by: INTERNAL MEDICINE

## 2021-09-22 PROCEDURE — 85014 HEMATOCRIT: CPT | Performed by: INTERNAL MEDICINE

## 2021-09-22 PROCEDURE — 96372 THER/PROPH/DIAG INJ SC/IM: CPT

## 2021-09-22 PROCEDURE — 83540 ASSAY OF IRON: CPT | Performed by: INTERNAL MEDICINE

## 2021-09-22 PROCEDURE — 82565 ASSAY OF CREATININE: CPT | Performed by: INTERNAL MEDICINE

## 2021-09-22 PROCEDURE — 36415 COLL VENOUS BLD VENIPUNCTURE: CPT

## 2021-09-22 RX ADMIN — EPOETIN ALFA-EPBX 20000 UNITS: 10000 INJECTION, SOLUTION INTRAVENOUS; SUBCUTANEOUS at 12:57

## 2021-09-24 RX ORDER — ATORVASTATIN CALCIUM 40 MG/1
40 TABLET, FILM COATED ORAL DAILY
Qty: 90 TABLET | Refills: 0 | Status: SHIPPED | OUTPATIENT
Start: 2021-09-24 | End: 2021-12-21 | Stop reason: SDUPTHER

## 2021-09-29 ENCOUNTER — HOSPITAL ENCOUNTER (OUTPATIENT)
Dept: ONCOLOGY | Facility: HOSPITAL | Age: 76
Discharge: HOME | End: 2021-09-29
Admitting: INTERNAL MEDICINE

## 2021-09-29 VITALS
SYSTOLIC BLOOD PRESSURE: 150 MMHG | BODY MASS INDEX: 40.94 KG/M2 | TEMPERATURE: 97.5 F | HEIGHT: 70 IN | WEIGHT: 286 LBS | HEART RATE: 85 BPM | DIASTOLIC BLOOD PRESSURE: 79 MMHG | RESPIRATION RATE: 18 BRPM

## 2021-09-29 DIAGNOSIS — N18.4 STAGE 4 CHRONIC KIDNEY DISEASE (HCC): Primary | ICD-10-CM

## 2021-09-29 DIAGNOSIS — N18.4 STAGE 4 CHRONIC KIDNEY DISEASE (HCC): ICD-10-CM

## 2021-09-29 DIAGNOSIS — D50.9 IRON DEFICIENCY ANEMIA, UNSPECIFIED IRON DEFICIENCY ANEMIA TYPE: ICD-10-CM

## 2021-09-29 LAB
CREAT SERPL-MCNC: 3.23 MG/DL (ref 0.76–1.27)
GFR SERPL CREATININE-BSD FRML MDRD: 19 ML/MIN/1.73
HCT VFR BLD AUTO: 29.4 % (ref 37.5–51)
HGB BLD-MCNC: 9.5 G/DL (ref 13–17.7)
POTASSIUM SERPL-SCNC: 4.6 MMOL/L (ref 3.5–5.2)

## 2021-09-29 PROCEDURE — 96372 THER/PROPH/DIAG INJ SC/IM: CPT

## 2021-09-29 PROCEDURE — 85018 HEMOGLOBIN: CPT | Performed by: INTERNAL MEDICINE

## 2021-09-29 PROCEDURE — 36415 COLL VENOUS BLD VENIPUNCTURE: CPT

## 2021-09-29 PROCEDURE — 84132 ASSAY OF SERUM POTASSIUM: CPT | Performed by: INTERNAL MEDICINE

## 2021-09-29 PROCEDURE — 25010000002 EPOETIN ALFA-EPBX 10000 UNIT/ML SOLUTION: Performed by: INTERNAL MEDICINE

## 2021-09-29 PROCEDURE — 82565 ASSAY OF CREATININE: CPT | Performed by: INTERNAL MEDICINE

## 2021-09-29 PROCEDURE — 85014 HEMATOCRIT: CPT | Performed by: INTERNAL MEDICINE

## 2021-09-29 RX ADMIN — EPOETIN ALFA-EPBX 20000 UNITS: 10000 INJECTION, SOLUTION INTRAVENOUS; SUBCUTANEOUS at 11:28

## 2021-10-06 ENCOUNTER — HOSPITAL ENCOUNTER (OUTPATIENT)
Dept: ONCOLOGY | Facility: HOSPITAL | Age: 76
Discharge: HOME | End: 2021-10-06
Admitting: INTERNAL MEDICINE

## 2021-10-06 VITALS
WEIGHT: 288 LBS | RESPIRATION RATE: 16 BRPM | TEMPERATURE: 97.7 F | DIASTOLIC BLOOD PRESSURE: 71 MMHG | BODY MASS INDEX: 41.23 KG/M2 | HEART RATE: 81 BPM | SYSTOLIC BLOOD PRESSURE: 150 MMHG | HEIGHT: 70 IN

## 2021-10-06 DIAGNOSIS — N18.4 STAGE 4 CHRONIC KIDNEY DISEASE (HCC): Primary | ICD-10-CM

## 2021-10-06 DIAGNOSIS — D50.9 IRON DEFICIENCY ANEMIA, UNSPECIFIED IRON DEFICIENCY ANEMIA TYPE: ICD-10-CM

## 2021-10-06 LAB
CREAT SERPL-MCNC: 3.53 MG/DL (ref 0.76–1.27)
GFR SERPL CREATININE-BSD FRML MDRD: 17 ML/MIN/1.73
HCT VFR BLD AUTO: 30.1 % (ref 37.5–51)
HGB BLD-MCNC: 9.5 G/DL (ref 13–17.7)
POTASSIUM SERPL-SCNC: 4.4 MMOL/L (ref 3.5–5.2)

## 2021-10-06 PROCEDURE — 84132 ASSAY OF SERUM POTASSIUM: CPT | Performed by: INTERNAL MEDICINE

## 2021-10-06 PROCEDURE — 82565 ASSAY OF CREATININE: CPT | Performed by: INTERNAL MEDICINE

## 2021-10-06 PROCEDURE — 25010000002 EPOETIN ALFA-EPBX 10000 UNIT/ML SOLUTION: Performed by: INTERNAL MEDICINE

## 2021-10-06 PROCEDURE — 96372 THER/PROPH/DIAG INJ SC/IM: CPT

## 2021-10-06 PROCEDURE — 36415 COLL VENOUS BLD VENIPUNCTURE: CPT

## 2021-10-06 PROCEDURE — 85014 HEMATOCRIT: CPT | Performed by: INTERNAL MEDICINE

## 2021-10-06 PROCEDURE — 85018 HEMOGLOBIN: CPT | Performed by: INTERNAL MEDICINE

## 2021-10-06 RX ADMIN — EPOETIN ALFA-EPBX 20000 UNITS: 10000 INJECTION, SOLUTION INTRAVENOUS; SUBCUTANEOUS at 11:10

## 2021-10-07 DIAGNOSIS — I10 ESSENTIAL HYPERTENSION: ICD-10-CM

## 2021-10-07 DIAGNOSIS — I11.0 HYPERTENSIVE HEART DISEASE WITH CHRONIC COMBINED SYSTOLIC AND DIASTOLIC CONGESTIVE HEART FAILURE (HCC): ICD-10-CM

## 2021-10-07 DIAGNOSIS — I50.42 HYPERTENSIVE HEART DISEASE WITH CHRONIC COMBINED SYSTOLIC AND DIASTOLIC CONGESTIVE HEART FAILURE (HCC): ICD-10-CM

## 2021-10-07 DIAGNOSIS — I48.21 PERMANENT ATRIAL FIBRILLATION (HCC): Primary | ICD-10-CM

## 2021-10-07 DIAGNOSIS — R79.89 ELEVATED TSH: ICD-10-CM

## 2021-10-07 DIAGNOSIS — N18.4 STAGE 4 CHRONIC KIDNEY DISEASE (HCC): ICD-10-CM

## 2021-10-07 PROCEDURE — 93296 REM INTERROG EVL PM/IDS: CPT | Performed by: INTERNAL MEDICINE

## 2021-10-07 PROCEDURE — 93294 REM INTERROG EVL PM/LDLS PM: CPT | Performed by: INTERNAL MEDICINE

## 2021-10-07 RX ORDER — WARFARIN SODIUM 7.5 MG/1
TABLET ORAL
Qty: 90 TABLET | Refills: 3 | Status: CANCELLED | OUTPATIENT
Start: 2021-10-07

## 2021-10-07 RX ORDER — WARFARIN SODIUM 2.5 MG/1
TABLET ORAL
Qty: 180 TABLET | Refills: 0 | Status: SHIPPED | OUTPATIENT
Start: 2021-10-07 | End: 2022-10-10

## 2021-10-07 NOTE — TELEPHONE ENCOUNTER
----- Message from Dustin Mehta MD sent at 10/7/2021 11:07 AM EDT -----  Regarding: FW: Non-Urgent Medical Question  Contact: [phone]      ----- Message -----  From: Berenice Serrano RN  Sent: 10/7/2021   9:38 AM EDT  To: Dustin Mehta MD  Subject: FW: Non-Urgent Medical Question                    ----- Message -----  From: Marco Antonio Montemayor  Sent: 10/7/2021   9:36 AM EDT  To: [facility]  Subject: RE: Non-Urgent Medical Question                  Blood Lab work for Dr. Dustin Mehta's appointment on October 15, 2021.  Please add to appointment on October 13th with Oncology/Infusion so they can shaniqua the blood.  Marco Antonio Montemayor  1945

## 2021-10-13 ENCOUNTER — ANTICOAGULATION VISIT (OUTPATIENT)
Dept: PHARMACY | Facility: HOSPITAL | Age: 76
End: 2021-10-13

## 2021-10-13 ENCOUNTER — HOSPITAL ENCOUNTER (OUTPATIENT)
Dept: ONCOLOGY | Facility: HOSPITAL | Age: 76
Discharge: HOME | End: 2021-10-13
Admitting: INTERNAL MEDICINE

## 2021-10-13 VITALS
SYSTOLIC BLOOD PRESSURE: 171 MMHG | WEIGHT: 285 LBS | HEART RATE: 84 BPM | HEIGHT: 70 IN | DIASTOLIC BLOOD PRESSURE: 78 MMHG | BODY MASS INDEX: 40.8 KG/M2 | RESPIRATION RATE: 18 BRPM | TEMPERATURE: 96.9 F

## 2021-10-13 DIAGNOSIS — N18.4 STAGE 4 CHRONIC KIDNEY DISEASE (HCC): Primary | ICD-10-CM

## 2021-10-13 DIAGNOSIS — D50.9 IRON DEFICIENCY ANEMIA, UNSPECIFIED IRON DEFICIENCY ANEMIA TYPE: ICD-10-CM

## 2021-10-13 LAB
HCT VFR BLD AUTO: 30.7 % (ref 37.5–51)
HGB BLD-MCNC: 9.5 G/DL (ref 13–17.7)
INR PPP: 3.1 (ref 0.91–1.09)
PROTHROMBIN TIME: 37.4 SECONDS (ref 10–13.8)

## 2021-10-13 PROCEDURE — 96372 THER/PROPH/DIAG INJ SC/IM: CPT

## 2021-10-13 PROCEDURE — 85018 HEMOGLOBIN: CPT | Performed by: INTERNAL MEDICINE

## 2021-10-13 PROCEDURE — 25010000002 EPOETIN ALFA-EPBX 10000 UNIT/ML SOLUTION: Performed by: INTERNAL MEDICINE

## 2021-10-13 PROCEDURE — 36416 COLLJ CAPILLARY BLOOD SPEC: CPT

## 2021-10-13 PROCEDURE — G0463 HOSPITAL OUTPT CLINIC VISIT: HCPCS

## 2021-10-13 PROCEDURE — 85014 HEMATOCRIT: CPT | Performed by: INTERNAL MEDICINE

## 2021-10-13 PROCEDURE — 36415 COLL VENOUS BLD VENIPUNCTURE: CPT

## 2021-10-13 PROCEDURE — 85610 PROTHROMBIN TIME: CPT

## 2021-10-13 RX ADMIN — EPOETIN ALFA-EPBX 20000 UNITS: 10000 INJECTION, SOLUTION INTRAVENOUS; SUBCUTANEOUS at 11:06

## 2021-10-13 NOTE — PROGRESS NOTES
Anticoagulation Clinic Progress Note  Indication: afib  Referring Provider: Tyson (next appointment: 10/15/2021)   Initial Warfarin Start Date: 2007  Goal INR: 2-3  Current Drug Interactions:  torsemide  Diet: eats salads 2x weekly 11/4/20  [id number]: 5 (HTN, Age, CHF, CAD)  Other: anemic, CKD (stage 3) May need bridge with Lovenox given persistent AF (per cardio 1/24/2019), Scr was 2.38 on 12/10/19     Anticoagulation Clinic INR History:       Date 10/30 11/20 12/17 1/14/20 2/11 3/11 4/8 5/6 6/11 7/9 8/6 9/3 9/14 9/30   Total Weekly Dose 32.5 mg 32.5 mg 32.5 mg 32.5 mg 32.5mg 32.5mg 32.5mg 32.5mg 32.5mg 32.5 mg 32.5 mg 32.5mg 32.5mg 26.25 mg   INR 2.3 2.3 2.1 1.9 2.2 2.1 2.1 2.2 2.2 2.3 2.7 1.6 2.2  1.8   Notes                       ??   5 day hold     Date 10/14 11/4 11/11 11/25 12/9 12/16 12/30 1/13 2/3 3/3 3/31 4/28 5/26 6/23   Total Weekly Dose 32.5 mg 32.5mg 35mg 32.5mg 33.75 mg 36.25 mg 33.75 mg 35mg 33.75 33.75mg 33.75mg 33.75 mg 33.75 mg 33.75mg   INR 2.0 1.6 2.1 1.8 1.8 2.5 2.0 3.0 2.1 2.4 2.3 3.0 2.1 2.2   Notes   1x boost GLV boostx1 boostx1             Date 7/21 9/1 10/13           Total Weekly Dose 33.75mg 33.75mg 33.75mg           INR 2.9 2.5 3.1           Notes                    Takes warfarin in the am    Clinic Interview:  Verbal Release Authorization signed on 8/22/2018 -- may speak with Raeann Montemayor (Wife). [phone] (Home) *Preferred*  Tablet Strength: pt has 7.5mg and 2.5mg tablets        Negatives:  Signs/symptoms of thrombosis, Signs/symptoms of bleeding, Laboratory test error suspected, Change in health, Change in alcohol use, Change in activity, Upcoming invasive procedure, Emergency department visit, Upcoming dental procedure, Missed doses, Extra doses, Change in medications, Change in diet/appetite, Hospital admission, Bruising, Other complaints           Plan:   1. INR is slightly supratherapeutic today at 3.1 (goal 2-3). Considering hx of stability on current dose and due for  lower dose tonight,   Instructed Mr. Montemayor to continue warfarin 5mg oral daily except 3.75mg Wednesdays until recheck   2. RTC in 4 weeks (previously q6w)  3. Verbal and written information provided in the clinic. Mr. Montemayor expresses understanding with teach back and has no further questions at this time.     Kristine Sutton, NighatD.  10/13/21   11:31 EDT

## 2021-10-14 NOTE — PROGRESS NOTES
Subjective:     Encounter Date:10/15/2021    Patient ID: Marco Antonio Montemayor is a 76 y.o.  white male, retired /currently laid off from being a  for [employer], from [place], [place].      PHYSICIAN: EDMUND Rosado MD  ELECTROPHYSIOLOGIST: Johnathan Taylor MD, [facility], [facility]  INTERVENTIONAL CARDIOLOGIST: Antonio Escamilla MD, [facility]  NEPHROLOGIST:  [facility]  DERMATOLOGIST:  Yoanna Marks MD/Oksana Young MD([facility])    Chief Complaint:   Chief Complaint   Patient presents with   • Coronary Artery Disease     f/u       Problem List:  1. 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 Remote acceptable intravenous adenosine Quantitative SPECT gated Cardiolite study, LVEF 0.50, April 1999.  b. Echocardiogram showing mild LVH with estimated ejection fraction 0.58 with mild aortic valve cusp sclerosis, and mild TR, 09/23/2014.   c. Remote abnormal preoperative Quantitative SPECT gated Cardiolite study with mild “fixed” inferoposterior hypoperfusion and mild LV enlargement with mild global hypokinesia with reduced LVEF 0.40 in the setting of abnormal EKG with subsequent diagnostic coronary angiography demonstrating mild-to-moderate nonobstructive coronary artery atherosclerosis with mild compensated left ventricular dysfunction, LVEF 0.52, and continued medical therapy felt warranted, June 2006.  d. Remote hospitalization for symptomatic atrial fibrillation with rapid ventricular response and mild congestive heart failure requiring BRYANNA and DC cardioversion, June 2009.  e. Left heart catheterization with essentially normal coronary arteries with mild luminal irregularities with an EF of 0.60 by Dr. Escamilla for angina and elevated troponin, 06/05/2015.   f. Residual class I symptoms.  2. Chronic essential hypertension with recent progressive hypertension and blood pressure readings and normal selective bilateral renal angiography, June  2006.  3. Dyslipidemia.  4. Chronic atrial tachyarrhythmias:  a. Intermittent chronic paroxysmal atrial fibrillation with electrophysiologic study with RFA for atrial flutter, October 1999.  b. Recurrent apparent transient atrial fibrillation, resolved with acceptable [facility] emergency department evaluation, June 2003.  c. Acceptable combination Doppler echocardiogram, July 2003, with apparent acceptable 24-hour Holter monitor, May 2006.  d. Recurrent asymptomatic atrial fibrillation with RVR, January 2007, subsequent Coumadin therapy and Tikosyn therapy with successful internal cardioversion of atrial fibrillation and marked bradyarrhythmias requiring DDDR pacemaker implant, [place] device, data not available, March 2007.  e. Remote hospitalization for symptomatic rapid atrial fibrillation/BRYANNA DC cardioversion with subsequent acceptable pacemaker interrogation, June/July 2009, as well as acceptable interrogation without reprogramming, July 2011.  f. Acceptable combination Doppler echocardiogram, December 2010, with residual class I symptoms.  g. Acceptable device interrogation following [facility] ED evaluation for chest pressure and shortness of breath, data deficit, May 2011, February 2013, February 2016, December 2016.  h. Abnormal pacemaker assessment with 8.4% mode switch with battery voltage of 1-1.25 years, June 2017, with abnormal PACEART assessment, July 2018, with 22% mode switch, with subsequent interrogation demonstrating chronic atrial fibrillation, May 2019, remote device interrogation March 2021 demonstrated 89% atrial fib burden, 8.42 battery longevity.  5. Morbid obesity: BMI 41.50  6. Ichthyosis.  7. Chronic lower tract obstructive symptoms, probable BPH.  8. Dyslipidemia.  9. Nasal polyps with deviated nasal septum with apparent subsequent nasal septoplasty/polypectomy, data deficit, July 2006.  10. Abdominal pain with apparent small bowel obstruction with exploratory laparotomy - data deficit.  11.  Appendectomy with subsequent delayed hospitalization for bleeding peptic ulcer disease/probable duodenal ulcer, November 2003.  12. Recurrent asymptomatic atrial fibrillation with rapid ventricular response, July 2007, with subsequent Tikosyn therapy and DDDR pacemaker implant with intermittent recurrent breakthrough arrhythmias, including remote DC cardioversion, May 2008, with subsequent recurrent DC cardioversion, autumn 2018, with recurrent atrial fibrillation and discontinuation of formal antiarrhythmic therapy - data deficit  13. Obstructive sleep apnea, compliant with CPAP  14. Stage 4 chronic kidney disease with recent apparent documented proteinuria and renal biopsy (February 2019)  15. Elevated TSH, November 2018  16.  Chronic anemia with epoetin injections Fall 2020, Spring 2021    No Known Allergies    Current Outpatient Medications:   •  acetaminophen (TYLENOL) 500 MG tablet, Take 500 mg by mouth Every 6 (Six) Hours As Needed for Mild Pain ., Disp: , Rfl:   •  atorvastatin (LIPITOR) 40 MG tablet, Take 1 tablet by mouth Daily. Need labs for further refills. Call office for lab orders, Disp: 90 tablet, Rfl: 0  •  calcitriol (ROCALTROL) 0.25 MCG capsule, Take 1 capsule by mouth 3 (Three) Times a Week. Monday, wed, friday, Disp: , Rfl:   •  carvedilol (COREG) 25 MG tablet, Take 1 tablet by mouth 2 (Two) Times a Day With Meals., Disp: 180 tablet, Rfl: 3  •  cholecalciferol (VITAMIN D3) 1000 UNITS tablet, Take 1,000 Units by mouth daily., Disp: , Rfl:   •  Ferrous Sulfate (IRON) 325 (65 FE) MG tablet, Take 65 mg by mouth Daily., Disp: , Rfl:   •  ketoconazole (NIZORAL) 2 % shampoo, Apply 1 application topically to the appropriate area as directed As Needed., Disp: , Rfl:   •  lisinopril (PRINIVIL,ZESTRIL) 5 MG tablet, Take 1 tablet by mouth Daily., Disp: 90 tablet, Rfl: 3  •  PHARMACY TO DOSE WARFARIN, Continuous As Needed (patient's warfarin is being managed by the [facility]  "Clinic ([phone]))., Disp: , Rfl:   •  torsemide (DEMADEX) 20 MG tablet, Take 20 mg by mouth Daily., Disp: , Rfl:   •  vitamin B-12 (CYANOCOBALAMIN) 1000 MCG tablet, Take 1,000 mcg by mouth Daily., Disp: , Rfl:   •  warfarin (COUMADIN) 2.5 MG tablet, TAKE 1.5-2 TABLET(S) BY MOUTH DAILY AS DIRECTED BY THE ANTICOAGULATION CLINIC, Disp: 180 tablet, Rfl: 0  •  warfarin (COUMADIN) 7.5 MG tablet, TAKE ONE-HALF TO ONE TABLET BY MOUTH DAILY AS DIRECTED, Disp: 90 tablet, Rfl: 3    History of Present Illness: Patient returns for scheduled 6-month follow up.  Patient has pursued a stable acceptable cardiac course over the past 6 months.  He has generally been up and about active and can do errands around the house and light physical activity.  He does have mild tachypnea dyspnea due to his anemia.  Continues to have weekly assessment of his hemoglobin and has erythropoietin supplementation almost weekly.  Denies anginal type chest discomfort, increased tachypnea/dyspnea, nausea, emesis, abdominal pain, headache, focal motor-sensory changes, falls, or presyncope.  He has not received influenza cessation for \"many many years\" and declines Covid immunization because \"I do not think it has been tested well yet\" after he said extensively reviewed the literature.  No additional interim medical events or hospitalizations he denies surgery or trauma.  No prolonged antibiotics.        ROS   Obtained and negative except as outlined in problem list and HPI.      ECG 12 Lead    Date/Time: 10/15/2021 2:12 PM  Performed by: Dustin Mehta MD  Authorized by: Dustin Mehta MD   Rhythm comments: Atrial fibrillation with frequent ventricular pacing, ST marked T wave abnormality, consider anterolateral ischemia, 76 bpm, QRS 90 ms,  ms, atrial pacing no longer present compared to ECG in 2018                 Objective:       Vitals:    10/15/21 1353 10/15/21 1354   BP: 170/90 163/93   BP Location: Left arm Left arm   Patient " "Position: Sitting Standing   Pulse: 80 84   SpO2: 98%    Weight: 131 kg (289 lb 3.2 oz)    Height: 177.8 cm (70\")    Recheck blood pressure right arm sitting was 140/86  Body mass index is 41.5 kg/m².  Last weight: 271 lbs    Vitals reviewed.   Constitutional:       Appearance: Well-developed.   Neck:      Thyroid: No thyromegaly.      Vascular: No carotid bruit or JVD.      Lymphadenopathy: No cervical adenopathy.   Pulmonary:      Effort: Pulmonary effort is normal.      Breath sounds: Decreased breath sounds present. No wheezing. No rhonchi. No rales.   Cardiovascular:      Irregularly irregular rhythm.      Murmurs: There is a grade 2/6 mid frequency harsh early systolic murmur at the URSB.      No gallop. No S3 gallop.   Pulses:     Dorsalis pedis: 1+ bilaterally.     Posterior tibial: 1+ bilaterally.  Edema:     Peripheral edema absent.   Abdominal:      Palpations: Abdomen is soft. There is no abdominal mass.      Tenderness: There is no abdominal tenderness.   Musculoskeletal: Normal range of motion. Skin:     General: Skin is warm and dry.      Findings: No rash.   Neurological:      Mental Status: Alert and oriented to person, place, and time.           Lab Review:   Lab Results   Component Value Date    GLUCOSE 130 (H) 10/13/2021    BUN 66 (H) 10/13/2021    CREATININE 3.58 (H) 10/13/2021    EGFRIFNONA 17 (L) 10/13/2021    BCR 18.4 10/13/2021     10/13/2021    K 4.1 10/13/2021     (H) 10/13/2021    MG 2.1 10/13/2021    CO2 19.0 (L) 10/13/2021    CALCIUM 8.3 (L) 10/13/2021    PROTENTOTREF 7.1 04/08/2020    ALBUMIN 3.80 10/13/2021    LABIL2 1.3 04/08/2020    AST 12 10/13/2021    ALT 9 10/13/2021       Lab Results   Component Value Date    WBC 6.64 10/13/2021    HGB 9.5 (L) 10/13/2021    HCT 30.7 (L) 10/13/2021    MCV 98.2 (H) 10/13/2021     10/13/2021       Lab Results   Component Value Date    HGBA1C 5.40 10/13/2021       Lab Results   Component Value Date    TSH 3.270 10/13/2021 "       Lab Results   Component Value Date    CHOL 97 04/14/2021    CHOL 115 05/06/2020     Lab Results   Component Value Date    TRIG 68 04/14/2021    TRIG 96 05/06/2020    TRIG 96 05/06/2020     Lab Results   Component Value Date    HDL 32 (L) 04/14/2021    HDL 33 (L) 05/06/2020    HDL 33 (L) 05/06/2020     Lab Results   Component Value Date    LDL 50 04/14/2021    LDL 63 05/06/2020    LDL 63 05/06/2020     PM Interrogation, 6/16/2021:   Normal pacemaker function, normal battery function, 100% AF, 43% paced in RV.    MURJ, 9/27/2021:  Normal device function  No events or alerts other than permanent atrial fibrillation  Battery is 95.5% (7.92 years)      10/13/2021: INR 3.1  Assessment:       Overall continued acceptable course with no new interim cardiopulmonary complaints with acceptable functional status on limited activity. We will defer additional diagnostic or therapeutic intervention from a cardiac perspective at this time. The patient's recent labs were discussed with him in office today and copies issues to him. Covid immunizations encouraged.     Diagnosis Plan   1. Ischemic heart disease  No recurrent angina pectoris or CHF on current activity schedule; continue current treatment   2. Essential hypertension  Controlled, continue current cardiac medications   3. Dyslipidemia  Abnormal lipid panel April 2022, continue atorvastatin   4. Chronic atrial fibrillation (HCC)  Stable, acceptable remote device check September 2021   5. Morbid obesity (HCC)  Physical activity as tolerated   6. Severe obstructive sleep apnea  Encouraged continued CPAP compliance          Plan:         1. Patient to continue current medications and close follow up with the above providers.  2. Tentative cardiology follow up in April 2022 or patient may return sooner PRN.    Scribed for Dustin Mehta MD by Ivet Beasley, APRN. 10/15/2021  14:06 EDT    I, Dustin Mehta MD, [facility], personally performed the services described in this  documentation as scribed by the above named individual in my presence, and it is both accurate and complete. At 14:14 EDT on 10/15/2021

## 2021-10-15 ENCOUNTER — OFFICE VISIT (OUTPATIENT)
Dept: CARDIOLOGY | Facility: CLINIC | Age: 76
End: 2021-10-15

## 2021-10-15 VITALS
HEART RATE: 84 BPM | HEIGHT: 70 IN | WEIGHT: 289.2 LBS | BODY MASS INDEX: 41.4 KG/M2 | SYSTOLIC BLOOD PRESSURE: 163 MMHG | OXYGEN SATURATION: 98 % | DIASTOLIC BLOOD PRESSURE: 93 MMHG

## 2021-10-15 DIAGNOSIS — I25.9 ISCHEMIC HEART DISEASE: Primary | ICD-10-CM

## 2021-10-15 DIAGNOSIS — I10 ESSENTIAL HYPERTENSION: ICD-10-CM

## 2021-10-15 DIAGNOSIS — G47.33 SEVERE OBSTRUCTIVE SLEEP APNEA: ICD-10-CM

## 2021-10-15 DIAGNOSIS — E66.01 MORBID OBESITY (HCC): ICD-10-CM

## 2021-10-15 DIAGNOSIS — E78.5 DYSLIPIDEMIA: ICD-10-CM

## 2021-10-15 DIAGNOSIS — I48.20 CHRONIC ATRIAL FIBRILLATION (HCC): ICD-10-CM

## 2021-10-15 PROCEDURE — 99214 OFFICE O/P EST MOD 30 MIN: CPT | Performed by: INTERNAL MEDICINE

## 2021-10-15 PROCEDURE — 93000 ELECTROCARDIOGRAM COMPLETE: CPT | Performed by: INTERNAL MEDICINE

## 2021-10-18 RX ORDER — LISINOPRIL 5 MG/1
5 TABLET ORAL DAILY
Qty: 90 TABLET | Refills: 3 | Status: SHIPPED | OUTPATIENT
Start: 2021-10-18 | End: 2022-01-19 | Stop reason: SDUPTHER

## 2021-10-18 RX ORDER — WARFARIN SODIUM 7.5 MG/1
TABLET ORAL
Qty: 90 TABLET | Refills: 0 | Status: SHIPPED | OUTPATIENT
Start: 2021-10-18 | End: 2022-04-14

## 2021-10-20 ENCOUNTER — HOSPITAL ENCOUNTER (OUTPATIENT)
Dept: ONCOLOGY | Facility: HOSPITAL | Age: 76
Discharge: HOME | End: 2021-10-20
Admitting: INTERNAL MEDICINE

## 2021-10-20 VITALS
WEIGHT: 282 LBS | HEART RATE: 75 BPM | RESPIRATION RATE: 18 BRPM | DIASTOLIC BLOOD PRESSURE: 81 MMHG | BODY MASS INDEX: 40.37 KG/M2 | SYSTOLIC BLOOD PRESSURE: 173 MMHG | HEIGHT: 70 IN | TEMPERATURE: 96.8 F

## 2021-10-20 DIAGNOSIS — N18.4 STAGE 4 CHRONIC KIDNEY DISEASE (HCC): Primary | ICD-10-CM

## 2021-10-20 DIAGNOSIS — D50.9 IRON DEFICIENCY ANEMIA, UNSPECIFIED IRON DEFICIENCY ANEMIA TYPE: ICD-10-CM

## 2021-10-20 LAB
CREAT SERPL-MCNC: 3.35 MG/DL (ref 0.76–1.27)
FERRITIN SERPL-MCNC: 102.6 NG/ML (ref 30–400)
GFR SERPL CREATININE-BSD FRML MDRD: 18 ML/MIN/1.73
HCT VFR BLD AUTO: 33.1 % (ref 37.5–51)
HGB BLD-MCNC: 10.3 G/DL (ref 13–17.7)
IRON 24H UR-MRATE: 35 MCG/DL (ref 59–158)
IRON SATN MFR SERPL: 12 % (ref 20–50)
POTASSIUM SERPL-SCNC: 4.1 MMOL/L (ref 3.5–5.2)
TIBC SERPL-MCNC: 298 MCG/DL (ref 298–536)
TRANSFERRIN SERPL-MCNC: 200 MG/DL (ref 200–360)

## 2021-10-20 PROCEDURE — 25010000002 EPOETIN ALFA-EPBX 10000 UNIT/ML SOLUTION: Performed by: INTERNAL MEDICINE

## 2021-10-20 PROCEDURE — 85018 HEMOGLOBIN: CPT | Performed by: INTERNAL MEDICINE

## 2021-10-20 PROCEDURE — 85014 HEMATOCRIT: CPT | Performed by: INTERNAL MEDICINE

## 2021-10-20 PROCEDURE — 84466 ASSAY OF TRANSFERRIN: CPT | Performed by: INTERNAL MEDICINE

## 2021-10-20 PROCEDURE — 83540 ASSAY OF IRON: CPT | Performed by: INTERNAL MEDICINE

## 2021-10-20 PROCEDURE — 82565 ASSAY OF CREATININE: CPT | Performed by: INTERNAL MEDICINE

## 2021-10-20 PROCEDURE — 36415 COLL VENOUS BLD VENIPUNCTURE: CPT

## 2021-10-20 PROCEDURE — 84132 ASSAY OF SERUM POTASSIUM: CPT | Performed by: INTERNAL MEDICINE

## 2021-10-20 PROCEDURE — 82728 ASSAY OF FERRITIN: CPT | Performed by: INTERNAL MEDICINE

## 2021-10-20 PROCEDURE — 96372 THER/PROPH/DIAG INJ SC/IM: CPT

## 2021-10-20 RX ADMIN — EPOETIN ALFA-EPBX 20000 UNITS: 10000 INJECTION, SOLUTION INTRAVENOUS; SUBCUTANEOUS at 10:30

## 2021-10-27 ENCOUNTER — HOSPITAL ENCOUNTER (OUTPATIENT)
Dept: ONCOLOGY | Facility: HOSPITAL | Age: 76
Discharge: HOME | End: 2021-10-27
Admitting: INTERNAL MEDICINE

## 2021-10-27 VITALS
RESPIRATION RATE: 18 BRPM | TEMPERATURE: 97.4 F | HEART RATE: 76 BPM | WEIGHT: 289 LBS | HEIGHT: 70 IN | SYSTOLIC BLOOD PRESSURE: 146 MMHG | BODY MASS INDEX: 41.37 KG/M2 | DIASTOLIC BLOOD PRESSURE: 78 MMHG

## 2021-10-27 DIAGNOSIS — D50.9 IRON DEFICIENCY ANEMIA, UNSPECIFIED IRON DEFICIENCY ANEMIA TYPE: ICD-10-CM

## 2021-10-27 DIAGNOSIS — N18.4 STAGE 4 CHRONIC KIDNEY DISEASE (HCC): Primary | ICD-10-CM

## 2021-10-27 LAB
CREAT SERPL-MCNC: 3.45 MG/DL (ref 0.76–1.27)
GFR SERPL CREATININE-BSD FRML MDRD: 17 ML/MIN/1.73
HCT VFR BLD AUTO: 32.9 % (ref 37.5–51)
HGB BLD-MCNC: 10.2 G/DL (ref 13–17.7)
POTASSIUM SERPL-SCNC: 4.9 MMOL/L (ref 3.5–5.2)

## 2021-10-27 PROCEDURE — 25010000002 EPOETIN ALFA-EPBX 10000 UNIT/ML SOLUTION: Performed by: INTERNAL MEDICINE

## 2021-10-27 PROCEDURE — 36415 COLL VENOUS BLD VENIPUNCTURE: CPT

## 2021-10-27 PROCEDURE — 96372 THER/PROPH/DIAG INJ SC/IM: CPT

## 2021-10-27 PROCEDURE — 84132 ASSAY OF SERUM POTASSIUM: CPT | Performed by: INTERNAL MEDICINE

## 2021-10-27 PROCEDURE — 85018 HEMOGLOBIN: CPT | Performed by: INTERNAL MEDICINE

## 2021-10-27 PROCEDURE — 82565 ASSAY OF CREATININE: CPT | Performed by: INTERNAL MEDICINE

## 2021-10-27 PROCEDURE — 85014 HEMATOCRIT: CPT | Performed by: INTERNAL MEDICINE

## 2021-10-27 RX ADMIN — EPOETIN ALFA-EPBX 20000 UNITS: 10000 INJECTION, SOLUTION INTRAVENOUS; SUBCUTANEOUS at 10:05

## 2021-10-30 DIAGNOSIS — I48.0 PAROXYSMAL ATRIAL FIBRILLATION (HCC): ICD-10-CM

## 2021-11-01 RX ORDER — CARVEDILOL 25 MG/1
25 TABLET ORAL 2 TIMES DAILY WITH MEALS
Qty: 180 TABLET | Refills: 3 | Status: SHIPPED | OUTPATIENT
Start: 2021-11-01 | End: 2022-07-07 | Stop reason: DRUGHIGH

## 2021-11-03 ENCOUNTER — HOSPITAL ENCOUNTER (OUTPATIENT)
Dept: ONCOLOGY | Facility: HOSPITAL | Age: 76
Discharge: HOME | End: 2021-11-03
Admitting: INTERNAL MEDICINE

## 2021-11-03 VITALS
BODY MASS INDEX: 40.94 KG/M2 | HEIGHT: 70 IN | DIASTOLIC BLOOD PRESSURE: 89 MMHG | WEIGHT: 286 LBS | RESPIRATION RATE: 16 BRPM | HEART RATE: 97 BPM | TEMPERATURE: 97 F | SYSTOLIC BLOOD PRESSURE: 155 MMHG

## 2021-11-03 DIAGNOSIS — D50.9 IRON DEFICIENCY ANEMIA, UNSPECIFIED IRON DEFICIENCY ANEMIA TYPE: ICD-10-CM

## 2021-11-03 DIAGNOSIS — N18.4 STAGE 4 CHRONIC KIDNEY DISEASE (HCC): Primary | ICD-10-CM

## 2021-11-03 LAB
CREAT SERPL-MCNC: 3.21 MG/DL (ref 0.76–1.27)
GFR SERPL CREATININE-BSD FRML MDRD: 19 ML/MIN/1.73
HCT VFR BLD AUTO: 33 % (ref 37.5–51)
HGB BLD-MCNC: 10.5 G/DL (ref 13–17.7)
POTASSIUM SERPL-SCNC: 4.2 MMOL/L (ref 3.5–5.2)

## 2021-11-03 PROCEDURE — 82565 ASSAY OF CREATININE: CPT | Performed by: INTERNAL MEDICINE

## 2021-11-03 PROCEDURE — 25010000002 EPOETIN ALFA-EPBX 10000 UNIT/ML SOLUTION: Performed by: INTERNAL MEDICINE

## 2021-11-03 PROCEDURE — 96372 THER/PROPH/DIAG INJ SC/IM: CPT

## 2021-11-03 PROCEDURE — 84132 ASSAY OF SERUM POTASSIUM: CPT | Performed by: INTERNAL MEDICINE

## 2021-11-03 PROCEDURE — 85018 HEMOGLOBIN: CPT | Performed by: INTERNAL MEDICINE

## 2021-11-03 PROCEDURE — 85014 HEMATOCRIT: CPT | Performed by: INTERNAL MEDICINE

## 2021-11-03 PROCEDURE — 36415 COLL VENOUS BLD VENIPUNCTURE: CPT

## 2021-11-03 RX ADMIN — EPOETIN ALFA-EPBX 20000 UNITS: 10000 INJECTION, SOLUTION INTRAVENOUS; SUBCUTANEOUS at 10:49

## 2021-11-10 ENCOUNTER — ANTICOAGULATION VISIT (OUTPATIENT)
Dept: PHARMACY | Facility: HOSPITAL | Age: 76
End: 2021-11-10

## 2021-11-10 ENCOUNTER — HOSPITAL ENCOUNTER (OUTPATIENT)
Dept: ONCOLOGY | Facility: HOSPITAL | Age: 76
Discharge: HOME | End: 2021-11-10
Admitting: INTERNAL MEDICINE

## 2021-11-10 VITALS
HEART RATE: 86 BPM | WEIGHT: 282.25 LBS | TEMPERATURE: 97.5 F | BODY MASS INDEX: 40.5 KG/M2 | DIASTOLIC BLOOD PRESSURE: 60 MMHG | SYSTOLIC BLOOD PRESSURE: 126 MMHG | RESPIRATION RATE: 16 BRPM

## 2021-11-10 DIAGNOSIS — D50.8 OTHER IRON DEFICIENCY ANEMIA: Chronic | ICD-10-CM

## 2021-11-10 DIAGNOSIS — N18.31 STAGE 3A CHRONIC KIDNEY DISEASE (HCC): Primary | ICD-10-CM

## 2021-11-10 DIAGNOSIS — D50.9 IRON DEFICIENCY ANEMIA, UNSPECIFIED IRON DEFICIENCY ANEMIA TYPE: ICD-10-CM

## 2021-11-10 DIAGNOSIS — N18.4 STAGE 4 CHRONIC KIDNEY DISEASE (HCC): ICD-10-CM

## 2021-11-10 LAB
CREAT SERPL-MCNC: 3.81 MG/DL (ref 0.76–1.27)
ERYTHROCYTE [DISTWIDTH] IN BLOOD BY AUTOMATED COUNT: 17.8 % (ref 12.3–15.4)
FERRITIN SERPL-MCNC: 102.7 NG/ML (ref 30–400)
GFR SERPL CREATININE-BSD FRML MDRD: 15 ML/MIN/1.73
HCT VFR BLD AUTO: 32.4 % (ref 37.5–51)
HGB BLD-MCNC: 10.1 G/DL (ref 13–17.7)
INR PPP: 3.1 (ref 0.91–1.09)
IRON 24H UR-MRATE: 28 MCG/DL (ref 59–158)
IRON SATN MFR SERPL: 9 % (ref 20–50)
LYMPHOCYTES # BLD AUTO: 1.1 10*3/MM3 (ref 0.7–3.1)
LYMPHOCYTES NFR BLD AUTO: 15.2 % (ref 19.6–45.3)
MCH RBC QN AUTO: 29.2 PG (ref 26.6–33)
MCHC RBC AUTO-ENTMCNC: 31.3 G/DL (ref 31.5–35.7)
MCV RBC AUTO: 93.3 FL (ref 79–97)
MONOCYTES # BLD AUTO: 0.2 10*3/MM3 (ref 0.1–0.9)
MONOCYTES NFR BLD AUTO: 2.7 % (ref 5–12)
NEUTROPHILS NFR BLD AUTO: 5.9 10*3/MM3 (ref 1.7–7)
NEUTROPHILS NFR BLD AUTO: 82.1 % (ref 42.7–76)
PLATELET # BLD AUTO: 183 10*3/MM3 (ref 140–450)
PMV BLD AUTO: 7.4 FL (ref 6–12)
POTASSIUM SERPL-SCNC: 4.3 MMOL/L (ref 3.5–5.2)
PROTHROMBIN TIME: 36.6 SECONDS (ref 10–13.8)
RBC # BLD AUTO: 3.47 10*6/MM3 (ref 4.14–5.8)
TIBC SERPL-MCNC: 305 MCG/DL (ref 298–536)
TRANSFERRIN SERPL-MCNC: 205 MG/DL (ref 200–360)
WBC # BLD AUTO: 7.2 10*3/MM3 (ref 3.4–10.8)

## 2021-11-10 PROCEDURE — 82728 ASSAY OF FERRITIN: CPT | Performed by: INTERNAL MEDICINE

## 2021-11-10 PROCEDURE — 96372 THER/PROPH/DIAG INJ SC/IM: CPT

## 2021-11-10 PROCEDURE — 36416 COLLJ CAPILLARY BLOOD SPEC: CPT

## 2021-11-10 PROCEDURE — 85025 COMPLETE CBC W/AUTO DIFF WBC: CPT | Performed by: INTERNAL MEDICINE

## 2021-11-10 PROCEDURE — 25010000002 EPOETIN ALFA-EPBX 10000 UNIT/ML SOLUTION: Performed by: INTERNAL MEDICINE

## 2021-11-10 PROCEDURE — 85610 PROTHROMBIN TIME: CPT

## 2021-11-10 PROCEDURE — 82565 ASSAY OF CREATININE: CPT | Performed by: INTERNAL MEDICINE

## 2021-11-10 PROCEDURE — 84466 ASSAY OF TRANSFERRIN: CPT | Performed by: INTERNAL MEDICINE

## 2021-11-10 PROCEDURE — 83540 ASSAY OF IRON: CPT | Performed by: INTERNAL MEDICINE

## 2021-11-10 PROCEDURE — G0463 HOSPITAL OUTPT CLINIC VISIT: HCPCS

## 2021-11-10 PROCEDURE — 84132 ASSAY OF SERUM POTASSIUM: CPT | Performed by: INTERNAL MEDICINE

## 2021-11-10 RX ADMIN — EPOETIN ALFA-EPBX 20000 UNITS: 10000 INJECTION, SOLUTION INTRAVENOUS; SUBCUTANEOUS at 11:35

## 2021-11-10 NOTE — PROGRESS NOTES
Feraheme was released, but per Maciel in pharmacy, it is due to start tomorrow. Patient agreeable to come back tomorrow 11/11/21 for day 1 and Monday 11/15/21 for day 4. Maciel will fix the plan

## 2021-11-10 NOTE — PROGRESS NOTES
Anticoagulation Clinic Progress Note  Indication: afib  Referring Provider: Tyson (next appointment: 10/15/2021)   Initial Warfarin Start Date: 2007  Goal INR: 2-3  Current Drug Interactions:  torsemide  Diet: eats salads 2x weekly 11/4/20  [id number]: 5 (HTN, Age, CHF, CAD)  Other: anemic, CKD (stage 3) May need bridge with Lovenox given persistent AF (per cardio 1/24/2019), Scr was 2.38 on 12/10/19     Anticoagulation Clinic INR History:       Date 10/30 11/20 12/17 1/14/20 2/11 3/11 4/8 5/6 6/11 7/9 8/6 9/3 9/14 9/30   Total Weekly Dose 32.5 mg 32.5 mg 32.5 mg 32.5 mg 32.5mg 32.5mg 32.5mg 32.5mg 32.5mg 32.5 mg 32.5 mg 32.5mg 32.5mg 26.25 mg   INR 2.3 2.3 2.1 1.9 2.2 2.1 2.1 2.2 2.2 2.3 2.7 1.6 2.2  1.8   Notes                       ??   5 day hold     Date 10/14 11/4 11/11 11/25 12/9 12/16 12/30 1/13 2/3 3/3 3/31 4/28 5/26 6/23   Total Weekly Dose 32.5 mg 32.5mg 35mg 32.5mg 33.75 mg 36.25 mg 33.75 mg 35mg 33.75 33.75mg 33.75mg 33.75 mg 33.75 mg 33.75mg   INR 2.0 1.6 2.1 1.8 1.8 2.5 2.0 3.0 2.1 2.4 2.3 3.0 2.1 2.2   Notes   1x boost GLV boostx1 boostx1             Date 7/21 9/1 10/13 11/10          Total Weekly Dose 33.75mg 33.75mg 33.75mg 33.75mg          INR 2.9 2.5 3.1 3.1          Notes                    Takes warfarin in the am    Clinic Interview:  Verbal Release Authorization signed on 8/22/2018 -- may speak with Raeann Sim (Wife). [phone] (Home) *Preferred*  Tablet Strength: pt has 7.5mg and 2.5mg tablets    Patient Findings    Negatives:  Signs/symptoms of thrombosis, Signs/symptoms of bleeding, Laboratory test error suspected, Change in health, Change in alcohol use, Change in activity, Upcoming invasive procedure, Emergency department visit, Upcoming dental procedure, Missed doses, Extra doses, Change in medications, Change in diet/appetite, Hospital admission, Bruising, Other complaints   Comments:  No changes according to patient.        Plan:   1. INR is still slightly supratherapeutic  today at 3.1 (goal 2-3). Considering hx of stability on current dose and due for lower dose tonight, Instructed Mr. Montemayor to continue warfarin 5mg oral daily except 3.75mg Wednesdays until recheck in 3 weeks (has another apt on 12/1)  2. RTC in 3 weeks on 12/1. (previously q6w)  3. Verbal and written information provided in the clinic. Mr. Montemayor expresses understanding with teach back and has no further questions at this time.     Zev Mejias,  PharmD Candidate 2022  11/10/21  12:15 CHULA RODGERS, Elana Mckeon, PharmD, have reviewed the note in full and agree with the assessment and plan.  11/10/21  12:21 EST

## 2021-11-11 ENCOUNTER — HOSPITAL ENCOUNTER (OUTPATIENT)
Dept: ONCOLOGY | Facility: HOSPITAL | Age: 76
Discharge: HOME | End: 2021-11-11
Admitting: INTERNAL MEDICINE

## 2021-11-11 VITALS
DIASTOLIC BLOOD PRESSURE: 81 MMHG | HEART RATE: 85 BPM | RESPIRATION RATE: 16 BRPM | BODY MASS INDEX: 40.49 KG/M2 | TEMPERATURE: 97.3 F | WEIGHT: 282.19 LBS | SYSTOLIC BLOOD PRESSURE: 168 MMHG

## 2021-11-11 DIAGNOSIS — N18.4 STAGE 4 CHRONIC KIDNEY DISEASE (HCC): ICD-10-CM

## 2021-11-11 DIAGNOSIS — D50.9 IRON DEFICIENCY ANEMIA, UNSPECIFIED IRON DEFICIENCY ANEMIA TYPE: Primary | ICD-10-CM

## 2021-11-11 PROCEDURE — 25010000002 FERUMOXYTOL 510 MG/17ML SOLUTION 510 MG VIAL: Performed by: INTERNAL MEDICINE

## 2021-11-11 PROCEDURE — 96365 THER/PROPH/DIAG IV INF INIT: CPT

## 2021-11-11 PROCEDURE — 96366 THER/PROPH/DIAG IV INF ADDON: CPT

## 2021-11-11 RX ADMIN — FERUMOXYTOL 510 MG: 510 INJECTION INTRAVENOUS at 14:51

## 2021-11-15 ENCOUNTER — HOSPITAL ENCOUNTER (OUTPATIENT)
Dept: ONCOLOGY | Facility: HOSPITAL | Age: 76
Discharge: HOME | End: 2021-11-15
Admitting: INTERNAL MEDICINE

## 2021-11-15 VITALS
BODY MASS INDEX: 40.94 KG/M2 | TEMPERATURE: 97 F | HEIGHT: 70 IN | WEIGHT: 286 LBS | RESPIRATION RATE: 18 BRPM | DIASTOLIC BLOOD PRESSURE: 68 MMHG | SYSTOLIC BLOOD PRESSURE: 126 MMHG | HEART RATE: 77 BPM

## 2021-11-15 DIAGNOSIS — D50.9 IRON DEFICIENCY ANEMIA, UNSPECIFIED IRON DEFICIENCY ANEMIA TYPE: Primary | ICD-10-CM

## 2021-11-15 DIAGNOSIS — N18.4 STAGE 4 CHRONIC KIDNEY DISEASE (HCC): ICD-10-CM

## 2021-11-15 PROCEDURE — 25010000002 FERUMOXYTOL 510 MG/17ML SOLUTION 510 MG VIAL: Performed by: INTERNAL MEDICINE

## 2021-11-15 PROCEDURE — 96374 THER/PROPH/DIAG INJ IV PUSH: CPT

## 2021-11-15 PROCEDURE — 96375 TX/PRO/DX INJ NEW DRUG ADDON: CPT

## 2021-11-15 RX ADMIN — FERUMOXYTOL 510 MG: 510 INJECTION INTRAVENOUS at 10:35

## 2021-11-15 NOTE — ADDENDUM NOTE
Encounter addended by: Tamika Winkler RN on: 11/15/2021 11:41 AM   Actions taken: Flowsheet accepted

## 2021-11-17 ENCOUNTER — HOSPITAL ENCOUNTER (OUTPATIENT)
Dept: ONCOLOGY | Facility: HOSPITAL | Age: 76
Discharge: HOME | End: 2021-11-17
Admitting: INTERNAL MEDICINE

## 2021-11-17 VITALS
BODY MASS INDEX: 40.8 KG/M2 | HEIGHT: 70 IN | DIASTOLIC BLOOD PRESSURE: 69 MMHG | RESPIRATION RATE: 16 BRPM | SYSTOLIC BLOOD PRESSURE: 139 MMHG | WEIGHT: 285 LBS | TEMPERATURE: 97.5 F | HEART RATE: 81 BPM

## 2021-11-17 DIAGNOSIS — D50.9 IRON DEFICIENCY ANEMIA, UNSPECIFIED IRON DEFICIENCY ANEMIA TYPE: ICD-10-CM

## 2021-11-17 DIAGNOSIS — N18.4 STAGE 4 CHRONIC KIDNEY DISEASE (HCC): Primary | ICD-10-CM

## 2021-11-17 LAB
HCT VFR BLD AUTO: 32 % (ref 37.5–51)
HGB BLD-MCNC: 10.1 G/DL (ref 13–17.7)

## 2021-11-17 PROCEDURE — 85018 HEMOGLOBIN: CPT | Performed by: INTERNAL MEDICINE

## 2021-11-17 PROCEDURE — 25010000002 EPOETIN ALFA-EPBX 10000 UNIT/ML SOLUTION: Performed by: INTERNAL MEDICINE

## 2021-11-17 PROCEDURE — 96372 THER/PROPH/DIAG INJ SC/IM: CPT

## 2021-11-17 PROCEDURE — 36415 COLL VENOUS BLD VENIPUNCTURE: CPT

## 2021-11-17 PROCEDURE — 85014 HEMATOCRIT: CPT | Performed by: INTERNAL MEDICINE

## 2021-11-17 RX ADMIN — EPOETIN ALFA-EPBX 20000 UNITS: 10000 INJECTION, SOLUTION INTRAVENOUS; SUBCUTANEOUS at 11:01

## 2021-11-24 ENCOUNTER — HOSPITAL ENCOUNTER (OUTPATIENT)
Dept: ONCOLOGY | Facility: HOSPITAL | Age: 76
Discharge: HOME | End: 2021-11-24
Admitting: INTERNAL MEDICINE

## 2021-11-24 VITALS
RESPIRATION RATE: 16 BRPM | DIASTOLIC BLOOD PRESSURE: 71 MMHG | SYSTOLIC BLOOD PRESSURE: 137 MMHG | HEIGHT: 70 IN | HEART RATE: 86 BPM | TEMPERATURE: 97.4 F | WEIGHT: 282 LBS | BODY MASS INDEX: 40.37 KG/M2

## 2021-11-24 DIAGNOSIS — N18.4 STAGE 4 CHRONIC KIDNEY DISEASE (HCC): Primary | ICD-10-CM

## 2021-11-24 DIAGNOSIS — D50.9 IRON DEFICIENCY ANEMIA, UNSPECIFIED IRON DEFICIENCY ANEMIA TYPE: ICD-10-CM

## 2021-11-24 LAB
CREAT SERPL-MCNC: 3.22 MG/DL (ref 0.76–1.27)
GFR SERPL CREATININE-BSD FRML MDRD: 19 ML/MIN/1.73
HCT VFR BLD AUTO: 33.7 % (ref 37.5–51)
HGB BLD-MCNC: 10.5 G/DL (ref 13–17.7)
POTASSIUM SERPL-SCNC: 4.3 MMOL/L (ref 3.5–5.2)

## 2021-11-24 PROCEDURE — 82565 ASSAY OF CREATININE: CPT | Performed by: INTERNAL MEDICINE

## 2021-11-24 PROCEDURE — 84132 ASSAY OF SERUM POTASSIUM: CPT | Performed by: INTERNAL MEDICINE

## 2021-11-24 PROCEDURE — 85018 HEMOGLOBIN: CPT | Performed by: INTERNAL MEDICINE

## 2021-11-24 PROCEDURE — 25010000002 EPOETIN ALFA-EPBX 10000 UNIT/ML SOLUTION: Performed by: INTERNAL MEDICINE

## 2021-11-24 PROCEDURE — 96372 THER/PROPH/DIAG INJ SC/IM: CPT

## 2021-11-24 PROCEDURE — 36415 COLL VENOUS BLD VENIPUNCTURE: CPT

## 2021-11-24 PROCEDURE — 85014 HEMATOCRIT: CPT | Performed by: INTERNAL MEDICINE

## 2021-11-24 RX ADMIN — EPOETIN ALFA-EPBX 20000 UNITS: 10000 INJECTION, SOLUTION INTRAVENOUS; SUBCUTANEOUS at 10:59

## 2021-12-01 ENCOUNTER — HOSPITAL ENCOUNTER (OUTPATIENT)
Dept: ONCOLOGY | Facility: HOSPITAL | Age: 76
Discharge: HOME | End: 2021-12-01
Admitting: INTERNAL MEDICINE

## 2021-12-01 ENCOUNTER — ANTICOAGULATION VISIT (OUTPATIENT)
Dept: PHARMACY | Facility: HOSPITAL | Age: 76
End: 2021-12-01

## 2021-12-01 VITALS
TEMPERATURE: 97.5 F | WEIGHT: 283 LBS | HEIGHT: 70 IN | DIASTOLIC BLOOD PRESSURE: 74 MMHG | RESPIRATION RATE: 16 BRPM | SYSTOLIC BLOOD PRESSURE: 125 MMHG | HEART RATE: 78 BPM | BODY MASS INDEX: 40.52 KG/M2

## 2021-12-01 DIAGNOSIS — N18.4 STAGE 4 CHRONIC KIDNEY DISEASE (HCC): Primary | ICD-10-CM

## 2021-12-01 DIAGNOSIS — D50.9 IRON DEFICIENCY ANEMIA, UNSPECIFIED IRON DEFICIENCY ANEMIA TYPE: ICD-10-CM

## 2021-12-01 LAB
CREAT SERPL-MCNC: 3.72 MG/DL (ref 0.76–1.27)
GFR SERPL CREATININE-BSD FRML MDRD: 16 ML/MIN/1.73
HCT VFR BLD AUTO: 34 % (ref 37.5–51)
HGB BLD-MCNC: 10.6 G/DL (ref 13–17.7)
INR PPP: 3.2 (ref 0.91–1.09)
POTASSIUM SERPL-SCNC: 4.4 MMOL/L (ref 3.5–5.2)
PROTHROMBIN TIME: 38.9 SECONDS (ref 10–13.8)

## 2021-12-01 PROCEDURE — 84132 ASSAY OF SERUM POTASSIUM: CPT | Performed by: INTERNAL MEDICINE

## 2021-12-01 PROCEDURE — 85014 HEMATOCRIT: CPT | Performed by: INTERNAL MEDICINE

## 2021-12-01 PROCEDURE — 96372 THER/PROPH/DIAG INJ SC/IM: CPT

## 2021-12-01 PROCEDURE — G0463 HOSPITAL OUTPT CLINIC VISIT: HCPCS

## 2021-12-01 PROCEDURE — 36416 COLLJ CAPILLARY BLOOD SPEC: CPT

## 2021-12-01 PROCEDURE — 25010000002 EPOETIN ALFA-EPBX 10000 UNIT/ML SOLUTION: Performed by: INTERNAL MEDICINE

## 2021-12-01 PROCEDURE — 82565 ASSAY OF CREATININE: CPT | Performed by: INTERNAL MEDICINE

## 2021-12-01 PROCEDURE — 36415 COLL VENOUS BLD VENIPUNCTURE: CPT

## 2021-12-01 PROCEDURE — 85018 HEMOGLOBIN: CPT | Performed by: INTERNAL MEDICINE

## 2021-12-01 PROCEDURE — 85610 PROTHROMBIN TIME: CPT

## 2021-12-01 RX ADMIN — EPOETIN ALFA-EPBX 20000 UNITS: 10000 INJECTION, SOLUTION INTRAVENOUS; SUBCUTANEOUS at 11:07

## 2021-12-01 NOTE — PROGRESS NOTES
Anticoagulation Clinic Progress Note  Indication: afib  Referring Provider: Tyson (next appointment: 10/15/2021)   Initial Warfarin Start Date: 2007  Goal INR: 2-3  Current Drug Interactions:  torsemide  Diet: eats salads 2x weekly 11/4/20  [id number]: 5 (HTN, Age, CHF, CAD)  Other: anemic, CKD (stage 4) May need bridge with Lovenox given persistent AF (per cardio 1/24/2019), Scr was 3.22 on 11/24/21     Anticoagulation Clinic INR History:       Date 10/30 11/20 12/17 1/14/20 2/11 3/11 4/8 5/6 6/11 7/9 8/6 9/3 9/14 9/30   Total Weekly Dose 32.5 mg 32.5 mg 32.5 mg 32.5 mg 32.5mg 32.5mg 32.5mg 32.5mg 32.5mg 32.5 mg 32.5 mg 32.5mg 32.5mg 26.25 mg   INR 2.3 2.3 2.1 1.9 2.2 2.1 2.1 2.2 2.2 2.3 2.7 1.6 2.2  1.8   Notes                       ??   5 day hold     Date 10/14 11/4 11/11 11/25 12/9 12/16 12/30 1/13 2/3 3/3 3/31 4/28 5/26 6/23   Total Weekly Dose 32.5 mg 32.5mg 35mg 32.5mg 33.75 mg 36.25 mg 33.75 mg 35mg 33.75 33.75mg 33.75mg 33.75 mg 33.75 mg 33.75mg   INR 2.0 1.6 2.1 1.8 1.8 2.5 2.0 3.0 2.1 2.4 2.3 3.0 2.1 2.2   Notes   1x boost GLV boostx1 boostx1             Date 7/21 9/1 10/13 11/10 12/1         Total Weekly Dose 33.75mg 33.75mg 33.75mg 33.75mg 33.75 mg         INR 2.9 2.5 3.1 3.1 3.2         Notes              Takes warfarin in the am    Clinic Interview:  Verbal Release Authorization signed on 8/22/2018 -- may speak with Raeann Montemayor (Wife). [phone] (Home) *Preferred*  Tablet Strength: pt has 7.5mg and 2.5mg tablets    Patient Findings  Positives:  Change in health   Negatives:  Signs/symptoms of thrombosis, Signs/symptoms of bleeding, Laboratory test error suspected, Change in alcohol use, Change in activity, Upcoming invasive procedure, Emergency department visit, Upcoming dental procedure, Missed doses, Extra doses, Change in medications, Change in diet/appetite, Hospital admission, Bruising, Other complaints   Comments:  Kidney function has decreased and is currently at bottom end of stage 4  CKD. Has had discussions with nephrologist about possible future need for dialysis. He has an appointment with nephrology in a couple weeks to discuss. Otherwise patient is doing well and reports no changes in meds or diet that would explain elevated INR.      Plan:   1. INR is still supratherapeutic today at 3.2 (goal 2-3). Considering consistent elevation on past 3 visits, have instructed Mr. Montemayor to reduce maintenance dose to warfarin 5mg oral daily except 3.75mg WedSun until recheck in 2 weeks (3.7% decrease)  2. RTC in 2 weeks on 12/15. (previously q6w)  3. Verbal and written information provided in the clinic. Mr. Montemayor expresses understanding with teach back and has no further questions at this time.     Zev Mejias,  PharmD Candidate 2022 12/01/21  10:03 Evelina ALMENDAREZ, PharmD, have reviewed the note in full and agree with the assessment and plan.  12/01/21  10:50 EST

## 2021-12-03 RX ORDER — TORSEMIDE 20 MG/1
20 TABLET ORAL 2 TIMES DAILY
Qty: 180 TABLET | Refills: 3 | Status: SHIPPED | OUTPATIENT
Start: 2021-12-03 | End: 2022-07-07

## 2021-12-03 NOTE — TELEPHONE ENCOUNTER
Lab Results   Component Value Date    CREATININE 3.72 (H) 12/01/2021     Stage 4 chronic kidney disease with recent apparent documented proteinuria and renal biopsy (February 2019)    [facility]     Patient requests refill of torsemide 20 mg BID     Please advise.

## 2021-12-08 ENCOUNTER — HOSPITAL ENCOUNTER (OUTPATIENT)
Dept: ONCOLOGY | Facility: HOSPITAL | Age: 76
Discharge: HOME | End: 2021-12-08
Admitting: INTERNAL MEDICINE

## 2021-12-08 VITALS
HEIGHT: 70 IN | TEMPERATURE: 97.8 F | WEIGHT: 277 LBS | BODY MASS INDEX: 39.65 KG/M2 | SYSTOLIC BLOOD PRESSURE: 147 MMHG | HEART RATE: 85 BPM | RESPIRATION RATE: 16 BRPM | DIASTOLIC BLOOD PRESSURE: 68 MMHG

## 2021-12-08 DIAGNOSIS — N18.4 STAGE 4 CHRONIC KIDNEY DISEASE (HCC): Primary | ICD-10-CM

## 2021-12-08 DIAGNOSIS — D50.9 IRON DEFICIENCY ANEMIA, UNSPECIFIED IRON DEFICIENCY ANEMIA TYPE: ICD-10-CM

## 2021-12-08 LAB
CREAT SERPL-MCNC: 3.55 MG/DL (ref 0.76–1.27)
FERRITIN SERPL-MCNC: 324.3 NG/ML (ref 30–400)
GFR SERPL CREATININE-BSD FRML MDRD: 17 ML/MIN/1.73
HCT VFR BLD AUTO: 34.7 % (ref 37.5–51)
HGB BLD-MCNC: 10.8 G/DL (ref 13–17.7)
IRON 24H UR-MRATE: 41 MCG/DL (ref 59–158)
IRON SATN MFR SERPL: 15 % (ref 20–50)
POTASSIUM SERPL-SCNC: 4 MMOL/L (ref 3.5–5.2)
TIBC SERPL-MCNC: 280 MCG/DL (ref 298–536)
TRANSFERRIN SERPL-MCNC: 188 MG/DL (ref 200–360)

## 2021-12-08 PROCEDURE — 82565 ASSAY OF CREATININE: CPT | Performed by: INTERNAL MEDICINE

## 2021-12-08 PROCEDURE — 96372 THER/PROPH/DIAG INJ SC/IM: CPT

## 2021-12-08 PROCEDURE — 83540 ASSAY OF IRON: CPT | Performed by: INTERNAL MEDICINE

## 2021-12-08 PROCEDURE — 82728 ASSAY OF FERRITIN: CPT | Performed by: INTERNAL MEDICINE

## 2021-12-08 PROCEDURE — 85014 HEMATOCRIT: CPT | Performed by: INTERNAL MEDICINE

## 2021-12-08 PROCEDURE — 25010000002 EPOETIN ALFA-EPBX 10000 UNIT/ML SOLUTION: Performed by: INTERNAL MEDICINE

## 2021-12-08 PROCEDURE — 84132 ASSAY OF SERUM POTASSIUM: CPT | Performed by: INTERNAL MEDICINE

## 2021-12-08 PROCEDURE — 84466 ASSAY OF TRANSFERRIN: CPT | Performed by: INTERNAL MEDICINE

## 2021-12-08 PROCEDURE — 85018 HEMOGLOBIN: CPT | Performed by: INTERNAL MEDICINE

## 2021-12-08 RX ADMIN — EPOETIN ALFA-EPBX 20000 UNITS: 10000 INJECTION, SOLUTION INTRAVENOUS; SUBCUTANEOUS at 09:34

## 2021-12-15 ENCOUNTER — ANTICOAGULATION VISIT (OUTPATIENT)
Dept: PHARMACY | Facility: HOSPITAL | Age: 76
End: 2021-12-15

## 2021-12-15 ENCOUNTER — HOSPITAL ENCOUNTER (OUTPATIENT)
Dept: ONCOLOGY | Facility: HOSPITAL | Age: 76
Discharge: HOME | End: 2021-12-15
Admitting: INTERNAL MEDICINE

## 2021-12-15 VITALS
DIASTOLIC BLOOD PRESSURE: 61 MMHG | SYSTOLIC BLOOD PRESSURE: 121 MMHG | HEART RATE: 85 BPM | TEMPERATURE: 97.4 F | WEIGHT: 277 LBS | BODY MASS INDEX: 39.65 KG/M2 | HEIGHT: 70 IN | RESPIRATION RATE: 18 BRPM

## 2021-12-15 DIAGNOSIS — D50.9 IRON DEFICIENCY ANEMIA, UNSPECIFIED IRON DEFICIENCY ANEMIA TYPE: ICD-10-CM

## 2021-12-15 DIAGNOSIS — N18.4 STAGE 4 CHRONIC KIDNEY DISEASE (HCC): Primary | ICD-10-CM

## 2021-12-15 LAB
CREAT SERPL-MCNC: 3.45 MG/DL (ref 0.76–1.27)
GFR SERPL CREATININE-BSD FRML MDRD: 17 ML/MIN/1.73
HCT VFR BLD AUTO: 35.6 % (ref 37.5–51)
HGB BLD-MCNC: 10.9 G/DL (ref 13–17.7)
INR PPP: 3.4 (ref 0.91–1.09)
POTASSIUM SERPL-SCNC: 3.9 MMOL/L (ref 3.5–5.2)
PROTHROMBIN TIME: 40.4 SECONDS (ref 10–13.8)

## 2021-12-15 PROCEDURE — 84132 ASSAY OF SERUM POTASSIUM: CPT | Performed by: INTERNAL MEDICINE

## 2021-12-15 PROCEDURE — 25010000002 EPOETIN ALFA-EPBX 10000 UNIT/ML SOLUTION: Performed by: INTERNAL MEDICINE

## 2021-12-15 PROCEDURE — 85014 HEMATOCRIT: CPT | Performed by: INTERNAL MEDICINE

## 2021-12-15 PROCEDURE — 36416 COLLJ CAPILLARY BLOOD SPEC: CPT

## 2021-12-15 PROCEDURE — 85018 HEMOGLOBIN: CPT | Performed by: INTERNAL MEDICINE

## 2021-12-15 PROCEDURE — 96372 THER/PROPH/DIAG INJ SC/IM: CPT

## 2021-12-15 PROCEDURE — 82565 ASSAY OF CREATININE: CPT | Performed by: INTERNAL MEDICINE

## 2021-12-15 PROCEDURE — G0463 HOSPITAL OUTPT CLINIC VISIT: HCPCS

## 2021-12-15 PROCEDURE — 85610 PROTHROMBIN TIME: CPT

## 2021-12-15 RX ADMIN — EPOETIN ALFA-EPBX 20000 UNITS: 10000 INJECTION, SOLUTION INTRAVENOUS; SUBCUTANEOUS at 09:29

## 2021-12-15 NOTE — PROGRESS NOTES
Anticoagulation Clinic Progress Note  Indication: afib  Referring Provider: Tyson (next appointment: 10/15/2021)   Initial Warfarin Start Date: 2007  Goal INR: 2-3  Current Drug Interactions:  torsemide  Diet: eats salads 2x weekly 11/4/20  [id number]: 5 (HTN, Age, CHF, CAD)  Other: anemic, CKD (stage 4) May need bridge with Lovenox given persistent AF (per cardio 1/24/2019), Scr was 3.22 on 11/24/21     Anticoagulation Clinic INR History:       Date 10/30 11/20 12/17 1/14/20 2/11 3/11 4/8 5/6 6/11 7/9 8/6 9/3 9/14 9/30   Total Weekly Dose 32.5 mg 32.5 mg 32.5 mg 32.5 mg 32.5mg 32.5mg 32.5mg 32.5mg 32.5mg 32.5 mg 32.5 mg 32.5mg 32.5mg 26.25 mg   INR 2.3 2.3 2.1 1.9 2.2 2.1 2.1 2.2 2.2 2.3 2.7 1.6 2.2  1.8   Notes                       ??   5 day hold     Date 10/14 11/4 11/11 11/25 12/9 12/16 12/30 1/13 2/3 3/3 3/31 4/28 5/26 6/23   Total Weekly Dose 32.5 mg 32.5mg 35mg 32.5mg 33.75 mg 36.25 mg 33.75 mg 35mg 33.75 33.75mg 33.75mg 33.75 mg 33.75 mg 33.75mg   INR 2.0 1.6 2.1 1.8 1.8 2.5 2.0 3.0 2.1 2.4 2.3 3.0 2.1 2.2   Notes   1x boost GLV boostx1 boostx1             Date 7/21 9/1 10/13 11/10 12/1 12/15        Total Weekly Dose 33.75mg 33.75mg 33.75mg 33.75mg 33.75 mg 32.5mg        INR 2.9 2.5 3.1 3.1 3.2 3.4        Notes              Takes warfarin in the am    Clinic Interview:  Verbal Release Authorization signed on 8/22/2018 -- may speak with Raeann Montemayor (Wife). [phone] (Home) *Preferred*  Tablet Strength: pt has 7.5mg and 2.5mg tablets    Patient Findings      Negatives:  Signs/symptoms of thrombosis, Signs/symptoms of bleeding, Laboratory test error suspected, Change in health, Change in alcohol use, Change in activity, Upcoming invasive procedure, Emergency department visit, Upcoming dental procedure, Missed doses, Extra doses, Change in medications, Change in diet/appetite, Hospital admission, Bruising, Other complaints    May be started on dialysis, has not yet heard back       Plan:   1. INR is  still supratherapeutic today at 3.4 (goal 2-3) despite dose redction. Have instructed Mr. Montemayor to further reduce maintenance dose to warfarin 3.75mg oral daily except 5mg TuesThursSat until recheck in 2 weeks   2. RTC in 2 weeks on 12/29. (previously q6w)  3. Verbal and written information provided in the clinic. Mr. Montemayor expresses understanding with teach back and has no further questions at this time.     Kristine Sutton PharmD.  12/15/21   09:59 EST

## 2021-12-21 RX ORDER — ATORVASTATIN CALCIUM 40 MG/1
40 TABLET, FILM COATED ORAL DAILY
Qty: 90 TABLET | Refills: 3 | Status: SHIPPED | OUTPATIENT
Start: 2021-12-21 | End: 2023-01-09

## 2021-12-22 ENCOUNTER — HOSPITAL ENCOUNTER (OUTPATIENT)
Dept: ONCOLOGY | Facility: HOSPITAL | Age: 76
Discharge: HOME | End: 2021-12-22
Admitting: INTERNAL MEDICINE

## 2021-12-22 VITALS
DIASTOLIC BLOOD PRESSURE: 74 MMHG | BODY MASS INDEX: 39.65 KG/M2 | SYSTOLIC BLOOD PRESSURE: 143 MMHG | WEIGHT: 277 LBS | TEMPERATURE: 97 F | HEART RATE: 75 BPM | RESPIRATION RATE: 18 BRPM | HEIGHT: 70 IN

## 2021-12-22 DIAGNOSIS — D50.9 IRON DEFICIENCY ANEMIA, UNSPECIFIED IRON DEFICIENCY ANEMIA TYPE: ICD-10-CM

## 2021-12-22 DIAGNOSIS — N18.4 STAGE 4 CHRONIC KIDNEY DISEASE (HCC): ICD-10-CM

## 2021-12-22 LAB
CREAT SERPL-MCNC: 3.37 MG/DL (ref 0.76–1.27)
GFR SERPL CREATININE-BSD FRML MDRD: 18 ML/MIN/1.73
HCT VFR BLD AUTO: 35.3 % (ref 37.5–51)
HGB BLD-MCNC: 11.3 G/DL (ref 13–17.7)
POTASSIUM SERPL-SCNC: 4.2 MMOL/L (ref 3.5–5.2)

## 2021-12-22 PROCEDURE — 36415 COLL VENOUS BLD VENIPUNCTURE: CPT

## 2021-12-22 PROCEDURE — 85018 HEMOGLOBIN: CPT | Performed by: INTERNAL MEDICINE

## 2021-12-22 PROCEDURE — 85014 HEMATOCRIT: CPT | Performed by: INTERNAL MEDICINE

## 2021-12-22 PROCEDURE — 84132 ASSAY OF SERUM POTASSIUM: CPT | Performed by: INTERNAL MEDICINE

## 2021-12-22 PROCEDURE — 82565 ASSAY OF CREATININE: CPT | Performed by: INTERNAL MEDICINE

## 2021-12-29 ENCOUNTER — ANTICOAGULATION VISIT (OUTPATIENT)
Dept: PHARMACY | Facility: HOSPITAL | Age: 76
End: 2021-12-29

## 2021-12-29 LAB
INR PPP: 3.4 (ref 0.91–1.09)
PROTHROMBIN TIME: 41.3 SECONDS (ref 10–13.8)

## 2021-12-29 PROCEDURE — 85610 PROTHROMBIN TIME: CPT

## 2021-12-29 PROCEDURE — G0463 HOSPITAL OUTPT CLINIC VISIT: HCPCS

## 2021-12-29 PROCEDURE — 36416 COLLJ CAPILLARY BLOOD SPEC: CPT

## 2022-01-06 PROCEDURE — 93296 REM INTERROG EVL PM/IDS: CPT | Performed by: INTERNAL MEDICINE

## 2022-01-06 PROCEDURE — 93294 REM INTERROG EVL PM/LDLS PM: CPT | Performed by: INTERNAL MEDICINE

## 2022-01-12 ENCOUNTER — ANTICOAGULATION VISIT (OUTPATIENT)
Dept: PHARMACY | Facility: HOSPITAL | Age: 77
End: 2022-01-12

## 2022-01-12 LAB
INR PPP: 1.9 (ref 0.91–1.09)
PROTHROMBIN TIME: 23.4 SECONDS (ref 10–13.8)

## 2022-01-12 PROCEDURE — 36416 COLLJ CAPILLARY BLOOD SPEC: CPT

## 2022-01-12 PROCEDURE — G0463 HOSPITAL OUTPT CLINIC VISIT: HCPCS

## 2022-01-12 PROCEDURE — 85610 PROTHROMBIN TIME: CPT

## 2022-01-12 NOTE — PROGRESS NOTES
Anticoagulation Clinic Progress Note  Indication: afib  Referring Provider: Tyson (next appointment: 10/15/2021)   Initial Warfarin Start Date: 2007  Goal INR: 2-3  Current Drug Interactions:  torsemide  Diet: eats salads 2x weekly 11/4/20  [id number]: 5 (HTN, Age, CHF, CAD)  Other: anemic, CKD (stage 4) May need bridge with Lovenox given persistent AF (per cardio 1/24/2019), Scr was 3.22 on 11/24/21     Anticoagulation Clinic INR History:       Date 10/30 11/20 12/17 1/14/20 2/11 3/11 4/8 5/6 6/11 7/9 8/6 9/3 9/14 9/30   Total Weekly Dose 32.5 mg 32.5 mg 32.5 mg 32.5 mg 32.5mg 32.5mg 32.5mg 32.5mg 32.5mg 32.5 mg 32.5 mg 32.5mg 32.5mg 26.25 mg   INR 2.3 2.3 2.1 1.9 2.2 2.1 2.1 2.2 2.2 2.3 2.7 1.6 2.2  1.8   Notes                       ??   5 day hold     Date 10/14 11/4 11/11 11/25 12/9 12/16 12/30 1/13 2/3 3/3 3/31 4/28 5/26 6/23   Total Weekly Dose 32.5 mg 32.5mg 35mg 32.5mg 33.75 mg 36.25 mg 33.75 mg 35mg 33.75 33.75mg 33.75mg 33.75 mg 33.75 mg 33.75mg   INR 2.0 1.6 2.1 1.8 1.8 2.5 2.0 3.0 2.1 2.4 2.3 3.0 2.1 2.2   Notes   1x boost GLV boostx1 boostx1             Date 7/21 9/1 10/13 11/10 12/1 12/15 12/29 1/12      Total Weekly Dose 33.75mg 33.75mg 33.75mg 33.75mg 33.75 mg 32.5mg 30mg 27.5mg      INR 2.9 2.5 3.1 3.1 3.2 3.4 3.4 1.9      Notes              Takes warfarin in the am    Clinic Interview:  Verbal Release Authorization signed on 8/22/2018 -- may speak with Raeann Montemayor (Wife). [phone] (Home) *Preferred*  Tablet Strength: pt has 7.5mg and 2.5mg tablets    Patient Findings    Patient Findings    Negatives:  Signs/symptoms of thrombosis, Signs/symptoms of bleeding, Laboratory test error suspected, Change in health, Change in alcohol use, Change in activity, Upcoming invasive procedure, Emergency department visit, Upcoming dental procedure, Missed doses, Extra doses, Change in medications, Change in diet/appetite, Hospital admission, Bruising, Other complaints         Plan:   1. INR is  subtherapeutic today at 1.9 (goal 2-3) following dose redction. Have instructed Mr. Montemayor to increase maintenance dose to warfarin 3.75mg oral daily except 5mg TuesdayFri until recheck in 2 weeks   2. RTC in 2 weeks on 1/26  3. Verbal and written information provided in the clinic. Mr. Montemayor expresses understanding with teach back and has no further questions at this time.     Kristine Sutton, NighatD.  01/12/22   09:57 EST

## 2022-01-18 PROBLEM — N18.4 ANEMIA DUE TO STAGE 4 CHRONIC KIDNEY DISEASE TREATED WITH ERYTHROPOIETIN (HCC): Status: ACTIVE | Noted: 2022-01-18

## 2022-01-18 PROBLEM — D63.1 ANEMIA DUE TO STAGE 4 CHRONIC KIDNEY DISEASE TREATED WITH ERYTHROPOIETIN: Status: ACTIVE | Noted: 2022-01-18

## 2022-01-19 ENCOUNTER — HOSPITAL ENCOUNTER (OUTPATIENT)
Dept: ONCOLOGY | Facility: HOSPITAL | Age: 77
Discharge: HOME | End: 2022-01-19
Admitting: INTERNAL MEDICINE

## 2022-01-19 VITALS
HEART RATE: 79 BPM | TEMPERATURE: 97 F | HEIGHT: 70 IN | WEIGHT: 276 LBS | BODY MASS INDEX: 39.51 KG/M2 | DIASTOLIC BLOOD PRESSURE: 95 MMHG | SYSTOLIC BLOOD PRESSURE: 195 MMHG | RESPIRATION RATE: 18 BRPM

## 2022-01-19 DIAGNOSIS — D50.9 IRON DEFICIENCY ANEMIA, UNSPECIFIED IRON DEFICIENCY ANEMIA TYPE: ICD-10-CM

## 2022-01-19 DIAGNOSIS — D63.1 ANEMIA DUE TO STAGE 4 CHRONIC KIDNEY DISEASE TREATED WITH ERYTHROPOIETIN: ICD-10-CM

## 2022-01-19 DIAGNOSIS — N18.4 STAGE 4 CHRONIC KIDNEY DISEASE: Primary | ICD-10-CM

## 2022-01-19 DIAGNOSIS — N18.4 ANEMIA DUE TO STAGE 4 CHRONIC KIDNEY DISEASE TREATED WITH ERYTHROPOIETIN: ICD-10-CM

## 2022-01-19 LAB
CREAT SERPL-MCNC: 3.78 MG/DL (ref 0.76–1.27)
FERRITIN SERPL-MCNC: 459.2 NG/ML (ref 30–400)
GFR SERPL CREATININE-BSD FRML MDRD: 16 ML/MIN/1.73
HCT VFR BLD AUTO: 34.6 % (ref 37.5–51)
HGB BLD-MCNC: 10.7 G/DL (ref 13–17.7)
IRON 24H UR-MRATE: 72 MCG/DL (ref 59–158)
IRON SATN MFR SERPL: 26 % (ref 20–50)
POTASSIUM SERPL-SCNC: 4.5 MMOL/L (ref 3.5–5.2)
TIBC SERPL-MCNC: 273 MCG/DL (ref 298–536)
TRANSFERRIN SERPL-MCNC: 183 MG/DL (ref 200–360)

## 2022-01-19 PROCEDURE — 83540 ASSAY OF IRON: CPT | Performed by: INTERNAL MEDICINE

## 2022-01-19 PROCEDURE — 84466 ASSAY OF TRANSFERRIN: CPT | Performed by: INTERNAL MEDICINE

## 2022-01-19 PROCEDURE — 96372 THER/PROPH/DIAG INJ SC/IM: CPT

## 2022-01-19 PROCEDURE — 82728 ASSAY OF FERRITIN: CPT | Performed by: INTERNAL MEDICINE

## 2022-01-19 PROCEDURE — 82565 ASSAY OF CREATININE: CPT | Performed by: INTERNAL MEDICINE

## 2022-01-19 PROCEDURE — 85014 HEMATOCRIT: CPT | Performed by: INTERNAL MEDICINE

## 2022-01-19 PROCEDURE — 84132 ASSAY OF SERUM POTASSIUM: CPT | Performed by: INTERNAL MEDICINE

## 2022-01-19 PROCEDURE — 25010000002 EPOETIN ALFA-EPBX 10000 UNIT/ML SOLUTION: Performed by: INTERNAL MEDICINE

## 2022-01-19 PROCEDURE — 85018 HEMOGLOBIN: CPT | Performed by: INTERNAL MEDICINE

## 2022-01-19 RX ADMIN — EPOETIN ALFA-EPBX 20000 UNITS: 10000 INJECTION, SOLUTION INTRAVENOUS; SUBCUTANEOUS at 14:51

## 2022-01-20 RX ORDER — LISINOPRIL 5 MG/1
5 TABLET ORAL DAILY
Qty: 90 TABLET | Refills: 3 | Status: SHIPPED | OUTPATIENT
Start: 2022-01-20 | End: 2022-07-07

## 2022-01-26 ENCOUNTER — ANTICOAGULATION VISIT (OUTPATIENT)
Dept: PHARMACY | Facility: HOSPITAL | Age: 77
End: 2022-01-26

## 2022-01-26 LAB
INR PPP: 1.7 (ref 0.91–1.09)
PROTHROMBIN TIME: 20.5 SECONDS (ref 10–13.8)

## 2022-01-26 PROCEDURE — 36416 COLLJ CAPILLARY BLOOD SPEC: CPT

## 2022-01-26 PROCEDURE — G0463 HOSPITAL OUTPT CLINIC VISIT: HCPCS

## 2022-01-26 PROCEDURE — 85610 PROTHROMBIN TIME: CPT

## 2022-01-26 NOTE — PROGRESS NOTES
Anticoagulation Clinic Progress Note  Indication: afib  Referring Provider: Tyson (next appointment: 10/15/2021)   Initial Warfarin Start Date: 2007  Goal INR: 2-3  Current Drug Interactions:  torsemide  Diet: eats salads 2x weekly 11/4/20  [id number]: 5 (HTN, Age, CHF, CAD)  Other: anemic, CKD (stage 4) May need bridge with Lovenox given persistent AF (per cardio 1/24/2019), Scr was 3.22 on 11/24/21     Anticoagulation Clinic INR History:   Date 10/30 11/20 12/17 1/14/20 2/11 3/11 4/8 5/6 6/11 7/9 8/6 9/3 9/14 9/30   Total Weekly Dose 32.5 mg 32.5 mg 32.5 mg 32.5 mg 32.5mg 32.5mg 32.5mg 32.5mg 32.5mg 32.5 mg 32.5 mg 32.5mg 32.5mg 26.25 mg   INR 2.3 2.3 2.1 1.9 2.2 2.1 2.1 2.2 2.2 2.3 2.7 1.6 2.2  1.8   Notes                       ??   5 day hold     Date 10/14 11/4 11/11 11/25 12/9 12/16 12/30 1/13 2/3 3/3 3/31 4/28 5/26 6/23   Total Weekly Dose 32.5 mg 32.5mg 35mg 32.5mg 33.75 mg 36.25 mg 33.75 mg 35mg 33.75 33.75mg 33.75mg 33.75 mg 33.75 mg 33.75mg   INR 2.0 1.6 2.1 1.8 1.8 2.5 2.0 3.0 2.1 2.4 2.3 3.0 2.1 2.2   Notes   1x boost GLV boostx1 boostx1             Date 7/21 9/1 10/13 11/10 12/1 12/15 12/29 1/12/22 1/26        Total Weekly Dose 33.75mg 33.75mg 33.75mg 33.75mg 33.75 mg 32.5mg 30mg 27.5mg 28.75 mg 30 mg       INR 2.9 2.5 3.1 3.1 3.2 3.4 3.4 1.9 1.7        Notes        redx1 ?        Takes warfarin in the am    Clinic Interview:  Verbal Release Authorization signed on 8/22/2018 -- may speak with Raeann Montemayor (Wife). [phone] (Home) *Preferred*  Tablet Strength: pt has 7.5mg and 2.5mg tablets    Patient Findings  Negatives:  Signs/symptoms of thrombosis, Signs/symptoms of bleeding, Laboratory test error suspected, Change in health, Change in alcohol use, Change in activity, Upcoming invasive procedure, Emergency department visit, Upcoming dental procedure, Missed doses, Extra doses, Change in medications, Change in diet/appetite, Hospital admission, Bruising, Other complaints   Comments:  Received  Procrit 20,000 on 1/19.   Unable to determine   Denies any changes     Plan:     1. INR is subtherapeutic today at 1.7 despite increased regimen (goal 2-3).  Instructed Mr. Montemayor to increase maintenance dose to warfarin 3.75mg oral daily except 5mg MonWedFri until recheck in 2 weeks (30 mg/week, 4.3% increase)  2. RTC in 2 weeks on 1/26  3. Verbal and written information provided in the clinic. Mr. Montemayor expresses understanding with teach back and has no further questions at this time.     Charlotte Michael, PharmD  01/26/22   10:11 EST

## 2022-02-02 ENCOUNTER — HOSPITAL ENCOUNTER (OUTPATIENT)
Dept: ONCOLOGY | Facility: HOSPITAL | Age: 77
Discharge: HOME | End: 2022-02-02
Admitting: INTERNAL MEDICINE

## 2022-02-02 VITALS
WEIGHT: 282 LBS | RESPIRATION RATE: 18 BRPM | HEIGHT: 70 IN | TEMPERATURE: 97 F | SYSTOLIC BLOOD PRESSURE: 137 MMHG | DIASTOLIC BLOOD PRESSURE: 66 MMHG | BODY MASS INDEX: 40.37 KG/M2 | HEART RATE: 84 BPM

## 2022-02-02 DIAGNOSIS — D50.9 IRON DEFICIENCY ANEMIA, UNSPECIFIED IRON DEFICIENCY ANEMIA TYPE: ICD-10-CM

## 2022-02-02 DIAGNOSIS — N18.4 ANEMIA DUE TO STAGE 4 CHRONIC KIDNEY DISEASE TREATED WITH ERYTHROPOIETIN: ICD-10-CM

## 2022-02-02 DIAGNOSIS — D63.1 ANEMIA DUE TO STAGE 4 CHRONIC KIDNEY DISEASE TREATED WITH ERYTHROPOIETIN: ICD-10-CM

## 2022-02-02 DIAGNOSIS — N18.4 STAGE 4 CHRONIC KIDNEY DISEASE: Primary | ICD-10-CM

## 2022-02-02 LAB
CREAT SERPL-MCNC: 3.82 MG/DL (ref 0.76–1.27)
GFR SERPL CREATININE-BSD FRML MDRD: 15 ML/MIN/1.73
HCT VFR BLD AUTO: 30.3 % (ref 37.5–51)
HGB BLD-MCNC: 9.4 G/DL (ref 13–17.7)
POTASSIUM SERPL-SCNC: 4.8 MMOL/L (ref 3.5–5.2)

## 2022-02-02 PROCEDURE — 84132 ASSAY OF SERUM POTASSIUM: CPT | Performed by: INTERNAL MEDICINE

## 2022-02-02 PROCEDURE — 85018 HEMOGLOBIN: CPT | Performed by: INTERNAL MEDICINE

## 2022-02-02 PROCEDURE — 96372 THER/PROPH/DIAG INJ SC/IM: CPT

## 2022-02-02 PROCEDURE — 25010000002 EPOETIN ALFA-EPBX 10000 UNIT/ML SOLUTION: Performed by: INTERNAL MEDICINE

## 2022-02-02 PROCEDURE — 36415 COLL VENOUS BLD VENIPUNCTURE: CPT

## 2022-02-02 PROCEDURE — 85014 HEMATOCRIT: CPT | Performed by: INTERNAL MEDICINE

## 2022-02-02 PROCEDURE — 82565 ASSAY OF CREATININE: CPT | Performed by: INTERNAL MEDICINE

## 2022-02-02 RX ADMIN — EPOETIN ALFA-EPBX 20000 UNITS: 10000 INJECTION, SOLUTION INTRAVENOUS; SUBCUTANEOUS at 10:10

## 2022-02-09 ENCOUNTER — ANTICOAGULATION VISIT (OUTPATIENT)
Dept: PHARMACY | Facility: HOSPITAL | Age: 77
End: 2022-02-09

## 2022-02-09 LAB
INR PPP: 1.8 (ref 0.91–1.09)
PROTHROMBIN TIME: 21.2 SECONDS (ref 10–13.8)

## 2022-02-09 PROCEDURE — 85610 PROTHROMBIN TIME: CPT

## 2022-02-09 PROCEDURE — 36416 COLLJ CAPILLARY BLOOD SPEC: CPT

## 2022-02-09 PROCEDURE — G0463 HOSPITAL OUTPT CLINIC VISIT: HCPCS

## 2022-02-09 NOTE — PROGRESS NOTES
Anticoagulation Clinic Progress Note  Indication: afib  Referring Provider: Tyson (next appointment: 10/15/2021)   Initial Warfarin Start Date: 2007  Goal INR: 2-3  Current Drug Interactions:  torsemide  Diet: eats salads 2x weekly 11/4/20  [id number]: 5 (HTN, Age, CHF, CAD)  Other: anemic, CKD (stage 4) May need bridge with Lovenox given persistent AF (per cardio 1/24/2019), Scr was 3.22 on 11/24/21     Anticoagulation Clinic INR History:   Date 10/30 11/20 12/17 1/14/20 2/11 3/11 4/8 5/6 6/11 7/9 8/6 9/3 9/14 9/30   Total Weekly Dose 32.5 mg 32.5 mg 32.5 mg 32.5 mg 32.5mg 32.5mg 32.5mg 32.5mg 32.5mg 32.5 mg 32.5 mg 32.5mg 32.5mg 26.25 mg   INR 2.3 2.3 2.1 1.9 2.2 2.1 2.1 2.2 2.2 2.3 2.7 1.6 2.2  1.8   Notes                       ??   5 day hold     Date 10/14 11/4 11/11 11/25 12/9 12/16 12/30 1/13 2/3 3/3 3/31 4/28 5/26 6/23   Total Weekly Dose 32.5 mg 32.5mg 35mg 32.5mg 33.75 mg 36.25 mg 33.75 mg 35mg 33.75 33.75mg 33.75mg 33.75 mg 33.75 mg 33.75mg   INR 2.0 1.6 2.1 1.8 1.8 2.5 2.0 3.0 2.1 2.4 2.3 3.0 2.1 2.2   Notes   1x boost GLV boostx1 boostx1             Date 7/21 9/1 10/13 11/10 12/1 12/15 12/29 1/12/22 1/26 2/9       Total Weekly Dose 33.75mg 33.75mg 33.75mg 33.75mg 33.75 mg 32.5mg 30mg 27.5mg 28.75 mg 30 mg 31.25 mg      INR 2.9 2.5 3.1 3.1 3.2 3.4 3.4 1.9 1.7 1.8       Notes        redx1 ?  inc      Takes warfarin in the am    Clinic Interview:  Verbal Release Authorization signed on 8/22/2018 -- may speak with Raeann Montemayor (Wife). [phone] (Home) *Preferred*  Tablet Strength: pt has 7.5mg and 2.5mg tablets    Patient Findings    Negatives:  Signs/symptoms of thrombosis, Signs/symptoms of bleeding, Laboratory test error suspected, Change in health, Change in alcohol use, Change in activity, Upcoming invasive procedure, Emergency department visit, Upcoming dental procedure, Missed doses, Extra doses, Change in medications, Change in diet/appetite, Hospital admission, Bruising, Other complaints    Comments:  Discussed his changing warfarin needs recently.  He denies any changes and cannot think of any reason for the change.   He stated that he is very consistent with his intake and medications.   As unable to pinpoint cause of changing needs recently, discussed that will increase his regimen conservatively as close to goal range of 2 to 3.  He was agreeable.  He will RTC next week to evaluate dosing needs as he has infusion appointment that day for Procrit 20,000 unit injection.    Above findings negative     Plan:     1. INR is subtherapeutic today at 1.8 despite increased regimen (goal 2-3).   As he is due for higher dose today, instructed Mr. Montemayor to increase maintenance dose to warfarin 5 mg oral daily except 3.75 mg SunTueThur until recheck (31.25 mg/week, 4.2% increase)  2. RTC in one week on 2/16.  If wnl, he would like to push his next appt out further.  3. Verbal and written information provided in the clinic. Mr. Montemayor expresses understanding with teach back and has no further questions at this time.     Charlotte Michael, PharmD  02/09/22   10:23 EST

## 2022-02-16 ENCOUNTER — HOSPITAL ENCOUNTER (OUTPATIENT)
Dept: ONCOLOGY | Facility: HOSPITAL | Age: 77
Discharge: HOME | End: 2022-02-16
Admitting: INTERNAL MEDICINE

## 2022-02-16 ENCOUNTER — ANTICOAGULATION VISIT (OUTPATIENT)
Dept: PHARMACY | Facility: HOSPITAL | Age: 77
End: 2022-02-16

## 2022-02-16 VITALS
BODY MASS INDEX: 39.8 KG/M2 | HEART RATE: 99 BPM | DIASTOLIC BLOOD PRESSURE: 84 MMHG | HEIGHT: 70 IN | RESPIRATION RATE: 16 BRPM | TEMPERATURE: 97.8 F | SYSTOLIC BLOOD PRESSURE: 177 MMHG | WEIGHT: 278 LBS

## 2022-02-16 DIAGNOSIS — N18.4 STAGE 4 CHRONIC KIDNEY DISEASE: Primary | ICD-10-CM

## 2022-02-16 DIAGNOSIS — D63.1 ANEMIA DUE TO STAGE 4 CHRONIC KIDNEY DISEASE TREATED WITH ERYTHROPOIETIN: ICD-10-CM

## 2022-02-16 DIAGNOSIS — N18.4 ANEMIA DUE TO STAGE 4 CHRONIC KIDNEY DISEASE TREATED WITH ERYTHROPOIETIN: ICD-10-CM

## 2022-02-16 DIAGNOSIS — D50.9 IRON DEFICIENCY ANEMIA, UNSPECIFIED IRON DEFICIENCY ANEMIA TYPE: ICD-10-CM

## 2022-02-16 LAB
CREAT SERPL-MCNC: 3.36 MG/DL (ref 0.76–1.27)
GFR SERPL CREATININE-BSD FRML MDRD: 18 ML/MIN/1.73
HCT VFR BLD AUTO: 30.9 % (ref 37.5–51)
HGB BLD-MCNC: 9.7 G/DL (ref 13–17.7)
INR PPP: 2.1 (ref 0.91–1.09)
POTASSIUM SERPL-SCNC: 4.5 MMOL/L (ref 3.5–5.2)
PROTHROMBIN TIME: 24.7 SECONDS (ref 10–13.8)

## 2022-02-16 PROCEDURE — 96372 THER/PROPH/DIAG INJ SC/IM: CPT

## 2022-02-16 PROCEDURE — 82565 ASSAY OF CREATININE: CPT | Performed by: INTERNAL MEDICINE

## 2022-02-16 PROCEDURE — 85610 PROTHROMBIN TIME: CPT

## 2022-02-16 PROCEDURE — 36416 COLLJ CAPILLARY BLOOD SPEC: CPT

## 2022-02-16 PROCEDURE — 25010000002 EPOETIN ALFA-EPBX 10000 UNIT/ML SOLUTION: Performed by: INTERNAL MEDICINE

## 2022-02-16 PROCEDURE — 84132 ASSAY OF SERUM POTASSIUM: CPT | Performed by: INTERNAL MEDICINE

## 2022-02-16 PROCEDURE — 85018 HEMOGLOBIN: CPT | Performed by: INTERNAL MEDICINE

## 2022-02-16 PROCEDURE — 85014 HEMATOCRIT: CPT | Performed by: INTERNAL MEDICINE

## 2022-02-16 PROCEDURE — G0463 HOSPITAL OUTPT CLINIC VISIT: HCPCS

## 2022-02-16 RX ADMIN — EPOETIN ALFA-EPBX 20000 UNITS: 10000 INJECTION, SOLUTION INTRAVENOUS; SUBCUTANEOUS at 12:49

## 2022-02-16 NOTE — PROGRESS NOTES
Anticoagulation Clinic Progress Note  Indication: afib  Referring Provider: Tyson (next appointment: 10/15/2021)   Initial Warfarin Start Date: 2007  Goal INR: 2-3  Current Drug Interactions:  torsemide  Diet: eats salads 2x weekly 11/4/20  [id number]: 5 (HTN, Age, CHF, CAD)  Other: anemic, CKD (stage 4) May need bridge with Lovenox given persistent AF (per cardio 1/24/2019), Scr was 3.22 on 11/24/21     Anticoagulation Clinic INR History:   Date 10/30 11/20 12/17 1/14/20 2/11 3/11 4/8 5/6 6/11 7/9 8/6 9/3 9/14 9/30   Total Weekly Dose 32.5 mg 32.5 mg 32.5 mg 32.5 mg 32.5mg 32.5mg 32.5mg 32.5mg 32.5mg 32.5 mg 32.5 mg 32.5mg 32.5mg 26.25 mg   INR 2.3 2.3 2.1 1.9 2.2 2.1 2.1 2.2 2.2 2.3 2.7 1.6 2.2  1.8   Notes                       ??   5 day hold     Date 10/14 11/4 11/11 11/25 12/9 12/16 12/30 1/13 2/3 3/3 3/31 4/28 5/26 6/23   Total Weekly Dose 32.5 mg 32.5mg 35mg 32.5mg 33.75 mg 36.25 mg 33.75 mg 35mg 33.75 33.75mg 33.75mg 33.75 mg 33.75 mg 33.75mg   INR 2.0 1.6 2.1 1.8 1.8 2.5 2.0 3.0 2.1 2.4 2.3 3.0 2.1 2.2   Notes   1x boost GLV boostx1 boostx1             Date 7/21 9/1 10/13 11/10 12/1 12/15 12/29 1/12/22 1/26 2/9       Total Weekly Dose 33.75mg 33.75mg 33.75mg 33.75mg 33.75 mg 32.5mg 30mg 27.5mg 28.75 mg 30 mg 31.25 mg      INR 2.9 2.5 3.1 3.1 3.2 3.4 3.4 1.9 1.7 1.8       Notes        redx1 ?  inc      Takes warfarin in the am    Clinic Interview:  Verbal Release Authorization signed on 8/22/2018 -- may speak with Raeann Montemayor (Wife). [phone] (Home) *Preferred*  Tablet Strength: pt has 7.5mg and 2.5mg tablets    Patient Findings    Negatives:  Signs/symptoms of thrombosis, Signs/symptoms of bleeding, Laboratory test error suspected, Change in health, Change in alcohol use, Change in activity, Upcoming invasive procedure, Emergency department visit, Upcoming dental procedure, Missed doses, Extra doses, Change in medications, Change in diet/appetite, Hospital admission, Bruising, Other complaints        Plan:     1. INR is therapeutic today at 2.1 (goal 2-3).   Instructed Mr. Montemayor to continuie maintenance dose to warfarin 5 mg oral daily except 3.75 mg SunTueThur until recheck  2. RTC 2/23  3. Verbal and written information provided in the clinic. Mr. Montemayor expresses understanding with teach back and has no further questions at this time.     Thank you,    Maciel Bojorquez PharmD, KATIE, BCPS  2/16/2022  11:57 EST

## 2022-02-23 ENCOUNTER — ANTICOAGULATION VISIT (OUTPATIENT)
Dept: PHARMACY | Facility: HOSPITAL | Age: 77
End: 2022-02-23

## 2022-02-23 LAB
INR PPP: 2.6 (ref 0.91–1.09)
PROTHROMBIN TIME: 31.5 SECONDS (ref 10–13.8)

## 2022-02-23 PROCEDURE — 36416 COLLJ CAPILLARY BLOOD SPEC: CPT

## 2022-02-23 PROCEDURE — 85610 PROTHROMBIN TIME: CPT

## 2022-02-23 NOTE — PROGRESS NOTES
Anticoagulation Clinic Progress Note  Indication: afib  Referring Provider: Tyson (next appointment: 10/15/2021)   Initial Warfarin Start Date: 2007  Goal INR: 2-3  Current Drug Interactions:  torsemide  Diet: eats salads 2x weekly 11/4/20  [id number]: 5 (HTN, Age, CHF, CAD)  Other: anemic, CKD (stage 4) May need bridge with Lovenox given persistent AF (per cardio 1/24/2019), Scr was 3.22 on 11/24/21     Anticoagulation Clinic INR History:   Date 10/30 11/20 12/17 1/14/20 2/11 3/11 4/8 5/6 6/11 7/9 8/6 9/3 9/14 9/30   Total Weekly Dose 32.5 mg 32.5 mg 32.5 mg 32.5 mg 32.5mg 32.5mg 32.5mg 32.5mg 32.5mg 32.5 mg 32.5 mg 32.5mg 32.5mg 26.25 mg   INR 2.3 2.3 2.1 1.9 2.2 2.1 2.1 2.2 2.2 2.3 2.7 1.6 2.2  1.8   Notes                       ??   5 day hold     Date 10/14 11/4 11/11 11/25 12/9 12/16 12/30 1/13 2/3 3/3 3/31 4/28 5/26 6/23   Total Weekly Dose 32.5 mg 32.5mg 35mg 32.5mg 33.75 mg 36.25 mg 33.75 mg 35mg 33.75 33.75mg 33.75mg 33.75 mg 33.75 mg 33.75mg   INR 2.0 1.6 2.1 1.8 1.8 2.5 2.0 3.0 2.1 2.4 2.3 3.0 2.1 2.2   Notes   1x boost GLV boostx1 boostx1             Date 7/21 9/1 10/13 11/10 12/1 12/15 12/29 1/12/22 1/26 2/9 2/16 2/23     Total Weekly Dose 33.75mg 33.75mg 33.75mg 33.75mg 33.75 mg 32.5mg 30mg 27.5mg 28.75 mg 30 mg 31.25 mg 31.25     INR 2.9 2.5 3.1 3.1 3.2 3.4 3.4 1.9 1.7 1.8 2.1 2.6     Notes        redx1 ?  inc      Takes warfarin in the am    Clinic Interview:  Verbal Release Authorization signed on 8/22/2018 -- may speak with Raeann Montemayor (Wife). [phone] (Home) *Preferred*  Tablet Strength: pt has 7.5mg and 2.5mg tablets    Patient Findings    Negatives:  Signs/symptoms of thrombosis, Signs/symptoms of bleeding, Laboratory test error suspected, Change in health, Change in alcohol use, Change in activity, Upcoming invasive procedure, Emergency department visit, Upcoming dental procedure, Missed doses, Extra doses, Change in medications, Change in diet/appetite, Hospital admission, Bruising,  Other complaints   Comments:  Mr Montemayor states that after his Retacrit injection on 3/2 he will be getting these every 2 weeks and asks if his INR appt can be on the same day.        Plan:     1. INR is therapeutic today at 2.4 (goal 2-3).   Instructed Mr. Montemayor to continui maintenance dose to warfarin 5 mg oral daily except 3.75 mg SunTueThur until recheck  2. RTC in 1 week 3/2 before his infusion appt.  3. Verbal and written information provided in the clinic. Mr. Montemayor expresses understanding with teach back and has no further questions at this time.     Thank you,    Alem Llanes, PharmD, BCPS   2/23/2022  11:50 EST

## 2022-03-02 ENCOUNTER — ANTICOAGULATION VISIT (OUTPATIENT)
Dept: PHARMACY | Facility: HOSPITAL | Age: 77
End: 2022-03-02

## 2022-03-02 ENCOUNTER — HOSPITAL ENCOUNTER (OUTPATIENT)
Dept: ONCOLOGY | Facility: HOSPITAL | Age: 77
Discharge: HOME | End: 2022-03-02
Admitting: INTERNAL MEDICINE

## 2022-03-02 VITALS
DIASTOLIC BLOOD PRESSURE: 62 MMHG | WEIGHT: 276 LBS | TEMPERATURE: 97.2 F | BODY MASS INDEX: 39.51 KG/M2 | RESPIRATION RATE: 18 BRPM | HEART RATE: 84 BPM | HEIGHT: 70 IN | SYSTOLIC BLOOD PRESSURE: 117 MMHG

## 2022-03-02 DIAGNOSIS — N18.4 ANEMIA DUE TO STAGE 4 CHRONIC KIDNEY DISEASE TREATED WITH ERYTHROPOIETIN: ICD-10-CM

## 2022-03-02 DIAGNOSIS — D63.1 ANEMIA DUE TO STAGE 4 CHRONIC KIDNEY DISEASE TREATED WITH ERYTHROPOIETIN: ICD-10-CM

## 2022-03-02 DIAGNOSIS — N18.4 STAGE 4 CHRONIC KIDNEY DISEASE: Primary | ICD-10-CM

## 2022-03-02 DIAGNOSIS — D50.9 IRON DEFICIENCY ANEMIA, UNSPECIFIED IRON DEFICIENCY ANEMIA TYPE: ICD-10-CM

## 2022-03-02 LAB
CREAT SERPL-MCNC: 3.64 MG/DL (ref 0.76–1.27)
EGFRCR SERPLBLD CKD-EPI 2021: 16.3 ML/MIN/1.73
FERRITIN SERPL-MCNC: 447.6 NG/ML (ref 30–400)
HCT VFR BLD AUTO: 30.9 % (ref 37.5–51)
HGB BLD-MCNC: 9.8 G/DL (ref 13–17.7)
INR PPP: 2.9 (ref 0.91–1.09)
IRON 24H UR-MRATE: 78 MCG/DL (ref 59–158)
IRON SATN MFR SERPL: 29 % (ref 20–50)
POTASSIUM SERPL-SCNC: 4.4 MMOL/L (ref 3.5–5.2)
PROTHROMBIN TIME: 35.1 SECONDS (ref 10–13.8)
TIBC SERPL-MCNC: 268 MCG/DL (ref 298–536)
TRANSFERRIN SERPL-MCNC: 180 MG/DL (ref 200–360)

## 2022-03-02 PROCEDURE — 82565 ASSAY OF CREATININE: CPT | Performed by: INTERNAL MEDICINE

## 2022-03-02 PROCEDURE — 85018 HEMOGLOBIN: CPT | Performed by: INTERNAL MEDICINE

## 2022-03-02 PROCEDURE — 36415 COLL VENOUS BLD VENIPUNCTURE: CPT

## 2022-03-02 PROCEDURE — 85014 HEMATOCRIT: CPT | Performed by: INTERNAL MEDICINE

## 2022-03-02 PROCEDURE — 84466 ASSAY OF TRANSFERRIN: CPT | Performed by: INTERNAL MEDICINE

## 2022-03-02 PROCEDURE — 85610 PROTHROMBIN TIME: CPT

## 2022-03-02 PROCEDURE — 84132 ASSAY OF SERUM POTASSIUM: CPT | Performed by: INTERNAL MEDICINE

## 2022-03-02 PROCEDURE — 25010000002 EPOETIN ALFA-EPBX 10000 UNIT/ML SOLUTION: Performed by: INTERNAL MEDICINE

## 2022-03-02 PROCEDURE — 96372 THER/PROPH/DIAG INJ SC/IM: CPT

## 2022-03-02 PROCEDURE — 82728 ASSAY OF FERRITIN: CPT | Performed by: INTERNAL MEDICINE

## 2022-03-02 PROCEDURE — G0463 HOSPITAL OUTPT CLINIC VISIT: HCPCS

## 2022-03-02 PROCEDURE — 83540 ASSAY OF IRON: CPT | Performed by: INTERNAL MEDICINE

## 2022-03-02 PROCEDURE — 36416 COLLJ CAPILLARY BLOOD SPEC: CPT

## 2022-03-02 RX ADMIN — EPOETIN ALFA-EPBX 20000 UNITS: 10000 INJECTION, SOLUTION INTRAVENOUS; SUBCUTANEOUS at 10:54

## 2022-03-02 NOTE — PROGRESS NOTES
Anticoagulation Clinic Progress Note  Indication: afib  Referring Provider: Tyson (next appointment: 10/15/2021)   Initial Warfarin Start Date: 2007  Goal INR: 2-3  Current Drug Interactions:  torsemide  Diet: eats salads 2x weekly 11/4/20  [id number]: 5 (HTN, Age, CHF, CAD)  Other: anemic, CKD (stage 4) May need bridge with Lovenox given persistent AF (per cardio 1/24/2019), Scr was 3.22 on 11/24/21     Anticoagulation Clinic INR History:   Date 10/30 11/20 12/17 1/14/20 2/11 3/11 4/8 5/6 6/11 7/9 8/6 9/3 9/14 9/30   Total Weekly Dose 32.5 mg 32.5 mg 32.5 mg 32.5 mg 32.5mg 32.5mg 32.5mg 32.5mg 32.5mg 32.5 mg 32.5 mg 32.5mg 32.5mg 26.25 mg   INR 2.3 2.3 2.1 1.9 2.2 2.1 2.1 2.2 2.2 2.3 2.7 1.6 2.2  1.8   Notes                       ??   5 day hold     Date 10/14 11/4 11/11 11/25 12/9 12/16 12/30 1/13 2/3 3/3 3/31 4/28 5/26 6/23   Total Weekly Dose 32.5 mg 32.5mg 35mg 32.5mg 33.75 mg 36.25 mg 33.75 mg 35mg 33.75 33.75mg 33.75mg 33.75 mg 33.75 mg 33.75mg   INR 2.0 1.6 2.1 1.8 1.8 2.5 2.0 3.0 2.1 2.4 2.3 3.0 2.1 2.2   Notes   1x boost GLV boostx1 boostx1             Date 7/21 9/1 10/13 11/10 12/1 12/15 12/29 1/12/22 1/26 2/9 2/16 2/23 3/2    Total Weekly Dose 33.75mg 33.75mg 33.75mg 33.75mg 33.75 mg 32.5mg 30mg 27.5mg 28.75 mg 30 mg 31.25 mg 31.25 mg 31.25 mg    INR 2.9 2.5 3.1 3.1 3.2 3.4 3.4 1.9 1.7 1.8 2.1 2.6 2.9    Notes        redx1 ?  inc      Takes warfarin in the am    Clinic Interview:  Verbal Release Authorization signed on 8/22/2018 -- may speak with Raeann Montemayor (Wife). [phone] (Home) *Preferred*  Tablet Strength: pt has 7.5mg and 2.5mg tablets    Patient Findings  Negatives:  Signs/symptoms of thrombosis, Signs/symptoms of bleeding, Laboratory test error suspected, Change in health, Change in alcohol use, Change in activity, Upcoming invasive procedure, Emergency department visit, Upcoming dental procedure, Missed doses, Extra doses, Change in medications, Change in diet/appetite, Hospital  admission, Bruising, Other complaints   Comments:  He denies any changes   Above findings negative     Plan:     1. INR is therapeutic today at 2.9 (goal 2-3).   Instructed Mr. Montemayor to continue maintenance dose to warfarin 5 mg oral daily except 3.75 mg SunTueThur until recheck.  He plans to add an additional serving of GLV each week.  2. RTC in two weeks on 3/16 prior to infusion appointment  3. Verbal and written information provided in the clinic. Mr. Montemayor expresses understanding with teach back and has no further questions at this time.     Charlotte Michael, PharmD  3/2/2022  09:34 EST

## 2022-03-16 ENCOUNTER — HOSPITAL ENCOUNTER (OUTPATIENT)
Dept: ONCOLOGY | Facility: HOSPITAL | Age: 77
Discharge: HOME | End: 2022-03-16
Admitting: INTERNAL MEDICINE

## 2022-03-16 ENCOUNTER — ANTICOAGULATION VISIT (OUTPATIENT)
Dept: PHARMACY | Facility: HOSPITAL | Age: 77
End: 2022-03-16

## 2022-03-16 VITALS
WEIGHT: 280 LBS | HEART RATE: 86 BPM | TEMPERATURE: 96.6 F | DIASTOLIC BLOOD PRESSURE: 67 MMHG | SYSTOLIC BLOOD PRESSURE: 122 MMHG | HEIGHT: 70 IN | RESPIRATION RATE: 18 BRPM | BODY MASS INDEX: 40.09 KG/M2

## 2022-03-16 DIAGNOSIS — D50.9 IRON DEFICIENCY ANEMIA, UNSPECIFIED IRON DEFICIENCY ANEMIA TYPE: ICD-10-CM

## 2022-03-16 DIAGNOSIS — N18.4 ANEMIA DUE TO STAGE 4 CHRONIC KIDNEY DISEASE TREATED WITH ERYTHROPOIETIN: ICD-10-CM

## 2022-03-16 DIAGNOSIS — D63.1 ANEMIA DUE TO STAGE 4 CHRONIC KIDNEY DISEASE TREATED WITH ERYTHROPOIETIN: ICD-10-CM

## 2022-03-16 DIAGNOSIS — N18.4 STAGE 4 CHRONIC KIDNEY DISEASE: Primary | ICD-10-CM

## 2022-03-16 LAB
CREAT SERPL-MCNC: 3.95 MG/DL (ref 0.76–1.27)
EGFRCR SERPLBLD CKD-EPI 2021: 14.8 ML/MIN/1.73
HCT VFR BLD AUTO: 28.8 % (ref 37.5–51)
HGB BLD-MCNC: 9 G/DL (ref 13–17.7)
INR PPP: 2.9 (ref 0.91–1.09)
POTASSIUM SERPL-SCNC: 4.8 MMOL/L (ref 3.5–5.2)
PROTHROMBIN TIME: 34.8 SECONDS (ref 10–13.8)

## 2022-03-16 PROCEDURE — 36416 COLLJ CAPILLARY BLOOD SPEC: CPT

## 2022-03-16 PROCEDURE — 85018 HEMOGLOBIN: CPT | Performed by: INTERNAL MEDICINE

## 2022-03-16 PROCEDURE — 85014 HEMATOCRIT: CPT | Performed by: INTERNAL MEDICINE

## 2022-03-16 PROCEDURE — 36415 COLL VENOUS BLD VENIPUNCTURE: CPT

## 2022-03-16 PROCEDURE — G0463 HOSPITAL OUTPT CLINIC VISIT: HCPCS

## 2022-03-16 PROCEDURE — 84132 ASSAY OF SERUM POTASSIUM: CPT | Performed by: INTERNAL MEDICINE

## 2022-03-16 PROCEDURE — 85610 PROTHROMBIN TIME: CPT

## 2022-03-16 PROCEDURE — 96372 THER/PROPH/DIAG INJ SC/IM: CPT

## 2022-03-16 PROCEDURE — 82565 ASSAY OF CREATININE: CPT | Performed by: INTERNAL MEDICINE

## 2022-03-16 PROCEDURE — 25010000002 EPOETIN ALFA-EPBX 10000 UNIT/ML SOLUTION: Performed by: INTERNAL MEDICINE

## 2022-03-16 RX ADMIN — EPOETIN ALFA-EPBX 20000 UNITS: 10000 INJECTION, SOLUTION INTRAVENOUS; SUBCUTANEOUS at 10:54

## 2022-03-16 NOTE — PROGRESS NOTES
Anticoagulation Clinic Progress Note  Indication: afib  Referring Provider: Tyson (next appointment: 10/15/2021)   Initial Warfarin Start Date: 2007  Goal INR: 2-3  Current Drug Interactions:  torsemide  Diet: eats salads 2x weekly 11/4/20  [id number]: 5 (HTN, Age, CHF, CAD)  Other: anemic, CKD (stage 4) May need bridge with Lovenox given persistent AF (per cardio 1/24/2019), Scr was 3.22 on 11/24/21     Anticoagulation Clinic INR History:   Date 10/30 11/20 12/17 1/14/20 2/11 3/11 4/8 5/6 6/11 7/9 8/6 9/3 9/14 9/30   Total Weekly Dose 32.5 mg 32.5 mg 32.5 mg 32.5 mg 32.5mg 32.5mg 32.5mg 32.5mg 32.5mg 32.5 mg 32.5 mg 32.5mg 32.5mg 26.25 mg   INR 2.3 2.3 2.1 1.9 2.2 2.1 2.1 2.2 2.2 2.3 2.7 1.6 2.2  1.8   Notes                       ??   5 day hold     Date 10/14 11/4 11/11 11/25 12/9 12/16 12/30 1/13 2/3 3/3 3/31 4/28 5/26 6/23   Total Weekly Dose 32.5 mg 32.5mg 35mg 32.5mg 33.75 mg 36.25 mg 33.75 mg 35mg 33.75 33.75mg 33.75mg 33.75 mg 33.75 mg 33.75mg   INR 2.0 1.6 2.1 1.8 1.8 2.5 2.0 3.0 2.1 2.4 2.3 3.0 2.1 2.2   Notes   1x boost GLV boostx1 boostx1             Date 7/21 9/1 10/13 11/10 12/1 12/15 12/29 1/12/22 1/26 2/9 2/16 2/23 3/2 3/16   Total Weekly Dose 33.75mg 33.75mg 33.75mg 33.75mg 33.75 mg 32.5mg 30mg 27.5mg 28.75 mg 30 mg 31.25 mg 31.25 mg 31.25 mg 31.25 mg   INR 2.9 2.5 3.1 3.1 3.2 3.4 3.4 1.9 1.7 1.8 2.1 2.6 2.9 2.9   Notes        redx1 ?  inc      Takes warfarin in the am    Clinic Interview:  Verbal Release Authorization signed on 8/22/2018 -- may speak with Raeann Montemayor (Wife). [phone] (Home) *Preferred*  Tablet Strength: pt has 7.5mg and 2.5mg tablets    Patient Findings  Negatives:  Signs/symptoms of thrombosis, Signs/symptoms of bleeding, Laboratory test error suspected, Change in health, Change in alcohol use, Change in activity, Upcoming invasive procedure, Emergency department visit, Upcoming dental procedure, Missed doses, Extra doses, Change in medications, Change in diet/appetite,  Hospital admission, Bruising, Other complaints   Comments:  He confirmed that he has added an extra serving of GLV   Otherwise, above findings negative     Plan:     1. INR is therapeutic today at 2.9  (goal 2-3).   Instructed Mr. Montemayor to continue maintenance dose to warfarin 5 mg oral daily except 3.75 mg SunTueThur until recheck.  He will continue with the additional serving of GLV each week.  2. RTC in two weeks on 3/30 prior to infusion appointment  3. Verbal and written information provided in the clinic. Mr. Montemayor expresses understanding with teach back and has no further questions at this time.     Charlotte Michael, PharmD  3/16/2022  10:05 EDT

## 2022-03-30 ENCOUNTER — HOSPITAL ENCOUNTER (OUTPATIENT)
Dept: ONCOLOGY | Facility: HOSPITAL | Age: 77
Discharge: HOME | End: 2022-03-30
Admitting: INTERNAL MEDICINE

## 2022-03-30 ENCOUNTER — ANTICOAGULATION VISIT (OUTPATIENT)
Dept: PHARMACY | Facility: HOSPITAL | Age: 77
End: 2022-03-30

## 2022-03-30 VITALS
BODY MASS INDEX: 40.52 KG/M2 | RESPIRATION RATE: 16 BRPM | DIASTOLIC BLOOD PRESSURE: 71 MMHG | TEMPERATURE: 97.6 F | WEIGHT: 283 LBS | HEART RATE: 82 BPM | SYSTOLIC BLOOD PRESSURE: 139 MMHG | HEIGHT: 70 IN

## 2022-03-30 DIAGNOSIS — N18.4 STAGE 4 CHRONIC KIDNEY DISEASE: Primary | ICD-10-CM

## 2022-03-30 DIAGNOSIS — N18.4 ANEMIA DUE TO STAGE 4 CHRONIC KIDNEY DISEASE TREATED WITH ERYTHROPOIETIN: ICD-10-CM

## 2022-03-30 DIAGNOSIS — D63.1 ANEMIA DUE TO STAGE 4 CHRONIC KIDNEY DISEASE TREATED WITH ERYTHROPOIETIN: ICD-10-CM

## 2022-03-30 DIAGNOSIS — D50.9 IRON DEFICIENCY ANEMIA, UNSPECIFIED IRON DEFICIENCY ANEMIA TYPE: ICD-10-CM

## 2022-03-30 LAB
CREAT SERPL-MCNC: 3.56 MG/DL (ref 0.76–1.27)
EGFRCR SERPLBLD CKD-EPI 2021: 16.8 ML/MIN/1.73
FERRITIN SERPL-MCNC: 397.4 NG/ML (ref 30–400)
HCT VFR BLD AUTO: 27.7 % (ref 37.5–51)
HGB BLD-MCNC: 8.6 G/DL (ref 13–17.7)
INR PPP: 2.5 (ref 0.91–1.09)
IRON 24H UR-MRATE: 75 MCG/DL (ref 59–158)
IRON SATN MFR SERPL: 27 % (ref 20–50)
POTASSIUM SERPL-SCNC: 4.8 MMOL/L (ref 3.5–5.2)
PROTHROMBIN TIME: 29.9 SECONDS (ref 10–13.8)
TIBC SERPL-MCNC: 279 MCG/DL (ref 298–536)
TRANSFERRIN SERPL-MCNC: 187 MG/DL (ref 200–360)

## 2022-03-30 PROCEDURE — 85610 PROTHROMBIN TIME: CPT

## 2022-03-30 PROCEDURE — G0463 HOSPITAL OUTPT CLINIC VISIT: HCPCS

## 2022-03-30 PROCEDURE — 85014 HEMATOCRIT: CPT | Performed by: INTERNAL MEDICINE

## 2022-03-30 PROCEDURE — 25010000002 EPOETIN ALFA-EPBX 10000 UNIT/ML SOLUTION: Performed by: INTERNAL MEDICINE

## 2022-03-30 PROCEDURE — 36415 COLL VENOUS BLD VENIPUNCTURE: CPT

## 2022-03-30 PROCEDURE — 96372 THER/PROPH/DIAG INJ SC/IM: CPT

## 2022-03-30 PROCEDURE — 83540 ASSAY OF IRON: CPT | Performed by: INTERNAL MEDICINE

## 2022-03-30 PROCEDURE — 84466 ASSAY OF TRANSFERRIN: CPT | Performed by: INTERNAL MEDICINE

## 2022-03-30 PROCEDURE — 82565 ASSAY OF CREATININE: CPT | Performed by: INTERNAL MEDICINE

## 2022-03-30 PROCEDURE — 82728 ASSAY OF FERRITIN: CPT | Performed by: INTERNAL MEDICINE

## 2022-03-30 PROCEDURE — 85018 HEMOGLOBIN: CPT | Performed by: INTERNAL MEDICINE

## 2022-03-30 PROCEDURE — 36416 COLLJ CAPILLARY BLOOD SPEC: CPT

## 2022-03-30 PROCEDURE — 84132 ASSAY OF SERUM POTASSIUM: CPT | Performed by: INTERNAL MEDICINE

## 2022-03-30 RX ADMIN — EPOETIN ALFA-EPBX 20000 UNITS: 10000 INJECTION, SOLUTION INTRAVENOUS; SUBCUTANEOUS at 11:18

## 2022-03-30 NOTE — PROGRESS NOTES
Anticoagulation Clinic Progress Note  Indication: afib  Referring Provider: Tyson (next appointment: 10/15/2021)   Initial Warfarin Start Date: 2007  Goal INR: 2-3  Current Drug Interactions:  torsemide  Diet: eats salads 2x weekly 11/4/20  [id number]: 5 (HTN, Age, CHF, CAD)  Other: anemic, CKD (stage 4) May need bridge with Lovenox given persistent AF (per cardio 1/24/2019), Scr was 3.22 on 11/24/21     Anticoagulation Clinic INR History:   Date 10/30 11/20 12/17 1/14/20 2/11 3/11 4/8 5/6 6/11 7/9 8/6 9/3 9/14 9/30   Total Weekly Dose 32.5 mg 32.5 mg 32.5 mg 32.5 mg 32.5mg 32.5mg 32.5mg 32.5mg 32.5mg 32.5 mg 32.5 mg 32.5mg 32.5mg 26.25 mg   INR 2.3 2.3 2.1 1.9 2.2 2.1 2.1 2.2 2.2 2.3 2.7 1.6 2.2  1.8   Notes                       ??   5 day hold     Date 10/14 11/4 11/11 11/25 12/9 12/16 12/30 1/13 2/3 3/3 3/31 4/28 5/26 6/23   Total Weekly Dose 32.5 mg 32.5mg 35mg 32.5mg 33.75 mg 36.25 mg 33.75 mg 35mg 33.75 33.75mg 33.75mg 33.75 mg 33.75 mg 33.75mg   INR 2.0 1.6 2.1 1.8 1.8 2.5 2.0 3.0 2.1 2.4 2.3 3.0 2.1 2.2   Notes   1x boost GLV boostx1 boostx1             Date 7/21 9/1 10/13 11/10 12/1 12/15 12/29 1/12/22 1/26 2/9 2/16 2/23 3/2 3/16   Total Weekly Dose 33.75mg 33.75mg 33.75mg 33.75mg 33.75 mg 32.5mg 30mg 27.5mg 28.75 mg 30 mg 31.25 mg 31.25 mg 31.25 mg 31.25 mg   INR 2.9 2.5 3.1 3.1 3.2 3.4 3.4 1.9 1.7 1.8 2.1 2.6 2.9 2.9   Notes        redx1 ?  inc        Date 3/30              Total WeeklyDose 31.25mg              INR 2.5              Notes                     Takes warfarin in the am    Clinic Interview:  Verbal Release Authorization signed on 8/22/2018 -- may speak with Raeann Montemayor (Wife). [phone] (Home) *Preferred*  Tablet Strength: pt has 7.5mg and 2.5mg tablets    Patient Findings    Negatives:  Signs/symptoms of thrombosis, Signs/symptoms of bleeding, Laboratory test error suspected, Change in health, Change in alcohol use, Change in activity, Upcoming invasive procedure, Emergency  department visit, Upcoming dental procedure, Missed doses, Extra doses, Change in medications, Change in diet/appetite, Hospital admission, Bruising, Other complaints         Plan:     1. INR is therapeutic today at 2.5  (goal 2-3).   Instructed Mr. Montemayor to continue maintenance dose to warfarin 5 mg oral daily except 3.75 mg SunTueThur until recheck.    2. RTC in two weeks on 4/13 prior to infusion appointment  3. Verbal and written information provided in the clinic. Mr. Montemayor expresses understanding with teach back and has no further questions at this time.     Kristine Sutton, NighatD.  03/30/22   10:26 EDT

## 2022-04-07 PROCEDURE — 93294 REM INTERROG EVL PM/LDLS PM: CPT | Performed by: INTERNAL MEDICINE

## 2022-04-07 PROCEDURE — 93296 REM INTERROG EVL PM/IDS: CPT | Performed by: INTERNAL MEDICINE

## 2022-04-08 ENCOUNTER — TELEPHONE (OUTPATIENT)
Dept: CARDIOLOGY | Facility: CLINIC | Age: 77
End: 2022-04-08

## 2022-04-08 DIAGNOSIS — I11.0 HYPERTENSIVE HEART DISEASE WITH CHRONIC COMBINED SYSTOLIC AND DIASTOLIC CONGESTIVE HEART FAILURE: ICD-10-CM

## 2022-04-08 DIAGNOSIS — I50.42 HYPERTENSIVE HEART DISEASE WITH CHRONIC COMBINED SYSTOLIC AND DIASTOLIC CONGESTIVE HEART FAILURE: ICD-10-CM

## 2022-04-08 DIAGNOSIS — R79.89 ELEVATED TSH: ICD-10-CM

## 2022-04-08 DIAGNOSIS — E78.5 DYSLIPIDEMIA: Primary | ICD-10-CM

## 2022-04-08 NOTE — TELEPHONE ENCOUNTER
----- Message from Marco Antonio Montemayor sent at 2022 11:44 AM EDT -----  Regarding: Blood Lab Work  Marco Antonio Montemayor; : 1945  I am a patient of Dr. Mehta. I have an appointment with Dr. Mehta on Friday, April 15 @ 11:15 am.  I need to have blood lab work done prior to the above date.  [facility] also, [facility]. I have an appointment with them on  @ 10:30 am.  If I could get Dr. Mehta's lab work done at the same time as I do Infusion that would be great.  Please add this request to doctor's orders so all can be completed at one time.  I know Dr. Mehta would like to see fresh blood lab report for my upcoming visit.  Phone: [phone]  email: [email or web address] or [email or web address]   ________________________________________    All recent labs in Epic.    Would you like to order labs at this time?     Please advise.

## 2022-04-13 ENCOUNTER — ANTICOAGULATION VISIT (OUTPATIENT)
Dept: PHARMACY | Facility: HOSPITAL | Age: 77
End: 2022-04-13

## 2022-04-13 ENCOUNTER — HOSPITAL ENCOUNTER (OUTPATIENT)
Dept: ONCOLOGY | Facility: HOSPITAL | Age: 77
Discharge: HOME | End: 2022-04-13
Admitting: INTERNAL MEDICINE

## 2022-04-13 VITALS
HEIGHT: 70 IN | HEART RATE: 80 BPM | WEIGHT: 282 LBS | SYSTOLIC BLOOD PRESSURE: 150 MMHG | TEMPERATURE: 97.6 F | BODY MASS INDEX: 40.37 KG/M2 | RESPIRATION RATE: 18 BRPM | DIASTOLIC BLOOD PRESSURE: 80 MMHG

## 2022-04-13 DIAGNOSIS — N18.4 STAGE 4 CHRONIC KIDNEY DISEASE: Primary | ICD-10-CM

## 2022-04-13 DIAGNOSIS — D50.9 IRON DEFICIENCY ANEMIA, UNSPECIFIED IRON DEFICIENCY ANEMIA TYPE: ICD-10-CM

## 2022-04-13 DIAGNOSIS — D63.1 ANEMIA DUE TO STAGE 4 CHRONIC KIDNEY DISEASE TREATED WITH ERYTHROPOIETIN: ICD-10-CM

## 2022-04-13 DIAGNOSIS — N18.4 ANEMIA DUE TO STAGE 4 CHRONIC KIDNEY DISEASE TREATED WITH ERYTHROPOIETIN: ICD-10-CM

## 2022-04-13 LAB
HCT VFR BLD AUTO: 28.1 % (ref 37.5–51)
HGB BLD-MCNC: 8.2 G/DL (ref 13–17.7)
INR PPP: 2.2 (ref 0.91–1.09)
PROTHROMBIN TIME: 26.9 SECONDS (ref 10–13.8)

## 2022-04-13 PROCEDURE — 25010000002 EPOETIN ALFA-EPBX 20000 UNIT/ML SOLUTION: Performed by: INTERNAL MEDICINE

## 2022-04-13 PROCEDURE — 85014 HEMATOCRIT: CPT | Performed by: INTERNAL MEDICINE

## 2022-04-13 PROCEDURE — 84443 ASSAY THYROID STIM HORMONE: CPT | Performed by: INTERNAL MEDICINE

## 2022-04-13 PROCEDURE — 84436 ASSAY OF TOTAL THYROXINE: CPT | Performed by: INTERNAL MEDICINE

## 2022-04-13 PROCEDURE — 96372 THER/PROPH/DIAG INJ SC/IM: CPT

## 2022-04-13 PROCEDURE — 80061 LIPID PANEL: CPT | Performed by: INTERNAL MEDICINE

## 2022-04-13 PROCEDURE — 80053 COMPREHEN METABOLIC PANEL: CPT | Performed by: INTERNAL MEDICINE

## 2022-04-13 PROCEDURE — 36415 COLL VENOUS BLD VENIPUNCTURE: CPT

## 2022-04-13 PROCEDURE — 84479 ASSAY OF THYROID (T3 OR T4): CPT | Performed by: INTERNAL MEDICINE

## 2022-04-13 PROCEDURE — 85610 PROTHROMBIN TIME: CPT

## 2022-04-13 PROCEDURE — 85018 HEMOGLOBIN: CPT | Performed by: INTERNAL MEDICINE

## 2022-04-13 PROCEDURE — 36416 COLLJ CAPILLARY BLOOD SPEC: CPT

## 2022-04-13 RX ADMIN — EPOETIN ALFA-EPBX 20000 UNITS: 20000 INJECTION, SOLUTION INTRAVENOUS; SUBCUTANEOUS at 11:46

## 2022-04-13 NOTE — PROGRESS NOTES
Anticoagulation Clinic Progress Note  Indication: afib  Referring Provider: Tyson (next appointment: 10/15/2021)   Initial Warfarin Start Date: 2007  Goal INR: 2-3  Current Drug Interactions:  torsemide  Diet: eats salads 2x weekly 11/4/20  [id number]: 5 (HTN, Age, CHF, CAD)  Other: anemic, CKD (stage 4) May need bridge with Lovenox given persistent AF (per cardio 1/24/2019), Scr was 3.22 on 11/24/21     Anticoagulation Clinic INR History:   Date 10/30 11/20 12/17 1/14/20 2/11 3/11 4/8 5/6 6/11 7/9 8/6 9/3 9/14 9/30   Total Weekly Dose 32.5 mg 32.5 mg 32.5 mg 32.5 mg 32.5mg 32.5mg 32.5mg 32.5mg 32.5mg 32.5 mg 32.5 mg 32.5mg 32.5mg 26.25 mg   INR 2.3 2.3 2.1 1.9 2.2 2.1 2.1 2.2 2.2 2.3 2.7 1.6 2.2  1.8   Notes                       ??   5 day hold     Date 10/14 11/4 11/11 11/25 12/9 12/16 12/30 1/13 2/3 3/3 3/31 4/28 5/26 6/23   Total Weekly Dose 32.5 mg 32.5mg 35mg 32.5mg 33.75 mg 36.25 mg 33.75 mg 35mg 33.75 33.75mg 33.75mg 33.75 mg 33.75 mg 33.75mg   INR 2.0 1.6 2.1 1.8 1.8 2.5 2.0 3.0 2.1 2.4 2.3 3.0 2.1 2.2   Notes   1x boost GLV boostx1 boostx1             Date 7/21 9/1 10/13 11/10 12/1 12/15 12/29 1/12/22 1/26 2/9 2/16 2/23 3/2 3/16   Total Weekly Dose 33.75mg 33.75mg 33.75mg 33.75mg 33.75 mg 32.5mg 30mg 27.5mg 28.75 mg 30 mg 31.25 mg 31.25 mg 31.25 mg 31.25 mg   INR 2.9 2.5 3.1 3.1 3.2 3.4 3.4 1.9 1.7 1.8 2.1 2.6 2.9 2.9   Notes        redx1 ?  inc        Date 3/30 4/13             Total WeeklyDose 31.25mg 31.25 mg             INR 2.5 2.2             Notes                     Takes warfarin in the am    Clinic Interview:  Verbal Release Authorization signed on 8/22/2018 -- may speak with Raeann Montemayor (Wife). [phone] (Home) *Preferred*  Tablet Strength: pt has 7.5mg and 2.5mg tablets    Patient Findings  Negatives:  Signs/symptoms of thrombosis, Signs/symptoms of bleeding, Laboratory test error suspected, Change in health, Change in alcohol use, Change in activity, Upcoming invasive procedure,  Emergency department visit, Upcoming dental procedure, Missed doses, Extra doses, Change in medications, Change in diet/appetite, Hospital admission, Bruising, Other complaints       Plan:     1. INR is therapeutic today at 2.2  (goal 2-3).   Instructed Mr. Montemayor to continue maintenance dose to warfarin 5 mg oral daily except 3.75 mg SunTueThur until recheck.    2. RTC in two weeks on 5/11 per patient preference.  He has been stable since February so checking q4 weeks seems reasonable.  3. Verbal and written information provided in the clinic. Mr. Montemayor expresses understanding with teach back and has no further questions at this time.     Alem Llanes, PharmD, BCPS   4/13/2022  10:46 EDT

## 2022-04-14 RX ORDER — WARFARIN SODIUM 7.5 MG/1
TABLET ORAL
Qty: 90 TABLET | Refills: 0 | Status: SHIPPED | OUTPATIENT
Start: 2022-04-14

## 2022-04-15 ENCOUNTER — OFFICE VISIT (OUTPATIENT)
Dept: CARDIOLOGY | Facility: CLINIC | Age: 77
End: 2022-04-15

## 2022-04-15 VITALS
WEIGHT: 286 LBS | HEIGHT: 70 IN | BODY MASS INDEX: 40.94 KG/M2 | HEART RATE: 85 BPM | DIASTOLIC BLOOD PRESSURE: 68 MMHG | OXYGEN SATURATION: 93 % | SYSTOLIC BLOOD PRESSURE: 126 MMHG

## 2022-04-15 DIAGNOSIS — I48.20 CHRONIC ATRIAL FIBRILLATION: ICD-10-CM

## 2022-04-15 DIAGNOSIS — G47.33 SEVERE OBSTRUCTIVE SLEEP APNEA: ICD-10-CM

## 2022-04-15 DIAGNOSIS — E78.5 DYSLIPIDEMIA: ICD-10-CM

## 2022-04-15 DIAGNOSIS — I25.9 ISCHEMIC HEART DISEASE: Primary | ICD-10-CM

## 2022-04-15 DIAGNOSIS — E66.01 MORBID OBESITY: ICD-10-CM

## 2022-04-15 DIAGNOSIS — I10 ESSENTIAL HYPERTENSION: ICD-10-CM

## 2022-04-15 PROCEDURE — 99214 OFFICE O/P EST MOD 30 MIN: CPT | Performed by: INTERNAL MEDICINE

## 2022-04-15 PROCEDURE — 93280 PM DEVICE PROGR EVAL DUAL: CPT | Performed by: INTERNAL MEDICINE

## 2022-04-15 NOTE — PROGRESS NOTES
Subjective:     Encounter Date:04/15/2022    Patient ID: Marco Antonio Montemayor is a 77 y.o.  white male, retired /currently laid off from being a  for [employer], from [place], [place].      REMOTE PHYSICIAN: EDMUND Rosado MD  ELECTROPHYSIOLOGIST: Johnathan Taylor MD, [facility], [facility]  INTERVENTIONAL CARDIOLOGIST: Antonio Escamilla MD  NEPHROLOGIST:  [facility]  DERMATOLOGIST:  Yoanna Marks MD/Oksana Young MD([facility])    Chief Complaint:   Chief Complaint   Patient presents with   • Atrial Fibrillation       Problem List:  1. 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 Remote acceptable intravenous adenosine Quantitative SPECT gated Cardiolite study, LVEF 0.50, April 1999.  b. Echocardiogram showing mild LVH with estimated ejection fraction 0.58 with mild aortic valve cusp sclerosis, and mild TR, 09/23/2014.   c. Remote abnormal preoperative Quantitative SPECT gated Cardiolite study with mild “fixed” inferoposterior hypoperfusion and mild LV enlargement with mild global hypokinesia with reduced LVEF 0.40 in the setting of abnormal EKG with subsequent diagnostic coronary angiography demonstrating mild-to-moderate nonobstructive coronary artery atherosclerosis with mild compensated left ventricular dysfunction, LVEF 0.52, and continued medical therapy felt warranted, June 2006.  d. Remote hospitalization for symptomatic atrial fibrillation with rapid ventricular response and mild congestive heart failure requiring BRYANNA and DC cardioversion, June 2009.  e. Left heart catheterization with essentially normal coronary arteries with mild luminal irregularities with an EF of 0.60 by Dr. Escamilla for angina and elevated troponin, 06/05/2015.   f. Residual class I symptoms, April 2022  2. Chronic essential hypertension with recent progressive hypertension and blood pressure readings and normal selective bilateral renal angiography, June  2006.  3. Dyslipidemia.  4. Chronic atrial tachyarrhythmias:  a. Intermittent chronic paroxysmal atrial fibrillation with electrophysiologic study with RFA for atrial flutter, October 1999.  b. Recurrent apparent transient atrial fibrillation, resolved with acceptable [facility] emergency department evaluation, June 2003.  c. Acceptable combination Doppler echocardiogram, July 2003, with apparent acceptable 24-hour Holter monitor, May 2006.  d. Recurrent asymptomatic atrial fibrillation with RVR, January 2007, subsequent Coumadin therapy and Tikosyn therapy with successful internal cardioversion of atrial fibrillation and marked bradyarrhythmias requiring DDDR pacemaker implant, St. Chidi device, data not available, March 2007.  e. Remote hospitalization for symptomatic rapid atrial fibrillation/BRYANNA DC cardioversion with subsequent acceptable pacemaker interrogation, June/July 2009, as well as acceptable interrogation without reprogramming, July 2011.  f. Acceptable combination Doppler echocardiogram, December 2010, with residual class I symptoms.  g. Acceptable device interrogation following [facility] ED evaluation for chest pressure and shortness of breath, data deficit, May 2011, February 2013, February 2016, December 2016.  h. Abnormal pacemaker assessment with 8.4% mode switch with battery voltage of 1-1.25 years, June 2017, with abnormal PACEART assessment, July 2018, with 22% mode switch, with subsequent interrogation demonstrating chronic atrial fibrillation, May 2019, remote device interrogation March 2021 demonstrated 89% atrial fib burden, 8.42 battery longevity.  i. Acceptable pacemaker interrogation without reprogramming, April 2022  5. Morbid obesity: BMI 41.50  6. Ichthyosis.  7. Chronic lower tract obstructive symptoms, probable BPH.  8. Dyslipidemia.  9. Nasal polyps with deviated nasal septum with apparent subsequent nasal septoplasty/polypectomy, data deficit, July 2006.  10. Abdominal pain with apparent small  bowel obstruction with exploratory laparotomy - data deficit.  11. Appendectomy with subsequent delayed hospitalization for bleeding peptic ulcer disease/probable duodenal ulcer, November 2003.  12. Recurrent asymptomatic atrial fibrillation with rapid ventricular response, July 2007, with subsequent Tikosyn therapy and DDDR pacemaker implant with intermittent recurrent breakthrough arrhythmias, including remote DC cardioversion, May 2008, with subsequent recurrent DC cardioversion, autumn 2018, with recurrent atrial fibrillation and discontinuation of formal antiarrhythmic therapy - data deficit  13. Obstructive sleep apnea, compliant with CPAP  14. Stage 4 chronic kidney disease with recent apparent documented proteinuria and renal biopsy (February 2019)  15. Elevated TSH, November 2018  16.  Chronic anemia with epoetin injections Fall 2020, Spring 2021    No Known Allergies    Current Outpatient Medications:   •  acetaminophen (TYLENOL) 500 MG tablet, Take 500 mg by mouth Every 6 (Six) Hours As Needed for Mild Pain ., Disp: , Rfl:   •  atorvastatin (LIPITOR) 40 MG tablet, Take 1 tablet by mouth Daily., Disp: 90 tablet, Rfl: 3  •  calcitriol (ROCALTROL) 0.25 MCG capsule, Take 1 capsule by mouth 3 (Three) Times a Week. Monday, wed, friday, Disp: , Rfl:   •  carvedilol (COREG) 25 MG tablet, Take 1 tablet by mouth 2 (Two) Times a Day With Meals., Disp: 180 tablet, Rfl: 3  •  cholecalciferol (VITAMIN D3) 1000 UNITS tablet, Take 1,000 Units by mouth daily., Disp: , Rfl:   •  Ferrous Sulfate (IRON) 325 (65 FE) MG tablet, Take 65 mg by mouth Daily., Disp: , Rfl:   •  ketoconazole (NIZORAL) 2 % shampoo, Apply 1 application topically to the appropriate area as directed As Needed., Disp: , Rfl:   •  lisinopril (PRINIVIL,ZESTRIL) 5 MG tablet, Take 1 tablet by mouth Daily., Disp: 90 tablet, Rfl: 3  •  PHARMACY TO DOSE WARFARIN, Continuous As Needed (patient's warfarin is being managed by the [facility]  "[facility]ticoagulation [facility] ([phone]))., Disp: , Rfl:   •  torsemide (DEMADEX) 20 MG tablet, Take 1 tablet by mouth 2 (Two) Times a Day., Disp: 180 tablet, Rfl: 3  •  vitamin B-12 (CYANOCOBALAMIN) 1000 MCG tablet, Take 1,000 mcg by mouth Daily., Disp: , Rfl:   •  warfarin (COUMADIN) 2.5 MG tablet, TAKE 1.5-2 TABLET(S) BY MOUTH DAILY AS DIRECTED BY THE ANTICOAGULATION CLINIC, Disp: 180 tablet, Rfl: 0  •  warfarin (COUMADIN) 7.5 MG tablet, TAKE 1/2 TO 1 TABLET BY MOUTH DAILY AS DIRECTED, Disp: 90 tablet, Rfl: 0    History of Present Illness: Patient returns for scheduled 6-month follow up. Patient has been doing well overall from a cardiovascular standpoint. Patient reports that he goes to [facility] about every 6 weeks. He reports occasional shortness of breath. He has had no interim ER visits, hospitalizations, serious illnesses, or surgeries. Patient denies chest pain, severe refractory shortness of breath, palpitations, edema, dizziness, and syncope. He reports that he hasn't gotten the COVID vaccinations, and has declined them. He reports that he will attempt to get outside when the weather is nice.          ROS   Obtained and negative except as outlined in problem list and HPI.    Procedures       Objective:       Vitals:    04/15/22 1114 04/15/22 1117   BP: 131/72 126/68   BP Location: Right arm Right arm   Patient Position: Sitting Standing   Pulse: 75 85   SpO2: 93%    Weight: 130 kg (286 lb)    Height: 177.8 cm (70\")      Body mass index is 41.04 kg/m².  Last weight: 289 lbs    Vitals reviewed.   Constitutional:       Appearance: Well-developed.   Neck:      Thyroid: No thyromegaly.      Vascular: No carotid bruit or JVD.      Lymphadenopathy: No cervical adenopathy.   Pulmonary:      Effort: Pulmonary effort is normal.      Breath sounds: Decreased breath sounds present. No wheezing. No rhonchi. No rales.      Comments: Left precordial pacemaker site nominal.  Cardiovascular:      Regular rhythm. "      Murmurs: There is a grade 2/6 mid frequency midsystolic murmur at the URSB.      No gallop. No S3 gallop.   Pulses:     Dorsalis pedis: 1+ bilaterally.     Posterior tibial: 1+ bilaterally.  Edema:     Peripheral edema present.     Pretibial: bilateral 1+ edema of the pretibial area.     Ankle: bilateral 1+ edema of the ankle.  Abdominal:      Palpations: Abdomen is soft. There is no abdominal mass.      Tenderness: There is no abdominal tenderness.   Musculoskeletal: Normal range of motion. Skin:     General: Skin is warm and dry.      Findings: No rash.   Neurological:      Mental Status: Alert and oriented to person, place, and time.           Lab Review:     Lab Results   Component Value Date    GLUCOSE 108 (H) 04/13/2022    BUN 67 (H) 04/13/2022    CREATININE 3.69 (H) 04/13/2022    EGFRIFNONA 18 (L) 02/16/2022    BCR 18 04/13/2022     (H) 04/13/2022    K 4.7 04/13/2022     (H) 04/13/2022    MG 2.1 10/13/2021    CO2 16 (L) 04/13/2022    CALCIUM 8.3 (L) 04/13/2022    PROTENTOTREF 5.9 (L) 04/13/2022    ALBUMIN 3.7 04/13/2022    LABIL2 1.7 04/13/2022    AST 10 04/13/2022    ALT 10 04/13/2022       Lab Results   Component Value Date    WBC 7.20 11/10/2021    HGB 8.2 (L) 04/13/2022    HCT 28.1 (L) 04/13/2022    MCV 93.3 11/10/2021     11/10/2021       Lab Results   Component Value Date    HGBA1C 5.40 10/13/2021       Lab Results   Component Value Date    TSH 4.260 04/13/2022     MURJ, 04/07/2022  · Normal Device Function  · Events or Alerts: 1, Tachycardia: High Ventricular Rate  · Battery: Battery is at 95.5%, 8.17 yrs  · Sensing, impedance and thresholds reviewed  · Programmed parameters reviewed  · Presenting rhythm reviewed  · Heart Rate Histograms reviewed    Device Interrogation, 04/15/2022  · St Chidi PPM 2240  · RA paced 4%  · RV paced 67%  · Nominal threshold and impedance  · Battery Voltage 2.98 V, 7.4-8.9 years  · Mode Switch 100% since 20 June 2021  · HVR events: 2, with V rates  controlled  · Home monitor in place and actively transmitting  · Nominal interrogation with no reprogramming or updates  · Implant Date: 18 May 2018            Assessment:       Overall continued acceptable course with no new interim cardiopulmonary complaints with acceptable functional status. We will defer additional diagnostic or therapeutic intervention from a cardiac perspective at this time.     Diagnosis Plan   1. Ischemic heart disease  No recurrent angina pectoris or CHF on current activity schedule; continue current treatment   2. Essential hypertension  Well controlled. Continue current treatment.   3. Dyslipidemia  No recent lipid panel to review. Continue atorvastatin.   4. Chronic atrial fibrillation (HCC)  Stable and asymptomatic. Continue current treatment.   5. Morbid obesity (HCC)  BMI 41.04. Continue heart healthy diet and exercise.   6. Severe obstructive sleep apnea  Compliance stressed.          Plan:         1. Patient is encouraged to implement a regular aerobic exercise routine, at least 30 minutes daily, 4-5 days per week.  2. Patient to continue current medications and close follow up with the above providers.  3. Tentative cardiology follow up in October 2022 or patient may return sooner PRN.    Scribed for Dustin Mehta MD by lEy Rodriguez. 4/15/2022  11:32 EDT    I, Dustin Mehta MD, [facility], personally performed the services described in this documentation as scribed by the above named individual in my presence, and it is both accurate and complete. At 13:45 EDT on 04/15/2022

## 2022-04-27 ENCOUNTER — HOSPITAL ENCOUNTER (OUTPATIENT)
Dept: ONCOLOGY | Facility: HOSPITAL | Age: 77
Discharge: HOME | End: 2022-04-27
Admitting: INTERNAL MEDICINE

## 2022-04-27 VITALS
DIASTOLIC BLOOD PRESSURE: 58 MMHG | HEIGHT: 70 IN | HEART RATE: 84 BPM | WEIGHT: 285 LBS | RESPIRATION RATE: 18 BRPM | SYSTOLIC BLOOD PRESSURE: 126 MMHG | BODY MASS INDEX: 40.8 KG/M2 | TEMPERATURE: 97.2 F

## 2022-04-27 DIAGNOSIS — D50.9 IRON DEFICIENCY ANEMIA, UNSPECIFIED IRON DEFICIENCY ANEMIA TYPE: ICD-10-CM

## 2022-04-27 DIAGNOSIS — D63.1 ANEMIA DUE TO STAGE 4 CHRONIC KIDNEY DISEASE TREATED WITH ERYTHROPOIETIN: ICD-10-CM

## 2022-04-27 DIAGNOSIS — N18.4 STAGE 4 CHRONIC KIDNEY DISEASE: Primary | ICD-10-CM

## 2022-04-27 DIAGNOSIS — N18.4 ANEMIA DUE TO STAGE 4 CHRONIC KIDNEY DISEASE TREATED WITH ERYTHROPOIETIN: ICD-10-CM

## 2022-04-27 LAB
CREAT SERPL-MCNC: 3.58 MG/DL (ref 0.76–1.27)
EGFRCR SERPLBLD CKD-EPI 2021: 16.6 ML/MIN/1.73
HCT VFR BLD AUTO: 26.7 % (ref 37.5–51)
HGB BLD-MCNC: 7.9 G/DL (ref 13–17.7)
POTASSIUM SERPL-SCNC: 4.5 MMOL/L (ref 3.5–5.2)

## 2022-04-27 PROCEDURE — 85014 HEMATOCRIT: CPT | Performed by: INTERNAL MEDICINE

## 2022-04-27 PROCEDURE — 25010000002 EPOETIN ALFA-EPBX 20000 UNIT/ML SOLUTION: Performed by: INTERNAL MEDICINE

## 2022-04-27 PROCEDURE — 36415 COLL VENOUS BLD VENIPUNCTURE: CPT

## 2022-04-27 PROCEDURE — 96372 THER/PROPH/DIAG INJ SC/IM: CPT

## 2022-04-27 PROCEDURE — 82565 ASSAY OF CREATININE: CPT | Performed by: INTERNAL MEDICINE

## 2022-04-27 PROCEDURE — 84132 ASSAY OF SERUM POTASSIUM: CPT | Performed by: INTERNAL MEDICINE

## 2022-04-27 PROCEDURE — 85018 HEMOGLOBIN: CPT | Performed by: INTERNAL MEDICINE

## 2022-04-27 RX ADMIN — EPOETIN ALFA-EPBX 20000 UNITS: 20000 INJECTION, SOLUTION INTRAVENOUS; SUBCUTANEOUS at 09:40

## 2022-05-03 ENCOUNTER — OFFICE VISIT (OUTPATIENT)
Dept: SLEEP MEDICINE | Facility: HOSPITAL | Age: 77
End: 2022-05-03

## 2022-05-03 VITALS
SYSTOLIC BLOOD PRESSURE: 139 MMHG | OXYGEN SATURATION: 94 % | DIASTOLIC BLOOD PRESSURE: 63 MMHG | WEIGHT: 289.2 LBS | HEART RATE: 86 BPM | BODY MASS INDEX: 41.4 KG/M2 | HEIGHT: 70 IN

## 2022-05-03 DIAGNOSIS — G47.33 OBSTRUCTIVE SLEEP APNEA, ADULT: Primary | ICD-10-CM

## 2022-05-03 PROCEDURE — 99213 OFFICE O/P EST LOW 20 MIN: CPT | Performed by: NURSE PRACTITIONER

## 2022-05-03 NOTE — PROGRESS NOTES
Chief Complaint:   Chief Complaint   Patient presents with   • Sleeping Problem       HPI:    Marco Antonio Montemayor is a 77 y.o. male here for follow-up of sleep apnea.  Patient was last seen 6/11/2019.  Patient continues to do well with BiPAP with O2 at 5 L at bedtime.  He is sleeping 6 to 8 hours nightly and does feel rested upon awakening.  He goes to sleep within 15 minutes and does get up 2-3 times in the night.  Patient has an [place] score of 15/24.  Patient does feel his BiPAP is very beneficial.  He has no concerns or complaints and wishes to continue therapy.        Current medications are:   Current Outpatient Medications:   •  acetaminophen (TYLENOL) 500 MG tablet, Take 500 mg by mouth Every 6 (Six) Hours As Needed for Mild Pain ., Disp: , Rfl:   •  atorvastatin (LIPITOR) 40 MG tablet, Take 1 tablet by mouth Daily., Disp: 90 tablet, Rfl: 3  •  calcitriol (ROCALTROL) 0.25 MCG capsule, Take 1 capsule by mouth 3 (Three) Times a Week. Monday, wed, friday, Disp: , Rfl:   •  carvedilol (COREG) 25 MG tablet, Take 1 tablet by mouth 2 (Two) Times a Day With Meals., Disp: 180 tablet, Rfl: 3  •  cholecalciferol (VITAMIN D3) 1000 UNITS tablet, Take 1,000 Units by mouth daily., Disp: , Rfl:   •  Ferrous Sulfate (IRON) 325 (65 FE) MG tablet, Take 65 mg by mouth Daily., Disp: , Rfl:   •  ketoconazole (NIZORAL) 2 % shampoo, Apply 1 application topically to the appropriate area as directed As Needed., Disp: , Rfl:   •  lisinopril (PRINIVIL,ZESTRIL) 5 MG tablet, Take 1 tablet by mouth Daily., Disp: 90 tablet, Rfl: 3  •  PHARMACY TO DOSE WARFARIN, Continuous As Needed (patient's warfarin is being managed by the [facility] ([phone]))., Disp: , Rfl:   •  torsemide (DEMADEX) 20 MG tablet, Take 1 tablet by mouth 2 (Two) Times a Day., Disp: 180 tablet, Rfl: 3  •  vitamin B-12 (CYANOCOBALAMIN) 1000 MCG tablet, Take 1,000 mcg by mouth Daily., Disp: , Rfl:   •  warfarin (COUMADIN) 2.5 MG  tablet, TAKE 1.5-2 TABLET(S) BY MOUTH DAILY AS DIRECTED BY THE ANTICOAGULATION CLINIC, Disp: 180 tablet, Rfl: 0  •  warfarin (COUMADIN) 7.5 MG tablet, TAKE 1/2 TO 1 TABLET BY MOUTH DAILY AS DIRECTED, Disp: 90 tablet, Rfl: 0.      The patient's relevant past medical, surgical, family and social history were reviewed and updated in Epic as appropriate.       Review of Systems   HENT: Positive for tinnitus.    Eyes: Positive for visual disturbance.   Respiratory: Positive for apnea and shortness of breath.    Cardiovascular: Positive for palpitations and leg swelling.   Psychiatric/Behavioral: Positive for sleep disturbance.   All other systems reviewed and are negative.        Objective:    Physical Exam  Constitutional:       Appearance: Normal appearance.   HENT:      Head: Normocephalic and atraumatic.      Mouth/Throat:      Comments: Class 4 airway  Cardiovascular:      Rate and Rhythm: Normal rate and regular rhythm.      Comments: pacemaker  Pulmonary:      Effort: Pulmonary effort is normal.      Breath sounds: Normal breath sounds.   Skin:     General: Skin is warm and dry.   Neurological:      Mental Status: He is alert and oriented to person, place, and time.   Psychiatric:         Mood and Affect: Mood normal.         Behavior: Behavior normal.         Thought Content: Thought content normal.         Judgment: Judgment normal.       90/90 days of use  Greater than 4-hour use 100%  90% IPAP 9.5  90% EPAP 8.0  AHI 1.2  Maximum IPAP 25  Minimum EPAP 8    ASSESSMENT/PLAN    Diagnoses and all orders for this visit:    1. Obstructive sleep apnea, adult (Primary)  -     PAP Therapy            1. Counseled patient regarding multimodal approach with healthy nutrition, healthy sleep, regular physical activity, social activities, counseling, and medications. Encouraged to practice lateral sleep position. Avoid alcohol and sedatives close to bedtime.  2. Refill supplies x1 year.  Return to clinic 1 year or sooner  symptoms warrant patient will continue O2 at bedtime 5 L through the BiPAP.    I have reviewed the results of my evaluation and impression and discussed my recommendations in detail with the patient.      Signed by  JENNIFER Solitario    May 4, 2022      CC: Provider, No Known          No ref. provider found

## 2022-05-11 ENCOUNTER — HOSPITAL ENCOUNTER (OUTPATIENT)
Dept: ONCOLOGY | Facility: HOSPITAL | Age: 77
Discharge: HOME | End: 2022-05-11
Admitting: INTERNAL MEDICINE

## 2022-05-11 ENCOUNTER — ANTICOAGULATION VISIT (OUTPATIENT)
Dept: PHARMACY | Facility: HOSPITAL | Age: 77
End: 2022-05-11

## 2022-05-11 VITALS
TEMPERATURE: 96.7 F | WEIGHT: 285 LBS | HEIGHT: 70 IN | DIASTOLIC BLOOD PRESSURE: 71 MMHG | SYSTOLIC BLOOD PRESSURE: 120 MMHG | HEART RATE: 89 BPM | BODY MASS INDEX: 40.8 KG/M2 | RESPIRATION RATE: 18 BRPM

## 2022-05-11 DIAGNOSIS — N18.4 STAGE 4 CHRONIC KIDNEY DISEASE: Primary | ICD-10-CM

## 2022-05-11 DIAGNOSIS — D63.1 ANEMIA DUE TO STAGE 4 CHRONIC KIDNEY DISEASE TREATED WITH ERYTHROPOIETIN: ICD-10-CM

## 2022-05-11 DIAGNOSIS — D50.9 IRON DEFICIENCY ANEMIA, UNSPECIFIED IRON DEFICIENCY ANEMIA TYPE: ICD-10-CM

## 2022-05-11 DIAGNOSIS — N18.4 ANEMIA DUE TO STAGE 4 CHRONIC KIDNEY DISEASE TREATED WITH ERYTHROPOIETIN: ICD-10-CM

## 2022-05-11 LAB
CREAT SERPL-MCNC: 3.73 MG/DL (ref 0.76–1.27)
EGFRCR SERPLBLD CKD-EPI 2021: 15.8 ML/MIN/1.73
FERRITIN SERPL-MCNC: 350.6 NG/ML (ref 30–400)
HCT VFR BLD AUTO: 27.2 % (ref 37.5–51)
HGB BLD-MCNC: 8.1 G/DL (ref 13–17.7)
INR PPP: 2.6 (ref 0.91–1.09)
IRON 24H UR-MRATE: 56 MCG/DL (ref 59–158)
IRON SATN MFR SERPL: 19 % (ref 20–50)
POTASSIUM SERPL-SCNC: 4.2 MMOL/L (ref 3.5–5.2)
PROTHROMBIN TIME: 31.3 SECONDS (ref 10–13.8)
TIBC SERPL-MCNC: 294 MCG/DL (ref 298–536)
TRANSFERRIN SERPL-MCNC: 197 MG/DL (ref 200–360)

## 2022-05-11 PROCEDURE — 84466 ASSAY OF TRANSFERRIN: CPT | Performed by: INTERNAL MEDICINE

## 2022-05-11 PROCEDURE — 85014 HEMATOCRIT: CPT | Performed by: INTERNAL MEDICINE

## 2022-05-11 PROCEDURE — 96372 THER/PROPH/DIAG INJ SC/IM: CPT

## 2022-05-11 PROCEDURE — 82565 ASSAY OF CREATININE: CPT | Performed by: INTERNAL MEDICINE

## 2022-05-11 PROCEDURE — 85610 PROTHROMBIN TIME: CPT

## 2022-05-11 PROCEDURE — 82728 ASSAY OF FERRITIN: CPT | Performed by: INTERNAL MEDICINE

## 2022-05-11 PROCEDURE — 84132 ASSAY OF SERUM POTASSIUM: CPT | Performed by: INTERNAL MEDICINE

## 2022-05-11 PROCEDURE — G0463 HOSPITAL OUTPT CLINIC VISIT: HCPCS

## 2022-05-11 PROCEDURE — 83540 ASSAY OF IRON: CPT | Performed by: INTERNAL MEDICINE

## 2022-05-11 PROCEDURE — 85018 HEMOGLOBIN: CPT | Performed by: INTERNAL MEDICINE

## 2022-05-11 PROCEDURE — 36416 COLLJ CAPILLARY BLOOD SPEC: CPT

## 2022-05-11 PROCEDURE — 25010000002 EPOETIN ALFA-EPBX 20000 UNIT/ML SOLUTION: Performed by: INTERNAL MEDICINE

## 2022-05-11 RX ADMIN — EPOETIN ALFA-EPBX 20000 UNITS: 20000 INJECTION, SOLUTION INTRAVENOUS; SUBCUTANEOUS at 09:28

## 2022-05-11 NOTE — PROGRESS NOTES
Anticoagulation Clinic Progress Note  Indication: afib  Referring Provider: Tyson (next appointment: 10/15/2021)   Initial Warfarin Start Date: 2007  Goal INR: 2-3  Current Drug Interactions:  torsemide  Diet: eats salads 2x weekly 11/4/20  [id number]: 5 (HTN, Age, CHF, CAD)  Other: anemic, CKD (stage 4) May need bridge with Lovenox given persistent AF (per cardio 1/24/2019), Scr was 3.22 on 11/24/21     Anticoagulation Clinic INR History:   Date 10/30 11/20 12/17 1/14/20 2/11 3/11 4/8 5/6 6/11 7/9 8/6 9/3 9/14 9/30   Total Weekly Dose 32.5 mg 32.5 mg 32.5 mg 32.5 mg 32.5mg 32.5mg 32.5mg 32.5mg 32.5mg 32.5 mg 32.5 mg 32.5mg 32.5mg 26.25 mg   INR 2.3 2.3 2.1 1.9 2.2 2.1 2.1 2.2 2.2 2.3 2.7 1.6 2.2  1.8   Notes                       ??   5 day hold     Date 10/14 11/4 11/11 11/25 12/9 12/16 12/30 1/13 2/3 3/3 3/31 4/28 5/26 6/23   Total Weekly Dose 32.5 mg 32.5mg 35mg 32.5mg 33.75 mg 36.25 mg 33.75 mg 35mg 33.75 33.75mg 33.75mg 33.75 mg 33.75 mg 33.75mg   INR 2.0 1.6 2.1 1.8 1.8 2.5 2.0 3.0 2.1 2.4 2.3 3.0 2.1 2.2   Notes   1x boost GLV boostx1 boostx1             Date 7/21 9/1 10/13 11/10 12/1 12/15 12/29 1/12/22 1/26 2/9 2/16 2/23 3/2 3/16   Total Weekly Dose 33.75mg 33.75mg 33.75mg 33.75mg 33.75 mg 32.5mg 30mg 27.5mg 28.75 mg 30 mg 31.25 mg 31.25 mg 31.25 mg 31.25 mg   INR 2.9 2.5 3.1 3.1 3.2 3.4 3.4 1.9 1.7 1.8 2.1 2.6 2.9 2.9   Notes        redx1 ?  inc        Date 3/30 4/13 5/11            Total WeeklyDose 31.25mg 31.25 mg 31.25 mg            INR 2.5 2.2 2.6            Notes                     Takes warfarin in the am    Clinic Interview:  Verbal Release Authorization signed on 8/22/2018 -- may speak with Raeann Montemayor (Wife). [phone] (Home) *Preferred*  Tablet Strength: pt has 7.5mg and 2.5mg tablets    Patient Findings  Negatives:  Signs/symptoms of thrombosis, Signs/symptoms of bleeding, Laboratory test error suspected, Change in health, Change in alcohol use, Change in activity, Upcoming invasive  "procedure, Emergency department visit, Upcoming dental procedure, Missed doses, Extra doses, Change in medications, Change in diet/appetite, Hospital admission, Bruising, Other complaints   Comments:  No changes per patient, diet stable. Patient says he will probably need dialysis at some point in the near future, \"my kidneys aren't coming back\". No set plans for HD start time currently.       Plan:   1. INR is therapeutic today at 2.6 (goal 2-3).   Instructed Mr. Montemayor to continue maintenance dose of warfarin 5 mg oral daily except 3.75 mg SunTueThur until recheck.    2. RTC in four weeks on 6/8. He has been stable since February.  3. Verbal and written information provided in the clinic. Mr. Montemayor expresses understanding with teach back and has no further questions at this time.     Raj Salter, PharmD, BCPS  5/11/2022  10:18 EDT      "

## 2022-05-21 ENCOUNTER — HOSPITAL ENCOUNTER (EMERGENCY)
Facility: HOSPITAL | Age: 77
Discharge: HOME | End: 2022-05-21
Attending: EMERGENCY MEDICINE | Admitting: EMERGENCY MEDICINE

## 2022-05-21 ENCOUNTER — APPOINTMENT (OUTPATIENT)
Dept: CARDIOLOGY | Facility: HOSPITAL | Age: 77
End: 2022-05-21

## 2022-05-21 VITALS
TEMPERATURE: 98.6 F | WEIGHT: 284.39 LBS | HEIGHT: 70 IN | RESPIRATION RATE: 17 BRPM | SYSTOLIC BLOOD PRESSURE: 186 MMHG | OXYGEN SATURATION: 96 % | DIASTOLIC BLOOD PRESSURE: 100 MMHG | BODY MASS INDEX: 40.71 KG/M2 | HEART RATE: 84 BPM

## 2022-05-21 DIAGNOSIS — N18.9 CHRONIC RENAL FAILURE, UNSPECIFIED CKD STAGE: ICD-10-CM

## 2022-05-21 DIAGNOSIS — L03.116 CELLULITIS OF LEFT LOWER EXTREMITY: Primary | ICD-10-CM

## 2022-05-21 DIAGNOSIS — D64.9 ANEMIA, UNSPECIFIED TYPE: ICD-10-CM

## 2022-05-21 LAB
ANION GAP SERPL CALCULATED.3IONS-SCNC: 15 MMOL/L (ref 5–15)
BASOPHILS # BLD AUTO: 0.02 10*3/MM3 (ref 0–0.2)
BASOPHILS NFR BLD AUTO: 0.2 % (ref 0–1.5)
BUN SERPL-MCNC: 81 MG/DL (ref 8–23)
BUN/CREAT SERPL: 20 (ref 7–25)
CALCIUM SPEC-SCNC: 8.9 MG/DL (ref 8.6–10.5)
CHLORIDE SERPL-SCNC: 113 MMOL/L (ref 98–107)
CO2 SERPL-SCNC: 20 MMOL/L (ref 22–29)
CREAT SERPL-MCNC: 4.04 MG/DL (ref 0.76–1.27)
DEPRECATED RDW RBC AUTO: 57.8 FL (ref 37–54)
EGFRCR SERPLBLD CKD-EPI 2021: 14.3 ML/MIN/1.73
EOSINOPHIL # BLD AUTO: 0.15 10*3/MM3 (ref 0–0.4)
EOSINOPHIL NFR BLD AUTO: 1.8 % (ref 0.3–6.2)
ERYTHROCYTE [DISTWIDTH] IN BLOOD BY AUTOMATED COUNT: 15.6 % (ref 12.3–15.4)
GLUCOSE SERPL-MCNC: 106 MG/DL (ref 65–99)
HCT VFR BLD AUTO: 27.5 % (ref 37.5–51)
HGB BLD-MCNC: 8.2 G/DL (ref 13–17.7)
IMM GRANULOCYTES # BLD AUTO: 0.02 10*3/MM3 (ref 0–0.05)
IMM GRANULOCYTES NFR BLD AUTO: 0.2 % (ref 0–0.5)
INR PPP: 2.67 (ref 0.84–1.13)
LYMPHOCYTES # BLD AUTO: 0.92 10*3/MM3 (ref 0.7–3.1)
LYMPHOCYTES NFR BLD AUTO: 11.2 % (ref 19.6–45.3)
MCH RBC QN AUTO: 30.5 PG (ref 26.6–33)
MCHC RBC AUTO-ENTMCNC: 29.8 G/DL (ref 31.5–35.7)
MCV RBC AUTO: 102.2 FL (ref 79–97)
MONOCYTES # BLD AUTO: 0.8 10*3/MM3 (ref 0.1–0.9)
MONOCYTES NFR BLD AUTO: 9.8 % (ref 5–12)
NEUTROPHILS NFR BLD AUTO: 6.29 10*3/MM3 (ref 1.7–7)
NEUTROPHILS NFR BLD AUTO: 76.8 % (ref 42.7–76)
NRBC BLD AUTO-RTO: 0 /100 WBC (ref 0–0.2)
NT-PROBNP SERPL-MCNC: 7887 PG/ML (ref 0–1800)
PLATELET # BLD AUTO: 168 10*3/MM3 (ref 140–450)
PMV BLD AUTO: 10 FL (ref 6–12)
POTASSIUM SERPL-SCNC: 4.1 MMOL/L (ref 3.5–5.2)
PROTHROMBIN TIME: 28.6 SECONDS (ref 11.4–14.4)
RBC # BLD AUTO: 2.69 10*6/MM3 (ref 4.14–5.8)
SODIUM SERPL-SCNC: 148 MMOL/L (ref 136–145)
WBC NRBC COR # BLD: 8.2 10*3/MM3 (ref 3.4–10.8)

## 2022-05-21 PROCEDURE — 85610 PROTHROMBIN TIME: CPT | Performed by: EMERGENCY MEDICINE

## 2022-05-21 PROCEDURE — 85025 COMPLETE CBC W/AUTO DIFF WBC: CPT | Performed by: EMERGENCY MEDICINE

## 2022-05-21 PROCEDURE — 99283 EMERGENCY DEPT VISIT LOW MDM: CPT

## 2022-05-21 PROCEDURE — 93971 EXTREMITY STUDY: CPT | Performed by: INTERNAL MEDICINE

## 2022-05-21 PROCEDURE — 36415 COLL VENOUS BLD VENIPUNCTURE: CPT

## 2022-05-21 PROCEDURE — 83880 ASSAY OF NATRIURETIC PEPTIDE: CPT | Performed by: EMERGENCY MEDICINE

## 2022-05-21 PROCEDURE — 80048 BASIC METABOLIC PNL TOTAL CA: CPT | Performed by: EMERGENCY MEDICINE

## 2022-05-21 PROCEDURE — 93971 EXTREMITY STUDY: CPT

## 2022-05-21 RX ORDER — CEPHALEXIN 500 MG/1
500 CAPSULE ORAL 3 TIMES DAILY
Qty: 30 CAPSULE | Refills: 0 | Status: SHIPPED | OUTPATIENT
Start: 2022-05-21 | End: 2022-06-23

## 2022-05-21 RX ORDER — CEPHALEXIN 250 MG/1
500 CAPSULE ORAL ONCE
Status: COMPLETED | OUTPATIENT
Start: 2022-05-21 | End: 2022-05-21

## 2022-05-21 RX ADMIN — CEPHALEXIN 500 MG: 250 CAPSULE ORAL at 14:06

## 2022-05-21 NOTE — ED PROVIDER NOTES
EMERGENCY DEPARTMENT ENCOUNTER    Pt Name: Marco Antonio Montemayor  MRN: [id number]  Pt :   1945  Room Number:    Date of encounter:  2022  PCP: Provider, No Known  ED Provider: Kuldeep Hernandez MD    Historian: Patient and wife      HPI:  Chief Complaint: Left greater than right leg lesions        Context: Marco Antonio Montemayor is a 77 y.o. male who presents to the ED c/o of lower extremity swelling and lesions ongoing for the last 2 weeks.  The patient has a history of ichthyosis.  He gets edema in both of his legs and has previously been prescribed compression hose.  These are too difficult to get on and off and therefore have not been used.  The patient noted red patches on his left leg greater than right leg roughly 2 weeks ago and they have just very slowly gradually increased in size.  He called his nephrologist but they recommended he come to the emergency department.  The patient notes left leg swelling greater than right leg with increased pressure in the leg whenever it is held in a dependent position.  The patient is on Coumadin and has been on and this for greater than 10 years.  His last INR was 2.6.  No recent fevers, chills, nausea, vomiting, diarrhea, chest pain, difficulty breathing.  No history of DVT/PE.  [facility] with next appointment being 2022.        PAST MEDICAL HISTORY  Past Medical History:   Diagnosis Date   • Abnormal ECG    • Arrhythmia    • Arthritis    • Atrial fibrillation (HCC)    • Cancer (HCC)     skin cancer removed from various locations    • Chronic diastolic congestive heart failure (HCC) 2018   • Coronary artery disease    • Dry skin    • Edema    • Heart murmur    • Hematoma    • Hypertension    • Long term current use of anticoagulant    • ZULEIKA treated with BiPAP    • Persistent atrial fibrillation (HCC) 2016     Patient notes dyspnea, fatigue and palpitations during episodes of afib.  multiple  external cardioversionsChads vasc 2, anticoagulated with Coumadin   • Sleep apnea     bipap- complaint with machine    • Stage 3 chronic kidney disease (HCC)    • Tinnitus    • Wears glasses     readers         PAST SURGICAL HISTORY  Past Surgical History:   Procedure Laterality Date   • ABLATION OF DYSRHYTHMIC FOCUS     • APPENDECTOMY     • CARDIAC ELECTROPHYSIOLOGY PROCEDURE N/A 5/18/2018    Procedure: PPM generator change - dual       [facility]/ please schedule in 8 weeks;  Surgeon: Johnathan Taylor MD;  Location: [facility] INVASIVE LOCATION;  Service: Cardiovascular   • CATARACT EXTRACTION      bilat wiht lens    • COLONOSCOPY     • ENDOSCOPY     • SKIN CANCER EXCISION      various locations    • TONSILLECTOMY     • WISDOM TOOTH EXTRACTION           FAMILY HISTORY  Family History   Problem Relation Age of Onset   • Cancer Mother    • Heart attack Mother    • Hypertension Mother    • Arrhythmia Father    • Hypertension Father    • Heart attack Father          SOCIAL HISTORY  Social History     Socioeconomic History   • Marital status:    Tobacco Use   • Smoking status: Never Smoker   • Smokeless tobacco: Never Used   Vaping Use   • Vaping Use: Never used   Substance and Sexual Activity   • Alcohol use: No   • Drug use: No   • Sexual activity: Defer         ALLERGIES  Patient has no known allergies.        REVIEW OF SYSTEMS  Review of Systems       All systems reviewed and negative except for those discussed in HPI.       PHYSICAL EXAM    I have reviewed the triage vital signs and nursing notes.    ED Triage Vitals [05/21/22 0851]   Temp Heart Rate Resp BP SpO2   98.6 °F (37 °C) 85 17 176/90 96 %      Temp src Heart Rate Source Patient Position BP Location FiO2 (%)   -- Monitor Sitting Left arm --       Physical Exam  GENERAL:   Appears pleasant, nontoxic.  HENT: Nares patent.  EYES: No scleral icterus.  CV: Regular rhythm, regular rate.  1+ dorsalis pedis pulses bilaterally.  3+ pitting edema in left  calf/pretibial region with 2+ on contralateral side.  RESPIRATORY: Normal effort.  No audible wheezes, rales or rhonchi.  ABDOMEN: Soft, nontender  MUSCULOSKELETAL: No deformities.  Left calf is 1.5 cm greater in circumference when measured at mid calf as compared to right.  NEURO: Alert, moves all extremities, follows commands.  Fine touch and motor intact in bilateral lower extremities distally.  SKIN: Erythematous patches left calf region greater than right calf.  Subcutaneous hematomas are noted which appear relatively superficial.  No petechial abnormalities are noted.  No lymphangitic streaking or inguinal lymphadenopathy.        LAB RESULTS  Recent Results (from the past 24 hour(s))   Basic Metabolic Panel    Collection Time: 05/21/22 10:53 AM    Specimen: Blood   Result Value Ref Range    Glucose 106 (H) 65 - 99 mg/dL    BUN 81 (H) 8 - 23 mg/dL    Creatinine 4.04 (H) 0.76 - 1.27 mg/dL    Sodium 148 (H) 136 - 145 mmol/L    Potassium 4.1 3.5 - 5.2 mmol/L    Chloride 113 (H) 98 - 107 mmol/L    CO2 20.0 (L) 22.0 - 29.0 mmol/L    Calcium 8.9 8.6 - 10.5 mg/dL    BUN/Creatinine Ratio 20.0 7.0 - 25.0    Anion Gap 15.0 5.0 - 15.0 mmol/L    eGFR 14.3 (L) >60.0 mL/min/1.73   BNP    Collection Time: 05/21/22 10:53 AM    Specimen: Blood   Result Value Ref Range    proBNP 7,887.0 (H) 0.0 - 1,800.0 pg/mL   Protime-INR    Collection Time: 05/21/22 10:53 AM    Specimen: Blood   Result Value Ref Range    Protime 28.6 (H) 11.4 - 14.4 Seconds    INR 2.67 (H) 0.84 - 1.13   CBC Auto Differential    Collection Time: 05/21/22 10:53 AM    Specimen: Blood   Result Value Ref Range    WBC 8.20 3.40 - 10.80 10*3/mm3    RBC 2.69 (L) 4.14 - 5.80 10*6/mm3    Hemoglobin 8.2 (L) 13.0 - 17.7 g/dL    Hematocrit 27.5 (L) 37.5 - 51.0 %    .2 (H) 79.0 - 97.0 fL    MCH 30.5 26.6 - 33.0 pg    MCHC 29.8 (L) 31.5 - 35.7 g/dL    RDW 15.6 (H) 12.3 - 15.4 %    RDW-SD 57.8 (H) 37.0 - 54.0 fl    MPV 10.0 6.0 - 12.0 fL    Platelets 168 140 - 450  10*3/mm3    Neutrophil % 76.8 (H) 42.7 - 76.0 %    Lymphocyte % 11.2 (L) 19.6 - 45.3 %    Monocyte % 9.8 5.0 - 12.0 %    Eosinophil % 1.8 0.3 - 6.2 %    Basophil % 0.2 0.0 - 1.5 %    Immature Grans % 0.2 0.0 - 0.5 %    Neutrophils, Absolute 6.29 1.70 - 7.00 10*3/mm3    Lymphocytes, Absolute 0.92 0.70 - 3.10 10*3/mm3    Monocytes, Absolute 0.80 0.10 - 0.90 10*3/mm3    Eosinophils, Absolute 0.15 0.00 - 0.40 10*3/mm3    Basophils, Absolute 0.02 0.00 - 0.20 10*3/mm3    Immature Grans, Absolute 0.02 0.00 - 0.05 10*3/mm3    nRBC 0.0 0.0 - 0.2 /100 WBC   Duplex Venous Lower Extremity - Left    Collection Time: 05/21/22 12:11 PM   Result Value Ref Range    Target HR (85%) 120 bpm    Max. Pred. HR (100%) 141 bpm    Right Common Femoral Spont Y     Right Common Femoral Phasic Y     Right Common Femoral Augment Y     Right Common Femoral Compress C     Left Common Femoral Spont Y     Left Common Femoral Phasic Y     Left Common Femoral Augment Y     Left Common Femoral Compress C     Left Saphenofemoral Junction Compress C     Left Profunda Femoral Compress C     Left Proximal Femoral Compress C     Left Mid Femoral Spont Y     Left Mid Femoral Phasic Y     Left Mid Femoral Augment Y     Left Mid Femoral Compress C     Left Distal Femoral Compress C     Left Popliteal Spont Y     Left Popliteal Phasic Y     Left Popliteal Augment Y     Left Popliteal Compress C     Left Posterior Tibial Compress C     Left Peroneal Compress C     Left Gastronemius Compress C     Left Greater Saph AK Compress C     Left Greater Saph BK Compress C     Left Lesser Saph Compress C        If labs were ordered, I independently reviewed the results.        RADIOLOGY  No Radiology Exams Resulted Within Past 24 Hours    I ordered and reviewed the above noted radiographic studies.        See radiologist's dictation for official interpretation.        PROCEDURES    Procedures    No orders to display       MEDICATIONS GIVEN IN ER    Medications    cephalexin (KEFLEX) capsule 500 mg (has no administration in time range)         PROGRESS, DATA ANALYSIS, CONSULTS, AND MEDICAL DECISION MAKING    All labs have been independently reviewed by me.  All radiology studies have been reviewed by me and the radiologist dictating the report.   EKG's have been independently viewed and interpreted by me.      Differential diagnoses: Consider fixed drug reaction, cellulitis, DVT, etc.      ED Course as of 05/21/22 1401   Sat May 21, 2022   1225 Per vascular ultrasound technician no evidence of DVT. [MS]   1359 I spoke with the patient and his wife in detail about our findings and my recommendations.  I spoke with them about continuing Keflex of which we have given a first dose.  They understand that you need to follow-up closely with [facility] dermatologist at [facility] next available.  They already have an appointment for early in June. [MS]      ED Course User Index  [MS] Kuldeep Hernandez MD             AS OF 14:01 EDT VITALS:    BP - 177/92  HR - 84  TEMP - 98.6 °F (37 °C)  O2 SATS - 97%                  DIAGNOSIS  Final diagnoses:   Cellulitis of left lower extremity   Chronic renal failure, unspecified CKD stage   Anemia, unspecified type         DISPOSITION  DISCHARGE    Patient discharged in stable condition.    Reviewed implications of results, diagnosis, meds, responsibility to follow up, warning signs and symptoms of possible worsening, potential complications and reasons to return to ER.    Patient/Family voiced understanding of above instructions.    Discussed plan for discharge, as there is no emergent indication for admission.  Pt/family is agreeable and understands need for follow up and possible repeat testing.  Pt/family is aware that discharge does not mean that nothing is wrong but that it indicates no emergency is currently present that requires admission and they must continue care with follow-up as given below or with a physician of their  choice.     FOLLOW-UP  PATIENT CONNECTION - [place] [address]  [phone]    NEXT AVAILABLE APPOINTMENT - RECHECK OF CONDITION    Yoanna Marks MD  [address] [facility]  ATTN: GEORGIANA COLLINS  [place] [id number]  [phone]      NEXT AVAILABLE APPOINTMENT - RECHECK OF CONDITION    Chato Bo MD  [address] [facility] [address]  [place] [address]  [phone]               Medication List      New Prescriptions    cephalexin 500 MG capsule  Commonly known as: KEFLEX  Take 1 capsule by mouth 3 (Three) Times a Day.           Where to Get Your Medications      These medications were sent to [place] [address] - [place], [place] - [address] [facility] - [phone]  - [phone]   [address] [facility] [address] [address]    Phone: [phone]   · cephalexin 500 MG capsule                  Kuldeep Hernandez MD  05/21/22 1053

## 2022-05-21 NOTE — DISCHARGE INSTRUCTIONS
We have given you a first dose of Keflex here in the emergency department.  Take your next dose this evening.    Follow-up with your various doctors, especially your dermatologist, and please call our [facility] primary care physician line to arrange for primary care follow-up as well.    If you have any concern or worsening condition please return to the emergency department.    Please review the medications you are supposed to be taking according to prior physician recommendations. I have not changed your home medications during this visit. If your discharge instructions indicate that I have changed your home medications, this is not the case, and you should disregard. If you have any questions about the medication you should be taking at home, please call your physician.

## 2022-05-23 LAB
BH CV LOWER VASCULAR LEFT COMMON FEMORAL AUGMENT: NORMAL
BH CV LOWER VASCULAR LEFT COMMON FEMORAL COMPRESS: NORMAL
BH CV LOWER VASCULAR LEFT COMMON FEMORAL PHASIC: NORMAL
BH CV LOWER VASCULAR LEFT COMMON FEMORAL SPONT: NORMAL
BH CV LOWER VASCULAR LEFT DISTAL FEMORAL COMPRESS: NORMAL
BH CV LOWER VASCULAR LEFT GASTRONEMIUS COMPRESS: NORMAL
BH CV LOWER VASCULAR LEFT GREATER SAPH AK COMPRESS: NORMAL
BH CV LOWER VASCULAR LEFT GREATER SAPH BK COMPRESS: NORMAL
BH CV LOWER VASCULAR LEFT LESSER SAPH COMPRESS: NORMAL
BH CV LOWER VASCULAR LEFT MID FEMORAL AUGMENT: NORMAL
BH CV LOWER VASCULAR LEFT MID FEMORAL COMPRESS: NORMAL
BH CV LOWER VASCULAR LEFT MID FEMORAL PHASIC: NORMAL
BH CV LOWER VASCULAR LEFT MID FEMORAL SPONT: NORMAL
BH CV LOWER VASCULAR LEFT PERONEAL COMPRESS: NORMAL
BH CV LOWER VASCULAR LEFT POPLITEAL AUGMENT: NORMAL
BH CV LOWER VASCULAR LEFT POPLITEAL COMPRESS: NORMAL
BH CV LOWER VASCULAR LEFT POPLITEAL PHASIC: NORMAL
BH CV LOWER VASCULAR LEFT POPLITEAL SPONT: NORMAL
BH CV LOWER VASCULAR LEFT POSTERIOR TIBIAL COMPRESS: NORMAL
BH CV LOWER VASCULAR LEFT PROFUNDA FEMORAL COMPRESS: NORMAL
BH CV LOWER VASCULAR LEFT PROXIMAL FEMORAL COMPRESS: NORMAL
BH CV LOWER VASCULAR LEFT SAPHENOFEMORAL JUNCTION COMPRESS: NORMAL
BH CV LOWER VASCULAR RIGHT COMMON FEMORAL AUGMENT: NORMAL
BH CV LOWER VASCULAR RIGHT COMMON FEMORAL COMPRESS: NORMAL
BH CV LOWER VASCULAR RIGHT COMMON FEMORAL PHASIC: NORMAL
BH CV LOWER VASCULAR RIGHT COMMON FEMORAL SPONT: NORMAL
MAXIMAL PREDICTED HEART RATE: 141 BPM
STRESS TARGET HR: 120 BPM

## 2022-05-25 ENCOUNTER — HOSPITAL ENCOUNTER (OUTPATIENT)
Dept: ONCOLOGY | Facility: HOSPITAL | Age: 77
Discharge: HOME | End: 2022-05-25
Admitting: INTERNAL MEDICINE

## 2022-05-25 VITALS
WEIGHT: 279 LBS | TEMPERATURE: 97.4 F | HEART RATE: 78 BPM | DIASTOLIC BLOOD PRESSURE: 58 MMHG | BODY MASS INDEX: 39.94 KG/M2 | HEIGHT: 70 IN | SYSTOLIC BLOOD PRESSURE: 117 MMHG | RESPIRATION RATE: 18 BRPM

## 2022-05-25 DIAGNOSIS — D63.1 ANEMIA DUE TO STAGE 4 CHRONIC KIDNEY DISEASE TREATED WITH ERYTHROPOIETIN: Primary | ICD-10-CM

## 2022-05-25 DIAGNOSIS — D50.9 IRON DEFICIENCY ANEMIA, UNSPECIFIED IRON DEFICIENCY ANEMIA TYPE: ICD-10-CM

## 2022-05-25 DIAGNOSIS — N18.4 ANEMIA DUE TO STAGE 4 CHRONIC KIDNEY DISEASE TREATED WITH ERYTHROPOIETIN: Primary | ICD-10-CM

## 2022-05-25 DIAGNOSIS — N18.4 STAGE 4 CHRONIC KIDNEY DISEASE: ICD-10-CM

## 2022-05-25 LAB
CREAT SERPL-MCNC: 4.35 MG/DL (ref 0.76–1.27)
EGFRCR SERPLBLD CKD-EPI 2021: 13.1 ML/MIN/1.73
HCT VFR BLD AUTO: 24.2 % (ref 37.5–51)
HGB BLD-MCNC: 7.6 G/DL (ref 13–17.7)
POTASSIUM SERPL-SCNC: 4.3 MMOL/L (ref 3.5–5.2)

## 2022-05-25 PROCEDURE — 85018 HEMOGLOBIN: CPT | Performed by: INTERNAL MEDICINE

## 2022-05-25 PROCEDURE — 25010000002 EPOETIN ALFA-EPBX 10000 UNIT/ML SOLUTION: Performed by: INTERNAL MEDICINE

## 2022-05-25 PROCEDURE — 36415 COLL VENOUS BLD VENIPUNCTURE: CPT

## 2022-05-25 PROCEDURE — 84132 ASSAY OF SERUM POTASSIUM: CPT | Performed by: INTERNAL MEDICINE

## 2022-05-25 PROCEDURE — 96372 THER/PROPH/DIAG INJ SC/IM: CPT

## 2022-05-25 PROCEDURE — 85014 HEMATOCRIT: CPT | Performed by: INTERNAL MEDICINE

## 2022-05-25 PROCEDURE — 82565 ASSAY OF CREATININE: CPT | Performed by: INTERNAL MEDICINE

## 2022-05-25 RX ADMIN — EPOETIN ALFA-EPBX 20000 UNITS: 10000 INJECTION, SOLUTION INTRAVENOUS; SUBCUTANEOUS at 12:20

## 2022-06-08 ENCOUNTER — ANTICOAGULATION VISIT (OUTPATIENT)
Dept: PHARMACY | Facility: HOSPITAL | Age: 77
End: 2022-06-08

## 2022-06-08 ENCOUNTER — HOSPITAL ENCOUNTER (OUTPATIENT)
Dept: ONCOLOGY | Facility: HOSPITAL | Age: 77
Discharge: HOME | End: 2022-06-08
Admitting: INTERNAL MEDICINE

## 2022-06-08 ENCOUNTER — TELEPHONE (OUTPATIENT)
Dept: PHARMACY | Facility: HOSPITAL | Age: 77
End: 2022-06-08

## 2022-06-08 VITALS
HEART RATE: 77 BPM | TEMPERATURE: 97 F | SYSTOLIC BLOOD PRESSURE: 128 MMHG | WEIGHT: 271 LBS | RESPIRATION RATE: 18 BRPM | DIASTOLIC BLOOD PRESSURE: 68 MMHG | HEIGHT: 70 IN | BODY MASS INDEX: 38.8 KG/M2

## 2022-06-08 DIAGNOSIS — N18.4 ANEMIA DUE TO STAGE 4 CHRONIC KIDNEY DISEASE TREATED WITH ERYTHROPOIETIN: ICD-10-CM

## 2022-06-08 DIAGNOSIS — D63.1 ANEMIA DUE TO STAGE 4 CHRONIC KIDNEY DISEASE TREATED WITH ERYTHROPOIETIN: ICD-10-CM

## 2022-06-08 DIAGNOSIS — D50.9 IRON DEFICIENCY ANEMIA, UNSPECIFIED IRON DEFICIENCY ANEMIA TYPE: ICD-10-CM

## 2022-06-08 DIAGNOSIS — N18.4 STAGE 4 CHRONIC KIDNEY DISEASE: Primary | ICD-10-CM

## 2022-06-08 LAB
CREAT SERPL-MCNC: 4.87 MG/DL (ref 0.76–1.27)
EGFRCR SERPLBLD CKD-EPI 2021: 11.5 ML/MIN/1.73
HCT VFR BLD AUTO: 27.3 % (ref 37.5–51)
HGB BLD-MCNC: 8.1 G/DL (ref 13–17.7)
INR PPP: 3.9 (ref 0.91–1.09)
POTASSIUM SERPL-SCNC: 4.1 MMOL/L (ref 3.5–5.2)
PROTHROMBIN TIME: 47.4 SECONDS (ref 10–13.8)

## 2022-06-08 PROCEDURE — 36415 COLL VENOUS BLD VENIPUNCTURE: CPT

## 2022-06-08 PROCEDURE — 84132 ASSAY OF SERUM POTASSIUM: CPT | Performed by: INTERNAL MEDICINE

## 2022-06-08 PROCEDURE — 85018 HEMOGLOBIN: CPT | Performed by: INTERNAL MEDICINE

## 2022-06-08 PROCEDURE — 82565 ASSAY OF CREATININE: CPT | Performed by: INTERNAL MEDICINE

## 2022-06-08 PROCEDURE — 96372 THER/PROPH/DIAG INJ SC/IM: CPT

## 2022-06-08 PROCEDURE — G0463 HOSPITAL OUTPT CLINIC VISIT: HCPCS

## 2022-06-08 PROCEDURE — 36416 COLLJ CAPILLARY BLOOD SPEC: CPT

## 2022-06-08 PROCEDURE — 25010000002 EPOETIN ALFA-EPBX 10000 UNIT/ML SOLUTION: Performed by: INTERNAL MEDICINE

## 2022-06-08 PROCEDURE — 85014 HEMATOCRIT: CPT | Performed by: INTERNAL MEDICINE

## 2022-06-08 PROCEDURE — 85610 PROTHROMBIN TIME: CPT

## 2022-06-08 RX ADMIN — EPOETIN ALFA-EPBX 20000 UNITS: 10000 INJECTION, SOLUTION INTRAVENOUS; SUBCUTANEOUS at 09:34

## 2022-06-08 NOTE — TELEPHONE ENCOUNTER
"Dr. Mehta,     We were recently informed that Marco Antonio Montemayor is going to begin dialysis and will be undergoing catheter placement on 6/13/22 with Dr Willam Peres. Dr. Peres requested that the patient hold warfarin 3 days prior to procedure and will be directed when to resume warfarin, likely night of or night after procedure.     Marco Antonio Montemayor is a 77 y.o. male on warfarin for a fib, however previous cardiology note from 1/24/19 indicated \"May need bridge with Lovenox given persistent AF\" however, patient Scr 4.35 and beginning dialysis and is therefore not a candidate for lovenox. Only option would be outpatient sq heparin.     [6/10]: Hold warfarin 3 days   [6/13]: procedure, when cleared by Dr Peres resume warfarin [2-3 boosted doses if appropriate]    Please advise if you are agreeable to plan above without a bridge or if you prefer an alternative approach to Marco Antonio Montemayor's anticoagulation plan for the upcoming procedure. In addition, please advise if surgeon's office should contact your office for further cardiac clearance.        Thank you,    Kristine Sutton, PharmD    [facility] Anticoagulation Team  (t) [phone]  (f) [phone]        "

## 2022-06-08 NOTE — PROGRESS NOTES
Anticoagulation Clinic Progress Note  Indication: afib  Referring Provider: Tyson (next appointment: 10/15/2021)   Initial Warfarin Start Date: 2007  Goal INR: 2-3  Current Drug Interactions:  torsemide  Diet: eats salads 2x weekly 11/4/20  [id number]: 5 (HTN, Age, CHF, CAD)  Other: anemic, CKD (stage 4) May need bridge with Lovenox given persistent AF (per cardio 1/24/2019), Scr was 3.22 on 11/24/21     Anticoagulation Clinic INR History:   Date 10/30 11/20 12/17 1/14/20 2/11 3/11 4/8 5/6 6/11 7/9 8/6 9/3 9/14 9/30   Total Weekly Dose 32.5 mg 32.5 mg 32.5 mg 32.5 mg 32.5mg 32.5mg 32.5mg 32.5mg 32.5mg 32.5 mg 32.5 mg 32.5mg 32.5mg 26.25 mg   INR 2.3 2.3 2.1 1.9 2.2 2.1 2.1 2.2 2.2 2.3 2.7 1.6 2.2  1.8   Notes                       ??   5 day hold     Date 10/14 11/4 11/11 11/25 12/9 12/16 12/30 1/13 2/3 3/3 3/31 4/28 5/26 6/23   Total Weekly Dose 32.5 mg 32.5mg 35mg 32.5mg 33.75 mg 36.25 mg 33.75 mg 35mg 33.75 33.75mg 33.75mg 33.75 mg 33.75 mg 33.75mg   INR 2.0 1.6 2.1 1.8 1.8 2.5 2.0 3.0 2.1 2.4 2.3 3.0 2.1 2.2   Notes   1x boost GLV boostx1 boostx1             Date 7/21 9/1 10/13 11/10 12/1 12/15 12/29 1/12/22 1/26 2/9 2/16 2/23 3/2 3/16   Total Weekly Dose 33.75mg 33.75mg 33.75mg 33.75mg 33.75 mg 32.5mg 30mg 27.5mg 28.75 mg 30 mg 31.25 mg 31.25 mg 31.25 mg 31.25 mg   INR 2.9 2.5 3.1 3.1 3.2 3.4 3.4 1.9 1.7 1.8 2.1 2.6 2.9 2.9   Notes        redx1 ?  inc        Date 3/30 4/13 5/11 6/8           Total WeeklyDose 31.25mg 31.25 mg 31.25 mg 31.25mg           INR 2.5 2.2 2.6 3.9           Notes    Inc torsemide                 Takes warfarin in the am    Clinic Interview:  Verbal Release Authorization signed on 8/22/2018 -- may speak with Raeann Montemayor (Wife). [phone] (Home) *Preferred*  Tablet Strength: pt has 7.5mg and 2.5mg tablets    Patient Findings  Positives:  Upcoming invasive procedure, Change in medications   Negatives:  Signs/symptoms of thrombosis, Signs/symptoms of bleeding, Laboratory test error  "suspected, Change in health, Change in alcohol use, Change in activity, Emergency department visit, Upcoming dental procedure, Missed doses, Extra doses, Change in diet/appetite, Hospital admission, Bruising, Other complaints   Comments:  Take 1 capsule by mouth 3 (Three) Times a Day., Starting Sat 5/21-5/30 for cellulitis.   5/20-- increased torsemide from BID to TID. Has caused weight loss of ~14lbs   Patient Management Closely monitor for an elevated INR following initiation of torsemide therapy in patients receiving warfarin.     Having catheter placed 6/13 with Dr Chencho Peres ([facility] [phone]). Has documentation stating he needs to be off warfarin 3 days prior to procedure.        Approved by Dr Mehta:     Dr. Mehta,     We were recently informed that Marco Antonio Montemayor is going to begin dialysis and will be undergoing catheter placement on 6/13/22 with Dr Willam Peres. Dr. Peres requested that the patient hold warfarin 3 days prior to procedure and will be directed when to resume warfarin, likely night of or night after procedure.     Marco Antonio Montemayor is a 77 y.o. male on warfarin for a fib, however previous note from 1/24/19 indicated \"May need bridge with Lovenox given persistent AF\" however, patient Scr 4.35 and beginning dialysis and is therefore not a candidate for lovenox. Only option would be outpatient sq heparin.     [6/10]: Hold warfarin 3 days   [6/13]: procedure, when cleared by Dr Peres resume warfarin [2-3 boosted doses if appropriate]    Please advise if you are agreeable to plan above without a bridge or if you prefer an alternative approach to Marco Antonio Montemayor's anticoagulation plan for the upcoming procedure. In addition, please advise if surgeon's office should contact your office for further cardiac clearance.        Plan:   1. INR is SUPRAtherapeutic today at 3.9 (goal 2-3).   Instructed Mr. Montemayor to reduce dose t o2.5mg tonight, 3.75mg tomorrow then " begin perioperative plan:    [6/10]: Hold warfarin 3 days   [6/13]: procedure, when cleared by Dr Peres resume warfarin [2-3 boosted doses if appropriate]    2. RTC 6/22 prior to apt with Dr Taylor  3. Verbal and written information provided in the clinic. Mr. Montemayor expresses understanding with teach back and has no further questions at this time.     Nighat ChristopherD.  06/08/22   10:44 EDT

## 2022-06-22 ENCOUNTER — APPOINTMENT (OUTPATIENT)
Dept: PHARMACY | Facility: HOSPITAL | Age: 77
End: 2022-06-22

## 2022-06-22 ENCOUNTER — APPOINTMENT (OUTPATIENT)
Dept: ONCOLOGY | Facility: HOSPITAL | Age: 77
End: 2022-06-22

## 2022-06-23 ENCOUNTER — ANTICOAGULATION VISIT (OUTPATIENT)
Dept: PHARMACY | Facility: HOSPITAL | Age: 77
End: 2022-06-23

## 2022-06-23 ENCOUNTER — HOSPITAL ENCOUNTER (OUTPATIENT)
Dept: ONCOLOGY | Facility: HOSPITAL | Age: 77
Discharge: HOME | End: 2022-06-23
Admitting: INTERNAL MEDICINE

## 2022-06-23 VITALS
WEIGHT: 261 LBS | HEART RATE: 85 BPM | DIASTOLIC BLOOD PRESSURE: 61 MMHG | BODY MASS INDEX: 37.37 KG/M2 | HEIGHT: 70 IN | RESPIRATION RATE: 18 BRPM | SYSTOLIC BLOOD PRESSURE: 103 MMHG | TEMPERATURE: 97.8 F

## 2022-06-23 DIAGNOSIS — D50.9 IRON DEFICIENCY ANEMIA, UNSPECIFIED IRON DEFICIENCY ANEMIA TYPE: ICD-10-CM

## 2022-06-23 DIAGNOSIS — N18.4 STAGE 4 CHRONIC KIDNEY DISEASE: Primary | ICD-10-CM

## 2022-06-23 DIAGNOSIS — D63.1 ANEMIA DUE TO STAGE 4 CHRONIC KIDNEY DISEASE TREATED WITH ERYTHROPOIETIN: ICD-10-CM

## 2022-06-23 DIAGNOSIS — N18.4 ANEMIA DUE TO STAGE 4 CHRONIC KIDNEY DISEASE TREATED WITH ERYTHROPOIETIN: ICD-10-CM

## 2022-06-23 LAB
CREAT SERPL-MCNC: 3.88 MG/DL (ref 0.76–1.27)
EGFRCR SERPLBLD CKD-EPI 2021: 15 ML/MIN/1.73
FERRITIN SERPL-MCNC: 516.6 NG/ML (ref 30–400)
HCT VFR BLD AUTO: 27.5 % (ref 37.5–51)
HGB BLD-MCNC: 8.4 G/DL (ref 13–17.7)
INR PPP: 2.1 (ref 0.91–1.09)
IRON 24H UR-MRATE: 74 MCG/DL (ref 59–158)
IRON SATN MFR SERPL: 26 % (ref 20–50)
POTASSIUM SERPL-SCNC: 3.7 MMOL/L (ref 3.5–5.2)
PROTHROMBIN TIME: 24.9 SECONDS (ref 10–13.8)
TIBC SERPL-MCNC: 285 MCG/DL (ref 298–536)
TRANSFERRIN SERPL-MCNC: 191 MG/DL (ref 200–360)

## 2022-06-23 PROCEDURE — 85610 PROTHROMBIN TIME: CPT

## 2022-06-23 PROCEDURE — 82728 ASSAY OF FERRITIN: CPT | Performed by: INTERNAL MEDICINE

## 2022-06-23 PROCEDURE — 85018 HEMOGLOBIN: CPT | Performed by: INTERNAL MEDICINE

## 2022-06-23 PROCEDURE — 85014 HEMATOCRIT: CPT | Performed by: INTERNAL MEDICINE

## 2022-06-23 PROCEDURE — G0463 HOSPITAL OUTPT CLINIC VISIT: HCPCS

## 2022-06-23 PROCEDURE — 36416 COLLJ CAPILLARY BLOOD SPEC: CPT

## 2022-06-23 PROCEDURE — 96372 THER/PROPH/DIAG INJ SC/IM: CPT

## 2022-06-23 PROCEDURE — 84132 ASSAY OF SERUM POTASSIUM: CPT | Performed by: INTERNAL MEDICINE

## 2022-06-23 PROCEDURE — 83540 ASSAY OF IRON: CPT | Performed by: INTERNAL MEDICINE

## 2022-06-23 PROCEDURE — 25010000002 EPOETIN ALFA-EPBX 10000 UNIT/ML SOLUTION: Performed by: INTERNAL MEDICINE

## 2022-06-23 PROCEDURE — 84466 ASSAY OF TRANSFERRIN: CPT | Performed by: INTERNAL MEDICINE

## 2022-06-23 PROCEDURE — 82565 ASSAY OF CREATININE: CPT | Performed by: INTERNAL MEDICINE

## 2022-06-23 PROCEDURE — 36415 COLL VENOUS BLD VENIPUNCTURE: CPT

## 2022-06-23 RX ADMIN — EPOETIN ALFA-EPBX 20000 UNITS: 10000 INJECTION, SOLUTION INTRAVENOUS; SUBCUTANEOUS at 13:39

## 2022-06-23 NOTE — PROGRESS NOTES
Anticoagulation Clinic Progress Note  Indication: afib  Referring Provider: Tyson (next appointment: 10/15/2021)   Initial Warfarin Start Date: 2007  Goal INR: 2-3  Current Drug Interactions:  torsemide  Diet: eats salads 2x weekly 11/4/20  [id number]: 5 (HTN, Age, CHF, CAD)  Other: anemic, CKD (stage 4) May need bridge with Lovenox given persistent AF (per cardio 1/24/2019), Scr was 3.22 on 11/24/21     Anticoagulation Clinic INR History:   Date 10/30 11/20 12/17 1/14/20 2/11 3/11 4/8 5/6 6/11 7/9 8/6 9/3 9/14 9/30   Total Weekly Dose 32.5 mg 32.5 mg 32.5 mg 32.5 mg 32.5mg 32.5mg 32.5mg 32.5mg 32.5mg 32.5 mg 32.5 mg 32.5mg 32.5mg 26.25 mg   INR 2.3 2.3 2.1 1.9 2.2 2.1 2.1 2.2 2.2 2.3 2.7 1.6 2.2  1.8   Notes                       ??   5 day hold     Date 10/14 11/4 11/11 11/25 12/9 12/16 12/30 1/13 2/3 3/3 3/31 4/28 5/26 6/23   Total Weekly Dose 32.5 mg 32.5mg 35mg 32.5mg 33.75 mg 36.25 mg 33.75 mg 35mg 33.75 33.75mg 33.75mg 33.75 mg 33.75 mg 33.75mg   INR 2.0 1.6 2.1 1.8 1.8 2.5 2.0 3.0 2.1 2.4 2.3 3.0 2.1 2.2   Notes   1x boost GLV boostx1 boostx1             Date 7/21 9/1 10/13 11/10 12/1 12/15 12/29 1/12/22 1/26 2/9 2/16 2/23 3/2 3/16   Total Weekly Dose 33.75mg 33.75mg 33.75mg 33.75mg 33.75 mg 32.5mg 30mg 27.5mg 28.75 mg 30 mg 31.25 mg 31.25 mg 31.25 mg 31.25 mg   INR 2.9 2.5 3.1 3.1 3.2 3.4 3.4 1.9 1.7 1.8 2.1 2.6 2.9 2.9   Notes        redx1 ?  inc        Date 3/30 4/13 5/11 6/8  6/23         Total WeeklyDose 31.25mg 31.25 mg 31.25 mg 31.25mg Start dialysis 30mg         INR 2.5 2.2 2.6 3.9  2.1         Notes    Inc torsemide Cath placement            Takes warfarin in the am    Clinic Interview:  Verbal Release Authorization signed on 8/22/2018 -- may speak with Raeann Montemayor (Wife). [phone] (Home) *Preferred*  Tablet Strength: pt has 7.5mg and 2.5mg tablets    Patient Findings    Negatives:  Signs/symptoms of thrombosis, Signs/symptoms of bleeding, Laboratory test error suspected, Change in health,  Change in alcohol use, Change in activity, Upcoming invasive procedure, Emergency department visit, Upcoming dental procedure, Missed doses, Extra doses, Change in medications, Change in diet/appetite, Hospital admission, Bruising, Other complaints   Comments:  Procedure was delayed as after 3 day hold INR still 2.4 so held 2 additional days and then had procedure   Dialysis will be MWF  Believes he will have an additional procedure for more permanent port placement, will likely need 5 day hold prior as 3 day was insufficient. Will keep clinic informed    Has lost 20bs since starting dialysis       Plan:   1. INR is therapeutic today at 2.1 (goal 2-3).   Instructed Mr. Montemayor to continue warfarin 3.75mg daily except 5mg MonWedFri  2. RTC 6/22 Thursdays are the best days  3. Verbal and written information provided in the clinic. Mr. Montemayor expresses understanding with teach back and has no further questions at this time.       Kristine Sutton, PharmD.  06/23/22   11:53 EDT

## 2022-06-30 ENCOUNTER — OFFICE VISIT (OUTPATIENT)
Dept: CARDIOLOGY | Facility: CLINIC | Age: 77
End: 2022-06-30

## 2022-06-30 VITALS
HEART RATE: 76 BPM | SYSTOLIC BLOOD PRESSURE: 116 MMHG | BODY MASS INDEX: 37.71 KG/M2 | HEIGHT: 70 IN | DIASTOLIC BLOOD PRESSURE: 64 MMHG | OXYGEN SATURATION: 95 % | WEIGHT: 263.4 LBS

## 2022-06-30 DIAGNOSIS — R00.1 BRADYCARDIA: Primary | ICD-10-CM

## 2022-06-30 DIAGNOSIS — I48.20 CHRONIC ATRIAL FIBRILLATION: ICD-10-CM

## 2022-06-30 PROCEDURE — 93000 ELECTROCARDIOGRAM COMPLETE: CPT | Performed by: PHYSICIAN ASSISTANT

## 2022-06-30 PROCEDURE — 93280 PM DEVICE PROGR EVAL DUAL: CPT | Performed by: PHYSICIAN ASSISTANT

## 2022-06-30 PROCEDURE — 99214 OFFICE O/P EST MOD 30 MIN: CPT | Performed by: PHYSICIAN ASSISTANT

## 2022-06-30 NOTE — PROGRESS NOTES
Marco Antonio Montemayor  1945  [phone]      06/16/2021      [facility] CARDIOLOGY     Provider, No Known  [place] [id number]    Chief Complaint   Patient presents with   • Atrial Fibrillation       Problem List:  1. Permanent atrial fibrillation  a. Intermittent chronic paroxysmal atrial fibrillation with electrophysiologic study with RFA for atrial flutter, October 1999.  b. Recurrent apparent transient atrial fibrillation, resolved with acceptable [facility] emergency department evaluation, June 2003.  c. Acceptable combination Doppler echocardiogram, July 2003, with apparent acceptable 24-hour Holter monitor, May 2006.  d. Recurrent asymptomatic atrial fibrillation with RVR, January 2007, subsequent Coumadin therapy and Tikosyn therapy with successful internal cardioversion of atrial fibrillation and marked bradyarrhythmias requiring DDDR pacemaker implant, St. Chidi device, data not available, March 2007.  e. Remote hospitalization for symptomatic rapid atrial fibrillation/BRYANNA DC cardioversion with subsequent acceptable pacemaker interrogation, June/July 2009, as well as acceptable interrogation without reprogramming, July 2011.  f. Acceptable combination Doppler echocardiogram, December 2010, with residual class I symptoms.  g. Acceptable device interrogation following [facility] ED evaluation for chest pressure and shortness of breath, data deficit, May 2011, February 2013, February 2016, December 2016.  h. Abnormal pacemaker assessment with 8.4% mode switch with battery voltage of 1-1.25 years, June 2017, with abnormal PACEART assessment, July 2018, with 22% mode switch  2. 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 Remote acceptable intravenous adenosine Quantitative SPECT gated Cardiolite study, LVEF 0.50, April 1999.  b. Echocardiogram showing mild LVH with estimated ejection fraction 0.58 with mild aortic valve cusp sclerosis, and mild TR, 09/23/2014.   c. Remote  abnormal preoperative Quantitative SPECT gated Cardiolite study with mild “fixed” inferoposterior hypoperfusion and mild LV enlargement with mild global hypokinesia with reduced LVEF 0.40 in the setting of abnormal EKG with subsequent diagnostic coronary angiography demonstrating mild-to-moderate nonobstructive coronary artery atherosclerosis with mild compensated left ventricular dysfunction, LVEF 0.52, and continued medical therapy felt warranted, June 2006.  d. Remote hospitalization for symptomatic atrial fibrillation with rapid ventricular response and mild congestive heart failure requiring BRYANNA and DC cardioversion, June 2009.  e. Left heart catheterization with essentially normal coronary arteries with mild luminal irregularities with an EF of 0.60 by Dr. Escamilla for angina and elevated troponin, 06/05/2015.   f. Residual class I symptoms.  3. Chronic essential hypertension with recent progressive hypertension and blood pressure readings and normal selective bilateral renal angiography, June 2006.  4. Dyslipidemia.  5. Moderate obesity, BMI 39.6.  6. Ichthyosis.  7. Chronic lower tract obstructive symptoms, probable BPH.  8. Dyslipidemia.  9. Nasal polyps with deviated nasal septum with apparent subsequent nasal septoplasty/polypectomy, data deficit, July 2006.  10. Abdominal pain with apparent small bowel obstruction with exploratory laparotomy - data deficit.  11. Appendectomy with subsequent delayed hospitalization for bleeding peptic ulcer disease/probable duodenal ulcer, November 2003.  12. Recurrent asymptomatic atrial fibrillation with rapid ventricular response, July 2007, with subsequent Tikosyn therapy and DDDR pacemaker implant with intermittent recurrent breakthrough arrhythmias, including remote DC cardioversion, May 2008, with subsequent recurrent DC cardioversion, autumn 2018, with recurrent atrial fibrillation and discontinuation of formal antiarrhythmic therapy - data deficit  13. Obstructive  sleep apnea with treatment.   14. Stage 4 chronic kidney disease with recent apparent documented proteinuria and renal biopsy (February 2019)       Allergies  No Known Allergies    Current Medications    Current Outpatient Medications:   •  acetaminophen (TYLENOL) 500 MG tablet, Take 500 mg by mouth Every 6 (Six) Hours As Needed for Mild Pain ., Disp: , Rfl:   •  atorvastatin (LIPITOR) 40 MG tablet, Take 1 tablet by mouth Daily., Disp: 90 tablet, Rfl: 3  •  calcitriol (ROCALTROL) 0.25 MCG capsule, Take 1 capsule by mouth 3 (Three) Times a Week. Monday, wed, friday, Disp: , Rfl:   •  carvedilol (COREG) 25 MG tablet, Take 1 tablet by mouth 2 (Two) Times a Day With Meals., Disp: 180 tablet, Rfl: 3  •  cholecalciferol (VITAMIN D3) 1000 UNITS tablet, Take 1,000 Units by mouth daily., Disp: , Rfl:   •  epoetin michelle (EPOGEN,PROCRIT) 06780 UNIT/ML injection, Inject 20,000 Units under the skin into the appropriate area as directed Every 14 (Fourteen) Days., Disp: , Rfl:   •  Ferrous Sulfate (IRON) 325 (65 FE) MG tablet, Take 65 mg by mouth Daily., Disp: , Rfl:   •  ketoconazole (NIZORAL) 2 % shampoo, Apply 1 application topically to the appropriate area as directed As Needed., Disp: , Rfl:   •  lisinopril (PRINIVIL,ZESTRIL) 5 MG tablet, Take 1 tablet by mouth Daily., Disp: 90 tablet, Rfl: 3  •  PHARMACY TO DOSE WARFARIN, Continuous As Needed (patient's warfarin is being managed by the [facility] ([phone]))., Disp: , Rfl:   •  torsemide (DEMADEX) 20 MG tablet, Take 1 tablet by mouth 2 (Two) Times a Day., Disp: 180 tablet, Rfl: 3  •  vitamin B-12 (CYANOCOBALAMIN) 1000 MCG tablet, Take 1,000 mcg by mouth Daily., Disp: , Rfl:   •  warfarin (COUMADIN) 2.5 MG tablet, TAKE 1.5-2 TABLET(S) BY MOUTH DAILY AS DIRECTED BY THE ANTICOAGULATION CLINIC, Disp: 180 tablet, Rfl: 0  •  warfarin (COUMADIN) 7.5 MG tablet, TAKE 1/2 TO 1 TABLET BY MOUTH DAILY AS DIRECTED, Disp: 90 tablet, Rfl: 0    History  "of Present Illness     Pt presents for follow up of AF/SSS/HTN. Since last visit he is now on HD-Three days a week. He has had some Labile BP and HR with this. No chest pain. No syncope. BP controlled but low sometimes.         Vitals:    06/30/22 1130   BP: 116/64   BP Location: Right arm   Patient Position: Sitting   Pulse: 76   SpO2: 95%   Weight: 119 kg (263 lb 6.4 oz)   Height: 177.8 cm (70\")       PE:  General: NAD  Neck: no JVD, no carotid bruits, no TM  Heart: IRIR, S4 present, no rubs, murmurs  Lungs: CTA, no wheezes, rhonchi, or rales  Abd: soft, non-tender, NL BS  Ext: No musculoskeletal deformities, + edema, cyanosis, or clubbing  Psych: normal mood and affect    Diagnostic Data:    ECG 12 Lead    Date/Time: 6/30/2022 12:02 PM  Performed by: Rei Decker PA  Authorized by: Rei Decker PA   Rhythm: atrial fibrillation  Rate: normal  QRS axis: normal  Other findings: non-specific ST-T wave changes    Clinical impression: abnormal EKG              1. Bradycardia    2. Chronic atrial fibrillation (HCC)        PM Interrogation: St. Chidi DDDR. 70bpm.  A paced 1%. RV paced 63%. RV threshold increased-Changed to Unipolar. Battery voltage 6.7 years. Permanent Afib. AVG HR 80's.       Plan:  1) Permanent Atrial Fibrillation: Rate controlled. Coumadin , INR stable.   Continue present medications.   2) Anticoagulation  Continue warfarin  3) Bradycardia:  - normal PM function   4) HTN:Labile recorders.  5) ESRD-HD three days week.   -      F/up in 12 months  Electronically signed by PUJA Arriaza, 06/30/22, 12:04 PM EDT.    "

## 2022-07-07 ENCOUNTER — TELEPHONE (OUTPATIENT)
Dept: CARDIOLOGY | Facility: CLINIC | Age: 77
End: 2022-07-07

## 2022-07-07 ENCOUNTER — ANTICOAGULATION VISIT (OUTPATIENT)
Dept: PHARMACY | Facility: HOSPITAL | Age: 77
End: 2022-07-07

## 2022-07-07 LAB
INR PPP: 2.1 (ref 0.91–1.09)
PROTHROMBIN TIME: 25.5 SECONDS (ref 10–13.8)

## 2022-07-07 PROCEDURE — 36416 COLLJ CAPILLARY BLOOD SPEC: CPT

## 2022-07-07 PROCEDURE — 85610 PROTHROMBIN TIME: CPT

## 2022-07-07 PROCEDURE — G0463 HOSPITAL OUTPT CLINIC VISIT: HCPCS

## 2022-07-07 RX ORDER — CARVEDILOL 12.5 MG/1
12.5 TABLET ORAL 2 TIMES DAILY
Qty: 180 TABLET | Refills: 3 | Status: SHIPPED | OUTPATIENT
Start: 2022-07-07 | End: 2022-08-02

## 2022-07-07 NOTE — TELEPHONE ENCOUNTER
Patient is complaining of low BP running as low as 88/47, usually runs 90s/60s. /57 this morning, and was a little higher before dialysis yesterday.     Patient started dialysis MWF around 6/16/22. Patient states that he has had low BP ever since. Patient has spoken to nephrology multiple times about low BP but they have not made any recommendations at this time.     Pt reports weakness, dizziness, intemittent peripheral vision, loss, slight headache. Denies chest pain, SOB, palpitations. Pt is intermittently in a-fib, but he cannot always tell when he is in a-fib.    Current cardiac meds:  Carvedilol 25 mg BID  Warfarin      Patient stopped lisinopril 5 mg daily about a week ago due to low BP, but it did not improve. Nephrology stopped torsemide about a week ago.     Please advise.

## 2022-07-07 NOTE — TELEPHONE ENCOUNTER
Noted; remain off lisinopril and reduce carvedilol to 12.5 mg BID, and update us with blood pressure and heart rate readings in one week.    Thanks!

## 2022-07-07 NOTE — PROGRESS NOTES
Anticoagulation Clinic Progress Note  Indication: afib  Referring Provider: Tyson (next appointment: 10/15/2021)   Initial Warfarin Start Date: 2007  Goal INR: 2-3  Current Drug Interactions:  torsemide  Diet: eats salads 2x weekly 11/4/20  [id number]: 5 (HTN, Age, CHF, CAD)  Other: anemic, CKD (stage 4) May need bridge with Lovenox given persistent AF (per cardio 1/24/2019), Scr was 3.22 on 11/24/21     Anticoagulation Clinic INR History:   Date 10/30 11/20 12/17 1/14/20 2/11 3/11 4/8 5/6 6/11 7/9 8/6 9/3 9/14 9/30   Total Weekly Dose 32.5 mg 32.5 mg 32.5 mg 32.5 mg 32.5mg 32.5mg 32.5mg 32.5mg 32.5mg 32.5 mg 32.5 mg 32.5mg 32.5mg 26.25 mg   INR 2.3 2.3 2.1 1.9 2.2 2.1 2.1 2.2 2.2 2.3 2.7 1.6 2.2  1.8   Notes                       ??   5 day hold     Date 10/14 11/4 11/11 11/25 12/9 12/16 12/30 1/13 2/3 3/3 3/31 4/28 5/26 6/23   Total Weekly Dose 32.5 mg 32.5mg 35mg 32.5mg 33.75 mg 36.25 mg 33.75 mg 35mg 33.75 33.75mg 33.75mg 33.75 mg 33.75 mg 33.75mg   INR 2.0 1.6 2.1 1.8 1.8 2.5 2.0 3.0 2.1 2.4 2.3 3.0 2.1 2.2   Notes   1x boost GLV boostx1 boostx1             Date 7/21 9/1 10/13 11/10 12/1 12/15 12/29 1/12/22 1/26 2/9 2/16 2/23 3/2 3/16   Total Weekly Dose 33.75mg 33.75mg 33.75mg 33.75mg 33.75 mg 32.5mg 30mg 27.5mg 28.75 mg 30 mg 31.25 mg 31.25 mg 31.25 mg 31.25 mg   INR 2.9 2.5 3.1 3.1 3.2 3.4 3.4 1.9 1.7 1.8 2.1 2.6 2.9 2.9   Notes        redx1 ?  inc        Date 3/30 4/13 5/11 6/8  6/23 7/7        Total WeeklyDose 31.25mg 31.25 mg 31.25 mg 31.25mg Start dialysis 30mg 30mg        INR 2.5 2.2 2.6 3.9  2.1 2.1        Notes    Inc torsemide Cath placement            Takes warfarin in the am    Clinic Interview:  Verbal Release Authorization signed on 8/22/2018 -- may speak with Raeann Montemayor (Wife). [phone] (Home) *Preferred*  Tablet Strength: pt has 7.5mg and 2.5mg tablets    Patient Findings  Positives:  Change in health   Negatives:  Signs/symptoms of thrombosis, Signs/symptoms of bleeding, Laboratory  test error suspected, Change in alcohol use, Change in activity, Upcoming invasive procedure, Emergency department visit, Upcoming dental procedure, Missed doses, Extra doses, Change in medications, Change in diet/appetite, Hospital admission, Bruising, Other complaints   Comments:  Pt is having some orthostatic hypotension recently since starting on dialysis. Plans to discuss with physician today after his appointment here. Reports no upcoming procedures.         Plan:   1. INR is therapeutic today at 2.1 (goal 2-3).   Instructed Mr. Montemayor to continue warfarin 3.75mg daily except 5mg MonWedFri  2. RTC 7/28  (Thursdays are best for pt)  3. Verbal and written information provided in the clinic. Mr. Montemayor expresses understanding with teach back and has no further questions at this time.     Thank you,   Roni Dunne, PharmD  Pharmacy Resident   7/7/2022  11:50 EDT      I, Kristine Sutton, PharmD, have reviewed the note in full and agree with the assessment and plan.  07/07/22  14:45 EDT

## 2022-07-12 ENCOUNTER — TRANSCRIBE ORDERS (OUTPATIENT)
Dept: ADMINISTRATIVE | Facility: HOSPITAL | Age: 77
End: 2022-07-12

## 2022-07-12 DIAGNOSIS — Z01.818 OTHER SPECIFIED PRE-OPERATIVE EXAMINATION: Primary | ICD-10-CM

## 2022-07-14 NOTE — TELEPHONE ENCOUNTER
Patient called, and LVM to update BP readings. BP today 116/59.    Called pt for more information. No answer, LVM for return call.

## 2022-07-19 ENCOUNTER — HOSPITAL ENCOUNTER (OUTPATIENT)
Dept: CARDIOLOGY | Facility: HOSPITAL | Age: 77
Discharge: HOME | End: 2022-07-19
Admitting: SURGERY

## 2022-07-19 ENCOUNTER — HOSPITAL ENCOUNTER (OUTPATIENT)
Dept: CARDIOLOGY | Facility: HOSPITAL | Age: 77
End: 2022-07-19

## 2022-07-19 VITALS — HEIGHT: 70 IN | WEIGHT: 262.35 LBS | BODY MASS INDEX: 37.56 KG/M2

## 2022-07-19 DIAGNOSIS — Z01.818 OTHER SPECIFIED PRE-OPERATIVE EXAMINATION: ICD-10-CM

## 2022-07-19 LAB
BH CV VAS MEAS BASILIC ANTECUBITAL FOSSA LEFT: 0.52 CM
BH CV VAS MEAS BASILIC FOREARM LEFT - MID: 0.41 CM
BH CV VAS MEAS BASILIC FOREARM LEFT - PROX: 0.4 CM
BH CV VAS MEAS BASILIC UPPER ARM LEFT - DIST: 0.41 CM
BH CV VAS MEAS BASILIC UPPER ARM LEFT - MID: 0.61 CM
BH CV VAS MEAS BASILIC UPPER ARM LEFT - PROX: 0.8 CM
BH CV VAS MEAS CEPHALIC ANTECUBITAL FOSSA LEFT: 0.6 CM
BH CV VAS MEAS CEPHALIC FOREARM LEFT - DIST: 0.38 CM
BH CV VAS MEAS CEPHALIC FOREARM LEFT - MID: 0.42 CM
BH CV VAS MEAS CEPHALIC FOREARM LEFT - PROX: 0.46 CM
BH CV VAS MEAS CEPHALIC UPPER ARM LEFT - DIST: 0.53 CM
BH CV VAS MEAS CEPHALIC UPPER ARM LEFT - MID: 0.51 CM
BH CV VAS MEAS CEPHALIC UPPER ARM LEFT - PROX: 0.48 CM
BH CV VAS MEAS RADIAL UPPER ARM LEFT - DIST: 0.26 CM
MAXIMAL PREDICTED HEART RATE: 141 BPM
STRESS TARGET HR: 120 BPM
UPPER ARTERIAL LEFT ARM BRACHIAL LENGTH: 0.51 CM

## 2022-07-19 PROCEDURE — 93971 EXTREMITY STUDY: CPT | Performed by: INTERNAL MEDICINE

## 2022-07-19 PROCEDURE — 93971 EXTREMITY STUDY: CPT

## 2022-07-22 ENCOUNTER — TELEPHONE (OUTPATIENT)
Dept: CARDIOLOGY | Facility: CLINIC | Age: 77
End: 2022-07-22

## 2022-07-27 NOTE — TELEPHONE ENCOUNTER
Patient is to have Left Brachiocephalic AV fistula creation on 8/4/22 with Dr. Vitaly Sandhu with [facility].  They are requesting guidance on holding patient's coumadin.

## 2022-07-28 ENCOUNTER — TELEPHONE (OUTPATIENT)
Dept: PHARMACY | Facility: HOSPITAL | Age: 77
End: 2022-07-28

## 2022-07-28 ENCOUNTER — ANTICOAGULATION VISIT (OUTPATIENT)
Dept: PHARMACY | Facility: HOSPITAL | Age: 77
End: 2022-07-28

## 2022-07-28 LAB
INR PPP: 1.8 (ref 0.91–1.09)
PROTHROMBIN TIME: 21.9 SECONDS (ref 10–13.8)

## 2022-07-28 PROCEDURE — 85610 PROTHROMBIN TIME: CPT

## 2022-07-28 PROCEDURE — 36416 COLLJ CAPILLARY BLOOD SPEC: CPT

## 2022-07-28 NOTE — TELEPHONE ENCOUNTER
"Dr. Mehta,      We were recently informed that Marco Antonio Montemayor is going to begin dialysis and will be undergoing Left Brachiocephalic AV fistula creation on 8/4/22 with Dr. Vitaly Reza with [facility]. Dr. Reza requested that the patient hold warfarin 4 days prior to procedure and will be directed when to resume warfarin, likely night of or night after procedure.      Marco Antonio Montemayor is a 77 y.o. male on warfarin for a fib, however previous cardiology note from 1/24/19 indicated \"May need bridge with Lovenox given persistent AF\" however, patient Scr 4.35 and beginning dialysis and is therefore not a candidate for lovenox. Only option would be outpatient sq heparin.      [7/31]: Hold warfarin 4 days   [8/4]: procedure,   [8/5] Restart warfarin per Dr Reza [2-3 boosted doses if appropriate]     Please advise if you are agreeable to plan above without a bridge or if you prefer an alternative approach to Marco Antonio Montemayor's anticoagulation plan for the upcoming procedure. In addition, please advise if surgeon's office should contact your office for further cardiac clearance.        Thank you,     Alem Llanes, PharmD, BCPS   7/28/2022  11:30 EDT     [facility] Anticoagulation Team  (t) [phone]  (f) [phone]  "

## 2022-07-28 NOTE — TELEPHONE ENCOUNTER
Noted; I concur with plan to hold warfarin at this time and not pursue any type of bridge with either Lovenox in view of his significant deterioration renal function OR inpatient IV heparin.    Thanks for your excellent help.

## 2022-07-28 NOTE — PROGRESS NOTES
Anticoagulation Clinic Progress Note  Indication: afib  Referring Provider: Tyson (next appointment: 10/15/2021)   Initial Warfarin Start Date: 2007  Goal INR: 2-3  Current Drug Interactions:  torsemide  Diet: eats salads 2x weekly 11/4/20  [id number]: 5 (HTN, Age, CHF, CAD)  Other: anemic, CKD (stage 4) May need bridge with Lovenox given persistent AF (per cardio 1/24/2019), Scr was 3.22 on 11/24/21     Anticoagulation Clinic INR History:   Date 10/30 11/20 12/17 1/14/20 2/11 3/11 4/8 5/6 6/11 7/9 8/6 9/3 9/14 9/30   Total Weekly Dose 32.5 mg 32.5 mg 32.5 mg 32.5 mg 32.5mg 32.5mg 32.5mg 32.5mg 32.5mg 32.5 mg 32.5 mg 32.5mg 32.5mg 26.25 mg   INR 2.3 2.3 2.1 1.9 2.2 2.1 2.1 2.2 2.2 2.3 2.7 1.6 2.2  1.8   Notes                       ??   5 day hold     Date 10/14 11/4 11/11 11/25 12/9 12/16 12/30 1/13 2/3 3/3 3/31 4/28 5/26 6/23   Total Weekly Dose 32.5 mg 32.5mg 35mg 32.5mg 33.75 mg 36.25 mg 33.75 mg 35mg 33.75 33.75mg 33.75mg 33.75 mg 33.75 mg 33.75mg   INR 2.0 1.6 2.1 1.8 1.8 2.5 2.0 3.0 2.1 2.4 2.3 3.0 2.1 2.2   Notes   1x boost GLV boostx1 boostx1             Date 7/21 9/1 10/13 11/10 12/1 12/15 12/29 1/12/22 1/26 2/9 2/16 2/23 3/2 3/16   Total Weekly Dose 33.75mg 33.75mg 33.75mg 33.75mg 33.75 mg 32.5mg 30mg 27.5mg 28.75 mg 30 mg 31.25 mg 31.25 mg 31.25 mg 31.25 mg   INR 2.9 2.5 3.1 3.1 3.2 3.4 3.4 1.9 1.7 1.8 2.1 2.6 2.9 2.9   Notes        redx1 ?  inc        Date 3/30 4/13 5/11 6/8  6/23 7/7 7/28       Total WeeklyDose 31.25mg 31.25 mg 31.25 mg 31.25mg Start dialysis 30mg 30mg 30 mg       INR 2.5 2.2 2.6 3.9  2.1 2.1 1.8       Notes    Inc torsemide Cath placement    AV fistula placement         Takes warfarin in the am    Clinic Interview:  Verbal Release Authorization signed on 8/22/2018 -- may speak with Raeann Montemayor (Wife). [phone] (Home) *Preferred*  Tablet Strength: pt has 7.5mg and 2.5mg tablets    Patient Findings  Positives:  Upcoming invasive procedure   Negatives:  Signs/symptoms of  thrombosis, Signs/symptoms of bleeding, Laboratory test error suspected, Change in health, Change in alcohol use, Change in activity, Emergency department visit, Upcoming dental procedure, Missed doses, Extra doses, Change in medications, Change in diet/appetite, Hospital admission, Bruising, Other complaints   Comments:  Denies any changes.     Patient is to have Left Brachiocephalic AV fistula creation on 8/4/22 with Dr. Vitaly Sandhu with [facility].   [phone] ext [phone]--Dr Reza surgical coordinator.  Will work on surgery plan and send to Dr Mehta          Plan:   1. INR is sub therapeutic today at 1.8 (goal 2-3).   Instructed Mr. Montemayor to boost todays dose to 5mg, then continue warfarin 3.75mg daily except 5mg MonWedFri until surgery hold.  2. RTC 8/9 after procedure   3. Verbal and written information provided in the clinic. Mr. Montemayor expresses understanding with teach back and has no further questions at this time.     Thank you,     Alem Llanes, PharmD, BCPS   7/28/2022  11:36 EDT

## 2022-08-02 ENCOUNTER — TELEPHONE (OUTPATIENT)
Dept: PHARMACY | Facility: HOSPITAL | Age: 77
End: 2022-08-02

## 2022-08-02 ENCOUNTER — PRE-ADMISSION TESTING (OUTPATIENT)
Dept: PREADMISSION TESTING | Facility: HOSPITAL | Age: 77
End: 2022-08-02

## 2022-08-02 VITALS — BODY MASS INDEX: 37.65 KG/M2 | WEIGHT: 263.01 LBS | HEIGHT: 70 IN

## 2022-08-02 LAB
ALBUMIN SERPL-MCNC: 4 G/DL (ref 3.5–5.2)
ALBUMIN/GLOB SERPL: 1.4 G/DL
ALP SERPL-CCNC: 87 U/L (ref 39–117)
ALT SERPL W P-5'-P-CCNC: 10 U/L (ref 1–41)
ANION GAP SERPL CALCULATED.3IONS-SCNC: 15 MMOL/L (ref 5–15)
AST SERPL-CCNC: 12 U/L (ref 1–40)
BILIRUB SERPL-MCNC: 0.4 MG/DL (ref 0–1.2)
BUN SERPL-MCNC: 38 MG/DL (ref 8–23)
BUN/CREAT SERPL: 7.2 (ref 7–25)
CALCIUM SPEC-SCNC: 9 MG/DL (ref 8.6–10.5)
CHLORIDE SERPL-SCNC: 99 MMOL/L (ref 98–107)
CO2 SERPL-SCNC: 27 MMOL/L (ref 22–29)
CREAT SERPL-MCNC: 5.26 MG/DL (ref 0.76–1.27)
DEPRECATED RDW RBC AUTO: 56.7 FL (ref 37–54)
EGFRCR SERPLBLD CKD-EPI 2021: 10.4 ML/MIN/1.73
ERYTHROCYTE [DISTWIDTH] IN BLOOD BY AUTOMATED COUNT: 15.1 % (ref 12.3–15.4)
GLOBULIN UR ELPH-MCNC: 2.8 GM/DL
GLUCOSE SERPL-MCNC: 104 MG/DL (ref 65–99)
HBA1C MFR BLD: 5.2 % (ref 4.8–5.6)
HCT VFR BLD AUTO: 30 % (ref 37.5–51)
HGB BLD-MCNC: 9.2 G/DL (ref 13–17.7)
INR PPP: 1.23 (ref 0.84–1.13)
MCH RBC QN AUTO: 31.4 PG (ref 26.6–33)
MCHC RBC AUTO-ENTMCNC: 30.7 G/DL (ref 31.5–35.7)
MCV RBC AUTO: 102.4 FL (ref 79–97)
PLATELET # BLD AUTO: 239 10*3/MM3 (ref 140–450)
PMV BLD AUTO: 9.5 FL (ref 6–12)
POTASSIUM SERPL-SCNC: 4.2 MMOL/L (ref 3.5–5.2)
PROT SERPL-MCNC: 6.8 G/DL (ref 6–8.5)
PROTHROMBIN TIME: 15.4 SECONDS (ref 11.4–14.4)
RBC # BLD AUTO: 2.93 10*6/MM3 (ref 4.14–5.8)
SARS-COV-2 RNA PNL SPEC NAA+PROBE: NOT DETECTED
SODIUM SERPL-SCNC: 141 MMOL/L (ref 136–145)
WBC NRBC COR # BLD: 8.97 10*3/MM3 (ref 3.4–10.8)

## 2022-08-02 PROCEDURE — 85610 PROTHROMBIN TIME: CPT

## 2022-08-02 PROCEDURE — C9803 HOPD COVID-19 SPEC COLLECT: HCPCS

## 2022-08-02 PROCEDURE — 85027 COMPLETE CBC AUTOMATED: CPT

## 2022-08-02 PROCEDURE — U0004 COV-19 TEST NON-CDC HGH THRU: HCPCS

## 2022-08-02 PROCEDURE — 36415 COLL VENOUS BLD VENIPUNCTURE: CPT

## 2022-08-02 PROCEDURE — 80053 COMPREHEN METABOLIC PANEL: CPT

## 2022-08-02 PROCEDURE — 83036 HEMOGLOBIN GLYCOSYLATED A1C: CPT

## 2022-08-02 RX ORDER — LISINOPRIL 5 MG/1
5 TABLET ORAL DAILY
COMMUNITY

## 2022-08-02 RX ORDER — CARVEDILOL 25 MG/1
12.5 TABLET ORAL 2 TIMES DAILY WITH MEALS
COMMUNITY

## 2022-08-02 RX ORDER — SEVELAMER CARBONATE 800 MG/1
800 TABLET, FILM COATED ORAL
COMMUNITY
End: 2023-01-12

## 2022-08-02 NOTE — PAT
covid in pat.     ekg on chart from 6/30/22 with clearance on chart about blood thinner.     Pm interrogation on chart from 6/30/22.     bactroban given to patient and explained to use night before surgery - pt verbalize understanding.     Per Anesthesia Request, patient instructed not to take their ACE/ARB medications on the AM of surgery.    Patient to apply Chlorhexadine wipes  to surgical area (as instructed) the night before procedure and the AM of procedure. Wipes provided.    Patient viewed general PAT education video as instructed in their preoperative information received from their surgeon.  Patient stated the general PAT education video was viewed in its entirety and survey completed.  Copies of [facility] general education handouts (Incentive Spirometry, Meds to Beds Program, Patient Belongings, Pre-op skin preparation instructions, Blood Glucose testing, Visitor policy, Surgery FAQ, Code H) distributed to patient if not printed. Education related to the PAT pass and skin preparation for surgery (if applicable) completed in PAT as a reinforcement to PAT education video. Patient instructed to return PAT pass provided today as well as completed skin preparation sheet (if applicable) on the day of procedure.     Additionally if patient had not viewed video yet but intended to view it at home or in our waiting area, then referred them to the handout with QR code/link provided during PAT visit.  Instructed patient to complete survey after viewing the video in its entirety.  Encouraged patient/family to read [facility] general education handouts thoroughly and notify PAT staff with any questions or concerns. Patient verbalized understanding of all information and priority content.

## 2022-08-02 NOTE — TELEPHONE ENCOUNTER
[facility] order received-- will discuss setting up training with patient post procedure

## 2022-08-04 ENCOUNTER — ANESTHESIA (OUTPATIENT)
Dept: PERIOP | Facility: HOSPITAL | Age: 77
End: 2022-08-04

## 2022-08-04 ENCOUNTER — HOSPITAL ENCOUNTER (OUTPATIENT)
Facility: HOSPITAL | Age: 77
Setting detail: HOSPITAL OUTPATIENT SURGERY
Discharge: HOME | End: 2022-08-04
Attending: SURGERY | Admitting: SURGERY

## 2022-08-04 ENCOUNTER — ANESTHESIA EVENT (OUTPATIENT)
Dept: PERIOP | Facility: HOSPITAL | Age: 77
End: 2022-08-04

## 2022-08-04 ENCOUNTER — ANESTHESIA EVENT CONVERTED (OUTPATIENT)
Dept: ANESTHESIOLOGY | Facility: HOSPITAL | Age: 77
End: 2022-08-04

## 2022-08-04 VITALS
WEIGHT: 260 LBS | SYSTOLIC BLOOD PRESSURE: 109 MMHG | RESPIRATION RATE: 16 BRPM | HEART RATE: 100 BPM | DIASTOLIC BLOOD PRESSURE: 61 MMHG | TEMPERATURE: 97.4 F | HEIGHT: 70 IN | BODY MASS INDEX: 37.22 KG/M2 | OXYGEN SATURATION: 93 %

## 2022-08-04 DIAGNOSIS — N18.30 STAGE 3 CHRONIC KIDNEY DISEASE, UNSPECIFIED WHETHER STAGE 3A OR 3B CKD: Primary | ICD-10-CM

## 2022-08-04 LAB — POTASSIUM SERPL-SCNC: 4.5 MMOL/L (ref 3.5–5.2)

## 2022-08-04 PROCEDURE — 25010000002 HEPARIN (PORCINE) PER 1000 UNITS: Performed by: SURGERY

## 2022-08-04 PROCEDURE — 84132 ASSAY OF SERUM POTASSIUM: CPT | Performed by: ANESTHESIOLOGY

## 2022-08-04 PROCEDURE — 0 LIDOCAINE 1 % SOLUTION: Performed by: NURSE ANESTHETIST, CERTIFIED REGISTERED

## 2022-08-04 PROCEDURE — 25010000002 FENTANYL CITRATE (PF) 50 MCG/ML SOLUTION: Performed by: NURSE ANESTHETIST, CERTIFIED REGISTERED

## 2022-08-04 PROCEDURE — 25010000002 VANCOMYCIN 10 G RECONSTITUTED SOLUTION

## 2022-08-04 PROCEDURE — 25010000002 ROPIVACAINE PER 1 MG: Performed by: NURSE ANESTHETIST, CERTIFIED REGISTERED

## 2022-08-04 PROCEDURE — 25010000002 PROPOFOL 10 MG/ML EMULSION: Performed by: NURSE ANESTHETIST, CERTIFIED REGISTERED

## 2022-08-04 PROCEDURE — 25010000002 DEXAMETHASONE SODIUM PHOSPHATE 10 MG/ML SOLUTION: Performed by: NURSE ANESTHETIST, CERTIFIED REGISTERED

## 2022-08-04 DEVICE — LIGACLIP MCA MULTIPLE CLIP APPLIERS, 20 SMALL CLIPS
Type: IMPLANTABLE DEVICE | Site: ARM | Status: FUNCTIONAL
Brand: LIGACLIP

## 2022-08-04 RX ORDER — FENTANYL CITRATE 50 UG/ML
50 INJECTION, SOLUTION INTRAMUSCULAR; INTRAVENOUS
Status: DISCONTINUED | OUTPATIENT
Start: 2022-08-04 | End: 2022-08-04 | Stop reason: HOSPADM

## 2022-08-04 RX ORDER — PROMETHAZINE HYDROCHLORIDE 25 MG/1
25 TABLET ORAL ONCE AS NEEDED
Status: DISCONTINUED | OUTPATIENT
Start: 2022-08-04 | End: 2022-08-04 | Stop reason: HOSPADM

## 2022-08-04 RX ORDER — SODIUM CHLORIDE, SODIUM LACTATE, POTASSIUM CHLORIDE, CALCIUM CHLORIDE 600; 310; 30; 20 MG/100ML; MG/100ML; MG/100ML; MG/100ML
INJECTION, SOLUTION INTRAVENOUS CONTINUOUS PRN
Status: DISCONTINUED | OUTPATIENT
Start: 2022-08-04 | End: 2022-08-04 | Stop reason: SURG

## 2022-08-04 RX ORDER — SODIUM CHLORIDE 9 MG/ML
9 INJECTION, SOLUTION INTRAVENOUS CONTINUOUS
Status: DISCONTINUED | OUTPATIENT
Start: 2022-08-04 | End: 2022-08-04 | Stop reason: HOSPADM

## 2022-08-04 RX ORDER — LABETALOL HYDROCHLORIDE 5 MG/ML
5 INJECTION, SOLUTION INTRAVENOUS
Status: DISCONTINUED | OUTPATIENT
Start: 2022-08-04 | End: 2022-08-04 | Stop reason: HOSPADM

## 2022-08-04 RX ORDER — PROPOFOL 10 MG/ML
VIAL (ML) INTRAVENOUS CONTINUOUS PRN
Status: DISCONTINUED | OUTPATIENT
Start: 2022-08-04 | End: 2022-08-04 | Stop reason: SURG

## 2022-08-04 RX ORDER — SODIUM CHLORIDE 0.9 % (FLUSH) 0.9 %
10 SYRINGE (ML) INJECTION EVERY 12 HOURS SCHEDULED
Status: DISCONTINUED | OUTPATIENT
Start: 2022-08-04 | End: 2022-08-04 | Stop reason: HOSPADM

## 2022-08-04 RX ORDER — HYDROMORPHONE HYDROCHLORIDE 1 MG/ML
0.5 INJECTION, SOLUTION INTRAMUSCULAR; INTRAVENOUS; SUBCUTANEOUS
Status: DISCONTINUED | OUTPATIENT
Start: 2022-08-04 | End: 2022-08-04 | Stop reason: HOSPADM

## 2022-08-04 RX ORDER — FAMOTIDINE 10 MG/ML
20 INJECTION, SOLUTION INTRAVENOUS ONCE
Status: CANCELLED | OUTPATIENT
Start: 2022-08-04 | End: 2022-08-04

## 2022-08-04 RX ORDER — HYDROCODONE BITARTRATE AND ACETAMINOPHEN 5; 325 MG/1; MG/1
1-2 TABLET ORAL EVERY 6 HOURS PRN
Qty: 5 TABLET | Refills: 0 | Status: ON HOLD | OUTPATIENT
Start: 2022-08-04 | End: 2022-11-03

## 2022-08-04 RX ORDER — FAMOTIDINE 20 MG/1
20 TABLET, FILM COATED ORAL ONCE
Status: COMPLETED | OUTPATIENT
Start: 2022-08-04 | End: 2022-08-04

## 2022-08-04 RX ORDER — ONDANSETRON 2 MG/ML
4 INJECTION INTRAMUSCULAR; INTRAVENOUS ONCE AS NEEDED
Status: DISCONTINUED | OUTPATIENT
Start: 2022-08-04 | End: 2022-08-04 | Stop reason: HOSPADM

## 2022-08-04 RX ORDER — HYDROCODONE BITARTRATE AND ACETAMINOPHEN 5; 325 MG/1; MG/1
1 TABLET ORAL ONCE AS NEEDED
Status: DISCONTINUED | OUTPATIENT
Start: 2022-08-04 | End: 2022-08-04 | Stop reason: HOSPADM

## 2022-08-04 RX ORDER — SODIUM CHLORIDE 0.9 % (FLUSH) 0.9 %
10 SYRINGE (ML) INJECTION AS NEEDED
Status: DISCONTINUED | OUTPATIENT
Start: 2022-08-04 | End: 2022-08-04 | Stop reason: HOSPADM

## 2022-08-04 RX ORDER — SODIUM CHLORIDE 0.9 % (FLUSH) 0.9 %
3 SYRINGE (ML) INJECTION EVERY 12 HOURS SCHEDULED
Status: DISCONTINUED | OUTPATIENT
Start: 2022-08-04 | End: 2022-08-04 | Stop reason: HOSPADM

## 2022-08-04 RX ORDER — HYDRALAZINE HYDROCHLORIDE 20 MG/ML
5 INJECTION INTRAMUSCULAR; INTRAVENOUS
Status: DISCONTINUED | OUTPATIENT
Start: 2022-08-04 | End: 2022-08-04 | Stop reason: HOSPADM

## 2022-08-04 RX ORDER — DROPERIDOL 2.5 MG/ML
0.62 INJECTION, SOLUTION INTRAMUSCULAR; INTRAVENOUS ONCE AS NEEDED
Status: DISCONTINUED | OUTPATIENT
Start: 2022-08-04 | End: 2022-08-04 | Stop reason: HOSPADM

## 2022-08-04 RX ORDER — DROPERIDOL 2.5 MG/ML
0.62 INJECTION, SOLUTION INTRAMUSCULAR; INTRAVENOUS
Status: DISCONTINUED | OUTPATIENT
Start: 2022-08-04 | End: 2022-08-04 | Stop reason: HOSPADM

## 2022-08-04 RX ORDER — ROPIVACAINE HYDROCHLORIDE 5 MG/ML
INJECTION, SOLUTION EPIDURAL; INFILTRATION; PERINEURAL
Status: COMPLETED | OUTPATIENT
Start: 2022-08-04 | End: 2022-08-04

## 2022-08-04 RX ORDER — MEPERIDINE HYDROCHLORIDE 25 MG/ML
12.5 INJECTION INTRAMUSCULAR; INTRAVENOUS; SUBCUTANEOUS
Status: DISCONTINUED | OUTPATIENT
Start: 2022-08-04 | End: 2022-08-04 | Stop reason: HOSPADM

## 2022-08-04 RX ORDER — SODIUM CHLORIDE 0.9 % (FLUSH) 0.9 %
3-10 SYRINGE (ML) INJECTION AS NEEDED
Status: DISCONTINUED | OUTPATIENT
Start: 2022-08-04 | End: 2022-08-04 | Stop reason: HOSPADM

## 2022-08-04 RX ORDER — PROMETHAZINE HYDROCHLORIDE 25 MG/1
25 SUPPOSITORY RECTAL ONCE AS NEEDED
Status: DISCONTINUED | OUTPATIENT
Start: 2022-08-04 | End: 2022-08-04 | Stop reason: HOSPADM

## 2022-08-04 RX ORDER — NALOXONE HCL 0.4 MG/ML
0.4 VIAL (ML) INJECTION AS NEEDED
Status: DISCONTINUED | OUTPATIENT
Start: 2022-08-04 | End: 2022-08-04 | Stop reason: HOSPADM

## 2022-08-04 RX ORDER — FENTANYL CITRATE 50 UG/ML
INJECTION, SOLUTION INTRAMUSCULAR; INTRAVENOUS
Status: COMPLETED | OUTPATIENT
Start: 2022-08-04 | End: 2022-08-04

## 2022-08-04 RX ORDER — MIDAZOLAM HYDROCHLORIDE 1 MG/ML
0.5 INJECTION INTRAMUSCULAR; INTRAVENOUS
Status: DISCONTINUED | OUTPATIENT
Start: 2022-08-04 | End: 2022-08-04 | Stop reason: HOSPADM

## 2022-08-04 RX ORDER — LIDOCAINE HYDROCHLORIDE 10 MG/ML
0.5 INJECTION, SOLUTION EPIDURAL; INFILTRATION; INTRACAUDAL; PERINEURAL ONCE AS NEEDED
Status: DISCONTINUED | OUTPATIENT
Start: 2022-08-04 | End: 2022-08-04 | Stop reason: HOSPADM

## 2022-08-04 RX ORDER — LIDOCAINE HYDROCHLORIDE 10 MG/ML
INJECTION, SOLUTION INFILTRATION; PERINEURAL AS NEEDED
Status: DISCONTINUED | OUTPATIENT
Start: 2022-08-04 | End: 2022-08-04 | Stop reason: SURG

## 2022-08-04 RX ORDER — DEXAMETHASONE SODIUM PHOSPHATE 10 MG/ML
INJECTION, SOLUTION INTRAMUSCULAR; INTRAVENOUS
Status: COMPLETED | OUTPATIENT
Start: 2022-08-04 | End: 2022-08-04

## 2022-08-04 RX ORDER — DEXMEDETOMIDINE HYDROCHLORIDE 100 UG/ML
INJECTION, SOLUTION INTRAVENOUS AS NEEDED
Status: DISCONTINUED | OUTPATIENT
Start: 2022-08-04 | End: 2022-08-04 | Stop reason: SURG

## 2022-08-04 RX ORDER — BUPIVACAINE HCL/0.9 % NACL/PF 0.125 %
PLASTIC BAG, INJECTION (ML) EPIDURAL AS NEEDED
Status: DISCONTINUED | OUTPATIENT
Start: 2022-08-04 | End: 2022-08-04 | Stop reason: SURG

## 2022-08-04 RX ORDER — IPRATROPIUM BROMIDE AND ALBUTEROL SULFATE 2.5; .5 MG/3ML; MG/3ML
3 SOLUTION RESPIRATORY (INHALATION) ONCE AS NEEDED
Status: DISCONTINUED | OUTPATIENT
Start: 2022-08-04 | End: 2022-08-04 | Stop reason: HOSPADM

## 2022-08-04 RX ORDER — SODIUM CHLORIDE, SODIUM LACTATE, POTASSIUM CHLORIDE, CALCIUM CHLORIDE 600; 310; 30; 20 MG/100ML; MG/100ML; MG/100ML; MG/100ML
9 INJECTION, SOLUTION INTRAVENOUS CONTINUOUS
Status: DISCONTINUED | OUTPATIENT
Start: 2022-08-04 | End: 2022-08-04

## 2022-08-04 RX ORDER — DOCUSATE SODIUM 250 MG
250 CAPSULE ORAL 2 TIMES DAILY
Qty: 14 CAPSULE | Refills: 0 | Status: ON HOLD | OUTPATIENT
Start: 2022-08-04 | End: 2022-11-03

## 2022-08-04 RX ADMIN — Medication 100 MCG: at 13:41

## 2022-08-04 RX ADMIN — Medication 100 MCG: at 13:34

## 2022-08-04 RX ADMIN — SODIUM CHLORIDE, POTASSIUM CHLORIDE, SODIUM LACTATE AND CALCIUM CHLORIDE: 600; 310; 30; 20 INJECTION, SOLUTION INTRAVENOUS at 13:11

## 2022-08-04 RX ADMIN — ROPIVACAINE HYDROCHLORIDE 30 ML: 5 INJECTION, SOLUTION EPIDURAL; INFILTRATION; PERINEURAL at 12:08

## 2022-08-04 RX ADMIN — DEXMEDETOMIDINE HYDROCHLORIDE 4 MCG: 100 INJECTION, SOLUTION INTRAVENOUS at 13:11

## 2022-08-04 RX ADMIN — DEXMEDETOMIDINE HYDROCHLORIDE 4 MCG: 100 INJECTION, SOLUTION INTRAVENOUS at 13:16

## 2022-08-04 RX ADMIN — Medication 100 MCG: at 13:53

## 2022-08-04 RX ADMIN — LIDOCAINE HYDROCHLORIDE 40 MG: 10 INJECTION, SOLUTION INFILTRATION; PERINEURAL at 13:11

## 2022-08-04 RX ADMIN — Medication 100 MCG: at 13:59

## 2022-08-04 RX ADMIN — Medication 100 MCG: at 13:45

## 2022-08-04 RX ADMIN — Medication 100 MCG: at 14:02

## 2022-08-04 RX ADMIN — Medication 100 MCG: at 13:23

## 2022-08-04 RX ADMIN — FENTANYL CITRATE 100 MCG: 50 INJECTION, SOLUTION INTRAMUSCULAR; INTRAVENOUS at 12:08

## 2022-08-04 RX ADMIN — Medication 1750 MG: at 13:16

## 2022-08-04 RX ADMIN — Medication 100 MCG: at 13:26

## 2022-08-04 RX ADMIN — PROPOFOL 75 MCG/KG/MIN: 10 INJECTION, EMULSION INTRAVENOUS at 13:11

## 2022-08-04 RX ADMIN — DEXAMETHASONE SODIUM PHOSPHATE 2 MG: 10 INJECTION, SOLUTION INTRAMUSCULAR; INTRAVENOUS at 12:08

## 2022-08-04 RX ADMIN — Medication 100 MCG: at 14:11

## 2022-08-04 RX ADMIN — SODIUM CHLORIDE 9 ML/HR: 9 INJECTION, SOLUTION INTRAVENOUS at 12:12

## 2022-08-04 RX ADMIN — FAMOTIDINE 20 MG: 20 TABLET ORAL at 12:12

## 2022-08-04 RX ADMIN — Medication 100 MCG: at 14:07

## 2022-08-04 NOTE — ANESTHESIA PROCEDURE NOTES
SS Supraclav      Patient reassessed immediately prior to procedure    Patient location during procedure: pre-op  Reason for block: at surgeon's request and post-op pain management  Performed by  CRNA/CAA: Zahraa Caputo CRNA  Assisted by: Anya Price RN  Preanesthetic Checklist  Completed: patient identified, IV checked, site marked, risks and benefits discussed, surgical consent, monitors and equipment checked, pre-op evaluation and timeout performed  Prep:  Pt Position: right lateral decubitus  Sterile barriers:cap, gloves, mask, sterile barriers and washed/disinfected hands  Prep: ChloraPrep  Patient monitoring: blood pressure monitoring, continuous pulse oximetry and EKG  Procedure    Sedation: yes  Performed under: local infiltration  Guidance:ultrasound guided  Images:still images obtained    Laterality:left  Block Type:supraclavicular  Injection Technique:single-shot  Needle Type:echogenic and short-bevel  Needle Gauge:20 G  Resistance on Injection: none    Medications Used: ropivacaine (NAROPIN) 0.5 % injection, 30 mL  dexamethasone sodium phosphate injection, 2 mg  fentaNYL citrate (PF) (SUBLIMAZE) injection, 100 mcg  Med administered at 8/4/2022 12:08 PM      Post Assessment  Injection Assessment: negative aspiration for heme, no paresthesia on injection and incremental injection  Patient Tolerance:comfortable throughout block  Complications:no  Additional Notes  Procedure:                 The pt was placed in semifowlers position with a slight tilt of the thorax contralateral to the insertion site.  The Insertion Site was prepped and draped in sterile fashion.  The pt was anesthetized with  IV Sedation( see meds) and  skin and cutaneous tissue where infiltrated and anesthetized with 1% Lidocaine 3 mls via a 25g needle.  Utilizing ultrasound guidance, a BBraun  Contiplex needle was advanced in-plane in a lateral to medial direction.  Hydro dissection of tissue was achieved with Normal saline. Major  vessels(supraclavicular artery) and the pleura where visualized as the brachial plexus was approached at the level immediately adjacent and superior to the clavicle. LA spread was visualized and injection was made incrementally every 5 mls with aspiration. Injection pressure was normal or little, there was no intraneural injection, no vascular injection. Thank You.

## 2022-08-04 NOTE — OP NOTE
General Surgery Operative Note    Marco Antonio Montemayor  [id number]  1945    Date of Surgery:  8/4/2022 14:29 EDT    Pre-op Diagnosis: Chronic renal failure    Post-op Diagnosis: Chronic renal failure    Procedure: Arteriovenous fistula creation, brachiocephalic, left    Surgeon: Jakob Reza MD    Anesthesia: Monitored Anesthesia Care with Regional block    Fluids: 300 mL of crystalloid    Estimated Blood Loss: Less than 25 mL    Urine Voided: Not recorded     Drains: None    Implant: None    Findings: The brachial artery was adequate with moderate calcification.                   The cephalic outflow vein was adequate.                   There was a noted  thrill in the outflow vein.    Complications: None apparent.    History:   77-year-old gentleman with chronic renal failure requires long-term hemodialysis access.      I had a discussion regarding the risks and benefits of arteriovenous fistula creation, including but not limited to: bleeding, infection, injury to adjacent viscera (vessels and nerves), permanent neurovascular or neuromuscular deficits, a steal syndrome, non-maturation, chronic pain, need for re-intervention, distal arterial embolization, and medical issues from a cardiopulmonary and deep venous thrombosis standpoint.  All their questions were answered and they wish to proceed.     Procedure:      After informed consent the patient was taken to the operating room and placed in the supine position on the operating table.  Adequate cardiopulmonary monitoring was placed by anesthesia and conscious sedation induced. The left upper extremity was prepped and draped in the standard sterile fashion, an ioban was placed on the skin, and a time out was observed.     An incision was created over antecubital crease. I dissected down through the subcutaneous elements to the median cubital vein which is in continuity with the cephalic vein.  This vein appeared adequate for fistula creation.  The  brachial artery was then dissected.  The aponeurosis overlying the brachial artery was opened sharply.  The artery was dissected free from the brachial vein.  This was then enncircled with a vessel loop.  The brachial artery was of adequate size with moderate calcification.  The cephalic vein was then dissected distally as possible to allow adequate length for a tension-free anastomosis.  This was divided with the vessel clips.  This was then flushed with heparinized saline and a small Diethrich clamp placed across it.  The brachial artery was then clamped proximally and distally, the arteriotomy was created with an 11 blade scalpel, and the Cooper scissors were used to extend the arteriotomy to match the aperture of the cephalic vein.  Then in an end-to-side fashion with a running 6-0 Prolene the arterial anastomosis was performed.  The system was appropriately de-aired and flushed.  All clamps were then removed and the arterial anastomosis was tied down under full systemic arterial pressure.  Meticulous hemostasis was obtained.  The wound was copiously irrigated.  The subcutaneous elements were reapproximated with interrupted 3-0 Vicryl.  The skin was run with a 4-0 subcuticular Monocryl.  And skin glue was placed on the incision.\     Sterile dressings were placed on the wound.  All lap and needle counts were correct at the end of the procedure ×2.  The patient was then transferred to the recovery room in stable condition.     Jakob Reza MD     Date: 8/4/2022  Time: 14:29 EDT

## 2022-08-04 NOTE — INTERVAL H&P NOTE
[facility] Pre-op    Full history and physical note from office is attached.    VS: /74  HR 93  RR 16  T 97.7  sat 94%RA    Immunizations:  Influenza:  No  Pneumococcal:  No  Tetanus:  No  Covid : No      LAB Results:  Lab Results   Component Value Date    WBC 8.97 08/02/2022    HGB 9.2 (L) 08/02/2022    HCT 30.0 (L) 08/02/2022    .4 (H) 08/02/2022     08/02/2022    NEUTROABS 6.29 05/21/2022    GLUCOSE 104 (H) 08/02/2022    BUN 38 (H) 08/02/2022    CREATININE 5.26 (H) 08/02/2022    EGFRIFNONA 18 (L) 02/16/2022    EGFRIFAFRI 26 (L) 11/12/2018     08/02/2022    K 4.2 08/02/2022    CL 99 08/02/2022    CO2 27.0 08/02/2022    MG 2.1 10/13/2021    PHOS 3.5 02/06/2019    CALCIUM 9.0 08/02/2022    ALBUMIN 4.00 08/02/2022    AST 12 08/02/2022    ALT 10 08/02/2022    BILITOT 0.4 08/02/2022    PTT 31.8 02/05/2019    INR 1.23 (H) 08/02/2022 7/19/22 mapping:  Interpretation Summary    · Patent left upper extremity venous system. Of note there is a pacemaker lead within the left subclavian vein.  · Patent left brachial radial and ulnar arteries.  · Consider right-sided study given presence of left-sided pacemaker.      Cancer Staging (if applicable)  Cancer Patient: __ yes __no __unknown__N/A; If yes, clinical stage T:__ N:__M:__, stage group or __N/A      Impression: Chronic kidney disease       Plan: LEFT UPPER EXTREMITY BRACHIOCEPHALIC ARTERIOVENOUS FISTULA FORMATION      JENNIFER Grady   8/4/2022   11:32 EDT     I have reviewed the above note, my prior note(s), appropriate imaging, and labs.  I have again discussed the risks and benefits of left brachiocephalic AV fistula creation with the patient.  All of their questions have been answered.  They understand and wish to proceed with surgical intervention.    CBC  Results from last 7 days   Lab Units 08/02/22  1405   WBC 10*3/mm3 8.97   HEMOGLOBIN g/dL 9.2*   HEMATOCRIT % 30.0*   PLATELETS 10*3/mm3 239       CMP  Results from  last 7 days   Lab Units 08/04/22  1156 08/02/22  1405   SODIUM mmol/L  --  141   POTASSIUM mmol/L 4.5 4.2   CHLORIDE mmol/L  --  99   CO2 mmol/L  --  27.0   BUN mg/dL  --  38*   CREATININE mg/dL  --  5.26*   CALCIUM mg/dL  --  9.0   BILIRUBIN mg/dL  --  0.4   ALK PHOS U/L  --  87   ALT (SGPT) U/L  --  10   AST (SGOT) U/L  --  12   GLUCOSE mg/dL  --  104*       Coagulation Cascade  PTT   Date Value Ref Range Status   02/05/2019 31.8 24.0 - 37.0 seconds Final     INR   Date Value Ref Range Status   08/02/2022 1.23 (H) 0.84 - 1.13 Final     Protime   Date Value Ref Range Status   08/02/2022 15.4 (H) 11.4 - 14.4 Seconds Final       No components found for: ECHO

## 2022-08-04 NOTE — ANESTHESIA POSTPROCEDURE EVALUATION
Patient: Marco Antonio Montemayor    Procedure Summary     Date: 08/04/22 Room / Location:  [facility] OR 08 /  [facility]    Anesthesia Start: 1306 Anesthesia Stop:     Procedure: LEFT UPPER EXTREMITY BRACHIOCEPHALIC ARTERIOVENOUS FISTULA FORMATION (Left ) Diagnosis:     Surgeons: Jakob Reza MD Provider: Vitaly Reyes MD    Anesthesia Type: MAC, regional ASA Status: 4          Anesthesia Type: MAC, regional    Vitals  No vitals data found for the desired time range.    spo2 95  HR 92  bp 115/55        Post Anesthesia Care and Evaluation    Patient location during evaluation: PACU  Patient participation: complete - patient participated  Level of consciousness: awake and alert  Pain management: adequate    Airway patency: patent  Anesthetic complications: No anesthetic complications  PONV Status: none  Cardiovascular status: hemodynamically stable and acceptable  Respiratory status: nonlabored ventilation, acceptable and nasal cannula  Hydration status: acceptable

## 2022-08-04 NOTE — DISCHARGE INSTRUCTIONS
You can transition from narcotic to plain tylenol for pain as long as you do not take more than 4 grams (4,000 mg) tylenol from ALL sources in a 24 hour period.

## 2022-08-04 NOTE — ANESTHESIA PREPROCEDURE EVALUATION
Anesthesia Evaluation                  Airway   Mallampati: I  TM distance: >3 FB  Neck ROM: full  No difficulty expected  Dental      Pulmonary    (+) sleep apnea,   Cardiovascular     ECG reviewed    (+) hypertension, valvular problems/murmurs murmur, CAD, dysrhythmias PAC, CHF ,       Neuro/Psych  GI/Hepatic/Renal/Endo    (+) morbid obesity,  renal disease ESRD,     Musculoskeletal     Abdominal    Substance History      OB/GYN          Other   arthritis,    history of cancer                    Anesthesia Plan    ASA 4     MAC and regional     (Scnb/left, site marked)  intravenous induction     Anesthetic plan, risks, benefits, and alternatives have been provided, discussed and informed consent has been obtained with: patient.    Plan discussed with CRNA.        CODE STATUS:

## 2022-08-09 ENCOUNTER — ANTICOAGULATION VISIT (OUTPATIENT)
Dept: PHARMACY | Facility: HOSPITAL | Age: 77
End: 2022-08-09

## 2022-08-09 LAB
INR PPP: 1.4 (ref 0.91–1.09)
PROTHROMBIN TIME: 16.8 SECONDS (ref 10–13.8)

## 2022-08-09 PROCEDURE — 85610 PROTHROMBIN TIME: CPT

## 2022-08-09 PROCEDURE — 36416 COLLJ CAPILLARY BLOOD SPEC: CPT

## 2022-08-09 PROCEDURE — G0463 HOSPITAL OUTPT CLINIC VISIT: HCPCS

## 2022-08-09 NOTE — PROGRESS NOTES
Anticoagulation Clinic Progress Note  Indication: afib  Referring Provider: Tyson (next appointment: 10/15/2021)   Initial Warfarin Start Date: 2007  Goal INR: 2-3  Current Drug Interactions:  torsemide  Diet: eats salads 2x weekly 11/4/20  [id number]: 5 (HTN, Age, CHF, CAD)  Other: anemic, CKD (stage 4) May need bridge with Lovenox given persistent AF (per cardio 1/24/2019), Scr was 3.22 on 11/24/21     Anticoagulation Clinic INR History:   Date 10/30 11/20 12/17 1/14/20 2/11 3/11 4/8 5/6 6/11 7/9 8/6 9/3 9/14 9/30   Total Weekly Dose 32.5 mg 32.5 mg 32.5 mg 32.5 mg 32.5mg 32.5mg 32.5mg 32.5mg 32.5mg 32.5 mg 32.5 mg 32.5mg 32.5mg 26.25 mg   INR 2.3 2.3 2.1 1.9 2.2 2.1 2.1 2.2 2.2 2.3 2.7 1.6 2.2  1.8   Notes                       ??   5 day hold     Date 10/14 11/4 11/11 11/25 12/9 12/16 12/30 1/13 2/3 3/3 3/31 4/28 5/26 6/23   Total Weekly Dose 32.5 mg 32.5mg 35mg 32.5mg 33.75 mg 36.25 mg 33.75 mg 35mg 33.75 33.75mg 33.75mg 33.75 mg 33.75 mg 33.75mg   INR 2.0 1.6 2.1 1.8 1.8 2.5 2.0 3.0 2.1 2.4 2.3 3.0 2.1 2.2   Notes   1x boost GLV boostx1 boostx1             Date 7/21 9/1 10/13 11/10 12/1 12/15 12/29 1/12/22 1/26 2/9 2/16 2/23 3/2 3/16   Total Weekly Dose 33.75mg 33.75mg 33.75mg 33.75mg 33.75 mg 32.5mg 30mg 27.5mg 28.75 mg 30 mg 31.25 mg 31.25 mg 31.25 mg 31.25 mg   INR 2.9 2.5 3.1 3.1 3.2 3.4 3.4 1.9 1.7 1.8 2.1 2.6 2.9 2.9   Notes        redx1 ?  inc        Date 3/30 4/13 5/11 6/8  6/23 7/7 7/28  8/9     Total WeeklyDose 31.25mg 31.25 mg 31.25 mg 31.25mg Start dialysis 30mg 30mg 30 mg  33.75mg     INR 2.5 2.2 2.6 3.9  2.1 2.1 1.8 1.23 1.4     Notes    Inc torsemide Cath placement    AV fistula placement       Takes warfarin in the am    Clinic Interview:  Verbal Release Authorization signed on 8/22/2018 -- may speak with Raeann Montemayor (Wife). [phone] (Home) *Preferred*  Tablet Strength: pt has 7.5mg and 2.5mg tablets    Patient Findings  Negatives:  Signs/symptoms of thrombosis, Signs/symptoms of  bleeding, Laboratory test error suspected, Change in health, Change in alcohol use, Change in activity, Upcoming invasive procedure, Emergency department visit, Upcoming dental procedure, Missed doses, Extra doses, Change in medications, Change in diet/appetite, Hospital admission, Bruising, Other complaints   Comments:  Denies any changes. Patient had Left Brachiocephalic AV fistula creation on 8/4/22 with Dr. Vitaly Sandhu with [facility]. He is feeling better and his appetite is normal. No new medications. He reports taking 5mg on Saturday instead of 7.5mg.     Plan:   1. INR is subtherapeutic today at 1.4 following hold for procedure (goal 2-3). Instructed Mr. Montemayor to boost todays dose to 7.5mg today, then continue warfarin 3.75mg daily except 5mg MonWedFri.   2. RTC 8/12 to ensure WNL.   3. Verbal and written information provided in the clinic. Mr. Montemayor expresses understanding with teach back and has no further questions at this time.     Evelina Quan, PharmD  8/9/2022  11:08 EDT

## 2022-08-12 ENCOUNTER — ANTICOAGULATION VISIT (OUTPATIENT)
Dept: PHARMACY | Facility: HOSPITAL | Age: 77
End: 2022-08-12

## 2022-08-12 LAB
INR PPP: 1.5 (ref 0.91–1.09)
PROTHROMBIN TIME: 18.1 SECONDS (ref 10–13.8)

## 2022-08-12 PROCEDURE — 36416 COLLJ CAPILLARY BLOOD SPEC: CPT

## 2022-08-12 PROCEDURE — G0463 HOSPITAL OUTPT CLINIC VISIT: HCPCS

## 2022-08-12 PROCEDURE — 85610 PROTHROMBIN TIME: CPT

## 2022-08-12 NOTE — PROGRESS NOTES
Anticoagulation Clinic Progress Note  Indication: afib  Referring Provider: Tyson (next appointment: 10/15/2021)   Initial Warfarin Start Date: 2007  Goal INR: 2-3  Current Drug Interactions:  torsemide  Diet: eats salads 2x weekly 11/4/20  [id number]: 5 (HTN, Age, CHF, CAD)  Other: anemic, CKD (stage 4) May need bridge with Lovenox given persistent AF (per cardio 1/24/2019), Scr was 3.22 on 11/24/21     Anticoagulation Clinic INR History:   Date 10/30 11/20 12/17 1/14/20 2/11 3/11 4/8 5/6 6/11 7/9 8/6 9/3 9/14 9/30   Total Weekly Dose 32.5 mg 32.5 mg 32.5 mg 32.5 mg 32.5mg 32.5mg 32.5mg 32.5mg 32.5mg 32.5 mg 32.5 mg 32.5mg 32.5mg 26.25 mg   INR 2.3 2.3 2.1 1.9 2.2 2.1 2.1 2.2 2.2 2.3 2.7 1.6 2.2  1.8   Notes                       ??   5 day hold     Date 10/14 11/4 11/11 11/25 12/9 12/16 12/30 1/13 2/3 3/3 3/31 4/28 5/26 6/23   Total Weekly Dose 32.5 mg 32.5mg 35mg 32.5mg 33.75 mg 36.25 mg 33.75 mg 35mg 33.75 33.75mg 33.75mg 33.75 mg 33.75 mg 33.75mg   INR 2.0 1.6 2.1 1.8 1.8 2.5 2.0 3.0 2.1 2.4 2.3 3.0 2.1 2.2   Notes   1x boost GLV boostx1 boostx1             Date 7/21 9/1 10/13 11/10 12/1 12/15 12/29 1/12/22 1/26 2/9 2/16 2/23 3/2 3/16   Total Weekly Dose 33.75mg 33.75mg 33.75mg 33.75mg 33.75 mg 32.5mg 30mg 27.5mg 28.75 mg 30 mg 31.25 mg 31.25 mg 31.25 mg 31.25 mg   INR 2.9 2.5 3.1 3.1 3.2 3.4 3.4 1.9 1.7 1.8 2.1 2.6 2.9 2.9   Notes        redx1 ?  inc        Date 3/30 4/13 5/11 6/8  6/23 7/7 7/28  8/9 8/12    Total WeeklyDose 31.25mg 31.25 mg 31.25 mg 31.25mg Start dialysis 30mg 30mg 30 mg  33.75mg 37.5mg    INR 2.5 2.2 2.6 3.9  2.1 2.1 1.8 1.23 1.4 1.5    Notes    Inc torsemide Cath placement    AV fistula placement       Takes warfarin in the am    Clinic Interview:  Verbal Release Authorization signed on 8/22/2018 -- may speak with Raeann Montemayor (Wife). [phone] (Home) *Preferred*  Tablet Strength: pt has 7.5mg and 2.5mg tablets  Estimated oop cost for BH [facility] HM: Patient has [employer] Medicare so they  should have minimal to no OOP costs.    Patient Findings  Comments:  Denies any changes. Patient had Left Brachiocephalic AV fistula creation on 8/4/22 with Dr. Vitaly Sandhu with [facility]. He is feeling better and his appetite is normal. No new medications. He reports taking 5mg on Saturday instead of 7.5mg.       Plan:   1. INR is subtherapeutic today at 1.5 following hold for procedure (goal 2-3). However not improved as much as anticipated. Instructed Mr. Montemayor to boost todays dose to 7.5mg today, and boost dose to 5mg tomorrow then continue warfarin 3.75mg daily except 5mg MonWedFri. He voices understanding using two strength tablets at this time and does not wish to simplify regimen with one strength tablets.   2. RTC 8/18 to ensure WNL and BH Oswaldo home monitor training. Wife will accompany for home monitor training. Discussed home monitor training with once weekly testing, how to order more supplies, [employer] meter, and billing. Sent to registration for estimated oop cost.  3. Verbal and written information provided in the clinic. Mr. Montemayor expresses understanding with teach back and has no further questions at this time.     Evelina Quan, PharmD  8/12/2022  08:18 EDT

## 2022-08-16 ENCOUNTER — TELEPHONE (OUTPATIENT)
Dept: PHARMACY | Facility: HOSPITAL | Age: 77
End: 2022-08-16

## 2022-08-16 ENCOUNTER — ANTICOAGULATION VISIT (OUTPATIENT)
Dept: PHARMACY | Facility: HOSPITAL | Age: 77
End: 2022-08-16

## 2022-08-16 LAB — INR PPP: 2.1 (ref 0.9–1.1)

## 2022-08-16 PROCEDURE — G0249 PROVIDE INR TEST MATER/EQUIP: HCPCS

## 2022-08-16 PROCEDURE — 85610 PROTHROMBIN TIME: CPT

## 2022-08-16 PROCEDURE — 36416 COLLJ CAPILLARY BLOOD SPEC: CPT

## 2022-08-16 NOTE — TELEPHONE ENCOUNTER
[employer] Check Serial # : [id number]   Test Strips    Lot : [id number]  Barcode : [id number] Exp : 6/23  Lancets   Lot : [id number]  Exp : 5/25  Tubes   Lot : [id number]

## 2022-08-16 NOTE — PROGRESS NOTES
Anticoagulation Clinic Progress Note  [facility] HOMEMONITOR: Jessica Check Serial # : [id number]     **see training powerpoint for opening HM encounters**    Indication: afib  Referring Provider: Tyson (next appointment: 10/15/2021)   Initial Warfarin Start Date: 2007  Goal INR: 2-3  Current Drug Interactions:  torsemide  Diet: eats salads 2x weekly 11/4/20  [id number]: 5 (HTN, Age, CHF, CAD)  Other: anemic, CKD (stage 4) May need bridge with Lovenox given persistent AF (per cardio 1/24/2019), Scr was 3.22 on 11/24/21     Anticoagulation Clinic INR History:   Date 10/30 11/20 12/17 1/14/20 2/11 3/11 4/8 5/6 6/11 7/9 8/6 9/3 9/14 9/30   Total Weekly Dose 32.5 mg 32.5 mg 32.5 mg 32.5 mg 32.5mg 32.5mg 32.5mg 32.5mg 32.5mg 32.5 mg 32.5 mg 32.5mg 32.5mg 26.25 mg   INR 2.3 2.3 2.1 1.9 2.2 2.1 2.1 2.2 2.2 2.3 2.7 1.6 2.2  1.8   Notes                       ??   5 day hold     Date 10/14 11/4 11/11 11/25 12/9 12/16 12/30 1/13 2/3 3/3 3/31 4/28 5/26 6/23   Total Weekly Dose 32.5 mg 32.5mg 35mg 32.5mg 33.75 mg 36.25 mg 33.75 mg 35mg 33.75 33.75mg 33.75mg 33.75 mg 33.75 mg 33.75mg   INR 2.0 1.6 2.1 1.8 1.8 2.5 2.0 3.0 2.1 2.4 2.3 3.0 2.1 2.2   Notes   1x boost GLV boostx1 boostx1             Date 7/21 9/1 10/13 11/10 12/1 12/15 12/29 1/12/22 1/26 2/9 2/16 2/23 3/2 3/16   Total Weekly Dose 33.75mg 33.75mg 33.75mg 33.75mg 33.75 mg 32.5mg 30mg 27.5mg 28.75 mg 30 mg 31.25 mg 31.25 mg 31.25 mg 31.25 mg   INR 2.9 2.5 3.1 3.1 3.2 3.4 3.4 1.9 1.7 1.8 2.1 2.6 2.9 2.9   Notes        redx1 ?  inc        Date 3/30 4/13 5/11 6/8  6/23 7/7 7/28  8/9 8/12 8/16   Total WeeklyDose 31.25mg 31.25 mg 31.25 mg 31.25mg Start dialysis 30mg 30mg 30 mg  33.75mg 37.5mg 37.5mg   INR 2.5 2.2 2.6 3.9  2.1 2.1 1.8 1.23 1.4 1.5 2.1   Notes    Inc torsemide Cath placement    AV fistula placement    HM train  1 no billing   Takes warfarin in the am    Clinic Interview:  Verbal Release Authorization signed on 8/22/2018 -- may speak with Raeann Montemayor  (Wife). [phone] (Home) *Preferred*  Tablet Strength: pt has 7.5mg and 2.5mg tablets  Estimated oop cost for BH EDER HM: Patient has [employer] Medicare so they should have minimal to no OOP costs.    Patient Findings      Negatives:  Signs/symptoms of thrombosis, Signs/symptoms of bleeding, Laboratory test error suspected, Change in health, Change in alcohol use, Change in activity, Upcoming invasive procedure, Emergency department visit, Upcoming dental procedure, Missed doses, Extra doses, Change in medications, Change in diet/appetite, Hospital admission, Bruising, Other complaints         FACE-TO-FACE EDUCATION  HOME PT / INR MONITORING DEVICE    Marco Antonio Montemayor presents to the clinic today for face to face training on the home INR point-of-care monitor. Marco Antonio Montemayor has been approved to begin home INR self-monitoring, meeting the following criteria:    1. Marco Antonio Montemayor has been anticoagulated for at least 3 months prior to use of the home INR device; and  2. Requires long term anticoagulation with warfarin; and  3. Requires at least weekly determinations of INR values; and  4. The treating physician has prescribed the self-monitoring device and home testing; and  5. The patient understands the need to continue to correctly use the device in the context of the management of the anticoagulation therapy following the initiation of home monitoring;     Marco Antonio Montemayor was trained for home INR self-monitoring under clinic supervision. The training today consisted of the followin. Demonstrating use and care of the INR monitor; and  2. Obtaining at least one blood sample; and  3. Providing instructions for reporting home INR test results; and  4. Verbal understanding of using the home monitor; and Marco Antonio Montemayor has demonstrated the technical skill and willingness to use the monitor and has the ability to comprehend the basic aspects of oral anticoagulation control, including the risks.  Marco Antonio Montemayor has practiced self-testing and is capable of performing this testing at home. Appropriateness of use will continued to be evaluated through in-clinic follow up at least twice annually.          Plan:   1. INR is therapeutic today at 2.1 (goal 2-3).  Instructed Mr. Montemayor to take warfarin 3.75mg daily except 5mg MonWedFriSAT.   2. RTC 8/21  On   3. Verbal and written information provided in the clinic. Mr. Montemayor expresses understanding with teach back and has no further questions at this time.       Home monitor information below:  · Test strips on hand: 6 / 8/16/22  ·   Lot : [id number]  Barcode : [id number] Exp : 6/23  · Serial number: [id number]   · Supplies billing code used: last 8/16/22- Next must be on or after  9/13/22  · Transfer Tube Lot number: Lot : [id number]  · Safety Lancets:  Lot : [id number]  Exp : 5/25   Billing:  *bill must be 4 tests 28 days or more from  visit  PLEASE REFER TO ANTICOAGULATION TRAINING POWERPOINT FOR BILLING. Needs to use in clinic remote appointment for encounter.      Kristine Sutton, Supriya.  08/16/22   14:44 EDT

## 2022-08-18 ENCOUNTER — APPOINTMENT (OUTPATIENT)
Dept: PHARMACY | Facility: HOSPITAL | Age: 77
End: 2022-08-18

## 2022-08-23 ENCOUNTER — ANTICOAGULATION VISIT (OUTPATIENT)
Dept: PHARMACY | Facility: HOSPITAL | Age: 77
End: 2022-08-23

## 2022-08-23 LAB — INR PPP: 1.9

## 2022-08-23 NOTE — PROGRESS NOTES
Anticoagulation Clinic Progress Note  [facility] Jessica Check Serial # : [id number]     Indication: afib  Referring Provider: Tyson (next appointment: 10/15/2021)   Initial Warfarin Start Date: 2007  Goal INR: 2-3  Current Drug Interactions:  torsemide  Diet: eats salads 2x weekly 11/4/20  [id number]: 5 (HTN, Age, CHF, CAD)  Other: anemic, CKD (stage 4) May need bridge with Lovenox given persistent AF (per cardio 1/24/2019), Scr was 3.22 on 11/24/21     Anticoagulation Clinic INR History:   Date 10/30 11/20 12/17 1/14/20 2/11 3/11 4/8 5/6 6/11 7/9 8/6 9/3 9/14 9/30   Total Weekly Dose 32.5 mg 32.5 mg 32.5 mg 32.5 mg 32.5mg 32.5mg 32.5mg 32.5mg 32.5mg 32.5 mg 32.5 mg 32.5mg 32.5mg 26.25 mg   INR 2.3 2.3 2.1 1.9 2.2 2.1 2.1 2.2 2.2 2.3 2.7 1.6 2.2  1.8   Notes                       ??   5 day hold     Date 10/14 11/4 11/11 11/25 12/9 12/16 12/30 1/13 2/3 3/3 3/31 4/28 5/26 6/23   Total Weekly Dose 32.5 mg 32.5mg 35mg 32.5mg 33.75 mg 36.25 mg 33.75 mg 35mg 33.75 33.75mg 33.75mg 33.75 mg 33.75 mg 33.75mg   INR 2.0 1.6 2.1 1.8 1.8 2.5 2.0 3.0 2.1 2.4 2.3 3.0 2.1 2.2   Notes   1x boost GLV boostx1 boostx1             Date 7/21 9/1 10/13 11/10 12/1 12/15 12/29 1/12/22 1/26 2/9 2/16 2/23 3/2 3/16   Total Weekly Dose 33.75mg 33.75mg 33.75mg 33.75mg 33.75 mg 32.5mg 30mg 27.5mg 28.75 mg 30 mg 31.25 mg 31.25 mg 31.25 mg 31.25 mg   INR 2.9 2.5 3.1 3.1 3.2 3.4 3.4 1.9 1.7 1.8 2.1 2.6 2.9 2.9   Notes        redx1 ?  inc        Date 3/30 4/13 5/11 6/8  6/23 7/7 7/28  8/9 8/12 8/16   Total WeeklyDose 31.25mg 31.25 mg 31.25 mg 31.25mg Start dialysis 30mg 30mg 30 mg  33.75mg 37.5mg 37.5mg   INR 2.5 2.2 2.6 3.9  2.1 2.1 1.8 1.23 1.4 1.5 2.1   Notes    Inc torsemide Cath placement    AV fistula placement    HM train hm 1 no billing       Date 8/23              Total WeeklyDose 31.25 mg              INR 1.9              Notes HM2 no billing              Takes warfarin in the am    Clinic Interview:  Verbal Release  Authorization signed on 8/22/2018 -- may speak with Raeann Montemayor (Wife). [phone] (Home) *Preferred*  Tablet Strength: pt has 7.5mg and 2.5mg tablets  Estimated oop cost for BH EDER HM: Patient has [employer] Medicare so they should have minimal to no OOP costs.    Patient Findings    Negatives:  Signs/symptoms of thrombosis, Signs/symptoms of bleeding, Laboratory test error suspected, Change in health, Change in alcohol use, Change in activity, Upcoming invasive procedure, Emergency department visit, Upcoming dental procedure, Missed doses, Extra doses, Change in medications, Change in diet/appetite, Hospital admission, Bruising, Other complaints   Comments:  All findings negative per pt.      Plan:   1. INR is slightly subtherapeutic today at 1.9 (goal 2-3).  Instructed Mr. Montemayor to take warfarin 5 mg daily except warfarin 3.75 mg SunThur until recheck.  2. Recheck INR in one week, 8/30.  3. Verbal and written information provided in the clinic. Mr. Montemayor expresses understanding with teach back and has no further questions at this time.       Home monitor information below:  · Test strips on hand: (5)  8/23/22  ·   Lot : [id number]  Barcode : [id number] Exp : 6/23  · Serial number: [id number]   · Supplies billing code used: last 8/16/22- Next must be on or after  9/13/22  · Transfer Tube Lot number: Lot : [id number]  · Safety Lancets:  Lot : [id number]  Exp : 5/25   Billing:  *bill must be 4 tests 28 days or more from  visit  PLEASE REFER TO ANTICOAGULATION TRAINING POWERPOINT FOR BILLING. Needs to use in clinic remote appointment for encounter.    Luigi Melo CPhT  8/23/2022  09:20 EDT     I, Raj Saltre, PharmD, have reviewed the note in full and agree with the assessment and plan. Would likely increase dose if low again at next encounter.  08/23/22  15:58 EDT

## 2022-08-30 ENCOUNTER — ANTICOAGULATION VISIT (OUTPATIENT)
Dept: PHARMACY | Facility: HOSPITAL | Age: 77
End: 2022-08-30

## 2022-08-30 LAB — INR PPP: 2

## 2022-08-30 NOTE — PROGRESS NOTES
Anticoagulation Clinic Progress Note  [facility] Jessica Check Serial # : [id number]     Indication: afib  Referring Provider: Tyson (next appointment: 10/15/2021)   Initial Warfarin Start Date: 2007  Goal INR: 2-3  Current Drug Interactions:  torsemide  Diet: eats salads 2x weekly 11/4/20  [id number]: 5 (HTN, Age, CHF, CAD)  Other: anemic, CKD (stage 4) May need bridge with Lovenox given persistent AF (per cardio 1/24/2019), Scr was 3.22 on 11/24/21     Anticoagulation Clinic INR History:   Date 10/30 11/20 12/17 1/14/20 2/11 3/11 4/8 5/6 6/11 7/9 8/6 9/3 9/14 9/30   Total Weekly Dose 32.5 mg 32.5 mg 32.5 mg 32.5 mg 32.5mg 32.5mg 32.5mg 32.5mg 32.5mg 32.5 mg 32.5 mg 32.5mg 32.5mg 26.25 mg   INR 2.3 2.3 2.1 1.9 2.2 2.1 2.1 2.2 2.2 2.3 2.7 1.6 2.2  1.8   Notes                       ??   5 day hold     Date 10/14 11/4 11/11 11/25 12/9 12/16 12/30 1/13 2/3 3/3 3/31 4/28 5/26 6/23   Total Weekly Dose 32.5 mg 32.5mg 35mg 32.5mg 33.75 mg 36.25 mg 33.75 mg 35mg 33.75 33.75mg 33.75mg 33.75 mg 33.75 mg 33.75mg   INR 2.0 1.6 2.1 1.8 1.8 2.5 2.0 3.0 2.1 2.4 2.3 3.0 2.1 2.2   Notes   1x boost GLV boostx1 boostx1             Date 7/21 9/1 10/13 11/10 12/1 12/15 12/29 1/12/22 1/26 2/9 2/16 2/23 3/2 3/16   Total Weekly Dose 33.75mg 33.75mg 33.75mg 33.75mg 33.75 mg 32.5mg 30mg 27.5mg 28.75 mg 30 mg 31.25 mg 31.25 mg 31.25 mg 31.25 mg   INR 2.9 2.5 3.1 3.1 3.2 3.4 3.4 1.9 1.7 1.8 2.1 2.6 2.9 2.9   Notes        redx1 ?  inc        Date 3/30 4/13 5/11 6/8  6/23 7/7 7/28  8/9 8/12 8/16   Total WeeklyDose 31.25mg 31.25 mg 31.25 mg 31.25mg Start dialysis 30mg 30mg 30 mg  33.75mg 37.5mg 37.5mg   INR 2.5 2.2 2.6 3.9  2.1 2.1 1.8 1.23 1.4 1.5 2.1   Notes    Inc torsemide Cath placement    AV fistula placement    HM train hm 1 no billing       Date 8/23 8/30             Total WeeklyDose 31.25 mg 32.5 mg             INR 1.9 2.0             Notes HM2 no billing HM3  No billing             Takes warfarin in the am    Clinic  Interview:  Verbal Release Authorization signed on 8/22/2018 -- may speak with Raeann Montemayor (Wife). [phone] (Home) *Preferred*  Tablet Strength: pt has 7.5mg and 2.5mg tablets  Estimated oop cost for BH EDER HM: Patient has [employer] Medicare so they should have minimal to no OOP costs.    Patient Findings    Negatives:  Signs/symptoms of thrombosis, Signs/symptoms of bleeding, Laboratory test error suspected, Change in health, Change in alcohol use, Change in activity, Upcoming invasive procedure, Emergency department visit, Upcoming dental procedure, Missed doses, Extra doses, Change in medications, Change in diet/appetite, Hospital admission, Bruising, Other complaints   Comments:  All findings negative per pt.      Plan:   1. INR is therapeutic today at 2.0 (goal 2.0-3.0).  Per Kristine Sutton, PharmD, instructed Mr. Montemayor to continue taking warfarin 5 mg daily except warfarin 3.75 mg SunThur until recheck.  2. Recheck INR in one week 9/6.  3. Verbal and written information provided in the clinic. Mr. Montemayor expresses understanding with teach back and has no further questions at this time.       Home monitor information below:  · Test strips on hand: (4)  8/30/22  ·   Lot : [id number]  Barcode : [id number] Exp : 6/23  · Serial number: [id number]   · Supplies billing code used: last 8/16/22- Next must be on or after  9/13/22  · Transfer Tube Lot number: Lot : [id number]  · Safety Lancets:  Lot : [id number]  Exp : 5/25   Billing:  *bill must be 4 tests 28 days or more from  visit  PLEASE REFER TO ANTICOAGULATION TRAINING POWERPOINT FOR BILLING. Needs to use in clinic remote appointment for encounter.    Ely Gong CPHT  8/30/2022  10:18 EDT     I, Kristine Sutton, PharmD, have reviewed the note in full and agree with the assessment and plan.  08/30/22  11:39 EDT  \

## 2022-09-06 ENCOUNTER — ANTICOAGULATION VISIT (OUTPATIENT)
Dept: PHARMACY | Facility: HOSPITAL | Age: 77
End: 2022-09-06

## 2022-09-06 LAB — INR PPP: 2

## 2022-09-06 NOTE — PROGRESS NOTES
Anticoagulation Clinic Progress Note  [facility] Jessica Check Serial # : [id number]     Indication: afib  Referring Provider: Tyson (next appointment: 10/15/2021)   Initial Warfarin Start Date: 2007  Goal INR: 2-3  Current Drug Interactions:  torsemide  Diet: eats salads 2x weekly 11/4/20  [id number]: 5 (HTN, Age, CHF, CAD)  Other: anemic, CKD (stage 4) May need bridge with Lovenox given persistent AF (per cardio 1/24/2019), Scr was 3.22 on 11/24/21     Anticoagulation Clinic INR History:   Date 10/30 11/20 12/17 1/14/20 2/11 3/11 4/8 5/6 6/11 7/9 8/6 9/3 9/14 9/30   Total Weekly Dose 32.5 mg 32.5 mg 32.5 mg 32.5 mg 32.5mg 32.5mg 32.5mg 32.5mg 32.5mg 32.5 mg 32.5 mg 32.5mg 32.5mg 26.25 mg   INR 2.3 2.3 2.1 1.9 2.2 2.1 2.1 2.2 2.2 2.3 2.7 1.6 2.2  1.8   Notes                       ??   5 day hold     Date 10/14 11/4 11/11 11/25 12/9 12/16 12/30 1/13 2/3 3/3 3/31 4/28 5/26 6/23   Total Weekly Dose 32.5 mg 32.5mg 35mg 32.5mg 33.75 mg 36.25 mg 33.75 mg 35mg 33.75 33.75mg 33.75mg 33.75 mg 33.75 mg 33.75mg   INR 2.0 1.6 2.1 1.8 1.8 2.5 2.0 3.0 2.1 2.4 2.3 3.0 2.1 2.2   Notes   1x boost GLV boostx1 boostx1             Date 7/21 9/1 10/13 11/10 12/1 12/15 12/29 1/12/22 1/26 2/9 2/16 2/23 3/2 3/16   Total Weekly Dose 33.75mg 33.75mg 33.75mg 33.75mg 33.75 mg 32.5mg 30mg 27.5mg 28.75 mg 30 mg 31.25 mg 31.25 mg 31.25 mg 31.25 mg   INR 2.9 2.5 3.1 3.1 3.2 3.4 3.4 1.9 1.7 1.8 2.1 2.6 2.9 2.9   Notes        redx1 ?  inc        Date 3/30 4/13 5/11 6/8  6/23 7/7 7/28  8/9 8/12 8/16   Total WeeklyDose 31.25mg 31.25 mg 31.25 mg 31.25mg Start dialysis 30mg 30mg 30 mg  33.75mg 37.5mg 37.5mg   INR 2.5 2.2 2.6 3.9  2.1 2.1 1.8 1.23 1.4 1.5 2.1   Notes    Inc torsemide Cath placement    AV fistula placement     train hm 1 no billing       Date 8/23 8/30 9/6            Total WeeklyDose 31.25 mg 32.5 mg 32.5 mg            INR 1.9 2.0 2.0            Notes HM2 no billing HM3  No billing HM4  No billing            Takes  warfarin in the am    Clinic Interview:  Verbal Release Authorization signed on 8/22/2018 -- may speak with Raeann Montemayor (Wife). [phone] (Home) *Preferred*  Tablet Strength: pt has 7.5mg and 2.5mg tablets  Estimated oop cost for BH EDER HM: Patient has [employer] Medicare so they should have minimal to no OOP costs.    Patient Findings    Negatives:  Signs/symptoms of thrombosis, Signs/symptoms of bleeding, Laboratory test error suspected, Change in health, Change in alcohol use, Change in activity, Upcoming invasive procedure, Emergency department visit, Upcoming dental procedure, Missed doses, Extra doses, Change in medications, Change in diet/appetite, Hospital admission, Bruising, Other complaints   Comments:  All findings negative per pt.     Plan:   1. INR is therapeutic today at 2.0 (goal 2.0-3.0). Mr. Montemayor will continue taking warfarin 5 mg daily except warfarin 3.75 mg SunThur until recheck.  2. Recheck INR in one week 9/13.  3. Verbal and written information provided in the clinic. Mr. Montemayor expresses understanding with teach back and has no further questions at this time.     Home monitor information below:  · Test strips on hand: (3)  9/6/22  ·   Lot : [id number]  Barcode : [id number] Exp : 6/23  · Serial number: [id number]   · Supplies billing code used: last 8/16/22- Next must be on or after  9/13/22  · Transfer Tube Lot number: Lot : [id number]  · Safety Lancets:  Lot : [id number]  Exp : 5/25   Billing:  *bill must be 4 tests 28 days or more from  visit  PLEASE REFER TO ANTICOAGULATION TRAINING POWERPOINT FOR BILLING. Needs to use in clinic remote appointment for encounter.    Ely Gong CPHT  9/6/2022  08:54 EDT      appt made for billing encounter  I, Kristine Sutton, PharmD, have reviewed the note in full and agree with the assessment and plan.  09/06/22  11:58 EDT

## 2022-09-13 ENCOUNTER — ANTICOAGULATION VISIT (OUTPATIENT)
Dept: PHARMACY | Facility: HOSPITAL | Age: 77
End: 2022-09-13

## 2022-09-13 ENCOUNTER — APPOINTMENT (OUTPATIENT)
Dept: PHARMACY | Facility: HOSPITAL | Age: 77
End: 2022-09-13

## 2022-09-13 LAB — INR PPP: 2.5

## 2022-09-13 PROCEDURE — G0249 PROVIDE INR TEST MATER/EQUIP: HCPCS

## 2022-09-13 NOTE — PROGRESS NOTES
Anticoagulation Clinic Progress Note  [facility] Jessica Check Serial # : [id number]     Indication: afib  Referring Provider: Tyson (next appointment: 10/15/2021)   Initial Warfarin Start Date: 2007  Goal INR: 2-3  Current Drug Interactions:  torsemide  Diet: eats salads 2x weekly 11/4/20  [id number]: 5 (HTN, Age, CHF, CAD)  Other: anemic, CKD (stage 4) May need bridge with Lovenox given persistent AF (per cardio 1/24/2019), Scr was 3.22 on 11/24/21     Anticoagulation Clinic INR History:   Date 10/30 11/20 12/17 1/14/20 2/11 3/11 4/8 5/6 6/11 7/9 8/6 9/3 9/14 9/30   Total Weekly Dose 32.5 mg 32.5 mg 32.5 mg 32.5 mg 32.5mg 32.5mg 32.5mg 32.5mg 32.5mg 32.5 mg 32.5 mg 32.5mg 32.5mg 26.25 mg   INR 2.3 2.3 2.1 1.9 2.2 2.1 2.1 2.2 2.2 2.3 2.7 1.6 2.2  1.8   Notes                       ??   5 day hold     Date 10/14 11/4 11/11 11/25 12/9 12/16 12/30 1/13 2/3 3/3 3/31 4/28 5/26 6/23   Total Weekly Dose 32.5 mg 32.5mg 35mg 32.5mg 33.75 mg 36.25 mg 33.75 mg 35mg 33.75 33.75mg 33.75mg 33.75 mg 33.75 mg 33.75mg   INR 2.0 1.6 2.1 1.8 1.8 2.5 2.0 3.0 2.1 2.4 2.3 3.0 2.1 2.2   Notes   1x boost GLV boostx1 boostx1             Date 7/21 9/1 10/13 11/10 12/1 12/15 12/29 1/12/22 1/26 2/9 2/16 2/23 3/2 3/16   Total Weekly Dose 33.75mg 33.75mg 33.75mg 33.75mg 33.75 mg 32.5mg 30mg 27.5mg 28.75 mg 30 mg 31.25 mg 31.25 mg 31.25 mg 31.25 mg   INR 2.9 2.5 3.1 3.1 3.2 3.4 3.4 1.9 1.7 1.8 2.1 2.6 2.9 2.9   Notes        redx1 ?  inc        Date 3/30 4/13 5/11 6/8  6/23 7/7 7/28  8/9 8/12 8/16   Total WeeklyDose 31.25mg 31.25 mg 31.25 mg 31.25mg Start dialysis 30mg 30mg 30 mg  33.75mg 37.5mg 37.5mg   INR 2.5 2.2 2.6 3.9  2.1 2.1 1.8 1.23 1.4 1.5 2.1   Notes    Inc torsemide Cath placement    AV fistula placement    HM train hm 1 no billing       Date 8/23 8/30 9/6 9/13           Total WeeklyDose 31.25 mg 32.5 mg 32.5 mg 32.5 mg           INR 1.9 2.0 2.0 2.5           Notes HM2 no billing HM3  No billing HM4  No billing  1  Bill  today           Takes warfarin in the am    Clinic Interview:  Verbal Release Authorization signed on 8/22/2018 -- may speak with Raeann Montemayor (Wife). [phone] (Home) *Preferred*  Tablet Strength: pt has 7.5mg and 2.5mg tablets  Estimated oop cost for BH EDER HM: Patient has [employer] so they should have minimal to no OOP costs.    Patient Findings  Negatives:  Signs/symptoms of thrombosis, Signs/symptoms of bleeding, Laboratory test error suspected, Change in health, Change in alcohol use, Change in activity, Upcoming invasive procedure, Emergency department visit, Upcoming dental procedure, Missed doses, Extra doses, Change in medications, Change in diet/appetite, Hospital admission, Bruising, Other complaints   Comments:  All findings negative per pt.     Plan:   1. INR is therapeutic today at 2.5 (goal 2.0-3.0). Instructed Mr. Montemayor to continue taking warfarin 5 mg oral daily except warfarin 3.75 mg SunThur until recheck.  2. Recheck INR in one week 9/20/22..  3. Verbal and written information provided in the clinic. Mr. Montemayor expresses understanding with teach back and has no further questions at this time.     Home monitor information below:  · Test strips on hand: (1)  9/13/22 *Sending supllies today  ·   Lot : [id number]  Barcode : [id number] Exp : 6/23  · Serial number: [id number]   · Supplies billing code used: last 8/16/22- Next must be on or after  10/12/22  · Transfer Tube Lot number: Lot : [id number]  · Safety Lancets:  Lot : [id number]  Exp : 5/25   Billing:  *bill must be 4 tests 28 days or more from  visit  PLEASE REFER TO ANTICOAGULATION TRAINING POWERPOINT FOR BILLING. Needs to use in clinic remote appointment for encounter.    Luigi Melo CPhT  9/13/2022  08:58 EDT     I, Charlotte Michael, PharmD, have reviewed the note in full and agree with the assessment and plan.  09/14/22  14:38 EDT

## 2022-09-14 ENCOUNTER — APPOINTMENT (OUTPATIENT)
Dept: PHARMACY | Facility: HOSPITAL | Age: 77
End: 2022-09-14

## 2022-09-20 ENCOUNTER — ANTICOAGULATION VISIT (OUTPATIENT)
Dept: PHARMACY | Facility: HOSPITAL | Age: 77
End: 2022-09-20

## 2022-09-20 LAB — INR PPP: 2.7

## 2022-09-20 NOTE — PROGRESS NOTES
Anticoagulation Clinic Progress Note  [facility] Jessica Check Serial # : [id number]     Indication: afib  Referring Provider: Tyson (next appointment: 10/15/2021)   Initial Warfarin Start Date: 2007  Goal INR: 2-3  Current Drug Interactions:  torsemide  Diet: eats salads 2x weekly 11/4/20  [id number]: 5 (HTN, Age, CHF, CAD)  Other: anemic, CKD (stage 4) May need bridge with Lovenox given persistent AF (per cardio 1/24/2019), Scr was 3.22 on 11/24/21     Anticoagulation Clinic INR History:   Date 10/30 11/20 12/17 1/14/20 2/11 3/11 4/8 5/6 6/11 7/9 8/6 9/3 9/14 9/30   Total Weekly Dose 32.5 mg 32.5 mg 32.5 mg 32.5 mg 32.5mg 32.5mg 32.5mg 32.5mg 32.5mg 32.5 mg 32.5 mg 32.5mg 32.5mg 26.25 mg   INR 2.3 2.3 2.1 1.9 2.2 2.1 2.1 2.2 2.2 2.3 2.7 1.6 2.2  1.8   Notes                       ??   5 day hold     Date 10/14 11/4 11/11 11/25 12/9 12/16 12/30 1/13 2/3 3/3 3/31 4/28 5/26 6/23   Total Weekly Dose 32.5 mg 32.5mg 35mg 32.5mg 33.75 mg 36.25 mg 33.75 mg 35mg 33.75 33.75mg 33.75mg 33.75 mg 33.75 mg 33.75mg   INR 2.0 1.6 2.1 1.8 1.8 2.5 2.0 3.0 2.1 2.4 2.3 3.0 2.1 2.2   Notes   1x boost GLV boostx1 boostx1             Date 7/21 9/1 10/13 11/10 12/1 12/15 12/29 1/12/22 1/26 2/9 2/16 2/23 3/2 3/16   Total Weekly Dose 33.75mg 33.75mg 33.75mg 33.75mg 33.75 mg 32.5mg 30mg 27.5mg 28.75 mg 30 mg 31.25 mg 31.25 mg 31.25 mg 31.25 mg   INR 2.9 2.5 3.1 3.1 3.2 3.4 3.4 1.9 1.7 1.8 2.1 2.6 2.9 2.9   Notes        redx1 ?  inc        Date 3/30 4/13 5/11 6/8  6/23 7/7 7/28  8/9 8/12 8/16   Total WeeklyDose 31.25mg 31.25 mg 31.25 mg 31.25mg Start dialysis 30mg 30mg 30 mg  33.75mg 37.5mg 37.5mg   INR 2.5 2.2 2.6 3.9  2.1 2.1 1.8 1.23 1.4 1.5 2.1   Notes    Inc torsemide Cath placement    AV fistula placement    HM train hm 1 no billing       Date 8/23 8/30 9/6 9/13 9/20          Total WeeklyDose 31.25 mg 32.5 mg 32.5 mg 32.5 mg 32.5 mg          INR 1.9 2.0 2.0 2.5 2.7          Notes HM2 no billing HM3  No billing HM4  No  billing HM 1  Bill today HM 2 No billing          Takes warfarin in the am    Clinic Interview:  Verbal Release Authorization signed on 8/22/2018 -- may speak with Raeann Montemayor (Wife). [phone] (Home) *Preferred*  Tablet Strength: pt has 7.5mg and 2.5mg tablets  Estimated oop cost for [facility] EDER HM: Patient has [employer] so they should have minimal to no OOP costs.    Patient Findings  Negatives:  Signs/symptoms of thrombosis, Signs/symptoms of bleeding, Laboratory test error suspected, Change in health, Change in alcohol use, Change in activity, Upcoming invasive procedure, Emergency department visit, Upcoming dental procedure, Missed doses, Extra doses, Change in medications, Change in diet/appetite, Hospital admission, Bruising, Other complaints   Comments:  All findings negative per pt.     Plan:   1. INR is therapeutic today at 2.7 (goal 2.0-3.0). Instructed Mr. Montemayor to continue taking warfarin 5 mg oral daily except warfarin 3.75 mg SunThur until recheck.  2. Recheck INR in one week 9/27/22..  3. Verbal and written information provided in the clinic. Mr. Montemayor expresses understanding with teach back and has no further questions at this time.     Home monitor information below:  · Test strips on hand: (6)  9/20/22   ·   Lot : [id number]  Barcode : [id number] Exp : 6/23  · Serial number: [id number]   · Supplies billing code used: last 8/16/22- Next must be on or after  10/12/22  · Transfer Tube Lot number: Lot : [id number]  · Safety Lancets:  Lot : [id number]  Exp : 5/25   Billing:  *bill must be 4 tests 28 days or more from HM visit  PLEASE REFER TO ANTICOAGULATION TRAINING POWERPOINT FOR BILLING. Needs to use in clinic remote appointment for encounter.    Alexander Scherer, Pharmacy Technician  9/20/2022  11:26 EDT    I, Alem Llanes, PharmD, have reviewed the note in full and agree with the assessment and plan.  09/20/22  13:20 EDT

## 2022-09-27 ENCOUNTER — TELEPHONE (OUTPATIENT)
Dept: PHARMACY | Facility: HOSPITAL | Age: 77
End: 2022-09-27

## 2022-09-27 NOTE — TELEPHONE ENCOUNTER
Pt having difficulty with BH HM. Will send supplies tomorrow; pt will call back when supplies arrive for a walkthrough/ troubleshooting.

## 2022-09-30 ENCOUNTER — ANTICOAGULATION VISIT (OUTPATIENT)
Dept: PHARMACY | Facility: HOSPITAL | Age: 77
End: 2022-09-30

## 2022-09-30 LAB
INR PPP: 1.3
INR PPP: 1.6

## 2022-09-30 NOTE — PROGRESS NOTES
Anticoagulation Clinic Progress Note  [facility] Check Serial # : [id number]     Indication: afib  Referring Provider: Tyson (next appointment: 10/15/2021)   Initial Warfarin Start Date: 2007  Goal INR: 2-3  Current Drug Interactions:  torsemide  Diet: eats salads 2x weekly 11/4/20  [id number]: 5 (HTN, Age, CHF, CAD)  Other: anemic, CKD (stage 4) May need bridge with Lovenox given persistent AF (per cardio 1/24/2019), Scr was 3.22 on 11/24/21     Anticoagulation Clinic INR History:   Date 10/30 11/20 12/17 1/14/20 2/11 3/11 4/8 5/6 6/11 7/9 8/6 9/3 9/14 9/30   Total Weekly Dose 32.5 mg 32.5 mg 32.5 mg 32.5 mg 32.5mg 32.5mg 32.5mg 32.5mg 32.5mg 32.5 mg 32.5 mg 32.5mg 32.5mg 26.25 mg   INR 2.3 2.3 2.1 1.9 2.2 2.1 2.1 2.2 2.2 2.3 2.7 1.6 2.2  1.8   Notes                       ??   5 day hold     Date 10/14 11/4 11/11 11/25 12/9 12/16 12/30 1/13 2/3 3/3 3/31 4/28 5/26 6/23   Total Weekly Dose 32.5 mg 32.5mg 35mg 32.5mg 33.75 mg 36.25 mg 33.75 mg 35mg 33.75 33.75mg 33.75mg 33.75 mg 33.75 mg 33.75mg   INR 2.0 1.6 2.1 1.8 1.8 2.5 2.0 3.0 2.1 2.4 2.3 3.0 2.1 2.2   Notes   1x boost GLV boostx1 boostx1             Date 7/21 9/1 10/13 11/10 12/1 12/15 12/29 1/12/22 1/26 2/9 2/16 2/23 3/2 3/16   Total Weekly Dose 33.75mg 33.75mg 33.75mg 33.75mg 33.75 mg 32.5mg 30mg 27.5mg 28.75 mg 30 mg 31.25 mg 31.25 mg 31.25 mg 31.25 mg   INR 2.9 2.5 3.1 3.1 3.2 3.4 3.4 1.9 1.7 1.8 2.1 2.6 2.9 2.9   Notes        redx1 ?  inc        Date 3/30 4/13 5/11 6/8  6/23 7/7 7/28  8/9 8/12 8/16   Total WeeklyDose 31.25mg 31.25 mg 31.25 mg 31.25mg Start dialysis 30mg 30mg 30 mg  33.75mg 37.5mg 37.5mg   INR 2.5 2.2 2.6 3.9  2.1 2.1 1.8 1.23 1.4 1.5 2.1   Notes    Inc torsemide Cath placement    AV fistula placement    HM train hm 1 no billing       Date 8/23 8/30 9/6 9/13 9/20 9/30         Total WeeklyDose 31.25 mg 32.5 mg 32.5 mg 32.5 mg 32.5 mg 32.5 mg         INR 1.9 2.0 2.0 2.5 2.7 1.6         Notes HM2 no billing HM3  No  billing HM4  No billing HM 1  Bill today HM 2 No billing HM 3 No bill         Takes warfarin in the am    Clinic Interview:  Verbal Release Authorization signed on 8/22/2018 -- may speak with Raeann Montemayor (Wife). [phone] (Home) *Preferred*  Tablet Strength: pt has 7.5mg and 2.5mg tablets  Estimated oop cost for BH EDER HM: Patient has [employer] Medicare so they should have minimal to no OOP costs.    Patient Findings  Negatives:  Signs/symptoms of thrombosis, Signs/symptoms of bleeding, Laboratory test error suspected, Change in health, Change in alcohol use, Change in activity, Upcoming invasive procedure, Emergency department visit, Upcoming dental procedure, Missed doses, Extra doses, Change in medications, Change in diet/appetite, Hospital admission, Bruising, Other complaints   Comments:  All findings negative per pt.    Initially he checked twice on one finger and thought this was the reason for a reading of 1.3.  But, he then checked on the opposite hand and got a reading of 1.6.     Plan:   1. INR is therapeutic today at 1.6 (goal 2.0-3.0). Instructed Mr. Montemayor to boost today's dose ot 7.5, then continue taking warfarin 5 mg oral daily except warfarin 3.75 mg SunThur until recheck.  2. Recheck INR in one week 10/4/22..  3. Verbal and written information provided in the clinic. Mr. Montemayor expresses understanding with teach back and has no further questions at this time.     Home monitor information below:  · Test strips on hand: 3  9/30/22   ·   Lot : [id number]  Barcode : [id number] Exp : 6/23  · Serial number: [id number]   · Supplies billing code used: last 8/16/22- Next must be on or after  10/12/22  · Transfer Tube Lot number: Lot : [id number]  · Safety Lancets:  Lot : [id number]  Exp : 5/25   Billing:  *bill must be 4 tests 28 days or more from  visit  PLEASE REFER TO ANTICOAGULATION TRAINING POWERPOINT FOR BILLING. Needs to use in clinic remote appointment for encounter.    Alem Llanes, PharmD,  BCPS   9/30/2022  10:25 EDT

## 2022-10-03 ENCOUNTER — APPOINTMENT (OUTPATIENT)
Dept: PHARMACY | Facility: HOSPITAL | Age: 77
End: 2022-10-03

## 2022-10-03 ENCOUNTER — TRANSCRIBE ORDERS (OUTPATIENT)
Dept: ADMINISTRATIVE | Facility: HOSPITAL | Age: 77
End: 2022-10-03

## 2022-10-03 DIAGNOSIS — N18.4 CHRONIC RENAL DISEASE, STAGE IV: Primary | ICD-10-CM

## 2022-10-04 ENCOUNTER — ANTICOAGULATION VISIT (OUTPATIENT)
Dept: PHARMACY | Facility: HOSPITAL | Age: 77
End: 2022-10-04

## 2022-10-04 LAB — INR PPP: 1.7

## 2022-10-04 NOTE — PROGRESS NOTES
Anticoagulation Clinic Progress Note  [facility] Serial # : [id number]     Indication: afib  Referring Provider: Tyson (next appointment: 10/15/2021)   Initial Warfarin Start Date: 2007  Goal INR: 2-3  Current Drug Interactions:  torsemide  Diet: eats salads 2x weekly 11/4/20  [id number]: 5 (HTN, Age, CHF, CAD)  Other: anemic, CKD (stage 4) May need bridge with Lovenox given persistent AF (per cardio 1/24/2019), Scr was 3.22 on 11/24/21     Anticoagulation Clinic INR History:   Date 10/30 11/20 12/17 1/14/20 2/11 3/11 4/8 5/6 6/11 7/9 8/6 9/3 9/14 9/30   Total Weekly Dose 32.5 mg 32.5 mg 32.5 mg 32.5 mg 32.5mg 32.5mg 32.5mg 32.5mg 32.5mg 32.5 mg 32.5 mg 32.5mg 32.5mg 26.25 mg   INR 2.3 2.3 2.1 1.9 2.2 2.1 2.1 2.2 2.2 2.3 2.7 1.6 2.2  1.8   Notes                       ??   5 day hold     Date 10/14 11/4 11/11 11/25 12/9 12/16 12/30 1/13 2/3 3/3 3/31 4/28 5/26 6/23   Total Weekly Dose 32.5 mg 32.5mg 35mg 32.5mg 33.75 mg 36.25 mg 33.75 mg 35mg 33.75 33.75mg 33.75mg 33.75 mg 33.75 mg 33.75mg   INR 2.0 1.6 2.1 1.8 1.8 2.5 2.0 3.0 2.1 2.4 2.3 3.0 2.1 2.2   Notes   1x boost GLV boostx1 boostx1             Date 7/21 9/1 10/13 11/10 12/1 12/15 12/29 1/12/22 1/26 2/9 2/16 2/23 3/2 3/16   Total Weekly Dose 33.75mg 33.75mg 33.75mg 33.75mg 33.75 mg 32.5mg 30mg 27.5mg 28.75 mg 30 mg 31.25 mg 31.25 mg 31.25 mg 31.25 mg   INR 2.9 2.5 3.1 3.1 3.2 3.4 3.4 1.9 1.7 1.8 2.1 2.6 2.9 2.9   Notes        redx1 ?  inc        Date 3/30 4/13 5/11 6/8  6/23 7/7 7/28  8/9 8/12 8/16   Total WeeklyDose 31.25mg 31.25 mg 31.25 mg 31.25mg Start dialysis 30mg 30mg 30 mg  33.75mg 37.5mg 37.5mg   INR 2.5 2.2 2.6 3.9  2.1 2.1 1.8 1.23 1.4 1.5 2.1   Notes    Inc torsemide Cath placement    AV fistula placement    HM train hm 1 no billing       Date 8/23 8/30 9/6 9/13 9/20 9/30 10/4        Total WeeklyDose 31.25 mg 32.5 mg 32.5 mg 32.5 mg 32.5 mg 32.5 mg 35 mg        INR 1.9 2.0 2.0 2.5 2.7 1.6 1.7        Notes HM2 no billing  HM3  No billing HM4  No billing HM 1  Bill today HM 2 No billing HM 3 No bill HM 4  No billing        Takes warfarin in the am    Clinic Interview:  Verbal Release Authorization signed on 8/22/2018 -- may speak with Raeann Montemayor (Wife). [phone] (Home) *Preferred*  Tablet Strength: pt has 7.5mg and 2.5mg tablets  Estimated oop cost for [facility] HM: Patient has [employer] Medicare so they should have minimal to no OOP costs.    Patient Findings    Negatives:  Signs/symptoms of thrombosis, Signs/symptoms of bleeding, Laboratory test error suspected, Change in health, Change in alcohol use, Change in activity, Upcoming invasive procedure, Emergency department visit, Upcoming dental procedure, Missed doses, Extra doses, Change in medications, Change in diet/appetite, Hospital admission, Bruising, Other complaints   Comments:  All findings negative per pt.        Plan:   1. INR is SUBtherapeutic today at 1.7 (goal 2.0-3.0). Per Raj Salter, PharmD, instructed Mr. Montemayor to boost today's dose to warfarin 7.5 mg, then continue taking warfarin 5 mg oral daily except warfarin 3.75 mg SunThur until recheck.  2. Recheck INR in one week 10/11/22.  3. Verbal and written information provided in the clinic. Mr. Montemayor expresses understanding with teach back and has no further questions at this time.     Home monitor information below:  · Test strips on hand: 2  10/4/22   ·   Lot : [id number]  Barcode : [id number] Exp : 6/23  · Serial number: [id number]   · Supplies billing code used: last 8/16/22- Next must be on or after  10/12/22  · Transfer Tube Lot number: Lot : [id number]  · Safety Lancets:  Lot : [id number]  Exp : 5/25   Billing:  *bill must be 4 tests 28 days or more from HM visit  PLEASE REFER TO ANTICOAGULATION TRAINING POWERPOINT FOR BILLING. Needs to use in clinic remote appointment for encounter.    Luigi Melo CPhT  10/4/2022  10:52 EDT     I, Raj Salter, PharmD, have reviewed the note in full and agree with  the assessment and plan.  10/04/22  16:10 EDT

## 2022-10-10 RX ORDER — WARFARIN SODIUM 2.5 MG/1
TABLET ORAL
Qty: 180 TABLET | Refills: 1 | Status: SHIPPED | OUTPATIENT
Start: 2022-10-10

## 2022-10-11 ENCOUNTER — ANTICOAGULATION VISIT (OUTPATIENT)
Dept: PHARMACY | Facility: HOSPITAL | Age: 77
End: 2022-10-11

## 2022-10-11 LAB — INR PPP: 2.8

## 2022-10-11 NOTE — PROGRESS NOTES
Anticoagulation Clinic Progress Note  [facility] Jessica Check Serial # : [id number]     Indication: afib  Referring Provider: Tyson (next appointment: 10/15/2021)   Initial Warfarin Start Date: 2007  Goal INR: 2-3  Current Drug Interactions:  torsemide  Diet: eats salads 2x weekly 11/4/20  [id number]: 5 (HTN, Age, CHF, CAD)  Other: anemic, CKD (stage 4) May need bridge with Lovenox given persistent AF (per cardio 1/24/2019), Scr was 3.22 on 11/24/21     Anticoagulation Clinic INR History:   Date 10/30 11/20 12/17 1/14/20 2/11 3/11 4/8 5/6 6/11 7/9 8/6 9/3 9/14 9/30   Total Weekly Dose 32.5 mg 32.5 mg 32.5 mg 32.5 mg 32.5mg 32.5mg 32.5mg 32.5mg 32.5mg 32.5 mg 32.5 mg 32.5mg 32.5mg 26.25 mg   INR 2.3 2.3 2.1 1.9 2.2 2.1 2.1 2.2 2.2 2.3 2.7 1.6 2.2  1.8   Notes                       ??   5 day hold     Date 10/14 11/4 11/11 11/25 12/9 12/16 12/30 1/13 2/3 3/3 3/31 4/28 5/26 6/23   Total Weekly Dose 32.5 mg 32.5mg 35mg 32.5mg 33.75 mg 36.25 mg 33.75 mg 35mg 33.75 33.75mg 33.75mg 33.75 mg 33.75 mg 33.75mg   INR 2.0 1.6 2.1 1.8 1.8 2.5 2.0 3.0 2.1 2.4 2.3 3.0 2.1 2.2   Notes   1x boost GLV boostx1 boostx1             Date 7/21 9/1 10/13 11/10 12/1 12/15 12/29 1/12/22 1/26 2/9 2/16 2/23 3/2 3/16   Total Weekly Dose 33.75mg 33.75mg 33.75mg 33.75mg 33.75 mg 32.5mg 30mg 27.5mg 28.75 mg 30 mg 31.25 mg 31.25 mg 31.25 mg 31.25 mg   INR 2.9 2.5 3.1 3.1 3.2 3.4 3.4 1.9 1.7 1.8 2.1 2.6 2.9 2.9   Notes        redx1 ?  inc        Date 3/30 4/13 5/11 6/8  6/23 7/7 7/28  8/9 8/12 8/16   Total WeeklyDose 31.25mg 31.25 mg 31.25 mg 31.25mg Start dialysis 30mg 30mg 30 mg  33.75mg 37.5mg 37.5mg   INR 2.5 2.2 2.6 3.9  2.1 2.1 1.8 1.23 1.4 1.5 2.1   Notes    Inc torsemide Cath placement    AV fistula placement    HM train hm 1 no billing       Date 8/23 8/30 9/6 9/13 9/20 9/30 10/4 10/11       Total WeeklyDose 31.25 mg 32.5 mg 32.5 mg 32.5 mg 32.5 mg 32.5 mg 35 mg 35 mg       INR 1.9 2.0 2.0 2.5 2.7 1.6 1.7 2.8       Notes HM2  no billing HM3  No billing HM4  No billing HM 1  Bill today HM 2 No billing HM 3 No bill HM 4  No billing 1x boost  HM 5 no bill day Bill day      Takes warfarin in the am    Clinic Interview:  Verbal Release Authorization signed on 8/22/2018 -- may speak with Raeann Montemayor (Wife). [phone] (Home) *Preferred*  Tablet Strength: pt has 7.5mg and 2.5mg tablets  Estimated oop cost for [facility] HM: Patient has [employer] Medicare so they should have minimal to no OOP costs.    Patient Findings    Negatives:  Signs/symptoms of thrombosis, Signs/symptoms of bleeding, Laboratory test error suspected, Change in health, Change in alcohol use, Change in activity, Upcoming invasive procedure, Emergency department visit, Upcoming dental procedure, Missed doses, Extra doses, Change in medications, Change in diet/appetite, Hospital admission, Bruising, Other complaints   Comments:  Mr Montemayor denies any changes to diet or medications.     Plan:   1. INR is therapeutic today at 2.8(goal 2.0-3.0). Instructed Mr. Montemayor to continue taking warfarin 5 mg oral daily except warfarin 3.75 mg SunThur until recheck.  2. Recheck INR in one week 10/18/22.  3. Verbal and written information provided in the clinic. Mr. Montemayor expresses understanding with teach back and has no further questions at this time.     Home monitor information below:  · Test strips on hand: 2  10/11/22 *Sending supplies today  ·   Lot : [id number]  Barcode : [id number] Exp : 6/23  · Serial number: [id number]   · Supplies billing code used: last 8/16/22- Next must be on or after  10/12/22  · Transfer Tube Lot number: Lot : [id number]  · Safety Lancets:  Lot : [id number]  Exp : 5/25   Billing:  *bill must be 4 tests 28 days or more from  visit  PLEASE REFER TO ANTICOAGULATION TRAINING POWERPOINT FOR BILLING. Needs to use in clinic remote appointment for encounter.    Luigi Melo CPhT  10/11/2022  09:36 EDT     I, Alem Llanes, PharmD, have reviewed the note in full  and agree with the assessment and plan.  10/11/22  14:48 EDT

## 2022-10-18 ENCOUNTER — ANTICOAGULATION VISIT (OUTPATIENT)
Dept: PHARMACY | Facility: HOSPITAL | Age: 77
End: 2022-10-18

## 2022-10-18 ENCOUNTER — HOSPITAL ENCOUNTER (OUTPATIENT)
Dept: CARDIOLOGY | Facility: HOSPITAL | Age: 77
Discharge: HOME | End: 2022-10-18

## 2022-10-18 VITALS — WEIGHT: 265 LBS | HEIGHT: 70 IN | BODY MASS INDEX: 37.94 KG/M2

## 2022-10-18 DIAGNOSIS — N18.4 CHRONIC RENAL DISEASE, STAGE IV: ICD-10-CM

## 2022-10-18 LAB
BH CV VAS DIALYSIS ARTERIAL ANASTOMOSIS EDV: 514 CM/SEC
BH CV VAS DIALYSIS ARTERIAL ANASTOMOSIS PSV: 839 CM/SEC
BH CV VAS DIALYSIS CONDUIT DIST DEPTH: 0.55 CM
BH CV VAS DIALYSIS CONDUIT DIST DIAMETER: 0.9 CM
BH CV VAS DIALYSIS CONDUIT DIST EDV: 144 CM/SEC
BH CV VAS DIALYSIS CONDUIT DIST PSV: 303 CM/SEC
BH CV VAS DIALYSIS CONDUIT MID DEPTH: 0.74 CM
BH CV VAS DIALYSIS CONDUIT MID DIAMETER: 0.69 CM
BH CV VAS DIALYSIS CONDUIT MID EDV: 152 CM/SEC
BH CV VAS DIALYSIS CONDUIT MID FLOW VOL: 814 ML/MIN
BH CV VAS DIALYSIS CONDUIT MID PSV: 324 CM/SEC
BH CV VAS DIALYSIS CONDUIT MID/DIST FLOW VOL: 843 ML/MIN
BH CV VAS DIALYSIS CONDUIT PROX DEPTH: 0.94 CM
BH CV VAS DIALYSIS CONDUIT PROX DIAMETER: 0.67 CM
BH CV VAS DIALYSIS CONDUIT PROX EDV: 135 CM/SEC
BH CV VAS DIALYSIS CONDUIT PROX FLOW VOL: 798 ML/MIN
BH CV VAS DIALYSIS CONDUIT PROX PSV: 236 CM/SEC
BH CV VAS DIALYSIS LEFT BRANCH 2 DIAMETER: 0.42 CM
BH CV VAS DIALYSIS PRE-INFLOW BRACHIAL EDV: 186 CM/SEC
BH CV VAS DIALYSIS PRE-INFLOW BRACHIAL FLOW VOL: 1126 ML/MIN
BH CV VAS DIALYSIS PRE-INFLOW BRACHIAL PSV: 361 CM/SEC
BH CV VAS DIALYSIS VENOUS OUTFLOW CEPHALIC ARCH EDV: 121 CM/SEC
BH CV VAS DIALYSIS VENOUS OUTFLOW CEPHALIC ARCH PSV: 214 CM/SEC
BH CV VAS DIALYSIS VENOUS OUTFLOW SUBCLAVIAN PSV: 130 CM/SEC
BH CV VAS PRELIMINARY FINDINGS SCRIPTING: 1
INR PPP: 2.4
MAXIMAL PREDICTED HEART RATE: 141 BPM
STRESS TARGET HR: 120 BPM

## 2022-10-18 PROCEDURE — G0249 PROVIDE INR TEST MATER/EQUIP: HCPCS

## 2022-10-18 PROCEDURE — 93990 DOPPLER FLOW TESTING: CPT | Performed by: INTERNAL MEDICINE

## 2022-10-18 NOTE — PROGRESS NOTES
Anticoagulation Clinic Progress Note  [facility] Jessica Check Serial # : [id number]     Indication: afib  Referring Provider: Tyson (next appointment: 10/15/2021)   Initial Warfarin Start Date: 2007  Goal INR: 2-3  Current Drug Interactions:  torsemide  Diet: eats salads 2x weekly 11/4/20  [id number]: 5 (HTN, Age, CHF, CAD)  Other: anemic, CKD (stage 4) May need bridge with Lovenox given persistent AF (per cardio 1/24/2019), Scr was 3.22 on 11/24/21     Anticoagulation Clinic INR History:   Date 10/30 11/20 12/17 1/14/20 2/11 3/11 4/8 5/6 6/11 7/9 8/6 9/3 9/14 9/30   Total Weekly Dose 32.5 mg 32.5 mg 32.5 mg 32.5 mg 32.5mg 32.5mg 32.5mg 32.5mg 32.5mg 32.5 mg 32.5 mg 32.5mg 32.5mg 26.25 mg   INR 2.3 2.3 2.1 1.9 2.2 2.1 2.1 2.2 2.2 2.3 2.7 1.6 2.2  1.8   Notes                       ??   5 day hold     Date 10/14 11/4 11/11 11/25 12/9 12/16 12/30 1/13 2/3 3/3 3/31 4/28 5/26 6/23   Total Weekly Dose 32.5 mg 32.5mg 35mg 32.5mg 33.75 mg 36.25 mg 33.75 mg 35mg 33.75 33.75mg 33.75mg 33.75 mg 33.75 mg 33.75mg   INR 2.0 1.6 2.1 1.8 1.8 2.5 2.0 3.0 2.1 2.4 2.3 3.0 2.1 2.2   Notes   1x boost GLV boostx1 boostx1             Date 7/21 9/1 10/13 11/10 12/1 12/15 12/29 1/12/22 1/26 2/9 2/16 2/23 3/2 3/16   Total Weekly Dose 33.75mg 33.75mg 33.75mg 33.75mg 33.75 mg 32.5mg 30mg 27.5mg 28.75 mg 30 mg 31.25 mg 31.25 mg 31.25 mg 31.25 mg   INR 2.9 2.5 3.1 3.1 3.2 3.4 3.4 1.9 1.7 1.8 2.1 2.6 2.9 2.9   Notes        redx1 ?  inc        Date 3/30 4/13 5/11 6/8  6/23 7/7 7/28  8/9 8/12 8/16   Total WeeklyDose 31.25mg 31.25 mg 31.25 mg 31.25mg Start dialysis 30mg 30mg 30 mg  33.75mg 37.5mg 37.5mg   INR 2.5 2.2 2.6 3.9  2.1 2.1 1.8 1.23 1.4 1.5 2.1   Notes    Inc torsemide Cath placement    AV fistula placement    HM train hm 1 no billing       Date 8/23 8/30 9/6 9/13 9/20 9/30 10/4 10/11 10/18      Total WeeklyDose 31.25 mg 32.5 mg 32.5 mg 32.5 mg 32.5 mg 32.5 mg 35 mg 35 mg 35 mg      INR 1.9 2.0 2.0 2.5 2.7 1.6 1.7 2.8 2.4       Notes HM2 no billing HM3  No billing HM4  No billing HM 1  Bill today HM 2 No billing HM 3 No bill HM 4  No billing 1x boost  HM 5 no bill day Bill day      Takes warfarin in the am    Clinic Interview:  Verbal Release Authorization signed on 8/22/2018 -- may speak with Raeann Montemayor (Wife). [phone] (Home) *Preferred*  Tablet Strength: pt has 7.5mg and 2.5mg tablets  Estimated oop cost for [facility] HM: Patient has [employer] Medicare so they should have minimal to no OOP costs.    Patient Findings    Negatives:  Signs/symptoms of thrombosis, Signs/symptoms of bleeding, Laboratory test error suspected, Change in health, Change in alcohol use, Change in activity, Upcoming invasive procedure, Emergency department visit, Upcoming dental procedure, Missed doses, Extra doses, Change in medications, Change in diet/appetite, Hospital admission, Bruising, Other complaints   Comments:  Mr Montemayor denies any changes to diet or medications.     Plan:   1. INR is therapeutic today at 2.4(goal 2.0-3.0). Instructed Mr. Montemayor to continue taking warfarin 5 mg oral daily except warfarin 3.75 mg SunThur until recheck.  2. Recheck INR in one week 10/25/22.  3. Verbal and written information provided in the clinic. Mr. Montemayor expresses understanding with teach back and has no further questions at this time.     Home monitor information below:  · Test strips on hand: 7  10/18/22   ·   Lot : [id number]  Barcode : [id number] Exp : 6/23  · Serial number: [id number]   · Supplies billing code used: last 10/18/22- Next must be on or after  11/15/22  · Transfer Tube Lot number: Lot : [id number]  · Safety Lancets:  Lot : [id number]  Exp : 5/25   Billing:  *bill must be 4 tests 28 days or more from  visit  PLEASE REFER TO ANTICOAGULATION TRAINING POWERPOINT FOR BILLING. Needs to use in clinic remote appointment for encounter.    Alem Llanes, PharmD, BCPS   10/18/2022  11:09 EDT

## 2022-10-25 ENCOUNTER — ANTICOAGULATION VISIT (OUTPATIENT)
Dept: PHARMACY | Facility: HOSPITAL | Age: 77
End: 2022-10-25

## 2022-10-25 LAB — INR PPP: 1.7

## 2022-10-25 NOTE — PROGRESS NOTES
Addendum 10/27: Patient sent today email informing us that he is having an upcoming fistula procedure as outpatient on 11/3/22 her at [facility] with Dr Reza. He reports that he received clearance from Dr Mehta's office to stop taking warfarin for 5 days. Contacted Dr Reza's office and spoke with Audrey. They have already contacted Cardiology and surgical clearance and warfarin 5 day hold approved by PUJA Garcia. Office to fax copy of this approval to Clinic. Per Audrey warfarin restart following procedure will be up to managing provider of warfarin, patient should be fine to restart night of procedure. Patient with a fib with CHADS-VASc score of 5 and is on dialysis, will therefore not bridge with Lovenox. Will follow perioperative plan as listed below:    10/28: final dose of warfarin prior to procedure  10/29-11/2: hold warfarin  11/3: Fistula procedure, restart warfarin with boosted dose of warfarin 7.5 mg  11/4: warfarin 5 mg  11/5: warfarin 5 mg  11/6: warfarin 3.75 mg  11/7: warfarin 5 mg  11/8: INR recheck    Tracker updated to reflect. Plan communicated over phone with patient who RBV plan and is in agreement.    Raj Salter, PharmD, BCPS  10/27/2022  16:19 EDT    Anticoagulation Clinic Progress Note  [facility] HOMEMONITOR: Coagu Check Serial # : [id number]     Indication: afib  Referring Provider: Tyson (next appointment: 10/15/2021)   Initial Warfarin Start Date: 2007  Goal INR: 2-3  Current Drug Interactions:  torsemide  Diet: eats salads 2x weekly 11/4/20  [id number]: 5 (HTN, Age, CHF, CAD)  Other: anemic, CKD (stage 4) May need bridge with Lovenox given persistent AF (per cardio 1/24/2019), Scr was 3.22 on 11/24/21     Anticoagulation Clinic INR History:   Date 10/30 11/20 12/17 1/14/20 2/11 3/11 4/8 5/6 6/11 7/9 8/6 9/3 9/14 9/30   Total Weekly Dose 32.5 mg 32.5 mg 32.5 mg 32.5 mg 32.5mg 32.5mg 32.5mg 32.5mg 32.5mg 32.5 mg 32.5 mg 32.5mg 32.5mg 26.25 mg   INR 2.3 2.3 2.1 1.9 2.2 2.1 2.1 2.2  2.2 2.3 2.7 1.6 2.2  1.8   Notes                       ??   5 day hold     Date 10/14 11/4 11/11 11/25 12/9 12/16 12/30 1/13 2/3 3/3 3/31 4/28 5/26 6/23   Total Weekly Dose 32.5 mg 32.5mg 35mg 32.5mg 33.75 mg 36.25 mg 33.75 mg 35mg 33.75 33.75mg 33.75mg 33.75 mg 33.75 mg 33.75mg   INR 2.0 1.6 2.1 1.8 1.8 2.5 2.0 3.0 2.1 2.4 2.3 3.0 2.1 2.2   Notes   1x boost GLV boostx1 boostx1             Date 7/21 9/1 10/13 11/10 12/1 12/15 12/29 1/12/22 1/26 2/9 2/16 2/23 3/2 3/16   Total Weekly Dose 33.75mg 33.75mg 33.75mg 33.75mg 33.75 mg 32.5mg 30mg 27.5mg 28.75 mg 30 mg 31.25 mg 31.25 mg 31.25 mg 31.25 mg   INR 2.9 2.5 3.1 3.1 3.2 3.4 3.4 1.9 1.7 1.8 2.1 2.6 2.9 2.9   Notes        redx1 ?  inc        Date 3/30 4/13 5/11 6/8  6/23 7/7 7/28  8/9 8/12 8/16   Total WeeklyDose 31.25mg 31.25 mg 31.25 mg 31.25mg Start dialysis 30mg 30mg 30 mg  33.75mg 37.5mg 37.5mg   INR 2.5 2.2 2.6 3.9  2.1 2.1 1.8 1.23 1.4 1.5 2.1   Notes    Inc torsemide Cath placement    AV fistula placement    HM train hm 1 no billing       Date 8/23 8/30 9/6 9/13 9/20 9/30 10/4 10/11 10/18 10/25     Total WeeklyDose 31.25 mg 32.5 mg 32.5 mg 32.5 mg 32.5 mg 32.5 mg 35 mg 35 mg 35 mg 32.5 mg     INR 1.9 2.0 2.0 2.5 2.7 1.6 1.7 2.8 2.4 1.7     Notes HM2 no billing HM3  No billing HM4  No billing HM 1  Bill today HM 2 No billing HM 3 No bill HM 4  No billing 1x boost  HM 5 no bill day Bill day HM- no billing       Takes warfarin in the am    Clinic Interview:  Verbal Release Authorization signed on 8/22/2018 -- may speak with Raeann Montemayor (Wife). [phone] (Home) *Preferred*  Tablet Strength: pt has 7.5mg and 2.5mg tablets  Estimated oop cost for [facility] HM: Patient has [employer] Medicare so they should have minimal to no OOP costs.    Patient Findings  Negatives:  Signs/symptoms of thrombosis, Signs/symptoms of bleeding, Laboratory test error suspected, Change in health, Change in alcohol use, Change in activity, Upcoming invasive procedure, Emergency  department visit, Upcoming dental procedure, Missed doses, Extra doses, Change in medications, Change in diet/appetite, Hospital admission, Bruising, Other complaints   Comments:  All findings negative per patient      Plan:   1. INR is SUBtherapeutic today at 1.7(goal 2.0-3.0). Per Kyle Plata PharmD,  Instructed Mr. Montemayor to BOOST tonight's warfarin dose to 7.5 mg then continue taking warfarin 5 mg oral daily except warfarin 3.75 mg SunThur until recheck.  2. Recheck INR in one week 11/1/22  3. Verbal and written information provided in the clinic. Mr. Montemayor expresses understanding with teach back and has no further questions at this time.     Home monitor information below:  · Test strips on hand: 6  10/25/22   ·   Lot : [id number]  Barcode : [id number] Exp : 6/23  · Serial number: [id number]   · Supplies billing code used: last 10/18/22- Next must be on or after  11/15/22  · Transfer Tube Lot number: Lot : [id number]  · Safety Lancets:  Lot : [id number]  Exp : 5/25   Billing:  *bill must be 4 tests 28 days or more from HM visit  PLEASE REFER TO ANTICOAGULATION TRAINING POWERPOINT FOR BILLING. Needs to use in clinic remote appointment for encounter.    Alexander Scherer, Pharmacy Technician  10/25/2022  09:08 EDT     I, Kyle Plata, PharmD, have reviewed the note in full and agree with the assessment and plan.  10/25/22  16:43 EDT

## 2022-10-27 ENCOUNTER — TELEPHONE (OUTPATIENT)
Dept: PHARMACY | Facility: HOSPITAL | Age: 77
End: 2022-10-27

## 2022-10-27 NOTE — TELEPHONE ENCOUNTER
Patient sent email informing us that he is having an upcoming fistula procedure as outpatient on 11/3/22 her at [facility] with Dr Reza. He reports that he received clearance from Dr Mehta's office to stop taking warfarin for 5 days. Contacted Dr Reza's office and spoke with Audrey. They have already contacted Cardiology and surgical clearance and warfarin 5 day hold approved by MARLIN Garcia Office to fax copy of this approval to Clinic. Per Audrey warfarin restart following procedure will be up to managing provider of warfarin, patient should be fine to restart night of procedure. Patient with a fib with CHADS-VASc score of 5 and is on dialysis, will therefore not bridge with Lovenox. Will follow perioperative plan as listed below:    10/28: final dose of warfarin prior to procedure  10/29-11/2: hold warfarin  11/3: Fistula procedure, restart warfarin with boosted dose of warfarin 7.5 mg  11/4: warfarin 5 mg  11/5: warfarin 5 mg  11/6: warfarin 3.75 mg  11/7: warfarin 5 mg  11/8: INR recheck    Raj Salter, PharmD, BCPS  10/27/2022  16:19 EDT

## 2022-11-01 ENCOUNTER — PRE-ADMISSION TESTING (OUTPATIENT)
Dept: PREADMISSION TESTING | Facility: HOSPITAL | Age: 77
End: 2022-11-01

## 2022-11-01 VITALS — BODY MASS INDEX: 38.69 KG/M2 | HEIGHT: 70 IN | WEIGHT: 270.28 LBS

## 2022-11-01 LAB
ALBUMIN SERPL-MCNC: 3.9 G/DL (ref 3.5–5.2)
ALBUMIN/GLOB SERPL: 1.3 G/DL
ALP SERPL-CCNC: 94 U/L (ref 39–117)
ALT SERPL W P-5'-P-CCNC: 8 U/L (ref 1–41)
ANION GAP SERPL CALCULATED.3IONS-SCNC: 15 MMOL/L (ref 5–15)
AST SERPL-CCNC: 14 U/L (ref 1–40)
BILIRUB SERPL-MCNC: 0.3 MG/DL (ref 0–1.2)
BUN SERPL-MCNC: 45 MG/DL (ref 8–23)
BUN/CREAT SERPL: 6.9 (ref 7–25)
CALCIUM SPEC-SCNC: 8.7 MG/DL (ref 8.6–10.5)
CHLORIDE SERPL-SCNC: 101 MMOL/L (ref 98–107)
CO2 SERPL-SCNC: 22 MMOL/L (ref 22–29)
CREAT SERPL-MCNC: 6.56 MG/DL (ref 0.76–1.27)
DEPRECATED RDW RBC AUTO: 63.6 FL (ref 37–54)
EGFRCR SERPLBLD CKD-EPI 2021: 8 ML/MIN/1.73
ERYTHROCYTE [DISTWIDTH] IN BLOOD BY AUTOMATED COUNT: 16.2 % (ref 12.3–15.4)
GLOBULIN UR ELPH-MCNC: 3.1 GM/DL
GLUCOSE SERPL-MCNC: 154 MG/DL (ref 65–99)
HCT VFR BLD AUTO: 34.2 % (ref 37.5–51)
HGB BLD-MCNC: 10.5 G/DL (ref 13–17.7)
MCH RBC QN AUTO: 33 PG (ref 26.6–33)
MCHC RBC AUTO-ENTMCNC: 30.7 G/DL (ref 31.5–35.7)
MCV RBC AUTO: 107.5 FL (ref 79–97)
PLATELET # BLD AUTO: 193 10*3/MM3 (ref 140–450)
PMV BLD AUTO: 9.4 FL (ref 6–12)
POTASSIUM SERPL-SCNC: 3.9 MMOL/L (ref 3.5–5.2)
PROT SERPL-MCNC: 7 G/DL (ref 6–8.5)
RBC # BLD AUTO: 3.18 10*6/MM3 (ref 4.14–5.8)
SODIUM SERPL-SCNC: 138 MMOL/L (ref 136–145)
WBC NRBC COR # BLD: 7.26 10*3/MM3 (ref 3.4–10.8)

## 2022-11-01 PROCEDURE — 85027 COMPLETE CBC AUTOMATED: CPT

## 2022-11-01 PROCEDURE — 80053 COMPREHEN METABOLIC PANEL: CPT

## 2022-11-01 PROCEDURE — 36415 COLL VENOUS BLD VENIPUNCTURE: CPT

## 2022-11-01 RX ORDER — BUMETANIDE 2 MG/1
2 TABLET ORAL EVERY OTHER DAY
COMMUNITY
Start: 2022-10-05

## 2022-11-01 NOTE — PAT
Per Anesthesia Request, patient instructed not to take their ACE/ARB medications on the AM of surgery.  (Lisinopril)    Patient to apply Chlorhexadine wipes  to surgical area (as instructed) the night before procedure and the AM of procedure. Wipes provided.    Device check in chart from 9/26/22.    EKG in chart from 6/30/22.    Cardiac clearance in chart.    Recent PT-INR in chart on 10/25/22.  This was not redrawn in PAT.    Patient denies SOA, chest pain, or any cardiac-related concerns since last seeing cardiologist.    Consent signed in PAT.

## 2022-11-02 ENCOUNTER — ANESTHESIA EVENT (OUTPATIENT)
Dept: PERIOP | Facility: HOSPITAL | Age: 77
End: 2022-11-02

## 2022-11-02 RX ORDER — FAMOTIDINE 10 MG/ML
20 INJECTION, SOLUTION INTRAVENOUS ONCE
Status: CANCELLED | OUTPATIENT
Start: 2022-11-02 | End: 2022-11-02

## 2022-11-03 ENCOUNTER — ANESTHESIA (OUTPATIENT)
Dept: PERIOP | Facility: HOSPITAL | Age: 77
End: 2022-11-03

## 2022-11-03 ENCOUNTER — ANESTHESIA EVENT CONVERTED (OUTPATIENT)
Dept: ANESTHESIOLOGY | Facility: HOSPITAL | Age: 77
End: 2022-11-03

## 2022-11-03 ENCOUNTER — HOSPITAL ENCOUNTER (OUTPATIENT)
Facility: HOSPITAL | Age: 77
Setting detail: HOSPITAL OUTPATIENT SURGERY
Discharge: HOME | End: 2022-11-03
Attending: SURGERY | Admitting: SURGERY

## 2022-11-03 VITALS
HEIGHT: 70 IN | HEART RATE: 83 BPM | TEMPERATURE: 98.1 F | DIASTOLIC BLOOD PRESSURE: 59 MMHG | WEIGHT: 270.28 LBS | OXYGEN SATURATION: 92 % | SYSTOLIC BLOOD PRESSURE: 96 MMHG | RESPIRATION RATE: 20 BRPM | BODY MASS INDEX: 38.69 KG/M2

## 2022-11-03 DIAGNOSIS — N18.4 STAGE 4 CHRONIC KIDNEY DISEASE: Primary | ICD-10-CM

## 2022-11-03 LAB — POTASSIUM SERPL-SCNC: 4.1 MMOL/L (ref 3.5–5.2)

## 2022-11-03 PROCEDURE — 84132 ASSAY OF SERUM POTASSIUM: CPT | Performed by: ANESTHESIOLOGY

## 2022-11-03 PROCEDURE — 25010000002 HEPARIN (PORCINE) PER 1000 UNITS: Performed by: SURGERY

## 2022-11-03 PROCEDURE — 25010000002 PROPOFOL 10 MG/ML EMULSION: Performed by: NURSE ANESTHETIST, CERTIFIED REGISTERED

## 2022-11-03 PROCEDURE — 25010000002 PHENYLEPHRINE 10 MG/ML SOLUTION: Performed by: NURSE ANESTHETIST, CERTIFIED REGISTERED

## 2022-11-03 PROCEDURE — 25010000002 VANCOMYCIN 10 G RECONSTITUTED SOLUTION: Performed by: SURGERY

## 2022-11-03 PROCEDURE — 25010000002 FENTANYL CITRATE (PF) 50 MCG/ML SOLUTION: Performed by: NURSE ANESTHETIST, CERTIFIED REGISTERED

## 2022-11-03 PROCEDURE — 25010000002 ONDANSETRON PER 1 MG: Performed by: NURSE ANESTHETIST, CERTIFIED REGISTERED

## 2022-11-03 DEVICE — LIGACLIP MCA MULTIPLE CLIP APPLIERS, 20 SMALL CLIPS
Type: IMPLANTABLE DEVICE | Site: ARM | Status: FUNCTIONAL
Brand: LIGACLIP

## 2022-11-03 DEVICE — FLOSEAL HEMOSTATIC MATRIX, 5ML
Type: IMPLANTABLE DEVICE | Site: ARM | Status: FUNCTIONAL
Brand: FLOSEAL HEMOSTATIC MATRIX

## 2022-11-03 DEVICE — CLIP LIGAT VASC HORIZON TI SM YEL 6CT: Type: IMPLANTABLE DEVICE | Site: ARM | Status: FUNCTIONAL

## 2022-11-03 RX ORDER — LIDOCAINE HYDROCHLORIDE 10 MG/ML
INJECTION, SOLUTION EPIDURAL; INFILTRATION; INTRACAUDAL; PERINEURAL AS NEEDED
Status: DISCONTINUED | OUTPATIENT
Start: 2022-11-03 | End: 2022-11-03 | Stop reason: SURG

## 2022-11-03 RX ORDER — PROPOFOL 10 MG/ML
VIAL (ML) INTRAVENOUS AS NEEDED
Status: DISCONTINUED | OUTPATIENT
Start: 2022-11-03 | End: 2022-11-03 | Stop reason: SURG

## 2022-11-03 RX ORDER — ALBUTEROL SULFATE 2.5 MG/3ML
2.5 SOLUTION RESPIRATORY (INHALATION) ONCE AS NEEDED
Status: DISCONTINUED | OUTPATIENT
Start: 2022-11-03 | End: 2022-11-03 | Stop reason: HOSPADM

## 2022-11-03 RX ORDER — ONDANSETRON 2 MG/ML
INJECTION INTRAMUSCULAR; INTRAVENOUS AS NEEDED
Status: DISCONTINUED | OUTPATIENT
Start: 2022-11-03 | End: 2022-11-03 | Stop reason: SURG

## 2022-11-03 RX ORDER — VASOPRESSIN 20 U/ML
INJECTION PARENTERAL AS NEEDED
Status: DISCONTINUED | OUTPATIENT
Start: 2022-11-03 | End: 2022-11-03 | Stop reason: SURG

## 2022-11-03 RX ORDER — DOCUSATE SODIUM 250 MG
250 CAPSULE ORAL 2 TIMES DAILY
Qty: 14 CAPSULE | Refills: 0 | Status: SHIPPED | OUTPATIENT
Start: 2022-11-03 | End: 2023-01-12

## 2022-11-03 RX ORDER — FENTANYL CITRATE 50 UG/ML
INJECTION, SOLUTION INTRAMUSCULAR; INTRAVENOUS
Status: COMPLETED | OUTPATIENT
Start: 2022-11-03 | End: 2022-11-03

## 2022-11-03 RX ORDER — PHENYLEPHRINE HYDROCHLORIDE 10 MG/ML
INJECTION INTRAVENOUS AS NEEDED
Status: DISCONTINUED | OUTPATIENT
Start: 2022-11-03 | End: 2022-11-03 | Stop reason: SURG

## 2022-11-03 RX ORDER — MIDAZOLAM HYDROCHLORIDE 1 MG/ML
0.5 INJECTION INTRAMUSCULAR; INTRAVENOUS
Status: DISCONTINUED | OUTPATIENT
Start: 2022-11-03 | End: 2022-11-03 | Stop reason: HOSPADM

## 2022-11-03 RX ORDER — BUPIVACAINE HYDROCHLORIDE 5 MG/ML
INJECTION, SOLUTION EPIDURAL; INTRACAUDAL
Status: COMPLETED | OUTPATIENT
Start: 2022-11-03 | End: 2022-11-03

## 2022-11-03 RX ORDER — LIDOCAINE HYDROCHLORIDE 10 MG/ML
0.5 INJECTION, SOLUTION EPIDURAL; INFILTRATION; INTRACAUDAL; PERINEURAL ONCE AS NEEDED
Status: COMPLETED | OUTPATIENT
Start: 2022-11-03 | End: 2022-11-03

## 2022-11-03 RX ORDER — MAGNESIUM HYDROXIDE 1200 MG/15ML
LIQUID ORAL AS NEEDED
Status: DISCONTINUED | OUTPATIENT
Start: 2022-11-03 | End: 2022-11-03 | Stop reason: HOSPADM

## 2022-11-03 RX ORDER — SODIUM CHLORIDE 0.9 % (FLUSH) 0.9 %
10 SYRINGE (ML) INJECTION AS NEEDED
Status: DISCONTINUED | OUTPATIENT
Start: 2022-11-03 | End: 2022-11-03 | Stop reason: HOSPADM

## 2022-11-03 RX ORDER — SODIUM CHLORIDE 0.9 % (FLUSH) 0.9 %
10 SYRINGE (ML) INJECTION EVERY 12 HOURS SCHEDULED
Status: DISCONTINUED | OUTPATIENT
Start: 2022-11-03 | End: 2022-11-03 | Stop reason: HOSPADM

## 2022-11-03 RX ORDER — SODIUM CHLORIDE 9 MG/ML
9 INJECTION, SOLUTION INTRAVENOUS CONTINUOUS
Status: DISCONTINUED | OUTPATIENT
Start: 2022-11-03 | End: 2022-11-03 | Stop reason: HOSPADM

## 2022-11-03 RX ORDER — FAMOTIDINE 20 MG/1
20 TABLET, FILM COATED ORAL ONCE
Status: COMPLETED | OUTPATIENT
Start: 2022-11-03 | End: 2022-11-03

## 2022-11-03 RX ORDER — CARVEDILOL 12.5 MG/1
25 TABLET ORAL ONCE
Status: COMPLETED | OUTPATIENT
Start: 2022-11-03 | End: 2022-11-03

## 2022-11-03 RX ORDER — FENTANYL CITRATE 50 UG/ML
50 INJECTION, SOLUTION INTRAMUSCULAR; INTRAVENOUS
Status: DISCONTINUED | OUTPATIENT
Start: 2022-11-03 | End: 2022-11-03 | Stop reason: HOSPADM

## 2022-11-03 RX ORDER — HYDROCODONE BITARTRATE AND ACETAMINOPHEN 5; 325 MG/1; MG/1
1 TABLET ORAL EVERY 8 HOURS PRN
Qty: 10 TABLET | Refills: 0 | Status: SHIPPED | OUTPATIENT
Start: 2022-11-03 | End: 2023-01-12

## 2022-11-03 RX ORDER — ONDANSETRON 2 MG/ML
4 INJECTION INTRAMUSCULAR; INTRAVENOUS ONCE AS NEEDED
Status: DISCONTINUED | OUTPATIENT
Start: 2022-11-03 | End: 2022-11-03 | Stop reason: HOSPADM

## 2022-11-03 RX ORDER — SODIUM CHLORIDE, SODIUM LACTATE, POTASSIUM CHLORIDE, CALCIUM CHLORIDE 600; 310; 30; 20 MG/100ML; MG/100ML; MG/100ML; MG/100ML
9 INJECTION, SOLUTION INTRAVENOUS CONTINUOUS
Status: DISCONTINUED | OUTPATIENT
Start: 2022-11-03 | End: 2022-11-03

## 2022-11-03 RX ORDER — EPHEDRINE SULFATE 50 MG/ML
INJECTION INTRAVENOUS AS NEEDED
Status: DISCONTINUED | OUTPATIENT
Start: 2022-11-03 | End: 2022-11-03 | Stop reason: SURG

## 2022-11-03 RX ORDER — CALCIUM CHLORIDE 100 MG/ML
INJECTION INTRAVENOUS; INTRAVENTRICULAR AS NEEDED
Status: DISCONTINUED | OUTPATIENT
Start: 2022-11-03 | End: 2022-11-03 | Stop reason: SURG

## 2022-11-03 RX ORDER — HYDROMORPHONE HYDROCHLORIDE 1 MG/ML
0.5 INJECTION, SOLUTION INTRAMUSCULAR; INTRAVENOUS; SUBCUTANEOUS
Status: DISCONTINUED | OUTPATIENT
Start: 2022-11-03 | End: 2022-11-03 | Stop reason: HOSPADM

## 2022-11-03 RX ADMIN — PHENYLEPHRINE HYDROCHLORIDE 100 MCG: 10 INJECTION INTRAVENOUS at 13:06

## 2022-11-03 RX ADMIN — PROPOFOL 120 MG: 10 INJECTION, EMULSION INTRAVENOUS at 13:01

## 2022-11-03 RX ADMIN — VASOPRESSIN 1 UNITS: 20 INJECTION INTRAVENOUS at 13:21

## 2022-11-03 RX ADMIN — PHENYLEPHRINE HYDROCHLORIDE 200 MCG: 10 INJECTION INTRAVENOUS at 13:10

## 2022-11-03 RX ADMIN — PHENYLEPHRINE HYDROCHLORIDE 200 MCG: 10 INJECTION INTRAVENOUS at 13:16

## 2022-11-03 RX ADMIN — SODIUM CHLORIDE: 9 INJECTION, SOLUTION INTRAVENOUS at 12:52

## 2022-11-03 RX ADMIN — FAMOTIDINE 20 MG: 20 TABLET ORAL at 12:07

## 2022-11-03 RX ADMIN — PHENYLEPHRINE HYDROCHLORIDE 200 MCG: 10 INJECTION INTRAVENOUS at 13:14

## 2022-11-03 RX ADMIN — VASOPRESSIN 2 UNITS: 20 INJECTION INTRAVENOUS at 14:35

## 2022-11-03 RX ADMIN — Medication 1750 MG: at 12:52

## 2022-11-03 RX ADMIN — PROPOFOL 30 MG: 10 INJECTION, EMULSION INTRAVENOUS at 13:46

## 2022-11-03 RX ADMIN — CALCIUM CHLORIDE 0.5 G: 100 INJECTION INTRAVENOUS; INTRAVENTRICULAR at 13:12

## 2022-11-03 RX ADMIN — LIDOCAINE HYDROCHLORIDE 40 MG: 10 INJECTION, SOLUTION EPIDURAL; INFILTRATION; INTRACAUDAL; PERINEURAL at 13:01

## 2022-11-03 RX ADMIN — LIDOCAINE HYDROCHLORIDE 0.5 ML: 10 INJECTION, SOLUTION EPIDURAL; INFILTRATION; INTRACAUDAL; PERINEURAL at 12:08

## 2022-11-03 RX ADMIN — CARVEDILOL 25 MG: 12.5 TABLET, FILM COATED ORAL at 12:15

## 2022-11-03 RX ADMIN — VASOPRESSIN 2 UNITS: 20 INJECTION INTRAVENOUS at 13:34

## 2022-11-03 RX ADMIN — PHENYLEPHRINE HYDROCHLORIDE 100 MCG: 10 INJECTION INTRAVENOUS at 13:04

## 2022-11-03 RX ADMIN — PHENYLEPHRINE HYDROCHLORIDE 200 MCG: 10 INJECTION INTRAVENOUS at 13:12

## 2022-11-03 RX ADMIN — ONDANSETRON 4 MG: 2 INJECTION INTRAMUSCULAR; INTRAVENOUS at 14:46

## 2022-11-03 RX ADMIN — EPHEDRINE SULFATE 10 MG: 50 INJECTION INTRAVENOUS at 13:20

## 2022-11-03 RX ADMIN — BUPIVACAINE HYDROCHLORIDE 20 ML: 5 INJECTION, SOLUTION EPIDURAL; INTRACAUDAL at 12:33

## 2022-11-03 RX ADMIN — FENTANYL CITRATE 25 MCG: 50 INJECTION, SOLUTION INTRAMUSCULAR; INTRAVENOUS at 12:33

## 2022-11-03 RX ADMIN — VASOPRESSIN 2 UNITS: 20 INJECTION INTRAVENOUS at 14:00

## 2022-11-03 RX ADMIN — VASOPRESSIN 2 UNITS: 20 INJECTION INTRAVENOUS at 14:17

## 2022-11-03 NOTE — ANESTHESIA PROCEDURE NOTES
Single Shot Supraclavicular      Patient reassessed immediately prior to procedure    Patient location during procedure: pre-op  Start time: 11/3/2022 12:33 PM  Reason for block: at surgeon's request and post-op pain management  Performed by  CRNA/CAA: Heriberto Blue, CRNA  Assisted by: Anya Price RN  Preanesthetic Checklist  Completed: patient identified, IV checked, site marked, risks and benefits discussed, surgical consent, monitors and equipment checked, pre-op evaluation and timeout performed  Prep:  Pt Position: supine  Sterile barriers:cap, gloves, mask and washed/disinfected hands  Prep: ChloraPrep  Patient monitoring: blood pressure monitoring, continuous pulse oximetry and EKG  Procedure    Sedation: yes  Performed under: local infiltration  Guidance:ultrasound guided    ULTRASOUND INTERPRETATION.  Using ultrasound guidance a 20 G gauge needle was placed in close proximity to the brachial plexus nerve, at which point, under ultrasound guidance anesthetic was injected in the area of the nerve and spread of the anesthesia was seen on ultrasound in close proximity thereto.  There were no abnormalities seen on ultrasound; a digital image was taken; and the patient tolerated the procedure with no complications. Images:still images obtained, printed/placed on chart    Laterality:left  Block Type:supraclavicular  Injection Technique:single-shot  Needle Type:echogenic and short-bevel  Needle Gauge:20 G  Resistance on Injection: none    Medications Used: fentaNYL citrate (PF) (SUBLIMAZE) injection - Intravenous   25 mcg - 11/3/2022 12:33:00 PM  bupivacaine (PF) (MARCAINE) 0.5 % injection - Injection   20 mL - 11/3/2022 12:33:00 PM      Medications  Preservative Free Saline:5ml    Post Assessment  Injection Assessment: negative aspiration for heme, no paresthesia on injection and incremental injection  Patient Tolerance:comfortable throughout block  Complications:no  Additional Notes  A high-frequency linear  "transducer, with sterile cover, was placed in the supraclavicular fossa to identify the subclavian artery and trunks and divisions of the brachial plexus. The insertion site was prepped and draped in sterile fashion. Skin and cutaneous tissue was infiltrated with 2-5 ml of 1% Lidocaine. Using ultrasound-guidance, a 20-gauge B-Irving 4\" Ultraplex 360 non-stimulating echogenic needle was advanced in plane from lateral to medial. Preservative-free normal saline was utilized for hydro-dissection of tissue, and to confirm final needle placement. Local anesthetic in incremental 3-5 ml injections to surround the brachial plexus with particular attention paid to the corner pocket inject near the subclavian artery to assure ulnar nerve coverage. Aspiration every 5 ml to prevent intravascular injection. Injection was completed with negative aspiration of blood and negative intravascular injection. Injection pressures were normal with minimal resistance.          "

## 2022-11-03 NOTE — OP NOTE
General Surgery Operative Note    Marco Antonio Montemayor  [id number]  1945    Date of Surgery:  11/3/2022 14:50 EDT    Pre-op Diagnosis: Arteriovenous fistula malfunction             Chronic renal disease    Post-op Diagnosis: Arteriovenous fistula malfunction              Chronic renal disease    Procedure: Elevation left upper extremity brachiocephalic arteriovenous fistula    Surgeon: Jakob Reza MD    Circulator: Dolores Levy RN; Jenn Iglesias RN  Scrub Person: Jane Arthur; Luis Sam RN     Anesthesia: General anesthetic with left regional block    Fluids: 300 mL of crystalloid    Estimated Blood Loss: Less than 50 mL    Urine Voided: Not recorded    Complications: None apparent    History:   77-year-old gentleman obese with chronic renal failure has a left upper extremity brachiocephalic arteriovenous fistula.  Dialysis is unable to access the fistula secondary to its depth.      The risks and benefits of brachiocephalic arteriovenous fistula elevation were rehashed.  Our discussion included but was not limited to: bleeding, infection, injury to adjacent viscera, fistula loss, need for reintervention, chronic pain, and medical issues from a cardiopulmonary and deep venous thrombosis standpoint.  All questions were answered and they understood and wished to proceed with surgical intervention.    Procedure:      After informed consent, the patient was taken to the operating room and placed in the supine position.  Appropriate antibiotic prophylaxis was given to the patient. General anesthesia was induced, the patient had preoperative left upper extremity regional anesthetic, the left upper extremity was then prepped and draped in the standard sterile fashion. An Ioban was placed on the skin. A time out was observed.     Created an incision directly over the arteriovenous fistula proximally at the level of the antecubital crease.  I dissected down to the arteriovenous fistula itself  which was sizable.  In a stepwise fashion I followed this up the arm creating an incision followed by the Bovie down to the level of the fistula.  The fistula was circumferentially dissected free from the surrounding tissue and encircled with a vessel loop.  This was done for approximately 15 cm, ligating all side branches with clips.  Meticulous hemostasis was obtained, 5 mL of Floseal was instilled into the wound.  This was irrigated free with Irrisept.  The soft tissue was then reapproximated posterior to the fistula, interrupted 0 Vicryl, thus elevating the fistula to the level of the skin. The skin was then reapproximated with interrupted 3-0 Vicryl at the level of the skin.  The skin was run with a 4-0 subcuticular Monocryl.  The wound was then dressed with Steri-Strips and a cover Derm.     All lap and needle counts were reported as correct at the end of the procedure ×2.  The patient was then transferred to the PACU.    Jakob Reza MD     Date: 11/3/2022  Time: 14:50 EDT

## 2022-11-03 NOTE — ANESTHESIA POSTPROCEDURE EVALUATION
Patient: Marco Antonio Montemayor    Procedure Summary     Date: 11/03/22 Room / Location:  [facility] OR 05 /  [facility] OR    Anesthesia Start: 1252 Anesthesia Stop: 1506    Procedure: ELEVATION LEFT UPPER EXTREMITY AV FISTULA (Left) Diagnosis:     Surgeons: Jakob Reza MD Provider: Abdelrahman Benito MD    Anesthesia Type: general with block ASA Status: 4          Anesthesia Type: general with block    Vitals  No vitals data found for the desired time range.          Post Anesthesia Care and Evaluation    Patient location during evaluation: PACU  Patient participation: complete - patient participated  Level of consciousness: awake  Pain score: 0  Pain management: adequate    Airway patency: patent  Anesthetic complications: No anesthetic complications  PONV Status: none  Cardiovascular status: acceptable and stable  Respiratory status: nasal cannula, unassisted, acceptable and spontaneous ventilation  Hydration status: acceptable

## 2022-11-03 NOTE — ANESTHESIA PROCEDURE NOTES
Airway  Urgency: elective    Date/Time: 11/3/2022 1:02 PM  Airway not difficult    General Information and Staff    Patient location during procedure: OR  CRNA/CAA: Maciel Knight CRNA    Indications and Patient Condition  Indications for airway management: airway protection    Preoxygenated: yes  Mask difficulty assessment: 0 - not attempted    Final Airway Details  Final airway type: supraglottic airway      Successful airway: I-gel  Size 4     Number of attempts at approach: 1  Assessment: lips, teeth, and gum same as pre-op    Additional Comments  LMA placed without difficulty, ventilation with assist, equal breath sounds and symmetric chest rise and fall

## 2022-11-03 NOTE — INTERVAL H&P NOTE
"[facility] Pre-op    Full history and physical note from office is attached.    /86 (BP Location: Right arm, Patient Position: Lying)   Pulse 112   Temp 97.4 °F (36.3 °C) (Temporal)   Resp 18   Ht 177.8 cm (70\")   Wt 123 kg (270 lb 4.5 oz)   BMI 38.78 kg/m²     Immunizations:  Influenza:  No      Pneumococcal:  No  Tetanus:  No  Covid : No      LAB Results:  Lab Results   Component Value Date    WBC 7.26 11/01/2022    HGB 10.5 (L) 11/01/2022    HCT 34.2 (L) 11/01/2022    .5 (H) 11/01/2022     11/01/2022    NEUTROABS 6.29 05/21/2022    GLUCOSE 154 (H) 11/01/2022    BUN 45 (H) 11/01/2022    CREATININE 6.56 (H) 11/01/2022    EGFRIFNONA 18 (L) 02/16/2022    EGFRIFAFRI 26 (L) 11/12/2018     11/01/2022    K 3.9 11/01/2022     11/01/2022    CO2 22.0 11/01/2022    MG 2.1 10/13/2021    PHOS 3.5 02/06/2019    CALCIUM 8.7 11/01/2022    ALBUMIN 3.90 11/01/2022    AST 14 11/01/2022    ALT 8 11/01/2022    BILITOT 0.3 11/01/2022    PTT 31.8 02/05/2019    INR 1.70 10/25/2022       Cancer Staging (if applicable)  Cancer Patient: __ yes __no __unknown__N/A; If yes, clinical stage T:__ N:__M:__, stage group or __N/A      Impression: Chronic kidney failure       Plan: ELEVATION LEFT UPPER EXTREMITY AV FISTULA      JENNIFER Grady   11/3/2022   11:36 EDT     I have reviewed the above note, my prior note(s), appropriate imaging, and labs.  I have again discussed the risks and benefits of elevation of the left brachiocephalic AV fistula with the patient.  All of their questions have been answered.  They understand and wish to proceed with surgical intervention.    CBC  Results from last 7 days   Lab Units 11/01/22  1046   WBC 10*3/mm3 7.26   HEMOGLOBIN g/dL 10.5*   HEMATOCRIT % 34.2*   PLATELETS 10*3/mm3 193       CMP  Results from last 7 days   Lab Units 11/01/22  1046   SODIUM mmol/L 138   POTASSIUM mmol/L 3.9   CHLORIDE mmol/L 101   CO2 mmol/L 22.0   BUN mg/dL 45*   CREATININE " mg/dL 6.56*   CALCIUM mg/dL 8.7   BILIRUBIN mg/dL 0.3   ALK PHOS U/L 94   ALT (SGPT) U/L 8   AST (SGOT) U/L 14   GLUCOSE mg/dL 154*       Coagulation Cascade  PTT   Date Value Ref Range Status   02/05/2019 31.8 24.0 - 37.0 seconds Final     INR   Date Value Ref Range Status   10/25/2022 1.70  Final     Protime   Date Value Ref Range Status   08/12/2022 18.1 (H) 10.0 - 13.8 seconds Final     Comment:     Meter: [id number] : [id number] Kameron Pacheco       No components found for: ECHO

## 2022-11-03 NOTE — ANESTHESIA PREPROCEDURE EVALUATION
Anesthesia Evaluation     Patient summary reviewed and Nursing notes reviewed                Airway   Mallampati: III  TM distance: >3 FB  Neck ROM: full  Possible difficult intubation  Dental - normal exam     Pulmonary - normal exam   (+) home oxygen (at night), sleep apnea (BiPAP),   Cardiovascular - normal exam    (+) pacemaker pacemaker, hypertension, CAD, dysrhythmias (off coumadin 6 days) Paroxysmal Atrial Fib, hyperlipidemia,     ROS comment:   Echo 6/15: normal EF, mild MR    LHC 6/15: essentially normal coronaries    Neuro/Psych- negative ROS  GI/Hepatic/Renal/Endo    (+) morbid obesity,  renal disease ESRD and dialysis,     Musculoskeletal (-) negative ROS    Abdominal  - normal exam    Bowel sounds: normal.   Substance History - negative use     OB/GYN negative ob/gyn ROS         Other                      Anesthesia Plan    ASA 4     general with block     intravenous induction     Anesthetic plan, risks, benefits, and alternatives have been provided, discussed and informed consent has been obtained with: patient.    Plan discussed with CRNA.        CODE STATUS:

## 2022-11-08 ENCOUNTER — ANTICOAGULATION VISIT (OUTPATIENT)
Dept: PHARMACY | Facility: HOSPITAL | Age: 77
End: 2022-11-08

## 2022-11-08 LAB — INR PPP: 1.4

## 2022-11-08 NOTE — PROGRESS NOTES
Anticoagulation Clinic Progress Note  [facility] Serial # : [id number]     Indication: afib  Referring Provider: Tyson (next appointment: 10/15/2021)   Initial Warfarin Start Date: 2007  Goal INR: 2-3  Current Drug Interactions:  torsemide  Diet: eats salads 2x weekly 11/4/20  [id number]: 5 (HTN, Age, CHF, CAD)  Other: anemic, CKD (stage 4) May need bridge with Lovenox given persistent AF (per cardio 1/24/2019), Scr was 3.22 on 11/24/21     Anticoagulation Clinic INR History:   Date 10/30 11/20 12/17 1/14/20 2/11 3/11 4/8 5/6 6/11 7/9 8/6 9/3 9/14 9/30   Total Weekly Dose 32.5 mg 32.5 mg 32.5 mg 32.5 mg 32.5mg 32.5mg 32.5mg 32.5mg 32.5mg 32.5 mg 32.5 mg 32.5mg 32.5mg 26.25 mg   INR 2.3 2.3 2.1 1.9 2.2 2.1 2.1 2.2 2.2 2.3 2.7 1.6 2.2  1.8   Notes                       ??   5 day hold     Date 10/14 11/4 11/11 11/25 12/9 12/16 12/30 1/13 2/3 3/3 3/31 4/28 5/26 6/23   Total Weekly Dose 32.5 mg 32.5mg 35mg 32.5mg 33.75 mg 36.25 mg 33.75 mg 35mg 33.75 33.75mg 33.75mg 33.75 mg 33.75 mg 33.75mg   INR 2.0 1.6 2.1 1.8 1.8 2.5 2.0 3.0 2.1 2.4 2.3 3.0 2.1 2.2   Notes   1x boost GLV boostx1 boostx1             Date 7/21 9/1 10/13 11/10 12/1 12/15 12/29 1/12/22 1/26 2/9 2/16 2/23 3/2 3/16   Total Weekly Dose 33.75mg 33.75mg 33.75mg 33.75mg 33.75 mg 32.5mg 30mg 27.5mg 28.75 mg 30 mg 31.25 mg 31.25 mg 31.25 mg 31.25 mg   INR 2.9 2.5 3.1 3.1 3.2 3.4 3.4 1.9 1.7 1.8 2.1 2.6 2.9 2.9   Notes        redx1 ?  inc        Date 3/30 4/13 5/11 6/8  6/23 7/7 7/28  8/9 8/12 8/16   Total WeeklyDose 31.25mg 31.25 mg 31.25 mg 31.25mg Start dialysis 30mg 30mg 30 mg  33.75mg 37.5mg 37.5mg   INR 2.5 2.2 2.6 3.9  2.1 2.1 1.8 1.23 1.4 1.5 2.1   Notes    Inc torsemide Cath placement    AV fistula placement    HM train hm 1 no billing       Date 8/23 8/30 9/6 9/13 9/20 9/30 10/4 10/11 10/18 10/25 11/8    Total WeeklyDose 31.25 mg 32.5 mg 32.5 mg 32.5 mg 32.5 mg 32.5 mg 35 mg 35 mg 35 mg 32.5 mg 26.5 mg    INR 1.9 2.0 2.0 2.5  2.7 1.6 1.7 2.8 2.4 1.7 1.4    Notes HM2 no billing HM3  No billing HM4  No billing HM 1  Bill today HM 2 No billing HM 3 No bill HM 4  No billing 1x boost  HM 5 no bill day Bill day HM- no billing   hm 2- no billing    Takes warfarin in the am    Clinic Interview:  Verbal Release Authorization signed on 8/22/2018 -- may speak with Raeann Montemayor (Wife). [phone] (Home) *Preferred*  Tablet Strength: pt has 7.5mg and 2.5mg tablets  Estimated oop cost for [facility] EDER HM: Patient has [employer] Medicare so they should have minimal to no OOP costs.      Patient Findings  Positives:  Missed doses, Extra doses   Negatives:  Signs/symptoms of thrombosis, Signs/symptoms of bleeding, Laboratory test error suspected, Change in health, Change in alcohol use, Change in activity, Upcoming invasive procedure, Emergency department visit, Upcoming dental procedure, Change in medications, Change in diet/appetite, Hospital admission, Bruising, Other complaints   Comments:  All findings negative per pt.   No new meds/diet changes post op        Plan:   1. INR is SUBtherapeutic today at 1.4(goal 2.0-3.0) following held doses post procedure.  Instructed Mr. Montemayor to BOOST tonight's warfarin dose to 7.5 mg then continue taking warfarin 5 mg oral daily except warfarin 3.75 mg Sun[place]ur until recheck.2. Recheck INR in one week 11/15/22  3. Verbal and written information provided in the clinic. Mr. Montemayor expresses understanding with teach back and has no further questions at this time.     Home monitor information below:  · Test strips on hand: 5  11/8/22   ·   Lot : [id number]  Barcode : [id number] Exp : 6/23  · Serial number: [id number]   · Supplies billing code used: last 10/18/22- Next must be on or after  11/15/22  · Transfer Tube Lot number: Lot : [id number]  · Safety Lancets:  Lot : [id number]  Exp : 5/25   Billing:  *bill must be 4 tests 28 days or more from  visit  PLEASE REFER TO ANTICOAGULATION TRAINING POWERPOINT FOR BILLING. Needs  to use in clinic remote appointment for encounter.    Kristine Sutton, PharmD.  11/08/22   09:27 EST

## 2022-11-15 ENCOUNTER — ANTICOAGULATION VISIT (OUTPATIENT)
Dept: PHARMACY | Facility: HOSPITAL | Age: 77
End: 2022-11-15

## 2022-11-15 LAB — INR PPP: 1.6

## 2022-11-15 NOTE — PROGRESS NOTES
Anticoagulation Clinic Progress Note  JANETH PARKER: Jessica Check Serial # : [id number]     Indication: afib  Referring Provider: Tyson  Initial Warfarin Start Date: 2007  Goal INR: 2-3  Current Drug Interactions:  torsemide  Diet: eats salads 2x weekly 11/15/22  [id number]: 5 (HTN, Age, CHF, CAD)  Other: anemic, CKD (stage 4) May need bridge with Lovenox given persistent AF (per cardio 1/24/2019), Scr was 3.22 on 11/24/21     Anticoagulation Clinic INR History:   Date 10/30 11/20 12/17 1/14/20 2/11 3/11 4/8 5/6 6/11 7/9 8/6 9/3 9/14 9/30   Total Weekly Dose 32.5 mg 32.5 mg 32.5 mg 32.5 mg 32.5mg 32.5mg 32.5mg 32.5mg 32.5mg 32.5 mg 32.5 mg 32.5mg 32.5mg 26.25 mg   INR 2.3 2.3 2.1 1.9 2.2 2.1 2.1 2.2 2.2 2.3 2.7 1.6 2.2  1.8   Notes                       ??   5 day hold     Date 10/14 11/4 11/11 11/25 12/9 12/16 12/30 1/13 2/3 3/3 3/31 4/28 5/26 6/23   Total Weekly Dose 32.5 mg 32.5mg 35mg 32.5mg 33.75 mg 36.25 mg 33.75 mg 35mg 33.75 33.75mg 33.75mg 33.75 mg 33.75 mg 33.75mg   INR 2.0 1.6 2.1 1.8 1.8 2.5 2.0 3.0 2.1 2.4 2.3 3.0 2.1 2.2   Notes   1x boost GLV boostx1 boostx1             Date 7/21 9/1 10/13 11/10 12/1 12/15 12/29 1/12/22 1/26 2/9 2/16 2/23 3/2 3/16   Total Weekly Dose 33.75mg 33.75mg 33.75mg 33.75mg 33.75 mg 32.5mg 30mg 27.5mg 28.75 mg 30 mg 31.25 mg 31.25 mg 31.25 mg 31.25 mg   INR 2.9 2.5 3.1 3.1 3.2 3.4 3.4 1.9 1.7 1.8 2.1 2.6 2.9 2.9   Notes        redx1 ?  inc        Date 3/30 4/13 5/11 6/8  6/23 7/7 7/28  8/9 8/12 8/16   Total WeeklyDose 31.25mg 31.25 mg 31.25 mg 31.25mg Start dialysis 30mg 30mg 30 mg  33.75mg 37.5mg 37.5mg   INR 2.5 2.2 2.6 3.9  2.1 2.1 1.8 1.23 1.4 1.5 2.1   Notes    Inc torsemide Cath placement    AV fistula placement    HM train  1 no billing       Date 8/23 8/30 9/6 9/13 9/20 9/30 10/4 10/11 10/18 10/25 11/8 11/15   Total WeeklyDose 31.25 mg 32.5 mg 32.5 mg 32.5 mg 32.5 mg 32.5 mg 35 mg 35 mg 35 mg 32.5 mg 26.5 mg 35 mg   INR 1.9 2.0 2.0 2.5 2.7 1.6 1.7 2.8 2.4  1.7 1.4 1.6   Notes HM2 no billing HM3  No billing HM4  No billing HM 1  Bill today HM 2 No billing HM 3 No bill HM 4  No billing 1x boost  HM 5 no bill day Bill day HM- no billing   hm 2- no billing 1x boost  HM 3 - no billing    Takes warfarin in the am    Clinic Interview:  Verbal Release Authorization signed on 8/22/2018 -- may speak with Raeann Montemayor (Wife). [phone] (Home) *Preferred*  Tablet Strength: pt has 7.5mg and 2.5mg tablets  Estimated oop cost for [facility] EDER HM: Patient has [employer] Medicare so they should have minimal to no OOP costs.      Patient Findings  Negatives:  Signs/symptoms of thrombosis, Signs/symptoms of bleeding, Laboratory test error suspected, Change in health, Change in alcohol use, Change in activity, Upcoming invasive procedure, Emergency department visit, Upcoming dental procedure, Missed doses, Extra doses, Change in medications, Change in diet/appetite, Hospital admission, Bruising, Other complaints   Comments:  All findings negative per patient.         Plan:   1. INR is SUBtherapeutic today at 1.6 (goal 2.0-3.0).  Instructed Mr. Montemayor toBOOST today's warfarin dose to 7.5 mg then takie warfarin 5 mg oral daily until recheck.2. Recheck INR in one week 11/22/22 (will have received 37.5 mg)  3. Verbal and written information provided in the clinic. Mr. Montemayor expresses understanding with teach back and has no further questions at this time.     Home monitor information below:  · Test strips on hand: 4  11/15/22   ·   Lot : [id number]  Barcode : [id number] Exp : 6/23  · Serial number: [id number]   · Supplies billing code used: last 10/18/22- Next must be on or after  11/15/22  · Transfer Tube Lot number: Lot : [id number]  · Safety Lancets:  Lot : [id number]  Exp : 5/25   Billing:  *bill must be 4 tests 28 days or more from  visit  PLEASE REFER TO ANTICOAGULATION TRAINING POWERPOINT FOR BILLING. Needs to use in clinic remote appointment for encounter.    Maryann Schmidt  PharmD  11/15/2022   09:49 EST

## 2022-11-22 ENCOUNTER — ANTICOAGULATION VISIT (OUTPATIENT)
Dept: PHARMACY | Facility: HOSPITAL | Age: 77
End: 2022-11-22

## 2022-11-22 LAB
INR PPP: 1.9
INR PPP: 1.9

## 2022-11-22 PROCEDURE — G0249 PROVIDE INR TEST MATER/EQUIP: HCPCS

## 2022-11-22 NOTE — PROGRESS NOTES
Anticoagulation Clinic Progress Note  JANETH PARKER: Jessica Check Serial # : [id number]     Indication: afib  Referring Provider: Tyson  Initial Warfarin Start Date: 2007  Goal INR: 2-3  Current Drug Interactions:  torsemide  Diet: eats salads 2x weekly 11/15/22  [id number]: 5 (HTN, Age, CHF, CAD)  Other: anemic, CKD (stage 4) May need bridge with Lovenox given persistent AF (per cardio 1/24/2019), Scr was 3.22 on 11/24/21     Anticoagulation Clinic INR History:   Date 10/30 11/20 12/17 1/14/20 2/11 3/11 4/8 5/6 6/11 7/9 8/6 9/3 9/14 9/30   Total Weekly Dose 32.5 mg 32.5 mg 32.5 mg 32.5 mg 32.5mg 32.5mg 32.5mg 32.5mg 32.5mg 32.5 mg 32.5 mg 32.5mg 32.5mg 26.25 mg   INR 2.3 2.3 2.1 1.9 2.2 2.1 2.1 2.2 2.2 2.3 2.7 1.6 2.2  1.8   Notes                       ??   5 day hold     Date 10/14 11/4 11/11 11/25 12/9 12/16 12/30 1/13 2/3 3/3 3/31 4/28 5/26 6/23   Total Weekly Dose 32.5 mg 32.5mg 35mg 32.5mg 33.75 mg 36.25 mg 33.75 mg 35mg 33.75 33.75mg 33.75mg 33.75 mg 33.75 mg 33.75mg   INR 2.0 1.6 2.1 1.8 1.8 2.5 2.0 3.0 2.1 2.4 2.3 3.0 2.1 2.2   Notes   1x boost GLV boostx1 boostx1             Date 7/21 9/1 10/13 11/10 12/1 12/15 12/29 1/12/22 1/26 2/9 2/16 2/23 3/2 3/16   Total Weekly Dose 33.75mg 33.75mg 33.75mg 33.75mg 33.75 mg 32.5mg 30mg 27.5mg 28.75 mg 30 mg 31.25 mg 31.25 mg 31.25 mg 31.25 mg   INR 2.9 2.5 3.1 3.1 3.2 3.4 3.4 1.9 1.7 1.8 2.1 2.6 2.9 2.9   Notes        redx1 ?  inc        Date 3/30 4/13 5/11 6/8  6/23 7/7 7/28  8/9 8/12 8/16   Total WeeklyDose 31.25mg 31.25 mg 31.25 mg 31.25mg Start dialysis 30mg 30mg 30 mg  33.75mg 37.5mg 37.5mg   INR 2.5 2.2 2.6 3.9  2.1 2.1 1.8 1.23 1.4 1.5 2.1   Notes    Inc torsemide Cath placement    AV fistula placement    HM train  1 no billing       Date 8/23 8/30 9/6 9/13 9/20 9/30 10/4 10/11 10/18 10/25 11/8 11/15   Total WeeklyDose 31.25 mg 32.5 mg 32.5 mg 32.5 mg 32.5 mg 32.5 mg 35 mg 35 mg 35 mg 32.5 mg 26.5 mg 35 mg   INR 1.9 2.0 2.0 2.5 2.7 1.6 1.7 2.8 2.4  1.7 1.4 1.6   Notes HM2 no billing HM3  No billing HM4  No billing HM 1  Bill today HM 2 No billing HM 3 No bill HM 4  No billing 1x boost  HM 5 no bill day Bill day  HM1 HM- 2 no billing   HM 3- no billing 1x boost  HM 4 - no billing      Date 11/22              Total WeeklyDose 37.5 mg              INR 1.9              Notes HM1- bill day                Takes warfarin in the am    Clinic Interview:  Verbal Release Authorization signed on 8/22/2018 -- may speak with Raeann Montemayor (Wife). [phone] (Home) *Preferred*  Tablet Strength: pt has 7.5mg and 2.5mg tablets  Estimated oop cost for [facility] ED[facility] HM: Patient has [employer] Medicare so they should have minimal to no OOP costs.    Patient Findings  Negatives:  Signs/symptoms of thrombosis, Signs/symptoms of bleeding, Laboratory test error suspected, Change in health, Change in alcohol use, Change in activity, Upcoming invasive procedure, Emergency department visit, Upcoming dental procedure, Missed doses, Extra doses, Change in medications, Change in diet/appetite, Hospital admission, Bruising, Other complaints   Comments:  All findings negative per patient.     Plan:   1. INR is SUBtherapeutic today at 1.9(goal 2.0-3.0).  Instructed Mr. Montemayor tocontinue warfarin 5 mg daily except warfarin 7.5 mg Tue until recheck.2. Recheck INR in one week 11/29/22   3. Verbal and written information provided in the clinic. Mr. Montemayor expresses understanding with teach back and has no further questions at this time.     Home monitor information below:  · Test strips on hand: 4  11/22/22   ·   Lot : [id number]  Barcode : [id number] Exp : 6/23  · Serial number: [id number]   · Supplies billing code used: last 11/22/22- Next must be on or after  12/20/22  · Transfer Tube Lot number: Lot : [id number]  · Safety Lancets:  Lot : [id number]  Exp : 5/25   Billing:  *bill must be 4 tests 28 days or more from  visit  PLEASE REFER TO ANTICOAGULATION TRAINING POWERPOINT FOR BILLING. Needs to use  in clinic remote appointment for encounter.    Luigi Melo CPhT  11/22/2022  09:14 EST     Alem RODGERS, PharmD, have reviewed the note in full and agree with the assessment and plan.  11/22/22  10:45 EST

## 2022-11-29 ENCOUNTER — ANTICOAGULATION VISIT (OUTPATIENT)
Dept: PHARMACY | Facility: HOSPITAL | Age: 77
End: 2022-11-29

## 2022-11-29 LAB — INR PPP: 2.8

## 2022-11-29 NOTE — PROGRESS NOTES
Anticoagulation Clinic Progress Note  JANETH PARKER: Jessica Check Serial # : [id number]     Indication: afib  Referring Provider: Tyson  Initial Warfarin Start Date: 2007  Goal INR: 2-3  Current Drug Interactions:  torsemide  Diet: eats salads 2x weekly 11/15/22  [id number]: 5 (HTN, Age, CHF, CAD)  Other: anemic, CKD (stage 4) May need bridge with Lovenox given persistent AF (per cardio 1/24/2019), Scr was 3.22 on 11/24/21     Anticoagulation Clinic INR History:   Date 10/30 11/20 12/17 1/14/20 2/11 3/11 4/8 5/6 6/11 7/9 8/6 9/3 9/14 9/30   Total Weekly Dose 32.5 mg 32.5 mg 32.5 mg 32.5 mg 32.5mg 32.5mg 32.5mg 32.5mg 32.5mg 32.5 mg 32.5 mg 32.5mg 32.5mg 26.25 mg   INR 2.3 2.3 2.1 1.9 2.2 2.1 2.1 2.2 2.2 2.3 2.7 1.6 2.2  1.8   Notes                       ??   5 day hold     Date 10/14 11/4 11/11 11/25 12/9 12/16 12/30 1/13 2/3 3/3 3/31 4/28 5/26 6/23   Total Weekly Dose 32.5 mg 32.5mg 35mg 32.5mg 33.75 mg 36.25 mg 33.75 mg 35mg 33.75 33.75mg 33.75mg 33.75 mg 33.75 mg 33.75mg   INR 2.0 1.6 2.1 1.8 1.8 2.5 2.0 3.0 2.1 2.4 2.3 3.0 2.1 2.2   Notes   1x boost GLV boostx1 boostx1             Date 7/21 9/1 10/13 11/10 12/1 12/15 12/29 1/12/22 1/26 2/9 2/16 2/23 3/2 3/16   Total Weekly Dose 33.75mg 33.75mg 33.75mg 33.75mg 33.75 mg 32.5mg 30mg 27.5mg 28.75 mg 30 mg 31.25 mg 31.25 mg 31.25 mg 31.25 mg   INR 2.9 2.5 3.1 3.1 3.2 3.4 3.4 1.9 1.7 1.8 2.1 2.6 2.9 2.9   Notes        redx1 ?  inc        Date 3/30 4/13 5/11 6/8  6/23 7/7 7/28  8/9 8/12 8/16   Total WeeklyDose 31.25mg 31.25 mg 31.25 mg 31.25mg Start dialysis 30mg 30mg 30 mg  33.75mg 37.5mg 37.5mg   INR 2.5 2.2 2.6 3.9  2.1 2.1 1.8 1.23 1.4 1.5 2.1   Notes    Inc torsemide Cath placement    AV fistula placement    HM train  1 no billing       Date 8/23 8/30 9/6 9/13 9/20 9/30 10/4 10/11 10/18 10/25 11/8 11/15   Total WeeklyDose 31.25 mg 32.5 mg 32.5 mg 32.5 mg 32.5 mg 32.5 mg 35 mg 35 mg 35 mg 32.5 mg 26.5 mg 35 mg   INR 1.9 2.0 2.0 2.5 2.7 1.6 1.7 2.8 2.4  1.7 1.4 1.6   Notes HM2 no billing HM3  No billing HM4  No billing HM 1  Bill today HM 2 No billing HM 3 No bill HM 4  No billing 1x boost  HM 5 no bill day Bill day  HM1 HM- 2 no billing   HM 3- no billing 1x boost  HM 4 - no billing      Date 11/22 11/29             Total WeeklyDose 37.5 mg 37.5 mg             INR 1.9 2.8             Notes HM1- bill day HM 2 - NO bill               Takes warfarin in the am    Clinic Interview:  Verbal Release Authorization signed on 8/22/2018 -- may speak with Raeann Montemayor (Wife). [phone] (Home) *Preferred*  Tablet Strength: pt has 7.5mg and 2.5mg tablets  Estimated oop cost for [facility] ED[facility] HM: Patient has [employer] Medicare so they should have minimal to no OOP costs.    Patient Findings  Negatives:  Signs/symptoms of thrombosis, Signs/symptoms of bleeding, Laboratory test error suspected, Change in health, Change in alcohol use, Change in activity, Upcoming invasive procedure, Emergency department visit, Upcoming dental procedure, Missed doses, Extra doses, Change in medications, Change in diet/appetite, Hospital admission, Bruising, Other complaints   Comments:  All findings negative per patient.     Plan:   1. INR is therapeutic today at 2.8 (goal 2.0-3.0).  Instructed Mr. Montemayor tocontinue warfarin 5 mg daily except warfarin 7.5 mg Tue until recheck.2. Recheck INR in one week 12/6/22   3. Verbal and written information provided in the clinic. Mr. Montemayor expresses understanding with teach back and has no further questions at this time.     Home monitor information below:  · Test strips on hand: 3  11/29/22   ·   Lot : [id number]  Barcode : [id number] Exp : 8/24  · Serial number: [id number]   · Supplies billing code used: last 11/22/22- Next must be on or after  12/20/22  · Transfer Tube Lot number: Lot : [id number]  · Safety Lancets:  Lot : [id number]  Exp : 5/25   Billing:  *bill must be 4 tests 28 days or more from  visit  PLEASE REFER TO ANTICOAGULATION TRAINING  POWERPOINT FOR BILLING. Needs to use in clinic remote appointment for encounter.    Alexander Scherer, Pharmacy Technician  11/29/2022  09:24 EST    37.5 mg/week.  I, Peng Melgar, PharmD, have reviewed the note in full and agree with the assessment and plan.  11/29/22  14:44 EST

## 2022-12-06 ENCOUNTER — ANTICOAGULATION VISIT (OUTPATIENT)
Dept: PHARMACY | Facility: HOSPITAL | Age: 77
End: 2022-12-06

## 2022-12-06 LAB — INR PPP: 1.9

## 2022-12-06 NOTE — PROGRESS NOTES
Anticoagulation Clinic Progress Note  JANETH PARKER: Jessica Check Serial # : [id number]     Indication: afib  Referring Provider: Tyson  Initial Warfarin Start Date: 2007  Goal INR: 2-3  Current Drug Interactions:  torsemide  Diet: eats salads 2x weekly 11/15/22  [id number]: 5 (HTN, Age, CHF, CAD)  Other: anemic, CKD (stage 4) May need bridge with Lovenox given persistent AF (per cardio 1/24/2019), Scr was 3.22 on 11/24/21     Anticoagulation Clinic INR History:   Date 10/30 11/20 12/17 1/14/20 2/11 3/11 4/8 5/6 6/11 7/9 8/6 9/3 9/14 9/30   Total Weekly Dose 32.5 mg 32.5 mg 32.5 mg 32.5 mg 32.5mg 32.5mg 32.5mg 32.5mg 32.5mg 32.5 mg 32.5 mg 32.5mg 32.5mg 26.25 mg   INR 2.3 2.3 2.1 1.9 2.2 2.1 2.1 2.2 2.2 2.3 2.7 1.6 2.2  1.8   Notes                       ??   5 day hold     Date 10/14 11/4 11/11 11/25 12/9 12/16 12/30 1/13 2/3 3/3 3/31 4/28 5/26 6/23   Total Weekly Dose 32.5 mg 32.5mg 35mg 32.5mg 33.75 mg 36.25 mg 33.75 mg 35mg 33.75 33.75mg 33.75mg 33.75 mg 33.75 mg 33.75mg   INR 2.0 1.6 2.1 1.8 1.8 2.5 2.0 3.0 2.1 2.4 2.3 3.0 2.1 2.2   Notes   1x boost GLV boostx1 boostx1             Date 7/21 9/1 10/13 11/10 12/1 12/15 12/29 1/12/22 1/26 2/9 2/16 2/23 3/2 3/16   Total Weekly Dose 33.75mg 33.75mg 33.75mg 33.75mg 33.75 mg 32.5mg 30mg 27.5mg 28.75 mg 30 mg 31.25 mg 31.25 mg 31.25 mg 31.25 mg   INR 2.9 2.5 3.1 3.1 3.2 3.4 3.4 1.9 1.7 1.8 2.1 2.6 2.9 2.9   Notes        redx1 ?  inc        Date 3/30 4/13 5/11 6/8  6/23 7/7 7/28  8/9 8/12 8/16   Total WeeklyDose 31.25mg 31.25 mg 31.25 mg 31.25mg Start dialysis 30mg 30mg 30 mg  33.75mg 37.5mg 37.5mg   INR 2.5 2.2 2.6 3.9  2.1 2.1 1.8 1.23 1.4 1.5 2.1   Notes    Inc torsemide Cath placement    AV fistula placement    HM train  1 no billing       Date 8/23 8/30 9/6 9/13 9/20 9/30 10/4 10/11 10/18 10/25 11/8 11/15   Total WeeklyDose 31.25 mg 32.5 mg 32.5 mg 32.5 mg 32.5 mg 32.5 mg 35 mg 35 mg 35 mg 32.5 mg 26.5 mg 35 mg   INR 1.9 2.0 2.0 2.5 2.7 1.6 1.7 2.8 2.4  1.7 1.4 1.6   Notes HM2 no billing HM3  No billing HM4  No billing HM 1  Bill today HM 2 No billing HM 3 No bill HM 4  No billing 1x boost  HM 5 no bill day Bill day  HM1 HM- 2 no billing   HM 3- no billing 1x boost  HM 4 - no billing      Date 11/22 11/29 12/6            Total WeeklyDose 37.5 mg 37.5 mg 37.5 mg            INR 1.9 2.8 1.9            Notes HM1- bill day HM 2 - NO bill HM 3 - no bill  Inc GLV              Takes warfarin in the am    Clinic Interview:  Verbal Release Authorization signed on 8/22/2018 -- may speak with Raeann Montemayor (Wife). [phone] (Home) *Preferred*  Tablet Strength: pt has 7.5mg and 2.5mg tablets  Estimated oop cost for BH EDER HM: Patient has [employer] Medicare so they should have minimal to no OOP costs.    Patient Findings  Positives:  Change in diet/appetite   Negatives:  Signs/symptoms of thrombosis, Signs/symptoms of bleeding, Laboratory test error suspected, Change in health, Change in alcohol use, Change in activity, Upcoming invasive procedure, Emergency department visit, Upcoming dental procedure, Missed doses, Extra doses, Change in medications, Hospital admission, Bruising, Other complaints   Comments:  Asparagus last night.       Plan:   1. INR is slightly SUBtherapeutic today at 1.9 (goal 2.0-3.0).  Instructed Mr. Montemayor tocontinue warfarin 5 mg daily except warfarin 7.5 mg Tue until recheck.2. Recheck INR in one week 12/13/22.  3. Verbal and written information provided in the clinic. Mr. Montemayor expresses understanding with teach back and has no further questions at this time.     Home monitor information below:  · Test strips on hand: 2  12/6/22   ·   Lot : [id number]  Barcode : [id number] Exp : 8/24  · Serial number: [id number]   · Supplies billing code used: last 11/22/22- Next must be on or after  12/20/22  · Transfer Tube Lot number: Lot : [id number]  · Safety Lancets:  Lot : [id number]  Exp : 5/25   Billing:  *bill must be 4 tests 28 days or more from   visit  PLEASE REFER TO ANTICOAGULATION TRAINING POWERPOINT FOR BILLING. Needs to use in clinic remote appointment for encounter.    Vitaly Valle, Pharmacy Intern  12/6/2022  09:54 EST     Given slightly subtherapeutic and due for higher dose tonight agreeable with plan.  I, Evelina Quan, PharmD, have reviewed the note in full and agree with the assessment and plan.  12/07/22  12:01 EST

## 2022-12-12 NOTE — PROGRESS NOTES
Subjective:     Encounter Date:01/12/2023      Patient ID: Marco Antonio Montemayor is a 77 y.o. .  white male, retired /currently laid off from being a  for [employer], from [place], [place].      REMOTE PHYSICIAN: EDMUND Rosado MD  ELECTROPHYSIOLOGIST: Johnathan Taylor MD, [facility], [facility]  INTERVENTIONAL CARDIOLOGIST: Antonio Escamilla MD  NEPHROLOGIST:  [facility]  DERMATOLOGIST:  Yoanna Marks MD/Oksana Young MD([facility])  GENERAL SURGEON: Jakob Reza MD    Chief Complaint:   Chief Complaint   Patient presents with   • 6 MONTH FOLLOW UP     Coronary artery disease involving native coronary artery of native heart without angina pectoris     Problem List:  1. 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 Remote acceptable intravenous adenosine Quantitative SPECT gated Cardiolite study, LVEF 0.50, April 1999.  b. Echocardiogram showing mild LVH with estimated ejection fraction 0.58 with mild aortic valve cusp sclerosis, and mild TR, 09/23/2014.   c. Remote abnormal preoperative Quantitative SPECT gated Cardiolite study with mild “fixed” inferoposterior hypoperfusion and mild LV enlargement with mild global hypokinesia with reduced LVEF 0.40 in the setting of abnormal EKG with subsequent diagnostic coronary angiography demonstrating mild-to-moderate nonobstructive coronary artery atherosclerosis with mild compensated left ventricular dysfunction, LVEF 0.52, and continued medical therapy felt warranted, June 2006.  d. Remote hospitalization for symptomatic atrial fibrillation with rapid ventricular response and mild congestive heart failure requiring BRYANNA and DC cardioversion, June 2009.  e. Left heart catheterization with essentially normal coronary arteries with mild luminal irregularities with an EF of 0.60 by Dr. Escamilla for angina and elevated troponin, 06/05/2015.   f. Residual class I symptoms, April 2022, January  2023  2. Chronic essential hypertension with recent progressive hypertension and blood pressure readings and normal selective bilateral renal angiography, June 2006.  3. Dyslipidemia.  4. Chronic atrial tachyarrhythmias:  a. Intermittent chronic paroxysmal atrial fibrillation with electrophysiologic study with RFA for atrial flutter, October 1999.  b. Recurrent apparent transient atrial fibrillation, resolved with acceptable [facility] emergency department evaluation, June 2003.  c. Acceptable combination Doppler echocardiogram, July 2003, with apparent acceptable 24-hour Holter monitor, May 2006.  d. Recurrent asymptomatic atrial fibrillation with RVR, January 2007, subsequent Coumadin therapy and Tikosyn therapy with successful internal cardioversion of atrial fibrillation and marked bradyarrhythmias requiring DDDR pacemaker implant, [place] device, data not available, March 2007.  e. Remote hospitalization for symptomatic rapid atrial fibrillation/BRYANNA DC cardioversion with subsequent acceptable pacemaker interrogation, June/July 2009, as well as acceptable interrogation without reprogramming, July 2011.  f. Acceptable combination Doppler echocardiogram, December 2010, with residual class I symptoms.  g. Acceptable device interrogation following [facility] ED evaluation for chest pressure and shortness of breath, data deficit, May 2011, February 2013, February 2016, December 2016.  h. Abnormal pacemaker assessment with 8.4% mode switch with battery voltage of 1-1.25 years, June 2017, with abnormal PACEART assessment, July 2018, with 22% mode switch, with subsequent interrogation demonstrating chronic atrial fibrillation, May 2019, remote device interrogation March 2021 demonstrated 89% atrial fib burden, 8.42 battery longevity.  i. Acceptable pacemaker interrogation without reprogramming, April 2022  5. Moderate obesity: BMI 37.88  6. Ichthyosis.  7. Chronic lower tract obstructive symptoms, probable  BPH.  8. Dyslipidemia.  9. Nasal polyps with deviated nasal septum with apparent subsequent nasal septoplasty/polypectomy, data deficit, July 2006.  10. Abdominal pain with apparent small bowel obstruction with exploratory laparotomy - data deficit.  11. Appendectomy with subsequent delayed hospitalization for bleeding peptic ulcer disease/probable duodenal ulcer, November 2003.  12. Recurrent asymptomatic atrial fibrillation with rapid ventricular response, July 2007, with subsequent Tikosyn therapy and DDDR pacemaker implant with intermittent recurrent breakthrough arrhythmias, including remote DC cardioversion, May 2008, with subsequent recurrent DC cardioversion, autumn 2018, with recurrent atrial fibrillation and discontinuation of formal antiarrhythmic therapy - data deficit  13. Obstructive sleep apnea, compliant with CPAP  14. Stage 4 chronic kidney disease with recent apparent documented proteinuria and renal biopsy (February 2019), LUE AV fistula August 2022/elevation AV fistula November 2022  15. Elevated TSH, November 2018  16.  Chronic anemia with epoetin injections Fall 2020, Spring 2021  17.  LLE cellulitis May 2022  18.  LUE AV fistula August 2022, patient has dialysis M, W, F    No Known Allergies    Current Outpatient Medications   Medication Instructions   • acetaminophen (TYLENOL) 500 mg, Oral, Every 6 Hours PRN   • atorvastatin (LIPITOR) 40 MG tablet TAKE ONE TABLET BY MOUTH DAILY   • bumetanide (BUMEX) 2 mg, Oral, Every Other Day, Takes on days off from dialysis (Tues, Thurs, Sat, Sun)   • carvedilol (COREG) 25 mg, Oral, 2 Times Daily With Meals   • cholecalciferol (VITAMIN D3) 1,000 Units, Oral, Daily   • ferrous sulfate 65 mg, Oral, Daily   • lisinopril (PRINIVIL,ZESTRIL) 5 mg, Oral, Daily   • PHARMACY TO DOSE WARFARIN Does not apply, Continuous PRN   • Sucroferric Oxyhydroxide (Velphoro) 500 MG chewable tablet Take As Directed   • vitamin B-12 (CYANOCOBALAMIN) 1,000 mcg, Oral, Daily   •  "warfarin (COUMADIN) 2.5 MG tablet Take 1.5 to 2 tablets by mouth daily or as directed by anticoagulation clinic   • warfarin (COUMADIN) 7.5 MG tablet TAKE 1/2 TO 1 TABLET BY MOUTH DAILY AS DIRECTED         HISTORY OF PRESENT ILLNESS:  The patient is here after a 9-month hiatus.  He had a device interrogation June 2022 with RV threshold increased/changed to unipolar and has an upcoming appointment with electrophysiology in March 2023.  The patient had a LUE AV fistula placed August 2022.  He has dialysis Monday, Wednesdays, and Fridays.  He denies any chest pain and only notices palpitations if his heart rate is elevated if he is trying to do activities too quickly.  He has mild shortness of breath but denies any increased edema, presyncope, or syncope.  His blood pressure sometimes gets too low when he is in dialysis.  Whenever the patient checks his blood pressure at home it is normally around 120/70.      ROS   All other systems reviewed and otherwise negative.    Procedures       Objective:       Vitals:    01/12/23 1435 01/12/23 1438   BP: 146/78 129/78   BP Location: Right arm Right arm   Patient Position: Sitting Standing   Cuff Size: Adult Adult   Pulse: 94    Resp: 18    SpO2: 98%    Weight: 120 kg (264 lb)    Height: 177.8 cm (70\")    Recheck blood pressure right arm sitting was 118/74  Body mass index is 37.88 kg/m².  Last weight April 2022 was 286 pounds  Constitutional:       Appearance: Healthy appearance. Not in distress.   Neck:      Vascular: No JVR. JVD normal.   Pulmonary:      Effort: Pulmonary effort is normal.      Breath sounds: Decreased breath sounds present. No wheezing. No rhonchi. No rales.   Chest:      Chest wall: Not tender to palpatation.   Cardiovascular:      PMI at left midclavicular line. Normal rate. Regular rhythm. Normal S1. Normal S2.      Murmurs: There is a grade 1/6 systolic murmur at the URSB.      No gallop. No click. No rub.   Pulses:     Intact distal pulses.   Edema:    "  Ankle: bilateral trace edema of the ankle.     Feet: bilateral trace edema of the feet.  Abdominal:      General: Bowel sounds are normal.      Palpations: Abdomen is soft.      Tenderness: There is no abdominal tenderness.   Musculoskeletal: Normal range of motion.         General: No tenderness. Skin:     General: Skin is warm and dry.      Comments: Left precordial pacemaker site nominal   Neurological:      General: No focal deficit present.      Mental Status: Alert and oriented to person, place and time.           Lab Review:   Lab Results   Component Value Date    GLUCOSE 85 01/10/2023    BUN 41 (H) 01/10/2023    CREATININE 7.12 (H) 01/10/2023    EGFRIFNONA 18 (L) 02/16/2022    EGFRIFAFRI 26 (L) 11/12/2018    BCR 5.8 (L) 01/10/2023    CO2 25.8 01/10/2023    CALCIUM 9.0 01/10/2023    PROTENTOTREF 6.8 01/10/2023    ALBUMIN 4.1 01/10/2023    LABIL2 1.5 01/10/2023    AST 13 01/10/2023    ALT 9 01/10/2023       Lab Results   Component Value Date    WBC 9.32 01/10/2023    HGB 10.4 (L) 01/10/2023    HCT 32.3 (L) 01/10/2023    .0 (H) 01/10/2023     01/10/2023       Lab Results   Component Value Date    HGBA1C 5.20 08/02/2022       Lab Results   Component Value Date    TSH 4.260 04/13/2022       Lab Results   Component Value Date    CHOL 97 04/14/2021    CHOL 115 05/06/2020     Lab Results   Component Value Date    TRIG 99 01/10/2023    TRIG 68 04/14/2021     Lab Results   Component Value Date    HDL 34 (L) 01/10/2023    HDL 32 (L) 04/14/2021     Lab Results   Component Value Date    LDL 56 01/10/2023    LDL 50 04/14/2021           Lab Results   Component Value Date    .0 (H) 09/14/2018   MURJ 12/30/2022: 1% atrial pacing, 10% RV pacing, 99% atrial fib burden, battery has 3.58 years left    Advance Care Planning   ACP discussion was held with the patient during this visit. Patient has an advance directive (not in EMR), copy requested.           Assessment:     Overall continued acceptable course  with no new interim cardiopulmonary complaints with fair functional status on limited activity and dialysis.  He has mild shortness of breath on exertion so I will order an echocardiogram to assess heart structure/function.  In 2014 his echocardiogram showed mild LVH with estimated ejection fraction 0.58 with mild aortic valve cusp sclerosis, and mild TR. He had a heart catheterization in 2015 with normal coronaries.     Diagnosis Plan   1. Chronic diastolic congestive heart failure (HCC)  Echocardiogram      2. Chronic atrial fibrillation (HCC)   Stable, continue warfarin, Coreg, follow-up with EP March 2023 for device interrogation      3. Primary hypertension   Controlled, continue current cardiac medications      4. Dyslipidemia   Acceptable lipid panel in January 2023, continue Lipitor      5. Morbid obesity (HCC)   Physical activity as tolerated, heart healthy diet             Plan:         1. Patient to continue current medications and close follow up with the above providers.  2. Tentative cardiology follow up in July 2023 or patient may return sooner PRN.  3. Echocardiogram    Electronically signed by JENNIFER Nava, 01/12/23, 4:53 PM EST.

## 2022-12-13 ENCOUNTER — ANTICOAGULATION VISIT (OUTPATIENT)
Dept: PHARMACY | Facility: HOSPITAL | Age: 77
End: 2022-12-13

## 2022-12-13 LAB — INR PPP: 1.5

## 2022-12-13 NOTE — PROGRESS NOTES
Anticoagulation Clinic Progress Note  JANETH PARKER: Jessica Check Serial # : [id number]     Indication: afib  Referring Provider: Tyosn  Initial Warfarin Start Date: 2007  Goal INR: 2-3  Current Drug Interactions:  torsemide  Diet: eats salads 2x weekly 11/15/22  [id number]: 5 (HTN, Age, CHF, CAD)  Other: anemic, CKD (stage 4) May need bridge with Lovenox given persistent AF (per cardio 1/24/2019), Scr was 3.22 on 11/24/21     Anticoagulation Clinic INR History:   Date 10/30 11/20 12/17 1/14/20 2/11 3/11 4/8 5/6 6/11 7/9 8/6 9/3 9/14 9/30   Total Weekly Dose 32.5 mg 32.5 mg 32.5 mg 32.5 mg 32.5mg 32.5mg 32.5mg 32.5mg 32.5mg 32.5 mg 32.5 mg 32.5mg 32.5mg 26.25 mg   INR 2.3 2.3 2.1 1.9 2.2 2.1 2.1 2.2 2.2 2.3 2.7 1.6 2.2  1.8   Notes                       ??   5 day hold     Date 10/14 11/4 11/11 11/25 12/9 12/16 12/30 1/13 2/3 3/3 3/31 4/28 5/26 6/23   Total Weekly Dose 32.5 mg 32.5mg 35mg 32.5mg 33.75 mg 36.25 mg 33.75 mg 35mg 33.75 33.75mg 33.75mg 33.75 mg 33.75 mg 33.75mg   INR 2.0 1.6 2.1 1.8 1.8 2.5 2.0 3.0 2.1 2.4 2.3 3.0 2.1 2.2   Notes   1x boost GLV boostx1 boostx1             Date 7/21 9/1 10/13 11/10 12/1 12/15 12/29 1/12/22 1/26 2/9 2/16 2/23 3/2 3/16   Total Weekly Dose 33.75mg 33.75mg 33.75mg 33.75mg 33.75 mg 32.5mg 30mg 27.5mg 28.75 mg 30 mg 31.25 mg 31.25 mg 31.25 mg 31.25 mg   INR 2.9 2.5 3.1 3.1 3.2 3.4 3.4 1.9 1.7 1.8 2.1 2.6 2.9 2.9   Notes        redx1 ?  inc        Date 3/30 4/13 5/11 6/8  6/23 7/7 7/28  8/9 8/12 8/16   Total WeeklyDose 31.25mg 31.25 mg 31.25 mg 31.25mg Start dialysis 30mg 30mg 30 mg  33.75mg 37.5mg 37.5mg   INR 2.5 2.2 2.6 3.9  2.1 2.1 1.8 1.23 1.4 1.5 2.1   Notes    Inc torsemide Cath placement    AV fistula placement    HM train  1 no billing       Date 8/23 8/30 9/6 9/13 9/20 9/30 10/4 10/11 10/18 10/25 11/8 11/15   Total WeeklyDose 31.25 mg 32.5 mg 32.5 mg 32.5 mg 32.5 mg 32.5 mg 35 mg 35 mg 35 mg 32.5 mg 26.5 mg 35 mg   INR 1.9 2.0 2.0 2.5 2.7 1.6 1.7 2.8 2.4  1.7 1.4 1.6   Notes HM2 no billing HM3  No billing HM4  No billing HM 1  Bill today HM 2 No billing HM 3 No bill HM 4  No billing 1x boost  HM 5 no bill day Bill day  HM1 HM- 2 no billing   HM 3- no billing 1x boost  HM 4 - no billing      Date 11/22 11/29 12/6 12/13           Total WeeklyDose 37.5 mg 37.5 mg 37.5 mg 37.5 mg           INR 1.9 2.8 1.9 1.5           Notes HM1- bill day HM 2 - NO bill HM 3 - no bill  Inc GLV Hm 4 - NO BILL             Takes warfarin in the am    Clinic Interview:  Verbal Release Authorization signed on 8/22/2018 -- may speak with Raeann Montemayor (Wife). [phone] (Home) *Preferred*  Tablet Strength: pt has 7.5mg and 2.5mg tablets  Estimated oop cost for BH EDER HM: Patient has [employer] Medicare so they should have minimal to no OOP costs.      Patient Findings    Positives:  Change in medications   Negatives:  Signs/symptoms of thrombosis, Signs/symptoms of bleeding, Laboratory test error suspected, Change in health, Change in alcohol use, Change in activity, Upcoming invasive procedure, Emergency department visit, Upcoming dental procedure, Missed doses, Extra doses, Change in diet/appetite, Hospital admission, Bruising, Other complaints   Comments:  Patient reports starting velphoro 12/10 and stopping selevamer the same day.   All other findings negative per patient.      Plan:   1. INR is again SUBtherapeutic today at 1.5 (goal 2.0-3.0).  Instructed Mr. Montemayor to BOOST tonight's dose to 10mg, tomorrow to 7.5mg then continue warfarin 5 mg daily except warfarin 7.5 mg Tue until recheck.2. Recheck INR in 1 week  3. Verbal and written information provided in the clinic. Mr. Montemayor expresses understanding with teach back and has no further questions at this time.     Home monitor information below:  · Test strips on hand: 5  12/13/22   ·   Lot : [id number]  Barcode : [id number] Exp : 8/24  · Serial number: [id number]   · Supplies billing code used: last 11/22/22- Next must be on  or after  12/20/22  · Transfer Tube Lot number: Lot : [id number]  · Safety Lancets:  Lot : [id number]  Exp : 5/25   Billing:  *bill must be 4 tests 28 days or more from HM visit  PLEASE REFER TO ANTICOAGULATION TRAINING POWERPOINT FOR BILLING. Needs to use in clinic remote appointment for encounter.    Nighat ChristopherD.  12/13/22   10:02 EST

## 2022-12-20 ENCOUNTER — ANTICOAGULATION VISIT (OUTPATIENT)
Dept: PHARMACY | Facility: HOSPITAL | Age: 77
End: 2022-12-20

## 2022-12-20 LAB — INR PPP: 2.3

## 2022-12-20 PROCEDURE — G0249 PROVIDE INR TEST MATER/EQUIP: HCPCS

## 2022-12-20 NOTE — PROGRESS NOTES
Anticoagulation Clinic Progress Note  JANETH PARKER: Jessica Check Serial # : [id number]     Indication: afib  Referring Provider: Tyson  Initial Warfarin Start Date: 2007  Goal INR: 2-3  Current Drug Interactions:  torsemide  Diet: eats salads 2x weekly 11/15/22  [id number]: 5 (HTN, Age, CHF, CAD)  Other: anemic, CKD (stage 4) May need bridge with Lovenox given persistent AF (per cardio 1/24/2019), Scr was 3.22 on 11/24/21     Anticoagulation Clinic INR History:   Date 10/30 11/20 12/17 1/14/20 2/11 3/11 4/8 5/6 6/11 7/9 8/6 9/3 9/14 9/30   Total Weekly Dose 32.5 mg 32.5 mg 32.5 mg 32.5 mg 32.5mg 32.5mg 32.5mg 32.5mg 32.5mg 32.5 mg 32.5 mg 32.5mg 32.5mg 26.25 mg   INR 2.3 2.3 2.1 1.9 2.2 2.1 2.1 2.2 2.2 2.3 2.7 1.6 2.2  1.8   Notes                       ??   5 day hold     Date 10/14 11/4 11/11 11/25 12/9 12/16 12/30 1/13 2/3 3/3 3/31 4/28 5/26 6/23   Total Weekly Dose 32.5 mg 32.5mg 35mg 32.5mg 33.75 mg 36.25 mg 33.75 mg 35mg 33.75 33.75mg 33.75mg 33.75 mg 33.75 mg 33.75mg   INR 2.0 1.6 2.1 1.8 1.8 2.5 2.0 3.0 2.1 2.4 2.3 3.0 2.1 2.2   Notes   1x boost GLV boostx1 boostx1             Date 7/21 9/1 10/13 11/10 12/1 12/15 12/29 1/12/22 1/26 2/9 2/16 2/23 3/2 3/16   Total Weekly Dose 33.75mg 33.75mg 33.75mg 33.75mg 33.75 mg 32.5mg 30mg 27.5mg 28.75 mg 30 mg 31.25 mg 31.25 mg 31.25 mg 31.25 mg   INR 2.9 2.5 3.1 3.1 3.2 3.4 3.4 1.9 1.7 1.8 2.1 2.6 2.9 2.9   Notes        redx1 ?  inc        Date 3/30 4/13 5/11 6/8  6/23 7/7 7/28  8/9 8/12 8/16   Total WeeklyDose 31.25mg 31.25 mg 31.25 mg 31.25mg Start dialysis 30mg 30mg 30 mg  33.75mg 37.5mg 37.5mg   INR 2.5 2.2 2.6 3.9  2.1 2.1 1.8 1.23 1.4 1.5 2.1   Notes    Inc torsemide Cath placement    AV fistula placement    HM train  1 no billing       Date 8/23 8/30 9/6 9/13 9/20 9/30 10/4 10/11 10/18 10/25 11/8 11/15   Total WeeklyDose 31.25 mg 32.5 mg 32.5 mg 32.5 mg 32.5 mg 32.5 mg 35 mg 35 mg 35 mg 32.5 mg 26.5 mg 35 mg   INR 1.9 2.0 2.0 2.5 2.7 1.6 1.7 2.8 2.4  1.7 1.4 1.6   Notes HM2 no billing HM3  No billing HM4  No billing HM 1  Bill today HM 2 No billing HM 3 No bill HM 4  No billing 1x boost  HM 5 no bill day Bill day  HM1 HM- 2 no billing   HM 3- no billing 1x boost  HM 4 - no billing      Date 11/22 11/29 12/6 12/13 12/20          Total WeeklyDose 37.5 mg 37.5 mg 37.5 mg 37.5 mg 42.5 mg           INR 1.9 2.8 1.9 1.5 2.3          Notes HM1- bill day HM 2 - NO bill HM 3 - no bill  Inc GLV Hm 4 - NO BILL HM 1 - Bill today            Takes warfarin in the am    Clinic Interview:  Verbal Release Authorization signed on 8/22/2018 -- may speak with Raeann Montemayor (Wife). [phone] (Home) *Preferred*  Tablet Strength: pt has 7.5mg and 2.5mg tablets  Estimated oop cost for BH EDER HM: Patient has [employer] Medicare so they should have minimal to no OOP costs.      Patient Findings  Negatives:  Signs/symptoms of thrombosis, Signs/symptoms of bleeding, Laboratory test error suspected, Change in health, Change in alcohol use, Change in activity, Upcoming invasive procedure, Emergency department visit, Upcoming dental procedure, Missed doses, Extra doses, Change in medications, Change in diet/appetite, Hospital admission, Bruising, Other complaints   Comments:  All findings negative per patient.        Plan:   1. INR is therapeutic today at 2.3 (goal 2.0-3.0). per Kristine Sutton  PharmD Instructed Mr. Montemayor to  continue warfarin 5 mg daily except warfarin 7.5 mg Tue&Fri until recheck.2. Recheck INR in 1 week 12/27/22  3. Verbal and written information provided in the clinic. Mr. Montemayor expresses understanding with teach back and has no further questions at this time.     Home monitor information below:  · Test strips on hand: 3  12/20/22   ·   Lot : [id number]  Barcode : [id number] Exp : 8/24  · Serial number: [id number]   · Supplies billing code used: last 12/20/22- Next must be on or after  1/17/23  · Transfer Tube Lot number: Lot : [id number]  · Safety Lancets:  Lot :  [id number]  Exp : 5/25   Billing:  *bill must be 4 tests 28 days or more from HM visit  PLEASE REFER TO ANTICOAGULATION TRAINING POWERPOINT FOR BILLING. Needs to use in clinic remote appointment for encounter.    Alexander Scherer, Pharmacy Technician  12/20/2022  12:22 Kristine ALMENDAREZ, PharmD, have reviewed the note in full and agree with the assessment and plan.  12/20/22  14:41 EST

## 2022-12-27 ENCOUNTER — ANTICOAGULATION VISIT (OUTPATIENT)
Dept: PHARMACY | Facility: HOSPITAL | Age: 77
End: 2022-12-27

## 2022-12-27 LAB — INR PPP: 3.4

## 2022-12-27 NOTE — PROGRESS NOTES
Anticoagulation Clinic Progress Note  JANETH PARKER: Jessica Check Serial # : [id number]     Indication: afib  Referring Provider: Tyson  Initial Warfarin Start Date: 2007  Goal INR: 2-3  Current Drug Interactions:  torsemide  Diet: eats salads 2x weekly 11/15/22  [id number]: 5 (HTN, Age, CHF, CAD)  Other: anemic, CKD (stage 4) May need bridge with Lovenox given persistent AF (per cardio 1/24/2019), Scr was 3.22 on 11/24/21     Anticoagulation Clinic INR History:   Date 10/30 11/20 12/17 1/14/20 2/11 3/11 4/8 5/6 6/11 7/9 8/6 9/3 9/14 9/30   Total Weekly Dose 32.5 mg 32.5 mg 32.5 mg 32.5 mg 32.5mg 32.5mg 32.5mg 32.5mg 32.5mg 32.5 mg 32.5 mg 32.5mg 32.5mg 26.25 mg   INR 2.3 2.3 2.1 1.9 2.2 2.1 2.1 2.2 2.2 2.3 2.7 1.6 2.2  1.8   Notes                       ??   5 day hold     Date 10/14 11/4 11/11 11/25 12/9 12/16 12/30 1/13 2/3 3/3 3/31 4/28 5/26 6/23   Total Weekly Dose 32.5 mg 32.5mg 35mg 32.5mg 33.75 mg 36.25 mg 33.75 mg 35mg 33.75 33.75mg 33.75mg 33.75 mg 33.75 mg 33.75mg   INR 2.0 1.6 2.1 1.8 1.8 2.5 2.0 3.0 2.1 2.4 2.3 3.0 2.1 2.2   Notes   1x boost GLV boostx1 boostx1             Date 7/21 9/1 10/13 11/10 12/1 12/15 12/29 1/12/22 1/26 2/9 2/16 2/23 3/2 3/16   Total Weekly Dose 33.75mg 33.75mg 33.75mg 33.75mg 33.75 mg 32.5mg 30mg 27.5mg 28.75 mg 30 mg 31.25 mg 31.25 mg 31.25 mg 31.25 mg   INR 2.9 2.5 3.1 3.1 3.2 3.4 3.4 1.9 1.7 1.8 2.1 2.6 2.9 2.9   Notes        redx1 ?  inc        Date 3/30 4/13 5/11 6/8  6/23 7/7 7/28  8/9 8/12 8/16   Total WeeklyDose 31.25mg 31.25 mg 31.25 mg 31.25mg Start dialysis 30mg 30mg 30 mg  33.75mg 37.5mg 37.5mg   INR 2.5 2.2 2.6 3.9  2.1 2.1 1.8 1.23 1.4 1.5 2.1   Notes    Inc torsemide Cath placement    AV fistula placement    HM train  1 no billing       Date 8/23 8/30 9/6 9/13 9/20 9/30 10/4 10/11 10/18 10/25 11/8 11/15   Total WeeklyDose 31.25 mg 32.5 mg 32.5 mg 32.5 mg 32.5 mg 32.5 mg 35 mg 35 mg 35 mg 32.5 mg 26.5 mg 35 mg   INR 1.9 2.0 2.0 2.5 2.7 1.6 1.7 2.8 2.4  1.7 1.4 1.6   Notes HM2 no billing HM3  No billing HM4  No billing HM 1  Bill today HM 2 No billing HM 3 No bill HM 4  No billing 1x boost  HM 5 no bill day Bill day  HM1 HM- 2 no billing   HM 3- no billing 1x boost  HM 4 - no billing      Date 11/22 11/29 12/6 12/13 12/20 12/27         Total WeeklyDose 37.5 mg 37.5 mg 37.5 mg 37.5 mg 42.5 mg  40 mg 37.5 mg        INR 1.9 2.8 1.9 1.5 2.3 3.4         Notes HM1- bill day HM 2 - NO bill HM 3 - no bill  Inc GLV Hm 4 - NO BILL HM 1 - Bill today HM 2   no billing           Takes warfarin in the am    Clinic Interview:  Verbal Release Authorization signed on 8/22/2018 -- may speak with Raeann Montemayor (Wife). [phone] (Home) *Preferred*  Tablet Strength: pt has 7.5mg and 2.5mg tablets  Estimated oop cost for [facility] HM: Patient has [employer] Medicare so they should have minimal to no OOP costs.      Patient Findings    Negatives:  Signs/symptoms of thrombosis, Signs/symptoms of bleeding, Laboratory test error suspected, Change in health, Change in alcohol use, Change in activity, Upcoming invasive procedure, Emergency department visit, Upcoming dental procedure, Missed doses, Extra doses, Change in medications, Change in diet/appetite, Hospital admission, Bruising, Other complaints   Comments:  No changes to report, he usually takes his warfarin in the AM but has not had today's dose.     Plan:   1. INR is supratherapeutic today at 3.4 (goal 2.0-3.0). Instructed Mr. Montemayor to reduce warfarin 5 mg daily except warfarin 7.5 mg Fri until recheck.2. Recheck INR in 1 week 1/2/23  3. Verbal information provided over the telephone. Mr. Montemayor expresses understanding with teach back and has no further questions at this time.     Home monitor information below:  · Test strips on hand: 3  12/27/22   ·   Lot : [id number]  Barcode : [id number] Exp : 8/24  · Serial number: [id number]   · Supplies billing code used: last 12/20/22- Next must be on or after  1/17/23  · Transfer  Tube Lot number: Lot : [id number]  · Safety Lancets:  Lot : [id number]  Exp : 5/25   Billing:  *bill must be 4 tests 28 days or more from HM visit  PLEASE REFER TO ANTICOAGULATION TRAINING POWERPOINT FOR BILLING. Needs to use in clinic remote appointment for encounter.      Peng Melgar, PharmD  12/27/2022  11:19 EST

## 2023-01-03 ENCOUNTER — ANTICOAGULATION VISIT (OUTPATIENT)
Dept: PHARMACY | Facility: HOSPITAL | Age: 78
End: 2023-01-03
Payer: MEDICARE

## 2023-01-03 LAB — INR PPP: 1.4

## 2023-01-03 NOTE — PROGRESS NOTES
Anticoagulation Clinic Progress Note  [facility] HOMEMONJASMINA: Jessica Check Serial # : [id number]     Indication: afib  Referring Provider: Tyson  Initial Warfarin Start Date: 2007  Goal INR: 2-3  Current Drug Interactions:   Diet: eats salads 2x weekly 11/15/22  [id number]: 5 (HTN, Age, CHF, CAD)   Other: anemic, CKD (stage 4) May need bridge with Lovenox given persistent AF (per cardio 1/24/2019), Scr was 3.22 on 11/24/21     Anticoagulation Clinic INR History:   Date 10/30 11/20 12/17 1/14/20 2/11 3/11 4/8 5/6 6/11 7/9 8/6 9/3 9/14 9/30   Total Weekly Dose 32.5 mg 32.5 mg 32.5 mg 32.5 mg 32.5mg 32.5mg 32.5mg 32.5mg 32.5mg 32.5 mg 32.5 mg 32.5mg 32.5mg 26.25 mg   INR 2.3 2.3 2.1 1.9 2.2 2.1 2.1 2.2 2.2 2.3 2.7 1.6 2.2  1.8   Notes                       ??   5 day hold     Date 10/14 11/4 11/11 11/25 12/9 12/16 12/30 1/13 2/3 3/3 3/31 4/28 5/26 6/23   Total Weekly Dose 32.5 mg 32.5mg 35mg 32.5mg 33.75 mg 36.25 mg 33.75 mg 35mg 33.75 33.75mg 33.75mg 33.75 mg 33.75 mg 33.75mg   INR 2.0 1.6 2.1 1.8 1.8 2.5 2.0 3.0 2.1 2.4 2.3 3.0 2.1 2.2   Notes   1x boost GLV boostx1 boostx1             Date 7/21 9/1 10/13 11/10 12/1 12/15 12/29 1/12/22 1/26 2/9 2/16 2/23 3/2 3/16   Total Weekly Dose 33.75mg 33.75mg 33.75mg 33.75mg 33.75 mg 32.5mg 30mg 27.5mg 28.75 mg 30 mg 31.25 mg 31.25 mg 31.25 mg 31.25 mg   INR 2.9 2.5 3.1 3.1 3.2 3.4 3.4 1.9 1.7 1.8 2.1 2.6 2.9 2.9   Notes        redx1 ?  inc        Date 3/30 4/13 5/11 6/8  6/23 7/7 7/28  8/9 8/12 8/16   Total WeeklyDose 31.25mg 31.25 mg 31.25 mg 31.25mg Start dialysis 30mg 30mg 30 mg  33.75mg 37.5mg 37.5mg   INR 2.5 2.2 2.6 3.9  2.1 2.1 1.8 1.23 1.4 1.5 2.1   Notes    Inc torsemide Cath placement    AV fistula placement    HM train  1 no billing       Date 8/23 8/30 9/6 9/13 9/20 9/30 10/4 10/11 10/18 10/25 11/8 11/15   Total WeeklyDose 31.25 mg 32.5 mg 32.5 mg 32.5 mg 32.5 mg 32.5 mg 35 mg 35 mg 35 mg 32.5 mg 26.5 mg 35 mg   INR 1.9 2.0 2.0 2.5 2.7 1.6 1.7 2.8 2.4 1.7 1.4  1.6   Notes HM2 no billing HM3  No billing HM4  No billing HM 1  Bill today HM 2 No billing HM 3 No bill HM 4  No billing 1x boost  HM 5 no bill day Bill day  HM1 HM- 2 no billing   HM 3- no billing 1x boost  HM 4 - no billing      Date 11/22 11/29 12/6 12/13 12/20 12/27 1/3/23        Total WeeklyDose 37.5 mg 37.5 mg 37.5 mg 37.5 mg 42.5 mg  40 mg 37.5 mg        INR 1.9 2.8 1.9 1.5 2.3 3.4 1.4        Notes HM1- bill day HM 2 - NO bill HM 3 - no bill  Inc GLV Hm 4 - NO BILL HM 1 - Bill today HM 2   no billing HM 3- no billing          Takes warfarin in the am    Clinic Interview:  Verbal Release Authorization signed on 8/22/2018 -- may speak with Raeann Montemayor (Wife). [phone] (Home) *Preferred*  Tablet Strength: pt has 7.5mg and 2.5mg tablets  Estimated oop cost for [facility] HM: Patient has [employer] Medicare so they should have minimal to no OOP costs.    Patient Findings  Negatives:  Signs/symptoms of thrombosis, Signs/symptoms of bleeding, Laboratory test error suspected, Change in health, Change in alcohol use, Change in activity, Upcoming invasive procedure, Emergency department visit, Upcoming dental procedure, Missed doses, Extra doses, Change in medications, Change in diet/appetite, Hospital admission, Bruising, Other complaints   Comments:  He reports that he hasn't been taking torsemide for quite some time due to dialysis. Removing from profile.     Plan:   1. INR is subtherapeutic today at 1.4 (goal 2.0-3.0). Uncertain cause for sharp decline. Instructed Mr. Montemayor to increase dose to warfarin 5 mg daily except warfarin 7.5 mg TueFri until recheck.2. Recheck INR in 1 week 1/10/23  3. Verbal information provided over the telephone. Mr. Montemayor expresses understanding with teach back and has no further questions at this time.     Home monitor information below:  · Test strips on hand: 3  12/27/22   ·   Lot : [id number]  Barcode : [id number] Exp : 8/24  · Serial number: [id number]   · Supplies billing  code used: last 12/20/22- Next must be on or after  1/17/23  · Transfer Tube Lot number: Lot : [id number]  · Safety Lancets:  Lot : [id number]  Exp : 5/25   Billing:  *bill must be 4 tests 28 days or more from HM visit  PLEASE REFER TO ANTICOAGULATION TRAINING POWERPOINT FOR BILLING. Needs to use in clinic remote appointment for encounter.      Evelina Quan, PharmD  1/3/2023  11:25 EST

## 2023-01-05 PROCEDURE — 93294 REM INTERROG EVL PM/LDLS PM: CPT | Performed by: INTERNAL MEDICINE

## 2023-01-05 PROCEDURE — 93296 REM INTERROG EVL PM/IDS: CPT | Performed by: INTERNAL MEDICINE

## 2023-01-09 DIAGNOSIS — I10 ESSENTIAL HYPERTENSION: Primary | ICD-10-CM

## 2023-01-09 DIAGNOSIS — E78.5 HYPERLIPIDEMIA LDL GOAL <70: ICD-10-CM

## 2023-01-09 RX ORDER — ATORVASTATIN CALCIUM 40 MG/1
TABLET, FILM COATED ORAL
Qty: 90 TABLET | Refills: 3 | Status: SHIPPED | OUTPATIENT
Start: 2023-01-09

## 2023-01-10 ENCOUNTER — LAB (OUTPATIENT)
Dept: LAB | Facility: HOSPITAL | Age: 78
End: 2023-01-10
Payer: MEDICARE

## 2023-01-10 ENCOUNTER — ANTICOAGULATION VISIT (OUTPATIENT)
Dept: PHARMACY | Facility: HOSPITAL | Age: 78
End: 2023-01-10
Payer: MEDICARE

## 2023-01-10 DIAGNOSIS — I10 ESSENTIAL HYPERTENSION: ICD-10-CM

## 2023-01-10 DIAGNOSIS — E78.5 HYPERLIPIDEMIA LDL GOAL <70: ICD-10-CM

## 2023-01-10 DIAGNOSIS — I48.92 ATRIAL FLUTTER, UNSPECIFIED TYPE: Primary | ICD-10-CM

## 2023-01-10 LAB — INR PPP: 0.8

## 2023-01-10 NOTE — PROGRESS NOTES
Anticoagulation Clinic Progress Note  [facility] HOMEMONJASMINA: Jessica Check Serial # : [id number]     Indication: afib  Referring Provider: Tyson  Initial Warfarin Start Date: 2007  Goal INR: 2-3  Current Drug Interactions:   Diet: eats salads 2x weekly 11/15/22  [id number]: 5 (HTN, Age, CHF, CAD)   Other: anemic, CKD (stage 4) May need bridge with Lovenox given persistent AF (per cardio 1/24/2019), Scr was 3.22 on 11/24/21     Anticoagulation Clinic INR History:   Date 10/30 11/20 12/17 1/14/20 2/11 3/11 4/8 5/6 6/11 7/9 8/6 9/3 9/14 9/30   Total Weekly Dose 32.5 mg 32.5 mg 32.5 mg 32.5 mg 32.5mg 32.5mg 32.5mg 32.5mg 32.5mg 32.5 mg 32.5 mg 32.5mg 32.5mg 26.25 mg   INR 2.3 2.3 2.1 1.9 2.2 2.1 2.1 2.2 2.2 2.3 2.7 1.6 2.2  1.8   Notes                       ??   5 day hold     Date 10/14 11/4 11/11 11/25 12/9 12/16 12/30 1/13 2/3 3/3 3/31 4/28 5/26 6/23   Total Weekly Dose 32.5 mg 32.5mg 35mg 32.5mg 33.75 mg 36.25 mg 33.75 mg 35mg 33.75 33.75mg 33.75mg 33.75 mg 33.75 mg 33.75mg   INR 2.0 1.6 2.1 1.8 1.8 2.5 2.0 3.0 2.1 2.4 2.3 3.0 2.1 2.2   Notes   1x boost GLV boostx1 boostx1             Date 7/21 9/1 10/13 11/10 12/1 12/15 12/29 1/12/22 1/26 2/9 2/16 2/23 3/2 3/16   Total Weekly Dose 33.75mg 33.75mg 33.75mg 33.75mg 33.75 mg 32.5mg 30mg 27.5mg 28.75 mg 30 mg 31.25 mg 31.25 mg 31.25 mg 31.25 mg   INR 2.9 2.5 3.1 3.1 3.2 3.4 3.4 1.9 1.7 1.8 2.1 2.6 2.9 2.9   Notes        redx1 ?  inc        Date 3/30 4/13 5/11 6/8  6/23 7/7 7/28  8/9 8/12 8/16   Total WeeklyDose 31.25mg 31.25 mg 31.25 mg 31.25mg Start dialysis 30mg 30mg 30 mg  33.75mg 37.5mg 37.5mg   INR 2.5 2.2 2.6 3.9  2.1 2.1 1.8 1.23 1.4 1.5 2.1   Notes    Inc torsemide Cath placement    AV fistula placement    HM train  1 no billing       Date 8/23 8/30 9/6 9/13 9/20 9/30 10/4 10/11 10/18 10/25 11/8 11/15   Total WeeklyDose 31.25 mg 32.5 mg 32.5 mg 32.5 mg 32.5 mg 32.5 mg 35 mg 35 mg 35 mg 32.5 mg 26.5 mg 35 mg   INR 1.9 2.0 2.0 2.5 2.7 1.6 1.7 2.8 2.4 1.7 1.4  1.6   Notes HM2 no billing HM3  No billing HM4  No billing HM 1  Bill today HM 2 No billing HM 3 No bill HM 4  No billing 1x boost  HM 5 no bill day Bill day  HM1 HM- 2 no billing   HM 3- no billing 1x boost  HM 4 - no billing      Date 11/22 11/29 12/6 12/13 12/20 12/27 1/3/23 1/10       Total WeeklyDose 37.5 mg 37.5 mg 37.5 mg 37.5 mg 42.5 mg  40 mg 37.5 mg 40 mg       INR 1.9 2.8 1.9 1.5 2.3 3.4 1.4 0.8       Notes HM1- bill day HM 2 - NO bill HM 3 - no bill  Inc GLV Hm 4 - NO BILL HM 1 - Bill today HM 2   no billing HM 3- no billing HM 4  No billing         Takes warfarin in the am    Clinic Interview:  Verbal Release Authorization signed on 8/22/2018 -- may speak with Raeann Montemayor (Wife). [phone] (Home) *Preferred*  Tablet Strength: pt has 7.5mg and 2.5mg tablets  Estimated oop cost for [facility] HM: Patient has [employer] Medicare so they should have minimal to no OOP costs.    Patient Findings  Negatives:  Signs/symptoms of thrombosis, Signs/symptoms of bleeding, Laboratory test error suspected, Change in health, Change in alcohol use, Change in activity, Upcoming invasive procedure, Emergency department visit, Upcoming dental procedure, Missed doses, Extra doses, Change in medications, Change in diet/appetite, Hospital admission, Bruising, Other complaints   Comments:  INR 0.8, patient has checked INR twice today. No changes per patient. Confirms dosing, has already taken normal 7.5 mg dose this AM. Confirmed that patient has been taking warfarin tablets daily straight out of labeled warfarin bottle. No diet changes, no supplements, no medication changes. Reviewed signs/symptoms of blood clot and stroke, instructed patient to seek medical attention if any of these should arise.       Plan:   1. INR is subtherapeutic today at 0.8 (goal 2.0-3.0). Still uncertain cause for sharp decline, with result of 0.8 today question of accuracy of result. Patient has already taken warfarin 7.5 mg dose in AM. Instructed  Mr. Montemayor to take warfarin 2.5 mg now (to make total dose of warfarin 10 mg today), take warfarin 5 mg tomorrow and recheck INR via  at [facility] lab Thursday prior to appt with Ivet Beasley. Instructed patient to take warfarin 5 mg Thursday if he does not get result by end of day however instructed him to call clinic Thursday afternoon ~4-4:15 if we have not already called him regarding result so we can hopefully investigate and get result by end of day.  2. Recheck INR Thursday 1/12 at [facility] lab via  (see above). Standing order entered.  3. New supplies being sent to patient in AM.  4. Verbal information provided over the telephone. Mr. Montemayor expresses understanding with teach back and has no further questions at this time.     Home monitor information below:  · Test strips on hand: 1  1/10/23  ·   Lot : [id number]  Barcode : [id number] Exp : 8/24  · Serial number: [id number]   · Supplies billing code used: last 12/20/22- Next must be on or after  1/17/23  · Transfer Tube Lot number: Lot : [id number]  · Safety Lancets:  Lot : [id number]  Exp : 5/25   Billing:  *bill must be 4 tests 28 days or more from HM visit  PLEASE REFER TO ANTICOAGULATION TRAINING POWERPOINT FOR BILLING. Needs to use in clinic remote appointment for encounter.    Raj Salter, PharmD, BCPS  1/10/2023  15:11 EST

## 2023-01-11 LAB
ALBUMIN SERPL-MCNC: 4.1 G/DL (ref 3.5–5.2)
ALBUMIN/GLOB SERPL: 1.5 G/DL
ALP SERPL-CCNC: 96 U/L (ref 39–117)
ALT SERPL-CCNC: 9 U/L (ref 1–41)
AST SERPL-CCNC: 13 U/L (ref 1–40)
BASOPHILS # BLD AUTO: 0.04 10*3/MM3 (ref 0–0.2)
BASOPHILS NFR BLD AUTO: 0.4 % (ref 0–1.5)
BILIRUB SERPL-MCNC: 0.3 MG/DL (ref 0–1.2)
BUN SERPL-MCNC: 41 MG/DL (ref 8–23)
BUN/CREAT SERPL: 5.8 (ref 7–25)
CALCIUM SERPL-MCNC: 9 MG/DL (ref 8.6–10.5)
CHLORIDE SERPL-SCNC: 99 MMOL/L (ref 98–107)
CHOLEST SERPL-MCNC: 109 MG/DL (ref 0–200)
CO2 SERPL-SCNC: 25.8 MMOL/L (ref 22–29)
CREAT SERPL-MCNC: 7.12 MG/DL (ref 0.76–1.27)
EGFRCR SERPLBLD CKD-EPI 2021: 7.3 ML/MIN/1.73
EOSINOPHIL # BLD AUTO: 0.27 10*3/MM3 (ref 0–0.4)
EOSINOPHIL NFR BLD AUTO: 2.9 % (ref 0.3–6.2)
ERYTHROCYTE [DISTWIDTH] IN BLOOD BY AUTOMATED COUNT: 15.6 % (ref 12.3–15.4)
GLOBULIN SER CALC-MCNC: 2.7 GM/DL
GLUCOSE SERPL-MCNC: 85 MG/DL (ref 65–99)
HCT VFR BLD AUTO: 32.3 % (ref 37.5–51)
HDLC SERPL-MCNC: 34 MG/DL (ref 40–60)
HGB BLD-MCNC: 10.4 G/DL (ref 13–17.7)
IMM GRANULOCYTES # BLD AUTO: 0.04 10*3/MM3 (ref 0–0.05)
IMM GRANULOCYTES NFR BLD AUTO: 0.4 % (ref 0–0.5)
LDLC SERPL CALC-MCNC: 56 MG/DL (ref 0–100)
LYMPHOCYTES # BLD AUTO: 1.83 10*3/MM3 (ref 0.7–3.1)
LYMPHOCYTES NFR BLD AUTO: 19.6 % (ref 19.6–45.3)
MCH RBC QN AUTO: 32.2 PG (ref 26.6–33)
MCHC RBC AUTO-ENTMCNC: 32.2 G/DL (ref 31.5–35.7)
MCV RBC AUTO: 100 FL (ref 79–97)
MONOCYTES # BLD AUTO: 1.1 10*3/MM3 (ref 0.1–0.9)
MONOCYTES NFR BLD AUTO: 11.8 % (ref 5–12)
NEUTROPHILS # BLD AUTO: 6.04 10*3/MM3 (ref 1.7–7)
NEUTROPHILS NFR BLD AUTO: 64.9 % (ref 42.7–76)
NRBC BLD AUTO-RTO: 0.4 /100 WBC (ref 0–0.2)
PLATELET # BLD AUTO: 214 10*3/MM3 (ref 140–450)
POTASSIUM SERPL-SCNC: 5.1 MMOL/L (ref 3.5–5.2)
PROT SERPL-MCNC: 6.8 G/DL (ref 6–8.5)
RBC # BLD AUTO: 3.23 10*6/MM3 (ref 4.14–5.8)
SODIUM SERPL-SCNC: 142 MMOL/L (ref 136–145)
TRIGL SERPL-MCNC: 99 MG/DL (ref 0–150)
VLDLC SERPL CALC-MCNC: 19 MG/DL (ref 5–40)
WBC # BLD AUTO: 9.32 10*3/MM3 (ref 3.4–10.8)

## 2023-01-12 ENCOUNTER — LAB (OUTPATIENT)
Dept: LAB | Facility: HOSPITAL | Age: 78
End: 2023-01-12
Payer: MEDICARE

## 2023-01-12 ENCOUNTER — OFFICE VISIT (OUTPATIENT)
Dept: CARDIOLOGY | Facility: CLINIC | Age: 78
End: 2023-01-12
Payer: MEDICARE

## 2023-01-12 ENCOUNTER — ANTICOAGULATION VISIT (OUTPATIENT)
Dept: PHARMACY | Facility: HOSPITAL | Age: 78
End: 2023-01-12
Payer: MEDICARE

## 2023-01-12 VITALS
HEIGHT: 70 IN | RESPIRATION RATE: 18 BRPM | SYSTOLIC BLOOD PRESSURE: 129 MMHG | WEIGHT: 264 LBS | DIASTOLIC BLOOD PRESSURE: 78 MMHG | BODY MASS INDEX: 37.8 KG/M2 | OXYGEN SATURATION: 98 % | HEART RATE: 94 BPM

## 2023-01-12 DIAGNOSIS — E66.01 MORBID OBESITY: Chronic | ICD-10-CM

## 2023-01-12 DIAGNOSIS — E78.5 DYSLIPIDEMIA: Chronic | ICD-10-CM

## 2023-01-12 DIAGNOSIS — I50.32 CHRONIC DIASTOLIC CONGESTIVE HEART FAILURE: Primary | Chronic | ICD-10-CM

## 2023-01-12 DIAGNOSIS — I10 PRIMARY HYPERTENSION: Chronic | ICD-10-CM

## 2023-01-12 DIAGNOSIS — I48.92 ATRIAL FLUTTER, UNSPECIFIED TYPE: ICD-10-CM

## 2023-01-12 DIAGNOSIS — I48.20 CHRONIC ATRIAL FIBRILLATION: ICD-10-CM

## 2023-01-12 LAB
INR PPP: 2.23 (ref 0.84–1.13)
INR PPP: 2.7 (ref 0.91–1.09)
PROTHROMBIN TIME: 24.8 SECONDS (ref 11.4–14.4)
PROTHROMBIN TIME: 32.3 SECONDS (ref 10–13.8)

## 2023-01-12 PROCEDURE — 99214 OFFICE O/P EST MOD 30 MIN: CPT | Performed by: NURSE PRACTITIONER

## 2023-01-12 PROCEDURE — 36416 COLLJ CAPILLARY BLOOD SPEC: CPT

## 2023-01-12 PROCEDURE — 36415 COLL VENOUS BLD VENIPUNCTURE: CPT

## 2023-01-12 PROCEDURE — 85610 PROTHROMBIN TIME: CPT

## 2023-01-12 PROCEDURE — G0463 HOSPITAL OUTPT CLINIC VISIT: HCPCS

## 2023-01-12 RX ORDER — SUCROFERRIC OXYHYDROXIDE 500 MG/1
TABLET, CHEWABLE ORAL TAKE AS DIRECTED
COMMUNITY
Start: 2022-12-09

## 2023-01-12 NOTE — PROGRESS NOTES
Anticoagulation Clinic Progress Note  [facility] HOMEMONJASMINA: Jessica Check Serial # : [id number]     Indication: afib  Referring Provider: Tyson  Initial Warfarin Start Date: 2007  Goal INR: 2-3  Current Drug Interactions:   Diet: eats salads 2x weekly 11/15/22  [id number]: 5 (HTN, Age, CHF, CAD)   Other: anemic, CKD (stage 4) May need bridge with Lovenox given persistent AF (per cardio 1/24/2019), Scr was 3.22 on 11/24/21     Anticoagulation Clinic INR History:   Date 10/30 11/20 12/17 1/14/20 2/11 3/11 4/8 5/6 6/11 7/9 8/6 9/3 9/14 9/30   Total Weekly Dose 32.5 mg 32.5 mg 32.5 mg 32.5 mg 32.5mg 32.5mg 32.5mg 32.5mg 32.5mg 32.5 mg 32.5 mg 32.5mg 32.5mg 26.25 mg   INR 2.3 2.3 2.1 1.9 2.2 2.1 2.1 2.2 2.2 2.3 2.7 1.6 2.2  1.8   Notes                       ??   5 day hold     Date 10/14 11/4 11/11 11/25 12/9 12/16 12/30 1/13 2/3 3/3 3/31 4/28 5/26 6/23   Total Weekly Dose 32.5 mg 32.5mg 35mg 32.5mg 33.75 mg 36.25 mg 33.75 mg 35mg 33.75 33.75mg 33.75mg 33.75 mg 33.75 mg 33.75mg   INR 2.0 1.6 2.1 1.8 1.8 2.5 2.0 3.0 2.1 2.4 2.3 3.0 2.1 2.2   Notes   1x boost GLV boostx1 boostx1             Date 7/21 9/1 10/13 11/10 12/1 12/15 12/29 1/12/22 1/26 2/9 2/16 2/23 3/2 3/16   Total Weekly Dose 33.75mg 33.75mg 33.75mg 33.75mg 33.75 mg 32.5mg 30mg 27.5mg 28.75 mg 30 mg 31.25 mg 31.25 mg 31.25 mg 31.25 mg   INR 2.9 2.5 3.1 3.1 3.2 3.4 3.4 1.9 1.7 1.8 2.1 2.6 2.9 2.9   Notes        redx1 ?  inc        Date 3/30 4/13 5/11 6/8  6/23 7/7 7/28  8/9 8/12 8/16   Total WeeklyDose 31.25mg 31.25 mg 31.25 mg 31.25mg Start dialysis 30mg 30mg 30 mg  33.75mg 37.5mg 37.5mg   INR 2.5 2.2 2.6 3.9  2.1 2.1 1.8 1.23 1.4 1.5 2.1   Notes    Inc torsemide Cath placement    AV fistula placement    HM train  1 no billing       Date 8/23 8/30 9/6 9/13 9/20 9/30 10/4 10/11 10/18 10/25 11/8 11/15   Total WeeklyDose 31.25 mg 32.5 mg 32.5 mg 32.5 mg 32.5 mg 32.5 mg 35 mg 35 mg 35 mg 32.5 mg 26.5 mg 35 mg   INR 1.9 2.0 2.0 2.5 2.7 1.6 1.7 2.8 2.4 1.7 1.4  1.6   Notes HM2 no billing HM3  No billing HM4  No billing HM 1  Bill today HM 2 No billing HM 3 No bill HM 4  No billing 1x boost  HM 5 no bill day Bill day  HM1 HM- 2 no billing   HM 3- no billing 1x boost  HM 4 - no billing      Date 11/22 11/29 12/6 12/13 12/20 12/27 1/3/23 1/10 1/12      Total WeeklyDose 37.5 mg 37.5 mg 37.5 mg 37.5 mg 42.5 mg  40 mg 37.5 mg 40 mg 42.5mg      INR 1.9 2.8 1.9 1.5 2.3 3.4 1.4 0.8 2.7 POCT  2.23   2.8 HM      Notes HM1- bill day HM 2 - NO bill HM 3 - no bill  Inc GLV Hm 4 - NO BILL HM 1 - Bill today HM 2   no billing HM 3- no billing HM 4  No billing lab error?? clinic        Takes warfarin in the am    Clinic Interview:  Verbal Release Authorization signed on 8/22/2018 -- may speak with Raeann Montemayor (Wife). [phone] (Home) *Preferred*  Tablet Strength: pt has 7.5mg and 2.5mg tablets  Estimated oop cost for  [facility] HM: Patient has [employer] Medicare so they should have minimal to no OOP costs.    Patient Findings    Negatives:  Signs/symptoms of thrombosis, Signs/symptoms of bleeding, Laboratory test error suspected, Change in health, Change in alcohol use, Change in activity, Upcoming invasive procedure, Emergency department visit, Upcoming dental procedure, Missed doses, Extra doses, Change in medications, Change in diet/appetite, Hospital admission, Bruising, Other complaints   Comments:  Unable to find issues with patient testing or any other explanation for baseline INR//significant increase in 2 days. Will go home and check INR on  for correlation    Reports he received strips in the mail today       Plan:   1. INR is therapeutic today at 2.7 via POCT, 2.8 HM and 2.23 via (goal 2.0-3.0). Instructed Mr. Montemayor to reusme  warfarin 5 mg daily except 7.5mg TuesFri and recheck INR on  for correlation. Would likely indicate lab error from 1/10  2. Recheck INR 1/17  3. New supplies being sent to patient in AM.  4. Verbal information provided over the telephone.   Sim expresses understanding with teach back and has no further questions at this time.     Home monitor information below:  · Test strips on hand: 6 1/12/22   ·   Lot : [id number]  Barcode : [id number] Exp : 8/24  · Serial number: [id number]   · Supplies billing code used: last 12/20/22- Next must be on or after  1/17/23  · Transfer Tube Lot number: Lot : [id number]  · Safety Lancets:  Lot : [id number]  Exp : 5/25   Billing:  *bill must be 4 tests 28 days or more from HM visit  PLEASE REFER TO ANTICOAGULATION TRAINING POWERPOINT FOR BILLING. Needs to use in clinic remote appointment for encounter.    Kristine Sutton, NighatD.  01/12/23   14:19 EST

## 2023-01-17 ENCOUNTER — ANTICOAGULATION VISIT (OUTPATIENT)
Dept: PHARMACY | Facility: HOSPITAL | Age: 78
End: 2023-01-17
Payer: MEDICARE

## 2023-01-17 LAB — INR PPP: 3.9

## 2023-01-17 PROCEDURE — G0249 PROVIDE INR TEST MATER/EQUIP: HCPCS

## 2023-01-17 NOTE — PROGRESS NOTES
Anticoagulation Clinic Progress Note  [facility] HOMEMONJASMINA: Jessica Check Serial # : [id number]     Indication: afib  Referring Provider: Tyson  Initial Warfarin Start Date: 2007  Goal INR: 2-3  Current Drug Interactions:   Diet: eats salads 2x weekly 11/15/22  [id number]: 5 (HTN, Age, CHF, CAD)   Other: anemic, CKD (stage 4) May need bridge with Lovenox given persistent AF (per cardio 1/24/2019), Scr was 3.22 on 11/24/21     Anticoagulation Clinic INR History:   Date 10/30 11/20 12/17 1/14/20 2/11 3/11 4/8 5/6 6/11 7/9 8/6 9/3 9/14 9/30   Total Weekly Dose 32.5 mg 32.5 mg 32.5 mg 32.5 mg 32.5mg 32.5mg 32.5mg 32.5mg 32.5mg 32.5 mg 32.5 mg 32.5mg 32.5mg 26.25 mg   INR 2.3 2.3 2.1 1.9 2.2 2.1 2.1 2.2 2.2 2.3 2.7 1.6 2.2  1.8   Notes                       ??   5 day hold     Date 10/14 11/4 11/11 11/25 12/9 12/16 12/30 1/13 2/3 3/3 3/31 4/28 5/26 6/23   Total Weekly Dose 32.5 mg 32.5mg 35mg 32.5mg 33.75 mg 36.25 mg 33.75 mg 35mg 33.75 33.75mg 33.75mg 33.75 mg 33.75 mg 33.75mg   INR 2.0 1.6 2.1 1.8 1.8 2.5 2.0 3.0 2.1 2.4 2.3 3.0 2.1 2.2   Notes   1x boost GLV boostx1 boostx1             Date 7/21 9/1 10/13 11/10 12/1 12/15 12/29 1/12/22 1/26 2/9 2/16 2/23 3/2 3/16   Total Weekly Dose 33.75mg 33.75mg 33.75mg 33.75mg 33.75 mg 32.5mg 30mg 27.5mg 28.75 mg 30 mg 31.25 mg 31.25 mg 31.25 mg 31.25 mg   INR 2.9 2.5 3.1 3.1 3.2 3.4 3.4 1.9 1.7 1.8 2.1 2.6 2.9 2.9   Notes        redx1 ?  inc        Date 3/30 4/13 5/11 6/8  6/23 7/7 7/28  8/9 8/12 8/16   Total WeeklyDose 31.25mg 31.25 mg 31.25 mg 31.25mg Start dialysis 30mg 30mg 30 mg  33.75mg 37.5mg 37.5mg   INR 2.5 2.2 2.6 3.9  2.1 2.1 1.8 1.23 1.4 1.5 2.1   Notes    Inc torsemide Cath placement    AV fistula placement    HM train  1 no billing       Date 8/23 8/30 9/6 9/13 9/20 9/30 10/4 10/11 10/18 10/25 11/8 11/15   Total WeeklyDose 31.25 mg 32.5 mg 32.5 mg 32.5 mg 32.5 mg 32.5 mg 35 mg 35 mg 35 mg 32.5 mg 26.5 mg 35 mg   INR 1.9 2.0 2.0 2.5 2.7 1.6 1.7 2.8 2.4 1.7 1.4  1.6   Notes HM2 no billing HM3  No billing HM4  No billing HM 1  Bill today HM 2 No billing HM 3 No bill HM 4  No billing 1x boost  HM 5 no bill day Bill day  HM1 HM- 2 no billing   HM 3- no billing 1x boost  HM 4 - no billing      Date 11/22 11/29 12/6 12/13 12/20 12/27 1/3/23 1/10 1/12 1/17     Total WeeklyDose 37.5 mg 37.5 mg 37.5 mg 37.5 mg 42.5 mg  40 mg 37.5 mg 40 mg 42.5mg 45 mg     INR 1.9 2.8 1.9 1.5 2.3 3.4 1.4 0.8 2.7 POCT  2.23   2.8 HM 3.9     Notes HM1- bill day HM 2 - NO bill HM 3 - no bill  Inc GLV Hm 4 - NO BILL HM 1 - Bill today HM 2   no billing HM 3- no billing HM 4  No billing lab error?? clinic HM 1  Bill today       Takes warfarin in the am    Clinic Interview:  Verbal Release Authorization signed on 8/22/2018 -- may speak with Raeann Montemayor (Wife). [phone] (Home) *Preferred*  Tablet Strength: pt has 7.5mg and 2.5mg tablets  Estimated oop cost for  [facility] HM: Patient has [employer] Medicare so they should have minimal to no OOP costs.    Patient Findings    Negatives:  Signs/symptoms of thrombosis, Signs/symptoms of bleeding, Laboratory test error suspected, Change in health, Change in alcohol use, Change in activity, Upcoming invasive procedure, Emergency department visit, Upcoming dental procedure, Missed doses, Extra doses, Change in medications, Change in diet/appetite, Hospital admission, Bruising, Other complaints   Comments:  Patient takes occasional acetaminophen but does not report taking any more than usual this week. He reports that he has continued with his usual 3 servings of greens in the past week as well.          Plan:   1. INR is [facility] today at 3.9 via (goal 2.0-3.0). Instructed Mr. Montemayor to reduce weekly dose to warfarin 5 mg daily except 7.5mg Fri.   2. Recheck INR 1/24  3. Verbal information provided over the telephone. Mr. Montemayor expresses understanding with teach back and has no further questions at this time.     Home monitor information below:  · Test  strips on hand: 5 1/17/22   ·   Lot : [id number]  Barcode : [id number] Exp : 8/24  · Serial number: [id number]   · Supplies billing code used: last 12/20/22- Next must be on or after  1/17/23  · Transfer Tube Lot number: Lot : [id number]  · Safety Lancets:  Lot : [id number]  Exp : 5/25   Billing:  *bill must be 4 tests 28 days or more from HM visit  PLEASE REFER TO ANTICOAGULATION TRAINING POWERPOINT FOR BILLING. Needs to use in clinic remote appointment for encounter.

## 2023-01-17 NOTE — PROGRESS NOTES
Anticoagulation Clinic Progress Note  [facility] HOMEMONJASMINA: Jessica Check Serial # : [id number]     Indication: afib  Referring Provider: Tyson  Initial Warfarin Start Date: 2007  Goal INR: 2-3  Current Drug Interactions:   Diet: eats salads 2x weekly 11/15/22  [id number]: 5 (HTN, Age, CHF, CAD)   Other: anemic, CKD (stage 4) May need bridge with Lovenox given persistent AF (per cardio 1/24/2019), Scr was 3.22 on 11/24/21     Anticoagulation Clinic INR History:   Date 10/30 11/20 12/17 1/14/20 2/11 3/11 4/8 5/6 6/11 7/9 8/6 9/3 9/14 9/30   Total Weekly Dose 32.5 mg 32.5 mg 32.5 mg 32.5 mg 32.5mg 32.5mg 32.5mg 32.5mg 32.5mg 32.5 mg 32.5 mg 32.5mg 32.5mg 26.25 mg   INR 2.3 2.3 2.1 1.9 2.2 2.1 2.1 2.2 2.2 2.3 2.7 1.6 2.2  1.8   Notes                       ??   5 day hold     Date 10/14 11/4 11/11 11/25 12/9 12/16 12/30 1/13 2/3 3/3 3/31 4/28 5/26 6/23   Total Weekly Dose 32.5 mg 32.5mg 35mg 32.5mg 33.75 mg 36.25 mg 33.75 mg 35mg 33.75 33.75mg 33.75mg 33.75 mg 33.75 mg 33.75mg   INR 2.0 1.6 2.1 1.8 1.8 2.5 2.0 3.0 2.1 2.4 2.3 3.0 2.1 2.2   Notes   1x boost GLV boostx1 boostx1             Date 7/21 9/1 10/13 11/10 12/1 12/15 12/29 1/12/22 1/26 2/9 2/16 2/23 3/2 3/16   Total Weekly Dose 33.75mg 33.75mg 33.75mg 33.75mg 33.75 mg 32.5mg 30mg 27.5mg 28.75 mg 30 mg 31.25 mg 31.25 mg 31.25 mg 31.25 mg   INR 2.9 2.5 3.1 3.1 3.2 3.4 3.4 1.9 1.7 1.8 2.1 2.6 2.9 2.9   Notes        redx1 ?  inc        Date 3/30 4/13 5/11 6/8  6/23 7/7 7/28  8/9 8/12 8/16   Total WeeklyDose 31.25mg 31.25 mg 31.25 mg 31.25mg Start dialysis 30mg 30mg 30 mg  33.75mg 37.5mg 37.5mg   INR 2.5 2.2 2.6 3.9  2.1 2.1 1.8 1.23 1.4 1.5 2.1   Notes    Inc torsemide Cath placement    AV fistula placement    HM train  1 no billing       Date 8/23 8/30 9/6 9/13 9/20 9/30 10/4 10/11 10/18 10/25 11/8 11/15   Total WeeklyDose 31.25 mg 32.5 mg 32.5 mg 32.5 mg 32.5 mg 32.5 mg 35 mg 35 mg 35 mg 32.5 mg 26.5 mg 35 mg   INR 1.9 2.0 2.0 2.5 2.7 1.6 1.7 2.8 2.4 1.7 1.4  1.6   Notes HM2 no billing HM3  No billing HM4  No billing HM 1  Bill today HM 2 No billing HM 3 No bill HM 4  No billing 1x boost  HM 5 no bill day Bill day  HM1 HM- 2 no billing   HM 3- no billing 1x boost  HM 4 - no billing      Date 11/22 11/29 12/6 12/13 12/20 12/27 1/3/23 1/10 1/12 1/17     Total WeeklyDose 37.5 mg 37.5 mg 37.5 mg 37.5 mg 42.5 mg  40 mg 37.5 mg 40 mg 42.5mg 45 mg     INR 1.9 2.8 1.9 1.5 2.3 3.4 1.4 0.8 2.7 POCT  2.23   2.8 HM 3.9     Notes HM1- bill day HM 2 - NO bill HM 3 - no bill  Inc GLV Hm 4 - NO BILL HM 1 - Bill today HM 2   no billing HM 3- no billing HM 4  No billing lab error?? clinic HM 1  Bill today       Takes warfarin in the am    Clinic Interview:  Verbal Release Authorization signed on 8/22/2018 -- may speak with Raeann Montemayor (Wife). [phone] (Home) *Preferred*  Tablet Strength: pt has 7.5mg and 2.5mg tablets  Estimated oop cost for [facility] HM: Patient has [employer] Medicare so they should have minimal to no OOP costs.    Patient Findings    Negatives:  Signs/symptoms of thrombosis, Signs/symptoms of bleeding, Laboratory test error suspected, Change in health, Change in alcohol use, Change in activity, Upcoming invasive procedure, Emergency department visit, Upcoming dental procedure, Missed doses, Extra doses, Change in medications, Change in diet/appetite, Hospital admission, Bruising, Other complaints   Comments:  Unable to find issues with patient testing or any other explanation for baseline INR//significant increase in 2 days. Will go home and check INR on  for correlation    Reports he received strips in the mail today       Plan:   1. INR is therapeutic today at 2.7 via POCT, 2.8 HM and 2.23 via (goal 2.0-3.0). Instructed Mr. Montemayor to reusme  warfarin 5 mg daily except 7.5mg TuesFri and recheck INR on  for correlation. Would likely indicate lab error from 1/10  2. Recheck INR 1/17  3. New supplies being sent to patient in AM.  4. Verbal information provided  over the telephone. Mr. Montemayor expresses understanding with teach back and has no further questions at this time.     Home monitor information below:  · Test strips on hand: 5 1/17/22   ·   Lot : [id number]  Barcode : [id number] Exp : 8/24  · Serial number: [id number]   · Supplies billing code used: last 12/20/22- Next must be on or after  1/17/23  · Transfer Tube Lot number: Lot : [id number]  · Safety Lancets:  Lot : [id number]  Exp : 5/25   Billing:  *bill must be 4 tests 28 days or more from HM visit  PLEASE REFER TO ANTICOAGULATION TRAINING POWERPOINT FOR BILLING. Needs to use in clinic remote appointment for encounter.

## 2023-01-19 ENCOUNTER — TELEPHONE (OUTPATIENT)
Dept: PHARMACY | Facility: HOSPITAL | Age: 78
End: 2023-01-19
Payer: MEDICARE

## 2023-01-19 NOTE — TELEPHONE ENCOUNTER
Dr. Ivet Beasley,      We were recently informed that  Marco Antonio Montemayor  is undergoing cath removal on Monday 1/23/23 with Dr. Peres at [facility] [place]. Dr. Peres requested that the patient hold warfarin for 5  days prior to procedure and resume warfarin the night of procedure.     Mr. Montemayor is on warfarin for atrial fibrillation therefore bridge therapy is not recommended.      DATE 1/18/23 Hold warfarin 5 days  DATE 1/23/23: procedure, Resume warfarin with two boosted doses. Recommend Monday warfarin 7.5 mg, 10 mg on Tuesday.    DATE 1/26/23: INR recheck with home monitor        Please advise if you are agreeable to plan above or if you prefer an alternative approach to Marco Antonio Montemayor anticoagulation plan for the upcoming procedure. In addition, please advise if surgeon's office should contact your office for further cardiac clearance.       Peng Melgar, PharmD  01/19/23   10:32 EST    [facility]

## 2023-01-26 ENCOUNTER — ANTICOAGULATION VISIT (OUTPATIENT)
Dept: PHARMACY | Facility: HOSPITAL | Age: 78
End: 2023-01-26
Payer: MEDICARE

## 2023-01-26 LAB — INR PPP: 1.9

## 2023-01-26 NOTE — PROGRESS NOTES
Anticoagulation Clinic Progress Note  [facility] HOMEMONJASMINA: Jessica Check Serial # : [id number]     Indication: afib  Referring Provider: Tyson  Initial Warfarin Start Date: 2007  Goal INR: 2-3  Current Drug Interactions:   Diet: eats salads 2x weekly 11/15/22  [id number]: 5 (HTN, Age, CHF, CAD)   Other: anemic, CKD (stage 4) May need bridge with Lovenox given persistent AF (per cardio 1/24/2019), Scr was 3.22 on 11/24/21     Anticoagulation Clinic INR History:   Date 10/30 11/20 12/17 1/14/20 2/11 3/11 4/8 5/6 6/11 7/9 8/6 9/3 9/14 9/30   Total Weekly Dose 32.5 mg 32.5 mg 32.5 mg 32.5 mg 32.5mg 32.5mg 32.5mg 32.5mg 32.5mg 32.5 mg 32.5 mg 32.5mg 32.5mg 26.25 mg   INR 2.3 2.3 2.1 1.9 2.2 2.1 2.1 2.2 2.2 2.3 2.7 1.6 2.2  1.8   Notes                       ??   5 day hold     Date 10/14 11/4 11/11 11/25 12/9 12/16 12/30 1/13 2/3 3/3 3/31 4/28 5/26 6/23   Total Weekly Dose 32.5 mg 32.5mg 35mg 32.5mg 33.75 mg 36.25 mg 33.75 mg 35mg 33.75 33.75mg 33.75mg 33.75 mg 33.75 mg 33.75mg   INR 2.0 1.6 2.1 1.8 1.8 2.5 2.0 3.0 2.1 2.4 2.3 3.0 2.1 2.2   Notes   1x boost GLV boostx1 boostx1             Date 7/21 9/1 10/13 11/10 12/1 12/15 12/29 1/12/22 1/26 2/9 2/16 2/23 3/2 3/16   Total Weekly Dose 33.75mg 33.75mg 33.75mg 33.75mg 33.75 mg 32.5mg 30mg 27.5mg 28.75 mg 30 mg 31.25 mg 31.25 mg 31.25 mg 31.25 mg   INR 2.9 2.5 3.1 3.1 3.2 3.4 3.4 1.9 1.7 1.8 2.1 2.6 2.9 2.9   Notes        redx1 ?  inc        Date 3/30 4/13 5/11 6/8  6/23 7/7 7/28  8/9 8/12 8/16   Total WeeklyDose 31.25mg 31.25 mg 31.25 mg 31.25mg Start dialysis 30mg 30mg 30 mg  33.75mg 37.5mg 37.5mg   INR 2.5 2.2 2.6 3.9  2.1 2.1 1.8 1.23 1.4 1.5 2.1   Notes    Inc torsemide Cath placement    AV fistula placement    HM train  1 no billing       Date 8/23 8/30 9/6 9/13 9/20 9/30 10/4 10/11 10/18 10/25 11/8 11/15   Total WeeklyDose 31.25 mg 32.5 mg 32.5 mg 32.5 mg 32.5 mg 32.5 mg 35 mg 35 mg 35 mg 32.5 mg 26.5 mg 35 mg   INR 1.9 2.0 2.0 2.5 2.7 1.6 1.7 2.8 2.4 1.7 1.4  1.6   Notes HM2 no billing HM3  No billing HM4  No billing HM 1  Bill today HM 2 No billing HM 3 No bill HM 4  No billing 1x boost  HM 5 no bill day Bill day  HM1 HM- 2 no billing   HM 3- no billing 1x boost  HM 4 - no billing      Date 11/22 11/29 12/6 12/13 12/20 12/27 1/3/23 1/10 1/12 1/17 1/26/23    Total WeeklyDose 37.5 mg 37.5 mg 37.5 mg 37.5 mg 42.5 mg  40 mg 37.5 mg 40 mg 42.5mg 45 mg 45 mg    INR 1.9 2.8 1.9 1.5 2.3 3.4 1.4 0.8 2.7 POCT  2.23   2.8 HM 3.9 1.9    Notes HM1- bill day HM 2 - NO bill HM 3 - no bill  Inc GLV Hm 4 - NO BILL HM 1 - Bill today HM 2   no billing HM 3- no billing HM 4  No billing lab error?? clinic HM 1  Bill today HM 2 - no bill       Takes warfarin in the am    Clinic Interview:  Verbal Release Authorization signed on 8/22/2018 -- may speak with Raeann Montemayor (Wife). [phone] (Home) *Preferred*  Tablet Strength: pt has 7.5mg and 2.5mg tablets  Estimated oop cost for [facility] HM: Patient has [employer] Medicare so they should have minimal to no OOP costs.      Patient Findings  Negatives:  Signs/symptoms of thrombosis, Signs/symptoms of bleeding, Laboratory test error suspected, Change in health, Change in alcohol use, Change in activity, Upcoming invasive procedure, Emergency department visit, Upcoming dental procedure, Missed doses, Extra doses, Change in medications, Change in diet/appetite, Hospital admission, Bruising, Other complaints   Comments:  All findings negative per patient       Plan:   1. INR is slightly SUBtherapeutic today at 1.9 following hold catheter removal (goal 2.0-3.0). Per Raj Salter PharmD Instructed Mr. Montemayor to take warfarin 5 mg oral daily except warfarin 7.5mg oral Tues until recheck   2. Recheck INR 1/31/23.  3. Verbal information provided over the telephone. Mr. McEndre expresses understanding with teach back and has no further questions at this time.     Home monitor information below:  · Test strips on hand: 4 1/26/22   ·   Lot : [id number]   Barcode : [id number] Exp : 8/24  · Serial number: [id number]   · Supplies billing code used: last 12/20/22- Next must be on or after  1/17/23  · Transfer Tube Lot number: Lot : [id number]  · Safety Lancets:  Lot : [id number]  Exp : 5/25   Billing:  *bill must be 4 tests 28 days or more from HM visit  PLEASE REFER TO ANTICOAGULATION TRAINING POWERPOINT FOR BILLING. Needs to use in clinic remote appointment for encounter.    lAexander Scherer, Pharmacy Technician  1/26/2023  09:51 CHULA RODGERS, Raj Salter, PharmD, have reviewed the note in full and agree with the assessment and plan. Patient held warfarin 5 days for catheter removal (see 1/19/23 telephone encounter). Tracker updated with held doses. Will decrease tomorrow's dose due to increase to 1.9 after only 3 doses of warfarin following 5 day hold. Recheck next week on Tuesday (normal day for patient to test on home monitor).  01/26/23  12:10 EST

## 2023-01-31 ENCOUNTER — ANTICOAGULATION VISIT (OUTPATIENT)
Dept: PHARMACY | Facility: HOSPITAL | Age: 78
End: 2023-01-31
Payer: MEDICARE

## 2023-01-31 LAB — INR PPP: 2.2

## 2023-01-31 NOTE — PROGRESS NOTES
Anticoagulation Clinic Progress Note   Oswaldo HOMEMONITOR: Coagu Check Serial # : [id number]     Indication: afib  Referring Provider: Tyson  Initial Warfarin Start Date: 2007  Goal INR: 2-3  Current Drug Interactions:   Diet: eats salads 2x weekly 11/15/22  [id number]: 5 (HTN, Age, CHF, CAD)   Other: anemic, CKD (stage 4) May need bridge with Lovenox given persistent AF (per cardio 1/24/2019), Scr was 3.22 on 11/24/21     Anticoagulation Clinic INR History:   Date 10/30 11/20 12/17 1/14/20 2/11 3/11 4/8 5/6 6/11 7/9 8/6 9/3 9/14 9/30   Total Weekly Dose 32.5 mg 32.5 mg 32.5 mg 32.5 mg 32.5mg 32.5mg 32.5mg 32.5mg 32.5mg 32.5 mg 32.5 mg 32.5mg 32.5mg 26.25 mg   INR 2.3 2.3 2.1 1.9 2.2 2.1 2.1 2.2 2.2 2.3 2.7 1.6 2.2  1.8   Notes                       ??   5 day hold     Date 10/14 11/4 11/11 11/25 12/9 12/16 12/30 1/13 2/3 3/3 3/31 4/28 5/26 6/23   Total Weekly Dose 32.5 mg 32.5mg 35mg 32.5mg 33.75 mg 36.25 mg 33.75 mg 35mg 33.75 33.75mg 33.75mg 33.75 mg 33.75 mg 33.75mg   INR 2.0 1.6 2.1 1.8 1.8 2.5 2.0 3.0 2.1 2.4 2.3 3.0 2.1 2.2   Notes   1x boost GLV boostx1 boostx1             Date 7/21 9/1 10/13 11/10 12/1 12/15 12/29 1/12/22 1/26 2/9 2/16 2/23 3/2 3/16   Total Weekly Dose 33.75mg 33.75mg 33.75mg 33.75mg 33.75 mg 32.5mg 30mg 27.5mg 28.75 mg 30 mg 31.25 mg 31.25 mg 31.25 mg 31.25 mg   INR 2.9 2.5 3.1 3.1 3.2 3.4 3.4 1.9 1.7 1.8 2.1 2.6 2.9 2.9   Notes        redx1 ?  inc        Date 3/30 4/13 5/11 6/8  6/23 7/7 7/28  8/9 8/12 8/16   Total WeeklyDose 31.25mg 31.25 mg 31.25 mg 31.25mg Start dialysis 30mg 30mg 30 mg  33.75mg 37.5mg 37.5mg   INR 2.5 2.2 2.6 3.9  2.1 2.1 1.8 1.23 1.4 1.5 2.1   Notes    Inc torsemide Cath placement    AV fistula placement    HM train  1 no billing       Date 8/23 8/30 9/6 9/13 9/20 9/30 10/4 10/11 10/18 10/25 11/8 11/15   Total WeeklyDose 31.25 mg 32.5 mg 32.5 mg 32.5 mg 32.5 mg 32.5 mg 35 mg 35 mg 35 mg 32.5 mg 26.5 mg 35 mg   INR 1.9 2.0 2.0 2.5 2.7 1.6 1.7 2.8 2.4 1.7  "1.4 1.6   Notes HM2 no billing HM3  No billing HM4  No billing HM 1  Bill today HM 2 No billing HM 3 No bill HM 4  No billing 1x boost  HM 5 no bill day Bill day  HM1 HM- 2 no billing   HM 3- no billing 1x boost  HM 4 - no billing      Date 11/22 11/29 12/6 12/13 12/20 12/27 1/3/23 1/10 1/12 1/17 1/26/23 1/31/23    Total WeeklyDose 37.5 mg 37.5 mg 37.5 mg 37.5 mg 42.5 mg  40 mg 37.5 mg 40 mg 42.5mg 45 mg 45 mg 40 mg    INR 1.9 2.8 1.9 1.5 2.3 3.4 1.4 0.8 2.7 POCT  2.23   2.8 HM 3.9 1.9 2.2    Notes HM1- bill day HM 2 - NO bill HM 3 - no bill  Inc GLV Hm 4 - NO BILL HM 1 - Bill today HM 2   no billing HM 3- no billing HM 4  No billing lab error?? clinic HM 1  Bill today HM 2 - no bill  HM 3 - no bill     Takes warfarin in the am    Clinic Interview:  Verbal Release Authorization signed on 8/22/2018 -- may speak with Raeann Montemayor (Wife). [phone] (Home) *Preferred*  Tablet Strength: pt has 7.5mg and 2.5mg tablets  Estimated oop cost for [facility] HM: Patient has [employer] Medicare so they should have minimal to no OOP costs.    Patient Findings  Negatives:  Signs/symptoms of thrombosis, Signs/symptoms of bleeding, Laboratory test error suspected, Change in health, Change in alcohol use, Change in activity, Upcoming invasive procedure, Emergency department visit, Upcoming dental procedure, Missed doses, Extra doses, Change in medications, Change in diet/appetite, Hospital admission, Bruising, Other complaints   Comments:  All findings negative per patient       Plan:   1. INR is therapeutic today at 2.2 (goal 2.0-3.0). Per Evelina Quan PharmD, Instructed Mr. Montemayor to resume warfarin 5 mg oral daily except 7.5mg   Tues&Fri until recheck   2. Recheck INR 2/7/23  3. Verbal information provided over the telephone. Mr. Montemayor expresses understanding with teach back and has no further questions at this time.     Home monitor information below:  · Test strips on hand: \"plenty\"  1/31/22   ·   Lot : [id number]  Barcode : " [id number] Exp : 8/24  · Serial number: [id number]   · Supplies billing code used: last 12/20/22- Next must be on or after  2/14/23  · Transfer Tube Lot number: Lot : [id number]  · Safety Lancets:  Lot : [id number]  Exp : 5/25   Billing:  *bill must be 4 tests 28 days or more from HM visit  PLEASE REFER TO ANTICOAGULATION TRAINING POWERPOINT FOR BILLING. Needs to use in clinic remote appointment for encounter.    Alexander Scherer, Pharmacy Technician  1/31/2023  09:14 CHULA RODGERS, Charlotte Michael, PharmD, have reviewed the note in full and agree with the assessment and plan.  01/31/23  14:23 EST

## 2023-02-03 ENCOUNTER — HOSPITAL ENCOUNTER (OUTPATIENT)
Dept: CARDIOLOGY | Facility: HOSPITAL | Age: 78
Discharge: HOME | End: 2023-02-03
Admitting: NURSE PRACTITIONER
Payer: MEDICARE

## 2023-02-03 VITALS — BODY MASS INDEX: 37.87 KG/M2 | WEIGHT: 264.55 LBS | HEIGHT: 70 IN

## 2023-02-03 LAB
ASCENDING AORTA: 3.7 CM
BH CV ECHO MEAS - AO MAX PG: 10.6 MMHG
BH CV ECHO MEAS - AO MEAN PG: 5 MMHG
BH CV ECHO MEAS - AO ROOT DIAM: 3.4 CM
BH CV ECHO MEAS - AO V2 MAX: 163 CM/SEC
BH CV ECHO MEAS - AO V2 VTI: 30.4 CM
BH CV ECHO MEAS - AVA(I,D): 3.2 CM2
BH CV ECHO MEAS - EDV(CUBED): 91.7 ML
BH CV ECHO MEAS - EDV(MOD-SP2): 67 ML
BH CV ECHO MEAS - EDV(MOD-SP4): 55 ML
BH CV ECHO MEAS - EF(MOD-BP): 57 %
BH CV ECHO MEAS - EF(MOD-SP2): 55.2 %
BH CV ECHO MEAS - EF(MOD-SP4): 61.8 %
BH CV ECHO MEAS - ESV(CUBED): 28.7 ML
BH CV ECHO MEAS - ESV(MOD-SP2): 30 ML
BH CV ECHO MEAS - ESV(MOD-SP4): 21 ML
BH CV ECHO MEAS - FS: 32.2 %
BH CV ECHO MEAS - IVS/LVPW: 1.31 CM
BH CV ECHO MEAS - IVSD: 1.84 CM
BH CV ECHO MEAS - LA DIMENSION: 6.1 CM
BH CV ECHO MEAS - LAT PEAK E' VEL: 10.7 CM/SEC
BH CV ECHO MEAS - LV MASS(C)D: 311.6 GRAMS
BH CV ECHO MEAS - LV MAX PG: 5.3 MMHG
BH CV ECHO MEAS - LV MEAN PG: 3 MMHG
BH CV ECHO MEAS - LV V1 MAX: 115 CM/SEC
BH CV ECHO MEAS - LV V1 VTI: 21.6 CM
BH CV ECHO MEAS - LVIDD: 4.5 CM
BH CV ECHO MEAS - LVIDS: 3.1 CM
BH CV ECHO MEAS - LVOT AREA: 4.5 CM2
BH CV ECHO MEAS - LVOT DIAM: 2.4 CM
BH CV ECHO MEAS - LVPWD: 1.4 CM
BH CV ECHO MEAS - MED PEAK E' VEL: 7.2 CM/SEC
BH CV ECHO MEAS - MV DEC SLOPE: 909 CM/SEC2
BH CV ECHO MEAS - MV DEC TIME: 0.13 MSEC
BH CV ECHO MEAS - MV E MAX VEL: 90.3 CM/SEC
BH CV ECHO MEAS - MV P1/2T: 44.1 MSEC
BH CV ECHO MEAS - MVA(P1/2T): 5 CM2
BH CV ECHO MEAS - PA ACC SLOPE: 697 CM/SEC2
BH CV ECHO MEAS - PA ACC TIME: 0.12 SEC
BH CV ECHO MEAS - PA PR(ACCEL): 26.8 MMHG
BH CV ECHO MEAS - SV(LVOT): 97.7 ML
BH CV ECHO MEAS - SV(MOD-SP2): 37 ML
BH CV ECHO MEAS - SV(MOD-SP4): 34 ML
BH CV ECHO MEAS - TR MAX PG: 26 MMHG
BH CV ECHO MEAS - TR MAX VEL: 254.4 CM/SEC
BH CV ECHO MEASUREMENTS AVERAGE E/E' RATIO: 10.09
BH CV VAS BP RIGHT ARM: NORMAL MMHG
BH CV XLRA - RV BASE: 3.7 CM
BH CV XLRA - RV LENGTH: 7.2 CM
BH CV XLRA - RV MID: 3.3 CM
BH CV XLRA - TDI S': 9.5 CM/SEC
IVRT: 85 MSEC
LEFT ATRIUM VOLUME INDEX: 46.8 ML/M2
MAXIMAL PREDICTED HEART RATE: 141 BPM
STRESS TARGET HR: 120 BPM

## 2023-02-03 PROCEDURE — 93306 TTE W/DOPPLER COMPLETE: CPT

## 2023-02-03 PROCEDURE — 93306 TTE W/DOPPLER COMPLETE: CPT | Performed by: INTERNAL MEDICINE

## 2023-02-07 ENCOUNTER — ANTICOAGULATION VISIT (OUTPATIENT)
Dept: PHARMACY | Facility: HOSPITAL | Age: 78
End: 2023-02-07
Payer: MEDICARE

## 2023-02-07 LAB — INR PPP: 2.3

## 2023-02-07 NOTE — PROGRESS NOTES
Anticoagulation Clinic Progress Note   Oswaldo HOMEMONITOR: Co[place] Check Serial # : [id number]     Indication: afib  Referring Provider: Tyson  Initial Warfarin Start Date: 2007  Goal INR: 2-3  Current Drug Interactions:   Diet: eats salads 2x weekly 11/15/22  [id number]: 5 (HTN, Age, CHF, CAD)   Other: anemic, CKD (stage 4) May need bridge with Lovenox given persistent AF (per cardio 1/24/2019), Scr was 3.22 on 11/24/21     Anticoagulation Clinic INR History:   Date 10/30 11/20 12/17 1/14/20 2/11 3/11 4/8 5/6 6/11 7/9 8/6 9/3 9/14 9/30   Total Weekly Dose 32.5 mg 32.5 mg 32.5 mg 32.5 mg 32.5mg 32.5mg 32.5mg 32.5mg 32.5mg 32.5 mg 32.5 mg 32.5mg 32.5mg 26.25 mg   INR 2.3 2.3 2.1 1.9 2.2 2.1 2.1 2.2 2.2 2.3 2.7 1.6 2.2  1.8   Notes                       ??   5 day hold     Date 10/14 11/4 11/11 11/25 12/9 12/16 12/30 1/13 2/3 3/3 3/31 4/28 5/26 6/23   Total Weekly Dose 32.5 mg 32.5mg 35mg 32.5mg 33.75 mg 36.25 mg 33.75 mg 35mg 33.75 33.75mg 33.75mg 33.75 mg 33.75 mg 33.75mg   INR 2.0 1.6 2.1 1.8 1.8 2.5 2.0 3.0 2.1 2.4 2.3 3.0 2.1 2.2   Notes   1x boost GLV boostx1 boostx1             Date 7/21 9/1 10/13 11/10 12/1 12/15 12/29 1/12/22 1/26 2/9 2/16 2/23 3/2 3/16   Total Weekly Dose 33.75mg 33.75mg 33.75mg 33.75mg 33.75 mg 32.5mg 30mg 27.5mg 28.75 mg 30 mg 31.25 mg 31.25 mg 31.25 mg 31.25 mg   INR 2.9 2.5 3.1 3.1 3.2 3.4 3.4 1.9 1.7 1.8 2.1 2.6 2.9 2.9   Notes        redx1 ?  inc        Date 3/30 4/13 5/11 6/8  6/23 7/7 7/28  8/9 8/12 8/16   Total WeeklyDose 31.25mg 31.25 mg 31.25 mg 31.25mg Start dialysis 30mg 30mg 30 mg  33.75mg 37.5mg 37.5mg   INR 2.5 2.2 2.6 3.9  2.1 2.1 1.8 1.23 1.4 1.5 2.1   Notes    Inc torsemide Cath placement    AV fistula placement    HM train  1 no billing       Date 8/23 8/30 9/6 9/13 9/20 9/30 10/4 10/11 10/18 10/25 11/8 11/15   Total WeeklyDose 31.25 mg 32.5 mg 32.5 mg 32.5 mg 32.5 mg 32.5 mg 35 mg 35 mg 35 mg 32.5 mg 26.5 mg 35 mg   INR 1.9 2.0 2.0 2.5 2.7 1.6 1.7 2.8 2.4 1.7  1.4 1.6   Notes HM2 no billing HM3  No billing HM4  No billing HM 1  Bill today HM 2 No billing HM 3 No bill HM 4  No billing 1x boost  HM 5 no bill day Bill day  HM1 HM- 2 no billing   HM 3- no billing 1x boost  HM 4 - no billing      Date 11/22 11/29 12/6 12/13 12/20 12/27 1/3/23 1/10 1/12 1/17 1/26/23 1/31/23   Total WeeklyDose 37.5 mg 37.5 mg 37.5 mg 37.5 mg 42.5 mg  40 mg 37.5 mg 40 mg 42.5mg 45 mg 45 mg 40 mg   INR 1.9 2.8 1.9 1.5 2.3 3.4 1.4 0.8 2.7 POCT  2.23   2.8 HM 3.9 1.9 2.2   Notes HM1- bill day HM 2 - NO bill HM 3 - no bill  Inc GLV Hm 4 - NO BILL HM 1 - Bill today HM 2   no billing HM 3- no billing HM 4  No billing lab error?? clinic HM 1  Bill today HM 2 - no bill  HM 3 - no bill      Date 2/7              Total WeeklyDose 40 mg              INR 2.3              Notes HM 4                Takes warfarin in the am    Clinic Interview:  Verbal Release Authorization signed on 8/22/2018 -- may speak with Raeann Montemayor (Wife). [phone] (Home) *Preferred*  Tablet Strength: pt has 7.5mg and 2.5mg tablets  Estimated oop cost for  [facility] HM: Patient has [employer] Medicare so they should have minimal to no OOP costs.    Patient Findings    Negatives:  Signs/symptoms of thrombosis, Signs/symptoms of bleeding, Laboratory test error suspected, Change in health, Change in alcohol use, Change in activity, Upcoming invasive procedure, Emergency department visit, Upcoming dental procedure, Missed doses, Extra doses, Change in medications, Change in diet/appetite, Hospital admission, Bruising, Other complaints   Comments:  All findings negative per pt.      Plan:   1. INR was therapeutic 2/7 at 2.3 (goal 2.0-3.0), didn't speak to pt until today. Instructed Mr. Montemayor to continue warfarin 5 mg oral daily except 7.5mg TuesFri until recheck   2. Recheck INR in one week, 2/14/23  3. Verbal information provided over the telephone. Mr. Montemayor expresses understanding with teach back and has no further questions at this  time.     Home monitor information below:  · Test strips on hand: 5  2/9/22   ·   Lot : [id number]  Barcode : [id number] Exp : 8/24  · Serial number: [id number]   · Supplies billing code used: last 12/20/22- Next must be on or after  2/14/23  · Transfer Tube Lot number: Lot : [id number]  · Safety Lancets:  Lot : [id number]  Exp : 5/25   Billing:  *bill must be 4 tests 28 days or more from HM visit  PLEASE REFER TO ANTICOAGULATION TRAINING POWERPOINT FOR BILLING. Needs to use in clinic remote appointment for encounter.    Luigi Melo CPhT  2/9/2023  10:09 Kristine ALMENDAREZ, PharmD, have reviewed the note in full and agree with the assessment and plan.  02/10/23  10:01 EST

## 2023-02-15 ENCOUNTER — ANTICOAGULATION VISIT (OUTPATIENT)
Dept: PHARMACY | Facility: HOSPITAL | Age: 78
End: 2023-02-15
Payer: MEDICARE

## 2023-02-15 LAB — INR PPP: 2.2

## 2023-02-15 NOTE — PROGRESS NOTES
Anticoagulation Clinic Progress Note   Oswaldo HOMEMONITOR: [employer] Check Serial # : [id number]     Indication: afib  Referring Provider: Tyson  Initial Warfarin Start Date: 2007  Goal INR: 2-3  Current Drug Interactions:   Diet: eats salads 2x weekly 11/15/22  [id number]: 5 (HTN, Age, CHF, CAD)   Other: anemic, CKD (stage 4) May need bridge with Lovenox given persistent AF (per cardio 1/24/2019), Scr was 3.22 on 11/24/21     Anticoagulation Clinic INR History:   Date 10/30 11/20 12/17 1/14/20 2/11 3/11 4/8 5/6 6/11 7/9 8/6 9/3 9/14 9/30   Total Weekly Dose 32.5 mg 32.5 mg 32.5 mg 32.5 mg 32.5mg 32.5mg 32.5mg 32.5mg 32.5mg 32.5 mg 32.5 mg 32.5mg 32.5mg 26.25 mg   INR 2.3 2.3 2.1 1.9 2.2 2.1 2.1 2.2 2.2 2.3 2.7 1.6 2.2  1.8   Notes                       ??   5 day hold     Date 10/14 11/4 11/11 11/25 12/9 12/16 12/30 1/13 2/3 3/3 3/31 4/28 5/26 6/23   Total Weekly Dose 32.5 mg 32.5mg 35mg 32.5mg 33.75 mg 36.25 mg 33.75 mg 35mg 33.75 33.75mg 33.75mg 33.75 mg 33.75 mg 33.75mg   INR 2.0 1.6 2.1 1.8 1.8 2.5 2.0 3.0 2.1 2.4 2.3 3.0 2.1 2.2   Notes   1x boost GLV boostx1 boostx1             Date 7/21 9/1 10/13 11/10 12/1 12/15 12/29 1/12/22 1/26 2/9 2/16 2/23 3/2 3/16   Total Weekly Dose 33.75mg 33.75mg 33.75mg 33.75mg 33.75 mg 32.5mg 30mg 27.5mg 28.75 mg 30 mg 31.25 mg 31.25 mg 31.25 mg 31.25 mg   INR 2.9 2.5 3.1 3.1 3.2 3.4 3.4 1.9 1.7 1.8 2.1 2.6 2.9 2.9   Notes        redx1 ?  inc        Date 3/30 4/13 5/11 6/8  6/23 7/7 7/28  8/9 8/12 8/16   Total WeeklyDose 31.25mg 31.25 mg 31.25 mg 31.25mg Start dialysis 30mg 30mg 30 mg  33.75mg 37.5mg 37.5mg   INR 2.5 2.2 2.6 3.9  2.1 2.1 1.8 1.23 1.4 1.5 2.1   Notes    Inc torsemide Cath placement    AV fistula placement    HM train  1 no billing       Date 8/23 8/30 9/6 9/13 9/20 9/30 10/4 10/11 10/18 10/25 11/8 11/15   Total WeeklyDose 31.25 mg 32.5 mg 32.5 mg 32.5 mg 32.5 mg 32.5 mg 35 mg 35 mg 35 mg 32.5 mg 26.5 mg 35 mg   INR 1.9 2.0 2.0 2.5 2.7 1.6 1.7 2.8 2.4 1.7  1.4 1.6   Notes HM2 no billing HM3  No billing HM4  No billing HM 1  Bill today HM 2 No billing HM 3 No bill HM 4  No billing 1x boost  HM 5 no bill day Bill day  HM1 HM- 2 no billing   HM 3- no billing 1x boost  HM 4 - no billing      Date 11/22 11/29 12/6 12/13 12/20 12/27 1/3/23 1/10 1/12 1/17 1/26/23 1/31/23   Total WeeklyDose 37.5 mg 37.5 mg 37.5 mg 37.5 mg 42.5 mg  40 mg 37.5 mg 40 mg 42.5mg 45 mg 45 mg 40 mg   INR 1.9 2.8 1.9 1.5 2.3 3.4 1.4 0.8 2.7 POCT  2.23   2.8 HM 3.9 1.9 2.2   Notes HM1- bill day HM 2 - NO bill HM 3 - no bill  Inc GLV Hm 4 - NO BILL HM 1 - Bill today HM 2   no billing HM 3- no billing HM 4  No billing lab error?? clinic HM 1  Bill today HM 2 - no bill  HM 3 - no bill      Date 2/7 2/14             Total WeeklyDose 40 mg 40 mg             INR 2.3 2.2             Notes HM 4 rec 2/15  HM 1  Bill today               Takes warfarin in the am    Clinic Interview:  Verbal Release Authorization signed on 8/22/2018 -- may speak with Raeann Montemayor (Wife). [phone] (Home) *Preferred*  Tablet Strength: pt has 7.5mg and 2.5mg tablets  Estimated oop cost for  [facility]ER HM: Patient has [employer] Medicare so they should have minimal to no OOP costs.    Patient Findings    Negatives:  Signs/symptoms of thrombosis, Signs/symptoms of bleeding, Laboratory test error suspected, Change in health, Change in alcohol use, Change in activity, Upcoming invasive procedure, Emergency department visit, Upcoming dental procedure, Missed doses, Extra doses, Change in medications, Change in diet/appetite, Hospital admission, Bruising, Other complaints   Comments:  All findings negative per pt.      Plan:   1. INR was therapeutic yesterday at 2.2 (goal 2.0-3.0) Instructed Mr. Montemayor to continue warfarin 5 mg oral daily except 7.5mg TuesFri until recheck   2. Recheck INR in one week, 2/21/23  3. Verbal information provided over the telephone. Mr. Montemayor expresses understanding with teach back and has no further  questions at this time.     Home monitor information below:  · Test strips on hand: 4  2/15/22   ·   Lot : [id number]  Barcode : [id number] Exp : 8/24  · Serial number: [id number]   · Supplies billing code used: last 2/14/23 Next must be on or after  3/15/23  · Transfer Tube Lot number: Lot : [id number]  · Safety Lancets:  Lot : [id number]  Exp : 5/25   Billing:  *bill must be 4 tests 28 days or more from  visit  PLEASE REFER TO ANTICOAGULATION TRAINING POWERPOINT FOR BILLING. Needs to use in clinic remote appointment for encounter.    Luigi Melo CPhT  2/15/2023  09:37 EST     IEvelina, PharmD, have reviewed the note in full and agree with the assessment and plan.  02/15/23  14:11 EST

## 2023-02-21 ENCOUNTER — ANTICOAGULATION VISIT (OUTPATIENT)
Dept: PHARMACY | Facility: HOSPITAL | Age: 78
End: 2023-02-21
Payer: MEDICARE

## 2023-02-21 LAB — INR PPP: 3.9

## 2023-02-21 NOTE — PROGRESS NOTES
Anticoagulation Clinic Progress Note   Oswaldo HOMEMONITOR: [employer] Check Serial # : [id number]     Indication: afib  Referring Provider: Tyson  Initial Warfarin Start Date: 2007  Goal INR: 2-3  Current Drug Interactions:   Diet: eats salads 2x weekly 11/15/22  [id number]: 5 (HTN, Age, CHF, CAD)   Other: anemic, CKD (stage 4) May need bridge with Lovenox given persistent AF (per cardio 1/24/2019), Scr was 3.22 on 11/24/21     Anticoagulation Clinic INR History:   Date 10/30 11/20 12/17 1/14/20 2/11 3/11 4/8 5/6 6/11 7/9 8/6 9/3 9/14 9/30   Total Weekly Dose 32.5 mg 32.5 mg 32.5 mg 32.5 mg 32.5mg 32.5mg 32.5mg 32.5mg 32.5mg 32.5 mg 32.5 mg 32.5mg 32.5mg 26.25 mg   INR 2.3 2.3 2.1 1.9 2.2 2.1 2.1 2.2 2.2 2.3 2.7 1.6 2.2  1.8   Notes                       ??   5 day hold     Date 10/14 11/4 11/11 11/25 12/9 12/16 12/30 1/13 2/3 3/3 3/31 4/28 5/26 6/23   Total Weekly Dose 32.5 mg 32.5mg 35mg 32.5mg 33.75 mg 36.25 mg 33.75 mg 35mg 33.75 33.75mg 33.75mg 33.75 mg 33.75 mg 33.75mg   INR 2.0 1.6 2.1 1.8 1.8 2.5 2.0 3.0 2.1 2.4 2.3 3.0 2.1 2.2   Notes   1x boost GLV boostx1 boostx1             Date 7/21 9/1 10/13 11/10 12/1 12/15 12/29 1/12/22 1/26 2/9 2/16 2/23 3/2 3/16   Total Weekly Dose 33.75mg 33.75mg 33.75mg 33.75mg 33.75 mg 32.5mg 30mg 27.5mg 28.75 mg 30 mg 31.25 mg 31.25 mg 31.25 mg 31.25 mg   INR 2.9 2.5 3.1 3.1 3.2 3.4 3.4 1.9 1.7 1.8 2.1 2.6 2.9 2.9   Notes        redx1 ?  inc        Date 3/30 4/13 5/11 6/8  6/23 7/7 7/28  8/9 8/12 8/16   Total WeeklyDose 31.25mg 31.25 mg 31.25 mg 31.25mg Start dialysis 30mg 30mg 30 mg  33.75mg 37.5mg 37.5mg   INR 2.5 2.2 2.6 3.9  2.1 2.1 1.8 1.23 1.4 1.5 2.1   Notes    Inc torsemide Cath placement    AV fistula placement    HM train  1 no billing       Date 8/23 8/30 9/6 9/13 9/20 9/30 10/4 10/11 10/18 10/25 11/8 11/15   Total WeeklyDose 31.25 mg 32.5 mg 32.5 mg 32.5 mg 32.5 mg 32.5 mg 35 mg 35 mg 35 mg 32.5 mg 26.5 mg 35 mg   INR 1.9 2.0 2.0 2.5 2.7 1.6 1.7 2.8 2.4 1.7  1.4 1.6   Notes HM2 no billing HM3  No billing HM4  No billing HM 1  Bill today HM 2 No billing HM 3 No bill HM 4  No billing 1x boost  HM 5 no bill day Bill day  HM1 HM- 2 no billing   HM 3- no billing 1x boost  HM 4 - no billing      Date 11/22 11/29 12/6 12/13 12/20 12/27 1/3/23 1/10 1/12 1/17 1/26/23 1/31/23   Total WeeklyDose 37.5 mg 37.5 mg 37.5 mg 37.5 mg 42.5 mg  40 mg 37.5 mg 40 mg 42.5mg 45 mg 45 mg 40 mg   INR 1.9 2.8 1.9 1.5 2.3 3.4 1.4 0.8 2.7 POCT  2.23   2.8 HM 3.9 1.9 2.2   Notes HM1- bill day HM 2 - NO bill HM 3 - no bill  Inc GLV Hm 4 - NO BILL HM 1 - Bill today HM 2   no billing HM 3- no billing HM 4  No billing lab error?? clinic HM 1  Bill today HM 2 - no bill  HM 3 - no bill      Date 2/7 2/14 2/21/23            Total WeeklyDose 40 mg 40 mg 40 mg            INR 2.3 2.2 3.9            Notes HM 4 rec 2/15  HM 1  Bill today HM 2 - no billing  1x reduction              Takes warfarin in the am    Clinic Interview:  Verbal Release Authorization signed on 8/22/2018 -- may speak with Raeann Montemayor (Wife). [phone] (Home) *Preferred*  Tablet Strength: pt has 7.5mg and 2.5mg tablets  Estimated oop cost for [facility] ED[facility] HM: Patient has [employer] Medicare so they should have minimal to no OOP costs.    Patient Findings  Negatives:  Signs/symptoms of thrombosis, Signs/symptoms of bleeding, Laboratory test error suspected, Change in health, Change in alcohol use, Change in activity, Upcoming invasive procedure, Emergency department visit, Upcoming dental procedure, Missed doses, Extra doses, Change in medications, Change in diet/appetite, Hospital admission, Bruising, Other complaints   Comments:  All findings negative per pt.      Plan:   1. INR was SUPRAtherapeutic today at 3.9 (goal 2.0-3.0). Per Donovan Trevizo PharmD  Instructed Mr. Montemayor to Reduce tonight's dose of warfarin to 5 mg then resume warfarin 5 mg oral daily except 7.5mg TuesFri until recheck   2. Recheck INR in one week, 2/28/23.  3.  Verbal information provided over the telephone. Mr. Montemayor expresses understanding with teach back and has no further questions at this time.     Home monitor information below:  · Test strips on hand: 4  2/21/22   ·   Lot : [id number]  Barcode : [id number] Exp : 8/24  · Serial number: [id number]   · Supplies billing code used: last 2/14/23 Next must be on or after  3/15/23  · Transfer Tube Lot number: Lot : [id number]  · Safety Lancets:  Lot : [id number]  Exp : 5/25   Billing:  *bill must be 4 tests 28 days or more from HM visit  PLEASE REFER TO ANTICOAGULATION TRAINING POWERPOINT FOR BILLING. Needs to use in clinic remote appointment for encounter.    Alexander Scherer, Pharmacy Technician  2/21/2023  09:33 Raj ALMENDAREZ, PharmD, have reviewed the note in full and agree with the assessment and plan.  02/22/23  14:09 EST

## 2023-02-28 ENCOUNTER — ANTICOAGULATION VISIT (OUTPATIENT)
Dept: PHARMACY | Facility: HOSPITAL | Age: 78
End: 2023-02-28
Payer: MEDICARE

## 2023-02-28 LAB — INR PPP: 1.6

## 2023-02-28 NOTE — PROGRESS NOTES
Anticoagulation Clinic Progress Note   Oswaldo HOMEMONITOR: [employer] Check Serial # : [id number]     Indication: afib  Referring Provider: Tyson  Initial Warfarin Start Date: 2007  Goal INR: 2-3  Current Drug Interactions:   Diet: eats salads 2x weekly 11/15/22  [id number]: 5 (HTN, Age, CHF, CAD)   Other: anemic, CKD (stage 4) May need bridge with Lovenox given persistent AF (per cardio 1/24/2019), Scr was 3.22 on 11/24/21     Anticoagulation Clinic INR History:   Date 10/30 11/20 12/17 1/14/20 2/11 3/11 4/8 5/6 6/11 7/9 8/6 9/3 9/14 9/30   Total Weekly Dose 32.5 mg 32.5 mg 32.5 mg 32.5 mg 32.5mg 32.5mg 32.5mg 32.5mg 32.5mg 32.5 mg 32.5 mg 32.5mg 32.5mg 26.25 mg   INR 2.3 2.3 2.1 1.9 2.2 2.1 2.1 2.2 2.2 2.3 2.7 1.6 2.2  1.8   Notes                       ??   5 day hold     Date 10/14 11/4 11/11 11/25 12/9 12/16 12/30 1/13 2/3 3/3 3/31 4/28 5/26 6/23   Total Weekly Dose 32.5 mg 32.5mg 35mg 32.5mg 33.75 mg 36.25 mg 33.75 mg 35mg 33.75 33.75mg 33.75mg 33.75 mg 33.75 mg 33.75mg   INR 2.0 1.6 2.1 1.8 1.8 2.5 2.0 3.0 2.1 2.4 2.3 3.0 2.1 2.2   Notes   1x boost GLV boostx1 boostx1             Date 7/21 9/1 10/13 11/10 12/1 12/15 12/29 1/12/22 1/26 2/9 2/16 2/23 3/2 3/16   Total Weekly Dose 33.75mg 33.75mg 33.75mg 33.75mg 33.75 mg 32.5mg 30mg 27.5mg 28.75 mg 30 mg 31.25 mg 31.25 mg 31.25 mg 31.25 mg   INR 2.9 2.5 3.1 3.1 3.2 3.4 3.4 1.9 1.7 1.8 2.1 2.6 2.9 2.9   Notes        redx1 ?  inc        Date 3/30 4/13 5/11 6/8  6/23 7/7 7/28  8/9 8/12 8/16   Total WeeklyDose 31.25mg 31.25 mg 31.25 mg 31.25mg Start dialysis 30mg 30mg 30 mg  33.75mg 37.5mg 37.5mg   INR 2.5 2.2 2.6 3.9  2.1 2.1 1.8 1.23 1.4 1.5 2.1   Notes    Inc torsemide Cath placement    AV fistula placement    HM train  1 no billing       Date 8/23 8/30 9/6 9/13 9/20 9/30 10/4 10/11 10/18 10/25 11/8 11/15   Total WeeklyDose 31.25 mg 32.5 mg 32.5 mg 32.5 mg 32.5 mg 32.5 mg 35 mg 35 mg 35 mg 32.5 mg 26.5 mg 35 mg   INR 1.9 2.0 2.0 2.5 2.7 1.6 1.7 2.8 2.4 1.7  1.4 1.6   Notes HM2 no billing HM3  No billing HM4  No billing HM 1  Bill today HM 2 No billing HM 3 No bill HM 4  No billing 1x boost  HM 5 no bill day Bill day  HM1 HM- 2 no billing   HM 3- no billing 1x boost  HM 4 - no billing      Date 11/22 11/29 12/6 12/13 12/20 12/27 1/3/23 1/10 1/12 1/17 1/26/23 1/31/23   Total WeeklyDose 37.5 mg 37.5 mg 37.5 mg 37.5 mg 42.5 mg  40 mg 37.5 mg 40 mg 42.5mg 45 mg 45 mg 40 mg   INR 1.9 2.8 1.9 1.5 2.3 3.4 1.4 0.8 2.7 POCT  2.23   2.8 HM 3.9 1.9 2.2   Notes HM1- bill day HM 2 - NO bill HM 3 - no bill  Inc GLV Hm 4 - NO BILL HM 1 - Bill today HM 2   no billing HM 3- no billing HM 4  No billing lab error?? clinic HM 1  Bill today HM 2 - no bill  HM 3 - no bill      Date 2/7 2/14 2/21/23 2/28/23           Total WeeklyDose 40 mg 40 mg 40 mg 37.5 mg 40 mg          INR 2.3 2.2 3.9 1.6           Notes HM 4 rec 2/15  HM 1  Bill today HM 2 - no billing  1x reduction   HM 3 - NO BILL              Takes warfarin in the am    Clinic Interview:  Verbal Release Authorization signed on 8/22/2018 -- may speak with Raeann Montemayor (Wife). [phone] (Home) *Preferred*  Tablet Strength: pt has 7.5mg and 2.5mg tablets  Estimated oop cost for [facility] ED[facility] HM: Patient has [employer] Medicare so they should have minimal to no OOP costs.    Patient Findings  Negatives:  Signs/symptoms of thrombosis, Signs/symptoms of bleeding, Laboratory test error suspected, Change in health, Change in alcohol use, Change in activity, Upcoming invasive procedure, Emergency department visit, Upcoming dental procedure, Missed doses, Extra doses, Change in medications, Change in diet/appetite, Hospital admission, Bruising, Other complaints   Comments:  All findings negative per pt.      Plan:   1. INR was SUBtherapeutic today at 1.6 (goal 2.0-3.0). Per Zev Galarza PharmD Instructed Mr. Montemayor to resume warfarin 5 mg oral daily except 7.5mg TuesFri until recheck   2. Recheck INR in one week, 3/7/23  3. Verbal  information provided over the telephone. Mr. Montemayor expresses understanding with teach back and has no further questions at this time.     Home monitor information below:  · Test strips on hand: 2  2/28/22 Sending more tomorrow  ·   Lot : [id number]  Barcode : [id number] Exp : 8/24  · Serial number: [id number]   · Supplies billing code used: last 2/14/23 Next must be on or after  3/15/23  · Transfer Tube Lot number: Lot : [id number]  · Safety Lancets:  Lot : [id number]  Exp : 5/25   Billing:  *bill must be 4 tests 28 days or more from HM visit  PLEASE REFER TO ANTICOAGULATION TRAINING POWERPOINT FOR BILLING. Needs to use in clinic remote appointment for encounter.    Alexander Scherer, Pharmacy Technician  2/28/2023  09:44 Zev ALMENDAREZ, [facility], have reviewed the note in full and agree with the assessment and plan.  02/28/23  11:36 EST

## 2023-03-06 ENCOUNTER — OFFICE VISIT (OUTPATIENT)
Dept: CARDIOLOGY | Facility: CLINIC | Age: 78
End: 2023-03-06
Payer: MEDICARE

## 2023-03-06 VITALS
WEIGHT: 274 LBS | SYSTOLIC BLOOD PRESSURE: 118 MMHG | HEART RATE: 60 BPM | BODY MASS INDEX: 39.22 KG/M2 | OXYGEN SATURATION: 97 % | DIASTOLIC BLOOD PRESSURE: 60 MMHG | HEIGHT: 70 IN

## 2023-03-06 DIAGNOSIS — Z95.0 PACEMAKER: Chronic | ICD-10-CM

## 2023-03-06 DIAGNOSIS — I10 PRIMARY HYPERTENSION: Primary | Chronic | ICD-10-CM

## 2023-03-06 DIAGNOSIS — E66.01 MORBID OBESITY: Chronic | ICD-10-CM

## 2023-03-06 PROCEDURE — 99214 OFFICE O/P EST MOD 30 MIN: CPT | Performed by: PHYSICIAN ASSISTANT

## 2023-03-06 PROCEDURE — 93000 ELECTROCARDIOGRAM COMPLETE: CPT | Performed by: PHYSICIAN ASSISTANT

## 2023-03-06 PROCEDURE — 93280 PM DEVICE PROGR EVAL DUAL: CPT | Performed by: PHYSICIAN ASSISTANT

## 2023-03-06 NOTE — PROGRESS NOTES
Marco Antonio Montemayor  1945  [phone]      [facility]     Provider, No Known  [place] [id number]    Chief Complaint   Patient presents with   • Congestive Heart Failure   • Coronary Artery Disease   • Hypertension   • SSS       Problem List:  1. Permanent atrial fibrillation  a. Intermittent chronic paroxysmal atrial fibrillation with electrophysiologic study with RFA for atrial flutter, October 1999.  b. Recurrent apparent transient atrial fibrillation, resolved with acceptable [facility] emergency department evaluation, June 2003.  c. Acceptable combination Doppler echocardiogram, July 2003, with apparent acceptable 24-hour Holter monitor, May 2006.  d. Recurrent asymptomatic atrial fibrillation with RVR, January 2007, subsequent Coumadin therapy and Tikosyn therapy with successful internal cardioversion of atrial fibrillation and marked bradyarrhythmias requiring DDDR pacemaker implant, [employer] device, data not available, March 2007.  e. Remote hospitalization for symptomatic rapid atrial fibrillation/BRYANNA DC cardioversion with subsequent acceptable pacemaker interrogation, June/July 2009, as well as acceptable interrogation without reprogramming, July 2011.  f. Acceptable combination Doppler echocardiogram, December 2010, with residual class I symptoms.  g. Acceptable device interrogation following [facility] ED evaluation for chest pressure and shortness of breath, data deficit, May 2011, February 2013, February 2016, December 2016.  h. Abnormal pacemaker assessment with 8.4% mode switch with battery voltage of 1-1.25 years, June 2017, with abnormal PACEART assessment, July 2018, with 22% mode switch  2. 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 Remote acceptable intravenous adenosine Quantitative SPECT gated Cardiolite study, LVEF 0.50, April 1999.  b. Echocardiogram showing mild LVH with estimated ejection fraction 0.58 with mild aortic valve  cusp sclerosis, and mild TR, 09/23/2014.   c. Remote abnormal preoperative Quantitative SPECT gated Cardiolite study with mild “fixed” inferoposterior hypoperfusion and mild LV enlargement with mild global hypokinesia with reduced LVEF 0.40 in the setting of abnormal EKG with subsequent diagnostic coronary angiography demonstrating mild-to-moderate nonobstructive coronary artery atherosclerosis with mild compensated left ventricular dysfunction, LVEF 0.52, and continued medical therapy felt warranted, June 2006.  d. Remote hospitalization for symptomatic atrial fibrillation with rapid ventricular response and mild congestive heart failure requiring BRYANNA and DC cardioversion, June 2009.  e. Left heart catheterization with essentially normal coronary arteries with mild luminal irregularities with an EF of 0.60 by Dr. Escamilla for angina and elevated troponin, 06/05/2015.   f. Residual class I symptoms. Echocardiogram EF 57%, LVH. Septum 1.8cm.   3. Chronic essential hypertension with recent progressive hypertension and blood pressure readings and normal selective bilateral renal angiography, June 2006.  4. Dyslipidemia.  5. Moderate obesity, BMI 39.6.  6. Ichthyosis.  7. Chronic lower tract obstructive symptoms, probable BPH.  8. Dyslipidemia.  9. Nasal polyps with deviated nasal septum with apparent subsequent nasal septoplasty/polypectomy, data deficit, July 2006.  10. Abdominal pain with apparent small bowel obstruction with exploratory laparotomy - data deficit.  11. Appendectomy with subsequent delayed hospitalization for bleeding peptic ulcer disease/probable duodenal ulcer, November 2003.  12. Recurrent asymptomatic atrial fibrillation with rapid ventricular response, July 2007, with subsequent Tikosyn therapy and DDDR pacemaker implant with intermittent recurrent breakthrough arrhythmias, including remote DC cardioversion, May 2008, with subsequent recurrent DC cardioversion, autumn 2018, with recurrent atrial  fibrillation and discontinuation of formal antiarrhythmic therapy - data deficit  13. Obstructive sleep apnea with treatment.   14. Stage 4 chronic kidney disease with recent apparent documented proteinuria and renal biopsy (February 2019)       Allergies  No Known Allergies    Current Medications    Current Outpatient Medications:   •  acetaminophen (TYLENOL) 500 MG tablet, Take 1 tablet by mouth Every 6 (Six) Hours As Needed for Mild Pain., Disp: , Rfl:   •  atorvastatin (LIPITOR) 40 MG tablet, TAKE ONE TABLET BY MOUTH DAILY, Disp: 90 tablet, Rfl: 3  •  bumetanide (BUMEX) 2 MG tablet, Take 1 tablet by mouth Every Other Day. Takes on days off from dialysis (Tues, Thurs, Sat, Sun), Disp: , Rfl:   •  carvedilol (COREG) 25 MG tablet, Take 12.5 mg by mouth 2 (Two) Times a Day With Meals., Disp: , Rfl:   •  cholecalciferol (VITAMIN D3) 1000 UNITS tablet, Take 1 tablet by mouth Daily., Disp: , Rfl:   •  Ferrous Sulfate (IRON) 325 (65 FE) MG tablet, Take 65 mg by mouth Daily., Disp: , Rfl:   •  lisinopril (PRINIVIL,ZESTRIL) 5 MG tablet, Take 1 tablet by mouth Daily., Disp: , Rfl:   •  Sucroferric Oxyhydroxide (Velphoro) 500 MG chewable tablet, Take As Directed., Disp: , Rfl:   •  vitamin B-12 (CYANOCOBALAMIN) 1000 MCG tablet, Take 1 tablet by mouth Daily., Disp: , Rfl:   •  warfarin (COUMADIN) 2.5 MG tablet, Take 1.5 to 2 tablets by mouth daily or as directed by anticoagulation clinic (Patient taking differently: Take 2 tablets by mouth daily or as directed by anticoagulation clinic), Disp: 180 tablet, Rfl: 1  •  warfarin (COUMADIN) 7.5 MG tablet, TAKE 1/2 TO 1 TABLET BY MOUTH DAILY AS DIRECTED (Patient taking differently: TAKE 1/2 TO 1 TABLET BY MOUTH DAILY AS DIRECTED-   holding for surgery -     7.5 mg then 5 mg then 3.75 mg- letter on cahrt), Disp: 90 tablet, Rfl: 0  •  PHARMACY TO DOSE WARFARIN, Continuous As Needed (patient's warfarin is being managed by the [facility]  "([phone]))., Disp: , Rfl:     History of Present Illness     Pt presents for follow up of AF/SSS/HTN.  He states his only complaint today he gets somewhat winded or short of breath when trying to physical activities.  Otherwise he states he has not noticed any difficulty with irregular heartbeat dizziness near syncope to me.  He is still doing hemodialysis 3 days a week for 4 hours each time.  His Coumadin is been managed by our clinic.  He has been following [facility] RODRIGUEZ for his blood pressure control    Vitals:    03/06/23 1040   BP: 118/60   BP Location: Right arm   Patient Position: Sitting   Pulse: 60   SpO2: 97%   Weight: 124 kg (274 lb)   Height: 177 cm (69.69\")       PE:  General: NAD  Neck: no JVD, no carotid bruits, no TM  Heart: IRIR, S4 present, no rubs, murmurs  Lungs: CTA, no wheezes, rhonchi, or rales  Abd: soft, non-tender, NL BS  Ext: No musculoskeletal deformities, + edema, cyanosis, or clubbing  Psych: normal mood and affect    Diagnostic Data:    ECG 12 Lead    Date/Time: 3/6/2023 10:55 AM  Performed by: Rei Decker PA  Authorized by: Rei Decker PA   Comparison: compared with previous ECG from 6/30/2022  Similar to previous ECG  Rhythm: atrial fibrillation  Rate: normal  Conduction: conduction normal  QRS axis: normal  Other findings: non-specific ST-T wave changes    Clinical impression: non-specific ECG              1. Primary hypertension    2. Morbid obesity (HCC)    3. Pacemaker        PM Interrogation: Manual interrogation, [employer] pacemaker.  DDDR rate 70.  A paced 1% RV paced 13%.  Normal P wave R wave thresholds impedances.  Battery voltage 4.1 years remaining.  100% mode switch.  8 Confirm off.  We will change mode to VVIR.    Plan:  1) Permanent Atrial Fibrillation: Rate controlled. Coumadin , INR stable.   Continue present medications.   2) Anticoagulation  Continue warfarin  3) Bradycardia:  - normal PM function   4) HTN:Labile recorders.  5) ESRD-HD " three days week.     Electronically signed by PUJA Arriaza, 03/06/23, 11:13 AM EST.

## 2023-03-07 ENCOUNTER — ANTICOAGULATION VISIT (OUTPATIENT)
Dept: PHARMACY | Facility: HOSPITAL | Age: 78
End: 2023-03-07
Payer: MEDICARE

## 2023-03-07 LAB — INR PPP: 2.8

## 2023-03-07 NOTE — PROGRESS NOTES
Anticoagulation Clinic Progress Note   Oswaldo HOMEMONITOR: [employer] Serial # : [id number]     Indication: afib  Referring Provider: Rex Decker  Initial Warfarin Start Date: 2007  Goal INR: 2-3  Current Drug Interactions:   Diet: eats salads 2x weekly 11/15/22  [id number]: 5 (HTN, Age, CHF, CAD)   Other: anemic, CKD (stage 4) May need bridge with Lovenox given persistent AF (per cardio 1/24/2019), Scr was 3.22 on 11/24/21     Anticoagulation Clinic INR History:   Date 10/30 11/20 12/17 1/14/20 2/11 3/11 4/8 5/6 6/11 7/9 8/6 9/3 9/14 9/30   Total Weekly Dose 32.5 mg 32.5 mg 32.5 mg 32.5 mg 32.5mg 32.5mg 32.5mg 32.5mg 32.5mg 32.5 mg 32.5 mg 32.5mg 32.5mg 26.25 mg   INR 2.3 2.3 2.1 1.9 2.2 2.1 2.1 2.2 2.2 2.3 2.7 1.6 2.2  1.8   Notes                       ??   5 day hold     Date 10/14 11/4 11/11 11/25 12/9 12/16 12/30 1/13 2/3 3/3 3/31 4/28 5/26 6/23   Total Weekly Dose 32.5 mg 32.5mg 35mg 32.5mg 33.75 mg 36.25 mg 33.75 mg 35mg 33.75 33.75mg 33.75mg 33.75 mg 33.75 mg 33.75mg   INR 2.0 1.6 2.1 1.8 1.8 2.5 2.0 3.0 2.1 2.4 2.3 3.0 2.1 2.2   Notes   1x boost GLV boostx1 boostx1             Date 7/21 9/1 10/13 11/10 12/1 12/15 12/29 1/12/22 1/26 2/9 2/16 2/23 3/2 3/16   Total Weekly Dose 33.75mg 33.75mg 33.75mg 33.75mg 33.75 mg 32.5mg 30mg 27.5mg 28.75 mg 30 mg 31.25 mg 31.25 mg 31.25 mg 31.25 mg   INR 2.9 2.5 3.1 3.1 3.2 3.4 3.4 1.9 1.7 1.8 2.1 2.6 2.9 2.9   Notes        redx1 ?  inc        Date 3/30 4/13 5/11 6/8  6/23 7/7 7/28  8/9 8/12 8/16   Total WeeklyDose 31.25mg 31.25 mg 31.25 mg 31.25mg Start dialysis 30mg 30mg 30 mg  33.75mg 37.5mg 37.5mg   INR 2.5 2.2 2.6 3.9  2.1 2.1 1.8 1.23 1.4 1.5 2.1   Notes    Inc torsemide Cath placement    AV fistula placement    HM train  1 no billing       Date 8/23 8/30 9/6 9/13 9/20 9/30 10/4 10/11 10/18 10/25 11/8 11/15   Total WeeklyDose 31.25 mg 32.5 mg 32.5 mg 32.5 mg 32.5 mg 32.5 mg 35 mg 35 mg 35 mg 32.5 mg 26.5 mg 35 mg   INR 1.9 2.0 2.0 2.5 2.7 1.6 1.7 2.8 2.4  1.7 1.4 1.6   Notes HM2 no billing HM3  No billing HM4  No billing HM 1  Bill today HM 2 No billing HM 3 No bill HM 4  No billing 1x boost  HM 5 no bill day Bill day  HM1 HM- 2 no billing   HM 3- no billing 1x boost  HM 4 - no billing      Date 11/22 11/29 12/6 12/13 12/20 12/27 1/3/23 1/10 1/12 1/17 1/26/23 1/31/23   Total WeeklyDose 37.5 mg 37.5 mg 37.5 mg 37.5 mg 42.5 mg  40 mg 37.5 mg 40 mg 42.5mg 45 mg 45 mg 40 mg   INR 1.9 2.8 1.9 1.5 2.3 3.4 1.4 0.8 2.7 POCT  2.23   2.8 HM 3.9 1.9 2.2   Notes HM1- bill day HM 2 - NO bill HM 3 - no bill  Inc GLV Hm 4 - NO BILL HM 1 - Bill today HM 2   no billing HM 3- no billing HM 4  No billing lab error?? clinic HM 1  Bill today HM 2 - no bill  HM 3 - no bill      Date 2/7 2/14 2/21/23 2/28/23 3/7          Total WeeklyDose 40 mg 40 mg 40 mg 37.5 mg 40 mg          INR 2.3 2.2 3.9 1.6 3.7          Notes HM 4 rec 2/15  HM 1  Bill today HM 2 - no billing  1xred  HM 3 - NO BILL  HM-4 no bill, percocet            Takes warfarin in the am    Clinic Interview:  Verbal Release Authorization signed on 8/22/2018 -- may speak with Raeann Montemayor (Wife). [phone] (Home) *Preferred*  Tablet Strength: pt has 7.5mg and 2.5mg tablets  Estimated oop cost for  [facility] HM: Patient has [employer] Medicare so they should have minimal to no OOP costs.    Patient Findings  Negatives:  Signs/symptoms of thrombosis, Signs/symptoms of bleeding, Laboratory test error suspected, Change in health, Change in alcohol use, Change in activity, Upcoming invasive procedure, Emergency department visit, Upcoming dental procedure, Missed doses, Extra doses, Change in medications, Change in diet/appetite, Hospital admission, Bruising, Other complaints   Comments:  All findings negative per pt.      Plan:   1. INR was SUBtherapeutic today at 1.6 (goal 2.0-3.0). Instructed Mr. Montemayor to continue maintenance regimen of warfarin 5 mg oral daily except 7.5mg TuesFri until recheck   2. Recheck INR in one week,  3/14/23  3. Verbal information provided over the telephone. Mr. Montemayor expresses understanding with teach back and has no further questions at this time.     Home monitor information below:  · Test strips on hand: 5  3/7/22   ·   Lot : [id number]  Barcode : [id number] Exp : 8/24  · Serial number: [id number]   · Supplies billing code used: last 2/14/23 Next must be on or after  3/15/23  · Transfer Tube Lot number: Lot : [id number]  · Safety Lancets:  Lot : [id number]  Exp : 5/25   Billing:  *bill must be 4 tests 28 days or more from HM visit  PLEASE REFER TO ANTICOAGULATION TRAINING POWERPOINT FOR BILLING. Needs to use in clinic remote appointment for encounter.    Charlotte Michael, PharmD  3/7/2023  09:14 EST

## 2023-03-14 ENCOUNTER — ANTICOAGULATION VISIT (OUTPATIENT)
Dept: PHARMACY | Facility: HOSPITAL | Age: 78
End: 2023-03-14
Payer: MEDICARE

## 2023-03-14 LAB — INR PPP: 2.5

## 2023-03-14 NOTE — PROGRESS NOTES
Anticoagulation Clinic Progress Note   Oswaldo HOMEMONITOR: [employer] Serial # : [id number]     Indication: afib  Referring Provider: Rex Decker  Initial Warfarin Start Date: 2007  Goal INR: 2-3  Current Drug Interactions:   Diet: eats salads 2x weekly 11/15/22  [id number]: 5 (HTN, Age, CHF, CAD)   Other: anemic, CKD (stage 4) May need bridge with Lovenox given persistent AF (per cardio 1/24/2019), Scr was 3.22 on 11/24/21     Anticoagulation Clinic INR History:   Date 10/30 11/20 12/17 1/14/20 2/11 3/11 4/8 5/6 6/11 7/9 8/6 9/3 9/14 9/30   Total Weekly Dose 32.5 mg 32.5 mg 32.5 mg 32.5 mg 32.5mg 32.5mg 32.5mg 32.5mg 32.5mg 32.5 mg 32.5 mg 32.5mg 32.5mg 26.25 mg   INR 2.3 2.3 2.1 1.9 2.2 2.1 2.1 2.2 2.2 2.3 2.7 1.6 2.2  1.8   Notes                       ??   5 day hold     Date 10/14 11/4 11/11 11/25 12/9 12/16 12/30 1/13 2/3 3/3 3/31 4/28 5/26 6/23   Total Weekly Dose 32.5 mg 32.5mg 35mg 32.5mg 33.75 mg 36.25 mg 33.75 mg 35mg 33.75 33.75mg 33.75mg 33.75 mg 33.75 mg 33.75mg   INR 2.0 1.6 2.1 1.8 1.8 2.5 2.0 3.0 2.1 2.4 2.3 3.0 2.1 2.2   Notes   1x boost GLV boostx1 boostx1             Date 7/21 9/1 10/13 11/10 12/1 12/15 12/29 1/12/22 1/26 2/9 2/16 2/23 3/2 3/16   Total Weekly Dose 33.75mg 33.75mg 33.75mg 33.75mg 33.75 mg 32.5mg 30mg 27.5mg 28.75 mg 30 mg 31.25 mg 31.25 mg 31.25 mg 31.25 mg   INR 2.9 2.5 3.1 3.1 3.2 3.4 3.4 1.9 1.7 1.8 2.1 2.6 2.9 2.9   Notes        redx1 ?  inc        Date 3/30 4/13 5/11 6/8  6/23 7/7 7/28  8/9 8/12 8/16   Total WeeklyDose 31.25mg 31.25 mg 31.25 mg 31.25mg Start dialysis 30mg 30mg 30 mg  33.75mg 37.5mg 37.5mg   INR 2.5 2.2 2.6 3.9  2.1 2.1 1.8 1.23 1.4 1.5 2.1   Notes    Inc torsemide Cath placement    AV fistula placement    HM train  1 no billing       Date 8/23 8/30 9/6 9/13 9/20 9/30 10/4 10/11 10/18 10/25 11/8 11/15   Total WeeklyDose 31.25 mg 32.5 mg 32.5 mg 32.5 mg 32.5 mg 32.5 mg 35 mg 35 mg 35 mg 32.5 mg 26.5 mg 35 mg   INR 1.9 2.0 2.0 2.5 2.7 1.6 1.7 2.8 2.4  1.7 1.4 1.6   Notes HM2 no billing HM3  No billing HM4  No billing HM 1  Bill today HM 2 No billing HM 3 No bill HM 4  No billing 1x boost  HM 5 no bill day Bill day  HM1 HM- 2 no billing   HM 3- no billing 1x boost  HM 4 - no billing      Date 11/22 11/29 12/6 12/13 12/20 12/27 1/3/23 1/10 1/12 1/17 1/26/23 1/31/23   Total WeeklyDose 37.5 mg 37.5 mg 37.5 mg 37.5 mg 42.5 mg  40 mg 37.5 mg 40 mg 42.5mg 45 mg 45 mg 40 mg   INR 1.9 2.8 1.9 1.5 2.3 3.4 1.4 0.8 2.7 POCT  2.23   2.8 HM 3.9 1.9 2.2   Notes HM1- bill day HM 2 - NO bill HM 3 - no bill  Inc GLV Hm 4 - NO BILL HM 1 - Bill today HM 2   no billing HM 3- no billing HM 4  No billing lab error?? clinic HM 1  Bill today HM 2 - no bill  HM 3 - no bill      Date 2/7 2/14 2/21/23 2/28/23 3/7 3/14/23         Total WeeklyDose 40 mg 40 mg 40 mg 37.5 mg 40 mg 40 mg         INR 2.3 2.2 3.9 1.6 3.7 ??? 2.5         Notes HM 4 rec 2/15  HM 1  Bill today HM 2 - no billing  1xred  HM 3 - NO BILL  HM-4 no bill, percocet HM 5 - no billing until 3/15 HM 1 - BILL today           Takes warfarin in the am    Clinic Interview:  Verbal Release Authorization signed on 8/22/2018 -- may speak with Raeann Montemayor (Wife). [phone] (Home) *Preferred*  Tablet Strength: pt has 7.5mg and 2.5mg tablets  Estimated oop cost for [facility] HM: Patient has [employer] Medicare so they should have minimal to no OOP costs.    Patient Findings  Negatives:  Signs/symptoms of thrombosis, Signs/symptoms of bleeding, Laboratory test error suspected, Change in health, Change in alcohol use, Change in activity, Upcoming invasive procedure, Emergency department visit, Upcoming dental procedure, Missed doses, Extra doses, Change in medications, Change in diet/appetite, Hospital admission, Bruising, Other complaints   Comments:  All findings negative per pt.      Plan:   1. INR was therapeutic today at 2.5 (goal 2.0-3.0). Instructed Mr. Montemayor to continue maintenance regimen of warfarin 5 mg oral daily except  7.5mg TuesFri until recheck   2. Recheck INR in one week, 3/21/23  3. Verbal information provided over the telephone. Mr. Montemayor expresses understanding with teach back and has no further questions at this time.     Home monitor information below:  · Test strips on hand: 5  3/14/22   ·   Lot : [id number]  Barcode : [id number] Exp : 8/24  · Serial number: [id number]   · Supplies billing code used: last 2/14/23 Next must be on or after  3/15/23  · Transfer Tube Lot number: Lot : [id number]  · Safety Lancets:  Lot : [id number]  Exp : 5/25   Billing:  *bill must be 4 tests 28 days or more from HM visit  PLEASE REFER TO ANTICOAGULATION TRAINING POWERPOINT FOR BILLING. Needs to use in clinic remote appointment for encounter.    Alexander Scherer, Pharmacy Technician  3/14/2023  11:16 EDT    I, Raj Salter, PharmD, have reviewed the note in full and agree with the assessment and plan. Since encounter was yesterday, will bill at next encounter.  03/15/23  15:37 EDT

## 2023-03-21 ENCOUNTER — ANTICOAGULATION VISIT (OUTPATIENT)
Dept: PHARMACY | Facility: HOSPITAL | Age: 78
End: 2023-03-21
Payer: MEDICARE

## 2023-03-21 LAB — INR PPP: 2.7

## 2023-03-21 PROCEDURE — G0249 PROVIDE INR TEST MATER/EQUIP: HCPCS

## 2023-03-21 NOTE — PROGRESS NOTES
Anticoagulation Clinic Progress Note   Oswaldo HOMEMONITOR: [employer] Serial # : [id number]     Indication: afib  Referring Provider: Gale  Initial Warfarin Start Date: 2007  Goal INR: 2-3  Current Drug Interactions:   Diet: eats salads 2x weekly 11/15/22  [id number]: 5 (HTN, Age, CHF, CAD)   Other: anemic, CKD (stage 4) May need bridge with Lovenox given persistent AF (per cardio 1/24/2019), Scr was 3.22 on 11/24/21     Anticoagulation Clinic INR History:   Date 10/30 11/20 12/17 1/14/20 2/11 3/11 4/8 5/6 6/11 7/9 8/6 9/3 9/14 9/30   Total Weekly Dose 32.5 mg 32.5 mg 32.5 mg 32.5 mg 32.5mg 32.5mg 32.5mg 32.5mg 32.5mg 32.5 mg 32.5 mg 32.5mg 32.5mg 26.25 mg   INR 2.3 2.3 2.1 1.9 2.2 2.1 2.1 2.2 2.2 2.3 2.7 1.6 2.2  1.8   Notes                       ??   5 day hold     Date 10/14 11/4 11/11 11/25 12/9 12/16 12/30 1/13 2/3 3/3 3/31 4/28 5/26 6/23   Total Weekly Dose 32.5 mg 32.5mg 35mg 32.5mg 33.75 mg 36.25 mg 33.75 mg 35mg 33.75 33.75mg 33.75mg 33.75 mg 33.75 mg 33.75mg   INR 2.0 1.6 2.1 1.8 1.8 2.5 2.0 3.0 2.1 2.4 2.3 3.0 2.1 2.2   Notes   1x boost GLV boostx1 boostx1             Date 7/21 9/1 10/13 11/10 12/1 12/15 12/29 1/12/22 1/26 2/9 2/16 2/23 3/2 3/16   Total Weekly Dose 33.75mg 33.75mg 33.75mg 33.75mg 33.75 mg 32.5mg 30mg 27.5mg 28.75 mg 30 mg 31.25 mg 31.25 mg 31.25 mg 31.25 mg   INR 2.9 2.5 3.1 3.1 3.2 3.4 3.4 1.9 1.7 1.8 2.1 2.6 2.9 2.9   Notes        redx1 ?  inc        Date 3/30 4/13 5/11 6/8  6/23 7/7 7/28  8/9 8/12 8/16   Total WeeklyDose 31.25mg 31.25 mg 31.25 mg 31.25mg Start dialysis 30mg 30mg 30 mg  33.75mg 37.5mg 37.5mg   INR 2.5 2.2 2.6 3.9  2.1 2.1 1.8 1.23 1.4 1.5 2.1   Notes    Inc torsemide Cath placement    AV fistula placement    HM train  1 no billing       Date 8/23 8/30 9/6 9/13 9/20 9/30 10/4 10/11 10/18 10/25 11/8 11/15   Total WeeklyDose 31.25 mg 32.5 mg 32.5 mg 32.5 mg 32.5 mg 32.5 mg 35 mg 35 mg 35 mg 32.5 mg 26.5 mg 35 mg   INR 1.9 2.0 2.0 2.5 2.7 1.6 1.7 2.8 2.4 1.7  1.4 1.6   Notes HM2 no billing HM3  No billing HM4  No billing HM 1  Bill today HM 2 No billing HM 3 No bill HM 4  No billing 1x boost  HM 5 no bill day Bill day  HM1 HM- 2 no billing   HM 3- no billing 1x boost  HM 4 - no billing      Date 11/22 11/29 12/6 12/13 12/20 12/27 1/3/23 1/10 1/12 1/17 1/26/23 1/31/23   Total WeeklyDose 37.5 mg 37.5 mg 37.5 mg 37.5 mg 42.5 mg  40 mg 37.5 mg 40 mg 42.5mg 45 mg 45 mg 40 mg   INR 1.9 2.8 1.9 1.5 2.3 3.4 1.4 0.8 2.7 POCT  2.23   2.8 HM 3.9 1.9 2.2   Notes HM1- bill day HM 2 - NO bill HM 3 - no bill  Inc GLV Hm 4 - NO BILL HM 1 - Bill today HM 2   no billing HM 3- no billing HM 4  No billing lab error?? clinic HM 1  Bill today HM 2 - no bill  HM 3 - no bill      Date 2/7 2/14 2/21/23 2/28/23 3/7 3/14/23 3/21/23        Total WeeklyDose 40 mg 40 mg 40 mg 37.5 mg 40 mg 40 mg 40 mg        INR 2.3 2.2 3.9 1.6 3.7 ??? 2.5 2.7        Notes HM 4 rec 2/15  HM 1  Bill today HM 2 - no billing  1xred  HM 3 - NO BILL  HM-4 no bill, percocet HM 5 - no billing until 3/15 HM 1 - BILL today           Takes warfarin in the am    Clinic Interview:  Verbal Release Authorization signed on 8/22/2018 -- may speak with Raeann Montemayor (Wife). [phone] (Home) *Preferred*  Tablet Strength: pt has 7.5mg and 2.5mg tablets  Estimated oop cost for [facility] HM: Patient has [employer] Medicare so they should have minimal to no OOP costs.    Patient Findings/  Negatives:  Signs/symptoms of thrombosis, Signs/symptoms of bleeding, Laboratory test error suspected, Change in health, Change in alcohol use, Change in activity, Upcoming invasive procedure, Emergency department visit, Upcoming dental procedure, Missed doses, Extra doses, Change in medications, Change in diet/appetite, Hospital admission, Bruising, Other complaints   Comments:  All findings negative per pt.      Plan:   1. INR was therapeutic today at 2.7 (goal 2.0-3.0). Instructed Mr. Montemayor to continue maintenance regimen of warfarin 5 mg oral  daily except 7.5mg TuesFri until recheck   2. Recheck INR in one week, 3/28/23  3. Verbal information provided over the telephone. Mr. Montemayor expresses understanding with teach back and has no further questions at this time.     Home monitor information below:  · Test strips on hand: 4  3/21/22   ·   Lot : [id number]  Barcode : [id number] Exp : 8/24  · Serial number: [id number]   · Supplies billing code used: last 3/21/23 Next must be on or after  4/18/23  · Transfer Tube Lot number: Lot : [id number]  · Safety Lancets:  Lot : [id number]  Exp : 5/25   Billing:  *bill must be 4 tests 28 days or more from HM visit  PLEASE REFER TO ANTICOAGULATION TRAINING POWERPOINT FOR BILLING. Needs to use in clinic remote appointment for encounter.    Alexander Scherer, Pharmacy Technician  3/21/2023  10:00 EDT     I, Kristine Sutton, PharmD, have reviewed the note in full and agree with the assessment and plan.  03/21/23  11:29 EDT

## 2023-03-28 ENCOUNTER — ANTICOAGULATION VISIT (OUTPATIENT)
Dept: PHARMACY | Facility: HOSPITAL | Age: 78
End: 2023-03-28
Payer: MEDICARE

## 2023-03-28 LAB — INR PPP: 3.2

## 2023-03-28 NOTE — PROGRESS NOTES
Anticoagulation Clinic Progress Note   Oswaldo HOMEMONITOR: [employer] Serial # : [id number]     Indication: afib  Referring Provider: Gale  Initial Warfarin Start Date: 2007  Goal INR: 2-3  Current Drug Interactions:   Diet: eats salads 2x weekly 11/15/22  [id number]: 5 (HTN, Age, CHF, CAD)   Other: anemic, CKD (stage 4) May need bridge with Lovenox given persistent AF (per cardio 1/24/2019), Scr was 3.22 on 11/24/21     Anticoagulation Clinic INR History:   Date 10/30 11/20 12/17 1/14/20 2/11 3/11 4/8 5/6 6/11 7/9 8/6 9/3 9/14 9/30   Total Weekly Dose 32.5 mg 32.5 mg 32.5 mg 32.5 mg 32.5mg 32.5mg 32.5mg 32.5mg 32.5mg 32.5 mg 32.5 mg 32.5mg 32.5mg 26.25 mg   INR 2.3 2.3 2.1 1.9 2.2 2.1 2.1 2.2 2.2 2.3 2.7 1.6 2.2  1.8   Notes                       ??   5 day hold     Date 10/14 11/4 11/11 11/25 12/9 12/16 12/30 1/13 2/3 3/3 3/31 4/28 5/26 6/23   Total Weekly Dose 32.5 mg 32.5mg 35mg 32.5mg 33.75 mg 36.25 mg 33.75 mg 35mg 33.75 33.75mg 33.75mg 33.75 mg 33.75 mg 33.75mg   INR 2.0 1.6 2.1 1.8 1.8 2.5 2.0 3.0 2.1 2.4 2.3 3.0 2.1 2.2   Notes   1x boost GLV boostx1 boostx1             Date 7/21 9/1 10/13 11/10 12/1 12/15 12/29 1/12/22 1/26 2/9 2/16 2/23 3/2 3/16   Total Weekly Dose 33.75mg 33.75mg 33.75mg 33.75mg 33.75 mg 32.5mg 30mg 27.5mg 28.75 mg 30 mg 31.25 mg 31.25 mg 31.25 mg 31.25 mg   INR 2.9 2.5 3.1 3.1 3.2 3.4 3.4 1.9 1.7 1.8 2.1 2.6 2.9 2.9   Notes        redx1 ?  inc        Date 3/30 4/13 5/11 6/8  6/23 7/7 7/28  8/9 8/12 8/16   Total WeeklyDose 31.25mg 31.25 mg 31.25 mg 31.25mg Start dialysis 30mg 30mg 30 mg  33.75mg 37.5mg 37.5mg   INR 2.5 2.2 2.6 3.9  2.1 2.1 1.8 1.23 1.4 1.5 2.1   Notes    Inc torsemide Cath placement    AV fistula placement    HM train  1 no billing       Date 8/23 8/30 9/6 9/13 9/20 9/30 10/4 10/11 10/18 10/25 11/8 11/15   Total WeeklyDose 31.25 mg 32.5 mg 32.5 mg 32.5 mg 32.5 mg 32.5 mg 35 mg 35 mg 35 mg 32.5 mg 26.5 mg 35 mg   INR 1.9 2.0 2.0 2.5 2.7 1.6 1.7 2.8 2.4 1.7  1.4 1.6   Notes HM2 no billing HM3  No billing HM4  No billing HM 1  Bill today HM 2 No billing HM 3 No bill HM 4  No billing 1x boost  HM 5 no bill day Bill day  HM1 HM- 2 no billing   HM 3- no billing 1x boost  HM 4 - no billing      Date 11/22 11/29 12/6 12/13 12/20 12/27 1/3/23 1/10 1/12 1/17 1/26/23 1/31/23   Total WeeklyDose 37.5 mg 37.5 mg 37.5 mg 37.5 mg 42.5 mg  40 mg 37.5 mg 40 mg 42.5mg 45 mg 45 mg 40 mg   INR 1.9 2.8 1.9 1.5 2.3 3.4 1.4 0.8 2.7 POCT  2.23   2.8 HM 3.9 1.9 2.2   Notes HM1- bill day HM 2 - NO bill HM 3 - no bill  Inc GLV Hm 4 - NO BILL HM 1 - Bill today HM 2   no billing HM 3- no billing HM 4  No billing lab error?? clinic HM 1  Bill today HM 2 - no bill  HM 3 - no bill      Date 2/7 2/14 2/21/23 2/28/23 3/7 3/14/23 3/21/23 3/28/23       Total WeeklyDose 40 mg 40 mg 40 mg 37.5 mg 40 mg 40 mg 40 mg 40 mg       INR 2.3 2.2 3.9 1.6 3.7 ??? 2.5 2.7 3.2       Notes HM 4 rec 2/15  HM 1  Bill today HM 2 - no billing  1xred  HM 3 - NO BILL  HM-4 no bill, percocet HM 5 - no billing until 3/15 HM 1 - BILL today  HM 2 - no Billing         Takes warfarin in the am    Clinic Interview:  Verbal Release Authorization signed on 8/22/2018 -- may speak with Raeann Montemayor (Wife). [phone] (Home) *Preferred*  Tablet Strength: pt has 7.5mg and 2.5mg tablets  Estimated oop cost for [facility] HM: Patient has [employer] Medicare so they should have minimal to no OOP costs.    Patient Findings/  Negatives:  Signs/symptoms of thrombosis, Signs/symptoms of bleeding, Laboratory test error suspected, Change in health, Change in alcohol use, Change in activity, Upcoming invasive procedure, Emergency department visit, Upcoming dental procedure, Missed doses, Extra doses, Change in medications, Change in diet/appetite, Hospital admission, Bruising, Other complaints   Comments:  All findings negative per pt.      Plan:   1. INR was therapeutic today at 3.2 (goal 2.0-3.0). Per Maryann DislaD, Instructed   Sim to REDUCE tonight's dose of warfarin to 5 mg then resume maintenance regimen of warfarin 5 mg oral daily except warfarin 7.5mg TuesFri until recheck   2. Recheck INR in one week, 4/4/23  3. Verbal information provided over the telephone. Mr. Montemayor expresses understanding with teach back and has no further questions at this time.     Home monitor information below:  · Test strips on hand: 3 3/28/22   ·   Lot : [id number]  Barcode : [id number] Exp : 8/24  · Serial number: [id number]   · Supplies billing code used: last 3/21/23 Next must be on or after  4/18/23  · Transfer Tube Lot number: Lot : [id number]  · Safety Lancets:  Lot : [id number]  Exp : 5/25   Billing:  *bill must be 4 tests 28 days or more from HM visit  PLEASE REFER TO ANTICOAGULATION TRAINING POWERPOINT FOR BILLING. Needs to use in clinic remote appointment for encounter.                .Alexander Scherer, Pharmacy Technician  3/28/2023  10:50 EDT       Will have received warfarin 37.5mg total at recheck    I, Maryann Schmidt, [facility], have reviewed the note in full and agree with the assessment and plan.  03/29/23  14:23 EDT

## 2023-04-04 ENCOUNTER — ANTICOAGULATION VISIT (OUTPATIENT)
Dept: PHARMACY | Facility: HOSPITAL | Age: 78
End: 2023-04-04
Payer: MEDICARE

## 2023-04-04 LAB — INR PPP: 2.8

## 2023-04-04 NOTE — PROGRESS NOTES
Anticoagulation Clinic Progress Note   Oswaldo HOMEMONITOR: [employer] Serial # : [id number]     Indication: afib  Referring Provider: Gale  Initial Warfarin Start Date: 2007  Goal INR: 2-3  Current Drug Interactions:   Diet: eats salads 2x weekly 11/15/22  [id number]: 5 (HTN, Age, CHF, CAD)   Other: anemic, CKD (stage 4) May need bridge with Lovenox given persistent AF (per cardio 1/24/2019), Scr was 3.22 on 11/24/21     Anticoagulation Clinic INR History:   Date 10/30 11/20 12/17 1/14/20 2/11 3/11 4/8 5/6 6/11 7/9 8/6 9/3 9/14 9/30   Total Weekly Dose 32.5 mg 32.5 mg 32.5 mg 32.5 mg 32.5mg 32.5mg 32.5mg 32.5mg 32.5mg 32.5 mg 32.5 mg 32.5mg 32.5mg 26.25 mg   INR 2.3 2.3 2.1 1.9 2.2 2.1 2.1 2.2 2.2 2.3 2.7 1.6 2.2  1.8   Notes                       ??   5 day hold     Date 10/14 11/4 11/11 11/25 12/9 12/16 12/30 1/13 2/3 3/3 3/31 4/28 5/26 6/23   Total Weekly Dose 32.5 mg 32.5mg 35mg 32.5mg 33.75 mg 36.25 mg 33.75 mg 35mg 33.75 33.75mg 33.75mg 33.75 mg 33.75 mg 33.75mg   INR 2.0 1.6 2.1 1.8 1.8 2.5 2.0 3.0 2.1 2.4 2.3 3.0 2.1 2.2   Notes   1x boost GLV boostx1 boostx1             Date 7/21 9/1 10/13 11/10 12/1 12/15 12/29 1/12/22 1/26 2/9 2/16 2/23 3/2 3/16   Total Weekly Dose 33.75mg 33.75mg 33.75mg 33.75mg 33.75 mg 32.5mg 30mg 27.5mg 28.75 mg 30 mg 31.25 mg 31.25 mg 31.25 mg 31.25 mg   INR 2.9 2.5 3.1 3.1 3.2 3.4 3.4 1.9 1.7 1.8 2.1 2.6 2.9 2.9   Notes        redx1 ?  inc        Date 3/30 4/13 5/11 6/8  6/23 7/7 7/28  8/9 8/12 8/16   Total WeeklyDose 31.25mg 31.25 mg 31.25 mg 31.25mg Start dialysis 30mg 30mg 30 mg  33.75mg 37.5mg 37.5mg   INR 2.5 2.2 2.6 3.9  2.1 2.1 1.8 1.23 1.4 1.5 2.1   Notes    Inc torsemide Cath placement    AV fistula placement    HM train  1 no billing       Date 8/23 8/30 9/6 9/13 9/20 9/30 10/4 10/11 10/18 10/25 11/8 11/15   Total WeeklyDose 31.25 mg 32.5 mg 32.5 mg 32.5 mg 32.5 mg 32.5 mg 35 mg 35 mg 35 mg 32.5 mg 26.5 mg 35 mg   INR 1.9 2.0 2.0 2.5 2.7 1.6 1.7 2.8 2.4 1.7  1.4 1.6   Notes HM2 no billing HM3  No billing HM4  No billing HM 1  Bill today HM 2 No billing HM 3 No bill HM 4  No billing 1x boost  HM 5 no bill day Bill day  HM1 HM- 2 no billing   HM 3- no billing 1x boost  HM 4 - no billing      Date 11/22 11/29 12/6 12/13 12/20 12/27 1/3/23 1/10 1/12 1/17 1/26/23 1/31/23   Total WeeklyDose 37.5 mg 37.5 mg 37.5 mg 37.5 mg 42.5 mg  40 mg 37.5 mg 40 mg 42.5mg 45 mg 45 mg 40 mg   INR 1.9 2.8 1.9 1.5 2.3 3.4 1.4 0.8 2.7 POCT  2.23   2.8 HM 3.9 1.9 2.2   Notes HM1- bill day HM 2 - NO bill HM 3 - no bill  Inc GLV Hm 4 - NO BILL HM 1 - Bill today HM 2   no billing HM 3- no billing HM 4  No billing lab error?? clinic HM 1  Bill today HM 2 - no bill  HM 3 - no bill      Date 2/7 2/14 2/21/23 2/28/23 3/7 3/14/23 3/21/23 3/28/23 4/4      Total WeeklyDose 40 mg 40 mg 40 mg 37.5 mg 40 mg 40 mg 40 mg 40 mg 37.5 mg      INR 2.3 2.2 3.9 1.6 3.7 ??? 2.5 2.7 3.2 2.8      Notes HM 4 rec 2/15  HM 1  Bill today HM 2 - no billing  1xred  HM 3 - NO BILL  HM-4 no bill, percocet HM 5 - no billing until 3/15 HM 1 - BILL today  HM 2 - no Billing HM 3  No billing        Takes warfarin in the am    Clinic Interview:  Verbal Release Authorization signed on 8/22/2018 -- may speak with Raeann Montemayor (Wife). [phone] (Home) *Preferred*  Tablet Strength: pt has 7.5mg and 2.5mg tablets  Estimated oop cost for [facility] HM: Patient has [employer] Medicare so they should have minimal to no OOP costs.    Patient Findings  Negatives:  Signs/symptoms of thrombosis, Signs/symptoms of bleeding, Laboratory test error suspected, Change in health, Change in alcohol use, Change in activity, Upcoming invasive procedure, Emergency department visit, Upcoming dental procedure, Missed doses, Extra doses, Change in medications, Change in diet/appetite, Hospital admission, Bruising, Other complaints   Comments:  All findings negative per pt.      Plan:   1. INR was therapeutic today at 2.8 (goal 2.0-3.0). Instructed   Sim to continue recently reduced regimen of warfarin 5 mg oral daily except 7.5 mg Fridays until recheck   2. Recheck INR in one week, 4/11/23  3. Verbal information provided over the telephone. Mr. Montemayor expresses understanding with teach back and has no further questions at this time.     Home monitor information below:  · Test strips on hand: 2  4/4/23 *Sending supplies today  ·   Lot : [id number]  Barcode : [id number] Exp : 8/24  · Serial number: [id number]   · Supplies billing code used: last 3/21/23 Next must be on or after  4/18/23  · Transfer Tube Lot number: Lot : [id number]  · Safety Lancets:  Lot : [id number]  Exp : 5/25   Billing:  *bill must be 4 tests 28 days or more from HM visit  PLEASE REFER TO ANTICOAGULATION TRAINING POWERPOINT FOR BILLING. Needs to use in clinic remote appointment for encounter.              Luigi Melo CPhT  4/4/2023  10:24 EDT     I, Charlotte Michael, PharmD, have reviewed the note in full and agree with the assessment and plan.  04/06/23  10:38 EDT

## 2023-04-11 ENCOUNTER — ANTICOAGULATION VISIT (OUTPATIENT)
Dept: PHARMACY | Facility: HOSPITAL | Age: 78
End: 2023-04-11
Payer: MEDICARE

## 2023-04-11 LAB — INR PPP: 2.5

## 2023-04-11 NOTE — PROGRESS NOTES
Anticoagulation Clinic Progress Note   Oswaldo HOMEMONITOR: [employer] Serial # : [id number]     Indication: afib  Referring Provider: Gale  Initial Warfarin Start Date: 2007  Goal INR: 2-3  Current Drug Interactions:   Diet: eats salads 2x weekly 11/15/22  [id number]: 5 (HTN, Age, CHF, CAD)   Other: anemic, CKD (stage 4) May need bridge with Lovenox given persistent AF (per cardio 1/24/2019), Scr was 3.22 on 11/24/21     Anticoagulation Clinic INR History:   Date 10/30 11/20 12/17 1/14/20 2/11 3/11 4/8 5/6 6/11 7/9 8/6 9/3 9/14 9/30   Total Weekly Dose 32.5 mg 32.5 mg 32.5 mg 32.5 mg 32.5mg 32.5mg 32.5mg 32.5mg 32.5mg 32.5 mg 32.5 mg 32.5mg 32.5mg 26.25 mg   INR 2.3 2.3 2.1 1.9 2.2 2.1 2.1 2.2 2.2 2.3 2.7 1.6 2.2  1.8   Notes                       ??   5 day hold     Date 10/14 11/4 11/11 11/25 12/9 12/16 12/30 1/13 2/3 3/3 3/31 4/28 5/26 6/23   Total Weekly Dose 32.5 mg 32.5mg 35mg 32.5mg 33.75 mg 36.25 mg 33.75 mg 35mg 33.75 33.75mg 33.75mg 33.75 mg 33.75 mg 33.75mg   INR 2.0 1.6 2.1 1.8 1.8 2.5 2.0 3.0 2.1 2.4 2.3 3.0 2.1 2.2   Notes   1x boost GLV boostx1 boostx1             Date 7/21 9/1 10/13 11/10 12/1 12/15 12/29 1/12/22 1/26 2/9 2/16 2/23 3/2 3/16   Total Weekly Dose 33.75mg 33.75mg 33.75mg 33.75mg 33.75 mg 32.5mg 30mg 27.5mg 28.75 mg 30 mg 31.25 mg 31.25 mg 31.25 mg 31.25 mg   INR 2.9 2.5 3.1 3.1 3.2 3.4 3.4 1.9 1.7 1.8 2.1 2.6 2.9 2.9   Notes        redx1 ?  inc        Date 3/30 4/13 5/11 6/8  6/23 7/7 7/28  8/9 8/12 8/16   Total WeeklyDose 31.25mg 31.25 mg 31.25 mg 31.25mg Start dialysis 30mg 30mg 30 mg  33.75mg 37.5mg 37.5mg   INR 2.5 2.2 2.6 3.9  2.1 2.1 1.8 1.23 1.4 1.5 2.1   Notes    Inc torsemide Cath placement    AV fistula placement    HM train  1 no billing       Date 8/23 8/30 9/6 9/13 9/20 9/30 10/4 10/11 10/18 10/25 11/8 11/15   Total WeeklyDose 31.25 mg 32.5 mg 32.5 mg 32.5 mg 32.5 mg 32.5 mg 35 mg 35 mg 35 mg 32.5 mg 26.5 mg 35 mg   INR 1.9 2.0 2.0 2.5 2.7 1.6 1.7 2.8 2.4 1.7  1.4 1.6   Notes HM2 no billing HM3  No billing HM4  No billing HM 1  Bill today HM 2 No billing HM 3 No bill HM 4  No billing 1x boost  HM 5 no bill day Bill day  HM1 HM- 2 no billing   HM 3- no billing 1x boost  HM 4 - no billing      Date 11/22 11/29 12/6 12/13 12/20 12/27 1/3/23 1/10 1/12 1/17 1/26/23 1/31/23   Total WeeklyDose 37.5 mg 37.5 mg 37.5 mg 37.5 mg 42.5 mg  40 mg 37.5 mg 40 mg 42.5mg 45 mg 45 mg 40 mg   INR 1.9 2.8 1.9 1.5 2.3 3.4 1.4 0.8 2.7 POCT  2.23   2.8 HM 3.9 1.9 2.2   Notes HM1- bill day HM 2 - NO bill HM 3 - no bill  Inc GLV Hm 4 - NO BILL HM 1 - Bill today HM 2   no billing HM 3- no billing HM 4  No billing lab error?? clinic HM 1  Bill today HM 2 - no bill  HM 3 - no bill      Date 2/7 2/14 2/21/23 2/28/23 3/7 3/14/23 3/21/23 3/28/23 4/4 4/11     Total WeeklyDose 40 mg 40 mg 40 mg 37.5 mg 40 mg 40 mg 40 mg 40 mg 37.5 mg 37.5 mg     INR 2.3 2.2 3.9 1.6 3.7 ??? 2.5 2.7 3.2 2.8 2.5     Notes HM 4 rec 2/15  HM 1  Bill today HM 2 - no billing  1xred  HM 3 - NO BILL  HM-4 no bill, percocet HM 5 - no billing until 3/15 HM 1 - BILL today  HM 2 - no Billing HM 3  No billing HM 4  No billing       Takes warfarin in the am    Clinic Interview:  Verbal Release Authorization signed on 8/22/2018 -- may speak with Raeann Montemayor (Wife). [phone] (Home) *Preferred*  Tablet Strength: pt has 7.5mg and 2.5mg tablets  Estimated oop cost for [facility] HM: Patient has [employer] Medicare so they should have minimal to no OOP costs.    Patient Findings  Negatives:  Signs/symptoms of thrombosis, Signs/symptoms of bleeding, Laboratory test error suspected, Change in health, Change in alcohol use, Change in activity, Upcoming invasive procedure, Emergency department visit, Upcoming dental procedure, Missed doses, Extra doses, Change in medications, Change in diet/appetite, Hospital admission, Bruising, Other complaints   Comments:  All findings negative per pt.      Plan:   1. INR was therapeutic today at 2.5 (goal  2.0-3.0). Instructed Mr. Montemayor to continue recently reduced regimen of warfarin 5 mg oral daily except 7.5 mg Fridays until recheck   2. Recheck INR in one week, 4/18/23  3. Verbal information provided over the telephone. Mr. Montemayor expresses understanding with teach back and has no further questions at this time.     Home monitor information below:  · Test strips on hand: 7  4/11/23   ·   Lot : [id number]  Barcode : [id number] Exp : 8/24  · Serial number: [id number]   · Supplies billing code used: last 3/21/23 Next must be on or after  4/18/23  · Transfer Tube Lot number: Lot : [id number]  · Safety Lancets:  Lot : [id number]  Exp : 5/25   Billing:  *bill must be 4 tests 28 days or more from HM visit  PLEASE REFER TO ANTICOAGULATION TRAINING POWERPOINT FOR BILLING. Needs to use in clinic remote appointment for encounter.              Luigi Melo CPhT  4/11/2023  09:41 EDT     I, Charlotte Michael, PharmD, have reviewed the note in full and agree with the assessment and plan.  04/11/23  13:59 EDT

## 2023-04-18 ENCOUNTER — ANTICOAGULATION VISIT (OUTPATIENT)
Dept: PHARMACY | Facility: HOSPITAL | Age: 78
End: 2023-04-18
Payer: MEDICARE

## 2023-04-18 LAB
INR PPP: 1.7
INR PPP: 1.7

## 2023-04-18 PROCEDURE — G0249 PROVIDE INR TEST MATER/EQUIP: HCPCS

## 2023-04-18 NOTE — PROGRESS NOTES
Anticoagulation Clinic Progress Note   Oswaldo HOMEMONITOR: [employer] Serial # : [id number]     Indication: afib  Referring Provider: Gale  Initial Warfarin Start Date: 2007  Goal INR: 2-3  Current Drug Interactions:   Diet: eats salads 2x weekly 11/15/22  [id number]: 5 (HTN, Age, CHF, CAD)   Other: anemic, CKD (stage 4) May need bridge with Lovenox given persistent AF (per cardio 1/24/2019), Scr was 3.22 on 11/24/21     Anticoagulation Clinic INR History:   Date 10/30 11/20 12/17 1/14/20 2/11 3/11 4/8 5/6 6/11 7/9 8/6 9/3 9/14 9/30   Total Weekly Dose 32.5 mg 32.5 mg 32.5 mg 32.5 mg 32.5mg 32.5mg 32.5mg 32.5mg 32.5mg 32.5 mg 32.5 mg 32.5mg 32.5mg 26.25 mg   INR 2.3 2.3 2.1 1.9 2.2 2.1 2.1 2.2 2.2 2.3 2.7 1.6 2.2  1.8   Notes                       ??   5 day hold     Date 10/14 11/4 11/11 11/25 12/9 12/16 12/30 1/13 2/3 3/3 3/31 4/28 5/26 6/23   Total Weekly Dose 32.5 mg 32.5mg 35mg 32.5mg 33.75 mg 36.25 mg 33.75 mg 35mg 33.75 33.75mg 33.75mg 33.75 mg 33.75 mg 33.75mg   INR 2.0 1.6 2.1 1.8 1.8 2.5 2.0 3.0 2.1 2.4 2.3 3.0 2.1 2.2   Notes   1x boost GLV boostx1 boostx1             Date 7/21 9/1 10/13 11/10 12/1 12/15 12/29 1/12/22 1/26 2/9 2/16 2/23 3/2 3/16   Total Weekly Dose 33.75mg 33.75mg 33.75mg 33.75mg 33.75 mg 32.5mg 30mg 27.5mg 28.75 mg 30 mg 31.25 mg 31.25 mg 31.25 mg 31.25 mg   INR 2.9 2.5 3.1 3.1 3.2 3.4 3.4 1.9 1.7 1.8 2.1 2.6 2.9 2.9   Notes        redx1 ?  inc        Date 3/30 4/13 5/11 6/8  6/23 7/7 7/28  8/9 8/12 8/16   Total WeeklyDose 31.25mg 31.25 mg 31.25 mg 31.25mg Start dialysis 30mg 30mg 30 mg  33.75mg 37.5mg 37.5mg   INR 2.5 2.2 2.6 3.9  2.1 2.1 1.8 1.23 1.4 1.5 2.1   Notes    Inc torsemide Cath placement    AV fistula placement    HM train  1 no billing       Date 8/23 8/30 9/6 9/13 9/20 9/30 10/4 10/11 10/18 10/25 11/8 11/15   Total WeeklyDose 31.25 mg 32.5 mg 32.5 mg 32.5 mg 32.5 mg 32.5 mg 35 mg 35 mg 35 mg 32.5 mg 26.5 mg 35 mg   INR 1.9 2.0 2.0 2.5 2.7 1.6 1.7 2.8 2.4 1.7  1.4 1.6   Notes HM2 no billing HM3  No billing HM4  No billing HM 1  Bill today HM 2 No billing HM 3 No bill HM 4  No billing 1x boost  HM 5 no bill day Bill day  HM1 HM- 2 no billing   HM 3- no billing 1x boost  HM 4 - no billing      Date 11/22 11/29 12/6 12/13 12/20 12/27 1/3/23 1/10 1/12 1/17 1/26/23 1/31/23   Total WeeklyDose 37.5 mg 37.5 mg 37.5 mg 37.5 mg 42.5 mg  40 mg 37.5 mg 40 mg 42.5mg 45 mg 45 mg 40 mg   INR 1.9 2.8 1.9 1.5 2.3 3.4 1.4 0.8 2.7 POCT  2.23   2.8 HM 3.9 1.9 2.2   Notes HM1- bill day HM 2 - NO bill HM 3 - no bill  Inc GLV Hm 4 - NO BILL HM 1 - Bill today HM 2   no billing HM 3- no billing HM 4  No billing lab error?? clinic HM 1  Bill today HM 2 - no bill  HM 3 - no bill      Date 2/7 2/14 2/21/23 2/28/23 3/7 3/14/23 3/21/23 3/28/23 4/4 4/11 4/18    Total WeeklyDose 40 mg 40 mg 40 mg 37.5 mg 40 mg 40 mg 40 mg 40 mg 37.5 mg 37.5 mg 37.5 mg     INR 2.3 2.2 3.9 1.6 3.7 ??? 2.5 2.7 3.2 2.8 2.5 1.7    Notes HM 4 rec 2/15  HM 1  Bill today HM 2 - no billing  1xred  HM 3 - NO BILL  HM-4 no bill, percocet HM 5 - no billing until 3/15 HM 1 - BILL today  HM 2 - no Billing HM 3  No billing HM 4  No billing HM 1-  BILL today       Takes warfarin in the am    Clinic Interview:  Verbal Release Authorization signed on 8/22/2018 -- may speak with Raeann Montemayor (Wife). [phone] (Home) *Preferred*  Tablet Strength: pt has 7.5mg and 2.5mg tablets  Estimated oop cost for [facility] HM: Patient has [employer] so they should have minimal to no OOP costs.    Patient Findings    Negatives:  Signs/symptoms of thrombosis, Signs/symptoms of bleeding, Laboratory test error suspected, Change in health, Change in alcohol use, Change in activity, Upcoming invasive procedure, Emergency department visit, Upcoming dental procedure, Missed doses, Extra doses, Change in medications, Change in diet/appetite, Hospital admission, Bruising, Other complaints   Comments:  All findings negative per patient.          Plan:    1. INR was subtherapeutic today at 1.7 (goal 2.0-3.0). Instructed Mr. Montemayor to boost today's dose to 7.5 mg and then continue recently reduced regimen of warfarin 5 mg oral daily except 7.5 mg Fridays until recheck   2. Recheck INR in one week, 4/25/23  3. Verbal information provided over the telephone. Mr. Montemayor expresses understanding with teach back and has no further questions at this time.     Home monitor information below:  · Test strips on hand: 6  4/18/23   ·   Lot : [id number]  Barcode : [id number] Exp : 8/24  · Serial number: [id number]   · Supplies billing code used: last 3/21/23 Next must be on or after  4/18/23  · Transfer Tube Lot number: Lot : [id number]  · Safety Lancets:  Lot : [id number]  Exp : 5/25   Billing:  *bill must be 4 tests 28 days or more from HM visit  PLEASE REFER TO ANTICOAGULATION TRAINING POWERPOINT FOR BILLING. Needs to use in clinic remote appointment for encounter.              Zev Mejias,  Supriya  04/18/23  09:35 EDT

## 2023-04-18 NOTE — PROGRESS NOTES
Anticoagulation Clinic Progress Note   Oswaldo HOMEMONITOR: [employer] Serial # : [id number]     Indication: afib  Referring Provider: Gale  Initial Warfarin Start Date: 2007  Goal INR: 2-3  Current Drug Interactions:   Diet: eats salads 2x weekly 11/15/22  [id number]: 5 (HTN, Age, CHF, CAD)   Other: anemic, CKD (stage 4) May need bridge with Lovenox given persistent AF (per cardio 1/24/2019), Scr was 3.22 on 11/24/21     Anticoagulation Clinic INR History:   Date 10/30 11/20 12/17 1/14/20 2/11 3/11 4/8 5/6 6/11 7/9 8/6 9/3 9/14 9/30   Total Weekly Dose 32.5 mg 32.5 mg 32.5 mg 32.5 mg 32.5mg 32.5mg 32.5mg 32.5mg 32.5mg 32.5 mg 32.5 mg 32.5mg 32.5mg 26.25 mg   INR 2.3 2.3 2.1 1.9 2.2 2.1 2.1 2.2 2.2 2.3 2.7 1.6 2.2  1.8   Notes                       ??   5 day hold     Date 10/14 11/4 11/11 11/25 12/9 12/16 12/30 1/13 2/3 3/3 3/31 4/28 5/26 6/23   Total Weekly Dose 32.5 mg 32.5mg 35mg 32.5mg 33.75 mg 36.25 mg 33.75 mg 35mg 33.75 33.75mg 33.75mg 33.75 mg 33.75 mg 33.75mg   INR 2.0 1.6 2.1 1.8 1.8 2.5 2.0 3.0 2.1 2.4 2.3 3.0 2.1 2.2   Notes   1x boost GLV boostx1 boostx1             Date 7/21 9/1 10/13 11/10 12/1 12/15 12/29 1/12/22 1/26 2/9 2/16 2/23 3/2 3/16   Total Weekly Dose 33.75mg 33.75mg 33.75mg 33.75mg 33.75 mg 32.5mg 30mg 27.5mg 28.75 mg 30 mg 31.25 mg 31.25 mg 31.25 mg 31.25 mg   INR 2.9 2.5 3.1 3.1 3.2 3.4 3.4 1.9 1.7 1.8 2.1 2.6 2.9 2.9   Notes        redx1 ?  inc        Date 3/30 4/13 5/11 6/8  6/23 7/7 7/28  8/9 8/12 8/16   Total WeeklyDose 31.25mg 31.25 mg 31.25 mg 31.25mg Start dialysis 30mg 30mg 30 mg  33.75mg 37.5mg 37.5mg   INR 2.5 2.2 2.6 3.9  2.1 2.1 1.8 1.23 1.4 1.5 2.1   Notes    Inc torsemide Cath placement    AV fistula placement    HM train  1 no billing       Date 8/23 8/30 9/6 9/13 9/20 9/30 10/4 10/11 10/18 10/25 11/8 11/15   Total WeeklyDose 31.25 mg 32.5 mg 32.5 mg 32.5 mg 32.5 mg 32.5 mg 35 mg 35 mg 35 mg 32.5 mg 26.5 mg 35 mg   INR 1.9 2.0 2.0 2.5 2.7 1.6 1.7 2.8 2.4 1.7  1.4 1.6   Notes HM2 no billing HM3  No billing HM4  No billing HM 1  Bill today HM 2 No billing HM 3 No bill HM 4  No billing 1x boost  HM 5 no bill day Bill day  HM1 HM- 2 no billing   HM 3- no billing 1x boost  HM 4 - no billing      Date 11/22 11/29 12/6 12/13 12/20 12/27 1/3/23 1/10 1/12 1/17 1/26/23 1/31/23   Total WeeklyDose 37.5 mg 37.5 mg 37.5 mg 37.5 mg 42.5 mg  40 mg 37.5 mg 40 mg 42.5mg 45 mg 45 mg 40 mg   INR 1.9 2.8 1.9 1.5 2.3 3.4 1.4 0.8 2.7 POCT  2.23   2.8 HM 3.9 1.9 2.2   Notes HM1- bill day HM 2 - NO bill HM 3 - no bill  Inc GLV Hm 4 - NO BILL HM 1 - Bill today HM 2   no billing HM 3- no billing HM 4  No billing lab error?? clinic HM 1  Bill today HM 2 - no bill  HM 3 - no bill      Date 2/7 2/14 2/21/23 2/28/23 3/7 3/14/23 3/21/23 3/28/23 4/4 4/11 4/18    Total WeeklyDose 40 mg 40 mg 40 mg 37.5 mg 40 mg 40 mg 40 mg 40 mg 37.5 mg 37.5 mg 37.5 mg     INR 2.3 2.2 3.9 1.6 3.7 ??? 2.5 2.7 3.2 2.8 2.5 1.7    Notes HM 4 rec 2/15  HM 1  Bill today HM 2 - no billing  1xred  HM 3 - NO BILL  HM-4 no bill, percocet HM 5 - no billing until 3/15 HM 1 - BILL today  HM 2 - no Billing HM 3  No billing HM 4  No billing HM 1-  BILL today       Takes warfarin in the am    Clinic Interview:  Verbal Release Authorization signed on 8/22/2018 -- may speak with Raeann Montemayor (Wife). [phone] (Home) *Preferred*  Tablet Strength: pt has 7.5mg and 2.5mg tablets  Estimated oop cost for [facility] ED[facility] HM: Patient has [employer] Medicare so they should have minimal to no OOP costs.    Patient Findings    Negatives:  Signs/symptoms of thrombosis, Signs/symptoms of bleeding, Laboratory test error suspected, Change in health, Change in alcohol use, Change in activity, Upcoming invasive procedure, Emergency department visit, Upcoming dental procedure, Missed doses, Extra doses, Change in medications, Change in diet/appetite, Hospital admission, Bruising, Other complaints   Comments:  All findings negative per patient.          Plan:    1. INR was subtherapeutic today at 1.7 (goal 2.0-3.0). Instructed Mr. Montemayor to boost today's dose to 7.5 mg and then continue recently reduced regimen of warfarin 5 mg oral daily except 7.5 mg Fridays until recheck   2. Recheck INR in one week, 4/25/23  3. Verbal information provided over the telephone. Mr. Montemayor expresses understanding with teach back and has no further questions at this time.     Home monitor information below:  · Test strips on hand: 6  4/18/23   ·   Lot : [id number]  Barcode : [id number] Exp : 8/24  · Serial number: [id number]   · Supplies billing code used: last 3/21/23 Next must be on or after  4/18/23  · Transfer Tube Lot number: Lot : [id number]  · Safety Lancets:  Lot : [id number]  Exp : 5/25   Billing:  *bill must be 4 tests 28 days or more from HM visit  PLEASE REFER TO ANTICOAGULATION TRAINING POWERPOINT FOR BILLING. Needs to use in clinic remote appointment for encounter.              Zev Mejias,  Supriya  04/18/23  13:31 EDT

## 2023-04-26 ENCOUNTER — ANTICOAGULATION VISIT (OUTPATIENT)
Dept: PHARMACY | Facility: HOSPITAL | Age: 78
End: 2023-04-26
Payer: MEDICARE

## 2023-04-26 LAB — INR PPP: 1.7

## 2023-04-26 NOTE — PROGRESS NOTES
Anticoagulation Clinic Progress Note   Oswaldo HOMEMONITOR: [employer] Check Serial # : [id number]     Indication: afib  Referring Provider: Gale  Initial Warfarin Start Date: 2007  Goal INR: 2-3  Current Drug Interactions:   Diet: eats salads 2x weekly 11/15/22  [id number]: 5 (HTN, Age, CHF, CAD)   Other: anemic, CKD (stage 4) May need bridge with Lovenox given persistent AF (per cardio 1/24/2019), Scr was 3.22 on 11/24/21     Anticoagulation Clinic INR History:   Date 10/30 11/20 12/17 1/14/20 2/11 3/11 4/8 5/6 6/11 7/9 8/6 9/3 9/14 9/30   Total Weekly Dose 32.5 mg 32.5 mg 32.5 mg 32.5 mg 32.5mg 32.5mg 32.5mg 32.5mg 32.5mg 32.5 mg 32.5 mg 32.5mg 32.5mg 26.25 mg   INR 2.3 2.3 2.1 1.9 2.2 2.1 2.1 2.2 2.2 2.3 2.7 1.6 2.2  1.8   Notes                       ??   5 day hold     Date 10/14 11/4 11/11 11/25 12/9 12/16 12/30 1/13 2/3 3/3 3/31 4/28 5/26 6/23   Total Weekly Dose 32.5 mg 32.5mg 35mg 32.5mg 33.75 mg 36.25 mg 33.75 mg 35mg 33.75 33.75mg 33.75mg 33.75 mg 33.75 mg 33.75mg   INR 2.0 1.6 2.1 1.8 1.8 2.5 2.0 3.0 2.1 2.4 2.3 3.0 2.1 2.2   Notes   1x boost GLV boostx1 boostx1             Date 7/21 9/1 10/13 11/10 12/1 12/15 12/29 1/12/22 1/26 2/9 2/16 2/23 3/2 3/16   Total Weekly Dose 33.75mg 33.75mg 33.75mg 33.75mg 33.75 mg 32.5mg 30mg 27.5mg 28.75 mg 30 mg 31.25 mg 31.25 mg 31.25 mg 31.25 mg   INR 2.9 2.5 3.1 3.1 3.2 3.4 3.4 1.9 1.7 1.8 2.1 2.6 2.9 2.9   Notes        redx1 ?  inc        Date 3/30 4/13 5/11 6/8  6/23 7/7 7/28  8/9 8/12 8/16   Total WeeklyDose 31.25mg 31.25 mg 31.25 mg 31.25mg Start dialysis 30mg 30mg 30 mg  33.75mg 37.5mg 37.5mg   INR 2.5 2.2 2.6 3.9  2.1 2.1 1.8 1.23 1.4 1.5 2.1   Notes    Inc torsemide Cath placement    AV fistula placement    HM train  1 no billing       Date 8/23 8/30 9/6 9/13 9/20 9/30 10/4 10/11 10/18 10/25 11/8 11/15   Total WeeklyDose 31.25 mg 32.5 mg 32.5 mg 32.5 mg 32.5 mg 32.5 mg 35 mg 35 mg 35 mg 32.5 mg 26.5 mg 35 mg   INR 1.9 2.0 2.0 2.5 2.7 1.6 1.7 2.8 2.4 1.7  1.4 1.6   Notes HM2 no billing HM3  No billing HM4  No billing HM 1  Bill today HM 2 No billing HM 3 No bill HM 4  No billing 1x boost  HM 5 no bill day Bill day  HM1 HM- 2 no billing   HM 3- no billing 1x boost  HM 4 - no billing      Date 11/22 11/29 12/6 12/13 12/20 12/27 1/3/23 1/10 1/12 1/17 1/26/23 1/31/23   Total WeeklyDose 37.5 mg 37.5 mg 37.5 mg 37.5 mg 42.5 mg  40 mg 37.5 mg 40 mg 42.5mg 45 mg 45 mg 40 mg   INR 1.9 2.8 1.9 1.5 2.3 3.4 1.4 0.8 2.7 POCT  2.23   2.8 HM 3.9 1.9 2.2   Notes HM1- bill day HM 2 - NO bill HM 3 - no bill  Inc GLV Hm 4 - NO BILL HM 1 - Bill today HM 2   no billing HM 3- no billing HM 4  No billing lab error?? clinic HM 1  Bill today HM 2 - no bill  HM 3 - no bill      Date 2/7 2/14 2/21/23 2/28/23 3/7 3/14/23 3/21/23 3/28/23 4/4 4/11 4/18 4/26   Total WeeklyDose 40 mg 40 mg 40 mg 37.5 mg 40 mg 40 mg 40 mg 40 mg 37.5 mg 37.5 mg 37.5 mg  40 mg   INR 2.3 2.2 3.9 1.6 3.7 ??? 2.5 2.7 3.2 2.8 2.5 1.7 1.7   Notes HM 4 rec 2/15  HM 1  Bill today HM 2 - no billing  1xred  HM 3 - NO BILL  HM-4 no bill, percocet HM 5 - no billing until 3/15 HM 1 - BILL today  HM 2 - no Billing HM 3  No billing HM 4  No billing HM 1-  BILL today  HM 2  No billing     Takes warfarin in the am    Clinic Interview:  Verbal Release Authorization signed on 8/22/2018 -- may speak with Raeann Sim (Wife). [phone] (Home) *Preferred*  Tablet Strength: pt has 7.5mg and 2.5mg tablets  Estimated oop cost for [facility] HM: Patient has [employer] Medicare so they should have minimal to no OOP costs.    Patient Findings    Negatives:  Signs/symptoms of thrombosis, Signs/symptoms of bleeding, Laboratory test error suspected, Change in health, Change in alcohol use, Change in activity, Upcoming invasive procedure, Emergency department visit, Upcoming dental procedure, Missed doses, Extra doses, Change in medications, Change in diet/appetite, Hospital admission, Bruising, Other complaints   Comments:  All findings negative  per pt.      Plan:   1. INR was SUBtherapeutic today at 1.7 (goal 2.0-3.0). Per Maryann Schmidt, PharmD, Instructed Mr. Montemayor to boost today's warfarin dose to 7.5 mg and then continue warfarin 5 mg oral daily except 7.5 mg FriTue until recheck   2. Recheck INR in one week, 5/2/23  3. Verbal information provided over the telephone. Mr. Montemayor expresses understanding with teach back and has no further questions at this time.     Home monitor information below:  · Test strips on hand: 5  4/26/23   ·   Lot : [id number]  Barcode : [id number] Exp : 8/24  · Serial number: [id number]   · Supplies billing code used: last 3/21/23 Next must be on or after  5/16/23  · Transfer Tube Lot number: Lot : [id number]  · Safety Lancets:  Lot : [id number]  Exp : 5/25   Billing:  *bill must be 4 tests 28 days or more from  visit  PLEASE REFER TO ANTICOAGULATION TRAINING POWERPOINT FOR BILLING. Needs to use in clinic remote appointment for encounter.              Luigi Melo CPhT  4/26/2023  09:26 EDT

## 2023-04-27 RX ORDER — WARFARIN SODIUM 7.5 MG/1
TABLET ORAL
Qty: 90 TABLET | Refills: 0 | Status: SHIPPED | OUTPATIENT
Start: 2023-04-27

## 2023-05-02 ENCOUNTER — ANTICOAGULATION VISIT (OUTPATIENT)
Dept: PHARMACY | Facility: HOSPITAL | Age: 78
End: 2023-05-02
Payer: MEDICARE

## 2023-05-02 LAB — INR PPP: 3.5

## 2023-05-02 NOTE — PROGRESS NOTES
Anticoagulation Clinic Progress Note   Oswaldo HOMEMONITOR: [employer] [employer] Serial # : [id number]     Indication: afib  Referring Provider: Gale  Initial Warfarin Start Date: 2007  Goal INR: 2-3  Current Drug Interactions:   Diet: eats salads 2x weekly 11/15/22  [id number]: 5 (HTN, Age, CHF, CAD)   Other: anemic, CKD (stage 4) May need bridge with Lovenox given persistent AF (per cardio 1/24/2019), Scr was 3.22 on 11/24/21     Anticoagulation Clinic INR History:   Date 10/30 11/20 12/17 1/14/20 2/11 3/11 4/8 5/6 6/11 7/9 8/6 9/3 9/14 9/30   Total Weekly Dose 32.5 mg 32.5 mg 32.5 mg 32.5 mg 32.5mg 32.5mg 32.5mg 32.5mg 32.5mg 32.5 mg 32.5 mg 32.5mg 32.5mg 26.25 mg   INR 2.3 2.3 2.1 1.9 2.2 2.1 2.1 2.2 2.2 2.3 2.7 1.6 2.2  1.8   Notes                       ??   5 day hold     Date 10/14 11/4 11/11 11/25 12/9 12/16 12/30 1/13 2/3 3/3 3/31 4/28 5/26 6/23   Total Weekly Dose 32.5 mg 32.5mg 35mg 32.5mg 33.75 mg 36.25 mg 33.75 mg 35mg 33.75 33.75mg 33.75mg 33.75 mg 33.75 mg 33.75mg   INR 2.0 1.6 2.1 1.8 1.8 2.5 2.0 3.0 2.1 2.4 2.3 3.0 2.1 2.2   Notes   1x boost GLV boostx1 boostx1             Date 7/21 9/1 10/13 11/10 12/1 12/15 12/29 1/12/22 1/26 2/9 2/16 2/23 3/2 3/16   Total Weekly Dose 33.75mg 33.75mg 33.75mg 33.75mg 33.75 mg 32.5mg 30mg 27.5mg 28.75 mg 30 mg 31.25 mg 31.25 mg 31.25 mg 31.25 mg   INR 2.9 2.5 3.1 3.1 3.2 3.4 3.4 1.9 1.7 1.8 2.1 2.6 2.9 2.9   Notes        redx1 ?  inc        Date 3/30 4/13 5/11 6/8  6/23 7/7 7/28  8/9 8/12 8/16   Total WeeklyDose 31.25mg 31.25 mg 31.25 mg 31.25mg Start dialysis 30mg 30mg 30 mg  33.75mg 37.5mg 37.5mg   INR 2.5 2.2 2.6 3.9  2.1 2.1 1.8 1.23 1.4 1.5 2.1   Notes    Inc torsemide Cath placement    AV fistula placement    HM train  1 no billing       Date 8/23 8/30 9/6 9/13 9/20 9/30 10/4 10/11 10/18 10/25 11/8 11/15   Total WeeklyDose 31.25 mg 32.5 mg 32.5 mg 32.5 mg 32.5 mg 32.5 mg 35 mg 35 mg 35 mg 32.5 mg 26.5 mg 35 mg   INR 1.9 2.0 2.0 2.5 2.7 1.6 1.7 2.8 2.4 1.7  1.4 1.6   Notes HM2 no billing HM3  No billing HM4  No billing HM 1  Bill today HM 2 No billing HM 3 No bill HM 4  No billing 1x boost  HM 5 no bill day Bill day  HM1 HM- 2 no billing   HM 3- no billing 1x boost  HM 4 - no billing      Date 11/22 11/29 12/6 12/13 12/20 12/27 1/3/23 1/10 1/12 1/17 1/26/23 1/31/23   Total WeeklyDose 37.5 mg 37.5 mg 37.5 mg 37.5 mg 42.5 mg  40 mg 37.5 mg 40 mg 42.5mg 45 mg 45 mg 40 mg   INR 1.9 2.8 1.9 1.5 2.3 3.4 1.4 0.8 2.7 POCT  2.23   2.8 HM 3.9 1.9 2.2   Notes HM1- bill day HM 2 - NO bill HM 3 - no bill  Inc GLV Hm 4 - NO BILL HM 1 - Bill today HM 2   no billing HM 3- no billing HM 4  No billing lab error?? clinic HM 1  Bill today HM 2 - no bill  HM 3 - no bill      Date 2/7 2/14 2/21/23 2/28/23 3/7 3/14/23 3/21/23 3/28/23 4/4 4/11 4/18 4/26   Total WeeklyDose 40 mg 40 mg 40 mg 37.5 mg 40 mg 40 mg 40 mg 40 mg 37.5 mg 37.5 mg 37.5 mg  40 mg   INR 2.3 2.2 3.9 1.6 3.7 ??? 2.5 2.7 3.2 2.8 2.5 1.7 1.7   Notes HM 4 rec 2/15  HM 1  Bill today HM 2 - no billing  1xred  HM 3 - NO BILL  HM-4 no bill, percocet HM 5 - no billing until 3/15 HM 1 - BILL today  HM 2 - no Billing HM 3  No billing HM 4  No billing HM 1-  BILL today  HM 2  No billing     Date 5/2              Total WeeklyDose 42.5 mg              INR 3.5              Notes HM 3  No billing                Takes warfarin in the am    Clinic Interview:  Verbal Release Authorization signed on 8/22/2018 -- may speak with Raeann Montemayor (Wife). [phone] (Home) *Preferred*  Tablet Strength: pt has 7.5mg and 2.5mg tablets  Estimated oop cost for [facility] ED[facility] HM: Patient has [employer] Medicare so they should have minimal to no OOP costs.    Patient Findings    Negatives:  Signs/symptoms of thrombosis, Signs/symptoms of bleeding, Laboratory test error suspected, Change in health, Change in alcohol use, Change in activity, Upcoming invasive procedure, Emergency department visit, Upcoming dental procedure, Missed doses, Extra doses, Change in  medications, Change in diet/appetite, Hospital admission, Bruising, Other complaints   Comments:  All findings negative per pt.      Plan:   1. INR was SUPRAtherapeutic today at 3.5 (goal 2.0-3.0). Per Evelina Quan, PharmD, Instructed Mr. Montemayor to take warfarin 5 mg daily until recheck   2. Recheck INR on 5/5/23.  3. Verbal information provided over the telephone. Mr. Montemayor expresses understanding with teach back and has no further questions at this time.     Home monitor information below:  · Test strips on hand: 4  5/2/23   ·   Lot : [id number]  Barcode : [id number] Exp : 8/24  · Serial number: [id number]   · Supplies billing code used: last 3/21/23 Next must be on or after  5/16/23  · Transfer Tube Lot number: Lot : [id number]  · Safety Lancets:  Lot : [id number]  Exp : 5/25   Billing:  *bill must be 4 tests 28 days or more from  visit  PLEASE REFER TO ANTICOAGULATION TRAINING POWERPOINT FOR BILLING. Needs to use in clinic remote appointment for encounter.              Luigi Melo CPhT  5/2/2023  09:24 EDT       I, Kristine Sutton, PharmD, have reviewed the note in full and agree with the assessment and plan.  05/02/23  16:29 EDT

## 2023-05-03 ENCOUNTER — OFFICE VISIT (OUTPATIENT)
Dept: SLEEP MEDICINE | Facility: HOSPITAL | Age: 78
End: 2023-05-03
Payer: MEDICARE

## 2023-05-03 VITALS
BODY MASS INDEX: 39.91 KG/M2 | WEIGHT: 278.8 LBS | SYSTOLIC BLOOD PRESSURE: 181 MMHG | OXYGEN SATURATION: 95 % | HEART RATE: 98 BPM | DIASTOLIC BLOOD PRESSURE: 77 MMHG | HEIGHT: 70 IN

## 2023-05-03 DIAGNOSIS — G47.34 NOCTURNAL HYPOXEMIA: ICD-10-CM

## 2023-05-03 DIAGNOSIS — G47.33 OBSTRUCTIVE SLEEP APNEA, ADULT: Primary | ICD-10-CM

## 2023-05-03 NOTE — PROGRESS NOTES
"Chief Complaint:   Chief Complaint   Patient presents with   • Follow-up       HPI:    Marco Antonio Montemayor is a 78 y.o. male here for follow-up of sleep apnea.  Patient was last seen 5/3/2022 and is on BiPAP therapy.  Patient states he is doing well sleeping 6 to 8 hours nightly and does feel rested upon awakening.  Patient goes to sleep within 15 minutes and does get up 1-2 times in the night.  Patient has an [place] score of 12/24.  Last year when we saw patient he was on O2 at 5 L that did bleed into his BiPAP.  Patient states DME came and picked up the machine stating he did not have an order.  Patient states \"I may not need it anymore.\"  We will do an overnight pulse oximetry while wearing BiPAP and I will get oxygen ordered if needed.  Patient feels he is doing well and wishes to continue therapy.        Current medications are:   Current Outpatient Medications:   •  acetaminophen (TYLENOL) 500 MG tablet, Take 1 tablet by mouth Every 6 (Six) Hours As Needed for Mild Pain., Disp: , Rfl:   •  atorvastatin (LIPITOR) 40 MG tablet, TAKE ONE TABLET BY MOUTH DAILY, Disp: 90 tablet, Rfl: 3  •  bumetanide (BUMEX) 2 MG tablet, Take 1 tablet by mouth Every Other Day. Takes on days off from dialysis (Tues, Thurs, Sat, Sun), Disp: , Rfl:   •  carvedilol (COREG) 25 MG tablet, Take 12.5 mg by mouth 2 (Two) Times a Day With Meals., Disp: , Rfl:   •  cholecalciferol (VITAMIN D3) 1000 UNITS tablet, Take 1 tablet by mouth Daily., Disp: , Rfl:   •  Ferrous Sulfate (IRON) 325 (65 FE) MG tablet, Take 65 mg by mouth Daily., Disp: , Rfl:   •  lisinopril (PRINIVIL,ZESTRIL) 5 MG tablet, Take 1 tablet by mouth Daily., Disp: , Rfl:   •  PHARMACY TO DOSE WARFARIN, Continuous As Needed (patient's warfarin is being managed by the [facility] ([phone]))., Disp: , Rfl:   •  Sucroferric Oxyhydroxide (Velphoro) 500 MG chewable tablet, Take As Directed., Disp: , Rfl:   •  vitamin B-12 (CYANOCOBALAMIN) " 1000 MCG tablet, Take 1 tablet by mouth Daily., Disp: , Rfl:   •  warfarin (COUMADIN) 2.5 MG tablet, Take 1.5 to 2 tablets by mouth daily or as directed by anticoagulation clinic (Patient taking differently: Take 2 tablets by mouth daily or as directed by anticoagulation clinic), Disp: 180 tablet, Rfl: 1  •  warfarin (COUMADIN) 7.5 MG tablet, Take one tablet by mouth daily or as directed by the Anticoagulation Clinic, Disp: 90 tablet, Rfl: 0.      The patient's relevant past medical, surgical, family and social history were reviewed and updated in Epic as appropriate.       Review of Systems   HENT: Positive for tinnitus.    Eyes: Positive for visual disturbance.   Respiratory: Positive for chest tightness and shortness of breath.    Cardiovascular: Positive for palpitations and leg swelling.   Musculoskeletal: Positive for myalgias.   Psychiatric/Behavioral: Positive for sleep disturbance.   All other systems reviewed and are negative.        Objective:    Physical Exam  Constitutional:       Appearance: Normal appearance.   HENT:      Head: Normocephalic and atraumatic.      Mouth/Throat:      Comments: Mallampati 4 anatomy  Cardiovascular:      Rate and Rhythm: Rhythm irregular.   Pulmonary:      Effort: Pulmonary effort is normal.      Breath sounds: Normal breath sounds.   Skin:     General: Skin is warm and dry.   Neurological:      Mental Status: He is alert and oriented to person, place, and time.   Psychiatric:         Mood and Affect: Mood normal.         Behavior: Behavior normal.         Thought Content: Thought content normal.         Judgment: Judgment normal.         CPAP Report    29/30 days of use  Greater than 4 hours use 96.7  AHI 1.5  Maximum IPAP 25  Minimum EPAP 8  The patient continues to use and benefit from CPAP therapy.    ASSESSMENT/PLAN    Diagnoses and all orders for this visit:    1. Obstructive sleep apnea, adult (Primary)  -     PAP Therapy  -     Overnight Sleep Oximetry Study;  Future    2. Nocturnal hypoxemia  -     Overnight Sleep Oximetry Study; Future        1. Counseled patient regarding multimodal approach with healthy nutrition, healthy sleep, regular physical activity, social activities, counseling, and medications. Encouraged to practice lateral sleep position. Avoid alcohol and sedatives close to bedtime.  2.     Fill supplies x1 year.  Overnight oximetry to be done to reassess nocturnal hypoxemia and patient will be called with these results.  Arise, follow-up in 1 year  I have reviewed the results of my evaluation and impression and discussed my recommendations in detail with the patient.      Signed by  JENNIFER Solitario    May 3, 2023      CC: Provider, No Known         No ref. provider found

## 2023-05-05 ENCOUNTER — ANTICOAGULATION VISIT (OUTPATIENT)
Dept: PHARMACY | Facility: HOSPITAL | Age: 78
End: 2023-05-05
Payer: MEDICARE

## 2023-05-05 LAB — INR PPP: 1.8

## 2023-05-05 NOTE — PROGRESS NOTES
Anticoagulation Clinic Progress Note   Oswaldo HOMEMONITOR: [employer] Check Serial # : [id number]     Indication: afib  Referring Provider: Gale  Initial Warfarin Start Date: 2007  Goal INR: 2-3  Current Drug Interactions:   Diet: eats salads 2x weekly 11/15/22  [id number]: 5 (HTN, Age, CHF, CAD)   Other: anemic, CKD (stage 4) May need bridge with Lovenox given persistent AF (per cardio 1/24/2019), Scr was 3.22 on 11/24/21     Anticoagulation Clinic INR History:   Date 10/30 11/20 12/17 1/14/20 2/11 3/11 4/8 5/6 6/11 7/9 8/6 9/3 9/14 9/30   Total Weekly Dose 32.5 mg 32.5 mg 32.5 mg 32.5 mg 32.5mg 32.5mg 32.5mg 32.5mg 32.5mg 32.5 mg 32.5 mg 32.5mg 32.5mg 26.25 mg   INR 2.3 2.3 2.1 1.9 2.2 2.1 2.1 2.2 2.2 2.3 2.7 1.6 2.2  1.8   Notes                       ??   5 day hold     Date 10/14 11/4 11/11 11/25 12/9 12/16 12/30 1/13 2/3 3/3 3/31 4/28 5/26 6/23   Total Weekly Dose 32.5 mg 32.5mg 35mg 32.5mg 33.75 mg 36.25 mg 33.75 mg 35mg 33.75 33.75mg 33.75mg 33.75 mg 33.75 mg 33.75mg   INR 2.0 1.6 2.1 1.8 1.8 2.5 2.0 3.0 2.1 2.4 2.3 3.0 2.1 2.2   Notes   1x boost GLV boostx1 boostx1             Date 7/21 9/1 10/13 11/10 12/1 12/15 12/29 1/12/22 1/26 2/9 2/16 2/23 3/2 3/16   Total Weekly Dose 33.75mg 33.75mg 33.75mg 33.75mg 33.75 mg 32.5mg 30mg 27.5mg 28.75 mg 30 mg 31.25 mg 31.25 mg 31.25 mg 31.25 mg   INR 2.9 2.5 3.1 3.1 3.2 3.4 3.4 1.9 1.7 1.8 2.1 2.6 2.9 2.9   Notes        redx1 ?  inc        Date 3/30 4/13 5/11 6/8  6/23 7/7 7/28  8/9 8/12 8/16   Total WeeklyDose 31.25mg 31.25 mg 31.25 mg 31.25mg Start dialysis 30mg 30mg 30 mg  33.75mg 37.5mg 37.5mg   INR 2.5 2.2 2.6 3.9  2.1 2.1 1.8 1.23 1.4 1.5 2.1   Notes    Inc torsemide Cath placement    AV fistula placement    HM train  1 no billing       Date 8/23 8/30 9/6 9/13 9/20 9/30 10/4 10/11 10/18 10/25 11/8 11/15   Total WeeklyDose 31.25 mg 32.5 mg 32.5 mg 32.5 mg 32.5 mg 32.5 mg 35 mg 35 mg 35 mg 32.5 mg 26.5 mg 35 mg   INR 1.9 2.0 2.0 2.5 2.7 1.6 1.7 2.8 2.4 1.7  1.4 1.6   Notes HM2 no billing HM3  No billing HM4  No billing HM 1  Bill today HM 2 No billing HM 3 No bill HM 4  No billing 1x boost  HM 5 no bill day Bill day  HM1 HM- 2 no billing   HM 3- no billing 1x boost  HM 4 - no billing      Date 11/22 11/29 12/6 12/13 12/20 12/27 1/3/23 1/10 1/12 1/17 1/26/23 1/31/23   Total WeeklyDose 37.5 mg 37.5 mg 37.5 mg 37.5 mg 42.5 mg  40 mg 37.5 mg 40 mg 42.5mg 45 mg 45 mg 40 mg   INR 1.9 2.8 1.9 1.5 2.3 3.4 1.4 0.8 2.7 POCT  2.23   2.8 HM 3.9 1.9 2.2   Notes HM1- bill day HM 2 - NO bill HM 3 - no bill  Inc GLV Hm 4 - NO BILL HM 1 - Bill today HM 2   no billing HM 3- no billing HM 4  No billing lab error?? clinic HM 1  Bill today HM 2 - no bill  HM 3 - no bill      Date 2/7 2/14 2/21/23 2/28/23 3/7 3/14/23 3/21/23 3/28/23 4/4 4/11 4/18 4/26   Total WeeklyDose 40 mg 40 mg 40 mg 37.5 mg 40 mg 40 mg 40 mg 40 mg 37.5 mg 37.5 mg 37.5 mg  40 mg   INR 2.3 2.2 3.9 1.6 3.7 ??? 2.5 2.7 3.2 2.8 2.5 1.7 1.7   Notes HM 4 rec 2/15  HM 1  Bill today HM 2 - no billing  1xred  HM 3 - NO BILL  HM-4 no bill, percocet HM 5 - no billing until 3/15 HM 1 - BILL today  HM 2 - no Billing HM 3  No billing HM 4  No billing HM 1-  BILL today  HM 2  No billing     Date 5/2 5/5             Total WeeklyDose 42.5 mg 37.5 mg             INR 3.5 1.8             Notes HM 3  No billing HM 4  No billing               Takes warfarin in the am    Clinic Interview:  Verbal Release Authorization signed on 8/22/2018 -- may speak with Raeann Montemayor (Wife). [phone] (Home) *Preferred*  Tablet Strength: pt has 7.5mg and 2.5mg tablets  Estimated oop cost for [facility] EDER HM: Patient has [employer] Medicare so they should have minimal to no OOP costs.    Patient Findings    Negatives:  Signs/symptoms of thrombosis, Signs/symptoms of bleeding, Laboratory test error suspected, Change in health, Change in alcohol use, Change in activity, Upcoming invasive procedure, Emergency department visit, Upcoming dental procedure, Missed  doses, Extra doses, Change in medications, Change in diet/appetite, Hospital admission, Bruising, Other complaints   Comments:  All findings negative per pt.      Plan:   1. INR was SUBtherapeutic today at 1.8 (goal 2.0-3.0). Per Evelina Quan, PharmD, Instructed Mr. Montemayor to take warfarin 5 mg daily except 7.5 mg FriSunTue until recheck   2. Recheck INR on 5/11/23.  3. Verbal information provided over the telephone. Mr. Montemayor expresses understanding with teach back and has no further questions at this time.     Home monitor information below:  · Test strips on hand: 3  5/5/23   ·   Lot : [id number]  Barcode : [id number] Exp : 8/24  · Serial number: [id number]   · Supplies billing code used: last 3/21/23 Next must be on or after  5/16/23  · Transfer Tube Lot number: Lot : [id number]  · Safety Lancets:  Lot : [id number]  Exp : 5/25   Billing:  *bill must be 4 tests 28 days or more from  visit  PLEASE REFER TO ANTICOAGULATION TRAINING POWERPOINT FOR BILLING. Needs to use in clinic remote appointment for encounter.              Luigi Melo CPhT  5/5/2023  11:06 EDT       IKristine, PharmD, have reviewed the note in full and agree with the assessment and plan.  05/05/23  11:31 EDT

## 2023-05-11 ENCOUNTER — ANTICOAGULATION VISIT (OUTPATIENT)
Dept: PHARMACY | Facility: HOSPITAL | Age: 78
End: 2023-05-11
Payer: MEDICARE

## 2023-05-11 LAB — INR PPP: 2.9

## 2023-05-11 PROCEDURE — G0463 HOSPITAL OUTPT CLINIC VISIT: HCPCS

## 2023-05-11 NOTE — PROGRESS NOTES
Anticoagulation Clinic Progress Note   Oswaldo HOMEMONITOR: [employer] Serial # : [id number]     Indication: afib  Referring Provider: Gale  Initial Warfarin Start Date: 2007  Goal INR: 2-3  Current Drug Interactions:   [id number]: 5 (HTN, Age, CHF, CAD)   Other: anemic, CKD (stage 4) May need bridge with Lovenox given persistent AF (per cardio 1/24/2019), Scr was 3.22 on 11/24/21    Diet: eats salads 2x weekly 5/11/2023     Anticoagulation Clinic INR History:   Date 10/30 11/20 12/17 1/14/20 2/11 3/11 4/8 5/6 6/11 7/9 8/6 9/3 9/14 9/30   Total Weekly Dose 32.5 mg 32.5 mg 32.5 mg 32.5 mg 32.5mg 32.5mg 32.5mg 32.5mg 32.5mg 32.5 mg 32.5 mg 32.5mg 32.5mg 26.25 mg   INR 2.3 2.3 2.1 1.9 2.2 2.1 2.1 2.2 2.2 2.3 2.7 1.6 2.2  1.8   Notes                       ??   5 day hold     Date 10/14 11/4 11/11 11/25 12/9 12/16 12/30 1/13 2/3 3/3 3/31 4/28 5/26 6/23   Total Weekly Dose 32.5 mg 32.5mg 35mg 32.5mg 33.75 mg 36.25 mg 33.75 mg 35mg 33.75 33.75mg 33.75mg 33.75 mg 33.75 mg 33.75mg   INR 2.0 1.6 2.1 1.8 1.8 2.5 2.0 3.0 2.1 2.4 2.3 3.0 2.1 2.2   Notes   1x boost GLV boostx1 boostx1             Date 7/21 9/1 10/13 11/10 12/1 12/15 12/29 1/12/22 1/26 2/9 2/16 2/23 3/2 3/16   Total Weekly Dose 33.75mg 33.75mg 33.75mg 33.75mg 33.75 mg 32.5mg 30mg 27.5mg 28.75 mg 30 mg 31.25 mg 31.25 mg 31.25 mg 31.25 mg   INR 2.9 2.5 3.1 3.1 3.2 3.4 3.4 1.9 1.7 1.8 2.1 2.6 2.9 2.9   Notes        redx1 ?  inc        Date 3/30 4/13 5/11 6/8  6/23 7/7 7/28  8/9 8/12 8/16   Total WeeklyDose 31.25mg 31.25 mg 31.25 mg 31.25mg Start dialysis 30mg 30mg 30 mg  33.75mg 37.5mg 37.5mg   INR 2.5 2.2 2.6 3.9  2.1 2.1 1.8 1.23 1.4 1.5 2.1   Notes    Inc torsemide Cath placement    AV fistula placement    HM train  1 no billing       Date 8/23 8/30 9/6 9/13 9/20 9/30 10/4 10/11 10/18 10/25 11/8 11/15   Total WeeklyDose 31.25 mg 32.5 mg 32.5 mg 32.5 mg 32.5 mg 32.5 mg 35 mg 35 mg 35 mg 32.5 mg 26.5 mg 35 mg   INR 1.9 2.0 2.0 2.5 2.7 1.6 1.7 2.8 2.4 1.7  1.4 1.6   Notes HM2 no billing HM3  No billing HM4  No billing HM 1  Bill today HM 2 No billing HM 3 No bill HM 4  No billing 1x boost  HM 5 no bill day Bill day  HM1 HM- 2 no billing   HM 3- no billing 1x boost  HM 4 - no billing      Date 11/22 11/29 12/6 12/13 12/20 12/27 1/3/23 1/10 1/12 1/17 1/26/23 1/31/23   Total WeeklyDose 37.5 mg 37.5 mg 37.5 mg 37.5 mg 42.5 mg  40 mg 37.5 mg 40 mg 42.5mg 45 mg 45 mg 40 mg   INR 1.9 2.8 1.9 1.5 2.3 3.4 1.4 0.8 2.7 POCT  2.23   2.8 HM 3.9 1.9 2.2   Notes HM1- bill day HM 2 - NO bill HM 3 - no bill  Inc GLV Hm 4 - NO BILL HM 1 - Bill today HM 2   no billing HM 3- no billing HM 4  No billing lab error?? clinic HM 1  Bill today HM 2 - no bill  HM 3 - no bill      Date 2/7 2/14 2/21/23 2/28/23 3/7 3/14/23 3/21/23 3/28/23 4/4 4/11 4/18 4/26   Total WeeklyDose 40 mg 40 mg 40 mg 37.5 mg 40 mg 40 mg 40 mg 40 mg 37.5 mg 37.5 mg 37.5 mg  40 mg   INR 2.3 2.2 3.9 1.6 3.7 ??? 2.5 2.7 3.2 2.8 2.5 1.7 1.7   Notes HM 4 rec 2/15  HM 1  Bill today HM 2 - no billing  1xred  HM 3 - NO BILL  HM-4 no bill, percocet HM 5 - no billing until 3/15 HM 1 - BILL today  HM 2 - no Billing HM 3  No billing HM 4  No billing HM 1-  BILL today  HM 2  No billing     Date 5/2 5/5 5/11            Total WeeklyDose 42.5 mg 37.5 mg 42.5mg            INR 3.5 1.8 2.9            Notes HM 3  No billing HM 4  No billing HM 5  No billing bill             Takes warfarin in the am    Clinic Interview:  Verbal Release Authorization signed on 8/22/2018 -- may speak with Raeann Montemayor (Wife). [phone] (Home) *Preferred*  Tablet Strength: pt has 7.5mg and 2.5mg tablets  Estimated oop cost for [facility] HM: Patient has [employer] Medicare so they should have minimal to no OOP costs.    Patient Findings  Negatives:  Signs/symptoms of thrombosis, Signs/symptoms of bleeding, Laboratory test error suspected, Change in health, Change in alcohol use, Change in activity, Upcoming invasive procedure, Emergency department visit,  Upcoming dental procedure, Missed doses, Extra doses, Change in medications, Change in diet/appetite, Hospital admission, Bruising, Other complaints   Comments:  All findings negative per pt.      Plan:   1. INR is therapeutic today at 2.9 (goal 2.0-3.0). Per Evelina Quan, PharmD, instructed Mr. Montemayor to continue warfarin 5 mg daily except 7.5 mg FriSunTue until recheck. At next encounter on Tuesday- need to determine if patient should take 7.5mg on Tue or 5mg. (determining between 42.5mg and 40mg/week)   2. Recheck INR on 5/16/23.   3. Patient again confirms 3 test strips.  4. Verbal information provided over the telephone. Mr. Montemayor expresses understanding with teach back and has no further questions at this time.     Home monitor information below:  · Test strips on hand: 3  5/11/23   ·   Lot : [id number]  Barcode : [id number] Exp : 8/24  · Serial number: [id number]   · Supplies billing code used: last 4/18/23 Next must be on or after  5/16/23  · Transfer Tube Lot number: Lot : [id number]  · Safety Lancets:  Lot : [id number]  Exp : 5/25   Billing:  *bill must be 4 tests 28 days or more from HM visit  PLEASE REFER TO ANTICOAGULATION TRAINING POWERPOINT FOR BILLING. Needs to use in clinic remote appointment for encounter.              Evelina Quan, PharmD  5/11/2023  09:31 EDT

## 2023-05-16 ENCOUNTER — ANTICOAGULATION VISIT (OUTPATIENT)
Dept: PHARMACY | Facility: HOSPITAL | Age: 78
End: 2023-05-16
Payer: MEDICARE

## 2023-05-16 LAB
INR PPP: 4.2
INR PPP: 4.2

## 2023-05-16 PROCEDURE — G0249 PROVIDE INR TEST MATER/EQUIP: HCPCS

## 2023-05-16 NOTE — PROGRESS NOTES
Anticoagulation Clinic Progress Note   Oswaldo HOMEMONITOR: [employer] Serial # : [id number]     Indication: afib  Referring Provider: Gale  Initial Warfarin Start Date: 2007  Goal INR: 2-3  Current Drug Interactions:   [id number]: 5 (HTN, Age, CHF, CAD)   Other: anemic, CKD (stage 4) May need bridge with Lovenox given persistent AF (per cardio 1/24/2019), Scr was 3.22 on 11/24/21    Diet: eats salads 2x weekly 5/11/2023     Anticoagulation Clinic INR History:   Date 10/30 11/20 12/17 1/14/20 2/11 3/11 4/8 5/6 6/11 7/9 8/6 9/3 9/14 9/30   Total Weekly Dose 32.5 mg 32.5 mg 32.5 mg 32.5 mg 32.5mg 32.5mg 32.5mg 32.5mg 32.5mg 32.5 mg 32.5 mg 32.5mg 32.5mg 26.25 mg   INR 2.3 2.3 2.1 1.9 2.2 2.1 2.1 2.2 2.2 2.3 2.7 1.6 2.2  1.8   Notes                       ??   5 day hold     Date 10/14 11/4 11/11 11/25 12/9 12/16 12/30 1/13 2/3 3/3 3/31 4/28 5/26 6/23   Total Weekly Dose 32.5 mg 32.5mg 35mg 32.5mg 33.75 mg 36.25 mg 33.75 mg 35mg 33.75 33.75mg 33.75mg 33.75 mg 33.75 mg 33.75mg   INR 2.0 1.6 2.1 1.8 1.8 2.5 2.0 3.0 2.1 2.4 2.3 3.0 2.1 2.2   Notes   1x boost GLV boostx1 boostx1             Date 7/21 9/1 10/13 11/10 12/1 12/15 12/29 1/12/22 1/26 2/9 2/16 2/23 3/2 3/16   Total Weekly Dose 33.75mg 33.75mg 33.75mg 33.75mg 33.75 mg 32.5mg 30mg 27.5mg 28.75 mg 30 mg 31.25 mg 31.25 mg 31.25 mg 31.25 mg   INR 2.9 2.5 3.1 3.1 3.2 3.4 3.4 1.9 1.7 1.8 2.1 2.6 2.9 2.9   Notes        redx1 ?  inc        Date 3/30 4/13 5/11 6/8  6/23 7/7 7/28  8/9 8/12 8/16   Total WeeklyDose 31.25mg 31.25 mg 31.25 mg 31.25mg Start dialysis 30mg 30mg 30 mg  33.75mg 37.5mg 37.5mg   INR 2.5 2.2 2.6 3.9  2.1 2.1 1.8 1.23 1.4 1.5 2.1   Notes    Inc torsemide Cath placement    AV fistula placement    HM train  1 no billing       Date 8/23 8/30 9/6 9/13 9/20 9/30 10/4 10/11 10/18 10/25 11/8 11/15   Total WeeklyDose 31.25 mg 32.5 mg 32.5 mg 32.5 mg 32.5 mg 32.5 mg 35 mg 35 mg 35 mg 32.5 mg 26.5 mg 35 mg   INR 1.9 2.0 2.0 2.5 2.7 1.6 1.7 2.8 2.4 1.7  1.4 1.6   Notes HM2 no billing HM3  No billing HM4  No billing HM 1  Bill today HM 2 No billing HM 3 No bill HM 4  No billing 1x boost  HM 5 no bill day Bill day  HM1 HM- 2 no billing   HM 3- no billing 1x boost  HM 4 - no billing      Date 11/22 11/29 12/6 12/13 12/20 12/27 1/3/23 1/10 1/12 1/17 1/26/23 1/31/23   Total WeeklyDose 37.5 mg 37.5 mg 37.5 mg 37.5 mg 42.5 mg  40 mg 37.5 mg 40 mg 42.5mg 45 mg 45 mg 40 mg   INR 1.9 2.8 1.9 1.5 2.3 3.4 1.4 0.8 2.7 POCT  2.23   2.8 HM 3.9 1.9 2.2   Notes HM1- bill day HM 2 - NO bill HM 3 - no bill  Inc GLV Hm 4 - NO BILL HM 1 - Bill today HM 2   no billing HM 3- no billing HM 4  No billing lab error?? clinic HM 1  Bill today HM 2 - no bill  HM 3 - no bill      Date 2/7 2/14 2/21/23 2/28/23 3/7 3/14/23 3/21/23 3/28/23 4/4 4/11 4/18 4/26   Total WeeklyDose 40 mg 40 mg 40 mg 37.5 mg 40 mg 40 mg 40 mg 40 mg 37.5 mg 37.5 mg 37.5 mg  40 mg   INR 2.3 2.2 3.9 1.6 3.7 ??? 2.5 2.7 3.2 2.8 2.5 1.7 1.7   Notes HM 4 rec 2/15  HM 1  Bill today HM 2 - no billing  1xred  HM 3 - NO BILL  HM-4 no bill, percocet HM 5 - no billing until 3/15 HM 1 - BILL today  HM 2 - no Billing HM 3  No billing HM 4  No billing HM 1-  BILL today  HM 2  No billing     Date 5/2 5/5 5/11 5/16 5/22          Total WeeklyDose 42.5 mg 37.5 mg 42.5mg 42.5 mg  35 mg          INR 3.5 1.8 2.9 4.2           Notes HM 3  No billing HM 4  No billing HM 5  No billing HM-1  Bill  Dec'd GLV  1x dose hold             Takes warfarin in the am    Clinic Interview:  Verbal Release Authorization signed on 8/22/2018 -- may speak with Raeann Montemayor (Wife). [phone] (Home) *Preferred*  Tablet Strength: pt has 7.5mg and 2.5mg tablets  Estimated oop cost for [facility] HM: Patient has [employer] Medicare so they should have minimal to no OOP costs.    Patient Findings  Positives:  Change in diet/appetite   Negatives:  Signs/symptoms of thrombosis, Signs/symptoms of bleeding, Laboratory test error suspected, Change in health, Change in  alcohol use, Change in activity, Upcoming invasive procedure, Emergency department visit, Upcoming dental procedure, Missed doses, Extra doses, Change in medications, Hospital admission, Bruising, Other complaints   Comments:  Mr. Montemayor stated that he didn't eat his usual 2 salads this past week. He denies any extra doses or new medications. Counseled patient on the importance of consistency in GLV in diet. Also, discussed the signs of unusual bleeding/bruising with patient. All other findings listed above are negative.          Plan:   1. INR is Supratherapeutic today at 4.2 (goal 2.0-3.0). Istructed Mr. Montemayor to hold warfarin dose this evening and then to continue warfarin 5 mg daily except 7.5 mg FriSunTue until recheck.   2. Recheck INR on 5/22/23. .  3. Verbal information provided over the telephone. Mr. Montemayor expresses understanding with teach back and has no further questions at this time.     Home monitor information below:  · Test strips on hand: 2  5/11/23   ·   Lot : [id number]  Barcode : [id number] Exp : 8/24  · Serial number: [id number]   · Supplies billing code used: last 5/16/23 Next must be on or after  6/13/23  · Transfer Tube Lot number: Lot : [id number]  · Safety Lancets:  Lot : [id number]  Exp : 5/25   Billing:  *bill must be 4 tests 28 days or more from  visit  PLEASE REFER TO ANTICOAGULATION TRAINING POWERPOINT FOR BILLING. Needs to use in clinic remote appointment for encounter.              Maryann Schmidt [facility]  5/16/2023  09:51 EDT

## 2023-05-22 ENCOUNTER — ANTICOAGULATION VISIT (OUTPATIENT)
Dept: PHARMACY | Facility: HOSPITAL | Age: 78
End: 2023-05-22
Payer: MEDICARE

## 2023-05-22 LAB — INR PPP: 2

## 2023-05-22 NOTE — PROGRESS NOTES
Anticoagulation Clinic Progress Note   Oswaldo HOMEMONITOR: [employer] Serial # : [id number]     Indication: afib  Referring Provider: Gale  Initial Warfarin Start Date: 2007  Goal INR: 2-3  Current Drug Interactions:   [id number]: 5 (HTN, Age, CHF, CAD)   Other: anemic, CKD (stage 4) May need bridge with Lovenox given persistent AF (per cardio 1/24/2019), Scr was 3.22 on 11/24/21    Diet: eats salads 2x weekly 5/11/2023     Anticoagulation Clinic INR History:   Date 10/30 11/20 12/17 1/14/20 2/11 3/11 4/8 5/6 6/11 7/9 8/6 9/3 9/14 9/30   Total Weekly Dose 32.5 mg 32.5 mg 32.5 mg 32.5 mg 32.5mg 32.5mg 32.5mg 32.5mg 32.5mg 32.5 mg 32.5 mg 32.5mg 32.5mg 26.25 mg   INR 2.3 2.3 2.1 1.9 2.2 2.1 2.1 2.2 2.2 2.3 2.7 1.6 2.2  1.8   Notes                       ??   5 day hold     Date 10/14 11/4 11/11 11/25 12/9 12/16 12/30 1/13 2/3 3/3 3/31 4/28 5/26 6/23   Total Weekly Dose 32.5 mg 32.5mg 35mg 32.5mg 33.75 mg 36.25 mg 33.75 mg 35mg 33.75 33.75mg 33.75mg 33.75 mg 33.75 mg 33.75mg   INR 2.0 1.6 2.1 1.8 1.8 2.5 2.0 3.0 2.1 2.4 2.3 3.0 2.1 2.2   Notes   1x boost GLV boostx1 boostx1             Date 7/21 9/1 10/13 11/10 12/1 12/15 12/29 1/12/22 1/26 2/9 2/16 2/23 3/2 3/16   Total Weekly Dose 33.75mg 33.75mg 33.75mg 33.75mg 33.75 mg 32.5mg 30mg 27.5mg 28.75 mg 30 mg 31.25 mg 31.25 mg 31.25 mg 31.25 mg   INR 2.9 2.5 3.1 3.1 3.2 3.4 3.4 1.9 1.7 1.8 2.1 2.6 2.9 2.9   Notes        redx1 ?  inc        Date 3/30 4/13 5/11 6/8  6/23 7/7 7/28  8/9 8/12 8/16   Total WeeklyDose 31.25mg 31.25 mg 31.25 mg 31.25mg Start dialysis 30mg 30mg 30 mg  33.75mg 37.5mg 37.5mg   INR 2.5 2.2 2.6 3.9  2.1 2.1 1.8 1.23 1.4 1.5 2.1   Notes    Inc torsemide Cath placement    AV fistula placement    HM train  1 no billing       Date 8/23 8/30 9/6 9/13 9/20 9/30 10/4 10/11 10/18 10/25 11/8 11/15   Total WeeklyDose 31.25 mg 32.5 mg 32.5 mg 32.5 mg 32.5 mg 32.5 mg 35 mg 35 mg 35 mg 32.5 mg 26.5 mg 35 mg   INR 1.9 2.0 2.0 2.5 2.7 1.6 1.7 2.8 2.4 1.7  1.4 1.6   Notes HM2 no billing HM3  No billing HM4  No billing HM 1  Bill today HM 2 No billing HM 3 No bill HM 4  No billing 1x boost  HM 5 no bill day Bill day  HM1 HM- 2 no billing   HM 3- no billing 1x boost  HM 4 - no billing      Date 11/22 11/29 12/6 12/13 12/20 12/27 1/3/23 1/10 1/12 1/17 1/26/23 1/31/23   Total WeeklyDose 37.5 mg 37.5 mg 37.5 mg 37.5 mg 42.5 mg  40 mg 37.5 mg 40 mg 42.5mg 45 mg 45 mg 40 mg   INR 1.9 2.8 1.9 1.5 2.3 3.4 1.4 0.8 2.7 POCT  2.23   2.8 HM 3.9 1.9 2.2   Notes HM1- bill day HM 2 - NO bill HM 3 - no bill  Inc GLV Hm 4 - NO BILL HM 1 - Bill today HM 2   no billing HM 3- no billing HM 4  No billing lab error?? clinic HM 1  Bill today HM 2 - no bill  HM 3 - no bill      Date 2/7 2/14 2/21/23 2/28/23 3/7 3/14/23 3/21/23 3/28/23 4/4 4/11 4/18 4/26   Total WeeklyDose 40 mg 40 mg 40 mg 37.5 mg 40 mg 40 mg 40 mg 40 mg 37.5 mg 37.5 mg 37.5 mg  40 mg   INR 2.3 2.2 3.9 1.6 3.7 ??? 2.5 2.7 3.2 2.8 2.5 1.7 1.7   Notes HM 4 rec 2/15  HM 1  Bill today HM 2 - no billing  1xred  HM 3 - NO BILL  HM-4 no bill, percocet HM 5 - no billing until 3/15 HM 1 - BILL today  HM 2 - no Billing HM 3  No billing HM 4  No billing HM 1-  BILL today  HM 2  No billing     Date 5/2 5/5 5/11 5/16 5/22          Total WeeklyDose 42.5 mg 37.5 mg 42.5mg 42.5 mg  35 mg          INR 3.5 1.8 2.9 4.2 2.0          Notes HM 3  No billing HM 4  No billing HM 5  No billing HM-1  Bill  Dec'd GLV  HM 2  No billing  1x hold             Takes warfarin in the am    Clinic Interview:  Verbal Release Authorization signed on 8/22/2018 -- may speak with Raeann Montemayor (Wife). [phone] (Home) *Preferred*  Tablet Strength: pt has 7.5mg and 2.5mg tablets  Estimated oop cost for [facility] ED[facility] HM: Patient has [employer] Medicare so they should have minimal to no OOP costs.    Patient Findings  Negatives:  Signs/symptoms of thrombosis, Signs/symptoms of bleeding, Laboratory test error suspected, Change in health, Change in alcohol use, Change in  activity, Upcoming invasive procedure, Emergency department visit, Upcoming dental procedure, Missed doses, Extra doses, Change in medications, Change in diet/appetite, Hospital admission, Bruising, Other complaints   Comments:  All findings negative per pt.      Plan:   1. INR is therapeutic today at 2.0 (goal 2.0-3.0). Per Kristine Sutton, PharmD, Instructed Mr. Montemayor to take warfarin 5 mg daily except 7.5 mg FriSun until recheck.   2. Recheck INR in one week, 5/30/23.  3. Verbal information provided over the telephone. Mr. Montemayor expresses understanding with teach back and has no further questions at this time.     Home monitor information below:  · Test strips on hand: 2  5/11/23 *Sending supplies  ·   Lot : [id number]  Barcode : [id number] Exp : 1/25  · Serial number: [id number]   · Supplies billing code used: last 5/16/23 Next must be on or after  6/13/23  · Transfer Tube Lot number: Lot : [id number]  · Safety Lancets:  Lot : [id number]   Billing:  *bill must be 4 tests 28 days or more from HM visit  PLEASE REFER TO ANTICOAGULATION TRAINING POWERPOINT FOR BILLING. Needs to use in clinic remote appointment for encounter.              Luigi Melo CPhT  5/22/2023  11:58 EDT     I, Raj Salter, PharmD, have reviewed the note in full and agree with the assessment and plan.  05/22/23  14:24 EDT

## 2023-05-30 ENCOUNTER — ANTICOAGULATION VISIT (OUTPATIENT)
Dept: PHARMACY | Facility: HOSPITAL | Age: 78
End: 2023-05-30

## 2023-05-30 LAB — INR PPP: 2.5

## 2023-05-30 NOTE — PROGRESS NOTES
Anticoagulation Clinic Progress Note   Oswaldo HOMEMONITOR: [employer] Serial # : [id number]     Indication: afib  Referring Provider: Gale  Initial Warfarin Start Date: 2007  Goal INR: 2-3  Current Drug Interactions:   [id number]: 5 (HTN, Age, CHF, CAD)   Other: anemic, CKD (stage 4) May need bridge with Lovenox given persistent AF (per cardio 1/24/2019), Scr was 3.22 on 11/24/21    Diet: eats salads 2x weekly 5/11/2023     Anticoagulation Clinic INR History:   Date 10/30 11/20 12/17 1/14/20 2/11 3/11 4/8 5/6 6/11 7/9 8/6 9/3 9/14 9/30   Total Weekly Dose 32.5 mg 32.5 mg 32.5 mg 32.5 mg 32.5mg 32.5mg 32.5mg 32.5mg 32.5mg 32.5 mg 32.5 mg 32.5mg 32.5mg 26.25 mg   INR 2.3 2.3 2.1 1.9 2.2 2.1 2.1 2.2 2.2 2.3 2.7 1.6 2.2  1.8   Notes                       ??   5 day hold     Date 10/14 11/4 11/11 11/25 12/9 12/16 12/30 1/13 2/3 3/3 3/31 4/28 5/26 6/23   Total Weekly Dose 32.5 mg 32.5mg 35mg 32.5mg 33.75 mg 36.25 mg 33.75 mg 35mg 33.75 33.75mg 33.75mg 33.75 mg 33.75 mg 33.75mg   INR 2.0 1.6 2.1 1.8 1.8 2.5 2.0 3.0 2.1 2.4 2.3 3.0 2.1 2.2   Notes   1x boost GLV boostx1 boostx1             Date 7/21 9/1 10/13 11/10 12/1 12/15 12/29 1/12/22 1/26 2/9 2/16 2/23 3/2 3/16   Total Weekly Dose 33.75mg 33.75mg 33.75mg 33.75mg 33.75 mg 32.5mg 30mg 27.5mg 28.75 mg 30 mg 31.25 mg 31.25 mg 31.25 mg 31.25 mg   INR 2.9 2.5 3.1 3.1 3.2 3.4 3.4 1.9 1.7 1.8 2.1 2.6 2.9 2.9   Notes        redx1 ?  inc        Date 3/30 4/13 5/11 6/8  6/23 7/7 7/28  8/9 8/12 8/16   Total WeeklyDose 31.25mg 31.25 mg 31.25 mg 31.25mg Start dialysis 30mg 30mg 30 mg  33.75mg 37.5mg 37.5mg   INR 2.5 2.2 2.6 3.9  2.1 2.1 1.8 1.23 1.4 1.5 2.1   Notes    Inc torsemide Cath placement    AV fistula placement    HM train  1 no billing       Date 8/23 8/30 9/6 9/13 9/20 9/30 10/4 10/11 10/18 10/25 11/8 11/15   Total WeeklyDose 31.25 mg 32.5 mg 32.5 mg 32.5 mg 32.5 mg 32.5 mg 35 mg 35 mg 35 mg 32.5 mg 26.5 mg 35 mg   INR 1.9 2.0 2.0 2.5 2.7 1.6 1.7 2.8 2.4 1.7  1.4 1.6   Notes HM2 no billing HM3  No billing HM4  No billing HM 1  Bill today HM 2 No billing HM 3 No bill HM 4  No billing 1x boost  HM 5 no bill day Bill day  HM1 HM- 2 no billing   HM 3- no billing 1x boost  HM 4 - no billing      Date 11/22 11/29 12/6 12/13 12/20 12/27 1/3/23 1/10 1/12 1/17 1/26/23 1/31/23   Total WeeklyDose 37.5 mg 37.5 mg 37.5 mg 37.5 mg 42.5 mg  40 mg 37.5 mg 40 mg 42.5mg 45 mg 45 mg 40 mg   INR 1.9 2.8 1.9 1.5 2.3 3.4 1.4 0.8 2.7 POCT  2.23   2.8 HM 3.9 1.9 2.2   Notes HM1- bill day HM 2 - NO bill HM 3 - no bill  Inc GLV Hm 4 - NO BILL HM 1 - Bill today HM 2   no billing HM 3- no billing HM 4  No billing lab error?? clinic HM 1  Bill today HM 2 - no bill  HM 3 - no bill      Date 2/7 2/14 2/21/23 2/28/23 3/7 3/14/23 3/21/23 3/28/23 4/4 4/11 4/18 4/26   Total WeeklyDose 40 mg 40 mg 40 mg 37.5 mg 40 mg 40 mg 40 mg 40 mg 37.5 mg 37.5 mg 37.5 mg  40 mg   INR 2.3 2.2 3.9 1.6 3.7 ??? 2.5 2.7 3.2 2.8 2.5 1.7 1.7   Notes HM 4 rec 2/15  HM 1  Bill today HM 2 - no billing  1xred  HM 3 - NO BILL  HM-4 no bill, percocet HM 5 - no billing until 3/15 HM 1 - BILL today  HM 2 - no Billing HM 3  No billing HM 4  No billing HM 1-  BILL today  HM 2  No billing     Date 5/2 5/5 5/11 5/16 5/22 5/30         Total WeeklyDose 42.5 mg 37.5 mg 42.5mg 42.5 mg  35 mg 40 mg         INR 3.5 1.8 2.9 4.2 2.0 2.5         Notes HM 3  No billing HM 4  No billing HM 5  No billing HM-1  Bill  Dec'd GLV  HM 2  No billing  1x hold  HM 3   no billing           Takes warfarin in the am    Clinic Interview:  Verbal Release Authorization signed on 8/22/2018 -- may speak with Raeann Montemayor (Wife). [phone] (Home) *Preferred*  Tablet Strength: pt has 7.5mg and 2.5mg tablets  Estimated oop cost for [facility] EDER HM: Patient has [employer] Medicare so they should have minimal to no OOP costs.    Patient Findings  Negatives:  Signs/symptoms of thrombosis, Signs/symptoms of bleeding, Laboratory test error suspected, Change in health, Change  in alcohol use, Change in activity, Upcoming invasive procedure, Emergency department visit, Upcoming dental procedure, Missed doses, Extra doses, Change in medications, Change in diet/appetite, Hospital admission, Bruising, Other complaints   Comments:  All findings negative per pt.      Plan:   1. INR is therapeutic today at 2.5 (goal 2.0-3.0). Instructed Mr. Montemayor to continue recently reduced regimen of warfarin 5 mg oral daily except 7.5 mg SunFri until recheck.   2. Recheck INR in one week, 6/6.  3. Verbal information provided over the telephone. Mr. Montemayor expresses understanding with teach back and has no further questions at this time.     Home monitor information below:  · Test strips on hand: 7  5/30/23  Lot : [id number]  Barcode : [id number] Exp : 1/25  · Serial number: [id number]   · Supplies billing code used: last 5/16/23 Next must be on or after  6/13/23  · Transfer Tube Lot number: Lot : [id number]  · Safety Lancets:  Lot : [id number]   Billing:  *bill must be 4 tests 28 days or more from HM visit  PLEASE REFER TO ANTICOAGULATION TRAINING POWERPOINT FOR BILLING. Needs to use in clinic remote appointment for encounter.              Luigi Melo CPhT  5/30/2023  09:49 EDT     I, Charlotte Michael, PharmD, have reviewed the note in full and agree with the assessment and plan.  05/30/23  14:27 EDT

## 2023-05-31 RX ORDER — WARFARIN SODIUM 2.5 MG/1
TABLET ORAL
Qty: 180 TABLET | Refills: 1 | Status: SHIPPED | OUTPATIENT
Start: 2023-05-31

## 2023-06-06 ENCOUNTER — ANTICOAGULATION VISIT (OUTPATIENT)
Dept: PHARMACY | Facility: HOSPITAL | Age: 78
End: 2023-06-06
Payer: MEDICARE

## 2023-06-06 LAB — INR PPP: 2.8

## 2023-06-06 NOTE — PROGRESS NOTES
Anticoagulation Clinic Progress Note   Oswaldo HOMEMONITOR: [employer] Serial # : [id number]     Indication: afib  Referring Provider: Gale  Initial Warfarin Start Date: 2007  Goal INR: 2-3  Current Drug Interactions:   [id number]: 5 (HTN, Age, CHF, CAD)   Other: anemic, CKD (stage 4) May need bridge with Lovenox given persistent AF (per cardio 1/24/2019), Scr was 3.22 on 11/24/21    Diet: eats salads 2x weekly 5/11/2023     Anticoagulation Clinic INR History:   Date 10/30 11/20 12/17 1/14/20 2/11 3/11 4/8 5/6 6/11 7/9 8/6 9/3 9/14 9/30   Total Weekly Dose 32.5 mg 32.5 mg 32.5 mg 32.5 mg 32.5mg 32.5mg 32.5mg 32.5mg 32.5mg 32.5 mg 32.5 mg 32.5mg 32.5mg 26.25 mg   INR 2.3 2.3 2.1 1.9 2.2 2.1 2.1 2.2 2.2 2.3 2.7 1.6 2.2  1.8   Notes                       ??   5 day hold     Date 10/14 11/4 11/11 11/25 12/9 12/16 12/30 1/13 2/3 3/3 3/31 4/28 5/26 6/23   Total Weekly Dose 32.5 mg 32.5mg 35mg 32.5mg 33.75 mg 36.25 mg 33.75 mg 35mg 33.75 33.75mg 33.75mg 33.75 mg 33.75 mg 33.75mg   INR 2.0 1.6 2.1 1.8 1.8 2.5 2.0 3.0 2.1 2.4 2.3 3.0 2.1 2.2   Notes   1x boost GLV boostx1 boostx1             Date 7/21 9/1 10/13 11/10 12/1 12/15 12/29 1/12/22 1/26 2/9 2/16 2/23 3/2 3/16   Total Weekly Dose 33.75mg 33.75mg 33.75mg 33.75mg 33.75 mg 32.5mg 30mg 27.5mg 28.75 mg 30 mg 31.25 mg 31.25 mg 31.25 mg 31.25 mg   INR 2.9 2.5 3.1 3.1 3.2 3.4 3.4 1.9 1.7 1.8 2.1 2.6 2.9 2.9   Notes        redx1 ?  inc        Date 3/30 4/13 5/11 6/8  6/23 7/7 7/28  8/9 8/12 8/16   Total WeeklyDose 31.25mg 31.25 mg 31.25 mg 31.25mg Start dialysis 30mg 30mg 30 mg  33.75mg 37.5mg 37.5mg   INR 2.5 2.2 2.6 3.9  2.1 2.1 1.8 1.23 1.4 1.5 2.1   Notes    Inc torsemide Cath placement    AV fistula placement    HM train  1 no billing       Date 8/23 8/30 9/6 9/13 9/20 9/30 10/4 10/11 10/18 10/25 11/8 11/15   Total WeeklyDose 31.25 mg 32.5 mg 32.5 mg 32.5 mg 32.5 mg 32.5 mg 35 mg 35 mg 35 mg 32.5 mg 26.5 mg 35 mg   INR 1.9 2.0 2.0 2.5 2.7 1.6 1.7 2.8 2.4 1.7  1.4 1.6   Notes HM2 no billing HM3  No billing HM4  No billing HM 1  Bill today HM 2 No billing HM 3 No bill HM 4  No billing 1x boost  HM 5 no bill day Bill day  HM1 HM- 2 no billing   HM 3- no billing 1x boost  HM 4 - no billing      Date 11/22 11/29 12/6 12/13 12/20 12/27 1/3/23 1/10 1/12 1/17 1/26/23 1/31/23   Total WeeklyDose 37.5 mg 37.5 mg 37.5 mg 37.5 mg 42.5 mg  40 mg 37.5 mg 40 mg 42.5mg 45 mg 45 mg 40 mg   INR 1.9 2.8 1.9 1.5 2.3 3.4 1.4 0.8 2.7 POCT  2.23   2.8 HM 3.9 1.9 2.2   Notes HM1- bill day HM 2 - NO bill HM 3 - no bill  Inc GLV Hm 4 - NO BILL HM 1 - Bill today HM 2   no billing HM 3- no billing HM 4  No billing lab error?? clinic HM 1  Bill today HM 2 - no bill  HM 3 - no bill      Date 2/7 2/14 2/21/23 2/28/23 3/7 3/14/23 3/21/23 3/28/23 4/4 4/11 4/18 4/26   Total WeeklyDose 40 mg 40 mg 40 mg 37.5 mg 40 mg 40 mg 40 mg 40 mg 37.5 mg 37.5 mg 37.5 mg  40 mg   INR 2.3 2.2 3.9 1.6 3.7 ??? 2.5 2.7 3.2 2.8 2.5 1.7 1.7   Notes HM 4 rec 2/15  HM 1  Bill today HM 2 - no billing  1xred  HM 3 - NO BILL  HM-4 no bill, percocet HM 5 - no billing until 3/15 HM 1 - BILL today  HM 2 - no Billing HM 3  No billing HM 4  No billing HM 1-  BILL today  HM 2  No billing     Date 5/2 5/5 5/11 5/16 5/22 5/30 6/6        Total WeeklyDose 42.5 mg 37.5 mg 42.5mg 42.5 mg  35 mg 40 mg 40 mg        INR 3.5 1.8 2.9 4.2 2.0 2.5 2.8        Notes HM 3  No billing HM 4  No billing HM 5  No billing HM-1  Bill  Dec'd GLV  HM 2  No billing  1x hold  HM 3   no billing HM 4  No billing          Takes warfarin in the am    Clinic Interview:  Verbal Release Authorization signed on 8/22/2018 -- may speak with Raeann Montemayor (Wife). [phone] (Home) *Preferred*  Tablet Strength: pt has 7.5mg and 2.5mg tablets  Estimated oop cost for [facility] EDER HM: Patient has [employer] Medicare so they should have minimal to no OOP costs.    Patient Findings    Negatives: Signs/symptoms of thrombosis, Signs/symptoms of bleeding, Laboratory test error  suspected, Change in health, Change in alcohol use, Change in activity, Upcoming invasive procedure, Emergency department visit, Upcoming dental procedure, Missed doses, Extra doses, Change in medications, Change in diet/appetite, Hospital admission, Bruising, Other complaints   Comments: All findings negative per pt.     Plan:   1. INR is therapeutic today at 2.8 (goal 2.0-3.0). Instructed Mr. Montemayor to continue recently reduced regimen of warfarin 5 mg oral daily except 7.5 mg SunFri until recheck.   2. Recheck INR in one week, 6/13/23.  3. Verbal information provided over the telephone. Mr. Montemayor expresses understanding with teach back and has no further questions at this time.     Home monitor information below:  Test strips on hand: 6  6/6/23    Lot : [id number]  Barcode : [id number] Exp : 1/25  Serial number: [id number]   Supplies billing code used: last 5/16/23 Next must be on or after  6/13/23  Transfer Tube Lot number: Lot : [id number]  Safety Lancets:  Lot : [id number]   Billing:  *bill must be 4 tests 28 days or more from HM visit  PLEASE REFER TO ANTICOAGULATION TRAINING POWERPOINT FOR BILLING. Needs to use in clinic remote appointment for encounter.              Luigi Melo CPhT  6/6/2023  09:21 EDT       I, Zev Mejias [facility], have reviewed the note in full and agree with the assessment and plan.  06/06/23  09:38 EDT

## 2023-06-14 ENCOUNTER — ANTICOAGULATION VISIT (OUTPATIENT)
Dept: PHARMACY | Facility: HOSPITAL | Age: 78
End: 2023-06-14
Payer: MEDICARE

## 2023-06-14 LAB — INR PPP: 3.2

## 2023-06-14 PROCEDURE — G0249 PROVIDE INR TEST MATER/EQUIP: HCPCS

## 2023-06-14 NOTE — PROGRESS NOTES
Anticoagulation Clinic Progress Note   Oswaldo HOMEMONITOR: [employer] Serial # : [id number]     Indication: afib  Referring Provider: Gale  Initial Warfarin Start Date: 2007  Goal INR: 2-3  Current Drug Interactions:   [id number]: 5 (HTN, Age, CHF, CAD)   Other: anemic, CKD (stage 4) May need bridge with Lovenox given persistent AF (per cardio 1/24/2019), Scr was 3.22 on 11/24/21    Diet: eats salads 2x weekly 5/11/2023     Anticoagulation Clinic INR History:   Date 10/30 11/20 12/17 1/14/20 2/11 3/11 4/8 5/6 6/11 7/9 8/6 9/3 9/14 9/30   Total Weekly Dose 32.5 mg 32.5 mg 32.5 mg 32.5 mg 32.5mg 32.5mg 32.5mg 32.5mg 32.5mg 32.5 mg 32.5 mg 32.5mg 32.5mg 26.25 mg   INR 2.3 2.3 2.1 1.9 2.2 2.1 2.1 2.2 2.2 2.3 2.7 1.6 2.2  1.8   Notes                       ??   5 day hold     Date 10/14 11/4 11/11 11/25 12/9 12/16 12/30 1/13 2/3 3/3 3/31 4/28 5/26 6/23   Total Weekly Dose 32.5 mg 32.5mg 35mg 32.5mg 33.75 mg 36.25 mg 33.75 mg 35mg 33.75 33.75mg 33.75mg 33.75 mg 33.75 mg 33.75mg   INR 2.0 1.6 2.1 1.8 1.8 2.5 2.0 3.0 2.1 2.4 2.3 3.0 2.1 2.2   Notes   1x boost GLV boostx1 boostx1             Date 7/21 9/1 10/13 11/10 12/1 12/15 12/29 1/12/22 1/26 2/9 2/16 2/23 3/2 3/16   Total Weekly Dose 33.75mg 33.75mg 33.75mg 33.75mg 33.75 mg 32.5mg 30mg 27.5mg 28.75 mg 30 mg 31.25 mg 31.25 mg 31.25 mg 31.25 mg   INR 2.9 2.5 3.1 3.1 3.2 3.4 3.4 1.9 1.7 1.8 2.1 2.6 2.9 2.9   Notes        redx1 ?  inc        Date 3/30 4/13 5/11 6/8  6/23 7/7 7/28  8/9 8/12 8/16   Total WeeklyDose 31.25mg 31.25 mg 31.25 mg 31.25mg Start dialysis 30mg 30mg 30 mg  33.75mg 37.5mg 37.5mg   INR 2.5 2.2 2.6 3.9  2.1 2.1 1.8 1.23 1.4 1.5 2.1   Notes    Inc torsemide Cath placement    AV fistula placement    HM train  1 no billing       Date 8/23 8/30 9/6 9/13 9/20 9/30 10/4 10/11 10/18 10/25 11/8 11/15   Total WeeklyDose 31.25 mg 32.5 mg 32.5 mg 32.5 mg 32.5 mg 32.5 mg 35 mg 35 mg 35 mg 32.5 mg 26.5 mg 35 mg   INR 1.9 2.0 2.0 2.5 2.7 1.6 1.7 2.8 2.4 1.7  1.4 1.6   Notes HM2 no billing HM3  No billing HM4  No billing HM 1  Bill today HM 2 No billing HM 3 No bill HM 4  No billing 1x boost  HM 5 no bill day Bill day  HM1 HM- 2 no billing   HM 3- no billing 1x boost  HM 4 - no billing      Date 11/22 11/29 12/6 12/13 12/20 12/27 1/3/23 1/10 1/12 1/17 1/26/23 1/31/23   Total WeeklyDose 37.5 mg 37.5 mg 37.5 mg 37.5 mg 42.5 mg  40 mg 37.5 mg 40 mg 42.5mg 45 mg 45 mg 40 mg   INR 1.9 2.8 1.9 1.5 2.3 3.4 1.4 0.8 2.7 POCT  2.23   2.8 HM 3.9 1.9 2.2   Notes HM1- bill day HM 2 - NO bill HM 3 - no bill  Inc GLV Hm 4 - NO BILL HM 1 - Bill today HM 2   no billing HM 3- no billing HM 4  No billing lab error?? clinic HM 1  Bill today HM 2 - no bill  HM 3 - no bill      Date 2/7 2/14 2/21/23 2/28/23 3/7 3/14/23 3/21/23 3/28/23 4/4 4/11 4/18 4/26   Total WeeklyDose 40 mg 40 mg 40 mg 37.5 mg 40 mg 40 mg 40 mg 40 mg 37.5 mg 37.5 mg 37.5 mg  40 mg   INR 2.3 2.2 3.9 1.6 3.7 ??? 2.5 2.7 3.2 2.8 2.5 1.7 1.7   Notes HM 4 rec 2/15  HM 1  Bill today HM 2 - no billing  1xred  HM 3 - NO BILL  HM-4 no bill, percocet HM 5 - no billing until 3/15 HM 1 - BILL today  HM 2 - no Billing HM 3  No billing HM 4  No billing HM 1-  BILL today  HM 2  No billing     Date 5/2 5/5 5/11 5/16 5/22 5/30 6/6 6/14       Total WeeklyDose 42.5 mg 37.5 mg 42.5mg 42.5 mg  35 mg 40 mg 40 mg 40 mg       INR 3.5 1.8 2.9 4.2 2.0 2.5 2.8 3.2       Notes HM 3  No billing HM 4  No billing HM 5  No billing HM-1  Bill  Dec'd GLV  HM 2  No billing  1x hold  HM 3   no billing HM 4  No billing HM 1  Bill today         Takes warfarin in the am    Clinic Interview:  Verbal Release Authorization signed on 8/22/2018 -- may speak with Raeann Montemayor (Wife). [phone] (Home) *Preferred*  Tablet Strength: pt has 7.5mg and 2.5mg tablets  Estimated oop cost for [facility] EDER HM: Patient has [employer] Medicare so they should have minimal to no OOP costs.    Patient Findings    Negatives: Signs/symptoms of thrombosis, Signs/symptoms of bleeding,  Laboratory test error suspected, Change in health, Change in alcohol use, Change in activity, Upcoming invasive procedure, Emergency department visit, Upcoming dental procedure, Missed doses, Extra doses, Change in medications, Change in diet/appetite, Hospital admission, Bruising, Other complaints   Comments: All findings negative per pt.     Plan:   1. INR was SUPRAtherapeutic today at 3.2 (goal 2.0-3.0). Per Evelina Quan, PharmD,Instructed Mr. Montemayor to reduce tonights warfarin dose to 2.5 mg then continue warfarin 5 mg oral daily except 7.5 mg SunFri until recheck.   2. Recheck INR in one week, 6/20/23.  3. Verbal information provided over the telephone. Mr. Montemayor expresses understanding with teach back and has no further questions at this time.     Home monitor information below:  Test strips on hand: 5  6/14/23    Lot : [id number]  Barcode : [id number] Exp : 1/25  Serial number: [id number]   Supplies billing code used: last 6/14/23 Next must be on or after  7/12/23  Transfer Tube Lot number: Lot : [id number]  Safety Lancets:  Lot : [id number]   Billing:  *bill must be 4 tests 28 days or more from HM visit  PLEASE REFER TO ANTICOAGULATION TRAINING POWERPOINT FOR BILLING. Needs to use in clinic remote appointment for encounter.              Luigi Melo CPhT  6/14/2023  09:45 EDT       I, Zev Mejias, [facility], have reviewed the note in full and agree with the assessment and plan.  06/14/23  10:01 EDT

## 2023-06-19 DIAGNOSIS — G47.33 OBSTRUCTIVE SLEEP APNEA, ADULT: ICD-10-CM

## 2023-06-19 DIAGNOSIS — G47.34 NOCTURNAL HYPOXEMIA: ICD-10-CM

## 2023-07-24 RX ORDER — CARVEDILOL 12.5 MG/1
TABLET ORAL
Qty: 180 TABLET | Refills: 3 | OUTPATIENT
Start: 2023-07-24

## 2023-07-25 ENCOUNTER — ANTICOAGULATION VISIT (OUTPATIENT)
Dept: PHARMACY | Facility: HOSPITAL | Age: 78
End: 2023-07-25
Payer: MEDICARE

## 2023-07-25 LAB — INR PPP: 1.9

## 2023-07-25 NOTE — PROGRESS NOTES
Anticoagulation Clinic Progress Note   Oswaldo HOMEMONITOR: [employer] Serial # : [id number]     Indication: afib  Referring Provider: Gale  Initial Warfarin Start Date: 2007  Goal INR: 2-3  Current Drug Interactions:   [id number]: 5 (HTN, Age, CHF, CAD)   Other: anemic, CKD (stage 4) May need bridge with Lovenox given persistent AF (per cardio 1/24/2019), Scr was 3.22 on 11/24/21    Diet: eats salads 2x weekly 5/11/2023     Anticoagulation Clinic INR History:   Date 10/30 11/20 12/17 1/14/20 2/11 3/11 4/8 5/6 6/11 7/9 8/6 9/3 9/14 9/30   Total Weekly Dose 32.5 mg 32.5 mg 32.5 mg 32.5 mg 32.5mg 32.5mg 32.5mg 32.5mg 32.5mg 32.5 mg 32.5 mg 32.5mg 32.5mg 26.25 mg   INR 2.3 2.3 2.1 1.9 2.2 2.1 2.1 2.2 2.2 2.3 2.7 1.6 2.2  1.8   Notes                       ??   5 day hold     Date 10/14 11/4 11/11 11/25 12/9 12/16 12/30 1/13 2/3 3/3 3/31 4/28 5/26 6/23   Total Weekly Dose 32.5 mg 32.5mg 35mg 32.5mg 33.75 mg 36.25 mg 33.75 mg 35mg 33.75 33.75mg 33.75mg 33.75 mg 33.75 mg 33.75mg   INR 2.0 1.6 2.1 1.8 1.8 2.5 2.0 3.0 2.1 2.4 2.3 3.0 2.1 2.2   Notes   1x boost GLV boostx1 boostx1             Date 7/21 9/1 10/13 11/10 12/1 12/15 12/29 1/12/22 1/26 2/9 2/16 2/23 3/2 3/16   Total Weekly Dose 33.75mg 33.75mg 33.75mg 33.75mg 33.75 mg 32.5mg 30mg 27.5mg 28.75 mg 30 mg 31.25 mg 31.25 mg 31.25 mg 31.25 mg   INR 2.9 2.5 3.1 3.1 3.2 3.4 3.4 1.9 1.7 1.8 2.1 2.6 2.9 2.9   Notes        redx1 ?  inc        Date 3/30 4/13 5/11 6/8  6/23 7/7 7/28  8/9 8/12 8/16   Total WeeklyDose 31.25mg 31.25 mg 31.25 mg 31.25mg Start dialysis 30mg 30mg 30 mg  33.75mg 37.5mg 37.5mg   INR 2.5 2.2 2.6 3.9  2.1 2.1 1.8 1.23 1.4 1.5 2.1   Notes    Inc torsemide Cath placement    AV fistula placement    HM train  1 no billing       Date 8/23 8/30 9/6 9/13 9/20 9/30 10/4 10/11 10/18 10/25 11/8 11/15   Total WeeklyDose 31.25 mg 32.5 mg 32.5 mg 32.5 mg 32.5 mg 32.5 mg 35 mg 35 mg 35 mg 32.5 mg 26.5 mg 35 mg   INR 1.9 2.0 2.0 2.5 2.7 1.6 1.7 2.8 2.4 1.7  1.4 1.6   Notes HM2 no billing HM3  No billing HM4  No billing HM 1  Bill today HM 2 No billing HM 3 No bill HM 4  No billing 1x boost  HM 5 no bill day Bill day  HM1 HM- 2 no billing   HM 3- no billing 1x boost  HM 4 - no billing      Date 11/22 11/29 12/6 12/13 12/20 12/27 1/3/23 1/10 1/12 1/17 1/26/23 1/31/23   Total WeeklyDose 37.5 mg 37.5 mg 37.5 mg 37.5 mg 42.5 mg  40 mg 37.5 mg 40 mg 42.5mg 45 mg 45 mg 40 mg   INR 1.9 2.8 1.9 1.5 2.3 3.4 1.4 0.8 2.7 POCT  2.23   2.8 HM 3.9 1.9 2.2   Notes HM1- bill day HM 2 - NO bill HM 3 - no bill  Inc GLV Hm 4 - NO BILL HM 1 - Bill today HM 2   no billing HM 3- no billing HM 4  No billing lab error?? clinic HM 1  Bill today HM 2 - no bill  HM 3 - no bill      Date 2/7 2/14 2/21/23 2/28/23 3/7 3/14/23 3/21/23 3/28/23 4/4 4/11 4/18 4/26   Total WeeklyDose 40 mg 40 mg 40 mg 37.5 mg 40 mg 40 mg 40 mg 40 mg 37.5 mg 37.5 mg 37.5 mg  40 mg   INR 2.3 2.2 3.9 1.6 3.7 ??? 2.5 2.7 3.2 2.8 2.5 1.7 1.7   Notes HM 4 rec 2/15  HM 1  Bill today HM 2 - no billing  1xred  HM 3 - NO BILL  HM-4 no bill, percocet HM 5 - no billing until 3/15 HM 1 - BILL today  HM 2 - no Billing HM 3  No billing HM 4  No billing HM 1-  BILL today  HM 2  No billing     Date 5/2 5/5 5/11 5/16 5/22 5/30 6/6 6/14 6/20 6/27 7/5 7/11   Total WeeklyDose 42.5 mg 37.5 mg 42.5mg 42.5 mg  35 mg 40 mg 40 mg 40 mg 37.5 mg 40 mg 40 mg 40 mg   INR 3.5 1.8 2.9 4.2 2.0 2.5 2.8 3.2 1.9 2.2 3.0 3.3   Notes HM 3  No billing HM 4  No billing HM 5  No billing HM-1  Bill  Dec'd GLV  HM 2  No billing  1x hold  HM 3   no billing HM 4  No billing HM 1  Bill today HM 2  No billing HM 3  No bill HM 4  No bill HM5  No bill     Date 7/18 7/25             Total WeeklyDose 37.5 mg 35 mg             INR 3.3 1.9             Notes HM 1  Bill today HM 2  No billing             Takes warfarin in the am    Clinic Interview:  Verbal Release Authorization signed on 8/22/2018 -- may speak with Raeann Montemayor (Wife). [phone] (Home)  *Preferred*  Tablet Strength: pt has 7.5mg and 2.5mg tablets  Estimated oop cost for [facility] EDER HM: Patient has [employer] Medicare so they should have minimal to no OOP costs.    Patient Findings  Negatives: Signs/symptoms of thrombosis, Signs/symptoms of bleeding, Laboratory test error suspected, Change in health, Change in alcohol use, Change in activity, Upcoming invasive procedure, Emergency department visit, Upcoming dental procedure, Missed doses, Extra doses, Change in medications, Change in diet/appetite, Hospital admission, Bruising, Other complaints   Comments: All findings negative per pt.     Plan:   1. INR was SUBtherapeutic today at 1.9 (goal 2.0-3.0). Per Alem Llanes, PharmD, instructed Mr. Montemayor to take warfarin 5 mg oral daily except 7.5 mg Fri until recheck.   2. Recheck INR in one week, 8/1/23.  3. Verbal information provided over the telephone. Mr. Montemayor expresses understanding with teach back and has no further questions at this time.     Home monitor information below:  Test strips on hand: 5  7/25/23    Lot : [id number]  Barcode : [id number] Exp : 1/25  Serial number: [id number]   Supplies billing code used: last 7/18/23 Next must be on or after  8/15/23  Transfer Tube Lot number: Lot : [id number]  Safety Lancets:  Lot : [id number]   Billing:  *bill must be 4 tests 28 days or more from HM visit  PLEASE REFER TO ANTICOAGULATION TRAINING POWERPOINT FOR BILLING. Needs to use in clinic remote appointment for encounter.    Luigi Melo CPhT  7/25/2023  10:05 EDT     I, Taylor Riedel, [facility], have reviewed the note in full and agree with the assessment and plan.  07/25/23  14:56 EDT

## 2023-07-28 ENCOUNTER — HOSPITAL ENCOUNTER (OUTPATIENT)
Dept: CARDIOLOGY | Facility: HOSPITAL | Age: 78
Discharge: HOME | End: 2023-07-28
Payer: MEDICARE

## 2023-07-28 VITALS
HEIGHT: 70 IN | WEIGHT: 275.57 LBS | HEART RATE: 86 BPM | DIASTOLIC BLOOD PRESSURE: 92 MMHG | BODY MASS INDEX: 39.45 KG/M2 | SYSTOLIC BLOOD PRESSURE: 153 MMHG

## 2023-07-28 DIAGNOSIS — I50.32 CHRONIC DIASTOLIC CONGESTIVE HEART FAILURE: Chronic | ICD-10-CM

## 2023-07-28 DIAGNOSIS — I25.10 CORONARY ARTERY DISEASE INVOLVING NATIVE CORONARY ARTERY OF NATIVE HEART WITHOUT ANGINA PECTORIS: Chronic | ICD-10-CM

## 2023-07-28 LAB
BH CV REST NUCLEAR ISOTOPE DOSE: 26.2 MCI
BH CV STRESS BP STAGE 1: NORMAL
BH CV STRESS BP STAGE 3: NORMAL
BH CV STRESS COMMENTS STAGE 1: NORMAL
BH CV STRESS DOSE REGADENOSON STAGE 1: 0.4
BH CV STRESS DURATION MIN STAGE 1: 1
BH CV STRESS DURATION MIN STAGE 2: 1
BH CV STRESS DURATION MIN STAGE 3: 1
BH CV STRESS DURATION MIN STAGE 4: 1
BH CV STRESS DURATION SEC STAGE 1: 0
BH CV STRESS DURATION SEC STAGE 2: 0
BH CV STRESS DURATION SEC STAGE 3: 0
BH CV STRESS DURATION SEC STAGE 4: 0
BH CV STRESS HR STAGE 1: 83
BH CV STRESS HR STAGE 2: 103
BH CV STRESS HR STAGE 3: 93
BH CV STRESS HR STAGE 4: 95
BH CV STRESS NUCLEAR ISOTOPE DOSE: 26.2 MCI
BH CV STRESS O2 STAGE 1: 99
BH CV STRESS O2 STAGE 2: 99
BH CV STRESS O2 STAGE 3: 99
BH CV STRESS O2 STAGE 4: 100
BH CV STRESS PROTOCOL 1: NORMAL
BH CV STRESS RECOVERY BP: NORMAL MMHG
BH CV STRESS RECOVERY HR: 89 BPM
BH CV STRESS RECOVERY O2: 100 %
BH CV STRESS STAGE 1: 1
BH CV STRESS STAGE 2: 2
BH CV STRESS STAGE 3: 3
BH CV STRESS STAGE 4: 4
MAXIMAL PREDICTED HEART RATE: 140 BPM
PERCENT MAX PREDICTED HR: 71.43 %
STRESS BASELINE BP: NORMAL MMHG
STRESS BASELINE HR: 86 BPM
STRESS O2 SAT REST: 98 %
STRESS PERCENT HR: 84 %
STRESS POST ESTIMATED WORKLOAD: 1 METS
STRESS POST EXERCISE DUR MIN: 4 MIN
STRESS POST EXERCISE DUR SEC: 0 SEC
STRESS POST O2 SAT PEAK: 99 %
STRESS POST PEAK BP: NORMAL MMHG
STRESS POST PEAK HR: 100 BPM
STRESS TARGET HR: 119 BPM

## 2023-07-28 PROCEDURE — 78431 MYOCRD IMG PET RST&STRS CT: CPT

## 2023-07-28 PROCEDURE — 93017 CV STRESS TEST TRACING ONLY: CPT

## 2023-07-28 PROCEDURE — 25010000002 REGADENOSON 0.4 MG/5ML SOLUTION: Performed by: NURSE PRACTITIONER

## 2023-07-28 PROCEDURE — 0 RUBIDIUM CHLORIDE: Performed by: NURSE PRACTITIONER

## 2023-07-28 PROCEDURE — A9555 RB82 RUBIDIUM: HCPCS | Performed by: NURSE PRACTITIONER

## 2023-07-28 RX ORDER — REGADENOSON 0.08 MG/ML
0.4 INJECTION, SOLUTION INTRAVENOUS ONCE
Status: COMPLETED | OUTPATIENT
Start: 2023-07-28 | End: 2023-07-28

## 2023-07-28 RX ADMIN — REGADENOSON 0.4 MG: 0.08 INJECTION, SOLUTION INTRAVENOUS at 10:09

## 2023-07-28 RX ADMIN — RUBIDIUM CHLORIDE RB-82 1 DOSE: 150 INJECTION, SOLUTION INTRAVENOUS at 10:00

## 2023-07-28 RX ADMIN — RUBIDIUM CHLORIDE RB-82 1 DOSE: 150 INJECTION, SOLUTION INTRAVENOUS at 10:11

## 2023-08-01 ENCOUNTER — ANTICOAGULATION VISIT (OUTPATIENT)
Dept: PHARMACY | Facility: HOSPITAL | Age: 78
End: 2023-08-01
Payer: MEDICARE

## 2023-08-01 LAB — INR PPP: 1.9

## 2023-08-08 ENCOUNTER — ANTICOAGULATION VISIT (OUTPATIENT)
Dept: PHARMACY | Facility: HOSPITAL | Age: 78
End: 2023-08-08
Payer: MEDICARE

## 2023-08-08 LAB — INR PPP: 2.3

## 2023-08-08 NOTE — PROGRESS NOTES
Anticoagulation Clinic Progress Note   Oswaldo HOMEMONITOR: [employer] Serial # : [id number]     Indication: afib  Referring Provider: Gale  Initial Warfarin Start Date: 2007  Goal INR: 2-3  Current Drug Interactions:   [id number]: 5 (HTN, Age, CHF, CAD)   Other: anemic, CKD (stage 4) May need bridge with Lovenox given persistent AF (per cardio 1/24/2019), Scr was 3.22 on 11/24/21    Diet: eats salads 2x weekly 5/11/2023     Anticoagulation Clinic INR History:   Date 10/30 11/20 12/17 1/14/20 2/11 3/11 4/8 5/6 6/11 7/9 8/6 9/3 9/14 9/30   Total Weekly Dose 32.5 mg 32.5 mg 32.5 mg 32.5 mg 32.5mg 32.5mg 32.5mg 32.5mg 32.5mg 32.5 mg 32.5 mg 32.5mg 32.5mg 26.25 mg   INR 2.3 2.3 2.1 1.9 2.2 2.1 2.1 2.2 2.2 2.3 2.7 1.6 2.2  1.8   Notes                       ??   5 day hold     Date 10/14 11/4 11/11 11/25 12/9 12/16 12/30 1/13 2/3 3/3 3/31 4/28 5/26 6/23   Total Weekly Dose 32.5 mg 32.5mg 35mg 32.5mg 33.75 mg 36.25 mg 33.75 mg 35mg 33.75 33.75mg 33.75mg 33.75 mg 33.75 mg 33.75mg   INR 2.0 1.6 2.1 1.8 1.8 2.5 2.0 3.0 2.1 2.4 2.3 3.0 2.1 2.2   Notes   1x boost GLV boostx1 boostx1             Date 7/21 9/1 10/13 11/10 12/1 12/15 12/29 1/12/22 1/26 2/9 2/16 2/23 3/2 3/16   Total Weekly Dose 33.75mg 33.75mg 33.75mg 33.75mg 33.75 mg 32.5mg 30mg 27.5mg 28.75 mg 30 mg 31.25 mg 31.25 mg 31.25 mg 31.25 mg   INR 2.9 2.5 3.1 3.1 3.2 3.4 3.4 1.9 1.7 1.8 2.1 2.6 2.9 2.9   Notes        redx1 ?  inc        Date 3/30 4/13 5/11 6/8  6/23 7/7 7/28  8/9 8/12 8/16   Total WeeklyDose 31.25mg 31.25 mg 31.25 mg 31.25mg Start dialysis 30mg 30mg 30 mg  33.75mg 37.5mg 37.5mg   INR 2.5 2.2 2.6 3.9  2.1 2.1 1.8 1.23 1.4 1.5 2.1   Notes    Inc torsemide Cath placement    AV fistula placement    HM train  1 no billing       Date 8/23 8/30 9/6 9/13 9/20 9/30 10/4 10/11 10/18 10/25 11/8 11/15   Total WeeklyDose 31.25 mg 32.5 mg 32.5 mg 32.5 mg 32.5 mg 32.5 mg 35 mg 35 mg 35 mg 32.5 mg 26.5 mg 35 mg   INR 1.9 2.0 2.0 2.5 2.7 1.6 1.7 2.8 2.4 1.7  1.4 1.6   Notes HM2 no billing HM3  No billing HM4  No billing HM 1  Bill today HM 2 No billing HM 3 No bill HM 4  No billing 1x boost  HM 5 no bill day Bill day  HM1 HM- 2 no billing   HM 3- no billing 1x boost  HM 4 - no billing      Date 11/22 11/29 12/6 12/13 12/20 12/27 1/3/23 1/10 1/12 1/17 1/26/23 1/31/23   Total WeeklyDose 37.5 mg 37.5 mg 37.5 mg 37.5 mg 42.5 mg  40 mg 37.5 mg 40 mg 42.5mg 45 mg 45 mg 40 mg   INR 1.9 2.8 1.9 1.5 2.3 3.4 1.4 0.8 2.7 POCT  2.23   2.8 HM 3.9 1.9 2.2   Notes HM1- bill day HM 2 - NO bill HM 3 - no bill  Inc GLV Hm 4 - NO BILL HM 1 - Bill today HM 2   no billing HM 3- no billing HM 4  No billing lab error?? clinic HM 1  Bill today HM 2 - no bill  HM 3 - no bill      Date 2/7 2/14 2/21/23 2/28/23 3/7 3/14/23 3/21/23 3/28/23 4/4 4/11 4/18 4/26   Total WeeklyDose 40 mg 40 mg 40 mg 37.5 mg 40 mg 40 mg 40 mg 40 mg 37.5 mg 37.5 mg 37.5 mg  40 mg   INR 2.3 2.2 3.9 1.6 3.7 ??? 2.5 2.7 3.2 2.8 2.5 1.7 1.7   Notes HM 4 rec 2/15  HM 1  Bill today HM 2 - no billing  1xred  HM 3 - NO BILL  HM-4 no bill, percocet HM 5 - no billing until 3/15 HM 1 - BILL today  HM 2 - no Billing HM 3  No billing HM 4  No billing HM 1-  BILL today  HM 2  No billing     Date 5/2 5/5 5/11 5/16 5/22 5/30 6/6 6/14 6/20 6/27 7/5 7/11   Total WeeklyDose 42.5 mg 37.5 mg 42.5mg 42.5 mg  35 mg 40 mg 40 mg 40 mg 37.5 mg 40 mg 40 mg 40 mg   INR 3.5 1.8 2.9 4.2 2.0 2.5 2.8 3.2 1.9 2.2 3.0 3.3   Notes HM 3  No billing HM 4  No billing HM 5  No billing HM-1  Bill  Dec'd GLV  HM 2  No billing  1x hold  HM 3   no billing HM 4  No billing HM 1  Bill today HM 2  No billing HM 3  No bill HM 4  No bill HM5  No bill     Date 7/18 7/25 08/01/23 8/8           Total WeeklyDose 37.5 mg 35 mg 37.5mg 40 mg           INR 3.3 1.9 1.9 2.3           Notes HM 1  Bill today HM 2  No billing HM 3  No billing  1 x boost HM 4 no billing            Takes warfarin in the am    Clinic Interview:  Verbal Release Authorization signed on 8/22/2018 --  may speak with Raeann Montemayor (Wife). [phone] (Home) *Preferred*  Tablet Strength: pt has 7.5mg and 2.5mg tablets  Estimated oop cost for [facility] EDER HM: Patient has [employer] Medicare so they should have minimal to no OOP costs.        Patient Findings  Negatives: Signs/symptoms of thrombosis, Signs/symptoms of bleeding, Laboratory test error suspected, Change in health, Change in alcohol use, Change in activity, Upcoming invasive procedure, Emergency department visit, Upcoming dental procedure, Missed doses, Extra doses, Change in medications, Change in diet/appetite, Hospital admission, Bruising, Other complaints   Comments: All findings negative per pt       Plan:   1. INR was therapeutic today at 2.3 (goal 2.0-3.0). will  continue his regular dose of  warfarin 5 mg oral daily except 7.5 mg Fri until recheck.   2. Recheck INR in one week, 8/15/23.  3. Verbal information provided over the telephone. Mr. Montemayor expresses understanding with teach back and has no further questions at this time.     Home monitor information below:  Test strips on hand: 8  08/8/23    Lot : [id number]  Barcode : [id number] Exp : 1/25  Serial number: [id number]   Supplies billing code used: last 7/18/23 Next must be on or after  8/15/23  Transfer Tube Lot number: Lot : [id number]  Safety Lancets:  Lot : [id number]   Billing:  *bill must be 4 tests 28 days or more from  visit  PLEASE REFER TO ANTICOAGULATION TRAINING POWERPOINT FOR BILLING. Needs to use in clinic remote appointment for encounter.    Akua Vazquez  Pharmacy Technician   8/8/2023 09:15 EDT    I, Kristine Sutton, PharmD, have reviewed the note in full and agree with the assessment and plan.  08/09/23  16:52 EDT

## 2023-08-15 ENCOUNTER — ANTICOAGULATION VISIT (OUTPATIENT)
Dept: PHARMACY | Facility: HOSPITAL | Age: 78
End: 2023-08-15
Payer: MEDICARE

## 2023-08-15 LAB — INR PPP: 2.6

## 2023-08-15 NOTE — PROGRESS NOTES
Anticoagulation Clinic Progress Note   Oswaldo HOMEMONITOR: [employer] Serial # : [id number]     Indication: afib  Referring Provider: Gale  Initial Warfarin Start Date: 2007  Goal INR: 2-3  Current Drug Interactions:   [id number]: 5 (HTN, Age, CHF, CAD)   Other: anemic, CKD (stage 4) May need bridge with Lovenox given persistent AF (per cardio 1/24/2019), Scr was 3.22 on 11/24/21    Diet: eats salads 2x weekly 5/11/2023     Anticoagulation Clinic INR History:   Date 10/30 11/20 12/17 1/14/20 2/11 3/11 4/8 5/6 6/11 7/9 8/6 9/3 9/14 9/30   Total Weekly Dose 32.5 mg 32.5 mg 32.5 mg 32.5 mg 32.5mg 32.5mg 32.5mg 32.5mg 32.5mg 32.5 mg 32.5 mg 32.5mg 32.5mg 26.25 mg   INR 2.3 2.3 2.1 1.9 2.2 2.1 2.1 2.2 2.2 2.3 2.7 1.6 2.2  1.8   Notes                       ??   5 day hold     Date 10/14 11/4 11/11 11/25 12/9 12/16 12/30 1/13 2/3 3/3 3/31 4/28 5/26 6/23   Total Weekly Dose 32.5 mg 32.5mg 35mg 32.5mg 33.75 mg 36.25 mg 33.75 mg 35mg 33.75 33.75mg 33.75mg 33.75 mg 33.75 mg 33.75mg   INR 2.0 1.6 2.1 1.8 1.8 2.5 2.0 3.0 2.1 2.4 2.3 3.0 2.1 2.2   Notes   1x boost GLV boostx1 boostx1             Date 7/21 9/1 10/13 11/10 12/1 12/15 12/29 1/12/22 1/26 2/9 2/16 2/23 3/2 3/16   Total Weekly Dose 33.75mg 33.75mg 33.75mg 33.75mg 33.75 mg 32.5mg 30mg 27.5mg 28.75 mg 30 mg 31.25 mg 31.25 mg 31.25 mg 31.25 mg   INR 2.9 2.5 3.1 3.1 3.2 3.4 3.4 1.9 1.7 1.8 2.1 2.6 2.9 2.9   Notes        redx1 ?  inc        Date 3/30 4/13 5/11 6/8  6/23 7/7 7/28  8/9 8/12 8/16   Total WeeklyDose 31.25mg 31.25 mg 31.25 mg 31.25mg Start dialysis 30mg 30mg 30 mg  33.75mg 37.5mg 37.5mg   INR 2.5 2.2 2.6 3.9  2.1 2.1 1.8 1.23 1.4 1.5 2.1   Notes    Inc torsemide Cath placement    AV fistula placement    HM train  1 no billing       Date 8/23 8/30 9/6 9/13 9/20 9/30 10/4 10/11 10/18 10/25 11/8 11/15   Total WeeklyDose 31.25 mg 32.5 mg 32.5 mg 32.5 mg 32.5 mg 32.5 mg 35 mg 35 mg 35 mg 32.5 mg 26.5 mg 35 mg   INR 1.9 2.0 2.0 2.5 2.7 1.6 1.7 2.8 2.4 1.7  1.4 1.6   Notes HM2 no billing HM3  No billing HM4  No billing HM 1  Bill today HM 2 No billing HM 3 No bill HM 4  No billing 1x boost  HM 5 no bill day Bill day  HM1 HM- 2 no billing   HM 3- no billing 1x boost  HM 4 - no billing      Date 11/22 11/29 12/6 12/13 12/20 12/27 1/3/23 1/10 1/12 1/17 1/26/23 1/31/23   Total WeeklyDose 37.5 mg 37.5 mg 37.5 mg 37.5 mg 42.5 mg  40 mg 37.5 mg 40 mg 42.5mg 45 mg 45 mg 40 mg   INR 1.9 2.8 1.9 1.5 2.3 3.4 1.4 0.8 2.7 POCT  2.23   2.8 HM 3.9 1.9 2.2   Notes HM1- bill day HM 2 - NO bill HM 3 - no bill  Inc GLV Hm 4 - NO BILL HM 1 - Bill today HM 2   no billing HM 3- no billing HM 4  No billing lab error?? clinic HM 1  Bill today HM 2 - no bill  HM 3 - no bill      Date 2/7 2/14 2/21/23 2/28/23 3/7 3/14/23 3/21/23 3/28/23 4/4 4/11 4/18 4/26   Total WeeklyDose 40 mg 40 mg 40 mg 37.5 mg 40 mg 40 mg 40 mg 40 mg 37.5 mg 37.5 mg 37.5 mg  40 mg   INR 2.3 2.2 3.9 1.6 3.7 ??? 2.5 2.7 3.2 2.8 2.5 1.7 1.7   Notes HM 4 rec 2/15  HM 1  Bill today HM 2 - no billing  1xred  HM 3 - NO BILL  HM-4 no bill, percocet HM 5 - no billing until 3/15 HM 1 - BILL today  HM 2 - no Billing HM 3  No billing HM 4  No billing HM 1-  BILL today  HM 2  No billing     Date 5/2 5/5 5/11 5/16 5/22 5/30 6/6 6/14 6/20 6/27 7/5 7/11   Total WeeklyDose 42.5 mg 37.5 mg 42.5mg 42.5 mg  35 mg 40 mg 40 mg 40 mg 37.5 mg 40 mg 40 mg 40 mg   INR 3.5 1.8 2.9 4.2 2.0 2.5 2.8 3.2 1.9 2.2 3.0 3.3   Notes HM 3  No billing HM 4  No billing HM 5  No billing HM-1  Bill  Dec'd GLV  HM 2  No billing  1x hold  HM 3   no billing HM 4  No billing HM 1  Bill today HM 2  No billing HM 3  No bill HM 4  No bill HM5  No bill     Date 7/18 7/25 08/01/23 8/8 8/15/23          Total WeeklyDose 37.5 mg 35 mg 37.5mg 40 mg 37.5 mg          INR 3.3 1.9 1.9 2.3 2.6          Notes HM 1  Bill today HM 2  No billing HM 3  No billing  1 x boost HM 4 no billing  HM 1 - BILL today           Takes warfarin in the am    Clinic Interview:  Verbal Release  Authorization signed on 8/22/2018 -- may speak with Raeann Montemayor (Wife). [phone] (Home) *Preferred*  Tablet Strength: pt has 7.5mg and 2.5mg tablets  Estimated oop cost for [facility] HM: Patient has [employer] Medicare so they should have minimal to no OOP costs.        Patient Findings  Negatives: Signs/symptoms of thrombosis, Signs/symptoms of bleeding, Laboratory test error suspected, Change in health, Change in alcohol use, Change in activity, Upcoming invasive procedure, Emergency department visit, Upcoming dental procedure, Missed doses, Extra doses, Change in medications, Change in diet/appetite, Hospital admission, Bruising, Other complaints   Comments: All findings negative per pt       Plan:   1. INR was therapeutic today at 2.6 (goal 2.0-3.0). Instructed Mr. Montemayor to continue warfarin 5 mg oral daily except 7.5 mg Fri until recheck.   2. Recheck INR in one week, 8/22/23.  3. Verbal information provided over the telephone. Mr. Montemayor expresses understanding with teach back and has no further questions at this time.     Home monitor information below:  Test strips on hand: 7  8/15/23    Lot : [id number]  Barcode : [id number] Exp : 1/25  Serial number: [id number]   Supplies billing code used: last 8/15/23 Next must be on or after  9/12/23  Transfer Tube Lot number: Lot : [id number]  Safety Lancets:  Lot : [id number]   Billing:  *bill must be 4 tests 28 days or more from  visit  PLEASE REFER TO ANTICOAGULATION TRAINING POWERPOINT FOR BILLING. Needs to use in clinic remote appointment for encounter.    Alexander Scherer, Pharmacy Technician  8/15/2023  10:54 EDT    I, Evelina Quan, PharmD, have reviewed the note in full and agree with the assessment and plan.  08/15/23  15:32 EDT

## 2023-08-23 ENCOUNTER — ANTICOAGULATION VISIT (OUTPATIENT)
Dept: PHARMACY | Facility: HOSPITAL | Age: 78
End: 2023-08-23
Payer: MEDICARE

## 2023-08-23 LAB — INR PPP: 2.1

## 2023-08-23 NOTE — PROGRESS NOTES
Anticoagulation Clinic Progress Note   Oswaldo HOMEMONITOR: [employer] Serial # : [id number]     Indication: afib  Referring Provider: Gale  Initial Warfarin Start Date: 2007  Goal INR: 2-3  Current Drug Interactions:   [id number]: 5 (HTN, Age, CHF, CAD)   Other: anemic, CKD (stage 4) May need bridge with Lovenox given persistent AF (per cardio 1/24/2019), Scr was 3.22 on 11/24/21    Diet: eats salads 2x weekly 5/11/2023     Anticoagulation Clinic INR History:   Date 10/30 11/20 12/17 1/14/20 2/11 3/11 4/8 5/6 6/11 7/9 8/6 9/3 9/14 9/30   Total Weekly Dose 32.5 mg 32.5 mg 32.5 mg 32.5 mg 32.5mg 32.5mg 32.5mg 32.5mg 32.5mg 32.5 mg 32.5 mg 32.5mg 32.5mg 26.25 mg   INR 2.3 2.3 2.1 1.9 2.2 2.1 2.1 2.2 2.2 2.3 2.7 1.6 2.2  1.8   Notes                       ??   5 day hold     Date 10/14 11/4 11/11 11/25 12/9 12/16 12/30 1/13 2/3 3/3 3/31 4/28 5/26 6/23   Total Weekly Dose 32.5 mg 32.5mg 35mg 32.5mg 33.75 mg 36.25 mg 33.75 mg 35mg 33.75 33.75mg 33.75mg 33.75 mg 33.75 mg 33.75mg   INR 2.0 1.6 2.1 1.8 1.8 2.5 2.0 3.0 2.1 2.4 2.3 3.0 2.1 2.2   Notes   1x boost GLV boostx1 boostx1             Date 7/21 9/1 10/13 11/10 12/1 12/15 12/29 1/12/22 1/26 2/9 2/16 2/23 3/2 3/16   Total Weekly Dose 33.75mg 33.75mg 33.75mg 33.75mg 33.75 mg 32.5mg 30mg 27.5mg 28.75 mg 30 mg 31.25 mg 31.25 mg 31.25 mg 31.25 mg   INR 2.9 2.5 3.1 3.1 3.2 3.4 3.4 1.9 1.7 1.8 2.1 2.6 2.9 2.9   Notes        redx1 ?  inc        Date 3/30 4/13 5/11 6/8  6/23 7/7 7/28  8/9 8/12 8/16   Total WeeklyDose 31.25mg 31.25 mg 31.25 mg 31.25mg Start dialysis 30mg 30mg 30 mg  33.75mg 37.5mg 37.5mg   INR 2.5 2.2 2.6 3.9  2.1 2.1 1.8 1.23 1.4 1.5 2.1   Notes    Inc torsemide Cath placement    AV fistula placement    HM train  1 no billing       Date 8/23 8/30 9/6 9/13 9/20 9/30 10/4 10/11 10/18 10/25 11/8 11/15   Total WeeklyDose 31.25 mg 32.5 mg 32.5 mg 32.5 mg 32.5 mg 32.5 mg 35 mg 35 mg 35 mg 32.5 mg 26.5 mg 35 mg   INR 1.9 2.0 2.0 2.5 2.7 1.6 1.7 2.8 2.4 1.7  1.4 1.6   Notes HM2 no billing HM3  No billing HM4  No billing HM 1  Bill today HM 2 No billing HM 3 No bill HM 4  No billing 1x boost  HM 5 no bill day Bill day  HM1 HM- 2 no billing   HM 3- no billing 1x boost  HM 4 - no billing      Date 11/22 11/29 12/6 12/13 12/20 12/27 1/3/23 1/10 1/12 1/17 1/26/23 1/31/23   Total WeeklyDose 37.5 mg 37.5 mg 37.5 mg 37.5 mg 42.5 mg  40 mg 37.5 mg 40 mg 42.5mg 45 mg 45 mg 40 mg   INR 1.9 2.8 1.9 1.5 2.3 3.4 1.4 0.8 2.7 POCT  2.23   2.8 HM 3.9 1.9 2.2   Notes HM1- bill day HM 2 - NO bill HM 3 - no bill  Inc GLV Hm 4 - NO BILL HM 1 - Bill today HM 2   no billing HM 3- no billing HM 4  No billing lab error?? clinic HM 1  Bill today HM 2 - no bill  HM 3 - no bill      Date 2/7 2/14 2/21/23 2/28/23 3/7 3/14/23 3/21/23 3/28/23 4/4 4/11 4/18 4/26   Total WeeklyDose 40 mg 40 mg 40 mg 37.5 mg 40 mg 40 mg 40 mg 40 mg 37.5 mg 37.5 mg 37.5 mg  40 mg   INR 2.3 2.2 3.9 1.6 3.7 ??? 2.5 2.7 3.2 2.8 2.5 1.7 1.7   Notes HM 4 rec 2/15  HM 1  Bill today HM 2 - no billing  1xred  HM 3 - NO BILL  HM-4 no bill, percocet HM 5 - no billing until 3/15 HM 1 - BILL today  HM 2 - no Billing HM 3  No billing HM 4  No billing HM 1-  BILL today  HM 2  No billing     Date 5/2 5/5 5/11 5/16 5/22 5/30 6/6 6/14 6/20 6/27 7/5 7/11   Total WeeklyDose 42.5 mg 37.5 mg 42.5mg 42.5 mg  35 mg 40 mg 40 mg 40 mg 37.5 mg 40 mg 40 mg 40 mg   INR 3.5 1.8 2.9 4.2 2.0 2.5 2.8 3.2 1.9 2.2 3.0 3.3   Notes HM 3  No billing HM 4  No billing HM 5  No billing HM-1  Bill  Dec'd GLV  HM 2  No billing  1x hold  HM 3   no billing HM 4  No billing HM 1  Bill today HM 2  No billing HM 3  No bill HM 4  No bill HM5  No bill     Date 7/18 7/25 08/01/23 8/8 8/15/23 8/23/23         Total WeeklyDose 37.5 mg 35 mg 37.5mg 40 mg 37.5 mg 37.5 mg         INR 3.3 1.9 1.9 2.3 2.6 2.1         Notes HM 1  Bill today HM 2  No billing HM 3  No billing  1 x boost HM 4 no billing  HM 1 - BILL today  HM 2 - no bill          Takes warfarin in the  am    Clinic Interview:  Verbal Release Authorization signed on 8/22/2018 -- may speak with Raeann Montemayor (Wife). [phone] (Home) *Preferred*  Tablet Strength: pt has 7.5mg and 2.5mg tablets  Estimated oop cost for [facility] HM: Patient has [employer] Medicare so they should have minimal to no OOP costs.        Patient Findings  Negatives: Signs/symptoms of thrombosis, Signs/symptoms of bleeding, Laboratory test error suspected, Change in health, Change in alcohol use, Change in activity, Upcoming invasive procedure, Emergency department visit, Upcoming dental procedure, Missed doses, Extra doses, Change in medications, Change in diet/appetite, Hospital admission, Bruising, Other complaints   Comments: All findings negative per pt       Plan:   1. INR was therapeutic today at 2.1 (goal 2.0-3.0). Instructed Mr. Montemayor to continue warfarin 5 mg oral daily except 7.5 mg Fri until recheck.   2. Recheck INR in one week, 8/30/23.  3. Verbal information provided over the telephone. Mr. Montemayor expresses understanding with teach back and has no further questions at this time.     Home monitor information below:  Test strips on hand: 5  8/23/23    Lot : [id number]  Barcode : [id number] Exp : 1/25  Serial number: [id number]   Supplies billing code used: last 8/15/23 Next must be on or after  9/12/23  Transfer Tube Lot number: Lot : [id number]  Safety Lancets:  Lot : [id number]   Billing:  *bill must be 4 tests 28 days or more from  visit  PLEASE REFER TO ANTICOAGULATION TRAINING POWERPOINT FOR BILLING. Needs to use in clinic remote appointment for encounter.    Alexander Scherer, Pharmacy Technician  8/23/2023  10:20 EDT    I, Alem Llanes, PharmD, have reviewed the note in full and agree with the assessment and plan.  08/23/23  16:12 EDT

## 2023-08-29 ENCOUNTER — ANTICOAGULATION VISIT (OUTPATIENT)
Dept: PHARMACY | Facility: HOSPITAL | Age: 78
End: 2023-08-29
Payer: MEDICARE

## 2023-08-29 LAB — INR PPP: 2.4

## 2023-08-29 NOTE — PROGRESS NOTES
Anticoagulation Clinic Progress Note   Oswaldo HOMEMONITOR: [employer] Serial # : [id number]     Indication: afib  Referring Provider: Gale  Initial Warfarin Start Date: 2007  Goal INR: 2-3  Current Drug Interactions:   [id number]: 5 (HTN, Age, CHF, CAD)   Other: anemic, CKD (stage 4) May need bridge with Lovenox given persistent AF (per cardio 1/24/2019), Scr was 3.22 on 11/24/21    Diet: eats salads 2x weekly 5/11/2023     Anticoagulation Clinic INR History:   Date 10/30 11/20 12/17 1/14/20 2/11 3/11 4/8 5/6 6/11 7/9 8/6 9/3 9/14 9/30   Total Weekly Dose 32.5 mg 32.5 mg 32.5 mg 32.5 mg 32.5mg 32.5mg 32.5mg 32.5mg 32.5mg 32.5 mg 32.5 mg 32.5mg 32.5mg 26.25 mg   INR 2.3 2.3 2.1 1.9 2.2 2.1 2.1 2.2 2.2 2.3 2.7 1.6 2.2  1.8   Notes                       ??   5 day hold     Date 10/14 11/4 11/11 11/25 12/9 12/16 12/30 1/13 2/3 3/3 3/31 4/28 5/26 6/23   Total Weekly Dose 32.5 mg 32.5mg 35mg 32.5mg 33.75 mg 36.25 mg 33.75 mg 35mg 33.75 33.75mg 33.75mg 33.75 mg 33.75 mg 33.75mg   INR 2.0 1.6 2.1 1.8 1.8 2.5 2.0 3.0 2.1 2.4 2.3 3.0 2.1 2.2   Notes   1x boost GLV boostx1 boostx1             Date 7/21 9/1 10/13 11/10 12/1 12/15 12/29 1/12/22 1/26 2/9 2/16 2/23 3/2 3/16   Total Weekly Dose 33.75mg 33.75mg 33.75mg 33.75mg 33.75 mg 32.5mg 30mg 27.5mg 28.75 mg 30 mg 31.25 mg 31.25 mg 31.25 mg 31.25 mg   INR 2.9 2.5 3.1 3.1 3.2 3.4 3.4 1.9 1.7 1.8 2.1 2.6 2.9 2.9   Notes        redx1 ?  inc        Date 3/30 4/13 5/11 6/8  6/23 7/7 7/28  8/9 8/12 8/16   Total WeeklyDose 31.25mg 31.25 mg 31.25 mg 31.25mg Start dialysis 30mg 30mg 30 mg  33.75mg 37.5mg 37.5mg   INR 2.5 2.2 2.6 3.9  2.1 2.1 1.8 1.23 1.4 1.5 2.1   Notes    Inc torsemide Cath placement    AV fistula placement    HM train  1 no billing       Date 8/23 8/30 9/6 9/13 9/20 9/30 10/4 10/11 10/18 10/25 11/8 11/15   Total WeeklyDose 31.25 mg 32.5 mg 32.5 mg 32.5 mg 32.5 mg 32.5 mg 35 mg 35 mg 35 mg 32.5 mg 26.5 mg 35 mg   INR 1.9 2.0 2.0 2.5 2.7 1.6 1.7 2.8 2.4 1.7  1.4 1.6   Notes HM2 no billing HM3  No billing HM4  No billing HM 1  Bill today HM 2 No billing HM 3 No bill HM 4  No billing 1x boost  HM 5 no bill day Bill day  HM1 HM- 2 no billing   HM 3- no billing 1x boost  HM 4 - no billing      Date 11/22 11/29 12/6 12/13 12/20 12/27 1/3/23 1/10 1/12 1/17 1/26/23 1/31/23   Total WeeklyDose 37.5 mg 37.5 mg 37.5 mg 37.5 mg 42.5 mg  40 mg 37.5 mg 40 mg 42.5mg 45 mg 45 mg 40 mg   INR 1.9 2.8 1.9 1.5 2.3 3.4 1.4 0.8 2.7 POCT  2.23   2.8 HM 3.9 1.9 2.2   Notes HM1- bill day HM 2 - NO bill HM 3 - no bill  Inc GLV Hm 4 - NO BILL HM 1 - Bill today HM 2   no billing HM 3- no billing HM 4  No billing lab error?? clinic HM 1  Bill today HM 2 - no bill  HM 3 - no bill      Date 2/7 2/14 2/21/23 2/28/23 3/7 3/14/23 3/21/23 3/28/23 4/4 4/11 4/18 4/26   Total WeeklyDose 40 mg 40 mg 40 mg 37.5 mg 40 mg 40 mg 40 mg 40 mg 37.5 mg 37.5 mg 37.5 mg  40 mg   INR 2.3 2.2 3.9 1.6 3.7 ??? 2.5 2.7 3.2 2.8 2.5 1.7 1.7   Notes HM 4 rec 2/15  HM 1  Bill today HM 2 - no billing  1xred  HM 3 - NO BILL  HM-4 no bill, percocet HM 5 - no billing until 3/15 HM 1 - BILL today  HM 2 - no Billing HM 3  No billing HM 4  No billing HM 1-  BILL today  HM 2  No billing     Date 5/2 5/5 5/11 5/16 5/22 5/30 6/6 6/14 6/20 6/27 7/5 7/11   Total WeeklyDose 42.5 mg 37.5 mg 42.5mg 42.5 mg  35 mg 40 mg 40 mg 40 mg 37.5 mg 40 mg 40 mg 40 mg   INR 3.5 1.8 2.9 4.2 2.0 2.5 2.8 3.2 1.9 2.2 3.0 3.3   Notes HM 3  No billing HM 4  No billing HM 5  No billing HM-1  Bill  Dec'd GLV  HM 2  No billing  1x hold  HM 3   no billing HM 4  No billing HM 1  Bill today HM 2  No billing HM 3  No bill HM 4  No bill HM5  No bill     Date 7/18 7/25 08/01/23 8/8 8/15/23 8/23/23 08/29/23        Total WeeklyDose 37.5 mg 35 mg 37.5mg 40 mg 37.5 mg 37.5 mg 37.5 mg        INR 3.3 1.9 1.9 2.3 2.6 2.1 2.4        Notes HM 1  Bill today HM 2  No billing HM 3  No billing  1 x boost HM 4 no billing  HM 1 - BILL today  HM 2 - no bill  HM 3- no billing         Takes warfarin in the am    Clinic Interview:  Verbal Release Authorization signed on 8/22/2018 -- may speak with Raeann Montemayor (Wife). [phone] (Home) *Preferred*  Tablet Strength: pt has 7.5mg and 2.5mg tablets  Estimated oop cost for [facility] ED[facility] HM: Patient has [employer] Medicare so they should have minimal to no OOP costs.                   Patient Findings  Negatives: Signs/symptoms of thrombosis, Signs/symptoms of bleeding, Laboratory test error suspected, Change in health, Change in alcohol use, Change in activity, Upcoming invasive procedure, Emergency department visit, Upcoming dental procedure, Missed doses, Extra doses, Change in medications, Change in diet/appetite, Hospital admission, Bruising, Other complaints   Comments: All findings negative per pt.       Plan:   1. INR was therapeutic today 08/29/23 at 2.4 (goal 2.0-3.0). Instructed Mr. Montemayor to continue warfarin 5 mg oral daily except 7.5 mg Fri until recheck.   2. Recheck INR in one week, 09/05/23.  3. Verbal information provided over the telephone. Mr. Montemayor expresses understanding with teach back and has no further questions at this time.     Home monitor information below:  Test strips on hand: 5  8/29/23    Lot : [id number]  Barcode : [id number] Exp : 1/25  Serial number: [id number]   Supplies billing code used: last 8/15/23 Next must be on or after  9/12/23  Transfer Tube Lot number: Lot : [id number]  Safety Lancets:  Lot : [id number]   Billing:  *bill must be 4 tests 28 days or more from  visit  PLEASE REFER TO ANTICOAGULATION TRAINING POWERPOINT FOR BILLING. Needs to use in clinic remote appointment for encounter.      Silke Corea  Pharmacy Technician  8/29/2023 10:47 EDT    I, Peng Melgar, PharmD, have reviewed the note in full and agree with the assessment and plan.  08/30/23  14:36 EDT

## 2023-09-05 ENCOUNTER — ANTICOAGULATION VISIT (OUTPATIENT)
Dept: PHARMACY | Facility: HOSPITAL | Age: 78
End: 2023-09-05
Payer: MEDICARE

## 2023-09-05 LAB — INR PPP: 2.3

## 2023-09-05 NOTE — PROGRESS NOTES
Anticoagulation Clinic Progress Note   Oswaldo HOMEMONITOR: [employer] Serial # : [id number]     Indication: afib  Referring Provider: Gale  Initial Warfarin Start Date: 2007  Goal INR: 2-3  Current Drug Interactions:   [id number]: 5 (HTN, Age, CHF, CAD)   Other: anemic, CKD (stage 4) May need bridge with Lovenox given persistent AF (per cardio 1/24/2019), Scr was 3.22 on 11/24/21    Diet: eats salads 2x weekly 5/11/2023     Anticoagulation Clinic INR History:   Date 10/30 11/20 12/17 1/14/20 2/11 3/11 4/8 5/6 6/11 7/9 8/6 9/3 9/14 9/30   Total Weekly Dose 32.5 mg 32.5 mg 32.5 mg 32.5 mg 32.5mg 32.5mg 32.5mg 32.5mg 32.5mg 32.5 mg 32.5 mg 32.5mg 32.5mg 26.25 mg   INR 2.3 2.3 2.1 1.9 2.2 2.1 2.1 2.2 2.2 2.3 2.7 1.6 2.2  1.8   Notes                       ??   5 day hold     Date 10/14 11/4 11/11 11/25 12/9 12/16 12/30 1/13 2/3 3/3 3/31 4/28 5/26 6/23   Total Weekly Dose 32.5 mg 32.5mg 35mg 32.5mg 33.75 mg 36.25 mg 33.75 mg 35mg 33.75 33.75mg 33.75mg 33.75 mg 33.75 mg 33.75mg   INR 2.0 1.6 2.1 1.8 1.8 2.5 2.0 3.0 2.1 2.4 2.3 3.0 2.1 2.2   Notes   1x boost GLV boostx1 boostx1             Date 7/21 9/1 10/13 11/10 12/1 12/15 12/29 1/12/22 1/26 2/9 2/16 2/23 3/2 3/16   Total Weekly Dose 33.75mg 33.75mg 33.75mg 33.75mg 33.75 mg 32.5mg 30mg 27.5mg 28.75 mg 30 mg 31.25 mg 31.25 mg 31.25 mg 31.25 mg   INR 2.9 2.5 3.1 3.1 3.2 3.4 3.4 1.9 1.7 1.8 2.1 2.6 2.9 2.9   Notes        redx1 ?  inc        Date 3/30 4/13 5/11 6/8  6/23 7/7 7/28  8/9 8/12 8/16   Total WeeklyDose 31.25mg 31.25 mg 31.25 mg 31.25mg Start dialysis 30mg 30mg 30 mg  33.75mg 37.5mg 37.5mg   INR 2.5 2.2 2.6 3.9  2.1 2.1 1.8 1.23 1.4 1.5 2.1   Notes    Inc torsemide Cath placement    AV fistula placement    HM train  1 no billing       Date 8/23 8/30 9/6 9/13 9/20 9/30 10/4 10/11 10/18 10/25 11/8 11/15   Total WeeklyDose 31.25 mg 32.5 mg 32.5 mg 32.5 mg 32.5 mg 32.5 mg 35 mg 35 mg 35 mg 32.5 mg 26.5 mg 35 mg   INR 1.9 2.0 2.0 2.5 2.7 1.6 1.7 2.8 2.4 1.7  1.4 1.6   Notes HM2 no billing HM3  No billing HM4  No billing HM 1  Bill today HM 2 No billing HM 3 No bill HM 4  No billing 1x boost  HM 5 no bill day Bill day  HM1 HM- 2 no billing   HM 3- no billing 1x boost  HM 4 - no billing      Date 11/22 11/29 12/6 12/13 12/20 12/27 1/3/23 1/10 1/12 1/17 1/26/23 1/31/23   Total WeeklyDose 37.5 mg 37.5 mg 37.5 mg 37.5 mg 42.5 mg  40 mg 37.5 mg 40 mg 42.5mg 45 mg 45 mg 40 mg   INR 1.9 2.8 1.9 1.5 2.3 3.4 1.4 0.8 2.7 POCT  2.23   2.8 HM 3.9 1.9 2.2   Notes HM1- bill day HM 2 - NO bill HM 3 - no bill  Inc GLV Hm 4 - NO BILL HM 1 - Bill today HM 2   no billing HM 3- no billing HM 4  No billing lab error?? clinic HM 1  Bill today HM 2 - no bill  HM 3 - no bill      Date 2/7 2/14 2/21/23 2/28/23 3/7 3/14/23 3/21/23 3/28/23 4/4 4/11 4/18 4/26   Total WeeklyDose 40 mg 40 mg 40 mg 37.5 mg 40 mg 40 mg 40 mg 40 mg 37.5 mg 37.5 mg 37.5 mg  40 mg   INR 2.3 2.2 3.9 1.6 3.7 ??? 2.5 2.7 3.2 2.8 2.5 1.7 1.7   Notes HM 4 rec 2/15  HM 1  Bill today HM 2 - no billing  1xred  HM 3 - NO BILL  HM-4 no bill, percocet HM 5 - no billing until 3/15 HM 1 - BILL today  HM 2 - no Billing HM 3  No billing HM 4  No billing HM 1-  BILL today  HM 2  No billing     Date 5/2 5/5 5/11 5/16 5/22 5/30 6/6 6/14 6/20 6/27 7/5 7/11   Total WeeklyDose 42.5 mg 37.5 mg 42.5mg 42.5 mg  35 mg 40 mg 40 mg 40 mg 37.5 mg 40 mg 40 mg 40 mg   INR 3.5 1.8 2.9 4.2 2.0 2.5 2.8 3.2 1.9 2.2 3.0 3.3   Notes HM 3  No billing HM 4  No billing HM 5  No billing HM-1  Bill  Dec'd GLV  HM 2  No billing  1x hold  HM 3   no billing HM 4  No billing HM 1  Bill today HM 2  No billing HM 3  No bill HM 4  No bill HM5  No bill     Date 7/18 7/25 08/01/23 8/8 8/15/23 8/23/23 08/29/23 09/05/23       Total WeeklyDose 37.5 mg 35 mg 37.5mg 40 mg 37.5 mg 37.5 mg 37.5 mg 37.5 mg       INR 3.3 1.9 1.9 2.3 2.6 2.1 2.4 2.3       Notes HM 1  Bill today HM 2  No billing HM 3  No billing  1 x boost HM 4 no billing  HM 1 - BILL today  HM 2 - no bill  HM 3-  no billing HM 4- no billing       Takes warfarin in the am    Clinic Interview:  Verbal Release Authorization signed on 8/22/2018 -- may speak with Raeann Montemayor (Wife). [phone] (Home) *Preferred*  Tablet Strength: pt has 7.5mg and 2.5mg tablets  Estimated oop cost for [facility] EDER HM: Patient has [employer] Medicare so they should have minimal to no OOP costs.                   Patient Findings  Negatives: Signs/symptoms of thrombosis, Signs/symptoms of bleeding, Laboratory test error suspected, Change in health, Change in alcohol use, Change in activity, Upcoming invasive procedure, Emergency department visit, Upcoming dental procedure, Missed doses, Extra doses, Change in medications, Change in diet/appetite, Hospital admission, Bruising, Other complaints   Comments: All findings negative per pt.       Plan:   1. INR was therapeutic today 09/05/23 at 2.3 (goal 2.0-3.0). Instructed Mr. Montemayor to continue warfarin 5 mg oral daily except 7.5 mg Fri until recheck.   2. Recheck INR in one week, 09/12/23.  3. Verbal information provided over the telephone. Mr. Montemayor expresses understanding with teach back and has no further questions at this time.     Home monitor information below:  Test strips on hand: 5  09/05/23    Lot : [id number]  Barcode : [id number] Exp : 1/25  Serial number: [id number]   Supplies billing code used: last 8/15/23 Next must be on or after  9/12/23  Transfer Tube Lot number: Lot : [id number]  Safety Lancets:  Lot : [id number]   Billing:  *bill must be 4 tests 28 days or more from HM visit  PLEASE REFER TO ANTICOAGULATION TRAINING POWERPOINT FOR BILLING. Needs to use in clinic remote appointment for encounter.      Silke Corea  Pharmacy Technician  9/5/2023 09:53 EDT      I, Kristine Sutton, PharmD, have reviewed the note in full and agree with the assessment and plan.  09/06/23  14:41 EDT

## 2023-09-12 ENCOUNTER — ANTICOAGULATION VISIT (OUTPATIENT)
Dept: PHARMACY | Facility: HOSPITAL | Age: 78
End: 2023-09-12
Payer: MEDICARE

## 2023-09-12 LAB — INR PPP: 2.7

## 2023-09-12 PROCEDURE — G0249 PROVIDE INR TEST MATER/EQUIP: HCPCS

## 2023-09-12 NOTE — PROGRESS NOTES
Anticoagulation Clinic Progress Note   Oswaldo HOMEMONITOR: [employer] Serial # : [id number]     Indication: afib  Referring Provider: Gale  Initial Warfarin Start Date: 2007  Goal INR: 2-3  Current Drug Interactions:   [id number]: 5 (HTN, Age, CHF, CAD)   Other: anemic, CKD (stage 4) May need bridge with Lovenox given persistent AF (per cardio 1/24/2019), Scr was 3.22 on 11/24/21    Diet: eats salads 2x weekly 5/11/2023     Anticoagulation Clinic INR History:   Date 10/30 11/20 12/17 1/14/20 2/11 3/11 4/8 5/6 6/11 7/9 8/6 9/3 9/14 9/30   Total Weekly Dose 32.5 mg 32.5 mg 32.5 mg 32.5 mg 32.5mg 32.5mg 32.5mg 32.5mg 32.5mg 32.5 mg 32.5 mg 32.5mg 32.5mg 26.25 mg   INR 2.3 2.3 2.1 1.9 2.2 2.1 2.1 2.2 2.2 2.3 2.7 1.6 2.2  1.8   Notes                       ??   5 day hold     Date 10/14 11/4 11/11 11/25 12/9 12/16 12/30 1/13 2/3 3/3 3/31 4/28 5/26 6/23   Total Weekly Dose 32.5 mg 32.5mg 35mg 32.5mg 33.75 mg 36.25 mg 33.75 mg 35mg 33.75 33.75mg 33.75mg 33.75 mg 33.75 mg 33.75mg   INR 2.0 1.6 2.1 1.8 1.8 2.5 2.0 3.0 2.1 2.4 2.3 3.0 2.1 2.2   Notes   1x boost GLV boostx1 boostx1             Date 7/21 9/1 10/13 11/10 12/1 12/15 12/29 1/12/22 1/26 2/9 2/16 2/23 3/2 3/16   Total Weekly Dose 33.75mg 33.75mg 33.75mg 33.75mg 33.75 mg 32.5mg 30mg 27.5mg 28.75 mg 30 mg 31.25 mg 31.25 mg 31.25 mg 31.25 mg   INR 2.9 2.5 3.1 3.1 3.2 3.4 3.4 1.9 1.7 1.8 2.1 2.6 2.9 2.9   Notes        redx1 ?  inc        Date 3/30 4/13 5/11 6/8  6/23 7/7 7/28  8/9 8/12 8/16   Total WeeklyDose 31.25mg 31.25 mg 31.25 mg 31.25mg Start dialysis 30mg 30mg 30 mg  33.75mg 37.5mg 37.5mg   INR 2.5 2.2 2.6 3.9  2.1 2.1 1.8 1.23 1.4 1.5 2.1   Notes    Inc torsemide Cath placement    AV fistula placement    HM train  1 no billing       Date 8/23 8/30 9/6 9/13 9/20 9/30 10/4 10/11 10/18 10/25 11/8 11/15   Total WeeklyDose 31.25 mg 32.5 mg 32.5 mg 32.5 mg 32.5 mg 32.5 mg 35 mg 35 mg 35 mg 32.5 mg 26.5 mg 35 mg   INR 1.9 2.0 2.0 2.5 2.7 1.6 1.7 2.8 2.4 1.7  1.4 1.6   Notes HM2 no billing HM3  No billing HM4  No billing HM 1  Bill today HM 2 No billing HM 3 No bill HM 4  No billing 1x boost  HM 5 no bill day Bill day  HM1 HM- 2 no billing   HM 3- no billing 1x boost  HM 4 - no billing      Date 11/22 11/29 12/6 12/13 12/20 12/27 1/3/23 1/10 1/12 1/17 1/26/23 1/31/23   Total WeeklyDose 37.5 mg 37.5 mg 37.5 mg 37.5 mg 42.5 mg  40 mg 37.5 mg 40 mg 42.5mg 45 mg 45 mg 40 mg   INR 1.9 2.8 1.9 1.5 2.3 3.4 1.4 0.8 2.7 POCT  2.23   2.8 HM 3.9 1.9 2.2   Notes HM1- bill day HM 2 - NO bill HM 3 - no bill  Inc GLV Hm 4 - NO BILL HM 1 - Bill today HM 2   no billing HM 3- no billing HM 4  No billing lab error?? clinic HM 1  Bill today HM 2 - no bill  HM 3 - no bill      Date 2/7 2/14 2/21/23 2/28/23 3/7 3/14/23 3/21/23 3/28/23 4/4 4/11 4/18 4/26   Total WeeklyDose 40 mg 40 mg 40 mg 37.5 mg 40 mg 40 mg 40 mg 40 mg 37.5 mg 37.5 mg 37.5 mg  40 mg   INR 2.3 2.2 3.9 1.6 3.7 ??? 2.5 2.7 3.2 2.8 2.5 1.7 1.7   Notes HM 4 rec 2/15  HM 1  Bill today HM 2 - no billing  1xred  HM 3 - NO BILL  HM-4 no bill, percocet HM 5 - no billing until 3/15 HM 1 - BILL today  HM 2 - no Billing HM 3  No billing HM 4  No billing HM 1-  BILL today  HM 2  No billing     Date 5/2 5/5 5/11 5/16 5/22 5/30 6/6 6/14 6/20 6/27 7/5 7/11   Total WeeklyDose 42.5 mg 37.5 mg 42.5mg 42.5 mg  35 mg 40 mg 40 mg 40 mg 37.5 mg 40 mg 40 mg 40 mg   INR 3.5 1.8 2.9 4.2 2.0 2.5 2.8 3.2 1.9 2.2 3.0 3.3   Notes HM 3  No billing HM 4  No billing HM 5  No billing HM-1  Bill  Dec'd GLV  HM 2  No billing  1x hold  HM 3   no billing HM 4  No billing HM 1  Bill today HM 2  No billing HM 3  No bill HM 4  No bill HM5  No bill     Date 7/18 7/25 08/01/23 8/8 8/15/23 8/23/23 08/29/23 09/05/23 9/12/23      Total WeeklyDose 37.5 mg 35 mg 37.5mg 40 mg 37.5 mg 37.5 mg 37.5 mg 37.5 mg 37.5 mg      INR 3.3 1.9 1.9 2.3 2.6 2.1 2.4 2.3 2.7      Notes HM 1  Bill today HM 2  No billing HM 3  No billing  1 x boost HM 4 no billing  HM 1 - BILL today  HM 2  - no bill  HM 3- no billing HM 4- no billing HM 1 - BILL today       Takes warfarin in the am    Clinic Interview:  Verbal Release Authorization signed on 8/22/2018 -- may speak with Raeann Montemayor (Wife). [phone] (Home) *Preferred*  Tablet Strength: pt has 7.5mg and 2.5mg tablets  Estimated oop cost for [facility] HM: Patient has [employer] Medicare so they should have minimal to no OOP costs.                     Patient Findings  Negatives: Signs/symptoms of thrombosis, Signs/symptoms of bleeding, Laboratory test error suspected, Change in health, Change in alcohol use, Change in activity, Upcoming invasive procedure, Emergency department visit, Upcoming dental procedure, Missed doses, Extra doses, Change in medications, Change in diet/appetite, Hospital admission, Bruising, Other complaints   Comments: All findings negative per pt.       Plan:   1. INR was therapeutic today at 2.7 (goal 2.0-3.0). Instructed Mr. Montemayor to continue warfarin 5 mg oral daily except 7.5 mg Fri until recheck.   2. Recheck INR in one week, 09/19/23.  3. Verbal information provided over the telephone. Mr. Montemayor expresses understanding with teach back and has no further questions at this time.     Home monitor information below:  Test strips on hand: 4  09/12/23    Lot : [id number]  Barcode : [id number] Exp : 1/25  Serial number: [id number]   Supplies billing code used: last 9/12/23 Next must be on or after  10/10/23  Transfer Tube Lot number: Lot : [id number]  Safety Lancets:  Lot : [id number]   Billing:  *bill must be 4 tests 28 days or more from HM visit  PLEASE REFER TO ANTICOAGULATION TRAINING POWERPOINT FOR BILLING. Needs to use in clinic remote appointment for encounter.    Alexander Scherer, Pharmacy Technician  9/12/2023  15:45 EDT    I, Kristine Sutton, PharmD, have reviewed the note in full and agree with the assessment and plan.  09/12/23  16:28 EDT

## 2023-09-18 RX ORDER — CARVEDILOL 12.5 MG/1
TABLET ORAL
Qty: 180 TABLET | Refills: 3 | Status: SHIPPED | OUTPATIENT
Start: 2023-09-18

## 2023-09-19 ENCOUNTER — ANTICOAGULATION VISIT (OUTPATIENT)
Dept: PHARMACY | Facility: HOSPITAL | Age: 78
End: 2023-09-19
Payer: MEDICARE

## 2023-09-19 LAB — INR PPP: 2.5

## 2023-09-19 NOTE — PROGRESS NOTES
Anticoagulation Clinic Progress Note   Oswaldo HOMEMONITOR: [employer] Serial # : [id number]     Indication: afib  Referring Provider: Gale  Initial Warfarin Start Date: 2007  Goal INR: 2-3  Current Drug Interactions:   [id number]: 5 (HTN, Age, CHF, CAD)   Other: anemic, CKD (stage 4) May need bridge with Lovenox given persistent AF (per cardio 1/24/2019), Scr was 3.22 on 11/24/21    Diet: eats salads 2x weekly 5/11/2023     Anticoagulation Clinic INR History:   Date 10/30 11/20 12/17 1/14/20 2/11 3/11 4/8 5/6 6/11 7/9 8/6 9/3 9/14 9/30   Total Weekly Dose 32.5 mg 32.5 mg 32.5 mg 32.5 mg 32.5mg 32.5mg 32.5mg 32.5mg 32.5mg 32.5 mg 32.5 mg 32.5mg 32.5mg 26.25 mg   INR 2.3 2.3 2.1 1.9 2.2 2.1 2.1 2.2 2.2 2.3 2.7 1.6 2.2  1.8   Notes                       ??   5 day hold     Date 10/14 11/4 11/11 11/25 12/9 12/16 12/30 1/13 2/3 3/3 3/31 4/28 5/26 6/23   Total Weekly Dose 32.5 mg 32.5mg 35mg 32.5mg 33.75 mg 36.25 mg 33.75 mg 35mg 33.75 33.75mg 33.75mg 33.75 mg 33.75 mg 33.75mg   INR 2.0 1.6 2.1 1.8 1.8 2.5 2.0 3.0 2.1 2.4 2.3 3.0 2.1 2.2   Notes   1x boost GLV boostx1 boostx1             Date 7/21 9/1 10/13 11/10 12/1 12/15 12/29 1/12/22 1/26 2/9 2/16 2/23 3/2 3/16   Total Weekly Dose 33.75mg 33.75mg 33.75mg 33.75mg 33.75 mg 32.5mg 30mg 27.5mg 28.75 mg 30 mg 31.25 mg 31.25 mg 31.25 mg 31.25 mg   INR 2.9 2.5 3.1 3.1 3.2 3.4 3.4 1.9 1.7 1.8 2.1 2.6 2.9 2.9   Notes        redx1 ?  inc        Date 3/30 4/13 5/11 6/8  6/23 7/7 7/28  8/9 8/12 8/16   Total WeeklyDose 31.25mg 31.25 mg 31.25 mg 31.25mg Start dialysis 30mg 30mg 30 mg  33.75mg 37.5mg 37.5mg   INR 2.5 2.2 2.6 3.9  2.1 2.1 1.8 1.23 1.4 1.5 2.1   Notes    Inc torsemide Cath placement    AV fistula placement    HM train  1 no billing       Date 8/23 8/30 9/6 9/13 9/20 9/30 10/4 10/11 10/18 10/25 11/8 11/15   Total WeeklyDose 31.25 mg 32.5 mg 32.5 mg 32.5 mg 32.5 mg 32.5 mg 35 mg 35 mg 35 mg 32.5 mg 26.5 mg 35 mg   INR 1.9 2.0 2.0 2.5 2.7 1.6 1.7 2.8 2.4 1.7  1.4 1.6   Notes HM2 no billing HM3  No billing HM4  No billing HM 1  Bill today HM 2 No billing HM 3 No bill HM 4  No billing 1x boost  HM 5 no bill day Bill day  HM1 HM- 2 no billing   HM 3- no billing 1x boost  HM 4 - no billing      Date 11/22 11/29 12/6 12/13 12/20 12/27 1/3/23 1/10 1/12 1/17 1/26/23 1/31/23   Total WeeklyDose 37.5 mg 37.5 mg 37.5 mg 37.5 mg 42.5 mg  40 mg 37.5 mg 40 mg 42.5mg 45 mg 45 mg 40 mg   INR 1.9 2.8 1.9 1.5 2.3 3.4 1.4 0.8 2.7 POCT  2.23   2.8 HM 3.9 1.9 2.2   Notes HM1- bill day HM 2 - NO bill HM 3 - no bill  Inc GLV Hm 4 - NO BILL HM 1 - Bill today HM 2   no billing HM 3- no billing HM 4  No billing lab error?? clinic HM 1  Bill today HM 2 - no bill  HM 3 - no bill      Date 2/7 2/14 2/21/23 2/28/23 3/7 3/14/23 3/21/23 3/28/23 4/4 4/11 4/18 4/26   Total WeeklyDose 40 mg 40 mg 40 mg 37.5 mg 40 mg 40 mg 40 mg 40 mg 37.5 mg 37.5 mg 37.5 mg  40 mg   INR 2.3 2.2 3.9 1.6 3.7 ??? 2.5 2.7 3.2 2.8 2.5 1.7 1.7   Notes HM 4 rec 2/15  HM 1  Bill today HM 2 - no billing  1xred  HM 3 - NO BILL  HM-4 no bill, percocet HM 5 - no billing until 3/15 HM 1 - BILL today  HM 2 - no Billing HM 3  No billing HM 4  No billing HM 1-  BILL today  HM 2  No billing     Date 5/2 5/5 5/11 5/16 5/22 5/30 6/6 6/14 6/20 6/27 7/5 7/11   Total WeeklyDose 42.5 mg 37.5 mg 42.5mg 42.5 mg  35 mg 40 mg 40 mg 40 mg 37.5 mg 40 mg 40 mg 40 mg   INR 3.5 1.8 2.9 4.2 2.0 2.5 2.8 3.2 1.9 2.2 3.0 3.3   Notes HM 3  No billing HM 4  No billing HM 5  No billing HM-1  Bill  Dec'd GLV  HM 2  No billing  1x hold  HM 3   no billing HM 4  No billing HM 1  Bill today HM 2  No billing HM 3  No bill HM 4  No bill HM5  No bill     Date 7/18 7/25 08/01/23 8/8 8/15/23 8/23/23 08/29/23 09/05/23 9/12/23 9/19/23     Total WeeklyDose 37.5 mg 35 mg 37.5mg 40 mg 37.5 mg 37.5 mg 37.5 mg 37.5 mg 37.5 mg 37.5 mg     INR 3.3 1.9 1.9 2.3 2.6 2.1 2.4 2.3 2.7 2.5     Notes HM 1  Bill today HM 2  No billing HM 3  No billing  1 x boost HM 4 no billing  HM 1 -  BILL today  HM 2 - no bill  HM 3- no billing HM 4- no billing HM 1 - BILL today  HM 2 - NO bill      Takes warfarin in the am    Clinic Interview:  Verbal Release Authorization signed on 8/22/2018 -- may speak with Raeann Montemayor (Wife). [phone] (Home) *Preferred*  Tablet Strength: pt has 7.5mg and 2.5mg tablets  Estimated oop cost for [facility] HM: Patient has [employer] Medicare so they should have minimal to no OOP costs.                   Patient Findings  Negatives: Signs/symptoms of thrombosis, Signs/symptoms of bleeding, Laboratory test error suspected, Change in health, Change in alcohol use, Change in activity, Upcoming invasive procedure, Emergency department visit, Upcoming dental procedure, Missed doses, Extra doses, Change in medications, Change in diet/appetite, Hospital admission, Bruising, Other complaints   Comments: All findings negative per pt.     Plan:     1. INR was therapeutic today at 2.5 (goal 2.0-3.0). Instructed Mr. Montemayor to continue warfarin 5 mg oral daily except 7.5 mg on Fridays until recheck.   2. Recheck INR in one week, 09/26/23.  3. Verbal information provided over the telephone. Mr. Montemayor expresses understanding with teach back and has no further questions at this time.     Home monitor information below:  Test strips on hand: 3  09/19/23    Lot : [id number]  Barcode : [id number] Exp : 1/25  Serial number: [id number]   Supplies billing code used: last 9/12/23 Next must be on or after  10/10/23  Transfer Tube Lot number: Lot : [id number]  Safety Lancets:  Lot : [id number]   Billing:  *bill must be 4 tests 28 days or more from HM visit  PLEASE REFER TO ANTICOAGULATION TRAINING POWERPOINT FOR BILLING. Needs to use in clinic remote appointment for encounter.    Alexander Scherer, Pharmacy Technician  9/19/2023  09:59 EDT    I, Charlotte Michael, PharmD, have reviewed the note in full and agree with the assessment and plan.  09/19/23  11:52 EDT

## 2023-09-26 ENCOUNTER — ANTICOAGULATION VISIT (OUTPATIENT)
Dept: PHARMACY | Facility: HOSPITAL | Age: 78
End: 2023-09-26
Payer: MEDICARE

## 2023-09-26 LAB — INR PPP: 2.7

## 2023-09-26 NOTE — PROGRESS NOTES
Anticoagulation Clinic Progress Note   Oswaldo HOMEMONITOR: [employer] Serial # : [id number]     Indication: afib  Referring Provider: Gale  Initial Warfarin Start Date: 2007  Goal INR: 2-3  Current Drug Interactions:   [id number]: 5 (HTN, Age, CHF, CAD)   Other: anemic, CKD (stage 4) May need bridge with Lovenox given persistent AF (per cardio 1/24/2019), Scr was 3.22 on 11/24/21    Diet: eats salads 2x weekly 5/11/2023     Anticoagulation Clinic INR History:   Date 10/30 11/20 12/17 1/14/20 2/11 3/11 4/8 5/6 6/11 7/9 8/6 9/3 9/14 9/30   Total Weekly Dose 32.5 mg 32.5 mg 32.5 mg 32.5 mg 32.5mg 32.5mg 32.5mg 32.5mg 32.5mg 32.5 mg 32.5 mg 32.5mg 32.5mg 26.25 mg   INR 2.3 2.3 2.1 1.9 2.2 2.1 2.1 2.2 2.2 2.3 2.7 1.6 2.2  1.8   Notes                       ??   5 day hold     Date 10/14 11/4 11/11 11/25 12/9 12/16 12/30 1/13 2/3 3/3 3/31 4/28 5/26 6/23   Total Weekly Dose 32.5 mg 32.5mg 35mg 32.5mg 33.75 mg 36.25 mg 33.75 mg 35mg 33.75 33.75mg 33.75mg 33.75 mg 33.75 mg 33.75mg   INR 2.0 1.6 2.1 1.8 1.8 2.5 2.0 3.0 2.1 2.4 2.3 3.0 2.1 2.2   Notes   1x boost GLV boostx1 boostx1             Date 7/21 9/1 10/13 11/10 12/1 12/15 12/29 1/12/22 1/26 2/9 2/16 2/23 3/2 3/16   Total Weekly Dose 33.75mg 33.75mg 33.75mg 33.75mg 33.75 mg 32.5mg 30mg 27.5mg 28.75 mg 30 mg 31.25 mg 31.25 mg 31.25 mg 31.25 mg   INR 2.9 2.5 3.1 3.1 3.2 3.4 3.4 1.9 1.7 1.8 2.1 2.6 2.9 2.9   Notes        redx1 ?  inc        Date 3/30 4/13 5/11 6/8  6/23 7/7 7/28  8/9 8/12 8/16   Total WeeklyDose 31.25mg 31.25 mg 31.25 mg 31.25mg Start dialysis 30mg 30mg 30 mg  33.75mg 37.5mg 37.5mg   INR 2.5 2.2 2.6 3.9  2.1 2.1 1.8 1.23 1.4 1.5 2.1   Notes    Inc torsemide Cath placement    AV fistula placement    HM train  1 no billing       Date 8/23 8/30 9/6 9/13 9/20 9/30 10/4 10/11 10/18 10/25 11/8 11/15   Total WeeklyDose 31.25 mg 32.5 mg 32.5 mg 32.5 mg 32.5 mg 32.5 mg 35 mg 35 mg 35 mg 32.5 mg 26.5 mg 35 mg   INR 1.9 2.0 2.0 2.5 2.7 1.6 1.7 2.8 2.4 1.7  1.4 1.6   Notes HM2 no billing HM3  No billing HM4  No billing HM 1  Bill today HM 2 No billing HM 3 No bill HM 4  No billing 1x boost  HM 5 no bill day Bill day  HM1 HM- 2 no billing   HM 3- no billing 1x boost  HM 4 - no billing      Date 11/22 11/29 12/6 12/13 12/20 12/27 1/3/23 1/10 1/12 1/17 1/26/23 1/31/23   Total WeeklyDose 37.5 mg 37.5 mg 37.5 mg 37.5 mg 42.5 mg  40 mg 37.5 mg 40 mg 42.5mg 45 mg 45 mg 40 mg   INR 1.9 2.8 1.9 1.5 2.3 3.4 1.4 0.8 2.7 POCT  2.23   2.8 HM 3.9 1.9 2.2   Notes HM1- bill day HM 2 - NO bill HM 3 - no bill  Inc GLV Hm 4 - NO BILL HM 1 - Bill today HM 2   no billing HM 3- no billing HM 4  No billing lab error?? clinic HM 1  Bill today HM 2 - no bill  HM 3 - no bill      Date 2/7 2/14 2/21/23 2/28/23 3/7 3/14/23 3/21/23 3/28/23 4/4 4/11 4/18 4/26   Total WeeklyDose 40 mg 40 mg 40 mg 37.5 mg 40 mg 40 mg 40 mg 40 mg 37.5 mg 37.5 mg 37.5 mg  40 mg   INR 2.3 2.2 3.9 1.6 3.7 ??? 2.5 2.7 3.2 2.8 2.5 1.7 1.7   Notes HM 4 rec 2/15  HM 1  Bill today HM 2 - no billing  1xred  HM 3 - NO BILL  HM-4 no bill, percocet HM 5 - no billing until 3/15 HM 1 - BILL today  HM 2 - no Billing HM 3  No billing HM 4  No billing HM 1-  BILL today  HM 2  No billing     Date 5/2 5/5 5/11 5/16 5/22 5/30 6/6 6/14 6/20 6/27 7/5 7/11   Total WeeklyDose 42.5 mg 37.5 mg 42.5mg 42.5 mg  35 mg 40 mg 40 mg 40 mg 37.5 mg 40 mg 40 mg 40 mg   INR 3.5 1.8 2.9 4.2 2.0 2.5 2.8 3.2 1.9 2.2 3.0 3.3   Notes HM 3  No billing HM 4  No billing HM 5  No billing HM-1  Bill  Dec'd GLV  HM 2  No billing  1x hold  HM 3   no billing HM 4  No billing HM 1  Bill today HM 2  No billing HM 3  No bill HM 4  No bill HM5  No bill     Date 7/18 7/25 08/01/23 8/8 8/15/23 8/23/23 08/29/23 09/05/23 9/12/23 9/19/23 9/26/23    Total WeeklyDose 37.5 mg 35 mg 37.5mg 40 mg 37.5 mg 37.5 mg 37.5 mg 37.5 mg 37.5 mg 37.5 mg 37.5 mg    INR 3.3 1.9 1.9 2.3 2.6 2.1 2.4 2.3 2.7 2.5 2.7    Notes HM 1  Bill today HM 2  No billing HM 3  No billing  1 x boost HM 4  no billing  HM 1 - BILL today  HM 2 - no bill  HM 3- no billing HM 4- no billing HM 1 - BILL today  HM 2 - NO bill  HM 3 - no bill     Takes warfarin in the am    Clinic Interview:  Verbal Release Authorization signed on 8/22/2018 -- may speak with Raeann Montemayor (Wife). [phone] (Home) *Preferred*  Tablet Strength: pt has 7.5mg and 2.5mg tablets  Estimated oop cost for [facility] HM: Patient has [employer] Medicare so they should have minimal to no OOP costs.                     Patient Findings  Negatives: Signs/symptoms of thrombosis, Signs/symptoms of bleeding, Laboratory test error suspected, Change in health, Change in alcohol use, Change in activity, Upcoming invasive procedure, Emergency department visit, Upcoming dental procedure, Missed doses, Extra doses, Change in medications, Change in diet/appetite, Hospital admission, Bruising, Other complaints   Comments: All findings negative per pt.       Plan:   1. INR was therapeutic today at 2.7 (goal 2.0-3.0). Instructed Raeann to have Mr. Montemayor to continue warfarin 5 mg oral daily except 7.5 mg on Fridays until recheck.   2. Recheck INR in one week, 10/3/23.  3. Verbal information provided over the telephone. Mr. Montemayor expresses understanding with teach back and has no further questions at this time.     Home monitor information below:  Test strips on hand: 2  09/26/23 sending supplies tomorrow    Lot : [id number]  Barcode : [id number] Exp : 1/25  Serial number: [id number]   Supplies billing code used: last 9/12/23 Next must be on or after  10/10/23  Transfer Tube Lot number: Lot : [id number]  Safety Lancets:  Lot : [id number]   Billing:  *bill must be 4 tests 28 days or more from HM visit  PLEASE REFER TO ANTICOAGULATION TRAINING POWERPOINT FOR BILLING. Needs to use in clinic remote appointment for encounter.    Alexander Scherer, Pharmacy Technician  9/26/2023  11:17 EDT    I, Raj Salter, PharmD, have reviewed the note in full and agree with the assessment  and plan.  09/26/23  13:30 EDT

## 2023-10-03 ENCOUNTER — ANTICOAGULATION VISIT (OUTPATIENT)
Dept: PHARMACY | Facility: HOSPITAL | Age: 78
End: 2023-10-03
Payer: MEDICARE

## 2023-10-03 LAB — INR PPP: 2

## 2023-10-03 NOTE — PROGRESS NOTES
Anticoagulation Clinic Progress Note   Oswadlo HOMEMONITOR: [employer] Serial # : [id number]     Indication: afib  Referring Provider: Gale  Initial Warfarin Start Date: 2007  Goal INR: 2-3  Current Drug Interactions:   [id number]: 5 (HTN, Age, CHF, CAD)   Other: anemic, CKD (stage 4) May need bridge with Lovenox given persistent AF (per cardio 1/24/2019), Scr was 3.22 on 11/24/21    Diet: eats salads 2x weekly 5/11/2023     Anticoagulation Clinic INR History:   Date 10/30 11/20 12/17 1/14/20 2/11 3/11 4/8 5/6 6/11 7/9 8/6 9/3 9/14 9/30   Total Weekly Dose 32.5 mg 32.5 mg 32.5 mg 32.5 mg 32.5mg 32.5mg 32.5mg 32.5mg 32.5mg 32.5 mg 32.5 mg 32.5mg 32.5mg 26.25 mg   INR 2.3 2.3 2.1 1.9 2.2 2.1 2.1 2.2 2.2 2.3 2.7 1.6 2.2  1.8   Notes                       ??   5 day hold     Date 10/14 11/4 11/11 11/25 12/9 12/16 12/30 1/13 2/3 3/3 3/31 4/28 5/26 6/23   Total Weekly Dose 32.5 mg 32.5mg 35mg 32.5mg 33.75 mg 36.25 mg 33.75 mg 35mg 33.75 33.75mg 33.75mg 33.75 mg 33.75 mg 33.75mg   INR 2.0 1.6 2.1 1.8 1.8 2.5 2.0 3.0 2.1 2.4 2.3 3.0 2.1 2.2   Notes   1x boost GLV boostx1 boostx1             Date 7/21 9/1 10/13 11/10 12/1 12/15 12/29 1/12/22 1/26 2/9 2/16 2/23 3/2 3/16   Total Weekly Dose 33.75mg 33.75mg 33.75mg 33.75mg 33.75 mg 32.5mg 30mg 27.5mg 28.75 mg 30 mg 31.25 mg 31.25 mg 31.25 mg 31.25 mg   INR 2.9 2.5 3.1 3.1 3.2 3.4 3.4 1.9 1.7 1.8 2.1 2.6 2.9 2.9   Notes        redx1 ?  inc        Date 3/30 4/13 5/11 6/8  6/23 7/7 7/28  8/9 8/12 8/16   Total WeeklyDose 31.25mg 31.25 mg 31.25 mg 31.25mg Start dialysis 30mg 30mg 30 mg  33.75mg 37.5mg 37.5mg   INR 2.5 2.2 2.6 3.9  2.1 2.1 1.8 1.23 1.4 1.5 2.1   Notes    Inc torsemide Cath placement    AV fistula placement    HM train  1 no billing       Date 8/23 8/30 9/6 9/13 9/20 9/30 10/4 10/11 10/18 10/25 11/8 11/15   Total WeeklyDose 31.25 mg 32.5 mg 32.5 mg 32.5 mg 32.5 mg 32.5 mg 35 mg 35 mg 35 mg 32.5 mg 26.5 mg 35 mg   INR 1.9 2.0 2.0 2.5 2.7 1.6 1.7 2.8 2.4 1.7  1.4 1.6   Notes HM2 no billing HM3  No billing HM4  No billing HM 1  Bill today HM 2 No billing HM 3 No bill HM 4  No billing 1x boost  HM 5 no bill day Bill day  HM1 HM- 2 no billing   HM 3- no billing 1x boost  HM 4 - no billing      Date 11/22 11/29 12/6 12/13 12/20 12/27 1/3/23 1/10 1/12 1/17 1/26/23 1/31/23   Total WeeklyDose 37.5 mg 37.5 mg 37.5 mg 37.5 mg 42.5 mg  40 mg 37.5 mg 40 mg 42.5mg 45 mg 45 mg 40 mg   INR 1.9 2.8 1.9 1.5 2.3 3.4 1.4 0.8 2.7 POCT  2.23   2.8 HM 3.9 1.9 2.2   Notes HM1- bill day HM 2 - NO bill HM 3 - no bill  Inc GLV Hm 4 - NO BILL HM 1 - Bill today HM 2   no billing HM 3- no billing HM 4  No billing lab error?? clinic HM 1  Bill today HM 2 - no bill  HM 3 - no bill      Date 2/7 2/14 2/21/23 2/28/23 3/7 3/14/23 3/21/23 3/28/23 4/4 4/11 4/18 4/26   Total WeeklyDose 40 mg 40 mg 40 mg 37.5 mg 40 mg 40 mg 40 mg 40 mg 37.5 mg 37.5 mg 37.5 mg  40 mg   INR 2.3 2.2 3.9 1.6 3.7 ??? 2.5 2.7 3.2 2.8 2.5 1.7 1.7   Notes HM 4 rec 2/15  HM 1  Bill today HM 2 - no billing  1xred  HM 3 - NO BILL  HM-4 no bill, percocet HM 5 - no billing until 3/15 HM 1 - BILL today  HM 2 - no Billing HM 3  No billing HM 4  No billing HM 1-  BILL today  HM 2  No billing     Date 5/2 5/5 5/11 5/16 5/22 5/30 6/6 6/14 6/20 6/27 7/5 7/11   Total WeeklyDose 42.5 mg 37.5 mg 42.5mg 42.5 mg  35 mg 40 mg 40 mg 40 mg 37.5 mg 40 mg 40 mg 40 mg   INR 3.5 1.8 2.9 4.2 2.0 2.5 2.8 3.2 1.9 2.2 3.0 3.3   Notes HM 3  No billing HM 4  No billing HM 5  No billing HM-1  Bill  Dec'd GLV  HM 2  No billing  1x hold  HM 3   no billing HM 4  No billing HM 1  Bill today HM 2  No billing HM 3  No bill HM 4  No bill HM5  No bill     Date 7/18 7/25 08/01/23 8/8 8/15/23 8/23/23 08/29/23 09/05/23 9/12/23 9/19/23 9/26/23 10/3   Total WeeklyDose 37.5 mg 35 mg 37.5mg 40 mg 37.5 mg 37.5 mg 37.5 mg 37.5 mg 37.5 mg 37.5 mg 37.5 mg 37.5 mg   INR 3.3 1.9 1.9 2.3 2.6 2.1 2.4 2.3 2.7 2.5 2.7 2.0   Notes HM 1  Bill today HM 2  No billing HM 3  No billing  1  x boost HM 4 no billing  HM 1 - BILL today  HM 2 - no bill  HM 3- no billing HM 4- no billing HM 1 - BILL today  HM 2 - NO bill  HM 3 - no bill  Hm 4- no bill   Takes warfarin in the am    Clinic Interview:  Verbal Release Authorization signed on 8/22/2018 -- may speak with Raeann Montemayor (Wife). [phone] (Home) *Preferred*  Tablet Strength: pt has 7.5mg and 2.5mg tablets  Estimated oop cost for [facility] EDER HM: Patient has [employer] Medicare so they should have minimal to no OOP costs.                     Patient Findings  Negatives: Signs/symptoms of thrombosis, Signs/symptoms of bleeding, Laboratory test error suspected, Change in health, Change in alcohol use, Change in activity, Upcoming invasive procedure, Emergency department visit, Upcoming dental procedure, Missed doses, Extra doses, Change in medications, Change in diet/appetite, Hospital admission, Bruising, Other complaints   Comments: All findings negative per pt.       Plan:   1. INR was therapeutic today at 2.0 (goal 2.0-3.0). Instructed  Mr. Montemayor to continue warfarin 5 mg oral daily except 7.5 mg on Fridays until recheck.   2. Recheck INR in one week, 10/10.  3. Verbal information provided over the telephone. Mr. Montemayor expresses understanding with teach back and has no further questions at this time.     Home monitor information below:  Test strips on hand: 7  Lot : [id number]  Barcode : [id number] Exp : 1/25  Serial number: [id number]   Supplies billing code used: last 9/12/23 Next must be on or after  10/10/23  Transfer Tube Lot number: Lot : [id number]  Safety Lancets:  Lot : [id number]   Billing:  *bill must be 4 tests 28 days or more from HM visit  PLEASE REFER TO ANTICOAGULATION TRAINING POWERPOINT FOR BILLING. Needs to use in clinic remote appointment for encounter.    Kristine Sutton, PharmD  10/3/2023  09:51 EDT

## 2023-10-09 RX ORDER — BUMETANIDE 2 MG/1
2 TABLET ORAL EVERY OTHER DAY
Qty: 48 TABLET | Refills: 0 | Status: SHIPPED | OUTPATIENT
Start: 2023-10-09

## 2023-10-10 ENCOUNTER — ANTICOAGULATION VISIT (OUTPATIENT)
Dept: PHARMACY | Facility: HOSPITAL | Age: 78
End: 2023-10-10
Payer: MEDICARE

## 2023-10-10 PROCEDURE — G0249 PROVIDE INR TEST MATER/EQUIP: HCPCS

## 2023-10-10 NOTE — PROGRESS NOTES
Anticoagulation Clinic Progress Note   Oswaldo HOMEMONITOR: [employer] Serial # : [id number]     Indication: afib  Referring Provider: Gale  Initial Warfarin Start Date: 2007  Goal INR: 2-3  Current Drug Interactions:   [id number]: 5 (HTN, Age, CHF, CAD)   Other: anemic, CKD (stage 4) May need bridge with Lovenox given persistent AF (per cardio 1/24/2019), Scr was 3.22 on 11/24/21    Diet: eats salads 2x weekly 5/11/2023     Anticoagulation Clinic INR History:   Date 10/30 11/20 12/17 1/14/20 2/11 3/11 4/8 5/6 6/11 7/9 8/6 9/3 9/14 9/30   Total Weekly Dose 32.5 mg 32.5 mg 32.5 mg 32.5 mg 32.5mg 32.5mg 32.5mg 32.5mg 32.5mg 32.5 mg 32.5 mg 32.5mg 32.5mg 26.25 mg   INR 2.3 2.3 2.1 1.9 2.2 2.1 2.1 2.2 2.2 2.3 2.7 1.6 2.2  1.8   Notes                       ??   5 day hold     Date 10/14 11/4 11/11 11/25 12/9 12/16 12/30 1/13 2/3 3/3 3/31 4/28 5/26 6/23   Total Weekly Dose 32.5 mg 32.5mg 35mg 32.5mg 33.75 mg 36.25 mg 33.75 mg 35mg 33.75 33.75mg 33.75mg 33.75 mg 33.75 mg 33.75mg   INR 2.0 1.6 2.1 1.8 1.8 2.5 2.0 3.0 2.1 2.4 2.3 3.0 2.1 2.2   Notes   1x boost GLV boostx1 boostx1             Date 7/21 9/1 10/13 11/10 12/1 12/15 12/29 1/12/22 1/26 2/9 2/16 2/23 3/2 3/16   Total Weekly Dose 33.75mg 33.75mg 33.75mg 33.75mg 33.75 mg 32.5mg 30mg 27.5mg 28.75 mg 30 mg 31.25 mg 31.25 mg 31.25 mg 31.25 mg   INR 2.9 2.5 3.1 3.1 3.2 3.4 3.4 1.9 1.7 1.8 2.1 2.6 2.9 2.9   Notes        redx1 ?  inc        Date 3/30 4/13 5/11 6/8  6/23 7/7 7/28  8/9 8/12 8/16   Total WeeklyDose 31.25mg 31.25 mg 31.25 mg 31.25mg Start dialysis 30mg 30mg 30 mg  33.75mg 37.5mg 37.5mg   INR 2.5 2.2 2.6 3.9  2.1 2.1 1.8 1.23 1.4 1.5 2.1   Notes    Inc torsemide Cath placement    AV fistula placement    HM train  1 no billing       Date 8/23 8/30 9/6 9/13 9/20 9/30 10/4 10/11 10/18 10/25 11/8 11/15   Total WeeklyDose 31.25 mg 32.5 mg 32.5 mg 32.5 mg 32.5 mg 32.5 mg 35 mg 35 mg 35 mg 32.5 mg 26.5 mg 35 mg   INR 1.9 2.0 2.0 2.5 2.7 1.6 1.7 2.8 2.4 1.7  1.4 1.6   Notes HM2 no billing HM3  No billing HM4  No billing HM 1  Bill today HM 2 No billing HM 3 No bill HM 4  No billing 1x boost  HM 5 no bill day Bill day  HM1 HM- 2 no billing   HM 3- no billing 1x boost  HM 4 - no billing      Date 11/22 11/29 12/6 12/13 12/20 12/27 1/3/23 1/10 1/12 1/17 1/26/23 1/31/23   Total WeeklyDose 37.5 mg 37.5 mg 37.5 mg 37.5 mg 42.5 mg  40 mg 37.5 mg 40 mg 42.5mg 45 mg 45 mg 40 mg   INR 1.9 2.8 1.9 1.5 2.3 3.4 1.4 0.8 2.7 POCT  2.23   2.8 HM 3.9 1.9 2.2   Notes HM1- bill day HM 2 - NO bill HM 3 - no bill  Inc GLV Hm 4 - NO BILL HM 1 - Bill today HM 2   no billing HM 3- no billing HM 4  No billing lab error?? clinic HM 1  Bill today HM 2 - no bill  HM 3 - no bill      Date 2/7 2/14 2/21/23 2/28/23 3/7 3/14/23 3/21/23 3/28/23 4/4 4/11 4/18 4/26   Total WeeklyDose 40 mg 40 mg 40 mg 37.5 mg 40 mg 40 mg 40 mg 40 mg 37.5 mg 37.5 mg 37.5 mg  40 mg   INR 2.3 2.2 3.9 1.6 3.7 ??? 2.5 2.7 3.2 2.8 2.5 1.7 1.7   Notes HM 4 rec 2/15  HM 1  Bill today HM 2 - no billing  1xred  HM 3 - NO BILL  HM-4 no bill, percocet HM 5 - no billing until 3/15 HM 1 - BILL today  HM 2 - no Billing HM 3  No billing HM 4  No billing HM 1-  BILL today  HM 2  No billing     Date 5/2 5/5 5/11 5/16 5/22 5/30 6/6 6/14 6/20 6/27 7/5 7/11   Total WeeklyDose 42.5 mg 37.5 mg 42.5mg 42.5 mg  35 mg 40 mg 40 mg 40 mg 37.5 mg 40 mg 40 mg 40 mg   INR 3.5 1.8 2.9 4.2 2.0 2.5 2.8 3.2 1.9 2.2 3.0 3.3   Notes HM 3  No billing HM 4  No billing HM 5  No billing HM-1  Bill  Dec'd GLV  HM 2  No billing  1x hold  HM 3   no billing HM 4  No billing HM 1  Bill today HM 2  No billing HM 3  No bill HM 4  No bill HM5  No bill     Date 7/18 7/25 08/01/23 8/8 8/15/23 8/23/23 08/29/23 09/05/23 9/12/23 9/19/23 9/26/23 10/3   Total WeeklyDose 37.5 mg 35 mg 37.5mg 40 mg 37.5 mg 37.5 mg 37.5 mg 37.5 mg 37.5 mg 37.5 mg 37.5 mg 37.5 mg   INR 3.3 1.9 1.9 2.3 2.6 2.1 2.4 2.3 2.7 2.5 2.7 2.0   Notes HM 1  Bill today HM 2  No billing HM 3  No billing  1  x boost HM 4 no billing  HM 1 - BILL today  HM 2 - no bill  HM 3- no billing HM 4- no billing HM 1 - BILL today  HM 2 - NO bill  HM 3 - no bill  Hm 4- no bill       Date 10/10           Total Weekly Dose 37.5mg           INR 1.5           Notes Hm- 1 bill today             Takes warfarin in the am    Clinic Interview:  Verbal Release Authorization signed on 8/22/2018 -- may speak with Raeann Montemayor (Wife). [phone] (Home) *Preferred*  Tablet Strength: pt has 7.5mg and 2.5mg tablets  Estimated oop cost for [facility] ED[facility] HM: Patient has [employer] Medicare so they should have minimal to no OOP costs.                     Patient Findings    Negatives: Signs/symptoms of thrombosis, Signs/symptoms of bleeding, Laboratory test error suspected, Change in health, Change in alcohol use, Change in activity, Upcoming invasive procedure, Emergency department visit, Upcoming dental procedure, Missed doses, Extra doses, Change in medications, Change in diet/appetite, Hospital admission, Bruising, Other complaints   Comments: Maybe had an extra salad this week, does not recall missing a dose         Plan:   1. INR was SUBtherapeutic today at 1.5 (goal 2.0-3.0). Instructed  Mr. Montemayor to BOOST tonight's dose to 10 mg then continue warfarin 5 mg oral daily except 7.5 mg on Fridays until recheck.   2. Recheck INR in one week, 10/17.  3. Verbal information provided over the telephone. Mr. Montemayor expresses understanding with teach back and has no further questions at this time.     Home monitor information below:  Test strips on hand: 7  Lot : [id number]  Barcode : [id number] Exp : 1/25  Serial number: [id number]   Supplies billing code used: last 10/10/23  Next must be on or after  11/7/23  Transfer Tube Lot number: Lot : [id number]  Safety Lancets:  Lot : [id number]   Billing:  *bill must be 4 tests 28 days or more from  visit  PLEASE REFER TO ANTICOAGULATION TRAINING POWERPOINT FOR BILLING. Needs to use in clinic remote appointment for  encounter.          Nighat ChristopherD.  10/10/23   09:52 EDT

## 2023-10-18 ENCOUNTER — ANTICOAGULATION VISIT (OUTPATIENT)
Dept: PHARMACY | Facility: HOSPITAL | Age: 78
End: 2023-10-18
Payer: MEDICARE

## 2023-10-18 LAB — INR PPP: 2

## 2023-10-18 NOTE — PROGRESS NOTES
Anticoagulation Clinic Progress Note   Oswaldo HOMEMONITOR: [employer] Serial # : [id number]     Indication: afib  Referring Provider: Gale  Initial Warfarin Start Date: 2007  Goal INR: 2-3  Current Drug Interactions:   [id number]: 5 (HTN, Age, CHF, CAD)   Other: anemic, CKD (stage 4) May need bridge with Lovenox given persistent AF (per cardio 1/24/2019), Scr was 3.22 on 11/24/21    Diet: eats salads 2x weekly 5/11/2023     Anticoagulation Clinic INR History:   Date 10/30 11/20 12/17 1/14/20 2/11 3/11 4/8 5/6 6/11 7/9 8/6 9/3 9/14 9/30   Total Weekly Dose 32.5 mg 32.5 mg 32.5 mg 32.5 mg 32.5mg 32.5mg 32.5mg 32.5mg 32.5mg 32.5 mg 32.5 mg 32.5mg 32.5mg 26.25 mg   INR 2.3 2.3 2.1 1.9 2.2 2.1 2.1 2.2 2.2 2.3 2.7 1.6 2.2  1.8   Notes                       ??   5 day hold     Date 10/14 11/4 11/11 11/25 12/9 12/16 12/30 1/13 2/3 3/3 3/31 4/28 5/26 6/23   Total Weekly Dose 32.5 mg 32.5mg 35mg 32.5mg 33.75 mg 36.25 mg 33.75 mg 35mg 33.75 33.75mg 33.75mg 33.75 mg 33.75 mg 33.75mg   INR 2.0 1.6 2.1 1.8 1.8 2.5 2.0 3.0 2.1 2.4 2.3 3.0 2.1 2.2   Notes   1x boost GLV boostx1 boostx1             Date 7/21 9/1 10/13 11/10 12/1 12/15 12/29 1/12/22 1/26 2/9 2/16 2/23 3/2 3/16   Total Weekly Dose 33.75mg 33.75mg 33.75mg 33.75mg 33.75 mg 32.5mg 30mg 27.5mg 28.75 mg 30 mg 31.25 mg 31.25 mg 31.25 mg 31.25 mg   INR 2.9 2.5 3.1 3.1 3.2 3.4 3.4 1.9 1.7 1.8 2.1 2.6 2.9 2.9   Notes        redx1 ?  inc        Date 3/30 4/13 5/11 6/8  6/23 7/7 7/28  8/9 8/12 8/16   Total WeeklyDose 31.25mg 31.25 mg 31.25 mg 31.25mg Start dialysis 30mg 30mg 30 mg  33.75mg 37.5mg 37.5mg   INR 2.5 2.2 2.6 3.9  2.1 2.1 1.8 1.23 1.4 1.5 2.1   Notes    Inc torsemide Cath placement    AV fistula placement    HM train  1 no billing       Date 8/23 8/30 9/6 9/13 9/20 9/30 10/4 10/11 10/18 10/25 11/8 11/15   Total WeeklyDose 31.25 mg 32.5 mg 32.5 mg 32.5 mg 32.5 mg 32.5 mg 35 mg 35 mg 35 mg 32.5 mg 26.5 mg 35 mg   INR 1.9 2.0 2.0 2.5 2.7 1.6 1.7 2.8 2.4 1.7  1.4 1.6   Notes HM2 no billing HM3  No billing HM4  No billing HM 1  Bill today HM 2 No billing HM 3 No bill HM 4  No billing 1x boost  HM 5 no bill day Bill day  HM1 HM- 2 no billing   HM 3- no billing 1x boost  HM 4 - no billing      Date 11/22 11/29 12/6 12/13 12/20 12/27 1/3/23 1/10 1/12 1/17 1/26/23 1/31/23   Total WeeklyDose 37.5 mg 37.5 mg 37.5 mg 37.5 mg 42.5 mg  40 mg 37.5 mg 40 mg 42.5mg 45 mg 45 mg 40 mg   INR 1.9 2.8 1.9 1.5 2.3 3.4 1.4 0.8 2.7 POCT  2.23   2.8 HM 3.9 1.9 2.2   Notes HM1- bill day HM 2 - NO bill HM 3 - no bill  Inc GLV Hm 4 - NO BILL HM 1 - Bill today HM 2   no billing HM 3- no billing HM 4  No billing lab error?? clinic HM 1  Bill today HM 2 - no bill  HM 3 - no bill      Date 2/7 2/14 2/21/23 2/28/23 3/7 3/14/23 3/21/23 3/28/23 4/4 4/11 4/18 4/26   Total WeeklyDose 40 mg 40 mg 40 mg 37.5 mg 40 mg 40 mg 40 mg 40 mg 37.5 mg 37.5 mg 37.5 mg  40 mg   INR 2.3 2.2 3.9 1.6 3.7 ??? 2.5 2.7 3.2 2.8 2.5 1.7 1.7   Notes HM 4 rec 2/15  HM 1  Bill today HM 2 - no billing  1xred  HM 3 - NO BILL  HM-4 no bill, percocet HM 5 - no billing until 3/15 HM 1 - BILL today  HM 2 - no Billing HM 3  No billing HM 4  No billing HM 1-  BILL today  HM 2  No billing     Date 5/2 5/5 5/11 5/16 5/22 5/30 6/6 6/14 6/20 6/27 7/5 7/11   Total WeeklyDose 42.5 mg 37.5 mg 42.5mg 42.5 mg  35 mg 40 mg 40 mg 40 mg 37.5 mg 40 mg 40 mg 40 mg   INR 3.5 1.8 2.9 4.2 2.0 2.5 2.8 3.2 1.9 2.2 3.0 3.3   Notes HM 3  No billing HM 4  No billing HM 5  No billing HM-1  Bill  Dec'd GLV  HM 2  No billing  1x hold  HM 3   no billing HM 4  No billing HM 1  Bill today HM 2  No billing HM 3  No bill HM 4  No bill HM5  No bill     Date 7/18 7/25 08/01/23 8/8 8/15/23 8/23/23 08/29/23 09/05/23 9/12/23 9/19/23 9/26/23 10/3   Total WeeklyDose 37.5 mg 35 mg 37.5mg 40 mg 37.5 mg 37.5 mg 37.5 mg 37.5 mg 37.5 mg 37.5 mg 37.5 mg 37.5 mg   INR 3.3 1.9 1.9 2.3 2.6 2.1 2.4 2.3 2.7 2.5 2.7 2.0   Notes HM 1  Bill today HM 2  No billing HM 3  No billing  1  x boost HM 4 no billing  HM 1 - BILL today  HM 2 - no bill  HM 3- no billing HM 4- no billing HM 1 - BILL today  HM 2 - NO bill  HM 3 - no bill  Hm 4- no bill       Date 10/10 10/18          Total Weekly Dose 37.5mg 42.5 ,mg          INR 1.5 2.0          Notes Hm- 1 bill today HM 2 no billing            Takes warfarin in the am    Clinic Interview:  Verbal Release Authorization signed on 8/22/2018 -- may speak with Raeann Montemayor (Wife). [phone] (Home) *Preferred*  Tablet Strength: pt has 7.5mg and 2.5mg tablets  Estimated oop cost for [facility] ED[facility] HM: Patient has [employer] Medicare so they should have minimal to no OOP costs.                       Patient Findings    Negatives: Signs/symptoms of thrombosis, Signs/symptoms of bleeding, Laboratory test error suspected, Change in health, Change in alcohol use, Change in activity, Upcoming invasive procedure, Emergency department visit, Upcoming dental procedure, Missed doses, Extra doses, Change in medications, Change in diet/appetite, Hospital admission, Bruising, Other complaints   Comments: Sending pt supplies today.  All findings negative per pt.       Plan:   1. INR was therapeutic today 10/18/2023 at 2.0 (goal 2.0-3.0). Instructed  Mr. Montemayor to BOOST tonight's dose to 10 mg then continue warfarin 5 mg oral daily except 7.5 mg on Fridays until recheck.   2. Recheck INR in one week, 10/25.  3. Verbal information provided over the telephone. Mr. Montemayor expresses understanding with teach back and has no further questions at this time.     Home monitor information below:  Test strips on hand: 2 (10/18/2023) sending test supplies today  Lot : [id number]  Barcode : [id number] Exp : 1/25  Serial number: [id number]   Supplies billing code used: last 10/10/23  Next must be on or after  11/7/23  Transfer Tube Lot number: Lot : [id number]  Safety Lancets:  Lot : [id number]   Billing:  *bill must be 4 tests 28 days or more from  visit  PLEASE REFER TO ANTICOAGULATION TRAINING  POWERPOINT FOR BILLING. Needs to use in clinic remote appointment for encounter.

## 2023-10-18 NOTE — PROGRESS NOTES
Anticoagulation Clinic Progress Note   Oswaldo HOMEMONITOR: [employer] Serial # : [id number]     Indication: afib  Referring Provider: Gale  Initial Warfarin Start Date: 2007  Goal INR: 2-3  Current Drug Interactions:   [id number]: 5 (HTN, Age, CHF, CAD)   Other: anemic, CKD (stage 4) May need bridge with Lovenox given persistent AF (per cardio 1/24/2019), Scr was 3.22 on 11/24/21    Diet: eats salads 2x weekly 5/11/2023     Anticoagulation Clinic INR History:   Date 10/30 11/20 12/17 1/14/20 2/11 3/11 4/8 5/6 6/11 7/9 8/6 9/3 9/14 9/30   Total Weekly Dose 32.5 mg 32.5 mg 32.5 mg 32.5 mg 32.5mg 32.5mg 32.5mg 32.5mg 32.5mg 32.5 mg 32.5 mg 32.5mg 32.5mg 26.25 mg   INR 2.3 2.3 2.1 1.9 2.2 2.1 2.1 2.2 2.2 2.3 2.7 1.6 2.2  1.8   Notes                       ??   5 day hold     Date 10/14 11/4 11/11 11/25 12/9 12/16 12/30 1/13 2/3 3/3 3/31 4/28 5/26 6/23   Total Weekly Dose 32.5 mg 32.5mg 35mg 32.5mg 33.75 mg 36.25 mg 33.75 mg 35mg 33.75 33.75mg 33.75mg 33.75 mg 33.75 mg 33.75mg   INR 2.0 1.6 2.1 1.8 1.8 2.5 2.0 3.0 2.1 2.4 2.3 3.0 2.1 2.2   Notes   1x boost GLV boostx1 boostx1             Date 7/21 9/1 10/13 11/10 12/1 12/15 12/29 1/12/22 1/26 2/9 2/16 2/23 3/2 3/16   Total Weekly Dose 33.75mg 33.75mg 33.75mg 33.75mg 33.75 mg 32.5mg 30mg 27.5mg 28.75 mg 30 mg 31.25 mg 31.25 mg 31.25 mg 31.25 mg   INR 2.9 2.5 3.1 3.1 3.2 3.4 3.4 1.9 1.7 1.8 2.1 2.6 2.9 2.9   Notes        redx1 ?  inc        Date 3/30 4/13 5/11 6/8  6/23 7/7 7/28  8/9 8/12 8/16   Total WeeklyDose 31.25mg 31.25 mg 31.25 mg 31.25mg Start dialysis 30mg 30mg 30 mg  33.75mg 37.5mg 37.5mg   INR 2.5 2.2 2.6 3.9  2.1 2.1 1.8 1.23 1.4 1.5 2.1   Notes    Inc torsemide Cath placement    AV fistula placement    HM train  1 no billing       Date 8/23 8/30 9/6 9/13 9/20 9/30 10/4 10/11 10/18 10/25 11/8 11/15   Total WeeklyDose 31.25 mg 32.5 mg 32.5 mg 32.5 mg 32.5 mg 32.5 mg 35 mg 35 mg 35 mg 32.5 mg 26.5 mg 35 mg   INR 1.9 2.0 2.0 2.5 2.7 1.6 1.7 2.8 2.4 1.7  1.4 1.6   Notes HM2 no billing HM3  No billing HM4  No billing HM 1  Bill today HM 2 No billing HM 3 No bill HM 4  No billing 1x boost  HM 5 no bill day Bill day  HM1 HM- 2 no billing   HM 3- no billing 1x boost  HM 4 - no billing      Date 11/22 11/29 12/6 12/13 12/20 12/27 1/3/23 1/10 1/12 1/17 1/26/23 1/31/23   Total WeeklyDose 37.5 mg 37.5 mg 37.5 mg 37.5 mg 42.5 mg  40 mg 37.5 mg 40 mg 42.5mg 45 mg 45 mg 40 mg   INR 1.9 2.8 1.9 1.5 2.3 3.4 1.4 0.8 2.7 POCT  2.23   2.8 HM 3.9 1.9 2.2   Notes HM1- bill day HM 2 - NO bill HM 3 - no bill  Inc GLV Hm 4 - NO BILL HM 1 - Bill today HM 2   no billing HM 3- no billing HM 4  No billing lab error?? clinic HM 1  Bill today HM 2 - no bill  HM 3 - no bill      Date 2/7 2/14 2/21/23 2/28/23 3/7 3/14/23 3/21/23 3/28/23 4/4 4/11 4/18 4/26   Total WeeklyDose 40 mg 40 mg 40 mg 37.5 mg 40 mg 40 mg 40 mg 40 mg 37.5 mg 37.5 mg 37.5 mg  40 mg   INR 2.3 2.2 3.9 1.6 3.7 ??? 2.5 2.7 3.2 2.8 2.5 1.7 1.7   Notes HM 4 rec 2/15  HM 1  Bill today HM 2 - no billing  1xred  HM 3 - NO BILL  HM-4 no bill, percocet HM 5 - no billing until 3/15 HM 1 - BILL today  HM 2 - no Billing HM 3  No billing HM 4  No billing HM 1-  BILL today  HM 2  No billing     Date 5/2 5/5 5/11 5/16 5/22 5/30 6/6 6/14 6/20 6/27 7/5 7/11   Total WeeklyDose 42.5 mg 37.5 mg 42.5mg 42.5 mg  35 mg 40 mg 40 mg 40 mg 37.5 mg 40 mg 40 mg 40 mg   INR 3.5 1.8 2.9 4.2 2.0 2.5 2.8 3.2 1.9 2.2 3.0 3.3   Notes HM 3  No billing HM 4  No billing HM 5  No billing HM-1  Bill  Dec'd GLV  HM 2  No billing  1x hold  HM 3   no billing HM 4  No billing HM 1  Bill today HM 2  No billing HM 3  No bill HM 4  No bill HM5  No bill     Date 7/18 7/25 08/01/23 8/8 8/15/23 8/23/23 08/29/23 09/05/23 9/12/23 9/19/23 9/26/23 10/3   Total WeeklyDose 37.5 mg 35 mg 37.5mg 40 mg 37.5 mg 37.5 mg 37.5 mg 37.5 mg 37.5 mg 37.5 mg 37.5 mg 37.5 mg   INR 3.3 1.9 1.9 2.3 2.6 2.1 2.4 2.3 2.7 2.5 2.7 2.0   Notes HM 1  Bill today HM 2  No billing HM 3  No billing  1  x boost HM 4 no billing  HM 1 - BILL today  HM 2 - no bill  HM 3- no billing HM 4- no billing HM 1 - BILL today  HM 2 - NO bill  HM 3 - no bill  Hm 4- no bill       Date 10/10 10/18          Total Weekly Dose 37.5mg 37.5 mg          INR 1.6 2.0          Notes Hm- 1 bill today  Missed dose?  Inc GLV HM 2- No bill             Takes warfarin in the am    Clinic Interview:  Verbal Release Authorization signed on 8/22/2018 -- may speak with Raeann Montemayor (Wife). [phone] (Home) *Preferred*  Tablet Strength: pt has 7.5mg and 2.5mg tablets  Estimated oop cost for [facility] EDER HM: Patient has [employer] Medicare so they should have minimal to no OOP costs.                    Patient Findings    Negatives: Signs/symptoms of thrombosis, Signs/symptoms of bleeding, Laboratory test error suspected, Change in health, Change in alcohol use, Change in activity, Upcoming invasive procedure, Emergency department visit, Upcoming dental procedure, Missed doses, Extra doses, Change in medications, Change in diet/appetite, Hospital admission, Bruising, Other complaints   Comments: Mr. Montemayor denies all the above listed findings.       Plan:   1. INR therapeutic today at 2.0 (goal 2.0-3.0). Instructed  Mr. Montemayor to continue warfarin 5 mg oral daily except 7.5 mg on Fridays until recheck.   2. Recheck INR in one week, 10/25.  3. Verbal information provided over the telephone. Mr. Montemayor expresses understanding with teach back and has no further questions at this time.     Home monitor information below:  Test strips on hand: 2---Mailing out test strips today   Lot : [id number]  Barcode : [id number] Exp : 1/25  Serial number: [id number]   Supplies billing code used: last 10/10/23  Next must be on or after  11/7/23  Transfer Tube Lot number: Lot : [id number]  Safety Lancets:  Lot : [id number]   Billing:  *bill must be 4 tests 28 days or more from HM visit  PLEASE REFER TO ANTICOAGULATION TRAINING POWERPOINT FOR BILLING. Needs to use in clinic  remote appointment for encounter.      Nighat PaezD  10/18/2023   11:43 EDT

## 2023-10-24 ENCOUNTER — ANTICOAGULATION VISIT (OUTPATIENT)
Dept: PHARMACY | Facility: HOSPITAL | Age: 78
End: 2023-10-24
Payer: MEDICARE

## 2023-10-24 LAB — INR PPP: 1.5

## 2023-10-24 NOTE — PROGRESS NOTES
Anticoagulation Clinic Progress Note   Oswaldo HOMEMONITOR: [employer] Serial # : [id number]     Indication: afib  Referring Provider: Gale  Initial Warfarin Start Date: 2007  Goal INR: 2-3  Current Drug Interactions:   [id number]: 5 (HTN, Age, CHF, CAD)   Other: anemic, CKD (stage 4) May need bridge with Lovenox given persistent AF (per cardio 1/24/2019), Scr was 3.22 on 11/24/21    Diet: eats salads 2x weekly 5/11/2023     Anticoagulation Clinic INR History:   Date 10/30 11/20 12/17 1/14/20 2/11 3/11 4/8 5/6 6/11 7/9 8/6 9/3 9/14 9/30   Total Weekly Dose 32.5 mg 32.5 mg 32.5 mg 32.5 mg 32.5mg 32.5mg 32.5mg 32.5mg 32.5mg 32.5 mg 32.5 mg 32.5mg 32.5mg 26.25 mg   INR 2.3 2.3 2.1 1.9 2.2 2.1 2.1 2.2 2.2 2.3 2.7 1.6 2.2  1.8   Notes                       ??   5 day hold     Date 10/14 11/4 11/11 11/25 12/9 12/16 12/30 1/13 2/3 3/3 3/31 4/28 5/26 6/23   Total Weekly Dose 32.5 mg 32.5mg 35mg 32.5mg 33.75 mg 36.25 mg 33.75 mg 35mg 33.75 33.75mg 33.75mg 33.75 mg 33.75 mg 33.75mg   INR 2.0 1.6 2.1 1.8 1.8 2.5 2.0 3.0 2.1 2.4 2.3 3.0 2.1 2.2   Notes   1x boost GLV boostx1 boostx1             Date 7/21 9/1 10/13 11/10 12/1 12/15 12/29 1/12/22 1/26 2/9 2/16 2/23 3/2 3/16   Total Weekly Dose 33.75mg 33.75mg 33.75mg 33.75mg 33.75 mg 32.5mg 30mg 27.5mg 28.75 mg 30 mg 31.25 mg 31.25 mg 31.25 mg 31.25 mg   INR 2.9 2.5 3.1 3.1 3.2 3.4 3.4 1.9 1.7 1.8 2.1 2.6 2.9 2.9   Notes        redx1 ?  inc        Date 3/30 4/13 5/11 6/8  6/23 7/7 7/28  8/9 8/12 8/16   Total WeeklyDose 31.25mg 31.25 mg 31.25 mg 31.25mg Start dialysis 30mg 30mg 30 mg  33.75mg 37.5mg 37.5mg   INR 2.5 2.2 2.6 3.9  2.1 2.1 1.8 1.23 1.4 1.5 2.1   Notes    Inc torsemide Cath placement    AV fistula placement    HM train  1 no billing       Date 8/23 8/30 9/6 9/13 9/20 9/30 10/4 10/11 10/18 10/25 11/8 11/15   Total WeeklyDose 31.25 mg 32.5 mg 32.5 mg 32.5 mg 32.5 mg 32.5 mg 35 mg 35 mg 35 mg 32.5 mg 26.5 mg 35 mg   INR 1.9 2.0 2.0 2.5 2.7 1.6 1.7 2.8 2.4 1.7  1.4 1.6   Notes HM2 no billing HM3  No billing HM4  No billing HM 1  Bill today HM 2 No billing HM 3 No bill HM 4  No billing 1x boost  HM 5 no bill day Bill day  HM1 HM- 2 no billing   HM 3- no billing 1x boost  HM 4 - no billing      Date 11/22 11/29 12/6 12/13 12/20 12/27 1/3/23 1/10 1/12 1/17 1/26/23 1/31/23   Total WeeklyDose 37.5 mg 37.5 mg 37.5 mg 37.5 mg 42.5 mg  40 mg 37.5 mg 40 mg 42.5mg 45 mg 45 mg 40 mg   INR 1.9 2.8 1.9 1.5 2.3 3.4 1.4 0.8 2.7 POCT  2.23   2.8 HM 3.9 1.9 2.2   Notes HM1- bill day HM 2 - NO bill HM 3 - no bill  Inc GLV Hm 4 - NO BILL HM 1 - Bill today HM 2   no billing HM 3- no billing HM 4  No billing lab error?? clinic HM 1  Bill today HM 2 - no bill  HM 3 - no bill      Date 2/7 2/14 2/21/23 2/28/23 3/7 3/14/23 3/21/23 3/28/23 4/4 4/11 4/18 4/26   Total WeeklyDose 40 mg 40 mg 40 mg 37.5 mg 40 mg 40 mg 40 mg 40 mg 37.5 mg 37.5 mg 37.5 mg  40 mg   INR 2.3 2.2 3.9 1.6 3.7 ??? 2.5 2.7 3.2 2.8 2.5 1.7 1.7   Notes HM 4 rec 2/15  HM 1  Bill today HM 2 - no billing  1xred  HM 3 - NO BILL  HM-4 no bill, percocet HM 5 - no billing until 3/15 HM 1 - BILL today  HM 2 - no Billing HM 3  No billing HM 4  No billing HM 1-  BILL today  HM 2  No billing     Date 5/2 5/5 5/11 5/16 5/22 5/30 6/6 6/14 6/20 6/27 7/5 7/11   Total WeeklyDose 42.5 mg 37.5 mg 42.5mg 42.5 mg  35 mg 40 mg 40 mg 40 mg 37.5 mg 40 mg 40 mg 40 mg   INR 3.5 1.8 2.9 4.2 2.0 2.5 2.8 3.2 1.9 2.2 3.0 3.3   Notes HM 3  No billing HM 4  No billing HM 5  No billing HM-1  Bill  Dec'd GLV  HM 2  No billing  1x hold  HM 3   no billing HM 4  No billing HM 1  Bill today HM 2  No billing HM 3  No bill HM 4  No bill HM5  No bill     Date 7/18 7/25 08/01/23 8/8 8/15/23 8/23/23 08/29/23 09/05/23 9/12/23 9/19/23 9/26/23 10/3   Total WeeklyDose 37.5 mg 35 mg 37.5mg 40 mg 37.5 mg 37.5 mg 37.5 mg 37.5 mg 37.5 mg 37.5 mg 37.5 mg 37.5 mg   INR 3.3 1.9 1.9 2.3 2.6 2.1 2.4 2.3 2.7 2.5 2.7 2.0   Notes HM 1  Bill today HM 2  No billing HM 3  No billing  1  x boost HM 4 no billing  HM 1 - BILL today  HM 2 - no bill  HM 3- no billing HM 4- no billing HM 1 - BILL today  HM 2 - NO bill  HM 3 - no bill  Hm 4- no bill       Date 10/10 10/18 10/24         Total Weekly Dose 37.5mg 37.5 mg 37.5mg         INR 1.6 2.0 1.5         Notes Hm- 1 bill today  Missed dose?  Inc GLV HM 2- No bill  Hm- 3 no bill           Takes warfarin in the am    Clinic Interview:  Verbal Release Authorization signed on 8/22/2018 -- may speak with Raeann Montemayor (Wife). [phone] (Home) *Preferred*  Tablet Strength: pt has 7.5mg and 2.5mg tablets  Estimated oop cost for [facility] EDER HM: Patient has [employer] Medicare so they should have minimal to no OOP costs.                    Patient Findings        Negatives:  Signs/symptoms of thrombosis, Signs/symptoms of bleeding, Laboratory test error suspected, Change in health, Change in alcohol use, Change in activity, Upcoming invasive procedure, Emergency department visit, Upcoming dental procedure, Missed doses, Extra doses, Change in medications, Change in diet/appetite, Hospital admission, Bruising, Other complaints         Plan:   1. INR SUBtherapeutic today at 1.5 (goal 2.0-3.0). Instructed  Mr. Montemayor to BOOST tonight's dose to 10mg then continue warfarin 5 mg oral daily except 7.5 mg on Fridays until recheck. Likely needs two days of 7.5mg maintenance  2. Recheck INR in one week  3. Verbal information provided over the telephone. Mr. Montemayor expresses understanding with teach back and has no further questions at this time.     Home monitor information below:  Test strips on hand: 2---Mailing out test strips today   Lot : [id number]  Barcode : [id number] Exp : 1/25  Serial number: [id number]   Supplies billing code used: last 10/10/23  Next must be on or after  11/7/23  Transfer Tube Lot number: Lot : [id number]  Safety Lancets:  Lot : [id number]   Billing:  *bill must be 4 tests 28 days or more from  visit  PLEASE REFER TO ANTICOAGULATION TRAINING  POWERPOINT FOR BILLING. Needs to use in clinic remote appointment for encounter.      Nighat ChristopherD.  10/24/23   09:51 EDT

## 2023-10-31 ENCOUNTER — ANTICOAGULATION VISIT (OUTPATIENT)
Dept: PHARMACY | Facility: HOSPITAL | Age: 78
End: 2023-10-31
Payer: MEDICARE

## 2023-10-31 LAB — INR PPP: 2.7

## 2023-10-31 NOTE — PROGRESS NOTES
Anticoagulation Clinic Progress Note   Oswaldo HOMEMONITOR: [employer] Serial # : [id number]     Indication: afib  Referring Provider: Gale  Initial Warfarin Start Date: 2007  Goal INR: 2-3  Current Drug Interactions:   [id number]: 5 (HTN, Age, CHF, CAD)   Other: anemic, CKD (stage 4) May need bridge with Lovenox given persistent AF (per cardio 1/24/2019), Scr was 3.22 on 11/24/21    Diet: eats salads 2x weekly 5/11/2023     Anticoagulation Clinic INR History:   Date 10/30 11/20 12/17 1/14/20 2/11 3/11 4/8 5/6 6/11 7/9 8/6 9/3 9/14 9/30   Total Weekly Dose 32.5 mg 32.5 mg 32.5 mg 32.5 mg 32.5mg 32.5mg 32.5mg 32.5mg 32.5mg 32.5 mg 32.5 mg 32.5mg 32.5mg 26.25 mg   INR 2.3 2.3 2.1 1.9 2.2 2.1 2.1 2.2 2.2 2.3 2.7 1.6 2.2  1.8   Notes                       ??   5 day hold     Date 10/14 11/4 11/11 11/25 12/9 12/16 12/30 1/13 2/3 3/3 3/31 4/28 5/26 6/23   Total Weekly Dose 32.5 mg 32.5mg 35mg 32.5mg 33.75 mg 36.25 mg 33.75 mg 35mg 33.75 33.75mg 33.75mg 33.75 mg 33.75 mg 33.75mg   INR 2.0 1.6 2.1 1.8 1.8 2.5 2.0 3.0 2.1 2.4 2.3 3.0 2.1 2.2   Notes   1x boost GLV boostx1 boostx1             Date 7/21 9/1 10/13 11/10 12/1 12/15 12/29 1/12/22 1/26 2/9 2/16 2/23 3/2 3/16   Total Weekly Dose 33.75mg 33.75mg 33.75mg 33.75mg 33.75 mg 32.5mg 30mg 27.5mg 28.75 mg 30 mg 31.25 mg 31.25 mg 31.25 mg 31.25 mg   INR 2.9 2.5 3.1 3.1 3.2 3.4 3.4 1.9 1.7 1.8 2.1 2.6 2.9 2.9   Notes        redx1 ?  inc        Date 3/30 4/13 5/11 6/8  6/23 7/7 7/28  8/9 8/12 8/16   Total WeeklyDose 31.25mg 31.25 mg 31.25 mg 31.25mg Start dialysis 30mg 30mg 30 mg  33.75mg 37.5mg 37.5mg   INR 2.5 2.2 2.6 3.9  2.1 2.1 1.8 1.23 1.4 1.5 2.1   Notes    Inc torsemide Cath placement    AV fistula placement    HM train  1 no billing       Date 8/23 8/30 9/6 9/13 9/20 9/30 10/4 10/11 10/18 10/25 11/8 11/15   Total WeeklyDose 31.25 mg 32.5 mg 32.5 mg 32.5 mg 32.5 mg 32.5 mg 35 mg 35 mg 35 mg 32.5 mg 26.5 mg 35 mg   INR 1.9 2.0 2.0 2.5 2.7 1.6 1.7 2.8 2.4 1.7  1.4 1.6   Notes HM2 no billing HM3  No billing HM4  No billing HM 1  Bill today HM 2 No billing HM 3 No bill HM 4  No billing 1x boost  HM 5 no bill day Bill day  HM1 HM- 2 no billing   HM 3- no billing 1x boost  HM 4 - no billing      Date 11/22 11/29 12/6 12/13 12/20 12/27 1/3/23 1/10 1/12 1/17 1/26/23 1/31/23   Total WeeklyDose 37.5 mg 37.5 mg 37.5 mg 37.5 mg 42.5 mg  40 mg 37.5 mg 40 mg 42.5mg 45 mg 45 mg 40 mg   INR 1.9 2.8 1.9 1.5 2.3 3.4 1.4 0.8 2.7 POCT  2.23   2.8 HM 3.9 1.9 2.2   Notes HM1- bill day HM 2 - NO bill HM 3 - no bill  Inc GLV Hm 4 - NO BILL HM 1 - Bill today HM 2   no billing HM 3- no billing HM 4  No billing lab error?? clinic HM 1  Bill today HM 2 - no bill  HM 3 - no bill      Date 2/7 2/14 2/21/23 2/28/23 3/7 3/14/23 3/21/23 3/28/23 4/4 4/11 4/18 4/26   Total WeeklyDose 40 mg 40 mg 40 mg 37.5 mg 40 mg 40 mg 40 mg 40 mg 37.5 mg 37.5 mg 37.5 mg  40 mg   INR 2.3 2.2 3.9 1.6 3.7 ??? 2.5 2.7 3.2 2.8 2.5 1.7 1.7   Notes HM 4 rec 2/15  HM 1  Bill today HM 2 - no billing  1xred  HM 3 - NO BILL  HM-4 no bill, percocet HM 5 - no billing until 3/15 HM 1 - BILL today  HM 2 - no Billing HM 3  No billing HM 4  No billing HM 1-  BILL today  HM 2  No billing     Date 5/2 5/5 5/11 5/16 5/22 5/30 6/6 6/14 6/20 6/27 7/5 7/11   Total WeeklyDose 42.5 mg 37.5 mg 42.5mg 42.5 mg  35 mg 40 mg 40 mg 40 mg 37.5 mg 40 mg 40 mg 40 mg   INR 3.5 1.8 2.9 4.2 2.0 2.5 2.8 3.2 1.9 2.2 3.0 3.3   Notes HM 3  No billing HM 4  No billing HM 5  No billing HM-1  Bill  Dec'd GLV  HM 2  No billing  1x hold  HM 3   no billing HM 4  No billing HM 1  Bill today HM 2  No billing HM 3  No bill HM 4  No bill HM5  No bill     Date 7/18 7/25 08/01/23 8/8 8/15/23 8/23/23 08/29/23 09/05/23 9/12/23 9/19/23 9/26/23 10/3   Total WeeklyDose 37.5 mg 35 mg 37.5mg 40 mg 37.5 mg 37.5 mg 37.5 mg 37.5 mg 37.5 mg 37.5 mg 37.5 mg 37.5 mg   INR 3.3 1.9 1.9 2.3 2.6 2.1 2.4 2.3 2.7 2.5 2.7 2.0   Notes HM 1  Bill today HM 2  No billing HM 3  No billing  1  x boost HM 4 no billing  HM 1 - BILL today  HM 2 - no bill  HM 3- no billing HM 4- no billing HM 1 - BILL today  HM 2 - NO bill  HM 3 - no bill  Hm 4- no bill       Date 10/10 10/18 10/24 10/31        Total Weekly Dose 37.5mg 37.5 mg 37.5mg 42.5 mg        INR 1.6 2.0 1.5 2.7        Notes Hm- 1 bill today  Missed dose?  Inc GLV HM 2- No bill  Hm- 3 no bill Hm 4 no billing   Boost dose           Takes warfarin in the am    Clinic Interview:  Verbal Release Authorization signed on 8/22/2018 -- may speak with Raeann Montemayor (Wife). [phone] (Home) *Preferred*  Tablet Strength: pt has 7.5mg and 2.5mg tablets  Estimated oop cost for [facility] HM: Patient has [employer] Medicare so they should have minimal to no OOP costs.                      Patient Findings  Negatives: Signs/symptoms of thrombosis, Signs/symptoms of bleeding, Laboratory test error suspected, Change in health, Change in alcohol use, Change in activity, Upcoming invasive procedure, Emergency department visit, Upcoming dental procedure, Missed doses, Extra doses, Change in medications, Change in diet/appetite, Hospital admission, Bruising, Other complaints   Comments: All findings negative per pt.       Plan:   1. INR therapeutic today 10/31/23 at 2.7 (goal 2.0-3.0). Instructed  Mr. Montemayor to continue warfarin 5 mg oral daily except 7.5 mg on Fridays until recheck. Might need two days of 7.5mg maintenance  2. Recheck INR in one week 11/7/23  3. Verbal information provided over the telephone. Mr. Montemayor expresses understanding with teach back and has no further questions at this time.     Home monitor information below:  Test strips on hand: 4- 10/31/2023   Lot : [id number]  Barcode : [id number] Exp : 1/25  Serial number: [id number]   Supplies billing code used: last 10/10/23  Next must be on or after  11/7/23  Transfer Tube Lot number: Lot : [id number]  Safety Lancets:  Lot : [id number]   Billing:  *bill must be 4 tests 28 days or more from  visit  PLEASE  REFER TO ANTICOAGULATION TRAINING POWERPOINT FOR BILLING. Needs to use in clinic remote appointment for encounter.    Silke Corea  Pharmacy Technician  10/31/2023 10:31 EDT    I, Kyle Plata, PharmD, have reviewed the note in full and agree with the assessment and plan.  10/31/23  14:30 EDT

## 2023-11-07 ENCOUNTER — ANTICOAGULATION VISIT (OUTPATIENT)
Dept: PHARMACY | Facility: HOSPITAL | Age: 78
End: 2023-11-07
Payer: MEDICARE

## 2023-11-07 LAB — INR PPP: 2.1

## 2023-11-07 PROCEDURE — G0249 PROVIDE INR TEST MATER/EQUIP: HCPCS

## 2023-11-07 NOTE — PROGRESS NOTES
Anticoagulation Clinic Progress Note   Oswaldo HOMEMONITOR: [employer] Serial # : [id number]     Indication: afib  Referring Provider: Gale  Initial Warfarin Start Date: 2007  Goal INR: 2-3  Current Drug Interactions:   [id number]: 5 (HTN, Age, CHF, CAD)   Other: anemic, CKD (stage 4) May need bridge with Lovenox given persistent AF (per cardio 1/24/2019), Scr was 3.22 on 11/24/21    Diet: eats salads 2x weekly 5/11/2023     Anticoagulation Clinic INR History:   Date 10/30 11/20 12/17 1/14/20 2/11 3/11 4/8 5/6 6/11 7/9 8/6 9/3 9/14 9/30   Total Weekly Dose 32.5 mg 32.5 mg 32.5 mg 32.5 mg 32.5mg 32.5mg 32.5mg 32.5mg 32.5mg 32.5 mg 32.5 mg 32.5mg 32.5mg 26.25 mg   INR 2.3 2.3 2.1 1.9 2.2 2.1 2.1 2.2 2.2 2.3 2.7 1.6 2.2  1.8   Notes                       ??   5 day hold     Date 10/14 11/4 11/11 11/25 12/9 12/16 12/30 1/13 2/3 3/3 3/31 4/28 5/26 6/23   Total Weekly Dose 32.5 mg 32.5mg 35mg 32.5mg 33.75 mg 36.25 mg 33.75 mg 35mg 33.75 33.75mg 33.75mg 33.75 mg 33.75 mg 33.75mg   INR 2.0 1.6 2.1 1.8 1.8 2.5 2.0 3.0 2.1 2.4 2.3 3.0 2.1 2.2   Notes   1x boost GLV boostx1 boostx1             Date 7/21 9/1 10/13 11/10 12/1 12/15 12/29 1/12/22 1/26 2/9 2/16 2/23 3/2 3/16   Total Weekly Dose 33.75mg 33.75mg 33.75mg 33.75mg 33.75 mg 32.5mg 30mg 27.5mg 28.75 mg 30 mg 31.25 mg 31.25 mg 31.25 mg 31.25 mg   INR 2.9 2.5 3.1 3.1 3.2 3.4 3.4 1.9 1.7 1.8 2.1 2.6 2.9 2.9   Notes        redx1 ?  inc        Date 3/30 4/13 5/11 6/8  6/23 7/7 7/28  8/9 8/12 8/16   Total WeeklyDose 31.25mg 31.25 mg 31.25 mg 31.25mg Start dialysis 30mg 30mg 30 mg  33.75mg 37.5mg 37.5mg   INR 2.5 2.2 2.6 3.9  2.1 2.1 1.8 1.23 1.4 1.5 2.1   Notes    Inc torsemide Cath placement    AV fistula placement    HM train  1 no billing       Date 8/23 8/30 9/6 9/13 9/20 9/30 10/4 10/11 10/18 10/25 11/8 11/15   Total WeeklyDose 31.25 mg 32.5 mg 32.5 mg 32.5 mg 32.5 mg 32.5 mg 35 mg 35 mg 35 mg 32.5 mg 26.5 mg 35 mg   INR 1.9 2.0 2.0 2.5 2.7 1.6 1.7 2.8 2.4 1.7  1.4 1.6   Notes HM2 no billing HM3  No billing HM4  No billing HM 1  Bill today HM 2 No billing HM 3 No bill HM 4  No billing 1x boost  HM 5 no bill day Bill day  HM1 HM- 2 no billing   HM 3- no billing 1x boost  HM 4 - no billing      Date 11/22 11/29 12/6 12/13 12/20 12/27 1/3/23 1/10 1/12 1/17 1/26/23 1/31/23   Total WeeklyDose 37.5 mg 37.5 mg 37.5 mg 37.5 mg 42.5 mg  40 mg 37.5 mg 40 mg 42.5mg 45 mg 45 mg 40 mg   INR 1.9 2.8 1.9 1.5 2.3 3.4 1.4 0.8 2.7 POCT  2.23   2.8 HM 3.9 1.9 2.2   Notes HM1- bill day HM 2 - NO bill HM 3 - no bill  Inc GLV Hm 4 - NO BILL HM 1 - Bill today HM 2   no billing HM 3- no billing HM 4  No billing lab error?? clinic HM 1  Bill today HM 2 - no bill  HM 3 - no bill      Date 2/7 2/14 2/21/23 2/28/23 3/7 3/14/23 3/21/23 3/28/23 4/4 4/11 4/18 4/26   Total WeeklyDose 40 mg 40 mg 40 mg 37.5 mg 40 mg 40 mg 40 mg 40 mg 37.5 mg 37.5 mg 37.5 mg  40 mg   INR 2.3 2.2 3.9 1.6 3.7 ??? 2.5 2.7 3.2 2.8 2.5 1.7 1.7   Notes HM 4 rec 2/15  HM 1  Bill today HM 2 - no billing  1xred  HM 3 - NO BILL  HM-4 no bill, percocet HM 5 - no billing until 3/15 HM 1 - BILL today  HM 2 - no Billing HM 3  No billing HM 4  No billing HM 1-  BILL today  HM 2  No billing     Date 5/2 5/5 5/11 5/16 5/22 5/30 6/6 6/14 6/20 6/27 7/5 7/11   Total WeeklyDose 42.5 mg 37.5 mg 42.5mg 42.5 mg  35 mg 40 mg 40 mg 40 mg 37.5 mg 40 mg 40 mg 40 mg   INR 3.5 1.8 2.9 4.2 2.0 2.5 2.8 3.2 1.9 2.2 3.0 3.3   Notes HM 3  No billing HM 4  No billing HM 5  No billing HM-1  Bill  Dec'd GLV  HM 2  No billing  1x hold  HM 3   no billing HM 4  No billing HM 1  Bill today HM 2  No billing HM 3  No bill HM 4  No bill HM5  No bill     Date 7/18 7/25 08/01/23 8/8 8/15/23 8/23/23 08/29/23 09/05/23 9/12/23 9/19/23 9/26/23 10/3   Total WeeklyDose 37.5 mg 35 mg 37.5mg 40 mg 37.5 mg 37.5 mg 37.5 mg 37.5 mg 37.5 mg 37.5 mg 37.5 mg 37.5 mg   INR 3.3 1.9 1.9 2.3 2.6 2.1 2.4 2.3 2.7 2.5 2.7 2.0   Notes HM 1  Bill today HM 2  No billing HM 3  No billing  1  x boost HM 4 no billing  HM 1 - BILL today  HM 2 - no bill  HM 3- no billing HM 4- no billing HM 1 - BILL today  HM 2 - NO bill  HM 3 - no bill  Hm 4- no bill       Date 10/10 10/18 10/24 10/31 11/07       Total Weekly Dose 37.5mg 37.5 mg 37.5mg 42.5 mg 37.5 mg       INR 1.6 2.0 1.5 2.7 2.1       Notes Hm- 1 bill today  Missed dose?  Inc GLV HM 2- No bill  Hm- 3 no bill Hm 4 no billing   Boost dose  HM 1 billing today         Takes warfarin in the am    Clinic Interview:  Verbal Release Authorization signed on 8/22/2018 -- may speak with Raeann Montemayor (Wife). [phone] (Home) *Preferred*  Tablet Strength: pt has 7.5mg and 2.5mg tablets  Estimated oop cost for [facility] HM: Patient has [employer] Medicare so they should have minimal to no OOP costs.                      Patient Findings  Negatives: Signs/symptoms of thrombosis, Signs/symptoms of bleeding, Laboratory test error suspected, Change in health, Change in alcohol use, Change in activity, Upcoming invasive procedure, Emergency department visit, Upcoming dental procedure, Missed doses, Extra doses, Change in medications, Change in diet/appetite, Hospital admission, Bruising, Other complaints   Comments: All findings negative per pt.       Plan:   1. INR therapeutic today 11/07/23 at 2.1 (goal 2.0-3.0). Instructed  Mr. Montemayor to continue warfarin 5 mg oral daily except 7.5 mg on Fridays until recheck.   2. Recheck INR in one week 11/14/23  3. Verbal information provided over the telephone. Mr. Montemayor expresses understanding with teach back and has no further questions at this time.     Home monitor information below:  Test strips on hand: 4- 11/07/2023   Lot : [id number]  Barcode : [id number] Exp : 1/25  Serial number: [id number]   Supplies billing code used: last 11/07/23  Next must be on or after  12/05/23  Transfer Tube Lot number: Lot : [id number]  Safety Lancets:  Lot : [id number]   Billing:  *bill must be 4 tests 28 days or more from  visit  PLEASE REFER  TO ANTICOAGULATION TRAINING POWERPOINT FOR BILLING. Needs to use in clinic remote appointment for encounter.    Silke Corea  Pharmacy Technician  11/7/2023 09:59 CHULA RODGERS, Kyle Plata, PharmD, have reviewed the note in full and agree with the assessment and plan.  11/07/23  12:03 EST

## 2023-11-14 ENCOUNTER — ANTICOAGULATION VISIT (OUTPATIENT)
Dept: PHARMACY | Facility: HOSPITAL | Age: 78
End: 2023-11-14
Payer: MEDICARE

## 2023-11-14 LAB — INR PPP: 2.5

## 2023-11-14 NOTE — PROGRESS NOTES
Anticoagulation Clinic Progress Note   Oswaldo HOMEMONITOR: [employer] Serial # : [id number]     Indication: afib  Referring Provider: Gale  Initial Warfarin Start Date: 2007  Goal INR: 2-3  Current Drug Interactions:   [id number]: 5 (HTN, Age, CHF, CAD)   Other: anemic, CKD (stage 4) May need bridge with Lovenox given persistent AF (per cardio 1/24/2019), Scr was 3.22 on 11/24/21    Diet: eats salads 2x weekly 5/11/2023     Anticoagulation Clinic INR History:   Date 10/30 11/20 12/17 1/14/20 2/11 3/11 4/8 5/6 6/11 7/9 8/6 9/3 9/14 9/30   Total Weekly Dose 32.5 mg 32.5 mg 32.5 mg 32.5 mg 32.5mg 32.5mg 32.5mg 32.5mg 32.5mg 32.5 mg 32.5 mg 32.5mg 32.5mg 26.25 mg   INR 2.3 2.3 2.1 1.9 2.2 2.1 2.1 2.2 2.2 2.3 2.7 1.6 2.2  1.8   Notes                       ??   5 day hold     Date 10/14 11/4 11/11 11/25 12/9 12/16 12/30 1/13 2/3 3/3 3/31 4/28 5/26 6/23   Total Weekly Dose 32.5 mg 32.5mg 35mg 32.5mg 33.75 mg 36.25 mg 33.75 mg 35mg 33.75 33.75mg 33.75mg 33.75 mg 33.75 mg 33.75mg   INR 2.0 1.6 2.1 1.8 1.8 2.5 2.0 3.0 2.1 2.4 2.3 3.0 2.1 2.2   Notes   1x boost GLV boostx1 boostx1             Date 7/21 9/1 10/13 11/10 12/1 12/15 12/29 1/12/22 1/26 2/9 2/16 2/23 3/2 3/16   Total Weekly Dose 33.75mg 33.75mg 33.75mg 33.75mg 33.75 mg 32.5mg 30mg 27.5mg 28.75 mg 30 mg 31.25 mg 31.25 mg 31.25 mg 31.25 mg   INR 2.9 2.5 3.1 3.1 3.2 3.4 3.4 1.9 1.7 1.8 2.1 2.6 2.9 2.9   Notes        redx1 ?  inc        Date 3/30 4/13 5/11 6/8  6/23 7/7 7/28  8/9 8/12 8/16   Total WeeklyDose 31.25mg 31.25 mg 31.25 mg 31.25mg Start dialysis 30mg 30mg 30 mg  33.75mg 37.5mg 37.5mg   INR 2.5 2.2 2.6 3.9  2.1 2.1 1.8 1.23 1.4 1.5 2.1   Notes    Inc torsemide Cath placement    AV fistula placement    HM train  1 no billing       Date 8/23 8/30 9/6 9/13 9/20 9/30 10/4 10/11 10/18 10/25 11/8 11/15   Total WeeklyDose 31.25 mg 32.5 mg 32.5 mg 32.5 mg 32.5 mg 32.5 mg 35 mg 35 mg 35 mg 32.5 mg 26.5 mg 35 mg   INR 1.9 2.0 2.0 2.5 2.7 1.6 1.7 2.8 2.4 1.7  1.4 1.6   Notes HM2 no billing HM3  No billing HM4  No billing HM 1  Bill today HM 2 No billing HM 3 No bill HM 4  No billing 1x boost  HM 5 no bill day Bill day  HM1 HM- 2 no billing   HM 3- no billing 1x boost  HM 4 - no billing      Date 11/22 11/29 12/6 12/13 12/20 12/27 1/3/23 1/10 1/12 1/17 1/26/23 1/31/23   Total WeeklyDose 37.5 mg 37.5 mg 37.5 mg 37.5 mg 42.5 mg  40 mg 37.5 mg 40 mg 42.5mg 45 mg 45 mg 40 mg   INR 1.9 2.8 1.9 1.5 2.3 3.4 1.4 0.8 2.7 POCT  2.23   2.8 HM 3.9 1.9 2.2   Notes HM1- bill day HM 2 - NO bill HM 3 - no bill  Inc GLV Hm 4 - NO BILL HM 1 - Bill today HM 2   no billing HM 3- no billing HM 4  No billing lab error?? clinic HM 1  Bill today HM 2 - no bill  HM 3 - no bill      Date 2/7 2/14 2/21/23 2/28/23 3/7 3/14/23 3/21/23 3/28/23 4/4 4/11 4/18 4/26   Total WeeklyDose 40 mg 40 mg 40 mg 37.5 mg 40 mg 40 mg 40 mg 40 mg 37.5 mg 37.5 mg 37.5 mg  40 mg   INR 2.3 2.2 3.9 1.6 3.7 ??? 2.5 2.7 3.2 2.8 2.5 1.7 1.7   Notes HM 4 rec 2/15  HM 1  Bill today HM 2 - no billing  1xred  HM 3 - NO BILL  HM-4 no bill, percocet HM 5 - no billing until 3/15 HM 1 - BILL today  HM 2 - no Billing HM 3  No billing HM 4  No billing HM 1-  BILL today  HM 2  No billing     Date 5/2 5/5 5/11 5/16 5/22 5/30 6/6 6/14 6/20 6/27 7/5 7/11   Total WeeklyDose 42.5 mg 37.5 mg 42.5mg 42.5 mg  35 mg 40 mg 40 mg 40 mg 37.5 mg 40 mg 40 mg 40 mg   INR 3.5 1.8 2.9 4.2 2.0 2.5 2.8 3.2 1.9 2.2 3.0 3.3   Notes HM 3  No billing HM 4  No billing HM 5  No billing HM-1  Bill  Dec'd GLV  HM 2  No billing  1x hold  HM 3   no billing HM 4  No billing HM 1  Bill today HM 2  No billing HM 3  No bill HM 4  No bill HM5  No bill     Date 7/18 7/25 08/01/23 8/8 8/15/23 8/23/23 08/29/23 09/05/23 9/12/23 9/19/23 9/26/23 10/3   Total WeeklyDose 37.5 mg 35 mg 37.5mg 40 mg 37.5 mg 37.5 mg 37.5 mg 37.5 mg 37.5 mg 37.5 mg 37.5 mg 37.5 mg   INR 3.3 1.9 1.9 2.3 2.6 2.1 2.4 2.3 2.7 2.5 2.7 2.0   Notes HM 1  Bill today HM 2  No billing HM 3  No billing  1  x boost HM 4 no billing  HM 1 - BILL today  HM 2 - no bill  HM 3- no billing HM 4- no billing HM 1 - BILL today  HM 2 - NO bill  HM 3 - no bill  Hm 4- no bill       Date 10/10 10/18 10/24 10/31 11/07 11/14/23      Total Weekly Dose 37.5mg 37.5 mg 37.5mg 42.5 mg 37.5 mg 37.5 mg      INR 1.6 2.0 1.5 2.7 2.1 2.5      Notes Hm- 1 bill today  Missed dose?  Inc GLV HM 2- No bill  Hm- 3 no bill Hm 4 no billing   Boost dose  HM 1 billing today HM 2 - no bill         Takes warfarin in the am    Clinic Interview:  Verbal Release Authorization signed on 8/22/2018 -- may speak with Raeann Montemayor (Wife). [phone] (Home) *Preferred*  Tablet Strength: pt has 7.5mg and 2.5mg tablets  Estimated oop cost for  [facility] HM: Patient has [employer] Medicare so they should have minimal to no OOP costs.                    Patient Findings  Negatives: Signs/symptoms of thrombosis, Signs/symptoms of bleeding, Laboratory test error suspected, Change in health, Change in alcohol use, Change in activity, Upcoming invasive procedure, Emergency department visit, Upcoming dental procedure, Missed doses, Extra doses, Change in medications, Change in diet/appetite, Hospital admission, Bruising, Other complaints   Comments: All findings negative per pt.       Plan:   1. INR therapeutic today at 11/14/23 at 2.5 (goal 2.0-3.0). Instructed  Mr. Montemayor to continue warfarin 5 mg oral daily except 7.5 mg on Fridays until recheck.   2. Recheck INR in one week 11/21/23  3. Verbal information provided over the telephone. Mr. Montemayor expresses understanding with teach back and has no further questions at this time.     Home monitor information below:  Test strips on hand: 4  11/14/2023   Lot : [id number]  Barcode : [id number] Exp : 1/25  Serial number: [id number]   Supplies billing code used: last 11/07/23  Next must be on or after  12/05/23  Transfer Tube Lot number: Lot : [id number]  Safety Lancets:  Lot : [id number]   Billing:  *bill must be 4 tests 28 days or  more from HM visit  PLEASE REFER TO ANTICOAGULATION TRAINING POWERPOINT FOR BILLING. Needs to use in clinic remote appointment for encounter.    Alexander Scherer, Pharmacy Technician  11/14/2023  09:38 Maryann ALMENDAREZ [facility], have reviewed the note in full and agree with the assessment and plan.  11/15/23  08:57 EST

## 2023-11-21 ENCOUNTER — ANTICOAGULATION VISIT (OUTPATIENT)
Dept: PHARMACY | Facility: HOSPITAL | Age: 78
End: 2023-11-21
Payer: MEDICARE

## 2023-11-21 LAB — INR PPP: 2.3

## 2023-11-21 NOTE — PROGRESS NOTES
Anticoagulation Clinic Progress Note   Oswaldo HOMEMONITOR: [employer] Serial # : [id number]     Indication: afib  Referring Provider: Gale  Initial Warfarin Start Date: 2007  Goal INR: 2-3  Current Drug Interactions:   [id number]: 5 (HTN, Age, CHF, CAD)   Other: anemic, CKD (stage 4) May need bridge with Lovenox given persistent AF (per cardio 1/24/2019), Scr was 3.22 on 11/24/21    Diet: eats salads 2x weekly 5/11/2023     Anticoagulation Clinic INR History:   Date 10/30 11/20 12/17 1/14/20 2/11 3/11 4/8 5/6 6/11 7/9 8/6 9/3 9/14 9/30   Total Weekly Dose 32.5 mg 32.5 mg 32.5 mg 32.5 mg 32.5mg 32.5mg 32.5mg 32.5mg 32.5mg 32.5 mg 32.5 mg 32.5mg 32.5mg 26.25 mg   INR 2.3 2.3 2.1 1.9 2.2 2.1 2.1 2.2 2.2 2.3 2.7 1.6 2.2  1.8   Notes                       ??   5 day hold     Date 10/14 11/4 11/11 11/25 12/9 12/16 12/30 1/13 2/3 3/3 3/31 4/28 5/26 6/23   Total Weekly Dose 32.5 mg 32.5mg 35mg 32.5mg 33.75 mg 36.25 mg 33.75 mg 35mg 33.75 33.75mg 33.75mg 33.75 mg 33.75 mg 33.75mg   INR 2.0 1.6 2.1 1.8 1.8 2.5 2.0 3.0 2.1 2.4 2.3 3.0 2.1 2.2   Notes   1x boost GLV boostx1 boostx1             Date 7/21 9/1 10/13 11/10 12/1 12/15 12/29 1/12/22 1/26 2/9 2/16 2/23 3/2 3/16   Total Weekly Dose 33.75mg 33.75mg 33.75mg 33.75mg 33.75 mg 32.5mg 30mg 27.5mg 28.75 mg 30 mg 31.25 mg 31.25 mg 31.25 mg 31.25 mg   INR 2.9 2.5 3.1 3.1 3.2 3.4 3.4 1.9 1.7 1.8 2.1 2.6 2.9 2.9   Notes        redx1 ?  inc        Date 3/30 4/13 5/11 6/8  6/23 7/7 7/28  8/9 8/12 8/16   Total WeeklyDose 31.25mg 31.25 mg 31.25 mg 31.25mg Start dialysis 30mg 30mg 30 mg  33.75mg 37.5mg 37.5mg   INR 2.5 2.2 2.6 3.9  2.1 2.1 1.8 1.23 1.4 1.5 2.1   Notes    Inc torsemide Cath placement    AV fistula placement    HM train  1 no billing       Date 8/23 8/30 9/6 9/13 9/20 9/30 10/4 10/11 10/18 10/25 11/8 11/15   Total WeeklyDose 31.25 mg 32.5 mg 32.5 mg 32.5 mg 32.5 mg 32.5 mg 35 mg 35 mg 35 mg 32.5 mg 26.5 mg 35 mg   INR 1.9 2.0 2.0 2.5 2.7 1.6 1.7 2.8 2.4 1.7  1.4 1.6   Notes HM2 no billing HM3  No billing HM4  No billing HM 1  Bill today HM 2 No billing HM 3 No bill HM 4  No billing 1x boost  HM 5 no bill day Bill day  HM1 HM- 2 no billing   HM 3- no billing 1x boost  HM 4 - no billing      Date 11/22 11/29 12/6 12/13 12/20 12/27 1/3/23 1/10 1/12 1/17 1/26/23 1/31/23   Total WeeklyDose 37.5 mg 37.5 mg 37.5 mg 37.5 mg 42.5 mg  40 mg 37.5 mg 40 mg 42.5mg 45 mg 45 mg 40 mg   INR 1.9 2.8 1.9 1.5 2.3 3.4 1.4 0.8 2.7 POCT  2.23   2.8 HM 3.9 1.9 2.2   Notes HM1- bill day HM 2 - NO bill HM 3 - no bill  Inc GLV Hm 4 - NO BILL HM 1 - Bill today HM 2   no billing HM 3- no billing HM 4  No billing lab error?? clinic HM 1  Bill today HM 2 - no bill  HM 3 - no bill      Date 2/7 2/14 2/21/23 2/28/23 3/7 3/14/23 3/21/23 3/28/23 4/4 4/11 4/18 4/26   Total WeeklyDose 40 mg 40 mg 40 mg 37.5 mg 40 mg 40 mg 40 mg 40 mg 37.5 mg 37.5 mg 37.5 mg  40 mg   INR 2.3 2.2 3.9 1.6 3.7 ??? 2.5 2.7 3.2 2.8 2.5 1.7 1.7   Notes HM 4 rec 2/15  HM 1  Bill today HM 2 - no billing  1xred  HM 3 - NO BILL  HM-4 no bill, percocet HM 5 - no billing until 3/15 HM 1 - BILL today  HM 2 - no Billing HM 3  No billing HM 4  No billing HM 1-  BILL today  HM 2  No billing     Date 5/2 5/5 5/11 5/16 5/22 5/30 6/6 6/14 6/20 6/27 7/5 7/11   Total WeeklyDose 42.5 mg 37.5 mg 42.5mg 42.5 mg  35 mg 40 mg 40 mg 40 mg 37.5 mg 40 mg 40 mg 40 mg   INR 3.5 1.8 2.9 4.2 2.0 2.5 2.8 3.2 1.9 2.2 3.0 3.3   Notes HM 3  No billing HM 4  No billing HM 5  No billing HM-1  Bill  Dec'd GLV  HM 2  No billing  1x hold  HM 3   no billing HM 4  No billing HM 1  Bill today HM 2  No billing HM 3  No bill HM 4  No bill HM5  No bill     Date 7/18 7/25 08/01/23 8/8 8/15/23 8/23/23 08/29/23 09/05/23 9/12/23 9/19/23 9/26/23 10/3   Total WeeklyDose 37.5 mg 35 mg 37.5mg 40 mg 37.5 mg 37.5 mg 37.5 mg 37.5 mg 37.5 mg 37.5 mg 37.5 mg 37.5 mg   INR 3.3 1.9 1.9 2.3 2.6 2.1 2.4 2.3 2.7 2.5 2.7 2.0   Notes HM 1  Bill today HM 2  No billing HM 3  No billing  1  x boost HM 4 no billing  HM 1 - BILL today  HM 2 - no bill  HM 3- no billing HM 4- no billing HM 1 - BILL today  HM 2 - NO bill  HM 3 - no bill  Hm 4- no bill       Date 10/10 10/18 10/24 10/31 11/07 11/14/23 11/21     Total Weekly Dose 37.5mg 37.5 mg 37.5mg 42.5 mg 37.5 mg 37.5 mg 37.5 mg     INR 1.6 2.0 1.5 2.7 2.1 2.5 2.3     Notes Hm- 1 bill today  Missed dose?  Inc GLV HM 2- No bill  Hm- 3 no bill Hm 4 no billing   Boost dose  HM 1 billing today HM 2 - no bill  HM 3 no billing       Takes warfarin in the am    Clinic Interview:  Verbal Release Authorization signed on 8/22/2018 -- may speak with Raeann Montemayor (Wife). [phone] (Home) *Preferred*  Tablet Strength: pt has 7.5mg and 2.5mg tablets  Estimated oop cost for  [facility] HM: Patient has [employer] Medicare so they should have minimal to no OOP costs.                    Patient Findings  Negatives: Signs/symptoms of thrombosis, Signs/symptoms of bleeding, Laboratory test error suspected, Change in health, Change in alcohol use, Change in activity, Upcoming invasive procedure, Emergency department visit, Upcoming dental procedure, Missed doses, Extra doses, Change in medications, Change in diet/appetite, Hospital admission, Bruising, Other complaints   Comments: All findings negative per pt.       Plan:   1. INR therapeutic today 11/21/23 at 2.3 (goal 2.0-3.0). Instructed  Mr. Montemayor to continue warfarin 5 mg oral daily except 7.5 mg on Fridays until recheck.   2. Recheck INR in one week 11/28/23  3. Verbal information provided over the telephone. Mr. Montemayor expresses understanding with teach back and has no further questions at this time.     Home monitor information below:  Test strips on hand: 4  11/21/2023   Lot : [id number]  Barcode : [id number] Exp : 1/25  Serial number: [id number]   Supplies billing code used: last 11/07/23  Next must be on or after  12/05/23  Transfer Tube Lot number: Lot : [id number]  Safety Lancets:  Lot : [id number]   Billing:  *bill  must be 4 tests 28 days or more from HM visit  PLEASE REFER TO ANTICOAGULATION TRAINING POWERPOINT FOR BILLING. Needs to use in clinic remote appointment for encounter.      Silke Corea  Pharmacy Technician  11/21/2023 11:47 Kristine ALMENDAREZ, PharmD, have reviewed the note in full and agree with the assessment and plan.  11/21/23  15:05 EST

## 2023-11-28 ENCOUNTER — ANTICOAGULATION VISIT (OUTPATIENT)
Dept: PHARMACY | Facility: HOSPITAL | Age: 78
End: 2023-11-28
Payer: MEDICARE

## 2023-11-28 LAB — INR PPP: 4.2

## 2023-11-28 NOTE — PROGRESS NOTES
Anticoagulation Clinic Progress Note   Oswaldo HOMEMONITOR: [employer] Serial # : [id number]     Indication: afib  Referring Provider: Gale  Initial Warfarin Start Date: 2007  Goal INR: 2-3  Current Drug Interactions:   [id number]: 5 (HTN, Age, CHF, CAD)   Other: anemic, CKD (stage 4) May need bridge with Lovenox given persistent AF (per cardio 1/24/2019), Scr was 3.22 on 11/24/21    Diet: eats salads 2x weekly 5/11/2023     Anticoagulation Clinic INR History:   Date 10/30 11/20 12/17 1/14/20 2/11 3/11 4/8 5/6 6/11 7/9 8/6 9/3 9/14 9/30   Total Weekly Dose 32.5 mg 32.5 mg 32.5 mg 32.5 mg 32.5mg 32.5mg 32.5mg 32.5mg 32.5mg 32.5 mg 32.5 mg 32.5mg 32.5mg 26.25 mg   INR 2.3 2.3 2.1 1.9 2.2 2.1 2.1 2.2 2.2 2.3 2.7 1.6 2.2  1.8   Notes                       ??   5 day hold     Date 10/14 11/4 11/11 11/25 12/9 12/16 12/30 1/13 2/3 3/3 3/31 4/28 5/26 6/23   Total Weekly Dose 32.5 mg 32.5mg 35mg 32.5mg 33.75 mg 36.25 mg 33.75 mg 35mg 33.75 33.75mg 33.75mg 33.75 mg 33.75 mg 33.75mg   INR 2.0 1.6 2.1 1.8 1.8 2.5 2.0 3.0 2.1 2.4 2.3 3.0 2.1 2.2   Notes   1x boost GLV boostx1 boostx1             Date 7/21 9/1 10/13 11/10 12/1 12/15 12/29 1/12/22 1/26 2/9 2/16 2/23 3/2 3/16   Total Weekly Dose 33.75mg 33.75mg 33.75mg 33.75mg 33.75 mg 32.5mg 30mg 27.5mg 28.75 mg 30 mg 31.25 mg 31.25 mg 31.25 mg 31.25 mg   INR 2.9 2.5 3.1 3.1 3.2 3.4 3.4 1.9 1.7 1.8 2.1 2.6 2.9 2.9   Notes        redx1 ?  inc        Date 3/30 4/13 5/11 6/8  6/23 7/7 7/28  8/9 8/12 8/16   Total WeeklyDose 31.25mg 31.25 mg 31.25 mg 31.25mg Start dialysis 30mg 30mg 30 mg  33.75mg 37.5mg 37.5mg   INR 2.5 2.2 2.6 3.9  2.1 2.1 1.8 1.23 1.4 1.5 2.1   Notes    Inc torsemide Cath placement    AV fistula placement    HM train  1 no billing       Date 8/23 8/30 9/6 9/13 9/20 9/30 10/4 10/11 10/18 10/25 11/8 11/15   Total WeeklyDose 31.25 mg 32.5 mg 32.5 mg 32.5 mg 32.5 mg 32.5 mg 35 mg 35 mg 35 mg 32.5 mg 26.5 mg 35 mg   INR 1.9 2.0 2.0 2.5 2.7 1.6 1.7 2.8 2.4 1.7  1.4 1.6   Notes HM2 no billing HM3  No billing HM4  No billing HM 1  Bill today HM 2 No billing HM 3 No bill HM 4  No billing 1x boost  HM 5 no bill day Bill day  HM1 HM- 2 no billing   HM 3- no billing 1x boost  HM 4 - no billing      Date 11/22 11/29 12/6 12/13 12/20 12/27 1/3/23 1/10 1/12 1/17 1/26/23 1/31/23   Total WeeklyDose 37.5 mg 37.5 mg 37.5 mg 37.5 mg 42.5 mg  40 mg 37.5 mg 40 mg 42.5mg 45 mg 45 mg 40 mg   INR 1.9 2.8 1.9 1.5 2.3 3.4 1.4 0.8 2.7 POCT  2.23   2.8 HM 3.9 1.9 2.2   Notes HM1- bill day HM 2 - NO bill HM 3 - no bill  Inc GLV Hm 4 - NO BILL HM 1 - Bill today HM 2   no billing HM 3- no billing HM 4  No billing lab error?? clinic HM 1  Bill today HM 2 - no bill  HM 3 - no bill      Date 2/7 2/14 2/21/23 2/28/23 3/7 3/14/23 3/21/23 3/28/23 4/4 4/11 4/18 4/26   Total WeeklyDose 40 mg 40 mg 40 mg 37.5 mg 40 mg 40 mg 40 mg 40 mg 37.5 mg 37.5 mg 37.5 mg  40 mg   INR 2.3 2.2 3.9 1.6 3.7 ??? 2.5 2.7 3.2 2.8 2.5 1.7 1.7   Notes HM 4 rec 2/15  HM 1  Bill today HM 2 - no billing  1xred  HM 3 - NO BILL  HM-4 no bill, percocet HM 5 - no billing until 3/15 HM 1 - BILL today  HM 2 - no Billing HM 3  No billing HM 4  No billing HM 1-  BILL today  HM 2  No billing     Date 5/2 5/5 5/11 5/16 5/22 5/30 6/6 6/14 6/20 6/27 7/5 7/11   Total WeeklyDose 42.5 mg 37.5 mg 42.5mg 42.5 mg  35 mg 40 mg 40 mg 40 mg 37.5 mg 40 mg 40 mg 40 mg   INR 3.5 1.8 2.9 4.2 2.0 2.5 2.8 3.2 1.9 2.2 3.0 3.3   Notes HM 3  No billing HM 4  No billing HM 5  No billing HM-1  Bill  Dec'd GLV  HM 2  No billing  1x hold  HM 3   no billing HM 4  No billing HM 1  Bill today HM 2  No billing HM 3  No bill HM 4  No bill HM5  No bill     Date 7/18 7/25 08/01/23 8/8 8/15/23 8/23/23 08/29/23 09/05/23 9/12/23 9/19/23 9/26/23 10/3   Total WeeklyDose 37.5 mg 35 mg 37.5mg 40 mg 37.5 mg 37.5 mg 37.5 mg 37.5 mg 37.5 mg 37.5 mg 37.5 mg 37.5 mg   INR 3.3 1.9 1.9 2.3 2.6 2.1 2.4 2.3 2.7 2.5 2.7 2.0   Notes HM 1  Bill today HM 2  No billing HM 3  No billing  1  x boost HM 4 no billing  HM 1 - BILL today  HM 2 - no bill  HM 3- no billing HM 4- no billing HM 1 - BILL today  HM 2 - NO bill  HM 3 - no bill  Hm 4- no bill       Date 10/10 10/18 10/24 10/31 11/07 11/14/23 11/21 11/28    Total Weekly Dose 37.5mg 37.5 mg 37.5mg 42.5 mg 37.5 mg 37.5 mg 37.5 mg 37.5 mg    INR 1.6 2.0 1.5 2.7 2.1 2.5 2.3 4.2    Notes Hm- 1 bill today  Missed dose?  Inc GLV HM 2- No bill  Hm- 3 no bill Hm 4 no billing   Boost dose  HM 1 billing today HM 2 - no bill  HM 3 no billing Hm 4 no billing      Takes warfarin in the am    Clinic Interview:  Verbal Release Authorization signed on 8/22/2018 -- may speak with Raeann Montemayor (Wife). [phone] (Home) *Preferred*  Tablet Strength: pt has 7.5mg and 2.5mg tablets  Estimated oop cost for [facility] HM: Patient has [employer] Medicare so they should have minimal to no OOP costs.                    Patient Findings  Negatives: Signs/symptoms of thrombosis, Signs/symptoms of bleeding, Laboratory test error suspected, Change in health, Change in alcohol use, Change in activity, Upcoming invasive procedure, Emergency department visit, Upcoming dental procedure, Missed doses, Extra doses, Change in medications, Change in diet/appetite, Hospital admission, Bruising, Other complaints   Comments: All findings negative per pt.       Plan:   1. INR supra-therapeutic today 11/28/23 at 4.2 (goal 2.0-3.0).Per Kristine Sutton, PharmD,   Instructed  Mr. Montemayor to take one time Decrease dose of 2.5 mg of warfarin tonight, then continue warfarin 5 mg oral daily except 7.5 mg on Fridays until recheck.   2. Recheck INR in one week 12/05/23  3. Verbal information provided over the telephone. Mr. Montemayor expresses understanding with teach back and has no further questions at this time.     Home monitor information below:  Test strips on hand: 3  11/28/2023   Lot : [id number]  Barcode : [id number] Exp : 1/25  Serial number: [id number]   Supplies billing code used: last  11/07/23  Next must be on or after  12/05/23  Transfer Tube Lot number: Lot : [id number]  Safety Lancets:  Lot : [id number]   Billing:  *bill must be 4 tests 28 days or more from HM visit  PLEASE REFER TO ANTICOAGULATION TRAINING POWERPOINT FOR BILLING. Needs to use in clinic remote appointment for encounter.    Silke Corea  Pharmacy Technician  11/28/2023 09:44 CHULA RODGERS, Zev Galarza [facility], have reviewed the note in full and agree with the assessment and plan.  11/28/23  15:17 EST

## 2023-12-05 ENCOUNTER — TELEPHONE (OUTPATIENT)
Dept: CARDIOLOGY | Facility: CLINIC | Age: 78
End: 2023-12-05
Payer: MEDICARE

## 2023-12-05 ENCOUNTER — ANTICOAGULATION VISIT (OUTPATIENT)
Dept: PHARMACY | Facility: HOSPITAL | Age: 78
End: 2023-12-05
Payer: MEDICARE

## 2023-12-05 ENCOUNTER — TRANSCRIBE ORDERS (OUTPATIENT)
Dept: ADMINISTRATIVE | Facility: HOSPITAL | Age: 78
End: 2023-12-05
Payer: MEDICARE

## 2023-12-05 DIAGNOSIS — N18.6 END STAGE RENAL DISEASE: Primary | ICD-10-CM

## 2023-12-05 LAB — INR PPP: 1.9

## 2023-12-05 RX ORDER — WARFARIN SODIUM 2.5 MG/1
TABLET ORAL
Qty: 180 TABLET | Refills: 1 | Status: SHIPPED | OUTPATIENT
Start: 2023-12-05

## 2023-12-05 NOTE — PROGRESS NOTES
Anticoagulation Clinic Progress Note   Oswaldo HOMEMONITOR: [employer] Serial # : [id number]     Indication: afib  Referring Provider: Gale  Initial Warfarin Start Date: 2007  Goal INR: 2-3  Current Drug Interactions:   [id number]: 5 (HTN, Age, CHF, CAD)   Other: anemic, CKD (stage 4) May need bridge with Lovenox given persistent AF (per cardio 1/24/2019), Scr was 3.22 on 11/24/21    Diet: eats salads 2x weekly 5/11/2023     Anticoagulation Clinic INR History:   Date 10/30 11/20 12/17 1/14/20 2/11 3/11 4/8 5/6 6/11 7/9 8/6 9/3 9/14 9/30   Total Weekly Dose 32.5 mg 32.5 mg 32.5 mg 32.5 mg 32.5mg 32.5mg 32.5mg 32.5mg 32.5mg 32.5 mg 32.5 mg 32.5mg 32.5mg 26.25 mg   INR 2.3 2.3 2.1 1.9 2.2 2.1 2.1 2.2 2.2 2.3 2.7 1.6 2.2  1.8   Notes                       ??   5 day hold     Date 10/14 11/4 11/11 11/25 12/9 12/16 12/30 1/13 2/3 3/3 3/31 4/28 5/26 6/23   Total Weekly Dose 32.5 mg 32.5mg 35mg 32.5mg 33.75 mg 36.25 mg 33.75 mg 35mg 33.75 33.75mg 33.75mg 33.75 mg 33.75 mg 33.75mg   INR 2.0 1.6 2.1 1.8 1.8 2.5 2.0 3.0 2.1 2.4 2.3 3.0 2.1 2.2   Notes   1x boost GLV boostx1 boostx1             Date 7/21 9/1 10/13 11/10 12/1 12/15 12/29 1/12/22 1/26 2/9 2/16 2/23 3/2 3/16   Total Weekly Dose 33.75mg 33.75mg 33.75mg 33.75mg 33.75 mg 32.5mg 30mg 27.5mg 28.75 mg 30 mg 31.25 mg 31.25 mg 31.25 mg 31.25 mg   INR 2.9 2.5 3.1 3.1 3.2 3.4 3.4 1.9 1.7 1.8 2.1 2.6 2.9 2.9   Notes        redx1 ?  inc        Date 3/30 4/13 5/11 6/8  6/23 7/7 7/28  8/9 8/12 8/16   Total WeeklyDose 31.25mg 31.25 mg 31.25 mg 31.25mg Start dialysis 30mg 30mg 30 mg  33.75mg 37.5mg 37.5mg   INR 2.5 2.2 2.6 3.9  2.1 2.1 1.8 1.23 1.4 1.5 2.1   Notes    Inc torsemide Cath placement    AV fistula placement    HM train  1 no billing       Date 8/23 8/30 9/6 9/13 9/20 9/30 10/4 10/11 10/18 10/25 11/8 11/15   Total WeeklyDose 31.25 mg 32.5 mg 32.5 mg 32.5 mg 32.5 mg 32.5 mg 35 mg 35 mg 35 mg 32.5 mg 26.5 mg 35 mg   INR 1.9 2.0 2.0 2.5 2.7 1.6 1.7 2.8 2.4 1.7  1.4 1.6   Notes HM2 no billing HM3  No billing HM4  No billing HM 1  Bill today HM 2 No billing HM 3 No bill HM 4  No billing 1x boost  HM 5 no bill day Bill day  HM1 HM- 2 no billing   HM 3- no billing 1x boost  HM 4 - no billing      Date 11/22 11/29 12/6 12/13 12/20 12/27 1/3/23 1/10 1/12 1/17 1/26/23 1/31/23   Total WeeklyDose 37.5 mg 37.5 mg 37.5 mg 37.5 mg 42.5 mg  40 mg 37.5 mg 40 mg 42.5mg 45 mg 45 mg 40 mg   INR 1.9 2.8 1.9 1.5 2.3 3.4 1.4 0.8 2.7 POCT  2.23   2.8 HM 3.9 1.9 2.2   Notes HM1- bill day HM 2 - NO bill HM 3 - no bill  Inc GLV Hm 4 - NO BILL HM 1 - Bill today HM 2   no billing HM 3- no billing HM 4  No billing lab error?? clinic HM 1  Bill today HM 2 - no bill  HM 3 - no bill      Date 2/7 2/14 2/21/23 2/28/23 3/7 3/14/23 3/21/23 3/28/23 4/4 4/11 4/18 4/26   Total WeeklyDose 40 mg 40 mg 40 mg 37.5 mg 40 mg 40 mg 40 mg 40 mg 37.5 mg 37.5 mg 37.5 mg  40 mg   INR 2.3 2.2 3.9 1.6 3.7 ??? 2.5 2.7 3.2 2.8 2.5 1.7 1.7   Notes HM 4 rec 2/15  HM 1  Bill today HM 2 - no billing  1xred  HM 3 - NO BILL  HM-4 no bill, percocet HM 5 - no billing until 3/15 HM 1 - BILL today  HM 2 - no Billing HM 3  No billing HM 4  No billing HM 1-  BILL today  HM 2  No billing     Date 5/2 5/5 5/11 5/16 5/22 5/30 6/6 6/14 6/20 6/27 7/5 7/11   Total WeeklyDose 42.5 mg 37.5 mg 42.5mg 42.5 mg  35 mg 40 mg 40 mg 40 mg 37.5 mg 40 mg 40 mg 40 mg   INR 3.5 1.8 2.9 4.2 2.0 2.5 2.8 3.2 1.9 2.2 3.0 3.3   Notes HM 3  No billing HM 4  No billing HM 5  No billing HM-1  Bill  Dec'd GLV  HM 2  No billing  1x hold  HM 3   no billing HM 4  No billing HM 1  Bill today HM 2  No billing HM 3  No bill HM 4  No bill HM5  No bill     Date 7/18 7/25 08/01/23 8/8 8/15/23 8/23/23 08/29/23 09/05/23 9/12/23 9/19/23 9/26/23 10/3   Total WeeklyDose 37.5 mg 35 mg 37.5mg 40 mg 37.5 mg 37.5 mg 37.5 mg 37.5 mg 37.5 mg 37.5 mg 37.5 mg 37.5 mg   INR 3.3 1.9 1.9 2.3 2.6 2.1 2.4 2.3 2.7 2.5 2.7 2.0   Notes HM 1  Bill today HM 2  No billing HM 3  No billing  1  x boost HM 4 no billing  HM 1 - BILL today  HM 2 - no bill  HM 3- no billing HM 4- no billing HM 1 - BILL today  HM 2 - NO bill  HM 3 - no bill  Hm 4- no bill       Date 10/10 10/18 10/24 10/31 11/07 11/14/23 11/21 11/28 12/05/23   Total Weekly Dose 37.5mg 37.5 mg 37.5mg 42.5 mg 37.5 mg 37.5 mg 37.5 mg 37.5 mg 35 mg   INR 1.6 2.0 1.5 2.7 2.1 2.5 2.3 4.2 1.9   Notes Hm- 1 bill today  Missed dose?  Inc GLV HM 2- No bill  Hm- 3 no bill Hm 4 no billing   Boost dose  HM 1 billing today HM 2 - no bill  HM 3 no billing Hm 4 no billing HM 1 billing day  1x red     Date               Total WeeklyDose               INR               Notes                Takes warfarin in the am    Clinic Interview:  Verbal Release Authorization signed on 8/22/2018 -- may speak with Raeann Montemayor (Wife). [phone] (Home) *Preferred*  Tablet Strength: pt has 7.5mg and 2.5mg tablets  Estimated oop cost for [facility] HM: Patient has [employer] Medicare so they should have minimal to no OOP costs.                  Patient Findings  Negatives: Signs/symptoms of thrombosis, Signs/symptoms of bleeding, Laboratory test error suspected, Change in health, Change in alcohol use, Change in activity, Upcoming invasive procedure, Emergency department visit, Upcoming dental procedure, Missed doses, Extra doses, Change in medications, Change in diet/appetite, Hospital admission, Bruising, Other complaints   Comments: All findings negative per pt.     Plan:   1. INR SUBtherapeutic today 12/05/23 at 1.9 (goal 2.0-3.0). Per Charlotte Michael, NighatD, instructed  Mr. Montemayor to resume warfarin 5 mg oral daily except 7.5 mg on Fridays until recheck.   2. Recheck INR in one week 12/12/23  3. Verbal information provided over the telephone. Mr. Montemayor expresses understanding with teach back and has no further questions at this time.     Home monitor information below:  Test strips on hand: 2  12/05/23 - sending more  Lot : [id number]  Barcode : [id number] Exp : 1/25  Serial  number: [id number]   Supplies billing code used: last 12/5/23  Next must be on or after  1/2/2024  Transfer Tube Lot number: Lot : [id number]  Safety Lancets:  Lot : [id number]   Billing:  *bill must be 4 tests 28 days or more from HM visit  PLEASE REFER TO ANTICOAGULATION TRAINING POWERPOINT FOR BILLING. Needs to use in clinic remote appointment for encounter.    Judy Mora   Pharmacy Technician   12/05/23    IEvelina, PharmD, have reviewed the note in full and agree with the assessment and plan.  12/05/23  13:10 EST

## 2023-12-07 ENCOUNTER — TELEPHONE (OUTPATIENT)
Dept: CARDIOLOGY | Facility: CLINIC | Age: 78
End: 2023-12-07
Payer: MEDICARE

## 2023-12-07 NOTE — TELEPHONE ENCOUNTER
Spoke to Mrs Montemayor in regards to [employer] not accepting [facility] HMO starting 1/1/24.  She is aware and they are switching to a PPO plan.

## 2023-12-12 ENCOUNTER — ANTICOAGULATION VISIT (OUTPATIENT)
Dept: PHARMACY | Facility: HOSPITAL | Age: 78
End: 2023-12-12
Payer: MEDICARE

## 2023-12-12 LAB — INR PPP: 1.8

## 2023-12-12 NOTE — PROGRESS NOTES
Anticoagulation Clinic Progress Note   Oswaldo HOMEMONITOR: [employer] Serial # : [id number]     Indication: afib  Referring Provider: Gale  Initial Warfarin Start Date: 2007  Goal INR: 2-3  Current Drug Interactions:   [id number]: 5 (HTN, Age, CHF, CAD)   Other: anemic, CKD (stage 4) May need bridge with Lovenox given persistent AF (per cardio 1/24/2019), Scr was 3.22 on 11/24/21    Diet: eats salads 2x weekly 5/11/2023     Anticoagulation Clinic INR History:   Date 10/30 11/20 12/17 1/14/20 2/11 3/11 4/8 5/6 6/11 7/9 8/6 9/3 9/14 9/30   Total Weekly Dose 32.5 mg 32.5 mg 32.5 mg 32.5 mg 32.5mg 32.5mg 32.5mg 32.5mg 32.5mg 32.5 mg 32.5 mg 32.5mg 32.5mg 26.25 mg   INR 2.3 2.3 2.1 1.9 2.2 2.1 2.1 2.2 2.2 2.3 2.7 1.6 2.2  1.8   Notes                       ??   5 day hold     Date 10/14 11/4 11/11 11/25 12/9 12/16 12/30 1/13 2/3 3/3 3/31 4/28 5/26 6/23   Total Weekly Dose 32.5 mg 32.5mg 35mg 32.5mg 33.75 mg 36.25 mg 33.75 mg 35mg 33.75 33.75mg 33.75mg 33.75 mg 33.75 mg 33.75mg   INR 2.0 1.6 2.1 1.8 1.8 2.5 2.0 3.0 2.1 2.4 2.3 3.0 2.1 2.2   Notes   1x boost GLV boostx1 boostx1             Date 7/21 9/1 10/13 11/10 12/1 12/15 12/29 1/12/22 1/26 2/9 2/16 2/23 3/2 3/16   Total Weekly Dose 33.75mg 33.75mg 33.75mg 33.75mg 33.75 mg 32.5mg 30mg 27.5mg 28.75 mg 30 mg 31.25 mg 31.25 mg 31.25 mg 31.25 mg   INR 2.9 2.5 3.1 3.1 3.2 3.4 3.4 1.9 1.7 1.8 2.1 2.6 2.9 2.9   Notes        redx1 ?  inc        Date 3/30 4/13 5/11 6/8  6/23 7/7 7/28  8/9 8/12 8/16   Total WeeklyDose 31.25mg 31.25 mg 31.25 mg 31.25mg Start dialysis 30mg 30mg 30 mg  33.75mg 37.5mg 37.5mg   INR 2.5 2.2 2.6 3.9  2.1 2.1 1.8 1.23 1.4 1.5 2.1   Notes    Inc torsemide Cath placement    AV fistula placement    HM train  1 no billing       Date 8/23 8/30 9/6 9/13 9/20 9/30 10/4 10/11 10/18 10/25 11/8 11/15   Total WeeklyDose 31.25 mg 32.5 mg 32.5 mg 32.5 mg 32.5 mg 32.5 mg 35 mg 35 mg 35 mg 32.5 mg 26.5 mg 35 mg   INR 1.9 2.0 2.0 2.5 2.7 1.6 1.7 2.8 2.4 1.7  1.4 1.6   Notes HM2 no billing HM3  No billing HM4  No billing HM 1  Bill today HM 2 No billing HM 3 No bill HM 4  No billing 1x boost  HM 5 no bill day Bill day  HM1 HM- 2 no billing   HM 3- no billing 1x boost  HM 4 - no billing      Date 11/22 11/29 12/6 12/13 12/20 12/27 1/3/23 1/10 1/12 1/17 1/26/23 1/31/23   Total WeeklyDose 37.5 mg 37.5 mg 37.5 mg 37.5 mg 42.5 mg  40 mg 37.5 mg 40 mg 42.5mg 45 mg 45 mg 40 mg   INR 1.9 2.8 1.9 1.5 2.3 3.4 1.4 0.8 2.7 POCT  2.23   2.8 HM 3.9 1.9 2.2   Notes HM1- bill day HM 2 - NO bill HM 3 - no bill  Inc GLV Hm 4 - NO BILL HM 1 - Bill today HM 2   no billing HM 3- no billing HM 4  No billing lab error?? clinic HM 1  Bill today HM 2 - no bill  HM 3 - no bill      Date 2/7 2/14 2/21/23 2/28/23 3/7 3/14/23 3/21/23 3/28/23 4/4 4/11 4/18 4/26   Total WeeklyDose 40 mg 40 mg 40 mg 37.5 mg 40 mg 40 mg 40 mg 40 mg 37.5 mg 37.5 mg 37.5 mg  40 mg   INR 2.3 2.2 3.9 1.6 3.7 ??? 2.5 2.7 3.2 2.8 2.5 1.7 1.7   Notes HM 4 rec 2/15  HM 1  Bill today HM 2 - no billing  1xred  HM 3 - NO BILL  HM-4 no bill, percocet HM 5 - no billing until 3/15 HM 1 - BILL today  HM 2 - no Billing HM 3  No billing HM 4  No billing HM 1-  BILL today  HM 2  No billing     Date 5/2 5/5 5/11 5/16 5/22 5/30 6/6 6/14 6/20 6/27 7/5 7/11   Total WeeklyDose 42.5 mg 37.5 mg 42.5mg 42.5 mg  35 mg 40 mg 40 mg 40 mg 37.5 mg 40 mg 40 mg 40 mg   INR 3.5 1.8 2.9 4.2 2.0 2.5 2.8 3.2 1.9 2.2 3.0 3.3   Notes HM 3  No billing HM 4  No billing HM 5  No billing HM-1  Bill  Dec'd GLV  HM 2  No billing  1x hold  HM 3   no billing HM 4  No billing HM 1  Bill today HM 2  No billing HM 3  No bill HM 4  No bill HM5  No bill     Date 7/18 7/25 08/01/23 8/8 8/15/23 8/23/23 08/29/23 09/05/23 9/12/23 9/19/23 9/26/23 10/3   Total WeeklyDose 37.5 mg 35 mg 37.5mg 40 mg 37.5 mg 37.5 mg 37.5 mg 37.5 mg 37.5 mg 37.5 mg 37.5 mg 37.5 mg   INR 3.3 1.9 1.9 2.3 2.6 2.1 2.4 2.3 2.7 2.5 2.7 2.0   Notes HM 1  Bill today HM 2  No billing HM 3  No billing  1  x boost HM 4 no billing  HM 1 - BILL today  HM 2 - no bill  HM 3- no billing HM 4- no billing HM 1 - BILL today  HM 2 - NO bill  HM 3 - no bill  Hm 4- no bill       Date 10/10 10/18 10/24 10/31 11/07 11/14/23 11/21 11/28 12/05/23   Total Weekly Dose 37.5mg 37.5 mg 37.5mg 42.5 mg 37.5 mg 37.5 mg 37.5 mg 37.5 mg 35 mg   INR 1.6 2.0 1.5 2.7 2.1 2.5 2.3 4.2 1.9   Notes Hm- 1 bill today  Missed dose?  Inc GLV HM 2- No bill  Hm- 3 no bill Hm 4 no billing   Boost dose  HM 1 billing today HM 2 - no bill  HM 3 no billing Hm 4 no billing HM 1 billing day  1x red     Date 12/12              Total WeeklyDose 37.5 mg              INR 1.8              Notes HM-2 no billing               Takes warfarin in the am    Clinic Interview:  Verbal Release Authorization signed on 8/22/2018 -- may speak with Raeann Montemayor (Wife). [phone] (Home) *Preferred*  Tablet Strength: pt has 7.5mg and 2.5mg tablets  Estimated oop cost for  [facility] HM: Patient has [employer] Medicare so they should have minimal to no OOP costs.                  Patient Findings  Negatives: Signs/symptoms of thrombosis, Signs/symptoms of bleeding, Laboratory test error suspected, Change in health, Change in alcohol use, Change in activity, Upcoming invasive procedure, Emergency department visit, Upcoming dental procedure, Missed doses, Extra doses, Change in medications, Change in diet/appetite, Hospital admission, Bruising, Other complaints   Comments: All findings negative per pt.     Plan:   1. INR SUBtherapeutic today 12/12/23 at 1.8 (goal 2.0-3.0). Instructed  Mr. Montemayor to BOOST today's warfarin to 7.5 mg and otherwise to resume warfarin 5 mg oral daily except 7.5 mg on Fridays until recheck.   2. Recheck INR in one week 12/19/23  3. Verbal information provided over the telephone. Mr. Montemayor expresses understanding with teach back and has no further questions at this time.     Home monitor information below:  Test strips on hand: 2  12/05/23 - sending  more  Lot : [id number]  Barcode : [id number] Exp : 1/25  Serial number: [id number]   Supplies billing code used: last 12/5/23  Next must be on or after  1/2/2024  Transfer Tube Lot number: Lot : [id number]  Safety Lancets:  Lot : [id number]   Billing:  *bill must be 4 tests 28 days or more from HM visit  PLEASE REFER TO ANTICOAGULATION TRAINING POWERPOINT FOR BILLING. Needs to use in clinic remote appointment for encounter.    Peng Melgar, PharmD  12/12/2023.  09:28 EST

## 2023-12-19 ENCOUNTER — ANTICOAGULATION VISIT (OUTPATIENT)
Dept: PHARMACY | Facility: HOSPITAL | Age: 78
End: 2023-12-19
Payer: MEDICARE

## 2023-12-19 LAB — INR PPP: 2.2

## 2023-12-19 NOTE — PROGRESS NOTES
Anticoagulation Clinic Progress Note   Oswaldo HOMEMONITOR: [employer] Serial # : [id number]     Indication: afib  Referring Provider: Gale  Initial Warfarin Start Date: 2007  Goal INR: 2-3  Current Drug Interactions:   [id number]: 5 (HTN, Age, CHF, CAD)   Other: anemic, CKD (stage 4) May need bridge with Lovenox given persistent AF (per cardio 1/24/2019), Scr was 3.22 on 11/24/21    Diet: eats salads 2x weekly 5/11/2023     Anticoagulation Clinic INR History:   Date 10/30 11/20 12/17 1/14/20 2/11 3/11 4/8 5/6 6/11 7/9 8/6 9/3 9/14 9/30   Total Weekly Dose 32.5 mg 32.5 mg 32.5 mg 32.5 mg 32.5mg 32.5mg 32.5mg 32.5mg 32.5mg 32.5 mg 32.5 mg 32.5mg 32.5mg 26.25 mg   INR 2.3 2.3 2.1 1.9 2.2 2.1 2.1 2.2 2.2 2.3 2.7 1.6 2.2  1.8   Notes                       ??   5 day hold     Date 10/14 11/4 11/11 11/25 12/9 12/16 12/30 1/13 2/3 3/3 3/31 4/28 5/26 6/23   Total Weekly Dose 32.5 mg 32.5mg 35mg 32.5mg 33.75 mg 36.25 mg 33.75 mg 35mg 33.75 33.75mg 33.75mg 33.75 mg 33.75 mg 33.75mg   INR 2.0 1.6 2.1 1.8 1.8 2.5 2.0 3.0 2.1 2.4 2.3 3.0 2.1 2.2   Notes   1x boost GLV boostx1 boostx1             Date 7/21 9/1 10/13 11/10 12/1 12/15 12/29 1/12/22 1/26 2/9 2/16 2/23 3/2 3/16   Total Weekly Dose 33.75mg 33.75mg 33.75mg 33.75mg 33.75 mg 32.5mg 30mg 27.5mg 28.75 mg 30 mg 31.25 mg 31.25 mg 31.25 mg 31.25 mg   INR 2.9 2.5 3.1 3.1 3.2 3.4 3.4 1.9 1.7 1.8 2.1 2.6 2.9 2.9   Notes        redx1 ?  inc        Date 3/30 4/13 5/11 6/8  6/23 7/7 7/28  8/9 8/12 8/16   Total WeeklyDose 31.25mg 31.25 mg 31.25 mg 31.25mg Start dialysis 30mg 30mg 30 mg  33.75mg 37.5mg 37.5mg   INR 2.5 2.2 2.6 3.9  2.1 2.1 1.8 1.23 1.4 1.5 2.1   Notes    Inc torsemide Cath placement    AV fistula placement    HM train  1 no billing       Date 8/23 8/30 9/6 9/13 9/20 9/30 10/4 10/11 10/18 10/25 11/8 11/15   Total WeeklyDose 31.25 mg 32.5 mg 32.5 mg 32.5 mg 32.5 mg 32.5 mg 35 mg 35 mg 35 mg 32.5 mg 26.5 mg 35 mg   INR 1.9 2.0 2.0 2.5 2.7 1.6 1.7 2.8 2.4 1.7  1.4 1.6   Notes HM2 no billing HM3  No billing HM4  No billing HM 1  Bill today HM 2 No billing HM 3 No bill HM 4  No billing 1x boost  HM 5 no bill day Bill day  HM1 HM- 2 no billing   HM 3- no billing 1x boost  HM 4 - no billing      Date 11/22 11/29 12/6 12/13 12/20 12/27 1/3/23 1/10 1/12 1/17 1/26/23 1/31/23   Total WeeklyDose 37.5 mg 37.5 mg 37.5 mg 37.5 mg 42.5 mg  40 mg 37.5 mg 40 mg 42.5mg 45 mg 45 mg 40 mg   INR 1.9 2.8 1.9 1.5 2.3 3.4 1.4 0.8 2.7 POCT  2.23   2.8 HM 3.9 1.9 2.2   Notes HM1- bill day HM 2 - NO bill HM 3 - no bill  Inc GLV Hm 4 - NO BILL HM 1 - Bill today HM 2   no billing HM 3- no billing HM 4  No billing lab error?? clinic HM 1  Bill today HM 2 - no bill  HM 3 - no bill      Date 2/7 2/14 2/21/23 2/28/23 3/7 3/14/23 3/21/23 3/28/23 4/4 4/11 4/18 4/26   Total WeeklyDose 40 mg 40 mg 40 mg 37.5 mg 40 mg 40 mg 40 mg 40 mg 37.5 mg 37.5 mg 37.5 mg  40 mg   INR 2.3 2.2 3.9 1.6 3.7 ??? 2.5 2.7 3.2 2.8 2.5 1.7 1.7   Notes HM 4 rec 2/15  HM 1  Bill today HM 2 - no billing  1xred  HM 3 - NO BILL  HM-4 no bill, percocet HM 5 - no billing until 3/15 HM 1 - BILL today  HM 2 - no Billing HM 3  No billing HM 4  No billing HM 1-  BILL today  HM 2  No billing     Date 5/2 5/5 5/11 5/16 5/22 5/30 6/6 6/14 6/20 6/27 7/5 7/11   Total WeeklyDose 42.5 mg 37.5 mg 42.5mg 42.5 mg  35 mg 40 mg 40 mg 40 mg 37.5 mg 40 mg 40 mg 40 mg   INR 3.5 1.8 2.9 4.2 2.0 2.5 2.8 3.2 1.9 2.2 3.0 3.3   Notes HM 3  No billing HM 4  No billing HM 5  No billing HM-1  Bill  Dec'd GLV  HM 2  No billing  1x hold  HM 3   no billing HM 4  No billing HM 1  Bill today HM 2  No billing HM 3  No bill HM 4  No bill HM5  No bill     Date 7/18 7/25 08/01/23 8/8 8/15/23 8/23/23 08/29/23 09/05/23 9/12/23 9/19/23 9/26/23 10/3   Total WeeklyDose 37.5 mg 35 mg 37.5mg 40 mg 37.5 mg 37.5 mg 37.5 mg 37.5 mg 37.5 mg 37.5 mg 37.5 mg 37.5 mg   INR 3.3 1.9 1.9 2.3 2.6 2.1 2.4 2.3 2.7 2.5 2.7 2.0   Notes HM 1  Bill today HM 2  No billing HM 3  No billing  1  x boost HM 4 no billing  HM 1 - BILL today  HM 2 - no bill  HM 3- no billing HM 4- no billing HM 1 - BILL today  HM 2 - NO bill  HM 3 - no bill  Hm 4- no bill       Date 10/10 10/18 10/24 10/31 11/07 11/14/23 11/21 11/28 12/05/23   Total Weekly Dose 37.5mg 37.5 mg 37.5mg 42.5 mg 37.5 mg 37.5 mg 37.5 mg 37.5 mg 35 mg   INR 1.6 2.0 1.5 2.7 2.1 2.5 2.3 4.2 1.9   Notes Hm- 1 bill today  Missed dose?  Inc GLV HM 2- No bill  Hm- 3 no bill Hm 4 no billing   Boost dose  HM 1 billing today HM 2 - no bill  HM 3 no billing Hm 4 no billing HM 1 billing day  1x red     Date 12/12 12/19/23             Total WeeklyDose 37.5 mg 40 mg             INR 1.8 2.2             Notes HM-2 no billing HM 3 - no bill               Takes warfarin in the am    Clinic Interview:  Verbal Release Authorization signed on 8/22/2018 -- may speak with Raeann Montemayor (Wife). [phone] (Home) *Preferred*  Tablet Strength: pt has 7.5mg and 2.5mg tablets  Estimated oop cost for [facility] HM: Patient has [employer] Medicare so they should have minimal to no OOP costs.                  Patient Findings  Negatives: Signs/symptoms of thrombosis, Signs/symptoms of bleeding, Laboratory test error suspected, Change in health, Change in alcohol use, Change in activity, Upcoming invasive procedure, Emergency department visit, Upcoming dental procedure, Missed doses, Extra doses, Change in medications, Change in diet/appetite, Hospital admission, Bruising, Other complaints   Comments: All findings negative per pt.     Plan:   1. INR is therapeutic today 12/19/23 at 2.2 (goal 2.0-3.0). Instructed  Mr. Montemayor to continue warfarin 5 mg oral daily except 7.5 mg on Fridays until recheck.   2. Recheck INR in one week 12/26/23  3. Verbal information provided over the telephone. Mr. Montemayor expresses understanding with teach back and has no further questions at this time.     Home monitor information below:  Test strips on hand: 6  12/05/23 - sending more  Lot : [id number]   Barcode : [id number] Exp : 1/25  Serial number: [id number]   Supplies billing code used: last 12/5/23  Next must be on or after  1/2/2024  Transfer Tube Lot number: Lot : [id number]  Safety Lancets:  Lot : [id number]   Billing:  *bill must be 4 tests 28 days or more from HM visit  PLEASE REFER TO ANTICOAGULATION TRAINING POWERPOINT FOR BILLING. Needs to use in clinic remote appointment for encounter.    Alexander Scherer, Pharmacy Technician  12/19/2023.  10:24 Zev ALMENDAREZ, [facility], have reviewed the note in full and agree with the assessment and plan.  12/19/23  15:02 EST

## 2023-12-26 ENCOUNTER — ANTICOAGULATION VISIT (OUTPATIENT)
Dept: PHARMACY | Facility: HOSPITAL | Age: 78
End: 2023-12-26
Payer: MEDICARE

## 2023-12-26 LAB — INR PPP: 2.1

## 2023-12-26 NOTE — PROGRESS NOTES
Anticoagulation Clinic Progress Note   Oswaldo HOMEMONITOR: [employer] Serial # : [id number]     Indication: afib  Referring Provider: Gale  Initial Warfarin Start Date: 2007  Goal INR: 2-3  Current Drug Interactions:   [id number]: 5 (HTN, Age, CHF, CAD)   Other: anemic, CKD (stage 4) May need bridge with Lovenox given persistent AF (per cardio 1/24/2019), Scr was 3.22 on 11/24/21    Diet: eats salads 2x weekly 5/11/2023     Anticoagulation Clinic INR History:   Date 10/30 11/20 12/17 1/14/20 2/11 3/11 4/8 5/6 6/11 7/9 8/6 9/3 9/14 9/30   Total Weekly Dose 32.5 mg 32.5 mg 32.5 mg 32.5 mg 32.5mg 32.5mg 32.5mg 32.5mg 32.5mg 32.5 mg 32.5 mg 32.5mg 32.5mg 26.25 mg   INR 2.3 2.3 2.1 1.9 2.2 2.1 2.1 2.2 2.2 2.3 2.7 1.6 2.2  1.8   Notes                       ??   5 day hold     Date 10/14 11/4 11/11 11/25 12/9 12/16 12/30 1/13 2/3 3/3 3/31 4/28 5/26 6/23   Total Weekly Dose 32.5 mg 32.5mg 35mg 32.5mg 33.75 mg 36.25 mg 33.75 mg 35mg 33.75 33.75mg 33.75mg 33.75 mg 33.75 mg 33.75mg   INR 2.0 1.6 2.1 1.8 1.8 2.5 2.0 3.0 2.1 2.4 2.3 3.0 2.1 2.2   Notes   1x boost GLV boostx1 boostx1             Date 7/21 9/1 10/13 11/10 12/1 12/15 12/29 1/12/22 1/26 2/9 2/16 2/23 3/2 3/16   Total Weekly Dose 33.75mg 33.75mg 33.75mg 33.75mg 33.75 mg 32.5mg 30mg 27.5mg 28.75 mg 30 mg 31.25 mg 31.25 mg 31.25 mg 31.25 mg   INR 2.9 2.5 3.1 3.1 3.2 3.4 3.4 1.9 1.7 1.8 2.1 2.6 2.9 2.9   Notes        redx1 ?  inc        Date 3/30 4/13 5/11 6/8  6/23 7/7 7/28  8/9 8/12 8/16   Total WeeklyDose 31.25mg 31.25 mg 31.25 mg 31.25mg Start dialysis 30mg 30mg 30 mg  33.75mg 37.5mg 37.5mg   INR 2.5 2.2 2.6 3.9  2.1 2.1 1.8 1.23 1.4 1.5 2.1   Notes    Inc torsemide Cath placement    AV fistula placement    HM train  1 no billing       Date 8/23 8/30 9/6 9/13 9/20 9/30 10/4 10/11 10/18 10/25 11/8 11/15   Total WeeklyDose 31.25 mg 32.5 mg 32.5 mg 32.5 mg 32.5 mg 32.5 mg 35 mg 35 mg 35 mg 32.5 mg 26.5 mg 35 mg   INR 1.9 2.0 2.0 2.5 2.7 1.6 1.7 2.8 2.4 1.7  1.4 1.6   Notes HM2 no billing HM3  No billing HM4  No billing HM 1  Bill today HM 2 No billing HM 3 No bill HM 4  No billing 1x boost  HM 5 no bill day Bill day  HM1 HM- 2 no billing   HM 3- no billing 1x boost  HM 4 - no billing      Date 11/22 11/29 12/6 12/13 12/20 12/27 1/3/23 1/10 1/12 1/17 1/26/23 1/31/23   Total WeeklyDose 37.5 mg 37.5 mg 37.5 mg 37.5 mg 42.5 mg  40 mg 37.5 mg 40 mg 42.5mg 45 mg 45 mg 40 mg   INR 1.9 2.8 1.9 1.5 2.3 3.4 1.4 0.8 2.7 POCT  2.23   2.8 HM 3.9 1.9 2.2   Notes HM1- bill day HM 2 - NO bill HM 3 - no bill  Inc GLV Hm 4 - NO BILL HM 1 - Bill today HM 2   no billing HM 3- no billing HM 4  No billing lab error?? clinic HM 1  Bill today HM 2 - no bill  HM 3 - no bill      Date 2/7 2/14 2/21/23 2/28/23 3/7 3/14/23 3/21/23 3/28/23 4/4 4/11 4/18 4/26   Total WeeklyDose 40 mg 40 mg 40 mg 37.5 mg 40 mg 40 mg 40 mg 40 mg 37.5 mg 37.5 mg 37.5 mg  40 mg   INR 2.3 2.2 3.9 1.6 3.7 ??? 2.5 2.7 3.2 2.8 2.5 1.7 1.7   Notes HM 4 rec 2/15  HM 1  Bill today HM 2 - no billing  1xred  HM 3 - NO BILL  HM-4 no bill, percocet HM 5 - no billing until 3/15 HM 1 - BILL today  HM 2 - no Billing HM 3  No billing HM 4  No billing HM 1-  BILL today  HM 2  No billing     Date 5/2 5/5 5/11 5/16 5/22 5/30 6/6 6/14 6/20 6/27 7/5 7/11   Total WeeklyDose 42.5 mg 37.5 mg 42.5mg 42.5 mg  35 mg 40 mg 40 mg 40 mg 37.5 mg 40 mg 40 mg 40 mg   INR 3.5 1.8 2.9 4.2 2.0 2.5 2.8 3.2 1.9 2.2 3.0 3.3   Notes HM 3  No billing HM 4  No billing HM 5  No billing HM-1  Bill  Dec'd GLV  HM 2  No billing  1x hold  HM 3   no billing HM 4  No billing HM 1  Bill today HM 2  No billing HM 3  No bill HM 4  No bill HM5  No bill     Date 7/18 7/25 08/01/23 8/8 8/15/23 8/23/23 08/29/23 09/05/23 9/12/23 9/19/23 9/26/23 10/3   Total WeeklyDose 37.5 mg 35 mg 37.5mg 40 mg 37.5 mg 37.5 mg 37.5 mg 37.5 mg 37.5 mg 37.5 mg 37.5 mg 37.5 mg   INR 3.3 1.9 1.9 2.3 2.6 2.1 2.4 2.3 2.7 2.5 2.7 2.0   Notes HM 1  Bill today HM 2  No billing HM 3  No billing  1  x boost HM 4 no billing  HM 1 - BILL today  HM 2 - no bill  HM 3- no billing HM 4- no billing HM 1 - BILL today  HM 2 - NO bill  HM 3 - no bill  Hm 4- no bill       Date 10/10 10/18 10/24 10/31 11/07 11/14/23 11/21 11/28 12/05/23   Total Weekly Dose 37.5mg 37.5 mg 37.5mg 42.5 mg 37.5 mg 37.5 mg 37.5 mg 37.5 mg 35 mg   INR 1.6 2.0 1.5 2.7 2.1 2.5 2.3 4.2 1.9   Notes Hm- 1 bill today  Missed dose?  Inc GLV HM 2- No bill  Hm- 3 no bill Hm 4 no billing   Boost dose  HM 1 billing today HM 2 - no bill  HM 3 no billing Hm 4 no billing HM 1 billing day  1x red     Date 12/12 12/19/23 12/26            Total WeeklyDose 37.5 mg 40 mg 37.5 mg            INR 1.8 2.2 2.1            Notes HM-2 no billing HM 3 - no bill  HM 4 no bill             Takes warfarin in the am    Clinic Interview:  Verbal Release Authorization signed on 8/22/2018 -- may speak with Raeann Montemayor (Wife). [phone] (Home) *Preferred*  Tablet Strength: pt has 7.5mg and 2.5mg tablets  Estimated oop cost for [facility] EDER HM: Patient has [employer] Medicare so they should have minimal to no OOP costs.                  Patient Findings    Negatives: Signs/symptoms of thrombosis, Signs/symptoms of bleeding, Laboratory test error suspected, Change in health, Change in alcohol use, Change in activity, Upcoming invasive procedure, Emergency department visit, Upcoming dental procedure, Missed doses, Extra doses, Change in medications, Change in diet/appetite, Hospital admission, Bruising, Other complaints   Comments: All findings negative per patient.       Plan:   1. INR is therapeutic today 12/26/23 at 2.1 (goal 2.0-3.0). Instructed  Mr. Montemayor to continue warfarin 5 mg oral daily except 7.5 mg on Fridays until recheck.   2. Recheck INR in one week 1/2/24  3. Verbal information provided over the telephone. Mr. Montemayor expresses understanding with teach back and has no further questions at this time.     Home monitor information below:  Test strips on hand: 3  12/26/23    Lot : [id number]  Barcode : [id number] Exp : 1/25  Serial number: [id number]   Supplies billing code used: last 12/5/23  Next must be on or after  1/2/2024  Transfer Tube Lot number: Lot : [id number]  Safety Lancets:  Lot : [id number]   Billing:  *bill must be 4 tests 28 days or more from HM visit  PLEASE REFER TO ANTICOAGULATION TRAINING POWERPOINT FOR BILLING. Needs to use in clinic remote appointment for encounter.    Isai Simmons   [facility]  12/26/2023 09:39 EST    I, Kyle Plata, PharmD, have reviewed the note in full and agree with the assessment and plan.  12/27/23  08:33 EST

## 2024-01-01 ENCOUNTER — APPOINTMENT (OUTPATIENT)
Dept: GENERAL RADIOLOGY | Facility: HOSPITAL | Age: 79
DRG: 870 | End: 2024-01-01
Payer: MEDICARE

## 2024-01-01 ENCOUNTER — APPOINTMENT (OUTPATIENT)
Dept: CT IMAGING | Facility: HOSPITAL | Age: 79
DRG: 870 | End: 2024-01-01
Payer: MEDICARE

## 2024-01-01 ENCOUNTER — APPOINTMENT (OUTPATIENT)
Dept: NEPHROLOGY | Facility: HOSPITAL | Age: 79
DRG: 870 | End: 2024-01-01
Payer: MEDICARE

## 2024-01-01 ENCOUNTER — APPOINTMENT (OUTPATIENT)
Dept: CARDIOLOGY | Facility: HOSPITAL | Age: 79
DRG: 870 | End: 2024-01-01
Payer: MEDICARE

## 2024-01-01 ENCOUNTER — APPOINTMENT (OUTPATIENT)
Dept: NEUROLOGY | Facility: HOSPITAL | Age: 79
DRG: 870 | End: 2024-01-01
Payer: MEDICARE

## 2024-01-01 ENCOUNTER — HOSPITAL ENCOUNTER (INPATIENT)
Facility: HOSPITAL | Age: 79
LOS: 20 days | DRG: 870 | End: 2024-12-02
Attending: EMERGENCY MEDICINE | Admitting: INTERNAL MEDICINE
Payer: MEDICARE

## 2024-01-01 VITALS
DIASTOLIC BLOOD PRESSURE: 69 MMHG | HEIGHT: 70 IN | TEMPERATURE: 96.5 F | WEIGHT: 300.93 LBS | OXYGEN SATURATION: 93 % | HEART RATE: 129 BPM | RESPIRATION RATE: 15 BRPM | BODY MASS INDEX: 43.08 KG/M2 | SYSTOLIC BLOOD PRESSURE: 100 MMHG

## 2024-01-01 DIAGNOSIS — J12.82 PNEUMONIA DUE TO COVID-19 VIRUS: Primary | ICD-10-CM

## 2024-01-01 DIAGNOSIS — N18.6 ESRD (END STAGE RENAL DISEASE): ICD-10-CM

## 2024-01-01 DIAGNOSIS — J96.01 ACUTE RESPIRATORY FAILURE WITH HYPOXIA: ICD-10-CM

## 2024-01-01 DIAGNOSIS — U07.1 PNEUMONIA DUE TO COVID-19 VIRUS: Primary | ICD-10-CM

## 2024-01-01 LAB
25(OH)D3 SERPL-MCNC: 52.4 NG/ML (ref 30–100)
ABO GROUP BLD: NORMAL
ABO GROUP BLD: NORMAL
ADV 40+41 DNA STL QL NAA+NON-PROBE: NOT DETECTED
ALBUMIN SERPL-MCNC: 2.9 G/DL (ref 3.5–5.2)
ALBUMIN SERPL-MCNC: 3 G/DL (ref 3.5–5.2)
ALBUMIN SERPL-MCNC: 3.1 G/DL (ref 3.5–5.2)
ALBUMIN SERPL-MCNC: 3.2 G/DL (ref 3.5–5.2)
ALBUMIN SERPL-MCNC: 3.3 G/DL (ref 3.5–5.2)
ALBUMIN SERPL-MCNC: 3.3 G/DL (ref 3.5–5.2)
ALBUMIN SERPL-MCNC: 3.4 G/DL (ref 3.5–5.2)
ALBUMIN SERPL-MCNC: 3.4 G/DL (ref 3.5–5.2)
ALBUMIN SERPL-MCNC: 3.5 G/DL (ref 3.5–5.2)
ALBUMIN SERPL-MCNC: 3.6 G/DL (ref 3.5–5.2)
ALBUMIN SERPL-MCNC: 3.6 G/DL (ref 3.5–5.2)
ALBUMIN SERPL-MCNC: 3.7 G/DL (ref 3.5–5.2)
ALBUMIN SERPL-MCNC: 3.8 G/DL (ref 3.5–5.2)
ALBUMIN/GLOB SERPL: 0.9 G/DL
ALBUMIN/GLOB SERPL: 0.9 G/DL
ALBUMIN/GLOB SERPL: 1 G/DL
ALBUMIN/GLOB SERPL: 1.1 G/DL
ALBUMIN/GLOB SERPL: 1.1 G/DL
ALBUMIN/GLOB SERPL: 1.2 G/DL
ALBUMIN/GLOB SERPL: 1.2 G/DL
ALBUMIN/GLOB SERPL: 1.4 G/DL
ALBUMIN/GLOB SERPL: 1.5 G/DL
ALBUMIN/GLOB SERPL: 1.5 G/DL
ALBUMIN/GLOB SERPL: 1.6 G/DL
ALBUMIN/GLOB SERPL: 1.9 G/DL
ALP SERPL-CCNC: 107 U/L (ref 39–117)
ALP SERPL-CCNC: 108 U/L (ref 39–117)
ALP SERPL-CCNC: 109 U/L (ref 39–117)
ALP SERPL-CCNC: 121 U/L (ref 39–117)
ALP SERPL-CCNC: 123 U/L (ref 39–117)
ALP SERPL-CCNC: 72 U/L (ref 39–117)
ALP SERPL-CCNC: 73 U/L (ref 39–117)
ALP SERPL-CCNC: 73 U/L (ref 39–117)
ALP SERPL-CCNC: 79 U/L (ref 39–117)
ALP SERPL-CCNC: 81 U/L (ref 39–117)
ALP SERPL-CCNC: 82 U/L (ref 39–117)
ALP SERPL-CCNC: 83 U/L (ref 39–117)
ALP SERPL-CCNC: 96 U/L (ref 39–117)
ALT SERPL W P-5'-P-CCNC: 12 U/L (ref 1–41)
ALT SERPL W P-5'-P-CCNC: 15 U/L (ref 1–41)
ALT SERPL W P-5'-P-CCNC: 17 U/L (ref 1–41)
ALT SERPL W P-5'-P-CCNC: 24 U/L (ref 1–41)
ALT SERPL W P-5'-P-CCNC: 5 U/L (ref 1–41)
ALT SERPL W P-5'-P-CCNC: 6 U/L (ref 1–41)
ALT SERPL W P-5'-P-CCNC: 7 U/L (ref 1–41)
ALT SERPL W P-5'-P-CCNC: 8 U/L (ref 1–41)
ALT SERPL W P-5'-P-CCNC: 8 U/L (ref 1–41)
AMMONIA BLD-SCNC: 15 UMOL/L (ref 16–60)
AMMONIA BLD-SCNC: 46 UMOL/L (ref 16–60)
ANION GAP SERPL CALCULATED.3IONS-SCNC: 12 MMOL/L (ref 5–15)
ANION GAP SERPL CALCULATED.3IONS-SCNC: 14 MMOL/L (ref 5–15)
ANION GAP SERPL CALCULATED.3IONS-SCNC: 15 MMOL/L (ref 5–15)
ANION GAP SERPL CALCULATED.3IONS-SCNC: 16 MMOL/L (ref 5–15)
ANION GAP SERPL CALCULATED.3IONS-SCNC: 17 MMOL/L (ref 5–15)
ANION GAP SERPL CALCULATED.3IONS-SCNC: 18 MMOL/L (ref 5–15)
ANION GAP SERPL CALCULATED.3IONS-SCNC: 19 MMOL/L (ref 5–15)
ANION GAP SERPL CALCULATED.3IONS-SCNC: 20 MMOL/L (ref 5–15)
ANION GAP SERPL CALCULATED.3IONS-SCNC: 21 MMOL/L (ref 5–15)
ANION GAP SERPL CALCULATED.3IONS-SCNC: 28 MMOL/L (ref 5–15)
ANISOCYTOSIS BLD QL: NORMAL
ARTERIAL PATENCY WRIST A: ABNORMAL
ARTERIAL PATENCY WRIST A: POSITIVE
ASCENDING AORTA: 3.8 CM
AST SERPL-CCNC: 17 U/L (ref 1–40)
AST SERPL-CCNC: 19 U/L (ref 1–40)
AST SERPL-CCNC: 19 U/L (ref 1–40)
AST SERPL-CCNC: 21 U/L (ref 1–40)
AST SERPL-CCNC: 21 U/L (ref 1–40)
AST SERPL-CCNC: 22 U/L (ref 1–40)
AST SERPL-CCNC: 24 U/L (ref 1–40)
AST SERPL-CCNC: 25 U/L (ref 1–40)
AST SERPL-CCNC: 26 U/L (ref 1–40)
AST SERPL-CCNC: 30 U/L (ref 1–40)
AST SERPL-CCNC: 31 U/L (ref 1–40)
AST SERPL-CCNC: 35 U/L (ref 1–40)
AST SERPL-CCNC: 89 U/L (ref 1–40)
ASTRO TYP 1-8 RNA STL QL NAA+NON-PROBE: NOT DETECTED
ATMOSPHERIC PRESS: ABNORMAL MM[HG]
BACTERIA SPEC AEROBE CULT: NORMAL
BACTERIA SPEC AEROBE CULT: NORMAL
BACTERIA SPEC RESP CULT: NORMAL
BACTERIA SPEC RESP CULT: NORMAL
BASE EXCESS BLDA CALC-SCNC: -0.1 MMOL/L (ref 0–2)
BASE EXCESS BLDA CALC-SCNC: -0.2 MMOL/L (ref 0–2)
BASE EXCESS BLDA CALC-SCNC: -1.5 MMOL/L (ref 0–2)
BASE EXCESS BLDA CALC-SCNC: -1.7 MMOL/L (ref 0–2)
BASE EXCESS BLDA CALC-SCNC: -2.4 MMOL/L (ref 0–2)
BASE EXCESS BLDA CALC-SCNC: -2.9 MMOL/L (ref 0–2)
BASE EXCESS BLDA CALC-SCNC: -2.9 MMOL/L (ref 0–2)
BASE EXCESS BLDA CALC-SCNC: -5.4 MMOL/L (ref 0–2)
BASE EXCESS BLDA CALC-SCNC: -5.9 MMOL/L (ref 0–2)
BASE EXCESS BLDA CALC-SCNC: -6.2 MMOL/L (ref 0–2)
BASE EXCESS BLDA CALC-SCNC: -7.8 MMOL/L (ref 0–2)
BASE EXCESS BLDA CALC-SCNC: -7.9 MMOL/L (ref 0–2)
BASE EXCESS BLDA CALC-SCNC: 0 MMOL/L (ref 0–2)
BASE EXCESS BLDA CALC-SCNC: 0.5 MMOL/L (ref 0–2)
BASE EXCESS BLDA CALC-SCNC: 2 MMOL/L (ref 0–2)
BASE EXCESS BLDA CALC-SCNC: 3.2 MMOL/L (ref 0–2)
BASE EXCESS BLDA CALC-SCNC: 5.4 MMOL/L (ref 0–2)
BASE EXCESS BLDA CALC-SCNC: 5.7 MMOL/L (ref 0–2)
BASE EXCESS BLDV CALC-SCNC: -8.1 MMOL/L (ref -2–2)
BASOPHILS # BLD AUTO: 0 10*3/MM3 (ref 0–0.2)
BASOPHILS # BLD AUTO: 0 10*3/MM3 (ref 0–0.2)
BASOPHILS # BLD AUTO: 0.01 10*3/MM3 (ref 0–0.2)
BASOPHILS # BLD AUTO: 0.02 10*3/MM3 (ref 0–0.2)
BASOPHILS # BLD AUTO: 0.03 10*3/MM3 (ref 0–0.2)
BASOPHILS # BLD AUTO: 0.04 10*3/MM3 (ref 0–0.2)
BASOPHILS # BLD AUTO: 0.05 10*3/MM3 (ref 0–0.2)
BASOPHILS NFR BLD AUTO: 0 % (ref 0–1.5)
BASOPHILS NFR BLD AUTO: 0 % (ref 0–1.5)
BASOPHILS NFR BLD AUTO: 0.1 % (ref 0–1.5)
BASOPHILS NFR BLD AUTO: 0.2 % (ref 0–1.5)
BASOPHILS NFR BLD AUTO: 0.3 % (ref 0–1.5)
BASOPHILS NFR BLD AUTO: 0.4 % (ref 0–1.5)
BASOPHILS NFR BLD AUTO: 0.5 % (ref 0–1.5)
BASOPHILS NFR BLD AUTO: 0.6 % (ref 0–1.5)
BDY SITE: ABNORMAL
BH BB BLOOD EXPIRATION DATE: NORMAL
BH BB BLOOD TYPE BARCODE: 600
BH BB BLOOD TYPE BARCODE: 6200
BH BB BLOOD TYPE BARCODE: 6200
BH BB DISPENSE STATUS: NORMAL
BH BB PRODUCT CODE: NORMAL
BH BB UNIT NUMBER: NORMAL
BH CV ECHO MEAS - AO MAX PG: 19 MMHG
BH CV ECHO MEAS - AO MEAN PG: 10 MMHG
BH CV ECHO MEAS - AO ROOT DIAM: 3.7 CM
BH CV ECHO MEAS - AO V2 MAX: 216.8 CM/SEC
BH CV ECHO MEAS - AO V2 VTI: 34.8 CM
BH CV ECHO MEAS - AVA(I,D): 1.44 CM2
BH CV ECHO MEAS - EDV(CUBED): 166.4 ML
BH CV ECHO MEAS - EDV(MOD-SP2): 82 ML
BH CV ECHO MEAS - EF(MOD-SP2): 62.3 %
BH CV ECHO MEAS - ESV(CUBED): 35.9 ML
BH CV ECHO MEAS - ESV(MOD-SP2): 30.9 ML
BH CV ECHO MEAS - FS: 40 %
BH CV ECHO MEAS - IVS/LVPW: 0.92 CM
BH CV ECHO MEAS - IVSD: 1.2 CM
BH CV ECHO MEAS - LA DIMENSION: 4.3 CM
BH CV ECHO MEAS - LAT PEAK E' VEL: 14 CM/SEC
BH CV ECHO MEAS - LV MASS(C)D: 288.2 GRAMS
BH CV ECHO MEAS - LV MAX PG: 3.8 MMHG
BH CV ECHO MEAS - LV MEAN PG: 2 MMHG
BH CV ECHO MEAS - LV V1 MAX: 96.9 CM/SEC
BH CV ECHO MEAS - LV V1 VTI: 17.6 CM
BH CV ECHO MEAS - LVIDD: 5.5 CM
BH CV ECHO MEAS - LVIDS: 3.3 CM
BH CV ECHO MEAS - LVOT AREA: 2.8 CM2
BH CV ECHO MEAS - LVOT DIAM: 1.9 CM
BH CV ECHO MEAS - LVPWD: 1.3 CM
BH CV ECHO MEAS - MED PEAK E' VEL: 7 CM/SEC
BH CV ECHO MEAS - MV DEC SLOPE: 608.3 CM/SEC2
BH CV ECHO MEAS - MV DEC TIME: 0.17 SEC
BH CV ECHO MEAS - MV E MAX VEL: 103.2 CM/SEC
BH CV ECHO MEAS - MV MAX PG: 3.9 MMHG
BH CV ECHO MEAS - MV MEAN PG: 1 MMHG
BH CV ECHO MEAS - MV V2 VTI: 20 CM
BH CV ECHO MEAS - MVA(VTI): 2.5 CM2
BH CV ECHO MEAS - PA ACC TIME: 0.09 SEC
BH CV ECHO MEAS - PA V2 MAX: 165 CM/SEC
BH CV ECHO MEAS - RAP SYSTOLE: 3 MMHG
BH CV ECHO MEAS - RVSP: 23 MMHG
BH CV ECHO MEAS - SV(LVOT): 49.9 ML
BH CV ECHO MEAS - SV(MOD-SP2): 51.1 ML
BH CV ECHO MEAS - SVI(LVOT): 20.8 ML/M2
BH CV ECHO MEAS - SVI(MOD-SP2): 21.3 ML/M2
BH CV ECHO MEAS - TR MAX PG: 20.4 MMHG
BH CV ECHO MEAS - TR MAX VEL: 226 CM/SEC
BH CV ECHO MEASUREMENTS AVERAGE E/E' RATIO: 9.83
BH CV XLRA - RV BASE: 4 CM
BH CV XLRA - RV LENGTH: 9.2 CM
BH CV XLRA - RV MID: 3.4 CM
BH CV XLRA - TDI S': 11.9 CM/SEC
BILIRUB SERPL-MCNC: 0.2 MG/DL (ref 0–1.2)
BILIRUB SERPL-MCNC: 0.3 MG/DL (ref 0–1.2)
BILIRUB SERPL-MCNC: 0.6 MG/DL (ref 0–1.2)
BILIRUB SERPL-MCNC: 0.7 MG/DL (ref 0–1.2)
BILIRUB SERPL-MCNC: 0.7 MG/DL (ref 0–1.2)
BILIRUB SERPL-MCNC: 0.9 MG/DL (ref 0–1.2)
BILIRUB SERPL-MCNC: 0.9 MG/DL (ref 0–1.2)
BILIRUB SERPL-MCNC: 1.1 MG/DL (ref 0–1.2)
BILIRUB SERPL-MCNC: 1.3 MG/DL (ref 0–1.2)
BLD GP AB SCN SERPL QL: NEGATIVE
BLD GP AB SCN SERPL QL: NEGATIVE
BODY TEMPERATURE: 31
BODY TEMPERATURE: 37
BUN SERPL-MCNC: 112 MG/DL (ref 8–23)
BUN SERPL-MCNC: 22 MG/DL (ref 8–23)
BUN SERPL-MCNC: 31 MG/DL (ref 8–23)
BUN SERPL-MCNC: 40 MG/DL (ref 8–23)
BUN SERPL-MCNC: 42 MG/DL (ref 8–23)
BUN SERPL-MCNC: 45 MG/DL (ref 8–23)
BUN SERPL-MCNC: 47 MG/DL (ref 8–23)
BUN SERPL-MCNC: 48 MG/DL (ref 8–23)
BUN SERPL-MCNC: 57 MG/DL (ref 8–23)
BUN SERPL-MCNC: 58 MG/DL (ref 8–23)
BUN SERPL-MCNC: 59 MG/DL (ref 8–23)
BUN SERPL-MCNC: 61 MG/DL (ref 8–23)
BUN SERPL-MCNC: 68 MG/DL (ref 8–23)
BUN SERPL-MCNC: 71 MG/DL (ref 8–23)
BUN SERPL-MCNC: 71 MG/DL (ref 8–23)
BUN SERPL-MCNC: 72 MG/DL (ref 8–23)
BUN SERPL-MCNC: 72 MG/DL (ref 8–23)
BUN SERPL-MCNC: 76 MG/DL (ref 8–23)
BUN SERPL-MCNC: 84 MG/DL (ref 8–23)
BUN SERPL-MCNC: 86 MG/DL (ref 8–23)
BUN SERPL-MCNC: 86 MG/DL (ref 8–23)
BUN SERPL-MCNC: 89 MG/DL (ref 8–23)
BUN SERPL-MCNC: 97 MG/DL (ref 8–23)
BUN SERPL-MCNC: 98 MG/DL (ref 8–23)
BUN/CREAT SERPL: 10.3 (ref 7–25)
BUN/CREAT SERPL: 10.5 (ref 7–25)
BUN/CREAT SERPL: 10.5 (ref 7–25)
BUN/CREAT SERPL: 10.8 (ref 7–25)
BUN/CREAT SERPL: 11 (ref 7–25)
BUN/CREAT SERPL: 11.4 (ref 7–25)
BUN/CREAT SERPL: 13.2 (ref 7–25)
BUN/CREAT SERPL: 14.3 (ref 7–25)
BUN/CREAT SERPL: 14.4 (ref 7–25)
BUN/CREAT SERPL: 14.5 (ref 7–25)
BUN/CREAT SERPL: 15.8 (ref 7–25)
BUN/CREAT SERPL: 16 (ref 7–25)
BUN/CREAT SERPL: 3.9 (ref 7–25)
BUN/CREAT SERPL: 4.5 (ref 7–25)
BUN/CREAT SERPL: 7.4 (ref 7–25)
BUN/CREAT SERPL: 7.4 (ref 7–25)
BUN/CREAT SERPL: 8.7 (ref 7–25)
BUN/CREAT SERPL: 8.9 (ref 7–25)
BUN/CREAT SERPL: 8.9 (ref 7–25)
BUN/CREAT SERPL: 9.1 (ref 7–25)
BUN/CREAT SERPL: 9.2 (ref 7–25)
BUN/CREAT SERPL: 9.5 (ref 7–25)
BUN/CREAT SERPL: 9.7 (ref 7–25)
BUN/CREAT SERPL: 9.8 (ref 7–25)
C CAYETANENSIS DNA STL QL NAA+NON-PROBE: NOT DETECTED
C COLI+JEJ+UPSA DNA STL QL NAA+NON-PROBE: NOT DETECTED
CA-I SERPL ISE-MCNC: 1.08 MMOL/L (ref 1.15–1.3)
CALCIUM SPEC-SCNC: 10.2 MG/DL (ref 8.6–10.5)
CALCIUM SPEC-SCNC: 7.6 MG/DL (ref 8.6–10.5)
CALCIUM SPEC-SCNC: 8 MG/DL (ref 8.6–10.5)
CALCIUM SPEC-SCNC: 8.1 MG/DL (ref 8.6–10.5)
CALCIUM SPEC-SCNC: 8.2 MG/DL (ref 8.6–10.5)
CALCIUM SPEC-SCNC: 8.3 MG/DL (ref 8.6–10.5)
CALCIUM SPEC-SCNC: 8.4 MG/DL (ref 8.6–10.5)
CALCIUM SPEC-SCNC: 8.6 MG/DL (ref 8.6–10.5)
CALCIUM SPEC-SCNC: 8.7 MG/DL (ref 8.6–10.5)
CALCIUM SPEC-SCNC: 8.8 MG/DL (ref 8.6–10.5)
CALCIUM SPEC-SCNC: 9.3 MG/DL (ref 8.6–10.5)
CALCIUM SPEC-SCNC: 9.4 MG/DL (ref 8.6–10.5)
CALCIUM SPEC-SCNC: 9.5 MG/DL (ref 8.6–10.5)
CALCIUM SPEC-SCNC: 9.6 MG/DL (ref 8.6–10.5)
CHLORIDE SERPL-SCNC: 100 MMOL/L (ref 98–107)
CHLORIDE SERPL-SCNC: 100 MMOL/L (ref 98–107)
CHLORIDE SERPL-SCNC: 101 MMOL/L (ref 98–107)
CHLORIDE SERPL-SCNC: 102 MMOL/L (ref 98–107)
CHLORIDE SERPL-SCNC: 102 MMOL/L (ref 98–107)
CHLORIDE SERPL-SCNC: 104 MMOL/L (ref 98–107)
CHLORIDE SERPL-SCNC: 92 MMOL/L (ref 98–107)
CHLORIDE SERPL-SCNC: 93 MMOL/L (ref 98–107)
CHLORIDE SERPL-SCNC: 93 MMOL/L (ref 98–107)
CHLORIDE SERPL-SCNC: 94 MMOL/L (ref 98–107)
CHLORIDE SERPL-SCNC: 94 MMOL/L (ref 98–107)
CHLORIDE SERPL-SCNC: 95 MMOL/L (ref 98–107)
CHLORIDE SERPL-SCNC: 95 MMOL/L (ref 98–107)
CHLORIDE SERPL-SCNC: 96 MMOL/L (ref 98–107)
CHLORIDE SERPL-SCNC: 97 MMOL/L (ref 98–107)
CHLORIDE SERPL-SCNC: 98 MMOL/L (ref 98–107)
CHLORIDE SERPL-SCNC: 99 MMOL/L (ref 98–107)
CHOLEST SERPL-MCNC: 64 MG/DL (ref 0–200)
CHOLEST SERPL-MCNC: 69 MG/DL (ref 0–200)
CK SERPL-CCNC: 127 U/L (ref 20–200)
CO2 BLDA-SCNC: 20.3 MMOL/L (ref 22–33)
CO2 BLDA-SCNC: 20.6 MMOL/L (ref 22–33)
CO2 BLDA-SCNC: 20.8 MMOL/L (ref 22–33)
CO2 BLDA-SCNC: 22.6 MMOL/L (ref 22–33)
CO2 BLDA-SCNC: 22.9 MMOL/L (ref 22–33)
CO2 BLDA-SCNC: 23.6 MMOL/L (ref 22–33)
CO2 BLDA-SCNC: 23.6 MMOL/L (ref 22–33)
CO2 BLDA-SCNC: 24 MMOL/L (ref 22–33)
CO2 BLDA-SCNC: 24.5 MMOL/L (ref 22–33)
CO2 BLDA-SCNC: 24.6 MMOL/L (ref 22–33)
CO2 BLDA-SCNC: 24.8 MMOL/L (ref 22–33)
CO2 BLDA-SCNC: 25.5 MMOL/L (ref 22–33)
CO2 BLDA-SCNC: 25.9 MMOL/L (ref 22–33)
CO2 BLDA-SCNC: 26.1 MMOL/L (ref 22–33)
CO2 BLDA-SCNC: 26.5 MMOL/L (ref 22–33)
CO2 BLDA-SCNC: 26.6 MMOL/L (ref 22–33)
CO2 BLDA-SCNC: 26.8 MMOL/L (ref 22–33)
CO2 BLDA-SCNC: 30.6 MMOL/L (ref 22–33)
CO2 BLDA-SCNC: 33.3 MMOL/L (ref 22–33)
CO2 SERPL-SCNC: 15 MMOL/L (ref 22–29)
CO2 SERPL-SCNC: 18 MMOL/L (ref 22–29)
CO2 SERPL-SCNC: 19 MMOL/L (ref 22–29)
CO2 SERPL-SCNC: 21 MMOL/L (ref 22–29)
CO2 SERPL-SCNC: 22 MMOL/L (ref 22–29)
CO2 SERPL-SCNC: 23 MMOL/L (ref 22–29)
CO2 SERPL-SCNC: 24 MMOL/L (ref 22–29)
CO2 SERPL-SCNC: 26 MMOL/L (ref 22–29)
CO2 SERPL-SCNC: 27 MMOL/L (ref 22–29)
CO2 SERPL-SCNC: 30 MMOL/L (ref 22–29)
COHGB MFR BLD: 0.2 % (ref 0–2)
COHGB MFR BLD: 0.5 %
COHGB MFR BLD: 0.5 % (ref 0–2)
COHGB MFR BLD: 0.5 % (ref 0–2)
COHGB MFR BLD: 0.7 % (ref 0–2)
COHGB MFR BLD: 0.9 % (ref 0–2)
COHGB MFR BLD: 0.9 % (ref 0–2)
COHGB MFR BLD: 1 % (ref 0–2)
COHGB MFR BLD: 1.2 % (ref 0–2)
COHGB MFR BLD: 1.3 % (ref 0–2)
COHGB MFR BLD: 1.6 % (ref 0–2)
COHGB MFR BLD: 1.6 % (ref 0–2)
COHGB MFR BLD: 1.7 % (ref 0–2)
COHGB MFR BLD: 1.7 % (ref 0–2)
COHGB MFR BLD: 2.9 % (ref 0–2)
CORTIS SERPL-MCNC: 3.01 MCG/DL
CREAT SERPL-MCNC: 10.04 MG/DL (ref 0.76–1.27)
CREAT SERPL-MCNC: 4.62 MG/DL (ref 0.76–1.27)
CREAT SERPL-MCNC: 4.72 MG/DL (ref 0.76–1.27)
CREAT SERPL-MCNC: 4.89 MG/DL (ref 0.76–1.27)
CREAT SERPL-MCNC: 4.91 MG/DL (ref 0.76–1.27)
CREAT SERPL-MCNC: 4.97 MG/DL (ref 0.76–1.27)
CREAT SERPL-MCNC: 5.26 MG/DL (ref 0.76–1.27)
CREAT SERPL-MCNC: 5.4 MG/DL (ref 0.76–1.27)
CREAT SERPL-MCNC: 5.41 MG/DL (ref 0.76–1.27)
CREAT SERPL-MCNC: 5.5 MG/DL (ref 0.76–1.27)
CREAT SERPL-MCNC: 5.61 MG/DL (ref 0.76–1.27)
CREAT SERPL-MCNC: 6.15 MG/DL (ref 0.76–1.27)
CREAT SERPL-MCNC: 6.27 MG/DL (ref 0.76–1.27)
CREAT SERPL-MCNC: 6.67 MG/DL (ref 0.76–1.27)
CREAT SERPL-MCNC: 6.77 MG/DL (ref 0.76–1.27)
CREAT SERPL-MCNC: 6.79 MG/DL (ref 0.76–1.27)
CREAT SERPL-MCNC: 6.84 MG/DL (ref 0.76–1.27)
CREAT SERPL-MCNC: 6.95 MG/DL (ref 0.76–1.27)
CREAT SERPL-MCNC: 7.01 MG/DL (ref 0.76–1.27)
CREAT SERPL-MCNC: 7.34 MG/DL (ref 0.76–1.27)
CREAT SERPL-MCNC: 7.7 MG/DL (ref 0.76–1.27)
CREAT SERPL-MCNC: 7.85 MG/DL (ref 0.76–1.27)
CREAT SERPL-MCNC: 8 MG/DL (ref 0.76–1.27)
CREAT SERPL-MCNC: 8.47 MG/DL (ref 0.76–1.27)
CROSSMATCH INTERPRETATION: NORMAL
CRP SERPL-MCNC: 12.45 MG/DL (ref 0–0.5)
CRP SERPL-MCNC: 3.63 MG/DL (ref 0–0.5)
CRYPTOSP DNA STL QL NAA+NON-PROBE: NOT DETECTED
D-LACTATE SERPL-SCNC: 1.2 MMOL/L (ref 0.5–2)
D-LACTATE SERPL-SCNC: 1.2 MMOL/L (ref 0.5–2)
D-LACTATE SERPL-SCNC: 1.6 MMOL/L (ref 0.5–2)
D-LACTATE SERPL-SCNC: 1.8 MMOL/L (ref 0.5–2)
D-LACTATE SERPL-SCNC: 12.3 MMOL/L (ref 0.5–2)
D-LACTATE SERPL-SCNC: 4.1 MMOL/L (ref 0.5–2)
DEPRECATED RDW RBC AUTO: 56.9 FL (ref 37–54)
DEPRECATED RDW RBC AUTO: 58.8 FL (ref 37–54)
DEPRECATED RDW RBC AUTO: 61.1 FL (ref 37–54)
DEPRECATED RDW RBC AUTO: 63 FL (ref 37–54)
DEPRECATED RDW RBC AUTO: 63.4 FL (ref 37–54)
DEPRECATED RDW RBC AUTO: 63.8 FL (ref 37–54)
DEPRECATED RDW RBC AUTO: 64.7 FL (ref 37–54)
DEPRECATED RDW RBC AUTO: 65.4 FL (ref 37–54)
DEPRECATED RDW RBC AUTO: 65.9 FL (ref 37–54)
DEPRECATED RDW RBC AUTO: 65.9 FL (ref 37–54)
DEPRECATED RDW RBC AUTO: 66 FL (ref 37–54)
DEPRECATED RDW RBC AUTO: 66.6 FL (ref 37–54)
DEPRECATED RDW RBC AUTO: 66.8 FL (ref 37–54)
DEPRECATED RDW RBC AUTO: 69.5 FL (ref 37–54)
DEPRECATED RDW RBC AUTO: 70.1 FL (ref 37–54)
DEPRECATED RDW RBC AUTO: 70.2 FL (ref 37–54)
DEPRECATED RDW RBC AUTO: 71.7 FL (ref 37–54)
DEPRECATED RDW RBC AUTO: 71.7 FL (ref 37–54)
DEPRECATED RDW RBC AUTO: 74 FL (ref 37–54)
DEPRECATED RDW RBC AUTO: 75.6 FL (ref 37–54)
E HISTOLYT DNA STL QL NAA+NON-PROBE: NOT DETECTED
EAEC PAA PLAS AGGR+AATA ST NAA+NON-PRB: NOT DETECTED
EC STX1+STX2 GENES STL QL NAA+NON-PROBE: NOT DETECTED
EGFRCR SERPLBLD CKD-EPI 2021: 10 ML/MIN/1.73
EGFRCR SERPLBLD CKD-EPI 2021: 10 ML/MIN/1.73
EGFRCR SERPLBLD CKD-EPI 2021: 10.3 ML/MIN/1.73
EGFRCR SERPLBLD CKD-EPI 2021: 11 ML/MIN/1.73
EGFRCR SERPLBLD CKD-EPI 2021: 11.2 ML/MIN/1.73
EGFRCR SERPLBLD CKD-EPI 2021: 11.3 ML/MIN/1.73
EGFRCR SERPLBLD CKD-EPI 2021: 11.7 ML/MIN/1.73
EGFRCR SERPLBLD CKD-EPI 2021: 12.1 ML/MIN/1.73
EGFRCR SERPLBLD CKD-EPI 2021: 4.7 ML/MIN/1.73
EGFRCR SERPLBLD CKD-EPI 2021: 5.8 ML/MIN/1.73
EGFRCR SERPLBLD CKD-EPI 2021: 6.2 ML/MIN/1.73
EGFRCR SERPLBLD CKD-EPI 2021: 6.4 ML/MIN/1.73
EGFRCR SERPLBLD CKD-EPI 2021: 6.5 ML/MIN/1.73
EGFRCR SERPLBLD CKD-EPI 2021: 6.9 ML/MIN/1.73
EGFRCR SERPLBLD CKD-EPI 2021: 7.3 ML/MIN/1.73
EGFRCR SERPLBLD CKD-EPI 2021: 7.4 ML/MIN/1.73
EGFRCR SERPLBLD CKD-EPI 2021: 7.5 ML/MIN/1.73
EGFRCR SERPLBLD CKD-EPI 2021: 7.6 ML/MIN/1.73
EGFRCR SERPLBLD CKD-EPI 2021: 7.6 ML/MIN/1.73
EGFRCR SERPLBLD CKD-EPI 2021: 7.8 ML/MIN/1.73
EGFRCR SERPLBLD CKD-EPI 2021: 8.4 ML/MIN/1.73
EGFRCR SERPLBLD CKD-EPI 2021: 8.5 ML/MIN/1.73
EGFRCR SERPLBLD CKD-EPI 2021: 9.5 ML/MIN/1.73
EGFRCR SERPLBLD CKD-EPI 2021: 9.8 ML/MIN/1.73
EOSINOPHIL # BLD AUTO: 0 10*3/MM3 (ref 0–0.4)
EOSINOPHIL # BLD AUTO: 0.01 10*3/MM3 (ref 0–0.4)
EOSINOPHIL # BLD AUTO: 0.01 10*3/MM3 (ref 0–0.4)
EOSINOPHIL # BLD AUTO: 0.15 10*3/MM3 (ref 0–0.4)
EOSINOPHIL # BLD AUTO: 0.2 10*3/MM3 (ref 0–0.4)
EOSINOPHIL # BLD AUTO: 0.22 10*3/MM3 (ref 0–0.4)
EOSINOPHIL # BLD AUTO: 0.24 10*3/MM3 (ref 0–0.4)
EOSINOPHIL # BLD AUTO: 0.25 10*3/MM3 (ref 0–0.4)
EOSINOPHIL # BLD AUTO: 0.27 10*3/MM3 (ref 0–0.4)
EOSINOPHIL # BLD AUTO: 0.29 10*3/MM3 (ref 0–0.4)
EOSINOPHIL # BLD AUTO: 0.34 10*3/MM3 (ref 0–0.4)
EOSINOPHIL # BLD AUTO: 0.39 10*3/MM3 (ref 0–0.4)
EOSINOPHIL # BLD AUTO: 0.39 10*3/MM3 (ref 0–0.4)
EOSINOPHIL # BLD AUTO: 0.43 10*3/MM3 (ref 0–0.4)
EOSINOPHIL # BLD AUTO: 0.61 10*3/MM3 (ref 0–0.4)
EOSINOPHIL NFR BLD AUTO: 0 % (ref 0.3–6.2)
EOSINOPHIL NFR BLD AUTO: 0.1 % (ref 0.3–6.2)
EOSINOPHIL NFR BLD AUTO: 0.2 % (ref 0.3–6.2)
EOSINOPHIL NFR BLD AUTO: 1.6 % (ref 0.3–6.2)
EOSINOPHIL NFR BLD AUTO: 1.7 % (ref 0.3–6.2)
EOSINOPHIL NFR BLD AUTO: 1.7 % (ref 0.3–6.2)
EOSINOPHIL NFR BLD AUTO: 2.5 % (ref 0.3–6.2)
EOSINOPHIL NFR BLD AUTO: 2.6 % (ref 0.3–6.2)
EOSINOPHIL NFR BLD AUTO: 3.2 % (ref 0.3–6.2)
EOSINOPHIL NFR BLD AUTO: 3.2 % (ref 0.3–6.2)
EOSINOPHIL NFR BLD AUTO: 3.8 % (ref 0.3–6.2)
EOSINOPHIL NFR BLD AUTO: 4.1 % (ref 0.3–6.2)
EOSINOPHIL NFR BLD AUTO: 5 % (ref 0.3–6.2)
EOSINOPHIL NFR BLD AUTO: 6.2 % (ref 0.3–6.2)
EOSINOPHIL NFR BLD AUTO: 8.1 % (ref 0.3–6.2)
EPAP: 0
EPAP: 6
EPEC EAE GENE STL QL NAA+NON-PROBE: NOT DETECTED
ERYTHROCYTE [DISTWIDTH] IN BLOOD BY AUTOMATED COUNT: 15.5 % (ref 12.3–15.4)
ERYTHROCYTE [DISTWIDTH] IN BLOOD BY AUTOMATED COUNT: 15.9 % (ref 12.3–15.4)
ERYTHROCYTE [DISTWIDTH] IN BLOOD BY AUTOMATED COUNT: 16 % (ref 12.3–15.4)
ERYTHROCYTE [DISTWIDTH] IN BLOOD BY AUTOMATED COUNT: 16.1 % (ref 12.3–15.4)
ERYTHROCYTE [DISTWIDTH] IN BLOOD BY AUTOMATED COUNT: 16.4 % (ref 12.3–15.4)
ERYTHROCYTE [DISTWIDTH] IN BLOOD BY AUTOMATED COUNT: 16.5 % (ref 12.3–15.4)
ERYTHROCYTE [DISTWIDTH] IN BLOOD BY AUTOMATED COUNT: 16.6 % (ref 12.3–15.4)
ERYTHROCYTE [DISTWIDTH] IN BLOOD BY AUTOMATED COUNT: 16.6 % (ref 12.3–15.4)
ERYTHROCYTE [DISTWIDTH] IN BLOOD BY AUTOMATED COUNT: 16.7 % (ref 12.3–15.4)
ERYTHROCYTE [DISTWIDTH] IN BLOOD BY AUTOMATED COUNT: 16.8 % (ref 12.3–15.4)
ERYTHROCYTE [DISTWIDTH] IN BLOOD BY AUTOMATED COUNT: 16.9 % (ref 12.3–15.4)
ERYTHROCYTE [DISTWIDTH] IN BLOOD BY AUTOMATED COUNT: 17.2 % (ref 12.3–15.4)
ERYTHROCYTE [DISTWIDTH] IN BLOOD BY AUTOMATED COUNT: 17.2 % (ref 12.3–15.4)
ERYTHROCYTE [DISTWIDTH] IN BLOOD BY AUTOMATED COUNT: 17.4 % (ref 12.3–15.4)
ERYTHROCYTE [DISTWIDTH] IN BLOOD BY AUTOMATED COUNT: 17.4 % (ref 12.3–15.4)
ERYTHROCYTE [DISTWIDTH] IN BLOOD BY AUTOMATED COUNT: 18.5 % (ref 12.3–15.4)
ERYTHROCYTE [DISTWIDTH] IN BLOOD BY AUTOMATED COUNT: 18.9 % (ref 12.3–15.4)
ERYTHROCYTE [DISTWIDTH] IN BLOOD BY AUTOMATED COUNT: 19.2 % (ref 12.3–15.4)
ERYTHROCYTE [DISTWIDTH] IN BLOOD BY AUTOMATED COUNT: 19.6 % (ref 12.3–15.4)
ERYTHROCYTE [DISTWIDTH] IN BLOOD BY AUTOMATED COUNT: 20.3 % (ref 12.3–15.4)
ETEC LTA+ST1A+ST1B TOX ST NAA+NON-PROBE: NOT DETECTED
G LAMBLIA DNA STL QL NAA+NON-PROBE: NOT DETECTED
GEN 5 1HR TROPONIN T REFLEX: 135 NG/L
GEN 5 1HR TROPONIN T REFLEX: 200 NG/L
GLOBULIN UR ELPH-MCNC: 1.9 GM/DL
GLOBULIN UR ELPH-MCNC: 2 GM/DL
GLOBULIN UR ELPH-MCNC: 2.1 GM/DL
GLOBULIN UR ELPH-MCNC: 2.2 GM/DL
GLOBULIN UR ELPH-MCNC: 2.2 GM/DL
GLOBULIN UR ELPH-MCNC: 2.3 GM/DL
GLOBULIN UR ELPH-MCNC: 2.5 GM/DL
GLOBULIN UR ELPH-MCNC: 2.8 GM/DL
GLOBULIN UR ELPH-MCNC: 2.8 GM/DL
GLOBULIN UR ELPH-MCNC: 3.1 GM/DL
GLOBULIN UR ELPH-MCNC: 3.2 GM/DL
GLOBULIN UR ELPH-MCNC: 3.2 GM/DL
GLOBULIN UR ELPH-MCNC: 3.4 GM/DL
GLUCOSE BLDC GLUCOMTR-MCNC: 100 MG/DL (ref 70–130)
GLUCOSE BLDC GLUCOMTR-MCNC: 107 MG/DL (ref 70–130)
GLUCOSE BLDC GLUCOMTR-MCNC: 114 MG/DL (ref 70–130)
GLUCOSE BLDC GLUCOMTR-MCNC: 120 MG/DL (ref 70–130)
GLUCOSE BLDC GLUCOMTR-MCNC: 122 MG/DL (ref 70–130)
GLUCOSE BLDC GLUCOMTR-MCNC: 123 MG/DL (ref 70–130)
GLUCOSE BLDC GLUCOMTR-MCNC: 124 MG/DL (ref 70–130)
GLUCOSE BLDC GLUCOMTR-MCNC: 125 MG/DL (ref 70–130)
GLUCOSE BLDC GLUCOMTR-MCNC: 126 MG/DL (ref 70–130)
GLUCOSE BLDC GLUCOMTR-MCNC: 129 MG/DL (ref 70–130)
GLUCOSE BLDC GLUCOMTR-MCNC: 130 MG/DL (ref 70–130)
GLUCOSE BLDC GLUCOMTR-MCNC: 130 MG/DL (ref 70–130)
GLUCOSE BLDC GLUCOMTR-MCNC: 131 MG/DL (ref 70–130)
GLUCOSE BLDC GLUCOMTR-MCNC: 131 MG/DL (ref 70–130)
GLUCOSE BLDC GLUCOMTR-MCNC: 132 MG/DL (ref 70–130)
GLUCOSE BLDC GLUCOMTR-MCNC: 132 MG/DL (ref 70–130)
GLUCOSE BLDC GLUCOMTR-MCNC: 133 MG/DL (ref 70–130)
GLUCOSE BLDC GLUCOMTR-MCNC: 135 MG/DL (ref 70–130)
GLUCOSE BLDC GLUCOMTR-MCNC: 137 MG/DL (ref 70–130)
GLUCOSE BLDC GLUCOMTR-MCNC: 137 MG/DL (ref 70–130)
GLUCOSE BLDC GLUCOMTR-MCNC: 139 MG/DL (ref 70–130)
GLUCOSE BLDC GLUCOMTR-MCNC: 139 MG/DL (ref 70–130)
GLUCOSE BLDC GLUCOMTR-MCNC: 140 MG/DL (ref 70–130)
GLUCOSE BLDC GLUCOMTR-MCNC: 140 MG/DL (ref 70–130)
GLUCOSE BLDC GLUCOMTR-MCNC: 141 MG/DL (ref 70–130)
GLUCOSE BLDC GLUCOMTR-MCNC: 142 MG/DL (ref 70–130)
GLUCOSE BLDC GLUCOMTR-MCNC: 143 MG/DL (ref 70–130)
GLUCOSE BLDC GLUCOMTR-MCNC: 144 MG/DL (ref 70–130)
GLUCOSE BLDC GLUCOMTR-MCNC: 145 MG/DL (ref 70–130)
GLUCOSE BLDC GLUCOMTR-MCNC: 146 MG/DL (ref 70–130)
GLUCOSE BLDC GLUCOMTR-MCNC: 147 MG/DL (ref 70–130)
GLUCOSE BLDC GLUCOMTR-MCNC: 148 MG/DL (ref 70–130)
GLUCOSE BLDC GLUCOMTR-MCNC: 148 MG/DL (ref 70–130)
GLUCOSE BLDC GLUCOMTR-MCNC: 149 MG/DL (ref 70–130)
GLUCOSE BLDC GLUCOMTR-MCNC: 153 MG/DL (ref 70–130)
GLUCOSE BLDC GLUCOMTR-MCNC: 155 MG/DL (ref 70–130)
GLUCOSE BLDC GLUCOMTR-MCNC: 156 MG/DL (ref 70–130)
GLUCOSE BLDC GLUCOMTR-MCNC: 158 MG/DL (ref 70–130)
GLUCOSE BLDC GLUCOMTR-MCNC: 161 MG/DL (ref 70–130)
GLUCOSE BLDC GLUCOMTR-MCNC: 162 MG/DL (ref 70–130)
GLUCOSE BLDC GLUCOMTR-MCNC: 163 MG/DL (ref 70–130)
GLUCOSE BLDC GLUCOMTR-MCNC: 165 MG/DL (ref 70–130)
GLUCOSE BLDC GLUCOMTR-MCNC: 166 MG/DL (ref 70–130)
GLUCOSE BLDC GLUCOMTR-MCNC: 167 MG/DL (ref 70–130)
GLUCOSE BLDC GLUCOMTR-MCNC: 169 MG/DL (ref 70–130)
GLUCOSE BLDC GLUCOMTR-MCNC: 170 MG/DL (ref 70–130)
GLUCOSE BLDC GLUCOMTR-MCNC: 173 MG/DL (ref 70–130)
GLUCOSE BLDC GLUCOMTR-MCNC: 174 MG/DL (ref 70–130)
GLUCOSE BLDC GLUCOMTR-MCNC: 175 MG/DL (ref 70–130)
GLUCOSE BLDC GLUCOMTR-MCNC: 177 MG/DL (ref 70–130)
GLUCOSE BLDC GLUCOMTR-MCNC: 179 MG/DL (ref 70–130)
GLUCOSE BLDC GLUCOMTR-MCNC: 180 MG/DL (ref 70–130)
GLUCOSE BLDC GLUCOMTR-MCNC: 182 MG/DL (ref 70–130)
GLUCOSE BLDC GLUCOMTR-MCNC: 202 MG/DL (ref 70–130)
GLUCOSE BLDC GLUCOMTR-MCNC: 99 MG/DL (ref 70–130)
GLUCOSE SERPL-MCNC: 112 MG/DL (ref 65–99)
GLUCOSE SERPL-MCNC: 114 MG/DL (ref 65–99)
GLUCOSE SERPL-MCNC: 115 MG/DL (ref 65–99)
GLUCOSE SERPL-MCNC: 118 MG/DL (ref 65–99)
GLUCOSE SERPL-MCNC: 118 MG/DL (ref 65–99)
GLUCOSE SERPL-MCNC: 122 MG/DL (ref 65–99)
GLUCOSE SERPL-MCNC: 130 MG/DL (ref 65–99)
GLUCOSE SERPL-MCNC: 130 MG/DL (ref 65–99)
GLUCOSE SERPL-MCNC: 135 MG/DL (ref 65–99)
GLUCOSE SERPL-MCNC: 135 MG/DL (ref 65–99)
GLUCOSE SERPL-MCNC: 139 MG/DL (ref 65–99)
GLUCOSE SERPL-MCNC: 144 MG/DL (ref 65–99)
GLUCOSE SERPL-MCNC: 146 MG/DL (ref 65–99)
GLUCOSE SERPL-MCNC: 146 MG/DL (ref 65–99)
GLUCOSE SERPL-MCNC: 147 MG/DL (ref 65–99)
GLUCOSE SERPL-MCNC: 150 MG/DL (ref 65–99)
GLUCOSE SERPL-MCNC: 163 MG/DL (ref 65–99)
GLUCOSE SERPL-MCNC: 165 MG/DL (ref 65–99)
GLUCOSE SERPL-MCNC: 167 MG/DL (ref 65–99)
GLUCOSE SERPL-MCNC: 172 MG/DL (ref 65–99)
GLUCOSE SERPL-MCNC: 183 MG/DL (ref 65–99)
GLUCOSE SERPL-MCNC: 270 MG/DL (ref 65–99)
GRAM STN SPEC: NORMAL
HAV IGM SERPL QL IA: NORMAL
HBA1C MFR BLD: 5.1 % (ref 4.8–5.6)
HBV CORE IGM SERPL QL IA: NORMAL
HBV SURFACE AG SERPL QL IA: NORMAL
HCO3 BLDA-SCNC: 19.2 MMOL/L (ref 20–26)
HCO3 BLDA-SCNC: 19.6 MMOL/L (ref 20–26)
HCO3 BLDA-SCNC: 20.8 MMOL/L (ref 20–26)
HCO3 BLDA-SCNC: 21.8 MMOL/L (ref 20–26)
HCO3 BLDA-SCNC: 22.3 MMOL/L (ref 20–26)
HCO3 BLDA-SCNC: 22.5 MMOL/L (ref 20–26)
HCO3 BLDA-SCNC: 22.6 MMOL/L (ref 20–26)
HCO3 BLDA-SCNC: 22.8 MMOL/L (ref 20–26)
HCO3 BLDA-SCNC: 23.4 MMOL/L (ref 20–26)
HCO3 BLDA-SCNC: 23.5 MMOL/L (ref 20–26)
HCO3 BLDA-SCNC: 23.7 MMOL/L (ref 20–26)
HCO3 BLDA-SCNC: 24.7 MMOL/L (ref 20–26)
HCO3 BLDA-SCNC: 24.8 MMOL/L (ref 20–26)
HCO3 BLDA-SCNC: 25.4 MMOL/L (ref 20–26)
HCO3 BLDA-SCNC: 25.6 MMOL/L (ref 20–26)
HCO3 BLDA-SCNC: 25.7 MMOL/L (ref 20–26)
HCO3 BLDA-SCNC: 29.4 MMOL/L (ref 20–26)
HCO3 BLDA-SCNC: 31.7 MMOL/L (ref 20–26)
HCO3 BLDV-SCNC: 18.9 MMOL/L (ref 22–28)
HCT VFR BLD AUTO: 20.1 % (ref 37.5–51)
HCT VFR BLD AUTO: 21.1 % (ref 37.5–51)
HCT VFR BLD AUTO: 21.1 % (ref 37.5–51)
HCT VFR BLD AUTO: 21.8 % (ref 37.5–51)
HCT VFR BLD AUTO: 21.9 % (ref 37.5–51)
HCT VFR BLD AUTO: 22.4 % (ref 37.5–51)
HCT VFR BLD AUTO: 22.5 % (ref 37.5–51)
HCT VFR BLD AUTO: 22.7 % (ref 37.5–51)
HCT VFR BLD AUTO: 22.7 % (ref 37.5–51)
HCT VFR BLD AUTO: 22.8 % (ref 37.5–51)
HCT VFR BLD AUTO: 23 % (ref 37.5–51)
HCT VFR BLD AUTO: 23 % (ref 37.5–51)
HCT VFR BLD AUTO: 23.2 % (ref 37.5–51)
HCT VFR BLD AUTO: 23.2 % (ref 37.5–51)
HCT VFR BLD AUTO: 23.3 % (ref 37.5–51)
HCT VFR BLD AUTO: 23.3 % (ref 37.5–51)
HCT VFR BLD AUTO: 23.4 % (ref 37.5–51)
HCT VFR BLD AUTO: 23.5 % (ref 37.5–51)
HCT VFR BLD AUTO: 23.6 % (ref 37.5–51)
HCT VFR BLD AUTO: 23.6 % (ref 37.5–51)
HCT VFR BLD AUTO: 23.7 % (ref 37.5–51)
HCT VFR BLD AUTO: 23.8 % (ref 37.5–51)
HCT VFR BLD AUTO: 24.1 % (ref 37.5–51)
HCT VFR BLD AUTO: 24.3 % (ref 37.5–51)
HCT VFR BLD AUTO: 24.8 % (ref 37.5–51)
HCT VFR BLD AUTO: 24.9 % (ref 37.5–51)
HCT VFR BLD AUTO: 26.2 % (ref 37.5–51)
HCT VFR BLD AUTO: 26.9 % (ref 37.5–51)
HCT VFR BLD AUTO: 27 % (ref 37.5–51)
HCT VFR BLD AUTO: 27.4 % (ref 37.5–51)
HCT VFR BLD AUTO: 27.5 % (ref 37.5–51)
HCT VFR BLD AUTO: 27.8 % (ref 37.5–51)
HCT VFR BLD AUTO: 28.1 % (ref 37.5–51)
HCT VFR BLD AUTO: 28.1 % (ref 37.5–51)
HCT VFR BLD AUTO: 29.1 % (ref 37.5–51)
HCT VFR BLD AUTO: 29.5 % (ref 37.5–51)
HCT VFR BLD AUTO: 33.7 % (ref 37.5–51)
HCT VFR BLD AUTO: 37.8 % (ref 37.5–51)
HCT VFR BLD CALC: 22.2 % (ref 38–51)
HCT VFR BLD CALC: 22.5 % (ref 38–51)
HCT VFR BLD CALC: 22.5 % (ref 38–51)
HCT VFR BLD CALC: 22.7 % (ref 38–51)
HCT VFR BLD CALC: 23.6 % (ref 38–51)
HCT VFR BLD CALC: 23.8 % (ref 38–51)
HCT VFR BLD CALC: 24 % (ref 38–51)
HCT VFR BLD CALC: 24.1 % (ref 38–51)
HCT VFR BLD CALC: 24.2 % (ref 38–51)
HCT VFR BLD CALC: 24.3 % (ref 38–51)
HCT VFR BLD CALC: 24.6 % (ref 38–51)
HCT VFR BLD CALC: 25 % (ref 38–51)
HCT VFR BLD CALC: 25.3 % (ref 38–51)
HCT VFR BLD CALC: 25.6 % (ref 38–51)
HCT VFR BLD CALC: 26.3 % (ref 38–51)
HCT VFR BLD CALC: 27.1 % (ref 38–51)
HCT VFR BLD CALC: 27.8 % (ref 38–51)
HCT VFR BLD CALC: 36 % (ref 38–51)
HCV AB SER QL: NORMAL
HEMOCCULT STL QL: POSITIVE
HGB BLD-MCNC: 11.8 G/DL (ref 13–17.7)
HGB BLD-MCNC: 6.6 G/DL (ref 13–17.7)
HGB BLD-MCNC: 6.7 G/DL (ref 13–17.7)
HGB BLD-MCNC: 7 G/DL (ref 13–17.7)
HGB BLD-MCNC: 7.1 G/DL (ref 13–17.7)
HGB BLD-MCNC: 7.2 G/DL (ref 13–17.7)
HGB BLD-MCNC: 7.3 G/DL (ref 13–17.7)
HGB BLD-MCNC: 7.3 G/DL (ref 13–17.7)
HGB BLD-MCNC: 7.4 G/DL (ref 13–17.7)
HGB BLD-MCNC: 7.5 G/DL (ref 13–17.7)
HGB BLD-MCNC: 7.5 G/DL (ref 13–17.7)
HGB BLD-MCNC: 7.6 G/DL (ref 13–17.7)
HGB BLD-MCNC: 7.7 G/DL (ref 13–17.7)
HGB BLD-MCNC: 7.8 G/DL (ref 13–17.7)
HGB BLD-MCNC: 7.9 G/DL (ref 13–17.7)
HGB BLD-MCNC: 8 G/DL (ref 13–17.7)
HGB BLD-MCNC: 8 G/DL (ref 13–17.7)
HGB BLD-MCNC: 8.1 G/DL (ref 13–17.7)
HGB BLD-MCNC: 8.2 G/DL (ref 13–17.7)
HGB BLD-MCNC: 8.2 G/DL (ref 13–17.7)
HGB BLD-MCNC: 8.4 G/DL (ref 13–17.7)
HGB BLD-MCNC: 8.4 G/DL (ref 13–17.7)
HGB BLD-MCNC: 8.5 G/DL (ref 13–17.7)
HGB BLD-MCNC: 8.6 G/DL (ref 13–17.7)
HGB BLD-MCNC: 8.7 G/DL (ref 13–17.7)
HGB BLD-MCNC: 8.7 G/DL (ref 13–17.7)
HGB BLD-MCNC: 9.8 G/DL (ref 13–17.7)
HGB BLDA-MCNC: 11.7 G/DL (ref 13.5–17.5)
HGB BLDA-MCNC: 7.2 G/DL (ref 13.5–17.5)
HGB BLDA-MCNC: 7.4 G/DL (ref 13.5–17.5)
HGB BLDA-MCNC: 7.7 G/DL (ref 13.5–17.5)
HGB BLDA-MCNC: 7.8 G/DL (ref 13.5–17.5)
HGB BLDA-MCNC: 7.8 G/DL (ref 13.5–17.5)
HGB BLDA-MCNC: 7.9 G/DL (ref 13.5–17.5)
HGB BLDA-MCNC: 8 G/DL (ref 13.5–17.5)
HGB BLDA-MCNC: 8.2 G/DL (ref 13.5–17.5)
HGB BLDA-MCNC: 8.2 G/DL (ref 13.5–17.5)
HGB BLDA-MCNC: 8.3 G/DL (ref 13.5–17.5)
HGB BLDA-MCNC: 8.6 G/DL (ref 13.5–17.5)
HGB BLDA-MCNC: 8.6 G/DL (ref 13.5–17.5)
HGB BLDA-MCNC: 8.8 G/DL (ref 13.5–17.5)
HGB BLDA-MCNC: 9.1 G/DL (ref 13.5–17.5)
HOLD SPECIMEN: NORMAL
IMM GRANULOCYTES # BLD AUTO: 0.04 10*3/MM3 (ref 0–0.05)
IMM GRANULOCYTES # BLD AUTO: 0.07 10*3/MM3 (ref 0–0.05)
IMM GRANULOCYTES # BLD AUTO: 0.08 10*3/MM3 (ref 0–0.05)
IMM GRANULOCYTES # BLD AUTO: 0.09 10*3/MM3 (ref 0–0.05)
IMM GRANULOCYTES # BLD AUTO: 0.1 10*3/MM3 (ref 0–0.05)
IMM GRANULOCYTES # BLD AUTO: 0.1 10*3/MM3 (ref 0–0.05)
IMM GRANULOCYTES # BLD AUTO: 0.11 10*3/MM3 (ref 0–0.05)
IMM GRANULOCYTES # BLD AUTO: 0.12 10*3/MM3 (ref 0–0.05)
IMM GRANULOCYTES # BLD AUTO: 0.15 10*3/MM3 (ref 0–0.05)
IMM GRANULOCYTES # BLD AUTO: 0.17 10*3/MM3 (ref 0–0.05)
IMM GRANULOCYTES # BLD AUTO: 0.18 10*3/MM3 (ref 0–0.05)
IMM GRANULOCYTES # BLD AUTO: 0.27 10*3/MM3 (ref 0–0.05)
IMM GRANULOCYTES # BLD AUTO: 0.3 10*3/MM3 (ref 0–0.05)
IMM GRANULOCYTES # BLD AUTO: 0.35 10*3/MM3 (ref 0–0.05)
IMM GRANULOCYTES # BLD AUTO: 0.35 10*3/MM3 (ref 0–0.05)
IMM GRANULOCYTES # BLD AUTO: 0.39 10*3/MM3 (ref 0–0.05)
IMM GRANULOCYTES # BLD AUTO: 0.47 10*3/MM3 (ref 0–0.05)
IMM GRANULOCYTES # BLD AUTO: 0.48 10*3/MM3 (ref 0–0.05)
IMM GRANULOCYTES NFR BLD AUTO: 0.7 % (ref 0–0.5)
IMM GRANULOCYTES NFR BLD AUTO: 1 % (ref 0–0.5)
IMM GRANULOCYTES NFR BLD AUTO: 1.3 % (ref 0–0.5)
IMM GRANULOCYTES NFR BLD AUTO: 1.4 % (ref 0–0.5)
IMM GRANULOCYTES NFR BLD AUTO: 1.4 % (ref 0–0.5)
IMM GRANULOCYTES NFR BLD AUTO: 1.6 % (ref 0–0.5)
IMM GRANULOCYTES NFR BLD AUTO: 1.6 % (ref 0–0.5)
IMM GRANULOCYTES NFR BLD AUTO: 1.7 % (ref 0–0.5)
IMM GRANULOCYTES NFR BLD AUTO: 2 % (ref 0–0.5)
IMM GRANULOCYTES NFR BLD AUTO: 2.3 % (ref 0–0.5)
IMM GRANULOCYTES NFR BLD AUTO: 3.1 % (ref 0–0.5)
IMM GRANULOCYTES NFR BLD AUTO: 3.2 % (ref 0–0.5)
IMM GRANULOCYTES NFR BLD AUTO: 4 % (ref 0–0.5)
IMM GRANULOCYTES NFR BLD AUTO: 4.5 % (ref 0–0.5)
IMM GRANULOCYTES NFR BLD AUTO: 4.7 % (ref 0–0.5)
IMM GRANULOCYTES NFR BLD AUTO: 4.8 % (ref 0–0.5)
INHALED O2 CONCENTRATION: 28 %
INHALED O2 CONCENTRATION: 30 %
INHALED O2 CONCENTRATION: 32 %
INHALED O2 CONCENTRATION: 35 %
INHALED O2 CONCENTRATION: 40 %
INHALED O2 CONCENTRATION: 40 %
INHALED O2 CONCENTRATION: 45 %
INHALED O2 CONCENTRATION: 50 %
INHALED O2 CONCENTRATION: 55 %
INHALED O2 CONCENTRATION: 55 %
INHALED O2 CONCENTRATION: 80 %
INHALED O2 CONCENTRATION: 90 %
INR PPP: 1.18 (ref 0.89–1.12)
INR PPP: 1.32 (ref 0.89–1.12)
INR PPP: 1.33 (ref 0.89–1.12)
INR PPP: 1.9 (ref 0.89–1.12)
INR PPP: 2.04 (ref 0.89–1.12)
INR PPP: 2.06 (ref 0.89–1.12)
INR PPP: 2.07 (ref 0.89–1.12)
INR PPP: 2.11 (ref 0.89–1.12)
INR PPP: 2.13 (ref 0.89–1.12)
INR PPP: 2.17 (ref 0.89–1.12)
INR PPP: 2.27 (ref 0.89–1.12)
INR PPP: 2.36 (ref 0.89–1.12)
INR PPP: 2.52 (ref 0.89–1.12)
INR PPP: 2.68 (ref 0.89–1.12)
INR PPP: 3.12 (ref 0.89–1.12)
INR PPP: 3.44 (ref 0.89–1.12)
INR PPP: 3.58 (ref 0.89–1.12)
IPAP: 0
IPAP: 16
IVRT: 85 MS
LEFT ATRIUM VOLUME INDEX: 47.1 ML/M2
LYMPHOCYTES # BLD AUTO: 0.46 10*3/MM3 (ref 0.7–3.1)
LYMPHOCYTES # BLD AUTO: 0.47 10*3/MM3 (ref 0.7–3.1)
LYMPHOCYTES # BLD AUTO: 0.49 10*3/MM3 (ref 0.7–3.1)
LYMPHOCYTES # BLD AUTO: 0.52 10*3/MM3 (ref 0.7–3.1)
LYMPHOCYTES # BLD AUTO: 0.52 10*3/MM3 (ref 0.7–3.1)
LYMPHOCYTES # BLD AUTO: 0.58 10*3/MM3 (ref 0.7–3.1)
LYMPHOCYTES # BLD AUTO: 0.6 10*3/MM3 (ref 0.7–3.1)
LYMPHOCYTES # BLD AUTO: 0.64 10*3/MM3 (ref 0.7–3.1)
LYMPHOCYTES # BLD AUTO: 0.65 10*3/MM3 (ref 0.7–3.1)
LYMPHOCYTES # BLD AUTO: 0.66 10*3/MM3 (ref 0.7–3.1)
LYMPHOCYTES # BLD AUTO: 0.69 10*3/MM3 (ref 0.7–3.1)
LYMPHOCYTES # BLD AUTO: 0.7 10*3/MM3 (ref 0.7–3.1)
LYMPHOCYTES # BLD AUTO: 0.7 10*3/MM3 (ref 0.7–3.1)
LYMPHOCYTES # BLD AUTO: 0.72 10*3/MM3 (ref 0.7–3.1)
LYMPHOCYTES # BLD AUTO: 0.76 10*3/MM3 (ref 0.7–3.1)
LYMPHOCYTES # BLD AUTO: 0.79 10*3/MM3 (ref 0.7–3.1)
LYMPHOCYTES # BLD AUTO: 0.8 10*3/MM3 (ref 0.7–3.1)
LYMPHOCYTES # BLD AUTO: 0.81 10*3/MM3 (ref 0.7–3.1)
LYMPHOCYTES # BLD AUTO: 1.05 10*3/MM3 (ref 0.7–3.1)
LYMPHOCYTES # BLD AUTO: 1.15 10*3/MM3 (ref 0.7–3.1)
LYMPHOCYTES NFR BLD AUTO: 10.1 % (ref 19.6–45.3)
LYMPHOCYTES NFR BLD AUTO: 11.2 % (ref 19.6–45.3)
LYMPHOCYTES NFR BLD AUTO: 4.4 % (ref 19.6–45.3)
LYMPHOCYTES NFR BLD AUTO: 4.6 % (ref 19.6–45.3)
LYMPHOCYTES NFR BLD AUTO: 5.6 % (ref 19.6–45.3)
LYMPHOCYTES NFR BLD AUTO: 6.2 % (ref 19.6–45.3)
LYMPHOCYTES NFR BLD AUTO: 6.3 % (ref 19.6–45.3)
LYMPHOCYTES NFR BLD AUTO: 7.4 % (ref 19.6–45.3)
LYMPHOCYTES NFR BLD AUTO: 7.4 % (ref 19.6–45.3)
LYMPHOCYTES NFR BLD AUTO: 7.5 % (ref 19.6–45.3)
LYMPHOCYTES NFR BLD AUTO: 7.5 % (ref 19.6–45.3)
LYMPHOCYTES NFR BLD AUTO: 7.6 % (ref 19.6–45.3)
LYMPHOCYTES NFR BLD AUTO: 7.7 % (ref 19.6–45.3)
LYMPHOCYTES NFR BLD AUTO: 7.8 % (ref 19.6–45.3)
LYMPHOCYTES NFR BLD AUTO: 8.1 % (ref 19.6–45.3)
LYMPHOCYTES NFR BLD AUTO: 8.5 % (ref 19.6–45.3)
LYMPHOCYTES NFR BLD AUTO: 8.6 % (ref 19.6–45.3)
LYMPHOCYTES NFR BLD AUTO: 9.1 % (ref 19.6–45.3)
LYMPHOCYTES NFR BLD AUTO: 9.2 % (ref 19.6–45.3)
LYMPHOCYTES NFR BLD AUTO: 9.7 % (ref 19.6–45.3)
Lab: ABNORMAL
MACROCYTES BLD QL SMEAR: NORMAL
MAGNESIUM SERPL-MCNC: 2 MG/DL (ref 1.6–2.4)
MAGNESIUM SERPL-MCNC: 2.1 MG/DL (ref 1.6–2.4)
MAGNESIUM SERPL-MCNC: 2.1 MG/DL (ref 1.6–2.4)
MAGNESIUM SERPL-MCNC: 2.2 MG/DL (ref 1.6–2.4)
MAGNESIUM SERPL-MCNC: 2.3 MG/DL (ref 1.6–2.4)
MAGNESIUM SERPL-MCNC: 2.5 MG/DL (ref 1.6–2.4)
MAGNESIUM SERPL-MCNC: 2.6 MG/DL (ref 1.6–2.4)
MAGNESIUM SERPL-MCNC: 2.8 MG/DL (ref 1.6–2.4)
MAGNESIUM SERPL-MCNC: 3.8 MG/DL (ref 1.6–2.4)
MCH RBC QN AUTO: 32.6 PG (ref 26.6–33)
MCH RBC QN AUTO: 32.7 PG (ref 26.6–33)
MCH RBC QN AUTO: 32.8 PG (ref 26.6–33)
MCH RBC QN AUTO: 33.2 PG (ref 26.6–33)
MCH RBC QN AUTO: 33.3 PG (ref 26.6–33)
MCH RBC QN AUTO: 33.5 PG (ref 26.6–33)
MCH RBC QN AUTO: 33.6 PG (ref 26.6–33)
MCH RBC QN AUTO: 33.7 PG (ref 26.6–33)
MCH RBC QN AUTO: 33.8 PG (ref 26.6–33)
MCH RBC QN AUTO: 34 PG (ref 26.6–33)
MCH RBC QN AUTO: 34.1 PG (ref 26.6–33)
MCH RBC QN AUTO: 35.2 PG (ref 26.6–33)
MCH RBC QN AUTO: 36.5 PG (ref 26.6–33)
MCHC RBC AUTO-ENTMCNC: 29.1 G/DL (ref 31.5–35.7)
MCHC RBC AUTO-ENTMCNC: 29.2 G/DL (ref 31.5–35.7)
MCHC RBC AUTO-ENTMCNC: 29.9 G/DL (ref 31.5–35.7)
MCHC RBC AUTO-ENTMCNC: 30.5 G/DL (ref 31.5–35.7)
MCHC RBC AUTO-ENTMCNC: 30.7 G/DL (ref 31.5–35.7)
MCHC RBC AUTO-ENTMCNC: 30.9 G/DL (ref 31.5–35.7)
MCHC RBC AUTO-ENTMCNC: 31 G/DL (ref 31.5–35.7)
MCHC RBC AUTO-ENTMCNC: 31 G/DL (ref 31.5–35.7)
MCHC RBC AUTO-ENTMCNC: 31.1 G/DL (ref 31.5–35.7)
MCHC RBC AUTO-ENTMCNC: 31.2 G/DL (ref 31.5–35.7)
MCHC RBC AUTO-ENTMCNC: 31.2 G/DL (ref 31.5–35.7)
MCHC RBC AUTO-ENTMCNC: 31.3 G/DL (ref 31.5–35.7)
MCHC RBC AUTO-ENTMCNC: 31.3 G/DL (ref 31.5–35.7)
MCHC RBC AUTO-ENTMCNC: 31.6 G/DL (ref 31.5–35.7)
MCHC RBC AUTO-ENTMCNC: 31.8 G/DL (ref 31.5–35.7)
MCHC RBC AUTO-ENTMCNC: 32.8 G/DL (ref 31.5–35.7)
MCHC RBC AUTO-ENTMCNC: 33.2 G/DL (ref 31.5–35.7)
MCHC RBC AUTO-ENTMCNC: 33.2 G/DL (ref 31.5–35.7)
MCHC RBC AUTO-ENTMCNC: 33.8 G/DL (ref 31.5–35.7)
MCHC RBC AUTO-ENTMCNC: 33.8 G/DL (ref 31.5–35.7)
MCV RBC AUTO: 100 FL (ref 79–97)
MCV RBC AUTO: 100.5 FL (ref 79–97)
MCV RBC AUTO: 101.7 FL (ref 79–97)
MCV RBC AUTO: 105.1 FL (ref 79–97)
MCV RBC AUTO: 105.9 FL (ref 79–97)
MCV RBC AUTO: 105.9 FL (ref 79–97)
MCV RBC AUTO: 106.3 FL (ref 79–97)
MCV RBC AUTO: 106.4 FL (ref 79–97)
MCV RBC AUTO: 106.5 FL (ref 79–97)
MCV RBC AUTO: 106.8 FL (ref 79–97)
MCV RBC AUTO: 107.4 FL (ref 79–97)
MCV RBC AUTO: 108.2 FL (ref 79–97)
MCV RBC AUTO: 108.4 FL (ref 79–97)
MCV RBC AUTO: 108.4 FL (ref 79–97)
MCV RBC AUTO: 109 FL (ref 79–97)
MCV RBC AUTO: 109.2 FL (ref 79–97)
MCV RBC AUTO: 109.8 FL (ref 79–97)
MCV RBC AUTO: 110.7 FL (ref 79–97)
MCV RBC AUTO: 114.8 FL (ref 79–97)
MCV RBC AUTO: 117 FL (ref 79–97)
METHGB BLD QL: 0.5 % (ref 0–1.5)
METHGB BLD QL: 0.7 % (ref 0–1.5)
METHGB BLD QL: 0.8 % (ref 0–1.5)
METHGB BLD QL: 0.8 % (ref 0–1.5)
METHGB BLD QL: 1 % (ref 0–1.5)
METHGB BLD QL: 1.1 % (ref 0–1.5)
METHGB BLD QL: 1.2 % (ref 0–1.5)
METHGB BLD QL: 1.3 % (ref 0–1.5)
METHGB BLD QL: 1.3 % (ref 0–1.5)
METHGB BLD QL: 1.4 % (ref 0–1.5)
METHGB BLD QL: 1.4 % (ref 0–1.5)
METHGB BLD QL: 1.5 %
METHGB BLD QL: 1.5 % (ref 0–1.5)
METHGB BLD QL: 1.5 % (ref 0–1.5)
METHGB BLD QL: 1.6 % (ref 0–1.5)
METHGB BLD QL: 1.7 % (ref 0–1.5)
METHGB BLD QL: 1.8 % (ref 0–1.5)
MODALITY: ABNORMAL
MONOCYTES # BLD AUTO: 0.46 10*3/MM3 (ref 0.1–0.9)
MONOCYTES # BLD AUTO: 0.49 10*3/MM3 (ref 0.1–0.9)
MONOCYTES # BLD AUTO: 0.49 10*3/MM3 (ref 0.1–0.9)
MONOCYTES # BLD AUTO: 0.6 10*3/MM3 (ref 0.1–0.9)
MONOCYTES # BLD AUTO: 0.61 10*3/MM3 (ref 0.1–0.9)
MONOCYTES # BLD AUTO: 0.65 10*3/MM3 (ref 0.1–0.9)
MONOCYTES # BLD AUTO: 0.67 10*3/MM3 (ref 0.1–0.9)
MONOCYTES # BLD AUTO: 0.68 10*3/MM3 (ref 0.1–0.9)
MONOCYTES # BLD AUTO: 0.69 10*3/MM3 (ref 0.1–0.9)
MONOCYTES # BLD AUTO: 0.69 10*3/MM3 (ref 0.1–0.9)
MONOCYTES # BLD AUTO: 0.72 10*3/MM3 (ref 0.1–0.9)
MONOCYTES # BLD AUTO: 0.76 10*3/MM3 (ref 0.1–0.9)
MONOCYTES # BLD AUTO: 0.85 10*3/MM3 (ref 0.1–0.9)
MONOCYTES # BLD AUTO: 0.88 10*3/MM3 (ref 0.1–0.9)
MONOCYTES # BLD AUTO: 0.98 10*3/MM3 (ref 0.1–0.9)
MONOCYTES # BLD AUTO: 1 10*3/MM3 (ref 0.1–0.9)
MONOCYTES # BLD AUTO: 1.15 10*3/MM3 (ref 0.1–0.9)
MONOCYTES # BLD AUTO: 1.24 10*3/MM3 (ref 0.1–0.9)
MONOCYTES # BLD AUTO: 1.63 10*3/MM3 (ref 0.1–0.9)
MONOCYTES # BLD AUTO: 1.67 10*3/MM3 (ref 0.1–0.9)
MONOCYTES NFR BLD AUTO: 10.2 % (ref 5–12)
MONOCYTES NFR BLD AUTO: 10.5 % (ref 5–12)
MONOCYTES NFR BLD AUTO: 11.6 % (ref 5–12)
MONOCYTES NFR BLD AUTO: 12.9 % (ref 5–12)
MONOCYTES NFR BLD AUTO: 12.9 % (ref 5–12)
MONOCYTES NFR BLD AUTO: 16.2 % (ref 5–12)
MONOCYTES NFR BLD AUTO: 5.5 % (ref 5–12)
MONOCYTES NFR BLD AUTO: 5.7 % (ref 5–12)
MONOCYTES NFR BLD AUTO: 6.1 % (ref 5–12)
MONOCYTES NFR BLD AUTO: 7.1 % (ref 5–12)
MONOCYTES NFR BLD AUTO: 7.4 % (ref 5–12)
MONOCYTES NFR BLD AUTO: 7.4 % (ref 5–12)
MONOCYTES NFR BLD AUTO: 7.8 % (ref 5–12)
MONOCYTES NFR BLD AUTO: 8.7 % (ref 5–12)
MONOCYTES NFR BLD AUTO: 8.7 % (ref 5–12)
MONOCYTES NFR BLD AUTO: 9 % (ref 5–12)
MONOCYTES NFR BLD AUTO: 9 % (ref 5–12)
MONOCYTES NFR BLD AUTO: 9.1 % (ref 5–12)
MONOCYTES NFR BLD AUTO: 9.3 % (ref 5–12)
MONOCYTES NFR BLD AUTO: 9.6 % (ref 5–12)
NEUTROPHILS NFR BLD AUTO: 4.84 10*3/MM3 (ref 1.7–7)
NEUTROPHILS NFR BLD AUTO: 5.41 10*3/MM3 (ref 1.7–7)
NEUTROPHILS NFR BLD AUTO: 5.57 10*3/MM3 (ref 1.7–7)
NEUTROPHILS NFR BLD AUTO: 5.72 10*3/MM3 (ref 1.7–7)
NEUTROPHILS NFR BLD AUTO: 5.9 10*3/MM3 (ref 1.7–7)
NEUTROPHILS NFR BLD AUTO: 6.02 10*3/MM3 (ref 1.7–7)
NEUTROPHILS NFR BLD AUTO: 6.06 10*3/MM3 (ref 1.7–7)
NEUTROPHILS NFR BLD AUTO: 6.22 10*3/MM3 (ref 1.7–7)
NEUTROPHILS NFR BLD AUTO: 6.97 10*3/MM3 (ref 1.7–7)
NEUTROPHILS NFR BLD AUTO: 68.5 % (ref 42.7–76)
NEUTROPHILS NFR BLD AUTO: 7.07 10*3/MM3 (ref 1.7–7)
NEUTROPHILS NFR BLD AUTO: 7.17 10*3/MM3 (ref 1.7–7)
NEUTROPHILS NFR BLD AUTO: 7.26 10*3/MM3 (ref 1.7–7)
NEUTROPHILS NFR BLD AUTO: 7.39 10*3/MM3 (ref 1.7–7)
NEUTROPHILS NFR BLD AUTO: 7.4 10*3/MM3 (ref 1.7–7)
NEUTROPHILS NFR BLD AUTO: 7.56 10*3/MM3 (ref 1.7–7)
NEUTROPHILS NFR BLD AUTO: 7.58 10*3/MM3 (ref 1.7–7)
NEUTROPHILS NFR BLD AUTO: 74 % (ref 42.7–76)
NEUTROPHILS NFR BLD AUTO: 75.5 % (ref 42.7–76)
NEUTROPHILS NFR BLD AUTO: 75.6 % (ref 42.7–76)
NEUTROPHILS NFR BLD AUTO: 76.1 % (ref 42.7–76)
NEUTROPHILS NFR BLD AUTO: 76.1 % (ref 42.7–76)
NEUTROPHILS NFR BLD AUTO: 76.5 % (ref 42.7–76)
NEUTROPHILS NFR BLD AUTO: 77.6 % (ref 42.7–76)
NEUTROPHILS NFR BLD AUTO: 77.8 % (ref 42.7–76)
NEUTROPHILS NFR BLD AUTO: 77.9 % (ref 42.7–76)
NEUTROPHILS NFR BLD AUTO: 77.9 % (ref 42.7–76)
NEUTROPHILS NFR BLD AUTO: 79.3 % (ref 42.7–76)
NEUTROPHILS NFR BLD AUTO: 79.8 % (ref 42.7–76)
NEUTROPHILS NFR BLD AUTO: 8.35 10*3/MM3 (ref 1.7–7)
NEUTROPHILS NFR BLD AUTO: 80 % (ref 42.7–76)
NEUTROPHILS NFR BLD AUTO: 80.7 % (ref 42.7–76)
NEUTROPHILS NFR BLD AUTO: 80.9 % (ref 42.7–76)
NEUTROPHILS NFR BLD AUTO: 82.4 % (ref 42.7–76)
NEUTROPHILS NFR BLD AUTO: 82.7 % (ref 42.7–76)
NEUTROPHILS NFR BLD AUTO: 83.6 % (ref 42.7–76)
NEUTROPHILS NFR BLD AUTO: 85.9 % (ref 42.7–76)
NEUTROPHILS NFR BLD AUTO: 9.78 10*3/MM3 (ref 1.7–7)
NEUTROPHILS NFR BLD AUTO: 9.83 10*3/MM3 (ref 1.7–7)
NEUTROPHILS NFR BLD AUTO: 9.86 10*3/MM3 (ref 1.7–7)
NOROVIRUS GI+II RNA STL QL NAA+NON-PROBE: NOT DETECTED
NOTIFIED BY: ABNORMAL
NOTIFIED WHO: ABNORMAL
NRBC BLD AUTO-RTO: 0.2 /100 WBC (ref 0–0.2)
NRBC BLD AUTO-RTO: 0.3 /100 WBC (ref 0–0.2)
NRBC BLD AUTO-RTO: 0.4 /100 WBC (ref 0–0.2)
NRBC BLD AUTO-RTO: 0.4 /100 WBC (ref 0–0.2)
NRBC BLD AUTO-RTO: 0.6 /100 WBC (ref 0–0.2)
NRBC BLD AUTO-RTO: 0.6 /100 WBC (ref 0–0.2)
NRBC BLD AUTO-RTO: 0.8 /100 WBC (ref 0–0.2)
NRBC BLD AUTO-RTO: 0.8 /100 WBC (ref 0–0.2)
NRBC BLD AUTO-RTO: 0.9 /100 WBC (ref 0–0.2)
NRBC BLD AUTO-RTO: 1 /100 WBC (ref 0–0.2)
NRBC BLD AUTO-RTO: 1.2 /100 WBC (ref 0–0.2)
NRBC BLD AUTO-RTO: 1.3 /100 WBC (ref 0–0.2)
NRBC BLD AUTO-RTO: 1.5 /100 WBC (ref 0–0.2)
NRBC BLD AUTO-RTO: 1.8 /100 WBC (ref 0–0.2)
NRBC BLD AUTO-RTO: 1.9 /100 WBC (ref 0–0.2)
NRBC BLD AUTO-RTO: 3 /100 WBC (ref 0–0.2)
NT-PROBNP SERPL-MCNC: ABNORMAL PG/ML (ref 0–1800)
OXYHGB MFR BLDV: 84.1 % (ref 94–99)
OXYHGB MFR BLDV: 89.3 %
OXYHGB MFR BLDV: 91.5 % (ref 94–99)
OXYHGB MFR BLDV: 92.7 % (ref 94–99)
OXYHGB MFR BLDV: 92.8 % (ref 94–99)
OXYHGB MFR BLDV: 92.8 % (ref 94–99)
OXYHGB MFR BLDV: 92.9 % (ref 94–99)
OXYHGB MFR BLDV: 93.4 % (ref 94–99)
OXYHGB MFR BLDV: 93.4 % (ref 94–99)
OXYHGB MFR BLDV: 93.8 % (ref 94–99)
OXYHGB MFR BLDV: 94 % (ref 94–99)
OXYHGB MFR BLDV: 94.2 % (ref 94–99)
OXYHGB MFR BLDV: 94.2 % (ref 94–99)
OXYHGB MFR BLDV: 94.9 % (ref 94–99)
OXYHGB MFR BLDV: 95.4 % (ref 94–99)
OXYHGB MFR BLDV: 95.5 % (ref 94–99)
OXYHGB MFR BLDV: 96 % (ref 94–99)
OXYHGB MFR BLDV: 96.8 % (ref 94–99)
OXYHGB MFR BLDV: 96.9 % (ref 94–99)
P SHIGELLOIDES DNA STL QL NAA+NON-PROBE: NOT DETECTED
PAW @ PEAK INSP FLOW SETTING VENT: 0 CMH2O
PCO2 BLDA: 29.1 MM HG (ref 35–45)
PCO2 BLDA: 34.6 MM HG (ref 35–45)
PCO2 BLDA: 34.6 MM HG (ref 35–45)
PCO2 BLDA: 35.4 MM HG (ref 35–45)
PCO2 BLDA: 36.6 MM HG (ref 35–45)
PCO2 BLDA: 38.4 MM HG (ref 35–45)
PCO2 BLDA: 39.1 MM HG (ref 35–45)
PCO2 BLDA: 39.2 MM HG (ref 35–45)
PCO2 BLDA: 39.8 MM HG (ref 35–45)
PCO2 BLDA: 40 MM HG (ref 35–45)
PCO2 BLDA: 40 MM HG (ref 35–45)
PCO2 BLDA: 40.8 MM HG (ref 35–45)
PCO2 BLDA: 41.1 MM HG (ref 35–45)
PCO2 BLDA: 45 MM HG (ref 35–45)
PCO2 BLDA: 53 MM HG (ref 35–45)
PCO2 BLDA: 59.2 MM HG (ref 35–45)
PCO2 BLDA: 61.6 MM HG (ref 35–45)
PCO2 BLDA: 64.9 MM HG (ref 35–45)
PCO2 BLDV: 44.8 MM HG (ref 41–51)
PCO2 TEMP ADJ BLD: 29.1 MM HG (ref 35–48)
PCO2 TEMP ADJ BLD: 34.6 MM HG (ref 35–48)
PCO2 TEMP ADJ BLD: 34.6 MM HG (ref 35–48)
PCO2 TEMP ADJ BLD: 36.6 MM HG (ref 35–48)
PCO2 TEMP ADJ BLD: 38.4 MM HG (ref 35–48)
PCO2 TEMP ADJ BLD: 39.1 MM HG (ref 35–48)
PCO2 TEMP ADJ BLD: 39.2 MM HG (ref 35–48)
PCO2 TEMP ADJ BLD: 39.8 MM HG (ref 35–48)
PCO2 TEMP ADJ BLD: 40 MM HG (ref 35–48)
PCO2 TEMP ADJ BLD: 40 MM HG (ref 35–48)
PCO2 TEMP ADJ BLD: 40.8 MM HG (ref 35–48)
PCO2 TEMP ADJ BLD: 41.1 MM HG (ref 35–48)
PCO2 TEMP ADJ BLD: 45 MM HG (ref 35–48)
PCO2 TEMP ADJ BLD: 53 MM HG (ref 35–48)
PCO2 TEMP ADJ BLD: 59.2 MM HG (ref 35–48)
PCO2 TEMP ADJ BLD: 61.6 MM HG (ref 35–48)
PCO2 TEMP ADJ BLD: 64.9 MM HG (ref 35–48)
PCO2 TEMP ADJ BLD: <5 MM HG (ref 35–48)
PEEP RESPIRATORY: 5 CM[H2O]
PH BLDA: 7.15 PH UNITS (ref 7.35–7.45)
PH BLDA: 7.17 PH UNITS (ref 7.35–7.45)
PH BLDA: 7.17 PH UNITS (ref 7.35–7.45)
PH BLDA: 7.24 PH UNITS (ref 7.35–7.45)
PH BLDA: 7.31 PH UNITS (ref 7.35–7.45)
PH BLDA: 7.36 PH UNITS (ref 7.35–7.45)
PH BLDA: 7.36 PH UNITS (ref 7.35–7.45)
PH BLDA: 7.38 PH UNITS (ref 7.35–7.45)
PH BLDA: 7.39 PH UNITS (ref 7.35–7.45)
PH BLDA: 7.4 PH UNITS (ref 7.35–7.45)
PH BLDA: 7.4 PH UNITS (ref 7.35–7.45)
PH BLDA: 7.42 PH UNITS (ref 7.35–7.45)
PH BLDA: 7.45 PH UNITS (ref 7.35–7.45)
PH BLDA: 7.47 PH UNITS (ref 7.35–7.45)
PH BLDA: 7.49 PH UNITS (ref 7.35–7.45)
PH BLDA: 7.55 PH UNITS (ref 7.35–7.45)
PH BLDV: 7.23 PH UNITS (ref 7.31–7.41)
PH, TEMP CORRECTED: 7.15 PH UNITS
PH, TEMP CORRECTED: 7.17 PH UNITS
PH, TEMP CORRECTED: 7.17 PH UNITS
PH, TEMP CORRECTED: 7.24 PH UNITS
PH, TEMP CORRECTED: 7.31 PH UNITS
PH, TEMP CORRECTED: 7.36 PH UNITS
PH, TEMP CORRECTED: 7.36 PH UNITS
PH, TEMP CORRECTED: 7.38 PH UNITS
PH, TEMP CORRECTED: 7.39 PH UNITS
PH, TEMP CORRECTED: 7.4 PH UNITS
PH, TEMP CORRECTED: 7.4 PH UNITS
PH, TEMP CORRECTED: 7.42 PH UNITS
PH, TEMP CORRECTED: 7.45 PH UNITS
PH, TEMP CORRECTED: 7.49 PH UNITS
PH, TEMP CORRECTED: 7.55 PH UNITS
PH, TEMP CORRECTED: 7.56 PH UNITS
PHOSPHATE SERPL-MCNC: 3.9 MG/DL (ref 2.5–4.5)
PHOSPHATE SERPL-MCNC: 4.2 MG/DL (ref 2.5–4.5)
PHOSPHATE SERPL-MCNC: 4.3 MG/DL (ref 2.5–4.5)
PHOSPHATE SERPL-MCNC: 4.3 MG/DL (ref 2.5–4.5)
PHOSPHATE SERPL-MCNC: 4.5 MG/DL (ref 2.5–4.5)
PHOSPHATE SERPL-MCNC: 5.1 MG/DL (ref 2.5–4.5)
PHOSPHATE SERPL-MCNC: 5.3 MG/DL (ref 2.5–4.5)
PHOSPHATE SERPL-MCNC: 5.5 MG/DL (ref 2.5–4.5)
PHOSPHATE SERPL-MCNC: 5.7 MG/DL (ref 2.5–4.5)
PHOSPHATE SERPL-MCNC: 5.7 MG/DL (ref 2.5–4.5)
PHOSPHATE SERPL-MCNC: 5.8 MG/DL (ref 2.5–4.5)
PHOSPHATE SERPL-MCNC: 5.9 MG/DL (ref 2.5–4.5)
PHOSPHATE SERPL-MCNC: 6.1 MG/DL (ref 2.5–4.5)
PHOSPHATE SERPL-MCNC: 6.4 MG/DL (ref 2.5–4.5)
PHOSPHATE SERPL-MCNC: 7.2 MG/DL (ref 2.5–4.5)
PLAT MORPH BLD: NORMAL
PLATELET # BLD AUTO: 129 10*3/MM3 (ref 140–450)
PLATELET # BLD AUTO: 133 10*3/MM3 (ref 140–450)
PLATELET # BLD AUTO: 133 10*3/MM3 (ref 140–450)
PLATELET # BLD AUTO: 141 10*3/MM3 (ref 140–450)
PLATELET # BLD AUTO: 143 10*3/MM3 (ref 140–450)
PLATELET # BLD AUTO: 155 10*3/MM3 (ref 140–450)
PLATELET # BLD AUTO: 158 10*3/MM3 (ref 140–450)
PLATELET # BLD AUTO: 159 10*3/MM3 (ref 140–450)
PLATELET # BLD AUTO: 162 10*3/MM3 (ref 140–450)
PLATELET # BLD AUTO: 166 10*3/MM3 (ref 140–450)
PLATELET # BLD AUTO: 167 10*3/MM3 (ref 140–450)
PLATELET # BLD AUTO: 172 10*3/MM3 (ref 140–450)
PLATELET # BLD AUTO: 175 10*3/MM3 (ref 140–450)
PLATELET # BLD AUTO: 175 10*3/MM3 (ref 140–450)
PLATELET # BLD AUTO: 181 10*3/MM3 (ref 140–450)
PLATELET # BLD AUTO: 181 10*3/MM3 (ref 140–450)
PLATELET # BLD AUTO: 186 10*3/MM3 (ref 140–450)
PLATELET # BLD AUTO: 200 10*3/MM3 (ref 140–450)
PLATELET # BLD AUTO: 210 10*3/MM3 (ref 140–450)
PLATELET # BLD AUTO: 212 10*3/MM3 (ref 140–450)
PMV BLD AUTO: 10.1 FL (ref 6–12)
PMV BLD AUTO: 10.1 FL (ref 6–12)
PMV BLD AUTO: 10.2 FL (ref 6–12)
PMV BLD AUTO: 10.3 FL (ref 6–12)
PMV BLD AUTO: 10.4 FL (ref 6–12)
PMV BLD AUTO: 10.5 FL (ref 6–12)
PMV BLD AUTO: 10.6 FL (ref 6–12)
PMV BLD AUTO: 10.6 FL (ref 6–12)
PMV BLD AUTO: 10.7 FL (ref 6–12)
PMV BLD AUTO: 10.8 FL (ref 6–12)
PMV BLD AUTO: 10.8 FL (ref 6–12)
PMV BLD AUTO: 10.9 FL (ref 6–12)
PMV BLD AUTO: 9.5 FL (ref 6–12)
PMV BLD AUTO: 9.7 FL (ref 6–12)
PMV BLD AUTO: 9.9 FL (ref 6–12)
PO2 BLDA: 102 MM HG (ref 83–108)
PO2 BLDA: 119 MM HG (ref 83–108)
PO2 BLDA: 126 MM HG (ref 83–108)
PO2 BLDA: 296 MM HG (ref 83–108)
PO2 BLDA: 328 MM HG (ref 83–108)
PO2 BLDA: 57.5 MM HG (ref 83–108)
PO2 BLDA: 71.7 MM HG (ref 83–108)
PO2 BLDA: 73.2 MM HG (ref 83–108)
PO2 BLDA: 79.3 MM HG (ref 83–108)
PO2 BLDA: 79.7 MM HG (ref 83–108)
PO2 BLDA: 81.8 MM HG (ref 83–108)
PO2 BLDA: 82.5 MM HG (ref 83–108)
PO2 BLDA: 85.6 MM HG (ref 83–108)
PO2 BLDA: 86.1 MM HG (ref 83–108)
PO2 BLDA: 87.7 MM HG (ref 83–108)
PO2 BLDA: 90.3 MM HG (ref 83–108)
PO2 BLDA: 96.5 MM HG (ref 83–108)
PO2 BLDA: 98.3 MM HG (ref 83–108)
PO2 BLDV: 77.7 MM HG (ref 27–53)
PO2 TEMP ADJ BLD: 102 MM HG (ref 83–108)
PO2 TEMP ADJ BLD: 119 MM HG (ref 83–108)
PO2 TEMP ADJ BLD: 126 MM HG (ref 83–108)
PO2 TEMP ADJ BLD: 296 MM HG (ref 83–108)
PO2 TEMP ADJ BLD: 328 MM HG (ref 83–108)
PO2 TEMP ADJ BLD: 57.5 MM HG (ref 83–108)
PO2 TEMP ADJ BLD: 71.7 MM HG (ref 83–108)
PO2 TEMP ADJ BLD: 73.2 MM HG (ref 83–108)
PO2 TEMP ADJ BLD: 79.3 MM HG (ref 83–108)
PO2 TEMP ADJ BLD: 79.7 MM HG (ref 83–108)
PO2 TEMP ADJ BLD: 8.44 MM HG (ref 83–108)
PO2 TEMP ADJ BLD: 81.8 MM HG (ref 83–108)
PO2 TEMP ADJ BLD: 82.5 MM HG (ref 83–108)
PO2 TEMP ADJ BLD: 85.6 MM HG (ref 83–108)
PO2 TEMP ADJ BLD: 86.1 MM HG (ref 83–108)
PO2 TEMP ADJ BLD: 90.3 MM HG (ref 83–108)
PO2 TEMP ADJ BLD: 96.5 MM HG (ref 83–108)
PO2 TEMP ADJ BLD: 98.3 MM HG (ref 83–108)
POTASSIUM SERPL-SCNC: 3.2 MMOL/L (ref 3.5–5.2)
POTASSIUM SERPL-SCNC: 3.2 MMOL/L (ref 3.5–5.2)
POTASSIUM SERPL-SCNC: 3.4 MMOL/L (ref 3.5–5.2)
POTASSIUM SERPL-SCNC: 3.4 MMOL/L (ref 3.5–5.2)
POTASSIUM SERPL-SCNC: 3.5 MMOL/L (ref 3.5–5.2)
POTASSIUM SERPL-SCNC: 3.9 MMOL/L (ref 3.5–5.2)
POTASSIUM SERPL-SCNC: 4 MMOL/L (ref 3.5–5.2)
POTASSIUM SERPL-SCNC: 4.1 MMOL/L (ref 3.5–5.2)
POTASSIUM SERPL-SCNC: 4.2 MMOL/L (ref 3.5–5.2)
POTASSIUM SERPL-SCNC: 4.3 MMOL/L (ref 3.5–5.2)
POTASSIUM SERPL-SCNC: 4.3 MMOL/L (ref 3.5–5.2)
POTASSIUM SERPL-SCNC: 4.4 MMOL/L (ref 3.5–5.2)
POTASSIUM SERPL-SCNC: 4.5 MMOL/L (ref 3.5–5.2)
POTASSIUM SERPL-SCNC: 4.5 MMOL/L (ref 3.5–5.2)
POTASSIUM SERPL-SCNC: 4.6 MMOL/L (ref 3.5–5.2)
POTASSIUM SERPL-SCNC: 4.8 MMOL/L (ref 3.5–5.2)
POTASSIUM SERPL-SCNC: 4.9 MMOL/L (ref 3.5–5.2)
POTASSIUM SERPL-SCNC: 5 MMOL/L (ref 3.5–5.2)
POTASSIUM SERPL-SCNC: 5 MMOL/L (ref 3.5–5.2)
PREALB SERPL-MCNC: 11.2 MG/DL (ref 20–40)
PREALB SERPL-MCNC: 14 MG/DL (ref 20–40)
PROCALCITONIN SERPL-MCNC: 0.59 NG/ML (ref 0–0.25)
PROCALCITONIN SERPL-MCNC: 0.63 NG/ML (ref 0–0.25)
PROCALCITONIN SERPL-MCNC: 1.67 NG/ML (ref 0–0.25)
PROCALCITONIN SERPL-MCNC: 2.74 NG/ML (ref 0–0.25)
PROT SERPL-MCNC: 4.9 G/DL (ref 6–8.5)
PROT SERPL-MCNC: 5.3 G/DL (ref 6–8.5)
PROT SERPL-MCNC: 5.4 G/DL (ref 6–8.5)
PROT SERPL-MCNC: 5.6 G/DL (ref 6–8.5)
PROT SERPL-MCNC: 5.6 G/DL (ref 6–8.5)
PROT SERPL-MCNC: 5.7 G/DL (ref 6–8.5)
PROT SERPL-MCNC: 5.7 G/DL (ref 6–8.5)
PROT SERPL-MCNC: 5.8 G/DL (ref 6–8.5)
PROT SERPL-MCNC: 5.8 G/DL (ref 6–8.5)
PROT SERPL-MCNC: 5.9 G/DL (ref 6–8.5)
PROT SERPL-MCNC: 5.9 G/DL (ref 6–8.5)
PROT SERPL-MCNC: 6.2 G/DL (ref 6–8.5)
PROT SERPL-MCNC: 6.4 G/DL (ref 6–8.5)
PROT SERPL-MCNC: 6.6 G/DL (ref 6–8.5)
PROT SERPL-MCNC: 7 G/DL (ref 6–8.5)
PROTHROMBIN TIME: 15.1 SECONDS (ref 12.2–14.5)
PROTHROMBIN TIME: 16.5 SECONDS (ref 12.2–14.5)
PROTHROMBIN TIME: 16.6 SECONDS (ref 12.2–14.5)
PROTHROMBIN TIME: 21.9 SECONDS (ref 12.2–14.5)
PROTHROMBIN TIME: 23.1 SECONDS (ref 12.2–14.5)
PROTHROMBIN TIME: 23.3 SECONDS (ref 12.2–14.5)
PROTHROMBIN TIME: 23.4 SECONDS (ref 12.2–14.5)
PROTHROMBIN TIME: 23.7 SECONDS (ref 12.2–14.5)
PROTHROMBIN TIME: 23.9 SECONDS (ref 12.2–14.5)
PROTHROMBIN TIME: 24.2 SECONDS (ref 12.2–14.5)
PROTHROMBIN TIME: 25.2 SECONDS (ref 12.2–14.5)
PROTHROMBIN TIME: 25.9 SECONDS (ref 12.2–14.5)
PROTHROMBIN TIME: 27.2 SECONDS (ref 12.2–14.5)
PROTHROMBIN TIME: 28.6 SECONDS (ref 12.2–14.5)
PROTHROMBIN TIME: 32.2 SECONDS (ref 12.2–14.5)
PROTHROMBIN TIME: 34.7 SECONDS (ref 12.2–14.5)
PROTHROMBIN TIME: 35.7 SECONDS (ref 12.2–14.5)
PSV: 10 CMH2O
PSV: 10 CMH2O
PTH-INTACT SERPL-MCNC: 569 PG/ML (ref 15–65)
QT INTERVAL: 298 MS
QT INTERVAL: 316 MS
QT INTERVAL: 348 MS
QT INTERVAL: 388 MS
QTC INTERVAL: 407 MS
QTC INTERVAL: 415 MS
QTC INTERVAL: 447 MS
QTC INTERVAL: 451 MS
RBC # BLD AUTO: 2.05 10*6/MM3 (ref 4.14–5.8)
RBC # BLD AUTO: 2.08 10*6/MM3 (ref 4.14–5.8)
RBC # BLD AUTO: 2.1 10*6/MM3 (ref 4.14–5.8)
RBC # BLD AUTO: 2.11 10*6/MM3 (ref 4.14–5.8)
RBC # BLD AUTO: 2.14 10*6/MM3 (ref 4.14–5.8)
RBC # BLD AUTO: 2.14 10*6/MM3 (ref 4.14–5.8)
RBC # BLD AUTO: 2.15 10*6/MM3 (ref 4.14–5.8)
RBC # BLD AUTO: 2.17 10*6/MM3 (ref 4.14–5.8)
RBC # BLD AUTO: 2.19 10*6/MM3 (ref 4.14–5.8)
RBC # BLD AUTO: 2.19 10*6/MM3 (ref 4.14–5.8)
RBC # BLD AUTO: 2.21 10*6/MM3 (ref 4.14–5.8)
RBC # BLD AUTO: 2.28 10*6/MM3 (ref 4.14–5.8)
RBC # BLD AUTO: 2.34 10*6/MM3 (ref 4.14–5.8)
RBC # BLD AUTO: 2.34 10*6/MM3 (ref 4.14–5.8)
RBC # BLD AUTO: 2.36 10*6/MM3 (ref 4.14–5.8)
RBC # BLD AUTO: 2.43 10*6/MM3 (ref 4.14–5.8)
RBC # BLD AUTO: 2.56 10*6/MM3 (ref 4.14–5.8)
RBC # BLD AUTO: 2.57 10*6/MM3 (ref 4.14–5.8)
RBC # BLD AUTO: 2.88 10*6/MM3 (ref 4.14–5.8)
RBC # BLD AUTO: 3.46 10*6/MM3 (ref 4.14–5.8)
RH BLD: POSITIVE
RH BLD: POSITIVE
RVA RNA STL QL NAA+NON-PROBE: NOT DETECTED
S ENT+BONG DNA STL QL NAA+NON-PROBE: NOT DETECTED
SAPO I+II+IV+V RNA STL QL NAA+NON-PROBE: NOT DETECTED
SARS-COV-2 AG RESP QL IA.RAPID: NORMAL
SHIGELLA SP+EIEC IPAH ST NAA+NON-PROBE: NOT DETECTED
SODIUM SERPL-SCNC: 131 MMOL/L (ref 136–145)
SODIUM SERPL-SCNC: 132 MMOL/L (ref 136–145)
SODIUM SERPL-SCNC: 133 MMOL/L (ref 136–145)
SODIUM SERPL-SCNC: 134 MMOL/L (ref 136–145)
SODIUM SERPL-SCNC: 135 MMOL/L (ref 136–145)
SODIUM SERPL-SCNC: 135 MMOL/L (ref 136–145)
SODIUM SERPL-SCNC: 136 MMOL/L (ref 136–145)
SODIUM SERPL-SCNC: 138 MMOL/L (ref 136–145)
SODIUM SERPL-SCNC: 139 MMOL/L (ref 136–145)
SODIUM SERPL-SCNC: 140 MMOL/L (ref 136–145)
SODIUM SERPL-SCNC: 141 MMOL/L (ref 136–145)
T&S EXPIRATION DATE: NORMAL
T&S EXPIRATION DATE: NORMAL
TOTAL RATE: 0 BREATHS/MINUTE
TOTAL RATE: 18 BREATHS/MINUTE
TOTAL RATE: 20 BREATHS/MINUTE
TRIGL SERPL-MCNC: 230 MG/DL (ref 0–150)
TRIGL SERPL-MCNC: 85 MG/DL (ref 0–150)
TROPONIN T DELTA: -7 NG/L
TROPONIN T DELTA: 28 NG/L
TROPONIN T SERPL HS-MCNC: 107 NG/L
TROPONIN T SERPL HS-MCNC: 207 NG/L
TSH SERPL DL<=0.05 MIU/L-ACNC: 3.18 UIU/ML (ref 0.27–4.2)
UFH PPP CHRO-ACNC: 0.1 IU/ML (ref 0.3–0.7)
UFH PPP CHRO-ACNC: 0.38 IU/ML (ref 0.3–0.7)
UFH PPP CHRO-ACNC: 0.47 IU/ML (ref 0.3–0.7)
UFH PPP CHRO-ACNC: 0.51 IU/ML (ref 0.3–0.7)
UNIT  ABO: NORMAL
UNIT  RH: NORMAL
V CHOL+PARA+VUL DNA STL QL NAA+NON-PROBE: NOT DETECTED
V CHOLERAE DNA STL QL NAA+NON-PROBE: NOT DETECTED
VENTILATOR MODE: ABNORMAL
VIT B12 BLD-MCNC: 996 PG/ML (ref 211–946)
VT ON VENT VENT: 0.44 ML
VT ON VENT VENT: 0.45 ML
VT ON VENT VENT: 0.5 ML
WBC MORPH BLD: NORMAL
WBC NRBC COR # BLD AUTO: 10.31 10*3/MM3 (ref 3.4–10.8)
WBC NRBC COR # BLD AUTO: 10.72 10*3/MM3 (ref 3.4–10.8)
WBC NRBC COR # BLD AUTO: 11.86 10*3/MM3 (ref 3.4–10.8)
WBC NRBC COR # BLD AUTO: 11.92 10*3/MM3 (ref 3.4–10.8)
WBC NRBC COR # BLD AUTO: 12.66 10*3/MM3 (ref 3.4–10.8)
WBC NRBC COR # BLD AUTO: 5.98 10*3/MM3 (ref 3.4–10.8)
WBC NRBC COR # BLD AUTO: 6.94 10*3/MM3 (ref 3.4–10.8)
WBC NRBC COR # BLD AUTO: 7.53 10*3/MM3 (ref 3.4–10.8)
WBC NRBC COR # BLD AUTO: 7.57 10*3/MM3 (ref 3.4–10.8)
WBC NRBC COR # BLD AUTO: 7.64 10*3/MM3 (ref 3.4–10.8)
WBC NRBC COR # BLD AUTO: 7.73 10*3/MM3 (ref 3.4–10.8)
WBC NRBC COR # BLD AUTO: 7.76 10*3/MM3 (ref 3.4–10.8)
WBC NRBC COR # BLD AUTO: 7.78 10*3/MM3 (ref 3.4–10.8)
WBC NRBC COR # BLD AUTO: 8.61 10*3/MM3 (ref 3.4–10.8)
WBC NRBC COR # BLD AUTO: 8.74 10*3/MM3 (ref 3.4–10.8)
WBC NRBC COR # BLD AUTO: 9.07 10*3/MM3 (ref 3.4–10.8)
WBC NRBC COR # BLD AUTO: 9.37 10*3/MM3 (ref 3.4–10.8)
WBC NRBC COR # BLD AUTO: 9.37 10*3/MM3 (ref 3.4–10.8)
WBC NRBC COR # BLD AUTO: 9.54 10*3/MM3 (ref 3.4–10.8)
WBC NRBC COR # BLD AUTO: 9.79 10*3/MM3 (ref 3.4–10.8)
WHOLE BLOOD HOLD COAG: NORMAL
WHOLE BLOOD HOLD SPECIMEN: NORMAL
Y ENTEROCOL DNA STL QL NAA+NON-PROBE: NOT DETECTED

## 2024-01-01 PROCEDURE — 86901 BLOOD TYPING SEROLOGIC RH(D): CPT | Performed by: NURSE PRACTITIONER

## 2024-01-01 PROCEDURE — 25010000002 LEVETRIRACETAM PER 10 MG

## 2024-01-01 PROCEDURE — 82948 REAGENT STRIP/BLOOD GLUCOSE: CPT

## 2024-01-01 PROCEDURE — 94761 N-INVAS EAR/PLS OXIMETRY MLT: CPT

## 2024-01-01 PROCEDURE — 99291 CRITICAL CARE FIRST HOUR: CPT | Performed by: INTERNAL MEDICINE

## 2024-01-01 PROCEDURE — 99232 SBSQ HOSP IP/OBS MODERATE 35: CPT | Performed by: PSYCHIATRY & NEUROLOGY

## 2024-01-01 PROCEDURE — 84100 ASSAY OF PHOSPHORUS: CPT

## 2024-01-01 PROCEDURE — 25010000002 CEFTRIAXONE PER 250 MG: Performed by: FAMILY MEDICINE

## 2024-01-01 PROCEDURE — 85610 PROTHROMBIN TIME: CPT | Performed by: FAMILY MEDICINE

## 2024-01-01 PROCEDURE — 70450 CT HEAD/BRAIN W/O DYE: CPT

## 2024-01-01 PROCEDURE — 85014 HEMATOCRIT: CPT | Performed by: INTERNAL MEDICINE

## 2024-01-01 PROCEDURE — 94799 UNLISTED PULMONARY SVC/PX: CPT

## 2024-01-01 PROCEDURE — 87040 BLOOD CULTURE FOR BACTERIA: CPT | Performed by: PHYSICIAN ASSISTANT

## 2024-01-01 PROCEDURE — 82375 ASSAY CARBOXYHB QUANT: CPT

## 2024-01-01 PROCEDURE — C1751 CATH, INF, PER/CENT/MIDLINE: HCPCS

## 2024-01-01 PROCEDURE — 86900 BLOOD TYPING SEROLOGIC ABO: CPT | Performed by: NURSE PRACTITIONER

## 2024-01-01 PROCEDURE — 36430 TRANSFUSION BLD/BLD COMPNT: CPT

## 2024-01-01 PROCEDURE — 25010000002 PROPOFOL 10 MG/ML EMULSION

## 2024-01-01 PROCEDURE — 94002 VENT MGMT INPAT INIT DAY: CPT

## 2024-01-01 PROCEDURE — 25010000002 LEVETRIRACETAM PER 10 MG: Performed by: PSYCHIATRY & NEUROLOGY

## 2024-01-01 PROCEDURE — 85520 HEPARIN ASSAY: CPT

## 2024-01-01 PROCEDURE — 25010000002 CEFTRIAXONE PER 250 MG: Performed by: PHYSICIAN ASSISTANT

## 2024-01-01 PROCEDURE — 95714 VEEG EA 12-26 HR UNMNTR: CPT

## 2024-01-01 PROCEDURE — 25010000002 ALBUMIN HUMAN 25% PER 50 ML: Performed by: NURSE PRACTITIONER

## 2024-01-01 PROCEDURE — 86923 COMPATIBILITY TEST ELECTRIC: CPT

## 2024-01-01 PROCEDURE — 93005 ELECTROCARDIOGRAM TRACING: CPT | Performed by: EMERGENCY MEDICINE

## 2024-01-01 PROCEDURE — 25010000002 DEXAMETHASONE PER 1 MG: Performed by: FAMILY MEDICINE

## 2024-01-01 PROCEDURE — 76937 US GUIDE VASCULAR ACCESS: CPT

## 2024-01-01 PROCEDURE — 94003 VENT MGMT INPAT SUBQ DAY: CPT

## 2024-01-01 PROCEDURE — 80053 COMPREHEN METABOLIC PANEL: CPT | Performed by: FAMILY MEDICINE

## 2024-01-01 PROCEDURE — 25010000002 ONDANSETRON PER 1 MG: Performed by: PHYSICIAN ASSISTANT

## 2024-01-01 PROCEDURE — P9047 ALBUMIN (HUMAN), 25%, 50ML: HCPCS | Performed by: INTERNAL MEDICINE

## 2024-01-01 PROCEDURE — 99291 CRITICAL CARE FIRST HOUR: CPT

## 2024-01-01 PROCEDURE — 25010000002 HYDROCORTISONE SOD SUC (PF) 100 MG RECONSTITUTED SOLUTION: Performed by: INTERNAL MEDICINE

## 2024-01-01 PROCEDURE — 97161 PT EVAL LOW COMPLEX 20 MIN: CPT

## 2024-01-01 PROCEDURE — 99232 SBSQ HOSP IP/OBS MODERATE 35: CPT | Performed by: STUDENT IN AN ORGANIZED HEALTH CARE EDUCATION/TRAINING PROGRAM

## 2024-01-01 PROCEDURE — 82140 ASSAY OF AMMONIA: CPT | Performed by: INTERNAL MEDICINE

## 2024-01-01 PROCEDURE — 25010000002 FENTANYL 10 MCG/1 ML NS: Performed by: INTERNAL MEDICINE

## 2024-01-01 PROCEDURE — 94660 CPAP INITIATION&MGMT: CPT

## 2024-01-01 PROCEDURE — 83050 HGB METHEMOGLOBIN QUAN: CPT

## 2024-01-01 PROCEDURE — 99233 SBSQ HOSP IP/OBS HIGH 50: CPT | Performed by: INTERNAL MEDICINE

## 2024-01-01 PROCEDURE — 86140 C-REACTIVE PROTEIN: CPT

## 2024-01-01 PROCEDURE — 83036 HEMOGLOBIN GLYCOSYLATED A1C: CPT | Performed by: STUDENT IN AN ORGANIZED HEALTH CARE EDUCATION/TRAINING PROGRAM

## 2024-01-01 PROCEDURE — 80069 RENAL FUNCTION PANEL: CPT | Performed by: INTERNAL MEDICINE

## 2024-01-01 PROCEDURE — 31500 INSERT EMERGENCY AIRWAY: CPT

## 2024-01-01 PROCEDURE — 25010000002 MIDAZOLAM PER 1 MG

## 2024-01-01 PROCEDURE — 93306 TTE W/DOPPLER COMPLETE: CPT | Performed by: INTERNAL MEDICINE

## 2024-01-01 PROCEDURE — 25010000002 MIDAZOLAM PER 1 MG: Performed by: INTERNAL MEDICINE

## 2024-01-01 PROCEDURE — 36600 WITHDRAWAL OF ARTERIAL BLOOD: CPT

## 2024-01-01 PROCEDURE — 83735 ASSAY OF MAGNESIUM: CPT | Performed by: INTERNAL MEDICINE

## 2024-01-01 PROCEDURE — 85018 HEMOGLOBIN: CPT | Performed by: INTERNAL MEDICINE

## 2024-01-01 PROCEDURE — 71045 X-RAY EXAM CHEST 1 VIEW: CPT

## 2024-01-01 PROCEDURE — 25810000003 SODIUM CHLORIDE 0.9 % SOLUTION 250 ML FLEX CONT: Performed by: FAMILY MEDICINE

## 2024-01-01 PROCEDURE — 84145 PROCALCITONIN (PCT): CPT | Performed by: PHYSICIAN ASSISTANT

## 2024-01-01 PROCEDURE — 80048 BASIC METABOLIC PNL TOTAL CA: CPT | Performed by: INTERNAL MEDICINE

## 2024-01-01 PROCEDURE — 85025 COMPLETE CBC W/AUTO DIFF WBC: CPT | Performed by: INTERNAL MEDICINE

## 2024-01-01 PROCEDURE — 25010000002 POTASSIUM CHLORIDE PER 2 MEQ

## 2024-01-01 PROCEDURE — 25010000002 FENTANYL CITRATE (PF) 50 MCG/ML SOLUTION

## 2024-01-01 PROCEDURE — 83735 ASSAY OF MAGNESIUM: CPT

## 2024-01-01 PROCEDURE — 83605 ASSAY OF LACTIC ACID: CPT

## 2024-01-01 PROCEDURE — 25010000002 EPOETIN ALFA-EPBX 10000 UNIT/ML SOLUTION: Performed by: INTERNAL MEDICINE

## 2024-01-01 PROCEDURE — 63710000001 DEXAMETHASONE PER 0.25 MG: Performed by: FAMILY MEDICINE

## 2024-01-01 PROCEDURE — 82805 BLOOD GASES W/O2 SATURATION: CPT

## 2024-01-01 PROCEDURE — 36415 COLL VENOUS BLD VENIPUNCTURE: CPT

## 2024-01-01 PROCEDURE — 93010 ELECTROCARDIOGRAM REPORT: CPT | Performed by: STUDENT IN AN ORGANIZED HEALTH CARE EDUCATION/TRAINING PROGRAM

## 2024-01-01 PROCEDURE — 84100 ASSAY OF PHOSPHORUS: CPT | Performed by: INTERNAL MEDICINE

## 2024-01-01 PROCEDURE — 25010000002 HEPARIN (PORCINE) 25000-0.45 UT/250ML-% SOLUTION: Performed by: INTERNAL MEDICINE

## 2024-01-01 PROCEDURE — 84484 ASSAY OF TROPONIN QUANT: CPT

## 2024-01-01 PROCEDURE — P9047 ALBUMIN (HUMAN), 25%, 50ML: HCPCS | Performed by: NURSE PRACTITIONER

## 2024-01-01 PROCEDURE — 25010000002 MIDAZOLAM 1 MG/ML 100ML NS 100 MG/100ML SOLUTION: Performed by: INTERNAL MEDICINE

## 2024-01-01 PROCEDURE — 74018 RADEX ABDOMEN 1 VIEW: CPT

## 2024-01-01 PROCEDURE — 83605 ASSAY OF LACTIC ACID: CPT | Performed by: NURSE PRACTITIONER

## 2024-01-01 PROCEDURE — 25010000002 REMDESIVIR 100 MG/20ML SOLUTION 1 EACH VIAL: Performed by: FAMILY MEDICINE

## 2024-01-01 PROCEDURE — 25010000002 ALBUMIN HUMAN 25% PER 50 ML: Performed by: INTERNAL MEDICINE

## 2024-01-01 PROCEDURE — 25010000002 PHENYLEPHRINE 10 MG/ML SOLUTION 5 ML VIAL

## 2024-01-01 PROCEDURE — 83880 ASSAY OF NATRIURETIC PEPTIDE: CPT

## 2024-01-01 PROCEDURE — 25010000002 PIPERACILLIN SOD-TAZOBACTAM PER 1 G: Performed by: INTERNAL MEDICINE

## 2024-01-01 PROCEDURE — 80053 COMPREHEN METABOLIC PANEL: CPT

## 2024-01-01 PROCEDURE — 84134 ASSAY OF PREALBUMIN: CPT

## 2024-01-01 PROCEDURE — 93306 TTE W/DOPPLER COMPLETE: CPT

## 2024-01-01 PROCEDURE — 80053 COMPREHEN METABOLIC PANEL: CPT | Performed by: STUDENT IN AN ORGANIZED HEALTH CARE EDUCATION/TRAINING PROGRAM

## 2024-01-01 PROCEDURE — 84478 ASSAY OF TRIGLYCERIDES: CPT

## 2024-01-01 PROCEDURE — 82272 OCCULT BLD FECES 1-3 TESTS: CPT | Performed by: NURSE PRACTITIONER

## 2024-01-01 PROCEDURE — 85610 PROTHROMBIN TIME: CPT | Performed by: PHYSICIAN ASSISTANT

## 2024-01-01 PROCEDURE — 82140 ASSAY OF AMMONIA: CPT | Performed by: NURSE PRACTITIONER

## 2024-01-01 PROCEDURE — 83735 ASSAY OF MAGNESIUM: CPT | Performed by: FAMILY MEDICINE

## 2024-01-01 PROCEDURE — 0BH17EZ INSERTION OF ENDOTRACHEAL AIRWAY INTO TRACHEA, VIA NATURAL OR ARTIFICIAL OPENING: ICD-10-PCS | Performed by: INTERNAL MEDICINE

## 2024-01-01 PROCEDURE — 94640 AIRWAY INHALATION TREATMENT: CPT

## 2024-01-01 PROCEDURE — 63710000001 INSULIN REGULAR HUMAN PER 5 UNITS

## 2024-01-01 PROCEDURE — 94664 DEMO&/EVAL PT USE INHALER: CPT

## 2024-01-01 PROCEDURE — P9016 RBC LEUKOCYTES REDUCED: HCPCS

## 2024-01-01 PROCEDURE — 84145 PROCALCITONIN (PCT): CPT | Performed by: INTERNAL MEDICINE

## 2024-01-01 PROCEDURE — 25010000002 MAGNESIUM SULFATE PER 500 MG OF MAGNESIUM

## 2024-01-01 PROCEDURE — 87070 CULTURE OTHR SPECIMN AEROBIC: CPT | Performed by: INTERNAL MEDICINE

## 2024-01-01 PROCEDURE — 99223 1ST HOSP IP/OBS HIGH 75: CPT | Performed by: FAMILY MEDICINE

## 2024-01-01 PROCEDURE — 95819 EEG AWAKE AND ASLEEP: CPT

## 2024-01-01 PROCEDURE — 25010000002 ALBUMIN HUMAN 25% PER 50 ML

## 2024-01-01 PROCEDURE — 84100 ASSAY OF PHOSPHORUS: CPT | Performed by: FAMILY MEDICINE

## 2024-01-01 PROCEDURE — 87205 SMEAR GRAM STAIN: CPT | Performed by: INTERNAL MEDICINE

## 2024-01-01 PROCEDURE — 83880 ASSAY OF NATRIURETIC PEPTIDE: CPT | Performed by: EMERGENCY MEDICINE

## 2024-01-01 PROCEDURE — 95711 VEEG 2-12 HR UNMONITORED: CPT

## 2024-01-01 PROCEDURE — 25010000003 DEXTROSE 5 % SOLUTION 1,000 ML FLEX CONT

## 2024-01-01 PROCEDURE — 02HV33Z INSERTION OF INFUSION DEVICE INTO SUPERIOR VENA CAVA, PERCUTANEOUS APPROACH: ICD-10-PCS | Performed by: INTERNAL MEDICINE

## 2024-01-01 PROCEDURE — 80053 COMPREHEN METABOLIC PANEL: CPT | Performed by: INTERNAL MEDICINE

## 2024-01-01 PROCEDURE — 25010000002 METOCLOPRAMIDE PER 10 MG

## 2024-01-01 PROCEDURE — 86900 BLOOD TYPING SEROLOGIC ABO: CPT

## 2024-01-01 PROCEDURE — 86850 RBC ANTIBODY SCREEN: CPT | Performed by: NURSE PRACTITIONER

## 2024-01-01 PROCEDURE — 85014 HEMATOCRIT: CPT | Performed by: STUDENT IN AN ORGANIZED HEALTH CARE EDUCATION/TRAINING PROGRAM

## 2024-01-01 PROCEDURE — 82607 VITAMIN B-12: CPT | Performed by: PHYSICIAN ASSISTANT

## 2024-01-01 PROCEDURE — 25010000002 FENTANYL CITRATE (PF) 50 MCG/ML SOLUTION: Performed by: INTERNAL MEDICINE

## 2024-01-01 PROCEDURE — 93010 ELECTROCARDIOGRAM REPORT: CPT | Performed by: INTERNAL MEDICINE

## 2024-01-01 PROCEDURE — 87507 IADNA-DNA/RNA PROBE TQ 12-25: CPT | Performed by: STUDENT IN AN ORGANIZED HEALTH CARE EDUCATION/TRAINING PROGRAM

## 2024-01-01 PROCEDURE — 83605 ASSAY OF LACTIC ACID: CPT | Performed by: INTERNAL MEDICINE

## 2024-01-01 PROCEDURE — 87426 SARSCOV CORONAVIRUS AG IA: CPT | Performed by: INTERNAL MEDICINE

## 2024-01-01 PROCEDURE — 85610 PROTHROMBIN TIME: CPT

## 2024-01-01 PROCEDURE — 4A133J1 MONITORING OF ARTERIAL PULSE, PERIPHERAL, PERCUTANEOUS APPROACH: ICD-10-PCS | Performed by: INTERNAL MEDICINE

## 2024-01-01 PROCEDURE — 25810000003 SODIUM CHLORIDE 0.9 % SOLUTION: Performed by: INTERNAL MEDICINE

## 2024-01-01 PROCEDURE — 85007 BL SMEAR W/DIFF WBC COUNT: CPT | Performed by: NURSE PRACTITIONER

## 2024-01-01 PROCEDURE — 80053 COMPREHEN METABOLIC PANEL: CPT | Performed by: EMERGENCY MEDICINE

## 2024-01-01 PROCEDURE — 83605 ASSAY OF LACTIC ACID: CPT | Performed by: PHYSICIAN ASSISTANT

## 2024-01-01 PROCEDURE — 43235 EGD DIAGNOSTIC BRUSH WASH: CPT | Performed by: INTERNAL MEDICINE

## 2024-01-01 PROCEDURE — 84443 ASSAY THYROID STIM HORMONE: CPT | Performed by: INTERNAL MEDICINE

## 2024-01-01 PROCEDURE — 5A1955Z RESPIRATORY VENTILATION, GREATER THAN 96 CONSECUTIVE HOURS: ICD-10-PCS | Performed by: INTERNAL MEDICINE

## 2024-01-01 PROCEDURE — XW033E5 INTRODUCTION OF REMDESIVIR ANTI-INFECTIVE INTO PERIPHERAL VEIN, PERCUTANEOUS APPROACH, NEW TECHNOLOGY GROUP 5: ICD-10-PCS | Performed by: INTERNAL MEDICINE

## 2024-01-01 PROCEDURE — 85025 COMPLETE CBC W/AUTO DIFF WBC: CPT | Performed by: STUDENT IN AN ORGANIZED HEALTH CARE EDUCATION/TRAINING PROGRAM

## 2024-01-01 PROCEDURE — 3E0G76Z INTRODUCTION OF NUTRITIONAL SUBSTANCE INTO UPPER GI, VIA NATURAL OR ARTIFICIAL OPENING: ICD-10-PCS | Performed by: INTERNAL MEDICINE

## 2024-01-01 PROCEDURE — 84484 ASSAY OF TROPONIN QUANT: CPT | Performed by: EMERGENCY MEDICINE

## 2024-01-01 PROCEDURE — C1769 GUIDE WIRE: HCPCS | Performed by: INTERNAL MEDICINE

## 2024-01-01 PROCEDURE — 82533 TOTAL CORTISOL: CPT | Performed by: INTERNAL MEDICINE

## 2024-01-01 PROCEDURE — 85018 HEMOGLOBIN: CPT | Performed by: STUDENT IN AN ORGANIZED HEALTH CARE EDUCATION/TRAINING PROGRAM

## 2024-01-01 PROCEDURE — 95720 EEG PHY/QHP EA INCR W/VEEG: CPT | Performed by: PSYCHIATRY & NEUROLOGY

## 2024-01-01 PROCEDURE — 85025 COMPLETE CBC W/AUTO DIFF WBC: CPT | Performed by: NURSE PRACTITIONER

## 2024-01-01 PROCEDURE — 36620 INSERTION CATHETER ARTERY: CPT

## 2024-01-01 PROCEDURE — 25010000002 POTASSIUM CHLORIDE PER 2 MEQ: Performed by: INTERNAL MEDICINE

## 2024-01-01 PROCEDURE — 85025 COMPLETE CBC W/AUTO DIFF WBC: CPT

## 2024-01-01 PROCEDURE — 92950 HEART/LUNG RESUSCITATION CPR: CPT

## 2024-01-01 PROCEDURE — 85025 COMPLETE CBC W/AUTO DIFF WBC: CPT | Performed by: EMERGENCY MEDICINE

## 2024-01-01 PROCEDURE — 74176 CT ABD & PELVIS W/O CONTRAST: CPT

## 2024-01-01 PROCEDURE — 4A133B1 MONITORING OF ARTERIAL PRESSURE, PERIPHERAL, PERCUTANEOUS APPROACH: ICD-10-PCS | Performed by: INTERNAL MEDICINE

## 2024-01-01 PROCEDURE — P9047 ALBUMIN (HUMAN), 25%, 50ML: HCPCS

## 2024-01-01 PROCEDURE — 83970 ASSAY OF PARATHORMONE: CPT | Performed by: INTERNAL MEDICINE

## 2024-01-01 PROCEDURE — 99231 SBSQ HOSP IP/OBS SF/LOW 25: CPT | Performed by: INTERNAL MEDICINE

## 2024-01-01 PROCEDURE — 82330 ASSAY OF CALCIUM: CPT

## 2024-01-01 PROCEDURE — 83880 ASSAY OF NATRIURETIC PEPTIDE: CPT | Performed by: INTERNAL MEDICINE

## 2024-01-01 PROCEDURE — 0DJ08ZZ INSPECTION OF UPPER INTESTINAL TRACT, VIA NATURAL OR ARTIFICIAL OPENING ENDOSCOPIC: ICD-10-PCS | Performed by: INTERNAL MEDICINE

## 2024-01-01 PROCEDURE — 25010000002 MIDAZOLAM PER 1 MG: Performed by: PHYSICAL MEDICINE & REHABILITATION

## 2024-01-01 PROCEDURE — 83880 ASSAY OF NATRIURETIC PEPTIDE: CPT | Performed by: NURSE PRACTITIONER

## 2024-01-01 PROCEDURE — 99222 1ST HOSP IP/OBS MODERATE 55: CPT | Performed by: PHYSICIAN ASSISTANT

## 2024-01-01 PROCEDURE — 95819 EEG AWAKE AND ASLEEP: CPT | Performed by: PSYCHIATRY & NEUROLOGY

## 2024-01-01 PROCEDURE — 82306 VITAMIN D 25 HYDROXY: CPT | Performed by: INTERNAL MEDICINE

## 2024-01-01 PROCEDURE — 5A1D70Z PERFORMANCE OF URINARY FILTRATION, INTERMITTENT, LESS THAN 6 HOURS PER DAY: ICD-10-PCS | Performed by: INTERNAL MEDICINE

## 2024-01-01 PROCEDURE — 25010000002 EPINEPHRINE 1 MG/10ML SOLUTION PREFILLED SYRINGE

## 2024-01-01 PROCEDURE — 83735 ASSAY OF MAGNESIUM: CPT | Performed by: NURSE PRACTITIONER

## 2024-01-01 PROCEDURE — 93005 ELECTROCARDIOGRAM TRACING: CPT | Performed by: STUDENT IN AN ORGANIZED HEALTH CARE EDUCATION/TRAINING PROGRAM

## 2024-01-01 PROCEDURE — 36556 INSERT NON-TUNNEL CV CATH: CPT

## 2024-01-01 PROCEDURE — 03HY32Z INSERTION OF MONITORING DEVICE INTO UPPER ARTERY, PERCUTANEOUS APPROACH: ICD-10-PCS | Performed by: INTERNAL MEDICINE

## 2024-01-01 PROCEDURE — 25010000002 PROPOFOL 10 MG/ML EMULSION: Performed by: INTERNAL MEDICINE

## 2024-01-01 PROCEDURE — 95718 EEG PHYS/QHP 2-12 HR W/VEEG: CPT | Performed by: PSYCHIATRY & NEUROLOGY

## 2024-01-01 PROCEDURE — 97165 OT EVAL LOW COMPLEX 30 MIN: CPT

## 2024-01-01 PROCEDURE — 5A12012 PERFORMANCE OF CARDIAC OUTPUT, SINGLE, MANUAL: ICD-10-PCS | Performed by: INTERNAL MEDICINE

## 2024-01-01 PROCEDURE — 80053 COMPREHEN METABOLIC PANEL: CPT | Performed by: NURSE PRACTITIONER

## 2024-01-01 PROCEDURE — 82465 ASSAY BLD/SERUM CHOLESTEROL: CPT

## 2024-01-01 PROCEDURE — 25810000003 SODIUM CHLORIDE 0.9 % SOLUTION 250 ML FLEX CONT

## 2024-01-01 PROCEDURE — 85520 HEPARIN ASSAY: CPT | Performed by: INTERNAL MEDICINE

## 2024-01-01 PROCEDURE — 93005 ELECTROCARDIOGRAM TRACING: CPT | Performed by: NURSE PRACTITIONER

## 2024-01-01 PROCEDURE — 80074 ACUTE HEPATITIS PANEL: CPT | Performed by: INTERNAL MEDICINE

## 2024-01-01 PROCEDURE — 82550 ASSAY OF CK (CPK): CPT

## 2024-01-01 PROCEDURE — 25010000002 ONDANSETRON PER 1 MG: Performed by: FAMILY MEDICINE

## 2024-01-01 PROCEDURE — 93005 ELECTROCARDIOGRAM TRACING: CPT

## 2024-01-01 PROCEDURE — 99221 1ST HOSP IP/OBS SF/LOW 40: CPT | Performed by: PSYCHIATRY & NEUROLOGY

## 2024-01-01 RX ORDER — LEVETIRACETAM 500 MG/5ML
500 INJECTION, SOLUTION, CONCENTRATE INTRAVENOUS DAILY
Status: DISCONTINUED | OUTPATIENT
Start: 2024-01-01 | End: 2024-01-01

## 2024-01-01 RX ORDER — POLYETHYLENE GLYCOL 3350 17 G/17G
17 POWDER, FOR SOLUTION ORAL DAILY PRN
Status: DISCONTINUED | OUTPATIENT
Start: 2024-01-01 | End: 2024-01-01

## 2024-01-01 RX ORDER — WARFARIN SODIUM 1 MG/1
5 TABLET ORAL
Status: DISCONTINUED | OUTPATIENT
Start: 2024-01-01 | End: 2024-01-01

## 2024-01-01 RX ORDER — ACETAMINOPHEN 650 MG/1
650 SUPPOSITORY RECTAL EVERY 4 HOURS PRN
Status: DISCONTINUED | OUTPATIENT
Start: 2024-01-01 | End: 2024-01-01

## 2024-01-01 RX ORDER — ONDANSETRON 2 MG/ML
4 INJECTION INTRAMUSCULAR; INTRAVENOUS ONCE
Status: COMPLETED | OUTPATIENT
Start: 2024-01-01 | End: 2024-01-01

## 2024-01-01 RX ORDER — ACETAMINOPHEN 160 MG/5ML
650 SOLUTION ORAL EVERY 6 HOURS PRN
Status: DISCONTINUED | OUTPATIENT
Start: 2024-01-01 | End: 2024-01-01 | Stop reason: HOSPADM

## 2024-01-01 RX ORDER — MIDAZOLAM IN NACL,ISO-OSMOT/PF 50 MG/50ML
1-10 INFUSION BOTTLE (ML) INTRAVENOUS
Status: DISCONTINUED | OUTPATIENT
Start: 2024-01-01 | End: 2024-01-01

## 2024-01-01 RX ORDER — CALCITRIOL 0.25 UG/1
0.25 CAPSULE, LIQUID FILLED ORAL EVERY OTHER DAY
Status: DISCONTINUED | OUTPATIENT
Start: 2024-01-01 | End: 2024-01-01

## 2024-01-01 RX ORDER — HYDROXYZINE HYDROCHLORIDE 25 MG/1
25 TABLET, FILM COATED ORAL 3 TIMES DAILY PRN
Status: DISCONTINUED | OUTPATIENT
Start: 2024-01-01 | End: 2024-01-01 | Stop reason: HOSPADM

## 2024-01-01 RX ORDER — ALBUMIN (HUMAN) 12.5 G/50ML
25 SOLUTION INTRAVENOUS
Status: COMPLETED | OUTPATIENT
Start: 2024-01-01 | End: 2024-01-01

## 2024-01-01 RX ORDER — AMOXICILLIN 250 MG
2 CAPSULE ORAL 2 TIMES DAILY PRN
Status: DISCONTINUED | OUTPATIENT
Start: 2024-01-01 | End: 2024-01-01

## 2024-01-01 RX ORDER — ALBUMIN (HUMAN) 12.5 G/50ML
12.5 SOLUTION INTRAVENOUS ONCE
Status: DISCONTINUED | OUTPATIENT
Start: 2024-01-01 | End: 2024-01-01

## 2024-01-01 RX ORDER — SODIUM CHLORIDE 9 MG/ML
40 INJECTION, SOLUTION INTRAVENOUS AS NEEDED
Status: DISCONTINUED | OUTPATIENT
Start: 2024-01-01 | End: 2024-01-01

## 2024-01-01 RX ORDER — ALBUMIN (HUMAN) 12.5 G/50ML
50 SOLUTION INTRAVENOUS ONCE
Status: COMPLETED | OUTPATIENT
Start: 2024-01-01 | End: 2024-01-01

## 2024-01-01 RX ORDER — LABETALOL HYDROCHLORIDE 5 MG/ML
10 INJECTION, SOLUTION INTRAVENOUS EVERY 4 HOURS PRN
Status: DISCONTINUED | OUTPATIENT
Start: 2024-01-01 | End: 2024-01-01

## 2024-01-01 RX ORDER — NOREPINEPHRINE BITARTRATE 0.03 MG/ML
.02-.3 INJECTION, SOLUTION INTRAVENOUS
Status: DISCONTINUED | OUTPATIENT
Start: 2024-01-01 | End: 2024-01-01

## 2024-01-01 RX ORDER — CHLORHEXIDINE GLUCONATE ORAL RINSE 1.2 MG/ML
15 SOLUTION DENTAL EVERY 12 HOURS SCHEDULED
Status: DISCONTINUED | OUTPATIENT
Start: 2024-01-01 | End: 2024-01-01

## 2024-01-01 RX ORDER — LEVETIRACETAM 500 MG/5ML
2000 INJECTION, SOLUTION, CONCENTRATE INTRAVENOUS ONCE
Status: COMPLETED | OUTPATIENT
Start: 2024-01-01 | End: 2024-01-01

## 2024-01-01 RX ORDER — ALBUMIN (HUMAN) 12.5 G/50ML
12.5 SOLUTION INTRAVENOUS AS NEEDED
Status: ACTIVE | OUTPATIENT
Start: 2024-01-01 | End: 2024-01-01

## 2024-01-01 RX ORDER — LANOLIN ALCOHOL/MO/W.PET/CERES
1000 CREAM (GRAM) TOPICAL DAILY
Status: DISCONTINUED | OUTPATIENT
Start: 2024-01-01 | End: 2024-01-01

## 2024-01-01 RX ORDER — ALBUMIN (HUMAN) 12.5 G/50ML
SOLUTION INTRAVENOUS
Status: COMPLETED
Start: 2024-01-01 | End: 2024-01-01

## 2024-01-01 RX ORDER — POTASSIUM CHLORIDE 29.8 MG/ML
20 INJECTION INTRAVENOUS
Status: COMPLETED | OUTPATIENT
Start: 2024-01-01 | End: 2024-01-01

## 2024-01-01 RX ORDER — DOCUSATE SODIUM 50 MG/5 ML
100 LIQUID (ML) ORAL 2 TIMES DAILY
Status: DISCONTINUED | OUTPATIENT
Start: 2024-01-01 | End: 2024-01-01

## 2024-01-01 RX ORDER — BISACODYL 10 MG
10 SUPPOSITORY, RECTAL RECTAL DAILY PRN
Status: DISCONTINUED | OUTPATIENT
Start: 2024-01-01 | End: 2024-01-01

## 2024-01-01 RX ORDER — FENTANYL CITRATE 50 UG/ML
25 INJECTION, SOLUTION INTRAMUSCULAR; INTRAVENOUS ONCE
Status: COMPLETED | OUTPATIENT
Start: 2024-01-01 | End: 2024-01-01

## 2024-01-01 RX ORDER — BISACODYL 5 MG/1
5 TABLET, DELAYED RELEASE ORAL DAILY PRN
Status: DISCONTINUED | OUTPATIENT
Start: 2024-01-01 | End: 2024-01-01 | Stop reason: SDUPTHER

## 2024-01-01 RX ORDER — MIDAZOLAM HYDROCHLORIDE 1 MG/ML
2 INJECTION, SOLUTION INTRAMUSCULAR; INTRAVENOUS ONCE
Status: COMPLETED | OUTPATIENT
Start: 2024-01-01 | End: 2024-01-01

## 2024-01-01 RX ORDER — PANTOPRAZOLE SODIUM 40 MG/10ML
40 INJECTION, POWDER, LYOPHILIZED, FOR SOLUTION INTRAVENOUS
Status: DISCONTINUED | OUTPATIENT
Start: 2024-01-01 | End: 2024-01-01

## 2024-01-01 RX ORDER — ALBUMIN (HUMAN) 12.5 G/50ML
12.5 SOLUTION INTRAVENOUS ONCE
Status: COMPLETED | OUTPATIENT
Start: 2024-01-01 | End: 2024-01-01

## 2024-01-01 RX ORDER — ROCURONIUM BROMIDE 50 MG/5 ML
50 SYRINGE (ML) INTRAVENOUS ONCE
Status: COMPLETED | OUTPATIENT
Start: 2024-01-01 | End: 2024-01-01

## 2024-01-01 RX ORDER — METOCLOPRAMIDE HYDROCHLORIDE 5 MG/ML
5 INJECTION INTRAMUSCULAR; INTRAVENOUS ONCE
Status: COMPLETED | OUTPATIENT
Start: 2024-01-01 | End: 2024-01-01

## 2024-01-01 RX ORDER — MIDAZOLAM HYDROCHLORIDE 1 MG/ML
INJECTION, SOLUTION INTRAMUSCULAR; INTRAVENOUS
Status: COMPLETED
Start: 2024-01-01 | End: 2024-01-01

## 2024-01-01 RX ORDER — NITROGLYCERIN 0.4 MG/1
0.4 TABLET SUBLINGUAL
Status: DISCONTINUED | OUTPATIENT
Start: 2024-01-01 | End: 2024-01-01

## 2024-01-01 RX ORDER — MIDAZOLAM HYDROCHLORIDE 1 MG/ML
INJECTION, SOLUTION INTRAMUSCULAR; INTRAVENOUS
Status: DISPENSED
Start: 2024-01-01 | End: 2024-01-01

## 2024-01-01 RX ORDER — AMOXICILLIN 250 MG
2 CAPSULE ORAL 2 TIMES DAILY
Status: DISCONTINUED | OUTPATIENT
Start: 2024-01-01 | End: 2024-01-01 | Stop reason: SDUPTHER

## 2024-01-01 RX ORDER — ACETAMINOPHEN 325 MG/1
650 TABLET ORAL EVERY 6 HOURS PRN
Status: DISCONTINUED | OUTPATIENT
Start: 2024-01-01 | End: 2024-01-01

## 2024-01-01 RX ORDER — MIDAZOLAM HYDROCHLORIDE 1 MG/ML
4 INJECTION, SOLUTION INTRAMUSCULAR; INTRAVENOUS ONCE
Status: COMPLETED | OUTPATIENT
Start: 2024-01-01 | End: 2024-01-01

## 2024-01-01 RX ORDER — DOXYCYCLINE 100 MG/1
100 CAPSULE ORAL ONCE
Status: COMPLETED | OUTPATIENT
Start: 2024-01-01 | End: 2024-01-01

## 2024-01-01 RX ORDER — MIDODRINE HYDROCHLORIDE 10 MG/1
10 TABLET ORAL ONCE
Status: COMPLETED | OUTPATIENT
Start: 2024-01-01 | End: 2024-01-01

## 2024-01-01 RX ORDER — DOXYCYCLINE 100 MG/1
100 CAPSULE ORAL EVERY 12 HOURS SCHEDULED
Status: DISCONTINUED | OUTPATIENT
Start: 2024-01-01 | End: 2024-01-01

## 2024-01-01 RX ORDER — AMANTADINE HYDROCHLORIDE 50 MG/5ML
200 SOLUTION ORAL WEEKLY
Status: DISCONTINUED | OUTPATIENT
Start: 2024-01-01 | End: 2024-01-01 | Stop reason: HOSPADM

## 2024-01-01 RX ORDER — ETOMIDATE 2 MG/ML
0.3 INJECTION INTRAVENOUS ONCE
Status: COMPLETED | OUTPATIENT
Start: 2024-01-01 | End: 2024-01-01

## 2024-01-01 RX ORDER — PROPOFOL 10 MG/ML
VIAL (ML) INTRAVENOUS
Status: COMPLETED
Start: 2024-01-01 | End: 2024-01-01

## 2024-01-01 RX ORDER — MIDODRINE HYDROCHLORIDE 10 MG/1
10 TABLET ORAL
Status: CANCELLED | OUTPATIENT
Start: 2024-01-01

## 2024-01-01 RX ORDER — NOREPINEPHRINE BITARTRATE 0.03 MG/ML
.02-.3 INJECTION, SOLUTION INTRAVENOUS
Status: CANCELLED | OUTPATIENT
Start: 2024-01-01

## 2024-01-01 RX ORDER — IPRATROPIUM BROMIDE AND ALBUTEROL SULFATE 2.5; .5 MG/3ML; MG/3ML
3 SOLUTION RESPIRATORY (INHALATION)
Status: DISCONTINUED | OUTPATIENT
Start: 2024-01-01 | End: 2024-01-01 | Stop reason: HOSPADM

## 2024-01-01 RX ORDER — LACTULOSE 10 G/15ML
20 SOLUTION ORAL 3 TIMES DAILY PRN
Status: DISCONTINUED | OUTPATIENT
Start: 2024-01-01 | End: 2024-01-01 | Stop reason: HOSPADM

## 2024-01-01 RX ORDER — POTASSIUM CHLORIDE 29.8 MG/ML
20 INJECTION INTRAVENOUS
Status: DISCONTINUED | OUTPATIENT
Start: 2024-01-01 | End: 2024-01-01

## 2024-01-01 RX ORDER — METOPROLOL TARTRATE 1 MG/ML
2.5 INJECTION, SOLUTION INTRAVENOUS ONCE
Status: COMPLETED | OUTPATIENT
Start: 2024-01-01 | End: 2024-01-01

## 2024-01-01 RX ORDER — LORAZEPAM 2 MG/ML
2 INJECTION INTRAMUSCULAR EVERY 4 HOURS PRN
Status: DISCONTINUED | OUTPATIENT
Start: 2024-01-01 | End: 2024-01-01

## 2024-01-01 RX ORDER — NYSTATIN 100000 [USP'U]/G
POWDER TOPICAL EVERY 12 HOURS SCHEDULED
Status: DISCONTINUED | OUTPATIENT
Start: 2024-01-01 | End: 2024-01-01 | Stop reason: HOSPADM

## 2024-01-01 RX ORDER — L.ACID,PARA/B.BIFIDUM/S.THERM 8B CELL
1 CAPSULE ORAL DAILY
Status: DISCONTINUED | OUTPATIENT
Start: 2024-01-01 | End: 2024-01-01

## 2024-01-01 RX ORDER — CARVEDILOL 12.5 MG/1
12.5 TABLET ORAL 2 TIMES DAILY
Status: DISCONTINUED | OUTPATIENT
Start: 2024-01-01 | End: 2024-01-01

## 2024-01-01 RX ORDER — ONDANSETRON 2 MG/ML
4 INJECTION INTRAMUSCULAR; INTRAVENOUS EVERY 6 HOURS PRN
Status: DISCONTINUED | OUTPATIENT
Start: 2024-01-01 | End: 2024-01-01 | Stop reason: HOSPADM

## 2024-01-01 RX ORDER — DEXAMETHASONE 4 MG/1
6 TABLET ORAL DAILY
Status: DISCONTINUED | OUTPATIENT
Start: 2024-01-01 | End: 2024-01-01

## 2024-01-01 RX ORDER — DEXMEDETOMIDINE HYDROCHLORIDE 4 UG/ML
.2-1.5 INJECTION, SOLUTION INTRAVENOUS
Status: DISCONTINUED | OUTPATIENT
Start: 2024-01-01 | End: 2024-01-01

## 2024-01-01 RX ORDER — ATORVASTATIN CALCIUM 40 MG/1
40 TABLET, FILM COATED ORAL NIGHTLY
Status: DISCONTINUED | OUTPATIENT
Start: 2024-01-01 | End: 2024-01-01

## 2024-01-01 RX ORDER — BUMETANIDE 1 MG/1
2 TABLET ORAL EVERY OTHER DAY
Status: CANCELLED | OUTPATIENT
Start: 2024-01-01

## 2024-01-01 RX ORDER — SODIUM CHLORIDE 0.9 % (FLUSH) 0.9 %
10 SYRINGE (ML) INJECTION AS NEEDED
Status: DISCONTINUED | OUTPATIENT
Start: 2024-01-01 | End: 2024-01-01

## 2024-01-01 RX ORDER — HYDROCORTISONE SODIUM SUCCINATE 100 MG/2ML
100 INJECTION INTRAMUSCULAR; INTRAVENOUS EVERY 8 HOURS
Status: COMPLETED | OUTPATIENT
Start: 2024-01-01 | End: 2024-01-01

## 2024-01-01 RX ORDER — HEPARIN SODIUM 5000 [USP'U]/ML
5000 INJECTION, SOLUTION INTRAVENOUS; SUBCUTANEOUS EVERY 12 HOURS SCHEDULED
Status: DISCONTINUED | OUTPATIENT
Start: 2024-01-01 | End: 2024-01-01

## 2024-01-01 RX ORDER — BISACODYL 5 MG/1
5 TABLET, DELAYED RELEASE ORAL DAILY PRN
Status: DISCONTINUED | OUTPATIENT
Start: 2024-01-01 | End: 2024-01-01

## 2024-01-01 RX ORDER — PANTOPRAZOLE SODIUM 40 MG/10ML
40 INJECTION, POWDER, LYOPHILIZED, FOR SOLUTION INTRAVENOUS DAILY
Status: DISCONTINUED | OUTPATIENT
Start: 2024-01-01 | End: 2024-01-01

## 2024-01-01 RX ORDER — MIDAZOLAM HYDROCHLORIDE 1 MG/ML
2 INJECTION, SOLUTION INTRAMUSCULAR; INTRAVENOUS EVERY 4 HOURS PRN
Status: DISCONTINUED | OUTPATIENT
Start: 2024-01-01 | End: 2024-01-01

## 2024-01-01 RX ORDER — SODIUM CHLORIDE 0.9 % (FLUSH) 0.9 %
10 SYRINGE (ML) INJECTION EVERY 12 HOURS SCHEDULED
Status: DISCONTINUED | OUTPATIENT
Start: 2024-01-01 | End: 2024-01-01

## 2024-01-01 RX ORDER — ALBUMIN (HUMAN) 12.5 G/50ML
12.5 SOLUTION INTRAVENOUS AS NEEDED
Status: CANCELLED | OUTPATIENT
Start: 2024-01-01 | End: 2024-01-01

## 2024-01-01 RX ORDER — IPRATROPIUM BROMIDE AND ALBUTEROL SULFATE 2.5; .5 MG/3ML; MG/3ML
3 SOLUTION RESPIRATORY (INHALATION) EVERY 4 HOURS PRN
Status: DISCONTINUED | OUTPATIENT
Start: 2024-01-01 | End: 2024-01-01

## 2024-01-01 RX ORDER — BISACODYL 10 MG
10 SUPPOSITORY, RECTAL RECTAL DAILY PRN
Status: DISCONTINUED | OUTPATIENT
Start: 2024-01-01 | End: 2024-01-01 | Stop reason: SDUPTHER

## 2024-01-01 RX ORDER — ACETAMINOPHEN 160 MG/5ML
650 SOLUTION ORAL EVERY 6 HOURS PRN
Status: DISCONTINUED | OUTPATIENT
Start: 2024-01-01 | End: 2024-01-01

## 2024-01-01 RX ORDER — ALBUMIN (HUMAN) 12.5 G/50ML
25 SOLUTION INTRAVENOUS
Status: DISPENSED | OUTPATIENT
Start: 2024-01-01 | End: 2024-01-01

## 2024-01-01 RX ORDER — DEXAMETHASONE SODIUM PHOSPHATE 10 MG/ML
6 INJECTION INTRAMUSCULAR; INTRAVENOUS DAILY
Status: DISCONTINUED | OUTPATIENT
Start: 2024-01-01 | End: 2024-01-01

## 2024-01-01 RX ORDER — LORAZEPAM 2 MG/ML
2 INJECTION INTRAMUSCULAR EVERY 4 HOURS PRN
Status: DISCONTINUED | OUTPATIENT
Start: 2024-01-01 | End: 2024-01-01 | Stop reason: HOSPADM

## 2024-01-01 RX ORDER — MAGNESIUM SULFATE HEPTAHYDRATE 500 MG/ML
INJECTION, SOLUTION INTRAMUSCULAR; INTRAVENOUS
Status: COMPLETED | OUTPATIENT
Start: 2024-01-01 | End: 2024-01-01

## 2024-01-01 RX ORDER — ONDANSETRON 2 MG/ML
4 INJECTION INTRAMUSCULAR; INTRAVENOUS EVERY 6 HOURS PRN
Status: DISCONTINUED | OUTPATIENT
Start: 2024-01-01 | End: 2024-01-01

## 2024-01-01 RX ORDER — FENTANYL CITRATE 50 UG/ML
25 INJECTION, SOLUTION INTRAMUSCULAR; INTRAVENOUS
Status: DISPENSED | OUTPATIENT
Start: 2024-01-01 | End: 2024-01-01

## 2024-01-01 RX ORDER — ATORVASTATIN CALCIUM 40 MG/1
40 TABLET, FILM COATED ORAL DAILY
Status: DISCONTINUED | OUTPATIENT
Start: 2024-01-01 | End: 2024-01-01

## 2024-01-01 RX ORDER — POTASSIUM CHLORIDE 29.8 MG/ML
20 INJECTION INTRAVENOUS ONCE
Status: COMPLETED | OUTPATIENT
Start: 2024-01-01 | End: 2024-01-01

## 2024-01-01 RX ORDER — WARFARIN SODIUM 5 MG/1
5 TABLET ORAL
Status: DISCONTINUED | OUTPATIENT
Start: 2024-01-01 | End: 2024-01-01

## 2024-01-01 RX ORDER — MIDODRINE HYDROCHLORIDE 5 MG/1
5 TABLET ORAL
Status: DISCONTINUED | OUTPATIENT
Start: 2024-01-01 | End: 2024-01-01

## 2024-01-01 RX ORDER — HEPARIN SODIUM 10000 [USP'U]/100ML
9 INJECTION, SOLUTION INTRAVENOUS
Status: DISCONTINUED | OUTPATIENT
Start: 2024-01-01 | End: 2024-01-01

## 2024-01-01 RX ORDER — PANTOPRAZOLE SODIUM 40 MG/10ML
40 INJECTION, POWDER, LYOPHILIZED, FOR SOLUTION INTRAVENOUS EVERY 12 HOURS SCHEDULED
Status: DISCONTINUED | OUTPATIENT
Start: 2024-01-01 | End: 2024-01-01 | Stop reason: HOSPADM

## 2024-01-01 RX ORDER — POLYETHYLENE GLYCOL 3350 17 G/17G
17 POWDER, FOR SOLUTION ORAL DAILY
Status: DISCONTINUED | OUTPATIENT
Start: 2024-01-01 | End: 2024-01-01 | Stop reason: SDUPTHER

## 2024-01-01 RX ORDER — FENTANYL CITRATE 50 UG/ML
25 INJECTION, SOLUTION INTRAMUSCULAR; INTRAVENOUS
Status: DISCONTINUED | OUTPATIENT
Start: 2024-01-01 | End: 2024-01-01

## 2024-01-01 RX ORDER — ALBUTEROL SULFATE 0.83 MG/ML
2.5 SOLUTION RESPIRATORY (INHALATION) 4 TIMES DAILY PRN
Status: DISCONTINUED | OUTPATIENT
Start: 2024-01-01 | End: 2024-01-01 | Stop reason: HOSPADM

## 2024-01-01 RX ORDER — LACTULOSE 10 G/15ML
20 SOLUTION ORAL 3 TIMES DAILY PRN
Status: DISCONTINUED | OUTPATIENT
Start: 2024-01-01 | End: 2024-01-01

## 2024-01-01 RX ORDER — ONDANSETRON 4 MG/1
4 TABLET, ORALLY DISINTEGRATING ORAL EVERY 6 HOURS PRN
Status: DISCONTINUED | OUTPATIENT
Start: 2024-01-01 | End: 2024-01-01

## 2024-01-01 RX ORDER — MIDODRINE HYDROCHLORIDE 5 MG/1
5 TABLET ORAL ONCE
Status: COMPLETED | OUTPATIENT
Start: 2024-01-01 | End: 2024-01-01

## 2024-01-01 RX ORDER — MIDODRINE HYDROCHLORIDE 10 MG/1
10 TABLET ORAL
Status: DISCONTINUED | OUTPATIENT
Start: 2024-01-01 | End: 2024-01-01

## 2024-01-01 RX ORDER — ACETYLCYSTEINE 200 MG/ML
2 SOLUTION ORAL; RESPIRATORY (INHALATION)
Status: DISCONTINUED | OUTPATIENT
Start: 2024-01-01 | End: 2024-01-01

## 2024-01-01 RX ORDER — MIDAZOLAM HYDROCHLORIDE 1 MG/ML
1 INJECTION, SOLUTION INTRAMUSCULAR; INTRAVENOUS 2 TIMES DAILY PRN
Status: DISCONTINUED | OUTPATIENT
Start: 2024-01-01 | End: 2024-01-01 | Stop reason: HOSPADM

## 2024-01-01 RX ORDER — AMINO ACIDS/PROTEIN HYDROLYS 11G-40/45
30 LIQUID IN PACKET (ML) ORAL 3 TIMES DAILY
Status: DISCONTINUED | OUTPATIENT
Start: 2024-01-01 | End: 2024-01-01

## 2024-01-01 RX ORDER — BUMETANIDE 1 MG/1
2 TABLET ORAL
Status: DISCONTINUED | OUTPATIENT
Start: 2024-01-01 | End: 2024-01-01

## 2024-01-01 RX ADMIN — DEXMEDETOMIDINE HYDROCHLORIDE 0.6 MCG/KG/HR: 4 INJECTION, SOLUTION INTRAVENOUS at 16:14

## 2024-01-01 RX ADMIN — IPRATROPIUM BROMIDE AND ALBUTEROL SULFATE 3 ML: 2.5; .5 SOLUTION RESPIRATORY (INHALATION) at 19:47

## 2024-01-01 RX ADMIN — PANTOPRAZOLE SODIUM 40 MG: 40 INJECTION, POWDER, FOR SOLUTION INTRAVENOUS at 08:58

## 2024-01-01 RX ADMIN — Medication 30 ML: at 21:54

## 2024-01-01 RX ADMIN — PIPERACILLIN AND TAZOBACTAM 4.5 G: 4; .5 INJECTION, POWDER, FOR SOLUTION INTRAVENOUS at 17:52

## 2024-01-01 RX ADMIN — MIDAZOLAM HYDROCHLORIDE 2 MG: 1 INJECTION, SOLUTION INTRAMUSCULAR; INTRAVENOUS at 00:56

## 2024-01-01 RX ADMIN — DOXYCYCLINE 100 MG: 100 CAPSULE ORAL at 20:32

## 2024-01-01 RX ADMIN — IPRATROPIUM BROMIDE AND ALBUTEROL SULFATE 3 ML: 2.5; .5 SOLUTION RESPIRATORY (INHALATION) at 07:17

## 2024-01-01 RX ADMIN — DOCUSATE SODIUM 100 MG: 50 LIQUID ORAL at 21:03

## 2024-01-01 RX ADMIN — Medication 30 ML: at 15:23

## 2024-01-01 RX ADMIN — PANTOPRAZOLE SODIUM 40 MG: 40 INJECTION, POWDER, FOR SOLUTION INTRAVENOUS at 08:32

## 2024-01-01 RX ADMIN — FENTANYL CITRATE 25 MCG: 50 INJECTION, SOLUTION INTRAMUSCULAR; INTRAVENOUS at 08:19

## 2024-01-01 RX ADMIN — DOCUSATE SODIUM 100 MG: 50 LIQUID ORAL at 20:46

## 2024-01-01 RX ADMIN — SODIUM CHLORIDE 1000 MG: 900 INJECTION INTRAVENOUS at 16:10

## 2024-01-01 RX ADMIN — CHLORHEXIDINE GLUCONATE 0.12% ORAL RINSE 15 ML: 1.2 LIQUID ORAL at 21:36

## 2024-01-01 RX ADMIN — DEXMEDETOMIDINE HYDROCHLORIDE 0.4 MCG/KG/HR: 4 INJECTION, SOLUTION INTRAVENOUS at 03:34

## 2024-01-01 RX ADMIN — CHLORHEXIDINE GLUCONATE 0.12% ORAL RINSE 15 ML: 1.2 LIQUID ORAL at 08:21

## 2024-01-01 RX ADMIN — NYSTATIN: 100000 POWDER TOPICAL at 22:32

## 2024-01-01 RX ADMIN — Medication 1 CAPSULE: at 08:19

## 2024-01-01 RX ADMIN — IPRATROPIUM BROMIDE AND ALBUTEROL SULFATE 3 ML: 2.5; .5 SOLUTION RESPIRATORY (INHALATION) at 19:34

## 2024-01-01 RX ADMIN — CHLORHEXIDINE GLUCONATE 0.12% ORAL RINSE 15 ML: 1.2 LIQUID ORAL at 08:32

## 2024-01-01 RX ADMIN — ACETYLCYSTEINE 2 ML: 200 SOLUTION ORAL; RESPIRATORY (INHALATION) at 15:34

## 2024-01-01 RX ADMIN — CYANOCOBALAMIN TAB 1000 MCG 1000 MCG: 1000 TAB at 09:24

## 2024-01-01 RX ADMIN — LEVETIRACETAM 500 MG: 100 INJECTION, SOLUTION INTRAVENOUS at 20:21

## 2024-01-01 RX ADMIN — AMANTADINE HYDROCHLORIDE 200 MG: 50 SOLUTION ORAL at 08:19

## 2024-01-01 RX ADMIN — INSULIN HUMAN 2 UNITS: 100 INJECTION, SOLUTION PARENTERAL at 00:00

## 2024-01-01 RX ADMIN — NYSTATIN: 100000 POWDER TOPICAL at 21:04

## 2024-01-01 RX ADMIN — IPRATROPIUM BROMIDE AND ALBUTEROL SULFATE 3 ML: 2.5; .5 SOLUTION RESPIRATORY (INHALATION) at 07:42

## 2024-01-01 RX ADMIN — DOCUSATE SODIUM 100 MG: 50 LIQUID ORAL at 08:58

## 2024-01-01 RX ADMIN — SODIUM CHLORIDE 1000 MG: 900 INJECTION INTRAVENOUS at 16:41

## 2024-01-01 RX ADMIN — HYDROCORTISONE SODIUM SUCCINATE 100 MG: 100 INJECTION, POWDER, FOR SOLUTION INTRAMUSCULAR; INTRAVENOUS at 07:51

## 2024-01-01 RX ADMIN — ATORVASTATIN CALCIUM 40 MG: 40 TABLET, FILM COATED ORAL at 20:23

## 2024-01-01 RX ADMIN — ACETAMINOPHEN 650 MG: 325 TABLET ORAL at 21:18

## 2024-01-01 RX ADMIN — ACETYLCYSTEINE 2 ML: 200 SOLUTION ORAL; RESPIRATORY (INHALATION) at 16:06

## 2024-01-01 RX ADMIN — IPRATROPIUM BROMIDE AND ALBUTEROL SULFATE 3 ML: 2.5; .5 SOLUTION RESPIRATORY (INHALATION) at 07:40

## 2024-01-01 RX ADMIN — ACETYLCYSTEINE 2 ML: 200 SOLUTION ORAL; RESPIRATORY (INHALATION) at 11:12

## 2024-01-01 RX ADMIN — Medication 1 CAPSULE: at 08:01

## 2024-01-01 RX ADMIN — Medication 30 ML: at 08:29

## 2024-01-01 RX ADMIN — ACETYLCYSTEINE 2 ML: 200 SOLUTION ORAL; RESPIRATORY (INHALATION) at 07:34

## 2024-01-01 RX ADMIN — Medication 1 CAPSULE: at 09:21

## 2024-01-01 RX ADMIN — NYSTATIN: 100000 POWDER TOPICAL at 09:45

## 2024-01-01 RX ADMIN — IPRATROPIUM BROMIDE AND ALBUTEROL SULFATE 3 ML: 2.5; .5 SOLUTION RESPIRATORY (INHALATION) at 07:37

## 2024-01-01 RX ADMIN — CHLORHEXIDINE GLUCONATE 0.12% ORAL RINSE 15 ML: 1.2 LIQUID ORAL at 08:28

## 2024-01-01 RX ADMIN — Medication 30 ML: at 09:06

## 2024-01-01 RX ADMIN — ACETAMINOPHEN 650 MG: 325 TABLET ORAL at 17:33

## 2024-01-01 RX ADMIN — ATORVASTATIN CALCIUM 40 MG: 40 TABLET, FILM COATED ORAL at 20:44

## 2024-01-01 RX ADMIN — SODIUM CHLORIDE 1000 MG: 900 INJECTION INTRAVENOUS at 18:08

## 2024-01-01 RX ADMIN — Medication 1 CAPSULE: at 09:06

## 2024-01-01 RX ADMIN — IPRATROPIUM BROMIDE AND ALBUTEROL SULFATE 3 ML: 2.5; .5 SOLUTION RESPIRATORY (INHALATION) at 12:29

## 2024-01-01 RX ADMIN — NYSTATIN: 100000 POWDER TOPICAL at 21:53

## 2024-01-01 RX ADMIN — Medication 30 ML: at 08:02

## 2024-01-01 RX ADMIN — CHLORHEXIDINE GLUCONATE 0.12% ORAL RINSE 15 ML: 1.2 LIQUID ORAL at 20:56

## 2024-01-01 RX ADMIN — POTASSIUM CHLORIDE 20 MEQ: 29.8 INJECTION, SOLUTION INTRAVENOUS at 01:28

## 2024-01-01 RX ADMIN — NYSTATIN: 100000 POWDER TOPICAL at 08:32

## 2024-01-01 RX ADMIN — IPRATROPIUM BROMIDE AND ALBUTEROL SULFATE 3 ML: 2.5; .5 SOLUTION RESPIRATORY (INHALATION) at 14:15

## 2024-01-01 RX ADMIN — EPINEPHRINE 1 MG: 0.1 INJECTION INTRAVENOUS at 20:03

## 2024-01-01 RX ADMIN — IPRATROPIUM BROMIDE AND ALBUTEROL SULFATE 3 ML: 2.5; .5 SOLUTION RESPIRATORY (INHALATION) at 12:50

## 2024-01-01 RX ADMIN — LEVETIRACETAM 500 MG: 100 INJECTION, SOLUTION INTRAVENOUS at 08:01

## 2024-01-01 RX ADMIN — PROPOFOL 10 MCG/KG/MIN: 10 INJECTION, EMULSION INTRAVENOUS at 13:56

## 2024-01-01 RX ADMIN — WHITE PETROLATUM 57.7 %-MINERAL OIL 31.9 % EYE OINTMENT: at 16:12

## 2024-01-01 RX ADMIN — CHLORHEXIDINE GLUCONATE 0.12% ORAL RINSE 15 ML: 1.2 LIQUID ORAL at 20:36

## 2024-01-01 RX ADMIN — Medication 150 MCG/HR: at 03:13

## 2024-01-01 RX ADMIN — LEVETIRACETAM 500 MG: 100 INJECTION, SOLUTION INTRAVENOUS at 08:21

## 2024-01-01 RX ADMIN — Medication 1 CAPSULE: at 08:37

## 2024-01-01 RX ADMIN — Medication 150 MCG/HR: at 00:07

## 2024-01-01 RX ADMIN — MIDAZOLAM 2 MG: 1 INJECTION INTRAMUSCULAR; INTRAVENOUS at 03:14

## 2024-01-01 RX ADMIN — REMDESIVIR 200 MG: 100 INJECTION, POWDER, LYOPHILIZED, FOR SOLUTION INTRAVENOUS at 22:48

## 2024-01-01 RX ADMIN — IPRATROPIUM BROMIDE AND ALBUTEROL SULFATE 3 ML: 2.5; .5 SOLUTION RESPIRATORY (INHALATION) at 12:04

## 2024-01-01 RX ADMIN — NYSTATIN: 100000 POWDER TOPICAL at 20:40

## 2024-01-01 RX ADMIN — NYSTATIN: 100000 POWDER TOPICAL at 08:38

## 2024-01-01 RX ADMIN — EPOETIN ALFA-EPBX 10000 UNITS: 10000 INJECTION, SOLUTION INTRAVENOUS; SUBCUTANEOUS at 10:47

## 2024-01-01 RX ADMIN — MIDODRINE HYDROCHLORIDE 5 MG: 5 TABLET ORAL at 16:58

## 2024-01-01 RX ADMIN — CHLORHEXIDINE GLUCONATE 0.12% ORAL RINSE 15 ML: 1.2 LIQUID ORAL at 08:44

## 2024-01-01 RX ADMIN — PANTOPRAZOLE SODIUM 40 MG: 40 INJECTION, POWDER, FOR SOLUTION INTRAVENOUS at 08:07

## 2024-01-01 RX ADMIN — PANTOPRAZOLE SODIUM 8 MG/HR: 40 INJECTION, POWDER, FOR SOLUTION INTRAVENOUS at 05:42

## 2024-01-01 RX ADMIN — ATORVASTATIN CALCIUM 40 MG: 40 TABLET, FILM COATED ORAL at 22:32

## 2024-01-01 RX ADMIN — IPRATROPIUM BROMIDE AND ALBUTEROL SULFATE 3 ML: 2.5; .5 SOLUTION RESPIRATORY (INHALATION) at 11:13

## 2024-01-01 RX ADMIN — ATORVASTATIN CALCIUM 40 MG: 40 TABLET, FILM COATED ORAL at 21:14

## 2024-01-01 RX ADMIN — PANTOPRAZOLE SODIUM 8 MG/HR: 40 INJECTION, POWDER, FOR SOLUTION INTRAVENOUS at 19:52

## 2024-01-01 RX ADMIN — DOCUSATE SODIUM 100 MG: 50 LIQUID ORAL at 21:40

## 2024-01-01 RX ADMIN — IPRATROPIUM BROMIDE AND ALBUTEROL SULFATE 3 ML: 2.5; .5 SOLUTION RESPIRATORY (INHALATION) at 21:13

## 2024-01-01 RX ADMIN — MAGNESIUM SULFATE HEPTAHYDRATE 2 G: 500 INJECTION, SOLUTION INTRAMUSCULAR; INTRAVENOUS at 20:05

## 2024-01-01 RX ADMIN — NOREPINEPHRINE BITARTRATE 0.02 MCG/KG/MIN: 0.03 INJECTION, SOLUTION INTRAVENOUS at 17:04

## 2024-01-01 RX ADMIN — Medication 1 CAPSULE: at 08:51

## 2024-01-01 RX ADMIN — DEXAMETHASONE SODIUM PHOSPHATE 6 MG: 10 INJECTION INTRAMUSCULAR; INTRAVENOUS at 09:21

## 2024-01-01 RX ADMIN — MIDAZOLAM HYDROCHLORIDE 6 MG/HR: 1 INJECTION, SOLUTION INTRAVENOUS at 04:25

## 2024-01-01 RX ADMIN — ATORVASTATIN CALCIUM 40 MG: 40 TABLET, FILM COATED ORAL at 21:53

## 2024-01-01 RX ADMIN — Medication 1 CAPSULE: at 08:32

## 2024-01-01 RX ADMIN — Medication 10 ML: at 08:37

## 2024-01-01 RX ADMIN — CHLORHEXIDINE GLUCONATE 0.12% ORAL RINSE 15 ML: 1.2 LIQUID ORAL at 08:07

## 2024-01-01 RX ADMIN — DEXMEDETOMIDINE HYDROCHLORIDE 0.6 MCG/KG/HR: 4 INJECTION, SOLUTION INTRAVENOUS at 10:57

## 2024-01-01 RX ADMIN — Medication 1 CAPSULE: at 13:23

## 2024-01-01 RX ADMIN — Medication 30 ML: at 20:23

## 2024-01-01 RX ADMIN — PANTOPRAZOLE SODIUM 40 MG: 40 INJECTION, POWDER, FOR SOLUTION INTRAVENOUS at 12:35

## 2024-01-01 RX ADMIN — DOXYCYCLINE 100 MG: 100 CAPSULE ORAL at 09:24

## 2024-01-01 RX ADMIN — PROPOFOL 15 MCG/KG/MIN: 10 INJECTION, EMULSION INTRAVENOUS at 03:55

## 2024-01-01 RX ADMIN — ATORVASTATIN CALCIUM 40 MG: 40 TABLET, FILM COATED ORAL at 20:58

## 2024-01-01 RX ADMIN — HYDROCORTISONE SODIUM SUCCINATE 100 MG: 100 INJECTION, POWDER, FOR SOLUTION INTRAMUSCULAR; INTRAVENOUS at 13:43

## 2024-01-01 RX ADMIN — Medication 10 MG: at 21:03

## 2024-01-01 RX ADMIN — INSULIN HUMAN 2 UNITS: 100 INJECTION, SOLUTION PARENTERAL at 18:18

## 2024-01-01 RX ADMIN — LEVETIRACETAM 500 MG: 100 INJECTION, SOLUTION INTRAVENOUS at 08:37

## 2024-01-01 RX ADMIN — DOCUSATE SODIUM 100 MG: 50 LIQUID ORAL at 20:50

## 2024-01-01 RX ADMIN — IPRATROPIUM BROMIDE AND ALBUTEROL SULFATE 3 ML: 2.5; .5 SOLUTION RESPIRATORY (INHALATION) at 21:21

## 2024-01-01 RX ADMIN — ATORVASTATIN CALCIUM 40 MG: 40 TABLET, FILM COATED ORAL at 21:03

## 2024-01-01 RX ADMIN — Medication 30 ML: at 20:41

## 2024-01-01 RX ADMIN — DEXAMETHASONE 6 MG: 4 TABLET ORAL at 21:18

## 2024-01-01 RX ADMIN — HYDROXYZINE HYDROCHLORIDE 25 MG: 25 TABLET ORAL at 12:35

## 2024-01-01 RX ADMIN — PANTOPRAZOLE SODIUM 40 MG: 40 INJECTION, POWDER, FOR SOLUTION INTRAVENOUS at 08:12

## 2024-01-01 RX ADMIN — SODIUM BICARBONATE 150 MEQ: 84 INJECTION INTRAVENOUS at 21:19

## 2024-01-01 RX ADMIN — ATORVASTATIN CALCIUM 40 MG: 40 TABLET, FILM COATED ORAL at 20:15

## 2024-01-01 RX ADMIN — PANTOPRAZOLE SODIUM 40 MG: 40 INJECTION, POWDER, FOR SOLUTION INTRAVENOUS at 08:37

## 2024-01-01 RX ADMIN — IPRATROPIUM BROMIDE AND ALBUTEROL SULFATE 3 ML: 2.5; .5 SOLUTION RESPIRATORY (INHALATION) at 17:04

## 2024-01-01 RX ADMIN — CYANOCOBALAMIN TAB 1000 MCG 1000 MCG: 1000 TAB at 12:19

## 2024-01-01 RX ADMIN — NYSTATIN: 100000 POWDER TOPICAL at 08:58

## 2024-01-01 RX ADMIN — Medication 200 MCG/HR: at 07:00

## 2024-01-01 RX ADMIN — HYDROCORTISONE SODIUM SUCCINATE 100 MG: 100 INJECTION, POWDER, FOR SOLUTION INTRAMUSCULAR; INTRAVENOUS at 22:21

## 2024-01-01 RX ADMIN — MUPIROCIN 1 APPLICATION: 20 OINTMENT TOPICAL at 08:37

## 2024-01-01 RX ADMIN — PANTOPRAZOLE SODIUM 40 MG: 40 INJECTION, POWDER, FOR SOLUTION INTRAVENOUS at 21:03

## 2024-01-01 RX ADMIN — PANTOPRAZOLE SODIUM 40 MG: 40 INJECTION, POWDER, FOR SOLUTION INTRAVENOUS at 11:55

## 2024-01-01 RX ADMIN — MIDAZOLAM HYDROCHLORIDE 1 MG/HR: 1 INJECTION, SOLUTION INTRAVENOUS at 10:55

## 2024-01-01 RX ADMIN — IPRATROPIUM BROMIDE AND ALBUTEROL SULFATE 3 ML: 2.5; .5 SOLUTION RESPIRATORY (INHALATION) at 08:30

## 2024-01-01 RX ADMIN — Medication 30 ML: at 21:15

## 2024-01-01 RX ADMIN — PANTOPRAZOLE SODIUM 8 MG/HR: 40 INJECTION, POWDER, FOR SOLUTION INTRAVENOUS at 04:19

## 2024-01-01 RX ADMIN — MUPIROCIN 1 APPLICATION: 20 OINTMENT TOPICAL at 08:21

## 2024-01-01 RX ADMIN — CARVEDILOL 12.5 MG: 12.5 TABLET, FILM COATED ORAL at 12:21

## 2024-01-01 RX ADMIN — Medication 300 MCG/HR: at 07:16

## 2024-01-01 RX ADMIN — DEXMEDETOMIDINE HYDROCHLORIDE 0.2 MCG/KG/HR: 4 INJECTION, SOLUTION INTRAVENOUS at 00:50

## 2024-01-01 RX ADMIN — PANTOPRAZOLE SODIUM 40 MG: 40 INJECTION, POWDER, FOR SOLUTION INTRAVENOUS at 09:44

## 2024-01-01 RX ADMIN — ALBUMIN (HUMAN) 50 G: 0.25 INJECTION, SOLUTION INTRAVENOUS at 08:02

## 2024-01-01 RX ADMIN — EPOETIN ALFA-EPBX 10000 UNITS: 10000 INJECTION, SOLUTION INTRAVENOUS; SUBCUTANEOUS at 08:44

## 2024-01-01 RX ADMIN — IPRATROPIUM BROMIDE AND ALBUTEROL SULFATE 3 ML: 2.5; .5 SOLUTION RESPIRATORY (INHALATION) at 16:00

## 2024-01-01 RX ADMIN — CHLORHEXIDINE GLUCONATE 0.12% ORAL RINSE 15 ML: 1.2 LIQUID ORAL at 20:40

## 2024-01-01 RX ADMIN — Medication 200 MCG/HR: at 03:38

## 2024-01-01 RX ADMIN — MUPIROCIN 1 APPLICATION: 20 OINTMENT TOPICAL at 20:40

## 2024-01-01 RX ADMIN — ACETYLCYSTEINE 2 ML: 200 SOLUTION ORAL; RESPIRATORY (INHALATION) at 07:40

## 2024-01-01 RX ADMIN — MIDAZOLAM HYDROCHLORIDE 1 MG: 1 INJECTION, SOLUTION INTRAMUSCULAR; INTRAVENOUS at 17:11

## 2024-01-01 RX ADMIN — DOCUSATE SODIUM 100 MG: 50 LIQUID ORAL at 09:44

## 2024-01-01 RX ADMIN — Medication 50 MCG/HR: at 12:21

## 2024-01-01 RX ADMIN — MUPIROCIN 1 APPLICATION: 20 OINTMENT TOPICAL at 20:36

## 2024-01-01 RX ADMIN — IPRATROPIUM BROMIDE AND ALBUTEROL SULFATE 3 ML: 2.5; .5 SOLUTION RESPIRATORY (INHALATION) at 19:57

## 2024-01-01 RX ADMIN — SODIUM BICARBONATE 150 MEQ: 84 INJECTION INTRAVENOUS at 22:00

## 2024-01-01 RX ADMIN — IPRATROPIUM BROMIDE AND ALBUTEROL SULFATE 3 ML: 2.5; .5 SOLUTION RESPIRATORY (INHALATION) at 21:17

## 2024-01-01 RX ADMIN — SODIUM CHLORIDE 500 ML: 9 INJECTION, SOLUTION INTRAVENOUS at 21:29

## 2024-01-01 RX ADMIN — NALOXEGOL OXALATE 25 MG: 25 TABLET, FILM COATED ORAL at 06:05

## 2024-01-01 RX ADMIN — ATORVASTATIN CALCIUM 40 MG: 40 TABLET, FILM COATED ORAL at 20:16

## 2024-01-01 RX ADMIN — Medication 1 CAPSULE: at 08:43

## 2024-01-01 RX ADMIN — DOCUSATE SODIUM 100 MG: 50 LIQUID ORAL at 08:44

## 2024-01-01 RX ADMIN — ATORVASTATIN CALCIUM 40 MG: 40 TABLET, FILM COATED ORAL at 00:08

## 2024-01-01 RX ADMIN — DEXAMETHASONE 6 MG: 4 TABLET ORAL at 12:19

## 2024-01-01 RX ADMIN — POTASSIUM CHLORIDE 20 MEQ: 29.8 INJECTION, SOLUTION INTRAVENOUS at 01:30

## 2024-01-01 RX ADMIN — Medication 30 ML: at 17:58

## 2024-01-01 RX ADMIN — NOREPINEPHRINE BITARTRATE 0.02 MCG/KG/MIN: 0.03 INJECTION, SOLUTION INTRAVENOUS at 09:32

## 2024-01-01 RX ADMIN — Medication 30 ML: at 15:50

## 2024-01-01 RX ADMIN — ATORVASTATIN CALCIUM 40 MG: 40 TABLET, FILM COATED ORAL at 21:38

## 2024-01-01 RX ADMIN — CHLORHEXIDINE GLUCONATE 0.12% ORAL RINSE 15 ML: 1.2 LIQUID ORAL at 08:22

## 2024-01-01 RX ADMIN — IPRATROPIUM BROMIDE AND ALBUTEROL SULFATE 3 ML: 2.5; .5 SOLUTION RESPIRATORY (INHALATION) at 08:01

## 2024-01-01 RX ADMIN — Medication 200 MCG/HR: at 05:42

## 2024-01-01 RX ADMIN — SODIUM BICARBONATE 100 MEQ: 84 INJECTION INTRAVENOUS at 21:39

## 2024-01-01 RX ADMIN — IPRATROPIUM BROMIDE AND ALBUTEROL SULFATE 3 ML: 2.5; .5 SOLUTION RESPIRATORY (INHALATION) at 08:38

## 2024-01-01 RX ADMIN — Medication 30 ML: at 08:22

## 2024-01-01 RX ADMIN — PANTOPRAZOLE SODIUM 40 MG: 40 INJECTION, POWDER, FOR SOLUTION INTRAVENOUS at 08:28

## 2024-01-01 RX ADMIN — NYSTATIN: 100000 POWDER TOPICAL at 09:06

## 2024-01-01 RX ADMIN — Medication 1 CAPSULE: at 08:22

## 2024-01-01 RX ADMIN — IPRATROPIUM BROMIDE AND ALBUTEROL SULFATE 3 ML: 2.5; .5 SOLUTION RESPIRATORY (INHALATION) at 10:19

## 2024-01-01 RX ADMIN — NYSTATIN: 100000 POWDER TOPICAL at 08:44

## 2024-01-01 RX ADMIN — Medication 30 ML: at 15:09

## 2024-01-01 RX ADMIN — MIDAZOLAM 2 MG: 1 INJECTION INTRAMUSCULAR; INTRAVENOUS at 13:43

## 2024-01-01 RX ADMIN — Medication 200 MCG/HR: at 12:37

## 2024-01-01 RX ADMIN — NOREPINEPHRINE BITARTRATE 0.03 MCG/KG/MIN: 0.03 INJECTION, SOLUTION INTRAVENOUS at 02:27

## 2024-01-01 RX ADMIN — CHLORHEXIDINE GLUCONATE 0.12% ORAL RINSE 15 ML: 1.2 LIQUID ORAL at 08:01

## 2024-01-01 RX ADMIN — MUPIROCIN 1 APPLICATION: 20 OINTMENT TOPICAL at 20:56

## 2024-01-01 RX ADMIN — CHLORHEXIDINE GLUCONATE 0.12% ORAL RINSE 15 ML: 1.2 LIQUID ORAL at 21:14

## 2024-01-01 RX ADMIN — LEVETIRACETAM 500 MG: 100 INJECTION, SOLUTION INTRAVENOUS at 08:51

## 2024-01-01 RX ADMIN — IPRATROPIUM BROMIDE AND ALBUTEROL SULFATE 3 ML: 2.5; .5 SOLUTION RESPIRATORY (INHALATION) at 12:56

## 2024-01-01 RX ADMIN — IPRATROPIUM BROMIDE AND ALBUTEROL SULFATE 3 ML: 2.5; .5 SOLUTION RESPIRATORY (INHALATION) at 13:12

## 2024-01-01 RX ADMIN — NOREPINEPHRINE BITARTRATE 0.02 MCG/KG/MIN: 0.03 INJECTION, SOLUTION INTRAVENOUS at 22:52

## 2024-01-01 RX ADMIN — NYSTATIN: 100000 POWDER TOPICAL at 20:16

## 2024-01-01 RX ADMIN — ACETYLCYSTEINE 2 ML: 200 SOLUTION ORAL; RESPIRATORY (INHALATION) at 14:15

## 2024-01-01 RX ADMIN — NALOXEGOL OXALATE 25 MG: 25 TABLET, FILM COATED ORAL at 10:21

## 2024-01-01 RX ADMIN — MIDODRINE HYDROCHLORIDE 10 MG: 10 TABLET ORAL at 08:19

## 2024-01-01 RX ADMIN — CARVEDILOL 12.5 MG: 12.5 TABLET, FILM COATED ORAL at 09:24

## 2024-01-01 RX ADMIN — IPRATROPIUM BROMIDE AND ALBUTEROL SULFATE 3 ML: 2.5; .5 SOLUTION RESPIRATORY (INHALATION) at 08:05

## 2024-01-01 RX ADMIN — PHENYLEPHRINE HYDROCHLORIDE 3 MCG/KG/MIN: 10 INJECTION INTRAVENOUS at 21:39

## 2024-01-01 RX ADMIN — Medication 30 ML: at 08:59

## 2024-01-01 RX ADMIN — ONDANSETRON 4 MG: 2 INJECTION INTRAMUSCULAR; INTRAVENOUS at 16:49

## 2024-01-01 RX ADMIN — IPRATROPIUM BROMIDE AND ALBUTEROL SULFATE 3 ML: 2.5; .5 SOLUTION RESPIRATORY (INHALATION) at 15:56

## 2024-01-01 RX ADMIN — CHLORHEXIDINE GLUCONATE 0.12% ORAL RINSE 15 ML: 1.2 LIQUID ORAL at 08:58

## 2024-01-01 RX ADMIN — CHLORHEXIDINE GLUCONATE 0.12% ORAL RINSE 15 ML: 1.2 LIQUID ORAL at 20:50

## 2024-01-01 RX ADMIN — ACETYLCYSTEINE 2 ML: 200 SOLUTION ORAL; RESPIRATORY (INHALATION) at 21:13

## 2024-01-01 RX ADMIN — PIPERACILLIN AND TAZOBACTAM 4.5 G: 4; .5 INJECTION, POWDER, FOR SOLUTION INTRAVENOUS at 18:22

## 2024-01-01 RX ADMIN — NYSTATIN: 100000 POWDER TOPICAL at 20:50

## 2024-01-01 RX ADMIN — CHLORHEXIDINE GLUCONATE 0.12% ORAL RINSE 15 ML: 1.2 LIQUID ORAL at 08:12

## 2024-01-01 RX ADMIN — Medication 30 ML: at 11:55

## 2024-01-01 RX ADMIN — PANTOPRAZOLE SODIUM 40 MG: 40 INJECTION, POWDER, FOR SOLUTION INTRAVENOUS at 05:21

## 2024-01-01 RX ADMIN — Medication 10 ML: at 09:24

## 2024-01-01 RX ADMIN — PIPERACILLIN AND TAZOBACTAM 4.5 G: 4; .5 INJECTION, POWDER, FOR SOLUTION INTRAVENOUS at 17:21

## 2024-01-01 RX ADMIN — PANTOPRAZOLE SODIUM 8 MG/HR: 40 INJECTION, POWDER, FOR SOLUTION INTRAVENOUS at 08:42

## 2024-01-01 RX ADMIN — MIDODRINE HYDROCHLORIDE 10 MG: 10 TABLET ORAL at 21:34

## 2024-01-01 RX ADMIN — FENTANYL CITRATE 25 MCG: 50 INJECTION, SOLUTION INTRAMUSCULAR; INTRAVENOUS at 04:41

## 2024-01-01 RX ADMIN — INSULIN HUMAN 2 UNITS: 100 INJECTION, SOLUTION PARENTERAL at 23:45

## 2024-01-01 RX ADMIN — PANTOPRAZOLE SODIUM 40 MG: 40 INJECTION, POWDER, FOR SOLUTION INTRAVENOUS at 08:22

## 2024-01-01 RX ADMIN — CHLORHEXIDINE GLUCONATE 0.12% ORAL RINSE 15 ML: 1.2 LIQUID ORAL at 08:37

## 2024-01-01 RX ADMIN — IPRATROPIUM BROMIDE AND ALBUTEROL SULFATE 3 ML: 2.5; .5 SOLUTION RESPIRATORY (INHALATION) at 20:17

## 2024-01-01 RX ADMIN — REMDESIVIR 100 MG: 100 INJECTION, POWDER, LYOPHILIZED, FOR SOLUTION INTRAVENOUS at 14:50

## 2024-01-01 RX ADMIN — NOREPINEPHRINE BITARTRATE 0.05 MCG/KG/MIN: 0.03 INJECTION, SOLUTION INTRAVENOUS at 04:24

## 2024-01-01 RX ADMIN — ALBUMIN (HUMAN) 50 G: 0.25 INJECTION, SOLUTION INTRAVENOUS at 15:12

## 2024-01-01 RX ADMIN — CHLORHEXIDINE GLUCONATE 0.12% ORAL RINSE 15 ML: 1.2 LIQUID ORAL at 20:23

## 2024-01-01 RX ADMIN — ACETYLCYSTEINE 2 ML: 200 SOLUTION ORAL; RESPIRATORY (INHALATION) at 08:38

## 2024-01-01 RX ADMIN — ALBUMIN (HUMAN) 12.5 G: 0.25 INJECTION, SOLUTION INTRAVENOUS at 20:34

## 2024-01-01 RX ADMIN — ACETYLCYSTEINE 2 ML: 200 SOLUTION ORAL; RESPIRATORY (INHALATION) at 20:39

## 2024-01-01 RX ADMIN — MIDODRINE HYDROCHLORIDE 5 MG: 5 TABLET ORAL at 18:41

## 2024-01-01 RX ADMIN — NYSTATIN: 100000 POWDER TOPICAL at 08:29

## 2024-01-01 RX ADMIN — ACETYLCYSTEINE 2 ML: 200 SOLUTION ORAL; RESPIRATORY (INHALATION) at 08:33

## 2024-01-01 RX ADMIN — DOXYCYCLINE 100 MG: 100 CAPSULE ORAL at 09:21

## 2024-01-01 RX ADMIN — IPRATROPIUM BROMIDE AND ALBUTEROL SULFATE 3 ML: 2.5; .5 SOLUTION RESPIRATORY (INHALATION) at 17:17

## 2024-01-01 RX ADMIN — PROPOFOL 15 MCG/KG/MIN: 10 INJECTION, EMULSION INTRAVENOUS at 15:23

## 2024-01-01 RX ADMIN — SODIUM CHLORIDE 500 ML: 9 INJECTION, SOLUTION INTRAVENOUS at 21:06

## 2024-01-01 RX ADMIN — ALBUMIN (HUMAN) 25 G: 0.25 INJECTION, SOLUTION INTRAVENOUS at 23:48

## 2024-01-01 RX ADMIN — NOREPINEPHRINE BITARTRATE 0.08 MCG/KG/MIN: 0.03 INJECTION, SOLUTION INTRAVENOUS at 17:53

## 2024-01-01 RX ADMIN — ACETAMINOPHEN 650 MG: 325 TABLET ORAL at 16:18

## 2024-01-01 RX ADMIN — NYSTATIN: 100000 POWDER TOPICAL at 08:01

## 2024-01-01 RX ADMIN — CARVEDILOL 12.5 MG: 12.5 TABLET, FILM COATED ORAL at 21:39

## 2024-01-01 RX ADMIN — Medication 10 ML: at 12:20

## 2024-01-01 RX ADMIN — CHLORHEXIDINE GLUCONATE 0.12% ORAL RINSE 15 ML: 1.2 LIQUID ORAL at 20:58

## 2024-01-01 RX ADMIN — MUPIROCIN 1 APPLICATION: 20 OINTMENT TOPICAL at 08:52

## 2024-01-01 RX ADMIN — ACETAMINOPHEN 650 MG: 650 SOLUTION ORAL at 02:30

## 2024-01-01 RX ADMIN — Medication 30 ML: at 20:51

## 2024-01-01 RX ADMIN — LEVETIRACETAM 2000 MG: 100 INJECTION, SOLUTION INTRAVENOUS at 22:09

## 2024-01-01 RX ADMIN — DOCUSATE SODIUM 100 MG: 50 LIQUID ORAL at 08:22

## 2024-01-01 RX ADMIN — PANTOPRAZOLE SODIUM 40 MG: 40 INJECTION, POWDER, FOR SOLUTION INTRAVENOUS at 21:53

## 2024-01-01 RX ADMIN — MUPIROCIN 1 APPLICATION: 20 OINTMENT TOPICAL at 20:46

## 2024-01-01 RX ADMIN — REMDESIVIR 100 MG: 100 INJECTION, POWDER, LYOPHILIZED, FOR SOLUTION INTRAVENOUS at 14:56

## 2024-01-01 RX ADMIN — PANTOPRAZOLE SODIUM 40 MG: 40 INJECTION, POWDER, FOR SOLUTION INTRAVENOUS at 21:38

## 2024-01-01 RX ADMIN — ACETYLCYSTEINE 2 ML: 200 SOLUTION ORAL; RESPIRATORY (INHALATION) at 16:00

## 2024-01-01 RX ADMIN — ACETAMINOPHEN 650 MG: 650 SOLUTION ORAL at 14:37

## 2024-01-01 RX ADMIN — IPRATROPIUM BROMIDE AND ALBUTEROL SULFATE 3 ML: 2.5; .5 SOLUTION RESPIRATORY (INHALATION) at 22:08

## 2024-01-01 RX ADMIN — ATORVASTATIN CALCIUM 40 MG: 40 TABLET, FILM COATED ORAL at 12:18

## 2024-01-01 RX ADMIN — IPRATROPIUM BROMIDE AND ALBUTEROL SULFATE 3 ML: 2.5; .5 SOLUTION RESPIRATORY (INHALATION) at 16:32

## 2024-01-01 RX ADMIN — Medication 1 CAPSULE: at 12:18

## 2024-01-01 RX ADMIN — FENTANYL CITRATE 25 MCG: 50 INJECTION, SOLUTION INTRAMUSCULAR; INTRAVENOUS at 05:22

## 2024-01-01 RX ADMIN — IPRATROPIUM BROMIDE AND ALBUTEROL SULFATE 3 ML: 2.5; .5 SOLUTION RESPIRATORY (INHALATION) at 20:39

## 2024-01-01 RX ADMIN — ATORVASTATIN CALCIUM 40 MG: 40 TABLET, FILM COATED ORAL at 20:40

## 2024-01-01 RX ADMIN — IPRATROPIUM BROMIDE AND ALBUTEROL SULFATE 3 ML: 2.5; .5 SOLUTION RESPIRATORY (INHALATION) at 15:34

## 2024-01-01 RX ADMIN — SODIUM BICARBONATE 150 MEQ: 84 INJECTION, SOLUTION INTRAVENOUS at 08:53

## 2024-01-01 RX ADMIN — METOCLOPRAMIDE 5 MG: 5 INJECTION, SOLUTION INTRAMUSCULAR; INTRAVENOUS at 21:39

## 2024-01-01 RX ADMIN — IPRATROPIUM BROMIDE AND ALBUTEROL SULFATE 3 ML: 2.5; .5 SOLUTION RESPIRATORY (INHALATION) at 16:03

## 2024-01-01 RX ADMIN — IPRATROPIUM BROMIDE AND ALBUTEROL SULFATE 3 ML: 2.5; .5 SOLUTION RESPIRATORY (INHALATION) at 08:32

## 2024-01-01 RX ADMIN — PIPERACILLIN AND TAZOBACTAM 3.38 G: 3; .375 INJECTION, POWDER, LYOPHILIZED, FOR SOLUTION INTRAVENOUS at 02:07

## 2024-01-01 RX ADMIN — REMDESIVIR 100 MG: 100 INJECTION, POWDER, LYOPHILIZED, FOR SOLUTION INTRAVENOUS at 13:35

## 2024-01-01 RX ADMIN — Medication 30 ML: at 15:46

## 2024-01-01 RX ADMIN — PROPOFOL 5 MCG/KG/MIN: 10 INJECTION, EMULSION INTRAVENOUS at 03:30

## 2024-01-01 RX ADMIN — PANTOPRAZOLE SODIUM 40 MG: 40 INJECTION, POWDER, FOR SOLUTION INTRAVENOUS at 09:06

## 2024-01-01 RX ADMIN — IPRATROPIUM BROMIDE AND ALBUTEROL SULFATE 3 ML: 2.5; .5 SOLUTION RESPIRATORY (INHALATION) at 16:45

## 2024-01-01 RX ADMIN — HYDROCORTISONE SODIUM SUCCINATE 100 MG: 100 INJECTION, POWDER, FOR SOLUTION INTRAMUSCULAR; INTRAVENOUS at 05:31

## 2024-01-01 RX ADMIN — POTASSIUM CHLORIDE 20 MEQ: 29.8 INJECTION, SOLUTION INTRAVENOUS at 01:24

## 2024-01-01 RX ADMIN — Medication 30 ML: at 11:24

## 2024-01-01 RX ADMIN — Medication 10 ML: at 09:25

## 2024-01-01 RX ADMIN — PANTOPRAZOLE SODIUM 8 MG/HR: 40 INJECTION, POWDER, FOR SOLUTION INTRAVENOUS at 02:49

## 2024-01-01 RX ADMIN — CHLORHEXIDINE GLUCONATE 0.12% ORAL RINSE 15 ML: 1.2 LIQUID ORAL at 20:43

## 2024-01-01 RX ADMIN — ACETYLCYSTEINE 4 ML: 200 SOLUTION ORAL; RESPIRATORY (INHALATION) at 09:51

## 2024-01-01 RX ADMIN — IPRATROPIUM BROMIDE AND ALBUTEROL SULFATE 3 ML: 2.5; .5 SOLUTION RESPIRATORY (INHALATION) at 21:42

## 2024-01-01 RX ADMIN — PROPOFOL 15 MCG/KG/MIN: 10 INJECTION, EMULSION INTRAVENOUS at 21:36

## 2024-01-01 RX ADMIN — ACETYLCYSTEINE 2 ML: 200 SOLUTION ORAL; RESPIRATORY (INHALATION) at 19:53

## 2024-01-01 RX ADMIN — HYDROCORTISONE SODIUM SUCCINATE 100 MG: 100 INJECTION, POWDER, FOR SOLUTION INTRAMUSCULAR; INTRAVENOUS at 22:26

## 2024-01-01 RX ADMIN — NOREPINEPHRINE BITARTRATE 0.05 MCG/KG/MIN: 0.03 INJECTION, SOLUTION INTRAVENOUS at 08:28

## 2024-01-01 RX ADMIN — MIDAZOLAM 2 MG: 1 INJECTION INTRAMUSCULAR; INTRAVENOUS at 20:16

## 2024-01-01 RX ADMIN — DOXYCYCLINE 100 MG: 100 CAPSULE ORAL at 20:39

## 2024-01-01 RX ADMIN — MIDODRINE HYDROCHLORIDE 10 MG: 10 TABLET ORAL at 21:01

## 2024-01-01 RX ADMIN — PROPOFOL 15 MCG/KG/MIN: 10 INJECTION, EMULSION INTRAVENOUS at 07:30

## 2024-01-01 RX ADMIN — DOCUSATE SODIUM 100 MG: 50 LIQUID ORAL at 22:32

## 2024-01-01 RX ADMIN — Medication 10 ML: at 20:40

## 2024-01-01 RX ADMIN — NYSTATIN: 100000 POWDER TOPICAL at 21:38

## 2024-01-01 RX ADMIN — Medication 1 CAPSULE: at 08:12

## 2024-01-01 RX ADMIN — FENTANYL CITRATE 25 MCG: 50 INJECTION, SOLUTION INTRAMUSCULAR; INTRAVENOUS at 15:26

## 2024-01-01 RX ADMIN — Medication 30 ML: at 21:39

## 2024-01-01 RX ADMIN — NOREPINEPHRINE BITARTRATE 0.16 MCG/KG/MIN: 0.03 INJECTION, SOLUTION INTRAVENOUS at 20:43

## 2024-01-01 RX ADMIN — Medication 1 CAPSULE: at 08:21

## 2024-01-01 RX ADMIN — DEXMEDETOMIDINE HYDROCHLORIDE 0.2 MCG/KG/HR: 4 INJECTION, SOLUTION INTRAVENOUS at 06:00

## 2024-01-01 RX ADMIN — CHLORHEXIDINE GLUCONATE 0.12% ORAL RINSE 15 ML: 1.2 LIQUID ORAL at 08:19

## 2024-01-01 RX ADMIN — ACETYLCYSTEINE 2 ML: 200 SOLUTION ORAL; RESPIRATORY (INHALATION) at 19:57

## 2024-01-01 RX ADMIN — IPRATROPIUM BROMIDE AND ALBUTEROL SULFATE 3 ML: 2.5; .5 SOLUTION RESPIRATORY (INHALATION) at 16:01

## 2024-01-01 RX ADMIN — IPRATROPIUM BROMIDE AND ALBUTEROL SULFATE 3 ML: 2.5; .5 SOLUTION RESPIRATORY (INHALATION) at 16:05

## 2024-01-01 RX ADMIN — PANTOPRAZOLE SODIUM 8 MG/HR: 40 INJECTION, POWDER, FOR SOLUTION INTRAVENOUS at 18:45

## 2024-01-01 RX ADMIN — ATORVASTATIN CALCIUM 40 MG: 40 TABLET, FILM COATED ORAL at 20:50

## 2024-01-01 RX ADMIN — DOXYCYCLINE 100 MG: 100 CAPSULE ORAL at 12:18

## 2024-01-01 RX ADMIN — SODIUM CHLORIDE 2000 MG: 900 INJECTION INTRAVENOUS at 16:42

## 2024-01-01 RX ADMIN — MUPIROCIN 1 APPLICATION: 20 OINTMENT TOPICAL at 20:44

## 2024-01-01 RX ADMIN — WARFARIN SODIUM 5 MG: 5 TABLET ORAL at 18:06

## 2024-01-01 RX ADMIN — PANTOPRAZOLE SODIUM 40 MG: 40 INJECTION, POWDER, FOR SOLUTION INTRAVENOUS at 20:16

## 2024-01-01 RX ADMIN — Medication 30 ML: at 17:56

## 2024-01-01 RX ADMIN — PANTOPRAZOLE SODIUM 8 MG/HR: 40 INJECTION, POWDER, FOR SOLUTION INTRAVENOUS at 10:56

## 2024-01-01 RX ADMIN — MIDAZOLAM HYDROCHLORIDE 4 MG: 1 INJECTION, SOLUTION INTRAMUSCULAR; INTRAVENOUS at 13:52

## 2024-01-01 RX ADMIN — PANTOPRAZOLE SODIUM 8 MG/HR: 40 INJECTION, POWDER, FOR SOLUTION INTRAVENOUS at 14:07

## 2024-01-01 RX ADMIN — IPRATROPIUM BROMIDE AND ALBUTEROL SULFATE 3 ML: 2.5; .5 SOLUTION RESPIRATORY (INHALATION) at 07:24

## 2024-01-01 RX ADMIN — CYANOCOBALAMIN TAB 1000 MCG 1000 MCG: 1000 TAB at 09:21

## 2024-01-01 RX ADMIN — LACTULOSE 20 G: 20 SOLUTION ORAL at 21:53

## 2024-01-01 RX ADMIN — ACETAMINOPHEN 650 MG: 325 TABLET ORAL at 06:34

## 2024-01-01 RX ADMIN — IPRATROPIUM BROMIDE AND ALBUTEROL SULFATE 3 ML: 2.5; .5 SOLUTION RESPIRATORY (INHALATION) at 11:40

## 2024-01-01 RX ADMIN — Medication 30 ML: at 20:58

## 2024-01-01 RX ADMIN — IPRATROPIUM BROMIDE AND ALBUTEROL SULFATE 3 ML: 2.5; .5 SOLUTION RESPIRATORY (INHALATION) at 20:52

## 2024-01-01 RX ADMIN — PANTOPRAZOLE SODIUM 8 MG/HR: 40 INJECTION, POWDER, FOR SOLUTION INTRAVENOUS at 13:43

## 2024-01-01 RX ADMIN — ACETYLCYSTEINE 2 ML: 200 SOLUTION ORAL; RESPIRATORY (INHALATION) at 20:18

## 2024-01-01 RX ADMIN — HYDROCORTISONE SODIUM SUCCINATE 100 MG: 100 INJECTION, POWDER, FOR SOLUTION INTRAMUSCULAR; INTRAVENOUS at 13:35

## 2024-01-01 RX ADMIN — MIDAZOLAM HYDROCHLORIDE 4 MG/HR: 1 INJECTION, SOLUTION INTRAVENOUS at 09:04

## 2024-01-01 RX ADMIN — Medication 300 MCG/HR: at 16:36

## 2024-01-01 RX ADMIN — PROPOFOL 20 MCG/KG/MIN: 10 INJECTION, EMULSION INTRAVENOUS at 18:27

## 2024-01-01 RX ADMIN — Medication 200 MCG/HR: at 15:45

## 2024-01-01 RX ADMIN — IPRATROPIUM BROMIDE AND ALBUTEROL SULFATE 3 ML: 2.5; .5 SOLUTION RESPIRATORY (INHALATION) at 17:16

## 2024-01-01 RX ADMIN — ACETYLCYSTEINE 2 ML: 200 SOLUTION ORAL; RESPIRATORY (INHALATION) at 19:02

## 2024-01-01 RX ADMIN — WARFARIN SODIUM 5 MG: 5 TABLET ORAL at 18:01

## 2024-01-01 RX ADMIN — PANTOPRAZOLE SODIUM 40 MG: 40 INJECTION, POWDER, FOR SOLUTION INTRAVENOUS at 20:46

## 2024-01-01 RX ADMIN — Medication 175 MCG/HR: at 03:17

## 2024-01-01 RX ADMIN — NYSTATIN: 100000 POWDER TOPICAL at 20:58

## 2024-01-01 RX ADMIN — DOCUSATE SODIUM 100 MG: 50 LIQUID ORAL at 08:07

## 2024-01-01 RX ADMIN — NOREPINEPHRINE BITARTRATE 0.05 MCG/KG/MIN: 0.03 INJECTION, SOLUTION INTRAVENOUS at 03:29

## 2024-01-01 RX ADMIN — POTASSIUM CHLORIDE 20 MEQ: 29.8 INJECTION, SOLUTION INTRAVENOUS at 00:08

## 2024-01-01 RX ADMIN — PANTOPRAZOLE SODIUM 40 MG: 40 INJECTION, POWDER, FOR SOLUTION INTRAVENOUS at 20:50

## 2024-01-01 RX ADMIN — DOXYCYCLINE 100 MG: 100 CAPSULE ORAL at 21:02

## 2024-01-01 RX ADMIN — Medication 30 ML: at 20:44

## 2024-01-01 RX ADMIN — METOPROLOL TARTRATE 2.5 MG: 5 INJECTION INTRAVENOUS at 03:04

## 2024-01-01 RX ADMIN — ONDANSETRON 4 MG: 2 INJECTION INTRAMUSCULAR; INTRAVENOUS at 08:07

## 2024-01-01 RX ADMIN — Medication 1 CAPSULE: at 08:28

## 2024-01-01 RX ADMIN — Medication 175 MCG/HR: at 17:27

## 2024-01-01 RX ADMIN — NYSTATIN: 100000 POWDER TOPICAL at 08:07

## 2024-01-01 RX ADMIN — WHITE PETROLATUM 57.7 %-MINERAL OIL 31.9 % EYE OINTMENT: at 08:52

## 2024-01-01 RX ADMIN — PANTOPRAZOLE SODIUM 40 MG: 40 INJECTION, POWDER, FOR SOLUTION INTRAVENOUS at 08:44

## 2024-01-01 RX ADMIN — PANTOPRAZOLE SODIUM 8 MG/HR: 40 INJECTION, POWDER, FOR SOLUTION INTRAVENOUS at 13:35

## 2024-01-01 RX ADMIN — NYSTATIN: 100000 POWDER TOPICAL at 08:12

## 2024-01-01 RX ADMIN — Medication 50 MG: at 03:16

## 2024-01-01 RX ADMIN — IPRATROPIUM BROMIDE AND ALBUTEROL SULFATE 3 ML: 2.5; .5 SOLUTION RESPIRATORY (INHALATION) at 19:03

## 2024-01-01 RX ADMIN — DOXYCYCLINE 100 MG: 100 CAPSULE ORAL at 08:19

## 2024-01-01 RX ADMIN — PANTOPRAZOLE SODIUM 8 MG/HR: 40 INJECTION, POWDER, FOR SOLUTION INTRAVENOUS at 00:17

## 2024-01-01 RX ADMIN — IPRATROPIUM BROMIDE AND ALBUTEROL SULFATE 3 ML: 2.5; .5 SOLUTION RESPIRATORY (INHALATION) at 08:33

## 2024-01-01 RX ADMIN — DOXYCYCLINE 100 MG: 100 CAPSULE ORAL at 16:42

## 2024-01-01 RX ADMIN — Medication 30 ML: at 17:28

## 2024-01-01 RX ADMIN — IPRATROPIUM BROMIDE AND ALBUTEROL SULFATE 3 ML: 2.5; .5 SOLUTION RESPIRATORY (INHALATION) at 15:38

## 2024-01-01 RX ADMIN — DOCUSATE SODIUM 100 MG: 50 LIQUID ORAL at 12:35

## 2024-01-01 RX ADMIN — INSULIN HUMAN 2 UNITS: 100 INJECTION, SOLUTION PARENTERAL at 00:32

## 2024-01-01 RX ADMIN — MIDODRINE HYDROCHLORIDE 10 MG: 10 TABLET ORAL at 10:04

## 2024-01-01 RX ADMIN — MIDODRINE HYDROCHLORIDE 5 MG: 5 TABLET ORAL at 11:39

## 2024-01-01 RX ADMIN — PANTOPRAZOLE SODIUM 8 MG/HR: 40 INJECTION, POWDER, FOR SOLUTION INTRAVENOUS at 23:19

## 2024-01-01 RX ADMIN — CHLORHEXIDINE GLUCONATE 0.12% ORAL RINSE 15 ML: 1.2 LIQUID ORAL at 20:16

## 2024-01-01 RX ADMIN — PROPOFOL 15 MCG/KG/MIN: 10 INJECTION, EMULSION INTRAVENOUS at 01:59

## 2024-01-01 RX ADMIN — Medication 30 ML: at 08:07

## 2024-01-01 RX ADMIN — ATORVASTATIN CALCIUM 40 MG: 40 TABLET, FILM COATED ORAL at 09:24

## 2024-01-01 RX ADMIN — CHLORHEXIDINE GLUCONATE 0.12% ORAL RINSE 15 ML: 1.2 LIQUID ORAL at 20:15

## 2024-01-01 RX ADMIN — Medication 10 ML: at 21:18

## 2024-01-01 RX ADMIN — PANTOPRAZOLE SODIUM 8 MG/HR: 40 INJECTION, POWDER, FOR SOLUTION INTRAVENOUS at 09:55

## 2024-01-01 RX ADMIN — CHLORHEXIDINE GLUCONATE 0.12% ORAL RINSE 15 ML: 1.2 LIQUID ORAL at 08:43

## 2024-01-01 RX ADMIN — ALBUMIN (HUMAN) 12.5 G: 0.25 INJECTION, SOLUTION INTRAVENOUS at 20:39

## 2024-01-01 RX ADMIN — NYSTATIN: 100000 POWDER TOPICAL at 08:23

## 2024-01-01 RX ADMIN — Medication 100 MCG/HR: at 03:52

## 2024-01-01 RX ADMIN — Medication 1 CAPSULE: at 08:58

## 2024-01-01 RX ADMIN — AMANTADINE HYDROCHLORIDE 200 MG: 50 SOLUTION ORAL at 13:20

## 2024-01-01 RX ADMIN — LEVETIRACETAM 500 MG: 100 INJECTION, SOLUTION INTRAVENOUS at 08:28

## 2024-01-01 RX ADMIN — ATORVASTATIN CALCIUM 40 MG: 40 TABLET, FILM COATED ORAL at 20:46

## 2024-01-01 RX ADMIN — Medication 200 MCG/HR: at 18:55

## 2024-01-01 RX ADMIN — CHLORHEXIDINE GLUCONATE 0.12% ORAL RINSE 15 ML: 1.2 LIQUID ORAL at 09:06

## 2024-01-01 RX ADMIN — IPRATROPIUM BROMIDE AND ALBUTEROL SULFATE 3 ML: 2.5; .5 SOLUTION RESPIRATORY (INHALATION) at 12:21

## 2024-01-01 RX ADMIN — IPRATROPIUM BROMIDE AND ALBUTEROL SULFATE 3 ML: 2.5; .5 SOLUTION RESPIRATORY (INHALATION) at 09:50

## 2024-01-01 RX ADMIN — ATORVASTATIN CALCIUM 40 MG: 40 TABLET, FILM COATED ORAL at 09:21

## 2024-01-01 RX ADMIN — EPOETIN ALFA-EPBX 10000 UNITS: 10000 INJECTION, SOLUTION INTRAVENOUS; SUBCUTANEOUS at 15:11

## 2024-01-01 RX ADMIN — IPRATROPIUM BROMIDE AND ALBUTEROL SULFATE 3 ML: 2.5; .5 SOLUTION RESPIRATORY (INHALATION) at 07:25

## 2024-01-01 RX ADMIN — PANTOPRAZOLE SODIUM 8 MG/HR: 40 INJECTION, POWDER, FOR SOLUTION INTRAVENOUS at 18:22

## 2024-01-01 RX ADMIN — Medication 30 ML: at 15:03

## 2024-01-01 RX ADMIN — NYSTATIN: 100000 POWDER TOPICAL at 20:15

## 2024-01-01 RX ADMIN — ACETYLCYSTEINE 2 ML: 200 SOLUTION ORAL; RESPIRATORY (INHALATION) at 15:38

## 2024-01-01 RX ADMIN — Medication 30 ML: at 17:53

## 2024-01-01 RX ADMIN — SODIUM BICARBONATE 50 MEQ: 84 INJECTION INTRAVENOUS at 20:20

## 2024-01-01 RX ADMIN — MIDAZOLAM 2 MG: 1 INJECTION INTRAMUSCULAR; INTRAVENOUS at 00:14

## 2024-01-01 RX ADMIN — IPRATROPIUM BROMIDE AND ALBUTEROL SULFATE 3 ML: 2.5; .5 SOLUTION RESPIRATORY (INHALATION) at 19:53

## 2024-01-01 RX ADMIN — Medication 1 CAPSULE: at 08:07

## 2024-01-01 RX ADMIN — FENTANYL CITRATE 25 MCG: 50 INJECTION, SOLUTION INTRAMUSCULAR; INTRAVENOUS at 06:55

## 2024-01-01 RX ADMIN — ACETYLCYSTEINE 2 ML: 200 SOLUTION ORAL; RESPIRATORY (INHALATION) at 08:32

## 2024-01-01 RX ADMIN — PANTOPRAZOLE SODIUM 8 MG/HR: 40 INJECTION, POWDER, FOR SOLUTION INTRAVENOUS at 08:50

## 2024-01-01 RX ADMIN — NYSTATIN: 100000 POWDER TOPICAL at 12:43

## 2024-01-01 RX ADMIN — Medication 200 MCG/HR: at 15:47

## 2024-01-01 RX ADMIN — NYSTATIN: 100000 POWDER TOPICAL at 08:20

## 2024-01-01 RX ADMIN — IPRATROPIUM BROMIDE AND ALBUTEROL SULFATE 3 ML: 2.5; .5 SOLUTION RESPIRATORY (INHALATION) at 07:34

## 2024-01-01 RX ADMIN — CHLORHEXIDINE GLUCONATE 0.12% ORAL RINSE 15 ML: 1.2 LIQUID ORAL at 08:51

## 2024-01-01 RX ADMIN — NYSTATIN: 100000 POWDER TOPICAL at 00:19

## 2024-01-01 RX ADMIN — DOXYCYCLINE 100 MG: 100 INJECTION, POWDER, LYOPHILIZED, FOR SOLUTION INTRAVENOUS at 08:47

## 2024-01-01 RX ADMIN — PIPERACILLIN AND TAZOBACTAM 4.5 G: 4; .5 INJECTION, POWDER, FOR SOLUTION INTRAVENOUS at 05:57

## 2024-01-01 RX ADMIN — CHLORHEXIDINE GLUCONATE 0.12% ORAL RINSE 15 ML: 1.2 LIQUID ORAL at 21:03

## 2024-01-01 RX ADMIN — PANTOPRAZOLE SODIUM 40 MG: 40 INJECTION, POWDER, FOR SOLUTION INTRAVENOUS at 08:01

## 2024-01-01 RX ADMIN — NOREPINEPHRINE BITARTRATE 0.05 MCG/KG/MIN: 0.03 INJECTION, SOLUTION INTRAVENOUS at 06:39

## 2024-01-01 RX ADMIN — DOCUSATE SODIUM 100 MG: 50 LIQUID ORAL at 08:19

## 2024-01-01 RX ADMIN — ETOMIDATE INJECTION 41.1 MG: 2 SOLUTION INTRAVENOUS at 13:52

## 2024-01-01 RX ADMIN — CHLORHEXIDINE GLUCONATE 0.12% ORAL RINSE 15 ML: 1.2 LIQUID ORAL at 20:28

## 2024-01-01 RX ADMIN — IPRATROPIUM BROMIDE AND ALBUTEROL SULFATE 3 ML: 2.5; .5 SOLUTION RESPIRATORY (INHALATION) at 14:06

## 2024-01-01 RX ADMIN — POTASSIUM CHLORIDE 20 MEQ: 29.8 INJECTION, SOLUTION INTRAVENOUS at 00:28

## 2024-01-01 RX ADMIN — MIDODRINE HYDROCHLORIDE 10 MG: 10 TABLET ORAL at 08:21

## 2024-01-01 RX ADMIN — IPRATROPIUM BROMIDE AND ALBUTEROL SULFATE 3 ML: 2.5; .5 SOLUTION RESPIRATORY (INHALATION) at 17:26

## 2024-01-01 RX ADMIN — CHLORHEXIDINE GLUCONATE 0.12% ORAL RINSE 15 ML: 1.2 LIQUID ORAL at 21:53

## 2024-01-01 RX ADMIN — ACETYLCYSTEINE 2 ML: 200 SOLUTION ORAL; RESPIRATORY (INHALATION) at 16:44

## 2024-01-01 RX ADMIN — ALBUMIN (HUMAN) 25 G: 0.25 INJECTION, SOLUTION INTRAVENOUS at 00:12

## 2024-01-01 RX ADMIN — MUPIROCIN 1 APPLICATION: 20 OINTMENT TOPICAL at 08:43

## 2024-01-01 RX ADMIN — SODIUM BICARBONATE 150 MEQ: 84 INJECTION, SOLUTION INTRAVENOUS at 03:43

## 2024-01-01 RX ADMIN — NYSTATIN: 100000 POWDER TOPICAL at 20:47

## 2024-01-01 RX ADMIN — Medication 30 ML: at 20:16

## 2024-01-01 RX ADMIN — PIPERACILLIN AND TAZOBACTAM 4.5 G: 4; .5 INJECTION, POWDER, FOR SOLUTION INTRAVENOUS at 05:31

## 2024-01-01 RX ADMIN — IPRATROPIUM BROMIDE AND ALBUTEROL SULFATE 3 ML: 2.5; .5 SOLUTION RESPIRATORY (INHALATION) at 20:18

## 2024-01-01 RX ADMIN — NYSTATIN: 100000 POWDER TOPICAL at 20:23

## 2024-01-01 RX ADMIN — DOCUSATE SODIUM 100 MG: 50 LIQUID ORAL at 21:53

## 2024-01-01 RX ADMIN — SENNOSIDES AND DOCUSATE SODIUM 2 TABLET: 50; 8.6 TABLET ORAL at 00:02

## 2024-01-01 RX ADMIN — PANTOPRAZOLE SODIUM 8 MG/HR: 40 INJECTION, POWDER, FOR SOLUTION INTRAVENOUS at 04:14

## 2024-01-01 RX ADMIN — Medication 10 ML: at 20:33

## 2024-01-01 RX ADMIN — Medication 175 MCG/HR: at 14:05

## 2024-01-01 RX ADMIN — WHITE PETROLATUM 57.7 %-MINERAL OIL 31.9 % EYE OINTMENT: at 10:57

## 2024-01-01 RX ADMIN — IPRATROPIUM BROMIDE AND ALBUTEROL SULFATE 3 ML: 2.5; .5 SOLUTION RESPIRATORY (INHALATION) at 00:45

## 2024-01-01 RX ADMIN — Medication 30 ML: at 08:52

## 2024-01-01 RX ADMIN — Medication 30 ML: at 21:04

## 2024-01-01 RX ADMIN — HEPARIN SODIUM 7 UNITS/KG/HR: 10000 INJECTION, SOLUTION INTRAVENOUS at 12:58

## 2024-01-01 RX ADMIN — Medication 50 MCG/HR: at 09:38

## 2024-01-01 RX ADMIN — Medication 30 ML: at 08:32

## 2024-01-01 RX ADMIN — REMDESIVIR 100 MG: 100 INJECTION, POWDER, LYOPHILIZED, FOR SOLUTION INTRAVENOUS at 21:01

## 2024-01-01 RX ADMIN — ATORVASTATIN CALCIUM 40 MG: 40 TABLET, FILM COATED ORAL at 20:34

## 2024-01-01 RX ADMIN — CALCITRIOL CAPSULES 0.25 MCG 0.25 MCG: 0.25 CAPSULE ORAL at 20:39

## 2024-01-01 RX ADMIN — NYSTATIN: 100000 POWDER TOPICAL at 21:14

## 2024-01-01 RX ADMIN — DOXYCYCLINE 100 MG: 100 INJECTION, POWDER, LYOPHILIZED, FOR SOLUTION INTRAVENOUS at 23:40

## 2024-01-01 RX ADMIN — DOXYCYCLINE 100 MG: 100 CAPSULE ORAL at 21:18

## 2024-01-01 RX ADMIN — PANTOPRAZOLE SODIUM 40 MG: 40 INJECTION, POWDER, FOR SOLUTION INTRAVENOUS at 22:32

## 2024-01-02 ENCOUNTER — ANTICOAGULATION VISIT (OUTPATIENT)
Dept: PHARMACY | Facility: HOSPITAL | Age: 79
End: 2024-01-02
Payer: MEDICARE

## 2024-01-02 LAB — INR PPP: 1.8

## 2024-01-02 PROCEDURE — G0249 PROVIDE INR TEST MATER/EQUIP: HCPCS

## 2024-01-02 NOTE — PROGRESS NOTES
Anticoagulation Clinic Progress Note   Oswaldo HOMEMONITOR: [place] Check Serial # : [id number]     Indication: afib  Referring Provider: Gale  Initial Warfarin Start Date: 2007  Goal INR: 2-3  Current Drug Interactions:   [id number]: 5 (HTN, Age, CHF, CAD)   Other: anemic, CKD (stage 4) May need bridge with Lovenox given persistent AF (per cardio 1/24/2019), Scr was 3.22 on 11/24/21    Diet: eats salads 2x weekly 5/11/2023     Anticoagulation Clinic INR History:   Date 10/30 11/20 12/17 1/14/20 2/11 3/11 4/8 5/6 6/11 7/9 8/6 9/3 9/14 9/30   Total Weekly Dose 32.5 mg 32.5 mg 32.5 mg 32.5 mg 32.5mg 32.5mg 32.5mg 32.5mg 32.5mg 32.5 mg 32.5 mg 32.5mg 32.5mg 26.25 mg   INR 2.3 2.3 2.1 1.9 2.2 2.1 2.1 2.2 2.2 2.3 2.7 1.6 2.2  1.8   Notes                       ??   5 day hold     Date 10/14 11/4 11/11 11/25 12/9 12/16 12/30 1/13 2/3 3/3 3/31 4/28 5/26 6/23   Total Weekly Dose 32.5 mg 32.5mg 35mg 32.5mg 33.75 mg 36.25 mg 33.75 mg 35mg 33.75 33.75mg 33.75mg 33.75 mg 33.75 mg 33.75mg   INR 2.0 1.6 2.1 1.8 1.8 2.5 2.0 3.0 2.1 2.4 2.3 3.0 2.1 2.2   Notes   1x boost GLV boostx1 boostx1             Date 7/21 9/1 10/13 11/10 12/1 12/15 12/29 1/12/22 1/26 2/9 2/16 2/23 3/2 3/16   Total Weekly Dose 33.75mg 33.75mg 33.75mg 33.75mg 33.75 mg 32.5mg 30mg 27.5mg 28.75 mg 30 mg 31.25 mg 31.25 mg 31.25 mg 31.25 mg   INR 2.9 2.5 3.1 3.1 3.2 3.4 3.4 1.9 1.7 1.8 2.1 2.6 2.9 2.9   Notes        redx1 ?  inc        Date 3/30 4/13 5/11 6/8  6/23 7/7 7/28  8/9 8/12 8/16   Total WeeklyDose 31.25mg 31.25 mg 31.25 mg 31.25mg Start dialysis 30mg 30mg 30 mg  33.75mg 37.5mg 37.5mg   INR 2.5 2.2 2.6 3.9  2.1 2.1 1.8 1.23 1.4 1.5 2.1   Notes    Inc torsemide Cath placement    AV fistula placement    HM train  1 no billing       Date 8/23 8/30 9/6 9/13 9/20 9/30 10/4 10/11 10/18 10/25 11/8 11/15   Total WeeklyDose 31.25 mg 32.5 mg 32.5 mg 32.5 mg 32.5 mg 32.5 mg 35 mg 35 mg 35 mg 32.5 mg 26.5 mg 35 mg   INR 1.9 2.0 2.0 2.5 2.7 1.6 1.7 2.8 2.4 1.7  1.4 1.6   Notes HM2 no billing HM3  No billing HM4  No billing HM 1  Bill today HM 2 No billing HM 3 No bill HM 4  No billing 1x boost  HM 5 no bill day Bill day  HM1 HM- 2 no billing   HM 3- no billing 1x boost  HM 4 - no billing      Date 11/22 11/29 12/6 12/13 12/20 12/27 1/3/23 1/10 1/12 1/17 1/26/23 1/31/23   Total WeeklyDose 37.5 mg 37.5 mg 37.5 mg 37.5 mg 42.5 mg  40 mg 37.5 mg 40 mg 42.5mg 45 mg 45 mg 40 mg   INR 1.9 2.8 1.9 1.5 2.3 3.4 1.4 0.8 2.7 POCT  2.23   2.8 HM 3.9 1.9 2.2   Notes HM1- bill day HM 2 - NO bill HM 3 - no bill  Inc GLV Hm 4 - NO BILL HM 1 - Bill today HM 2   no billing HM 3- no billing HM 4  No billing lab error?? clinic HM 1  Bill today HM 2 - no bill  HM 3 - no bill      Date 2/7 2/14 2/21/23 2/28/23 3/7 3/14/23 3/21/23 3/28/23 4/4 4/11 4/18 4/26   Total WeeklyDose 40 mg 40 mg 40 mg 37.5 mg 40 mg 40 mg 40 mg 40 mg 37.5 mg 37.5 mg 37.5 mg  40 mg   INR 2.3 2.2 3.9 1.6 3.7 ??? 2.5 2.7 3.2 2.8 2.5 1.7 1.7   Notes HM 4 rec 2/15  HM 1  Bill today HM 2 - no billing  1xred  HM 3 - NO BILL  HM-4 no bill, percocet HM 5 - no billing until 3/15 HM 1 - BILL today  HM 2 - no Billing HM 3  No billing HM 4  No billing HM 1-  BILL today  HM 2  No billing     Date 5/2 5/5 5/11 5/16 5/22 5/30 6/6 6/14 6/20 6/27 7/5 7/11   Total WeeklyDose 42.5 mg 37.5 mg 42.5mg 42.5 mg  35 mg 40 mg 40 mg 40 mg 37.5 mg 40 mg 40 mg 40 mg   INR 3.5 1.8 2.9 4.2 2.0 2.5 2.8 3.2 1.9 2.2 3.0 3.3   Notes HM 3  No billing HM 4  No billing HM 5  No billing HM-1  Bill  Dec'd GLV  HM 2  No billing  1x hold  HM 3   no billing HM 4  No billing HM 1  Bill today HM 2  No billing HM 3  No bill HM 4  No bill HM5  No bill     Date 7/18 7/25 08/01/23 8/8 8/15/23 8/23/23 08/29/23 09/05/23 9/12/23 9/19/23 9/26/23 10/3   Total WeeklyDose 37.5 mg 35 mg 37.5mg 40 mg 37.5 mg 37.5 mg 37.5 mg 37.5 mg 37.5 mg 37.5 mg 37.5 mg 37.5 mg   INR 3.3 1.9 1.9 2.3 2.6 2.1 2.4 2.3 2.7 2.5 2.7 2.0   Notes HM 1  Bill today HM 2  No billing HM 3  No billing  1  x boost HM 4 no billing  HM 1 - BILL today  HM 2 - no bill  HM 3- no billing HM 4- no billing HM 1 - BILL today  HM 2 - NO bill  HM 3 - no bill  Hm 4- no bill       Date 10/10 10/18 10/24 10/31 11/07 11/14/23 11/21 11/28 12/05/23   Total Weekly Dose 37.5mg 37.5 mg 37.5mg 42.5 mg 37.5 mg 37.5 mg 37.5 mg 37.5 mg 35 mg   INR 1.6 2.0 1.5 2.7 2.1 2.5 2.3 4.2 1.9   Notes Hm- 1 bill today  Missed dose?  Inc GLV HM 2- No bill  Hm- 3 no bill Hm 4 no billing   Boost dose  HM 1 billing today HM 2 - no bill  HM 3 no billing Hm 4 no billing HM 1 billing day  1x red     Date 12/12 12/19/23 12/26 1/2/24           Total WeeklyDose 37.5 mg 40 mg 37.5 mg 37.5 mg           INR 1.8 2.2 2.1 1.8           Notes HM-2 no billing HM 3 - no bill  HM 4 no bill HM 1 billing day            Takes warfarin in the am    Clinic Interview:  Verbal Release Authorization signed on 8/22/2018 -- may speak with Raeann Montemayor (Wife). [phone] (Home) *Preferred*  Tablet Strength: pt has 7.5mg and 2.5mg tablets  Estimated oop cost for  EDER HM: Patient has [employer] Medicare so they should have minimal to no OOP costs.                  Patient Findings  Negatives: Signs/symptoms of thrombosis, Signs/symptoms of bleeding, Laboratory test error suspected, Change in health, Change in alcohol use, Change in activity, Upcoming invasive procedure, Emergency department visit, Upcoming dental procedure, Missed doses, Extra doses, Change in medications, Change in diet/appetite, Hospital admission, Bruising, Other complaints   Comments: All findings negative per patient.       Plan:   1. INR is subtherapeutic today 1/2/24 at 1.8 (goal 2.0-3.0). Instructed Mr. Montemayor to take a boosted dose of warfarin 7.5 mg today, and then resume prior dose of warfarin 5 mg oral daily except 7.5 mg on Fridays until recheck.  2. Recheck INR in one week 1/9/24  3. Verbal information provided over the telephone. Mr. Montemayor expresses understanding with teach back and has no further  questions at this time.     Home monitor information below:  Test strips on hand: 2  1/2/24 - will plan to send out supplies today  Lot : [id number]  Barcode : [id number] Exp : 1/25  Serial number: [id number]   Supplies billing code used: last 12/5/23  Next must be on or after  1/30/2024  Transfer Tube Lot number: Lot : [id number]  Safety Lancets:  Lot : [id number]     Billing:  *bill must be 4 tests 28 days or more from HM visit  PLEASE REFER TO ANTICOAGULATION TRAINING POWERPOINT FOR BILLING. Needs to use in clinic remote appointment for encounter.    Raj Salter, NighatD, BCPS  1/2/2024  11:45 EST

## 2024-01-04 NOTE — PROGRESS NOTES
Subjective:     Encounter Date:01/17/2024      Patient ID: Marco Antonio Montemayor is a 78 y.o.   white male, retired /currently laid off from being a  for [employer], from [place], [place].      REMOTE PHYSICIAN: EDMUND Rosado MD  ELECTROPHYSIOLOGIST: Johnathan Taylor MD, [facility], [facility]  INTERVENTIONAL CARDIOLOGIST: Antonio Escamilla MD  NEPHROLOGIST:  [facility]  DERMATOLOGIST:  Yoanna Marks MD/Oksana Young MD([facility])  GENERAL SURGEON: Jakob Reza MD.    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  Echocardiogram showing mild LVH with estimated ejection fraction 0.58 with mild aortic valve cusp sclerosis, and mild TR, 09/23/2014.   Remote abnormal preoperative Quantitative SPECT gated Cardiolite study with mild “fixed” inferoposterior hypoperfusion and mild LV enlargement with mild global hypokinesia with reduced LVEF 0.40 in the setting of abnormal EKG with subsequent diagnostic coronary angiography demonstrating mild-to-moderate nonobstructive coronary artery atherosclerosis with mild compensated left ventricular dysfunction, LVEF 0.52, and continued medical therapy felt warranted, June 2006.  Remote hospitalization for symptomatic atrial fibrillation with rapid ventricular response and mild congestive heart failure requiring BRYANNA and DC cardioversion, June 2009.  Left heart catheterization with essentially normal coronary arteries with mild luminal irregularities with an EF of 0.60 by Dr. Escamilla for angina and elevated troponin, 06/05/2015.   Echocardiogram 2/3/2023: LVEF 57%, LV wall thickness consistent with moderate concentric hypertrophy.  Septum measures 1.8 cm,  LV diastolic function normal, LA volume moderately increased, RA cavity mild to moderately dilated  Residual class I symptoms, April 2022, January 2023, CCS class I-II chest discomfort/NYHA class II dyspnea on exertion symptoms July 2023  Cardiac PET July 2023 negative for ischemia.  He was noted to have mild coronary artery calcification in the RCA on CT images and MAC, aortic arch, and descending aorta, EF 51%.  Chronic essential hypertension with recent progressive hypertension and blood pressure readings and normal selective bilateral renal angiography, June 2006.  Dyslipidemia.  Chronic atrial tachyarrhythmias:  Intermittent chronic paroxysmal atrial fibrillation with electrophysiologic study with RFA for atrial flutter, October 1999.  Recurrent apparent transient atrial fibrillation, resolved with acceptable [facility] emergency department evaluation, June 2003.  Acceptable combination Doppler echocardiogram, July 2003, with apparent acceptable 24-hour Holter monitor, May 2006.  Recurrent asymptomatic atrial fibrillation with RVR, January 2007, subsequent Coumadin therapy and Tikosyn therapy with successful internal cardioversion of atrial fibrillation and marked bradyarrhythmias requiring DDDR pacemaker implant, [employer] [place] device, data not available, March 2007.  Remote hospitalization for symptomatic rapid atrial fibrillation/BRYANNA DC cardioversion with subsequent acceptable pacemaker interrogation, June/July 2009, as well as acceptable interrogation without reprogramming, July 2011.  Acceptable combination Doppler echocardiogram, December 2010, with residual class I symptoms.  Acceptable device interrogation following [facility] ED evaluation for chest pressure and shortness of breath, data deficit, May 2011, February 2013, February 2016, December 2016.  Abnormal pacemaker assessment with 8.4% mode switch with battery voltage of 1-1.25 years, June 2017, with abnormal PACEART assessment, July 2018, with 22% mode switch, with  subsequent interrogation demonstrating chronic atrial fibrillation, May 2019, remote device interrogation March 2021 demonstrated 89% atrial fib burden, 8.42 battery longevity.  Acceptable pacemaker interrogation without reprogramming, April 2022, pacemaker changed to VVI March 2023  Class III obesity: BMI 41.04  Ichthyosis.  Chronic lower tract obstructive symptoms, probable BPH.  Dyslipidemia.  Nasal polyps with deviated nasal septum with apparent subsequent nasal septoplasty/polypectomy, data deficit, July 2006.  10. Abdominal pain with apparent small bowel obstruction with exploratory laparotomy - data deficit.  11. Appendectomy with subsequent delayed hospitalization for bleeding peptic ulcer disease/probable duodenal ulcer, November 2003.  12. Recurrent asymptomatic atrial fibrillation with rapid ventricular response, July 2007, with subsequent Tikosyn therapy and DDDR pacemaker implant with intermittent recurrent breakthrough arrhythmias, including remote DC cardioversion, May 2008, with subsequent recurrent DC cardioversion, autumn 2018, with recurrent atrial fibrillation and discontinuation of formal antiarrhythmic therapy - data deficit  13. Obstructive sleep apnea, compliant with CPAP  14. Stage 4 chronic kidney disease with recent apparent documented proteinuria and renal biopsy (February 2019), LUE AV fistula August 2022/elevation AV fistula November 2022  15. Elevated TSH, November 2018  16.  Chronic anemia with epoetin injections Fall 2020, Spring 2021  17.  LLE cellulitis May 2022  18.  LUE AV fistula August 2022, patient has dialysis M, W, F    No Known Allergies    Current Outpatient Medications   Medication Instructions    acetaminophen (TYLENOL) 500 mg, Oral, Every 6 Hours PRN    atorvastatin (LIPITOR) 40 MG tablet TAKE ONE TABLET BY MOUTH DAILY    bumetanide (BUMEX) 2 mg, Oral, Every Other Day, Takes on days off from dialysis (Tues, Thurs, Sat, Sun)    carvedilol (COREG) 12.5 MG tablet TAKE ONE  TABLET BY MOUTH TWICE A DAY    cholecalciferol (VITAMIN D3) 1,000 Units, Oral, Daily    dofetilide (TIKOSYN) 500 mcg, Oral    doxazosin (CARDURA) 2 mg, Oral, Nightly    ferrous sulfate 65 mg, Oral, Daily    irbesartan (AVAPRO) 150 mg, Oral, Daily    ketoconazole (NIZORAL) 2 % shampoo     lisinopril (PRINIVIL,ZESTRIL) 5 mg, Oral, Daily    nitroglycerin (NITROSTAT) 0.3 MG SL tablet 1 under the tongue as needed for angina, may repeat q5mins for up three doses    PHARMACY TO DOSE WARFARIN Does not apply, Continuous PRN    ranolazine (RANEXA) 500 mg, Oral, 2 Times Daily    Sucroferric Oxyhydroxide (Velphoro) 500 MG chewable tablet Take As Directed    vitamin B-12 (CYANOCOBALAMIN) 1,000 mcg, Oral, Daily    warfarin (COUMADIN) 2.5 MG tablet TAKE ONE AND ONE-HALF TO TWO TABLETS BY MOUTH ONCE DAILY    warfarin (COUMADIN) 7.5 MG tablet Take one tablet by mouth daily or as directed by the Anticoagulation Clinic         HISTORY OF PRESENT ILLNESS:  The patient is here for 6-month follow-up.   The patient's INRs are managed by the anticoagulation clinic.  He has dialysis 3 days a week.  He is supposed to see Dr. Taylor March 2024 with device check.  Patient had a cardiac PET July 2023 negative for ischemia.  He was noted to have mild coronary artery calcification in the RCA on CT images and MAC, aortic arch, and descending aorta, EF 51%.  The patient denies any chest pain, palpitations, presyncope, syncope, or edema.  He does not have any sputum production but does have some abdominal distention.  His nephrologist ordered an upcoming abdominal ultrasound that is supposed to be performed soon.  Patient has some mild shortness of breath on exertion but says that he is rather sedentary.  He denies any orthopnea as long as he has his CPAP.  He takes Bumex on the days that he does not have dialysis per his nephrologist.      ROS   All other systems reviewed and otherwise negative.      ECG 12 Lead    Date/Time: 1/17/2024 12:18  "PM  Performed by: Ivet Beasley APRN    Authorized by: Ivet Beasley APRN  Rhythm comments: Atrial fibrillation, T wave abnormality, consider inferolateral ischemia, abnormal ECG, 91 bpm,  ms, QTc 450 ms, PVCs no longer present compared to ECG March 2023             Objective:       Vitals:    01/17/24 1017 01/17/24 1029 01/17/24 1222   BP: 130/62 126/66    BP Location: Right arm Right arm    Patient Position: Sitting Standing    Pulse: (!) 40 (!) 42 70   SpO2: 94% 94%    Weight: 130 kg (286 lb)     Height: 177.8 cm (70\")       Body mass index is 41.04 kg/m².  Last weight July 2023 was 276 pounds  Constitutional:       Appearance: Healthy appearance. Not in distress.   Neck:      Vascular: No JVR. JVD normal.   Pulmonary:      Effort: Pulmonary effort is normal.      Breath sounds: Decreased breath sounds present. No wheezing. No rhonchi. No rales.   Chest:      Chest wall: Not tender to palpatation.   Cardiovascular:      PMI at left midclavicular line. Normal rate. Irregularly irregular rhythm. Normal S1. Normal S2.       Murmurs: There is a grade 1/6 systolic murmur at the URSB.      No gallop.  No click. No rub.   Pulses:     Intact distal pulses.   Edema:     Peripheral edema absent.   Abdominal:      General: Bowel sounds are normal.      Palpations: Abdomen is soft.      Tenderness: There is no abdominal tenderness.   Musculoskeletal: Normal range of motion.         General: No tenderness. Skin:     General: Skin is warm and dry.      Comments:  Left precordial pacemaker site nominal    Neurological:      General: No focal deficit present.      Mental Status: Alert and oriented to person, place and time.           Lab Review:   Lab Results   Component Value Date    GLUCOSE 95 01/10/2024    BUN 49 (H) 01/10/2024    CREATININE 7.08 (H) 01/10/2024    EGFRIFNONA 18 (L) 02/16/2022    EGFRIFAFRI 26 (L) 11/12/2018    BCR 6.9 (L) 01/10/2024    CO2 26.0 01/10/2024    CALCIUM 9.8 01/10/2024    " PROTENTOTREF 6.8 01/10/2023    ALBUMIN 4.1 01/10/2024    LABIL2 1.5 01/10/2023    AST 8 01/10/2024    ALT 6 01/10/2024       Lab Results   Component Value Date    WBC 8.32 01/10/2024    HGB 11.5 (L) 01/10/2024    HCT 35.1 (L) 01/10/2024    .0 (H) 01/10/2024     01/10/2024       Lab Results   Component Value Date    HGBA1C 5.20 08/02/2022       Lab Results   Component Value Date    TSH 4.670 (H) 01/10/2024       Lab Results   Component Value Date    CHOL 111 01/10/2024    CHOL 97 04/14/2021     Lab Results   Component Value Date    TRIG 120 01/10/2024    TRIG 99 01/10/2023     Lab Results   Component Value Date    HDL 30 (L) 01/10/2024    HDL 34 (L) 01/10/2023     Lab Results   Component Value Date    LDL 59 01/10/2024    LDL 56 01/10/2023           Lab Results   Component Value Date    .0 (H) 09/14/2018     Advance Care Planning   ACP discussion was held with the patient during this visit. Patient has an advance directive (not in EMR), copy requested.      MURJ 1/4/2024: Battery longevity 3.2 years, 12% right ventricular pacing        Assessment:       Overall continued acceptable course with no new interim cardiopulmonary complaints with fair functional status on dialysis with mild shortness of breath on exertion and abdominal distention. I will defer additional diagnostic or therapeutic intervention from a cardiac perspective at this time other than a repeat echocardiogram.  The patient will follow-up with Dr. Taylor in March 2024 with device check. Patient had a cardiac PET July 2023 negative for ischemia.  He was noted to have mild coronary artery calcification in the RCA on CT images and MAC, aortic arch, and descending aorta, EF 51%.  Hopefully I will get to review the patient's upcoming abdominal ultrasound.     Diagnosis Plan   1. Chronic diastolic congestive heart failure  Echocardiogram      2. Primary hypertension  Controlled, continue current cardiac medications      3.  Dyslipidemia  Good lipid panel January 2024, continue atorvastatin             Plan:         Patient to continue current medications and close follow up with the above providers.  Tentative cardiology follow up in July 2024 or patient may return sooner PRN.  Echocardiogram      Electronically signed by JENNIFER Gold, 01/17/24, 12:26 PM EST.

## 2024-01-08 DIAGNOSIS — I48.0 PAF (PAROXYSMAL ATRIAL FIBRILLATION): ICD-10-CM

## 2024-01-08 DIAGNOSIS — E78.5 HYPERLIPIDEMIA LDL GOAL <70: ICD-10-CM

## 2024-01-08 DIAGNOSIS — I10 ESSENTIAL HYPERTENSION: Primary | ICD-10-CM

## 2024-01-09 ENCOUNTER — ANTICOAGULATION VISIT (OUTPATIENT)
Dept: PHARMACY | Facility: HOSPITAL | Age: 79
End: 2024-01-09
Payer: MEDICARE

## 2024-01-09 LAB — INR PPP: 1.9

## 2024-01-09 NOTE — PROGRESS NOTES
Anticoagulation Clinic Progress Note   Oswaldo HOMEMONITOR: [employer] Serial # : [id number]     Indication: afib  Referring Provider: Gale  Initial Warfarin Start Date: 2007  Goal INR: 2-3  Current Drug Interactions:   [id number]: 5 (HTN, Age, CHF, CAD)   Other: anemic, CKD (stage 4) May need bridge with Lovenox given persistent AF (per cardio 1/24/2019), Scr was 3.22 on 11/24/21    Diet: eats salads 2x weekly 5/11/2023     Anticoagulation Clinic INR History:   Date 10/30 11/20 12/17 1/14/20 2/11 3/11 4/8 5/6 6/11 7/9 8/6 9/3 9/14 9/30   Total Weekly Dose 32.5 mg 32.5 mg 32.5 mg 32.5 mg 32.5mg 32.5mg 32.5mg 32.5mg 32.5mg 32.5 mg 32.5 mg 32.5mg 32.5mg 26.25 mg   INR 2.3 2.3 2.1 1.9 2.2 2.1 2.1 2.2 2.2 2.3 2.7 1.6 2.2  1.8   Notes                       ??   5 day hold     Date 10/14 11/4 11/11 11/25 12/9 12/16 12/30 1/13 2/3 3/3 3/31 4/28 5/26 6/23   Total Weekly Dose 32.5 mg 32.5mg 35mg 32.5mg 33.75 mg 36.25 mg 33.75 mg 35mg 33.75 33.75mg 33.75mg 33.75 mg 33.75 mg 33.75mg   INR 2.0 1.6 2.1 1.8 1.8 2.5 2.0 3.0 2.1 2.4 2.3 3.0 2.1 2.2   Notes   1x boost GLV boostx1 boostx1             Date 7/21 9/1 10/13 11/10 12/1 12/15 12/29 1/12/22 1/26 2/9 2/16 2/23 3/2 3/16   Total Weekly Dose 33.75mg 33.75mg 33.75mg 33.75mg 33.75 mg 32.5mg 30mg 27.5mg 28.75 mg 30 mg 31.25 mg 31.25 mg 31.25 mg 31.25 mg   INR 2.9 2.5 3.1 3.1 3.2 3.4 3.4 1.9 1.7 1.8 2.1 2.6 2.9 2.9   Notes        redx1 ?  inc        Date 3/30 4/13 5/11 6/8  6/23 7/7 7/28  8/9 8/12 8/16   Total WeeklyDose 31.25mg 31.25 mg 31.25 mg 31.25mg Start dialysis 30mg 30mg 30 mg  33.75mg 37.5mg 37.5mg   INR 2.5 2.2 2.6 3.9  2.1 2.1 1.8 1.23 1.4 1.5 2.1   Notes    Inc torsemide Cath placement    AV fistula placement    HM train  1 no billing       Date 8/23 8/30 9/6 9/13 9/20 9/30 10/4 10/11 10/18 10/25 11/8 11/15   Total WeeklyDose 31.25 mg 32.5 mg 32.5 mg 32.5 mg 32.5 mg 32.5 mg 35 mg 35 mg 35 mg 32.5 mg 26.5 mg 35 mg   INR 1.9 2.0 2.0 2.5 2.7 1.6 1.7 2.8 2.4 1.7  1.4 1.6   Notes HM2 no billing HM3  No billing HM4  No billing HM 1  Bill today HM 2 No billing HM 3 No bill HM 4  No billing 1x boost  HM 5 no bill day Bill day  HM1 HM- 2 no billing   HM 3- no billing 1x boost  HM 4 - no billing      Date 11/22 11/29 12/6 12/13 12/20 12/27 1/3/23 1/10 1/12 1/17 1/26/23 1/31/23   Total WeeklyDose 37.5 mg 37.5 mg 37.5 mg 37.5 mg 42.5 mg  40 mg 37.5 mg 40 mg 42.5mg 45 mg 45 mg 40 mg   INR 1.9 2.8 1.9 1.5 2.3 3.4 1.4 0.8 2.7 POCT  2.23   2.8 HM 3.9 1.9 2.2   Notes HM1- bill day HM 2 - NO bill HM 3 - no bill  Inc GLV Hm 4 - NO BILL HM 1 - Bill today HM 2   no billing HM 3- no billing HM 4  No billing lab error?? clinic HM 1  Bill today HM 2 - no bill  HM 3 - no bill      Date 2/7 2/14 2/21/23 2/28/23 3/7 3/14/23 3/21/23 3/28/23 4/4 4/11 4/18 4/26   Total WeeklyDose 40 mg 40 mg 40 mg 37.5 mg 40 mg 40 mg 40 mg 40 mg 37.5 mg 37.5 mg 37.5 mg  40 mg   INR 2.3 2.2 3.9 1.6 3.7 ??? 2.5 2.7 3.2 2.8 2.5 1.7 1.7   Notes HM 4 rec 2/15  HM 1  Bill today HM 2 - no billing  1xred  HM 3 - NO BILL  HM-4 no bill, percocet HM 5 - no billing until 3/15 HM 1 - BILL today  HM 2 - no Billing HM 3  No billing HM 4  No billing HM 1-  BILL today  HM 2  No billing     Date 5/2 5/5 5/11 5/16 5/22 5/30 6/6 6/14 6/20 6/27 7/5 7/11   Total WeeklyDose 42.5 mg 37.5 mg 42.5mg 42.5 mg  35 mg 40 mg 40 mg 40 mg 37.5 mg 40 mg 40 mg 40 mg   INR 3.5 1.8 2.9 4.2 2.0 2.5 2.8 3.2 1.9 2.2 3.0 3.3   Notes HM 3  No billing HM 4  No billing HM 5  No billing HM-1  Bill  Dec'd GLV  HM 2  No billing  1x hold  HM 3   no billing HM 4  No billing HM 1  Bill today HM 2  No billing HM 3  No bill HM 4  No bill HM5  No bill     Date 7/18 7/25 08/01/23 8/8 8/15/23 8/23/23 08/29/23 09/05/23 9/12/23 9/19/23 9/26/23 10/3   Total WeeklyDose 37.5 mg 35 mg 37.5mg 40 mg 37.5 mg 37.5 mg 37.5 mg 37.5 mg 37.5 mg 37.5 mg 37.5 mg 37.5 mg   INR 3.3 1.9 1.9 2.3 2.6 2.1 2.4 2.3 2.7 2.5 2.7 2.0   Notes HM 1  Bill today HM 2  No billing HM 3  No billing  1  x boost HM 4 no billing  HM 1 - BILL today  HM 2 - no bill  HM 3- no billing HM 4- no billing HM 1 - BILL today  HM 2 - NO bill  HM 3 - no bill  Hm 4- no bill       Date 10/10 10/18 10/24 10/31 11/07 11/14/23 11/21 11/28 12/05/23   Total Weekly Dose 37.5mg 37.5 mg 37.5mg 42.5 mg 37.5 mg 37.5 mg 37.5 mg 37.5 mg 35 mg   INR 1.6 2.0 1.5 2.7 2.1 2.5 2.3 4.2 1.9   Notes Hm- 1 bill today  Missed dose?  Inc GLV HM 2- No bill  Hm- 3 no bill Hm 4 no billing   Boost dose  HM 1 billing today HM 2 - no bill  HM 3 no billing Hm 4 no billing HM 1 billing day  1x red     Date 12/12 12/19/23 12/26 1/2/24 1/9          Total WeeklyDose 37.5 mg 40 mg 37.5 mg 37.5 mg 40 mg          INR 1.8 2.2 2.1 1.8 1.9          Notes HM-2 no billing HM 3 - no bill  HM 4 no bill HM 1 billing day HM-2 no billing           Takes warfarin in the am    Clinic Interview:  Verbal Release Authorization signed on 8/22/2018 -- may speak with Raeann Montemayor (Wife). [phone] (Home) *Preferred*  Tablet Strength: pt has 7.5mg and 2.5mg tablets  Estimated oop cost for  [facility] HM: Patient has [employer] Medicare so they should have minimal to no OOP costs.                  Patient Findings  Negatives: Signs/symptoms of thrombosis, Signs/symptoms of bleeding, Laboratory test error suspected, Change in health, Change in alcohol use, Change in activity, Upcoming invasive procedure, Emergency department visit, Upcoming dental procedure, Missed doses, Extra doses, Change in medications, Change in diet/appetite, Hospital admission, Bruising, Other complaints   Comments: No changes per patient, dosing verified       Plan:   1. INR is subtherapeutic today at 1.9 (goal 2.0-3.0). Instructed Mr. Montemayor to take a boosted dose of warfarin 7.5 mg today, and then resume prior dose of warfarin 5 mg oral daily except 7.5 mg on Fridays until recheck.  2. Recheck INR in one week 1/16/24. Would likely continue 40 mg as a maintenance dose unless INR is supratherapeutic at next  encounter.  3. Verbal information provided over the telephone. Mr. Montemayor expresses understanding with teach back and has no further questions at this time.     Home monitor information below:  Test strips on hand: 5  1/9  Lot : [id number]  Barcode : [id number] Exp : 1/25  Serial number: [id number]   Supplies billing code used: last 12/5/23  Next must be on or after  1/30/2024  Transfer Tube Lot number: Lot : [id number]  Safety Lancets:  Lot : [id number]     Billing:  *bill must be 4 tests 28 days or more from HM visit  PLEASE REFER TO ANTICOAGULATION TRAINING POWERPOINT FOR BILLING. Needs to use in clinic remote appointment for encounter.    Raj Salter, PharmD, BCPS  1/9/2024  10:53 EST

## 2024-01-10 ENCOUNTER — LAB (OUTPATIENT)
Dept: LAB | Facility: HOSPITAL | Age: 79
End: 2024-01-10
Payer: MEDICARE

## 2024-01-10 DIAGNOSIS — E78.5 HYPERLIPIDEMIA LDL GOAL <70: ICD-10-CM

## 2024-01-10 DIAGNOSIS — I48.0 PAF (PAROXYSMAL ATRIAL FIBRILLATION): ICD-10-CM

## 2024-01-10 DIAGNOSIS — I10 ESSENTIAL HYPERTENSION: ICD-10-CM

## 2024-01-10 PROCEDURE — 85027 COMPLETE CBC AUTOMATED: CPT

## 2024-01-10 PROCEDURE — 80053 COMPREHEN METABOLIC PANEL: CPT

## 2024-01-10 PROCEDURE — 80061 LIPID PANEL: CPT

## 2024-01-10 PROCEDURE — 83735 ASSAY OF MAGNESIUM: CPT

## 2024-01-10 PROCEDURE — 84443 ASSAY THYROID STIM HORMONE: CPT

## 2024-01-10 PROCEDURE — 36415 COLL VENOUS BLD VENIPUNCTURE: CPT

## 2024-01-11 LAB
ALBUMIN SERPL-MCNC: 4.1 G/DL (ref 3.5–5.2)
ALBUMIN/GLOB SERPL: 1.6 G/DL
ALP SERPL-CCNC: 79 U/L (ref 39–117)
ALT SERPL W P-5'-P-CCNC: 6 U/L (ref 1–41)
ANION GAP SERPL CALCULATED.3IONS-SCNC: 15 MMOL/L (ref 5–15)
AST SERPL-CCNC: 8 U/L (ref 1–40)
BILIRUB SERPL-MCNC: 0.4 MG/DL (ref 0–1.2)
BUN SERPL-MCNC: 49 MG/DL (ref 8–23)
BUN/CREAT SERPL: 6.9 (ref 7–25)
CALCIUM SPEC-SCNC: 9.8 MG/DL (ref 8.6–10.5)
CHLORIDE SERPL-SCNC: 104 MMOL/L (ref 98–107)
CHOLEST SERPL-MCNC: 111 MG/DL (ref 0–200)
CO2 SERPL-SCNC: 26 MMOL/L (ref 22–29)
CREAT SERPL-MCNC: 7.08 MG/DL (ref 0.76–1.27)
DEPRECATED RDW RBC AUTO: 53.1 FL (ref 37–54)
EGFRCR SERPLBLD CKD-EPI 2021: 7.3 ML/MIN/1.73
ERYTHROCYTE [DISTWIDTH] IN BLOOD BY AUTOMATED COUNT: 14.4 % (ref 12.3–15.4)
GLOBULIN UR ELPH-MCNC: 2.5 GM/DL
GLUCOSE SERPL-MCNC: 95 MG/DL (ref 65–99)
HCT VFR BLD AUTO: 35.1 % (ref 37.5–51)
HDLC SERPL-MCNC: 30 MG/DL (ref 40–60)
HGB BLD-MCNC: 11.5 G/DL (ref 13–17.7)
LDLC SERPL CALC-MCNC: 59 MG/DL (ref 0–100)
LDLC/HDLC SERPL: 1.9 {RATIO}
MAGNESIUM SERPL-MCNC: 2.5 MG/DL (ref 1.6–2.4)
MCH RBC QN AUTO: 33.4 PG (ref 26.6–33)
MCHC RBC AUTO-ENTMCNC: 32.8 G/DL (ref 31.5–35.7)
MCV RBC AUTO: 102 FL (ref 79–97)
PLATELET # BLD AUTO: 149 10*3/MM3 (ref 140–450)
PMV BLD AUTO: 10 FL (ref 6–12)
POTASSIUM SERPL-SCNC: 4.6 MMOL/L (ref 3.5–5.2)
PROT SERPL-MCNC: 6.6 G/DL (ref 6–8.5)
RBC # BLD AUTO: 3.44 10*6/MM3 (ref 4.14–5.8)
SODIUM SERPL-SCNC: 145 MMOL/L (ref 136–145)
TRIGL SERPL-MCNC: 120 MG/DL (ref 0–150)
TSH SERPL DL<=0.05 MIU/L-ACNC: 4.67 UIU/ML (ref 0.27–4.2)
VLDLC SERPL-MCNC: 22 MG/DL (ref 5–40)
WBC NRBC COR # BLD AUTO: 8.32 10*3/MM3 (ref 3.4–10.8)

## 2024-01-15 RX ORDER — ATORVASTATIN CALCIUM 40 MG/1
TABLET, FILM COATED ORAL
Qty: 90 TABLET | Refills: 3 | Status: SHIPPED | OUTPATIENT
Start: 2024-01-15

## 2024-01-17 ENCOUNTER — TELEPHONE (OUTPATIENT)
Dept: CARDIOLOGY | Facility: CLINIC | Age: 79
End: 2024-01-17

## 2024-01-17 ENCOUNTER — ANTICOAGULATION VISIT (OUTPATIENT)
Dept: PHARMACY | Facility: HOSPITAL | Age: 79
End: 2024-01-17
Payer: MEDICARE

## 2024-01-17 ENCOUNTER — OFFICE VISIT (OUTPATIENT)
Dept: CARDIOLOGY | Facility: CLINIC | Age: 79
End: 2024-01-17
Payer: MEDICARE

## 2024-01-17 VITALS
SYSTOLIC BLOOD PRESSURE: 126 MMHG | WEIGHT: 286 LBS | HEART RATE: 70 BPM | BODY MASS INDEX: 40.94 KG/M2 | DIASTOLIC BLOOD PRESSURE: 66 MMHG | HEIGHT: 70 IN | OXYGEN SATURATION: 94 %

## 2024-01-17 DIAGNOSIS — E78.5 DYSLIPIDEMIA: Chronic | ICD-10-CM

## 2024-01-17 DIAGNOSIS — I10 PRIMARY HYPERTENSION: Chronic | ICD-10-CM

## 2024-01-17 DIAGNOSIS — I50.32 CHRONIC DIASTOLIC CONGESTIVE HEART FAILURE: Primary | Chronic | ICD-10-CM

## 2024-01-17 LAB — INR PPP: 2.2

## 2024-01-17 RX ORDER — IRBESARTAN 150 MG/1
150 TABLET ORAL DAILY
COMMUNITY

## 2024-01-17 RX ORDER — RANOLAZINE 500 MG/1
500 TABLET, EXTENDED RELEASE ORAL 2 TIMES DAILY
COMMUNITY

## 2024-01-17 RX ORDER — DOXAZOSIN 2 MG/1
2 TABLET ORAL NIGHTLY
COMMUNITY

## 2024-01-17 RX ORDER — TORSEMIDE 10 MG/1
10 TABLET ORAL DAILY
COMMUNITY
End: 2024-01-17 | Stop reason: ALTCHOICE

## 2024-01-17 RX ORDER — DOFETILIDE 0.5 MG/1
500 CAPSULE ORAL
COMMUNITY

## 2024-01-17 NOTE — PROGRESS NOTES
Anticoagulation Clinic Progress Note   Oswaldo HOMEMONITOR: [employer] Serial # : [id number]     Indication: afib  Referring Provider: Gale  Initial Warfarin Start Date: 2007  Goal INR: 2-3  Current Drug Interactions:   [id number]: 5 (HTN, Age, CHF, CAD)   Other: anemic, CKD (stage 4) May need bridge with Lovenox given persistent AF (per cardio 1/24/2019), Scr was 3.22 on 11/24/21    Diet: eats salads 2x weekly 5/11/2023     Anticoagulation Clinic INR History:   Date 10/30 11/20 12/17 1/14/20 2/11 3/11 4/8 5/6 6/11 7/9 8/6 9/3 9/14 9/30   Total Weekly Dose 32.5 mg 32.5 mg 32.5 mg 32.5 mg 32.5mg 32.5mg 32.5mg 32.5mg 32.5mg 32.5 mg 32.5 mg 32.5mg 32.5mg 26.25 mg   INR 2.3 2.3 2.1 1.9 2.2 2.1 2.1 2.2 2.2 2.3 2.7 1.6 2.2  1.8   Notes                       ??   5 day hold     Date 10/14 11/4 11/11 11/25 12/9 12/16 12/30 1/13 2/3 3/3 3/31 4/28 5/26 6/23   Total Weekly Dose 32.5 mg 32.5mg 35mg 32.5mg 33.75 mg 36.25 mg 33.75 mg 35mg 33.75 33.75mg 33.75mg 33.75 mg 33.75 mg 33.75mg   INR 2.0 1.6 2.1 1.8 1.8 2.5 2.0 3.0 2.1 2.4 2.3 3.0 2.1 2.2   Notes   1x boost GLV boostx1 boostx1             Date 7/21 9/1 10/13 11/10 12/1 12/15 12/29 1/12/22 1/26 2/9 2/16 2/23 3/2 3/16   Total Weekly Dose 33.75mg 33.75mg 33.75mg 33.75mg 33.75 mg 32.5mg 30mg 27.5mg 28.75 mg 30 mg 31.25 mg 31.25 mg 31.25 mg 31.25 mg   INR 2.9 2.5 3.1 3.1 3.2 3.4 3.4 1.9 1.7 1.8 2.1 2.6 2.9 2.9   Notes        redx1 ?  inc        Date 3/30 4/13 5/11 6/8  6/23 7/7 7/28  8/9 8/12 8/16   Total WeeklyDose 31.25mg 31.25 mg 31.25 mg 31.25mg Start dialysis 30mg 30mg 30 mg  33.75mg 37.5mg 37.5mg   INR 2.5 2.2 2.6 3.9  2.1 2.1 1.8 1.23 1.4 1.5 2.1   Notes    Inc torsemide Cath placement    AV fistula placement    HM train  1 no billing       Date 8/23 8/30 9/6 9/13 9/20 9/30 10/4 10/11 10/18 10/25 11/8 11/15   Total WeeklyDose 31.25 mg 32.5 mg 32.5 mg 32.5 mg 32.5 mg 32.5 mg 35 mg 35 mg 35 mg 32.5 mg 26.5 mg 35 mg   INR 1.9 2.0 2.0 2.5 2.7 1.6 1.7 2.8 2.4 1.7  1.4 1.6   Notes HM2 no billing HM3  No billing HM4  No billing HM 1  Bill today HM 2 No billing HM 3 No bill HM 4  No billing 1x boost  HM 5 no bill day Bill day  HM1 HM- 2 no billing   HM 3- no billing 1x boost  HM 4 - no billing      Date 11/22 11/29 12/6 12/13 12/20 12/27 1/3/23 1/10 1/12 1/17 1/26/23 1/31/23   Total WeeklyDose 37.5 mg 37.5 mg 37.5 mg 37.5 mg 42.5 mg  40 mg 37.5 mg 40 mg 42.5mg 45 mg 45 mg 40 mg   INR 1.9 2.8 1.9 1.5 2.3 3.4 1.4 0.8 2.7 POCT  2.23   2.8 HM 3.9 1.9 2.2   Notes HM1- bill day HM 2 - NO bill HM 3 - no bill  Inc GLV Hm 4 - NO BILL HM 1 - Bill today HM 2   no billing HM 3- no billing HM 4  No billing lab error?? clinic HM 1  Bill today HM 2 - no bill  HM 3 - no bill      Date 2/7 2/14 2/21/23 2/28/23 3/7 3/14/23 3/21/23 3/28/23 4/4 4/11 4/18 4/26   Total WeeklyDose 40 mg 40 mg 40 mg 37.5 mg 40 mg 40 mg 40 mg 40 mg 37.5 mg 37.5 mg 37.5 mg  40 mg   INR 2.3 2.2 3.9 1.6 3.7 ??? 2.5 2.7 3.2 2.8 2.5 1.7 1.7   Notes HM 4 rec 2/15  HM 1  Bill today HM 2 - no billing  1xred  HM 3 - NO BILL  HM-4 no bill, percocet HM 5 - no billing until 3/15 HM 1 - BILL today  HM 2 - no Billing HM 3  No billing HM 4  No billing HM 1-  BILL today  HM 2  No billing     Date 5/2 5/5 5/11 5/16 5/22 5/30 6/6 6/14 6/20 6/27 7/5 7/11   Total WeeklyDose 42.5 mg 37.5 mg 42.5mg 42.5 mg  35 mg 40 mg 40 mg 40 mg 37.5 mg 40 mg 40 mg 40 mg   INR 3.5 1.8 2.9 4.2 2.0 2.5 2.8 3.2 1.9 2.2 3.0 3.3   Notes HM 3  No billing HM 4  No billing HM 5  No billing HM-1  Bill  Dec'd GLV  HM 2  No billing  1x hold  HM 3   no billing HM 4  No billing HM 1  Bill today HM 2  No billing HM 3  No bill HM 4  No bill HM5  No bill     Date 7/18 7/25 08/01/23 8/8 8/15/23 8/23/23 08/29/23 09/05/23 9/12/23 9/19/23 9/26/23 10/3   Total WeeklyDose 37.5 mg 35 mg 37.5mg 40 mg 37.5 mg 37.5 mg 37.5 mg 37.5 mg 37.5 mg 37.5 mg 37.5 mg 37.5 mg   INR 3.3 1.9 1.9 2.3 2.6 2.1 2.4 2.3 2.7 2.5 2.7 2.0   Notes HM 1  Bill today HM 2  No billing HM 3  No billing  1  x boost HM 4 no billing  HM 1 - BILL today  HM 2 - no bill  HM 3- no billing HM 4- no billing HM 1 - BILL today  HM 2 - NO bill  HM 3 - no bill  Hm 4- no bill       Date 10/10 10/18 10/24 10/31 11/07 11/14/23 11/21 11/28 12/05/23   Total Weekly Dose 37.5mg 37.5 mg 37.5mg 42.5 mg 37.5 mg 37.5 mg 37.5 mg 37.5 mg 35 mg   INR 1.6 2.0 1.5 2.7 2.1 2.5 2.3 4.2 1.9   Notes Hm- 1 bill today  Missed dose?  Inc GLV HM 2- No bill  Hm- 3 no bill Hm 4 no billing   Boost dose  HM 1 billing today HM 2 - no bill  HM 3 no billing Hm 4 no billing HM 1 billing day  1x red     Date 12/12 12/19/23 12/26 1/2/24 1/9 1/17         Total WeeklyDose 37.5 mg 40 mg 37.5 mg 37.5 mg 40 mg 40 mg         INR 1.8 2.2 2.1 1.8 1.9 2.2         Notes HM-2 no billing HM 3 - no bill  HM 4 no bill HM 1 billing day HM-2 no billing Hm 3 no bill          Takes warfarin in the am    Clinic Interview:  Verbal Release Authorization signed on 8/22/2018 -- may speak with Raeann Montemayor (Wife). [phone] (Home) *Preferred*  Tablet Strength: pt has 7.5mg and 2.5mg tablets  Estimated oop cost for [facility] HM: Patient has [employer] Medicare so they should have minimal to no OOP costs.                  Patient Findings  Negatives: Signs/symptoms of thrombosis, Signs/symptoms of bleeding, Laboratory test error suspected, Change in health, Change in alcohol use, Change in activity, Upcoming invasive procedure, Emergency department visit, Upcoming dental procedure, Missed doses, Extra doses, Change in medications, Change in diet/appetite, Hospital admission, Bruising, Other complaints   Comments: No changes per patient, dosing verified       Plan:   1. INR is therapeutic today at 2.2 (goal 2.0-3.0). Instructed Mr. Montemayor to continue warfarin 5 mg oral daily except 7.5 mg on TUESFridays until recheck.  2. Recheck INR in one week  3. Verbal information provided over the telephone. Mr. Montemayor expresses understanding with teach back and has no further questions at this time.      Home monitor information below:  Test strips on hand: 4  1/17  Lot : [id number]  Barcode : [id number] Exp : 1/25  Serial number: [id number]   Supplies billing code used: last 12/5/23  Next must be on or after  1/30/2024  Transfer Tube Lot number: Lot : [id number]  Safety Lancets:  Lot : [id number]     Billing:  *bill must be 4 tests 28 days or more from HM visit  PLEASE REFER TO ANTICOAGULATION TRAINING POWERPOINT FOR BILLING. Needs to use in clinic remote appointment for encounter.    Kristine Sutton, NighatD.  01/17/24   13:33 EST

## 2024-01-22 ENCOUNTER — HOSPITAL ENCOUNTER (OUTPATIENT)
Dept: ULTRASOUND IMAGING | Facility: HOSPITAL | Age: 79
Discharge: HOME | End: 2024-01-22
Admitting: INTERNAL MEDICINE
Payer: MEDICARE

## 2024-01-22 DIAGNOSIS — N18.6 END STAGE RENAL DISEASE: ICD-10-CM

## 2024-01-22 PROCEDURE — 76700 US EXAM ABDOM COMPLETE: CPT

## 2024-01-23 ENCOUNTER — ANTICOAGULATION VISIT (OUTPATIENT)
Dept: PHARMACY | Facility: HOSPITAL | Age: 79
End: 2024-01-23
Payer: MEDICARE

## 2024-01-23 LAB — INR PPP: 2.4

## 2024-01-23 NOTE — PROGRESS NOTES
Anticoagulation Clinic Progress Note   Oswaldo HOMEMONITOR: [employer] Serial # : [id number]     Indication: afib  Referring Provider: Gale  Initial Warfarin Start Date: 2007  Goal INR: 2-3  Current Drug Interactions:   [id number]: 5 (HTN, Age, CHF, CAD)   Other: anemic, CKD (stage 4) May need bridge with Lovenox given persistent AF (per cardio 1/24/2019), Scr was 3.22 on 11/24/21    Diet: eats salads 2x weekly 5/11/2023     Anticoagulation Clinic INR History:   Date 10/30 11/20 12/17 1/14/20 2/11 3/11 4/8 5/6 6/11 7/9 8/6 9/3 9/14 9/30   Total Weekly Dose 32.5 mg 32.5 mg 32.5 mg 32.5 mg 32.5mg 32.5mg 32.5mg 32.5mg 32.5mg 32.5 mg 32.5 mg 32.5mg 32.5mg 26.25 mg   INR 2.3 2.3 2.1 1.9 2.2 2.1 2.1 2.2 2.2 2.3 2.7 1.6 2.2  1.8   Notes                       ??   5 day hold     Date 10/14 11/4 11/11 11/25 12/9 12/16 12/30 1/13 2/3 3/3 3/31 4/28 5/26 6/23   Total Weekly Dose 32.5 mg 32.5mg 35mg 32.5mg 33.75 mg 36.25 mg 33.75 mg 35mg 33.75 33.75mg 33.75mg 33.75 mg 33.75 mg 33.75mg   INR 2.0 1.6 2.1 1.8 1.8 2.5 2.0 3.0 2.1 2.4 2.3 3.0 2.1 2.2   Notes   1x boost GLV boostx1 boostx1             Date 7/21 9/1 10/13 11/10 12/1 12/15 12/29 1/12/22 1/26 2/9 2/16 2/23 3/2 3/16   Total Weekly Dose 33.75mg 33.75mg 33.75mg 33.75mg 33.75 mg 32.5mg 30mg 27.5mg 28.75 mg 30 mg 31.25 mg 31.25 mg 31.25 mg 31.25 mg   INR 2.9 2.5 3.1 3.1 3.2 3.4 3.4 1.9 1.7 1.8 2.1 2.6 2.9 2.9   Notes        redx1 ?  inc        Date 3/30 4/13 5/11 6/8  6/23 7/7 7/28  8/9 8/12 8/16   Total WeeklyDose 31.25mg 31.25 mg 31.25 mg 31.25mg Start dialysis 30mg 30mg 30 mg  33.75mg 37.5mg 37.5mg   INR 2.5 2.2 2.6 3.9  2.1 2.1 1.8 1.23 1.4 1.5 2.1   Notes    Inc torsemide Cath placement    AV fistula placement    HM train  1 no billing       Date 8/23 8/30 9/6 9/13 9/20 9/30 10/4 10/11 10/18 10/25 11/8 11/15   Total WeeklyDose 31.25 mg 32.5 mg 32.5 mg 32.5 mg 32.5 mg 32.5 mg 35 mg 35 mg 35 mg 32.5 mg 26.5 mg 35 mg   INR 1.9 2.0 2.0 2.5 2.7 1.6 1.7 2.8 2.4 1.7  1.4 1.6   Notes HM2 no billing HM3  No billing HM4  No billing HM 1  Bill today HM 2 No billing HM 3 No bill HM 4  No billing 1x boost  HM 5 no bill day Bill day  HM1 HM- 2 no billing   HM 3- no billing 1x boost  HM 4 - no billing      Date 11/22 11/29 12/6 12/13 12/20 12/27 1/3/23 1/10 1/12 1/17 1/26/23 1/31/23   Total WeeklyDose 37.5 mg 37.5 mg 37.5 mg 37.5 mg 42.5 mg  40 mg 37.5 mg 40 mg 42.5mg 45 mg 45 mg 40 mg   INR 1.9 2.8 1.9 1.5 2.3 3.4 1.4 0.8 2.7 POCT  2.23   2.8 HM 3.9 1.9 2.2   Notes HM1- bill day HM 2 - NO bill HM 3 - no bill  Inc GLV Hm 4 - NO BILL HM 1 - Bill today HM 2   no billing HM 3- no billing HM 4  No billing lab error?? clinic HM 1  Bill today HM 2 - no bill  HM 3 - no bill      Date 2/7 2/14 2/21/23 2/28/23 3/7 3/14/23 3/21/23 3/28/23 4/4 4/11 4/18 4/26   Total WeeklyDose 40 mg 40 mg 40 mg 37.5 mg 40 mg 40 mg 40 mg 40 mg 37.5 mg 37.5 mg 37.5 mg  40 mg   INR 2.3 2.2 3.9 1.6 3.7 ??? 2.5 2.7 3.2 2.8 2.5 1.7 1.7   Notes HM 4 rec 2/15  HM 1  Bill today HM 2 - no billing  1xred  HM 3 - NO BILL  HM-4 no bill, percocet HM 5 - no billing until 3/15 HM 1 - BILL today  HM 2 - no Billing HM 3  No billing HM 4  No billing HM 1-  BILL today  HM 2  No billing     Date 5/2 5/5 5/11 5/16 5/22 5/30 6/6 6/14 6/20 6/27 7/5 7/11   Total WeeklyDose 42.5 mg 37.5 mg 42.5mg 42.5 mg  35 mg 40 mg 40 mg 40 mg 37.5 mg 40 mg 40 mg 40 mg   INR 3.5 1.8 2.9 4.2 2.0 2.5 2.8 3.2 1.9 2.2 3.0 3.3   Notes HM 3  No billing HM 4  No billing HM 5  No billing HM-1  Bill  Dec'd GLV  HM 2  No billing  1x hold  HM 3   no billing HM 4  No billing HM 1  Bill today HM 2  No billing HM 3  No bill HM 4  No bill HM5  No bill     Date 7/18 7/25 08/01/23 8/8 8/15/23 8/23/23 08/29/23 09/05/23 9/12/23 9/19/23 9/26/23 10/3   Total WeeklyDose 37.5 mg 35 mg 37.5mg 40 mg 37.5 mg 37.5 mg 37.5 mg 37.5 mg 37.5 mg 37.5 mg 37.5 mg 37.5 mg   INR 3.3 1.9 1.9 2.3 2.6 2.1 2.4 2.3 2.7 2.5 2.7 2.0   Notes HM 1  Bill today HM 2  No billing HM 3  No billing  1  x boost HM 4 no billing  HM 1 - BILL today  HM 2 - no bill  HM 3- no billing HM 4- no billing HM 1 - BILL today  HM 2 - NO bill  HM 3 - no bill  Hm 4- no bill       Date 10/10 10/18 10/24 10/31 11/07 11/14/23 11/21 11/28 12/05/23   Total Weekly Dose 37.5mg 37.5 mg 37.5mg 42.5 mg 37.5 mg 37.5 mg 37.5 mg 37.5 mg 35 mg   INR 1.6 2.0 1.5 2.7 2.1 2.5 2.3 4.2 1.9   Notes Hm- 1 bill today  Missed dose?  Inc GLV HM 2- No bill  Hm- 3 no bill Hm 4 no billing   Boost dose  HM 1 billing today HM 2 - no bill  HM 3 no billing Hm 4 no billing HM 1 billing day  1x red     Date 12/12 12/19/23 12/26 1/2/24 1/9 1/17 1/23/24        Total WeeklyDose 37.5 mg 40 mg 37.5 mg 37.5 mg 40 mg 40 mg 40 mg        INR 1.8 2.2 2.1 1.8 1.9 2.2 2.4        Notes HM-2 no billing HM 3 - no bill  HM 4 no bill HM 1 billing day HM-2 no billing Hm 3 no bill HM 4 - no bill          Takes warfarin in the am    Clinic Interview:  Verbal Release Authorization signed on 8/22/2018 -- may speak with Raeann Montemayor (Wife). [phone] (Home) *Preferred*  Tablet Strength: pt has 7.5mg and 2.5mg tablets  Estimated oop cost for [facility] HM: Patient has [employer] Medicare so they should have minimal to no OOP costs.                  Patient Findings    Negatives: Signs/symptoms of thrombosis, Signs/symptoms of bleeding, Laboratory test error suspected, Change in health, Change in alcohol use, Change in activity, Upcoming invasive procedure, Emergency department visit, Upcoming dental procedure, Missed doses, Extra doses, Change in medications, Change in diet/appetite, Hospital admission, Bruising, Other complaints   Comments: All findings negative per patient       Plan:   1. INR is therapeutic today at 2.4 (goal 2.0-3.0). Instructed Mr. Montemayor to continue warfarin 5 mg oral daily except 7.5 mg on Tues&Fridays until recheck.  2. Recheck INR in one week 1/30/24  3. Verbal information provided over the telephone. Mr. Montemayor expresses understanding with teach back and has  no further questions at this time.     Home monitor information below:  Test strips on hand: 5  1/23/24  Lot : [id number]  Barcode : [id number] Exp : 1/25  Serial number: [id number]   Supplies billing code used: last 12/5/23  Next must be on or after  1/30/2024  Transfer Tube Lot number: Lot : [id number]  Safety Lancets:  Lot : [id number]     Billing:  *bill must be 4 tests 28 days or more from  visit  PLEASE REFER TO ANTICOAGULATION TRAINING POWERPOINT FOR BILLING. Needs to use in clinic remote appointment for encounter.    Alexander Scherer, Pharmacy Technician  1/23/2024  10:33 CHULA RODGERS, Michael Campbell, [facility], have reviewed the note in full and agree with the assessment and plan.  01/23/24  12:27 EST

## 2024-01-30 ENCOUNTER — ANTICOAGULATION VISIT (OUTPATIENT)
Dept: PHARMACY | Facility: HOSPITAL | Age: 79
End: 2024-01-30
Payer: MEDICARE

## 2024-01-30 LAB — INR PPP: 2.1

## 2024-01-30 NOTE — PROGRESS NOTES
Anticoagulation Clinic Progress Note   Oswaldo HOMEMONITOR: [employer] Serial # : [id number]     Indication: afib  Referring Provider: Gale  Initial Warfarin Start Date: 2007  Goal INR: 2-3  Current Drug Interactions:   [id number]: 5 (HTN, Age, CHF, CAD)   Other: anemic, CKD (stage 4) May need bridge with Lovenox given persistent AF (per cardio 1/24/2019), Scr was 3.22 on 11/24/21    Diet: eats salads 2x weekly 5/11/2023     Anticoagulation Clinic INR History:   Date 10/30 11/20 12/17 1/14/20 2/11 3/11 4/8 5/6 6/11 7/9 8/6 9/3 9/14 9/30   Total Weekly Dose 32.5 mg 32.5 mg 32.5 mg 32.5 mg 32.5mg 32.5mg 32.5mg 32.5mg 32.5mg 32.5 mg 32.5 mg 32.5mg 32.5mg 26.25 mg   INR 2.3 2.3 2.1 1.9 2.2 2.1 2.1 2.2 2.2 2.3 2.7 1.6 2.2  1.8   Notes                       ??   5 day hold     Date 10/14 11/4 11/11 11/25 12/9 12/16 12/30 1/13 2/3 3/3 3/31 4/28 5/26 6/23   Total Weekly Dose 32.5 mg 32.5mg 35mg 32.5mg 33.75 mg 36.25 mg 33.75 mg 35mg 33.75 33.75mg 33.75mg 33.75 mg 33.75 mg 33.75mg   INR 2.0 1.6 2.1 1.8 1.8 2.5 2.0 3.0 2.1 2.4 2.3 3.0 2.1 2.2   Notes   1x boost GLV boostx1 boostx1             Date 7/21 9/1 10/13 11/10 12/1 12/15 12/29 1/12/22 1/26 2/9 2/16 2/23 3/2 3/16   Total Weekly Dose 33.75mg 33.75mg 33.75mg 33.75mg 33.75 mg 32.5mg 30mg 27.5mg 28.75 mg 30 mg 31.25 mg 31.25 mg 31.25 mg 31.25 mg   INR 2.9 2.5 3.1 3.1 3.2 3.4 3.4 1.9 1.7 1.8 2.1 2.6 2.9 2.9   Notes        redx1 ?  inc        Date 3/30 4/13 5/11 6/8  6/23 7/7 7/28  8/9 8/12 8/16   Total WeeklyDose 31.25mg 31.25 mg 31.25 mg 31.25mg Start dialysis 30mg 30mg 30 mg  33.75mg 37.5mg 37.5mg   INR 2.5 2.2 2.6 3.9  2.1 2.1 1.8 1.23 1.4 1.5 2.1   Notes    Inc torsemide Cath placement    AV fistula placement    HM train  1 no billing       Date 8/23 8/30 9/6 9/13 9/20 9/30 10/4 10/11 10/18 10/25 11/8 11/15   Total WeeklyDose 31.25 mg 32.5 mg 32.5 mg 32.5 mg 32.5 mg 32.5 mg 35 mg 35 mg 35 mg 32.5 mg 26.5 mg 35 mg   INR 1.9 2.0 2.0 2.5 2.7 1.6 1.7 2.8 2.4 1.7  1.4 1.6   Notes HM2 no billing HM3  No billing HM4  No billing HM 1  Bill today HM 2 No billing HM 3 No bill HM 4  No billing 1x boost  HM 5 no bill day Bill day  HM1 HM- 2 no billing   HM 3- no billing 1x boost  HM 4 - no billing      Date 11/22 11/29 12/6 12/13 12/20 12/27 1/3/23 1/10 1/12 1/17 1/26/23 1/31/23   Total WeeklyDose 37.5 mg 37.5 mg 37.5 mg 37.5 mg 42.5 mg  40 mg 37.5 mg 40 mg 42.5mg 45 mg 45 mg 40 mg   INR 1.9 2.8 1.9 1.5 2.3 3.4 1.4 0.8 2.7 POCT  2.23   2.8 HM 3.9 1.9 2.2   Notes HM1- bill day HM 2 - NO bill HM 3 - no bill  Inc GLV Hm 4 - NO BILL HM 1 - Bill today HM 2   no billing HM 3- no billing HM 4  No billing lab error?? clinic HM 1  Bill today HM 2 - no bill  HM 3 - no bill      Date 2/7 2/14 2/21/23 2/28/23 3/7 3/14/23 3/21/23 3/28/23 4/4 4/11 4/18 4/26   Total WeeklyDose 40 mg 40 mg 40 mg 37.5 mg 40 mg 40 mg 40 mg 40 mg 37.5 mg 37.5 mg 37.5 mg  40 mg   INR 2.3 2.2 3.9 1.6 3.7 ??? 2.5 2.7 3.2 2.8 2.5 1.7 1.7   Notes HM 4 rec 2/15  HM 1  Bill today HM 2 - no billing  1xred  HM 3 - NO BILL  HM-4 no bill, percocet HM 5 - no billing until 3/15 HM 1 - BILL today  HM 2 - no Billing HM 3  No billing HM 4  No billing HM 1-  BILL today  HM 2  No billing     Date 5/2 5/5 5/11 5/16 5/22 5/30 6/6 6/14 6/20 6/27 7/5 7/11   Total WeeklyDose 42.5 mg 37.5 mg 42.5mg 42.5 mg  35 mg 40 mg 40 mg 40 mg 37.5 mg 40 mg 40 mg 40 mg   INR 3.5 1.8 2.9 4.2 2.0 2.5 2.8 3.2 1.9 2.2 3.0 3.3   Notes HM 3  No billing HM 4  No billing HM 5  No billing HM-1  Bill  Dec'd GLV  HM 2  No billing  1x hold  HM 3   no billing HM 4  No billing HM 1  Bill today HM 2  No billing HM 3  No bill HM 4  No bill HM5  No bill     Date 7/18 7/25 08/01/23 8/8 8/15/23 8/23/23 08/29/23 09/05/23 9/12/23 9/19/23 9/26/23 10/3   Total WeeklyDose 37.5 mg 35 mg 37.5mg 40 mg 37.5 mg 37.5 mg 37.5 mg 37.5 mg 37.5 mg 37.5 mg 37.5 mg 37.5 mg   INR 3.3 1.9 1.9 2.3 2.6 2.1 2.4 2.3 2.7 2.5 2.7 2.0   Notes HM 1  Bill today HM 2  No billing HM 3  No billing  1  x boost HM 4 no billing  HM 1 - BILL today  HM 2 - no bill  HM 3- no billing HM 4- no billing HM 1 - BILL today  HM 2 - NO bill  HM 3 - no bill  Hm 4- no bill       Date 10/10 10/18 10/24 10/31 11/07 11/14/23 11/21 11/28 12/05/23  12/12  12/19 12/26   Total Weekly Dose 37.5mg 37.5 mg 37.5mg 42.5 mg 37.5 mg 37.5 mg 37.5 mg 37.5 mg 35 mg 37.5 mg  40 mg 37.5 mg   INR 1.6 2.0 1.5 2.7 2.1 2.5 2.3 4.2 1.9  1.8 2.2 2.1   Notes Hm- 1 bill today  Missed dose?  Inc GLV HM 2- No bill  Hm- 3 no bill Hm 4 no billing   Boost dose  HM 1 billing today HM 2 - no bill  HM 3 no billing Hm 4 no billing HM 1 billing day  1x red  HM-2 no bill HM-3 no bill  boost HM-4 bill     Date 1/2/24 1/9 1/17 1/23/24 1/30           Total WeeklyDose 37.5 mg 40 mg 40 mg 40 mg 40 mg           INR 1.8 1.9 2.2 2.4 2.1           Notes HM 1 billing day HM-2 no billing boost Hm 3 no bill HM 4 - no bill  HM 1-bill today            Takes warfarin in the am    Clinic Interview:  Verbal Release Authorization signed on 8/22/2018 -- may speak with Raeann Montemayor (Wife). [phone] (Home) *Preferred*  Tablet Strength: pt has 7.5mg and 2.5mg tablets  Estimated oop cost for [facility] HM: Patient has [employer] Medicare so they should have minimal to no OOP costs.                  Patient Findings    Negatives: Signs/symptoms of thrombosis, Signs/symptoms of bleeding, Laboratory test error suspected, Change in health, Change in alcohol use, Change in activity, Upcoming invasive procedure, Emergency department visit, Upcoming dental procedure, Missed doses, Extra doses, Change in medications, Change in diet/appetite, Hospital admission, Bruising, Other complaints   Comments: All findings negative per patient.       Plan:   1. INR is therapeutic today at 2.1 (goal 2.0-3.0). Instructed Mr. Montemayor to continue recently increased regimen of warfarin 5 mg oral daily except 7.5 mg TueFri until recheck.  2. Recheck INR in one week 2/6/24  3. Verbal information provided over the  telephone. Mr. Montemayor expresses understanding with teach back and has no further questions at this time.     Home monitor information below:  Test strips on hand: 4  1/30/24  Lot : [id number]  Barcode : [id number] Exp : 1/25  Serial number: [id number]   Supplies billing code used: last 12/5/23  Next must be on or after 02/27/2024  Transfer Tube Lot number: Lot : [id number]  Safety Lancets:  Lot : [id number]     Billing:  *bill must be 4 tests 28 days or more from HM visit  PLEASE REFER TO ANTICOAGULATION TRAINING POWERPOINT FOR BILLING. Needs to use in clinic remote appointment for encounter.      Isai Simmons CP  1/30/2024 12:15 EST    I, Charlotte Michael, PharmD, have reviewed the note in full and agree with the assessment and plan.  01/30/24  14:38 EST

## 2024-02-06 ENCOUNTER — ANTICOAGULATION VISIT (OUTPATIENT)
Dept: PHARMACY | Facility: HOSPITAL | Age: 79
End: 2024-02-06
Payer: MEDICARE

## 2024-02-06 LAB — INR PPP: 2.7

## 2024-02-06 NOTE — PROGRESS NOTES
Anticoagulation Clinic Progress Note   Oswaldo HOMEMONITOR: [employer] Serial # : [id number]     Indication: afib  Referring Provider: Gale  Initial Warfarin Start Date: 2007  Goal INR: 2-3  Current Drug Interactions:   [id number]: 5 (HTN, Age, CHF, CAD)   Other: anemic, CKD (stage 4) May need bridge with Lovenox given persistent AF (per cardio 1/24/2019), Scr was 3.22 on 11/24/21    Diet: eats salads 2x weekly 5/11/2023     Anticoagulation Clinic INR History:   Date 10/30 11/20 12/17 1/14/20 2/11 3/11 4/8 5/6 6/11 7/9 8/6 9/3 9/14 9/30   Total Weekly Dose 32.5 mg 32.5 mg 32.5 mg 32.5 mg 32.5mg 32.5mg 32.5mg 32.5mg 32.5mg 32.5 mg 32.5 mg 32.5mg 32.5mg 26.25 mg   INR 2.3 2.3 2.1 1.9 2.2 2.1 2.1 2.2 2.2 2.3 2.7 1.6 2.2  1.8   Notes                       ??   5 day hold     Date 10/14 11/4 11/11 11/25 12/9 12/16 12/30 1/13 2/3 3/3 3/31 4/28 5/26 6/23   Total Weekly Dose 32.5 mg 32.5mg 35mg 32.5mg 33.75 mg 36.25 mg 33.75 mg 35mg 33.75 33.75mg 33.75mg 33.75 mg 33.75 mg 33.75mg   INR 2.0 1.6 2.1 1.8 1.8 2.5 2.0 3.0 2.1 2.4 2.3 3.0 2.1 2.2   Notes   1x boost GLV boostx1 boostx1             Date 7/21 9/1 10/13 11/10 12/1 12/15 12/29 1/12/22 1/26 2/9 2/16 2/23 3/2 3/16   Total Weekly Dose 33.75mg 33.75mg 33.75mg 33.75mg 33.75 mg 32.5mg 30mg 27.5mg 28.75 mg 30 mg 31.25 mg 31.25 mg 31.25 mg 31.25 mg   INR 2.9 2.5 3.1 3.1 3.2 3.4 3.4 1.9 1.7 1.8 2.1 2.6 2.9 2.9   Notes        redx1 ?  inc        Date 3/30 4/13 5/11 6/8  6/23 7/7 7/28  8/9 8/12 8/16   Total WeeklyDose 31.25mg 31.25 mg 31.25 mg 31.25mg Start dialysis 30mg 30mg 30 mg  33.75mg 37.5mg 37.5mg   INR 2.5 2.2 2.6 3.9  2.1 2.1 1.8 1.23 1.4 1.5 2.1   Notes    Inc torsemide Cath placement    AV fistula placement    HM train  1 no billing       Date 8/23 8/30 9/6 9/13 9/20 9/30 10/4 10/11 10/18 10/25 11/8 11/15   Total WeeklyDose 31.25 mg 32.5 mg 32.5 mg 32.5 mg 32.5 mg 32.5 mg 35 mg 35 mg 35 mg 32.5 mg 26.5 mg 35 mg   INR 1.9 2.0 2.0 2.5 2.7 1.6 1.7 2.8 2.4 1.7  1.4 1.6   Notes HM2 no billing HM3  No billing HM4  No billing HM 1  Bill today HM 2 No billing HM 3 No bill HM 4  No billing 1x boost  HM 5 no bill day Bill day  HM1 HM- 2 no billing   HM 3- no billing 1x boost  HM 4 - no billing      Date 11/22 11/29 12/6 12/13 12/20 12/27 1/3/23 1/10 1/12 1/17 1/26/23 1/31/23   Total WeeklyDose 37.5 mg 37.5 mg 37.5 mg 37.5 mg 42.5 mg  40 mg 37.5 mg 40 mg 42.5mg 45 mg 45 mg 40 mg   INR 1.9 2.8 1.9 1.5 2.3 3.4 1.4 0.8 2.7 POCT  2.23   2.8 HM 3.9 1.9 2.2   Notes HM1- bill day HM 2 - NO bill HM 3 - no bill  Inc GLV Hm 4 - NO BILL HM 1 - Bill today HM 2   no billing HM 3- no billing HM 4  No billing lab error?? clinic HM 1  Bill today HM 2 - no bill  HM 3 - no bill      Date 2/7 2/14 2/21/23 2/28/23 3/7 3/14/23 3/21/23 3/28/23 4/4 4/11 4/18 4/26   Total WeeklyDose 40 mg 40 mg 40 mg 37.5 mg 40 mg 40 mg 40 mg 40 mg 37.5 mg 37.5 mg 37.5 mg  40 mg   INR 2.3 2.2 3.9 1.6 3.7 ??? 2.5 2.7 3.2 2.8 2.5 1.7 1.7   Notes HM 4 rec 2/15  HM 1  Bill today HM 2 - no billing  1xred  HM 3 - NO BILL  HM-4 no bill, percocet HM 5 - no billing until 3/15 HM 1 - BILL today  HM 2 - no Billing HM 3  No billing HM 4  No billing HM 1-  BILL today  HM 2  No billing     Date 5/2 5/5 5/11 5/16 5/22 5/30 6/6 6/14 6/20 6/27 7/5 7/11   Total WeeklyDose 42.5 mg 37.5 mg 42.5mg 42.5 mg  35 mg 40 mg 40 mg 40 mg 37.5 mg 40 mg 40 mg 40 mg   INR 3.5 1.8 2.9 4.2 2.0 2.5 2.8 3.2 1.9 2.2 3.0 3.3   Notes HM 3  No billing HM 4  No billing HM 5  No billing HM-1  Bill  Dec'd GLV  HM 2  No billing  1x hold  HM 3   no billing HM 4  No billing HM 1  Bill today HM 2  No billing HM 3  No bill HM 4  No bill HM5  No bill     Date 7/18 7/25 08/01/23 8/8 8/15/23 8/23/23 08/29/23 09/05/23 9/12/23 9/19/23 9/26/23 10/3   Total WeeklyDose 37.5 mg 35 mg 37.5mg 40 mg 37.5 mg 37.5 mg 37.5 mg 37.5 mg 37.5 mg 37.5 mg 37.5 mg 37.5 mg   INR 3.3 1.9 1.9 2.3 2.6 2.1 2.4 2.3 2.7 2.5 2.7 2.0   Notes HM 1  Bill today HM 2  No billing HM 3  No billing  1  x boost HM 4 no billing  HM 1 - BILL today  HM 2 - no bill  HM 3- no billing HM 4- no billing HM 1 - BILL today  HM 2 - NO bill  HM 3 - no bill  Hm 4- no bill       Date 10/10 10/18 10/24 10/31 11/07 11/14/23 11/21 11/28 12/05/23  12/12  12/19 12/26   Total Weekly Dose 37.5mg 37.5 mg 37.5mg 42.5 mg 37.5 mg 37.5 mg 37.5 mg 37.5 mg 35 mg 37.5 mg  40 mg 37.5 mg   INR 1.6 2.0 1.5 2.7 2.1 2.5 2.3 4.2 1.9  1.8 2.2 2.1   Notes Hm- 1 bill today  Missed dose?  Inc GLV HM 2- No bill  Hm- 3 no bill Hm 4 no billing   Boost dose  HM 1 billing today HM 2 - no bill  HM 3 no billing Hm 4 no billing HM 1 billing day  1x red  HM-2 no bill HM-3 no bill  boost HM-4 bill     Date 1/2/24 1/9 1/17 1/23/24 1/30 2/6/24          Total WeeklyDose 37.5 mg 40 mg 40 mg 40 mg 40 mg 40 mg          INR 1.8 1.9 2.2 2.4 2.1 2.7          Notes HM 1 billing day HM-2 no billing boost Hm 3 no bill HM 4 - no bill  HM 1-bill today HM 2 -no bill            Takes warfarin in the am    Clinic Interview:  Verbal Release Authorization signed on 8/22/2018 -- may speak with Raeann Montemayor (Wife). [phone] (Home) *Preferred*  Tablet Strength: pt has 7.5mg and 2.5mg tablets  Estimated oop cost for  [facility]ER HM: Patient has [employer] Medicare so they should have minimal to no OOP costs.                    Patient Findings  Negatives: Signs/symptoms of thrombosis, Signs/symptoms of bleeding, Laboratory test error suspected, Change in health, Change in alcohol use, Change in activity, Upcoming invasive procedure, Emergency department visit, Upcoming dental procedure, Missed doses, Extra doses, Change in medications, Change in diet/appetite, Hospital admission, Bruising, Other complaints   Comments: All findings negative per patient.       Plan:   1. INR is therapeutic today at 2.7 (goal 2.0-3.0). Instructed Mr. Montemayor to continue recently increased regimen of warfarin 5 mg oral daily except 7.5 mg TueFri until recheck.  2. Recheck INR in one week 2/13/24  3. Verbal  information provided over the telephone. Mr. Montemayor expresses understanding with teach back and has no further questions at this time.     Home monitor information below:  Test strips on hand: 0  2/6/24 sending supplies tomorrow morning   Lot : [id number]  Barcode : [id number] Exp : 1/25  Serial number: [id number]   Supplies billing code used: last 12/5/23  Next must be on or after 02/27/2024  Transfer Tube Lot number: Lot : [id number]  Safety Lancets:  Lot : [id number]     Billing:  *bill must be 4 tests 28 days or more from HM visit  PLEASE REFER TO ANTICOAGULATION TRAINING POWERPOINT FOR BILLING. Needs to use in clinic remote appointment for encounter.      Alexander Scherer, Pharmacy Technician  2/6/2024  14:40 Maryann ALMENDAREZ, [facility], have reviewed the note in full and agree with the assessment and plan.  02/06/24  15:15 EST

## 2024-02-13 ENCOUNTER — ANTICOAGULATION VISIT (OUTPATIENT)
Dept: PHARMACY | Facility: HOSPITAL | Age: 79
End: 2024-02-13
Payer: MEDICARE

## 2024-02-13 LAB — INR PPP: 1.4

## 2024-02-20 ENCOUNTER — ANTICOAGULATION VISIT (OUTPATIENT)
Dept: PHARMACY | Facility: HOSPITAL | Age: 79
End: 2024-02-20
Payer: MEDICARE

## 2024-02-20 LAB — INR PPP: 2.2

## 2024-02-20 NOTE — PROGRESS NOTES
Anticoagulation Clinic Progress Note   Oswaldo HOMEMONITOR: [employer] Serial # : [id number]     Indication: afib  Referring Provider: Gale  Initial Warfarin Start Date: 2007  Goal INR: 2-3  Current Drug Interactions:   [id number]: 5 (HTN, Age, CHF, CAD)   Other: anemic, CKD (stage 4) May need bridge with Lovenox given persistent AF (per cardio 1/24/2019), Scr was 3.22 on 11/24/21    Diet: eats salads 2x weekly 5/11/2023     Anticoagulation Clinic INR History:   Date 10/30 11/20 12/17 1/14/20 2/11 3/11 4/8 5/6 6/11 7/9 8/6 9/3 9/14 9/30   Total Weekly Dose 32.5 mg 32.5 mg 32.5 mg 32.5 mg 32.5mg 32.5mg 32.5mg 32.5mg 32.5mg 32.5 mg 32.5 mg 32.5mg 32.5mg 26.25 mg   INR 2.3 2.3 2.1 1.9 2.2 2.1 2.1 2.2 2.2 2.3 2.7 1.6 2.2  1.8   Notes                       ??   5 day hold     Date 10/14 11/4 11/11 11/25 12/9 12/16 12/30 1/13 2/3 3/3 3/31 4/28 5/26 6/23   Total Weekly Dose 32.5 mg 32.5mg 35mg 32.5mg 33.75 mg 36.25 mg 33.75 mg 35mg 33.75 33.75mg 33.75mg 33.75 mg 33.75 mg 33.75mg   INR 2.0 1.6 2.1 1.8 1.8 2.5 2.0 3.0 2.1 2.4 2.3 3.0 2.1 2.2   Notes   1x boost GLV boostx1 boostx1             Date 7/21 9/1 10/13 11/10 12/1 12/15 12/29 1/12/22 1/26 2/9 2/16 2/23 3/2 3/16   Total Weekly Dose 33.75mg 33.75mg 33.75mg 33.75mg 33.75 mg 32.5mg 30mg 27.5mg 28.75 mg 30 mg 31.25 mg 31.25 mg 31.25 mg 31.25 mg   INR 2.9 2.5 3.1 3.1 3.2 3.4 3.4 1.9 1.7 1.8 2.1 2.6 2.9 2.9   Notes        redx1 ?  inc        Date 3/30 4/13 5/11 6/8  6/23 7/7 7/28  8/9 8/12 8/16   Total WeeklyDose 31.25mg 31.25 mg 31.25 mg 31.25mg Start dialysis 30mg 30mg 30 mg  33.75mg 37.5mg 37.5mg   INR 2.5 2.2 2.6 3.9  2.1 2.1 1.8 1.23 1.4 1.5 2.1   Notes    Inc torsemide Cath placement    AV fistula placement    HM train  1 no billing       Date 8/23 8/30 9/6 9/13 9/20 9/30 10/4 10/11 10/18 10/25 11/8 11/15   Total WeeklyDose 31.25 mg 32.5 mg 32.5 mg 32.5 mg 32.5 mg 32.5 mg 35 mg 35 mg 35 mg 32.5 mg 26.5 mg 35 mg   INR 1.9 2.0 2.0 2.5 2.7 1.6 1.7 2.8 2.4 1.7  1.4 1.6   Notes HM2 no billing HM3  No billing HM4  No billing HM 1  Bill today HM 2 No billing HM 3 No bill HM 4  No billing 1x boost  HM 5 no bill day Bill day  HM1 HM- 2 no billing   HM 3- no billing 1x boost  HM 4 - no billing      Date 11/22 11/29 12/6 12/13 12/20 12/27 1/3/23 1/10 1/12 1/17 1/26/23 1/31/23   Total WeeklyDose 37.5 mg 37.5 mg 37.5 mg 37.5 mg 42.5 mg  40 mg 37.5 mg 40 mg 42.5mg 45 mg 45 mg 40 mg   INR 1.9 2.8 1.9 1.5 2.3 3.4 1.4 0.8 2.7 POCT  2.23   2.8 HM 3.9 1.9 2.2   Notes HM1- bill day HM 2 - NO bill HM 3 - no bill  Inc GLV Hm 4 - NO BILL HM 1 - Bill today HM 2   no billing HM 3- no billing HM 4  No billing lab error?? clinic HM 1  Bill today HM 2 - no bill  HM 3 - no bill      Date 2/7 2/14 2/21/23 2/28/23 3/7 3/14/23 3/21/23 3/28/23 4/4 4/11 4/18 4/26   Total WeeklyDose 40 mg 40 mg 40 mg 37.5 mg 40 mg 40 mg 40 mg 40 mg 37.5 mg 37.5 mg 37.5 mg  40 mg   INR 2.3 2.2 3.9 1.6 3.7 ??? 2.5 2.7 3.2 2.8 2.5 1.7 1.7   Notes HM 4 rec 2/15  HM 1  Bill today HM 2 - no billing  1xred  HM 3 - NO BILL  HM-4 no bill, percocet HM 5 - no billing until 3/15 HM 1 - BILL today  HM 2 - no Billing HM 3  No billing HM 4  No billing HM 1-  BILL today  HM 2  No billing     Date 5/2 5/5 5/11 5/16 5/22 5/30 6/6 6/14 6/20 6/27 7/5 7/11   Total WeeklyDose 42.5 mg 37.5 mg 42.5mg 42.5 mg  35 mg 40 mg 40 mg 40 mg 37.5 mg 40 mg 40 mg 40 mg   INR 3.5 1.8 2.9 4.2 2.0 2.5 2.8 3.2 1.9 2.2 3.0 3.3   Notes HM 3  No billing HM 4  No billing HM 5  No billing HM-1  Bill  Dec'd GLV  HM 2  No billing  1x hold  HM 3   no billing HM 4  No billing HM 1  Bill today HM 2  No billing HM 3  No bill HM 4  No bill HM5  No bill     Date 7/18 7/25 08/01/23 8/8 8/15/23 8/23/23 08/29/23 09/05/23 9/12/23 9/19/23 9/26/23 10/3   Total WeeklyDose 37.5 mg 35 mg 37.5mg 40 mg 37.5 mg 37.5 mg 37.5 mg 37.5 mg 37.5 mg 37.5 mg 37.5 mg 37.5 mg   INR 3.3 1.9 1.9 2.3 2.6 2.1 2.4 2.3 2.7 2.5 2.7 2.0   Notes HM 1  Bill today HM 2  No billing HM 3  No billing  1  x boost HM 4 no billing  HM 1 - BILL today  HM 2 - no bill  HM 3- no billing HM 4- no billing HM 1 - BILL today  HM 2 - NO bill  HM 3 - no bill  Hm 4- no bill       Date 10/10 10/18 10/24 10/31 11/07 11/14/23 11/21 11/28 12/05/23  12/12  12/19 12/26   Total Weekly Dose 37.5mg 37.5 mg 37.5mg 42.5 mg 37.5 mg 37.5 mg 37.5 mg 37.5 mg 35 mg 37.5 mg  40 mg 37.5 mg   INR 1.6 2.0 1.5 2.7 2.1 2.5 2.3 4.2 1.9  1.8 2.2 2.1   Notes Hm- 1 bill today  Missed dose?  Inc GLV HM 2- No bill  Hm- 3 no bill Hm 4 no billing   Boost dose  HM 1 billing today HM 2 - no bill  HM 3 no billing Hm 4 no billing HM 1 billing day  1x red  HM-2 no bill HM-3 no bill  boost HM-4 bill     Date 1/2/24 1/9 1/17 1/23/24 1/30 2/6/24 2/13/24 2/20/24        Total WeeklyDose 37.5 mg 40 mg 40 mg 40 mg 40 mg 40 mg 40 mg 45 mg        INR 1.8 1.9 2.2 2.4 2.1 2.7 1.4 2.2        Notes HM 1 billing day HM-2 no billing boost Hm 3 no bill HM 4 - no bill  HM 1-bill today HM 2 -no bill  HM 3 - no bill  HM 4 - no bill   BOOST          Takes warfarin in the am    Clinic Interview:  Verbal Release Authorization signed on 8/22/2018 -- may speak with Raeann Montemayor (Wife). [phone] (Home) *Preferred*  Tablet Strength: pt has 7.5mg and 2.5mg tablets  Estimated oop cost for [facility] EDER HM: Patient has [employer] Medicare so they should have minimal to no OOP costs.                      Patient Findings  Negatives: Signs/symptoms of thrombosis, Signs/symptoms of bleeding, Laboratory test error suspected, Change in health, Change in alcohol use, Change in activity, Upcoming invasive procedure, Emergency department visit, Upcoming dental procedure, Missed doses, Extra doses, Change in medications, Change in diet/appetite, Hospital admission, Bruising, Other complaints   Comments: All findings negative per patient       Plan:   1. INR is therapeutic today at 2.2 (goal 2.0-3.0). Per Jenni Walter, [facility] Instructed Mr. Montemayor to resum warfarin 5 mg oral daily except Warfarin 7.5 mg  Tues&Fri until recheck.  2. Recheck INR in one week 2/27/24  3. Verbal information provided over the telephone. Mr. Montemayor expresses understanding with teach back and has no further questions at this time.     Home monitor information below:  Test strips on hand: 4  2/20/24  Lot : [id number]  Barcode : [id number] Exp : 1/25  Serial number: [id number]   Supplies billing code used: last 12/5/23  Next must be on or after 02/27/2024  Transfer Tube Lot number: Lot : [id number]  Safety Lancets:  Lot : [id number]     Billing:  *bill must be 4 tests 28 days or more from HM visit  PLEASE REFER TO ANTICOAGULATION TRAINING POWERPOINT FOR BILLING. Needs to use in clinic remote appointment for encounter.      Alexander Scherer, Pharmacy Technician  2/20/2024  12:20 Jenni ALMENDAREZ, [facility], have reviewed the note in full and agree with the assessment and plan.  02/20/24  12:56 EST

## 2024-02-21 RX ORDER — BUMETANIDE 2 MG/1
TABLET ORAL
Qty: 48 TABLET | Refills: 0 | Status: SHIPPED | OUTPATIENT
Start: 2024-02-21

## 2024-02-26 ENCOUNTER — HOSPITAL ENCOUNTER (OUTPATIENT)
Dept: CARDIOLOGY | Facility: HOSPITAL | Age: 79
Discharge: HOME | End: 2024-02-26
Admitting: NURSE PRACTITIONER
Payer: MEDICARE

## 2024-02-26 VITALS — BODY MASS INDEX: 40.94 KG/M2 | WEIGHT: 285.94 LBS | HEIGHT: 70 IN

## 2024-02-26 PROCEDURE — 93306 TTE W/DOPPLER COMPLETE: CPT | Performed by: INTERNAL MEDICINE

## 2024-02-26 PROCEDURE — 93306 TTE W/DOPPLER COMPLETE: CPT

## 2024-02-27 ENCOUNTER — ANTICOAGULATION VISIT (OUTPATIENT)
Dept: PHARMACY | Facility: HOSPITAL | Age: 79
End: 2024-02-27
Payer: MEDICARE

## 2024-02-27 LAB
ASCENDING AORTA: 3.6 CM
BH CV ECHO MEAS - AO MAX PG: 12.3 MMHG
BH CV ECHO MEAS - AO MEAN PG: 6 MMHG
BH CV ECHO MEAS - AO ROOT DIAM: 3.8 CM
BH CV ECHO MEAS - AO V2 MAX: 175 CM/SEC
BH CV ECHO MEAS - AO V2 VTI: 32.1 CM
BH CV ECHO MEAS - AVA(I,D): 2.6 CM2
BH CV ECHO MEAS - EDV(CUBED): 157.5 ML
BH CV ECHO MEAS - EDV(MOD-SP2): 112 ML
BH CV ECHO MEAS - EDV(MOD-SP4): 109 ML
BH CV ECHO MEAS - EF(MOD-BP): 55.5 %
BH CV ECHO MEAS - EF(MOD-SP2): 54.1 %
BH CV ECHO MEAS - EF(MOD-SP4): 55.4 %
BH CV ECHO MEAS - ESV(CUBED): 54.9 ML
BH CV ECHO MEAS - ESV(MOD-SP2): 51.4 ML
BH CV ECHO MEAS - ESV(MOD-SP4): 48.6 ML
BH CV ECHO MEAS - FS: 29.6 %
BH CV ECHO MEAS - IVS/LVPW: 0.85 CM
BH CV ECHO MEAS - IVSD: 1.1 CM
BH CV ECHO MEAS - LA DIMENSION: 5.3 CM
BH CV ECHO MEAS - LAT PEAK E' VEL: 12.6 CM/SEC
BH CV ECHO MEAS - LV DIASTOLIC VOL/BSA (35-75): 45 CM2
BH CV ECHO MEAS - LV MASS(C)D: 264.4 GRAMS
BH CV ECHO MEAS - LV MAX PG: 3.7 MMHG
BH CV ECHO MEAS - LV MEAN PG: 2 MMHG
BH CV ECHO MEAS - LV SYSTOLIC VOL/BSA (12-30): 20.1 CM2
BH CV ECHO MEAS - LV V1 MAX: 95.7 CM/SEC
BH CV ECHO MEAS - LV V1 VTI: 17 CM
BH CV ECHO MEAS - LVIDD: 5.4 CM
BH CV ECHO MEAS - LVIDS: 3.8 CM
BH CV ECHO MEAS - LVOT AREA: 4.9 CM2
BH CV ECHO MEAS - LVOT DIAM: 2.5 CM
BH CV ECHO MEAS - LVPWD: 1.3 CM
BH CV ECHO MEAS - MED PEAK E' VEL: 8.9 CM/SEC
BH CV ECHO MEAS - MV DEC SLOPE: 607.3 CM/SEC2
BH CV ECHO MEAS - MV DEC TIME: 0.17 SEC
BH CV ECHO MEAS - MV E MAX VEL: 103.7 CM/SEC
BH CV ECHO MEAS - MV MAX PG: 6.1 MMHG
BH CV ECHO MEAS - MV MEAN PG: 2.5 MMHG
BH CV ECHO MEAS - MV V2 VTI: 21.6 CM
BH CV ECHO MEAS - MVA(VTI): 3.9 CM2
BH CV ECHO MEAS - PA ACC TIME: 0.1 SEC
BH CV ECHO MEAS - PA V2 MAX: 108 CM/SEC
BH CV ECHO MEAS - RAP SYSTOLE: 3 MMHG
BH CV ECHO MEAS - RVSP: 40 MMHG
BH CV ECHO MEAS - SI(MOD-SP2): 25 ML/M2
BH CV ECHO MEAS - SI(MOD-SP4): 24.9 ML/M2
BH CV ECHO MEAS - SV(LVOT): 83.4 ML
BH CV ECHO MEAS - SV(MOD-SP2): 60.6 ML
BH CV ECHO MEAS - SV(MOD-SP4): 60.4 ML
BH CV ECHO MEAS - TAPSE (>1.6): 2.38 CM
BH CV ECHO MEAS - TR MAX PG: 36.9 MMHG
BH CV ECHO MEAS - TR MAX VEL: 303.6 CM/SEC
BH CV ECHO MEASUREMENTS AVERAGE E/E' RATIO: 9.65
BH CV VAS BP RIGHT ARM: NORMAL MMHG
BH CV XLRA - RV BASE: 4 CM
BH CV XLRA - RV LENGTH: 8.7 CM
BH CV XLRA - RV MID: 3.8 CM
BH CV XLRA - TDI S': 13.2 CM/SEC
INR PPP: 3.7
IVRT: 74 MS
LEFT ATRIUM VOLUME INDEX: 61.6 ML/M2

## 2024-02-27 PROCEDURE — G0249 PROVIDE INR TEST MATER/EQUIP: HCPCS

## 2024-02-27 NOTE — PROGRESS NOTES
Anticoagulation Clinic Progress Note   Oswaldo HOMEMONITOR: [place] Check Serial # : [id number]     Indication: afib  Referring Provider: Gale  Initial Warfarin Start Date: 2007  Goal INR: 2-3  Current Drug Interactions:   [id number]: 5 (HTN, Age, CHF, CAD)   Other: anemic, CKD (stage 4) May need bridge with Lovenox given persistent AF (per cardio 1/24/2019), Scr was 3.22 on 11/24/21    Diet: eats salads 2x weekly 5/11/2023     Anticoagulation Clinic INR History:     Date 3/30 4/13 5/11 6/8  6/23 7/7 7/28  8/9 8/12 8/16   Total WeeklyDose 31.25mg 31.25 mg 31.25 mg 31.25mg Start dialysis 30mg 30mg 30 mg  33.75mg 37.5mg 37.5mg   INR 2.5 2.2 2.6 3.9  2.1 2.1 1.8 1.23 1.4 1.5 2.1   Notes    Inc torsemide Cath placement    AV fistula placement    HM train hm 1 no billing       Date 8/23 8/30 9/6 9/13 9/20 9/30 10/4 10/11 10/18 10/25 11/8 11/15   Total WeeklyDose 31.25 mg 32.5 mg 32.5 mg 32.5 mg 32.5 mg 32.5 mg 35 mg 35 mg 35 mg 32.5 mg 26.5 mg 35 mg   INR 1.9 2.0 2.0 2.5 2.7 1.6 1.7 2.8 2.4 1.7 1.4 1.6   Notes HM2 no billing HM3  No billing HM4  No billing HM 1  Bill today HM 2 No billing HM 3 No bill HM 4  No billing 1x boost  HM 5 no bill day Bill day  HM1 HM- 2 no billing   HM 3- no billing 1x boost  HM 4 - no billing      Date 11/22 11/29 12/6 12/13 12/20 12/27 1/3/23 1/10 1/12 1/17 1/26/23 1/31/23   Total WeeklyDose 37.5 mg 37.5 mg 37.5 mg 37.5 mg 42.5 mg  40 mg 37.5 mg 40 mg 42.5mg 45 mg 45 mg 40 mg   INR 1.9 2.8 1.9 1.5 2.3 3.4 1.4 0.8 2.7 POCT  2.23   2.8 HM 3.9 1.9 2.2   Notes HM1- bill day HM 2 - NO bill HM 3 - no bill  Inc GLV Hm 4 - NO BILL HM 1 - Bill today HM 2   no billing HM 3- no billing HM 4  No billing lab error?? clinic HM 1  Bill today HM 2 - no bill  HM 3 - no bill      Date 2/7 2/14 2/21/23 2/28/23 3/7 3/14/23 3/21/23 3/28/23 4/4 4/11 4/18 4/26   Total WeeklyDose 40 mg 40 mg 40 mg 37.5 mg 40 mg 40 mg 40 mg 40 mg 37.5 mg 37.5 mg 37.5 mg  40 mg   INR 2.3 2.2 3.9 1.6 3.7 ??? 2.5 2.7 3.2 2.8  2.5 1.7 1.7   Notes HM 4 rec 2/15  HM 1  Bill today HM 2 - no billing  1xred  HM 3 - NO BILL  HM-4 no bill, percocet HM 5 - no billing until 3/15 HM 1 - BILL today  HM 2 - no Billing HM 3  No billing HM 4  No billing HM 1-  BILL today  HM 2  No billing     Date 5/2 5/5 5/11 5/16 5/22 5/30 6/6 6/14 6/20 6/27 7/5 7/11   Total WeeklyDose 42.5 mg 37.5 mg 42.5mg 42.5 mg  35 mg 40 mg 40 mg 40 mg 37.5 mg 40 mg 40 mg 40 mg   INR 3.5 1.8 2.9 4.2 2.0 2.5 2.8 3.2 1.9 2.2 3.0 3.3   Notes HM 3  No billing HM 4  No billing HM 5  No billing HM-1  Bill  Dec'd GLV  HM 2  No billing  1x hold  HM 3   no billing HM 4  No billing HM 1  Bill today HM 2  No billing HM 3  No bill HM 4  No bill HM5  No bill     Date 7/18 7/25 08/01/23 8/8 8/15/23 8/23/23 08/29/23 09/05/23 9/12/23 9/19/23 9/26/23 10/3   Total WeeklyDose 37.5 mg 35 mg 37.5mg 40 mg 37.5 mg 37.5 mg 37.5 mg 37.5 mg 37.5 mg 37.5 mg 37.5 mg 37.5 mg   INR 3.3 1.9 1.9 2.3 2.6 2.1 2.4 2.3 2.7 2.5 2.7 2.0   Notes HM 1  Bill today HM 2  No billing HM 3  No billing  1 x boost HM 4 no billing  HM 1 - BILL today  HM 2 - no bill  HM 3- no billing HM 4- no billing HM 1 - BILL today  HM 2 - NO bill  HM 3 - no bill  Hm 4- no bill       Date 10/10 10/18 10/24 10/31 11/07 11/14/23 11/21 11/28 12/05/23  12/12  12/19 12/26   Total Weekly Dose 37.5mg 37.5 mg 37.5mg 42.5 mg 37.5 mg 37.5 mg 37.5 mg 37.5 mg 35 mg 37.5 mg  40 mg 37.5 mg   INR 1.6 2.0 1.5 2.7 2.1 2.5 2.3 4.2 1.9  1.8 2.2 2.1   Notes Hm- 1 bill today  Missed dose?  Inc GLV HM 2- No bill  Hm- 3 no bill Hm 4 no billing   Boost dose  HM 1 billing today HM 2 - no bill  HM 3 no billing Hm 4 no billing HM 1 billing day  1x red  HM-2 no bill HM-3 no bill  boost HM-4 bill     Date 1/2/24 1/9 1/17 1/23/24 1/30 2/6/24 2/13/24 2/20/24 2/27       Total WeeklyDose 37.5 mg 40 mg 40 mg 40 mg 40 mg 40 mg 40 mg 45 mg 40mg        INR 1.8 1.9 2.2 2.4 2.1 2.7 1.4 2.2 3.7       Notes HM 1 billing day HM-2 no billing boost Hm 3 no bill HM 4 - no bill  HM  1-bill today HM 2 -no bill  HM 3 - no bill  HM 4 - no bill   BOOST  Hm- 1   Bill today        Takes warfarin in the am    Clinic Interview:  Verbal Release Authorization signed on 8/22/2018 -- may speak with Raeann Montemayor (Wife). [phone] (Home) *Preferred*  Tablet Strength: pt has 7.5mg and 2.5mg tablets  Estimated oop cost for BH EDER HM: Patient has [employer] Medicare so they should have minimal to no OOP costs.                      Patient Findings  Negatives: Signs/symptoms of thrombosis, Signs/symptoms of bleeding, Laboratory test error suspected, Change in health, Change in alcohol use, Change in activity, Upcoming invasive procedure, Emergency department visit, Upcoming dental procedure, Missed doses, Extra doses, Change in medications, Change in diet/appetite, Hospital admission, Bruising, Other complaints   Comments: All findings negative per patient       Plan:   1. INR is supratherapeutic today at 2.2 (goal 2.0-3.0).  Instructed Mr. Montemayor to take warfarin 5 mg oral daily except Warfarin 7.5 mg Fri until recheck.  2. Recheck INR in one week 3/5  3. Verbal information provided over the telephone. Mr. Montemayor expresses understanding with teach back and has no further questions at this time.     Home monitor information below:  Test strips on hand: 4  2/20/24  Lot : [id number]  Barcode : [id number] Exp : 1/25  Serial number: [id number]   Supplies billing code used: last 2/27/24  Next must be on or after 03/26/2024  Transfer Tube Lot number: Lot : [id number]  Safety Lancets:  Lot : [id number]     Billing:  *bill must be 4 tests 28 days or more from HM visit  PLEASE REFER TO ANTICOAGULATION TRAINING POWERPOINT FOR BILLING. Needs to use in clinic remote appointment for encounter.      Kristine Sutton, PharmD  2/27/2024  14:11 EST

## 2024-02-29 ENCOUNTER — HOSPITAL ENCOUNTER (OUTPATIENT)
Facility: HOSPITAL | Age: 79
Setting detail: OBSERVATION
Discharge: HOME | End: 2024-03-01
Attending: EMERGENCY MEDICINE | Admitting: INTERNAL MEDICINE
Payer: MEDICARE

## 2024-02-29 ENCOUNTER — APPOINTMENT (OUTPATIENT)
Dept: GENERAL RADIOLOGY | Facility: HOSPITAL | Age: 79
End: 2024-02-29
Payer: MEDICARE

## 2024-02-29 ENCOUNTER — APPOINTMENT (OUTPATIENT)
Dept: CT IMAGING | Facility: HOSPITAL | Age: 79
End: 2024-02-29
Payer: MEDICARE

## 2024-02-29 DIAGNOSIS — Z99.2 ESRD ON HEMODIALYSIS: ICD-10-CM

## 2024-02-29 DIAGNOSIS — Z86.79 HISTORY OF ATRIAL FIBRILLATION: ICD-10-CM

## 2024-02-29 DIAGNOSIS — R41.82 ALTERED MENTAL STATUS, UNSPECIFIED ALTERED MENTAL STATUS TYPE: Primary | ICD-10-CM

## 2024-02-29 DIAGNOSIS — N18.6 ESRD ON HEMODIALYSIS: ICD-10-CM

## 2024-02-29 DIAGNOSIS — R47.81 SLURRED SPEECH: ICD-10-CM

## 2024-02-29 DIAGNOSIS — H53.8 BLURRED VISION, BILATERAL: ICD-10-CM

## 2024-02-29 DIAGNOSIS — Z79.01 ANTICOAGULATED ON COUMADIN: ICD-10-CM

## 2024-02-29 PROBLEM — R00.1 BRADYCARDIA: Status: RESOLVED | Noted: 2019-01-21 | Resolved: 2024-02-29

## 2024-02-29 PROBLEM — R79.89 ELEVATED TSH: Status: RESOLVED | Noted: 2020-04-14 | Resolved: 2024-02-29

## 2024-02-29 PROBLEM — K76.0 FATTY LIVER: Status: ACTIVE | Noted: 2024-02-29

## 2024-02-29 PROBLEM — R29.90 STROKE-LIKE SYMPTOMS: Status: ACTIVE | Noted: 2024-02-29

## 2024-02-29 PROBLEM — H53.9 CHANGE IN VISION: Status: ACTIVE | Noted: 2024-02-29

## 2024-02-29 PROBLEM — R51.9 HEADACHE: Status: ACTIVE | Noted: 2024-02-29

## 2024-02-29 PROBLEM — N18.30 CHRONIC KIDNEY DISEASE, STAGE III (MODERATE): Status: RESOLVED | Noted: 2019-02-05 | Resolved: 2024-02-29

## 2024-02-29 PROBLEM — R51.9 HEADACHE: Status: RESOLVED | Noted: 2024-02-29 | Resolved: 2024-02-29

## 2024-02-29 PROBLEM — I50.32 CHRONIC DIASTOLIC CONGESTIVE HEART FAILURE: Chronic | Status: RESOLVED | Noted: 2018-09-14 | Resolved: 2024-02-29

## 2024-02-29 PROBLEM — R19.7 DIARRHEA: Status: RESOLVED | Noted: 2018-09-14 | Resolved: 2024-02-29

## 2024-02-29 LAB
ALBUMIN SERPL-MCNC: 4 G/DL (ref 3.5–5.2)
ALBUMIN/GLOB SERPL: 1.5 G/DL
ALP SERPL-CCNC: 74 U/L (ref 39–117)
ALT SERPL W P-5'-P-CCNC: 7 U/L (ref 1–41)
ANION GAP SERPL CALCULATED.3IONS-SCNC: 13 MMOL/L (ref 5–15)
AST SERPL-CCNC: 7 U/L (ref 1–40)
BASOPHILS # BLD AUTO: 0.02 10*3/MM3 (ref 0–0.2)
BASOPHILS NFR BLD AUTO: 0.2 % (ref 0–1.5)
BILIRUB SERPL-MCNC: 0.4 MG/DL (ref 0–1.2)
BUN SERPL-MCNC: 39 MG/DL (ref 8–23)
BUN/CREAT SERPL: 7.2 (ref 7–25)
CALCIUM SPEC-SCNC: 8.5 MG/DL (ref 8.6–10.5)
CHLORIDE SERPL-SCNC: 94 MMOL/L (ref 98–107)
CO2 SERPL-SCNC: 28 MMOL/L (ref 22–29)
CREAT SERPL-MCNC: 5.39 MG/DL (ref 0.76–1.27)
DEPRECATED RDW RBC AUTO: 57.8 FL (ref 37–54)
EGFRCR SERPLBLD CKD-EPI 2021: 10.1 ML/MIN/1.73
EOSINOPHIL # BLD AUTO: 0.17 10*3/MM3 (ref 0–0.4)
EOSINOPHIL NFR BLD AUTO: 2 % (ref 0.3–6.2)
ERYTHROCYTE [DISTWIDTH] IN BLOOD BY AUTOMATED COUNT: 16.1 % (ref 12.3–15.4)
ERYTHROCYTE [SEDIMENTATION RATE] IN BLOOD: 36 MM/HR (ref 0–20)
FERRITIN SERPL-MCNC: 1695 NG/ML (ref 30–400)
GEN 5 2HR TROPONIN T REFLEX: 168 NG/L
GLOBULIN UR ELPH-MCNC: 2.7 GM/DL
GLUCOSE SERPL-MCNC: 124 MG/DL (ref 65–99)
HCT VFR BLD AUTO: 27.5 % (ref 37.5–51)
HGB BLD-MCNC: 8.8 G/DL (ref 13–17.7)
HOLD SPECIMEN: NORMAL
IMM GRANULOCYTES # BLD AUTO: 0.09 10*3/MM3 (ref 0–0.05)
IMM GRANULOCYTES NFR BLD AUTO: 1 % (ref 0–0.5)
INR PPP: 2.99 (ref 0.89–1.12)
IRON 24H UR-MRATE: 59 MCG/DL (ref 59–158)
IRON SATN MFR SERPL: 20 % (ref 20–50)
LYMPHOCYTES # BLD AUTO: 1.22 10*3/MM3 (ref 0.7–3.1)
LYMPHOCYTES NFR BLD AUTO: 14 % (ref 19.6–45.3)
MAGNESIUM SERPL-MCNC: 2 MG/DL (ref 1.6–2.4)
MCH RBC QN AUTO: 32.7 PG (ref 26.6–33)
MCHC RBC AUTO-ENTMCNC: 32 G/DL (ref 31.5–35.7)
MCV RBC AUTO: 102.2 FL (ref 79–97)
MONOCYTES # BLD AUTO: 0.88 10*3/MM3 (ref 0.1–0.9)
MONOCYTES NFR BLD AUTO: 10.1 % (ref 5–12)
NEUTROPHILS NFR BLD AUTO: 6.31 10*3/MM3 (ref 1.7–7)
NEUTROPHILS NFR BLD AUTO: 72.7 % (ref 42.7–76)
NRBC BLD AUTO-RTO: 0.7 /100 WBC (ref 0–0.2)
PLATELET # BLD AUTO: 162 10*3/MM3 (ref 140–450)
PMV BLD AUTO: 9.5 FL (ref 6–12)
POTASSIUM SERPL-SCNC: 3.2 MMOL/L (ref 3.5–5.2)
PROT SERPL-MCNC: 6.7 G/DL (ref 6–8.5)
PROTHROMBIN TIME: 31.3 SECONDS (ref 12.2–14.5)
RBC # BLD AUTO: 2.69 10*6/MM3 (ref 4.14–5.8)
SODIUM SERPL-SCNC: 135 MMOL/L (ref 136–145)
TIBC SERPL-MCNC: 289 MCG/DL (ref 298–536)
TRANSFERRIN SERPL-MCNC: 194 MG/DL (ref 200–360)
TROPONIN T DELTA: 13 NG/L
TROPONIN T SERPL HS-MCNC: 155 NG/L
TROPONIN T SERPL HS-MCNC: 169 NG/L
WBC NRBC COR # BLD AUTO: 8.69 10*3/MM3 (ref 3.4–10.8)
WHOLE BLOOD HOLD COAG: NORMAL
WHOLE BLOOD HOLD SPECIMEN: NORMAL

## 2024-02-29 PROCEDURE — G0378 HOSPITAL OBSERVATION PER HR: HCPCS

## 2024-02-29 PROCEDURE — 99285 EMERGENCY DEPT VISIT HI MDM: CPT

## 2024-02-29 PROCEDURE — 83540 ASSAY OF IRON: CPT | Performed by: INTERNAL MEDICINE

## 2024-02-29 PROCEDURE — 85652 RBC SED RATE AUTOMATED: CPT | Performed by: INTERNAL MEDICINE

## 2024-02-29 PROCEDURE — 83735 ASSAY OF MAGNESIUM: CPT | Performed by: EMERGENCY MEDICINE

## 2024-02-29 PROCEDURE — 99223 1ST HOSP IP/OBS HIGH 75: CPT | Performed by: INTERNAL MEDICINE

## 2024-02-29 PROCEDURE — 85025 COMPLETE CBC W/AUTO DIFF WBC: CPT | Performed by: EMERGENCY MEDICINE

## 2024-02-29 PROCEDURE — 93005 ELECTROCARDIOGRAM TRACING: CPT | Performed by: EMERGENCY MEDICINE

## 2024-02-29 PROCEDURE — 80053 COMPREHEN METABOLIC PANEL: CPT | Performed by: EMERGENCY MEDICINE

## 2024-02-29 PROCEDURE — 82607 VITAMIN B-12: CPT | Performed by: INTERNAL MEDICINE

## 2024-02-29 PROCEDURE — 84443 ASSAY THYROID STIM HORMONE: CPT | Performed by: INTERNAL MEDICINE

## 2024-02-29 PROCEDURE — 85610 PROTHROMBIN TIME: CPT | Performed by: EMERGENCY MEDICINE

## 2024-02-29 PROCEDURE — 71045 X-RAY EXAM CHEST 1 VIEW: CPT

## 2024-02-29 PROCEDURE — 70450 CT HEAD/BRAIN W/O DYE: CPT

## 2024-02-29 PROCEDURE — 84466 ASSAY OF TRANSFERRIN: CPT | Performed by: INTERNAL MEDICINE

## 2024-02-29 PROCEDURE — 94799 UNLISTED PULMONARY SVC/PX: CPT

## 2024-02-29 PROCEDURE — 86140 C-REACTIVE PROTEIN: CPT | Performed by: INTERNAL MEDICINE

## 2024-02-29 PROCEDURE — 94660 CPAP INITIATION&MGMT: CPT

## 2024-02-29 PROCEDURE — 84484 ASSAY OF TROPONIN QUANT: CPT | Performed by: INTERNAL MEDICINE

## 2024-02-29 PROCEDURE — 99214 OFFICE O/P EST MOD 30 MIN: CPT

## 2024-02-29 PROCEDURE — 36415 COLL VENOUS BLD VENIPUNCTURE: CPT

## 2024-02-29 PROCEDURE — 84484 ASSAY OF TROPONIN QUANT: CPT | Performed by: EMERGENCY MEDICINE

## 2024-02-29 PROCEDURE — 84550 ASSAY OF BLOOD/URIC ACID: CPT | Performed by: INTERNAL MEDICINE

## 2024-02-29 PROCEDURE — 82728 ASSAY OF FERRITIN: CPT | Performed by: INTERNAL MEDICINE

## 2024-02-29 RX ORDER — SODIUM CHLORIDE 0.9 % (FLUSH) 0.9 %
10 SYRINGE (ML) INJECTION AS NEEDED
Status: DISCONTINUED | OUTPATIENT
Start: 2024-02-29 | End: 2024-03-01 | Stop reason: HOSPADM

## 2024-02-29 RX ORDER — ASPIRIN 81 MG/1
81 TABLET, CHEWABLE ORAL ONCE
Status: COMPLETED | OUTPATIENT
Start: 2024-03-01 | End: 2024-03-01

## 2024-02-29 RX ORDER — POTASSIUM CHLORIDE 1.5 G/1.58G
40 POWDER, FOR SOLUTION ORAL EVERY 4 HOURS
Status: DISPENSED | OUTPATIENT
Start: 2024-03-01 | End: 2024-03-01

## 2024-02-29 RX ORDER — WARFARIN SODIUM 7.5 MG/1
7.5 TABLET ORAL
Status: DISCONTINUED | OUTPATIENT
Start: 2024-03-01 | End: 2024-03-01 | Stop reason: HOSPADM

## 2024-02-29 RX ORDER — SODIUM CHLORIDE 9 MG/ML
40 INJECTION, SOLUTION INTRAVENOUS AS NEEDED
Status: DISCONTINUED | OUTPATIENT
Start: 2024-02-29 | End: 2024-03-01 | Stop reason: HOSPADM

## 2024-02-29 RX ORDER — LANOLIN ALCOHOL/MO/W.PET/CERES
1000 CREAM (GRAM) TOPICAL DAILY
Status: DISCONTINUED | OUTPATIENT
Start: 2024-02-29 | End: 2024-03-01 | Stop reason: HOSPADM

## 2024-02-29 RX ORDER — SODIUM CHLORIDE 0.9 % (FLUSH) 0.9 %
10 SYRINGE (ML) INJECTION EVERY 12 HOURS SCHEDULED
Status: DISCONTINUED | OUTPATIENT
Start: 2024-02-29 | End: 2024-03-01 | Stop reason: HOSPADM

## 2024-02-29 RX ORDER — WARFARIN SODIUM 5 MG/1
5 TABLET ORAL
Status: DISCONTINUED | OUTPATIENT
Start: 2024-02-29 | End: 2024-03-01 | Stop reason: HOSPADM

## 2024-02-29 RX ORDER — ATORVASTATIN CALCIUM 40 MG/1
40 TABLET, FILM COATED ORAL NIGHTLY
Status: DISCONTINUED | OUTPATIENT
Start: 2024-02-29 | End: 2024-03-01 | Stop reason: HOSPADM

## 2024-02-29 RX ORDER — PANTOPRAZOLE SODIUM 40 MG/1
40 TABLET, DELAYED RELEASE ORAL
Status: DISCONTINUED | OUTPATIENT
Start: 2024-03-01 | End: 2024-03-01 | Stop reason: HOSPADM

## 2024-02-29 NOTE — H&P
[facility] Medicine Services  HISTORY AND PHYSICAL    Patient Name: Marco Antonio Montemayor  : 1945  MRN: [id number]  Primary Care Physician: Provider, No Known    Subjective   Subjective     Chief Complaint:change in vision    HPI:  Marco Antonio Montemayor is a 78 y.o. male who  has a past medical history of Abnormal ECG, Arrhythmia, Arthritis, Asthma, Cancer, Chronic diastolic congestive heart failure (2018), CKD (chronic kidney disease), Coronary artery disease, Dry skin, Dyslipidemia (2016), Edema, Heart murmur, Hematoma, Hemodialysis access site with arteriovenous graft, History of transfusion, Hypertension, Iron deficiency anemia, Long term current use of anticoagulant, ZULEIKA treated with BiPAP, Persistent atrial fibrillation (2016), Tinnitus, and Wears glasses. who presented to the ED by EMS after losing vision with some following confusion while in dialysis today.  Patient has had 3 HD treatment over the last 3 days because the machine malfunctioned 2 days ago. Today he reports feeling like his vision when dizzy and then dark. -- his BP may have low at that time. He had no other complaints but there are reports of difficulty speaking.  Initial stroke eval is negative, he has returned to baseline and we are asked to admit for evaluation.      Review of Systems   Patient denies weight loss, headaches, changes in vision, fever, chills, sore throat, shortness of breath, cough, nausea or vomiting, diarrhea, abdominal pain or distension, change in urine output or habits, joint pain, rash, itching, numbness/tingling, weakness or bleeding.    Otherwise complete ROS is negative except as mentioned in the HPI.    Personal History         PMH: He  has a past medical history of Abnormal ECG, Arrhythmia, Arthritis, Asthma, Cancer, Chronic diastolic congestive heart failure (2018), CKD (chronic kidney disease), Coronary artery disease, Dry skin, Dyslipidemia (2016),  Edema, Heart murmur, Hematoma, Hemodialysis access site with arteriovenous graft, History of transfusion, Hypertension, Iron deficiency anemia, Long term current use of anticoagulant, ZULEIKA treated with BiPAP, Persistent atrial fibrillation (07/07/2016), Tinnitus, and Wears glasses.   PSxH: He  has a past surgical history that includes Appendectomy; Cataract extraction; Skin cancer excision; Tonsillectomy; [place] tooth extraction; Colonoscopy; Esophagogastroduodenoscopy; Cardiac electrophysiology procedure (N/A, 05/18/2018); Ablation of dysrhythmic focus; arteriovenous fistula/shunt surgery (Left, 08/04/2022); arteriovenous fistula/shunt surgery (Left, 11/03/2022); Insert / replace / remove pacemaker (about 20 years back); and Vein Surgery (for dialysis).         FH: His family history includes Arrhythmia in his father; Cancer in his mother; Heart attack in his father and mother; Hypertension in his father and mother.   SH: He  reports that he has never smoked. He has never used smokeless tobacco. He reports that he does not drink alcohol and does not use drugs.     Medications:  Sucroferric Oxyhydroxide, Warfarin Sodium, acetaminophen, atorvastatin, bumetanide, carvedilol, cholecalciferol, ferrous sulfate, nitroglycerin, vitamin B-12, and warfarin    No Known Allergies    Objective   Objective     Vital Signs:   Temp:  [97.6 °F (36.4 °C)] 97.6 °F (36.4 °C)  Heart Rate:  [93-96] 94  Resp:  [16] 16  BP: (149-159)/() 159/100    Constitutional: Awake, alert, interactive and pleasant, obese,   Eyes: clear sclerae, no conjunctival injection  HENT: NCAT, mucous membranes moist  Neck: no masses or lymphadenopathy, trachea midline  Respiratory: good breath sounds bilaterally, respirations unlabored  Cardiovascular: RRR, no murmurs appreciated, palpable peripheral pulses  Abdomen:  soft, obese, palpable liver, not tender or distended  Musculoskeletal: No peripheral edema, clubbing or cyanosis  Neurologic: Oriented x 3,                        Strength symmetric in all extremities                     Cranial Nerves grossly intact, speech clear  Skin: No rashes or jaundice, covered in hyperkeratotic skin, chronic venous changes on his legs  Psychiatric: Appropriate mood, insight      Result Review:  I have personally reviewed the results from the time of this admission   to 2/29/2024 18:58 EST and agree with these findings:  [x]  Laboratory  [x]  Microbiology  [x]  Radiology  [x]  EKG/Telemetry   []  Cardiology/Vascular   []  Pathology  [x]  Old records  []  Other:  Most notable findings include: chronic anemia, macrocytosis, low K, INR 2.99, elevated troponin, inverted T waves on EKG      LAB RESULTS:      Lab 02/29/24  1311   WBC 8.69   HEMOGLOBIN 8.8*   HEMATOCRIT 27.5*   PLATELETS 162   NEUTROS ABS 6.31   IMMATURE GRANS (ABS) 0.09*   LYMPHS ABS 1.22   MONOS ABS 0.88   EOS ABS 0.17   .2*   PROTIME 31.3*         Lab 02/29/24  1311   SODIUM 135*   POTASSIUM 3.2*   CHLORIDE 94*   CO2 28.0   ANION GAP 13.0   BUN 39*   CREATININE 5.39*   EGFR 10.1*   GLUCOSE 124*   CALCIUM 8.5*   MAGNESIUM 2.0         Lab 02/29/24  1311   TOTAL PROTEIN 6.7   ALBUMIN 4.0   GLOBULIN 2.7   ALT (SGPT) 7   AST (SGOT) 7   BILIRUBIN 0.4   ALK PHOS 74         Lab 02/29/24  1515 02/29/24  1311 02/27/24  0000   HSTROP T 168* 155*  --    PROTIME  --  31.3*  --    INR  --  2.99* 3.70                 Brief Urine Lab Results       None          COVID19   Date Value Ref Range Status   08/02/2022 Not Detected Not Detected - Ref. Range Final       CT Head Without Contrast    Result Date: 2/29/2024  CT HEAD WO CONTRAST Date of Exam: 2/29/2024 1:50 PM EST Indication: acute headache, ams. Comparison: None available. Technique: Axial CT images were obtained of the head without contrast administration.  Automated exposure control and iterative construction methods were used. Findings: There is no evidence of hemorrhage. There is no mass effect or midline shift. There  is no extracerebral collection. Ventricles are normal in size and configuration for patient's stated age. Posterior fossa is within normal limits. Calvarium and skull base appear intact.  Visualized sinuses show no air fluid levels. Visualized orbits are unremarkable.     Impression: Impression: No acute intracranial abnormalities Electronically Signed: Raji Hudson MD  2/29/2024 2:24 PM EST  Workstation ID: [id number]    XR Chest 1 View    Result Date: 2/29/2024  XR CHEST 1 VW Date of Exam: 2/29/2024 1:29 PM EST Indication: Weak/Dizzy/AMS triage protocol Comparison: 9/14/2018. Findings: Somewhat limited portable evaluation demonstrates low lung volumes with cardiac enlargement and left lung base opacity, nonspecific potentially representing effusion, airspace disease or atelectasis. There is no distinct pneumothorax. Left chest wall ICD  projects in place.     Impression: Impression: Low lung volumes, cardiac enlargement and left lung base opacity, nonspecific and potentially representing airspace disease or small left effusion. Electronically Signed: Dipak Gregg MD  2/29/2024 1:50 PM EST  Workstation ID: [id number]     Results for orders placed in visit on 01/17/24    Adult Transthoracic Echo Complete W/ Cont if Necessary Per Protocol    Interpretation Summary  •  Left ventricular systolic function is normal. Calculated left ventricular EF = 55.5% Left ventricular ejection fraction appears to be 56 - 60%.  •  Left ventricular wall thickness is consistent with mild concentric hypertrophy.  •  Left ventricular diastolic function was normal.  •  The left atrial cavity is mildly dilated.  •  Estimated right ventricular systolic pressure from tricuspid regurgitation is mildly elevated (35-45 mmHg). Calculated right ventricular systolic pressure from tricuspid regurgitation is 40 mmHg.    Septal thickness and atrial volume both improved on today's study compared to 2023      The patient qualifies to receive the  vaccine, but they have not yet received it.    Assessment & Plan   Assessment / Plan       Stroke-like symptoms    Coronary artery disease    Dyslipidemia    Hypertension    Morbid obesity    Severe obstructive sleep apnea    Pacemaker    Chronic atrial fibrillation    Anemia due to stage 4 chronic kidney disease treated with erythropoietin    Change in vision    Headache      Assessment & Plan:  79 yo with HO HTN, Afib with PPM, fatty liver, ZULEIKA, obesity and ESRD for two year on HD who presents with loss of vision    Change in vision  -no evidence of ischemia  -suspect hypotensive event associated with HD  -recent echo with dilated LA, normal LV    HO AFib with PPM  -hold cored due to hypotension, restart when stable  -pharmacy to dose coumadin    ESRD  -consult NAL  -shouldn't need HD until Saturday    Hypokalemia  -replete and monitor    ZULEIKA  -CPAP    Obesity with MARTE  -needs GI followup    Acute on Chronic anemia  -check iron stores, occult stool, add PPI  -ideally would be closer to 10  --ferritin very high, serum iron low will check inflammatory markers  --colon polyps in 2020    HO CAD with EKG changes and troponin elevation'  -could be related to hypotension, he denies any chest pain   -normal stress test in 7/23  -recheck troponin in the morning, EKG prn pain  -cont aspirin and statin    Seborrheic keratosis  --WOC eval    DVT prophylaxis:  Medical and mechanical DVT prophylaxis orders are present.    CODE STATUS:FULL CODE     Expected Discharge  Expected Discharge Date: 3/1/2024; Expected Discharge Time:     Electronically signed by Seble Simms MD 02/29/24 18:58 EST

## 2024-02-29 NOTE — Clinical Note
Level of Care: Telemetry [5]   Diagnosis: Change in vision [325186]   Admitting Physician: FREDERICK GNOZALEZ [[id number]]   Attending Physician: FREDERICK GONZALEZ [[id number]]

## 2024-02-29 NOTE — ED PROVIDER NOTES
[place]    EMERGENCY DEPARTMENT ENCOUNTER      Pt Name: Marco Antonio Montemayor  MRN: [id number]  YOB: 1945  Date of evaluation: 2/29/2024  Provider: Howie Gandhi MD    CHIEF COMPLAINT       Chief Complaint   Patient presents with    Altered Mental Status         HISTORY OF PRESENT ILLNESS   Marco Antonio Montemayor is a 78 y.o. male who presents to the emergency department ***       Nursing notes were reviewed.    REVIEW OF SYSTEMS     ROS:  A chief complaint appropriate review of systems was completed and is negative except as noted in the HPI.      PAST MEDICAL HISTORY     Past Medical History:   Diagnosis Date    Abnormal ECG     Arrhythmia     Arthritis     Asthma     Cancer     skin cancer removed from various locations     Chronic diastolic congestive heart failure 09/14/2018    CKD (chronic kidney disease)     closer to stage 5    Coronary artery disease     Dry skin     Dyslipidemia 07/07/2016    Edema     Heart murmur     Hematoma     Hemodialysis access site with arteriovenous graft     History of transfusion     no transfusion reaction    Hypertension     Iron deficiency anemia     Long term current use of anticoagulant     ZULEIKA treated with BiPAP     Persistent atrial fibrillation 07/07/2016     Patient notes dyspnea, fatigue and palpitations during episodes of afib.  multiple external cardioversionsChads vasc 2, anticoagulated with Coumadin    Tinnitus     Wears glasses     readers         SURGICAL HISTORY       Past Surgical History:   Procedure Laterality Date    ABLATION OF DYSRHYTHMIC FOCUS      APPENDECTOMY      ARTERIOVENOUS FISTULA/SHUNT SURGERY Left 08/04/2022    Procedure: UPPER EXTREMITY BRACHIOCEPHALIC ARTERIOVENOUS FISTULA FORMATION LEFT;  Surgeon: Jakob Reza MD;  Location: [facility];  Service: Vascular;  Laterality: Left;    ARTERIOVENOUS FISTULA/SHUNT SURGERY Left 11/03/2022    Procedure: ELEVATION LEFT UPPER EXTREMITY AV FISTULA;  Surgeon: Jakob Reza,  MD;  Location:  [facility];  Service: Vascular;  Laterality: Left;    CARDIAC ELECTROPHYSIOLOGY PROCEDURE N/A 05/18/2018    Procedure: PPM generator change - dual       [facility]/ please schedule in 8 weeks;  Surgeon: Johnathan Taylor MD;  Location:  [facility];  Service: Cardiovascular    CATARACT EXTRACTION      bilat wiht lens     COLONOSCOPY      ENDOSCOPY      Pt denies    INSERT / REPLACE / REMOVE PACEMAKER  about 20 years back    SKIN CANCER EXCISION      various locations     TONSILLECTOMY      VEIN SURGERY  for dialysis    WISDOM TOOTH EXTRACTION           CURRENT MEDICATIONS       Current Facility-Administered Medications:     sodium chloride 0.9 % flush 10 mL, 10 mL, Intravenous, PRN, Howie Gandhi MD    Current Outpatient Medications:     acetaminophen (TYLENOL) 500 MG tablet, Take 1 tablet by mouth Every 6 (Six) Hours As Needed for Mild Pain., Disp: , Rfl:     atorvastatin (LIPITOR) 40 MG tablet, TAKE ONE TABLET BY MOUTH DAILY, Disp: 90 tablet, Rfl: 3    bumetanide (BUMEX) 2 MG tablet, TAKE 1 TABLET BY MOUTH EVERY OTHER DAY **TAKE ON DAYS OFF FROM DIALYSIS (TUESDAY , THURSDAY , SATURDAY AND SUNDAY ), Disp: 48 tablet, Rfl: 0    carvedilol (COREG) 12.5 MG tablet, TAKE ONE TABLET BY MOUTH TWICE A DAY, Disp: 180 tablet, Rfl: 3    cholecalciferol (VITAMIN D3) 1000 UNITS tablet, Take 1 tablet by mouth Daily., Disp: , Rfl:     dofetilide (Tikosyn) 500 MCG capsule, Take 1 capsule by mouth., Disp: , Rfl:     doxazosin (Cardura) 2 MG tablet, Take 1 tablet by mouth Every Night., Disp: , Rfl:     Ferrous Sulfate (IRON) 325 (65 FE) MG tablet, Take 65 mg by mouth Daily., Disp: , Rfl:     irbesartan (Avapro) 150 MG tablet, Take 1 tablet by mouth Daily., Disp: , Rfl:     ketoconazole (NIZORAL) 2 % shampoo, , Disp: , Rfl:     lisinopril (PRINIVIL,ZESTRIL) 5 MG tablet, Take 1 tablet by mouth Daily., Disp: , Rfl:     nitroglycerin (NITROSTAT) 0.3 MG SL tablet, 1 under the tongue as needed for angina, may  "repeat q5mins for up three doses, Disp: 25 tablet, Rfl: 11    PHARMACY TO DOSE WARFARIN, Continuous As Needed (patient's warfarin is being managed by the [facility] ([phone]))., Disp: , Rfl:     ranolazine (Ranexa) 500 MG 12 hr tablet, Take 1 tablet by mouth 2 (Two) Times a Day., Disp: , Rfl:     Sucroferric Oxyhydroxide (Velphoro) 500 MG chewable tablet, Take As Directed., Disp: , Rfl:     vitamin B-12 (CYANOCOBALAMIN) 1000 MCG tablet, Take 1 tablet by mouth Daily., Disp: , Rfl:     warfarin (COUMADIN) 2.5 MG tablet, TAKE ONE AND ONE-HALF TO TWO TABLETS BY MOUTH ONCE DAILY, Disp: 180 tablet, Rfl: 1    warfarin (COUMADIN) 7.5 MG tablet, Take one tablet by mouth daily or as directed by the Anticoagulation Clinic, Disp: 90 tablet, Rfl: 0    ALLERGIES     Patient has no known allergies.    FAMILY HISTORY       Family History   Problem Relation Age of Onset    Cancer Mother     Heart attack Mother     Hypertension Mother     Arrhythmia Father     Hypertension Father     Heart attack Father           SOCIAL HISTORY       Social History     Socioeconomic History    Marital status:    Tobacco Use    Smoking status: Never    Smokeless tobacco: Never   Vaping Use    Vaping Use: Never used   Substance and Sexual Activity    Alcohol use: No    Drug use: No    Sexual activity: Never         PHYSICAL EXAM    (up to 7 for level 4, 8 or more for level 5)     Vitals:    02/29/24 1255   BP: 149/88   BP Location: Right arm   Patient Position: Lying   Pulse: 96   Resp: 16   Temp: 97.6 °F (36.4 °C)   TempSrc: Oral   SpO2: 93%   Weight: 129 kg (285 lb)   Height: 177 cm (69.69\")       ***      DIAGNOSTIC RESULTS     EKG: All EKGs are interpreted by the Emergency Department Physician who either signs or Co-signs this chart in the absence of a cardiologist.    ECG 12 Lead ED Triage Standing Order; Weak / Dizzy / AMS    (Results Pending)         RADIOLOGY:   [x] Radiologist's Report " Reviewed:  XR Chest 1 View    (Results Pending)       I ordered and independently reviewed the above noted radiographic studies.        LABS:    I have reviewed and interpreted all of the currently available lab results from this visit (if applicable):  Results for orders placed or performed in visit on 02/27/24   Protime-INR    Specimen: Blood   Result Value Ref Range    INR 3.70         If labs were ordered, I independently reviewed the results and considered them in treating the patient.      EMERGENCY DEPARTMENT COURSE and DIFFERENTIAL DIAGNOSIS/MDM:   Vitals:  AS OF 13:00 EST    BP - 149/88  HR - 96  TEMP - 97.6 °F (36.4 °C) (Oral)  O2 SATS - 93%        Discussion below represents my analysis of pertinent findings related to patient's condition, differential diagnosis, treatment plan and final disposition.      Differential diagnosis:  The differential diagnosis associated with the patient's presentation includes: ***      Independent interpretations (ECG/rhythm strip/X-ray/US/CT scan): ***      Additional sources:  Discussed/obtained information from independent historians:   [] Spouse:   [] Parent:   [] Friend:   [] EMS:   [] Other:  External (non-ED) record review:   [] Inpatient record:   [] Office record:   [] Outpatient record:   [] Prior Outpatient labs:   [] Prior Outpatient radiology:   [] Primary Care record:   [] Outside ED record:   [] Other:       Patient's care impacted by:   [] Diabetes   [] Hypertension   [] Coronary Artery Disease   [] Cancer   [] Other:     Care significantly affected by Social Determinants of Health (housing and economic circumstances, unemployment)    [] Yes     [] No   If yes, Patient's care significantly limited by  Social Determinants of Health including:    [] Inadequate housing    [] Low income    [] Alcoholism and drug addiction in family    [] Problems related to primary support group    [] Unemployment    [] Problems related to employment    [] Other Social  Determinants of Health:       Consideration of admission/observation vs discharge: ***      I considered prescription management with: ***   [] Pain medication:   [] Antiviral:   [] Antibiotic:   [] Other:    Additional orders considered but not ordered:  The following testing was considered but ultimately not selected after discussion with patient/family: ***    ED Course:           ***    I had a discussion with the patient/family regarding diagnosis, diagnostic results, treatment plan, and medications.  The patient/family indicated understanding of these instructions.  I spent adequate time at the bedside preceding discharge necessary to personally discuss the aftercare instructions, giving patient education, providing explanations of the results of our evaluations/findings, and my decision making to assure that the patient/family understand the plan of care.  Time was allotted to answer questions at that time and throughout the ED course.  Emphasis was placed on timely follow-up after discharge.  I also discussed the potential for the development of an acute emergent condition requiring further evaluation, admission, or even surgical intervention. I discussed that we found nothing during the visit today indicating the need for further workup, admission, or the presence of an unstable medical condition.  I encouraged the patient to return to the emergency department immediately for ANY concerns, worsening, new complaints, or if symptoms persist and unable to seek follow-up in a timely fashion.  The patient/family expressed understanding and agreement with this plan.  The patient will follow-up with their PCP in 1-2 days for reevaluation.           PROCEDURES:  Procedures    CRITICAL CARE TIME        FINAL IMPRESSION    No diagnosis found.      DISPOSITION/PLAN     ED Disposition       None              Comment: Please note this report has been produced using speech recognition software.      Howie Gandhi,  MD  Attending Emergency Physician           Albumin 3.9 3.5 - 5.2 g/dL    Phosphorus 6.8 (H) 2.5 - 4.5 mg/dL    Anion Gap 13.0 5.0 - 15.0 mmol/L    BUN/Creatinine Ratio 7.8 7.0 - 25.0    eGFR 7.6 (L) >60.0 mL/min/1.73   CBC Auto Differential    Specimen: Blood   Result Value Ref Range    WBC 8.27 3.40 - 10.80 10*3/mm3    RBC 2.79 (L) 4.14 - 5.80 10*6/mm3    Hemoglobin 9.1 (L) 13.0 - 17.7 g/dL    Hematocrit 28.3 (L) 37.5 - 51.0 %    .4 (H) 79.0 - 97.0 fL    MCH 32.6 26.6 - 33.0 pg    MCHC 32.2 31.5 - 35.7 g/dL    RDW 15.9 (H) 12.3 - 15.4 %    RDW-SD 57.1 (H) 37.0 - 54.0 fl    MPV 9.7 6.0 - 12.0 fL    Platelets 175 140 - 450 10*3/mm3    Neutrophil % 70.9 42.7 - 76.0 %    Lymphocyte % 14.5 (L) 19.6 - 45.3 %    Monocyte % 11.2 5.0 - 12.0 %    Eosinophil % 2.5 0.3 - 6.2 %    Basophil % 0.2 0.0 - 1.5 %    Immature Grans % 0.7 (H) 0.0 - 0.5 %    Neutrophils, Absolute 5.85 1.70 - 7.00 10*3/mm3    Lymphocytes, Absolute 1.20 0.70 - 3.10 10*3/mm3    Monocytes, Absolute 0.93 (H) 0.10 - 0.90 10*3/mm3    Eosinophils, Absolute 0.21 0.00 - 0.40 10*3/mm3    Basophils, Absolute 0.02 0.00 - 0.20 10*3/mm3    Immature Grans, Absolute 0.06 (H) 0.00 - 0.05 10*3/mm3    nRBC 0.4 (H) 0.0 - 0.2 /100 WBC   High Sensitivity Troponin T    Specimen: Blood   Result Value Ref Range    HS Troponin T 159 (C) <22 ng/L   Vitamin B12    Specimen: Blood   Result Value Ref Range    Vitamin B-12 362 211 - 946 pg/mL   TSH    Specimen: Blood   Result Value Ref Range    TSH 4.220 (H) 0.270 - 4.200 uIU/mL   POC Glucose Once    Specimen: Blood   Result Value Ref Range    Glucose 108 70 - 130 mg/dL   POC Glucose Once    Specimen: Blood   Result Value Ref Range    Glucose 107 70 - 130 mg/dL   POC Glucose Once    Specimen: Blood   Result Value Ref Range    Glucose 139 (H) 70 - 130 mg/dL   ECG 12 Lead Chest Pain   Result Value Ref Range    QT Interval 330 ms    QTC Interval 405 ms   Type & Screen    Specimen: Blood   Result Value Ref Range    ABO Type A     RH type Positive     Antibody Screen  Negative     T&S Expiration Date 3/4/2024 11:59:59 PM    Green Top (Gel)   Result Value Ref Range    Extra Tube Hold for add-ons.    Lavender Top   Result Value Ref Range    Extra Tube hold for add-on    Gold Top - SST   Result Value Ref Range    Extra Tube Hold for add-ons.    Gray Top   Result Value Ref Range    Extra Tube Hold for add-ons.    Light Blue Top   Result Value Ref Range    Extra Tube Hold for add-ons.    Duplex Carotid Ultrasound CAR   Result Value Ref Range    Prox CCA PSV 85.6 cm/sec    Prox CCA EDV 18.1 cm/sec    Right Mid CCA PSV 64.8 cm/sec    right Mid CCA EDV 16.5 cm/sec    Dist CCA PSV 64.8 cm/sec    Dist CCA EDV 17.0 cm/sec    Prox ICA PSV 68.0 cm/sec    Prox ICA EDV 15.4 cm/sec    Mid ICA .0 cm/sec    Mid ICA EDV 41.5 cm/sec    Dist ICA .0 cm/sec    Dist ICA EDV 50.5 cm/sec    Prox ECA PSV 53.8 cm/sec    Prox ECA EDV 11.0 cm/sec    Vertebral A PSV 53.8 cm/sec    Vertebral A EDV 14.8 cm/sec    Prox SCLA .0 cm/sec    ICA/CCA ratio 1.84     Prox CCA .0 cm/sec    Prox CCA EDV 25.1 cm/sec    left Mid CCA .0 cm/sec    left Mid CCA EDV 19.9 cm/sec    Dist CCA PSV 76.4 cm/sec    Dist CCA EDV 19.9 cm/sec    Prox ICA PSV 71.4 cm/sec    Prox ICA EDV 15.5 cm/sec    Mid ICA .0 cm/sec    Mid ICA EDV 39.1 cm/sec    Dist ICA .0 cm/sec    Dist ICA EDV 52.0 cm/sec    Prox ECA .0 cm/sec    Prox ECA EDV 16.5 cm/sec    Vertebral A PSV 24.5 cm/sec    Vertebral A EDV 10.7 cm/sec    Prox SCLA .0 cm/sec    ICA/CCA ratio 1.52     Right arm /87 mmHg    Left arm BP Dialysis mmHg        If labs were ordered, I independently reviewed the results and considered them in treating the patient.      EMERGENCY DEPARTMENT COURSE and DIFFERENTIAL DIAGNOSIS/MDM:   Vitals:  AS OF 12:27 EDT    BP - 157/75  HR - 95  TEMP - 98.8 °F (37.1 °C) (Oral)  O2 SATS - 98%        Discussion below represents my analysis of pertinent findings related to patient's condition,  differential diagnosis, treatment plan and final disposition.      Differential diagnosis:  The differential diagnosis associated with the patient's presentation includes: cva, ich, hypertensive emergency, intracranial mass, electroltye dergangement      Independent interpretations (ECG/rhythm strip/X-ray/US/CT scan): I independentl interpreted the pt head CT and cardiac monitor - there is no ich and the pt is in nsr      Additional sources:  Discussed/obtained information from independent historians:   [] Spouse:   [] Parent:   [] Friend:   [x] EMS: report was taken from ems - vss during transport   [] Other:  External (non-ED) record review:   [] Inpatient record:   [] Office record:   [] Outpatient record:   [] Prior Outpatient labs:   [] Prior Outpatient radiology:   [] Primary Care record:   [] Outside ED record:   [] Other:       Patient's care impacted by:   [] Diabetes   [x] Hypertension   [] Coronary Artery Disease   [] Cancer   [x] Other: esrd on hd    Care significantly affected by Social Determinants of Health (housing and economic circumstances, unemployment)    [] Yes     [x] No   If yes, Patient's care significantly limited by  Social Determinants of Health including:    [] Inadequate housing    [] Low income    [] Alcoholism and drug addiction in family    [] Problems related to primary support group    [] Unemployment    [] Problems related to employment    [] Other Social Determinants of Health:       Consideration of admission/observation vs discharge: Pt w/ ams, neuro dx, req admission      ED Course:    ED Course as of 03/21/24 1227   Thu Feb 29, 2024   1329 I discussed with stroke navigator.  Discussed history, presentation.  Does not feel this needs to be run as a code stroke at this time. [NS]   1758 Patient presents after speech difficulty and bilateral blurred vision that began while he was at dialysis prior to arrival. Has reportedly had dialysis 3 days in a row. During his session today,  he acutely became confused and developed some difficulty with speech and bilateral blurred vision. Confusion and speech difficulty had resolved by the time that he arrived here. He was still complaining about some mild bilateral blurred vision which is still slightly persistent. Initial head CT was negative. Spoke with stroke team regarding this patient. Will need cardiology clearance for MRI as he has a pacemaker. He has no focal deficits on exam and initial lab work looks okay. [NS]   0209 I discussed case with hospitalist Dr. Heaton.  Discussed patient history, presentation, workup.  Accepts patient for admission. [NS]      ED Course User Index  [NS] Howie Gandhi MD           PROCEDURES:  Procedures    CRITICAL CARE TIME        FINAL IMPRESSION      1. Altered mental status, unspecified altered mental status type    2. Slurred speech    3. Blurred vision, bilateral    4. ESRD on hemodialysis    5. History of atrial fibrillation    6. Anticoagulated on Coumadin          DISPOSITION/PLAN     ED Disposition       ED Disposition   Decision to Admit    Condition   --    Comment   Level of Care: Telemetry [5]   Diagnosis: Change in vision [108013]   Admitting Physician: FREDERICK GONZALEZ [[id number]]   Attending Physician: FREDERICK GONZALEZ [[id number]]                   Comment: Please note this report has been produced using speech recognition software.      Howie Gandhi MD  Attending Emergency Physician             Howie Gandhi MD  03/21/24 2780

## 2024-03-01 ENCOUNTER — DOCUMENTATION (OUTPATIENT)
Dept: CARDIOLOGY | Facility: CLINIC | Age: 79
End: 2024-03-01
Payer: MEDICARE

## 2024-03-01 ENCOUNTER — APPOINTMENT (OUTPATIENT)
Dept: CARDIOLOGY | Facility: HOSPITAL | Age: 79
End: 2024-03-01
Payer: MEDICARE

## 2024-03-01 VITALS
HEART RATE: 95 BPM | WEIGHT: 285 LBS | SYSTOLIC BLOOD PRESSURE: 157 MMHG | BODY MASS INDEX: 40.8 KG/M2 | RESPIRATION RATE: 16 BRPM | DIASTOLIC BLOOD PRESSURE: 75 MMHG | TEMPERATURE: 98.8 F | HEIGHT: 70 IN | OXYGEN SATURATION: 98 %

## 2024-03-01 LAB
ABO GROUP BLD: NORMAL
ALBUMIN SERPL-MCNC: 3.9 G/DL (ref 3.5–5.2)
ANION GAP SERPL CALCULATED.3IONS-SCNC: 13 MMOL/L (ref 5–15)
BASOPHILS # BLD AUTO: 0.02 10*3/MM3 (ref 0–0.2)
BASOPHILS NFR BLD AUTO: 0.2 % (ref 0–1.5)
BH CV XLRA MEAS LEFT DIST CCA EDV: 19.9 CM/SEC
BH CV XLRA MEAS LEFT DIST CCA PSV: 76.4 CM/SEC
BH CV XLRA MEAS LEFT DIST ICA EDV: 52 CM/SEC
BH CV XLRA MEAS LEFT DIST ICA PSV: 160 CM/SEC
BH CV XLRA MEAS LEFT ICA/CCA RATIO: 1.52
BH CV XLRA MEAS LEFT MID CCA EDV: 19.9 CM/SEC
BH CV XLRA MEAS LEFT MID CCA PSV: 109 CM/SEC
BH CV XLRA MEAS LEFT MID ICA EDV: 39.1 CM/SEC
BH CV XLRA MEAS LEFT MID ICA PSV: 116 CM/SEC
BH CV XLRA MEAS LEFT PROX CCA EDV: 25.1 CM/SEC
BH CV XLRA MEAS LEFT PROX CCA PSV: 135 CM/SEC
BH CV XLRA MEAS LEFT PROX ECA EDV: 16.5 CM/SEC
BH CV XLRA MEAS LEFT PROX ECA PSV: 148 CM/SEC
BH CV XLRA MEAS LEFT PROX ICA EDV: 15.5 CM/SEC
BH CV XLRA MEAS LEFT PROX ICA PSV: 71.4 CM/SEC
BH CV XLRA MEAS LEFT PROX SCLA PSV: 202 CM/SEC
BH CV XLRA MEAS LEFT VERTEBRAL A EDV: 10.7 CM/SEC
BH CV XLRA MEAS LEFT VERTEBRAL A PSV: 24.5 CM/SEC
BH CV XLRA MEAS RIGHT DIST CCA EDV: 17 CM/SEC
BH CV XLRA MEAS RIGHT DIST CCA PSV: 64.8 CM/SEC
BH CV XLRA MEAS RIGHT DIST ICA EDV: 50.5 CM/SEC
BH CV XLRA MEAS RIGHT DIST ICA PSV: 127 CM/SEC
BH CV XLRA MEAS RIGHT ICA/CCA RATIO: 1.84
BH CV XLRA MEAS RIGHT MID CCA EDV: 16.5 CM/SEC
BH CV XLRA MEAS RIGHT MID CCA PSV: 64.8 CM/SEC
BH CV XLRA MEAS RIGHT MID ICA EDV: 41.5 CM/SEC
BH CV XLRA MEAS RIGHT MID ICA PSV: 119 CM/SEC
BH CV XLRA MEAS RIGHT PROX CCA EDV: 18.1 CM/SEC
BH CV XLRA MEAS RIGHT PROX CCA PSV: 85.6 CM/SEC
BH CV XLRA MEAS RIGHT PROX ECA EDV: 11 CM/SEC
BH CV XLRA MEAS RIGHT PROX ECA PSV: 53.8 CM/SEC
BH CV XLRA MEAS RIGHT PROX ICA EDV: 15.4 CM/SEC
BH CV XLRA MEAS RIGHT PROX ICA PSV: 68 CM/SEC
BH CV XLRA MEAS RIGHT PROX SCLA PSV: 233 CM/SEC
BH CV XLRA MEAS RIGHT VERTEBRAL A EDV: 14.8 CM/SEC
BH CV XLRA MEAS RIGHT VERTEBRAL A PSV: 53.8 CM/SEC
BLD GP AB SCN SERPL QL: NEGATIVE
BUN SERPL-MCNC: 53 MG/DL (ref 8–23)
BUN/CREAT SERPL: 7.8 (ref 7–25)
CALCIUM SPEC-SCNC: 9 MG/DL (ref 8.6–10.5)
CHLORIDE SERPL-SCNC: 93 MMOL/L (ref 98–107)
CO2 SERPL-SCNC: 28 MMOL/L (ref 22–29)
CREAT SERPL-MCNC: 6.83 MG/DL (ref 0.76–1.27)
CRP SERPL-MCNC: 0.65 MG/DL (ref 0–0.5)
DEPRECATED RDW RBC AUTO: 57.1 FL (ref 37–54)
EGFRCR SERPLBLD CKD-EPI 2021: 7.6 ML/MIN/1.73
EOSINOPHIL # BLD AUTO: 0.21 10*3/MM3 (ref 0–0.4)
EOSINOPHIL NFR BLD AUTO: 2.5 % (ref 0.3–6.2)
ERYTHROCYTE [DISTWIDTH] IN BLOOD BY AUTOMATED COUNT: 15.9 % (ref 12.3–15.4)
GLUCOSE BLDC GLUCOMTR-MCNC: 107 MG/DL (ref 70–130)
GLUCOSE BLDC GLUCOMTR-MCNC: 108 MG/DL (ref 70–130)
GLUCOSE BLDC GLUCOMTR-MCNC: 139 MG/DL (ref 70–130)
GLUCOSE SERPL-MCNC: 101 MG/DL (ref 65–99)
HBA1C MFR BLD: 5.9 % (ref 4.8–5.6)
HCT VFR BLD AUTO: 28.3 % (ref 37.5–51)
HGB BLD-MCNC: 9.1 G/DL (ref 13–17.7)
IMM GRANULOCYTES # BLD AUTO: 0.06 10*3/MM3 (ref 0–0.05)
IMM GRANULOCYTES NFR BLD AUTO: 0.7 % (ref 0–0.5)
INR PPP: 2.24 (ref 0.89–1.12)
LEFT ARM BP: NORMAL MMHG
LYMPHOCYTES # BLD AUTO: 1.2 10*3/MM3 (ref 0.7–3.1)
LYMPHOCYTES NFR BLD AUTO: 14.5 % (ref 19.6–45.3)
MCH RBC QN AUTO: 32.6 PG (ref 26.6–33)
MCHC RBC AUTO-ENTMCNC: 32.2 G/DL (ref 31.5–35.7)
MCV RBC AUTO: 101.4 FL (ref 79–97)
MONOCYTES # BLD AUTO: 0.93 10*3/MM3 (ref 0.1–0.9)
MONOCYTES NFR BLD AUTO: 11.2 % (ref 5–12)
NEUTROPHILS NFR BLD AUTO: 5.85 10*3/MM3 (ref 1.7–7)
NEUTROPHILS NFR BLD AUTO: 70.9 % (ref 42.7–76)
NRBC BLD AUTO-RTO: 0.4 /100 WBC (ref 0–0.2)
PHOSPHATE SERPL-MCNC: 6.8 MG/DL (ref 2.5–4.5)
PLATELET # BLD AUTO: 175 10*3/MM3 (ref 140–450)
PMV BLD AUTO: 9.7 FL (ref 6–12)
POTASSIUM SERPL-SCNC: 4 MMOL/L (ref 3.5–5.2)
PROTHROMBIN TIME: 25 SECONDS (ref 12.2–14.5)
QT INTERVAL: 330 MS
QTC INTERVAL: 405 MS
RBC # BLD AUTO: 2.79 10*6/MM3 (ref 4.14–5.8)
RH BLD: POSITIVE
RIGHT ARM BP: NORMAL MMHG
SODIUM SERPL-SCNC: 134 MMOL/L (ref 136–145)
T&S EXPIRATION DATE: NORMAL
TROPONIN T SERPL HS-MCNC: 159 NG/L
TSH SERPL DL<=0.05 MIU/L-ACNC: 4.22 UIU/ML (ref 0.27–4.2)
URATE SERPL-MCNC: 4.1 MG/DL (ref 3.4–7)
VIT B12 BLD-MCNC: 362 PG/ML (ref 211–946)
WBC NRBC COR # BLD AUTO: 8.27 10*3/MM3 (ref 3.4–10.8)

## 2024-03-01 PROCEDURE — 86850 RBC ANTIBODY SCREEN: CPT | Performed by: INTERNAL MEDICINE

## 2024-03-01 PROCEDURE — G0378 HOSPITAL OBSERVATION PER HR: HCPCS

## 2024-03-01 PROCEDURE — 85025 COMPLETE CBC W/AUTO DIFF WBC: CPT | Performed by: INTERNAL MEDICINE

## 2024-03-01 PROCEDURE — 86900 BLOOD TYPING SEROLOGIC ABO: CPT | Performed by: INTERNAL MEDICINE

## 2024-03-01 PROCEDURE — 97161 PT EVAL LOW COMPLEX 20 MIN: CPT

## 2024-03-01 PROCEDURE — 93880 EXTRACRANIAL BILAT STUDY: CPT | Performed by: INTERNAL MEDICINE

## 2024-03-01 PROCEDURE — 97165 OT EVAL LOW COMPLEX 30 MIN: CPT

## 2024-03-01 PROCEDURE — 99239 HOSP IP/OBS DSCHRG MGMT >30: CPT | Performed by: INTERNAL MEDICINE

## 2024-03-01 PROCEDURE — 85610 PROTHROMBIN TIME: CPT

## 2024-03-01 PROCEDURE — 82948 REAGENT STRIP/BLOOD GLUCOSE: CPT

## 2024-03-01 PROCEDURE — 93880 EXTRACRANIAL BILAT STUDY: CPT

## 2024-03-01 PROCEDURE — 86901 BLOOD TYPING SEROLOGIC RH(D): CPT | Performed by: INTERNAL MEDICINE

## 2024-03-01 PROCEDURE — 80069 RENAL FUNCTION PANEL: CPT | Performed by: INTERNAL MEDICINE

## 2024-03-01 PROCEDURE — 83036 HEMOGLOBIN GLYCOSYLATED A1C: CPT

## 2024-03-01 PROCEDURE — 92523 SPEECH SOUND LANG COMPREHEN: CPT

## 2024-03-01 PROCEDURE — 84484 ASSAY OF TROPONIN QUANT: CPT | Performed by: INTERNAL MEDICINE

## 2024-03-01 RX ORDER — AMMONIUM LACTATE 12 G/100G
LOTION TOPICAL 2 TIMES DAILY PRN
Status: DISCONTINUED | OUTPATIENT
Start: 2024-03-01 | End: 2024-03-01 | Stop reason: HOSPADM

## 2024-03-01 RX ORDER — POTASSIUM CHLORIDE 750 MG/1
40 CAPSULE, EXTENDED RELEASE ORAL ONCE
Status: DISCONTINUED | OUTPATIENT
Start: 2024-03-01 | End: 2024-03-01 | Stop reason: HOSPADM

## 2024-03-01 RX ADMIN — PANTOPRAZOLE SODIUM 40 MG: 40 TABLET, DELAYED RELEASE ORAL at 06:40

## 2024-03-01 RX ADMIN — PANTOPRAZOLE SODIUM 40 MG: 40 TABLET, DELAYED RELEASE ORAL at 00:03

## 2024-03-01 RX ADMIN — Medication 1000 MCG: at 00:11

## 2024-03-01 RX ADMIN — WARFARIN SODIUM 5 MG: 5 TABLET ORAL at 00:03

## 2024-03-01 RX ADMIN — Medication 10 ML: at 08:32

## 2024-03-01 RX ADMIN — POTASSIUM CHLORIDE 40 MEQ: 1.5 POWDER, FOR SOLUTION ORAL at 00:04

## 2024-03-01 RX ADMIN — Medication 10 ML: at 00:10

## 2024-03-01 RX ADMIN — ATORVASTATIN CALCIUM 40 MG: 40 TABLET, FILM COATED ORAL at 00:03

## 2024-03-01 RX ADMIN — ASPIRIN 81 MG: 81 TABLET, CHEWABLE ORAL at 00:03

## 2024-03-01 RX ADMIN — Medication 1000 MCG: at 08:32

## 2024-03-01 NOTE — PLAN OF CARE
Goal Outcome Evaluation:  Plan of Care Reviewed With: patient           Outcome Evaluation: Pt presents at baseline for functional mobility tasks. No deficits identified requiring PT services. Rec home upon dc.      Anticipated Discharge Disposition (PT): home

## 2024-03-01 NOTE — PROGRESS NOTES
"Pharmacy Consult  -  Warfarin    Marco Antonio Montemayor is a  78 y.o. male   Height - 177 cm (69.69\")  Weight - 129 kg (285 lb)    Consulting Provider: Hospitalist  Indication: Afib  Goal INR: 2-3  Home Regimen:    - Warfarin 5 mg PO Daily except on Friday   - Warfarin 7.5 mg PO on Friday    Bridge Therapy: No    Drug-Drug Interactions with current regimen:   None    Warfarin Dosing During Admission:  Date  2/29 3/1          INR  2.99 2.24          Dose  5 mg (7.5 mg)             Education Provided: Patient previously counseled at [facility]. Inpatient pharmacist available for counseling/questions as needed.    Discharge Follow up:   Following Provider -  [facility]   Follow up time range or appointment -  2-3 Days post-discharge    Labs:  Results from last 7 days   Lab Units 03/01/24  0456 02/29/24  1311 02/27/24  0000   INR  2.24* 2.99* 3.70   HEMOGLOBIN g/dL 9.1* 8.8*  --    HEMATOCRIT % 28.3* 27.5*  --      Results from last 7 days   Lab Units 03/01/24  0456 02/29/24  1311   SODIUM mmol/L 134* 135*   POTASSIUM mmol/L 4.0 3.2*   CHLORIDE mmol/L 93* 94*   CO2 mmol/L 28.0 28.0   BUN mg/dL 53* 39*   CREATININE mg/dL 6.83* 5.39*   CALCIUM mg/dL 9.0 8.5*   BILIRUBIN mg/dL  --  0.4   ALK PHOS U/L  --  74   ALT (SGPT) U/L  --  7   AST (SGOT) U/L  --  7   GLUCOSE mg/dL 101* 124*     Current dietary intake: None charted in last 24 hours  Diet Order   Procedures    Diet: Cardiac Diets; Healthy Heart (2-3 Na+); Texture: Regular Texture (IDDSI 7); Fluid Consistency: Thin (IDDSI 0)      Assessment/Plan:   Patient's INR is 2.24 and remains therapeutic.  Patient takes warfarin 5 mg on all days aside from Fridays, on which he takes warfarin 7.5 mg per anticoagulation clinic. Plan to give warfarin 7.5 mg dose tonight.  Daily PT/INR ordered.  Monitor signs/symptoms of bleeding, dietary intake, and drug-drug interactions. Make dose adjustments as necessary.  Pharmacy will continue to " follow.      Thank you,    Selena Bassett, PharmRAISSA  3/1/2024  12:17 EST      I have reviewed documentation and agree.     Thank you,    Selena Bassett PharmD, BCPS  3/1/2024  12:17 EST

## 2024-03-01 NOTE — PROGRESS NOTES
Pt has [employer] dual chamber pacemaker. The entire pacing system is NOT MRI conditional (compatible). The patient is NOT pacemaker dependent.

## 2024-03-01 NOTE — NURSING NOTE
Carotid Ultrasound completed. Patient back on the unit at approximately 1522. He is alert& oriented x 4. Currently awaiting transportation for discharge.

## 2024-03-01 NOTE — ED NOTES
Marco Antonio Montemayor    Nursing Report ED to Floor:  Mental status: a/o x 3  Ambulatory status: 2 person assist  Oxygen Therapy:  ra  Cardiac Rhythm: afib  Admitted from: home  Safety Concerns:  weak/dizzy  Social Issues: unknown   ED Room #:  21    ED Nurse Phone Extension - [phone] or may call [phone].      HPI:   Chief Complaint   Patient presents with    Altered Mental Status       Past Medical History:  Past Medical History:   Diagnosis Date    Abnormal ECG     Arrhythmia     Arthritis     Asthma     Cancer     skin cancer removed from various locations     Chronic diastolic congestive heart failure 09/14/2018    CKD (chronic kidney disease)     closer to stage 5    Coronary artery disease     Dry skin     Dyslipidemia 07/07/2016    Edema     Heart murmur     Hematoma     Hemodialysis access site with arteriovenous graft     History of transfusion     no transfusion reaction    Hypertension     Iron deficiency anemia     Long term current use of anticoagulant     ZULEIKA treated with BiPAP     Persistent atrial fibrillation 07/07/2016     Patient notes dyspnea, fatigue and palpitations during episodes of afib.  multiple external cardioversionsChads vasc 2, anticoagulated with Coumadin    Tinnitus     Wears glasses     readers        Past Surgical History:  Past Surgical History:   Procedure Laterality Date    ABLATION OF DYSRHYTHMIC FOCUS      APPENDECTOMY      ARTERIOVENOUS FISTULA/SHUNT SURGERY Left 08/04/2022    Procedure: UPPER EXTREMITY BRACHIOCEPHALIC ARTERIOVENOUS FISTULA FORMATION LEFT;  Surgeon: Jakob Reza MD;  Location:  [facility];  Service: Vascular;  Laterality: Left;    ARTERIOVENOUS FISTULA/SHUNT SURGERY Left 11/03/2022    Procedure: ELEVATION LEFT UPPER EXTREMITY AV FISTULA;  Surgeon: Jakob Reza MD;  Location:  [facility];  Service: Vascular;  Laterality: Left;    CARDIAC ELECTROPHYSIOLOGY PROCEDURE N/A 05/18/2018    Procedure: PPM generator change - dual       [facility]/ please  "schedule in 8 weeks;  Surgeon: Johnathan Taylor MD;  Location: [facility] INVASIVE LOCATION;  Service: Cardiovascular    CATARACT EXTRACTION      bilat wiht lens     COLONOSCOPY      ENDOSCOPY      Pt denies    INSERT / REPLACE / REMOVE PACEMAKER  about 20 years back    SKIN CANCER EXCISION      various locations     TONSILLECTOMY      VEIN SURGERY  for dialysis    WISDOM TOOTH EXTRACTION          Admitting Doctor:   Seble Simms MD    Consulting Provider(s):  Consults       No orders found from 1/31/2024 to 3/1/2024.             Admitting Diagnosis:   The primary encounter diagnosis was Altered mental status, unspecified altered mental status type. Diagnoses of Slurred speech, Blurred vision, bilateral, ESRD on hemodialysis, History of atrial fibrillation, and Anticoagulated on Coumadin were also pertinent to this visit.    Most Recent Vitals:   Vitals:    02/29/24 1255 02/29/24 1430 02/29/24 1848   BP: 149/88 159/100    BP Location: Right arm     Patient Position: Lying     Pulse: 96 93 94   Resp: 16     Temp: 97.6 °F (36.4 °C)     TempSrc: Oral     SpO2: 93%  95%   Weight: 129 kg (285 lb)     Height: 177 cm (69.69\")         Active LDAs/IV Access:   Lines, Drains & Airways       Active LDAs       Name Placement date Placement time Site Days    Peripheral IV 02/29/24 1311 Anterior;Right Forearm 02/29/24  1311  Forearm  less than 1                    Labs (abnormal labs have a star):   Labs Reviewed   COMPREHENSIVE METABOLIC PANEL - Abnormal; Notable for the following components:       Result Value    Glucose 124 (*)     BUN 39 (*)     Creatinine 5.39 (*)     Sodium 135 (*)     Potassium 3.2 (*)     Chloride 94 (*)     Calcium 8.5 (*)     eGFR 10.1 (*)     All other components within normal limits    Narrative:     GFR Normal >60  Chronic Kidney Disease <60  Kidney Failure <15    The GFR formula is only valid for adults with stable renal function between ages 18 and 70.   SINGLE HSTROPONIN T - Abnormal; Notable " for the following components:    HS Troponin T 155 (*)     All other components within normal limits    Narrative:     High Sensitive Troponin T Reference Range:  <14.0 ng/L- Negative Female for AMI  <22.0 ng/L- Negative Male for AMI  >=14 - Abnormal Female indicating possible myocardial injury.  >=22 - Abnormal Male indicating possible myocardial injury.   Clinicians would have to utilize clinical acumen, EKG, Troponin, and serial changes to determine if it is an Acute Myocardial Infarction or myocardial injury due to an underlying chronic condition.        CBC WITH AUTO DIFFERENTIAL - Abnormal; Notable for the following components:    RBC 2.69 (*)     Hemoglobin 8.8 (*)     Hematocrit 27.5 (*)     .2 (*)     RDW 16.1 (*)     RDW-SD 57.8 (*)     Lymphocyte % 14.0 (*)     Immature Grans % 1.0 (*)     Immature Grans, Absolute 0.09 (*)     nRBC 0.7 (*)     All other components within normal limits   PROTIME-INR - Abnormal; Notable for the following components:    Protime 31.3 (*)     INR 2.99 (*)     All other components within normal limits   HIGH SENSITIVITIY TROPONIN T 2HR - Abnormal; Notable for the following components:    HS Troponin T 168 (*)     Troponin T Delta 13 (*)     All other components within normal limits    Narrative:     High Sensitive Troponin T Reference Range:  <14.0 ng/L- Negative Female for AMI  <22.0 ng/L- Negative Male for AMI  >=14 - Abnormal Female indicating possible myocardial injury.  >=22 - Abnormal Male indicating possible myocardial injury.   Clinicians would have to utilize clinical acumen, EKG, Troponin, and serial changes to determine if it is an Acute Myocardial Infarction or myocardial injury due to an underlying chronic condition.        MAGNESIUM - Normal   RAINBOW DRAW    Narrative:     The following orders were created for panel order [place] Draw.  Procedure                               Abnormality         Status                     ---------                                -----------         ------                     Green Top (Gel)[[id number]]                                  Final result               Lavender Top[[id number]]                                     Final result               Gold Top - SST[[id number]]                                   Final result               Gray Top[[id number]]                                         Final result               Light Blue Top[[id number]]                                   Final result                 Please view results for these tests on the individual orders.   URINALYSIS W/ MICROSCOPIC IF INDICATED (NO CULTURE)   TROPONIN   FERRITIN   IRON PROFILE   POCT GLUCOSE FINGERSTICK   CBC AND DIFFERENTIAL    Narrative:     The following orders were created for panel order CBC & Differential.  Procedure                               Abnormality         Status                     ---------                               -----------         ------                     CBC Auto Differential[[id number]]        Abnormal            Final result                 Please view results for these tests on the individual orders.   GREEN TOP   LAVENDER TOP   GOLD TOP - SST   GRAY TOP   LIGHT BLUE TOP       Meds Given in ED:   Medications   sodium chloride 0.9 % flush 10 mL (has no administration in time range)

## 2024-03-01 NOTE — PROGRESS NOTES
"Pharmacy Consult  -  Warfarin    Marco Antonio Montemayor is a  78 y.o. male   Height - 177 cm (69.69\")  Weight - 129 kg (285 lb)    Consulting Provider: - Hospitalist  Indication: - Afib  Goal INR: -  2-3  Home Regimen:    - Warfarin 5 mg PO Daily except on Friday   - Warfarin 7.5 mg PO on Friday    Bridge Therapy: No    Drug-Drug Interactions with current regimen:   None    Warfarin Dosing During Admission:    Date  02/29           INR  2.99           Dose  (5 mg)                Education Provided:    Discharge Follow up:   Following Provider -   [facility]   Follow up time range or appointment -  2-3 Days post-discharge      Labs:    Results from last 7 days   Lab Units 02/29/24  1311 02/27/24  0000   INR  2.99* 3.70   HEMOGLOBIN g/dL 8.8*  --    HEMATOCRIT % 27.5*  --      Results from last 7 days   Lab Units 02/29/24  1311   SODIUM mmol/L 135*   POTASSIUM mmol/L 3.2*   CHLORIDE mmol/L 94*   CO2 mmol/L 28.0   BUN mg/dL 39*   CREATININE mg/dL 5.39*   CALCIUM mg/dL 8.5*   BILIRUBIN mg/dL 0.4   ALK PHOS U/L 74   ALT (SGPT) U/L 7   AST (SGOT) U/L 7   GLUCOSE mg/dL 124*       Current dietary intake: None charted      Assessment/Plan:   Patient's INR is 2.99, therapeutic at therapeutic today.  Per patient report, he has not taken Warfarin dose today. Will give 5 mg PO tonight and plan to reassess in the morning.  Daily PT/INR ordered.  Monitor signs/symptoms of bleeding, dietary intake, and drug-drug interactions. Make dose adjustments as necessary.  Pharmacy will continue to follow.      Thank you  Charisma Anguiano SANDHYA  2/29/2024  21:46 EST       "

## 2024-03-01 NOTE — DISCHARGE SUMMARY
[facility] Medicine Services  DISCHARGE SUMMARY    Patient Name: Marco Antonio Montemayor  : 1945  MRN: [id number]    Date of Admission: 2024 12:51 PM  Date of Discharge:  3/1/2024  Primary Care Physician: Provider, No Known    Consults       Date and Time Order Name Status Description    2024  8:10 PM Inpatient Cardiology Consult Completed     2024  7:26 PM Inpatient Nephrology Consult              Hospital Course     Presenting Problem:     Active Hospital Problems    Diagnosis  POA    **Stroke-like symptoms [R29.90]  Yes    Change in vision [H53.9]  Yes    ESRD on HD T,R,S since  [N18.6]  Yes    Fatty liver [K76.0]  Yes    Anemia due to stage 4 chronic kidney disease treated with erythropoietin [N18.4, D63.1]  Yes    Chronic atrial fibrillation [I48.20]  Yes    Pacemaker [Z95.0]  Yes    Severe obstructive sleep apnea [G47.33]  Yes    Coronary artery disease [I25.10]  Yes    Hypertension [I10]  Yes    Morbid obesity [E66.01]  Yes    Dyslipidemia [E78.5]  Yes      Resolved Hospital Problems    Diagnosis Date Resolved POA    Headache [R51.9] 2024 Yes          Hospital Course:  Marco Antonio Montemayor is a78 yo with HO HTN, Afib with PPM, fatty liver, ZULEIKA, obesity and ESRD for two year on HD who presents with loss of vision     Change in vision  -Occurred during HD and resolved shortly after arrival. Suspect hypotensive event associated with HDHis imaging thus far shows no evidence of ischemia. His PPM was placed , unclear if MRI compatible however he is back to baseline and I'm not sure it would . Will defer final decision to stroke team.  -Had recent echo with dilated LA, normal LV  -PT/OT rec'd home.    Discharge Follow Up Recommendations for outpatient labs/diagnostics:   PCP in 1-2 weeks    Day of Discharge     HPI:  UP in chair eating. Doing much better. Asking to go home.    Review of Systems  Gen- No fevers, chills  CV- No chest pain,  palpitations  Resp- No cough, dyspnea  GI- No N/V/D, abd pain     Vital Signs:   Temp:  [97.6 °F (36.4 °C)-98.5 °F (36.9 °C)] 98.5 °F (36.9 °C)  Heart Rate:  [] 101  Resp:  [16-19] 16  BP: (134-164)/() 154/87      Physical Exam:  Constitutional: No acute distress, awake, alert  HENT: NCAT, mucous membranes moist  Respiratory: Clear to auscultation bilaterally, respiratory effort normal   Cardiovascular: RRR, no murmurs, rubs, or gallops  Gastrointestinal: Positive bowel sounds, soft, nontender, nondistended, obese  Musculoskeletal: No bilateral ankle edema  Psychiatric: Appropriate affect, cooperative  Neurologic: Oriented x 3, strength symmetric in all extremities, Cranial Nerves grossly intact to confrontation, speech clear  Skin: No rashes     Pertinent  and/or Most Recent Results     LAB RESULTS:      Lab 03/01/24 0456 02/29/24 1922 02/29/24  1311   WBC 8.27  --  8.69   HEMOGLOBIN 9.1*  --  8.8*   HEMATOCRIT 28.3*  --  27.5*   PLATELETS 175  --  162   NEUTROS ABS 5.85  --  6.31   IMMATURE GRANS (ABS) 0.06*  --  0.09*   LYMPHS ABS 1.20  --  1.22   MONOS ABS 0.93*  --  0.88   EOS ABS 0.21  --  0.17   .4*  --  102.2*   SED RATE  --   --  36*   CRP  --  0.65*  --    PROTIME 25.0*  --  31.3*         Lab 03/01/24 0456 02/29/24 1922 02/29/24  1311   SODIUM 134*  --  135*   POTASSIUM 4.0  --  3.2*   CHLORIDE 93*  --  94*   CO2 28.0  --  28.0   ANION GAP 13.0  --  13.0   BUN 53*  --  39*   CREATININE 6.83*  --  5.39*   EGFR 7.6*  --  10.1*   GLUCOSE 101*  --  124*   CALCIUM 9.0  --  8.5*   MAGNESIUM  --   --  2.0   PHOSPHORUS 6.8*  --   --    HEMOGLOBIN A1C 5.90*  --   --    TSH  --  4.220*  --          Lab 03/01/24  0456 02/29/24  1311   TOTAL PROTEIN  --  6.7   ALBUMIN 3.9 4.0   GLOBULIN  --  2.7   ALT (SGPT)  --  7   AST (SGOT)  --  7   BILIRUBIN  --  0.4   ALK PHOS  --  74         Lab 03/01/24  0456 02/29/24  1922 02/29/24  1515 02/29/24  1311 02/27/24  0000   HSTROP T 159* 169* 168* 155*  --     PROTIME 25.0*  --   --  31.3*  --    INR 2.24*  --   --  2.99* 3.70             Lab 03/01/24  0456 02/29/24  1515 02/29/24  1311   IRON  --  59  --    IRON SATURATION (TSAT)  --  20  --    TIBC  --  289*  --    TRANSFERRIN  --  194*  --    FERRITIN  --  1,695.00*  --    VITAMIN B 12  --   --  362   ABO TYPING A  --   --    RH TYPING Positive  --   --    ANTIBODY SCREEN Negative  --   --          Brief Urine Lab Results       None          Microbiology Results (last 10 days)       ** No results found for the last 240 hours. **            CT Head Without Contrast    Result Date: 2/29/2024  CT HEAD WO CONTRAST Date of Exam: 2/29/2024 1:50 PM EST Indication: acute headache, ams. Comparison: None available. Technique: Axial CT images were obtained of the head without contrast administration.  Automated exposure control and iterative construction methods were used. Findings: There is no evidence of hemorrhage. There is no mass effect or midline shift. There is no extracerebral collection. Ventricles are normal in size and configuration for patient's stated age. Posterior fossa is within normal limits. Calvarium and skull base appear intact.  Visualized sinuses show no air fluid levels. Visualized orbits are unremarkable.     Impression: No acute intracranial abnormalities Electronically Signed: Raji Hudson MD  2/29/2024 2:24 PM EST  Workstation ID: [id number]    XR Chest 1 View    Result Date: 2/29/2024  XR CHEST 1 VW Date of Exam: 2/29/2024 1:29 PM EST Indication: Weak/Dizzy/AMS triage protocol Comparison: 9/14/2018. Findings: Somewhat limited portable evaluation demonstrates low lung volumes with cardiac enlargement and left lung base opacity, nonspecific potentially representing effusion, airspace disease or atelectasis. There is no distinct pneumothorax. Left chest wall ICD  projects in place.     Impression: Low lung volumes, cardiac enlargement and left lung base opacity, nonspecific and potentially representing  airspace disease or small left effusion. Electronically Signed: Dipak Gregg MD  2/29/2024 1:50 PM EST  Workstation ID: [id number]    Adult Transthoracic Echo Complete W/ Cont if Necessary Per Protocol    Result Date: 2/27/2024    Left ventricular systolic function is normal. Calculated left ventricular EF = 55.5% Left ventricular ejection fraction appears to be 56 - 60%.   Left ventricular wall thickness is consistent with mild concentric hypertrophy.   Left ventricular diastolic function was normal.   The left atrial cavity is mildly dilated.   Estimated right ventricular systolic pressure from tricuspid regurgitation is mildly elevated (35-45 mmHg). Calculated right ventricular systolic pressure from tricuspid regurgitation is 40 mmHg. Septal thickness and atrial volume both improved on today's study compared to 2023      Results for orders placed during the hospital encounter of 10/18/22    Duplex hemodialysis access CAR    Interpretation Summary    Left sided flow volumes are adequate.    Elevated velocities at the arterial anastomosis, but no obvious significant stenosis seen.    The left sided autogenous arteriovenous (AV) fistula is patent without significant defect.   The arterial inflow vessel is patent without significant defect.  The arterial anastomosis is patent without significant defect.  The venous outflow vessel is patent without significant defect.      Results for orders placed during the hospital encounter of 10/18/22    Duplex hemodialysis access CAR    Interpretation Summary    Left sided flow volumes are adequate.    Elevated velocities at the arterial anastomosis, but no obvious significant stenosis seen.    The left sided autogenous arteriovenous (AV) fistula is patent without significant defect.   The arterial inflow vessel is patent without significant defect.  The arterial anastomosis is patent without significant defect.  The venous outflow vessel is patent without significant  defect.      Results for orders placed in visit on 01/17/24    Adult Transthoracic Echo Complete W/ Cont if Necessary Per Protocol    Interpretation Summary    Left ventricular systolic function is normal. Calculated left ventricular EF = 55.5% Left ventricular ejection fraction appears to be 56 - 60%.    Left ventricular wall thickness is consistent with mild concentric hypertrophy.    Left ventricular diastolic function was normal.    The left atrial cavity is mildly dilated.    Estimated right ventricular systolic pressure from tricuspid regurgitation is mildly elevated (35-45 mmHg). Calculated right ventricular systolic pressure from tricuspid regurgitation is 40 mmHg.    Septal thickness and atrial volume both improved on today's study compared to 2023      Plan for Follow-up of Pending Labs/Results:     Discharge Details        Discharge Medications        Continue These Medications        Instructions Start Date   acetaminophen 500 MG tablet  Commonly known as: TYLENOL   500 mg, Oral, Every 6 Hours PRN      atorvastatin 40 MG tablet  Commonly known as: LIPITOR   TAKE ONE TABLET BY MOUTH DAILY      bumetanide 2 MG tablet  Commonly known as: BUMEX   TAKE 1 TABLET BY MOUTH EVERY OTHER DAY **TAKE ON DAYS OFF FROM DIALYSIS (TUESDAY , THURSDAY , SATURDAY AND SUNDAY )      carvedilol 12.5 MG tablet  Commonly known as: COREG   TAKE ONE TABLET BY MOUTH TWICE A DAY      cholecalciferol 25 MCG (1000 UT) tablet  Commonly known as: VITAMIN D3   1,000 Units, Oral, Daily      ferrous sulfate 325 (65 Fe) MG tablet   65 mg, Oral, Daily      nitroglycerin 0.3 MG SL tablet  Commonly known as: NITROSTAT   1 under the tongue as needed for angina, may repeat q5mins for up three doses      warfarin 2.5 MG tablet  Commonly known as: COUMADIN   TAKE ONE AND ONE-HALF TO TWO TABLETS BY MOUTH ONCE DAILY      PHARMACY TO DOSE WARFARIN   Does not apply, Continuous PRN      Velphoro 500 MG chewable tablet  Generic drug: Sucroferric  Oxyhydroxide   Take As Directed, Crush or chew and swallow 3 tablets by mouth 3 times a day with meals and 1 tablet twice a day with snacks.      vitamin B-12 1000 MCG tablet  Commonly known as: CYANOCOBALAMIN   1,000 mcg, Oral, Daily               No Known Allergies      Discharge Disposition:  Home or Self Care    Diet:  Hospital:  Diet Order   Procedures    Diet: Cardiac Diets; Healthy Heart (2-3 Na+); Texture: Regular Texture (IDDSI 7); Fluid Consistency: Thin (IDDSI 0)            Activity:      Restrictions or Other Recommendations:         CODE STATUS:    There are no questions and answers to display.       Future Appointments   Date Time Provider Department Center   3/20/2024 10:15 AM Johnathan Taylor MD [facility]   5/2/2024  9:00 AM Airam Koroma APRN [facility]   7/17/2024 10:40 AM Ivet Beasley APRN [facility]       Additional Instructions for the Follow-ups that You Need to Schedule       Discharge Follow-up with PCP   As directed       Currently Documented PCP:    Provider, No Known    PCP Phone Number:    None     Follow Up Details: 1-2 week hospital follow up                      Suzie Lam II,   03/01/24      Time Spent on Discharge:  I spent  34  minutes on this discharge activity which included: face-to-face encounter with the patient, reviewing the data in the system, coordination of the care with the nursing staff as well as consultants, documentation, and entering orders.

## 2024-03-01 NOTE — PLAN OF CARE
Goal Outcome Evaluation:  Plan of Care Reviewed With: patient        Progress: no change  Outcome Evaluation: Pt presents at baseline for ADL completion and related mobility with symmetrical BUE strength and coordination/sensation intact. No visual deficits observed, full to confrontation testing and tracking L/R intact. OT signing off, defer to PT for any further mobility needs. Rec d/c to home when medically appropriate.      Anticipated Discharge Disposition (OT): home

## 2024-03-01 NOTE — THERAPY DISCHARGE NOTE
Acute Care - Occupational Therapy Discharge  [facility]    Patient Name: Marco Antonio Montemayor  : 1945    MRN: [id number]                              Today's Date: 3/1/2024       Admit Date: 2024    Visit Dx:     ICD-10-CM ICD-9-CM   1. Altered mental status, unspecified altered mental status type  R41.82 780.97   2. Slurred speech  R47.81 784.59   3. Blurred vision, bilateral  H53.8 368.8   4. ESRD on hemodialysis  N18.6 585.6    Z99.2 V45.11   5. History of atrial fibrillation  Z86.79 V12.59   6. Anticoagulated on Coumadin  Z79.01 V58.61     Patient Active Problem List   Diagnosis    Coronary artery disease    Dyslipidemia    Hypertension    Morbid obesity    Hypertensive heart disease with congestive heart failure    Severe obstructive sleep apnea    Dependence on nocturnal oxygen therapy    Other abnormal glucose     Stage 4 chronic kidney disease    Pacemaker    Anemia    Chronic atrial fibrillation    Sick sinus syndrome    Anemia due to stage 4 chronic kidney disease treated with erythropoietin    Stroke-like symptoms    Change in vision    ESRD on HD T,R,S since     Fatty liver     Past Medical History:   Diagnosis Date    Abnormal ECG     Arrhythmia     Arthritis     Asthma     Cancer     skin cancer removed from various locations     Chronic diastolic congestive heart failure 2018    CKD (chronic kidney disease)     closer to stage 5    Coronary artery disease     Dry skin     Dyslipidemia 2016    Edema     Heart murmur     Hematoma     Hemodialysis access site with arteriovenous graft     History of transfusion     no transfusion reaction    Hypertension     Iron deficiency anemia     Long term current use of anticoagulant     ZULEIKA treated with BiPAP     Persistent atrial fibrillation 2016     Patient notes dyspnea, fatigue and palpitations during episodes of afib.  multiple external cardioversionsChads vasc 2, anticoagulated with Coumadin    Tinnitus     Wears glasses      readers     Past Surgical History:   Procedure Laterality Date    ABLATION OF DYSRHYTHMIC FOCUS      APPENDECTOMY      ARTERIOVENOUS FISTULA/SHUNT SURGERY Left 08/04/2022    Procedure: UPPER EXTREMITY BRACHIOCEPHALIC ARTERIOVENOUS FISTULA FORMATION LEFT;  Surgeon: Jakob Reza MD;  Location:  [facility];  Service: Vascular;  Laterality: Left;    ARTERIOVENOUS FISTULA/SHUNT SURGERY Left 11/03/2022    Procedure: ELEVATION LEFT UPPER EXTREMITY AV FISTULA;  Surgeon: Jakob Reza MD;  Location:  [facility];  Service: Vascular;  Laterality: Left;    CARDIAC ELECTROPHYSIOLOGY PROCEDURE N/A 05/18/2018    Procedure: PPM generator change - dual       [facility] [place]/ please schedule in 8 weeks;  Surgeon: Johnathan Taylor MD;  Location:  [facility];  Service: Cardiovascular    CATARACT EXTRACTION      bilat wiht lens     COLONOSCOPY      ENDOSCOPY      Pt denies    INSERT / REPLACE / REMOVE PACEMAKER  about 20 years back    SKIN CANCER EXCISION      various locations     TONSILLECTOMY      VEIN SURGERY  for dialysis    WISDOM TOOTH EXTRACTION        General Information       Row Name 03/01/24 0817          OT Time and Intention    Document Type discharge evaluation/summary  -CS     Mode of Treatment occupational therapy  -CS       Row Name 03/01/24 0817          General Information    Patient Profile Reviewed yes  -CS     Prior Level of Function independent:;all household mobility;ADL's;driving  Pt is retired, lives with spouse, reports no AD for mobility at baseline. No bath seating in place currently. HD 3x/week. CPAP for sleep.  -CS     Existing Precautions/Restrictions no known precautions/restrictions  -CS     Barriers to Rehab none identified  -CS       Row Name 03/01/24 0817          Living Environment    People in Home spouse  -CS       Row Name 03/01/24 0817          Home Main Entrance    Number of Stairs, Main Entrance one  -CS       Row Name 03/01/24 0817          Stairs Within Home,  Primary    Number of Stairs, Within Home, Primary none  -CS       Row Name 03/01/24 0817          Cognition    Orientation Status (Cognition) oriented x 4  -CS               User Key  (r) = Recorded By, (t) = Taken By, (c) = Cosigned By      Initials Name Provider Type    CS Fazal Ford OT Occupational Therapist                   Mobility/ADL's       Row Name 03/01/24 0819          Bed Mobility    Comment, (Bed Mobility) UIC upon arrival, returned to chair  -       Row Name 03/01/24 0819          Transfers    Transfers sit-stand transfer;stand-sit transfer  -CS       Row Name 03/01/24 0819          Sit-Stand Transfer    Sit-Stand [place] (Transfers) supervision  -CS       Row Name 03/01/24 0819          Stand-Sit Transfer    Stand-Sit [place] (Transfers) supervision  -       Row Name 03/01/24 0819          Functional Mobility    Functional Mobility- Comment No LOB during in-room distance to sinkside, toilet, and return to recliner, no AD  -CS     Patient was able to Ambulate yes  -CS       Row Name 03/01/24 0819          Activities of Daily Living    BADL Assessment/Intervention lower body dressing;toileting;feeding  -CS       Row Name 03/01/24 0819          Lower Body Dressing Assessment/Training    [place] Level (Lower Body Dressing) don;socks;standby assist  -CS     Position (Lower Body Dressing) unsupported sitting  -CS     Comment, (Lower Body Dressing) reach to distal BLEs intact with use of figure 4 position bilaterally  -CS       Row Name 03/01/24 0819          Toileting Assessment/Training    [place] Level (Toileting) adjust/manage clothing;perform perineal hygiene;independent  -CS     Assistive Devices (Toileting) commode  -CS     Position (Toileting) unsupported standing  -CS     Comment, (Toileting) standing urination at toilet in bathroom  -CS       Row Name 03/01/24 0819          Self-Feeding Assessment/Training    [place] Level (Feeding) feeding skills;liquids to  mouth;independent  -CS     Position (Self-Feeding) supported sitting  -CS               User Key  (r) = Recorded By, (t) = Taken By, (c) = Cosigned By      Initials Name Provider Type     Fazal Ford OT Occupational Therapist                   Obj/Interventions       Row Name 03/01/24 0820          Sensory Assessment (Somatosensory)    Sensory Assessment (Somatosensory) bilateral UE  -CS     Bilateral UE Sensory Assessment general sensation;light touch awareness;light touch localization;proprioception;intact  -       Row Name 03/01/24 0820          Vision Assessment/Intervention    Visual Impairment/Limitations WFL;corrective lenses for reading  -     Vision Assessment Comment full to confrontation testing, tracking L/R intact  -CS       Row Name 03/01/24 0820          Range of Motion Comprehensive    General Range of Motion no range of motion deficits identified  -       Row Name 03/01/24 0820          Strength Comprehensive (MMT)    General Manual Muscle Testing (MMT) Assessment no strength deficits identified  -CS     Comment, General Manual Muscle Testing (MMT) Assessment BUE grossly 4+/5, symmetrical, appropriate for age and condition  -       Row Name 03/01/24 0820          Motor Skills    Motor Skills coordination;functional endurance  -CS     Coordination bilateral;upper extremity;finger to nose;bimanual skills;WFL;other (see comments)  finger to nose performed in standing  -     Functional Endurance moderate  -       Row Name 03/01/24 0820          Balance    Balance Assessment sitting dynamic balance;sitting static balance;standing static balance;standing dynamic balance  -CS     Static Sitting Balance independent  -CS     Dynamic Sitting Balance independent  -CS     Position, Sitting Balance unsupported;sitting edge of bed  -CS     Static Standing Balance independent  -CS     Dynamic Standing Balance standby assist  -CS     Position/Device Used, Standing Balance unsupported  -      Balance Interventions sitting;sit to stand;standing;occupation based/functional task  -CS     Comment, Balance no LOB during ADL performance or related mobility  -CS               User Key  (r) = Recorded By, (t) = Taken By, (c) = Cosigned By      Initials Name Provider Type    CS Fazal Ford, OT Occupational Therapist                   Goals/Plan    No documentation.                  Clinical Impression       Row Name 03/01/24 0823          Pain Assessment    Pretreatment Pain Rating 0/10 - no pain  -CS     Posttreatment Pain Rating 0/10 - no pain  -CS       Row Name 03/01/24 0823          Plan of Care Review    Plan of Care Reviewed With patient  -CS     Progress no change  -CS     Outcome Evaluation Pt presents at baseline for ADL completion and related mobility with symmetrical BUE strength and coordination/sensation intact. No visual deficits observed, full to confrontation testing and tracking L/R intact. OT signing off, defer to PT for any further mobility needs. Rec d/c to home when medically appropriate.  -       Row Name 03/01/24 0823          Therapy Assessment/Plan (OT)    Criteria for Skilled Therapeutic Interventions Met (OT) does not meet criteria for skilled intervention;no  -CS     Therapy Frequency (OT) evaluation only  -       Row Name 03/01/24 0823          Therapy Plan Review/Discharge Plan (OT)    Anticipated Discharge Disposition (OT) home  -       Row Name 03/01/24 0823          Vital Signs    Pre Systolic BP Rehab 157  RN cleared for eval  -CS     Pre Treatment Diastolic BP 89  -CS     Post Systolic BP Rehab 160  -CS     Post Treatment Diastolic BP 75  -CS     Pre SpO2 (%) 95  -CS     O2 Delivery Pre Treatment room air  -CS     O2 Delivery Intra Treatment room air  -CS     Post SpO2 (%) 96  -CS     O2 Delivery Post Treatment room air  -CS     Pre Patient Position Supine  -CS     Intra Patient Position Standing  -CS     Post Patient Position Sitting  -CS       Row Name 03/01/24  0823          Positioning and Restraints    Pre-Treatment Position sitting in chair/recliner  -CS     Post Treatment Position chair  -CS     In Chair notified nsg;sitting;call light within reach;encouraged to call for assist;exit alarm on  -CS               User Key  (r) = Recorded By, (t) = Taken By, (c) = Cosigned By      Initials Name Provider Type    CS Fazal Ford, OT Occupational Therapist                   Outcome Measures       Row Name 03/01/24 0825          How much help from another is currently needed...    Putting on and taking off regular lower body clothing? 4  -CS     Bathing (including washing, rinsing, and drying) 4  -CS     Toileting (which includes using toilet bed pan or urinal) 4  -CS     Putting on and taking off regular upper body clothing 4  -CS     Taking care of personal grooming (such as brushing teeth) 4  -CS     Eating meals 4  -CS     AM-PAC 6 Clicks Score (OT) 24  -CS       Row Name 03/01/24 0027 02/29/24 2045       How much help from another person do you currently need...    Turning from your back to your side while in flat bed without using bedrails? 3  -CJ 3  -CJ    Moving from lying on back to sitting on the side of a flat bed without bedrails? 3  -CJ 3  -CJ    Moving to and from a bed to a chair (including a wheelchair)? 3  -CJ 3  -CJ    Standing up from a chair using your arms (e.g., wheelchair, bedside chair)? 3  -CJ 3  -CJ    Climbing 3-5 steps with a railing? 2  -CJ 2  -CJ    To walk in hospital room? 3  -CJ 3  -CJ    AM-PAC 6 Clicks Score (PT) 17  -CJ 17  -CJ    Highest Level of Mobility Goal 5 --> Static standing  -CJ 5 --> Static standing  -CJ      Row Name 03/01/24 0825          Modified [place] Scale    Modified [place] Scale 0 - No Symptoms at all.  -CS       Row Name 03/01/24 0825          Functional Assessment    Outcome Measure Options AM-PAC 6 Clicks Daily Activity (OT);Modified [place]  -CS               User Key  (r) = Recorded By, (t) = Taken By, (c) =  Cosigned By      Initials Name Provider Type    CS Fazal Ford OT Occupational Therapist    Tiburcio Butler RN Registered Nurse                  Occupational Therapy Education       Title: PT OT SLP Therapies (Done)       Topic: Occupational Therapy (Done)       Point: Precautions (Done)       Description:   Instruct learner(s) on prescribed precautions during self-care and functional transfers.                  Learning Progress Summary             Patient Acceptance, D,E, VU,DU by JAILYN at 3/1/2024 0825    Comment: in-room safety awareness                                         User Key       Initials Effective Dates Name Provider Type Discipline     06/16/21 -  Fazal Ford OT Occupational Therapist OT                  OT Recommendation and Plan  Therapy Frequency (OT): evaluation only  Plan of Care Review  Plan of Care Reviewed With: patient  Progress: no change  Outcome Evaluation: Pt presents at baseline for ADL completion and related mobility with symmetrical BUE strength and coordination/sensation intact. No visual deficits observed, full to confrontation testing and tracking L/R intact. OT signing off, defer to PT for any further mobility needs. Rec d/c to home when medically appropriate.  Plan of Care Reviewed With: patient  Outcome Evaluation: Pt presents at baseline for ADL completion and related mobility with symmetrical BUE strength and coordination/sensation intact. No visual deficits observed, full to confrontation testing and tracking L/R intact. OT signing off, defer to PT for any further mobility needs. Rec d/c to home when medically appropriate.     Time Calculation:   Evaluation Complexity (OT)  Review Occupational Profile/Medical/Therapy History Complexity: expanded/moderate complexity  Assessment, Occupational Performance/Identification of Deficit Complexity: 1-3 performance deficits  Clinical Decision Making Complexity (OT): problem focused assessment/low complexity  Overall  Complexity of Evaluation (OT): low complexity     Time Calculation- OT       Row Name 03/01/24 0826             Time Calculation- OT    OT Start Time 0734  -CS      OT Received On 03/01/24  -CS         Untimed Charges    OT Eval/Re-eval Minutes 34  -CS         Total Minutes    Untimed Charges Total Minutes 34  -CS       Total Minutes 34  -CS                User Key  (r) = Recorded By, (t) = Taken By, (c) = Cosigned By      Initials Name Provider Type    CS Fazal Ford OT Occupational Therapist                  Therapy Charges for Today       Code Description Service Date Service Provider Modifiers Qty    [id number]  OT EVAL LOW COMPLEXITY 3 3/1/2024 Fazal Ford OT GO 1               OT Discharge Summary  Anticipated Discharge Disposition (OT): home  Reason for Discharge: At baseline function, other (comment) (for ADL performance)  Discharge Destination: Home    Fazal Ford OT  3/1/2024

## 2024-03-01 NOTE — THERAPY DISCHARGE NOTE
Patient Name: Marco Antonoi Montemayor  : 1945    MRN: [id number]                              Today's Date: 3/1/2024       Admit Date: 2024    Visit Dx:     ICD-10-CM ICD-9-CM   1. Altered mental status, unspecified altered mental status type  R41.82 780.97   2. Slurred speech  R47.81 784.59   3. Blurred vision, bilateral  H53.8 368.8   4. ESRD on hemodialysis  N18.6 585.6    Z99.2 V45.11   5. History of atrial fibrillation  Z86.79 V12.59   6. Anticoagulated on Coumadin  Z79.01 V58.61     Patient Active Problem List   Diagnosis    Coronary artery disease    Dyslipidemia    Hypertension    Morbid obesity    Hypertensive heart disease with congestive heart failure    Severe obstructive sleep apnea    Dependence on nocturnal oxygen therapy    Other abnormal glucose     Stage 4 chronic kidney disease    Pacemaker    Anemia    Chronic atrial fibrillation    Sick sinus syndrome    Anemia due to stage 4 chronic kidney disease treated with erythropoietin    Stroke-like symptoms    Change in vision    ESRD on HD T,R,S since     Fatty liver     Past Medical History:   Diagnosis Date    Abnormal ECG     Arrhythmia     Arthritis     Asthma     Cancer     skin cancer removed from various locations     Chronic diastolic congestive heart failure 2018    CKD (chronic kidney disease)     closer to stage 5    Coronary artery disease     Dry skin     Dyslipidemia 2016    Edema     Heart murmur     Hematoma     Hemodialysis access site with arteriovenous graft     History of transfusion     no transfusion reaction    Hypertension     Iron deficiency anemia     Long term current use of anticoagulant     ZULEIKA treated with BiPAP     Persistent atrial fibrillation 2016     Patient notes dyspnea, fatigue and palpitations during episodes of afib.  multiple external cardioversionsChads vasc 2, anticoagulated with Coumadin    Tinnitus     Wears glasses     readers     Past Surgical History:   Procedure Laterality  Date    ABLATION OF DYSRHYTHMIC FOCUS      APPENDECTOMY      ARTERIOVENOUS FISTULA/SHUNT SURGERY Left 08/04/2022    Procedure: UPPER EXTREMITY BRACHIOCEPHALIC ARTERIOVENOUS FISTULA FORMATION LEFT;  Surgeon: Jakob Reza MD;  Location:  [facility];  Service: Vascular;  Laterality: Left;    ARTERIOVENOUS FISTULA/SHUNT SURGERY Left 11/03/2022    Procedure: ELEVATION LEFT UPPER EXTREMITY AV FISTULA;  Surgeon: Jakob Reza MD;  Location:  [facility];  Service: Vascular;  Laterality: Left;    CARDIAC ELECTROPHYSIOLOGY PROCEDURE N/A 05/18/2018    Procedure: PPM generator change - dual       [facility]/ please schedule in 8 weeks;  Surgeon: Johnathan Taylor MD;  Location:  [facility];  Service: Cardiovascular    CATARACT EXTRACTION      bilat wiht lens     COLONOSCOPY      ENDOSCOPY      Pt denies    INSERT / REPLACE / REMOVE PACEMAKER  about 20 years back    SKIN CANCER EXCISION      various locations     TONSILLECTOMY      VEIN SURGERY  for dialysis    WISDOM TOOTH EXTRACTION        General Information       Row Name 03/01/24 1132          Physical Therapy Time and Intention    Document Type discharge evaluation/summary  -KR     Mode of Treatment physical therapy;individual therapy  -KR       Row Name 03/01/24 1132          General Information    Patient Profile Reviewed yes  -KR     Prior Level of Function independent:;all household mobility;community mobility;driving;ADL's  Pt is retired, lives with spouse, reports no AD for mobility at baseline. No bath seating in place currently. HD 3x/week. CPAP for sleep.  -KR     Existing Precautions/Restrictions no known precautions/restrictions  -KR     Barriers to Rehab none identified  -KR       Row Name 03/01/24 1132          Living Environment    People in Home spouse  -KR       Row Name 03/01/24 1132          Home Main Entrance    Number of Stairs, Main Entrance one  -KR     Stair Railings, Main Entrance none  -KR       Row Name 03/01/24 1132           Stairs Within Home, Primary    Number of Stairs, Within Home, Primary none  -KR       Row Name 03/01/24 1132          Cognition    Orientation Status (Cognition) oriented x 4  -KR               User Key  (r) = Recorded By, (t) = Taken By, (c) = Cosigned By      Initials Name Provider Type    Priscila Cobb PT Physical Therapist                   Mobility       Row Name 03/01/24 1133          Sit-Stand Transfer    Sit-Stand [place] (Transfers) supervision  -KR     Comment, (Sit-Stand Transfer) 1x from chair  -KR       Row Name 03/01/24 1133          Gait/Stairs (Locomotion)    [place] Level (Gait) standby assist  -KR     Distance in Feet (Gait) 200 + 200  -KR     Deviations/Abnormal Patterns (Gait) malgorzata decreased;gait speed decreased;base of support, wide  -KR     Comment, (Gait/Stairs) increased mediolateral trunk sway and wide LETICIA. No LOB with ambulation.  -KR               User Key  (r) = Recorded By, (t) = Taken By, (c) = Cosigned By      Initials Name Provider Type    Priscila Cobb PT Physical Therapist                   Obj/Interventions       Row Name 03/01/24 1133          Range of Motion Comprehensive    General Range of Motion no range of motion deficits identified  -KR       Row Name 03/01/24 1133          Strength Comprehensive (MMT)    General Manual Muscle Testing (MMT) Assessment no strength deficits identified  -KR       Row Name 03/01/24 1133          Balance    Balance Assessment sitting static balance;sitting dynamic balance;standing static balance;standing dynamic balance  -KR     Static Sitting Balance independent  -KR     Dynamic Sitting Balance independent  -KR     Position, Sitting Balance unsupported;sitting in chair  -KR     Static Standing Balance independent  -KR     Dynamic Standing Balance standby assist  -KR     Position/Device Used, Standing Balance unsupported  -KR     Balance Interventions standing;sitting;sit to stand;static;dynamic  -KR       Row  Name 03/01/24 1133          Sensory Assessment (Somatosensory)    Sensory Assessment (Somatosensory) LE sensation intact  -KR               User Key  (r) = Recorded By, (t) = Taken By, (c) = Cosigned By      Initials Name Provider Type    Priscila Cobb PT Physical Therapist                   Goals/Plan    No documentation.                  Clinical Impression       Row Name 03/01/24 1134          Pain    Pretreatment Pain Rating 0/10 - no pain  -KR     Posttreatment Pain Rating 0/10 - no pain  -KR       Row Name 03/01/24 1134          Plan of Care Review    Plan of Care Reviewed With patient  -KR     Outcome Evaluation Pt presents at baseline for functional mobility tasks. No deficits identified requiring PT services. Rec home upon dc.  -KR       Row Name 03/01/24 1134          Therapy Assessment/Plan (PT)    Criteria for Skilled Interventions Met (PT) no;no problems identified which require skilled intervention  -KR       Row Name 03/01/24 1134          Vital Signs    Pre Systolic BP Rehab 135  -KR     Pre Treatment Diastolic BP 76  -KR     Post Systolic BP Rehab 154  -KR     Post Treatment Diastolic BP 87  -KR     Posttreatment Heart Rate (beats/min) 78  -KR     Pre Patient Position Sitting  -KR     Intra Patient Position Standing  -KR     Post Patient Position Sitting  -KR       Row Name 03/01/24 1134          Positioning and Restraints    Pre-Treatment Position sitting in chair/recliner  -KR     Post Treatment Position chair  -KR     In Chair notified nsg;sitting;call light within reach;exit alarm on  -KR               User Key  (r) = Recorded By, (t) = Taken By, (c) = Cosigned By      Initials Name Provider Type    Priscila Cobb, REJI Physical Therapist                   Outcome Measures       Row Name 03/01/24 1134 03/01/24 0027       How much help from another person do you currently need...    Turning from your back to your side while in flat bed without using bedrails? 4  -KR 3  -CJ    Moving from  lying on back to sitting on the side of a flat bed without bedrails? 4  -KR 3  -CJ    Moving to and from a bed to a chair (including a wheelchair)? 4  -KR 3  -CJ    Standing up from a chair using your arms (e.g., wheelchair, bedside chair)? 4  -KR 3  -CJ    Climbing 3-5 steps with a railing? 3  -KR 2  -CJ    To walk in hospital room? 3  -KR 3  -CJ    AM-PAC 6 Clicks Score (PT) 22  -KR 17  -CJ    Highest Level of Mobility Goal 7 --> Walk 25 feet or more  -KR 5 --> Static standing  -CJ      Row Name 03/01/24 1134 03/01/24 0825       Modified [place] Scale    Pre-Stroke Modified [place] Scale 6 - Unable to determine (UTD) from the medical record documentation  -KR --    Modified [place] Scale 0 - No Symptoms at all.  -KR 0 - No Symptoms at all.  -CS      Row Name 03/01/24 0825          Functional Assessment    Outcome Measure Options AM-PAC 6 Clicks Daily Activity (OT);Modified [place]  -CS               User Key  (r) = Recorded By, (t) = Taken By, (c) = Cosigned By      Initials Name Provider Type    CS Fazal Ford OT Occupational Therapist    CJ Tiburcio Mccann, RN Registered Nurse    Priscila Cobb, PT Physical Therapist                                 Physical Therapy Education       Title: PT OT SLP Therapies (Done)       Topic: Physical Therapy (Done)       Point: Mobility training (Done)       Learning Progress Summary             Patient Acceptance, E, VU by RUFINO at 3/1/2024 1135                         Point: Body mechanics (Done)       Learning Progress Summary             Patient Acceptance, E, VU by RUFINO at 3/1/2024 1135                         Point: Precautions (Done)       Learning Progress Summary             Patient Acceptance, E, VU by RUFINO at 3/1/2024 1135                                         User Key       Initials Effective Dates Name Provider Type Discipline     12/30/22 -  Priscila Valenzuela, PT Physical Therapist PT                  PT Recommendation and Plan     Plan of Care Reviewed With:  patient  Outcome Evaluation: Pt presents at baseline for functional mobility tasks. No deficits identified requiring PT services. Rec home upon dc.     Time Calculation:   PT Evaluation Complexity  History, PT Evaluation Complexity: 3 or more personal factors and/or comorbidities  Examination of Body Systems (PT Eval Complexity): total of 4 or more elements  Clinical Presentation (PT Evaluation Complexity): stable  Clinical Decision Making (PT Evaluation Complexity): low complexity  Overall Complexity (PT Evaluation Complexity): low complexity     PT Charges       Row Name 03/01/24 1136             Time Calculation    Start Time 1120  -KR      PT Received On 03/01/24  -KR         Untimed Charges    PT Eval/Re-eval Minutes 31  -KR         Total Minutes    Untimed Charges Total Minutes 31  -KR       Total Minutes 31  -KR                User Key  (r) = Recorded By, (t) = Taken By, (c) = Cosigned By      Initials Name Provider Type    Priscila Cobb, PT Physical Therapist                  Therapy Charges for Today       Code Description Service Date Service Provider Modifiers Qty    [id number]  PT EVAL LOW COMPLEXITY 3 3/1/2024 Priscila Valenzuela, PT GP 1            PT G-Codes  Outcome Measure Options: AM-PAC 6 Clicks Daily Activity (OT), Modified [place]  AM-PAC 6 Clicks Score (PT): 22  AM-PAC 6 Clicks Score (OT): 24  Modified [place] Scale: 0 - No Symptoms at all.  PT Discharge Summary  Anticipated Discharge Disposition (PT): home  Reason for Discharge: At baseline function    Priscila Valenzuela, REJI  3/1/2024

## 2024-03-01 NOTE — NURSING NOTE
Woc consulted for seborrheic keratosis however there is no real treatment for this patient is not having any problems with this at this time.    Patient was assessed by nurse while I was on the phone and there is no open areas or bleeding at this time.    Patient denies any problems or concerns or needs with this.    Woc did however order some AmLactin as needed patient can take it or leave it nurse instructed on this.  AmLactin is just to moisturize.  It will probably take a few applications to loosen and any support keratosis however if it is thick and heavy it could need to be applied for a few weeks.    Woc will sign off please reconsult if further needs arise.  Woc did place pressure injury preventions based on advanced age please and act as necessary.

## 2024-03-01 NOTE — THERAPY EVALUATION
Acute Care - Speech Language Pathology Initial Evaluation  [facility]  Cognitive-Communication Evaluation      Patient Name: Marco Antonio Montemayor  : 1945  MRN: [id number]  Today's Date: 3/1/2024               Admit Date: 2024     Visit Dx:    ICD-10-CM ICD-9-CM   1. Altered mental status, unspecified altered mental status type  R41.82 780.97   2. Slurred speech  R47.81 784.59   3. Blurred vision, bilateral  H53.8 368.8   4. ESRD on hemodialysis  N18.6 585.6    Z99.2 V45.11   5. History of atrial fibrillation  Z86.79 V12.59   6. Anticoagulated on Coumadin  Z79.01 V58.61     Patient Active Problem List   Diagnosis    Coronary artery disease    Dyslipidemia    Hypertension    Morbid obesity    Hypertensive heart disease with congestive heart failure    Severe obstructive sleep apnea    Dependence on nocturnal oxygen therapy    Other abnormal glucose     Stage 4 chronic kidney disease    Pacemaker    Anemia    Chronic atrial fibrillation    Sick sinus syndrome    Anemia due to stage 4 chronic kidney disease treated with erythropoietin    Stroke-like symptoms    Change in vision    ESRD on HD T,R,S since     Fatty liver     Past Medical History:   Diagnosis Date    Abnormal ECG     Arrhythmia     Arthritis     Asthma     Cancer     skin cancer removed from various locations     Chronic diastolic congestive heart failure 2018    CKD (chronic kidney disease)     closer to stage 5    Coronary artery disease     Dry skin     Dyslipidemia 2016    Edema     Heart murmur     Hematoma     Hemodialysis access site with arteriovenous graft     History of transfusion     no transfusion reaction    Hypertension     Iron deficiency anemia     Long term current use of anticoagulant     ZULEIKA treated with BiPAP     Persistent atrial fibrillation 2016     Patient notes dyspnea, fatigue and palpitations during episodes of afib.  multiple external cardioversionsChads vasc 2, anticoagulated with Coumadin     Tinnitus     Wears glasses     readers     Past Surgical History:   Procedure Laterality Date    ABLATION OF DYSRHYTHMIC FOCUS      APPENDECTOMY      ARTERIOVENOUS FISTULA/SHUNT SURGERY Left 08/04/2022    Procedure: UPPER EXTREMITY BRACHIOCEPHALIC ARTERIOVENOUS FISTULA FORMATION LEFT;  Surgeon: Jakob Reza MD;  Location:  [facility];  Service: Vascular;  Laterality: Left;    ARTERIOVENOUS FISTULA/SHUNT SURGERY Left 11/03/2022    Procedure: ELEVATION LEFT UPPER EXTREMITY AV FISTULA;  Surgeon: Jakob Reza MD;  Location:  [facility];  Service: Vascular;  Laterality: Left;    CARDIAC ELECTROPHYSIOLOGY PROCEDURE N/A 05/18/2018    Procedure: PPM generator change - dual       [facility]/ please schedule in 8 weeks;  Surgeon: Johnathan Taylor MD;  Location:  [facility];  Service: Cardiovascular    CATARACT EXTRACTION      bilat wiht lens     COLONOSCOPY      ENDOSCOPY      Pt denies    INSERT / REPLACE / REMOVE PACEMAKER  about 20 years back    SKIN CANCER EXCISION      various locations     TONSILLECTOMY      VEIN SURGERY  for dialysis    WISDOM TOOTH EXTRACTION         SLP Recommendation and Plan  SLP Diagnosis: (P) mild-moderate, cognitive-linguistic disorder (03/01/24 1026)           [facility] Criteria for Skilled Therapy Interventions Met: (P) declined skilled intervention at this time, other (see comments) (Pt at baseline does not feel treatment is needed or affect activities of daily living. SLP in agreement no treatment necessary as showing good deficit awareness and judgement.) (03/01/24 1026)  Anticipated Discharge Disposition (SLP): (P) No further SLP services warranted (03/01/24 1026)                                         Plan of Care Reviewed With: (P) patient (03/01/24 1224)      SLP EVALUATION (last 72 hours)       SLP SLC Evaluation       Row Name 03/01/24 1026                   Communication Assessment/Intervention    Document Type evaluation (P)   -LW        Subjective  Information no complaints (P)   -LW        Patient Observations alert;cooperative (P)   -LW        Patient Effort good (P)   -LW        Symptoms Noted During/After Treatment none (P)   -LW           General Information    Patient Profile Reviewed yes (P)   -LW        Pertinent History Of Current Problem Admitted with AMS and stroke-like symptoms, visual disturbances, and headaches. No significant evidence found on CT, pending MRI workup. (P)   -LW        Precautions/Limitations, Vision WFL;for purposes of eval (P)   -LW        Precautions/Limitations, Hearing WFL;for purposes of eval (P)   -LW        Patient Level of Education college degree (P)   -LW        Prior Level of Function-Communication WFL (P)   -LW        Plans/Goals Discussed with patient (P)   -LW        Barriers to Rehab none identified (P)   -LW        Patient's Goals for Discharge return to home (P)   -LW           Pain    Additional Documentation Pain Scale: Numbers Pre/Post-Treatment (Group) (P)   -LW           Pain Scale: Numbers Pre/Post-Treatment    Pretreatment Pain Rating 0/10 - no pain (P)   -LW        Posttreatment Pain Rating 0/10 - no pain (P)   -LW           Comprehension Assessment/Intervention    Comprehension Assessment/Intervention Auditory Comprehension (P)   -LW           Auditory Comprehension Assessment/Intervention    Auditory Comprehension (Communication) WFL (P)   -LW        Answers Questions (Communication) simple;WFL (P)   -LW        Able to Follow Commands (Communication) multi-step;WFL (P)   -LW        Narrative Discourse conversational level;WFL (P)   -LW           Expression Assessment/Intervention    Expression Assessment/Intervention verbal expression (P)   -LW           Verbal Expression Assessment/Intervention    Verbal Expression WFL (P)   -LW        Repetition sentences;WFL (P)   -LW        Phrase Completion WFL (P)   -LW        Spontaneous/Functional Words WFL (P)   -LW        Sentence Formulation WFL (P)   -LW         Conversational Discourse/Fluency WFL (P)   -LW           Oral Motor Structure and Function    Oral Motor Structure and Function WFL (P)   -LW        Dentition Assessment natural, present and adequate (P)   -LW        Mucosal Quality moist, healthy (P)   -LW           Oral Musculature and Cranial Nerve Assessment    Oral Motor General Assessment WFL (P)   -LW           Motor Speech Assessment/Intervention    Motor Speech Function WFL (P)   -LW        Initiation of Phonation (Communication) WFL (P)   -LW        Automatic Speech (Communication) WFL (P)   -LW        Verbal Repetition (Communication) sentences;WFL (P)   -LW        Conversational Speech (Communication) WFL (P)   -LW        Speech intelligibility 100%;with unfamiliar listener;in connected speech (P)   -LW           Cursory Voice Assessment/Intervention    Quality and Resonance (Voice) WFL (P)   -LW           Speaking Valve    Respiratory Status room air (P)   -LW           Cognitive Assessment Intervention- SLP    Cognitive Function (Cognition) mild impairment (P)   -LW        Orientation Status (Cognition) person;place;time;situation;WFL (P)   -LW        Memory (Cognitive) immediate;moderate impairment (P)   -LW        Attention (Cognitive) sustained;mild impairment;other (see comments) (P)   decreased sustained attention as distracting self from task  -LW        Thought Organization (Cognitive) abstract convergent;WFL (P)   -LW        Reasoning (Cognitive) mental flexibility;WFL (P)   -LW        Problem Solving (Cognitive) temporal;WFL (P)   -LW        Functional Math (Cognitive) word problems;WFL (P)   -LW        Executive Function (Cognition) deficit awareness;judgement;WFL (P)   -LW        Pragmatics (Communication) WFL (P)   -LW           SLP Evaluation Clinical Impressions    SLP Diagnosis mild-moderate;cognitive-linguistic disorder (P)   -LW        Rehab Potential/Prognosis good (P)   -LW        [facility] Criteria for Skilled Therapy Interventions Met  declined skilled intervention at this time;other (see comments) (P)   Pt at baseline does not feel treatment is needed or affect activities of daily living. SLP in agreement no treatment necessary as showing good deficit awareness and judgement.  -LW        Functional Impact functional impact in ADLs (P)   -LW           Recommendations    Anticipated Discharge Disposition (SLP) No further SLP services warranted (P)   -LW                  User Key  (r) = Recorded By, (t) = Taken By, (c) = Cosigned By      Initials Name Effective Dates    Silva Castillo Speech Therapy Student 12/12/23 -                        EDUCATION  The patient has been educated in the following areas:     Cognitive Impairment Communication Impairment.                      Time Calculation:      Time Calculation- SLP       Row Name 03/01/24 1224             Time Calculation- SLP    SLP Start Time 1026 (P)   -LW      SLP Received On 03/01/24 (P)   -LW         Untimed Charges    83294-MT Eval Speech and Production w/ Language Minutes 62 (P)   -LW         Total Minutes    Untimed Charges Total Minutes 62 (P)   -LW       Total Minutes 62 (P)   -LW                User Key  (r) = Recorded By, (t) = Taken By, (c) = Cosigned By      Initials Name Provider Type    LW Silva Ram Speech Therapy Student SLP Student                    Therapy Charges for Today       Code Description Service Date Service Provider Modifiers Qty    [id number] HC ST EVAL SPEECH AND PROD W LANG  4 3/1/2024 Silva Ram Speech Therapy Student GN 1                       Silva Ram Speech Therapy Student  3/1/2024

## 2024-03-01 NOTE — CONSULTS
Stroke Consult Note    Patient Name: Marco Antonio Montemayor   MRN: [id number]  Age: 78 y.o.  Sex: male  : 1945    Primary Care Physician: Provider, No Known  Referring Physician:  Dr. Gandhi    TIME STROKE TEAM CALLED:  EST     TIME PATIENT SEEN:  EST    Handedness: Right  Race: White     Chief Complaint/Reason for Consultation: Headache and blurry vision    HPI: Mr. Montemayor is a 78-year-old male with PMH of A-fib (on Coumadin), arthritis, asthma, HTN, HLD, CHF, CKD (on dialysis), CAD, anemia and ZULEIKA.  He presents to [facility] ED with transient episodes of headache and blurry vision that seem to correlate to his scheduled hemodialysis. Currently patient is back to baseline functioning status.  Mr. Montemayor has been on hemodialysis for approximately 20 months and is scheduled for Tuesday, Thursday and Saturday treatments.    Initial NIH 0.  Blood pressure 159/100.  On exam patient is able answer questions appropriately and follow two-step commands.  Denies any sensory deficits.  No headache, nausea, falls or recent trauma.  Strength in all extremities 5/5.  Non-smoker and no EtOH use.  Takes prescribed medications routinely.    Last Known Normal Date/Time: 2024 EST     Review of Systems   Constitutional:  Negative for fever.   HENT:  Negative for trouble swallowing and voice change.    Eyes:  Positive for visual disturbance (Blurry vision).   Respiratory:  Negative for shortness of breath.    Cardiovascular:  Negative for chest pain.   Gastrointestinal:  Negative for abdominal pain and nausea.   Neurological:  Positive for headaches. Negative for dizziness, facial asymmetry, speech difficulty, weakness, light-headedness and numbness.   Psychiatric/Behavioral:  Negative for confusion.       Past Medical History:   Diagnosis Date    Abnormal ECG     Arrhythmia     Arthritis     Asthma     Cancer     skin cancer removed from various locations     Chronic diastolic congestive heart failure 2018    CKD  (chronic kidney disease)     closer to stage 5    Coronary artery disease     Dry skin     Dyslipidemia 07/07/2016    Edema     Heart murmur     Hematoma     Hemodialysis access site with arteriovenous graft     History of transfusion     no transfusion reaction    Hypertension     Iron deficiency anemia     Long term current use of anticoagulant     ZULEIKA treated with BiPAP     Persistent atrial fibrillation 07/07/2016     Patient notes dyspnea, fatigue and palpitations during episodes of afib.  multiple external cardioversionsChads vasc 2, anticoagulated with Coumadin    Tinnitus     Wears glasses     readers     Past Surgical History:   Procedure Laterality Date    ABLATION OF DYSRHYTHMIC FOCUS      APPENDECTOMY      ARTERIOVENOUS FISTULA/SHUNT SURGERY Left 08/04/2022    Procedure: UPPER EXTREMITY BRACHIOCEPHALIC ARTERIOVENOUS FISTULA FORMATION LEFT;  Surgeon: Jakob Reza MD;  Location:  [facility];  Service: Vascular;  Laterality: Left;    ARTERIOVENOUS FISTULA/SHUNT SURGERY Left 11/03/2022    Procedure: ELEVATION LEFT UPPER EXTREMITY AV FISTULA;  Surgeon: Jakob Reza MD;  Location:  [facility];  Service: Vascular;  Laterality: Left;    CARDIAC ELECTROPHYSIOLOGY PROCEDURE N/A 05/18/2018    Procedure: PPM generator change - dual       [facility]/ please schedule in 8 weeks;  Surgeon: Johnathan Taylor MD;  Location:  [facility];  Service: Cardiovascular    CATARACT EXTRACTION      bilat wiht lens     COLONOSCOPY      ENDOSCOPY      Pt denies    INSERT / REPLACE / REMOVE PACEMAKER  about 20 years back    SKIN CANCER EXCISION      various locations     TONSILLECTOMY      VEIN SURGERY  for dialysis    WISDOM TOOTH EXTRACTION       Family History   Problem Relation Age of Onset    Cancer Mother     Heart attack Mother     Hypertension Mother     Arrhythmia Father     Hypertension Father     Heart attack Father      Social History     Socioeconomic History    Marital status:     Tobacco Use    Smoking status: Never    Smokeless tobacco: Never   Vaping Use    Vaping Use: Never used   Substance and Sexual Activity    Alcohol use: No    Drug use: No    Sexual activity: Never     No Known Allergies  Prior to Admission medications    Medication Sig Start Date End Date Taking? Authorizing Provider   acetaminophen (TYLENOL) 500 MG tablet Take 1 tablet by mouth Every 6 (Six) Hours As Needed for Mild Pain.   Yes Adrian Munson MD   atorvastatin (LIPITOR) 40 MG tablet TAKE ONE TABLET BY MOUTH DAILY 1/15/24  Yes Ivet Beasley APRN   bumetanide (BUMEX) 2 MG tablet TAKE 1 TABLET BY MOUTH EVERY OTHER DAY **TAKE ON DAYS OFF FROM DIALYSIS (TUESDAY , THURSDAY , SATURDAY AND SUNDAY ) 2/21/24  Yes Ivet Beasley APRN   carvedilol (COREG) 12.5 MG tablet TAKE ONE TABLET BY MOUTH TWICE A DAY 9/18/23  Yes Ivet Beasley APRN   cholecalciferol (VITAMIN D3) 1000 UNITS tablet Take 1 tablet by mouth Daily.   Yes Adrian Munson MD   Ferrous Sulfate (IRON) 325 (65 FE) MG tablet Take 65 mg by mouth Daily.   Yes Adrian Munson MD   Sucroferric Oxyhydroxide (Velphoro) 500 MG chewable tablet Take As Directed. Crush or chew and swallow 3 tablets by mouth 3 times a day with meals and 1 tablet twice a day with snacks. 12/9/22  Yes Adrian Munson MD   vitamin B-12 (CYANOCOBALAMIN) 1000 MCG tablet Take 1 tablet by mouth Daily.   Yes Adrian Munson MD   warfarin (COUMADIN) 2.5 MG tablet TAKE ONE AND ONE-HALF TO TWO TABLETS BY MOUTH ONCE DAILY 12/5/23  Yes Rei Decker PA   nitroglycerin (NITROSTAT) 0.3 MG SL tablet 1 under the tongue as needed for angina, may repeat q5mins for up three doses 7/14/23   Ivet Beasley APRN   PHARMACY TO DOSE WARFARIN Continuous As Needed (patient's warfarin is being managed by the [facility] ([phone])).    Adrian Munson MD   dofetilide (Tikosyn) 500 MCG capsule Take 1 capsule by mouth.   2/29/24  Adrian Munson MD   doxazosin (Cardura) 2 MG tablet Take 1 tablet by mouth Every Night.  2/29/24  Adrian Munson MD   irbesartan (Avapro) 150 MG tablet Take 1 tablet by mouth Daily.  2/29/24  Adrian Munson MD   ketoconazole (NIZORAL) 2 % shampoo  5/22/23 2/29/24  Adrian Munson MD   lisinopril (PRINIVIL,ZESTRIL) 5 MG tablet Take 1 tablet by mouth Daily.  2/29/24  Adrian Munson MD   ranolazine (Ranexa) 500 MG 12 hr tablet Take 1 tablet by mouth 2 (Two) Times a Day.  2/29/24  Adrian Munson MD   warfarin (COUMADIN) 7.5 MG tablet Take one tablet by mouth daily or as directed by the Anticoagulation Clinic 4/27/23 2/29/24  Ivet Beasley APRN         Temp:  [97.6 °F (36.4 °C)] 97.6 °F (36.4 °C)  Heart Rate:  [93-96] 94  Resp:  [16] 16  BP: (149-159)/() 159/100  Neurological Exam  Mental Status  Awake, alert and oriented to person, place and time. Oriented to person, place and time. Oriented to person, place, and time. Speech is normal. Language is fluent with no aphasia.    Cranial Nerves  CN II: Visual fields full to confrontation.  CN III, IV, VI: Extraocular movements intact bilaterally. Pupils equal round and reactive to light bilaterally.  CN V: Facial sensation is normal.  CN VII: Full and symmetric facial movement.  CN VIII: Hearing intact.  CN XI: Shoulder shrug strength is normal.  CN XII: Tongue midline without atrophy or fasciculations.    Motor  Normal muscle bulk throughout. Normal muscle tone. Strength is 5/5 throughout all four extremities.    Sensory  Light touch is normal in upper and lower extremities.     Coordination  Right: Finger-to-nose normal. Rapid alternating movement normal.Left: Finger-to-nose normal. Rapid alternating movement normal.    Gait    Unable to assess.      Physical Exam  Constitutional:       General: He is not in acute distress.     Appearance: Normal appearance.   HENT:      Head: Normocephalic and atraumatic.       Nose: Nose normal.      Mouth/Throat:      Mouth: Mucous membranes are dry.   Eyes:      Extraocular Movements: Extraocular movements intact.      Pupils: Pupils are equal, round, and reactive to light.   Cardiovascular:      Pulses: Normal pulses.   Pulmonary:      Effort: Pulmonary effort is normal.   Abdominal:      Comments: Round, obese   Musculoskeletal:         General: Normal range of motion.      Cervical back: Normal range of motion.   Skin:     General: Skin is warm and dry.   Neurological:      Mental Status: He is oriented to person, place, and time.      Cranial Nerves: No cranial nerve deficit.      Sensory: No sensory deficit.      Motor: Motor strength is normal.No weakness.      Coordination: Coordination normal.   Psychiatric:         Mood and Affect: Mood normal.         Speech: Speech normal.         Behavior: Behavior normal.         Acute Stroke Data    Thrombolytic Inclusion / Exclusion Criteria    Time: 19:54 EST  Person Administering Scale: JENNIFER Loo    Inclusion Criteria  [x]   18 years of age or greater   []   Onset of symptoms < 4.5 hours before beginning treatment (stroke onset = time patient was last seen well or without symptoms).   []   Diagnosis of acute ischemic stroke causing measurable disabling deficit (Complete Hemianopia, Any Aphasia, Visual or Sensory Extinction, Any weakness limiting sustained effort against gravity)   []   Any remaining deficit considered potentially disabling in view of patient and practitioner   Exclusion criteria (Do not proceed with Alteplase if any are checked under exclusion criteria)  [x]   Onset unknown or GREATER than 4.5 hours   []   ICH on CT/MRI   []   CT demonstrates hypodensity representing acute or subacute infarct   []   Significant head trauma or prior stroke in the previous 3 months   []   Symptoms suggestive of subarachnoid hemorrhage   []   History of un-ruptured intracranial aneurysm GREATER than 10 mm   []   Recent  intracranial or intraspinal surgery within the last 3 months   []   Arterial puncture at a non-compressible site in the previous 7 days   []   Active internal bleeding   []   Acute bleeding tendency   []   Platelet count LESS than 100,000 for known hematological diseases such as leukemia, thrombocytopenia or chronic cirrhosis   []   Current use of anticoagulant with INR GREATER than 1.7 or PT GREATER than 15 seconds, aPTT GREATER than 40 seconds   []   Heparin received within 48 hours, resulting in abnormally elevated aPTT GREATER than upper limit of normal   []   Current use of direct thrombin inhibitors or direct factor Xa inhibitors in the past 48 hours   []   Elevated blood pressure refractory to treatment (systolic GREATER than 185 mm/Hg or diastolic  GREATER than 110 mm/Hg   []   Suspected infective endocarditis and aortic arch dissection   []   Current use of therapeutic treatment dose of low-molecular-weight heparin (LMWH) within the previous 24 hours   []   Structural GI malignancy or bleed   Relative exclusion for all patients  []   Only minor non-disabling symptoms   []   Pregnancy   []   Seizure at onset with postictal residual neurological impairments   []   Major surgery or previous trauma within past 14 days   []   History of previous spontaneous ICH, intracranial neoplasm, or AV malformation   []   Postpartum (within previous 14 days)   []   Recent GI or urinary tract hemorrhage (within previous 21 days)   []   Recent acute MI (within previous 3 months)   []   History of un-ruptured intracranial aneurysm LESS than 10 mm   []   History of ruptured intracranial aneurysm   []   Blood glucose LESS than 50 mg/dL (2.7 mmol/L)   []   Dural puncture within the last 7 days   []   Known GREATER than 10 cerebral microbleeds   Additional exclusions for patients with symptoms onset between 3 and 4.5 hours.  []   Age > 80.   []   On any anticoagulants regardless of INR  >>> Warfarin (Coumadin), Heparin, Enoxaparin  (Lovenox), fondaparinux (Arixtra), bivalirudin (Angiomax), Argatroban, dabigatran (Pradaxa), rivaroxaban (Xarelto), or apixaban (Eliquis)   []   Severe stroke (NIHSS > 25).   []   History of BOTH diabetes and previous ischemic stroke.   []   The risks and benefits have been discussed with the patient or family related to the administration of IV thrombolytic therapy for stroke symptoms.   []   I have discussed and reviewed the patient's case and imaging with the attending prior to IV thrombolytic therapy.   NA Time IV thrombolytic administered       Hospital Meds:  Scheduled- atorvastatin, 40 mg, Oral, Daily  vitamin B-12, 1,000 mcg, Oral, Daily      Infusions- Pharmacy to dose warfarin,        PRNs-   Pharmacy to dose warfarin    sodium chloride    Functional Status Prior to Current Stroke/Moultrie Score:   MODIFIED TRISTA SCALE (to be assessed for each patient having history of stroke) []Stroke history but not assessed  [x]0: No symptoms at all  []1: No significant disability despite symptoms  []2: Slight disability  []3: Moderate disability  []4: Moderately severe disability  []5: Severe disability  []6: Death        NIH Stroke Scale  Time: 19:54 EST  Person Administering Scale: JENNIFER Loo  Interval: baseline  1a. Level of Consciousness: 0-->Alert, keenly responsive  1b. LOC Questions: 0-->Answers both questions correctly  1c. LOC Commands: 0-->Performs both tasks correctly  2. Best Gaze: 0-->Normal  3. Visual: 0-->No visual loss  4. Facial Palsy: 0-->Normal symmetrical movements  5a. Motor Arm, Left: 0-->No drift, limb holds 90 (or 45) degrees for full 10 secs  5b. Motor Arm, Right: 0-->No drift, limb holds 90 (or 45) degrees for full 10 secs  6a. Motor Leg, Left: 0-->No drift, leg holds 30 degree position for full 5 secs  6b. Motor Leg, Right: 0-->No drift, leg holds 30 degree position for full 5 secs  7. Limb Ataxia: 0-->Absent  8. Sensory: 0-->Normal, no sensory loss  9. Best Language: 0-->No aphasia,  normal  10. Dysarthria: 0-->Normal  11. Extinction and Inattention (formerly Neglect): 0-->No abnormality    Total (NIH Stroke Scale): 0     Results Reviewed:  I have personally reviewed current lab, radiology, and data and agree with results.    Results for orders placed in visit on 01/17/24    Adult Transthoracic Echo Complete W/ Cont if Necessary Per Protocol    Interpretation Summary    Left ventricular systolic function is normal. Calculated left ventricular EF = 55.5% Left ventricular ejection fraction appears to be 56 - 60%.    Left ventricular wall thickness is consistent with mild concentric hypertrophy.    Left ventricular diastolic function was normal.    The left atrial cavity is mildly dilated.    Estimated right ventricular systolic pressure from tricuspid regurgitation is mildly elevated (35-45 mmHg). Calculated right ventricular systolic pressure from tricuspid regurgitation is 40 mmHg.    Septal thickness and atrial volume both improved on today's study compared to 2023     WBC   Date Value Ref Range Status   02/29/2024 8.69 3.40 - 10.80 10*3/mm3 Final     RBC   Date Value Ref Range Status   02/29/2024 2.69 (L) 4.14 - 5.80 10*6/mm3 Final     Hemoglobin   Date Value Ref Range Status   02/29/2024 8.8 (L) 13.0 - 17.7 g/dL Final     Hematocrit   Date Value Ref Range Status   02/29/2024 27.5 (L) 37.5 - 51.0 % Final     MCV   Date Value Ref Range Status   02/29/2024 102.2 (H) 79.0 - 97.0 fL Final     MCH   Date Value Ref Range Status   02/29/2024 32.7 26.6 - 33.0 pg Final     MCHC   Date Value Ref Range Status   02/29/2024 32.0 31.5 - 35.7 g/dL Final     RDW   Date Value Ref Range Status   02/29/2024 16.1 (H) 12.3 - 15.4 % Final     RDW-SD   Date Value Ref Range Status   02/29/2024 57.8 (H) 37.0 - 54.0 fl Final     MPV   Date Value Ref Range Status   02/29/2024 9.5 6.0 - 12.0 fL Final     Platelets   Date Value Ref Range Status   02/29/2024 162 140 - 450 10*3/mm3 Final     Neutrophil %   Date Value Ref  Range Status   02/29/2024 72.7 42.7 - 76.0 % Final     Lymphocyte %   Date Value Ref Range Status   02/29/2024 14.0 (L) 19.6 - 45.3 % Final     Monocyte %   Date Value Ref Range Status   02/29/2024 10.1 5.0 - 12.0 % Final     Eosinophil %   Date Value Ref Range Status   02/29/2024 2.0 0.3 - 6.2 % Final     Basophil %   Date Value Ref Range Status   02/29/2024 0.2 0.0 - 1.5 % Final     Immature Grans %   Date Value Ref Range Status   02/29/2024 1.0 (H) 0.0 - 0.5 % Final     Neutrophils, Absolute   Date Value Ref Range Status   02/29/2024 6.31 1.70 - 7.00 10*3/mm3 Final     Lymphocytes, Absolute   Date Value Ref Range Status   02/29/2024 1.22 0.70 - 3.10 10*3/mm3 Final     Monocytes, Absolute   Date Value Ref Range Status   02/29/2024 0.88 0.10 - 0.90 10*3/mm3 Final     Eosinophils, Absolute   Date Value Ref Range Status   02/29/2024 0.17 0.00 - 0.40 10*3/mm3 Final     Basophils, Absolute   Date Value Ref Range Status   02/29/2024 0.02 0.00 - 0.20 10*3/mm3 Final     Immature Grans, Absolute   Date Value Ref Range Status   02/29/2024 0.09 (H) 0.00 - 0.05 10*3/mm3 Final     nRBC   Date Value Ref Range Status   02/29/2024 0.7 (H) 0.0 - 0.2 /100 WBC Final      Lab Results   Component Value Date    GLUCOSE 124 (H) 02/29/2024    BUN 39 (H) 02/29/2024    CREATININE 5.39 (H) 02/29/2024    EGFRRESULT 7.3 (L) 01/10/2023    EGFR 10.1 (L) 02/29/2024    BCR 7.2 02/29/2024    K 3.2 (L) 02/29/2024    CO2 28.0 02/29/2024    CALCIUM 8.5 (L) 02/29/2024    PROTENTOTREF 6.8 01/10/2023    ALBUMIN 4.0 02/29/2024    BILITOT 0.4 02/29/2024    AST 7 02/29/2024    ALT 7 02/29/2024    CT Head Without Contrast    Result Date: 2/29/2024  Impression: No acute intracranial abnormalities Electronically Signed: Raji Hudson MD  2/29/2024 2:24 PM EST  Workstation ID: [id number]    XR Chest 1 View    Result Date: 2/29/2024  Impression: Low lung volumes, cardiac enlargement and left lung base opacity, nonspecific and potentially representing airspace  disease or small left effusion. Electronically Signed: Dipak Gregg MD  2/29/2024 1:50 PM EST  Workstation ID: [id number]      Assessment/Plan:  Mr. Montemayor is a 78-year-old male with PMH of A-fib (on Coumadin), arthritis, asthma, HTN, HLD, CHF, CKD (on dialysis), CAD, anemia and ZULEIKA.  He presents to [facility] ED with transient episodes of headache and blurry vision that seem to correlate to his scheduled hemodialysis. Currently patient is back to baseline functioning status.  Mr. Montemayor has been on hemodialysis for approximately 20 months and is scheduled for Tuesday, Thursday and Saturday treatments. Patient is not a candidate for IV thrombotic therapy due to last known well greater than 4.5 hours.  Patient is not a candidate for endovascular therapy due to incomplete scans.    Antiplatelet PTA: None  Anticoagulant PTA: Coumadin        Headache and blurry vision  Differentials to include TIA, CVA  N.p.o. until bedside dysphagia screening complete by RN  Activity as tolerated  Continue Coumadin, pharmacy to dose  Atorvastatin 40 mg p.o. nightly  MRI brain without contrast routine  MRA head and neck without contrast routine  Cardiology consult for pacemaker clearance  A1c routine  Blood pressure goals, SBP less than 200  Diabetes educator to see if appropriate  PT/OT/SLP eval and treat  Case management to follow      Plan of care discussed with patient, family bedside and primary RN.  Stroke neurology will continue to follow.  Thank you for this consult.  Call with any questions or concerns.      Yahir Beavers, APRN  February 29, 2024  19:54 EST

## 2024-03-01 NOTE — CONSULTS
Diabetes Education    Patient Name:  Marco Antonio Montemayor  YOB: 1945  MRN: [id number]  Admit Date:  2/29/2024        Reviewed chart for diabetes education consult.  Noted no history of diabetes.  Noted A1c of 5.9%, which does not meet diagnostic criteria for diabetes diagnosis.  He is not eligible for stroke and diabetes follow up class.  Please re-consult for any diabetes education needs.  Thank you for this referral.       Electronically signed by:  Taylor Amaya RN  03/01/24 11:29 EST

## 2024-03-01 NOTE — PLAN OF CARE
Goal Outcome Evaluation:  Plan of Care Reviewed With: (P) patient                  Anticipated Discharge Disposition (SLP): (P) No further SLP services warranted    SLP Diagnosis: (P) mild-moderate, cognitive-linguistic disorder (03/01/24 1026)

## 2024-03-01 NOTE — CASE MANAGEMENT/SOCIAL WORK
Discharge Planning Assessment  [facility]     Patient Name: Marco Antonio Montemayor  MRN: [id number]  Today's Date: 3/1/2024    Admit Date: 2/29/2024    Plan: Home   Discharge Needs Assessment       Row Name 03/01/24 1111       Living Environment    People in Home spouse    Current Living Arrangements home    Potentially Unsafe Housing Conditions none    Primary Care Provided by self    Provides Primary Care For no one    Quality of Family Relationships supportive    Able to Return to Prior Arrangements yes       Resource/Environmental Concerns    Resource/Environmental Concerns none    Transportation Concerns none       Transition Planning    Patient/Family Anticipates Transition to home    Patient/Family Anticipated Services at Transition none    Transportation Anticipated family or friend will provide  pt's daughter able to provide transportation       Discharge Needs Assessment    Readmission Within the Last 30 Days no previous admission in last 30 days    Equipment Currently Used at Home cpap    Concerns to be Addressed no discharge needs identified    Equipment Needed After Discharge none    Provided Post Acute Provider List? N/A    N/A Provider List Comment Does not anticipate any discharge needs at this time    Provided Post Acute Provider Quality & Resource List? N/A                   Discharge Plan       Row Name 03/01/24 1116       Plan    Plan Home    Patient/Family in Agreement with Plan yes    Provided Post Acute Provider List? N/A    Provided Post Acute Provider Quality & Resource List? N/A    Plan Comments MSW spoke with pt at bedside. Pt is independent in ADL's and IADL's but reports he is sometimes unsteady on his feet and has a right knee issue. Pt reports he does have a walker and cane at home if he ever has to use them. Pt goes to dialysis Tuesdays, Thursdays and Saturdays at [facility] on [facility]. Pt has no current discharge needs or concerns and reports his plan is to discharge home when  ready. Pt reports his daughter will be able to provide transportation home when he is ready.    Final Discharge Disposition Code 01 - home or self-care                  Continued Care and Services - Admitted Since 2/29/2024       Dialysis/Infusion       Service Provider Request Status Selected Services Address Phone Fax Patient Preferred    [facility] [address], [place] [phone] --                  Expected Discharge Date and Time       Expected Discharge Date Expected Discharge Time    Mar 1, 2024            Demographic Summary       Row Name 03/01/24 1111       General Information    Reason for Consult discharge planning                   Functional Status       Row Name 03/01/24 1111       Functional Status, IADL    Medications independent    Meal Preparation independent    Housekeeping independent    Laundry independent    Shopping independent                   Psychosocial    No documentation.                  Abuse/Neglect    No documentation.                  Legal    No documentation.                  Substance Abuse    No documentation.                  Patient Forms    No documentation.                     SRIKANTH Ruby

## 2024-03-02 ENCOUNTER — READMISSION MANAGEMENT (OUTPATIENT)
Dept: CALL CENTER | Facility: HOSPITAL | Age: 79
End: 2024-03-02
Payer: MEDICARE

## 2024-03-02 NOTE — OUTREACH NOTE
Prep Survey      Flowsheet Row Responses   [facility] facility patient discharged from? [facility]   Is LACE score < 7 ? No   Eligibility Readm Mgmt   Discharge diagnosis Stroke-like symptoms   Does the patient have one of the following disease processes/diagnoses(primary or secondary)? Other   Does the patient have Home health ordered? No   Is there a DME ordered? No   Comments regarding appointments HD--[facility]--T/Th/Sat.   Prep survey completed? Yes            Su LITTLE - Registered Nurse

## 2024-03-05 ENCOUNTER — ANTICOAGULATION VISIT (OUTPATIENT)
Dept: PHARMACY | Facility: HOSPITAL | Age: 79
End: 2024-03-05
Payer: MEDICARE

## 2024-03-05 ENCOUNTER — READMISSION MANAGEMENT (OUTPATIENT)
Dept: CALL CENTER | Facility: HOSPITAL | Age: 79
End: 2024-03-05
Payer: MEDICARE

## 2024-03-05 LAB
INR PPP: 3.3
QT INTERVAL: 330 MS
QTC INTERVAL: 405 MS

## 2024-03-05 NOTE — PROGRESS NOTES
Anticoagulation Clinic Progress Note   Oswaldo HOMEMONITOR: [employer] Check Serial # : [id number]     Indication: afib  Referring Provider: Gale  Initial Warfarin Start Date: 2007  Goal INR: 2-3  Current Drug Interactions:   [id number]: 5 (HTN, Age, CHF, CAD)   Other: anemic, CKD (stage 4) May need bridge with Lovenox given persistent AF (per cardio 1/24/2019), Scr was 3.22 on 11/24/21    Diet: eats salads 2x weekly 5/11/2023     Anticoagulation Clinic INR History:     Date 3/30 4/13 5/11 6/8  6/23 7/7 7/28  8/9 8/12 8/16   Total WeeklyDose 31.25mg 31.25 mg 31.25 mg 31.25mg Start dialysis 30mg 30mg 30 mg  33.75mg 37.5mg 37.5mg   INR 2.5 2.2 2.6 3.9  2.1 2.1 1.8 1.23 1.4 1.5 2.1   Notes    Inc torsemide Cath placement    AV fistula placement    HM train hm 1 no billing       Date 8/23 8/30 9/6 9/13 9/20 9/30 10/4 10/11 10/18 10/25 11/8 11/15   Total WeeklyDose 31.25 mg 32.5 mg 32.5 mg 32.5 mg 32.5 mg 32.5 mg 35 mg 35 mg 35 mg 32.5 mg 26.5 mg 35 mg   INR 1.9 2.0 2.0 2.5 2.7 1.6 1.7 2.8 2.4 1.7 1.4 1.6   Notes HM2 no billing HM3  No billing HM4  No billing HM 1  Bill today HM 2 No billing HM 3 No bill HM 4  No billing 1x boost  HM 5 no bill day Bill day  HM1 HM- 2 no billing   HM 3- no billing 1x boost  HM 4 - no billing      Date 11/22 11/29 12/6 12/13 12/20 12/27 1/3/23 1/10 1/12 1/17 1/26/23 1/31/23   Total WeeklyDose 37.5 mg 37.5 mg 37.5 mg 37.5 mg 42.5 mg  40 mg 37.5 mg 40 mg 42.5mg 45 mg 45 mg 40 mg   INR 1.9 2.8 1.9 1.5 2.3 3.4 1.4 0.8 2.7 POCT  2.23   2.8 HM 3.9 1.9 2.2   Notes HM1- bill day HM 2 - NO bill HM 3 - no bill  Inc GLV Hm 4 - NO BILL HM 1 - Bill today HM 2   no billing HM 3- no billing HM 4  No billing lab error?? clinic HM 1  Bill today HM 2 - no bill  HM 3 - no bill      Date 2/7 2/14 2/21/23 2/28/23 3/7 3/14/23 3/21/23 3/28/23 4/4 4/11 4/18 4/26   Total WeeklyDose 40 mg 40 mg 40 mg 37.5 mg 40 mg 40 mg 40 mg 40 mg 37.5 mg 37.5 mg 37.5 mg  40 mg   INR 2.3 2.2 3.9 1.6 3.7 ??? 2.5 2.7 3.2 2.8  2.5 1.7 1.7   Notes HM 4 rec 2/15  HM 1  Bill today HM 2 - no billing  1xred  HM 3 - NO BILL  HM-4 no bill, percocet HM 5 - no billing until 3/15 HM 1 - BILL today  HM 2 - no Billing HM 3  No billing HM 4  No billing HM 1-  BILL today  HM 2  No billing     Date 5/2 5/5 5/11 5/16 5/22 5/30 6/6 6/14 6/20 6/27 7/5 7/11   Total WeeklyDose 42.5 mg 37.5 mg 42.5mg 42.5 mg  35 mg 40 mg 40 mg 40 mg 37.5 mg 40 mg 40 mg 40 mg   INR 3.5 1.8 2.9 4.2 2.0 2.5 2.8 3.2 1.9 2.2 3.0 3.3   Notes HM 3  No billing HM 4  No billing HM 5  No billing HM-1  Bill  Dec'd GLV  HM 2  No billing  1x hold  HM 3   no billing HM 4  No billing HM 1  Bill today HM 2  No billing HM 3  No bill HM 4  No bill HM5  No bill     Date 7/18 7/25 08/01/23 8/8 8/15/23 8/23/23 08/29/23 09/05/23 9/12/23 9/19/23 9/26/23 10/3   Total WeeklyDose 37.5 mg 35 mg 37.5mg 40 mg 37.5 mg 37.5 mg 37.5 mg 37.5 mg 37.5 mg 37.5 mg 37.5 mg 37.5 mg   INR 3.3 1.9 1.9 2.3 2.6 2.1 2.4 2.3 2.7 2.5 2.7 2.0   Notes HM 1  Bill today HM 2  No billing HM 3  No billing  1 x boost HM 4 no billing  HM 1 - BILL today  HM 2 - no bill  HM 3- no billing HM 4- no billing HM 1 - BILL today  HM 2 - NO bill  HM 3 - no bill  Hm 4- no bill       Date 10/10 10/18 10/24 10/31 11/07 11/14/23 11/21 11/28 12/05/23  12/12  12/19 12/26   Total Weekly Dose 37.5mg 37.5 mg 37.5mg 42.5 mg 37.5 mg 37.5 mg 37.5 mg 37.5 mg 35 mg 37.5 mg  40 mg 37.5 mg   INR 1.6 2.0 1.5 2.7 2.1 2.5 2.3 4.2 1.9  1.8 2.2 2.1   Notes Hm- 1 bill today  Missed dose?  Inc GLV HM 2- No bill  Hm- 3 no bill Hm 4 no billing   Boost dose  HM 1 billing today HM 2 - no bill  HM 3 no billing Hm 4 no billing HM 1 billing day  1x red  HM-2 no bill HM-3 no bill  boost HM-4 bill     Date 1/2/24 1/9 1/17 1/23/24 1/30 2/6/24 2/13/24 2/20/24 2/27 3/5/24      Total WeeklyDose 37.5 mg 40 mg 40 mg 40 mg 40 mg 40 mg 40 mg 45 mg 40mg  37.5 mg       INR 1.8 1.9 2.2 2.4 2.1 2.7 1.4 2.2 3.7 3.3      Notes HM 1 billing day HM-2 no billing boost Hm 3 no bill HM 4  - no bill  HM 1-bill today HM 2 -no bill  HM 3 - no bill  HM 4 - no bill   BOOST  Hm- 1   Bill today HM 2 - no bill        Takes warfarin in the am    Clinic Interview:  Verbal Release Authorization signed on 8/22/2018 -- may speak with Raeann Montemayor (Wife). [phone] (Home) *Preferred*  Tablet Strength: pt has 7.5mg and 2.5mg tablets  Estimated oop cost for [facility] HM: Patient has [employer] Medicare so they should have minimal to no OOP costs.                    Patient Findings    Positives: Emergency department visit   Negatives: Signs/symptoms of thrombosis, Signs/symptoms of bleeding, Laboratory test error suspected, Change in health, Change in alcohol use, Change in activity, Upcoming invasive procedure, Upcoming dental procedure, Missed doses, Extra doses, Change in medications, Change in diet/appetite, Hospital admission, Bruising, Other complaints   Comments: Patient was admitted  2/29/24 in [facility] ED  for slurred speech and altered mental status    All other findings negative per patient       Plan:   1. INR is SUPRAtherapeutic today at 3.3 (goal 2.0-3.0). per Maryann Schmidt, PharmD Instructed Mr. Montemayor to take warfarin 5 mg oral daily until recheck.  2. Recheck INR in one week 3/12/24  3. Verbal information provided over the telephone. Mr. Montemayor expresses understanding with teach back and has no further questions at this time.     Home monitor information below:  Test strips on hand: 5  3/5/24  Lot : [id number]  Barcode : [id number] Exp : 1/25  Serial number: [id number]   Supplies billing code used: last 2/27/24  Next must be on or after 03/26/2024  Transfer Tube Lot number: Lot : [id number]  Safety Lancets:  Lot : [id number]     Billing:  *bill must be 4 tests 28 days or more from HM visit  PLEASE REFER TO ANTICOAGULATION TRAINING POWERPOINT FOR BILLING. Needs to use in clinic remote appointment for encounter.      Alexander Scherer, Pharmacy Technician  3/5/2024  13:05 EST    I, Yahir Rene, PharmD, have  reviewed the note in full and agree with the assessment and plan.  03/05/24  14:43 EST

## 2024-03-05 NOTE — OUTREACH NOTE
Medical Week 1 Survey      Flowsheet Row Responses   [facility] patient discharged from? [place]   Does the patient have one of the following disease processes/diagnoses(primary or secondary)? Other   Week 1 attempt successful? No   Unsuccessful attempts Attempt 1            Natasha OLIVEIRA - Licensed Nurse

## 2024-03-11 ENCOUNTER — READMISSION MANAGEMENT (OUTPATIENT)
Dept: CALL CENTER | Facility: HOSPITAL | Age: 79
End: 2024-03-11
Payer: MEDICARE

## 2024-03-11 NOTE — OUTREACH NOTE
Medical Week 2 Survey      Flowsheet Row Responses   [facility] [place] patient discharged from? [facility]   Does the patient have one of the following disease processes/diagnoses(primary or secondary)? Other   Week 2 attempt successful? No   Unsuccessful attempts Attempt 1            Natasha OLIVEIRA - Licensed Nurse

## 2024-03-13 ENCOUNTER — ANTICOAGULATION VISIT (OUTPATIENT)
Dept: PHARMACY | Facility: HOSPITAL | Age: 79
End: 2024-03-13
Payer: MEDICARE

## 2024-03-13 LAB — INR PPP: 2.5

## 2024-03-13 NOTE — PROGRESS NOTES
Anticoagulation Clinic Progress Note   Oswaldo HOMEMONITOR: [employer] Check Serial # : [id number]     Indication: afib  Referring Provider: Gale  Initial Warfarin Start Date: 2007  Goal INR: 2-3  Current Drug Interactions:   [id number]: 5 (HTN, Age, CHF, CAD)   Other: anemic, CKD (stage 4) May need bridge with Lovenox given persistent AF (per cardio 1/24/2019), Scr was 3.22 on 11/24/21    Diet: eats salads 2x weekly 5/11/2023     Anticoagulation Clinic INR History:     Date 3/30 4/13 5/11 6/8  6/23 7/7 7/28  8/9 8/12 8/16   Total WeeklyDose 31.25mg 31.25 mg 31.25 mg 31.25mg Start dialysis 30mg 30mg 30 mg  33.75mg 37.5mg 37.5mg   INR 2.5 2.2 2.6 3.9  2.1 2.1 1.8 1.23 1.4 1.5 2.1   Notes    Inc torsemide Cath placement    AV fistula placement    HM train hm 1 no billing       Date 8/23 8/30 9/6 9/13 9/20 9/30 10/4 10/11 10/18 10/25 11/8 11/15   Total WeeklyDose 31.25 mg 32.5 mg 32.5 mg 32.5 mg 32.5 mg 32.5 mg 35 mg 35 mg 35 mg 32.5 mg 26.5 mg 35 mg   INR 1.9 2.0 2.0 2.5 2.7 1.6 1.7 2.8 2.4 1.7 1.4 1.6   Notes HM2 no billing HM3  No billing HM4  No billing HM 1  Bill today HM 2 No billing HM 3 No bill HM 4  No billing 1x boost  HM 5 no bill day Bill day  HM1 HM- 2 no billing   HM 3- no billing 1x boost  HM 4 - no billing      Date 11/22 11/29 12/6 12/13 12/20 12/27 1/3/23 1/10 1/12 1/17 1/26/23 1/31/23   Total WeeklyDose 37.5 mg 37.5 mg 37.5 mg 37.5 mg 42.5 mg  40 mg 37.5 mg 40 mg 42.5mg 45 mg 45 mg 40 mg   INR 1.9 2.8 1.9 1.5 2.3 3.4 1.4 0.8 2.7 POCT  2.23   2.8 HM 3.9 1.9 2.2   Notes HM1- bill day HM 2 - NO bill HM 3 - no bill  Inc GLV Hm 4 - NO BILL HM 1 - Bill today HM 2   no billing HM 3- no billing HM 4  No billing lab error?? clinic HM 1  Bill today HM 2 - no bill  HM 3 - no bill      Date 2/7 2/14 2/21/23 2/28/23 3/7 3/14/23 3/21/23 3/28/23 4/4 4/11 4/18 4/26   Total WeeklyDose 40 mg 40 mg 40 mg 37.5 mg 40 mg 40 mg 40 mg 40 mg 37.5 mg 37.5 mg 37.5 mg  40 mg   INR 2.3 2.2 3.9 1.6 3.7 ??? 2.5 2.7 3.2 2.8  2.5 1.7 1.7   Notes HM 4 rec 2/15  HM 1  Bill today HM 2 - no billing  1xred  HM 3 - NO BILL  HM-4 no bill, percocet HM 5 - no billing until 3/15 HM 1 - BILL today  HM 2 - no Billing HM 3  No billing HM 4  No billing HM 1-  BILL today  HM 2  No billing     Date 5/2 5/5 5/11 5/16 5/22 5/30 6/6 6/14 6/20 6/27 7/5 7/11   Total WeeklyDose 42.5 mg 37.5 mg 42.5mg 42.5 mg  35 mg 40 mg 40 mg 40 mg 37.5 mg 40 mg 40 mg 40 mg   INR 3.5 1.8 2.9 4.2 2.0 2.5 2.8 3.2 1.9 2.2 3.0 3.3   Notes HM 3  No billing HM 4  No billing HM 5  No billing HM-1  Bill  Dec'd GLV  HM 2  No billing  1x hold  HM 3   no billing HM 4  No billing HM 1  Bill today HM 2  No billing HM 3  No bill HM 4  No bill HM5  No bill     Date 7/18 7/25 08/01/23 8/8 8/15/23 8/23/23 08/29/23 09/05/23 9/12/23 9/19/23 9/26/23 10/3   Total WeeklyDose 37.5 mg 35 mg 37.5mg 40 mg 37.5 mg 37.5 mg 37.5 mg 37.5 mg 37.5 mg 37.5 mg 37.5 mg 37.5 mg   INR 3.3 1.9 1.9 2.3 2.6 2.1 2.4 2.3 2.7 2.5 2.7 2.0   Notes HM 1  Bill today HM 2  No billing HM 3  No billing  1 x boost HM 4 no billing  HM 1 - BILL today  HM 2 - no bill  HM 3- no billing HM 4- no billing HM 1 - BILL today  HM 2 - NO bill  HM 3 - no bill  Hm 4- no bill       Date 10/10 10/18 10/24 10/31 11/07 11/14/23 11/21 11/28 12/05/23  12/12  12/19 12/26   Total Weekly Dose 37.5mg 37.5 mg 37.5mg 42.5 mg 37.5 mg 37.5 mg 37.5 mg 37.5 mg 35 mg 37.5 mg  40 mg 37.5 mg   INR 1.6 2.0 1.5 2.7 2.1 2.5 2.3 4.2 1.9  1.8 2.2 2.1   Notes Hm- 1 bill today  Missed dose?  Inc GLV HM 2- No bill  Hm- 3 no bill Hm 4 no billing   Boost dose  HM 1 billing today HM 2 - no bill  HM 3 no billing Hm 4 no billing HM 1 billing day  1x red  HM-2 no bill HM-3 no bill  boost HM-4 bill     Date 1/2/24 1/9 1/17 1/23/24 1/30 2/6/24 2/13/24 2/20/24 2/27 3/5/24 3/13/24     Total WeeklyDose 37.5 mg 40 mg 40 mg 40 mg 40 mg 40 mg 40 mg 45 mg 40mg  37.5 mg  35 mg     INR 1.8 1.9 2.2 2.4 2.1 2.7 1.4 2.2 3.7 3.3 2.5     Notes HM 1 billing day HM-2 no billing boost Hm  3 no bill HM 4 - no bill  HM 1-bill today HM 2 -no bill  HM 3 - no bill  HM 4 - no bill   BOOST  Hm- 1   Bill today HM 2 - no bill  HM 3 no bill      Takes warfarin in the am    Clinic Interview:  Verbal Release Authorization signed on 8/22/2018 -- may speak with Raeann Montemayor (Wife). [phone] (Home) *Preferred*  Tablet Strength: pt has 7.5mg and 2.5mg tablets  Estimated oop cost for [facility] EDER HM: Patient has [employer] Medicare so they should have minimal to no OOP costs.                  Patient Findings  Negatives: Signs/symptoms of thrombosis, Signs/symptoms of bleeding, Laboratory test error suspected, Change in health, Change in alcohol use, Change in activity, Upcoming invasive procedure, Emergency department visit, Upcoming dental procedure, Missed doses, Extra doses, Change in medications, Change in diet/appetite, Hospital admission, Bruising, Other complaints   Comments: All  findings negative per patient     Plan:   1. INR is therapeutic today at 2.5 (goal 2.0-3.0). Per Peng Melgar PharmD, instructed Mr. Montemayor to take warfarin 5 mg oral daily and warfarin 7.5 mg on Friday until recheck.  2. Recheck INR in one week 3/19/24  3. Verbal information provided over the telephone. Mr. Montemayor expresses understanding with teach back and has no further questions at this time.     Home monitor information below:  Test strips on hand: 5  3/13/24  Lot : [id number]  Barcode : [id number] Exp : 1/25  Serial number: [id number]   Supplies billing code used: last 2/27/24  Next must be on or after 03/26/2024  Transfer Tube Lot number: Lot : [id number]  Safety Lancets:  Lot : [id number]     Billing:  *bill must be 4 tests 28 days or more from  visit  PLEASE REFER TO ANTICOAGULATION TRAINING POWERPOINT FOR BILLING. Needs to use in clinic remote appointment for encounter.    Judy Mora  Pharmacy Technician   3/13/2024 10:18 EDT    37.5 mg/week. He has been mostly therapeutic on 40 mg/week recently.  Peng RODGERS  Talia, PharmD, have reviewed the note in full and agree with the assessment and plan.  03/13/24  11:54 EDT

## 2024-03-15 ENCOUNTER — READMISSION MANAGEMENT (OUTPATIENT)
Dept: CALL CENTER | Facility: HOSPITAL | Age: 79
End: 2024-03-15
Payer: MEDICARE

## 2024-03-15 NOTE — OUTREACH NOTE
Medical Week 2 Survey      Flowsheet Row Responses   [facility] patient discharged from? [facility]   Does the patient have one of the following disease processes/diagnoses(primary or secondary)? Other   Week 2 attempt successful? No   Unsuccessful attempts Attempt 2            Gena HAJI - Registered Nurse

## 2024-03-19 ENCOUNTER — ANTICOAGULATION VISIT (OUTPATIENT)
Dept: PHARMACY | Facility: HOSPITAL | Age: 79
End: 2024-03-19
Payer: MEDICARE

## 2024-03-19 LAB — INR PPP: 2.2

## 2024-03-19 NOTE — PROGRESS NOTES
Anticoagulation Clinic Progress Note   Oswaldo HOMEMONITOR: [employer] Check Serial # : [id number]     Indication: afib  Referring Provider: Gale  Initial Warfarin Start Date: 2007  Goal INR: 2-3  Current Drug Interactions:   [id number]: 5 (HTN, Age, CHF, CAD)   Other: anemic, CKD (stage 4) May need bridge with Lovenox given persistent AF (per cardio 1/24/2019), Scr was 3.22 on 11/24/21    Diet: eats salads 2x weekly 5/11/2023     Anticoagulation Clinic INR History:     Date 3/30 4/13 5/11 6/8  6/23 7/7 7/28  8/9 8/12 8/16   Total WeeklyDose 31.25mg 31.25 mg 31.25 mg 31.25mg Start dialysis 30mg 30mg 30 mg  33.75mg 37.5mg 37.5mg   INR 2.5 2.2 2.6 3.9  2.1 2.1 1.8 1.23 1.4 1.5 2.1   Notes    Inc torsemide Cath placement    AV fistula placement    HM train hm 1 no billing       Date 8/23 8/30 9/6 9/13 9/20 9/30 10/4 10/11 10/18 10/25 11/8 11/15   Total WeeklyDose 31.25 mg 32.5 mg 32.5 mg 32.5 mg 32.5 mg 32.5 mg 35 mg 35 mg 35 mg 32.5 mg 26.5 mg 35 mg   INR 1.9 2.0 2.0 2.5 2.7 1.6 1.7 2.8 2.4 1.7 1.4 1.6   Notes HM2 no billing HM3  No billing HM4  No billing HM 1  Bill today HM 2 No billing HM 3 No bill HM 4  No billing 1x boost  HM 5 no bill day Bill day  HM1 HM- 2 no billing   HM 3- no billing 1x boost  HM 4 - no billing      Date 11/22 11/29 12/6 12/13 12/20 12/27 1/3/23 1/10 1/12 1/17 1/26/23 1/31/23   Total WeeklyDose 37.5 mg 37.5 mg 37.5 mg 37.5 mg 42.5 mg  40 mg 37.5 mg 40 mg 42.5mg 45 mg 45 mg 40 mg   INR 1.9 2.8 1.9 1.5 2.3 3.4 1.4 0.8 2.7 POCT  2.23   2.8 HM 3.9 1.9 2.2   Notes HM1- bill day HM 2 - NO bill HM 3 - no bill  Inc GLV Hm 4 - NO BILL HM 1 - Bill today HM 2   no billing HM 3- no billing HM 4  No billing lab error?? clinic HM 1  Bill today HM 2 - no bill  HM 3 - no bill      Date 2/7 2/14 2/21/23 2/28/23 3/7 3/14/23 3/21/23 3/28/23 4/4 4/11 4/18 4/26   Total WeeklyDose 40 mg 40 mg 40 mg 37.5 mg 40 mg 40 mg 40 mg 40 mg 37.5 mg 37.5 mg 37.5 mg  40 mg   INR 2.3 2.2 3.9 1.6 3.7 ??? 2.5 2.7 3.2 2.8  2.5 1.7 1.7   Notes HM 4 rec 2/15  HM 1  Bill today HM 2 - no billing  1xred  HM 3 - NO BILL  HM-4 no bill, percocet HM 5 - no billing until 3/15 HM 1 - BILL today  HM 2 - no Billing HM 3  No billing HM 4  No billing HM 1-  BILL today  HM 2  No billing     Date 5/2 5/5 5/11 5/16 5/22 5/30 6/6 6/14 6/20 6/27 7/5 7/11   Total WeeklyDose 42.5 mg 37.5 mg 42.5mg 42.5 mg  35 mg 40 mg 40 mg 40 mg 37.5 mg 40 mg 40 mg 40 mg   INR 3.5 1.8 2.9 4.2 2.0 2.5 2.8 3.2 1.9 2.2 3.0 3.3   Notes HM 3  No billing HM 4  No billing HM 5  No billing HM-1  Bill  Dec'd GLV  HM 2  No billing  1x hold  HM 3   no billing HM 4  No billing HM 1  Bill today HM 2  No billing HM 3  No bill HM 4  No bill HM5  No bill     Date 7/18 7/25 08/01/23 8/8 8/15/23 8/23/23 08/29/23 09/05/23 9/12/23 9/19/23 9/26/23 10/3   Total WeeklyDose 37.5 mg 35 mg 37.5mg 40 mg 37.5 mg 37.5 mg 37.5 mg 37.5 mg 37.5 mg 37.5 mg 37.5 mg 37.5 mg   INR 3.3 1.9 1.9 2.3 2.6 2.1 2.4 2.3 2.7 2.5 2.7 2.0   Notes HM 1  Bill today HM 2  No billing HM 3  No billing  1 x boost HM 4 no billing  HM 1 - BILL today  HM 2 - no bill  HM 3- no billing HM 4- no billing HM 1 - BILL today  HM 2 - NO bill  HM 3 - no bill  Hm 4- no bill       Date 10/10 10/18 10/24 10/31 11/07 11/14/23 11/21 11/28 12/05/23  12/12  12/19 12/26   Total Weekly Dose 37.5mg 37.5 mg 37.5mg 42.5 mg 37.5 mg 37.5 mg 37.5 mg 37.5 mg 35 mg 37.5 mg  40 mg 37.5 mg   INR 1.6 2.0 1.5 2.7 2.1 2.5 2.3 4.2 1.9  1.8 2.2 2.1   Notes Hm- 1 bill today  Missed dose?  Inc GLV HM 2- No bill  Hm- 3 no bill Hm 4 no billing   Boost dose  HM 1 billing today HM 2 - no bill  HM 3 no billing Hm 4 no billing HM 1 billing day  1x red  HM-2 no bill HM-3 no bill  boost HM-4 bill     Date 1/2/24 1/9 1/17 1/23/24 1/30 2/6/24 2/13/24 2/20/24 2/27 3/5/24 3/13/24 3/19    Total WeeklyDose 37.5 mg 40 mg 40 mg 40 mg 40 mg 40 mg 40 mg 45 mg 40mg  37.5 mg  35 mg 37.5 mg    INR 1.8 1.9 2.2 2.4 2.1 2.7 1.4 2.2 3.7 3.3 2.5 2.2    Notes HM 1 billing day HM-2 no  billing boost Hm 3 no bill HM 4 - no bill  HM 1-bill today HM 2 -no bill  HM 3 - no bill  HM 4 - no bill   BOOST  Hm- 1   Bill today HM 2 - no bill  HM 3 no bill Hm 4 no bill     Takes warfarin in the am    Clinic Interview:  Verbal Release Authorization signed on 8/22/2018 -- may speak with Raeann Montemayor (Wife). [phone] (Home) *Preferred*  Tablet Strength: pt has 7.5mg and 2.5mg tablets  Estimated oop cost for [facility] EDER HM: Patient has [employer] Medicare so they should have minimal to no OOP costs.                  Patient Findings  Negatives: Signs/symptoms of thrombosis, Signs/symptoms of bleeding, Laboratory test error suspected, Change in health, Change in alcohol use, Change in activity, Upcoming invasive procedure, Emergency department visit, Upcoming dental procedure, Missed doses, Extra doses, Change in medications, Change in diet/appetite, Hospital admission, Bruising, Other complaints   Comments: All  findings negative per patient     Plan:   1. INR is therapeutic today at 2.2 (goal 2.0-3.0). Per Raj Salter PharmD, instructed Mr. Montemayor to take warfarin 5 mg oral daily and warfarin 7.5 mg on Friday until recheck.  2. Recheck INR in one week 3/26/24  3. Verbal information provided over the telephone. Mr. Montemayor expresses understanding with teach back and has no further questions at this time.     Home monitor information below:  Test strips on hand: 4  3/19/24  Lot : [id number]  Barcode : [id number] Exp : 1/25  Serial number: [id number]   Supplies billing code used: last 2/27/24  Next must be on or after 03/26/2024  Transfer Tube Lot number: Lot : [id number]  Safety Lancets:  Lot : [id number]     Billing:  *bill must be 4 tests 28 days or more from HM visit  PLEASE REFER TO ANTICOAGULATION TRAINING POWERPOINT FOR BILLING. Needs to use in clinic remote appointment for encounter.    Judy Mora  Pharmacy Technician   3/19/2024 13:39 EDT    I, Yahir Rene, PharmD, have reviewed the note in full and agree  with the assessment and plan.  03/19/24  14:58 EDT

## 2024-03-20 ENCOUNTER — OFFICE VISIT (OUTPATIENT)
Dept: CARDIOLOGY | Facility: CLINIC | Age: 79
End: 2024-03-20
Payer: MEDICARE

## 2024-03-20 VITALS
SYSTOLIC BLOOD PRESSURE: 144 MMHG | HEIGHT: 69 IN | DIASTOLIC BLOOD PRESSURE: 62 MMHG | WEIGHT: 289 LBS | BODY MASS INDEX: 42.8 KG/M2 | OXYGEN SATURATION: 96 % | HEART RATE: 89 BPM

## 2024-03-20 DIAGNOSIS — I10 PRIMARY HYPERTENSION: Chronic | ICD-10-CM

## 2024-03-20 DIAGNOSIS — R00.1 BRADYCARDIA: ICD-10-CM

## 2024-03-20 DIAGNOSIS — I48.21 PERMANENT ATRIAL FIBRILLATION: Primary | ICD-10-CM

## 2024-03-20 RX ORDER — MIDODRINE HYDROCHLORIDE 10 MG/1
10 TABLET ORAL
COMMUNITY
Start: 2024-03-16

## 2024-03-20 NOTE — PROGRESS NOTES
Marco Antonio Milestosin  1945  [phone]    03/20/2024    [facility] CARDIOLOGY     Referring Provider: No ref. provider found     Provider, No Known  [place] [id number]    Chief Complaint   Patient presents with    Atrial Fibrillation    Shortness of Breath       Problem List:   Permanent atrial fibrillation  Intermittent chronic paroxysmal atrial fibrillation with electrophysiologic study with RFA for atrial flutter, October 1999.  Recurrent apparent transient atrial fibrillation, resolved with acceptable [facility] emergency department evaluation, June 2003.  Acceptable combination Doppler echocardiogram, July 2003, with apparent acceptable 24-hour Holter monitor, May 2006.  Recurrent asymptomatic atrial fibrillation with RVR, January 2007, subsequent Coumadin therapy and Tikosyn therapy with successful internal cardioversion of atrial fibrillation and marked bradyarrhythmias requiring DDDR pacemaker implant, [place] device, data not available, March 2007.  Remote hospitalization for symptomatic rapid atrial fibrillation/BRYANNA DC cardioversion with subsequent acceptable pacemaker interrogation, June/July 2009, as well as acceptable interrogation without reprogramming, July 2011.  Acceptable combination Doppler echocardiogram, December 2010, with residual class I symptoms.  Acceptable device interrogation following [facility] ED evaluation for chest pressure and shortness of breath, data deficit, May 2011, February 2013, February 2016, December 2016.  Abnormal pacemaker assessment with 8.4% mode switch with battery voltage of 1-1.25 years, June 2017, with abnormal PACEART assessment, July 2018, with 22% mode switch  Probable hypertensive cardiovascular disease:  Remote acceptable intravenous adenosine Quantitative SPECT gated Cardiolite study, LVEF 0.50, April 1999.  Echocardiogram showing mild LVH with estimated ejection fraction 0.58 with mild aortic valve cusp sclerosis, and mild  TR, 09/23/2014.   Remote abnormal preoperative Quantitative SPECT gated Cardiolite study with mild “fixed” inferoposterior hypoperfusion and mild LV enlargement with mild global hypokinesia with reduced LVEF 0.40 in the setting of abnormal EKG with subsequent diagnostic coronary angiography demonstrating mild-to-moderate nonobstructive coronary artery atherosclerosis with mild compensated left ventricular dysfunction, LVEF 0.52, and continued medical therapy felt warranted, June 2006.  Remote hospitalization for symptomatic atrial fibrillation with rapid ventricular response and mild congestive heart failure requiring BRYANNA and DC cardioversion, June 2009.  Left heart catheterization with essentially normal coronary arteries with mild luminal irregularities with an EF of 0.60 by Dr. Escamilla for angina and elevated troponin, 06/05/2015.   Residual class I symptoms. Echocardiogram EF 57%, LVH. Septum 1.8cm.   Echocardiogram 2/26/2024: EF 55%, LA mildly dilated RVSP is 40 mmHg  Chronic essential hypertension with recent progressive hypertension and blood pressure readings and normal selective bilateral renal angiography, June 2006.  Dyslipidemia.  Moderate obesity, BMI 39.6.  Ichthyosis.  Chronic lower tract obstructive symptoms, probable BPH.  Dyslipidemia.  Nasal polyps with deviated nasal septum with apparent subsequent nasal septoplasty/polypectomy, data deficit, July 2006.  Abdominal pain with apparent small bowel obstruction with exploratory laparotomy - data deficit.  Appendectomy with subsequent delayed hospitalization for bleeding peptic ulcer disease/probable duodenal ulcer, November 2003.  Recurrent asymptomatic atrial fibrillation with rapid ventricular response, July 2007, with subsequent Tikosyn therapy and DDDR pacemaker implant with intermittent recurrent breakthrough arrhythmias, including remote DC cardioversion, May 2008, with subsequent recurrent DC cardioversion, autumn 2018, with recurrent atrial  fibrillation and discontinuation of formal antiarrhythmic therapy - data deficit  Obstructive sleep apnea with treatment.   Stage 4 chronic kidney disease with recent apparent documented proteinuria and renal biopsy (February 2019)    Allergies  No Known Allergies    Current Medications    Current Outpatient Medications:     acetaminophen (TYLENOL) 500 MG tablet, Take 1 tablet by mouth Every 6 (Six) Hours As Needed for Mild Pain., Disp: , Rfl:     atorvastatin (LIPITOR) 40 MG tablet, TAKE ONE TABLET BY MOUTH DAILY, Disp: 90 tablet, Rfl: 3    bumetanide (BUMEX) 2 MG tablet, TAKE 1 TABLET BY MOUTH EVERY OTHER DAY **TAKE ON DAYS OFF FROM DIALYSIS (TUESDAY , THURSDAY , SATURDAY AND SUNDAY ), Disp: 48 tablet, Rfl: 0    carvedilol (COREG) 12.5 MG tablet, TAKE ONE TABLET BY MOUTH TWICE A DAY, Disp: 180 tablet, Rfl: 3    cholecalciferol (VITAMIN D3) 1000 UNITS tablet, Take 1 tablet by mouth Daily., Disp: , Rfl:     Ferrous Sulfate (IRON) 325 (65 FE) MG tablet, Take 65 mg by mouth Daily., Disp: , Rfl:     midodrine (PROAMATINE) 10 MG tablet, Take 1 tablet by mouth 3 (Three) Times a Day Before Meals. Before Dialysis, Tues, Thurs, Sat, Disp: , Rfl:     nitroglycerin (NITROSTAT) 0.3 MG SL tablet, 1 under the tongue as needed for angina, may repeat q5mins for up three doses, Disp: 25 tablet, Rfl: 11    PHARMACY TO DOSE WARFARIN, Continuous As Needed (patient's warfarin is being managed by the [facility] ([phone]))., Disp: , Rfl:     Sucroferric Oxyhydroxide (Velphoro) 500 MG chewable tablet, Take As Directed. Crush or chew and swallow 3 tablets by mouth 3 times a day with meals and 1 tablet twice a day with snacks., Disp: , Rfl:     vitamin B-12 (CYANOCOBALAMIN) 1000 MCG tablet, Take 1 tablet by mouth Daily., Disp: , Rfl:     warfarin (COUMADIN) 2.5 MG tablet, TAKE ONE AND ONE-HALF TO TWO TABLETS BY MOUTH ONCE DAILY, Disp: 180 tablet, Rfl: 1    History of Present Illness:     Pt presents  "for follow up of A-fib/sick sinus syndrome/hypertension, pacemaker check. Since we last saw the pt, he continues to have dialysis three days per week. He has been having hypotension during dialysis, and is now on Midodrine on his dialysis days. He was recently admitted a couple of weeks ago, due to syncope during dialysis and low BP. He seems to be doing better since starting the midodrine. He has chronic SOB, which is gradually getting worse. He has also been chronically anemic with Hgb 9. He denies hematochezia but does have black stools with taking iron.  No TIA/CVA symptoms. INR levels have been stable, follows with Anticoagulation Clinic.     ROS:  General:  +  fatigue, - weight gain or loss  Cardiovascular:  Denies CP, PND, syncope, near syncope, edema or palpitations.  Pulmonary:  +BLACKWELL, - cough, or wheezing      Vitals:    03/20/24 1011   BP: 144/62   BP Location: Left arm   Patient Position: Sitting   Pulse: 89   SpO2: 96%   Weight: 131 kg (289 lb)   Height: 175.3 cm (69\")     Body mass index is 42.68 kg/m².  PE:  General: NAD  Neck: no JVD, no carotid bruits, no TM  Heart RRR, NL S1, S2, S4 present, no rubs, murmurs  Lungs: CTA, no wheezes, rhonchi, or rales  Abd: soft, non-tender, NL BS  Ext: No musculoskeletal deformities, no edema, cyanosis, or clubbing  Psych: normal mood and affect    Diagnostic Data:    Pacemaker manual interrogation programmed VVIR 70.  RV paced 13% battery voltage 3 years, permanent atrial fibrillation, 1 ventricular high rate 3 seconds in duration    Procedures      1. Permanent atrial fibrillation    2. Bradycardia    3. Primary hypertension          Plan:  1) Permanent Atrial Fibrillation: Rate controlled. Coumadin , INR stable.   Continue present medications.   2) Anticoagulation  Continue warfarin.  Continue to monitor chronic anemia with nephrology.  Unfortunately he would not be a good watchman candidate and would be of high risk due to to chronic conditions and being on " dialysis.  3) Bradycardia:  - normal PM function. 3 years on battery.   4) HTN:Labile recordings, especially with dialysis. Now on midodrine.   5) ESRD-HD three days week.        F/up in 12 months    Electronically signed by PUJA Steele, 03/20/24, 10:41 AM EDT.

## 2024-03-26 ENCOUNTER — ANTICOAGULATION VISIT (OUTPATIENT)
Dept: PHARMACY | Facility: HOSPITAL | Age: 79
End: 2024-03-26
Payer: MEDICARE

## 2024-03-26 DIAGNOSIS — I48.21 PERMANENT ATRIAL FIBRILLATION: Primary | ICD-10-CM

## 2024-03-26 LAB — INR PPP: 2.2

## 2024-03-26 NOTE — PROGRESS NOTES
Anticoagulation Clinic Progress Note   Oswaldo HOMEMONITOR: [employer] Serial # : [id number]     Indication: afib  Referring Provider: Gale  Initial Warfarin Start Date: 2007  Goal INR: 2-3  Current Drug Interactions:   [id number]: 5 (HTN, Age, CHF, CAD)   Other: anemic, CKD (stage 4) May need bridge with Lovenox given persistent AF (per cardio 1/24/2019), Scr was 3.22 on 11/24/21    Diet: eats salads 2x weekly 5/11/2023     Anticoagulation Clinic INR History:     Date 3/30 4/13 5/11 6/8  6/23 7/7 7/28  8/9 8/12 8/16   Total WeeklyDose 31.25mg 31.25 mg 31.25 mg 31.25mg Start dialysis 30mg 30mg 30 mg  33.75mg 37.5mg 37.5mg   INR 2.5 2.2 2.6 3.9  2.1 2.1 1.8 1.23 1.4 1.5 2.1   Notes    Inc torsemide Cath placement    AV fistula placement    HM train hm 1 no billing       Date 8/23 8/30 9/6 9/13 9/20 9/30 10/4 10/11 10/18 10/25 11/8 11/15   Total WeeklyDose 31.25 mg 32.5 mg 32.5 mg 32.5 mg 32.5 mg 32.5 mg 35 mg 35 mg 35 mg 32.5 mg 26.5 mg 35 mg   INR 1.9 2.0 2.0 2.5 2.7 1.6 1.7 2.8 2.4 1.7 1.4 1.6   Notes HM2 no billing HM3  No billing HM4  No billing HM 1  Bill today HM 2 No billing HM 3 No bill HM 4  No billing 1x boost  HM 5 no bill day Bill day  HM1 HM- 2 no billing   HM 3- no billing 1x boost  HM 4 - no billing      Date 11/22 11/29 12/6 12/13 12/20 12/27 1/3/23 1/10 1/12 1/17 1/26/23 1/31/23   Total WeeklyDose 37.5 mg 37.5 mg 37.5 mg 37.5 mg 42.5 mg  40 mg 37.5 mg 40 mg 42.5mg 45 mg 45 mg 40 mg   INR 1.9 2.8 1.9 1.5 2.3 3.4 1.4 0.8 2.7 POCT  2.23   2.8 HM 3.9 1.9 2.2   Notes HM1- bill day HM 2 - NO bill HM 3 - no bill  Inc GLV Hm 4 - NO BILL HM 1 - Bill today HM 2   no billing HM 3- no billing HM 4  No billing lab error?? clinic HM 1  Bill today HM 2 - no bill  HM 3 - no bill      Date 2/7 2/14 2/21/23 2/28/23 3/7 3/14/23 3/21/23 3/28/23 4/4 4/11 4/18 4/26   Total WeeklyDose 40 mg 40 mg 40 mg 37.5 mg 40 mg 40 mg 40 mg 40 mg 37.5 mg 37.5 mg 37.5 mg  40 mg   INR 2.3 2.2 3.9 1.6 3.7 ??? 2.5 2.7 3.2 2.8  2.5 1.7 1.7   Notes HM 4 rec 2/15  HM 1  Bill today HM 2 - no billing  1xred  HM 3 - NO BILL  HM-4 no bill, percocet HM 5 - no billing until 3/15 HM 1 - BILL today  HM 2 - no Billing HM 3  No billing HM 4  No billing HM 1-  BILL today  HM 2  No billing     Date 5/2 5/5 5/11 5/16 5/22 5/30 6/6 6/14 6/20 6/27 7/5 7/11   Total WeeklyDose 42.5 mg 37.5 mg 42.5mg 42.5 mg  35 mg 40 mg 40 mg 40 mg 37.5 mg 40 mg 40 mg 40 mg   INR 3.5 1.8 2.9 4.2 2.0 2.5 2.8 3.2 1.9 2.2 3.0 3.3   Notes HM 3  No billing HM 4  No billing HM 5  No billing HM-1  Bill  Dec'd GLV  HM 2  No billing  1x hold  HM 3   no billing HM 4  No billing HM 1  Bill today HM 2  No billing HM 3  No bill HM 4  No bill HM5  No bill     Date 7/18 7/25 08/01/23 8/8 8/15/23 8/23/23 08/29/23 09/05/23 9/12/23 9/19/23 9/26/23 10/3   Total WeeklyDose 37.5 mg 35 mg 37.5mg 40 mg 37.5 mg 37.5 mg 37.5 mg 37.5 mg 37.5 mg 37.5 mg 37.5 mg 37.5 mg   INR 3.3 1.9 1.9 2.3 2.6 2.1 2.4 2.3 2.7 2.5 2.7 2.0   Notes HM 1  Bill today HM 2  No billing HM 3  No billing  1 x boost HM 4 no billing  HM 1 - BILL today  HM 2 - no bill  HM 3- no billing HM 4- no billing HM 1 - BILL today  HM 2 - NO bill  HM 3 - no bill  Hm 4- no bill       Date 10/10 10/18 10/24 10/31 11/07 11/14/23 11/21 11/28 12/05/23  12/12  12/19 12/26   Total Weekly Dose 37.5mg 37.5 mg 37.5mg 42.5 mg 37.5 mg 37.5 mg 37.5 mg 37.5 mg 35 mg 37.5 mg  40 mg 37.5 mg   INR 1.6 2.0 1.5 2.7 2.1 2.5 2.3 4.2 1.9  1.8 2.2 2.1   Notes Hm- 1 bill today  Missed dose?  Inc GLV HM 2- No bill  Hm- 3 no bill Hm 4 no billing   Boost dose  HM 1 billing today  2 - no bill  HM 3 no billing Hm 4 no billing HM 1 billing day  1x red  HM-2 no bill HM-3 no bill  boost HM-4 bill     Date 1/2/24 1/9 1/17 1/23/24 1/30 2/6/24 2/13/24 2/20/24 2/27 3/5/24 3/13/24 3/19 3/26/24   Total WeeklyDose 37.5 mg 40 mg 40 mg 40 mg 40 mg 40 mg 40 mg 45 mg 40mg  37.5 mg  35 mg 37.5 mg 37.5 mg   INR 1.8 1.9 2.2 2.4 2.1 2.7 1.4 2.2 3.7 3.3 2.5 2.2 2.2   Notes  1  billing day HM-2 no billing boost Hm 3 no bill HM 4 - no bill  HM 1-bill today HM 2 -no bill  HM 3 - no bill  HM 4 - no bill   BOOST  Hm- 1   Bill today HM 2 - no bill  HM 3 no bill Hm 4 no bill HM 1 - Bill Today    Takes warfarin in the am    Clinic Interview:  Verbal Release Authorization signed on 8/22/2018 -- may speak with Raeann Montemayor (Wife). [phone] (Home) *Preferred*  Tablet Strength: pt has 7.5mg and 2.5mg tablets  Estimated oop cost for BH EDER HM: Patient has [employer] Medicare so they should have minimal to no OOP costs.                    Patient Findings    Negatives: Signs/symptoms of thrombosis, Signs/symptoms of bleeding, Laboratory test error suspected, Change in health, Change in alcohol use, Change in activity, Upcoming invasive procedure, Emergency department visit, Upcoming dental procedure, Missed doses, Extra doses, Change in medications, Change in diet/appetite, Hospital admission, Bruising, Other complaints   Comments: All findings negative per patient.       Plan:   1. INR is therapeutic today 3/26 at 2.2 (goal 2.0-3.0). Instructed Mr. Montemayor to take warfarin 5 mg oral daily except warfarin 7.5 mg oral Friday until recheck.  2. Recheck INR in one week 4/02/24.  3. Verbal information provided over the telephone. Mr. Montemayor expresses understanding with teach back and has no further questions at this time.     Home monitor information below:  Test strips on hand: 3  3/26/24  Lot : [id number]  Barcode : [id number] Exp : 1/25  Serial number: [id number]   Supplies billing code used: last 3/26/24  Next must be on or after 04/23/2024  Transfer Tube Lot number: Lot : [id number]  Safety Lancets:  Lot : [id number]     Billing:  *bill must be 4 tests 28 days or more from HM visit  PLEASE REFER TO ANTICOAGULATION TRAINING POWERPOINT FOR BILLING. Needs to use in clinic remote appointment for encounter.    Kristi Dunham CPhT   14:24 EDT 3/26/2024      Changed back to 37.5 mg/week; same as he had  last week.  I, Peng Melgar, PharmD, have reviewed the note in full and agree with the assessment and plan.  03/26/24  14:59 EDT

## 2024-04-09 ENCOUNTER — ANTICOAGULATION VISIT (OUTPATIENT)
Dept: PHARMACY | Facility: HOSPITAL | Age: 79
End: 2024-04-09
Payer: MEDICARE

## 2024-04-09 DIAGNOSIS — I48.21 PERMANENT ATRIAL FIBRILLATION: Primary | ICD-10-CM

## 2024-04-09 LAB — INR PPP: 2.1

## 2024-04-09 NOTE — PROGRESS NOTES
Anticoagulation Clinic Progress Note   Oswaldo HOMEMONITOR: [employer] Serial # : [id number]     Indication: afib  Referring Provider: Gale  Initial Warfarin Start Date: 2007  Goal INR: 2-3  Current Drug Interactions:   [id number]: 5 (HTN, Age, CHF, CAD)   Other: anemic, CKD (stage 4) May need bridge with Lovenox given persistent AF (per cardio 1/24/2019), Scr was 3.22 on 11/24/21    Diet: eats salads 2x weekly 5/11/2023     Anticoagulation Clinic INR History:     Date 3/30 4/13 5/11 6/8  6/23 7/7 7/28  8/9 8/12 8/16   Total WeeklyDose 31.25mg 31.25 mg 31.25 mg 31.25mg Start dialysis 30mg 30mg 30 mg  33.75mg 37.5mg 37.5mg   INR 2.5 2.2 2.6 3.9  2.1 2.1 1.8 1.23 1.4 1.5 2.1   Notes    Inc torsemide Cath placement    AV fistula placement    HM train hm 1 no billing       Date 8/23 8/30 9/6 9/13 9/20 9/30 10/4 10/11 10/18 10/25 11/8 11/15   Total WeeklyDose 31.25 mg 32.5 mg 32.5 mg 32.5 mg 32.5 mg 32.5 mg 35 mg 35 mg 35 mg 32.5 mg 26.5 mg 35 mg   INR 1.9 2.0 2.0 2.5 2.7 1.6 1.7 2.8 2.4 1.7 1.4 1.6   Notes HM2 no billing HM3  No billing HM4  No billing HM 1  Bill today HM 2 No billing HM 3 No bill HM 4  No billing 1x boost  HM 5 no bill day Bill day  HM1 HM- 2 no billing   HM 3- no billing 1x boost  HM 4 - no billing      Date 11/22 11/29 12/6 12/13 12/20 12/27 1/3/23 1/10 1/12 1/17 1/26/23 1/31/23   Total WeeklyDose 37.5 mg 37.5 mg 37.5 mg 37.5 mg 42.5 mg  40 mg 37.5 mg 40 mg 42.5mg 45 mg 45 mg 40 mg   INR 1.9 2.8 1.9 1.5 2.3 3.4 1.4 0.8 2.7 POCT  2.23   2.8 HM 3.9 1.9 2.2   Notes HM1- bill day HM 2 - NO bill HM 3 - no bill  Inc GLV Hm 4 - NO BILL HM 1 - Bill today HM 2   no billing HM 3- no billing HM 4  No billing lab error?? clinic HM 1  Bill today HM 2 - no bill  HM 3 - no bill      Date 2/7 2/14 2/21/23 2/28/23 3/7 3/14/23 3/21/23 3/28/23 4/4 4/11 4/18 4/26   Total WeeklyDose 40 mg 40 mg 40 mg 37.5 mg 40 mg 40 mg 40 mg 40 mg 37.5 mg 37.5 mg 37.5 mg  40 mg   INR 2.3 2.2 3.9 1.6 3.7 ??? 2.5 2.7 3.2 2.8  2.5 1.7 1.7   Notes HM 4 rec 2/15  HM 1  Bill today HM 2 - no billing  1xred  HM 3 - NO BILL  HM-4 no bill, percocet HM 5 - no billing until 3/15 HM 1 - BILL today  HM 2 - no Billing HM 3  No billing HM 4  No billing HM 1-  BILL today  HM 2  No billing     Date 5/2 5/5 5/11 5/16 5/22 5/30 6/6 6/14 6/20 6/27 7/5 7/11   Total WeeklyDose 42.5 mg 37.5 mg 42.5mg 42.5 mg  35 mg 40 mg 40 mg 40 mg 37.5 mg 40 mg 40 mg 40 mg   INR 3.5 1.8 2.9 4.2 2.0 2.5 2.8 3.2 1.9 2.2 3.0 3.3   Notes HM 3  No billing HM 4  No billing HM 5  No billing HM-1  Bill  Dec'd GLV  HM 2  No billing  1x hold  HM 3   no billing HM 4  No billing HM 1  Bill today HM 2  No billing HM 3  No bill HM 4  No bill HM5  No bill     Date 7/18 7/25 08/01/23 8/8 8/15/23 8/23/23 08/29/23 09/05/23 9/12/23 9/19/23 9/26/23 10/3   Total WeeklyDose 37.5 mg 35 mg 37.5mg 40 mg 37.5 mg 37.5 mg 37.5 mg 37.5 mg 37.5 mg 37.5 mg 37.5 mg 37.5 mg   INR 3.3 1.9 1.9 2.3 2.6 2.1 2.4 2.3 2.7 2.5 2.7 2.0   Notes HM 1  Bill today HM 2  No billing HM 3  No billing  1 x boost HM 4 no billing  HM 1 - BILL today  HM 2 - no bill  HM 3- no billing HM 4- no billing HM 1 - BILL today  HM 2 - NO bill  HM 3 - no bill  Hm 4- no bill     Date 10/10 10/18 10/24 10/31 11/07 11/14/23 11/21 11/28 12/05/23  12/12  12/19 12/26   Total Weekly Dose 37.5mg 37.5 mg 37.5mg 42.5 mg 37.5 mg 37.5 mg 37.5 mg 37.5 mg 35 mg 37.5 mg  40 mg 37.5 mg   INR 1.6 2.0 1.5 2.7 2.1 2.5 2.3 4.2 1.9  1.8 2.2 2.1   Notes Hm- 1 bill today  Missed dose?  Inc GLV HM 2- No bill  Hm- 3 no bill Hm 4 no billing   Boost dose  HM 1 billing today  2 - no bill  HM 3 no billing Hm 4 no billing HM 1 billing day  1x red  HM-2 no bill HM-3 no bill  boost HM-4 bill     Date 1/2/24 1/9 1/17 1/23/24 1/30 2/6/24 2/13/24 2/20/24 2/27 3/5/24 3/13/24 3/19 3/26/24   Total Weekly  Dose 37.5 mg 40 mg 40 mg 40 mg 40 mg 40 mg 40 mg 45 mg 40mg  37.5 mg  35 mg 37.5 mg 37.5 mg   INR 1.8 1.9 2.2 2.4 2.1 2.7 1.4 2.2 3.7 3.3 2.5 2.2 2.2   Notes  1  billing day HM-2 no billing boost Hm 3 no bill HM 4 - no bill  HM 1-bill today HM 2 -no bill  HM 3 - no bill  HM 4 - no bill   BOOST  Hm- 1   Bill today HM 2 - no bill  HM 3 no bill Hm 4 no bill HM 1 - Bill Today      Date 4/9               Total WeeklyDose 37.5 mg               INR 2.1               Notes HM 2 - No Bill                 Takes warfarin in the am    Clinic Interview:  Verbal Release Authorization signed on 8/22/2018 -- may speak with Raeann Montemayor (Wife). [phone] (Home) *Preferred*  Tablet Strength: pt has 7.5mg and 2.5mg tablets  Estimated oop cost for BH EDER HM: Patient has [employer] Medicare so they should have minimal to no OOP costs.                  Patient Findings  Negatives: Signs/symptoms of thrombosis, Signs/symptoms of bleeding, Laboratory test error suspected, Change in health, Change in alcohol use, Change in activity, Upcoming invasive procedure, Emergency department visit, Upcoming dental procedure, Missed doses, Extra doses, Change in medications, Change in diet/appetite, Hospital admission, Bruising, Other complaints   Comments: All findings negative per patient.       Plan:   1. INR is therapeutic today at 2.1 (goal 2.0-3.0). Instructed Mr. Montemayor to continue warfarin 5 mg oral daily except warfarin 7.5 mg oral Fridays until recheck.  2. Recheck INR in one week on 4/16/24.  3. Verbal information provided over the telephone. Mr. Montemayor expresses understanding with teach back and has no further questions at this time.     Home monitor information below:  Test strips on hand: 3 4/9/24  Lot : [id number]  Barcode : [id number] Exp : 1/25  Serial number: [id number]   Supplies billing code used: last 3/26/24  Next must be on or after 04/23/2024  Transfer Tube Lot number: Lot : [id number]  Safety Lancets:  Lot : [id number]     Billing:  *bill must be 4 tests 28 days or more from  visit  PLEASE REFER TO ANTICOAGULATION TRAINING POWERPOINT FOR BILLING. Needs to use in clinic remote  appointment for encounter.      Kristi Dunham CPhT   13:23 EDT 4/9/2024    ICharlotte, PharmD, have reviewed the note in full and agree with the assessment and plan.  04/09/24  15:27 EDT

## 2024-04-16 ENCOUNTER — ANTICOAGULATION VISIT (OUTPATIENT)
Dept: PHARMACY | Facility: HOSPITAL | Age: 79
End: 2024-04-16
Payer: MEDICARE

## 2024-04-16 DIAGNOSIS — I48.21 PERMANENT ATRIAL FIBRILLATION: Primary | ICD-10-CM

## 2024-04-16 LAB — INR PPP: 2.3

## 2024-04-16 NOTE — PROGRESS NOTES
Anticoagulation Clinic Progress Note   Oswaldo HOMEMONITOR: [employer] Check Serial # : [id number]     Indication: afib  Referring Provider: Gale  Initial Warfarin Start Date: 2007  Goal INR: 2-3  Current Drug Interactions:   [id number]: 5 (HTN, Age, CHF, CAD)   Other: anemic, CKD (stage 4) May need bridge with Lovenox given persistent AF (per cardio 1/24/2019), Scr was 3.22 on 11/24/21    Diet: eats salads 2x weekly 5/11/2023     Anticoagulation Clinic INR History:     Date 3/30 4/13 5/11 6/8  6/23 7/7 7/28  8/9 8/12 8/16   Total WeeklyDose 31.25mg 31.25 mg 31.25 mg 31.25mg Start dialysis 30mg 30mg 30 mg  33.75mg 37.5mg 37.5mg   INR 2.5 2.2 2.6 3.9  2.1 2.1 1.8 1.23 1.4 1.5 2.1   Notes    Inc torsemide Cath placement    AV fistula placement    HM train hm 1 no billing       Date 8/23 8/30 9/6 9/13 9/20 9/30 10/4 10/11 10/18 10/25 11/8 11/15   Total WeeklyDose 31.25 mg 32.5 mg 32.5 mg 32.5 mg 32.5 mg 32.5 mg 35 mg 35 mg 35 mg 32.5 mg 26.5 mg 35 mg   INR 1.9 2.0 2.0 2.5 2.7 1.6 1.7 2.8 2.4 1.7 1.4 1.6   Notes HM2 no billing HM3  No billing HM4  No billing HM 1  Bill today HM 2 No billing HM 3 No bill HM 4  No billing 1x boost  HM 5 no bill day Bill day  HM1 HM- 2 no billing   HM 3- no billing 1x boost  HM 4 - no billing      Date 11/22 11/29 12/6 12/13 12/20 12/27 1/3/23 1/10 1/12 1/17 1/26/23 1/31/23   Total WeeklyDose 37.5 mg 37.5 mg 37.5 mg 37.5 mg 42.5 mg  40 mg 37.5 mg 40 mg 42.5mg 45 mg 45 mg 40 mg   INR 1.9 2.8 1.9 1.5 2.3 3.4 1.4 0.8 2.7 POCT  2.23   2.8 HM 3.9 1.9 2.2   Notes HM1- bill day HM 2 - NO bill HM 3 - no bill  Inc GLV Hm 4 - NO BILL HM 1 - Bill today HM 2   no billing HM 3- no billing HM 4  No billing lab error?? clinic HM 1  Bill today HM 2 - no bill  HM 3 - no bill      Date 2/7 2/14 2/21/23 2/28/23 3/7 3/14/23 3/21/23 3/28/23 4/4 4/11 4/18 4/26   Total WeeklyDose 40 mg 40 mg 40 mg 37.5 mg 40 mg 40 mg 40 mg 40 mg 37.5 mg 37.5 mg 37.5 mg  40 mg   INR 2.3 2.2 3.9 1.6 3.7 ??? 2.5 2.7 3.2 2.8  2.5 1.7 1.7   Notes HM 4 rec 2/15  HM 1  Bill today HM 2 - no billing  1xred  HM 3 - NO BILL  HM-4 no bill, percocet HM 5 - no billing until 3/15 HM 1 - BILL today  HM 2 - no Billing HM 3  No billing HM 4  No billing HM 1-  BILL today  HM 2  No billing     Date 5/2 5/5 5/11 5/16 5/22 5/30 6/6 6/14 6/20 6/27 7/5 7/11   Total WeeklyDose 42.5 mg 37.5 mg 42.5mg 42.5 mg  35 mg 40 mg 40 mg 40 mg 37.5 mg 40 mg 40 mg 40 mg   INR 3.5 1.8 2.9 4.2 2.0 2.5 2.8 3.2 1.9 2.2 3.0 3.3   Notes HM 3  No billing HM 4  No billing HM 5  No billing HM-1  Bill  Dec'd GLV  HM 2  No billing  1x hold  HM 3   no billing HM 4  No billing HM 1  Bill today HM 2  No billing HM 3  No bill HM 4  No bill HM5  No bill     Date 7/18 7/25 08/01/23 8/8 8/15/23 8/23/23 08/29/23 09/05/23 9/12/23 9/19/23 9/26/23 10/3   Total WeeklyDose 37.5 mg 35 mg 37.5mg 40 mg 37.5 mg 37.5 mg 37.5 mg 37.5 mg 37.5 mg 37.5 mg 37.5 mg 37.5 mg   INR 3.3 1.9 1.9 2.3 2.6 2.1 2.4 2.3 2.7 2.5 2.7 2.0   Notes HM 1  Bill today HM 2  No billing HM 3  No billing  1 x boost HM 4 no billing  HM 1 - BILL today  HM 2 - no bill  HM 3- no billing HM 4- no billing HM 1 - BILL today  HM 2 - NO bill  HM 3 - no bill  Hm 4- no bill     Date 10/10 10/18 10/24 10/31 11/07 11/14/23 11/21 11/28 12/05/23  12/12  12/19 12/26   Total Weekly Dose 37.5mg 37.5 mg 37.5mg 42.5 mg 37.5 mg 37.5 mg 37.5 mg 37.5 mg 35 mg 37.5 mg  40 mg 37.5 mg   INR 1.6 2.0 1.5 2.7 2.1 2.5 2.3 4.2 1.9  1.8 2.2 2.1   Notes Hm- 1 bill today  Missed dose?  Inc GLV HM 2- No bill  Hm- 3 no bill Hm 4 no billing   Boost dose  HM 1 billing today  2 - no bill  HM 3 no billing Hm 4 no billing HM 1 billing day  1x red  HM-2 no bill HM-3 no bill  boost HM-4 bill     Date 1/2/24 1/9 1/17 1/23/24 1/30 2/6/24 2/13/24 2/20/24 2/27 3/5/24 3/13/24 3/19 3/26/24   Total Weekly  Dose 37.5 mg 40 mg 40 mg 40 mg 40 mg 40 mg 40 mg 45 mg 40mg  37.5 mg  35 mg 37.5 mg 37.5 mg   INR 1.8 1.9 2.2 2.4 2.1 2.7 1.4 2.2 3.7 3.3 2.5 2.2 2.2   Notes  1  billing day HM-2 no billing boost Hm 3 no bill HM 4 - no bill  HM 1-bill today HM 2 -no bill  HM 3 - no bill  HM 4 - no bill   BOOST  Hm- 1   Bill today HM 2 - no bill  HM 3 no bill Hm 4 no bill HM 1 - Bill Today      Date 4/9 4/16              Total WeeklyDose 37.5 mg 37.5 mg              INR 2.1 2.3              Notes HM 2 - No Bill HM 3 - No Bill                Takes warfarin in the am    Clinic Interview:  Verbal Release Authorization signed on 8/22/2018 -- may speak with Raeann Montemayor (Wife). [phone] (Home) *Preferred*  Tablet Strength: pt has 7.5mg and 2.5mg tablets  Estimated oop cost for [facility] ED[facility] HM: Patient has [employer] Medicare so they should have minimal to no OOP costs.                  Patient Findings    Negatives: Signs/symptoms of thrombosis, Signs/symptoms of bleeding, Laboratory test error suspected, Change in health, Change in alcohol use, Change in activity, Upcoming invasive procedure, Emergency department visit, Upcoming dental procedure, Missed doses, Extra doses, Change in medications, Change in diet/appetite, Hospital admission, Bruising, Other complaints   Comments: All findings negative per patient.     Plan:   1. INR is therapeutic today at 2.3 (goal 2.0-3.0). Instructed Mr. Montemayor to continue warfarin 5 mg oral daily except warfarin 7.5 mg oral Fridays until recheck.  2. Recheck INR in one week, 4/23/24.  3. Verbal information provided over the telephone. Mr. Montemayor expresses understanding with teach back and has no further questions at this time.     Home monitor information below:  Test strips on hand: 3 4/16/24 - likely two unable to confirm - sending strips on 4/16  Lot : [id number]  Barcode : [id number] Exp : 1/25  Serial number: [id number]   Supplies billing code used: last 3/26/24  Next must be on or after 04/23/2024  Transfer Tube Lot number: Lot : [id number]  Safety Lancets:  Lot : [id number]     Billing:  *bill must be 4 tests 28 days or more from  visit  PLEASE REFER TO  ANTICOAGULATION TRAINING POWERPOINT FOR BILLING. Needs to use in clinic remote appointment for encounter.      Kristi Dunham CPhT   13:53 EDT 4/16/2024    I, Yahir Rene, PharmD, have reviewed the note in full and agree with the assessment and plan.  04/16/24  15:21 EDT

## 2024-04-24 ENCOUNTER — ANTICOAGULATION VISIT (OUTPATIENT)
Dept: PHARMACY | Facility: HOSPITAL | Age: 79
End: 2024-04-24
Payer: MEDICARE

## 2024-04-24 DIAGNOSIS — I48.21 PERMANENT ATRIAL FIBRILLATION: Primary | ICD-10-CM

## 2024-04-24 LAB — INR PPP: 2.5

## 2024-04-24 NOTE — PROGRESS NOTES
Anticoagulation Clinic Progress Note   Oswaldo HOMEMONITOR: [employer] Serial # : [id number]     Indication: afib  Referring Provider: Gale  Initial Warfarin Start Date: 2007  Goal INR: 2-3  Current Drug Interactions:   [id number]: 5 (HTN, Age, CHF, CAD)   Other: anemic, CKD (stage 4) May need bridge with Lovenox given persistent AF (per cardio 1/24/2019), Scr was 3.22 on 11/24/21    Diet: eats salads 2x weekly 5/11/2023     Anticoagulation Clinic INR History:     Date 3/30 4/13 5/11 6/8  6/23 7/7 7/28  8/9 8/12 8/16   Total WeeklyDose 31.25mg 31.25 mg 31.25 mg 31.25mg Start dialysis 30mg 30mg 30 mg  33.75mg 37.5mg 37.5mg   INR 2.5 2.2 2.6 3.9  2.1 2.1 1.8 1.23 1.4 1.5 2.1   Notes    Inc torsemide Cath placement    AV fistula placement    HM train hm 1 no billing       Date 8/23 8/30 9/6 9/13 9/20 9/30 10/4 10/11 10/18 10/25 11/8 11/15   Total WeeklyDose 31.25 mg 32.5 mg 32.5 mg 32.5 mg 32.5 mg 32.5 mg 35 mg 35 mg 35 mg 32.5 mg 26.5 mg 35 mg   INR 1.9 2.0 2.0 2.5 2.7 1.6 1.7 2.8 2.4 1.7 1.4 1.6   Notes HM2 no billing HM3  No billing HM4  No billing HM 1  Bill today HM 2 No billing HM 3 No bill HM 4  No billing 1x boost  HM 5 no bill day Bill day  HM1 HM- 2 no billing   HM 3- no billing 1x boost  HM 4 - no billing      Date 11/22 11/29 12/6 12/13 12/20 12/27 1/3/23 1/10 1/12 1/17 1/26/23 1/31/23   Total WeeklyDose 37.5 mg 37.5 mg 37.5 mg 37.5 mg 42.5 mg  40 mg 37.5 mg 40 mg 42.5mg 45 mg 45 mg 40 mg   INR 1.9 2.8 1.9 1.5 2.3 3.4 1.4 0.8 2.7 POCT  2.23   2.8 HM 3.9 1.9 2.2   Notes HM1- bill day HM 2 - NO bill HM 3 - no bill  Inc GLV Hm 4 - NO BILL HM 1 - Bill today HM 2   no billing HM 3- no billing HM 4  No billing lab error?? clinic HM 1  Bill today HM 2 - no bill  HM 3 - no bill      Date 2/7 2/14 2/21/23 2/28/23 3/7 3/14/23 3/21/23 3/28/23 4/4 4/11 4/18 4/26   Total WeeklyDose 40 mg 40 mg 40 mg 37.5 mg 40 mg 40 mg 40 mg 40 mg 37.5 mg 37.5 mg 37.5 mg  40 mg   INR 2.3 2.2 3.9 1.6 3.7 ??? 2.5 2.7 3.2 2.8  2.5 1.7 1.7   Notes HM 4 rec 2/15  HM 1  Bill today HM 2 - no billing  1xred  HM 3 - NO BILL  HM-4 no bill, percocet HM 5 - no billing until 3/15 HM 1 - BILL today  HM 2 - no Billing HM 3  No billing HM 4  No billing HM 1-  BILL today  HM 2  No billing     Date 5/2 5/5 5/11 5/16 5/22 5/30 6/6 6/14 6/20 6/27 7/5 7/11   Total WeeklyDose 42.5 mg 37.5 mg 42.5mg 42.5 mg  35 mg 40 mg 40 mg 40 mg 37.5 mg 40 mg 40 mg 40 mg   INR 3.5 1.8 2.9 4.2 2.0 2.5 2.8 3.2 1.9 2.2 3.0 3.3   Notes HM 3  No billing HM 4  No billing HM 5  No billing HM-1  Bill  Dec'd GLV  HM 2  No billing  1x hold  HM 3   no billing HM 4  No billing HM 1  Bill today HM 2  No billing HM 3  No bill HM 4  No bill HM5  No bill     Date 7/18 7/25 08/01/23 8/8 8/15/23 8/23/23 08/29/23 09/05/23 9/12/23 9/19/23 9/26/23 10/3   Total WeeklyDose 37.5 mg 35 mg 37.5mg 40 mg 37.5 mg 37.5 mg 37.5 mg 37.5 mg 37.5 mg 37.5 mg 37.5 mg 37.5 mg   INR 3.3 1.9 1.9 2.3 2.6 2.1 2.4 2.3 2.7 2.5 2.7 2.0   Notes HM 1  Bill today HM 2  No billing HM 3  No billing  1 x boost HM 4 no billing  HM 1 - BILL today  HM 2 - no bill  HM 3- no billing HM 4- no billing HM 1 - BILL today  HM 2 - NO bill  HM 3 - no bill  Hm 4- no bill     Date 10/10 10/18 10/24 10/31 11/07 11/14/23 11/21 11/28 12/05/23  12/12  12/19 12/26   Total Weekly Dose 37.5mg 37.5 mg 37.5mg 42.5 mg 37.5 mg 37.5 mg 37.5 mg 37.5 mg 35 mg 37.5 mg  40 mg 37.5 mg   INR 1.6 2.0 1.5 2.7 2.1 2.5 2.3 4.2 1.9  1.8 2.2 2.1   Notes Hm- 1 bill today  Missed dose?  Inc GLV HM 2- No bill  Hm- 3 no bill Hm 4 no billing   Boost dose  HM 1 billing today  2 - no bill  HM 3 no billing Hm 4 no billing HM 1 billing day  1x red  HM-2 no bill HM-3 no bill  boost HM-4 bill     Date 1/2/24 1/9 1/17 1/23/24 1/30 2/6/24 2/13/24 2/20/24 2/27 3/5/24 3/13/24 3/19 3/26/24   Total Weekly  Dose 37.5 mg 40 mg 40 mg 40 mg 40 mg 40 mg 40 mg 45 mg 40mg  37.5 mg  35 mg 37.5 mg 37.5 mg   INR 1.8 1.9 2.2 2.4 2.1 2.7 1.4 2.2 3.7 3.3 2.5 2.2 2.2   Notes  1  billing day HM-2 no billing boost Hm 3 no bill HM 4 - no bill  HM 1-bill today HM 2 -no bill  HM 3 - no bill  HM 4 - no bill   BOOST  Hm- 1   Bill today HM 2 - no bill  HM 3 no bill Hm 4 no bill HM 1 - Bill Today      Date 4/9 4/16 4/24/24             Total WeeklyDose 37.5 mg 37.5 mg 37.5 mg             INR 2.1 2.3 2.5             Notes HM 2 - No Bill HM 3 - No Bill HM 4 - no bill                Takes warfarin in the am    Clinic Interview:  Verbal Release Authorization signed on 8/22/2018 -- may speak with Raeann Montemayor (Wife). [phone] (Home) *Preferred*  Tablet Strength: pt has 7.5mg and 2.5mg tablets  Estimated oop cost for [facility] EDER HM: Patient has [employer] Medicare so they should have minimal to no OOP costs.                    Patient Findings  Negatives: Signs/symptoms of thrombosis, Signs/symptoms of bleeding, Laboratory test error suspected, Change in health, Change in alcohol use, Change in activity, Upcoming invasive procedure, Emergency department visit, Upcoming dental procedure, Missed doses, Extra doses, Change in medications, Change in diet/appetite, Hospital admission, Bruising, Other complaints   Comments: All findings negative per patient.       Plan:   1. INR is therapeutic today at 2.5 (goal 2.0-3.0). Instructed Mr. Montemayor to continue warfarin 5 mg oral daily except warfarin 7.5 mg oral Fridays until recheck.  2. Recheck INR in one week, 5/1/24.  3. Verbal information provided over the telephone. Mr. Montemayor expresses understanding with teach back and has no further questions at this time.     Home monitor information below:  Test strips on hand: 5  4/24/24  Lot : [id number]  Barcode : [id number] Exp : 1/25  Serial number: [id number]   Supplies billing code used: last 3/26/24  Next must be on or after 04/23/2024  Transfer Tube Lot number: Lot : [id number]  Safety Lancets:  Lot : [id number]     Billing:  *bill must be 4 tests 28 days or more from  visit  PLEASE REFER TO ANTICOAGULATION TRAINING  POWERPOINT FOR BILLING. Needs to use in clinic remote appointment for encounter.      Alexander Scherer, Pharmacy Technician  4/24/2024  12:10 EDT    I, Kyle Plata, PharmD, have reviewed the note in full and agree with the assessment and plan.  04/24/24  14:40 EDT

## 2024-05-03 ENCOUNTER — ANTICOAGULATION VISIT (OUTPATIENT)
Dept: PHARMACY | Facility: HOSPITAL | Age: 79
End: 2024-05-03
Payer: MEDICARE

## 2024-05-03 DIAGNOSIS — I48.21 PERMANENT ATRIAL FIBRILLATION: Primary | ICD-10-CM

## 2024-05-03 LAB — INR PPP: 2.6

## 2024-05-03 PROCEDURE — G0249 PROVIDE INR TEST MATER/EQUIP: HCPCS

## 2024-05-03 NOTE — PROGRESS NOTES
Anticoagulation Clinic Progress Note   Oswaldo HOMEMONITOR: [employer] Serial # : [id number]     Indication: afib  Referring Provider: Gale  Initial Warfarin Start Date: 2007  Goal INR: 2-3  Current Drug Interactions:   [id number]: 5 (HTN, Age, CHF, CAD)   Other: anemic, CKD (stage 4) May need bridge with Lovenox given persistent AF (per cardio 1/24/2019), Scr was 3.22 on 11/24/21    Diet: eats salads 2x weekly 5/11/2023     Anticoagulation Clinic INR History:     Date 3/30 4/13 5/11 6/8  6/23 7/7 7/28  8/9 8/12 8/16   Total WeeklyDose 31.25mg 31.25 mg 31.25 mg 31.25mg Start dialysis 30mg 30mg 30 mg  33.75mg 37.5mg 37.5mg   INR 2.5 2.2 2.6 3.9  2.1 2.1 1.8 1.23 1.4 1.5 2.1   Notes    Inc torsemide Cath placement    AV fistula placement    HM train hm 1 no billing       Date 8/23 8/30 9/6 9/13 9/20 9/30 10/4 10/11 10/18 10/25 11/8 11/15   Total WeeklyDose 31.25 mg 32.5 mg 32.5 mg 32.5 mg 32.5 mg 32.5 mg 35 mg 35 mg 35 mg 32.5 mg 26.5 mg 35 mg   INR 1.9 2.0 2.0 2.5 2.7 1.6 1.7 2.8 2.4 1.7 1.4 1.6   Notes HM2 no billing HM3  No billing HM4  No billing HM 1  Bill today HM 2 No billing HM 3 No bill HM 4  No billing 1x boost  HM 5 no bill day Bill day  HM1 HM- 2 no billing   HM 3- no billing 1x boost  HM 4 - no billing      Date 11/22 11/29 12/6 12/13 12/20 12/27 1/3/23 1/10 1/12 1/17 1/26/23 1/31/23   Total WeeklyDose 37.5 mg 37.5 mg 37.5 mg 37.5 mg 42.5 mg  40 mg 37.5 mg 40 mg 42.5mg 45 mg 45 mg 40 mg   INR 1.9 2.8 1.9 1.5 2.3 3.4 1.4 0.8 2.7 POCT  2.23   2.8 HM 3.9 1.9 2.2   Notes HM1- bill day HM 2 - NO bill HM 3 - no bill  Inc GLV Hm 4 - NO BILL HM 1 - Bill today HM 2   no billing HM 3- no billing HM 4  No billing lab error?? clinic HM 1  Bill today HM 2 - no bill  HM 3 - no bill      Date 2/7 2/14 2/21/23 2/28/23 3/7 3/14/23 3/21/23 3/28/23 4/4 4/11 4/18 4/26   Total WeeklyDose 40 mg 40 mg 40 mg 37.5 mg 40 mg 40 mg 40 mg 40 mg 37.5 mg 37.5 mg 37.5 mg  40 mg   INR 2.3 2.2 3.9 1.6 3.7 ??? 2.5 2.7 3.2 2.8  2.5 1.7 1.7   Notes HM 4 rec 2/15  HM 1  Bill today HM 2 - no billing  1xred  HM 3 - NO BILL  HM-4 no bill, percocet HM 5 - no billing until 3/15 HM 1 - BILL today  HM 2 - no Billing HM 3  No billing HM 4  No billing HM 1-  BILL today  HM 2  No billing     Date 5/2 5/5 5/11 5/16 5/22 5/30 6/6 6/14 6/20 6/27 7/5 7/11   Total WeeklyDose 42.5 mg 37.5 mg 42.5mg 42.5 mg  35 mg 40 mg 40 mg 40 mg 37.5 mg 40 mg 40 mg 40 mg   INR 3.5 1.8 2.9 4.2 2.0 2.5 2.8 3.2 1.9 2.2 3.0 3.3   Notes HM 3  No billing HM 4  No billing HM 5  No billing HM-1  Bill  Dec'd GLV  HM 2  No billing  1x hold  HM 3   no billing HM 4  No billing HM 1  Bill today HM 2  No billing HM 3  No bill HM 4  No bill HM5  No bill     Date 7/18 7/25 08/01/23 8/8 8/15/23 8/23/23 08/29/23 09/05/23 9/12/23 9/19/23 9/26/23 10/3   Total WeeklyDose 37.5 mg 35 mg 37.5mg 40 mg 37.5 mg 37.5 mg 37.5 mg 37.5 mg 37.5 mg 37.5 mg 37.5 mg 37.5 mg   INR 3.3 1.9 1.9 2.3 2.6 2.1 2.4 2.3 2.7 2.5 2.7 2.0   Notes HM 1  Bill today HM 2  No billing HM 3  No billing  1 x boost HM 4 no billing  HM 1 - BILL today  HM 2 - no bill  HM 3- no billing HM 4- no billing HM 1 - BILL today  HM 2 - NO bill  HM 3 - no bill  Hm 4- no bill     Date 10/10 10/18 10/24 10/31 11/07 11/14/23 11/21 11/28 12/05/23  12/12  12/19 12/26   Total Weekly Dose 37.5mg 37.5 mg 37.5mg 42.5 mg 37.5 mg 37.5 mg 37.5 mg 37.5 mg 35 mg 37.5 mg  40 mg 37.5 mg   INR 1.6 2.0 1.5 2.7 2.1 2.5 2.3 4.2 1.9  1.8 2.2 2.1   Notes Hm- 1 bill today  Missed dose?  Inc GLV HM 2- No bill  Hm- 3 no bill Hm 4 no billing   Boost dose  HM 1 billing today  2 - no bill  HM 3 no billing Hm 4 no billing HM 1 billing day  1x red  HM-2 no bill HM-3 no bill  boost HM-4 bill     Date 1/2/24 1/9 1/17 1/23/24 1/30 2/6/24 2/13/24 2/20/24 2/27 3/5/24 3/13/24 3/19 3/26/24   Total Weekly  Dose 37.5 mg 40 mg 40 mg 40 mg 40 mg 40 mg 40 mg 45 mg 40mg  37.5 mg  35 mg 37.5 mg 37.5 mg   INR 1.8 1.9 2.2 2.4 2.1 2.7 1.4 2.2 3.7 3.3 2.5 2.2 2.2   Notes  1  billing day HM-2 no billing boost Hm 3 no bill HM 4 - no bill  HM 1-bill today HM 2 -no bill  HM 3 - no bill  HM 4 - no bill   BOOST  Hm- 1   Bill today HM 2 - no bill  HM 3 no bill Hm 4 no bill HM 1 - Bill Today      Date 4/9 4/16 4/24/24 5/1/24            Total WeeklyDose 37.5 mg 37.5 mg 37.5 mg 37.5 mg            INR 2.1 2.3 2.5 2.6            Notes HM 2 - No Bill HM 3 - No Bill HM 4 - no bill  HM 1 - no bill   Rec'd 5/3              Takes warfarin in the am    Clinic Interview:  Verbal Release Authorization signed on 8/22/2018 -- may speak with Raeann Montemayor (Wife). [phone] (Home) *Preferred*  Tablet Strength: pt has 7.5mg and 2.5mg tablets  Estimated oop cost for [facility] EDER HM: Patient has [employer] Medicare so they should have minimal to no OOP costs.                    Patient Findings  Negatives: Signs/symptoms of thrombosis, Signs/symptoms of bleeding, Laboratory test error suspected, Change in health, Change in alcohol use, Change in activity, Upcoming invasive procedure, Emergency department visit, Upcoming dental procedure, Missed doses, Extra doses, Change in medications, Change in diet/appetite, Hospital admission, Bruising, Other complaints   Comments: All findings negative per patient.       Plan:   1. INR was therapeutic 5/1/24 at 2.6 (goal 2.0-3.0). Instructed Mr. Montemayor to continue warfarin 5 mg oral daily except warfarin 7.5 mg oral Fridays until recheck.  2. Recheck INR in one week, 5/8/24.  3. Verbal information provided over the telephone. Mr. Montemayor expresses understanding with teach back and has no further questions at this time.     Home monitor information below:  Test strips on hand: 4  5/3/24  Lot : [id number]  Barcode : [id number] Exp : 1/25  Serial number: [id number]   Supplies billing code used: last 5/3/24  Next must be on or after 05/31/2024  Transfer Tube Lot number: Lot : [id number]  Safety Lancets:  Lot : [id number]     Billing:  *bill must be 4 tests 28 days or more from   visit  PLEASE REFER TO ANTICOAGULATION TRAINING POWERPOINT FOR BILLING. Needs to use in clinic remote appointment for encounter.      Alexander Scherer, Pharmacy Technician  5/3/2024  09:00 EDT    I, Jenni Walter, [facility], have reviewed the note in full and agree with the assessment and plan.  05/03/24  09:08 EDT

## 2024-05-07 ENCOUNTER — ANTICOAGULATION VISIT (OUTPATIENT)
Dept: PHARMACY | Facility: HOSPITAL | Age: 79
End: 2024-05-07
Payer: MEDICARE

## 2024-05-07 DIAGNOSIS — I48.21 PERMANENT ATRIAL FIBRILLATION: Primary | ICD-10-CM

## 2024-05-07 LAB — INR PPP: 2.6

## 2024-05-14 ENCOUNTER — ANTICOAGULATION VISIT (OUTPATIENT)
Dept: PHARMACY | Facility: HOSPITAL | Age: 79
End: 2024-05-14
Payer: MEDICARE

## 2024-05-14 DIAGNOSIS — I48.21 PERMANENT ATRIAL FIBRILLATION: Primary | ICD-10-CM

## 2024-05-14 LAB — INR PPP: 2.7

## 2024-05-14 NOTE — PROGRESS NOTES
Anticoagulation Clinic Progress Note   Oswaldo HOMEMONITOR: [employer] Serial # : [id number]     Indication: afib  Referring Provider: Gale  Initial Warfarin Start Date: 2007  Goal INR: 2-3  Current Drug Interactions:   [id number]: 5 (HTN, Age, CHF, CAD)   Other: anemic, CKD (stage 4) May need bridge with Lovenox given persistent AF (per cardio 1/24/2019), Scr was 3.22 on 11/24/21    Diet: eats salads 2x weekly 5/11/2023     Anticoagulation Clinic INR History:     Date 3/30 4/13 5/11 6/8  6/23 7/7 7/28  8/9 8/12 8/16   Total WeeklyDose 31.25mg 31.25 mg 31.25 mg 31.25mg Start dialysis 30mg 30mg 30 mg  33.75mg 37.5mg 37.5mg   INR 2.5 2.2 2.6 3.9  2.1 2.1 1.8 1.23 1.4 1.5 2.1   Notes    Inc torsemide Cath placement    AV fistula placement    HM train hm 1 no billing       Date 8/23 8/30 9/6 9/13 9/20 9/30 10/4 10/11 10/18 10/25 11/8 11/15   Total WeeklyDose 31.25 mg 32.5 mg 32.5 mg 32.5 mg 32.5 mg 32.5 mg 35 mg 35 mg 35 mg 32.5 mg 26.5 mg 35 mg   INR 1.9 2.0 2.0 2.5 2.7 1.6 1.7 2.8 2.4 1.7 1.4 1.6   Notes HM2 no billing HM3  No billing HM4  No billing HM 1  Bill today HM 2 No billing HM 3 No bill HM 4  No billing 1x boost  HM 5 no bill day Bill day  HM1 HM- 2 no billing   HM 3- no billing 1x boost  HM 4 - no billing      Date 11/22 11/29 12/6 12/13 12/20 12/27 1/3/23 1/10 1/12 1/17 1/26/23 1/31/23   Total WeeklyDose 37.5 mg 37.5 mg 37.5 mg 37.5 mg 42.5 mg  40 mg 37.5 mg 40 mg 42.5mg 45 mg 45 mg 40 mg   INR 1.9 2.8 1.9 1.5 2.3 3.4 1.4 0.8 2.7 POCT  2.23   2.8 HM 3.9 1.9 2.2   Notes HM1- bill day HM 2 - NO bill HM 3 - no bill  Inc GLV Hm 4 - NO BILL HM 1 - Bill today HM 2   no billing HM 3- no billing HM 4  No billing lab error?? clinic HM 1  Bill today HM 2 - no bill  HM 3 - no bill      Date 2/7 2/14 2/21/23 2/28/23 3/7 3/14/23 3/21/23 3/28/23 4/4 4/11 4/18 4/26   Total WeeklyDose 40 mg 40 mg 40 mg 37.5 mg 40 mg 40 mg 40 mg 40 mg 37.5 mg 37.5 mg 37.5 mg  40 mg   INR 2.3 2.2 3.9 1.6 3.7 ??? 2.5 2.7 3.2 2.8  2.5 1.7 1.7   Notes HM 4 rec 2/15  HM 1  Bill today HM 2 - no billing  1xred  HM 3 - NO BILL  HM-4 no bill, percocet HM 5 - no billing until 3/15 HM 1 - BILL today  HM 2 - no Billing HM 3  No billing HM 4  No billing HM 1-  BILL today  HM 2  No billing     Date 5/2 5/5 5/11 5/16 5/22 5/30 6/6 6/14 6/20 6/27 7/5 7/11   Total WeeklyDose 42.5 mg 37.5 mg 42.5mg 42.5 mg  35 mg 40 mg 40 mg 40 mg 37.5 mg 40 mg 40 mg 40 mg   INR 3.5 1.8 2.9 4.2 2.0 2.5 2.8 3.2 1.9 2.2 3.0 3.3   Notes HM 3  No billing HM 4  No billing HM 5  No billing HM-1  Bill  Dec'd GLV  HM 2  No billing  1x hold  HM 3   no billing HM 4  No billing HM 1  Bill today HM 2  No billing HM 3  No bill HM 4  No bill HM5  No bill     Date 7/18 7/25 08/01/23 8/8 8/15/23 8/23/23 08/29/23 09/05/23 9/12/23 9/19/23 9/26/23 10/3   Total WeeklyDose 37.5 mg 35 mg 37.5mg 40 mg 37.5 mg 37.5 mg 37.5 mg 37.5 mg 37.5 mg 37.5 mg 37.5 mg 37.5 mg   INR 3.3 1.9 1.9 2.3 2.6 2.1 2.4 2.3 2.7 2.5 2.7 2.0   Notes HM 1  Bill today HM 2  No billing HM 3  No billing  1 x boost HM 4 no billing  HM 1 - BILL today  HM 2 - no bill  HM 3- no billing HM 4- no billing HM 1 - BILL today  HM 2 - NO bill  HM 3 - no bill  Hm 4- no bill     Date 10/10 10/18 10/24 10/31 11/07 11/14/23 11/21 11/28 12/05/23  12/12  12/19 12/26   Total Weekly Dose 37.5mg 37.5 mg 37.5mg 42.5 mg 37.5 mg 37.5 mg 37.5 mg 37.5 mg 35 mg 37.5 mg  40 mg 37.5 mg   INR 1.6 2.0 1.5 2.7 2.1 2.5 2.3 4.2 1.9  1.8 2.2 2.1   Notes Hm- 1 bill today  Missed dose?  Inc GLV HM 2- No bill  Hm- 3 no bill Hm 4 no billing   Boost dose  HM 1 billing today  2 - no bill  HM 3 no billing Hm 4 no billing HM 1 billing day  1x red  HM-2 no bill HM-3 no bill  boost HM-4 bill     Date 1/2/24 1/9 1/17 1/23/24 1/30 2/6/24 2/13/24 2/20/24 2/27 3/5/24 3/13/24 3/19 3/26/24   Total Weekly  Dose 37.5 mg 40 mg 40 mg 40 mg 40 mg 40 mg 40 mg 45 mg 40mg  37.5 mg  35 mg 37.5 mg 37.5 mg   INR 1.8 1.9 2.2 2.4 2.1 2.7 1.4 2.2 3.7 3.3 2.5 2.2 2.2   Notes  1  billing day HM-2 no billing boost Hm 3 no bill HM 4 - no bill  HM 1-bill today HM 2 -no bill  HM 3 - no bill  HM 4 - no bill   BOOST  Hm- 1   Bill today HM 2 - no bill  HM 3 no bill Hm 4 no bill HM 1 - Bill Today      Date 4/9 4/16 4/24/24 5/1/24 5/7/24 5/14          Total WeeklyDose 37.5 mg 37.5 mg 37.5 mg 37.5 mg 37.5 mg           INR 2.1 2.3 2.5 2.6 2.6 2.7          Notes HM 2 - No Bill HM 3 - No Bill HM 4 - no bill  HM 1 - no bill   Rec'd 5/3 HM 2 - no bill  HM 3 - no bill            Takes warfarin in the am    Clinic Interview:  Verbal Release Authorization signed on 8/22/2018 -- may speak with Raeann Montemayor (Wife). [phone] (Home) *Preferred*  Tablet Strength: pt has 7.5mg and 2.5mg tablets  Estimated oop cost for [facility] EDER HM: Patient has [employer] Medicare so they should have minimal to no OOP costs.                            Plan:   1. INR is therapeutic today at 2.7 (goal 2.0-3.0). Instructed Mr. Montemayor to continue warfarin 5 mg oral daily except warfarin 7.5 mg oral Fridays until recheck.  2. Recheck INR in 1 week, 5/21/24  3. Verbal information provided over the telephone. Mr. Montemayor expresses understanding with teach back and has no further questions at this time.     Home monitor information below:  Test strips on hand: 4    Lot : [id number]  Barcode : [id number] Exp : 1/25  Serial number: [id number]   Supplies billing code used: last 5/3/24  Next must be on or after 05/31/2024  Transfer Tube Lot number: Lot : [id number]  Safety Lancets:  Lot : [id number]     Billing:  *bill must be 4 tests 28 days or more from  visit  PLEASE REFER TO ANTICOAGULATION TRAINING POWERPOINT FOR BILLING. Needs to use in clinic remote appointment for encounter.

## 2024-05-22 ENCOUNTER — ANTICOAGULATION VISIT (OUTPATIENT)
Dept: PHARMACY | Facility: HOSPITAL | Age: 79
End: 2024-05-22
Payer: MEDICARE

## 2024-05-22 DIAGNOSIS — I48.21 PERMANENT ATRIAL FIBRILLATION: Primary | ICD-10-CM

## 2024-05-22 LAB — INR PPP: 2.9

## 2024-05-22 NOTE — PROGRESS NOTES
Anticoagulation Clinic Progress Note   Oswaldo HOMEMONITOR: [employer] Serial # : [id number]     Indication: afib  Referring Provider: Gale  Initial Warfarin Start Date: 2007  Goal INR: 2-3  Current Drug Interactions:   [id number]: 5 (HTN, Age, CHF, CAD)   Other: anemic, CKD (stage 4) May need bridge with Lovenox given persistent AF (per cardio 1/24/2019), Scr was 3.22 on 11/24/21    Diet: eats salads 2x weekly 5/11/2023     Anticoagulation Clinic INR History:     Date 3/30 4/13 5/11 6/8  6/23 7/7 7/28  8/9 8/12 8/16   Total WeeklyDose 31.25mg 31.25 mg 31.25 mg 31.25mg Start dialysis 30mg 30mg 30 mg  33.75mg 37.5mg 37.5mg   INR 2.5 2.2 2.6 3.9  2.1 2.1 1.8 1.23 1.4 1.5 2.1   Notes    Inc torsemide Cath placement    AV fistula placement    HM train hm 1 no billing       Date 8/23 8/30 9/6 9/13 9/20 9/30 10/4 10/11 10/18 10/25 11/8 11/15   Total WeeklyDose 31.25 mg 32.5 mg 32.5 mg 32.5 mg 32.5 mg 32.5 mg 35 mg 35 mg 35 mg 32.5 mg 26.5 mg 35 mg   INR 1.9 2.0 2.0 2.5 2.7 1.6 1.7 2.8 2.4 1.7 1.4 1.6   Notes HM2 no billing HM3  No billing HM4  No billing HM 1  Bill today HM 2 No billing HM 3 No bill HM 4  No billing 1x boost  HM 5 no bill day Bill day  HM1 HM- 2 no billing   HM 3- no billing 1x boost  HM 4 - no billing      Date 11/22 11/29 12/6 12/13 12/20 12/27 1/3/23 1/10 1/12 1/17 1/26/23 1/31/23   Total WeeklyDose 37.5 mg 37.5 mg 37.5 mg 37.5 mg 42.5 mg  40 mg 37.5 mg 40 mg 42.5mg 45 mg 45 mg 40 mg   INR 1.9 2.8 1.9 1.5 2.3 3.4 1.4 0.8 2.7 POCT  2.23   2.8 HM 3.9 1.9 2.2   Notes HM1- bill day HM 2 - NO bill HM 3 - no bill  Inc GLV Hm 4 - NO BILL HM 1 - Bill today HM 2   no billing HM 3- no billing HM 4  No billing lab error?? clinic HM 1  Bill today HM 2 - no bill  HM 3 - no bill      Date 2/7 2/14 2/21/23 2/28/23 3/7 3/14/23 3/21/23 3/28/23 4/4 4/11 4/18 4/26   Total WeeklyDose 40 mg 40 mg 40 mg 37.5 mg 40 mg 40 mg 40 mg 40 mg 37.5 mg 37.5 mg 37.5 mg  40 mg   INR 2.3 2.2 3.9 1.6 3.7 ??? 2.5 2.7 3.2 2.8  2.5 1.7 1.7   Notes HM 4 rec 2/15  HM 1  Bill today HM 2 - no billing  1xred  HM 3 - NO BILL  HM-4 no bill, percocet HM 5 - no billing until 3/15 HM 1 - BILL today  HM 2 - no Billing HM 3  No billing HM 4  No billing HM 1-  BILL today  HM 2  No billing     Date 5/2 5/5 5/11 5/16 5/22 5/30 6/6 6/14 6/20 6/27 7/5 7/11   Total WeeklyDose 42.5 mg 37.5 mg 42.5mg 42.5 mg  35 mg 40 mg 40 mg 40 mg 37.5 mg 40 mg 40 mg 40 mg   INR 3.5 1.8 2.9 4.2 2.0 2.5 2.8 3.2 1.9 2.2 3.0 3.3   Notes HM 3  No billing HM 4  No billing HM 5  No billing HM-1  Bill  Dec'd GLV  HM 2  No billing  1x hold  HM 3   no billing HM 4  No billing HM 1  Bill today HM 2  No billing HM 3  No bill HM 4  No bill HM5  No bill     Date 7/18 7/25 08/01/23 8/8 8/15/23 8/23/23 08/29/23 09/05/23 9/12/23 9/19/23 9/26/23 10/3   Total WeeklyDose 37.5 mg 35 mg 37.5mg 40 mg 37.5 mg 37.5 mg 37.5 mg 37.5 mg 37.5 mg 37.5 mg 37.5 mg 37.5 mg   INR 3.3 1.9 1.9 2.3 2.6 2.1 2.4 2.3 2.7 2.5 2.7 2.0   Notes HM 1  Bill today HM 2  No billing HM 3  No billing  1 x boost HM 4 no billing  HM 1 - BILL today  HM 2 - no bill  HM 3- no billing HM 4- no billing HM 1 - BILL today  HM 2 - NO bill  HM 3 - no bill  Hm 4- no bill     Date 10/10 10/18 10/24 10/31 11/07 11/14/23 11/21 11/28 12/05/23  12/12  12/19 12/26   Total Weekly Dose 37.5mg 37.5 mg 37.5mg 42.5 mg 37.5 mg 37.5 mg 37.5 mg 37.5 mg 35 mg 37.5 mg  40 mg 37.5 mg   INR 1.6 2.0 1.5 2.7 2.1 2.5 2.3 4.2 1.9  1.8 2.2 2.1   Notes Hm- 1 bill today  Missed dose?  Inc GLV HM 2- No bill  Hm- 3 no bill Hm 4 no billing   Boost dose  HM 1 billing today  2 - no bill  HM 3 no billing Hm 4 no billing HM 1 billing day  1x red  HM-2 no bill HM-3 no bill  boost HM-4 bill     Date 1/2/24 1/9 1/17 1/23/24 1/30 2/6/24 2/13/24 2/20/24 2/27 3/5/24 3/13/24 3/19 3/26/24   Total Weekly  Dose 37.5 mg 40 mg 40 mg 40 mg 40 mg 40 mg 40 mg 45 mg 40mg  37.5 mg  35 mg 37.5 mg 37.5 mg   INR 1.8 1.9 2.2 2.4 2.1 2.7 1.4 2.2 3.7 3.3 2.5 2.2 2.2   Notes  1  billing day HM-2 no billing boost Hm 3 no bill HM 4 - no bill  HM 1-bill today HM 2 -no bill  HM 3 - no bill  HM 4 - no bill   BOOST  Hm- 1   Bill today HM 2 - no bill  HM 3 no bill Hm 4 no bill HM 1 - Bill Today      Date 4/9 4/16 4/24/24 5/1/24 5/7/24 5/14 5/22         Total WeeklyDose 37.5 mg 37.5 mg 37.5 mg 37.5 mg 37.5 mg 37.5 mg 37.5 mg         INR 2.1 2.3 2.5 2.6 2.6 2.7 2.9         Notes HM 2 - No Bill HM 3 - No Bill HM 4 - no bill  HM 1 - no bill   Rec'd 5/3 HM 2 - no bill  HM 3 - no bill HM 4 - no bill           Takes warfarin in the am    Clinic Interview:  Verbal Release Authorization signed on 8/22/2018 -- may speak with Raeann Montemayor (Wife). [phone] (Home) *Preferred*  Tablet Strength: pt has 7.5mg and 2.5mg tablets  Estimated oop cost for [facility] EDER HM: Patient has [employer] Medicare so they should have minimal to no OOP costs.                  Patient Findings    Negatives: Signs/symptoms of thrombosis, Signs/symptoms of bleeding, Laboratory test error suspected, Change in health, Change in alcohol use, Change in activity, Upcoming invasive procedure, Emergency department visit, Upcoming dental procedure, Missed doses, Extra doses, Change in medications, Change in diet/appetite, Hospital admission, Bruising, Other complaints   Comments: All findings negative per patient.     Plan:   1. INR is therapeutic today at 2.9 (goal 2.0-3.0). Instructed Mr. Montemayor to continue warfarin 5 mg oral daily except warfarin 7.5 mg oral Fridays until recheck.  2. Recheck INR 5/28, per patient preference.   3. Verbal information provided over the telephone. Mr. Montemayor expresses understanding with teach back and has no further questions at this time.     Home monitor information below:  Test strips on hand: 2  5/22/24 - Sending more tomorrow   Lot : [id number]  Barcode : [id number] Exp : 1/25  Serial number: [id number]   Supplies billing code used: last 5/3/24  Next must be on or after 05/31/2024  Transfer Tube  Lot number: Lot : [id number]  Safety Lancets:  Lot : [id number]     Billing:  *bill must be 4 tests 28 days or more from HM visit  PLEASE REFER TO ANTICOAGULATION TRAINING POWERPOINT FOR BILLING. Needs to use in clinic remote appointment for encounter.      Kristi Dunham CPhT   11:19 EDT 5/22/2024    IJenni, [facility], have reviewed the note in full and agree with the assessment and plan.  05/22/24  12:07 EDT

## 2024-05-28 ENCOUNTER — ANTICOAGULATION VISIT (OUTPATIENT)
Dept: PHARMACY | Facility: HOSPITAL | Age: 79
End: 2024-05-28
Payer: MEDICARE

## 2024-05-28 DIAGNOSIS — I48.21 PERMANENT ATRIAL FIBRILLATION: Primary | ICD-10-CM

## 2024-05-28 LAB — INR PPP: 2.8

## 2024-05-28 RX ORDER — BUMETANIDE 2 MG/1
TABLET ORAL
Qty: 48 TABLET | Refills: 2 | Status: SHIPPED | OUTPATIENT
Start: 2024-05-28

## 2024-05-28 NOTE — PROGRESS NOTES
Anticoagulation Clinic Progress Note   Oswaldo HOMEMONITOR: [employer] Check Serial # : [id number]     Indication: afib  Referring Provider: Gale  Initial Warfarin Start Date: 2007  Goal INR: 2-3  Current Drug Interactions:   [id number]: 5 (HTN, Age, CHF, CAD)   Other: anemic, CKD (stage 4) May need bridge with Lovenox given persistent AF (per cardio 1/24/2019), Scr was 3.22 on 11/24/21    Diet: eats salads 2x weekly 5/11/2023     Anticoagulation Clinic INR History:     Date 3/30 4/13 5/11 6/8  6/23 7/7 7/28  8/9 8/12 8/16   Total WeeklyDose 31.25mg 31.25 mg 31.25 mg 31.25mg Start dialysis 30mg 30mg 30 mg  33.75mg 37.5mg 37.5mg   INR 2.5 2.2 2.6 3.9  2.1 2.1 1.8 1.23 1.4 1.5 2.1   Notes    Inc torsemide Cath placement    AV fistula placement    HM train hm 1 no billing       Date 8/23 8/30 9/6 9/13 9/20 9/30 10/4 10/11 10/18 10/25 11/8 11/15   Total WeeklyDose 31.25 mg 32.5 mg 32.5 mg 32.5 mg 32.5 mg 32.5 mg 35 mg 35 mg 35 mg 32.5 mg 26.5 mg 35 mg   INR 1.9 2.0 2.0 2.5 2.7 1.6 1.7 2.8 2.4 1.7 1.4 1.6   Notes HM2 no billing HM3  No billing HM4  No billing HM 1  Bill today HM 2 No billing HM 3 No bill HM 4  No billing 1x boost  HM 5 no bill day Bill day  HM1 HM- 2 no billing   HM 3- no billing 1x boost  HM 4 - no billing      Date 11/22 11/29 12/6 12/13 12/20 12/27 1/3/23 1/10 1/12 1/17 1/26/23 1/31/23   Total WeeklyDose 37.5 mg 37.5 mg 37.5 mg 37.5 mg 42.5 mg  40 mg 37.5 mg 40 mg 42.5mg 45 mg 45 mg 40 mg   INR 1.9 2.8 1.9 1.5 2.3 3.4 1.4 0.8 2.7 POCT  2.23   2.8 HM 3.9 1.9 2.2   Notes HM1- bill day HM 2 - NO bill HM 3 - no bill  Inc GLV Hm 4 - NO BILL HM 1 - Bill today HM 2   no billing HM 3- no billing HM 4  No billing lab error?? clinic HM 1  Bill today HM 2 - no bill  HM 3 - no bill      Date 2/7 2/14 2/21/23 2/28/23 3/7 3/14/23 3/21/23 3/28/23 4/4 4/11 4/18 4/26   Total WeeklyDose 40 mg 40 mg 40 mg 37.5 mg 40 mg 40 mg 40 mg 40 mg 37.5 mg 37.5 mg 37.5 mg  40 mg   INR 2.3 2.2 3.9 1.6 3.7 ??? 2.5 2.7 3.2 2.8  2.5 1.7 1.7   Notes HM 4 rec 2/15  HM 1  Bill today HM 2 - no billing  1xred  HM 3 - NO BILL  HM-4 no bill, percocet HM 5 - no billing until 3/15 HM 1 - BILL today  HM 2 - no Billing HM 3  No billing HM 4  No billing HM 1-  BILL today  HM 2  No billing     Date 5/2 5/5 5/11 5/16 5/22 5/30 6/6 6/14 6/20 6/27 7/5 7/11   Total WeeklyDose 42.5 mg 37.5 mg 42.5mg 42.5 mg  35 mg 40 mg 40 mg 40 mg 37.5 mg 40 mg 40 mg 40 mg   INR 3.5 1.8 2.9 4.2 2.0 2.5 2.8 3.2 1.9 2.2 3.0 3.3   Notes HM 3  No billing HM 4  No billing HM 5  No billing HM-1  Bill  Dec'd GLV  HM 2  No billing  1x hold  HM 3   no billing HM 4  No billing HM 1  Bill today HM 2  No billing HM 3  No bill HM 4  No bill HM5  No bill     Date 7/18 7/25 08/01/23 8/8 8/15/23 8/23/23 08/29/23 09/05/23 9/12/23 9/19/23 9/26/23 10/3   Total WeeklyDose 37.5 mg 35 mg 37.5mg 40 mg 37.5 mg 37.5 mg 37.5 mg 37.5 mg 37.5 mg 37.5 mg 37.5 mg 37.5 mg   INR 3.3 1.9 1.9 2.3 2.6 2.1 2.4 2.3 2.7 2.5 2.7 2.0   Notes HM 1  Bill today HM 2  No billing HM 3  No billing  1 x boost HM 4 no billing  HM 1 - BILL today  HM 2 - no bill  HM 3- no billing HM 4- no billing HM 1 - BILL today  HM 2 - NO bill  HM 3 - no bill  Hm 4- no bill     Date 10/10 10/18 10/24 10/31 11/07 11/14/23 11/21 11/28 12/05/23  12/12  12/19 12/26   Total Weekly Dose 37.5mg 37.5 mg 37.5mg 42.5 mg 37.5 mg 37.5 mg 37.5 mg 37.5 mg 35 mg 37.5 mg  40 mg 37.5 mg   INR 1.6 2.0 1.5 2.7 2.1 2.5 2.3 4.2 1.9  1.8 2.2 2.1   Notes Hm- 1 bill today  Missed dose?  Inc GLV HM 2- No bill  Hm- 3 no bill Hm 4 no billing   Boost dose  HM 1 billing today  2 - no bill  HM 3 no billing Hm 4 no billing HM 1 billing day  1x red  HM-2 no bill HM-3 no bill  boost HM-4 bill     Date 1/2/24 1/9 1/17 1/23/24 1/30 2/6/24 2/13/24 2/20/24 2/27 3/5/24 3/13/24 3/19 3/26/24   Total Weekly  Dose 37.5 mg 40 mg 40 mg 40 mg 40 mg 40 mg 40 mg 45 mg 40mg  37.5 mg  35 mg 37.5 mg 37.5 mg   INR 1.8 1.9 2.2 2.4 2.1 2.7 1.4 2.2 3.7 3.3 2.5 2.2 2.2   Notes  1  billing day HM-2 no billing boost Hm 3 no bill HM 4 - no bill  HM 1-bill today HM 2 -no bill  HM 3 - no bill  HM 4 - no bill   BOOST  Hm- 1   Bill today HM 2 - no bill  HM 3 no bill Hm 4 no bill HM 1 - Bill Today      Date 4/9 4/16 4/24/24 5/1/24 5/7/24 5/14 5/22 5/28        Total WeeklyDose 37.5 mg 37.5 mg 37.5 mg 37.5 mg 37.5 mg 37.5 mg 37.5 mg 37.5 mg        INR 2.1 2.3 2.5 2.6 2.6 2.7 2.9 2.8        Notes HM 2 - No Bill HM 3 - No Bill HM 4 - no bill  HM 1 - no bill   Rec'd 5/3 HM 2 - no bill  HM 3 - no bill HM 4 - no bill HM 5 - no bill          Takes warfarin in the am    Clinic Interview:  Verbal Release Authorization signed on 8/22/2018 -- may speak with Raeann Montemayor (Wife). [phone] (Home) *Preferred*  Tablet Strength: pt has 7.5mg and 2.5mg tablets  Estimated oop cost for BH EDER HM: Patient has [employer] Medicare so they should have minimal to no OOP costs.                  Patient Findings    Negatives: Signs/symptoms of thrombosis, Signs/symptoms of bleeding, Laboratory test error suspected, Change in health, Change in alcohol use, Change in activity, Upcoming invasive procedure, Emergency department visit, Upcoming dental procedure, Missed doses, Extra doses, Change in medications, Change in diet/appetite, Hospital admission, Bruising, Other complaints   Comments: All findings negative per patient.     Plan:   1. INR is therapeutic today at 2.8 (goal 2.0-3.0). Instructed Mr. Montemayor to continue warfarin 5 mg oral daily except warfarin 7.5 mg oral Fridays until recheck.  2. Recheck INR in 1 week, 6/04/24  3. Verbal information provided over the telephone. Mr. Montemayor expresses understanding with teach back and has no further questions at this time.     Home monitor information below:  Test strips on hand: 6  5/28/24  Lot : [id number]  Barcode : [id number] Exp : 1/25  Serial number: [id number]   Supplies billing code used: last 5/3/24  Next must be on or after 05/31/2024  Transfer Tube Lot number:  Lot : [id number]  Safety Lancets:  Lot : [id number]     Billing:  *bill must be 4 tests 28 days or more from HM visit  PLEASE REFER TO ANTICOAGULATION TRAINING POWERPOINT FOR BILLING. Needs to use in clinic remote appointment for encounter.      Kristi Dunham CPhT   16:06 EDT 5/28/2024    I, Maciel Bojorquez, [facility], have reviewed the note in full and agree with the assessment and plan.  05/28/24  17:04 EDT

## 2024-06-04 ENCOUNTER — ANTICOAGULATION VISIT (OUTPATIENT)
Dept: PHARMACY | Facility: HOSPITAL | Age: 79
End: 2024-06-04
Payer: MEDICARE

## 2024-06-04 DIAGNOSIS — I48.21 PERMANENT ATRIAL FIBRILLATION: Primary | ICD-10-CM

## 2024-06-04 LAB — INR PPP: 2.7

## 2024-06-04 PROCEDURE — G0249 PROVIDE INR TEST MATER/EQUIP: HCPCS

## 2024-06-04 NOTE — PROGRESS NOTES
Anticoagulation Clinic Progress Note   Oswaldo HOMEMONITOR: [employer] Serial # : [id number]     Indication: afib  Referring Provider: Gale  Initial Warfarin Start Date: 2007  Goal INR: 2-3  Current Drug Interactions:   [id number]: 5 (HTN, Age, CHF, CAD)   Other: anemic, CKD (stage 4) May need bridge with Lovenox given persistent AF (per cardio 1/24/2019), Scr was 3.22 on 11/24/21    Diet: eats salads 2x weekly 5/11/2023     Anticoagulation Clinic INR History:     Date 3/30 4/13 5/11 6/8  6/23 7/7 7/28  8/9 8/12 8/16   Total WeeklyDose 31.25mg 31.25 mg 31.25 mg 31.25mg Start dialysis 30mg 30mg 30 mg  33.75mg 37.5mg 37.5mg   INR 2.5 2.2 2.6 3.9  2.1 2.1 1.8 1.23 1.4 1.5 2.1   Notes    Inc torsemide Cath placement    AV fistula placement    HM train hm 1 no billing       Date 8/23 8/30 9/6 9/13 9/20 9/30 10/4 10/11 10/18 10/25 11/8 11/15   Total WeeklyDose 31.25 mg 32.5 mg 32.5 mg 32.5 mg 32.5 mg 32.5 mg 35 mg 35 mg 35 mg 32.5 mg 26.5 mg 35 mg   INR 1.9 2.0 2.0 2.5 2.7 1.6 1.7 2.8 2.4 1.7 1.4 1.6   Notes HM2 no billing HM3  No billing HM4  No billing HM 1  Bill today HM 2 No billing HM 3 No bill HM 4  No billing 1x boost  HM 5 no bill day Bill day  HM1 HM- 2 no billing   HM 3- no billing 1x boost  HM 4 - no billing      Date 11/22 11/29 12/6 12/13 12/20 12/27 1/3/23 1/10 1/12 1/17 1/26/23 1/31/23   Total WeeklyDose 37.5 mg 37.5 mg 37.5 mg 37.5 mg 42.5 mg  40 mg 37.5 mg 40 mg 42.5mg 45 mg 45 mg 40 mg   INR 1.9 2.8 1.9 1.5 2.3 3.4 1.4 0.8 2.7 POCT  2.23   2.8 HM 3.9 1.9 2.2   Notes HM1- bill day HM 2 - NO bill HM 3 - no bill  Inc GLV Hm 4 - NO BILL HM 1 - Bill today HM 2   no billing HM 3- no billing HM 4  No billing lab error?? clinic HM 1  Bill today HM 2 - no bill  HM 3 - no bill      Date 2/7 2/14 2/21/23 2/28/23 3/7 3/14/23 3/21/23 3/28/23 4/4 4/11 4/18 4/26   Total WeeklyDose 40 mg 40 mg 40 mg 37.5 mg 40 mg 40 mg 40 mg 40 mg 37.5 mg 37.5 mg 37.5 mg  40 mg   INR 2.3 2.2 3.9 1.6 3.7 ??? 2.5 2.7 3.2 2.8  2.5 1.7 1.7   Notes HM 4 rec 2/15  HM 1  Bill today HM 2 - no billing  1xred  HM 3 - NO BILL  HM-4 no bill, percocet HM 5 - no billing until 3/15 HM 1 - BILL today  HM 2 - no Billing HM 3  No billing HM 4  No billing HM 1-  BILL today  HM 2  No billing     Date 5/2 5/5 5/11 5/16 5/22 5/30 6/6 6/14 6/20 6/27 7/5 7/11   Total WeeklyDose 42.5 mg 37.5 mg 42.5mg 42.5 mg  35 mg 40 mg 40 mg 40 mg 37.5 mg 40 mg 40 mg 40 mg   INR 3.5 1.8 2.9 4.2 2.0 2.5 2.8 3.2 1.9 2.2 3.0 3.3   Notes HM 3  No billing HM 4  No billing HM 5  No billing HM-1  Bill  Dec'd GLV  HM 2  No billing  1x hold  HM 3   no billing HM 4  No billing HM 1  Bill today HM 2  No billing HM 3  No bill HM 4  No bill HM5  No bill     Date 7/18 7/25 08/01/23 8/8 8/15/23 8/23/23 08/29/23 09/05/23 9/12/23 9/19/23 9/26/23 10/3   Total WeeklyDose 37.5 mg 35 mg 37.5mg 40 mg 37.5 mg 37.5 mg 37.5 mg 37.5 mg 37.5 mg 37.5 mg 37.5 mg 37.5 mg   INR 3.3 1.9 1.9 2.3 2.6 2.1 2.4 2.3 2.7 2.5 2.7 2.0   Notes HM 1  Bill today HM 2  No billing HM 3  No billing  1 x boost HM 4 no billing  HM 1 - BILL today  HM 2 - no bill  HM 3- no billing HM 4- no billing HM 1 - BILL today  HM 2 - NO bill  HM 3 - no bill  Hm 4- no bill     Date 10/10 10/18 10/24 10/31 11/07 11/14/23 11/21 11/28 12/05/23  12/12  12/19 12/26   Total Weekly Dose 37.5mg 37.5 mg 37.5mg 42.5 mg 37.5 mg 37.5 mg 37.5 mg 37.5 mg 35 mg 37.5 mg  40 mg 37.5 mg   INR 1.6 2.0 1.5 2.7 2.1 2.5 2.3 4.2 1.9  1.8 2.2 2.1   Notes Hm- 1 bill today  Missed dose?  Inc GLV HM 2- No bill  Hm- 3 no bill Hm 4 no billing   Boost dose  HM 1 billing today  2 - no bill  HM 3 no billing Hm 4 no billing HM 1 billing day  1x red  HM-2 no bill HM-3 no bill  boost HM-4 bill     Date 1/2/24 1/9 1/17 1/23/24 1/30 2/6/24 2/13/24 2/20/24 2/27 3/5/24 3/13/24 3/19 3/26/24   Total Weekly  Dose 37.5 mg 40 mg 40 mg 40 mg 40 mg 40 mg 40 mg 45 mg 40mg  37.5 mg  35 mg 37.5 mg 37.5 mg   INR 1.8 1.9 2.2 2.4 2.1 2.7 1.4 2.2 3.7 3.3 2.5 2.2 2.2   Notes  1  billing day HM-2 no billing boost Hm 3 no bill HM 4 - no bill  HM 1-bill today HM 2 -no bill  HM 3 - no bill  HM 4 - no bill   BOOST  Hm- 1   Bill today HM 2 - no bill  HM 3 no bill Hm 4 no bill HM 1 - Bill Today      Date 4/9 4/16 4/24/24 5/1/24 5/7/24 5/14 5/22 5/28 6/4       Total WeeklyDose 37.5 mg 37.5 mg 37.5 mg 37.5 mg 37.5 mg 37.5 mg 37.5 mg 37.5 mg 37.5 mg       INR 2.1 2.3 2.5 2.6 2.6 2.7 2.9 2.8 2.7       Notes HM 2 - No Bill HM 3 - No Bill HM 4 - no bill  HM 1 - no bill   Rec'd 5/3 HM 2 - no bill  HM 3 - no bill HM 4 - no bill HM 5 - no bill HM 1 - Bill today          Takes warfarin in the am    Clinic Interview:  Verbal Release Authorization signed on 8/22/2018 -- may speak with Raeann Montemayor (Wife). [phone] (Home) *Preferred*  Tablet Strength: pt has 7.5mg and 2.5mg tablets  Estimated oop cost for  [facility] HM: Patient has [employer] Medicare so they should have minimal to no OOP costs.                  Patient Findings    Negatives: Signs/symptoms of thrombosis, Signs/symptoms of bleeding, Laboratory test error suspected, Change in health, Change in alcohol use, Change in activity, Upcoming invasive procedure, Emergency department visit, Upcoming dental procedure, Missed doses, Extra doses, Change in medications, Change in diet/appetite, Hospital admission, Bruising, Other complaints   Comments: All findings negative per patient.     Plan:   1. INR is therapeutic today at 2.7 (goal 2.0-3.0). Instructed Mr. Montemayor to continue warfarin 5 mg oral daily except warfarin 7.5 mg oral Fridays until recheck.  2. Recheck INR in 1 week, 6/11/24  3. Verbal information provided over the telephone. Mr. Montemayor expresses understanding with teach back and has no further questions at this time.     Home monitor information below:  Test strips on hand: 4  6/04/24  Lot : [id number]  Barcode : [id number] Exp: 1/25  Serial number: [id number]   Supplies billing code used: last 5/3/24  Next must be on or after  7/2/2024  Transfer Tube Lot number: Lot: [id number]  Safety Lancets:  Lot : [id number]     Billing:  *bill must be 4 tests 28 days or more from HM visit  PLEASE REFER TO ANTICOAGULATION TRAINING POWERPOINT FOR BILLING. Needs to use in clinic remote appointment for encounter.      Kristi Dunham CPhT   13:16 EDT 6/4/2024    IRaj, PharmD, have reviewed the note in full and agree with the assessment and plan.  06/04/24  14:25 EDT

## 2024-06-11 ENCOUNTER — ANTICOAGULATION VISIT (OUTPATIENT)
Dept: PHARMACY | Facility: HOSPITAL | Age: 79
End: 2024-06-11
Payer: MEDICARE

## 2024-06-11 DIAGNOSIS — I48.21 PERMANENT ATRIAL FIBRILLATION: Primary | ICD-10-CM

## 2024-06-11 LAB — INR PPP: 2.3

## 2024-06-11 NOTE — PROGRESS NOTES
Anticoagulation Clinic Progress Note   Oswaldo HOMEMONITOR: [employer] Serial # : [id number]     Indication: afib  Referring Provider: Gale  Initial Warfarin Start Date: 2007  Goal INR: 2-3  Current Drug Interactions:   [id number]: 5 (HTN, Age, CHF, CAD)   Other: anemic, CKD (stage 4) May need bridge with Lovenox given persistent AF (per cardio 1/24/2019), Scr was 3.22 on 11/24/21    Diet: eats salads 2x weekly 5/11/2023     Anticoagulation Clinic INR History:     Date 3/30 4/13 5/11 6/8  6/23 7/7 7/28  8/9 8/12 8/16   Total WeeklyDose 31.25mg 31.25 mg 31.25 mg 31.25mg Start dialysis 30mg 30mg 30 mg  33.75mg 37.5mg 37.5mg   INR 2.5 2.2 2.6 3.9  2.1 2.1 1.8 1.23 1.4 1.5 2.1   Notes    Inc torsemide Cath placement    AV fistula placement    HM train hm 1 no billing       Date 8/23 8/30 9/6 9/13 9/20 9/30 10/4 10/11 10/18 10/25 11/8 11/15   Total WeeklyDose 31.25 mg 32.5 mg 32.5 mg 32.5 mg 32.5 mg 32.5 mg 35 mg 35 mg 35 mg 32.5 mg 26.5 mg 35 mg   INR 1.9 2.0 2.0 2.5 2.7 1.6 1.7 2.8 2.4 1.7 1.4 1.6   Notes HM2 no billing HM3  No billing HM4  No billing HM 1  Bill today HM 2 No billing HM 3 No bill HM 4  No billing 1x boost  HM 5 no bill day Bill day  HM1 HM- 2 no billing   HM 3- no billing 1x boost  HM 4 - no billing      Date 11/22 11/29 12/6 12/13 12/20 12/27 1/3/23 1/10 1/12 1/17 1/26/23 1/31/23   Total WeeklyDose 37.5 mg 37.5 mg 37.5 mg 37.5 mg 42.5 mg  40 mg 37.5 mg 40 mg 42.5mg 45 mg 45 mg 40 mg   INR 1.9 2.8 1.9 1.5 2.3 3.4 1.4 0.8 2.7 POCT  2.23   2.8 HM 3.9 1.9 2.2   Notes HM1- bill day HM 2 - NO bill HM 3 - no bill  Inc GLV Hm 4 - NO BILL HM 1 - Bill today HM 2   no billing HM 3- no billing HM 4  No billing lab error?? clinic HM 1  Bill today HM 2 - no bill  HM 3 - no bill      Date 2/7 2/14 2/21/23 2/28/23 3/7 3/14/23 3/21/23 3/28/23 4/4 4/11 4/18 4/26   Total WeeklyDose 40 mg 40 mg 40 mg 37.5 mg 40 mg 40 mg 40 mg 40 mg 37.5 mg 37.5 mg 37.5 mg  40 mg   INR 2.3 2.2 3.9 1.6 3.7 ??? 2.5 2.7 3.2 2.8  2.5 1.7 1.7   Notes HM 4 rec 2/15  HM 1  Bill today HM 2 - no billing  1xred  HM 3 - NO BILL  HM-4 no bill, percocet HM 5 - no billing until 3/15 HM 1 - BILL today  HM 2 - no Billing HM 3  No billing HM 4  No billing HM 1-  BILL today  HM 2  No billing     Date 5/2 5/5 5/11 5/16 5/22 5/30 6/6 6/14 6/20 6/27 7/5 7/11   Total WeeklyDose 42.5 mg 37.5 mg 42.5mg 42.5 mg  35 mg 40 mg 40 mg 40 mg 37.5 mg 40 mg 40 mg 40 mg   INR 3.5 1.8 2.9 4.2 2.0 2.5 2.8 3.2 1.9 2.2 3.0 3.3   Notes HM 3  No billing HM 4  No billing HM 5  No billing HM-1  Bill  Dec'd GLV  HM 2  No billing  1x hold  HM 3   no billing HM 4  No billing HM 1  Bill today HM 2  No billing HM 3  No bill HM 4  No bill HM5  No bill     Date 7/18 7/25 08/01/23 8/8 8/15/23 8/23/23 08/29/23 09/05/23 9/12/23 9/19/23 9/26/23 10/3   Total WeeklyDose 37.5 mg 35 mg 37.5mg 40 mg 37.5 mg 37.5 mg 37.5 mg 37.5 mg 37.5 mg 37.5 mg 37.5 mg 37.5 mg   INR 3.3 1.9 1.9 2.3 2.6 2.1 2.4 2.3 2.7 2.5 2.7 2.0   Notes HM 1  Bill today HM 2  No billing HM 3  No billing  1 x boost HM 4 no billing  HM 1 - BILL today  HM 2 - no bill  HM 3- no billing HM 4- no billing HM 1 - BILL today  HM 2 - NO bill  HM 3 - no bill  Hm 4- no bill     Date 10/10 10/18 10/24 10/31 11/07 11/14/23 11/21 11/28 12/05/23  12/12  12/19 12/26   Total Weekly Dose 37.5mg 37.5 mg 37.5mg 42.5 mg 37.5 mg 37.5 mg 37.5 mg 37.5 mg 35 mg 37.5 mg  40 mg 37.5 mg   INR 1.6 2.0 1.5 2.7 2.1 2.5 2.3 4.2 1.9  1.8 2.2 2.1   Notes Hm- 1 bill today  Missed dose?  Inc GLV HM 2- No bill  Hm- 3 no bill Hm 4 no billing   Boost dose  HM 1 billing today  2 - no bill  HM 3 no billing Hm 4 no billing HM 1 billing day  1x red  HM-2 no bill HM-3 no bill  boost HM-4 bill     Date 1/2/24 1/9 1/17 1/23/24 1/30 2/6/24 2/13/24 2/20/24 2/27 3/5/24 3/13/24 3/19 3/26/24   Total Weekly  Dose 37.5 mg 40 mg 40 mg 40 mg 40 mg 40 mg 40 mg 45 mg 40mg  37.5 mg  35 mg 37.5 mg 37.5 mg   INR 1.8 1.9 2.2 2.4 2.1 2.7 1.4 2.2 3.7 3.3 2.5 2.2 2.2   Notes  1  billing day HM-2 no billing boost Hm 3 no bill HM 4 - no bill  HM 1-bill today HM 2 -no bill  HM 3 - no bill  HM 4 - no bill   BOOST  Hm- 1   Bill today HM 2 - no bill  HM 3 no bill Hm 4 no bill HM 1 - Bill Today      Date 4/9 4/16 4/24/24 5/1/24 5/7/24 5/14 5/22 5/28 6/4 6/11      Total WeeklyDose 37.5 mg 37.5 mg 37.5 mg 37.5 mg 37.5 mg 37.5 mg 37.5 mg 37.5 mg 37.5 mg 37.5 mg      INR 2.1 2.3 2.5 2.6 2.6 2.7 2.9 2.8 2.7 2.3      Notes HM 2 - No Bill HM 3 - No Bill HM 4 - no bill  HM 1 - no bill   Rec'd 5/3 HM 2 - no bill  HM 3 - no bill HM 4 - no bill HM 5 - no bill HM 1 - Bill today  HM 2 - no bill        Takes warfarin in the am    Clinic Interview:  Verbal Release Authorization signed on 8/22/2018 -- may speak with Raeann Montemayor (Wife). [phone] (Home) *Preferred*  Tablet Strength: pt has 7.5mg and 2.5mg tablets  Estimated oop cost for  [facility] HM: Patient has [employer] Medicare so they should have minimal to no OOP costs.                  Patient Findings    Negatives: Signs/symptoms of thrombosis, Signs/symptoms of bleeding, Laboratory test error suspected, Change in health, Change in alcohol use, Change in activity, Upcoming invasive procedure, Emergency department visit, Upcoming dental procedure, Missed doses, Extra doses, Change in medications, Change in diet/appetite, Hospital admission, Bruising, Other complaints   Comments: All findings negative per patient.     Plan:   1. INR is therapeutic today at 2.3 (goal 2.0-3.0). Instructed Mr. Montemayor to continue warfarin 5 mg oral daily except warfarin 7.5 mg oral Fridays until recheck.  2. Recheck INR in 1 week, 6/18/24  3. Verbal information provided over the telephone. Mr. Montemayor expresses understanding with teach back and has no further questions at this time.     Home monitor information below:  Test strips on hand: 4  6/11/24  Lot : [id number]  Barcode : [id number] Exp: 1/25  Serial number: [id number]   Supplies billing code used: last 5/3/24  Next  must be on or after 7/2/2024  Transfer Tube Lot number: Lot: [id number]  Safety Lancets:  Lot : [id number]     Billing:  *bill must be 4 tests 28 days or more from HM visit  PLEASE REFER TO ANTICOAGULATION TRAINING POWERPOINT FOR BILLING. Needs to use in clinic remote appointment for encounter.    Kristi Dunham CPhT   14:48 EDT 6/11/2024    IJessi [facility], have reviewed the note in full and agree with the assessment and plan.  06/11/24  16:13 EDT

## 2024-06-14 RX ORDER — WARFARIN SODIUM 2.5 MG/1
TABLET ORAL
Qty: 180 TABLET | Refills: 1 | Status: SHIPPED | OUTPATIENT
Start: 2024-06-14

## 2024-06-18 ENCOUNTER — ANTICOAGULATION VISIT (OUTPATIENT)
Dept: PHARMACY | Facility: HOSPITAL | Age: 79
End: 2024-06-18
Payer: MEDICARE

## 2024-06-18 DIAGNOSIS — I48.21 PERMANENT ATRIAL FIBRILLATION: Primary | ICD-10-CM

## 2024-06-18 LAB — INR PPP: 2.5

## 2024-06-18 NOTE — PROGRESS NOTES
Anticoagulation Clinic Progress Note   Oswaldo HOMEMONITOR: [employer] Serial # : [id number]     Indication: afib  Referring Provider: Gale  Initial Warfarin Start Date: 2007  Goal INR: 2-3  Current Drug Interactions:   [id number]: 5 (HTN, Age, CHF, CAD)   Other: anemic, CKD (stage 4) May need bridge with Lovenox given persistent AF (per cardio 1/24/2019), Scr was 3.22 on 11/24/21    Diet: eats salads 2x weekly 5/11/2023     Anticoagulation Clinic INR History:     Date 3/30 4/13 5/11 6/8  6/23 7/7 7/28  8/9 8/12 8/16   Total WeeklyDose 31.25mg 31.25 mg 31.25 mg 31.25mg Start dialysis 30mg 30mg 30 mg  33.75mg 37.5mg 37.5mg   INR 2.5 2.2 2.6 3.9  2.1 2.1 1.8 1.23 1.4 1.5 2.1   Notes    Inc torsemide Cath placement    AV fistula placement    HM train hm 1 no billing       Date 8/23 8/30 9/6 9/13 9/20 9/30 10/4 10/11 10/18 10/25 11/8 11/15   Total WeeklyDose 31.25 mg 32.5 mg 32.5 mg 32.5 mg 32.5 mg 32.5 mg 35 mg 35 mg 35 mg 32.5 mg 26.5 mg 35 mg   INR 1.9 2.0 2.0 2.5 2.7 1.6 1.7 2.8 2.4 1.7 1.4 1.6   Notes HM2 no billing HM3  No billing HM4  No billing HM 1  Bill today HM 2 No billing HM 3 No bill HM 4  No billing 1x boost  HM 5 no bill day Bill day  HM1 HM- 2 no billing   HM 3- no billing 1x boost  HM 4 - no billing      Date 11/22 11/29 12/6 12/13 12/20 12/27 1/3/23 1/10 1/12 1/17 1/26/23 1/31/23   Total WeeklyDose 37.5 mg 37.5 mg 37.5 mg 37.5 mg 42.5 mg  40 mg 37.5 mg 40 mg 42.5mg 45 mg 45 mg 40 mg   INR 1.9 2.8 1.9 1.5 2.3 3.4 1.4 0.8 2.7 POCT  2.23   2.8 HM 3.9 1.9 2.2   Notes HM1- bill day HM 2 - NO bill HM 3 - no bill  Inc GLV Hm 4 - NO BILL HM 1 - Bill today HM 2   no billing HM 3- no billing HM 4  No billing lab error?? clinic HM 1  Bill today HM 2 - no bill  HM 3 - no bill      Date 2/7 2/14 2/21/23 2/28/23 3/7 3/14/23 3/21/23 3/28/23 4/4 4/11 4/18 4/26   Total WeeklyDose 40 mg 40 mg 40 mg 37.5 mg 40 mg 40 mg 40 mg 40 mg 37.5 mg 37.5 mg 37.5 mg  40 mg   INR 2.3 2.2 3.9 1.6 3.7 ??? 2.5 2.7 3.2 2.8  2.5 1.7 1.7   Notes HM 4 rec 2/15  HM 1  Bill today HM 2 - no billing  1xred  HM 3 - NO BILL  HM-4 no bill, percocet HM 5 - no billing until 3/15 HM 1 - BILL today  HM 2 - no Billing HM 3  No billing HM 4  No billing HM 1-  BILL today  HM 2  No billing     Date 5/2 5/5 5/11 5/16 5/22 5/30 6/6 6/14 6/20 6/27 7/5 7/11   Total WeeklyDose 42.5 mg 37.5 mg 42.5mg 42.5 mg  35 mg 40 mg 40 mg 40 mg 37.5 mg 40 mg 40 mg 40 mg   INR 3.5 1.8 2.9 4.2 2.0 2.5 2.8 3.2 1.9 2.2 3.0 3.3   Notes HM 3  No billing HM 4  No billing HM 5  No billing HM-1  Bill  Dec'd GLV  HM 2  No billing  1x hold  HM 3   no billing HM 4  No billing HM 1  Bill today HM 2  No billing HM 3  No bill HM 4  No bill HM5  No bill     Date 7/18 7/25 08/01/23 8/8 8/15/23 8/23/23 08/29/23 09/05/23 9/12/23 9/19/23 9/26/23 10/3   Total WeeklyDose 37.5 mg 35 mg 37.5mg 40 mg 37.5 mg 37.5 mg 37.5 mg 37.5 mg 37.5 mg 37.5 mg 37.5 mg 37.5 mg   INR 3.3 1.9 1.9 2.3 2.6 2.1 2.4 2.3 2.7 2.5 2.7 2.0   Notes HM 1  Bill today HM 2  No billing HM 3  No billing  1 x boost HM 4 no billing  HM 1 - BILL today  HM 2 - no bill  HM 3- no billing HM 4- no billing HM 1 - BILL today  HM 2 - NO bill  HM 3 - no bill  Hm 4- no bill     Date 10/10 10/18 10/24 10/31 11/07 11/14/23 11/21 11/28 12/05/23  12/12  12/19 12/26   Total Weekly Dose 37.5mg 37.5 mg 37.5mg 42.5 mg 37.5 mg 37.5 mg 37.5 mg 37.5 mg 35 mg 37.5 mg  40 mg 37.5 mg   INR 1.6 2.0 1.5 2.7 2.1 2.5 2.3 4.2 1.9  1.8 2.2 2.1   Notes Hm- 1 bill today  Missed dose?  Inc GLV HM 2- No bill  Hm- 3 no bill Hm 4 no billing   Boost dose  HM 1 billing today  2 - no bill  HM 3 no billing Hm 4 no billing HM 1 billing day  1x red  HM-2 no bill HM-3 no bill  boost HM-4 bill     Date 1/2/24 1/9 1/17 1/23/24 1/30 2/6/24 2/13/24 2/20/24 2/27 3/5/24 3/13/24 3/19 3/26/24   Total Weekly  Dose 37.5 mg 40 mg 40 mg 40 mg 40 mg 40 mg 40 mg 45 mg 40mg  37.5 mg  35 mg 37.5 mg 37.5 mg   INR 1.8 1.9 2.2 2.4 2.1 2.7 1.4 2.2 3.7 3.3 2.5 2.2 2.2   Notes  1  billing day HM-2 no billing boost Hm 3 no bill HM 4 - no bill  HM 1-bill today HM 2 -no bill  HM 3 - no bill  HM 4 - no bill   BOOST  Hm- 1   Bill today HM 2 - no bill  HM 3 no bill Hm 4 no bill HM 1 - Bill Today      Date 4/9 4/16 4/24/24 5/1/24 5/7/24 5/14 5/22 5/28 6/4 6/11 6/18     Total WeeklyDose 37.5 mg 37.5 mg 37.5 mg 37.5 mg 37.5 mg 37.5 mg 37.5 mg 37.5 mg 37.5 mg 37.5 mg 37.5 mg     INR 2.1 2.3 2.5 2.6 2.6 2.7 2.9 2.8 2.7 2.3 2.5     Notes HM 2 - No Bill HM 3 - No Bill HM 4 - no bill  HM 1 - no bill   Rec'd 5/3 HM 2 - no bill  HM 3 - no bill HM 4 - no bill HM 5 - no bill HM 1 - Bill today  HM 2 - no bill HM-3 no bill       Date                 Total Weekly  Dose                INR                Notes                Takes warfarin in the am    Clinic Interview:  Verbal Release Authorization signed on 8/22/2018 -- may speak with Raeann Montemayor (Wife). [phone] (Home) *Preferred*  Tablet Strength: pt has 7.5mg and 2.5mg tablets  Estimated oop cost for  [facility] HM: Patient has [employer] Medicare so they should have minimal to no OOP costs.                  Patient Findings    Negatives: Signs/symptoms of thrombosis, Signs/symptoms of bleeding, Laboratory test error suspected, Change in health, Change in alcohol use, Change in activity, Upcoming invasive procedure, Emergency department visit, Upcoming dental procedure, Missed doses, Extra doses, Change in medications, Change in diet/appetite, Hospital admission, Bruising, Other complaints   Comments: All findings negative per patient.     Plan:   1. INR is therapeutic today at 2.5 (goal 2.0-3.0). Instructed Mr. Montemayor to continue warfarin 5 mg oral daily except 7.5 mg Fridays until recheck.  2. Recheck INR in 1 week, 6/25/24  3. Verbal information provided over the telephone. Mr. Montemayor expresses understanding with teach back and has no further questions at this time.     Home monitor information below:  Test strips on hand: 3  6/18/24  Lot : [id number]   Barcode : [id number] Exp: 1/25  Serial number: [id number]   Supplies billing code used: last 5/3/24  Next must be on or after 7/2/2024  Transfer Tube Lot number: Lot: [id number]  Safety Lancets:  Lot : [id number]     Billing:  *bill must be 4 tests 28 days or more from HM visit  PLEASE REFER TO ANTICOAGULATION TRAINING POWERPOINT FOR BILLING. Needs to use in clinic remote appointment for encounter.    Charlotte Michael, PharmD  06/18/24  16:28 EDT

## 2024-07-02 ENCOUNTER — ANTICOAGULATION VISIT (OUTPATIENT)
Dept: PHARMACY | Facility: HOSPITAL | Age: 79
End: 2024-07-02
Payer: MEDICARE

## 2024-07-02 DIAGNOSIS — I48.21 PERMANENT ATRIAL FIBRILLATION: Primary | ICD-10-CM

## 2024-07-02 LAB — INR PPP: 3

## 2024-07-02 NOTE — PROGRESS NOTES
Anticoagulation Clinic Progress Note   Oswaldo HOMEMONITOR: [employer] Serial # : [id number]     Indication: afib  Referring Provider: Gale  Initial Warfarin Start Date: 2007  Goal INR: 2-3  Current Drug Interactions:   [id number]: 5 (HTN, Age, CHF, CAD)   Other: anemic, CKD (stage 4) May need bridge with Lovenox given persistent AF (per cardio 1/24/2019), Scr was 3.22 on 11/24/21    Diet: eats salads 2x weekly 5/11/2023     Anticoagulation Clinic INR History:     Date 3/30 4/13 5/11 6/8  6/23 7/7 7/28  8/9 8/12 8/16   Total WeeklyDose 31.25mg 31.25 mg 31.25 mg 31.25mg Start dialysis 30mg 30mg 30 mg  33.75mg 37.5mg 37.5mg   INR 2.5 2.2 2.6 3.9  2.1 2.1 1.8 1.23 1.4 1.5 2.1   Notes    Inc torsemide Cath placement    AV fistula placement    HM train hm 1 no billing       Date 8/23 8/30 9/6 9/13 9/20 9/30 10/4 10/11 10/18 10/25 11/8 11/15   Total WeeklyDose 31.25 mg 32.5 mg 32.5 mg 32.5 mg 32.5 mg 32.5 mg 35 mg 35 mg 35 mg 32.5 mg 26.5 mg 35 mg   INR 1.9 2.0 2.0 2.5 2.7 1.6 1.7 2.8 2.4 1.7 1.4 1.6   Notes HM2 no billing HM3  No billing HM4  No billing HM 1  Bill today HM 2 No billing HM 3 No bill HM 4  No billing 1x boost  HM 5 no bill day Bill day  HM1 HM- 2 no billing   HM 3- no billing 1x boost  HM 4 - no billing      Date 11/22 11/29 12/6 12/13 12/20 12/27 1/3/23 1/10 1/12 1/17 1/26/23 1/31/23   Total WeeklyDose 37.5 mg 37.5 mg 37.5 mg 37.5 mg 42.5 mg  40 mg 37.5 mg 40 mg 42.5mg 45 mg 45 mg 40 mg   INR 1.9 2.8 1.9 1.5 2.3 3.4 1.4 0.8 2.7 POCT  2.23   2.8 HM 3.9 1.9 2.2   Notes HM1- bill day HM 2 - NO bill HM 3 - no bill  Inc GLV Hm 4 - NO BILL HM 1 - Bill today HM 2   no billing HM 3- no billing HM 4  No billing lab error?? clinic HM 1  Bill today HM 2 - no bill  HM 3 - no bill      Date 2/7 2/14 2/21/23 2/28/23 3/7 3/14/23 3/21/23 3/28/23 4/4 4/11 4/18 4/26   Total WeeklyDose 40 mg 40 mg 40 mg 37.5 mg 40 mg 40 mg 40 mg 40 mg 37.5 mg 37.5 mg 37.5 mg  40 mg   INR 2.3 2.2 3.9 1.6 3.7 ??? 2.5 2.7 3.2 2.8  2.5 1.7 1.7   Notes HM 4 rec 2/15  HM 1  Bill today HM 2 - no billing  1xred  HM 3 - NO BILL  HM-4 no bill, percocet HM 5 - no billing until 3/15 HM 1 - BILL today  HM 2 - no Billing HM 3  No billing HM 4  No billing HM 1-  BILL today  HM 2  No billing     Date 5/2 5/5 5/11 5/16 5/22 5/30 6/6 6/14 6/20 6/27 7/5 7/11   Total WeeklyDose 42.5 mg 37.5 mg 42.5mg 42.5 mg  35 mg 40 mg 40 mg 40 mg 37.5 mg 40 mg 40 mg 40 mg   INR 3.5 1.8 2.9 4.2 2.0 2.5 2.8 3.2 1.9 2.2 3.0 3.3   Notes HM 3  No billing HM 4  No billing HM 5  No billing HM-1  Bill  Dec'd GLV  HM 2  No billing  1x hold  HM 3   no billing HM 4  No billing HM 1  Bill today HM 2  No billing HM 3  No bill HM 4  No bill HM5  No bill     Date 7/18 7/25 08/01/23 8/8 8/15/23 8/23/23 08/29/23 09/05/23 9/12/23 9/19/23 9/26/23 10/3   Total WeeklyDose 37.5 mg 35 mg 37.5mg 40 mg 37.5 mg 37.5 mg 37.5 mg 37.5 mg 37.5 mg 37.5 mg 37.5 mg 37.5 mg   INR 3.3 1.9 1.9 2.3 2.6 2.1 2.4 2.3 2.7 2.5 2.7 2.0   Notes HM 1  Bill today HM 2  No billing HM 3  No billing  1 x boost HM 4 no billing  HM 1 - BILL today  HM 2 - no bill  HM 3- no billing HM 4- no billing HM 1 - BILL today  HM 2 - NO bill  HM 3 - no bill  Hm 4- no bill     Date 10/10 10/18 10/24 10/31 11/07 11/14/23 11/21 11/28 12/05/23  12/12  12/19 12/26   Total Weekly Dose 37.5mg 37.5 mg 37.5mg 42.5 mg 37.5 mg 37.5 mg 37.5 mg 37.5 mg 35 mg 37.5 mg  40 mg 37.5 mg   INR 1.6 2.0 1.5 2.7 2.1 2.5 2.3 4.2 1.9  1.8 2.2 2.1   Notes Hm- 1 bill today  Missed dose?  Inc GLV HM 2- No bill  Hm- 3 no bill Hm 4 no billing   Boost dose  HM 1 billing today  2 - no bill  HM 3 no billing Hm 4 no billing HM 1 billing day  1x red  HM-2 no bill HM-3 no bill  boost HM-4 bill     Date 1/2/24 1/9 1/17 1/23/24 1/30 2/6/24 2/13/24 2/20/24 2/27 3/5/24 3/13/24 3/19 3/26/24   Total Weekly  Dose 37.5 mg 40 mg 40 mg 40 mg 40 mg 40 mg 40 mg 45 mg 40mg  37.5 mg  35 mg 37.5 mg 37.5 mg   INR 1.8 1.9 2.2 2.4 2.1 2.7 1.4 2.2 3.7 3.3 2.5 2.2 2.2   Notes  1  billing day HM-2 no billing boost Hm 3 no bill HM 4 - no bill  HM 1-bill today HM 2 -no bill  HM 3 - no bill  HM 4 - no bill   BOOST  Hm- 1   Bill today HM 2 - no bill  HM 3 no bill Hm 4 no bill HM 1 - Bill Today      Date 4/9 4/16 4/24/24 5/1/24 5/7/24 5/14 5/22 5/28 6/4 6/11 6/18 7/2    Total WeeklyDose 37.5 mg 37.5 mg 37.5 mg 37.5 mg 37.5 mg 37.5 mg 37.5 mg 37.5 mg 37.5 mg 37.5 mg 37.5 mg  37.5 mg    INR 2.1 2.3 2.5 2.6 2.6 2.7 2.9 2.8 2.7 2.3 2.5  3    Notes HM 2 - No Bill HM 3 - No Bill HM 4 - no bill  HM 1 - no bill   Rec'd 5/3 HM 2 - no bill  HM 3 - no bill HM 4 - no bill HM 5 - no bill HM 1 - Bill today  HM 2 - no bill HM-3 no bill HM-4 no bill HM-1 bill     Date                 Total Weekly  Dose                INR                Notes                Takes warfarin in the am    Clinic Interview:  Verbal Release Authorization signed on 8/22/2018 -- may speak with Raeann Montemayor (Wife). [phone] (Home) *Preferred*  Tablet Strength: pt has 7.5mg and 2.5mg tablets  Estimated oop cost for  [facility] HM: Patient has [employer] Medicare so they should have minimal to no OOP costs.                  Patient Findings    Negatives: Signs/symptoms of thrombosis, Signs/symptoms of bleeding, Laboratory test error suspected, Change in health, Change in alcohol use, Change in activity, Upcoming invasive procedure, Emergency department visit, Upcoming dental procedure, Missed doses, Extra doses, Change in medications, Change in diet/appetite, Hospital admission, Bruising, Other complaints   Comments: All findings negative per patient.     Plan:   1. INR is therapeutic today at 2.5 (goal 2.0-3.0). Instructed Mr. Montemayor to continue warfarin 5 mg oral daily except 7.5 mg Fridays until recheck.  2. Recheck INR in 1 week, 7/9/24  3. Verbal information provided over the telephone. Mr. Montemayor expresses understanding with teach back and has no further questions at this time.     Home monitor information below:  Test strips on hand:  4  7/2/24  -     Lot : [id number]  Barcode : [id number] Exp: 1/25  Serial number: [id number]   Supplies billing code used: last 5/3/24  Next must be on or after 7/2/2024  Transfer Tube Lot number: Lot: [id number]  Safety Lancets:  Lot : [id number]     Billing:  *bill must be 4 tests 28 days or more from HM visit  PLEASE REFER TO ANTICOAGULATION TRAINING POWERPOINT FOR BILLING. Needs to use in clinic remote appointment for encounter.    Charlotte Michael, PharmD  07/02/24  11:56 EDT

## 2024-07-10 ENCOUNTER — ANTICOAGULATION VISIT (OUTPATIENT)
Dept: PHARMACY | Facility: HOSPITAL | Age: 79
End: 2024-07-10
Payer: MEDICARE

## 2024-07-10 DIAGNOSIS — I48.21 PERMANENT ATRIAL FIBRILLATION: Primary | ICD-10-CM

## 2024-07-10 LAB — INR PPP: 2.4

## 2024-07-10 PROCEDURE — G0249 PROVIDE INR TEST MATER/EQUIP: HCPCS

## 2024-07-10 NOTE — PROGRESS NOTES
Anticoagulation Clinic Progress Note   Oswaldo HOMEMONITOR: [employer] Serial # : [id number]     Indication: afib  Referring Provider: Gale  Initial Warfarin Start Date: 2007  Goal INR: 2-3  Current Drug Interactions:   [id number]: 5 (HTN, Age, CHF, CAD)   Other: anemic, CKD (stage 4) May need bridge with Lovenox given persistent AF (per cardio 1/24/2019), Scr was 3.22 on 11/24/21    Diet: eats salads 2x weekly 5/11/2023     Anticoagulation Clinic INR History:     Date 3/30 4/13 5/11 6/8  6/23 7/7 7/28  8/9 8/12 8/16   Total WeeklyDose 31.25mg 31.25 mg 31.25 mg 31.25mg Start dialysis 30mg 30mg 30 mg  33.75mg 37.5mg 37.5mg   INR 2.5 2.2 2.6 3.9  2.1 2.1 1.8 1.23 1.4 1.5 2.1   Notes    Inc torsemide Cath placement    AV fistula placement    HM train hm 1 no billing       Date 8/23 8/30 9/6 9/13 9/20 9/30 10/4 10/11 10/18 10/25 11/8 11/15   Total WeeklyDose 31.25 mg 32.5 mg 32.5 mg 32.5 mg 32.5 mg 32.5 mg 35 mg 35 mg 35 mg 32.5 mg 26.5 mg 35 mg   INR 1.9 2.0 2.0 2.5 2.7 1.6 1.7 2.8 2.4 1.7 1.4 1.6   Notes HM2 no billing HM3  No billing HM4  No billing HM 1  Bill today HM 2 No billing HM 3 No bill HM 4  No billing 1x boost  HM 5 no bill day Bill day  HM1 HM- 2 no billing   HM 3- no billing 1x boost  HM 4 - no billing      Date 11/22 11/29 12/6 12/13 12/20 12/27 1/3/23 1/10 1/12 1/17 1/26/23 1/31/23   Total WeeklyDose 37.5 mg 37.5 mg 37.5 mg 37.5 mg 42.5 mg  40 mg 37.5 mg 40 mg 42.5mg 45 mg 45 mg 40 mg   INR 1.9 2.8 1.9 1.5 2.3 3.4 1.4 0.8 2.7 POCT  2.23   2.8 HM 3.9 1.9 2.2   Notes HM1- bill day HM 2 - NO bill HM 3 - no bill  Inc GLV Hm 4 - NO BILL HM 1 - Bill today HM 2   no billing HM 3- no billing HM 4  No billing lab error?? clinic HM 1  Bill today HM 2 - no bill  HM 3 - no bill      Date 2/7 2/14 2/21/23 2/28/23 3/7 3/14/23 3/21/23 3/28/23 4/4 4/11 4/18 4/26   Total WeeklyDose 40 mg 40 mg 40 mg 37.5 mg 40 mg 40 mg 40 mg 40 mg 37.5 mg 37.5 mg 37.5 mg  40 mg   INR 2.3 2.2 3.9 1.6 3.7 ??? 2.5 2.7 3.2 2.8  2.5 1.7 1.7   Notes HM 4 rec 2/15  HM 1  Bill today HM 2 - no billing  1xred  HM 3 - NO BILL  HM-4 no bill, percocet HM 5 - no billing until 3/15 HM 1 - BILL today  HM 2 - no Billing HM 3  No billing HM 4  No billing HM 1-  BILL today  HM 2  No billing     Date 5/2 5/5 5/11 5/16 5/22 5/30 6/6 6/14 6/20 6/27 7/5 7/11   Total WeeklyDose 42.5 mg 37.5 mg 42.5mg 42.5 mg  35 mg 40 mg 40 mg 40 mg 37.5 mg 40 mg 40 mg 40 mg   INR 3.5 1.8 2.9 4.2 2.0 2.5 2.8 3.2 1.9 2.2 3.0 3.3   Notes HM 3  No billing HM 4  No billing HM 5  No billing HM-1  Bill  Dec'd GLV  HM 2  No billing  1x hold  HM 3   no billing HM 4  No billing HM 1  Bill today HM 2  No billing HM 3  No bill HM 4  No bill HM5  No bill     Date 7/18 7/25 08/01/23 8/8 8/15/23 8/23/23 08/29/23 09/05/23 9/12/23 9/19/23 9/26/23 10/3   Total WeeklyDose 37.5 mg 35 mg 37.5mg 40 mg 37.5 mg 37.5 mg 37.5 mg 37.5 mg 37.5 mg 37.5 mg 37.5 mg 37.5 mg   INR 3.3 1.9 1.9 2.3 2.6 2.1 2.4 2.3 2.7 2.5 2.7 2.0   Notes HM 1  Bill today HM 2  No billing HM 3  No billing  1 x boost HM 4 no billing  HM 1 - BILL today  HM 2 - no bill  HM 3- no billing HM 4- no billing HM 1 - BILL today  HM 2 - NO bill  HM 3 - no bill  Hm 4- no bill     Date 10/10 10/18 10/24 10/31 11/07 11/14/23 11/21 11/28 12/05/23  12/12  12/19 12/26   Total Weekly Dose 37.5mg 37.5 mg 37.5mg 42.5 mg 37.5 mg 37.5 mg 37.5 mg 37.5 mg 35 mg 37.5 mg  40 mg 37.5 mg   INR 1.6 2.0 1.5 2.7 2.1 2.5 2.3 4.2 1.9  1.8 2.2 2.1   Notes Hm- 1 bill today  Missed dose?  Inc GLV HM 2- No bill  Hm- 3 no bill Hm 4 no billing   Boost dose  HM 1 billing today  2 - no bill  HM 3 no billing Hm 4 no billing HM 1 billing day  1x red  HM-2 no bill HM-3 no bill  boost HM-4 bill     Date 1/2/24 1/9 1/17 1/23/24 1/30 2/6/24 2/13/24 2/20/24 2/27 3/5/24 3/13/24 3/19 3/26/24   Total Weekly  Dose 37.5 mg 40 mg 40 mg 40 mg 40 mg 40 mg 40 mg 45 mg 40mg  37.5 mg  35 mg 37.5 mg 37.5 mg   INR 1.8 1.9 2.2 2.4 2.1 2.7 1.4 2.2 3.7 3.3 2.5 2.2 2.2   Notes  1  billing day HM-2 no billing boost Hm 3 no bill HM 4 - no bill  HM 1-bill today HM 2 -no bill  HM 3 - no bill  HM 4 - no bill   BOOST  Hm- 1   Bill today HM 2 - no bill  HM 3 no bill Hm 4 no bill HM 1 - Bill Today      Date 4/9 4/16 4/24/24 5/1/24 5/7/24 5/14 5/22 5/28 6/4 6/11 6/18 7/2 7/9   Total WeeklyDose 37.5 mg 37.5 mg 37.5 mg 37.5 mg 37.5 mg 37.5 mg 37.5 mg 37.5 mg 37.5 mg 37.5 mg 37.5 mg  37.5 mg 37.5 mg   INR 2.1 2.3 2.5 2.6 2.6 2.7 2.9 2.8 2.7 2.3 2.5  3 2.4   Notes HM 2 - No Bill HM 3 - No Bill HM 4 - no bill  HM 1 - no bill   Rec'd 5/3 HM 2 - no bill  HM 3 - no bill HM 4 - no bill HM 5 - no bill HM 1 - Bill today  HM 2 - no bill HM-3 no bill HM-4 no bill HM 1 - BILL TODAY  Rec'd 7/10     Date                 Total Weekly  Dose                INR                Notes                Takes warfarin in the am    Clinic Interview:  Verbal Release Authorization signed on 8/22/2018 -- may speak with Raeann Mnotemayor (Wife). [phone] (Home) *Preferred*  Tablet Strength: pt has 7.5mg and 2.5mg tablets  Estimated oop cost for [facility] includes the [facility] HM: Patient has [employer] Medicare so they should have minimal to no OOP costs.                  Patient Findings    Negatives: Signs/symptoms of thrombosis, Signs/symptoms of bleeding, Laboratory test error suspected, Change in health, Change in alcohol use, Change in activity, Upcoming invasive procedure, Emergency department visit, Upcoming dental procedure, Missed doses, Extra doses, Change in medications, Change in diet/appetite, Hospital admission, Bruising, Other complaints   Comments: All findings negative per patient.     Plan:   1. INR was therapeutic yesterday at 2.4 (goal 2.0-3.0). Result received today. Instructed Mr. Montemayor to continue warfarin 5 mg oral daily except 7.5 mg Fri until recheck.  2. Repeat INR in 1 week, 7/16/24  3. Verbal information provided over the telephone. Mr. Montemayor expresses understanding with teach back and has no further questions at this time.      Home monitor information below:  Test strips on hand: 2  7/10/24 -- Sending more supplies tomorrow   Lot : [id number]  Barcode : [id number] Exp: 1/25  Serial number: [id number]   Supplies billing code used: last 7/10/24 -  Next must be on or after 8/7/2024  Transfer Tube Lot number: Lot: [id number]  Safety Lancets:  Lot : [id number]     Billing:  *bill must be 4 tests 28 days or more from  visit  PLEASE REFER TO ANTICOAGULATION TRAINING POWERPOINT FOR BILLING. Needs to use in clinic remote appointment for encounter.      Kristi Dunham CPhT   09:17 EDT 7/10/2024    IJessi [facility], have reviewed the note in full and agree with the assessment and plan.  07/10/24  09:54 EDT

## 2024-07-10 NOTE — PROGRESS NOTES
Subjective:     Encounter Date:07/17/2024      Patient ID: Marco Antonio Montemayor is a 79 y.o.  white male, retired /currently laid off from being a  for [employer], from [place], [place].      REMOTE PHYSICIAN: EDMUND Rosado MD  ELECTROPHYSIOLOGIST: Johnathan Taylor MD, [facility], [facility]  INTERVENTIONAL CARDIOLOGIST: Antonio Escamilla MD  NEPHROLOGIST:  [facility]  DERMATOLOGIST:  Yoanna Marks MD/Oksana Young MD([facility])  GENERAL SURGEON: Jakob Reza MD.    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  Echocardiogram showing mild LVH with estimated ejection fraction 0.58 with mild aortic valve cusp sclerosis, and mild TR, 09/23/2014.   Remote abnormal preoperative Quantitative SPECT gated Cardiolite study with mild “fixed” inferoposterior hypoperfusion and mild LV enlargement with mild global hypokinesia with reduced LVEF 0.40 in the setting of abnormal EKG with subsequent diagnostic coronary angiography demonstrating mild-to-moderate nonobstructive coronary artery atherosclerosis with mild compensated left ventricular dysfunction, LVEF 0.52, and continued medical therapy felt warranted, June 2006.  Remote hospitalization for symptomatic atrial fibrillation with rapid ventricular response and mild congestive heart failure requiring BRYANNA and DC cardioversion, June 2009.  Left heart catheterization with essentially normal coronary arteries with mild luminal irregularities with an EF of 0.60 by Dr. Escamilla for angina and elevated troponin, 06/05/2015.   Echocardiogram 2/3/2023: LVEF 57%, LV wall thickness consistent with moderate concentric hypertrophy.  Septum measures 1.8 cm, LV diastolic function normal, LA  volume moderately increased, RA cavity mild to moderately dilated  Residual class I symptoms, April 2022, January 2023, CCS class I-II chest discomfort/NYHA class II dyspnea on exertion symptoms July 2023, July 2024  Cardiac PET July 2023 negative for ischemia.  He was noted to have mild coronary artery calcification in the RCA on CT images and MAC, aortic arch, and descending aorta, EF 51%.  Echocardiogram 2/26/2024: LVEF 55.5%, LV wall thickness consistent with mild concentric hypertrophy, LV diastolic function normal, RVSP 40 mmHg  Chronic essential hypertension with recent progressive hypertension and blood pressure readings and normal selective bilateral renal angiography, June 2006.  Dyslipidemia.  Chronic atrial tachyarrhythmias:  Intermittent chronic paroxysmal atrial fibrillation with electrophysiologic study with RFA for atrial flutter, October 1999.  Recurrent apparent transient atrial fibrillation, resolved with acceptable [facility] emergency department evaluation, June 2003.  Acceptable combination Doppler echocardiogram, July 2003, with apparent acceptable 24-hour Holter monitor, May 2006.  Recurrent asymptomatic atrial fibrillation with RVR, January 2007, subsequent Coumadin therapy and Tikosyn therapy with successful internal cardioversion of atrial fibrillation and marked bradyarrhythmias requiring DDDR pacemaker implant, [employer] [place] device, data not available, March 2007.  Remote hospitalization for symptomatic rapid atrial fibrillation/BRYANNA DC cardioversion with subsequent acceptable pacemaker interrogation, June/July 2009, as well as acceptable interrogation without reprogramming, July 2011.  Acceptable combination Doppler echocardiogram, December 2010, with residual class I symptoms.  Acceptable device interrogation following [facility] ED evaluation for chest pressure and shortness of breath, data deficit, May 2011, February 2013, February 2016, December 2016.  Abnormal pacemaker assessment with 8.4% mode  switch with battery voltage of 1-1.25 years, June 2017, with abnormal PACEART assessment, July 2018, with 22% mode switch, with subsequent interrogation demonstrating chronic atrial fibrillation, May 2019, remote device interrogation March 2021 demonstrated 89% atrial fib burden, 8.42 battery longevity.  Acceptable pacemaker interrogation without reprogramming, April 2022, pacemaker changed to VVI March 2023, pacemaker is not MRI conditional/compatible  Patient not felt to be a good candidate for Watchman  Class III obesity: BMI 41.04  Ichthyosis.  Chronic lower tract obstructive symptoms, probable BPH.  Dyslipidemia.  Nasal polyps with deviated nasal septum with apparent subsequent nasal septoplasty/polypectomy, data deficit, July 2006.  10. Abdominal pain with apparent small bowel obstruction with exploratory laparotomy - data deficit.  11. Appendectomy with subsequent delayed hospitalization for bleeding peptic ulcer disease/probable duodenal ulcer, November 2003.  12. Recurrent asymptomatic atrial fibrillation with rapid ventricular response, July 2007, with subsequent Tikosyn therapy and DDDR pacemaker implant with intermittent recurrent breakthrough arrhythmias, including remote DC cardioversion, May 2008, with subsequent recurrent DC cardioversion, autumn 2018, with recurrent atrial fibrillation and discontinuation of formal antiarrhythmic therapy - data deficit  13. Obstructive sleep apnea, compliant with CPAP  14. Stage 4 chronic kidney disease with recent apparent documented proteinuria and renal biopsy (February 2019), LUE AV fistula August 2022/elevation AV fistula November 2022, bilateral renal cysts  15. Elevated TSH, November 2018  16.  Chronic anemia with epoetin injections Fall 2020, Spring 2021  17.  LLE cellulitis May 2022  18.  LUE AV fistula August 2022, patient has dialysis M, W, F  19.  Fatty liver, splenomegaly  20. [facility] 27-hour hospitalization for strokelike symptoms with loss of vision and  was suspected to be due to hypotension that happened during dialysis.    No Known Allergies    Current Outpatient Medications   Medication Instructions    acetaminophen (TYLENOL) 500 mg, Oral, Every 6 Hours PRN    atorvastatin (LIPITOR) 40 MG tablet TAKE ONE TABLET BY MOUTH DAILY    bumetanide (BUMEX) 2 MG tablet TAKE 1 TABLET BY MOUTH EVERY OTHER DAY - TAKE ON DAYS OFF FROM DIALYSIS (TUESDAY, THURSDAY, SATURDAY AND SUNDAY)    carvedilol (COREG) 12.5 MG tablet TAKE ONE TABLET BY MOUTH TWICE A DAY    cholecalciferol (VITAMIN D3) 1,000 Units, Oral, Daily    ferrous sulfate 65 mg, Oral, Daily    midodrine (PROAMATINE) 10 mg, Oral, 3 Times Daily Before Meals, Before Dialysis, Tues, Thurs, Sat    nitroglycerin (NITROSTAT) 0.3 MG SL tablet 1 under the tongue as needed for angina, may repeat q5mins for up three doses    PHARMACY TO DOSE WARFARIN Does not apply, Continuous PRN    Sucroferric Oxyhydroxide (Velphoro) 500 MG chewable tablet Take As Directed, Crush or chew and swallow 3 tablets by mouth 3 times a day with meals and 1 tablet twice a day with snacks.    vitamin B-12 (CYANOCOBALAMIN) 1,000 mcg, Oral, Daily    warfarin (COUMADIN) 2.5 MG tablet TAKE ONE AND ONE-HALF TO TWO TABLETS BY MOUTH ONCE DAILY         HISTORY OF PRESENT ILLNESS:  The patient is here for 6-month follow-up.  Patient had device check in March 2024 with Dr. Taylor. Patient had a cardiac PET July 2023 negative for ischemia. He was noted to have mild coronary artery calcification in the RCA on CT images and MAC, aortic arch, and descending aorta, EF 51%.  Echocardiogram February 2024 showed LVEF 55.5%, LV wall thickness consistent with mild concentric hypertrophy, LV diastolic function normal, RVSP 40 mmHg.  Patient had a carotid duplex March 2024 showing bilateral less than 50% ICA stenosis.  In the patient had a [facility] 27-hour hospitalization for strokelike symptoms with loss of vision and was suspected to be due to hypotension that happened  "during dialysis.  He denies any chest pain, shortness of breath, palpitations, dizziness, presyncope, or syncope.  He has some RLE lesions and is followed by wound care once a week.  They are wrapping his leg and he says that his KYLE was normal but he has venous insufficiency-data deficit.  He was on antibiotics last week.  He has an upcoming appointment with his PCP next month.  He is supposed to have an upcoming lower extremity venous duplex.  The patient says that his RLE lesions are painful and it feels like it is more of nerve pain and is supposed to see pain management today.  The patient typically takes his carvedilol after he has dialysis now.      ROS   All other systems reviewed and otherwise negative.    Procedures       Objective:       Vitals:    07/17/24 1032 07/17/24 1033   BP: 130/60 128/62   Pulse: 90 89   SpO2: 96% 97%   Weight: 130 kg (287 lb 6.4 oz)    Height: 177.8 cm (70\")      Body mass index is 41.24 kg/m².  Wt Readings from Last 2 Encounters:   07/17/24 130 kg (287 lb 6.4 oz)   03/20/24 131 kg (289 lb)        Constitutional:       Appearance: Healthy appearance. Not in distress.   Neck:      Vascular: No JVR. JVD normal.   Pulmonary:      Effort: Pulmonary effort is normal.      Breath sounds: Normal breath sounds. No wheezing. No rhonchi. No rales.   Chest:      Chest wall: Not tender to palpatation.   Cardiovascular:      PMI at left midclavicular line. Normal rate. Irregularly irregular rhythm. Normal S1. Normal S2.       Murmurs: There is a grade 2/6 systolic murmur at the LLSB.      No gallop.  No click. No rub.   Pulses:     Dorsalis pedis: 1+ bilaterally.     Posterior tibial: 1+ bilaterally.  Edema:     Peripheral edema absent.   Abdominal:      General: Bowel sounds are normal.      Palpations: Abdomen is soft.      Tenderness: There is no abdominal tenderness.   Musculoskeletal: Normal range of motion.         General: No tenderness. Skin:     General: Skin is warm and dry.      " Comments:  Left precordial pacemaker site nominal     Neurological:      General: No focal deficit present.      Mental Status: Alert and oriented to person, place and time.           Lab Review:   Lab Results   Component Value Date    GLUCOSE 101 (H) 03/01/2024    BUN 53 (H) 03/01/2024    CREATININE 6.83 (H) 03/01/2024    EGFRIFNONA 18 (L) 02/16/2022    EGFRIFAFRI 26 (L) 11/12/2018    BCR 7.8 03/01/2024    CO2 28.0 03/01/2024    CALCIUM 9.0 03/01/2024    PROTENTOTREF 6.8 01/10/2023    ALBUMIN 3.9 03/01/2024    LABIL2 1.5 01/10/2023    AST 7 02/29/2024    ALT 7 02/29/2024       Lab Results   Component Value Date    WBC 8.27 03/01/2024    HGB 9.1 (L) 03/01/2024    HCT 28.3 (L) 03/01/2024    .4 (H) 03/01/2024     03/01/2024       Lab Results   Component Value Date    HGBA1C 5.90 (H) 03/01/2024       Lab Results   Component Value Date    TSH 4.220 (H) 02/29/2024       Lab Results   Component Value Date    CHOL 111 01/10/2024    CHOL 97 04/14/2021     Lab Results   Component Value Date    TRIG 120 01/10/2024    TRIG 99 01/10/2023     Lab Results   Component Value Date    HDL 30 (L) 01/10/2024    HDL 34 (L) 01/10/2023     Lab Results   Component Value Date    LDL 59 01/10/2024    LDL 56 01/10/2023           Lab Results   Component Value Date    .0 (H) 09/14/2018                 Results for orders placed in visit on 01/17/24    Adult Transthoracic Echo Complete W/ Cont if Necessary Per Protocol    Interpretation Summary    Left ventricular systolic function is normal. Calculated left ventricular EF = 55.5% Left ventricular ejection fraction appears to be 56 - 60%.    Left ventricular wall thickness is consistent with mild concentric hypertrophy.    Left ventricular diastolic function was normal.    The left atrial cavity is mildly dilated.    Estimated right ventricular systolic pressure from tricuspid regurgitation is mildly elevated (35-45 mmHg). Calculated right ventricular systolic pressure from  tricuspid regurgitation is 40 mmHg.    Septal thickness and atrial volume both improved on today's study compared to 2023       Results for orders placed during the hospital encounter of 02/29/24    Duplex Carotid Ultrasound CAR    Interpretation Summary    Right internal carotid artery demonstrates a less than 50% stenosis.    Left internal carotid artery demonstrates a less than 50% stenosis.     [facility] PPM interrogation 7/17/2024: VVIR at 70, underlying rhythm atrial fibrillation, no episodes, no reprogramming, 3 years battery longevity      Advance Care Planning   ACP discussion was held with the patient during this visit. Patient has an advance directive (not in EMR), copy requested.      Assessment:     Overall continued acceptable course with no new interim cardiopulmonary complaints with fair functional status on dialysis. We will defer additional diagnostic or therapeutic intervention from a cardiac perspective at this time. Echocardiogram February 2024 showed LVEF 55.5%, LV wall thickness consistent with mild concentric hypertrophy, LV diastolic function normal, RVSP 40 mmHg.  Patient had acceptable device interrogation in office today with no reprogramming.  May consider repeat KYLE after his next appointment.  He is currently being followed by wound care for his RLE lesions.     Diagnosis Plan   1. Hypertensive heart disease with chronic combined systolic and diastolic congestive heart failure  No recurrent angina pectoris or CHF on current activity schedule; continue current treatment       2. Coronary artery disease involving native coronary artery of native heart without angina pectoris  No recurrent angina pectoris or CHF on current activity schedule; continue current treatment       3. Primary hypertension  Controlled, continue current cardiac medications      4. Dyslipidemia  Good lipid panel January 2024, continue atorvastatin      5. Permanent atrial fibrillation  Patient had acceptable device  interrogation in office today with no reprogramming.  Continue warfarin, carvedilol             Plan:         Patient to continue current medications and close follow up with the above providers.  Tentative cardiology follow up in January 2025 or patient may return sooner PRN.  [facility] PPM check with Dr Taylor in March 2025      Electronically signed by JENNIFER Gold, 07/17/24, 11:11 AM EDT.

## 2024-07-16 ENCOUNTER — ANTICOAGULATION VISIT (OUTPATIENT)
Dept: PHARMACY | Facility: HOSPITAL | Age: 79
End: 2024-07-16
Payer: MEDICARE

## 2024-07-16 DIAGNOSIS — I48.21 PERMANENT ATRIAL FIBRILLATION: Primary | ICD-10-CM

## 2024-07-16 LAB — INR PPP: 3.4

## 2024-07-16 NOTE — PROGRESS NOTES
Anticoagulation Clinic Progress Note   Oswaldo HOMEMONITOR: [place] Check Serial # : [id number]     Indication: afib  Referring Provider: Gale  Initial Warfarin Start Date: 2007  Goal INR: 2-3  Current Drug Interactions:   [id number]: 5 (HTN, Age, CHF, CAD)   Other: anemic, CKD (stage 4) May need bridge with Lovenox given persistent AF (per cardio 1/24/2019), Scr was 3.22 on 11/24/21    Diet: eats salads 2x weekly 5/11/2023     Anticoagulation Clinic INR History:     Date 3/30 4/13 5/11 6/8  6/23 7/7 7/28  8/9 8/12 8/16   Total WeeklyDose 31.25mg 31.25 mg 31.25 mg 31.25mg Start dialysis 30mg 30mg 30 mg  33.75mg 37.5mg 37.5mg   INR 2.5 2.2 2.6 3.9  2.1 2.1 1.8 1.23 1.4 1.5 2.1   Notes    Inc torsemide Cath placement    AV fistula placement    HM train hm 1 no billing       Date 8/23 8/30 9/6 9/13 9/20 9/30 10/4 10/11 10/18 10/25 11/8 11/15   Total WeeklyDose 31.25 mg 32.5 mg 32.5 mg 32.5 mg 32.5 mg 32.5 mg 35 mg 35 mg 35 mg 32.5 mg 26.5 mg 35 mg   INR 1.9 2.0 2.0 2.5 2.7 1.6 1.7 2.8 2.4 1.7 1.4 1.6   Notes HM2 no billing HM3  No billing HM4  No billing HM 1  Bill today HM 2 No billing HM 3 No bill HM 4  No billing 1x boost  HM 5 no bill day Bill day  HM1 HM- 2 no billing   HM 3- no billing 1x boost  HM 4 - no billing      Date 11/22 11/29 12/6 12/13 12/20 12/27 1/3/23 1/10 1/12 1/17 1/26/23 1/31/23   Total WeeklyDose 37.5 mg 37.5 mg 37.5 mg 37.5 mg 42.5 mg  40 mg 37.5 mg 40 mg 42.5mg 45 mg 45 mg 40 mg   INR 1.9 2.8 1.9 1.5 2.3 3.4 1.4 0.8 2.7 POCT  2.23   2.8 HM 3.9 1.9 2.2   Notes HM1- bill day HM 2 - NO bill HM 3 - no bill  Inc GLV Hm 4 - NO BILL HM 1 - Bill today HM 2   no billing HM 3- no billing HM 4  No billing lab error?? clinic HM 1  Bill today HM 2 - no bill  HM 3 - no bill      Date 2/7 2/14 2/21/23 2/28/23 3/7 3/14/23 3/21/23 3/28/23 4/4 4/11 4/18 4/26   Total WeeklyDose 40 mg 40 mg 40 mg 37.5 mg 40 mg 40 mg 40 mg 40 mg 37.5 mg 37.5 mg 37.5 mg  40 mg   INR 2.3 2.2 3.9 1.6 3.7 ??? 2.5 2.7 3.2 2.8  2.5 1.7 1.7   Notes HM 4 rec 2/15  HM 1  Bill today HM 2 - no billing  1xred  HM 3 - NO BILL  HM-4 no bill, percocet HM 5 - no billing until 3/15 HM 1 - BILL today  HM 2 - no Billing HM 3  No billing HM 4  No billing HM 1-  BILL today  HM 2  No billing     Date 5/2 5/5 5/11 5/16 5/22 5/30 6/6 6/14 6/20 6/27 7/5 7/11   Total WeeklyDose 42.5 mg 37.5 mg 42.5mg 42.5 mg  35 mg 40 mg 40 mg 40 mg 37.5 mg 40 mg 40 mg 40 mg   INR 3.5 1.8 2.9 4.2 2.0 2.5 2.8 3.2 1.9 2.2 3.0 3.3   Notes HM 3  No billing HM 4  No billing HM 5  No billing HM-1  Bill  Dec'd GLV  HM 2  No billing  1x hold  HM 3   no billing HM 4  No billing HM 1  Bill today HM 2  No billing HM 3  No bill HM 4  No bill HM5  No bill     Date 7/18 7/25 08/01/23 8/8 8/15/23 8/23/23 08/29/23 09/05/23 9/12/23 9/19/23 9/26/23 10/3   Total WeeklyDose 37.5 mg 35 mg 37.5mg 40 mg 37.5 mg 37.5 mg 37.5 mg 37.5 mg 37.5 mg 37.5 mg 37.5 mg 37.5 mg   INR 3.3 1.9 1.9 2.3 2.6 2.1 2.4 2.3 2.7 2.5 2.7 2.0   Notes HM 1  Bill today HM 2  No billing HM 3  No billing  1 x boost HM 4 no billing  HM 1 - BILL today  HM 2 - no bill  HM 3- no billing HM 4- no billing HM 1 - BILL today  HM 2 - NO bill  HM 3 - no bill  Hm 4- no bill     Date 10/10 10/18 10/24 10/31 11/07 11/14/23 11/21 11/28 12/05/23  12/12  12/19 12/26   Total Weekly Dose 37.5mg 37.5 mg 37.5mg 42.5 mg 37.5 mg 37.5 mg 37.5 mg 37.5 mg 35 mg 37.5 mg  40 mg 37.5 mg   INR 1.6 2.0 1.5 2.7 2.1 2.5 2.3 4.2 1.9  1.8 2.2 2.1   Notes Hm- 1 bill today  Missed dose?  Inc GLV HM 2- No bill  Hm- 3 no bill Hm 4 no billing   Boost dose  HM 1 billing today  2 - no bill  HM 3 no billing Hm 4 no billing HM 1 billing day  1x red  HM-2 no bill HM-3 no bill  boost HM-4 bill     Date 1/2/24 1/9 1/17 1/23/24 1/30 2/6/24 2/13/24 2/20/24 2/27 3/5/24 3/13/24 3/19 3/26/24   Total Weekly  Dose 37.5 mg 40 mg 40 mg 40 mg 40 mg 40 mg 40 mg 45 mg 40mg  37.5 mg  35 mg 37.5 mg 37.5 mg   INR 1.8 1.9 2.2 2.4 2.1 2.7 1.4 2.2 3.7 3.3 2.5 2.2 2.2   Notes  1  billing day HM-2 no billing boost Hm 3 no bill HM 4 - no bill  HM 1-bill today HM 2 -no bill  HM 3 - no bill  HM 4 - no bill   BOOST  Hm- 1   Bill today HM 2 - no bill  HM 3 no bill Hm 4 no bill HM 1 - Bill Today      Date 4/9 4/16 4/24/24 5/1/24 5/7/24 5/14 5/22 5/28 6/4 6/11 6/18 7/2 7/9   Total WeeklyDose 37.5 mg 37.5 mg 37.5 mg 37.5 mg 37.5 mg 37.5 mg 37.5 mg 37.5 mg 37.5 mg 37.5 mg 37.5 mg  37.5 mg 37.5 mg   INR 2.1 2.3 2.5 2.6 2.6 2.7 2.9 2.8 2.7 2.3 2.5  3 2.4   Notes HM 2 - No Bill HM 3 - No Bill HM 4 - no bill  HM 1 - no bill   Rec'd 5/3 HM 2 - no bill  HM 3 - no bill HM 4 - no bill HM 5 - no bill HM 1 - Bill today  HM 2 - no bill HM-3 no bill HM-4 no bill HM 1 - BILL TODAY  Rec'd 7/10     Date  7/16               Total Weekly  Dose 37.5 mg               INR 3.4               Notes HM 2 - no bill    APAP               Takes warfarin in the am    Clinic Interview:  Verbal Release Authorization signed on 8/22/2018 -- may speak with Raeann Montemayor (Wife). [phone] (Home) *Preferred*  Tablet Strength: pt has 7.5mg and 2.5mg tablets  Estimated oop cost for  EDER HM: Patient has [employer] Medicare so they should have minimal to no OOP costs.                 Patient Findings    Positives: Change in medications   Negatives: Signs/symptoms of thrombosis, Signs/symptoms of bleeding, Laboratory test error suspected, Change in health, Change in alcohol use, Change in activity, Upcoming invasive procedure, Emergency department visit, Upcoming dental procedure, Missed doses, Extra doses, Change in diet/appetite, Hospital admission, Bruising, Other complaints   Comments: Patient says he has had pain in his right leg since the middle of June. He says he took extra tylenol this week for the pain. He denies any of the other above findings.     Plan:   1. INR was supratherapeutic today at 3.4 (goal 2.0-3.0). Instructed Mr. Montemayor to take a reduced dose today of warfarin 2.5 mg, then continue warfarin 5 mg oral daily  except 7.5 mg Fri until recheck.  2. Repeat INR in 1 week, 7/23/24  3. Verbal information provided over the telephone. Mr. Montemayor expresses understanding with teach back and has no further questions at this time.     Home monitor information below:  Test strips on hand: 5  7/16/24  Lot : [id number]  Barcode : [id number] Exp: 1/25  Serial number: [id number]   Supplies billing code used: last 7/10/24 -  Next must be on or after 8/7/2024  Transfer Tube Lot number: Lot: [id number]  Safety Lancets:  Lot : [id number]     Billing:  *bill must be 4 tests 28 days or more from HM visit  PLEASE REFER TO ANTICOAGULATION TRAINING POWERPOINT FOR BILLING. Needs to use in clinic remote appointment for encounter.      Jessi Vasquez, PharmD  07/16/24   16:13 EDT

## 2024-07-17 ENCOUNTER — OFFICE VISIT (OUTPATIENT)
Dept: CARDIOLOGY | Facility: CLINIC | Age: 79
End: 2024-07-17
Payer: MEDICARE

## 2024-07-17 VITALS
HEIGHT: 70 IN | DIASTOLIC BLOOD PRESSURE: 62 MMHG | WEIGHT: 287.4 LBS | HEART RATE: 89 BPM | BODY MASS INDEX: 41.14 KG/M2 | OXYGEN SATURATION: 97 % | SYSTOLIC BLOOD PRESSURE: 128 MMHG

## 2024-07-17 DIAGNOSIS — I50.42 HYPERTENSIVE HEART DISEASE WITH CHRONIC COMBINED SYSTOLIC AND DIASTOLIC CONGESTIVE HEART FAILURE: Primary | ICD-10-CM

## 2024-07-17 DIAGNOSIS — I48.21 PERMANENT ATRIAL FIBRILLATION: ICD-10-CM

## 2024-07-17 DIAGNOSIS — I10 PRIMARY HYPERTENSION: Chronic | ICD-10-CM

## 2024-07-17 DIAGNOSIS — E78.5 DYSLIPIDEMIA: Chronic | ICD-10-CM

## 2024-07-17 DIAGNOSIS — I11.0 HYPERTENSIVE HEART DISEASE WITH CHRONIC COMBINED SYSTOLIC AND DIASTOLIC CONGESTIVE HEART FAILURE: Primary | ICD-10-CM

## 2024-07-17 DIAGNOSIS — I25.10 CORONARY ARTERY DISEASE INVOLVING NATIVE CORONARY ARTERY OF NATIVE HEART WITHOUT ANGINA PECTORIS: Chronic | ICD-10-CM

## 2024-07-23 ENCOUNTER — ANTICOAGULATION VISIT (OUTPATIENT)
Dept: PHARMACY | Facility: HOSPITAL | Age: 79
End: 2024-07-23
Payer: MEDICARE

## 2024-07-23 DIAGNOSIS — I48.21 PERMANENT ATRIAL FIBRILLATION: Primary | ICD-10-CM

## 2024-07-23 LAB — INR PPP: 2.7

## 2024-07-23 NOTE — PROGRESS NOTES
Anticoagulation Clinic Progress Note   Oswaldo HOMEMONITOR: [employer] Serial # : [id number]     Indication: afib  Referring Provider: Gale  Initial Warfarin Start Date: 2007  Goal INR: 2-3  Current Drug Interactions:   [id number]: 5 (HTN, Age, CHF, CAD)   Other: anemic, CKD (stage 4) May need bridge with Lovenox given persistent AF (per cardio 1/24/2019), Scr was 3.22 on 11/24/21    Diet: eats salads 2x weekly 5/11/2023     Anticoagulation Clinic INR History:     Date 3/30 4/13 5/11 6/8  6/23 7/7 7/28  8/9 8/12 8/16   Total WeeklyDose 31.25mg 31.25 mg 31.25 mg 31.25mg Start dialysis 30mg 30mg 30 mg  33.75mg 37.5mg 37.5mg   INR 2.5 2.2 2.6 3.9  2.1 2.1 1.8 1.23 1.4 1.5 2.1   Notes    Inc torsemide Cath placement    AV fistula placement    HM train hm 1 no billing       Date 8/23 8/30 9/6 9/13 9/20 9/30 10/4 10/11 10/18 10/25 11/8 11/15   Total WeeklyDose 31.25 mg 32.5 mg 32.5 mg 32.5 mg 32.5 mg 32.5 mg 35 mg 35 mg 35 mg 32.5 mg 26.5 mg 35 mg   INR 1.9 2.0 2.0 2.5 2.7 1.6 1.7 2.8 2.4 1.7 1.4 1.6   Notes HM2 no billing HM3  No billing HM4  No billing HM 1  Bill today HM 2 No billing HM 3 No bill HM 4  No billing 1x boost  HM 5 no bill day Bill day  HM1 HM- 2 no billing   HM 3- no billing 1x boost  HM 4 - no billing      Date 11/22 11/29 12/6 12/13 12/20 12/27 1/3/23 1/10 1/12 1/17 1/26/23 1/31/23   Total WeeklyDose 37.5 mg 37.5 mg 37.5 mg 37.5 mg 42.5 mg  40 mg 37.5 mg 40 mg 42.5mg 45 mg 45 mg 40 mg   INR 1.9 2.8 1.9 1.5 2.3 3.4 1.4 0.8 2.7 POCT  2.23   2.8 HM 3.9 1.9 2.2   Notes HM1- bill day HM 2 - NO bill HM 3 - no bill  Inc GLV Hm 4 - NO BILL HM 1 - Bill today HM 2   no billing HM 3- no billing HM 4  No billing lab error?? clinic HM 1  Bill today HM 2 - no bill  HM 3 - no bill      Date 2/7 2/14 2/21/23 2/28/23 3/7 3/14/23 3/21/23 3/28/23 4/4 4/11 4/18 4/26   Total WeeklyDose 40 mg 40 mg 40 mg 37.5 mg 40 mg 40 mg 40 mg 40 mg 37.5 mg 37.5 mg 37.5 mg  40 mg   INR 2.3 2.2 3.9 1.6 3.7 ??? 2.5 2.7 3.2 2.8  2.5 1.7 1.7   Notes HM 4 rec 2/15  HM 1  Bill today HM 2 - no billing  1xred  HM 3 - NO BILL  HM-4 no bill, percocet HM 5 - no billing until 3/15 HM 1 - BILL today  HM 2 - no Billing HM 3  No billing HM 4  No billing HM 1-  BILL today  HM 2  No billing     Date 5/2 5/5 5/11 5/16 5/22 5/30 6/6 6/14 6/20 6/27 7/5 7/11   Total WeeklyDose 42.5 mg 37.5 mg 42.5mg 42.5 mg  35 mg 40 mg 40 mg 40 mg 37.5 mg 40 mg 40 mg 40 mg   INR 3.5 1.8 2.9 4.2 2.0 2.5 2.8 3.2 1.9 2.2 3.0 3.3   Notes HM 3  No billing HM 4  No billing HM 5  No billing HM-1  Bill  Dec'd GLV  HM 2  No billing  1x hold  HM 3   no billing HM 4  No billing HM 1  Bill today HM 2  No billing HM 3  No bill HM 4  No bill HM5  No bill     Date 7/18 7/25 08/01/23 8/8 8/15/23 8/23/23 08/29/23 09/05/23 9/12/23 9/19/23 9/26/23 10/3   Total WeeklyDose 37.5 mg 35 mg 37.5mg 40 mg 37.5 mg 37.5 mg 37.5 mg 37.5 mg 37.5 mg 37.5 mg 37.5 mg 37.5 mg   INR 3.3 1.9 1.9 2.3 2.6 2.1 2.4 2.3 2.7 2.5 2.7 2.0   Notes HM 1  Bill today HM 2  No billing HM 3  No billing  1 x boost HM 4 no billing  HM 1 - BILL today  HM 2 - no bill  HM 3- no billing HM 4- no billing HM 1 - BILL today  HM 2 - NO bill  HM 3 - no bill  Hm 4- no bill     Date 10/10 10/18 10/24 10/31 11/07 11/14/23 11/21 11/28 12/05/23  12/12  12/19 12/26   Total Weekly Dose 37.5mg 37.5 mg 37.5mg 42.5 mg 37.5 mg 37.5 mg 37.5 mg 37.5 mg 35 mg 37.5 mg  40 mg 37.5 mg   INR 1.6 2.0 1.5 2.7 2.1 2.5 2.3 4.2 1.9  1.8 2.2 2.1   Notes Hm- 1 bill today  Missed dose?  Inc GLV HM 2- No bill  Hm- 3 no bill Hm 4 no billing   Boost dose  HM 1 billing today  2 - no bill  HM 3 no billing Hm 4 no billing HM 1 billing day  1x red  HM-2 no bill HM-3 no bill  boost HM-4 bill     Date 1/2/24 1/9 1/17 1/23/24 1/30 2/6/24 2/13/24 2/20/24 2/27 3/5/24 3/13/24 3/19 3/26/24   Total Weekly  Dose 37.5 mg 40 mg 40 mg 40 mg 40 mg 40 mg 40 mg 45 mg 40mg  37.5 mg  35 mg 37.5 mg 37.5 mg   INR 1.8 1.9 2.2 2.4 2.1 2.7 1.4 2.2 3.7 3.3 2.5 2.2 2.2   Notes  1  billing day HM-2 no billing boost Hm 3 no bill HM 4 - no bill  HM 1-bill today HM 2 -no bill  HM 3 - no bill  HM 4 - no bill   BOOST  Hm- 1   Bill today HM 2 - no bill  HM 3 no bill Hm 4 no bill HM 1 - Bill Today      Date 4/9 4/16 4/24/24 5/1/24 5/7/24 5/14 5/22 5/28 6/4 6/11 6/18 7/2 7/9   Total WeeklyDose 37.5 mg 37.5 mg 37.5 mg 37.5 mg 37.5 mg 37.5 mg 37.5 mg 37.5 mg 37.5 mg 37.5 mg 37.5 mg  37.5 mg 37.5 mg   INR 2.1 2.3 2.5 2.6 2.6 2.7 2.9 2.8 2.7 2.3 2.5  3 2.4   Notes HM 2 - No Bill HM 3 - No Bill HM 4 - no bill  HM 1 - no bill   Rec'd 5/3 HM 2 - no bill  HM 3 - no bill HM 4 - no bill HM 5 - no bill HM 1 - Bill today  HM 2 - no bill HM-3 no bill HM-4 no bill HM 1 - BILL TODAY  Rec'd 7/10     Date  7/16 7/23/24              Total Weekly  Dose 37.5 mg 35 mg              INR 3.4 2.7              Notes HM 2 - no bill    APAP HM 3 - NO BILL   APAP  1x red               Takes warfarin in the am    Clinic Interview:  Verbal Release Authorization signed on 8/22/2018 -- may speak with Raeann Montemayor (Wife). [phone] (Home) *Preferred*  Tablet Strength: pt has 7.5mg and 2.5mg tablets  Estimated oop cost for [facility] HM: Patient has [employer] Medicare so they should have minimal to no OOP costs.                   Patient Findings  Negatives: Signs/symptoms of thrombosis, Signs/symptoms of bleeding, Laboratory test error suspected, Change in health, Change in alcohol use, Change in activity, Upcoming invasive procedure, Emergency department visit, Upcoming dental procedure, Missed doses, Extra doses, Change in medications, Change in diet/appetite, Hospital admission, Bruising, Other complaints   Comments: All findings negative per patient       Plan:   1. INR was therapeutic today at 2.7 (goal 2.0-3.0). Instructed Mr. Montemayor to resume warfarin 5 mg oral daily except 7.5 mg Fri until recheck.  2. Repeat INR in 1 week, 7/30/24  3. Verbal information provided over the telephone. Mr. Montemayor expresses understanding with  teach back and has no further questions at this time.     Home monitor information below:  Test strips on hand: 4  7/12324  Lot : [id number]  Barcode : [id number] Exp: 1/25  Serial number: [id number]   Supplies billing code used: last 7/10/24 -  Next must be on or after 8/7/2024  Transfer Tube Lot number: Lot: [id number]  Safety Lancets:  Lot : [id number]     Billing:  *bill must be 4 tests 28 days or more from HM visit  PLEASE REFER TO ANTICOAGULATION TRAINING POWERPOINT FOR BILLING. Needs to use in clinic remote appointment for encounter.    Alexander Scherer, Pharmacy Technician  7/23/2024  16:27 EDT    I, Jessi Vasquez, [facility], have reviewed the note in full and agree with the assessment and plan.  07/24/24  11:04 EDT

## 2024-07-31 ENCOUNTER — ANTICOAGULATION VISIT (OUTPATIENT)
Dept: PHARMACY | Facility: HOSPITAL | Age: 79
End: 2024-07-31
Payer: MEDICARE

## 2024-07-31 DIAGNOSIS — I48.21 PERMANENT ATRIAL FIBRILLATION: Primary | ICD-10-CM

## 2024-07-31 LAB — INR PPP: 2.4

## 2024-07-31 NOTE — PROGRESS NOTES
Anticoagulation Clinic Progress Note   Oswaldo HOMEMONITOR: [employer] Serial # : [id number]     Indication: afib  Referring Provider: Gale  Initial Warfarin Start Date: 2007  Goal INR: 2-3  Current Drug Interactions:   [id number]: 5 (HTN, Age, CHF, CAD)   Other: anemic, CKD (stage 4) May need bridge with Lovenox given persistent AF (per cardio 1/24/2019), Scr was 3.22 on 11/24/21    Diet: eats salads 2x weekly 5/11/2023     Anticoagulation Clinic INR History:     Date 3/30 4/13 5/11 6/8  6/23 7/7 7/28  8/9 8/12 8/16   Total WeeklyDose 31.25mg 31.25 mg 31.25 mg 31.25mg Start dialysis 30mg 30mg 30 mg  33.75mg 37.5mg 37.5mg   INR 2.5 2.2 2.6 3.9  2.1 2.1 1.8 1.23 1.4 1.5 2.1   Notes    Inc torsemide Cath placement    AV fistula placement    HM train hm 1 no billing       Date 8/23 8/30 9/6 9/13 9/20 9/30 10/4 10/11 10/18 10/25 11/8 11/15   Total WeeklyDose 31.25 mg 32.5 mg 32.5 mg 32.5 mg 32.5 mg 32.5 mg 35 mg 35 mg 35 mg 32.5 mg 26.5 mg 35 mg   INR 1.9 2.0 2.0 2.5 2.7 1.6 1.7 2.8 2.4 1.7 1.4 1.6   Notes HM2 no billing HM3  No billing HM4  No billing HM 1  Bill today HM 2 No billing HM 3 No bill HM 4  No billing 1x boost  HM 5 no bill day Bill day  HM1 HM- 2 no billing   HM 3- no billing 1x boost  HM 4 - no billing      Date 11/22 11/29 12/6 12/13 12/20 12/27 1/3/23 1/10 1/12 1/17 1/26/23 1/31/23   Total WeeklyDose 37.5 mg 37.5 mg 37.5 mg 37.5 mg 42.5 mg  40 mg 37.5 mg 40 mg 42.5mg 45 mg 45 mg 40 mg   INR 1.9 2.8 1.9 1.5 2.3 3.4 1.4 0.8 2.7 POCT  2.23   2.8 HM 3.9 1.9 2.2   Notes HM1- bill day HM 2 - NO bill HM 3 - no bill  Inc GLV Hm 4 - NO BILL HM 1 - Bill today HM 2   no billing HM 3- no billing HM 4  No billing lab error?? clinic HM 1  Bill today HM 2 - no bill  HM 3 - no bill      Date 2/7 2/14 2/21/23 2/28/23 3/7 3/14/23 3/21/23 3/28/23 4/4 4/11 4/18 4/26   Total WeeklyDose 40 mg 40 mg 40 mg 37.5 mg 40 mg 40 mg 40 mg 40 mg 37.5 mg 37.5 mg 37.5 mg  40 mg   INR 2.3 2.2 3.9 1.6 3.7 ??? 2.5 2.7 3.2 2.8  2.5 1.7 1.7   Notes HM 4 rec 2/15  HM 1  Bill today HM 2 - no billing  1xred  HM 3 - NO BILL  HM-4 no bill, percocet HM 5 - no billing until 3/15 HM 1 - BILL today  HM 2 - no Billing HM 3  No billing HM 4  No billing HM 1-  BILL today  HM 2  No billing     Date 5/2 5/5 5/11 5/16 5/22 5/30 6/6 6/14 6/20 6/27 7/5 7/11   Total WeeklyDose 42.5 mg 37.5 mg 42.5mg 42.5 mg  35 mg 40 mg 40 mg 40 mg 37.5 mg 40 mg 40 mg 40 mg   INR 3.5 1.8 2.9 4.2 2.0 2.5 2.8 3.2 1.9 2.2 3.0 3.3   Notes HM 3  No billing HM 4  No billing HM 5  No billing HM-1  Bill  Dec'd GLV  HM 2  No billing  1x hold  HM 3   no billing HM 4  No billing HM 1  Bill today HM 2  No billing HM 3  No bill HM 4  No bill HM5  No bill     Date 7/18 7/25 08/01/23 8/8 8/15/23 8/23/23 08/29/23 09/05/23 9/12/23 9/19/23 9/26/23 10/3   Total WeeklyDose 37.5 mg 35 mg 37.5mg 40 mg 37.5 mg 37.5 mg 37.5 mg 37.5 mg 37.5 mg 37.5 mg 37.5 mg 37.5 mg   INR 3.3 1.9 1.9 2.3 2.6 2.1 2.4 2.3 2.7 2.5 2.7 2.0   Notes HM 1  Bill today HM 2  No billing HM 3  No billing  1 x boost HM 4 no billing  HM 1 - BILL today  HM 2 - no bill  HM 3- no billing HM 4- no billing HM 1 - BILL today  HM 2 - NO bill  HM 3 - no bill  Hm 4- no bill     Date 10/10 10/18 10/24 10/31 11/07 11/14/23 11/21 11/28 12/05/23  12/12  12/19 12/26   Total Weekly Dose 37.5mg 37.5 mg 37.5mg 42.5 mg 37.5 mg 37.5 mg 37.5 mg 37.5 mg 35 mg 37.5 mg  40 mg 37.5 mg   INR 1.6 2.0 1.5 2.7 2.1 2.5 2.3 4.2 1.9  1.8 2.2 2.1   Notes Hm- 1 bill today  Missed dose?  Inc GLV HM 2- No bill  Hm- 3 no bill Hm 4 no billing   Boost dose  HM 1 billing today  2 - no bill  HM 3 no billing Hm 4 no billing HM 1 billing day  1x red  HM-2 no bill HM-3 no bill  boost HM-4 bill     Date 1/2/24 1/9 1/17 1/23/24 1/30 2/6/24 2/13/24 2/20/24 2/27 3/5/24 3/13/24 3/19 3/26/24   Total Weekly  Dose 37.5 mg 40 mg 40 mg 40 mg 40 mg 40 mg 40 mg 45 mg 40mg  37.5 mg  35 mg 37.5 mg 37.5 mg   INR 1.8 1.9 2.2 2.4 2.1 2.7 1.4 2.2 3.7 3.3 2.5 2.2 2.2   Notes  1  billing day HM-2 no billing boost Hm 3 no bill HM 4 - no bill  HM 1-bill today HM 2 -no bill  HM 3 - no bill  HM 4 - no bill   BOOST  Hm- 1   Bill today HM 2 - no bill  HM 3 no bill Hm 4 no bill HM 1 - Bill Today      Date 4/9 4/16 4/24/24 5/1/24 5/7/24 5/14 5/22 5/28 6/4 6/11 6/18 7/2 7/9   Total WeeklyDose 37.5 mg 37.5 mg 37.5 mg 37.5 mg 37.5 mg 37.5 mg 37.5 mg 37.5 mg 37.5 mg 37.5 mg 37.5 mg  37.5 mg 37.5 mg   INR 2.1 2.3 2.5 2.6 2.6 2.7 2.9 2.8 2.7 2.3 2.5  3 2.4   Notes HM 2 - No Bill HM 3 - No Bill HM 4 - no bill  HM 1 - no bill   Rec'd 5/3 HM 2 - no bill  HM 3 - no bill HM 4 - no bill HM 5 - no bill HM 1 - Bill today  HM 2 - no bill HM-3 no bill HM-4 no bill HM 1 - BILL TODAY  Rec'd 7/10     Date  7/16 7/23/24 7/31             Total Weekly  Dose 37.5 mg 35 mg 35 mg             INR 3.4 2.7 2.4             Notes HM 2 - no bill    APAP HM 3 - NO BILL   APAP  1x red  HM 4 no bill             Takes warfarin in the am    Clinic Interview:  Verbal Release Authorization signed on 8/22/2018 -- may speak with Raeann Montemayor (Wife). [phone] (Home) *Preferred*  Tablet Strength: pt has 7.5mg and 2.5mg tablets  Estimated oop cost for [facility] HM: Patient has [employer] Medicare so they should have minimal to no OOP costs.                 Patient Findings  Negatives: Signs/symptoms of thrombosis, Signs/symptoms of bleeding, Laboratory test error suspected, Change in health, Change in alcohol use, Change in activity, Upcoming invasive procedure, Emergency department visit, Upcoming dental procedure, Missed doses, Extra doses, Change in medications, Change in diet/appetite, Hospital admission, Bruising, Other complaints   Comments: All findings negative per pt     Plan:   1. INR is therapeutic today at 2.4 (goal 2.0-3.0). Instructed Mr. Montemayor to continue warfarin 5 mg oral daily except 7.5 mg Fri until recheck.  2. Repeat INR in 1 week, 8/7/24  3. Verbal information provided over the telephone. Mr. Montemayor expresses  understanding with teach back and has no further questions at this time.     Home monitor information below:  Test strips on hand: 3  7/31/324  Lot : [id number]  Barcode : [id number] Exp: 1/25  Serial number: [id number]   Supplies billing code used: last 7/10/24 -  Next must be on or after 8/7/2024  Transfer Tube Lot number: Lot: [id number]  Safety Lancets:  Lot : [id number]     Billing:  *bill must be 4 tests 28 days or more from  visit  PLEASE REFER TO ANTICOAGULATION TRAINING POWERPOINT FOR BILLING. Needs to use in clinic remote appointment for encounter.    Judy Mora  Pharmacy Technician   7/31/2024 08:24 EDT    I, Criss Britt, PharmD, have reviewed the note in full and agree with the assessment and plan.  07/31/24  12:03 EDT

## 2024-08-05 ENCOUNTER — OFFICE VISIT (OUTPATIENT)
Age: 79
End: 2024-08-05
Payer: MEDICARE

## 2024-08-05 VITALS
SYSTOLIC BLOOD PRESSURE: 138 MMHG | WEIGHT: 292 LBS | DIASTOLIC BLOOD PRESSURE: 74 MMHG | HEART RATE: 90 BPM | OXYGEN SATURATION: 96 % | BODY MASS INDEX: 41.9 KG/M2

## 2024-08-05 DIAGNOSIS — E11.65 TYPE 2 DIABETES MELLITUS WITH HYPERGLYCEMIA, UNSPECIFIED WHETHER LONG TERM INSULIN USE: Primary | ICD-10-CM

## 2024-08-05 DIAGNOSIS — N18.6 ESRD (END STAGE RENAL DISEASE): ICD-10-CM

## 2024-08-05 DIAGNOSIS — I10 PRIMARY HYPERTENSION: Chronic | ICD-10-CM

## 2024-08-05 DIAGNOSIS — I48.21 PERMANENT ATRIAL FIBRILLATION: ICD-10-CM

## 2024-08-05 LAB
EXPIRATION DATE: ABNORMAL
HBA1C MFR BLD: 6.1 % (ref 4.5–5.7)
Lab: ABNORMAL

## 2024-08-05 PROCEDURE — 1160F RVW MEDS BY RX/DR IN RCRD: CPT | Performed by: STUDENT IN AN ORGANIZED HEALTH CARE EDUCATION/TRAINING PROGRAM

## 2024-08-05 PROCEDURE — 3075F SYST BP GE 130 - 139MM HG: CPT | Performed by: STUDENT IN AN ORGANIZED HEALTH CARE EDUCATION/TRAINING PROGRAM

## 2024-08-05 PROCEDURE — 83036 HEMOGLOBIN GLYCOSYLATED A1C: CPT | Performed by: STUDENT IN AN ORGANIZED HEALTH CARE EDUCATION/TRAINING PROGRAM

## 2024-08-05 PROCEDURE — 1126F AMNT PAIN NOTED NONE PRSNT: CPT | Performed by: STUDENT IN AN ORGANIZED HEALTH CARE EDUCATION/TRAINING PROGRAM

## 2024-08-05 PROCEDURE — 3044F HG A1C LEVEL LT 7.0%: CPT | Performed by: STUDENT IN AN ORGANIZED HEALTH CARE EDUCATION/TRAINING PROGRAM

## 2024-08-05 PROCEDURE — 99204 OFFICE O/P NEW MOD 45 MIN: CPT | Performed by: STUDENT IN AN ORGANIZED HEALTH CARE EDUCATION/TRAINING PROGRAM

## 2024-08-05 PROCEDURE — 3078F DIAST BP <80 MM HG: CPT | Performed by: STUDENT IN AN ORGANIZED HEALTH CARE EDUCATION/TRAINING PROGRAM

## 2024-08-05 PROCEDURE — 1159F MED LIST DOCD IN RCRD: CPT | Performed by: STUDENT IN AN ORGANIZED HEALTH CARE EDUCATION/TRAINING PROGRAM

## 2024-08-05 NOTE — PROGRESS NOTES
Office Note     Name: Marco Antonio Montemayor    : 1945     MRN: [id number]     Chief Complaint  Establish Care    Subjective     History of Present Illness:  Marco Antonio Montemayor is a 79 y.o. male who presents today for initial visit to establish care.  He has multiple chronic conditions including ESRD on hemodialysis 3 times a week through left AV fistula, A-fib, chronic anemia, hyperlipidemia.  He has a wound which is possibly stasis ulcer versus calciphylaxis.  He has no other significant complaints at this time and is doing fairly well overall    Past Medical History:   Past Medical History:   Diagnosis Date    Abnormal ECG     Arrhythmia     Arthritis     Asthma     Cancer     skin cancer removed from various locations     Chronic diastolic congestive heart failure 2018    CKD (chronic kidney disease)     closer to stage 5    Coronary artery disease     Dry skin     Dyslipidemia 2016    Edema     Heart murmur     Hematoma     Hemodialysis access site with arteriovenous graft     History of transfusion     no transfusion reaction    Hypertension     Iron deficiency anemia     Long term current use of anticoagulant     ZULEIKA treated with BiPAP     Persistent atrial fibrillation 2016     Patient notes dyspnea, fatigue and palpitations during episodes of afib.  multiple external cardioversionsChads vasc 2, anticoagulated with Coumadin    Tinnitus     Wears glasses     readers       Past Surgical History:   Past Surgical History:   Procedure Laterality Date    ABLATION OF DYSRHYTHMIC FOCUS      APPENDECTOMY      ARTERIOVENOUS FISTULA/SHUNT SURGERY Left 2022    Procedure: UPPER EXTREMITY BRACHIOCEPHALIC ARTERIOVENOUS FISTULA FORMATION LEFT;  Surgeon: Jakob Reza MD;  Location: [facility];  Service: Vascular;  Laterality: Left;    ARTERIOVENOUS FISTULA/SHUNT SURGERY Left 2022    Procedure: ELEVATION LEFT UPPER EXTREMITY AV FISTULA;  Surgeon: Jakob Reza MD;   Location:  [facility];  Service: Vascular;  Laterality: Left;    CARDIAC ELECTROPHYSIOLOGY PROCEDURE N/A 05/18/2018    Procedure: PPM generator change - dual       [facility]/ please schedule in 8 weeks;  Surgeon: Johnathan Taylor MD;  Location:  [facility];  Service: Cardiovascular    CATARACT EXTRACTION      bilat wiht lens     COLONOSCOPY      ENDOSCOPY      Pt denies    INSERT / REPLACE / REMOVE PACEMAKER  about 20 years back    SKIN CANCER EXCISION      various locations     TONSILLECTOMY      VEIN SURGERY  for dialysis    WISDOM TOOTH EXTRACTION         Immunizations:   Immunization History   Administered Date(s) Administered    Hepatitis B 2 Dose Vaccine Heplisav-B 07/15/2022, 08/12/2022, 09/16/2022, 12/28/2022        Medications:     Current Outpatient Medications:     acetaminophen (TYLENOL) 500 MG tablet, Take 1 tablet by mouth Every 6 (Six) Hours As Needed for Mild Pain., Disp: , Rfl:     atorvastatin (LIPITOR) 40 MG tablet, TAKE ONE TABLET BY MOUTH DAILY, Disp: 90 tablet, Rfl: 3    bumetanide (BUMEX) 2 MG tablet, TAKE 1 TABLET BY MOUTH EVERY OTHER DAY - TAKE ON DAYS OFF FROM DIALYSIS (TUESDAY, THURSDAY, SATURDAY AND SUNDAY), Disp: 48 tablet, Rfl: 2    carvedilol (COREG) 12.5 MG tablet, TAKE ONE TABLET BY MOUTH TWICE A DAY, Disp: 180 tablet, Rfl: 3    cholecalciferol (VITAMIN D3) 1000 UNITS tablet, Take 1 tablet by mouth Daily., Disp: , Rfl:     Ferrous Sulfate (IRON) 325 (65 FE) MG tablet, Take 65 mg by mouth Daily., Disp: , Rfl:     midodrine (PROAMATINE) 10 MG tablet, Take 1 tablet by mouth 3 (Three) Times a Day Before Meals. Before Dialysis, Tues, Thurs, Sat, Disp: , Rfl:     PHARMACY TO DOSE WARFARIN, Continuous As Needed (patient's warfarin is being managed by the [facility] ([phone]))., Disp: , Rfl:     Sucroferric Oxyhydroxide (Velphoro) 500 MG chewable tablet, Take As Directed. Crush or chew and swallow 3 tablets by mouth 3 times a day with  "meals and 1 tablet twice a day with snacks., Disp: , Rfl:     vitamin B-12 (CYANOCOBALAMIN) 1000 MCG tablet, Take 1 tablet by mouth Daily., Disp: , Rfl:     warfarin (COUMADIN) 2.5 MG tablet, TAKE ONE AND ONE-HALF TO TWO TABLETS BY MOUTH ONCE DAILY, Disp: 180 tablet, Rfl: 1    nitroglycerin (NITROSTAT) 0.3 MG SL tablet, 1 under the tongue as needed for angina, may repeat q5mins for up three doses (Patient not taking: Reported on 8/5/2024), Disp: 25 tablet, Rfl: 11    Allergies:   No Known Allergies    Family History:   Family History   Problem Relation Age of Onset    Cancer Mother     Heart attack Mother     Hypertension Mother     Arrhythmia Father     Hypertension Father     Heart attack Father        Social History:   Social History     Socioeconomic History    Marital status:    Tobacco Use    Smoking status: Never     Passive exposure: Never    Smokeless tobacco: Never   Vaping Use    Vaping status: Never Used   Substance and Sexual Activity    Alcohol use: No    Drug use: No    Sexual activity: Never         Objective     Vital Signs  /74   Pulse 90   Wt 132 kg (292 lb)   SpO2 96%   BMI 41.90 kg/m²   Estimated body mass index is 41.9 kg/m² as calculated from the following:    Height as of 7/17/24: 177.8 cm (70\").    Weight as of this encounter: 132 kg (292 lb).    Class 3 Severe Obesity (BMI >=40). Obesity-related health conditions include the following: hypertension. Obesity is unchanged. BMI is is above average; BMI management plan is completed.  Patient has  medical contraindication to weight loss therapies which is ESRD .      Physical Exam  Constitutional:       General: He is not in acute distress.     Appearance: He is not toxic-appearing.   Cardiovascular:      Rate and Rhythm: Normal rate. Rhythm irregular.      Heart sounds: No murmur heard.     No friction rub. No gallop.   Pulmonary:      Effort: Pulmonary effort is normal.      Breath sounds: Normal breath sounds.   Abdominal:    "   General: Abdomen is flat. There is no distension.   Skin:     General: Skin is warm and dry.   Neurological:      Mental Status: He is alert.   Psychiatric:         Mood and Affect: Mood normal.         Behavior: Behavior normal.          Assessment and Plan     1. Type 2 diabetes mellitus with hyperglycemia, unspecified whether long term insulin use  Chronic stable not on any diabetes meds  Urine microalbumin not indicated due to ESRD  -A1c today at goal at 6.1%  - POC Glycosylated Hemoglobin (Hb A1C)    2. Permanent atrial fibrillation  Chronic stable continue warfarin and coreg    3. ESRD on HD T,R,S since 2022  Midodrine on dialysis days, continue bumex  HD through AVF TRS since 2022  Makes some urine, continue bumex    4. Primary hypertension  Chronic stable continue coreg       Counseling was given to patient for the following topics: instructions for management.    Follow Up  Return in about 3 months (around 11/5/2024).    MD SONG [facility]  [address] [id number]  [phone]

## 2024-08-07 ENCOUNTER — ANTICOAGULATION VISIT (OUTPATIENT)
Dept: PHARMACY | Facility: HOSPITAL | Age: 79
End: 2024-08-07
Payer: MEDICARE

## 2024-08-07 DIAGNOSIS — I48.21 PERMANENT ATRIAL FIBRILLATION: Primary | ICD-10-CM

## 2024-08-07 LAB — INR PPP: 3

## 2024-08-07 PROCEDURE — G0249 PROVIDE INR TEST MATER/EQUIP: HCPCS

## 2024-08-07 NOTE — PROGRESS NOTES
Anticoagulation Clinic Progress Note   Oswaldo HOMEMONITOR: [employer] Serial # : [id number]     Indication: afib  Referring Provider: Gale  Initial Warfarin Start Date: 2007  Goal INR: 2-3  Current Drug Interactions:   [id number]: 5 (HTN, Age, CHF, CAD)   Other: anemic, CKD (stage 4) May need bridge with Lovenox given persistent AF (per cardio 1/24/2019), Scr was 3.22 on 11/24/21    Diet: eats salads 2x weekly 5/11/2023     Anticoagulation Clinic INR History:     Date 3/30 4/13 5/11 6/8  6/23 7/7 7/28  8/9 8/12 8/16   Total WeeklyDose 31.25mg 31.25 mg 31.25 mg 31.25mg Start dialysis 30mg 30mg 30 mg  33.75mg 37.5mg 37.5mg   INR 2.5 2.2 2.6 3.9  2.1 2.1 1.8 1.23 1.4 1.5 2.1   Notes    Inc torsemide Cath placement    AV fistula placement    HM train hm 1 no billing       Date 8/23 8/30 9/6 9/13 9/20 9/30 10/4 10/11 10/18 10/25 11/8 11/15   Total WeeklyDose 31.25 mg 32.5 mg 32.5 mg 32.5 mg 32.5 mg 32.5 mg 35 mg 35 mg 35 mg 32.5 mg 26.5 mg 35 mg   INR 1.9 2.0 2.0 2.5 2.7 1.6 1.7 2.8 2.4 1.7 1.4 1.6   Notes HM2 no billing HM3  No billing HM4  No billing HM 1  Bill today HM 2 No billing HM 3 No bill HM 4  No billing 1x boost  HM 5 no bill day Bill day  HM1 HM- 2 no billing   HM 3- no billing 1x boost  HM 4 - no billing      Date 11/22 11/29 12/6 12/13 12/20 12/27 1/3/23 1/10 1/12 1/17 1/26/23 1/31/23   Total WeeklyDose 37.5 mg 37.5 mg 37.5 mg 37.5 mg 42.5 mg  40 mg 37.5 mg 40 mg 42.5mg 45 mg 45 mg 40 mg   INR 1.9 2.8 1.9 1.5 2.3 3.4 1.4 0.8 2.7 POCT  2.23   2.8 HM 3.9 1.9 2.2   Notes HM1- bill day HM 2 - NO bill HM 3 - no bill  Inc GLV Hm 4 - NO BILL HM 1 - Bill today HM 2   no billing HM 3- no billing HM 4  No billing lab error?? clinic HM 1  Bill today HM 2 - no bill  HM 3 - no bill      Date 2/7 2/14 2/21/23 2/28/23 3/7 3/14/23 3/21/23 3/28/23 4/4 4/11 4/18 4/26   Total WeeklyDose 40 mg 40 mg 40 mg 37.5 mg 40 mg 40 mg 40 mg 40 mg 37.5 mg 37.5 mg 37.5 mg  40 mg   INR 2.3 2.2 3.9 1.6 3.7 ??? 2.5 2.7 3.2 2.8  2.5 1.7 1.7   Notes HM 4 rec 2/15  HM 1  Bill today HM 2 - no billing  1xred  HM 3 - NO BILL  HM-4 no bill, percocet HM 5 - no billing until 3/15 HM 1 - BILL today  HM 2 - no Billing HM 3  No billing HM 4  No billing HM 1-  BILL today  HM 2  No billing     Date 5/2 5/5 5/11 5/16 5/22 5/30 6/6 6/14 6/20 6/27 7/5 7/11   Total WeeklyDose 42.5 mg 37.5 mg 42.5mg 42.5 mg  35 mg 40 mg 40 mg 40 mg 37.5 mg 40 mg 40 mg 40 mg   INR 3.5 1.8 2.9 4.2 2.0 2.5 2.8 3.2 1.9 2.2 3.0 3.3   Notes HM 3  No billing HM 4  No billing HM 5  No billing HM-1  Bill  Dec'd GLV  HM 2  No billing  1x hold  HM 3   no billing HM 4  No billing HM 1  Bill today HM 2  No billing HM 3  No bill HM 4  No bill HM5  No bill     Date 7/18 7/25 08/01/23 8/8 8/15/23 8/23/23 08/29/23 09/05/23 9/12/23 9/19/23 9/26/23 10/3   Total WeeklyDose 37.5 mg 35 mg 37.5mg 40 mg 37.5 mg 37.5 mg 37.5 mg 37.5 mg 37.5 mg 37.5 mg 37.5 mg 37.5 mg   INR 3.3 1.9 1.9 2.3 2.6 2.1 2.4 2.3 2.7 2.5 2.7 2.0   Notes HM 1  Bill today HM 2  No billing HM 3  No billing  1 x boost HM 4 no billing  HM 1 - BILL today  HM 2 - no bill  HM 3- no billing HM 4- no billing HM 1 - BILL today  HM 2 - NO bill  HM 3 - no bill  Hm 4- no bill     Date 10/10 10/18 10/24 10/31 11/07 11/14/23 11/21 11/28 12/05/23  12/12  12/19 12/26   Total Weekly Dose 37.5mg 37.5 mg 37.5mg 42.5 mg 37.5 mg 37.5 mg 37.5 mg 37.5 mg 35 mg 37.5 mg  40 mg 37.5 mg   INR 1.6 2.0 1.5 2.7 2.1 2.5 2.3 4.2 1.9  1.8 2.2 2.1   Notes Hm- 1 bill today  Missed dose?  Inc GLV HM 2- No bill  Hm- 3 no bill Hm 4 no billing   Boost dose  HM 1 billing today  2 - no bill  HM 3 no billing Hm 4 no billing HM 1 billing day  1x red  HM-2 no bill HM-3 no bill  boost HM-4 bill     Date 1/2/24 1/9 1/17 1/23/24 1/30 2/6/24 2/13/24 2/20/24 2/27 3/5/24 3/13/24 3/19 3/26/24   Total Weekly  Dose 37.5 mg 40 mg 40 mg 40 mg 40 mg 40 mg 40 mg 45 mg 40mg  37.5 mg  35 mg 37.5 mg 37.5 mg   INR 1.8 1.9 2.2 2.4 2.1 2.7 1.4 2.2 3.7 3.3 2.5 2.2 2.2   Notes  1  billing day HM-2 no billing boost Hm 3 no bill HM 4 - no bill  HM 1-bill today HM 2 -no bill  HM 3 - no bill  HM 4 - no bill   BOOST  Hm- 1   Bill today HM 2 - no bill  HM 3 no bill Hm 4 no bill HM 1 - Bill Today      Date 4/9 4/16 4/24/24 5/1/24 5/7/24 5/14 5/22 5/28 6/4 6/11 6/18 7/2 7/9   Total WeeklyDose 37.5 mg 37.5 mg 37.5 mg 37.5 mg 37.5 mg 37.5 mg 37.5 mg 37.5 mg 37.5 mg 37.5 mg 37.5 mg  37.5 mg 37.5 mg   INR 2.1 2.3 2.5 2.6 2.6 2.7 2.9 2.8 2.7 2.3 2.5  3 2.4   Notes HM 2 - No Bill HM 3 - No Bill HM 4 - no bill  HM 1 - no bill   Rec'd 5/3 HM 2 - no bill  HM 3 - no bill HM 4 - no bill HM 5 - no bill HM 1 - Bill today  HM 2 - no bill HM-3 no bill HM-4 no bill HM 1 - BILL TODAY  Rec'd 7/10     Date  7/16 7/23/24 7/31 8/7            Total Weekly  Dose 37.5 mg 35 mg 35 mg 35 mg            INR 3.4 2.7 2.4 3.0            Notes HM 2 - no bill    APAP HM 3 - NO BILL   APAP  1x red  HM 4 no bill HM 1 bill            Takes warfarin in the am    Clinic Interview:  Verbal Release Authorization signed on 8/22/2018 -- may speak with Raeann Montemayor (Wife). [phone] (Home) *Preferred*  Tablet Strength: pt has 7.5mg and 2.5mg tablets  Estimated oop cost for [facility] HM: Patient has [employer] Medicare so they should have minimal to no OOP costs.                 Patient Findings  Negatives: Signs/symptoms of thrombosis, Signs/symptoms of bleeding, Laboratory test error suspected, Change in health, Change in alcohol use, Change in activity, Upcoming invasive procedure, Emergency department visit, Upcoming dental procedure, Missed doses, Extra doses, Change in medications, Change in diet/appetite, Hospital admission, Bruising, Other complaints   Comments: All findings negative per pt     Plan:   1. INR is therapeutic today at 3.0 (goal 2.0-3.0). Instructed Mr. Montemayor to continue warfarin 5 mg oral daily except 7.5 mg Fri until recheck.  2. Repeat INR in 1 week, 8/14/24  3. Verbal information provided over the telephone.   Sim expresses understanding with teach back and has no further questions at this time.     Home monitor information below:  Test strips on hand: 4  8/7/324  Lot : [id number]  Barcode : [id number] Exp: 1/25  Serial number: [id number]   Supplies billing code used: last 8/7/24 -  Next must be on or after 9/4/2024  Transfer Tube Lot number: Lot: [id number]  Safety Lancets:  Lot : [id number]     Billing:  *bill must be 4 tests 28 days or more from HM visit  PLEASE REFER TO ANTICOAGULATION TRAINING POWERPOINT FOR BILLING. Needs to use in clinic remote appointment for encounter.    Judy Mora  Pharmacy Technician   8/7/2024 09:56 EDT    I, Jessi Vasquez [facility], have reviewed the note in full and agree with the assessment and plan.  08/07/24  10:00 EDT

## 2024-08-13 ENCOUNTER — ANTICOAGULATION VISIT (OUTPATIENT)
Dept: PHARMACY | Facility: HOSPITAL | Age: 79
End: 2024-08-13
Payer: MEDICARE

## 2024-08-13 DIAGNOSIS — I48.21 PERMANENT ATRIAL FIBRILLATION: Primary | ICD-10-CM

## 2024-08-13 LAB — INR PPP: 3.3

## 2024-08-13 NOTE — PROGRESS NOTES
Anticoagulation Clinic Progress Note   Oswaldo HOMEMONITOR: [employer] Check Serial # : [id number]     Indication: afib  Referring Provider: Gale  Initial Warfarin Start Date: 2007  Goal INR: 2-3  Current Drug Interactions:   [id number]: 5 (HTN, Age, CHF, CAD)   Other: anemic, CKD (stage 4) May need bridge with Lovenox given persistent AF (per cardio 1/24/2019), Scr was 3.22 on 11/24/21    Diet: eats salads 2x weekly 5/11/2023     Anticoagulation Clinic INR History:     Date 3/30 4/13 5/11 6/8  6/23 7/7 7/28  8/9 8/12 8/16   Total WeeklyDose 31.25mg 31.25 mg 31.25 mg 31.25mg Start dialysis 30mg 30mg 30 mg  33.75mg 37.5mg 37.5mg   INR 2.5 2.2 2.6 3.9  2.1 2.1 1.8 1.23 1.4 1.5 2.1   Notes    Inc torsemide Cath placement    AV fistula placement    HM train hm 1 no billing       Date 8/23 8/30 9/6 9/13 9/20 9/30 10/4 10/11 10/18 10/25 11/8 11/15   Total WeeklyDose 31.25 mg 32.5 mg 32.5 mg 32.5 mg 32.5 mg 32.5 mg 35 mg 35 mg 35 mg 32.5 mg 26.5 mg 35 mg   INR 1.9 2.0 2.0 2.5 2.7 1.6 1.7 2.8 2.4 1.7 1.4 1.6   Notes HM2 no billing HM3  No billing HM4  No billing HM 1  Bill today HM 2 No billing HM 3 No bill HM 4  No billing 1x boost  HM 5 no bill day Bill day  HM1 HM- 2 no billing   HM 3- no billing 1x boost  HM 4 - no billing      Date 11/22 11/29 12/6 12/13 12/20 12/27 1/3/23 1/10 1/12 1/17 1/26/23 1/31/23   Total WeeklyDose 37.5 mg 37.5 mg 37.5 mg 37.5 mg 42.5 mg  40 mg 37.5 mg 40 mg 42.5mg 45 mg 45 mg 40 mg   INR 1.9 2.8 1.9 1.5 2.3 3.4 1.4 0.8 2.7 POCT  2.23   2.8 HM 3.9 1.9 2.2   Notes HM1- bill day HM 2 - NO bill HM 3 - no bill  Inc GLV Hm 4 - NO BILL HM 1 - Bill today HM 2   no billing HM 3- no billing HM 4  No billing lab error?? clinic HM 1  Bill today HM 2 - no bill  HM 3 - no bill      Date 2/7 2/14 2/21/23 2/28/23 3/7 3/14/23 3/21/23 3/28/23 4/4 4/11 4/18 4/26   Total WeeklyDose 40 mg 40 mg 40 mg 37.5 mg 40 mg 40 mg 40 mg 40 mg 37.5 mg 37.5 mg 37.5 mg  40 mg   INR 2.3 2.2 3.9 1.6 3.7 ??? 2.5 2.7 3.2 2.8  2.5 1.7 1.7   Notes HM 4 rec 2/15  HM 1  Bill today HM 2 - no billing  1xred  HM 3 - NO BILL  HM-4 no bill, percocet HM 5 - no billing until 3/15 HM 1 - BILL today  HM 2 - no Billing HM 3  No billing HM 4  No billing HM 1-  BILL today  HM 2  No billing     Date 5/2 5/5 5/11 5/16 5/22 5/30 6/6 6/14 6/20 6/27 7/5 7/11   Total WeeklyDose 42.5 mg 37.5 mg 42.5mg 42.5 mg  35 mg 40 mg 40 mg 40 mg 37.5 mg 40 mg 40 mg 40 mg   INR 3.5 1.8 2.9 4.2 2.0 2.5 2.8 3.2 1.9 2.2 3.0 3.3   Notes HM 3  No billing HM 4  No billing HM 5  No billing HM-1  Bill  Dec'd GLV  HM 2  No billing  1x hold  HM 3   no billing HM 4  No billing HM 1  Bill today HM 2  No billing HM 3  No bill HM 4  No bill HM5  No bill     Date 7/18 7/25 08/01/23 8/8 8/15/23 8/23/23 08/29/23 09/05/23 9/12/23 9/19/23 9/26/23 10/3   Total WeeklyDose 37.5 mg 35 mg 37.5mg 40 mg 37.5 mg 37.5 mg 37.5 mg 37.5 mg 37.5 mg 37.5 mg 37.5 mg 37.5 mg   INR 3.3 1.9 1.9 2.3 2.6 2.1 2.4 2.3 2.7 2.5 2.7 2.0   Notes HM 1  Bill today HM 2  No billing HM 3  No billing  1 x boost HM 4 no billing  HM 1 - BILL today  HM 2 - no bill  HM 3- no billing HM 4- no billing HM 1 - BILL today  HM 2 - NO bill  HM 3 - no bill  Hm 4- no bill     Date 10/10 10/18 10/24 10/31 11/07 11/14/23 11/21 11/28 12/05/23  12/12  12/19 12/26   Total Weekly Dose 37.5mg 37.5 mg 37.5mg 42.5 mg 37.5 mg 37.5 mg 37.5 mg 37.5 mg 35 mg 37.5 mg  40 mg 37.5 mg   INR 1.6 2.0 1.5 2.7 2.1 2.5 2.3 4.2 1.9  1.8 2.2 2.1   Notes Hm- 1 bill today  Missed dose?  Inc GLV HM 2- No bill  Hm- 3 no bill Hm 4 no billing   Boost dose  HM 1 billing today  2 - no bill  HM 3 no billing Hm 4 no billing HM 1 billing day  1x red  HM-2 no bill HM-3 no bill  boost HM-4 bill     Date 1/2/24 1/9 1/17 1/23/24 1/30 2/6/24 2/13/24 2/20/24 2/27 3/5/24 3/13/24 3/19 3/26/24   Total Weekly  Dose 37.5 mg 40 mg 40 mg 40 mg 40 mg 40 mg 40 mg 45 mg 40mg  37.5 mg  35 mg 37.5 mg 37.5 mg   INR 1.8 1.9 2.2 2.4 2.1 2.7 1.4 2.2 3.7 3.3 2.5 2.2 2.2   Notes  1  billing day HM-2 no billing boost Hm 3 no bill HM 4 - no bill  HM 1-bill today HM 2 -no bill  HM 3 - no bill  HM 4 - no bill   BOOST  Hm- 1   Bill today HM 2 - no bill  HM 3 no bill Hm 4 no bill HM 1 - Bill Today      Date 4/9 4/16 4/24/24 5/1/24 5/7/24 5/14 5/22 5/28 6/4 6/11 6/18 7/2 7/9   Total WeeklyDose 37.5 mg 37.5 mg 37.5 mg 37.5 mg 37.5 mg 37.5 mg 37.5 mg 37.5 mg 37.5 mg 37.5 mg 37.5 mg  37.5 mg 37.5 mg   INR 2.1 2.3 2.5 2.6 2.6 2.7 2.9 2.8 2.7 2.3 2.5  3 2.4   Notes HM 2 - No Bill HM 3 - No Bill HM 4 - no bill  HM 1 - no bill   Rec'd 5/3 HM 2 - no bill  HM 3 - no bill HM 4 - no bill HM 5 - no bill HM 1 - Bill today  HM 2 - no bill HM-3 no bill HM-4 no bill HM 1 - BILL TODAY  Rec'd 7/10     Date  7/16 7/23/24 7/31 8/7 8/13/24           Total Weekly  Dose 37.5 mg 35 mg 35 mg 35 mg 37.5 mg 35 mg          INR 3.4 2.7 2.4 3.0 3.3           Notes HM 2 - no bill    APAP HM 3 - NO BILL   APAP  1x red  HM 4 no bill HM 1 bill HM 2 - no bill  1x red          Takes warfarin in the am    Clinic Interview:  Verbal Release Authorization signed on 8/22/2018 -- may speak with Raeann Montemayor (Wife). [phone] (Home) *Preferred*  Tablet Strength: pt has 7.5mg and 2.5mg tablets  Estimated oop cost for [facility] HM: Patient has [employer] Medicare so they should have minimal to no OOP costs.                 Patient Findings  Negatives: Signs/symptoms of thrombosis, Signs/symptoms of bleeding, Laboratory test error suspected, Change in health, Change in alcohol use, Change in activity, Upcoming invasive procedure, Emergency department visit, Upcoming dental procedure, Missed doses, Extra doses, Change in medications, Change in diet/appetite, Hospital admission, Bruising, Other complaints   Comments: All findings negative per pt     Plan:   1. INR is SUPRAtherapeutic today at 3.3 (goal 2.0-3.0). Instructed Mr. Montemayor to REDUCE tonight's dose of warfarin to 2.5 mg then resume continue warfarin 5 mg oral daily except 7.5 mg Fri until  recheck.  2. Repeat INR in 1 week, 8/20/24  3. Verbal information provided over the telephone. Mr. Montemayor expresses understanding with teach back and has no further questions at this time.     Home monitor information below:  Test strips on hand: 3  8/13/324  Lot : [id number]  Barcode : [id number] Exp: 1/25  Serial number: [id number]   Supplies billing code used: last 8/7/24 -  Next must be on or after 9/4/2024  Transfer Tube Lot number: Lot: [id number]  Safety Lancets:  Lot : [id number]     Billing:  *bill must be 4 tests 28 days or more from HM visit  PLEASE REFER TO ANTICOAGULATION TRAINING POWERPOINT FOR BILLING. Needs to use in clinic remote appointment for encounter.    Alexander Scherer, Pharmacy Technician  8/13/2024  13:56 EDT    I, Criss Britt, PharmD, have reviewed the note in full and agree with the assessment and plan.  08/14/24  08:36 EDT

## 2024-08-19 ENCOUNTER — OFFICE VISIT (OUTPATIENT)
Age: 79
End: 2024-08-19
Payer: MEDICARE

## 2024-08-19 VITALS
HEART RATE: 90 BPM | SYSTOLIC BLOOD PRESSURE: 130 MMHG | WEIGHT: 292.2 LBS | HEIGHT: 70 IN | RESPIRATION RATE: 16 BRPM | BODY MASS INDEX: 41.83 KG/M2 | DIASTOLIC BLOOD PRESSURE: 82 MMHG | OXYGEN SATURATION: 94 %

## 2024-08-19 DIAGNOSIS — R58 BLOOD LOSS: Primary | ICD-10-CM

## 2024-08-19 DIAGNOSIS — G89.29 OTHER CHRONIC PAIN: ICD-10-CM

## 2024-08-19 LAB
EXPIRATION DATE: ABNORMAL
HGB BLDA-MCNC: 8.8 G/DL (ref 12–17)
Lab: ABNORMAL

## 2024-08-19 PROCEDURE — 1159F MED LIST DOCD IN RCRD: CPT | Performed by: STUDENT IN AN ORGANIZED HEALTH CARE EDUCATION/TRAINING PROGRAM

## 2024-08-19 PROCEDURE — 85018 HEMOGLOBIN: CPT | Performed by: STUDENT IN AN ORGANIZED HEALTH CARE EDUCATION/TRAINING PROGRAM

## 2024-08-19 PROCEDURE — 1160F RVW MEDS BY RX/DR IN RCRD: CPT | Performed by: STUDENT IN AN ORGANIZED HEALTH CARE EDUCATION/TRAINING PROGRAM

## 2024-08-19 PROCEDURE — 99214 OFFICE O/P EST MOD 30 MIN: CPT | Performed by: STUDENT IN AN ORGANIZED HEALTH CARE EDUCATION/TRAINING PROGRAM

## 2024-08-19 PROCEDURE — 3075F SYST BP GE 130 - 139MM HG: CPT | Performed by: STUDENT IN AN ORGANIZED HEALTH CARE EDUCATION/TRAINING PROGRAM

## 2024-08-19 PROCEDURE — 1126F AMNT PAIN NOTED NONE PRSNT: CPT | Performed by: STUDENT IN AN ORGANIZED HEALTH CARE EDUCATION/TRAINING PROGRAM

## 2024-08-19 PROCEDURE — 3079F DIAST BP 80-89 MM HG: CPT | Performed by: STUDENT IN AN ORGANIZED HEALTH CARE EDUCATION/TRAINING PROGRAM

## 2024-08-19 RX ORDER — GABAPENTIN 100 MG/1
100 CAPSULE ORAL 3 TIMES DAILY
COMMUNITY

## 2024-08-19 NOTE — PROGRESS NOTES
Office Note     Name: Marco Antonio Montemayor    : 1945     MRN: [id number]     Chief Complaint  Shaking (Pt has been having increased twitching/shaking in hands and other muscles), Fatigue (Pt has been much more fatigued in the past few days, some trouble comprehending), and Eye Problem (Pt has been having swirling spots in his vision)    Subjective     History of Present Illness:  Marco Antonio Montemayor is a 79 y.o. male who presents today for acute visit for lightheadedness, weakness, tremors.  Reports that he had a significant amount of blood loss with dialysis on Saturday and had also just started taking gabapentin around that time.  He is noted generalized fatigue and weakness although he is still able to walk.  He has not had any syncopal episodes but does report tremors at rest.    Past Medical History:   Past Medical History:   Diagnosis Date    Abnormal ECG     Arrhythmia     Arthritis     Asthma     Cancer     skin cancer removed from various locations     Chronic diastolic congestive heart failure 2018    CKD (chronic kidney disease)     closer to stage 5    Coronary artery disease     Dry skin     Dyslipidemia 2016    Edema     Heart murmur     Hematoma     Hemodialysis access site with arteriovenous graft     History of transfusion     no transfusion reaction    Hypertension     Iron deficiency anemia     Long term current use of anticoagulant     ZULEIKA treated with BiPAP     Persistent atrial fibrillation 2016     Patient notes dyspnea, fatigue and palpitations during episodes of afib.  multiple external cardioversionsChads vasc 2, anticoagulated with Coumadin    Tinnitus     Wears glasses     readers       Past Surgical History:   Past Surgical History:   Procedure Laterality Date    ABLATION OF DYSRHYTHMIC FOCUS      APPENDECTOMY      ARTERIOVENOUS FISTULA/SHUNT SURGERY Left 2022    Procedure: UPPER EXTREMITY BRACHIOCEPHALIC ARTERIOVENOUS FISTULA FORMATION LEFT;  Surgeon:  Jakob Reza MD;  Location:  [facility];  Service: Vascular;  Laterality: Left;    ARTERIOVENOUS FISTULA/SHUNT SURGERY Left 11/03/2022    Procedure: ELEVATION LEFT UPPER EXTREMITY AV FISTULA;  Surgeon: Jakob Reza MD;  Location:  [facility];  Service: Vascular;  Laterality: Left;    CARDIAC ELECTROPHYSIOLOGY PROCEDURE N/A 05/18/2018    Procedure: PPM generator change - dual       [facility]/ please schedule in 8 weeks;  Surgeon: Johnathan Taylor MD;  Location:  [facility];  Service: Cardiovascular    CATARACT EXTRACTION      bilat wiht lens     COLONOSCOPY      ENDOSCOPY      Pt denies    INSERT / REPLACE / REMOVE PACEMAKER  about 20 years back    SKIN CANCER EXCISION      various locations     TONSILLECTOMY      VEIN SURGERY  for dialysis    WISDOM TOOTH EXTRACTION         Immunizations:   Immunization History   Administered Date(s) Administered    Hepatitis B 2 Dose Vaccine Heplisav-B 07/15/2022, 08/12/2022, 09/16/2022, 12/28/2022        Medications:     Current Outpatient Medications:     acetaminophen (TYLENOL) 500 MG tablet, Take 1 tablet by mouth Every 6 (Six) Hours As Needed for Mild Pain., Disp: , Rfl:     atorvastatin (LIPITOR) 40 MG tablet, TAKE ONE TABLET BY MOUTH DAILY, Disp: 90 tablet, Rfl: 3    bumetanide (BUMEX) 2 MG tablet, TAKE 1 TABLET BY MOUTH EVERY OTHER DAY - TAKE ON DAYS OFF FROM DIALYSIS (TUESDAY, THURSDAY, SATURDAY AND SUNDAY), Disp: 48 tablet, Rfl: 2    carvedilol (COREG) 12.5 MG tablet, TAKE ONE TABLET BY MOUTH TWICE A DAY, Disp: 180 tablet, Rfl: 3    cholecalciferol (VITAMIN D3) 1000 UNITS tablet, Take 1 tablet by mouth Daily., Disp: , Rfl:     Ferrous Sulfate (IRON) 325 (65 FE) MG tablet, Take 65 mg by mouth Daily., Disp: , Rfl:     gabapentin (NEURONTIN) 100 MG capsule, Take 1 capsule by mouth 3 (Three) Times a Day., Disp: , Rfl:     Methoxy PEG-Epoetin Beta (MIRCERA IJ), 150 mcg Every 14 (Fourteen) Days., Disp: , Rfl:     midodrine (PROAMATINE) 10 MG  "tablet, Take 1 tablet by mouth 3 (Three) Times a Day Before Meals. Before Dialysis, Tues, Thurs, Sat, Disp: , Rfl:     nitroglycerin (NITROSTAT) 0.3 MG SL tablet, 1 under the tongue as needed for angina, may repeat q5mins for up three doses, Disp: 25 tablet, Rfl: 11    PHARMACY TO DOSE WARFARIN, Continuous As Needed (patient's warfarin is being managed by the [facility] ([phone]))., Disp: , Rfl:     Sucroferric Oxyhydroxide (Velphoro) 500 MG chewable tablet, Take As Directed. Crush or chew and swallow 3 tablets by mouth 3 times a day with meals and 1 tablet twice a day with snacks., Disp: , Rfl:     vitamin B-12 (CYANOCOBALAMIN) 1000 MCG tablet, Take 1 tablet by mouth Daily., Disp: , Rfl:     warfarin (COUMADIN) 2.5 MG tablet, TAKE ONE AND ONE-HALF TO TWO TABLETS BY MOUTH ONCE DAILY, Disp: 180 tablet, Rfl: 1    Allergies:   No Known Allergies    Family History:   Family History   Problem Relation Age of Onset    Cancer Mother     Heart attack Mother     Hypertension Mother     Arrhythmia Father     Hypertension Father     Heart attack Father        Social History:   Social History     Socioeconomic History    Marital status:    Tobacco Use    Smoking status: Never     Passive exposure: Never    Smokeless tobacco: Never   Vaping Use    Vaping status: Never Used   Substance and Sexual Activity    Alcohol use: No    Drug use: No    Sexual activity: Never         Objective     Vital Signs  /82   Pulse 90   Resp 16   Ht 177.8 cm (70\")   Wt 133 kg (292 lb 3.2 oz)   SpO2 94%   BMI 41.93 kg/m²   Estimated body mass index is 41.93 kg/m² as calculated from the following:    Height as of this encounter: 177.8 cm (70\").    Weight as of this encounter: 133 kg (292 lb 3.2 oz).            Physical Exam  Constitutional:       General: He is not in acute distress.     Appearance: He is not toxic-appearing.   Pulmonary:      Effort: Pulmonary effort is normal. No " respiratory distress.   Abdominal:      General: Abdomen is flat. There is no distension.   Skin:     General: Skin is warm and dry.   Neurological:      Mental Status: He is alert.   Psychiatric:         Mood and Affect: Mood normal.         Behavior: Behavior normal.          Assessment and Plan     1. Blood loss  Had acute blood loss with dialysis, hemoglobin now 8.8 which is similar to previous measurements.  Symptoms are consistent with combination of sedation from gabapentin and lightheadedness from blood loss in dialysis  - POC Hemoglobin    2. Other chronic pain  Recommend he hold off on gabapentin until after next dialysis and start it nightly instead of tid       Counseling was given to patient for the following topics: instructions for management.    Follow Up  No follow-ups on file.    MD SONG [facility]  [address] [address] [phone]

## 2024-08-20 ENCOUNTER — ANTICOAGULATION VISIT (OUTPATIENT)
Dept: PHARMACY | Facility: HOSPITAL | Age: 79
End: 2024-08-20
Payer: MEDICARE

## 2024-08-20 DIAGNOSIS — I48.21 PERMANENT ATRIAL FIBRILLATION: Primary | ICD-10-CM

## 2024-08-20 LAB — INR PPP: 1.9

## 2024-08-20 NOTE — PROGRESS NOTES
Anticoagulation Clinic Progress Note   Oswaldo HOMEMONITOR: [employer] Check Serial # : [id number]     Indication: afib  Referring Provider: Gale  Initial Warfarin Start Date: 2007  Goal INR: 2-3  Current Drug Interactions:   [id number]: 5 (HTN, Age, CHF, CAD)   Other: anemic, CKD (stage 4) May need bridge with Lovenox given persistent AF (per cardio 1/24/2019), Scr was 3.22 on 11/24/21    Diet: eats salads 2x weekly 5/11/2023     Anticoagulation Clinic INR History:     Date 3/30 4/13 5/11 6/8  6/23 7/7 7/28  8/9 8/12 8/16   Total WeeklyDose 31.25mg 31.25 mg 31.25 mg 31.25mg Start dialysis 30mg 30mg 30 mg  33.75mg 37.5mg 37.5mg   INR 2.5 2.2 2.6 3.9  2.1 2.1 1.8 1.23 1.4 1.5 2.1   Notes    Inc torsemide Cath placement    AV fistula placement    HM train hm 1 no billing       Date 8/23 8/30 9/6 9/13 9/20 9/30 10/4 10/11 10/18 10/25 11/8 11/15   Total WeeklyDose 31.25 mg 32.5 mg 32.5 mg 32.5 mg 32.5 mg 32.5 mg 35 mg 35 mg 35 mg 32.5 mg 26.5 mg 35 mg   INR 1.9 2.0 2.0 2.5 2.7 1.6 1.7 2.8 2.4 1.7 1.4 1.6   Notes HM2 no billing HM3  No billing HM4  No billing HM 1  Bill today HM 2 No billing HM 3 No bill HM 4  No billing 1x boost  HM 5 no bill day Bill day  HM1 HM- 2 no billing   HM 3- no billing 1x boost  HM 4 - no billing      Date 11/22 11/29 12/6 12/13 12/20 12/27 1/3/23 1/10 1/12 1/17 1/26/23 1/31/23   Total WeeklyDose 37.5 mg 37.5 mg 37.5 mg 37.5 mg 42.5 mg  40 mg 37.5 mg 40 mg 42.5mg 45 mg 45 mg 40 mg   INR 1.9 2.8 1.9 1.5 2.3 3.4 1.4 0.8 2.7 POCT  2.23   2.8 HM 3.9 1.9 2.2   Notes HM1- bill day HM 2 - NO bill HM 3 - no bill  Inc GLV Hm 4 - NO BILL HM 1 - Bill today HM 2   no billing HM 3- no billing HM 4  No billing lab error?? clinic HM 1  Bill today HM 2 - no bill  HM 3 - no bill      Date 2/7 2/14 2/21/23 2/28/23 3/7 3/14/23 3/21/23 3/28/23 4/4 4/11 4/18 4/26   Total WeeklyDose 40 mg 40 mg 40 mg 37.5 mg 40 mg 40 mg 40 mg 40 mg 37.5 mg 37.5 mg 37.5 mg  40 mg   INR 2.3 2.2 3.9 1.6 3.7 ??? 2.5 2.7 3.2 2.8  2.5 1.7 1.7   Notes HM 4 rec 2/15  HM 1  Bill today HM 2 - no billing  1xred  HM 3 - NO BILL  HM-4 no bill, percocet HM 5 - no billing until 3/15 HM 1 - BILL today  HM 2 - no Billing HM 3  No billing HM 4  No billing HM 1-  BILL today  HM 2  No billing     Date 5/2 5/5 5/11 5/16 5/22 5/30 6/6 6/14 6/20 6/27 7/5 7/11   Total WeeklyDose 42.5 mg 37.5 mg 42.5mg 42.5 mg  35 mg 40 mg 40 mg 40 mg 37.5 mg 40 mg 40 mg 40 mg   INR 3.5 1.8 2.9 4.2 2.0 2.5 2.8 3.2 1.9 2.2 3.0 3.3   Notes HM 3  No billing HM 4  No billing HM 5  No billing HM-1  Bill  Dec'd GLV  HM 2  No billing  1x hold  HM 3   no billing HM 4  No billing HM 1  Bill today HM 2  No billing HM 3  No bill HM 4  No bill HM5  No bill     Date 7/18 7/25 08/01/23 8/8 8/15/23 8/23/23 08/29/23 09/05/23 9/12/23 9/19/23 9/26/23 10/3   Total WeeklyDose 37.5 mg 35 mg 37.5mg 40 mg 37.5 mg 37.5 mg 37.5 mg 37.5 mg 37.5 mg 37.5 mg 37.5 mg 37.5 mg   INR 3.3 1.9 1.9 2.3 2.6 2.1 2.4 2.3 2.7 2.5 2.7 2.0   Notes HM 1  Bill today HM 2  No billing HM 3  No billing  1 x boost HM 4 no billing  HM 1 - BILL today  HM 2 - no bill  HM 3- no billing HM 4- no billing HM 1 - BILL today  HM 2 - NO bill  HM 3 - no bill  Hm 4- no bill     Date 10/10 10/18 10/24 10/31 11/07 11/14/23 11/21 11/28 12/05/23  12/12  12/19 12/26   Total Weekly Dose 37.5mg 37.5 mg 37.5mg 42.5 mg 37.5 mg 37.5 mg 37.5 mg 37.5 mg 35 mg 37.5 mg  40 mg 37.5 mg   INR 1.6 2.0 1.5 2.7 2.1 2.5 2.3 4.2 1.9  1.8 2.2 2.1   Notes Hm- 1 bill today  Missed dose?  Inc GLV HM 2- No bill  Hm- 3 no bill Hm 4 no billing   Boost dose  HM 1 billing today  2 - no bill  HM 3 no billing Hm 4 no billing HM 1 billing day  1x red  HM-2 no bill HM-3 no bill  boost HM-4 bill     Date 1/2/24 1/9 1/17 1/23/24 1/30 2/6/24 2/13/24 2/20/24 2/27 3/5/24 3/13/24 3/19 3/26/24   Total Weekly  Dose 37.5 mg 40 mg 40 mg 40 mg 40 mg 40 mg 40 mg 45 mg 40mg  37.5 mg  35 mg 37.5 mg 37.5 mg   INR 1.8 1.9 2.2 2.4 2.1 2.7 1.4 2.2 3.7 3.3 2.5 2.2 2.2   Notes  1  "billing day HM-2 no billing boost Hm 3 no bill HM 4 - no bill  HM 1-bill today HM 2 -no bill  HM 3 - no bill  HM 4 - no bill   BOOST  Hm- 1   Bill today HM 2 - no bill  HM 3 no bill Hm 4 no bill HM 1 - Bill Today      Date 4/9 4/16 4/24/24 5/1/24 5/7/24 5/14 5/22 5/28 6/4 6/11 6/18 7/2 7/9   Total WeeklyDose 37.5 mg 37.5 mg 37.5 mg 37.5 mg 37.5 mg 37.5 mg 37.5 mg 37.5 mg 37.5 mg 37.5 mg 37.5 mg  37.5 mg 37.5 mg   INR 2.1 2.3 2.5 2.6 2.6 2.7 2.9 2.8 2.7 2.3 2.5  3 2.4   Notes HM 2 - No Bill HM 3 - No Bill HM 4 - no bill  HM 1 - no bill   Rec'd 5/3 HM 2 - no bill  HM 3 - no bill HM 4 - no bill HM 5 - no bill HM 1 - Bill today  HM 2 - no bill HM-3 no bill HM-4 no bill HM 1 - BILL TODAY  Rec'd 7/10     Date  7/16 7/23/24 7/31 8/7 8/13/24 8/20/24          Total Weekly  Dose 37.5 mg 35 mg 35 mg 35 mg 37.5 mg 35 mg          INR 3.4 2.7 2.4 3.0 3.3 1.9          Notes HM 2 - no bill    APAP HM 3 - NO BILL   APAP  1x red  HM 4 no bill HM 1 bill HM 2 - no bill  HM 3 - no bill   1x red          Takes warfarin in the am    Clinic Interview:  Verbal Release Authorization signed on 8/22/2018 -- may speak with Raeann Montemayor (Wife). [phone] (Home) *Preferred*  Tablet Strength: pt has 7.5mg and 2.5mg tablets  Estimated oop cost for [facility] EDER HM: Patient has [employer] Medicare so they should have minimal to no OOP costs.                   Patient Findings  Positives: Signs/symptoms of bleeding, Change in medications   Negatives: Signs/symptoms of thrombosis, Laboratory test error suspected, Change in health, Change in alcohol use, Change in activity, Upcoming invasive procedure, Emergency department visit, Upcoming dental procedure, Missed doses, Extra doses, Change in diet/appetite, Hospital admission, Bruising, Other complaints   Comments: 8/16/24 Mr. Montemayor started Gabapentin, then discontinued on Sunday 8/25/24 due to side effects    Saturday 8 /17/24 during dialysis , pt claims \" the needle fell out of my arm and I lost quite a " "bit of blood \"    All other findings negative per patient         Plan:   1. INR is SUBtherapeutic today at 1.9 (goal 2.0-3.0). Instructed Mr. Montemayor to resume Warfarin 5 mg oral daily except 7.5 mg Fri until recheck.  2. Repeat INR in 1 week, 8/27/24  3. Verbal information provided over the telephone. Mr. Montemayor expresses understanding with teach back and has no further questions at this time.     Home monitor information below:  Test strips on hand: 1  8/20/24 sending tomorroow   Lot : [id number]  Barcode : [id number] Exp: 1/25  Serial number: [id number]   Supplies billing code used: last 8/7/24 -  Next must be on or after 9/4/2024  Transfer Tube Lot number: Lot: [id number]  Safety Lancets:  Lot : [id number]     Billing:  *bill must be 4 tests 28 days or more from HM visit  PLEASE REFER TO ANTICOAGULATION TRAINING POWERPOINT FOR BILLING. Needs to use in clinic remote appointment for encounter.    Alexander Scherer, Pharmacy Technician  8/20/2024  15:16 EDT    I, Anitha Anderson, [facility], have reviewed the note in full and agree with the assessment and plan.  08/23/24  10:22 EDT      "

## 2024-08-28 ENCOUNTER — ANTICOAGULATION VISIT (OUTPATIENT)
Dept: PHARMACY | Facility: HOSPITAL | Age: 79
End: 2024-08-28
Payer: MEDICARE

## 2024-08-28 DIAGNOSIS — I48.21 PERMANENT ATRIAL FIBRILLATION: Primary | ICD-10-CM

## 2024-08-28 LAB — INR PPP: 2.6

## 2024-08-28 NOTE — PROGRESS NOTES
Anticoagulation Clinic Progress Note   Oswaldo HOMEMONITOR: [employer] Check Serial # : [id number]     Indication: afib  Referring Provider: Gale  Initial Warfarin Start Date: 2007  Goal INR: 2-3  Current Drug Interactions:   [id number]: 5 (HTN, Age, CHF, CAD)   Other: anemic, CKD (stage 4) May need bridge with Lovenox given persistent AF (per cardio 1/24/2019), Scr was 3.22 on 11/24/21    Diet: eats salads 2x weekly 5/11/2023     Anticoagulation Clinic INR History:     Date 3/30 4/13 5/11 6/8  6/23 7/7 7/28  8/9 8/12 8/16   Total WeeklyDose 31.25mg 31.25 mg 31.25 mg 31.25mg Start dialysis 30mg 30mg 30 mg  33.75mg 37.5mg 37.5mg   INR 2.5 2.2 2.6 3.9  2.1 2.1 1.8 1.23 1.4 1.5 2.1   Notes    Inc torsemide Cath placement    AV fistula placement    HM train hm 1 no billing       Date 8/23 8/30 9/6 9/13 9/20 9/30 10/4 10/11 10/18 10/25 11/8 11/15   Total WeeklyDose 31.25 mg 32.5 mg 32.5 mg 32.5 mg 32.5 mg 32.5 mg 35 mg 35 mg 35 mg 32.5 mg 26.5 mg 35 mg   INR 1.9 2.0 2.0 2.5 2.7 1.6 1.7 2.8 2.4 1.7 1.4 1.6   Notes HM2 no billing HM3  No billing HM4  No billing HM 1  Bill today HM 2 No billing HM 3 No bill HM 4  No billing 1x boost  HM 5 no bill day Bill day  HM1 HM- 2 no billing   HM 3- no billing 1x boost  HM 4 - no billing      Date 11/22 11/29 12/6 12/13 12/20 12/27 1/3/23 1/10 1/12 1/17 1/26/23 1/31/23   Total WeeklyDose 37.5 mg 37.5 mg 37.5 mg 37.5 mg 42.5 mg  40 mg 37.5 mg 40 mg 42.5mg 45 mg 45 mg 40 mg   INR 1.9 2.8 1.9 1.5 2.3 3.4 1.4 0.8 2.7 POCT  2.23   2.8 HM 3.9 1.9 2.2   Notes HM1- bill day HM 2 - NO bill HM 3 - no bill  Inc GLV Hm 4 - NO BILL HM 1 - Bill today HM 2   no billing HM 3- no billing HM 4  No billing lab error?? clinic HM 1  Bill today HM 2 - no bill  HM 3 - no bill      Date 2/7 2/14 2/21/23 2/28/23 3/7 3/14/23 3/21/23 3/28/23 4/4 4/11 4/18 4/26   Total WeeklyDose 40 mg 40 mg 40 mg 37.5 mg 40 mg 40 mg 40 mg 40 mg 37.5 mg 37.5 mg 37.5 mg  40 mg   INR 2.3 2.2 3.9 1.6 3.7 ??? 2.5 2.7 3.2 2.8  2.5 1.7 1.7   Notes HM 4 rec 2/15  HM 1  Bill today HM 2 - no billing  1xred  HM 3 - NO BILL  HM-4 no bill, percocet HM 5 - no billing until 3/15 HM 1 - BILL today  HM 2 - no Billing HM 3  No billing HM 4  No billing HM 1-  BILL today  HM 2  No billing     Date 5/2 5/5 5/11 5/16 5/22 5/30 6/6 6/14 6/20 6/27 7/5 7/11   Total WeeklyDose 42.5 mg 37.5 mg 42.5mg 42.5 mg  35 mg 40 mg 40 mg 40 mg 37.5 mg 40 mg 40 mg 40 mg   INR 3.5 1.8 2.9 4.2 2.0 2.5 2.8 3.2 1.9 2.2 3.0 3.3   Notes HM 3  No billing HM 4  No billing HM 5  No billing HM-1  Bill  Dec'd GLV  HM 2  No billing  1x hold  HM 3   no billing HM 4  No billing HM 1  Bill today HM 2  No billing HM 3  No bill HM 4  No bill HM5  No bill     Date 7/18 7/25 08/01/23 8/8 8/15/23 8/23/23 08/29/23 09/05/23 9/12/23 9/19/23 9/26/23 10/3   Total WeeklyDose 37.5 mg 35 mg 37.5mg 40 mg 37.5 mg 37.5 mg 37.5 mg 37.5 mg 37.5 mg 37.5 mg 37.5 mg 37.5 mg   INR 3.3 1.9 1.9 2.3 2.6 2.1 2.4 2.3 2.7 2.5 2.7 2.0   Notes HM 1  Bill today HM 2  No billing HM 3  No billing  1 x boost HM 4 no billing  HM 1 - BILL today  HM 2 - no bill  HM 3- no billing HM 4- no billing HM 1 - BILL today  HM 2 - NO bill  HM 3 - no bill  Hm 4- no bill     Date 10/10 10/18 10/24 10/31 11/07 11/14/23 11/21 11/28 12/05/23  12/12  12/19 12/26   Total Weekly Dose 37.5mg 37.5 mg 37.5mg 42.5 mg 37.5 mg 37.5 mg 37.5 mg 37.5 mg 35 mg 37.5 mg  40 mg 37.5 mg   INR 1.6 2.0 1.5 2.7 2.1 2.5 2.3 4.2 1.9  1.8 2.2 2.1   Notes Hm- 1 bill today  Missed dose?  Inc GLV HM 2- No bill  Hm- 3 no bill Hm 4 no billing   Boost dose  HM 1 billing today  2 - no bill  HM 3 no billing Hm 4 no billing HM 1 billing day  1x red  HM-2 no bill HM-3 no bill  boost HM-4 bill     Date 1/2/24 1/9 1/17 1/23/24 1/30 2/6/24 2/13/24 2/20/24 2/27 3/5/24 3/13/24 3/19 3/26/24   Total Weekly  Dose 37.5 mg 40 mg 40 mg 40 mg 40 mg 40 mg 40 mg 45 mg 40mg  37.5 mg  35 mg 37.5 mg 37.5 mg   INR 1.8 1.9 2.2 2.4 2.1 2.7 1.4 2.2 3.7 3.3 2.5 2.2 2.2   Notes  1  billing day HM-2 no billing boost Hm 3 no bill HM 4 - no bill  HM 1-bill today HM 2 -no bill  HM 3 - no bill  HM 4 - no bill   BOOST  Hm- 1   Bill today HM 2 - no bill  HM 3 no bill Hm 4 no bill HM 1 - Bill Today      Date 4/9 4/16 4/24/24 5/1/24 5/7/24 5/14 5/22 5/28 6/4 6/11 6/18 7/2 7/9   Total WeeklyDose 37.5 mg 37.5 mg 37.5 mg 37.5 mg 37.5 mg 37.5 mg 37.5 mg 37.5 mg 37.5 mg 37.5 mg 37.5 mg  37.5 mg 37.5 mg   INR 2.1 2.3 2.5 2.6 2.6 2.7 2.9 2.8 2.7 2.3 2.5  3 2.4   Notes HM 2 - No Bill HM 3 - No Bill HM 4 - no bill  HM 1 - no bill   Rec'd 5/3 HM 2 - no bill  HM 3 - no bill HM 4 - no bill HM 5 - no bill HM 1 - Bill today  HM 2 - no bill HM-3 no bill HM-4 no bill HM 1 - BILL TODAY  Rec'd 7/10     Date  7/16 7/23/24 7/31 8/7 8/13/24 8/20/24 8/28         Total Weekly  Dose 37.5 mg 35 mg 35 mg 35 mg 37.5 mg 35 mg 37.5 mg         INR 3.4 2.7 2.4 3.0 3.3 1.9 2.6         Notes HM 2 - no bill    APAP HM 3 - NO BILL   APAP  1x red  HM 4 no bill HM 1 bill HM 2 - no bill  HM 3 - no bill   1x red HM 4 - no bill         Takes warfarin in the am    Clinic Interview:  Verbal Release Authorization signed on 8/22/2018 -- may speak with Raeann Montemayor (Wife). [phone] (Home) *Preferred*  Tablet Strength: pt has 7.5mg and 2.5mg tablets  Estimated oop cost for [facility] EDER HM: Patient has [employer] Medicare so they should have minimal to no OOP costs.                 Patient Findings    Negatives: Signs/symptoms of thrombosis, Signs/symptoms of bleeding, Laboratory test error suspected, Change in health, Change in alcohol use, Change in activity, Upcoming invasive procedure, Emergency department visit, Upcoming dental procedure, Missed doses, Extra doses, Change in medications, Change in diet/appetite, Hospital admission, Bruising, Other complaints   Comments: All findings negative per patient.     Plan:   1. INR therapeutic today at 2.6 (goal 2.0-3.0). Instructed Mr. Montemayor to continue warfarin 5 mg oral daily except 7.5 mg Fri until  recheck.  2. Repeat INR 9.03.24. Patient prefers testing on Tuesdays.   3. Verbal information provided over the telephone. Mr. Montemayor expresses understanding with teach back and has no further questions at this time.     Home monitor information below:  Test strips on hand: 5  8.28.24  Lot : [id number]  Barcode : [id number] Exp: 1/25  Serial number: [id number]   Supplies billing code used: last 8/7/24 -  Next must be on or after 9/4/2024  Transfer Tube Lot number: Lot: [id number]  Safety Lancets:  Lot : [id number]     Billing:  *bill must be 4 tests 28 days or more from HM visit  PLEASE REFER TO ANTICOAGULATION TRAINING POWERPOINT FOR BILLING. Needs to use in clinic remote appointment for encounter.      Kristi Dunham CPhT   13:31 EDT 8/28/2024    IAnitha [facility], have reviewed the note in full and agree with the assessment and plan.  08/28/24  14:08 EDT

## 2024-09-05 ENCOUNTER — ANTICOAGULATION VISIT (OUTPATIENT)
Dept: PHARMACY | Facility: HOSPITAL | Age: 79
End: 2024-09-05
Payer: MEDICARE

## 2024-09-05 DIAGNOSIS — I48.21 PERMANENT ATRIAL FIBRILLATION: Primary | ICD-10-CM

## 2024-09-05 LAB — INR PPP: 2

## 2024-09-05 NOTE — PROGRESS NOTES
Anticoagulation Clinic Progress Note   Oswaldo HOMEMONITOR: [employer] Check Serial # : [id number]     Indication: afib  Referring Provider: Gale  Initial Warfarin Start Date: 2007  Goal INR: 2-3  Current Drug Interactions:   [id number]: 5 (HTN, Age, CHF, CAD)   Other: anemic, CKD (stage 4) May need bridge with Lovenox given persistent AF (per cardio 1/24/2019), Scr was 3.22 on 11/24/21    Diet: eats salads 2x weekly 5/11/2023     Anticoagulation Clinic INR History:     Date 3/30 4/13 5/11 6/8  6/23 7/7 7/28  8/9 8/12 8/16   Total WeeklyDose 31.25mg 31.25 mg 31.25 mg 31.25mg Start dialysis 30mg 30mg 30 mg  33.75mg 37.5mg 37.5mg   INR 2.5 2.2 2.6 3.9  2.1 2.1 1.8 1.23 1.4 1.5 2.1   Notes    Inc torsemide Cath placement    AV fistula placement    HM train hm 1 no billing       Date 8/23 8/30 9/6 9/13 9/20 9/30 10/4 10/11 10/18 10/25 11/8 11/15   Total WeeklyDose 31.25 mg 32.5 mg 32.5 mg 32.5 mg 32.5 mg 32.5 mg 35 mg 35 mg 35 mg 32.5 mg 26.5 mg 35 mg   INR 1.9 2.0 2.0 2.5 2.7 1.6 1.7 2.8 2.4 1.7 1.4 1.6   Notes HM2 no billing HM3  No billing HM4  No billing HM 1  Bill today HM 2 No billing HM 3 No bill HM 4  No billing 1x boost  HM 5 no bill day Bill day  HM1 HM- 2 no billing   HM 3- no billing 1x boost  HM 4 - no billing      Date 11/22 11/29 12/6 12/13 12/20 12/27 1/3/23 1/10 1/12 1/17 1/26/23 1/31/23   Total WeeklyDose 37.5 mg 37.5 mg 37.5 mg 37.5 mg 42.5 mg  40 mg 37.5 mg 40 mg 42.5mg 45 mg 45 mg 40 mg   INR 1.9 2.8 1.9 1.5 2.3 3.4 1.4 0.8 2.7 POCT  2.23   2.8 HM 3.9 1.9 2.2   Notes HM1- bill day HM 2 - NO bill HM 3 - no bill  Inc GLV Hm 4 - NO BILL HM 1 - Bill today HM 2   no billing HM 3- no billing HM 4  No billing lab error?? clinic HM 1  Bill today HM 2 - no bill  HM 3 - no bill      Date 2/7 2/14 2/21/23 2/28/23 3/7 3/14/23 3/21/23 3/28/23 4/4 4/11 4/18 4/26   Total WeeklyDose 40 mg 40 mg 40 mg 37.5 mg 40 mg 40 mg 40 mg 40 mg 37.5 mg 37.5 mg 37.5 mg  40 mg   INR 2.3 2.2 3.9 1.6 3.7 ??? 2.5 2.7 3.2 2.8  2.5 1.7 1.7   Notes HM 4 rec 2/15  HM 1  Bill today HM 2 - no billing  1xred  HM 3 - NO BILL  HM-4 no bill, percocet HM 5 - no billing until 3/15 HM 1 - BILL today  HM 2 - no Billing HM 3  No billing HM 4  No billing HM 1-  BILL today  HM 2  No billing     Date 5/2 5/5 5/11 5/16 5/22 5/30 6/6 6/14 6/20 6/27 7/5 7/11   Total WeeklyDose 42.5 mg 37.5 mg 42.5mg 42.5 mg  35 mg 40 mg 40 mg 40 mg 37.5 mg 40 mg 40 mg 40 mg   INR 3.5 1.8 2.9 4.2 2.0 2.5 2.8 3.2 1.9 2.2 3.0 3.3   Notes HM 3  No billing HM 4  No billing HM 5  No billing HM-1  Bill  Dec'd GLV  HM 2  No billing  1x hold  HM 3   no billing HM 4  No billing HM 1  Bill today HM 2  No billing HM 3  No bill HM 4  No bill HM5  No bill     Date 7/18 7/25 08/01/23 8/8 8/15/23 8/23/23 08/29/23 09/05/23 9/12/23 9/19/23 9/26/23 10/3   Total WeeklyDose 37.5 mg 35 mg 37.5mg 40 mg 37.5 mg 37.5 mg 37.5 mg 37.5 mg 37.5 mg 37.5 mg 37.5 mg 37.5 mg   INR 3.3 1.9 1.9 2.3 2.6 2.1 2.4 2.3 2.7 2.5 2.7 2.0   Notes HM 1  Bill today HM 2  No billing HM 3  No billing  1 x boost HM 4 no billing  HM 1 - BILL today  HM 2 - no bill  HM 3- no billing HM 4- no billing HM 1 - BILL today  HM 2 - NO bill  HM 3 - no bill  Hm 4- no bill     Date 10/10 10/18 10/24 10/31 11/07 11/14/23 11/21 11/28 12/05/23  12/12  12/19 12/26   Total Weekly Dose 37.5mg 37.5 mg 37.5mg 42.5 mg 37.5 mg 37.5 mg 37.5 mg 37.5 mg 35 mg 37.5 mg  40 mg 37.5 mg   INR 1.6 2.0 1.5 2.7 2.1 2.5 2.3 4.2 1.9  1.8 2.2 2.1   Notes Hm- 1 bill today  Missed dose?  Inc GLV HM 2- No bill  Hm- 3 no bill Hm 4 no billing   Boost dose  HM 1 billing today  2 - no bill  HM 3 no billing Hm 4 no billing HM 1 billing day  1x red  HM-2 no bill HM-3 no bill  boost HM-4 bill     Date 1/2/24 1/9 1/17 1/23/24 1/30 2/6/24 2/13/24 2/20/24 2/27 3/5/24 3/13/24 3/19 3/26/24   Total Weekly  Dose 37.5 mg 40 mg 40 mg 40 mg 40 mg 40 mg 40 mg 45 mg 40mg  37.5 mg  35 mg 37.5 mg 37.5 mg   INR 1.8 1.9 2.2 2.4 2.1 2.7 1.4 2.2 3.7 3.3 2.5 2.2 2.2   Notes  1  billing day HM-2 no billing boost Hm 3 no bill HM 4 - no bill  HM 1-bill today HM 2 -no bill  HM 3 - no bill  HM 4 - no bill   BOOST  Hm- 1   Bill today HM 2 - no bill  HM 3 no bill Hm 4 no bill HM 1 - Bill Today      Date 4/9 4/16 4/24/24 5/1/24 5/7/24 5/14 5/22 5/28 6/4 6/11 6/18 7/2 7/9   Total WeeklyDose 37.5 mg 37.5 mg 37.5 mg 37.5 mg 37.5 mg 37.5 mg 37.5 mg 37.5 mg 37.5 mg 37.5 mg 37.5 mg  37.5 mg 37.5 mg   INR 2.1 2.3 2.5 2.6 2.6 2.7 2.9 2.8 2.7 2.3 2.5  3 2.4   Notes HM 2 - No Bill HM 3 - No Bill HM 4 - no bill  HM 1 - no bill   Rec'd 5/3 HM 2 - no bill  HM 3 - no bill HM 4 - no bill HM 5 - no bill HM 1 - Bill today  HM 2 - no bill HM-3 no bill HM-4 no bill HM 1 - BILL TODAY  Rec'd 7/10     Date  7/16 7/23/24 7/31 8/7 8/13/24 8/20/24 8/28 9/5        Total Weekly  Dose 37.5 mg 35 mg 35 mg 35 mg 37.5 mg 35 mg 37.5 mg 37.5 mg        INR 3.4 2.7 2.4 3.0 3.3 1.9 2.6 2.0        Notes HM 2 - no bill    APAP HM 3 - NO BILL   APAP  1x red  HM 4 no bill HM 1 bill HM 2 - no bill  HM 3 - no bill   1x red HM 4 - no bill HM 1  bill        Takes warfarin in the am    Clinic Interview:  Verbal Release Authorization signed on 8/22/2018 -- may speak with Raeann Montemayor (Wife). [phone] (Home) *Preferred*  Tablet Strength: pt has 7.5mg and 2.5mg tablets  Estimated oop cost for BH EDER HM: Patient has [employer] Medicare so they should have minimal to no OOP costs.                 Patient Findings    Negatives: Signs/symptoms of thrombosis, Signs/symptoms of bleeding, Laboratory test error suspected, Change in health, Change in alcohol use, Change in activity, Upcoming invasive procedure, Emergency department visit, Upcoming dental procedure, Missed doses, Extra doses, Change in medications, Change in diet/appetite, Hospital admission, Bruising, Other complaints   Comments: All findings negative per patient.     Plan:   1. INR therapeutic today at 2.0 (goal 2.0-3.0). Instructed Mr. Montemayor to continue warfarin 5 mg oral daily  except 7.5 mg Fri until recheck.  2. Repeat INR 9/12/24.   3. Verbal information provided over the telephone. Mr. Montemayor expresses understanding with teach back and has no further questions at this time.     Home monitor information below:  Test strips on hand: 2  9/5/24  Lot : [id number]  Barcode : [id number] Exp: 1/25  Serial number: [id number]   Supplies billing code used: last 8/7/24 -  Next must be on or after 9/4/2024  Transfer Tube Lot number: Lot: [id number]  Safety Lancets:  Lot : [id number]     Billing:  *bill must be 4 tests 28 days or more from HM visit  PLEASE REFER TO ANTICOAGULATION TRAINING POWERPOINT FOR BILLING. Needs to use in clinic remote appointment for encounter.        Anitha Anderson, PharmD  9/5/2024  13:52 EDT

## 2024-09-11 ENCOUNTER — ANTICOAGULATION VISIT (OUTPATIENT)
Dept: PHARMACY | Facility: HOSPITAL | Age: 79
End: 2024-09-11
Payer: MEDICARE

## 2024-09-11 DIAGNOSIS — I48.21 PERMANENT ATRIAL FIBRILLATION: Primary | ICD-10-CM

## 2024-09-11 LAB — INR PPP: 2.1

## 2024-09-11 NOTE — PROGRESS NOTES
Anticoagulation Clinic Progress Note   Oswaldo HOMEMONITOR: [employer] Serial # : [id number]     Indication: afib  Referring Provider: Gale  Initial Warfarin Start Date: 2007  Goal INR: 2-3  Current Drug Interactions:   [id number]: 5 (HTN, Age, CHF, CAD)   Other: anemic, CKD (stage 4) May need bridge with Lovenox given persistent AF (per cardio 1/24/2019), Scr was 3.22 on 11/24/21    Diet: eats salads 2x weekly 5/11/2023     Anticoagulation Clinic INR History:     Date 3/30 4/13 5/11 6/8  6/23 7/7 7/28  8/9 8/12 8/16   Total WeeklyDose 31.25mg 31.25 mg 31.25 mg 31.25mg Start dialysis 30mg 30mg 30 mg  33.75mg 37.5mg 37.5mg   INR 2.5 2.2 2.6 3.9  2.1 2.1 1.8 1.23 1.4 1.5 2.1   Notes    Inc torsemide Cath placement    AV fistula placement    HM train hm 1 no billing       Date 8/23 8/30 9/6 9/13 9/20 9/30 10/4 10/11 10/18 10/25 11/8 11/15   Total WeeklyDose 31.25 mg 32.5 mg 32.5 mg 32.5 mg 32.5 mg 32.5 mg 35 mg 35 mg 35 mg 32.5 mg 26.5 mg 35 mg   INR 1.9 2.0 2.0 2.5 2.7 1.6 1.7 2.8 2.4 1.7 1.4 1.6   Notes HM2 no billing HM3  No billing HM4  No billing HM 1  Bill today HM 2 No billing HM 3 No bill HM 4  No billing 1x boost  HM 5 no bill day Bill day  HM1 HM- 2 no billing   HM 3- no billing 1x boost  HM 4 - no billing      Date 11/22 11/29 12/6 12/13 12/20 12/27 1/3/23 1/10 1/12 1/17 1/26/23 1/31/23   Total WeeklyDose 37.5 mg 37.5 mg 37.5 mg 37.5 mg 42.5 mg  40 mg 37.5 mg 40 mg 42.5mg 45 mg 45 mg 40 mg   INR 1.9 2.8 1.9 1.5 2.3 3.4 1.4 0.8 2.7 POCT  2.23   2.8 HM 3.9 1.9 2.2   Notes HM1- bill day HM 2 - NO bill HM 3 - no bill  Inc GLV Hm 4 - NO BILL HM 1 - Bill today HM 2   no billing HM 3- no billing HM 4  No billing lab error?? clinic HM 1  Bill today HM 2 - no bill  HM 3 - no bill      Date 2/7 2/14 2/21/23 2/28/23 3/7 3/14/23 3/21/23 3/28/23 4/4 4/11 4/18 4/26   Total WeeklyDose 40 mg 40 mg 40 mg 37.5 mg 40 mg 40 mg 40 mg 40 mg 37.5 mg 37.5 mg 37.5 mg  40 mg   INR 2.3 2.2 3.9 1.6 3.7 ??? 2.5 2.7 3.2 2.8  2.5 1.7 1.7   Notes HM 4 rec 2/15  HM 1  Bill today HM 2 - no billing  1xred  HM 3 - NO BILL  HM-4 no bill, percocet HM 5 - no billing until 3/15 HM 1 - BILL today  HM 2 - no Billing HM 3  No billing HM 4  No billing HM 1-  BILL today  HM 2  No billing     Date 5/2 5/5 5/11 5/16 5/22 5/30 6/6 6/14 6/20 6/27 7/5 7/11   Total WeeklyDose 42.5 mg 37.5 mg 42.5mg 42.5 mg  35 mg 40 mg 40 mg 40 mg 37.5 mg 40 mg 40 mg 40 mg   INR 3.5 1.8 2.9 4.2 2.0 2.5 2.8 3.2 1.9 2.2 3.0 3.3   Notes HM 3  No billing HM 4  No billing HM 5  No billing HM-1  Bill  Dec'd GLV  HM 2  No billing  1x hold  HM 3   no billing HM 4  No billing HM 1  Bill today HM 2  No billing HM 3  No bill HM 4  No bill HM5  No bill     Date 7/18 7/25 08/01/23 8/8 8/15/23 8/23/23 08/29/23 09/05/23 9/12/23 9/19/23 9/26/23 10/3   Total WeeklyDose 37.5 mg 35 mg 37.5mg 40 mg 37.5 mg 37.5 mg 37.5 mg 37.5 mg 37.5 mg 37.5 mg 37.5 mg 37.5 mg   INR 3.3 1.9 1.9 2.3 2.6 2.1 2.4 2.3 2.7 2.5 2.7 2.0   Notes HM 1  Bill today HM 2  No billing HM 3  No billing  1 x boost HM 4 no billing  HM 1 - BILL today  HM 2 - no bill  HM 3- no billing HM 4- no billing HM 1 - BILL today  HM 2 - NO bill  HM 3 - no bill  Hm 4- no bill     Date 10/10 10/18 10/24 10/31 11/07 11/14/23 11/21 11/28 12/05/23  12/12  12/19 12/26   Total Weekly Dose 37.5mg 37.5 mg 37.5mg 42.5 mg 37.5 mg 37.5 mg 37.5 mg 37.5 mg 35 mg 37.5 mg  40 mg 37.5 mg   INR 1.6 2.0 1.5 2.7 2.1 2.5 2.3 4.2 1.9  1.8 2.2 2.1   Notes Hm- 1 bill today  Missed dose?  Inc GLV HM 2- No bill  Hm- 3 no bill Hm 4 no billing   Boost dose  HM 1 billing today  2 - no bill  HM 3 no billing Hm 4 no billing HM 1 billing day  1x red  HM-2 no bill HM-3 no bill  boost HM-4 bill     Date 1/2/24 1/9 1/17 1/23/24 1/30 2/6/24 2/13/24 2/20/24 2/27 3/5/24 3/13/24 3/19 3/26/24   Total Weekly  Dose 37.5 mg 40 mg 40 mg 40 mg 40 mg 40 mg 40 mg 45 mg 40mg  37.5 mg  35 mg 37.5 mg 37.5 mg   INR 1.8 1.9 2.2 2.4 2.1 2.7 1.4 2.2 3.7 3.3 2.5 2.2 2.2   Notes  1  billing day HM-2 no billing boost Hm 3 no bill HM 4 - no bill  HM 1-bill today HM 2 -no bill  HM 3 - no bill  HM 4 - no bill   BOOST  Hm- 1   Bill today HM 2 - no bill  HM 3 no bill Hm 4 no bill HM 1 - Bill Today      Date 4/9 4/16 4/24/24 5/1/24 5/7/24 5/14 5/22 5/28 6/4 6/11 6/18 7/2 7/9   Total WeeklyDose 37.5 mg 37.5 mg 37.5 mg 37.5 mg 37.5 mg 37.5 mg 37.5 mg 37.5 mg 37.5 mg 37.5 mg 37.5 mg  37.5 mg 37.5 mg   INR 2.1 2.3 2.5 2.6 2.6 2.7 2.9 2.8 2.7 2.3 2.5  3 2.4   Notes HM 2 - No Bill HM 3 - No Bill HM 4 - no bill  HM 1 - no bill   Rec'd 5/3 HM 2 - no bill  HM 3 - no bill HM 4 - no bill HM 5 - no bill HM 1 - Bill today  HM 2 - no bill HM-3 no bill HM-4 no bill HM 1 - BILL TODAY  Rec'd 7/10     Date  7/16 7/23/24 7/31 8/7 8/13/24 8/20/24 8/28 9/5 9/11/24       Total Weekly  Dose 37.5 mg 35 mg 35 mg 35 mg 37.5 mg 35 mg 37.5 mg 37.5 mg 37.5 mg        INR 3.4 2.7 2.4 3.0 3.3 1.9 2.6 2.0 2.1       Notes HM 2 - no bill    APAP HM 3 - NO BILL   APAP  1x red  HM 4 no bill HM 1 bill HM 2 - no bill  HM 3 - no bill   1x red HM 4 - no bill HM 1  bill HM 2 -no bill        Takes warfarin in the am    Clinic Interview:  Verbal Release Authorization signed on 8/22/2018 -- may speak with Raeann Montemayor (Wife). [phone] (Home) *Preferred*  Tablet Strength: pt has 7.5mg and 2.5mg tablets  Estimated oop cost for [facility] EDER HM: Patient has [employer] Medicare so they should have minimal to no OOP costs.                 Patient Findings  Negatives: Signs/symptoms of thrombosis, Signs/symptoms of bleeding, Laboratory test error suspected, Change in health, Change in alcohol use, Change in activity, Upcoming invasive procedure, Emergency department visit, Upcoming dental procedure, Missed doses, Extra doses, Change in medications, Change in diet/appetite, Hospital admission, Bruising, Other complaints   Comments: All findings negative per patient.     Plan:   1. INR therapeutic today at 2.1 (goal 2.0-3.0). Instructed Mr. Montemayor to  continue warfarin 5mg oral daily except 7.5mg Fri until recheck.  2. Repeat INR 9/18/24.   3. Verbal information provided over the telephone. Mr. Montemayor expresses understanding with teach back and has no further questions at this time.     Home monitor information below:  Test strips on hand: 1  9/11/24 sending tomorrow   Lot : [id number]  Barcode : [id number] Exp: 1/25  Serial number: [id number]   Supplies billing code used: last 8/7/24 -  Next must be on or after 9/4/2024  Transfer Tube Lot number: Lot: [id number]  Safety Lancets:  Lot : [id number]     Billing:  *bill must be 4 tests 28 days or more from HM visit  PLEASE REFER TO ANTICOAGULATION TRAINING POWERPOINT FOR BILLING. Needs to use in clinic remote appointment for encounter.      Alexander Scherer, Pharmacy Technician  9/11/2024  10:16 EDT      I, Criss Britt, PharmD, have reviewed the note in full and agree with the assessment and plan.  09/11/24  11:51 EDT

## 2024-09-17 ENCOUNTER — ANTICOAGULATION VISIT (OUTPATIENT)
Dept: PHARMACY | Facility: HOSPITAL | Age: 79
End: 2024-09-17
Payer: MEDICARE

## 2024-09-17 DIAGNOSIS — I48.21 PERMANENT ATRIAL FIBRILLATION: Primary | ICD-10-CM

## 2024-09-17 LAB — INR PPP: 2.3

## 2024-09-19 ENCOUNTER — TELEPHONE (OUTPATIENT)
Age: 79
End: 2024-09-19
Payer: MEDICARE

## 2024-09-24 ENCOUNTER — ANTICOAGULATION VISIT (OUTPATIENT)
Dept: PHARMACY | Facility: HOSPITAL | Age: 79
End: 2024-09-24
Payer: MEDICARE

## 2024-09-24 DIAGNOSIS — I48.21 PERMANENT ATRIAL FIBRILLATION: Primary | ICD-10-CM

## 2024-09-24 LAB — INR PPP: 2.4

## 2024-10-01 ENCOUNTER — ANTICOAGULATION VISIT (OUTPATIENT)
Dept: PHARMACY | Facility: HOSPITAL | Age: 79
End: 2024-10-01
Payer: MEDICARE

## 2024-10-01 DIAGNOSIS — I48.21 PERMANENT ATRIAL FIBRILLATION: Primary | ICD-10-CM

## 2024-10-01 LAB — INR PPP: 2.6

## 2024-10-01 NOTE — PROGRESS NOTES
Anticoagulation Clinic Progress Note   Oswaldo HOMEMONITOR: [employer] Serial # : [id number]     Indication: afib  Referring Provider: Gale  Initial Warfarin Start Date: 2007  Goal INR: 2-3  Current Drug Interactions:   [id number]: 5 (HTN, Age, CHF, CAD)   Other: anemic, CKD (stage 4) May need bridge with Lovenox given persistent AF (per cardio 1/24/2019), Scr was 3.22 on 11/24/21    Diet: eats salads 2x weekly 5/11/2023     Anticoagulation Clinic INR History:     Date 3/30 4/13 5/11 6/8  6/23 7/7 7/28  8/9 8/12 8/16   Total WeeklyDose 31.25mg 31.25 mg 31.25 mg 31.25mg Start dialysis 30mg 30mg 30 mg  33.75mg 37.5mg 37.5mg   INR 2.5 2.2 2.6 3.9  2.1 2.1 1.8 1.23 1.4 1.5 2.1   Notes    Inc torsemide Cath placement    AV fistula placement    HM train hm 1 no billing       Date 8/23 8/30 9/6 9/13 9/20 9/30 10/4 10/11 10/18 10/25 11/8 11/15   Total WeeklyDose 31.25 mg 32.5 mg 32.5 mg 32.5 mg 32.5 mg 32.5 mg 35 mg 35 mg 35 mg 32.5 mg 26.5 mg 35 mg   INR 1.9 2.0 2.0 2.5 2.7 1.6 1.7 2.8 2.4 1.7 1.4 1.6   Notes HM2 no billing HM3  No billing HM4  No billing HM 1  Bill today HM 2 No billing HM 3 No bill HM 4  No billing 1x boost  HM 5 no bill day Bill day  HM1 HM- 2 no billing   HM 3- no billing 1x boost  HM 4 - no billing      Date 11/22 11/29 12/6 12/13 12/20 12/27 1/3/23 1/10 1/12 1/17 1/26/23 1/31/23   Total WeeklyDose 37.5 mg 37.5 mg 37.5 mg 37.5 mg 42.5 mg  40 mg 37.5 mg 40 mg 42.5mg 45 mg 45 mg 40 mg   INR 1.9 2.8 1.9 1.5 2.3 3.4 1.4 0.8 2.7 POCT  2.23   2.8 HM 3.9 1.9 2.2   Notes HM1- bill day HM 2 - NO bill HM 3 - no bill  Inc GLV Hm 4 - NO BILL HM 1 - Bill today HM 2   no billing HM 3- no billing HM 4  No billing lab error?? clinic HM 1  Bill today HM 2 - no bill  HM 3 - no bill      Date 2/7 2/14 2/21/23 2/28/23 3/7 3/14/23 3/21/23 3/28/23 4/4 4/11 4/18 4/26   Total WeeklyDose 40 mg 40 mg 40 mg 37.5 mg 40 mg 40 mg 40 mg 40 mg 37.5 mg 37.5 mg 37.5 mg  40 mg   INR 2.3 2.2 3.9 1.6 3.7 ??? 2.5 2.7 3.2 2.8  2.5 1.7 1.7   Notes HM 4 rec 2/15  HM 1  Bill today HM 2 - no billing  1xred  HM 3 - NO BILL  HM-4 no bill, percocet HM 5 - no billing until 3/15 HM 1 - BILL today  HM 2 - no Billing HM 3  No billing HM 4  No billing HM 1-  BILL today  HM 2  No billing     Date 5/2 5/5 5/11 5/16 5/22 5/30 6/6 6/14 6/20 6/27 7/5 7/11   Total WeeklyDose 42.5 mg 37.5 mg 42.5mg 42.5 mg  35 mg 40 mg 40 mg 40 mg 37.5 mg 40 mg 40 mg 40 mg   INR 3.5 1.8 2.9 4.2 2.0 2.5 2.8 3.2 1.9 2.2 3.0 3.3   Notes HM 3  No billing HM 4  No billing HM 5  No billing HM-1  Bill  Dec'd GLV  HM 2  No billing  1x hold  HM 3   no billing HM 4  No billing HM 1  Bill today HM 2  No billing HM 3  No bill HM 4  No bill HM5  No bill     Date 7/18 7/25 08/01/23 8/8 8/15/23 8/23/23 08/29/23 09/05/23 9/12/23 9/19/23 9/26/23 10/3   Total WeeklyDose 37.5 mg 35 mg 37.5mg 40 mg 37.5 mg 37.5 mg 37.5 mg 37.5 mg 37.5 mg 37.5 mg 37.5 mg 37.5 mg   INR 3.3 1.9 1.9 2.3 2.6 2.1 2.4 2.3 2.7 2.5 2.7 2.0   Notes HM 1  Bill today HM 2  No billing HM 3  No billing  1 x boost HM 4 no billing  HM 1 - BILL today  HM 2 - no bill  HM 3- no billing HM 4- no billing HM 1 - BILL today  HM 2 - NO bill  HM 3 - no bill  Hm 4- no bill     Date 10/10 10/18 10/24 10/31 11/07 11/14/23 11/21 11/28 12/05/23  12/12  12/19 12/26   Total Weekly Dose 37.5mg 37.5 mg 37.5mg 42.5 mg 37.5 mg 37.5 mg 37.5 mg 37.5 mg 35 mg 37.5 mg  40 mg 37.5 mg   INR 1.6 2.0 1.5 2.7 2.1 2.5 2.3 4.2 1.9  1.8 2.2 2.1   Notes Hm- 1 bill today  Missed dose?  Inc GLV HM 2- No bill  Hm- 3 no bill Hm 4 no billing   Boost dose  HM 1 billing today  2 - no bill  HM 3 no billing Hm 4 no billing HM 1 billing day  1x red  HM-2 no bill HM-3 no bill  boost HM-4 bill     Date 1/2/24 1/9 1/17 1/23/24 1/30 2/6/24 2/13/24 2/20/24 2/27 3/5/24 3/13/24 3/19 3/26/24   Total Weekly  Dose 37.5 mg 40 mg 40 mg 40 mg 40 mg 40 mg 40 mg 45 mg 40mg  37.5 mg  35 mg 37.5 mg 37.5 mg   INR 1.8 1.9 2.2 2.4 2.1 2.7 1.4 2.2 3.7 3.3 2.5 2.2 2.2   Notes  1  billing day HM-2 no billing boost Hm 3 no bill HM 4 - no bill  HM 1-bill today HM 2 -no bill  HM 3 - no bill  HM 4 - no bill   BOOST  Hm- 1   Bill today HM 2 - no bill  HM 3 no bill Hm 4 no bill HM 1 - Bill Today      Date 4/9 4/16 4/24/24 5/1/24 5/7/24 5/14 5/22 5/28 6/4 6/11 6/18 7/2 7/9   Total WeeklyDose 37.5 mg 37.5 mg 37.5 mg 37.5 mg 37.5 mg 37.5 mg 37.5 mg 37.5 mg 37.5 mg 37.5 mg 37.5 mg  37.5 mg 37.5 mg   INR 2.1 2.3 2.5 2.6 2.6 2.7 2.9 2.8 2.7 2.3 2.5  3 2.4   Notes HM 2 - No Bill HM 3 - No Bill HM 4 - no bill  HM 1 - no bill   Rec'd 5/3 HM 2 - no bill  HM 3 - no bill HM 4 - no bill HM 5 - no bill HM 1 - Bill today  HM 2 - no bill HM-3 no bill HM-4 no bill HM 1 - BILL TODAY  Rec'd 7/10     Date  7/16 7/23/24 7/31 8/7 8/13/24 8/20/24 8/28 9/5 9/11/24 9/17/24 9/24 10/1/24    Total Weekly  Dose 37.5 mg 35 mg 35 mg 35 mg 37.5 mg 35 mg 37.5 mg 37.5 mg 37.5 mg  37.5 mg 37.5 mg     INR 3.4 2.7 2.4 3.0 3.3 1.9 2.6 2.0 2.1 2.3 2.4 2.6    Notes HM 2 - no bill    APAP HM 3 - NO BILL   APAP  1x red  HM 4 no bill HM 1 bill HM 2 - no bill  HM 3 - no bill   1x red HM 4 - no bill HM 1  bill HM 2 -no bill  HM 3 - NO BILL  HM 4-no bill HM 5 - NO bill     Takes warfarin in the am    Clinic Interview:  Verbal Release Authorization signed on 8/22/2018 -- may speak with Raeann Montemayor (Wife). [phone] (Home) *Preferred*  Tablet Strength: pt has 7.5mg and 2.5mg tablets  Estimated oop cost for [facility] EDER HM: Patient has [employer] Medicare so they should have minimal to no OOP costs.                   Patient Findings  Negatives: Signs/symptoms of thrombosis, Signs/symptoms of bleeding, Laboratory test error suspected, Change in health, Change in alcohol use, Change in activity, Upcoming invasive procedure, Emergency department visit, Upcoming dental procedure, Missed doses, Extra doses, Change in medications, Change in diet/appetite, Hospital admission, Bruising, Other complaints   Comments: All findings negative per patient.        Plan:   1. INR is therapeutic today at 2.6 (goal 2.0-3.0). Instructed Mr. Montemayor to continue warfarin 5 mg oral daily except 7.5mg Fri until recheck.  2. Repeat INR 10/18/24.   3. Verbal information provided over the telephone. Mr. Montemayor expresses understanding with teach back and has no further questions at this time.     Home monitor information below:  Test strips on hand: 3   10/1/24  Lot : [id number]  Barcode : [id number] Exp: 1/25  Serial number: [id number]   Supplies billing code used: last 8/7/24 -  Next must be on or after 10/3/2024   Transfer Tube Lot number: Lot: [id number]  Safety Lancets:  Lot : [id number]     Billing:  *bill must be 4 tests 28 days or more from HM visit  PLEASE REFER TO ANTICOAGULATION TRAINING POWERPOINT FOR BILLING. Needs to use in clinic remote appointment for encounter.      Alexander Scherer, Pharmacy Technician  10/1/2024  15:23 EDT    I, Anitha Anderson, [facility], have reviewed the note in full and agree with the assessment and plan.  10/01/24  15:28 EDT

## 2024-10-09 ENCOUNTER — ANTICOAGULATION VISIT (OUTPATIENT)
Dept: PHARMACY | Facility: HOSPITAL | Age: 79
End: 2024-10-09
Payer: MEDICARE

## 2024-10-09 DIAGNOSIS — I48.21 PERMANENT ATRIAL FIBRILLATION: Primary | ICD-10-CM

## 2024-10-09 LAB — INR PPP: 2.8

## 2024-10-09 PROCEDURE — G0249 PROVIDE INR TEST MATER/EQUIP: HCPCS

## 2024-10-09 NOTE — PROGRESS NOTES
Anticoagulation Clinic Progress Note   Oswaldo HOMEMONITOR: [employer] Serial # : [id number]     Indication: afib  Referring Provider: Gale  Initial Warfarin Start Date: 2007  Goal INR: 2-3  Current Drug Interactions:   [id number]: 5 (HTN, Age, CHF, CAD)   Other: anemic, CKD (stage 4) May need bridge with Lovenox given persistent AF (per cardio 1/24/2019), Scr was 3.22 on 11/24/21    Diet: eats salads 2x weekly 5/11/2023     Anticoagulation Clinic INR History:     Date 3/30 4/13 5/11 6/8  6/23 7/7 7/28  8/9 8/12 8/16   Total WeeklyDose 31.25mg 31.25 mg 31.25 mg 31.25mg Start dialysis 30mg 30mg 30 mg  33.75mg 37.5mg 37.5mg   INR 2.5 2.2 2.6 3.9  2.1 2.1 1.8 1.23 1.4 1.5 2.1   Notes    Inc torsemide Cath placement    AV fistula placement    HM train hm 1 no billing       Date 8/23 8/30 9/6 9/13 9/20 9/30 10/4 10/11 10/18 10/25 11/8 11/15   Total WeeklyDose 31.25 mg 32.5 mg 32.5 mg 32.5 mg 32.5 mg 32.5 mg 35 mg 35 mg 35 mg 32.5 mg 26.5 mg 35 mg   INR 1.9 2.0 2.0 2.5 2.7 1.6 1.7 2.8 2.4 1.7 1.4 1.6   Notes HM2 no billing HM3  No billing HM4  No billing HM 1  Bill today HM 2 No billing HM 3 No bill HM 4  No billing 1x boost  HM 5 no bill day Bill day  HM1 HM- 2 no billing   HM 3- no billing 1x boost  HM 4 - no billing      Date 11/22 11/29 12/6 12/13 12/20 12/27 1/3/23 1/10 1/12 1/17 1/26/23 1/31/23   Total WeeklyDose 37.5 mg 37.5 mg 37.5 mg 37.5 mg 42.5 mg  40 mg 37.5 mg 40 mg 42.5mg 45 mg 45 mg 40 mg   INR 1.9 2.8 1.9 1.5 2.3 3.4 1.4 0.8 2.7 POCT  2.23   2.8 HM 3.9 1.9 2.2   Notes HM1- bill day HM 2 - NO bill HM 3 - no bill  Inc GLV Hm 4 - NO BILL HM 1 - Bill today HM 2   no billing HM 3- no billing HM 4  No billing lab error?? clinic HM 1  Bill today HM 2 - no bill  HM 3 - no bill      Date 2/7 2/14 2/21/23 2/28/23 3/7 3/14/23 3/21/23 3/28/23 4/4 4/11 4/18 4/26   Total WeeklyDose 40 mg 40 mg 40 mg 37.5 mg 40 mg 40 mg 40 mg 40 mg 37.5 mg 37.5 mg 37.5 mg  40 mg   INR 2.3 2.2 3.9 1.6 3.7 ??? 2.5 2.7 3.2 2.8  2.5 1.7 1.7   Notes HM 4 rec 2/15  HM 1  Bill today HM 2 - no billing  1xred  HM 3 - NO BILL  HM-4 no bill, percocet HM 5 - no billing until 3/15 HM 1 - BILL today  HM 2 - no Billing HM 3  No billing HM 4  No billing HM 1-  BILL today  HM 2  No billing     Date 5/2 5/5 5/11 5/16 5/22 5/30 6/6 6/14 6/20 6/27 7/5 7/11   Total WeeklyDose 42.5 mg 37.5 mg 42.5mg 42.5 mg  35 mg 40 mg 40 mg 40 mg 37.5 mg 40 mg 40 mg 40 mg   INR 3.5 1.8 2.9 4.2 2.0 2.5 2.8 3.2 1.9 2.2 3.0 3.3   Notes HM 3  No billing HM 4  No billing HM 5  No billing HM-1  Bill  Dec'd GLV  HM 2  No billing  1x hold  HM 3   no billing HM 4  No billing HM 1  Bill today HM 2  No billing HM 3  No bill HM 4  No bill HM5  No bill     Date 7/18 7/25 08/01/23 8/8 8/15/23 8/23/23 08/29/23 09/05/23 9/12/23 9/19/23 9/26/23 10/3   Total WeeklyDose 37.5 mg 35 mg 37.5mg 40 mg 37.5 mg 37.5 mg 37.5 mg 37.5 mg 37.5 mg 37.5 mg 37.5 mg 37.5 mg   INR 3.3 1.9 1.9 2.3 2.6 2.1 2.4 2.3 2.7 2.5 2.7 2.0   Notes HM 1  Bill today HM 2  No billing HM 3  No billing  1 x boost HM 4 no billing  HM 1 - BILL today  HM 2 - no bill  HM 3- no billing HM 4- no billing HM 1 - BILL today  HM 2 - NO bill  HM 3 - no bill  Hm 4- no bill     Date 10/10 10/18 10/24 10/31 11/07 11/14/23 11/21 11/28 12/05/23  12/12  12/19 12/26   Total Weekly Dose 37.5mg 37.5 mg 37.5mg 42.5 mg 37.5 mg 37.5 mg 37.5 mg 37.5 mg 35 mg 37.5 mg  40 mg 37.5 mg   INR 1.6 2.0 1.5 2.7 2.1 2.5 2.3 4.2 1.9  1.8 2.2 2.1   Notes Hm- 1 bill today  Missed dose?  Inc GLV HM 2- No bill  Hm- 3 no bill Hm 4 no billing   Boost dose  HM 1 billing today  2 - no bill  HM 3 no billing Hm 4 no billing HM 1 billing day  1x red  HM-2 no bill HM-3 no bill  boost HM-4 bill     Date 1/2/24 1/9 1/17 1/23/24 1/30 2/6/24 2/13/24 2/20/24 2/27 3/5/24 3/13/24 3/19 3/26/24   Total Weekly  Dose 37.5 mg 40 mg 40 mg 40 mg 40 mg 40 mg 40 mg 45 mg 40mg  37.5 mg  35 mg 37.5 mg 37.5 mg   INR 1.8 1.9 2.2 2.4 2.1 2.7 1.4 2.2 3.7 3.3 2.5 2.2 2.2   Notes  1  billing day HM-2 no billing boost Hm 3 no bill HM 4 - no bill  HM 1-bill today HM 2 -no bill  HM 3 - no bill  HM 4 - no bill   BOOST  Hm- 1   Bill today HM 2 - no bill  HM 3 no bill Hm 4 no bill HM 1 - Bill Today      Date 4/9 4/16 4/24/24 5/1/24 5/7/24 5/14 5/22 5/28 6/4 6/11 6/18 7/2 7/9   Total WeeklyDose 37.5 mg 37.5 mg 37.5 mg 37.5 mg 37.5 mg 37.5 mg 37.5 mg 37.5 mg 37.5 mg 37.5 mg 37.5 mg  37.5 mg 37.5 mg   INR 2.1 2.3 2.5 2.6 2.6 2.7 2.9 2.8 2.7 2.3 2.5  3 2.4   Notes HM 2 - No Bill HM 3 - No Bill HM 4 - no bill  HM 1 - no bill   Rec'd 5/3 HM 2 - no bill  HM 3 - no bill HM 4 - no bill HM 5 - no bill HM 1 - Bill today  HM 2 - no bill HM-3 no bill HM-4 no bill HM 1 - BILL TODAY  Rec'd 7/10     Date  7/16 7/23/24 7/31 8/7 8/13/24 8/20/24 8/28 9/5 9/11/24 9/17/24 9/24 10/8/24    Total Weekly  Dose 37.5 mg 35 mg 35 mg 35 mg 37.5 mg 35 mg 37.5 mg 37.5 mg 37.5 mg  37.5 mg 37.5 mg 37.5 mg    INR 3.4 2.7 2.4 3.0 3.3 1.9 2.6 2.0 2.1 2.3 2.4 2.8    Notes HM 2 - no bill    APAP HM 3 - NO BILL   APAP  1x red  HM 4 no bill HM 1 bill HM 2 - no bill  HM 3 - no bill   1x red HM 4 - no bill HM 1  bill HM 2 -no bill  HM 3 - NO BILL  HM 4-no bill HM 1 - BILL TODAY  Rec'd 10/9/24    Takes warfarin in the am    Clinic Interview:  Verbal Release Authorization signed on 8/22/2018 -- may speak with Raeann ZuñigaWife). [phone] (Home) *Preferred*  Tablet Strength: pt has 7.5mg and 2.5mg tablets  Estimated oop cost for BH EDER HM: Patient has [employer] Medicare so they should have minimal to no OOP costs.                   Patient Findings  Negatives: Signs/symptoms of thrombosis, Signs/symptoms of bleeding, Laboratory test error suspected, Change in health, Change in alcohol use, Change in activity, Upcoming invasive procedure, Emergency department visit, Upcoming dental procedure, Missed doses, Extra doses, Change in medications, Change in diet/appetite, Hospital admission, Bruising, Other complaints   Comments: All findings  negative per patient.       Plan:   1. INR is therapeutic yesterday at 2.8 (goal 2.0-3.0). Instructed Mr. Montemayor to continue warfarin 5 mg oral daily except 7.5mg Fri until recheck.  2. Repeat INR 10/15/24.   3. Verbal information provided over the telephone. Mr. Montemayor expresses understanding with teach back and has no further questions at this time.     Home monitor information below:  Test strips on hand: 2   10/9/24 sent today   Lot : [id number]  Barcode : [id number] Exp: 1/25  Serial number: [id number]   Supplies billing code used: last 8/7/24 -  Next must be on or after 11/7/2024   Transfer Tube Lot number: Lot: [id number]  Safety Lancets:  Lot : [id number]     Billing:  *bill must be 4 tests 28 days or more from HM visit  PLEASE REFER TO ANTICOAGULATION TRAINING POWERPOINT FOR BILLING. Needs to use in clinic remote appointment for encounter.      Alexander Scherer, Pharmacy Technician  10/9/2024  15:58 EDT    I, Anitha Anderson, [facility], have reviewed the note in full and agree with the assessment and plan.  10/10/24  08:09 EDT

## 2024-10-15 ENCOUNTER — ANTICOAGULATION VISIT (OUTPATIENT)
Dept: PHARMACY | Facility: HOSPITAL | Age: 79
End: 2024-10-15
Payer: MEDICARE

## 2024-10-15 DIAGNOSIS — I48.21 PERMANENT ATRIAL FIBRILLATION: Primary | ICD-10-CM

## 2024-10-15 LAB — INR PPP: 2

## 2024-10-15 NOTE — PROGRESS NOTES
Anticoagulation Clinic Progress Note   Oswaldo HOMEMONITOR: [employer] Check Serial # : [id number]     Indication: afib  Referring Provider: Gale  Initial Warfarin Start Date: 2007  Goal INR: 2-3  Current Drug Interactions:   [id number]: 5 (HTN, Age, CHF, CAD)   Other: anemic, CKD (stage 4) May need bridge with Lovenox given persistent AF (per cardio 1/24/2019), Scr was 3.22 on 11/24/21    Diet: eats salads 2x weekly 5/11/2023     Anticoagulation Clinic INR History:     Date 3/30 4/13 5/11 6/8  6/23 7/7 7/28  8/9 8/12 8/16   Total WeeklyDose 31.25mg 31.25 mg 31.25 mg 31.25mg Start dialysis 30mg 30mg 30 mg  33.75mg 37.5mg 37.5mg   INR 2.5 2.2 2.6 3.9  2.1 2.1 1.8 1.23 1.4 1.5 2.1   Notes    Inc torsemide Cath placement    AV fistula placement    HM train hm 1 no billing       Date 8/23 8/30 9/6 9/13 9/20 9/30 10/4 10/11 10/18 10/25 11/8 11/15   Total WeeklyDose 31.25 mg 32.5 mg 32.5 mg 32.5 mg 32.5 mg 32.5 mg 35 mg 35 mg 35 mg 32.5 mg 26.5 mg 35 mg   INR 1.9 2.0 2.0 2.5 2.7 1.6 1.7 2.8 2.4 1.7 1.4 1.6   Notes HM2 no billing HM3  No billing HM4  No billing HM 1  Bill today HM 2 No billing HM 3 No bill HM 4  No billing 1x boost  HM 5 no bill day Bill day  HM1 HM- 2 no billing   HM 3- no billing 1x boost  HM 4 - no billing      Date 11/22 11/29 12/6 12/13 12/20 12/27 1/3/23 1/10 1/12 1/17 1/26/23 1/31/23   Total WeeklyDose 37.5 mg 37.5 mg 37.5 mg 37.5 mg 42.5 mg  40 mg 37.5 mg 40 mg 42.5mg 45 mg 45 mg 40 mg   INR 1.9 2.8 1.9 1.5 2.3 3.4 1.4 0.8 2.7 POCT  2.23   2.8 HM 3.9 1.9 2.2   Notes HM1- bill day HM 2 - NO bill HM 3 - no bill  Inc GLV Hm 4 - NO BILL HM 1 - Bill today HM 2   no billing HM 3- no billing HM 4  No billing lab error?? clinic HM 1  Bill today HM 2 - no bill  HM 3 - no bill      Date 2/7 2/14 2/21/23 2/28/23 3/7 3/14/23 3/21/23 3/28/23 4/4 4/11 4/18 4/26   Total WeeklyDose 40 mg 40 mg 40 mg 37.5 mg 40 mg 40 mg 40 mg 40 mg 37.5 mg 37.5 mg 37.5 mg  40 mg   INR 2.3 2.2 3.9 1.6 3.7 ??? 2.5 2.7 3.2 2.8  2.5 1.7 1.7   Notes HM 4 rec 2/15  HM 1  Bill today HM 2 - no billing  1xred  HM 3 - NO BILL  HM-4 no bill, percocet HM 5 - no billing until 3/15 HM 1 - BILL today  HM 2 - no Billing HM 3  No billing HM 4  No billing HM 1-  BILL today  HM 2  No billing     Date 5/2 5/5 5/11 5/16 5/22 5/30 6/6 6/14 6/20 6/27 7/5 7/11   Total WeeklyDose 42.5 mg 37.5 mg 42.5mg 42.5 mg  35 mg 40 mg 40 mg 40 mg 37.5 mg 40 mg 40 mg 40 mg   INR 3.5 1.8 2.9 4.2 2.0 2.5 2.8 3.2 1.9 2.2 3.0 3.3   Notes HM 3  No billing HM 4  No billing HM 5  No billing HM-1  Bill  Dec'd GLV  HM 2  No billing  1x hold  HM 3   no billing HM 4  No billing HM 1  Bill today HM 2  No billing HM 3  No bill HM 4  No bill HM5  No bill     Date 7/18 7/25 08/01/23 8/8 8/15/23 8/23/23 08/29/23 09/05/23 9/12/23 9/19/23 9/26/23 10/3   Total WeeklyDose 37.5 mg 35 mg 37.5mg 40 mg 37.5 mg 37.5 mg 37.5 mg 37.5 mg 37.5 mg 37.5 mg 37.5 mg 37.5 mg   INR 3.3 1.9 1.9 2.3 2.6 2.1 2.4 2.3 2.7 2.5 2.7 2.0   Notes HM 1  Bill today HM 2  No billing HM 3  No billing  1 x boost HM 4 no billing  HM 1 - BILL today  HM 2 - no bill  HM 3- no billing HM 4- no billing HM 1 - BILL today  HM 2 - NO bill  HM 3 - no bill  Hm 4- no bill     Date 10/10 10/18 10/24 10/31 11/07 11/14/23 11/21 11/28 12/05/23  12/12  12/19 12/26   Total Weekly Dose 37.5mg 37.5 mg 37.5mg 42.5 mg 37.5 mg 37.5 mg 37.5 mg 37.5 mg 35 mg 37.5 mg  40 mg 37.5 mg   INR 1.6 2.0 1.5 2.7 2.1 2.5 2.3 4.2 1.9  1.8 2.2 2.1   Notes Hm- 1 bill today  Missed dose?  Inc GLV HM 2- No bill  Hm- 3 no bill Hm 4 no billing   Boost dose  HM 1 billing today  2 - no bill  HM 3 no billing Hm 4 no billing HM 1 billing day  1x red  HM-2 no bill HM-3 no bill  boost HM-4 bill     Date 1/2/24 1/9 1/17 1/23/24 1/30 2/6/24 2/13/24 2/20/24 2/27 3/5/24 3/13/24 3/19 3/26/24   Total Weekly  Dose 37.5 mg 40 mg 40 mg 40 mg 40 mg 40 mg 40 mg 45 mg 40mg  37.5 mg  35 mg 37.5 mg 37.5 mg   INR 1.8 1.9 2.2 2.4 2.1 2.7 1.4 2.2 3.7 3.3 2.5 2.2 2.2   Notes  1  billing day HM-2 no billing boost Hm 3 no bill HM 4 - no bill  HM 1-bill today HM 2 -no bill  HM 3 - no bill  HM 4 - no bill   BOOST  Hm- 1   Bill today HM 2 - no bill  HM 3 no bill Hm 4 no bill HM 1 - Bill Today      Date 4/9 4/16 4/24/24 5/1/24 5/7/24 5/14 5/22 5/28 6/4 6/11 6/18 7/2 7/9   Total WeeklyDose 37.5 mg 37.5 mg 37.5 mg 37.5 mg 37.5 mg 37.5 mg 37.5 mg 37.5 mg 37.5 mg 37.5 mg 37.5 mg  37.5 mg 37.5 mg   INR 2.1 2.3 2.5 2.6 2.6 2.7 2.9 2.8 2.7 2.3 2.5  3 2.4   Notes HM 2 - No Bill HM 3 - No Bill HM 4 - no bill  HM 1 - no bill   Rec'd 5/3 HM 2 - no bill  HM 3 - no bill HM 4 - no bill HM 5 - no bill HM 1 - Bill today  HM 2 - no bill HM-3 no bill HM-4 no bill HM 1 - BILL TODAY  Rec'd 7/10     Date  7/16 7/23/24 7/31 8/7 8/13/24 8/20/24 8/28 9/5 9/11/24 9/17/24 9/24 10/8/24 10/15   Total Weekly  Dose 37.5 mg 35 mg 35 mg 35 mg 37.5 mg 35 mg 37.5 mg 37.5 mg 37.5 mg  37.5 mg 37.5 mg 37.5 mg 37.5 mg   INR 3.4 2.7 2.4 3.0 3.3 1.9 2.6 2.0 2.1 2.3 2.4 2.8 2.0   Notes HM 2 - no bill    APAP HM 3 - NO BILL   APAP  1x red  HM 4 no bill HM 1 bill HM 2 - no bill  HM 3 - no bill   1x red HM 4 - no bill HM 1  bill HM 2 -no bill  HM 3 - NO BILL  HM 4-no bill HM 1 - BILL TODAY  Rec'd 10/9/24 HM 2 - no bill   Takes warfarin in the am    Clinic Interview:  Verbal Release Authorization signed on 8/22/2018 -- may speak with Raeann Montemayor (Wife). [phone] (Home) *Preferred*  Tablet Strength: pt has 7.5mg and 2.5mg tablets  Estimated oop cost for [facility] EDER HM: Patient has [employer] Medicare so they should have minimal to no OOP costs.    Patient Findings    Negatives: Signs/symptoms of thrombosis, Signs/symptoms of bleeding, Laboratory test error suspected, Change in health, Change in alcohol use, Change in activity, Upcoming invasive procedure, Emergency department visit, Upcoming dental procedure, Missed doses, Extra doses, Change in medications, Change in diet/appetite, Hospital admission, Bruising, Other complaints   Comments:  All findings negative per patient.     Plan:   1. INR therapeutic today at 2.0 (goal 2.0-3.0). Instructed Mr. Montemayor to continue warfarin 5 mg oral daily except 7.5mg Fri until recheck.  2. Repeat INR in 1 week, 10.22.24  3. Verbal information provided over the telephone. Mr. Montemayor expresses understanding with teach back and has no further questions at this time.     Home monitor information below:  Test strips on hand: 5   10.15.24  Lot : [id number]  Barcode : [id number] Exp: 1/25  Serial number: [id number]   Supplies billing code used: last 8/7/24 -  Next must be on or after 11/7/2024   Transfer Tube Lot number: Lot: [id number]  Safety Lancets:  Lot : [id number]     Billing:  *bill must be 4 tests 28 days or more from HM visit  PLEASE REFER TO ANTICOAGULATION TRAINING POWERPOINT FOR BILLING. Needs to use in clinic remote appointment for encounter.        Kristi Dunham CPhT   13:52 EDT 10/15/2024    [facility]Anitha [facility], have reviewed the note in full and agree with the assessment and plan.  10/15/24  15:55 EDT

## 2024-10-21 RX ORDER — WARFARIN SODIUM 2.5 MG/1
TABLET ORAL
Qty: 180 TABLET | Refills: 1 | Status: CANCELLED | OUTPATIENT
Start: 2024-10-21

## 2024-10-21 RX ORDER — WARFARIN SODIUM 2.5 MG/1
TABLET ORAL
Qty: 180 TABLET | Refills: 1 | Status: SHIPPED | OUTPATIENT
Start: 2024-10-21 | End: 2024-10-22 | Stop reason: SDUPTHER

## 2024-10-21 NOTE — TELEPHONE ENCOUNTER
Pt. Is requesting a refill for his Warfarin 7.5  mg tabs.    Alexander Scherer, Pharmacy Technician  10/21/2024  11:26 EDT

## 2024-10-22 ENCOUNTER — ANTICOAGULATION VISIT (OUTPATIENT)
Dept: PHARMACY | Facility: HOSPITAL | Age: 79
End: 2024-10-22
Payer: MEDICARE

## 2024-10-22 DIAGNOSIS — I48.21 PERMANENT ATRIAL FIBRILLATION: Primary | ICD-10-CM

## 2024-10-22 LAB — INR PPP: 2.3

## 2024-10-22 RX ORDER — WARFARIN SODIUM 2.5 MG/1
TABLET ORAL
Qty: 180 TABLET | Refills: 1 | Status: SHIPPED | OUTPATIENT
Start: 2024-10-22

## 2024-10-22 RX ORDER — WARFARIN SODIUM 2.5 MG/1
TABLET ORAL
Qty: 180 TABLET | Refills: 1 | Status: SHIPPED | OUTPATIENT
Start: 2024-10-22 | End: 2024-10-22 | Stop reason: SDUPTHER

## 2024-10-22 NOTE — PROGRESS NOTES
Anticoagulation Clinic Progress Note   Oswaldo HOMEMONITOR: [employer] Serial # : [id number]     Indication: afib  Referring Provider: Gale  Initial Warfarin Start Date: 2007  Goal INR: 2-3  Current Drug Interactions:   [id number]: 5 (HTN, Age, CHF, CAD)   Other: anemic, CKD (stage 4) May need bridge with Lovenox given persistent AF (per cardio 1/24/2019), Scr was 3.22 on 11/24/21    Diet: eats salads 2x weekly 5/11/2023     Anticoagulation Clinic INR History:     Date 3/30 4/13 5/11 6/8  6/23 7/7 7/28  8/9 8/12 8/16   Total WeeklyDose 31.25mg 31.25 mg 31.25 mg 31.25mg Start dialysis 30mg 30mg 30 mg  33.75mg 37.5mg 37.5mg   INR 2.5 2.2 2.6 3.9  2.1 2.1 1.8 1.23 1.4 1.5 2.1   Notes    Inc torsemide Cath placement    AV fistula placement    HM train hm 1 no billing       Date 8/23 8/30 9/6 9/13 9/20 9/30 10/4 10/11 10/18 10/25 11/8 11/15   Total WeeklyDose 31.25 mg 32.5 mg 32.5 mg 32.5 mg 32.5 mg 32.5 mg 35 mg 35 mg 35 mg 32.5 mg 26.5 mg 35 mg   INR 1.9 2.0 2.0 2.5 2.7 1.6 1.7 2.8 2.4 1.7 1.4 1.6   Notes HM2 no billing HM3  No billing HM4  No billing HM 1  Bill today HM 2 No billing HM 3 No bill HM 4  No billing 1x boost  HM 5 no bill day Bill day  HM1 HM- 2 no billing   HM 3- no billing 1x boost  HM 4 - no billing      Date 11/22 11/29 12/6 12/13 12/20 12/27 1/3/23 1/10 1/12 1/17 1/26/23 1/31/23   Total WeeklyDose 37.5 mg 37.5 mg 37.5 mg 37.5 mg 42.5 mg  40 mg 37.5 mg 40 mg 42.5mg 45 mg 45 mg 40 mg   INR 1.9 2.8 1.9 1.5 2.3 3.4 1.4 0.8 2.7 POCT  2.23   2.8 HM 3.9 1.9 2.2   Notes HM1- bill day HM 2 - NO bill HM 3 - no bill  Inc GLV Hm 4 - NO BILL HM 1 - Bill today HM 2   no billing HM 3- no billing HM 4  No billing lab error?? clinic HM 1  Bill today HM 2 - no bill  HM 3 - no bill      Date 2/7 2/14 2/21/23 2/28/23 3/7 3/14/23 3/21/23 3/28/23 4/4 4/11 4/18 4/26   Total WeeklyDose 40 mg 40 mg 40 mg 37.5 mg 40 mg 40 mg 40 mg 40 mg 37.5 mg 37.5 mg 37.5 mg  40 mg   INR 2.3 2.2 3.9 1.6 3.7 ??? 2.5 2.7 3.2 2.8  2.5 1.7 1.7   Notes HM 4 rec 2/15  HM 1  Bill today HM 2 - no billing  1xred  HM 3 - NO BILL  HM-4 no bill, percocet HM 5 - no billing until 3/15 HM 1 - BILL today  HM 2 - no Billing HM 3  No billing HM 4  No billing HM 1-  BILL today  HM 2  No billing     Date 5/2 5/5 5/11 5/16 5/22 5/30 6/6 6/14 6/20 6/27 7/5 7/11   Total WeeklyDose 42.5 mg 37.5 mg 42.5mg 42.5 mg  35 mg 40 mg 40 mg 40 mg 37.5 mg 40 mg 40 mg 40 mg   INR 3.5 1.8 2.9 4.2 2.0 2.5 2.8 3.2 1.9 2.2 3.0 3.3   Notes HM 3  No billing HM 4  No billing HM 5  No billing HM-1  Bill  Dec'd GLV  HM 2  No billing  1x hold  HM 3   no billing HM 4  No billing HM 1  Bill today HM 2  No billing HM 3  No bill HM 4  No bill HM5  No bill     Date 7/18 7/25 08/01/23 8/8 8/15/23 8/23/23 08/29/23 09/05/23 9/12/23 9/19/23 9/26/23 10/3   Total WeeklyDose 37.5 mg 35 mg 37.5mg 40 mg 37.5 mg 37.5 mg 37.5 mg 37.5 mg 37.5 mg 37.5 mg 37.5 mg 37.5 mg   INR 3.3 1.9 1.9 2.3 2.6 2.1 2.4 2.3 2.7 2.5 2.7 2.0   Notes HM 1  Bill today HM 2  No billing HM 3  No billing  1 x boost HM 4 no billing  HM 1 - BILL today  HM 2 - no bill  HM 3- no billing HM 4- no billing HM 1 - BILL today  HM 2 - NO bill  HM 3 - no bill  Hm 4- no bill     Date 10/10 10/18 10/24 10/31 11/07 11/14/23 11/21 11/28 12/05/23  12/12  12/19 12/26   Total Weekly Dose 37.5mg 37.5 mg 37.5mg 42.5 mg 37.5 mg 37.5 mg 37.5 mg 37.5 mg 35 mg 37.5 mg  40 mg 37.5 mg   INR 1.6 2.0 1.5 2.7 2.1 2.5 2.3 4.2 1.9  1.8 2.2 2.1   Notes Hm- 1 bill today  Missed dose?  Inc GLV HM 2- No bill  Hm- 3 no bill Hm 4 no billing   Boost dose  HM 1 billing today  2 - no bill  HM 3 no billing Hm 4 no billing HM 1 billing day  1x red  HM-2 no bill HM-3 no bill  boost HM-4 bill     Date 1/2/24 1/9 1/17 1/23/24 1/30 2/6/24 2/13/24 2/20/24 2/27 3/5/24 3/13/24 3/19 3/26/24   Total Weekly  Dose 37.5 mg 40 mg 40 mg 40 mg 40 mg 40 mg 40 mg 45 mg 40mg  37.5 mg  35 mg 37.5 mg 37.5 mg   INR 1.8 1.9 2.2 2.4 2.1 2.7 1.4 2.2 3.7 3.3 2.5 2.2 2.2   Notes  1  billing day HM-2 no billing boost Hm 3 no bill HM 4 - no bill  HM 1-bill today HM 2 -no bill  HM 3 - no bill  HM 4 - no bill   BOOST  Hm- 1   Bill today HM 2 - no bill  HM 3 no bill Hm 4 no bill HM 1 - Bill Today      Date 4/9 4/16 4/24/24 5/1/24 5/7/24 5/14 5/22 5/28 6/4 6/11 6/18 7/2 7/9   Total WeeklyDose 37.5 mg 37.5 mg 37.5 mg 37.5 mg 37.5 mg 37.5 mg 37.5 mg 37.5 mg 37.5 mg 37.5 mg 37.5 mg  37.5 mg 37.5 mg   INR 2.1 2.3 2.5 2.6 2.6 2.7 2.9 2.8 2.7 2.3 2.5  3 2.4   Notes HM 2 - No Bill HM 3 - No Bill HM 4 - no bill  HM 1 - no bill   Rec'd 5/3 HM 2 - no bill  HM 3 - no bill HM 4 - no bill HM 5 - no bill HM 1 - Bill today  HM 2 - no bill HM-3 no bill HM-4 no bill HM 1 - BILL TODAY  Rec'd 7/10     Date  7/16 7/23/24 7/31 8/7 8/13/24 8/20/24 8/28 9/5 9/11/24 9/17/24 9/24 10/8/24 10/15   Total Weekly  Dose 37.5 mg 35 mg 35 mg 35 mg 37.5 mg 35 mg 37.5 mg 37.5 mg 37.5 mg  37.5 mg 37.5 mg 37.5 mg 37.5 mg   INR 3.4 2.7 2.4 3.0 3.3 1.9 2.6 2.0 2.1 2.3 2.4 2.8 2.0   Notes HM 2 - no bill    APAP HM 3 - NO BILL   APAP  1x red  HM 4 no bill HM 1 bill HM 2 - no bill  HM 3 - no bill   1x red HM 4 - no bill HM 1  bill HM 2 -no bill  HM 3 - NO BILL  HM 4-no bill HM 1 - BILL TODAY  Rec'd 10/9/24 HM 2 - no bill     Date 10/22               Total Weekly  Dose 37.5mg                INR 2.3               Notes HM 3 - no bill                   Takes warfarin in the am    Clinic Interview:  Verbal Release Authorization signed on 8/22/2018 -- may speak with Raeann Montemayor (Wife). [phone] (Home) *Preferred*  Tablet Strength: pt has 7.5mg and 2.5mg tablets  Estimated oop cost for BH EDER HM: Patient has [employer] Medicare so they should have minimal to no OOP costs.    Patient Findings    Negatives: Signs/symptoms of thrombosis, Signs/symptoms of bleeding, Laboratory test error suspected, Change in health, Change in alcohol use, Change in activity, Upcoming invasive procedure, Emergency department visit, Upcoming dental procedure, Missed  doses, Extra doses, Change in medications, Change in diet/appetite, Hospital admission, Bruising, Other complaints   Comments: All findings negative per patient.     Plan:   1. INR therapeutic today at 2.3 (goal 2.0-3.0). Instructed Mr. Montemayor to continue warfarin 5 mg oral daily except 7.5mg Fri until recheck.  2. Repeat INR in 1 week, 10.29.24  3. Verbal information provided over the telephone. Mr. Montemayor expresses understanding with teach back and has no further questions at this time.     Home monitor information below:  Test strips on hand: 4   10.22.24  Lot : [id number]  Barcode : [id number] Exp: 1/25  Serial number: [id number]   Supplies billing code used: last 8/7/24 -  Next must be on or after 11/7/2024   Transfer Tube Lot number: Lot: [id number]  Safety Lancets:  Lot : [id number]     Billing:  *bill must be 4 tests 28 days or more from  visit  PLEASE REFER TO ANTICOAGULATION TRAINING POWERPOINT FOR BILLING. Needs to use in clinic remote appointment for encounter.    Criss Britt, PharmD   10/22/2024  13:32 EDT

## 2024-10-29 ENCOUNTER — ANTICOAGULATION VISIT (OUTPATIENT)
Dept: PHARMACY | Facility: HOSPITAL | Age: 79
End: 2024-10-29
Payer: MEDICARE

## 2024-10-29 DIAGNOSIS — I48.21 PERMANENT ATRIAL FIBRILLATION: Primary | ICD-10-CM

## 2024-10-29 LAB — INR PPP: 2.5

## 2024-10-29 NOTE — PROGRESS NOTES
Anticoagulation Clinic Progress Note   Oswaldo HOMEMONITOR: [employer] Serial # : [id number]     Indication: afib  Referring Provider: Gale  Initial Warfarin Start Date: 2007  Goal INR: 2-3  Current Drug Interactions:   [id number]: 5 (HTN, Age, CHF, CAD)   Other: anemic, CKD (stage 4) May need bridge with Lovenox given persistent AF (per cardio 1/24/2019), Scr was 3.22 on 11/24/21    Diet: eats salads 2x weekly 5/11/2023     Anticoagulation Clinic INR History:     Date 3/30 4/13 5/11 6/8  6/23 7/7 7/28  8/9 8/12 8/16   Total WeeklyDose 31.25mg 31.25 mg 31.25 mg 31.25mg Start dialysis 30mg 30mg 30 mg  33.75mg 37.5mg 37.5mg   INR 2.5 2.2 2.6 3.9  2.1 2.1 1.8 1.23 1.4 1.5 2.1   Notes    Inc torsemide Cath placement    AV fistula placement    HM train hm 1 no billing       Date 8/23 8/30 9/6 9/13 9/20 9/30 10/4 10/11 10/18 10/25 11/8 11/15   Total WeeklyDose 31.25 mg 32.5 mg 32.5 mg 32.5 mg 32.5 mg 32.5 mg 35 mg 35 mg 35 mg 32.5 mg 26.5 mg 35 mg   INR 1.9 2.0 2.0 2.5 2.7 1.6 1.7 2.8 2.4 1.7 1.4 1.6   Notes HM2 no billing HM3  No billing HM4  No billing HM 1  Bill today HM 2 No billing HM 3 No bill HM 4  No billing 1x boost  HM 5 no bill day Bill day  HM1 HM- 2 no billing   HM 3- no billing 1x boost  HM 4 - no billing      Date 11/22 11/29 12/6 12/13 12/20 12/27 1/3/23 1/10 1/12 1/17 1/26/23 1/31/23   Total WeeklyDose 37.5 mg 37.5 mg 37.5 mg 37.5 mg 42.5 mg  40 mg 37.5 mg 40 mg 42.5mg 45 mg 45 mg 40 mg   INR 1.9 2.8 1.9 1.5 2.3 3.4 1.4 0.8 2.7 POCT  2.23   2.8 HM 3.9 1.9 2.2   Notes HM1- bill day HM 2 - NO bill HM 3 - no bill  Inc GLV Hm 4 - NO BILL HM 1 - Bill today HM 2   no billing HM 3- no billing HM 4  No billing lab error?? clinic HM 1  Bill today HM 2 - no bill  HM 3 - no bill      Date 2/7 2/14 2/21/23 2/28/23 3/7 3/14/23 3/21/23 3/28/23 4/4 4/11 4/18 4/26   Total WeeklyDose 40 mg 40 mg 40 mg 37.5 mg 40 mg 40 mg 40 mg 40 mg 37.5 mg 37.5 mg 37.5 mg  40 mg   INR 2.3 2.2 3.9 1.6 3.7 ??? 2.5 2.7 3.2 2.8  2.5 1.7 1.7   Notes HM 4 rec 2/15  HM 1  Bill today HM 2 - no billing  1xred  HM 3 - NO BILL  HM-4 no bill, percocet HM 5 - no billing until 3/15 HM 1 - BILL today  HM 2 - no Billing HM 3  No billing HM 4  No billing HM 1-  BILL today  HM 2  No billing     Date 5/2 5/5 5/11 5/16 5/22 5/30 6/6 6/14 6/20 6/27 7/5 7/11   Total WeeklyDose 42.5 mg 37.5 mg 42.5mg 42.5 mg  35 mg 40 mg 40 mg 40 mg 37.5 mg 40 mg 40 mg 40 mg   INR 3.5 1.8 2.9 4.2 2.0 2.5 2.8 3.2 1.9 2.2 3.0 3.3   Notes HM 3  No billing HM 4  No billing HM 5  No billing HM-1  Bill  Dec'd GLV  HM 2  No billing  1x hold  HM 3   no billing HM 4  No billing HM 1  Bill today HM 2  No billing HM 3  No bill HM 4  No bill HM5  No bill     Date 7/18 7/25 08/01/23 8/8 8/15/23 8/23/23 08/29/23 09/05/23 9/12/23 9/19/23 9/26/23 10/3   Total WeeklyDose 37.5 mg 35 mg 37.5mg 40 mg 37.5 mg 37.5 mg 37.5 mg 37.5 mg 37.5 mg 37.5 mg 37.5 mg 37.5 mg   INR 3.3 1.9 1.9 2.3 2.6 2.1 2.4 2.3 2.7 2.5 2.7 2.0   Notes HM 1  Bill today HM 2  No billing HM 3  No billing  1 x boost HM 4 no billing  HM 1 - BILL today  HM 2 - no bill  HM 3- no billing HM 4- no billing HM 1 - BILL today  HM 2 - NO bill  HM 3 - no bill  Hm 4- no bill     Date 10/10 10/18 10/24 10/31 11/07 11/14/23 11/21 11/28 12/05/23  12/12  12/19 12/26   Total Weekly Dose 37.5mg 37.5 mg 37.5mg 42.5 mg 37.5 mg 37.5 mg 37.5 mg 37.5 mg 35 mg 37.5 mg  40 mg 37.5 mg   INR 1.6 2.0 1.5 2.7 2.1 2.5 2.3 4.2 1.9  1.8 2.2 2.1   Notes Hm- 1 bill today  Missed dose?  Inc GLV HM 2- No bill  Hm- 3 no bill Hm 4 no billing   Boost dose  HM 1 billing today  2 - no bill  HM 3 no billing Hm 4 no billing HM 1 billing day  1x red  HM-2 no bill HM-3 no bill  boost HM-4 bill     Date 1/2/24 1/9 1/17 1/23/24 1/30 2/6/24 2/13/24 2/20/24 2/27 3/5/24 3/13/24 3/19 3/26/24   Total Weekly  Dose 37.5 mg 40 mg 40 mg 40 mg 40 mg 40 mg 40 mg 45 mg 40mg  37.5 mg  35 mg 37.5 mg 37.5 mg   INR 1.8 1.9 2.2 2.4 2.1 2.7 1.4 2.2 3.7 3.3 2.5 2.2 2.2   Notes  1  billing day HM-2 no billing boost Hm 3 no bill HM 4 - no bill  HM 1-bill today HM 2 -no bill  HM 3 - no bill  HM 4 - no bill   BOOST  Hm- 1   Bill today HM 2 - no bill  HM 3 no bill Hm 4 no bill HM 1 - Bill Today      Date 4/9 4/16 4/24/24 5/1/24 5/7/24 5/14 5/22 5/28 6/4 6/11 6/18 7/2 7/9   Total WeeklyDose 37.5 mg 37.5 mg 37.5 mg 37.5 mg 37.5 mg 37.5 mg 37.5 mg 37.5 mg 37.5 mg 37.5 mg 37.5 mg  37.5 mg 37.5 mg   INR 2.1 2.3 2.5 2.6 2.6 2.7 2.9 2.8 2.7 2.3 2.5  3 2.4   Notes HM 2 - No Bill HM 3 - No Bill HM 4 - no bill  HM 1 - no bill   Rec'd 5/3 HM 2 - no bill  HM 3 - no bill HM 4 - no bill HM 5 - no bill HM 1 - Bill today  HM 2 - no bill HM-3 no bill HM-4 no bill HM 1 - BILL TODAY  Rec'd 7/10     Date  7/16 7/23/24 7/31 8/7 8/13/24 8/20/24 8/28 9/5 9/11/24 9/17/24 9/24 10/8/24 10/15   Total Weekly  Dose 37.5 mg 35 mg 35 mg 35 mg 37.5 mg 35 mg 37.5 mg 37.5 mg 37.5 mg  37.5 mg 37.5 mg 37.5 mg 37.5 mg   INR 3.4 2.7 2.4 3.0 3.3 1.9 2.6 2.0 2.1 2.3 2.4 2.8 2.0   Notes HM 2 - no bill    APAP HM 3 - NO BILL   APAP  1x red  HM 4 no bill HM 1 bill HM 2 - no bill  HM 3 - no bill   1x red HM 4 - no bill HM 1  bill HM 2 -no bill  HM 3 - NO BILL  HM 4-no bill HM 1 - BILL TODAY  Rec'd 10/9/24 HM 2 - no bill     Date 10/22 10/29              Total Weekly  Dose 37.5mg  37.5 mg              INR 2.3 2.5              Notes HM 3 - no bill HM 4 - no bill                Takes warfarin in the am    Clinic Interview:  Verbal Release Authorization signed on 8/22/2018 -- may speak with Raeann Montemayor (Wife). [phone] (Home) *Preferred*  Tablet Strength: pt has 7.5mg and 2.5mg tablets  Estimated oop cost for [facility] EDER HM: Patient has [employer] Medicare so they should have minimal to no OOP costs.    Patient Findings    Negatives: Signs/symptoms of thrombosis, Signs/symptoms of bleeding, Laboratory test error suspected, Change in health, Change in alcohol use, Change in activity, Upcoming invasive procedure, Emergency department visit,  Upcoming dental procedure, Missed doses, Extra doses, Change in medications, Change in diet/appetite, Hospital admission, Bruising, Other complaints   Comments: All findings negative per patient.     Plan:   1. INR is therapeutic today at 2.5 (goal 2.0-3.0). Instructed Mr. Montemayor to continue warfarin 5 mg oral daily except 7.5 mg Fri until recheck.  2. Repeat INR in 1 week, 11/5/24  3. Verbal information provided over the telephone. Mr. Montemayor expresses understanding with teach back and has no further questions at this time.     Home monitor information below:  Test strips on hand: 4  10/29/24  Lot : [id number]  Barcode : [id number] Exp: 1/25  Serial number: [id number]   Supplies billing code used: last 8/7/24 -  Next must be on or after 11/7/2024   Transfer Tube Lot number: Lot: [id number]  Safety Lancets:  Lot : [id number]     Billing:  *bill must be 4 tests 28 days or more from  visit  PLEASE REFER TO ANTICOAGULATION TRAINING POWERPOINT FOR BILLING. Needs to use in clinic remote appointment for encounter.      Kristi Dunham CPhT   14:29 EDT 10/29/2024    [facility], have reviewed the note in full and agree with the assessment and plan.  10/29/24  14:46 EDT

## 2024-11-05 ENCOUNTER — ANTICOAGULATION VISIT (OUTPATIENT)
Dept: PHARMACY | Facility: HOSPITAL | Age: 79
End: 2024-11-05
Payer: MEDICARE

## 2024-11-05 DIAGNOSIS — I48.21 PERMANENT ATRIAL FIBRILLATION: Primary | ICD-10-CM

## 2024-11-05 LAB — INR PPP: 2

## 2024-11-05 NOTE — PROGRESS NOTES
Anticoagulation Clinic Progress Note   Oswaldo HOMEMONITOR: [place] Check Serial # : [id number]     Indication: afib  Referring Provider: Gale  Initial Warfarin Start Date: 2007  Goal INR: 2-3  Current Drug Interactions:   [id number]: 5 (HTN, Age, CHF, CAD)   Other: anemic, CKD (stage 4) May need bridge with Lovenox given persistent AF (per cardio 1/24/2019), Scr was 3.22 on 11/24/21    Diet: eats salads 2x weekly 5/11/2023     Anticoagulation Clinic INR History:     Date 3/30 4/13 5/11 6/8  6/23 7/7 7/28  8/9 8/12 8/16   Total WeeklyDose 31.25mg 31.25 mg 31.25 mg 31.25mg Start dialysis 30mg 30mg 30 mg  33.75mg 37.5mg 37.5mg   INR 2.5 2.2 2.6 3.9  2.1 2.1 1.8 1.23 1.4 1.5 2.1   Notes    Inc torsemide Cath placement    AV fistula placement    HM train hm 1 no billing       Date 8/23 8/30 9/6 9/13 9/20 9/30 10/4 10/11 10/18 10/25 11/8 11/15   Total WeeklyDose 31.25 mg 32.5 mg 32.5 mg 32.5 mg 32.5 mg 32.5 mg 35 mg 35 mg 35 mg 32.5 mg 26.5 mg 35 mg   INR 1.9 2.0 2.0 2.5 2.7 1.6 1.7 2.8 2.4 1.7 1.4 1.6   Notes HM2 no billing HM3  No billing HM4  No billing HM 1  Bill today HM 2 No billing HM 3 No bill HM 4  No billing 1x boost  HM 5 no bill day Bill day  HM1 HM- 2 no billing   HM 3- no billing 1x boost  HM 4 - no billing      Date 11/22 11/29 12/6 12/13 12/20 12/27 1/3/23 1/10 1/12 1/17 1/26/23 1/31/23   Total WeeklyDose 37.5 mg 37.5 mg 37.5 mg 37.5 mg 42.5 mg  40 mg 37.5 mg 40 mg 42.5mg 45 mg 45 mg 40 mg   INR 1.9 2.8 1.9 1.5 2.3 3.4 1.4 0.8 2.7 POCT  2.23   2.8 HM 3.9 1.9 2.2   Notes HM1- bill day HM 2 - NO bill HM 3 - no bill  Inc GLV Hm 4 - NO BILL HM 1 - Bill today HM 2   no billing HM 3- no billing HM 4  No billing lab error?? clinic HM 1  Bill today HM 2 - no bill  HM 3 - no bill      Date 2/7 2/14 2/21/23 2/28/23 3/7 3/14/23 3/21/23 3/28/23 4/4 4/11 4/18 4/26   Total WeeklyDose 40 mg 40 mg 40 mg 37.5 mg 40 mg 40 mg 40 mg 40 mg 37.5 mg 37.5 mg 37.5 mg  40 mg   INR 2.3 2.2 3.9 1.6 3.7 ??? 2.5 2.7 3.2 2.8  2.5 1.7 1.7   Notes HM 4 rec 2/15  HM 1  Bill today HM 2 - no billing  1xred  HM 3 - NO BILL  HM-4 no bill, percocet HM 5 - no billing until 3/15 HM 1 - BILL today  HM 2 - no Billing HM 3  No billing HM 4  No billing HM 1-  BILL today  HM 2  No billing     Date 5/2 5/5 5/11 5/16 5/22 5/30 6/6 6/14 6/20 6/27 7/5 7/11   Total WeeklyDose 42.5 mg 37.5 mg 42.5mg 42.5 mg  35 mg 40 mg 40 mg 40 mg 37.5 mg 40 mg 40 mg 40 mg   INR 3.5 1.8 2.9 4.2 2.0 2.5 2.8 3.2 1.9 2.2 3.0 3.3   Notes HM 3  No billing HM 4  No billing HM 5  No billing HM-1  Bill  Dec'd GLV  HM 2  No billing  1x hold  HM 3   no billing HM 4  No billing HM 1  Bill today HM 2  No billing HM 3  No bill HM 4  No bill HM5  No bill     Date 7/18 7/25 08/01/23 8/8 8/15/23 8/23/23 08/29/23 09/05/23 9/12/23 9/19/23 9/26/23 10/3   Total WeeklyDose 37.5 mg 35 mg 37.5mg 40 mg 37.5 mg 37.5 mg 37.5 mg 37.5 mg 37.5 mg 37.5 mg 37.5 mg 37.5 mg   INR 3.3 1.9 1.9 2.3 2.6 2.1 2.4 2.3 2.7 2.5 2.7 2.0   Notes HM 1  Bill today HM 2  No billing HM 3  No billing  1 x boost HM 4 no billing  HM 1 - BILL today  HM 2 - no bill  HM 3- no billing HM 4- no billing HM 1 - BILL today  HM 2 - NO bill  HM 3 - no bill  Hm 4- no bill     Date 10/10 10/18 10/24 10/31 11/07 11/14/23 11/21 11/28 12/05/23  12/12  12/19 12/26   Total Weekly Dose 37.5mg 37.5 mg 37.5mg 42.5 mg 37.5 mg 37.5 mg 37.5 mg 37.5 mg 35 mg 37.5 mg  40 mg 37.5 mg   INR 1.6 2.0 1.5 2.7 2.1 2.5 2.3 4.2 1.9  1.8 2.2 2.1   Notes Hm- 1 bill today  Missed dose?  Inc GLV HM 2- No bill  Hm- 3 no bill Hm 4 no billing   Boost dose  HM 1 billing today  2 - no bill  HM 3 no billing Hm 4 no billing HM 1 billing day  1x red  HM-2 no bill HM-3 no bill  boost HM-4 bill     Date 1/2/24 1/9 1/17 1/23/24 1/30 2/6/24 2/13/24 2/20/24 2/27 3/5/24 3/13/24 3/19 3/26/24   Total Weekly  Dose 37.5 mg 40 mg 40 mg 40 mg 40 mg 40 mg 40 mg 45 mg 40mg  37.5 mg  35 mg 37.5 mg 37.5 mg   INR 1.8 1.9 2.2 2.4 2.1 2.7 1.4 2.2 3.7 3.3 2.5 2.2 2.2   Notes  1  billing day HM-2 no billing boost Hm 3 no bill HM 4 - no bill  HM 1-bill today HM 2 -no bill  HM 3 - no bill  HM 4 - no bill   BOOST  Hm- 1   Bill today HM 2 - no bill  HM 3 no bill Hm 4 no bill HM 1 - Bill Today      Date 4/9 4/16 4/24/24 5/1/24 5/7/24 5/14 5/22 5/28 6/4 6/11 6/18 7/2 7/9   Total WeeklyDose 37.5 mg 37.5 mg 37.5 mg 37.5 mg 37.5 mg 37.5 mg 37.5 mg 37.5 mg 37.5 mg 37.5 mg 37.5 mg  37.5 mg 37.5 mg   INR 2.1 2.3 2.5 2.6 2.6 2.7 2.9 2.8 2.7 2.3 2.5  3 2.4   Notes HM 2 - No Bill HM 3 - No Bill HM 4 - no bill  HM 1 - no bill   Rec'd 5/3 HM 2 - no bill  HM 3 - no bill HM 4 - no bill HM 5 - no bill HM 1 - Bill today  HM 2 - no bill HM-3 no bill HM-4 no bill HM 1 - BILL TODAY  Rec'd 7/10     Date  7/16 7/23/24 7/31 8/7 8/13/24 8/20/24 8/28 9/5 9/11/24 9/17/24 9/24 10/8/24 10/15   Total Weekly  Dose 37.5 mg 35 mg 35 mg 35 mg 37.5 mg 35 mg 37.5 mg 37.5 mg 37.5 mg  37.5 mg 37.5 mg 37.5 mg 37.5 mg   INR 3.4 2.7 2.4 3.0 3.3 1.9 2.6 2.0 2.1 2.3 2.4 2.8 2.0   Notes HM 2 - no bill    APAP HM 3 - NO BILL   APAP  1x red  HM 4 no bill HM 1 bill HM 2 - no bill  HM 3 - no bill   1x red HM 4 - no bill HM 1  bill HM 2 -no bill  HM 3 - NO BILL  HM 4-no bill HM 1 - BILL TODAY  Rec'd 10/9/24 HM 2 - no bill     Date 10/22 10/29 11/5/24             Total Weekly  Dose 37.5mg  37.5 mg 37.5 mg             INR 2.3 2.5 2.0             Notes HM 3 - no bill HM 4 - no bill HM 5 - no bill                Takes warfarin in the am    Clinic Interview:  Verbal Release Authorization signed on 8/22/2018 -- may speak with Raeann Montemayor (Wife). [phone] (Home) *Preferred*  Tablet Strength: pt has 7.5mg and 2.5mg tablets  Estimated oop cost for [facility] HM: Patient has [employer] Medicare so they should have minimal to no OOP costs.      Patient Findings  Negatives: Signs/symptoms of thrombosis, Signs/symptoms of bleeding, Laboratory test error suspected, Change in health, Change in alcohol use, Change in activity, Upcoming invasive procedure,  Emergency department visit, Upcoming dental procedure, Missed doses, Extra doses, Change in medications, Change in diet/appetite, Hospital admission, Bruising, Other complaints   Comments: All findings negative per patient.       Plan:   1. INR is therapeutic today at 2.0 (goal 2.0-3.0). Instructed Mr. Montemayor to continue Warfarin 5 mg oral daily except 7.5 mg on Friday until recheck.  2. Repeat INR in 1 week, 11/12/24  3. Verbal information provided over the telephone. Mr. Montemayor expresses understanding with teach back and has no further questions at this time.     Home monitor information below:  Test strips on hand: 4  11/5/24  Lot : [id number]  Barcode : [id number] Exp: 1/25  Serial number: [id number]   Supplies billing code used: last 8/7/24 -  Next must be on or after 11/7/2024   Transfer Tube Lot number: Lot: [id number]  Safety Lancets:  Lot : [id number]     Billing:  *bill must be 4 tests 28 days or more from  visit  PLEASE REFER TO ANTICOAGULATION TRAINING POWERPOINT FOR BILLING. Needs to use in clinic remote appointment for encounter.      Anitha RODGERS [facility], have reviewed the note in full and agree with the assessment and plan.  11/05/24  16:07 EST

## 2024-11-06 ENCOUNTER — OFFICE VISIT (OUTPATIENT)
Age: 79
End: 2024-11-06
Payer: MEDICARE

## 2024-11-06 VITALS
RESPIRATION RATE: 18 BRPM | OXYGEN SATURATION: 93 % | SYSTOLIC BLOOD PRESSURE: 124 MMHG | HEART RATE: 106 BPM | TEMPERATURE: 98.6 F | BODY MASS INDEX: 41.1 KG/M2 | WEIGHT: 287.1 LBS | DIASTOLIC BLOOD PRESSURE: 78 MMHG | HEIGHT: 70 IN

## 2024-11-06 DIAGNOSIS — E11.65 TYPE 2 DIABETES MELLITUS WITH HYPERGLYCEMIA, UNSPECIFIED WHETHER LONG TERM INSULIN USE: Primary | ICD-10-CM

## 2024-11-06 DIAGNOSIS — J22 LOWER RESP. TRACT INFECTION: ICD-10-CM

## 2024-11-06 DIAGNOSIS — I10 PRIMARY HYPERTENSION: ICD-10-CM

## 2024-11-06 DIAGNOSIS — I48.21 PERMANENT ATRIAL FIBRILLATION: ICD-10-CM

## 2024-11-06 LAB
EXPIRATION DATE: ABNORMAL
HBA1C MFR BLD: 5.8 % (ref 4.5–5.7)
Lab: ABNORMAL

## 2024-11-06 PROCEDURE — 1159F MED LIST DOCD IN RCRD: CPT | Performed by: STUDENT IN AN ORGANIZED HEALTH CARE EDUCATION/TRAINING PROGRAM

## 2024-11-06 PROCEDURE — 1160F RVW MEDS BY RX/DR IN RCRD: CPT | Performed by: STUDENT IN AN ORGANIZED HEALTH CARE EDUCATION/TRAINING PROGRAM

## 2024-11-06 PROCEDURE — 83036 HEMOGLOBIN GLYCOSYLATED A1C: CPT | Performed by: STUDENT IN AN ORGANIZED HEALTH CARE EDUCATION/TRAINING PROGRAM

## 2024-11-06 PROCEDURE — 3074F SYST BP LT 130 MM HG: CPT | Performed by: STUDENT IN AN ORGANIZED HEALTH CARE EDUCATION/TRAINING PROGRAM

## 2024-11-06 PROCEDURE — 3078F DIAST BP <80 MM HG: CPT | Performed by: STUDENT IN AN ORGANIZED HEALTH CARE EDUCATION/TRAINING PROGRAM

## 2024-11-06 PROCEDURE — 1126F AMNT PAIN NOTED NONE PRSNT: CPT | Performed by: STUDENT IN AN ORGANIZED HEALTH CARE EDUCATION/TRAINING PROGRAM

## 2024-11-06 PROCEDURE — 99214 OFFICE O/P EST MOD 30 MIN: CPT | Performed by: STUDENT IN AN ORGANIZED HEALTH CARE EDUCATION/TRAINING PROGRAM

## 2024-11-06 PROCEDURE — 3044F HG A1C LEVEL LT 7.0%: CPT | Performed by: STUDENT IN AN ORGANIZED HEALTH CARE EDUCATION/TRAINING PROGRAM

## 2024-11-06 RX ORDER — CEFDINIR 300 MG/1
CAPSULE ORAL
Qty: 5 CAPSULE | Refills: 0 | Status: SHIPPED | OUTPATIENT
Start: 2024-11-06

## 2024-11-06 NOTE — PROGRESS NOTES
Office Note     Name: Marco Antonio Montemayor    : 1945     MRN: [id number]     Chief Complaint  URI (Pt states he's had some chest congestion and runny nose. He had some chills on . Denies needing covid or flu test. No color to his mucus) and Immunizations (Pt denies wanting any vaccines.)    Subjective     History of Present Illness:  Marco Antonio Montemayor is a 79 y.o. male who presents today for follow up of chronic conditions and complaint of productive cough and dyspnea. Subjective chills but no fevers.  Calciphylaxis and dialysis day lightheadedness are improved.      Past Medical History:   Past Medical History:   Diagnosis Date    Abnormal ECG     Arrhythmia     Arthritis     Asthma     Cancer     skin cancer removed from various locations     Chronic diastolic congestive heart failure 2018    CKD (chronic kidney disease)     closer to stage 5    Coronary artery disease     Dry skin     Dyslipidemia 2016    Edema     Heart murmur     Hematoma     Hemodialysis access site with arteriovenous graft     History of transfusion     no transfusion reaction    Hypertension     Iron deficiency anemia     Long term current use of anticoagulant     ZULEIKA treated with BiPAP     Persistent atrial fibrillation 2016     Patient notes dyspnea, fatigue and palpitations during episodes of afib.  multiple external cardioversionsChads vasc 2, anticoagulated with Coumadin    Tinnitus     Wears glasses     readers       Past Surgical History:   Past Surgical History:   Procedure Laterality Date    ABLATION OF DYSRHYTHMIC FOCUS      APPENDECTOMY      ARTERIOVENOUS FISTULA/SHUNT SURGERY Left 2022    Procedure: UPPER EXTREMITY BRACHIOCEPHALIC ARTERIOVENOUS FISTULA FORMATION LEFT;  Surgeon: Jakob Reza MD;  Location: [facility];  Service: Vascular;  Laterality: Left;    ARTERIOVENOUS FISTULA/SHUNT SURGERY Left 2022    Procedure: ELEVATION LEFT UPPER EXTREMITY AV FISTULA;  Surgeon:  Jakob Reza MD;  Location:  [facility];  Service: Vascular;  Laterality: Left;    CARDIAC ELECTROPHYSIOLOGY PROCEDURE N/A 05/18/2018    Procedure: PPM generator change - dual       [facility]/ please schedule in 8 weeks;  Surgeon: Johnathan Taylor MD;  Location:  [facility];  Service: Cardiovascular    CATARACT EXTRACTION      bilat wiht lens     COLONOSCOPY      ENDOSCOPY      Pt denies    INSERT / REPLACE / REMOVE PACEMAKER  about 20 years back    SKIN CANCER EXCISION      various locations     TONSILLECTOMY      VEIN SURGERY  for dialysis    WISDOM TOOTH EXTRACTION         Immunizations:   Immunization History   Administered Date(s) Administered    Hepatitis B 2 Dose Vaccine Heplisav-B 07/15/2022, 08/12/2022, 09/16/2022, 12/28/2022        Medications:     Current Outpatient Medications:     acetaminophen (TYLENOL) 500 MG tablet, Take 1 tablet by mouth Every 6 (Six) Hours As Needed for Mild Pain., Disp: , Rfl:     atorvastatin (LIPITOR) 40 MG tablet, TAKE ONE TABLET BY MOUTH DAILY, Disp: 90 tablet, Rfl: 3    bumetanide (BUMEX) 2 MG tablet, TAKE 1 TABLET BY MOUTH EVERY OTHER DAY - TAKE ON DAYS OFF FROM DIALYSIS (TUESDAY, THURSDAY, SATURDAY AND SUNDAY), Disp: 48 tablet, Rfl: 2    carvedilol (COREG) 12.5 MG tablet, TAKE ONE TABLET BY MOUTH TWICE A DAY, Disp: 180 tablet, Rfl: 3    cholecalciferol (VITAMIN D3) 1000 UNITS tablet, Take 1 tablet by mouth Daily., Disp: , Rfl:     Ferrous Sulfate (IRON) 325 (65 FE) MG tablet, Take 65 mg by mouth Daily., Disp: , Rfl:     midodrine (PROAMATINE) 10 MG tablet, Take 1 tablet by mouth 3 (Three) Times a Day Before Meals. Before Dialysis, Tues, Thurs, Sat, Disp: , Rfl:     nitroglycerin (NITROSTAT) 0.3 MG SL tablet, 1 under the tongue as needed for angina, may repeat q5mins for up three doses, Disp: 25 tablet, Rfl: 11    Sucroferric Oxyhydroxide (Velphoro) 500 MG chewable tablet, Take As Directed. Crush or chew and swallow 3 tablets by mouth 3 times a day  "with meals and 1 tablet twice a day with snacks., Disp: , Rfl:     vitamin B-12 (CYANOCOBALAMIN) 1000 MCG tablet, Take 1 tablet by mouth Daily., Disp: , Rfl:     warfarin (COUMADIN) 2.5 MG tablet, Take 2-3 tablets by mouth daily or as directed by anticoagulation clinic, Disp: 180 tablet, Rfl: 1    cefdinir (OMNICEF) 300 MG capsule, Take one capsule on day one followed by one capsule on dialysis days after dialysis is complete until they are gone., Disp: 5 capsule, Rfl: 0    gabapentin (NEURONTIN) 100 MG capsule, Take 1 capsule by mouth 3 (Three) Times a Day., Disp: , Rfl:     Methoxy PEG-Epoetin Beta (MIRCERA IJ), 150 mcg Every 14 (Fourteen) Days., Disp: , Rfl:     Allergies:   No Known Allergies    Family History:   Family History   Problem Relation Age of Onset    Cancer Mother     Heart attack Mother     Hypertension Mother     Arrhythmia Father     Hypertension Father     Heart attack Father        Social History:   Social History     Socioeconomic History    Marital status:    Tobacco Use    Smoking status: Never     Passive exposure: Never    Smokeless tobacco: Never   Vaping Use    Vaping status: Never Used   Substance and Sexual Activity    Alcohol use: No    Drug use: No    Sexual activity: Never         Objective     Vital Signs  /78 (BP Location: Right arm, Patient Position: Sitting, Cuff Size: Large Adult)   Pulse 106   Temp 98.6 °F (37 °C) (Oral)   Resp 18   Ht 177.8 cm (70\")   Wt 130 kg (287 lb 1.6 oz)   SpO2 93%   BMI 41.19 kg/m²   Estimated body mass index is 41.19 kg/m² as calculated from the following:    Height as of this encounter: 177.8 cm (70\").    Weight as of this encounter: 130 kg (287 lb 1.6 oz).            Physical Exam  Constitutional:       General: He is not in acute distress.     Appearance: He is not toxic-appearing.   Cardiovascular:      Rate and Rhythm: Normal rate and regular rhythm.      Heart sounds: No murmur heard.     No friction rub. No gallop. "   Pulmonary:      Effort: Pulmonary effort is normal.      Breath sounds: Rhonchi and rales present.   Abdominal:      General: Abdomen is flat. There is no distension.   Skin:     General: Skin is warm and dry.   Neurological:      Mental Status: He is alert.   Psychiatric:         Mood and Affect: Mood normal.         Behavior: Behavior normal.          Assessment and Plan     1. Type 2 diabetes mellitus with hyperglycemia, unspecified whether long term insulin use  Chronic stable  A1c at goal  No changes today  - POC Glycosylated Hemoglobin (Hb A1C)    2. Permanent atrial fibrillation  Chronic stable  In afib, continue warfarin and coreg    3. Primary hypertension  Chronic stable on coreg. Midodrine on HD days    4. Lower resp. tract infection  Exam concerning for possible CAP, Rx cefdinir dosed for dialysis  - cefdinir (OMNICEF) 300 MG capsule; Take one capsule on day one followed by one capsule on dialysis days after dialysis is complete until they are gone.  Dispense: 5 capsule; Refill: 0       Counseling was given to patient for the following topics: instructions for management.    Follow Up  Return in about 3 months (around 2/6/2025) for Follow Up.    Troy Murcia MD  [facility]  [address] [address]  [phone]

## 2024-11-11 ENCOUNTER — APPOINTMENT (OUTPATIENT)
Facility: HOSPITAL | Age: 79
DRG: 870 | End: 2024-11-11
Payer: MEDICARE

## 2024-11-11 ENCOUNTER — HOSPITAL ENCOUNTER (EMERGENCY)
Facility: HOSPITAL | Age: 79
Discharge: LEFT BEFORE DISCHARGE | DRG: 870 | End: 2024-11-11
Attending: EMERGENCY MEDICINE | Admitting: EMERGENCY MEDICINE
Payer: MEDICARE

## 2024-11-11 ENCOUNTER — TELEPHONE (OUTPATIENT)
Age: 79
End: 2024-11-11
Payer: MEDICARE

## 2024-11-11 VITALS
SYSTOLIC BLOOD PRESSURE: 174 MMHG | RESPIRATION RATE: 20 BRPM | BODY MASS INDEX: 41.03 KG/M2 | DIASTOLIC BLOOD PRESSURE: 90 MMHG | TEMPERATURE: 98.5 F | WEIGHT: 286.6 LBS | OXYGEN SATURATION: 100 % | HEART RATE: 100 BPM | HEIGHT: 70 IN

## 2024-11-11 DIAGNOSIS — U07.1 COVID: Primary | ICD-10-CM

## 2024-11-11 DIAGNOSIS — R09.02 HYPOXIA: ICD-10-CM

## 2024-11-11 DIAGNOSIS — A08.39 DIARRHEA DUE TO COVID-19: ICD-10-CM

## 2024-11-11 DIAGNOSIS — U07.1 DIARRHEA DUE TO COVID-19: ICD-10-CM

## 2024-11-11 LAB
ALBUMIN SERPL-MCNC: 3.6 G/DL (ref 3.5–5.2)
ALBUMIN/GLOB SERPL: 1.3 G/DL
ALP SERPL-CCNC: 115 U/L (ref 39–117)
ALT SERPL W P-5'-P-CCNC: <5 U/L (ref 1–41)
ANION GAP SERPL CALCULATED.3IONS-SCNC: 14.3 MMOL/L (ref 5–15)
AST SERPL-CCNC: 25 U/L (ref 1–40)
B PARAPERT DNA SPEC QL NAA+PROBE: NOT DETECTED
B PERT DNA SPEC QL NAA+PROBE: NOT DETECTED
BILIRUB SERPL-MCNC: 0.5 MG/DL (ref 0–1.2)
BUN SERPL-MCNC: 50 MG/DL (ref 8–23)
BUN/CREAT SERPL: 5.2 (ref 7–25)
C PNEUM DNA NPH QL NAA+NON-PROBE: NOT DETECTED
CALCIUM SPEC-SCNC: 8.6 MG/DL (ref 8.6–10.5)
CHLORIDE SERPL-SCNC: 99 MMOL/L (ref 98–107)
CO2 SERPL-SCNC: 27.7 MMOL/L (ref 22–29)
CREAT SERPL-MCNC: 9.69 MG/DL (ref 0.76–1.27)
DEPRECATED RDW RBC AUTO: 62.6 FL (ref 37–54)
EGFRCR SERPLBLD CKD-EPI 2021: 5 ML/MIN/1.73
ERYTHROCYTE [DISTWIDTH] IN BLOOD BY AUTOMATED COUNT: 15.6 % (ref 12.3–15.4)
FLUAV SUBTYP SPEC NAA+PROBE: NOT DETECTED
FLUBV RNA ISLT QL NAA+PROBE: NOT DETECTED
GEN 5 2HR TROPONIN T REFLEX: 217 NG/L
GLOBULIN UR ELPH-MCNC: 2.8 GM/DL
GLUCOSE SERPL-MCNC: 134 MG/DL (ref 65–99)
HADV DNA SPEC NAA+PROBE: NOT DETECTED
HCOV 229E RNA SPEC QL NAA+PROBE: NOT DETECTED
HCOV HKU1 RNA SPEC QL NAA+PROBE: NOT DETECTED
HCOV NL63 RNA SPEC QL NAA+PROBE: NOT DETECTED
HCOV OC43 RNA SPEC QL NAA+PROBE: NOT DETECTED
HCT VFR BLD AUTO: 35.1 % (ref 37.5–51)
HGB BLD-MCNC: 10.8 G/DL (ref 13–17.7)
HMPV RNA NPH QL NAA+NON-PROBE: NOT DETECTED
HOLD SPECIMEN: NORMAL
HPIV1 RNA ISLT QL NAA+PROBE: NOT DETECTED
HPIV2 RNA SPEC QL NAA+PROBE: NOT DETECTED
HPIV3 RNA NPH QL NAA+PROBE: NOT DETECTED
HPIV4 P GENE NPH QL NAA+PROBE: NOT DETECTED
INR PPP: 1.72 (ref 0.89–1.12)
LYMPHOCYTES # BLD MANUAL: 0.81 10*3/MM3 (ref 0.7–3.1)
LYMPHOCYTES NFR BLD MANUAL: 11 % (ref 5–12)
M PNEUMO IGG SER IA-ACNC: NOT DETECTED
MAGNESIUM SERPL-MCNC: 2.3 MG/DL (ref 1.6–2.4)
MCH RBC QN AUTO: 33.3 PG (ref 26.6–33)
MCHC RBC AUTO-ENTMCNC: 30.8 G/DL (ref 31.5–35.7)
MCV RBC AUTO: 108.3 FL (ref 79–97)
MONOCYTES # BLD: 0.59 10*3/MM3 (ref 0.1–0.9)
NEUTROPHILS # BLD AUTO: 4 10*3/MM3 (ref 1.7–7)
NEUTROPHILS NFR BLD MANUAL: 71 % (ref 42.7–76)
NEUTS BAND NFR BLD MANUAL: 3 % (ref 0–5)
NRBC SPEC MANUAL: 2 /100 WBC (ref 0–0.2)
NT-PROBNP SERPL-MCNC: ABNORMAL PG/ML (ref 0–1800)
PLAT MORPH BLD: NORMAL
PLATELET # BLD AUTO: 133 10*3/MM3 (ref 140–450)
PMV BLD AUTO: 9.7 FL (ref 6–12)
POTASSIUM SERPL-SCNC: 3.8 MMOL/L (ref 3.5–5.2)
PROT SERPL-MCNC: 6.4 G/DL (ref 6–8.5)
PROTHROMBIN TIME: 20.8 SECONDS (ref 12.2–14.5)
RBC # BLD AUTO: 3.24 10*6/MM3 (ref 4.14–5.8)
RBC MORPH BLD: NORMAL
RHINOVIRUS RNA SPEC NAA+PROBE: NOT DETECTED
RSV RNA NPH QL NAA+NON-PROBE: NOT DETECTED
SARS-COV-2 RNA RESP QL NAA+PROBE: DETECTED
SODIUM SERPL-SCNC: 141 MMOL/L (ref 136–145)
TROPONIN T DELTA: -3 NG/L
TROPONIN T SERPL HS-MCNC: 220 NG/L
VARIANT LYMPHS NFR BLD MANUAL: 15 % (ref 19.6–45.3)
WBC MORPH BLD: NORMAL
WBC NRBC COR # BLD AUTO: 5.4 10*3/MM3 (ref 3.4–10.8)
WHOLE BLOOD HOLD COAG: NORMAL
WHOLE BLOOD HOLD SPECIMEN: NORMAL

## 2024-11-11 PROCEDURE — 71275 CT ANGIOGRAPHY CHEST: CPT

## 2024-11-11 PROCEDURE — 0202U NFCT DS 22 TRGT SARS-COV-2: CPT

## 2024-11-11 PROCEDURE — 25510000001 IOPAMIDOL PER 1 ML: Performed by: EMERGENCY MEDICINE

## 2024-11-11 PROCEDURE — 99285 EMERGENCY DEPT VISIT HI MDM: CPT

## 2024-11-11 PROCEDURE — 80053 COMPREHEN METABOLIC PANEL: CPT | Performed by: EMERGENCY MEDICINE

## 2024-11-11 PROCEDURE — 83880 ASSAY OF NATRIURETIC PEPTIDE: CPT

## 2024-11-11 PROCEDURE — 36415 COLL VENOUS BLD VENIPUNCTURE: CPT

## 2024-11-11 PROCEDURE — 85007 BL SMEAR W/DIFF WBC COUNT: CPT | Performed by: EMERGENCY MEDICINE

## 2024-11-11 PROCEDURE — 71045 X-RAY EXAM CHEST 1 VIEW: CPT

## 2024-11-11 PROCEDURE — 84484 ASSAY OF TROPONIN QUANT: CPT | Performed by: EMERGENCY MEDICINE

## 2024-11-11 PROCEDURE — 85610 PROTHROMBIN TIME: CPT

## 2024-11-11 PROCEDURE — 93005 ELECTROCARDIOGRAM TRACING: CPT | Performed by: EMERGENCY MEDICINE

## 2024-11-11 PROCEDURE — 85025 COMPLETE CBC W/AUTO DIFF WBC: CPT | Performed by: EMERGENCY MEDICINE

## 2024-11-11 PROCEDURE — 83735 ASSAY OF MAGNESIUM: CPT | Performed by: EMERGENCY MEDICINE

## 2024-11-11 RX ORDER — IOPAMIDOL 755 MG/ML
100 INJECTION, SOLUTION INTRAVASCULAR
Status: COMPLETED | OUTPATIENT
Start: 2024-11-11 | End: 2024-11-11

## 2024-11-11 RX ORDER — SODIUM CHLORIDE 0.9 % (FLUSH) 0.9 %
10 SYRINGE (ML) INJECTION AS NEEDED
Status: DISCONTINUED | OUTPATIENT
Start: 2024-11-11 | End: 2024-11-11 | Stop reason: HOSPADM

## 2024-11-11 RX ADMIN — IOPAMIDOL 90 ML: 755 INJECTION, SOLUTION INTRAVENOUS at 19:10

## 2024-11-11 NOTE — TELEPHONE ENCOUNTER
Pt and wife called he has had Shortness of breath all weekend. Short distances get out of breathe, weak.   Blood pressures:   210/146  160/85

## 2024-11-11 NOTE — TELEPHONE ENCOUNTER
Pressures slightly improved, patient is due for dialysis tomorrow which should help his BP, has been short of air since his infection. He does however have continuous diarrhea.  Concerning for possible C diff.  Recommend he go into the ER given the combination of issues he is having.  At minimum needs c diff testing and evaluation of bp issues

## 2024-11-11 NOTE — FSED PROVIDER NOTE
Subjective  History of Present Illness:    Patient with PMHx of CKD receiving dialysis T/Th/S, atrial fibrillation on warfarin, HTN presents the ED with complaints of diarrhea and decreased appetite.  States symptoms started Saturday night and then progressed through today.  Patient was advised by his PCP to come to the ED for further evaluation.  Patient has not missed any dialysis treatments.  States that on Halloween, grandkids were over and next day, patient found out that they had tested positive for COVID.  Patient denies any fever, body aches, chest pain, shortness of breath.  Patient did have episode of headache.  At that time, patient had high blood pressure and once BP stabilized, headache has resolved.  Patient also had episode of chills, but denies any chills currently.  Patient denies any nausea or vomiting, abdominal pain.  States he does not have any appetite.  Has not had any episodes of diarrhea while being in the ED.  Denies any other symptoms or concerns at this time.    Nurses Notes reviewed and agree, including vitals, allergies, social history and prior medical history.     REVIEW OF SYSTEMS: All systems reviewed and not pertinent unless noted.  Review of Systems   Constitutional:  Positive for appetite change and fatigue.   Gastrointestinal:  Positive for diarrhea.   Neurological:  Positive for weakness.   All other systems reviewed and are negative.      Past Medical History:   Diagnosis Date    Abnormal ECG     Arrhythmia     Arthritis     Asthma     Cancer     skin cancer removed from various locations     Chronic diastolic congestive heart failure 09/14/2018    CKD (chronic kidney disease)     closer to stage 5    Coronary artery disease     Dry skin     Dyslipidemia 07/07/2016    Edema     Heart murmur     Hematoma     Hemodialysis access site with arteriovenous graft     History of transfusion     no transfusion reaction    Hypertension     Iron deficiency anemia     Long term current  use of anticoagulant     ZULEIKA treated with BiPAP     Persistent atrial fibrillation 07/07/2016     Patient notes dyspnea, fatigue and palpitations during episodes of afib.  multiple external cardioversionsChads vasc 2, anticoagulated with Coumadin    Tinnitus     Wears glasses     readers       Allergies:    Patient has no known allergies.      Past Surgical History:   Procedure Laterality Date    ABLATION OF DYSRHYTHMIC FOCUS      APPENDECTOMY      ARTERIOVENOUS FISTULA/SHUNT SURGERY Left 08/04/2022    Procedure: UPPER EXTREMITY BRACHIOCEPHALIC ARTERIOVENOUS FISTULA FORMATION LEFT;  Surgeon: Jakob Reza MD;  Location:  [facility];  Service: Vascular;  Laterality: Left;    ARTERIOVENOUS FISTULA/SHUNT SURGERY Left 11/03/2022    Procedure: ELEVATION LEFT UPPER EXTREMITY AV FISTULA;  Surgeon: Jakob Reza MD;  Location:  [facility];  Service: Vascular;  Laterality: Left;    CARDIAC ELECTROPHYSIOLOGY PROCEDURE N/A 05/18/2018    Procedure: PPM generator change - dual       [facility]/ please schedule in 8 weeks;  Surgeon: Johnathan Taylor MD;  Location:  [facility];  Service: Cardiovascular    CATARACT EXTRACTION      bilat wiht lens     COLONOSCOPY      ENDOSCOPY      Pt denies    INSERT / REPLACE / REMOVE PACEMAKER  about 20 years back    SKIN CANCER EXCISION      various locations     TONSILLECTOMY      VEIN SURGERY  for dialysis    WISDOM TOOTH EXTRACTION           Social History     Socioeconomic History    Marital status:    Tobacco Use    Smoking status: Never     Passive exposure: Never    Smokeless tobacco: Never   Vaping Use    Vaping status: Never Used   Substance and Sexual Activity    Alcohol use: No    Drug use: No    Sexual activity: Never         Family History   Problem Relation Age of Onset    Cancer Mother     Heart attack Mother     Hypertension Mother     Arrhythmia Father     Hypertension Father     Heart attack Father        Objective  Physical Exam:  BP  "177/93 (BP Location: Right arm, Patient Position: Lying)   Pulse 103   Temp 98.6 °F (37 °C) (Oral)   Resp 16   Ht 177.8 cm (70\")   Wt 130 kg (286 lb 9.6 oz)   SpO2 98%   BMI 41.12 kg/m²      Physical Exam  Constitutional:       General: He is not in acute distress.     Appearance: He is obese. He is not ill-appearing.   HENT:      Head: Normocephalic and atraumatic.   Cardiovascular:      Rate and Rhythm: Normal rate. Rhythm irregular.      Pulses: Normal pulses.   Pulmonary:      Effort: Pulmonary effort is normal. No respiratory distress.      Breath sounds: Normal breath sounds.   Abdominal:      General: Abdomen is flat. Bowel sounds are normal.      Palpations: Abdomen is soft.   Musculoskeletal:         General: No swelling or tenderness. Normal range of motion.      Cervical back: Normal range of motion and neck supple.   Skin:     General: Skin is warm and dry.   Neurological:      General: No focal deficit present.      Mental Status: He is alert and oriented to person, place, and time.   Psychiatric:         Mood and Affect: Mood normal.         Behavior: Behavior normal.       Procedures    ED Course:    ED Course as of 11/11/24 2023   Mon Nov 11, 2024   1546 proBNP(!) [BG]   1546 proBNP(!): 21,061.0 [BG]   1756 proBNP(!): 21,061.0 [BG]   1801 HS Troponin T(!!): 217 [BG]   1834 An EKG was ordered, reviewed, and interpreted by myself:  Rate: 80.    Rhythm: Atrial fibrillation    QTc: 459    ST elevation: [n]. STEMI: [N]    Patient has some T wave inversion or nonspecific T wave changes that are no change on this EKG.  Present on previous as evaluated on February 29, 2024.     [FRANDY]   1834 I discussed this case with PA.  Patient currently has no chest pain shortness of breath.  He is not excessively fatigued.  His biggest complaint has been decreased appetite and diarrhea.  The troponin and BNP were ordered from triage erroneously overall troponin is elevated because this patient has end-stage renal " disease.  I do not think this represents active ACS as the patient is asymptomatic.  BNP is elevated likely due to his end-stage renal disease.  He has dialysis tomorrow.  He is in no acute distress.  Supplemental oxygen was discontinued and he desatted. Could be pulmonary edema, COVID PNA, or PE. Will scan [FRANDY]      ED Course User Index  [BG] Adriano Case PA-C  [FRANDY] Elpidio Jorge MD       Lab Results (last 24 hours)       Procedure Component Value Units Date/Time    CBC & Differential [[id number]]  (Abnormal) Collected: 11/11/24 1426    Specimen: Blood Updated: 11/11/24 1515    Narrative:      The following orders were created for panel order CBC & Differential.  Procedure                               Abnormality         Status                     ---------                               -----------         ------                     CBC Auto Differential[[id number]]        Abnormal            Final result               Scan Slide[[id number]]                                                                    Please view results for these tests on the individual orders.    Comprehensive Metabolic Panel [[id number]]  (Abnormal) Collected: 11/11/24 1426    Specimen: Blood Updated: 11/11/24 1505     Glucose 134 mg/dL      BUN 50 mg/dL      Creatinine 9.69 mg/dL      Sodium 141 mmol/L      Potassium 3.8 mmol/L      Chloride 99 mmol/L      CO2 27.7 mmol/L      Calcium 8.6 mg/dL      Total Protein 6.4 g/dL      Albumin 3.6 g/dL      ALT (SGPT) <5 U/L      AST (SGOT) 25 U/L      Alkaline Phosphatase 115 U/L      Total Bilirubin 0.5 mg/dL      Globulin 2.8 gm/dL      A/G Ratio 1.3 g/dL      BUN/Creatinine Ratio 5.2     Anion Gap 14.3 mmol/L      eGFR 5.0 mL/min/1.73      Comment: <15 Indicative of kidney failure       Narrative:      GFR Normal >60  Chronic Kidney Disease <60  Kidney Failure <15    The GFR formula is only valid for adults with stable renal function between ages 18 and 70.    [facility]  Sensitivity Troponin T [[id number]]  (Abnormal) Collected: 11/11/24 1426    Specimen: Blood Updated: 11/11/24 1501     HS Troponin T 220 ng/L     Magnesium [[id number]]  (Normal) Collected: 11/11/24 1426    Specimen: Blood Updated: 11/11/24 1501     Magnesium 2.3 mg/dL     CBC Auto Differential [[id number]]  (Abnormal) Collected: 11/11/24 1426    Specimen: Blood Updated: 11/11/24 1514     WBC 5.40 10*3/mm3      RBC 3.24 10*6/mm3      Hemoglobin 10.8 g/dL      Hematocrit 35.1 %      .3 fL      MCH 33.3 pg      MCHC 30.8 g/dL      RDW 15.6 %      RDW-SD 62.6 fl      MPV 9.7 fL      Platelets 133 10*3/mm3     Protime-INR [[id number]]  (Abnormal) Collected: 11/11/24 1426    Specimen: Blood Updated: 11/11/24 1509     Protime 20.8 Seconds      INR 1.72    proBNP [[id number]]  (Abnormal) Collected: 11/11/24 1426    Specimen: Blood Updated: 11/11/24 1529     proBNP 21,061.0 pg/mL     Narrative:      This assay is used as an aid in the diagnosis of individuals suspected of having heart failure. It can be used as an aid in the diagnosis of acute decompensated heart failure (ADHF) in patients presenting with signs and symptoms of ADHF to the emergency department (ED). In addition, NT-proBNP of <300 pg/mL indicates ADHF is not likely.    Age Range Result Interpretation  NT-proBNP Concentration (pg/mL:      <50             Positive            >450                   Gray                 300-450                    Negative             <300    50-75           Positive            >900                  Gray                300-900                  Negative            <300      >75             Positive            >1800                  Gray                300-1800                  Negative            <300    Manual Differential [[id number]]  (Abnormal) Collected: 11/11/24 1426    Specimen: Blood Updated: 11/11/24 1514     Neutrophil % 71.0 %      Lymphocyte % 15.0 %      Monocyte % 11.0 %      Bands %  3.0 %      Neutrophils Absolute  4.00 10*3/mm3      Lymphocytes Absolute 0.81 10*3/mm3      Monocytes Absolute 0.59 10*3/mm3      nRBC 2.0 /100 WBC      RBC Morphology Normal     WBC Morphology Normal     Platelet Morphology Normal    Respiratory Panel PCR w/COVID-19(SARS-CoV-2) [facility]-House, NP Swab in UTM/VTM, 2 HR TAT - Swab, Nasopharynx [[id number]]  (Abnormal) Collected: 11/11/24 1428    Specimen: Swab from Nasopharynx Updated: 11/11/24 1526     ADENOVIRUS, PCR Not Detected     Coronavirus 229E Not Detected     Coronavirus HKU1 Not Detected     Coronavirus NL63 Not Detected     Coronavirus OC43 Not Detected     COVID19 Detected     Human Metapneumovirus Not Detected     Human Rhinovirus/Enterovirus Not Detected     Influenza A PCR Not Detected     Influenza B PCR Not Detected     Parainfluenza Virus 1 Not Detected     Parainfluenza Virus 2 Not Detected     Parainfluenza Virus 3 Not Detected     Parainfluenza Virus 4 Not Detected     RSV, PCR Not Detected     Bordetella pertussis pcr Not Detected     Bordetella parapertussis PCR Not Detected     Chlamydophila pneumoniae PCR Not Detected     Mycoplasma pneumo by PCR Not Detected    Narrative:      In the setting of a positive respiratory panel with a viral infection PLUS a negative procalcitonin without other underlying concern for bacterial infection, consider observing off antibiotics or discontinuation of antibiotics and continue supportive care. If the respiratory panel is positive for atypical bacterial infection (Bordetella pertussis, Chlamydophila pneumoniae, or Mycoplasma pneumoniae), consider antibiotic de-escalation to target atypical bacterial infection.    High Sensitivity Troponin T 2Hr [[id number]]  (Abnormal) Collected: 11/11/24 1627    Specimen: Blood Updated: 11/11/24 1702     HS Troponin T 217 ng/L      Troponin T Delta -3 ng/L              CT Angiogram Chest Pulmonary Embolism    Result Date: 11/11/2024  CT ANGIOGRAM CHEST PULMONARY EMBOLISM Date of Exam:  11/11/2024 7:00 PM EST Indication: Shortness of breath. Comparison: None available. Technique: Axial CT images were obtained of the chest after the uneventful intravenous administration of 90 cc Isovue 370 IV contrast utilizing pulmonary embolism protocol.  Reconstructed coronal and sagittal images were also obtained. Automated exposure  control and iterative construction methods were used. Findings: Patchy areas of glass within both lungs. Cardiac pacemaker/defibrillator leads. No lobar consolidation. No pneumothorax or pleural effusion. No evidence of aortic dissection. No pulmonary embolus is appreciated. No pericardial effusion. Limited  evaluation of the upper abdomen is normal. Thoracic vertebral body height and alignment are normal. No lytic or blastic disease. No rib fractures are seen.     Impression: 1. No pulmonary embolus. 2. Patchy areas of groundglass noted within both lungs. Differential considerations include atypical pneumonia such as COVID-19. Additional differential considerations may include metastatic disease or developing pneumonia however these are considered less likely Electronically Signed: Joss Daniels MD  11/11/2024 7:38 PM EST  Workstation ID: [id number]    XR Chest 1 View    Result Date: 11/11/2024  XR CHEST 1 VW Date of Exam: 11/11/2024 2:18 PM EST Indication: Weak/Dizzy/AMS triage protocol Comparison: 2/29/2024 Findings: Left chest wall ICD again noted. There is continued moderately severe cardiomegaly. No acute infiltrates or effusions identified. Exam is somewhat limited by portable technique with obesity.     Impression: Impression: Stable cardiomegaly. No new abnormality. Electronically Signed: Cat Vieira MD  11/11/2024 2:56 PM EST  Workstation ID: [id number]      [facility]  Patient presented to the ED earlier today with complaints of generalized fatigue and diarrhea over the past 2 days.  Patient did test positive for COVID.  Upon arrival, patient was hypoxic and placed on 2 L NC.   Patient's labs showed elevated troponin of 220 with repeat 217.  Patient also had elevated BNP of 21,061.  Patient's SpO2 dropped to upper 80s when taken off of NC.  Ordered CT PE which showed no PE, however did show patchy areas of groundglass noted within both lungs which could be atypical pneumonia.  Was going to call about admitting patient to [facility], however patient wanted to leave AMA.  Discussed the risks of leaving and patient still decided to leave AMA.  Advised patient to return if symptoms worsened.    Medications   sodium chloride 0.9 % flush 10 mL (has no administration in time range)   iopamidol (ISOVUE-370) 76 % injection 100 mL (90 mL Intravenous Given 11/11/24 1910)     -----  ED Disposition       ED Disposition   AMA    Condition   --    Comment   --             Final diagnoses:   COVID   Diarrhea due to COVID-19   Hypoxia      Your Follow-Up Providers       Heriberto Murcia MD In 2 days.    Specialty: Family Medicine  Follow up details: As needed, If symptoms worsen  [address] [address] [phone]                       Contact information for after-discharge care    Follow-up information has not been specified.                    Your medication list        CONTINUE taking these medications        Instructions Last Dose Given Next Dose Due   acetaminophen 500 MG tablet  Commonly known as: TYLENOL      Take 1 tablet by mouth Every 6 (Six) Hours As Needed for Mild Pain.       atorvastatin 40 MG tablet  Commonly known as: LIPITOR      TAKE ONE TABLET BY MOUTH DAILY       bumetanide 2 MG tablet  Commonly known as: BUMEX      TAKE 1 TABLET BY MOUTH EVERY OTHER DAY - TAKE ON DAYS OFF FROM DIALYSIS (TUESDAY, THURSDAY, SATURDAY AND SUNDAY)       carvedilol 12.5 MG tablet  Commonly known as: COREG      TAKE ONE TABLET BY MOUTH TWICE A DAY       cefdinir 300 MG capsule  Commonly known as: OMNICEF      Take one capsule on day one followed by one capsule on dialysis days  after dialysis is complete until they are gone.       cholecalciferol 25 MCG (1000 UT) tablet  Commonly known as: VITAMIN D3      Take 1 tablet by mouth Daily.       ferrous sulfate 325 (65 Fe) MG tablet      Take 65 mg by mouth Daily.       midodrine 10 MG tablet  Commonly known as: PROAMATINE      Take 1 tablet by mouth 3 (Three) Times a Day Before Meals. Before Dialysis, Tues, Thurs, Sat       nitroglycerin 0.3 MG SL tablet  Commonly known as: NITROSTAT      1 under the tongue as needed for angina, may repeat q5mins for up three doses       Velphoro 500 MG chewable tablet  Generic drug: Sucroferric Oxyhydroxide      Take As Directed. Crush or chew and swallow 3 tablets by mouth 3 times a day with meals and 1 tablet twice a day with snacks.       vitamin B-12 1000 MCG tablet  Commonly known as: CYANOCOBALAMIN      Take 1 tablet by mouth Daily.       warfarin 2.5 MG tablet  Commonly known as: COUMADIN      Take 2-3 tablets by mouth daily or as directed by anticoagulation clinic

## 2024-11-12 ENCOUNTER — TELEPHONE (OUTPATIENT)
Age: 79
End: 2024-11-12
Payer: MEDICARE

## 2024-11-12 PROBLEM — J12.82 PNEUMONIA DUE TO COVID-19 VIRUS: Status: ACTIVE | Noted: 2024-11-12

## 2024-11-12 PROBLEM — U07.1 PNEUMONIA DUE TO COVID-19 VIRUS: Status: ACTIVE | Noted: 2024-11-12

## 2024-11-12 LAB
QT INTERVAL: 364 MS
QTC INTERVAL: 427 MS

## 2024-11-12 NOTE — TELEPHONE ENCOUNTER
Patient's wife called and stated patient went to [facility] ER.  Patient was told patient needs to be on Oxygen all of the time.  Oxygen got down to 80 today and patient has COVID.  Patient's wife request a call back.  Patient refused to stay in hospital overnight.

## 2024-11-12 NOTE — ED PROVIDER NOTES
Subjective  History of Present Illness:    Chief Complaint: Shortness of breath, weakness  History of Present Illness: 79-year-old male with shortness of breath and weakness, diagnosed with COVID yesterday, has significant past medical history for chronic kidney disease on dialysis, coronary artery disease, morbid obesity, obstructive sleep apnea on CPAP.  He states he is not normally on oxygen but did require oxygen when he arrived because he was extremely short of breath.  He states that he is normally quite ambulatory, but has not been able to perform even simple tasks because he is so severely short of breath, including walking short distances.  He also complains of a cough.  Onset: Gradual  Duration: 5 days  Exacerbating / Alleviating factors: Extremely weak, short of breath, difficulty with walking short distances  Associated symptoms: Fatigue      Nurses Notes reviewed and agree, including vitals, allergies, social history and prior medical history.     REVIEW OF SYSTEMS: All systems reviewed and not pertinent unless noted.    Review of Systems   Constitutional:  Positive for fatigue.   Respiratory:  Positive for cough and shortness of breath.    Neurological:  Positive for weakness.   All other systems reviewed and are negative.      Past Medical History:   Diagnosis Date    Abnormal ECG     Arrhythmia     Arthritis     Asthma     Cancer     skin cancer removed from various locations     Chronic diastolic congestive heart failure 09/14/2018    CKD (chronic kidney disease)     closer to stage 5    Coronary artery disease     Dry skin     Dyslipidemia 07/07/2016    Edema     Heart murmur     Hematoma     Hemodialysis access site with arteriovenous graft     History of transfusion     no transfusion reaction    Hypertension     Iron deficiency anemia     Long term current use of anticoagulant     ZULEIKA treated with BiPAP     Persistent atrial fibrillation 07/07/2016     Patient notes dyspnea, fatigue and  "palpitations during episodes of afib.  multiple external cardioversionsChads vasc 2, anticoagulated with Coumadin    Tinnitus     Wears glasses     readers       Allergies:    Patient has no known allergies.      Past Surgical History:   Procedure Laterality Date    ABLATION OF DYSRHYTHMIC FOCUS      APPENDECTOMY      ARTERIOVENOUS FISTULA/SHUNT SURGERY Left 08/04/2022    Procedure: UPPER EXTREMITY BRACHIOCEPHALIC ARTERIOVENOUS FISTULA FORMATION LEFT;  Surgeon: Jakob Reza MD;  Location:  [facility];  Service: Vascular;  Laterality: Left;    ARTERIOVENOUS FISTULA/SHUNT SURGERY Left 11/03/2022    Procedure: ELEVATION LEFT UPPER EXTREMITY AV FISTULA;  Surgeon: Jakob Reza MD;  Location:  [facility];  Service: Vascular;  Laterality: Left;    CARDIAC ELECTROPHYSIOLOGY PROCEDURE N/A 05/18/2018    Procedure: PPM generator change - dual       [facility]/ please schedule in 8 weeks;  Surgeon: Johnathan Taylor MD;  Location:  [facility];  Service: Cardiovascular    CATARACT EXTRACTION      bilat wiht lens     COLONOSCOPY      ENDOSCOPY      Pt denies    INSERT / REPLACE / REMOVE PACEMAKER  about 20 years back    SKIN CANCER EXCISION      various locations     TONSILLECTOMY      VEIN SURGERY  for dialysis    WISDOM TOOTH EXTRACTION           Social History     Socioeconomic History    Marital status:    Tobacco Use    Smoking status: Never     Passive exposure: Never    Smokeless tobacco: Never   Vaping Use    Vaping status: Never Used   Substance and Sexual Activity    Alcohol use: No    Drug use: No    Sexual activity: Never         Family History   Problem Relation Age of Onset    Cancer Mother     Heart attack Mother     Hypertension Mother     Arrhythmia Father     Hypertension Father     Heart attack Father        Objective  Physical Exam:  /58 (BP Location: Right arm, Patient Position: Lying)   Pulse 108   Temp 99.1 °F (37.3 °C) (Axillary)   Resp 18   Ht 177.8 cm (70\") "   Wt 127 kg (279 lb 9.6 oz)   SpO2 98%   BMI 40.12 kg/m²      Physical Exam  Vitals and nursing note reviewed.   Constitutional:       Appearance: He is well-developed. He is ill-appearing.   HENT:      Head: Normocephalic and atraumatic.      Mouth/Throat:      Mouth: Mucous membranes are moist.   Eyes:      Extraocular Movements: Extraocular movements intact.   Cardiovascular:      Rate and Rhythm: Normal rate and regular rhythm.   Pulmonary:      Effort: Pulmonary effort is normal.      Breath sounds: Decreased breath sounds present.   Abdominal:      Palpations: Abdomen is soft.   Musculoskeletal:         General: Normal range of motion.      Cervical back: Normal range of motion.   Skin:     General: Skin is warm and dry.   Neurological:      Mental Status: He is alert and oriented to person, place, and time.   Psychiatric:         Behavior: Behavior normal.         Thought Content: Thought content normal.         Judgment: Judgment normal.           Critical Care    Performed by: Chris Marshall Jr., PA-C  Authorized by: Joshua Francis DO    Critical care provider statement:     Critical care time (minutes):  30    Critical care time was exclusive of:  Separately billable procedures and treating other patients and teaching time    Critical care was necessary to treat or prevent imminent or life-threatening deterioration of the following conditions: Sepsis, from pneumonia, broad-spectrum antibiotics administered, acute respiratory failure hypoxia requiring supplemental oxygen, and admission.    Critical care was time spent personally by me on the following activities:  Blood draw for specimens, development of treatment plan with patient or surrogate, discussions with consultants, discussions with primary provider, evaluation of patient's response to treatment, examination of patient, obtaining history from patient or surrogate, ordering and performing treatments and interventions, ordering and review of  laboratory studies, ordering and review of radiographic studies, pulse oximetry, re-evaluation of patient's condition and review of old charts    Care discussed with: admitting provider        ED Course:    ED Course as of 11/13/24 2232 Tue Nov 12, 2024   1551 Review of previous  non ED visits, prior labs, prior imaging, available notes from prior evaluations or visits with specialists, medication list, allergies, past medical history, past surgical history     [CS]   1636 I Personally reviewed all laboratory studies performed in the emergency department  [CS]      ED Course User Index  [CS] Chris Marshall Jr., MELINA       Lab Results (last 24 hours)       Procedure Component Value Units Date/Time    Magnesium [[id number]]  (Normal) Collected: 11/13/24 0503    Specimen: Blood Updated: 11/13/24 0603     Magnesium 2.1 mg/dL     Phosphorus [[id number]]  (Abnormal) Collected: 11/13/24 0503    Specimen: Blood Updated: 11/13/24 0603     Phosphorus 7.2 mg/dL     Protime-INR [[id number]]  (Abnormal) Collected: 11/13/24 0503    Specimen: Blood Updated: 11/13/24 0540     Protime 24.2 Seconds      INR 2.17    Comprehensive Metabolic Panel [[id number]]  (Abnormal) Collected: 11/13/24 0503    Specimen: Blood Updated: 11/13/24 0603     Glucose 130 mg/dL      BUN 31 mg/dL      Creatinine 6.95 mg/dL      Sodium 138 mmol/L      Potassium 4.5 mmol/L      Chloride 96 mmol/L      CO2 27.0 mmol/L      Calcium 8.1 mg/dL      Total Protein 6.4 g/dL      Albumin 3.3 g/dL      ALT (SGPT) 8 U/L      AST (SGOT) 25 U/L      Alkaline Phosphatase 107 U/L      Total Bilirubin 0.3 mg/dL      Globulin 3.1 gm/dL      Comment: Calculated Result        A/G Ratio 1.1 g/dL      BUN/Creatinine Ratio 4.5     Anion Gap 15.0 mmol/L      eGFR 7.4 mL/min/1.73      Comment: <15 Indicative of kidney failure       Narrative:      GFR Normal >60  Chronic Kidney Disease <60  Kidney Failure <15    The GFR formula is only valid for adults with stable renal function  between ages 18 and 70.    Gastrointestinal Panel, PCR - Stool, Per Rectum [[id number]]  (Normal) Collected: 11/13/24 1530    Specimen: Stool from Per Rectum Updated: 11/13/24 1656     Campylobacter Not Detected     Plesiomonas shigelloides Not Detected     Salmonella Not Detected     Vibrio Not Detected     Vibrio cholerae Not Detected     Yersinia enterocolitica Not Detected     Enteroaggregative E. coli (EAEC) Not Detected     Enteropathogenic E. coli (EPEC) Not Detected     Enterotoxigenic E. coli (ETEC) lt/st Not Detected     Shiga-like toxin-producing E. coli (STEC) stx1/stx2 Not Detected     Shigella/Enteroinvasive E. coli (EIEC) Not Detected     Cryptosporidium Not Detected     Cyclospora cayetanensis Not Detected     Entamoeba histolytica Not Detected     Giardia lamblia Not Detected     Adenovirus F40/41 Not Detected     Astrovirus Not Detected     Norovirus GI/GII Not Detected     Rotavirus A Not Detected     Sapovirus (I, II, IV or V) Not Detected             CT Head Without Contrast    Result Date: 11/13/2024  CT HEAD WO CONTRAST Date of Exam: 11/13/2024 11:49 AM EST Indication: headache and on coumadin. Comparison: None available. Technique: Axial CT images were obtained of the head without contrast administration.  Automated exposure control and iterative construction methods were used. Findings: There is no evidence of hemorrhage. There is no mass effect or midline shift. Diffuse brain atrophy. Tiny round hypodensity in the left basal ganglia suspicious for a chronic lacunar infarct. There is no extracerebral collection. Ventricles are normal in size and configuration for patient's stated age. Posterior fossa is within normal limits. Calvarium and skull base appear intact.  Visualized sinuses show no air fluid levels. Visualized orbits are unremarkable.     Impression: Impression: No evidence of acute intracranial abnormality Electronically Signed: Raji Hudson MD  11/13/2024 12:46 PM EST   Workstation ID: [id number]    XR Chest 1 View    Result Date: 11/12/2024  XR CHEST 1 VW Date of Exam: 11/12/2024 4:37 PM EST Indication: SOA triage protocol Comparison: 11/11/2024. Findings: Left lower lobe consolidation. Vascular congestion. Dual-lead cardiac pacemaker. The clavicles are intact. No rib fractures. No pneumothorax or pleural effusion. Atheromatous disease of the aortic arch.     Impression: Left lower lobe consolidation and vascular congestion. Electronically Signed: Joss Daniels MD  11/12/2024 5:01 PM EST  Workstation ID: [id number]                [unfilled]                                       @[email or web address]                    Medical Decision Making  Patient Presentation 79-year-old male presented to the emergency department hypoxic with recent COVID diagnosis requiring oxygen immediately due to low saturation    DDX respiratory failure hypoxia, COVID, pneumonia, bronchitis, effusion, sepsis    Data Review/ Non ED Records /Analysis/Ordering unique tests  Review of previous  non ED visits, prior labs, prior imaging, available notes from prior evaluations or visits with specialists, medication list, allergies, past medical history, past surgical history        Independent Review Studies  I Personally reviewed all laboratory studies performed in the emergency department     Intervention/Re-evaluation intervention included nasal cannula oxygen, broad-spectrum antibiotics, and admission    Independent Clinician discussed the case with the on-call hospitalist accepted for admission    Risk Stratification tools/clinical decision rules patient presented with known COVID, with a new onset of hypoxia, he had oxygen applied, he also met sepsis criteria and was found to have a multifocal pneumonia, he was started on broad-spectrum antibiotics and admitted to the hospital for further care    Shared Decision Making discussed all findings with patient and family and they were agreeable    Disposition patient  stable for admission    Problems Addressed:  Acute respiratory failure with hypoxia: complicated acute illness or injury  Pneumonia due to COVID-19 virus: complicated acute illness or injury    Amount and/or Complexity of Data Reviewed  External Data Reviewed: labs, radiology and notes.     Details: Review of previous  non ED visits, prior labs, prior imaging, available notes from prior evaluations or visits with specialists, medication list, allergies, past medical history, past surgical history      Labs: ordered. Decision-making details documented in ED Course.     Details: I Personally reviewed all laboratory studies performed in the emergency department   Radiology: ordered. Decision-making details documented in ED Course.  ECG/medicine tests: ordered.    Risk  Prescription drug management.  Decision regarding hospitalization.          Final diagnoses:   Pneumonia due to COVID-19 virus   Acute respiratory failure with hypoxia           Disposition admission       Chris Marshall Jr., PA-C  11/13/24 3344

## 2024-11-12 NOTE — Clinical Note
Level of Care: Telemetry [5]   Diagnosis: Pneumonia due to COVID-19 virus [[id number]]   Certification: I Certify That Inpatient Hospital Services Are Medically Necessary For Greater Than 2 Midnights

## 2024-11-13 NOTE — PROGRESS NOTES
Pharmacy Consult - Warfarin Dosing    Marco Antonio Montemayor is a 79 y.o. male receiving warfarin therapy.    Consulting Provider: Hospitalist  Indication: Atrial fibrillation   Goal INR: 2 - 3  Home Regimen: warfarin 5mg daily except 7.5mg on Friday    Bridge Therapy: No     Drug-Drug Interactions with current regimen:  Ceftriaxone - may enhance AC effects of warfarin   Dexamethasone - may enhance AC effects of warfarin   Doxycycline - may enhance AC effects of warfarin   Remdesivir - may enhance AC effects of warfarin     Warfarin Dosing During Admission:  Date  11/13           INR  2.17           Dose  5 mg              Education Provided: Patient follows with [facility] and has been provided warfarin education via their services. Inpatient pharmacist available for questions upon request.     Discharge Follow-Up:     Outpatient Following Provider - [facility] (last appt: 11/5/24  referring provider: JENNIFER Barroso)     Follow-Up Recommendation - 2-3 days after hospital discharge    Labs:  Results from last 7 days   Lab Units 11/13/24  0503 11/12/24  2138 11/12/24  1518 11/11/24  1426   INR  2.17* 2.07* 2.04* 1.72*   HEMOGLOBIN g/dL  --   --  11.8* 10.8*   HEMATOCRIT %  --   --  37.8 35.1*     Results from last 7 days   Lab Units 11/13/24  0503 11/12/24  1518 11/11/24  1426   SODIUM mmol/L 138 138 141   POTASSIUM mmol/L 4.5 3.9 3.8   CHLORIDE mmol/L 96* 96* 99   CO2 mmol/L 27.0 30.0* 27.7   BUN mg/dL 31* 22 50*   CREATININE mg/dL 6.95* 5.61* 9.69*   CALCIUM mg/dL 8.1* 8.6 8.6   BILIRUBIN mg/dL 0.3 0.6 0.5   ALK PHOS U/L 107 123* 115   ALT (SGPT) U/L 8 8 <5   AST (SGOT) U/L 25 24 25   GLUCOSE mg/dL 130* 112* 134*     Current dietary intake:   Diet Order   Procedures    Diet: Cardiac, Renal; Healthy Heart (2-3 Na+); Low Potassium, Low Phosphorus; Fluid Consistency: Thin (IDDSI 0)      Assessment/Plan:  Pharmacy to dose warfarin for Afib. Goal INR 2-3.  Therapeutic INR of 2.17 today. Increased from 2.07  yesterday after warfarin dose was held last night.   Will order home dose of warfarin 5mg today.  Daily PT/INR ordered.  Pharmacy will continue to monitor signs/symptoms of bleeding, dietary intake, and drug-drug interactions and make dose adjustments as necessary.    Taylor Riedel-Rogers, PharmD, BCPS  11/13/2024  12:28 EST

## 2024-11-13 NOTE — PLAN OF CARE
Problem: Adult Inpatient Plan of Care  Goal: Plan of Care Review  Outcome: Progressing  Goal: Patient-Specific Goal (Individualized)  Outcome: Progressing  Goal: Absence of Hospital-Acquired Illness or Injury  Outcome: Progressing  Intervention: Identify and Manage Fall Risk  Description: Perform standard risk assessment on admission using a validated tool or comprehensive approach appropriate to the patient; reassess fall risk frequently, with change in status or transfer to another level of care.  Communicate risk to interprofessional healthcare team; ensure fall risk visible cue.  Determine need for increased observation, equipment and environmental modification, as well as use of supportive, nonskid footwear.  Adjust safety measures to individual needs and identified risk factors.  Reinforce the importance of active participation with fall risk prevention, safety, and physical activity with the patient and family.  Perform regular intentional rounding to assess need for position change, pain assessment and personal needs, including assistance with toileting.  Recent Flowsheet Documentation  Taken 11/13/2024 1600 by Nita Diaz, RN  Safety Promotion/Fall Prevention:   assistive device/personal items within reach   clutter free environment maintained   fall prevention program maintained   nonskid shoes/slippers when out of bed   room organization consistent   safety round/check completed  Taken 11/13/2024 1200 by Nita Diaz, RN  Safety Promotion/Fall Prevention:   activity supervised   assistive device/personal items within reach   clutter free environment maintained   fall prevention program maintained   nonskid shoes/slippers when out of bed   safety round/check completed   room organization consistent  Taken 11/13/2024 0922 by Nita Diaz, RN  Safety Promotion/Fall Prevention:   activity supervised   assistive device/personal items within reach   clutter free environment maintained   fall  prevention program maintained   nonskid shoes/slippers when out of bed   room organization consistent   safety round/check completed  Intervention: Prevent Skin Injury  Description: Perform a screening for skin injury risk, such as pressure or moisture-associated skin damage on admission and at regular intervals throughout hospital stay.  Keep all areas of skin (especially folds) clean and dry.  Maintain adequate skin hydration.  Relieve and redistribute pressure and protect bony prominences and skin at risk for injury; implement measures based on patient-specific risk factors.  Match turning and repositioning schedule to clinical condition.  Encourage weight shift frequently; assist with reposition if unable to complete independently.  Float heels off bed; avoid pressure on the Achilles tendon.  Keep skin free from extended contact with medical devices.  Optimize nutrition and hydration.  Encourage functional activity and mobility, as early as tolerated.  Use aids (e.g., slide boards, mechanical lift) during transfer.  Recent Flowsheet Documentation  Taken 11/13/2024 1600 by Nita Diaz RN  Body Position:   lower extremity elevated   neutral body alignment   neutral head position  Taken 11/13/2024 0922 by Nita Diaz RN  Body Position: (sitting on side of bed) other (see comments)  Skin Protection:   silicone foam dressing in place   skin sealant/moisture barrier applied  Intervention: Prevent Infection  Description: Maintain skin and mucous membrane integrity; promote hand, oral and pulmonary hygiene.  Optimize fluid balance, nutrition, sleep and glycemic control to maximize infection resistance.  Identify potential sources of infection early to prevent or mitigate progression of infection (e.g., wound, lines, devices).  Evaluate ongoing need for invasive devices; remove promptly when no longer indicated.  Review vaccination status.  Recent Flowsheet Documentation  Taken 11/13/2024 1600 by Joe  CAITLIN Moya  Infection Prevention:   equipment surfaces disinfected   hand hygiene promoted   environmental surveillance performed   rest/sleep promoted   single patient room provided   personal protective equipment utilized  Taken 11/13/2024 1200 by Nita Diaz RN  Infection Prevention:   environmental surveillance performed   equipment surfaces disinfected   hand hygiene promoted   single patient room provided   rest/sleep promoted   personal protective equipment utilized  Taken 11/13/2024 0922 by Nita Diaz RN  Infection Prevention:   environmental surveillance performed   equipment surfaces disinfected   hand hygiene promoted   personal protective equipment utilized   rest/sleep promoted   single patient room provided  Goal: Optimal Comfort and Wellbeing  Outcome: Progressing  Intervention: Monitor Pain and Promote Comfort  Description: Assess pain level, treatment efficacy and patient response at regular intervals using a consistent pain scale.  Consider the presence and impact of preexisting chronic pain.  Encourage patient and caregiver involvement in pain assessment, interventions and safety measures.  Promote activity; balance with sleep and rest to enhance healing.  Recent Flowsheet Documentation  Taken 11/13/2024 1200 by Nita Diaz RN  Pain Management Interventions:   pillow support provided   position adjusted   quiet environment facilitated  Taken 11/13/2024 0922 by Nita Diaz RN  Pain Management Interventions:   pillow support provided   position adjusted   quiet environment facilitated  Intervention: Provide Person-Centered Care  Description: Use a family-focused approach to care; encourage support system presence and participation.  Develop trust and rapport by proactively providing information, encouraging questions, addressing concerns and offering reassurance.  Acknowledge emotional response to hospitalization.  Recognize and utilize personal coping strategies and strengths;  develop goals via shared decision-making.  Honor spiritual and cultural preferences.  Recent Flowsheet Documentation  Taken 11/13/2024 0922 by Nita Diaz, RN  Trust Relationship/Rapport:   care explained   choices provided   questions answered   questions encouraged   thoughts/feelings acknowledged  Goal: Readiness for Transition of Care  Outcome: Progressing  Goal: Plan of Care Review  Outcome: Progressing  Goal: Patient-Specific Goal (Individualized)  Outcome: Progressing  Goal: Absence of Hospital-Acquired Illness or Injury  Outcome: Progressing  Intervention: Identify and Manage Fall Risk  Description: Perform standard risk assessment on admission using a validated tool or comprehensive approach appropriate to the patient; reassess fall risk frequently, with change in status or transfer to another level of care.  Communicate risk to interprofessional healthcare team; ensure fall risk visible cue.  Determine need for increased observation, equipment and environmental modification, as well as use of supportive, nonskid footwear.  Adjust safety measures to individual needs and identified risk factors.  Reinforce the importance of active participation with fall risk prevention, safety, and physical activity with the patient and family.  Perform regular intentional rounding to assess need for position change, pain assessment and personal needs, including assistance with toileting.  Recent Flowsheet Documentation  Taken 11/13/2024 1600 by Nita Diaz, RN  Safety Promotion/Fall Prevention:   assistive device/personal items within reach   clutter free environment maintained   fall prevention program maintained   nonskid shoes/slippers when out of bed   room organization consistent   safety round/check completed  Taken 11/13/2024 1200 by Nita Diaz, RN  Safety Promotion/Fall Prevention:   activity supervised   assistive device/personal items within reach   clutter free environment maintained   fall  prevention program maintained   nonskid shoes/slippers when out of bed   safety round/check completed   room organization consistent  Taken 11/13/2024 0922 by Nita Diaz RN  Safety Promotion/Fall Prevention:   activity supervised   assistive device/personal items within reach   clutter free environment maintained   fall prevention program maintained   nonskid shoes/slippers when out of bed   room organization consistent   safety round/check completed  Intervention: Prevent Skin Injury  Description: Perform a screening for skin injury risk, such as pressure or moisture-associated skin damage on admission and at regular intervals throughout hospital stay.  Keep all areas of skin (especially folds) clean and dry.  Maintain adequate skin hydration.  Relieve and redistribute pressure and protect bony prominences and skin at risk for injury; implement measures based on patient-specific risk factors.  Match turning and repositioning schedule to clinical condition.  Encourage weight shift frequently; assist with reposition if unable to complete independently.  Float heels off bed; avoid pressure on the Achilles tendon.  Keep skin free from extended contact with medical devices.  Optimize nutrition and hydration.  Encourage functional activity and mobility, as early as tolerated.  Use aids (e.g., slide boards, mechanical lift) during transfer.  Recent Flowsheet Documentation  Taken 11/13/2024 1600 by Nita Diaz RN  Body Position:   lower extremity elevated   neutral body alignment   neutral head position  Taken 11/13/2024 0922 by Nita Diaz RN  Body Position: (sitting on side of bed) other (see comments)  Skin Protection:   silicone foam dressing in place   skin sealant/moisture barrier applied  Intervention: Prevent Infection  Description: Maintain skin and mucous membrane integrity; promote hand, oral and pulmonary hygiene.  Optimize fluid balance, nutrition, sleep and glycemic control to maximize  infection resistance.  Identify potential sources of infection early to prevent or mitigate progression of infection (e.g., wound, lines, devices).  Evaluate ongoing need for invasive devices; remove promptly when no longer indicated.  Review vaccination status.  Recent Flowsheet Documentation  Taken 11/13/2024 1600 by Nita Diaz RN  Infection Prevention:   equipment surfaces disinfected   hand hygiene promoted   environmental surveillance performed   rest/sleep promoted   single patient room provided   personal protective equipment utilized  Taken 11/13/2024 1200 by Nita Diaz RN  Infection Prevention:   environmental surveillance performed   equipment surfaces disinfected   hand hygiene promoted   single patient room provided   rest/sleep promoted   personal protective equipment utilized  Taken 11/13/2024 0922 by Nita Diaz RN  Infection Prevention:   environmental surveillance performed   equipment surfaces disinfected   hand hygiene promoted   personal protective equipment utilized   rest/sleep promoted   single patient room provided  Goal: Optimal Comfort and Wellbeing  Outcome: Progressing  Intervention: Monitor Pain and Promote Comfort  Description: Assess pain level, treatment efficacy and patient response at regular intervals using a consistent pain scale.  Consider the presence and impact of preexisting chronic pain.  Encourage patient and caregiver involvement in pain assessment, interventions and safety measures.  Promote activity; balance with sleep and rest to enhance healing.  Recent Flowsheet Documentation  Taken 11/13/2024 1200 by Nita Diaz RN  Pain Management Interventions:   pillow support provided   position adjusted   quiet environment facilitated  Taken 11/13/2024 0922 by Nita Diaz RN  Pain Management Interventions:   pillow support provided   position adjusted   quiet environment facilitated  Intervention: Provide Person-Centered Care  Description: Use a  family-focused approach to care; encourage support system presence and participation.  Develop trust and rapport by proactively providing information, encouraging questions, addressing concerns and offering reassurance.  Acknowledge emotional response to hospitalization.  Recognize and utilize personal coping strategies and strengths; develop goals via shared decision-making.  Honor spiritual and cultural preferences.  Recent Flowsheet Documentation  Taken 11/13/2024 0922 by Nita Diaz, RN  Trust Relationship/Rapport:   care explained   choices provided   questions answered   questions encouraged   thoughts/feelings acknowledged  Goal: Readiness for Transition of Care  Outcome: Progressing   Goal Outcome Evaluation:

## 2024-11-13 NOTE — CASE MANAGEMENT/SOCIAL WORK
Discharge Planning Assessment  [facility]     Patient Name: Marco Antonio Montemayor  MRN: [id number]  Today's Date: 11/13/2024    Admit Date: 11/12/2024    Plan: home   Discharge Needs Assessment       Row Name 11/13/24 1403       Living Environment    People in Home spouse    Current Living Arrangements home    Potentially Unsafe Housing Conditions none    In the past 12 months has the electric, gas, oil, or water company threatened to shut off services in your home? No    Primary Care Provided by self    Provides Primary Care For no one    Family Caregiver if Needed none    Quality of Family Relationships unable to assess    Able to Return to Prior Arrangements yes       Resource/Environmental Concerns    Resource/Environmental Concerns none    Transportation Concerns none       Transportation Needs    In the past 12 months, has lack of transportation kept you from medical appointments or from getting medications? no    In the past 12 months, has lack of transportation kept you from meetings, work, or from getting things needed for daily living? No       Food Insecurity    Within the past 12 months, you worried that your food would run out before you got the money to buy more. Never true    Within the past 12 months, the food you bought just didn't last and you didn't have money to get more. Never true       Transition Planning    Patient/Family Anticipates Transition to home with family    Patient/Family Anticipated Services at Transition none    Transportation Anticipated family or friend will provide       Discharge Needs Assessment    Readmission Within the Last 30 Days no previous admission in last 30 days    Equipment Currently Used at Home none    Concerns to be Addressed discharge planning    Do you want help finding or keeping work or a job? I do not need or want help    Do you want help with school or training? For example, starting or completing job training or getting a high school diploma, GED or  equivalent No    Anticipated Changes Related to Illness none    Equipment Needed After Discharge none                   Discharge Plan       Row Name 11/13/24 1404       Plan    Plan home    Patient/Family in Agreement with Plan yes    Plan Comments Met with mr. Montemayor at the bedside to initiate discharge plan. He shares a home in [place] with his wife. He is independent with activities of daily living and drives. He denies the use of DME. His primary care provider is Dr. Heriberto Murcia. He denies the receipt of home health. He goes to [facility] on Tuesday, Thursday, and Saturdays for dialysis. He denies obstacles to getting medical care or obtaining medications. No discharge needs were identified today. Patient plans to return home with family at discharge, and family or friend will provide transportation.    Final Discharge Disposition Code 01 - home or self-care                  Continued Care and Services - Admitted Since 11/12/2024    No active coordination exists for this encounter.       Expected Discharge Date and Time       Expected Discharge Date Expected Discharge Time    Nov 16, 2024            Demographic Summary       Row Name 11/13/24 1400       General Information    Admission Type inpatient    Arrived From emergency department    Referral Source admission list    Reason for Consult discharge planning    Preferred Language English       Contact Information    Permission Granted to Share Info With family/designee    Contact Information Obtained for     Contact Information Comments Raeann Montemayor Spouse [phone]                   Functional Status       Row Name 11/13/24 1401       Functional Status    Usual Activity Tolerance good    Current Activity Tolerance other (see comments)  MARICARMEN. PT and OT are ordered but not signed in today. Will await their notes.       Physical Activity    On average, how many days per week do you engage in moderate to strenuous exercise (like a  brisk walk)? 0 days    On average, how many minutes do you engage in exercise at this level? 0 min    Number of minutes of exercise per week 0       Assessment of Health Literacy    How often do you have someone help you read hospital materials? Never    How often do you have problems learning about your medical condition because of difficulty understanding written information? Never    How often do you have a problem understanding what is told to you about your medical condition? Occasionally    How confident are you filling out medical forms by yourself? Quite a bit    Health Literacy Good       Functional Status, IADL    Medications independent    Meal Preparation independent    Housekeeping independent    Laundry independent    Shopping independent    If for any reason you need help with day-to-day activities such as bathing, preparing meals, shopping, managing finances, etc., do you get the help you need? I don't need any help       Mental Status    General Appearance WDL WDL       Mental Status Summary    Recent Changes in Mental Status/Cognitive Functioning no changes       Employment/    Employment Status retired                   Psychosocial    No documentation.                  Abuse/Neglect    No documentation.                  Legal    No documentation.                  Substance Abuse    No documentation.                  Patient Forms    No documentation.                     Radha Christina RN

## 2024-11-13 NOTE — PROGRESS NOTES
Pharmacy Consult - Warfarin Dosing    Marco Antonio Montemayor is a 79 y.o. male receiving warfarin therapy.    Consulting Provider: hospitalist   Indication: Atrial fibrillation   Goal INR: 2 - 3  Home Regimen: Warfarin 5 mg oral daily except 7.5 mg on Friday     Bridge Therapy: No    Drug-Drug Interactions with current regimen:  APAP (dose dependent)-may enhance AC effects of warfarin.  Ceftriaxone-may enhance AC effects of warfarin.  Dexamethasone-may enhance AC effects of warfarin.  Doxycycline-may enhance AC effects of warfarin.  Bumex (held)-may diminish AC effects of warfarin.    Warfarin Dosing During Admission:    Date  11/12           INR  2.04           Dose  5 mg (PTA)              Education Provided:     Discharge Follow-Up:     Outpatient Following Provider -      Follow-Up Recommendation -     Labs:  Results from last 7 days   Lab Units 11/12/24  1518 11/11/24  1426   INR  2.04* 1.72*   HEMOGLOBIN g/dL 11.8* 10.8*   HEMATOCRIT % 37.8 35.1*     Results from last 7 days   Lab Units 11/12/24  1518 11/11/24  1426   SODIUM mmol/L 138 141   POTASSIUM mmol/L 3.9 3.8   CHLORIDE mmol/L 96* 99   CO2 mmol/L 30.0* 27.7   BUN mg/dL 22 50*   CREATININE mg/dL 5.61* 9.69*   CALCIUM mg/dL 8.6 8.6   BILIRUBIN mg/dL 0.6 0.5   ALK PHOS U/L 123* 115   ALT (SGPT) U/L 8 <5   AST (SGOT) U/L 24 25   GLUCOSE mg/dL 112* 134*     Current dietary intake: cardiac diet    Assessment/Plan:  Pharmacy to dose warfarin for AF. Goal INR 2-3.  Therapeutic INR of 2.04 today.  Warfarin 5 mg dose taken PTA.  Daily PT/INR ordered.  Pharmacy will continue to monitor signs/symptoms of bleeding, dietary intake, and drug-drug interactions and make dose adjustments as necessary.     Hortencia Lopez RPH  11/12/2024  21:12 EST

## 2024-11-13 NOTE — PROGRESS NOTES
[facility] Medicine Services  PROGRESS NOTE    Patient Name: Marco Antonio Montemayor  : 1945  MRN: [id number]    Date of Admission: 2024  Primary Care Physician: Heriberto Murcia MD    Subjective   Subjective     CC:  F/u shortness of breath    HPI:  Oxygen fell off earlier but feeling and breathing better now that it is on. Denies CP. Family at bedside      Objective   Objective     Vital Signs:   Temp:  [96.6 °F (35.9 °C)-101 °F (38.3 °C)] 96.6 °F (35.9 °C)  Heart Rate:  [] 90  Resp:  [18-20] 18  BP: (126-159)/(60-83) 126/60  Flow (L/min) (Oxygen Therapy):  [2.5-4] 4     Physical Exam:  Constitutional: No acute distress, awake, alert  HENT: NCAT, mucous membranes moist  Respiratory: Clear to auscultation bilaterally, respiratory effort normal . On 4L NC  Cardiovascular: RRR, no murmurs, rubs, or gallops  Gastrointestinal: Positive bowel sounds, soft, nontender, nondistended  Musculoskeletal: No bilateral ankle edema  Psychiatric: Appropriate affect, cooperative  Neurologic: Oriented x 3, no focal deficits  Skin: No rashes      Results Reviewed:  LAB RESULTS:      Lab 24  0503 24  2138 24  1811 24  1518 24  1627 24  1426   WBC  --   --   --  5.98  --  5.40   HEMOGLOBIN  --   --   --  11.8*  --  10.8*   HEMATOCRIT  --   --   --  37.8  --  35.1*   PLATELETS  --   --   --  129*  --  133*   NEUTROS ABS  --   --   --  4.84  --  4.00   IMMATURE GRANS (ABS)  --   --   --  0.04  --   --    LYMPHS ABS  --   --   --  0.46*  --   --    MONOS ABS  --   --   --  0.61  --   --    EOS ABS  --   --   --  0.01  --   --    MCV  --   --   --  109.2*  --  108.3*   PROCALCITONIN  --   --   --  0.63*  --   --    LACTATE  --   --   --  1.6  --   --    PROTIME 24.2* 23.4*  --  23.1*  --  20.8*   HSTROP T  --   --  200* 207* 217* 220*         Lab 24  0503 24  1518 24  1426 24  1415   SODIUM 138 138 141  --    POTASSIUM 4.5 3.9 3.8  --     CHLORIDE 96* 96* 99  --    CO2 27.0 30.0* 27.7  --    ANION GAP 15.0 12.0 14.3  --    BUN 31* 22 50*  --    CREATININE 6.95* 5.61* 9.69*  --    EGFR 7.4* 9.5* 5.0*  --    GLUCOSE 130* 112* 134*  --    CALCIUM 8.1* 8.6 8.6  --    MAGNESIUM 2.1  --  2.3  --    PHOSPHORUS 7.2*  --   --   --    HEMOGLOBIN A1C  --   --   --  5.8*         Lab 11/13/24  0503 11/12/24  1518 11/11/24  1426   TOTAL PROTEIN 6.4 7.0 6.4   ALBUMIN 3.3* 3.8 3.6   GLOBULIN 3.1 3.2 2.8   ALT (SGPT) 8 8 <5   AST (SGOT) 25 24 25   BILIRUBIN 0.3 0.6 0.5   ALK PHOS 107 123* 115         Lab 11/13/24  0503 11/12/24  2138 11/12/24  1811 11/12/24  1518 11/11/24  1627 11/11/24  1426   PROBNP  --   --   --  21,630.0*  --  21,061.0*   HSTROP T  --   --  200* 207* 217* 220*   PROTIME 24.2* 23.4*  --  23.1*  --  20.8*   INR 2.17* 2.07*  --  2.04*  --  1.72*                 Lab 11/12/24  1624   PH, ARTERIAL 7.384   PCO2, ARTERIAL 53.0*   PO2 .0*   FIO2 32   HCO3 ART 31.7*   BASE EXCESS ART 5.4*   CARBOXYHEMOGLOBIN 0.5     Brief Urine Lab Results       None            Microbiology Results Abnormal       None            XR Chest 1 View    Result Date: 11/12/2024  XR CHEST 1 VW Date of Exam: 11/12/2024 4:37 PM EST Indication: SOA triage protocol Comparison: 11/11/2024. Findings: Left lower lobe consolidation. Vascular congestion. Dual-lead cardiac pacemaker. The clavicles are intact. No rib fractures. No pneumothorax or pleural effusion. Atheromatous disease of the aortic arch.     Impression: Left lower lobe consolidation and vascular congestion. Electronically Signed: Joss Daniels MD  11/12/2024 5:01 PM EST  Workstation ID: [id number]    CT Angiogram Chest Pulmonary Embolism    Result Date: 11/11/2024  CT ANGIOGRAM CHEST PULMONARY EMBOLISM Date of Exam: 11/11/2024 7:00 PM EST Indication: Shortness of breath. Comparison: None available. Technique: Axial CT images were obtained of the chest after the uneventful intravenous administration of 90 cc Isovue 370  IV contrast utilizing pulmonary embolism protocol.  Reconstructed coronal and sagittal images were also obtained. Automated exposure  control and iterative construction methods were used. Findings: Patchy areas of glass within both lungs. Cardiac pacemaker/defibrillator leads. No lobar consolidation. No pneumothorax or pleural effusion. No evidence of aortic dissection. No pulmonary embolus is appreciated. No pericardial effusion. Limited  evaluation of the upper abdomen is normal. Thoracic vertebral body height and alignment are normal. No lytic or blastic disease. No rib fractures are seen.     Impression: 1. No pulmonary embolus. 2. Patchy areas of groundglass noted within both lungs. Differential considerations include atypical pneumonia such as COVID-19. Additional differential considerations may include metastatic disease or developing pneumonia however these are considered less likely Electronically Signed: Joss Daniels MD  11/11/2024 7:38 PM EST  Workstation ID: [id number]    XR Chest 1 View    Result Date: 11/11/2024  XR CHEST 1 VW Date of Exam: 11/11/2024 2:18 PM EST Indication: Weak/Dizzy/AMS triage protocol Comparison: 2/29/2024 Findings: Left chest wall ICD again noted. There is continued moderately severe cardiomegaly. No acute infiltrates or effusions identified. Exam is somewhat limited by portable technique with obesity.     Impression: Impression: Stable cardiomegaly. No new abnormality. Electronically Signed: Cat Vieira MD  11/11/2024 2:56 PM EST  Workstation ID: [id number]     Results for orders placed in visit on 01/17/24    Adult Transthoracic Echo Complete W/ Cont if Necessary Per Protocol    Interpretation Summary    Left ventricular systolic function is normal. Calculated left ventricular EF = 55.5% Left ventricular ejection fraction appears to be 56 - 60%.    Left ventricular wall thickness is consistent with mild concentric hypertrophy.    Left ventricular diastolic function was normal.     The left atrial cavity is mildly dilated.    Estimated right ventricular systolic pressure from tricuspid regurgitation is mildly elevated (35-45 mmHg). Calculated right ventricular systolic pressure from tricuspid regurgitation is 40 mmHg.    Septal thickness and atrial volume both improved on today's study compared to 2023      Current medications:  Scheduled Meds:atorvastatin, 40 mg, Oral, Daily  [Held by provider] bumetanide, 2 mg, Oral, Once per day on Sunday Monday Wednesday Friday  carvedilol, 12.5 mg, Oral, BID  cefTRIAXone, 1,000 mg, Intravenous, Q24H  dexAMETHasone, 6 mg, Oral, Daily   Or  dexAMETHasone, 6 mg, Intravenous, Daily  doxycycline, 100 mg, Oral, Q12H  [START ON 11/14/2024] midodrine, 10 mg, Oral, Once per day on Tuesday Thursday Saturday  remdesivir, 100 mg, Intravenous, Q24H  sodium chloride, 10 mL, Intravenous, Q12H  vitamin B-12, 1,000 mcg, Oral, Daily  warfarin, 5 mg, Oral, Daily      Continuous Infusions:Pharmacy to dose warfarin,       PRN Meds:.  acetaminophen **OR** acetaminophen **OR** acetaminophen    senna-docusate sodium **AND** polyethylene glycol **AND** bisacodyl **AND** bisacodyl    ipratropium-albuterol    nitroglycerin    ondansetron ODT **OR** ondansetron    Pharmacy to dose warfarin    sodium chloride    sodium chloride    sodium chloride    Assessment & Plan   Assessment & Plan     Active Hospital Problems    Diagnosis  POA    **Pneumonia due to COVID-19 virus [U07.1, J12.82]  Yes      Resolved Hospital Problems   No resolved problems to display.        Brief Hospital Course to date:  Marco Antonio Montemayor is a 79 y.o. male  w history of A-fib on Coumadin, CKD on hemodialysis, chronic diastolic congestive heart failure, ZULEIKA on CPAP presenting due to increased work of breathing and hypoxia. Found to COVID-19 PNA.      Sepsis due to COVID-19 PNA with concern for superimposed bacterial PNA  LLL Consolidation   Acute hypoxic respiratory failure   -CTA  on admission: no pulmonary  embolus. Patchy areas of groundglass noted within both lungs. Differential considerations include atypical pneumonia   -XR: vascular congestion w LLL infiltrate  -CTH pending (headache improved this am)  -started Ceftriaxone and doxycycline. Continue. Having some loose stools, send stool studies and add probiotic  -Decadron and remdesivir   -wean O2 as tolerated   -follow blood cx     Afib on Warfarin  -pharmacy consulted for dosing warfarin  -continue home BB     Thrombocytopenia  -likely due to active infection. Monitor      CHFpEF  -01/17/2024: ECHO EF: 56-60%   -Bumex 2 mg on nondialysis days.  Resume as tolerated. Held initially d/t poor appetite  -Encourage oral intake      ESRD on HD  -Tu/Th/Sat. Last session 11/12  -On Velphoro however this is not on formulary at the hospital.   -nephrology consulted  -Continue midodrine on dialysis days        Expected Discharge Location and Transportation: home  Expected Discharge   Expected Discharge Date: 11/16/2024; Expected Discharge Time:      VTE Prophylaxis:  Pharmacologic & mechanical VTE prophylaxis orders are present.         AM-PAC 6 Clicks Score (PT): 14 (11/12/24 2056)    CODE STATUS:   Code Status and Medical Interventions: CPR (Attempt to Resuscitate); Full Support   Ordered at: 11/12/24 2047     Level Of Support Discussed With:    Patient     Code Status (Patient has no pulse and is not breathing):    CPR (Attempt to Resuscitate)     Medical Interventions (Patient has pulse or is breathing):    Full Support       Melody Cardozo MD  11/13/24

## 2024-11-13 NOTE — PLAN OF CARE
Goal Outcome Evaluation:   Problem: Adult Inpatient Plan of Care  Goal: Absence of Hospital-Acquired Illness or Injury  Intervention: Identify and Manage Fall Risk  Recent Flowsheet Documentation  Taken 11/13/2024 0409 by Joy Tripathi RN  Safety Promotion/Fall Prevention: safety round/check completed  Taken 11/13/2024 0200 by Joy Tripathi RN  Safety Promotion/Fall Prevention: safety round/check completed  Taken 11/13/2024 0000 by Joy Tripathi RN  Safety Promotion/Fall Prevention: safety round/check completed  Taken 11/12/2024 2200 by Joy Tripathi RN  Safety Promotion/Fall Prevention:   safety round/check completed   fall prevention program maintained  Taken 11/12/2024 2056 by Joy Tripathi RN  Safety Promotion/Fall Prevention:   activity supervised   fall prevention program maintained   safety round/check completed   toileting scheduled   room organization consistent     Problem: Adult Inpatient Plan of Care  Goal: Absence of Hospital-Acquired Illness or Injury  Intervention: Prevent Skin Injury  Recent Flowsheet Documentation  Taken 11/13/2024 0409 by Joy Tripathi RN  Body Position: position changed independently  Taken 11/13/2024 0200 by Joy Tripathi RN  Body Position: position changed independently  Taken 11/13/2024 0000 by Joy Tripathi RN  Body Position: position changed independently  Taken 11/12/2024 2200 by Joy Tripathi RN  Body Position: position changed independently  Taken 11/12/2024 2056 by Joy Tripathi RN  Body Position: position changed independently     Problem: Adult Inpatient Plan of Care  Goal: Optimal Comfort and Wellbeing  Intervention: Monitor Pain and Promote Comfort  Recent Flowsheet Documentation  Taken 11/13/2024 0000 by Joy Tripathi RN  Pain Management Interventions:   quiet environment facilitated   care clustered  Taken 11/12/2024 2200 by Deuce  Joy, RN  Pain Management Interventions:   quiet environment facilitated   care clustered   position adjusted   unnecessary movement minimized  Taken 11/12/2024 2056 by Joy Tripahti RN  Pain Management Interventions:   pain management plan reviewed with patient/caregiver   pain medication given   care clustered   quiet environment facilitated    Admit 11/12 from home r/t COVID -19 PNA. Hx afib with chronic coumadin therapy, as well as HD Tuesday, Thursday, Saturday; coumadin help at per provider orders, nephrology consulted r/t HD treatment. C/o sever headache upon coming to ED and upon arrival to this unit, tylenol prescribed by admitting MD. Tylenol effective, denies any further head pain at this time. CT head without contrast unable to obtained at this time - will relay this to oncoming RN, in order to have CT performed. In bed resting at this time, call light in reach. Pt and family report that pt  anuric. AV fistula + for thrill and bruit to LUE. Will continue to monitor.

## 2024-11-13 NOTE — CONSULTS
Patient Care Team:  Heriberto Murcia MD as PCP - General (Family Medicine)  Shanell Arango, PharmD as Pharmacist (Pharmacy)  Evelina Quan, PharmD as Pharmacist (Pharmacy)  Peng Melgar, PharmD as Pharmacist (Pharmacy)  Johnathan Taylor MD as Consulting Physician (Cardiac Electrophysiology)  Chato Bo MD as Consulting Physician (Nephrology)  Ivet Beasley APRN as Nurse Practitioner (Cardiology)  Rei Deckre PA as Physician Assistant (Cardiology)    Chief complaint: ESRD     Consults   Subjective     HPI: THIS IS AN 79YOM with ESRD on HD TTS schedule, HFpEF, HTN, ZULEIKA. Patient was admitted with SOB/ COVID- 19 PNA/ Nephrology consulted for ESRD management. I have reviewed vitals, labs and medications list. Patient with no missed HD session, No chest pain or fever today. Still with SOB and productive cough.     Shortness of Breath    12 POINTS ROS negative except as per HPI    Review of Systems   Respiratory:  Positive for shortness of breath.         Past Medical History:   Diagnosis Date    Abnormal ECG     Arrhythmia     Arthritis     Asthma     Cancer     skin cancer removed from various locations     Chronic diastolic congestive heart failure 09/14/2018    CKD (chronic kidney disease)     closer to stage 5    Coronary artery disease     Dry skin     Dyslipidemia 07/07/2016    Edema     Heart murmur     Hematoma     Hemodialysis access site with arteriovenous graft     History of transfusion     no transfusion reaction    Hypertension     Iron deficiency anemia     Long term current use of anticoagulant     ZULEIKA treated with BiPAP     Persistent atrial fibrillation 07/07/2016     Patient notes dyspnea, fatigue and palpitations during episodes of afib.  multiple external cardioversionsChads vasc 2, anticoagulated with Coumadin    Tinnitus     Wears glasses     readers   ,   Past Surgical History:   Procedure Laterality Date    ABLATION OF DYSRHYTHMIC FOCUS      APPENDECTOMY       ARTERIOVENOUS FISTULA/SHUNT SURGERY Left 08/04/2022    Procedure: UPPER EXTREMITY BRACHIOCEPHALIC ARTERIOVENOUS FISTULA FORMATION LEFT;  Surgeon: Jakob Reza MD;  Location:  [facility];  Service: Vascular;  Laterality: Left;    ARTERIOVENOUS FISTULA/SHUNT SURGERY Left 11/03/2022    Procedure: ELEVATION LEFT UPPER EXTREMITY AV FISTULA;  Surgeon: Jakob Reza MD;  Location:  [facility];  Service: Vascular;  Laterality: Left;    CARDIAC ELECTROPHYSIOLOGY PROCEDURE N/A 05/18/2018    Procedure: PPM generator change - dual       [facility]/ please schedule in 8 weeks;  Surgeon: Johnathan Taylor MD;  Location:  [facility];  Service: Cardiovascular    CATARACT EXTRACTION      bilat wiht lens     COLONOSCOPY      ENDOSCOPY      Pt denies    INSERT / REPLACE / REMOVE PACEMAKER  about 20 years back    SKIN CANCER EXCISION      various locations     TONSILLECTOMY      VEIN SURGERY  for dialysis    WISDOM TOOTH EXTRACTION     ,   Family History   Problem Relation Age of Onset    Cancer Mother     Heart attack Mother     Hypertension Mother     Arrhythmia Father     Hypertension Father     Heart attack Father    ,   Social History     Socioeconomic History    Marital status:    Tobacco Use    Smoking status: Never     Passive exposure: Never    Smokeless tobacco: Never   Vaping Use    Vaping status: Never Used   Substance and Sexual Activity    Alcohol use: No    Drug use: No    Sexual activity: Never     E-cigarette/Vaping    E-cigarette/Vaping Use Never User     Passive Exposure No     Counseling Given No      E-cigarette/Vaping Substances    Nicotine No     THC No     CBD No     Flavoring No      E-cigarette/Vaping Devices    Disposable No     Pre-filled or Refillable Cartridge No     Refillable Tank No     Pre-filled Pod No          ,   Medications Prior to Admission   Medication Sig Dispense Refill Last Dose/Taking    acetaminophen (TYLENOL) 500 MG tablet Take 1 tablet by mouth  Every 6 (Six) Hours As Needed for Mild Pain.   Past Week    atorvastatin (LIPITOR) 40 MG tablet TAKE ONE TABLET BY MOUTH DAILY 90 tablet 3 11/12/2024    bumetanide (BUMEX) 2 MG tablet TAKE 1 TABLET BY MOUTH EVERY OTHER DAY - TAKE ON DAYS OFF FROM DIALYSIS (TUESDAY, THURSDAY, SATURDAY AND SUNDAY) 48 tablet 2 11/12/2024    carvedilol (COREG) 12.5 MG tablet TAKE ONE TABLET BY MOUTH TWICE A  tablet 3 11/12/2024    cholecalciferol (VITAMIN D3) 1000 UNITS tablet Take 1 tablet by mouth Daily.   11/12/2024    midodrine (PROAMATINE) 10 MG tablet Take 1 tablet by mouth 3 (Three) Times a Day Before Meals. Before Dialysis, Tues, Thurs, Sat   11/12/2024    Sucroferric Oxyhydroxide (Velphoro) 500 MG chewable tablet Take As Directed. Crush or chew and swallow 3 tablets by mouth 3 times a day with meals and 1 tablet twice a day with snacks.   11/12/2024    vitamin B-12 (CYANOCOBALAMIN) 1000 MCG tablet Take 1 tablet by mouth Daily.   11/12/2024    warfarin (COUMADIN) 2.5 MG tablet Take 2-3 tablets by mouth daily or as directed by anticoagulation clinic 180 tablet 1 11/12/2024    cefdinir (OMNICEF) 300 MG capsule Take one capsule on day one followed by one capsule on dialysis days after dialysis is complete until they are gone. 5 capsule 0     Ferrous Sulfate (IRON) 325 (65 FE) MG tablet Take 65 mg by mouth Daily.       nitroglycerin (NITROSTAT) 0.3 MG SL tablet 1 under the tongue as needed for angina, may repeat q5mins for up three doses 25 tablet 11    , Scheduled Meds:  atorvastatin, 40 mg, Oral, Daily  [Held by provider] bumetanide, 2 mg, Oral, Once per day on Sunday Monday Wednesday Friday  carvedilol, 12.5 mg, Oral, BID  cefTRIAXone, 1,000 mg, Intravenous, Q24H  dexAMETHasone, 6 mg, Oral, Daily   Or  dexAMETHasone, 6 mg, Intravenous, Daily  doxycycline, 100 mg, Oral, Q12H  lactobacillus acidophilus, 1 capsule, Oral, Daily  [START ON 11/14/2024] midodrine, 10 mg, Oral, Once per day on Tuesday Thursday  Saturday  remdesivir, 100 mg, Intravenous, Q24H  sodium chloride, 10 mL, Intravenous, Q12H  [START ON 11/14/2024] Sucroferric Oxyhydroxide, 3 tablet, Oral, TID With Meals  [START ON 11/14/2024] Sucroferric Oxyhydroxide, 1 tablet, Oral, BID  vitamin B-12, 1,000 mcg, Oral, Daily  warfarin, 5 mg, Oral, Daily   , Continuous Infusions:  Pharmacy to dose warfarin,    , PRN Meds:    acetaminophen **OR** acetaminophen **OR** acetaminophen    senna-docusate sodium **AND** polyethylene glycol **AND** bisacodyl **AND** bisacodyl    ipratropium-albuterol    nitroglycerin    ondansetron ODT **OR** ondansetron    Pharmacy to dose warfarin    sodium chloride    sodium chloride    sodium chloride, and Allergies:  Patient has no known allergies.    Objective     Vital Signs  Temp:  [96.6 °F (35.9 °C)-101 °F (38.3 °C)] 96.6 °F (35.9 °C)  Heart Rate:  [] 90  Resp:  [18-20] 18  BP: (126-159)/(60-83) 126/60    No intake/output data recorded.  I/O last 3 completed shifts:  In: 340 [P.O.:240; IV Piggyback:100]  Out: -     Physical Exam  Constitutional:       General: He is not in acute distress.     Appearance: Normal appearance. He is obese. He is ill-appearing.      Comments: LUE AVF with thrill   HENT:      Mouth/Throat:      Mouth: Mucous membranes are dry.   Eyes:      Pupils: Pupils are equal, round, and reactive to light.   Cardiovascular:      Rate and Rhythm: Normal rate.   Pulmonary:      Effort: Pulmonary effort is normal.   Abdominal:      General: There is distension.      Palpations: Abdomen is soft.      Tenderness: There is no abdominal tenderness.   Musculoskeletal:      Right lower leg: Edema present.      Left lower leg: Edema present.   Neurological:      General: No focal deficit present.   Psychiatric:         Mood and Affect: Mood normal.         Results Review:    I reviewed the patient's new clinical results.    WBC WBC   Date Value Ref Range Status   11/12/2024 5.98 3.40 - 10.80 10*3/mm3 Final   11/11/2024  "5.40 3.40 - 10.80 10*3/mm3 Final      HGB Hemoglobin   Date Value Ref Range Status   11/12/2024 11.8 (L) 13.0 - 17.7 g/dL Final   11/11/2024 10.8 (L) 13.0 - 17.7 g/dL Final      HCT Hematocrit   Date Value Ref Range Status   11/12/2024 37.8 37.5 - 51.0 % Final   11/11/2024 35.1 (L) 37.5 - 51.0 % Final      Platlets No results found for: \"LABPLAT\"   MCV MCV   Date Value Ref Range Status   11/12/2024 109.2 (H) 79.0 - 97.0 fL Final   11/11/2024 108.3 (H) 79.0 - 97.0 fL Final          Sodium Sodium   Date Value Ref Range Status   11/13/2024 138 136 - 145 mmol/L Final   11/12/2024 138 136 - 145 mmol/L Final   11/11/2024 141 136 - 145 mmol/L Final      Potassium Potassium   Date Value Ref Range Status   11/13/2024 4.5 3.5 - 5.2 mmol/L Final   11/12/2024 3.9 3.5 - 5.2 mmol/L Final   11/11/2024 3.8 3.5 - 5.2 mmol/L Final      Chloride Chloride   Date Value Ref Range Status   11/13/2024 96 (L) 98 - 107 mmol/L Final   11/12/2024 96 (L) 98 - 107 mmol/L Final   11/11/2024 99 98 - 107 mmol/L Final      CO2 CO2   Date Value Ref Range Status   11/13/2024 27.0 22.0 - 29.0 mmol/L Final   11/12/2024 30.0 (H) 22.0 - 29.0 mmol/L Final   11/11/2024 27.7 22.0 - 29.0 mmol/L Final      BUN BUN   Date Value Ref Range Status   11/13/2024 31 (H) 8 - 23 mg/dL Final   11/12/2024 22 8 - 23 mg/dL Final   11/11/2024 50 (H) 8 - 23 mg/dL Final      Creatinine Creatinine   Date Value Ref Range Status   11/13/2024 6.95 (H) 0.76 - 1.27 mg/dL Final   11/12/2024 5.61 (H) 0.76 - 1.27 mg/dL Final   11/11/2024 9.69 (H) 0.76 - 1.27 mg/dL Final      Calcium Calcium   Date Value Ref Range Status   11/13/2024 8.1 (L) 8.6 - 10.5 mg/dL Final   11/12/2024 8.6 8.6 - 10.5 mg/dL Final   11/11/2024 8.6 8.6 - 10.5 mg/dL Final      PO4 No results found for: \"CAPO4\"   Albumin Albumin   Date Value Ref Range Status   11/13/2024 3.3 (L) 3.5 - 5.2 g/dL Final   11/12/2024 3.8 3.5 - 5.2 g/dL Final   11/11/2024 3.6 3.5 - 5.2 g/dL Final      Magnesium Magnesium   Date Value Ref " "Range Status   11/13/2024 2.1 1.6 - 2.4 mg/dL Final   11/11/2024 2.3 1.6 - 2.4 mg/dL Final      Uric Acid No results found for: \"URICACID\"         Assessment & Plan       Pneumonia due to COVID-19 virus      Assessment & Plan      - ESRD on HD TTS- No missed treatment sessions  - HTN   - HFpEF  - Anemia of CKD: Hgb at goal - No indications for BOAZ  - CKD MBD: Check PTH, 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   - Volume status: No overt volume overload  - COVID- 19 PNA  - Afib / rate controlled     PLAN   HD in am as per OP HD schedule   Dose all meds to HD   Will continue managing ESRD related complications     I discussed the patients findings and my recommendations with patient    Shaina Juárez MD  11/13/24  09:26 EST          "

## 2024-11-13 NOTE — H&P
[facility]  HISTORY AND PHYSICAL    Patient Name: Marco Antonio Montemayor  : 1945  MRN: [id number]  Primary Care Physician: Heriberto Murcia MD  Date of admission: 2024      Subjective   Subjective     Chief Complaint:  COVID-19     HPI:  Marco Antonio Montemayor is a 79 y.o. male history of A-fib on Coumadin, CKD on hemodialysis, chronic diastolic congestive heart failure, ZULEIKA on CPAP presenting due to increased work of breathing and hypoxia. Patient found to COVID-19 PNA.     At the bedside is patient's wife and daughter. Symptoms started a few days ago which included diarrhea, HA, and productive clear cough. He does have a fever today. Denies head trauma. Did have dialysis today. Of note, patient left AMA from [facility] ED yesterday 2024.       Personal History     Past Medical History:   Diagnosis Date   • Abnormal ECG    • Arrhythmia    • Arthritis    • Asthma    • Cancer     skin cancer removed from various locations    • Chronic diastolic congestive heart failure 2018   • CKD (chronic kidney disease)     closer to stage 5   • Coronary artery disease    • Dry skin    • Dyslipidemia 2016   • Edema    • Heart murmur    • Hematoma    • Hemodialysis access site with arteriovenous graft    • History of transfusion     no transfusion reaction   • Hypertension    • Iron deficiency anemia    • Long term current use of anticoagulant    • ZULEIKA treated with BiPAP    • Persistent atrial fibrillation 2016     Patient notes dyspnea, fatigue and palpitations during episodes of afib.  multiple external cardioversionsChads vasc 2, anticoagulated with Coumadin   • Tinnitus    • Wears glasses     readers           Past Surgical History:   Procedure Laterality Date   • ABLATION OF DYSRHYTHMIC FOCUS     • APPENDECTOMY     • ARTERIOVENOUS FISTULA/SHUNT SURGERY Left 2022    Procedure: UPPER EXTREMITY BRACHIOCEPHALIC ARTERIOVENOUS FISTULA FORMATION LEFT;  Surgeon:  Jakob Reza MD;  Location:  [facility];  Service: Vascular;  Laterality: Left;   • ARTERIOVENOUS FISTULA/SHUNT SURGERY Left 11/03/2022    Procedure: ELEVATION LEFT UPPER EXTREMITY AV FISTULA;  Surgeon: Jakob Reza MD;  Location:  [facility];  Service: Vascular;  Laterality: Left;   • CARDIAC ELECTROPHYSIOLOGY PROCEDURE N/A 05/18/2018    Procedure: PPM generator change - dual       [facility]/ please schedule in 8 weeks;  Surgeon: Johnathan Taylor MD;  Location:  [facility];  Service: Cardiovascular   • CATARACT EXTRACTION      bilat wiht lens    • COLONOSCOPY     • ENDOSCOPY      Pt denies   • INSERT / REPLACE / REMOVE PACEMAKER  about 20 years back   • SKIN CANCER EXCISION      various locations    • TONSILLECTOMY     • VEIN SURGERY  for dialysis   • WISDOM TOOTH EXTRACTION         Family History: family history includes Arrhythmia in his father; Cancer in his mother; Heart attack in his father and mother; Hypertension in his father and mother.     Social History:  reports that he has never smoked. He has never been exposed to tobacco smoke. He has never used smokeless tobacco. He reports that he does not drink alcohol and does not use drugs.  Social History     Social History Narrative   • Not on file       Medications:  Available home medication information reviewed.  Sucroferric Oxyhydroxide, acetaminophen, atorvastatin, bumetanide, carvedilol, cefdinir, cholecalciferol, ferrous sulfate, midodrine, nitroglycerin, vitamin B-12, and warfarin    No Known Allergies    Objective   Objective     Vital Signs:   Temp:  [98.4 °F (36.9 °C)-101 °F (38.3 °C)] 100.7 °F (38.2 °C)  Heart Rate:  [] 102  Resp:  [18-20] 20  BP: (133-159)/(72-83) 133/77  Flow (L/min) (Oxygen Therapy):  [2.5] 2.5       Physical Exam   Constitutional:  male no acute distress, awake, alert  HENT: NCAT, mucous membranes moist  Respiratory: Decreased breath sounds at bases B/L, respiratory effort normal    Cardiovascular: S1-S2 no rubs or gallops  Gastrointestinal: Positive bowel sounds, soft, nontender, full   Musculoskeletal: No bilateral ankle edema, moves all four extremities   Psychiatric: Appropriate affect, cooperative  Neurologic: Oriented x 3, speech clear, non focal   Skin: No rashes    Result Review:  I have personally reviewed the results from the time of this admission to 11/12/2024 21:54 EST and agree with these findings:  [x]  Laboratory list / accordion  []  Microbiology  []  Radiology  []  EKG/Telemetry   []  Cardiology/Vascular   []  Pathology  []  Old records  []  Other:        LAB RESULTS:      Lab 11/12/24  1518 11/11/24  1426   WBC 5.98 5.40   HEMOGLOBIN 11.8* 10.8*   HEMATOCRIT 37.8 35.1*   PLATELETS 129* 133*   NEUTROS ABS 4.84 4.00   IMMATURE GRANS (ABS) 0.04  --    LYMPHS ABS 0.46*  --    MONOS ABS 0.61  --    EOS ABS 0.01  --    .2* 108.3*   PROCALCITONIN 0.63*  --    LACTATE 1.6  --    PROTIME 23.1* 20.8*   INR 2.04* 1.72*         Lab 11/12/24  1518 11/11/24  1426 11/06/24  1415   SODIUM 138 141  --    POTASSIUM 3.9 3.8  --    CHLORIDE 96* 99  --    CO2 30.0* 27.7  --    ANION GAP 12.0 14.3  --    BUN 22 50*  --    CREATININE 5.61* 9.69*  --    EGFR 9.5* 5.0*  --    GLUCOSE 112* 134*  --    CALCIUM 8.6 8.6  --    MAGNESIUM  --  2.3  --    HEMOGLOBIN A1C  --   --  5.8*         Lab 11/12/24  1518 11/11/24  1426   TOTAL PROTEIN 7.0 6.4   ALBUMIN 3.8 3.6   GLOBULIN 3.2 2.8   ALT (SGPT) 8 <5   AST (SGOT) 24 25   BILIRUBIN 0.6 0.5   ALK PHOS 123* 115         Lab 11/12/24  1811 11/12/24  1518 11/11/24  1627 11/11/24  1426   PROBNP  --  21,630.0*  --  21,061.0*   HSTROP T 200* 207* 217* 220*                 Lab 11/12/24  1624   PH, ARTERIAL 7.384   PCO2, ARTERIAL 53.0*   PO2 .0*   FIO2 32   HCO3 ART 31.7*   BASE EXCESS ART 5.4*   CARBOXYHEMOGLOBIN 0.5         Microbiology Results (last 10 days)       Procedure Component Value - Date/Time    Respiratory Panel PCR  w/COVID-19(SARS-CoV-2) [facility] In-House, NP Swab in UTM/VTM, 2 HR TAT - Swab, Nasopharynx [[id number]]  (Abnormal) Collected: 11/11/24 1428    Lab Status: Final result Specimen: Swab from Nasopharynx Updated: 11/11/24 1526     ADENOVIRUS, PCR Not Detected     Coronavirus 229E Not Detected     Coronavirus HKU1 Not Detected     Coronavirus NL63 Not Detected     Coronavirus OC43 Not Detected     COVID19 Detected     Human Metapneumovirus Not Detected     Human Rhinovirus/Enterovirus Not Detected     Influenza A PCR Not Detected     Influenza B PCR Not Detected     Parainfluenza Virus 1 Not Detected     Parainfluenza Virus 2 Not Detected     Parainfluenza Virus 3 Not Detected     Parainfluenza Virus 4 Not Detected     RSV, PCR Not Detected     Bordetella pertussis pcr Not Detected     Bordetella parapertussis PCR Not Detected     Chlamydophila pneumoniae PCR Not Detected     Mycoplasma pneumo by PCR Not Detected    Narrative:      In the setting of a positive respiratory panel with a viral infection PLUS a negative procalcitonin without other underlying concern for bacterial infection, consider observing off antibiotics or discontinuation of antibiotics and continue supportive care. If the respiratory panel is positive for atypical bacterial infection (Bordetella pertussis, Chlamydophila pneumoniae, or Mycoplasma pneumoniae), consider antibiotic de-escalation to target atypical bacterial infection.            XR Chest 1 View    Result Date: 11/12/2024  XR CHEST 1 VW Date of Exam: 11/12/2024 4:37 PM EST Indication: SOA triage protocol Comparison: 11/11/2024. Findings: Left lower lobe consolidation. Vascular congestion. Dual-lead cardiac pacemaker. The clavicles are intact. No rib fractures. No pneumothorax or pleural effusion. Atheromatous disease of the aortic arch.     Impression: Left lower lobe consolidation and vascular congestion. Electronically Signed: Joss Daniels MD  11/12/2024 5:01 PM EST   Workstation ID: [id number]    CT Angiogram Chest Pulmonary Embolism    Result Date: 11/11/2024  CT ANGIOGRAM CHEST PULMONARY EMBOLISM Date of Exam: 11/11/2024 7:00 PM EST Indication: Shortness of breath. Comparison: None available. Technique: Axial CT images were obtained of the chest after the uneventful intravenous administration of 90 cc Isovue 370 IV contrast utilizing pulmonary embolism protocol.  Reconstructed coronal and sagittal images were also obtained. Automated exposure  control and iterative construction methods were used. Findings: Patchy areas of glass within both lungs. Cardiac pacemaker/defibrillator leads. No lobar consolidation. No pneumothorax or pleural effusion. No evidence of aortic dissection. No pulmonary embolus is appreciated. No pericardial effusion. Limited  evaluation of the upper abdomen is normal. Thoracic vertebral body height and alignment are normal. No lytic or blastic disease. No rib fractures are seen.     Impression: 1. No pulmonary embolus. 2. Patchy areas of groundglass noted within both lungs. Differential considerations include atypical pneumonia such as COVID-19. Additional differential considerations may include metastatic disease or developing pneumonia however these are considered less likely Electronically Signed: Joss Daniels MD  11/11/2024 7:38 PM EST  Workstation ID: [id number]    XR Chest 1 View    Result Date: 11/11/2024  XR CHEST 1 VW Date of Exam: 11/11/2024 2:18 PM EST Indication: Weak/Dizzy/AMS triage protocol Comparison: 2/29/2024 Findings: Left chest wall ICD again noted. There is continued moderately severe cardiomegaly. No acute infiltrates or effusions identified. Exam is somewhat limited by portable technique with obesity.     Impression: Impression: Stable cardiomegaly. No new abnormality. Electronically Signed: Cat Vieira MD  11/11/2024 2:56 PM EST  Workstation ID: [id number]     Results for orders placed in visit on 01/17/24    Adult Transthoracic Echo  Complete W/ Cont if Necessary Per Protocol    Interpretation Summary  •  Left ventricular systolic function is normal. Calculated left ventricular EF = 55.5% Left ventricular ejection fraction appears to be 56 - 60%.  •  Left ventricular wall thickness is consistent with mild concentric hypertrophy.  •  Left ventricular diastolic function was normal.  •  The left atrial cavity is mildly dilated.  •  Estimated right ventricular systolic pressure from tricuspid regurgitation is mildly elevated (35-45 mmHg). Calculated right ventricular systolic pressure from tricuspid regurgitation is 40 mmHg.    Septal thickness and atrial volume both improved on today's study compared to 2023      Assessment & Plan   Assessment & Plan     Marco Antonio Montemayor is a 79 y.o. male history of A-fib on Coumadin, CKD on hemodialysis, chronic diastolic congestive heart failure, ZULEIKA on CPAP presenting due to increased work of breathing and hypoxia. Patient found to COVID-19 PNA.     Sepsis due to COVID-19 PNA with concern for superimposed bacterial PNA  LLL Consolidation   Acute hypoxic respiratory failure   -CTA: no pulmonary embolus. Patchy areas of groundglass noted within both lungs. Differential considerations include atypical pneumonia   -XR: vascular congestion   -CTH pending   -start Ceftriaxone and doxycycline   -Decadron and remdesivir (family and patient aware of starting medication)  -wean O2 as tolerated   -follow blood cx    Afib on Warfarin  -pharmacy consulted for dosing   -continue home BB    Thrombocytopenia  -likely due to active infection. Monitor     CHFpEF  -01/17/2024: ECHO EF: 56-60%   -Bumex 2 mg on nondialysis days.  Will hold at this time as patient has had poor appetite  -Encourage oral intake     ESRD on HD  -Tu/Th/Sat. Last session 11/12  -On Velphoro however this is not on formulary at the hospital.   -Consult nephrology  -Continue midodrine on dialysis days         VTE Prophylaxis:  Pharmacologic & mechanical  VTE prophylaxis orders are present.          CODE STATUS:    Code Status and Medical Interventions: CPR (Attempt to Resuscitate); Full Support   Ordered at: 11/12/24 2047     Level Of Support Discussed With:    Patient     Code Status (Patient has no pulse and is not breathing):    CPR (Attempt to Resuscitate)     Medical Interventions (Patient has pulse or is breathing):    Full Support       Expected Discharge   Expected discharge date/ time has not been documented.     Augusta Cortes MD  11/12/24

## 2024-11-14 NOTE — PLAN OF CARE
Goal Outcome Evaluation:  Plan of Care Reviewed With: patient, spouse, family           Outcome Evaluation: Patient presents with deficits in strength, endurance, and balance. He was able to ambulate in room with CGA and FWW with increased SOA compared to his baseline. IPPT is indicated to address current deficits. Recommend D/C home with assist and HHPT when medically appropriate.    Anticipated Discharge Disposition (PT): home with assist, home with home health

## 2024-11-14 NOTE — PLAN OF CARE
Problem: Hemodialysis  Goal: Safe, Effective Therapy Delivery  Outcome: Progressing  Goal: Effective Tissue Perfusion  Outcome: Progressing  Goal: Absence of Infection Signs and Symptoms  Outcome: Progressing   Goal Outcome Evaluation:

## 2024-11-14 NOTE — PLAN OF CARE
Goal Outcome Evaluation:  Plan of Care Reviewed With: patient, spouse, family           Outcome Evaluation: Pt presents below baseline with self care/mobility d/t weakness, decr balance and activity tolerance.  Pt CGA to don socks, CGA to ambulate with RW.  OT will follow to advance pt toward PLOF.  Recommend home with assist and HHOT.    Anticipated Discharge Disposition (OT): home with assist, home with home health

## 2024-11-14 NOTE — THERAPY EVALUATION
Patient Name: Marco Antonio Montemayor  : 1945    MRN: [id number]                              Today's Date: 2024       Admit Date: 2024    Visit Dx:     ICD-10-CM ICD-9-CM   1. Pneumonia due to COVID-19 virus  U07.1 480.8    J12.82 079.89   2. Acute respiratory failure with hypoxia  J96.01 518.81     Patient Active Problem List   Diagnosis    Permanent atrial fibrillation    Coronary artery disease    Dyslipidemia    Hypertension    Morbid obesity    Hypertensive heart disease with congestive heart failure    Severe obstructive sleep apnea    Dependence on nocturnal oxygen therapy    Other abnormal glucose     Stage 4 chronic kidney disease    Pacemaker    Anemia    Chronic atrial fibrillation    Bradycardia    Anemia due to stage 4 chronic kidney disease treated with erythropoietin    Stroke-like symptoms    Change in vision    ESRD on HD T,R,S since     Fatty liver    Pneumonia due to COVID-19 virus     Past Medical History:   Diagnosis Date    Abnormal ECG     Arrhythmia     Arthritis     Asthma     Cancer     skin cancer removed from various locations     Chronic diastolic congestive heart failure 2018    CKD (chronic kidney disease)     closer to stage 5    Coronary artery disease     Dry skin     Dyslipidemia 2016    Edema     Heart murmur     Hematoma     Hemodialysis access site with arteriovenous graft     History of transfusion     no transfusion reaction    Hypertension     Iron deficiency anemia     Long term current use of anticoagulant     ZULIEKA treated with BiPAP     Persistent atrial fibrillation 2016     Patient notes dyspnea, fatigue and palpitations during episodes of afib.  multiple external cardioversionsChads vasc 2, anticoagulated with Coumadin    Tinnitus     Wears glasses     readers     Past Surgical History:   Procedure Laterality Date    ABLATION OF DYSRHYTHMIC FOCUS      APPENDECTOMY      ARTERIOVENOUS FISTULA/SHUNT SURGERY Left 2022    Procedure:  UPPER EXTREMITY BRACHIOCEPHALIC ARTERIOVENOUS FISTULA FORMATION LEFT;  Surgeon: Jakob Reza MD;  Location:  [facility];  Service: Vascular;  Laterality: Left;    ARTERIOVENOUS FISTULA/SHUNT SURGERY Left 11/03/2022    Procedure: ELEVATION LEFT UPPER EXTREMITY AV FISTULA;  Surgeon: Jakob Reza MD;  Location:  [facility];  Service: Vascular;  Laterality: Left;    CARDIAC ELECTROPHYSIOLOGY PROCEDURE N/A 05/18/2018    Procedure: PPM generator change - dual       [place]/ please schedule in 8 weeks;  Surgeon: Johnathan Taylor MD;  Location:  [facility];  Service: Cardiovascular    CATARACT EXTRACTION      bilat wiht lens     COLONOSCOPY      ENDOSCOPY      Pt denies    INSERT / REPLACE / REMOVE PACEMAKER  about 20 years back    SKIN CANCER EXCISION      various locations     TONSILLECTOMY      VEIN SURGERY  for dialysis    WISDOM TOOTH EXTRACTION        General Information       Row Name 11/14/24 1404          OT Time and Intention    Document Type evaluation  -AC     Mode of Treatment occupational therapy  -AC     Patient Effort good  -AC       Row Name 11/14/24 1404          General Information    Patient Profile Reviewed yes  -AC     Prior Level of Function independent:;all household mobility;community mobility;ADL's;driving  has 4 wheeled walker, but does not use  -AC     Existing Precautions/Restrictions fall  contact/airborne, [place]  -AC     Barriers to Rehab hearing deficit  -AC       Row Name 11/14/24 1401          Occupational Profile    Environmental Supports and Barriers (Occupational Profile) tub shower  -AC       Row Name 11/14/24 1403          Living Environment    People in Home spouse  -AC       Row Name 11/14/24 1404          Home Main Entrance    Number of Stairs, Main Entrance one;other (see comments)  1 step in front entrance with no HR, back entry has a ramp  -AC       Row Name 11/14/24 1404          Stairs Within Home, Primary    Number of Stairs, Within Home,  Primary none  -       Row Name 11/14/24 1404          Cognition    Orientation Status (Cognition) oriented x 3  -       Row Name 11/14/24 1404          Safety Issues/Impairments Affecting Functional Mobility    Safety Issues Affecting Function (Mobility) awareness of need for assistance;insight into deficits/self-awareness;safety precaution awareness  -     Impairments Affecting Function (Mobility) balance;endurance/activity tolerance;shortness of breath;strength  -               User Key  (r) = Recorded By, (t) = Taken By, (c) = Cosigned By      Initials Name Provider Type     Clau Anguiano, OT Occupational Therapist                     Mobility/ADL's       Row Name 11/14/24 1446          Bed Mobility    Bed Mobility sit-supine;scooting/bridging  -     Scooting/Bridging [place] (Bed Mobility) standby assist;verbal cues  bed flat to scoot to HOB  -     Sit-Supine [place] (Bed Mobility) standby assist  -     Assistive Device (Bed Mobility) head of bed elevated  -     Comment, (Bed Mobility) increased time and effort  -       Row Name 11/14/24 1446          Transfers    Transfers sit-stand transfer  -       Row Name 11/14/24 1446          Sit-Stand Transfer    Sit-Stand [place] (Transfers) contact guard  -     Assistive Device (Sit-Stand Transfers) walker, front-wheeled  -       Row Name 11/14/24 1446          Functional Mobility    Functional Mobility- Ind. Level verbal cues required;contact guard assist  -     Functional Mobility- Device walker, front-wheeled  -AC     Functional Mobility-Distance (Feet) 20  -       Row Name 11/14/24 1446          Activities of Daily Living    BADL Assessment/Intervention lower body dressing  -       Row Name 11/14/24 1446          Lower Body Dressing Assessment/Training    [place] Level (Lower Body Dressing) don;socks;contact guard assist  -AC     Position (Lower Body Dressing) edge of bed sitting  -               User Key  (r)  = Recorded By, (t) = Taken By, (c) = Cosigned By      Initials Name Provider Type     Clau Anguiano, OT Occupational Therapist                   Obj/Interventions       Row Name 11/14/24 1447          Sensory Assessment (Somatosensory)    Sensory Assessment (Somatosensory) UE sensation intact  -AC       Row Name 11/14/24 1447          Vision Assessment/Intervention    Visual Impairment/Limitations corrective lenses for reading  -AC       Row Name 11/14/24 1447          Range of Motion Comprehensive    General Range of Motion bilateral upper extremity ROM WNL  -AC       Row Name 11/14/24 1447          Strength Comprehensive (MMT)    Comment, General Manual Muscle Testing (MMT) Assessment BUE grossly 4/5  -[facility] Name 11/14/24 1447          Balance    Balance Assessment sitting static balance;sitting dynamic balance;standing static balance;standing dynamic balance  -     Static Sitting Balance standby assist  -     Dynamic Sitting Balance contact guard  -AC     Position, Sitting Balance unsupported;sitting edge of bed  -AC     Static Standing Balance verbal cues;contact guard  -AC     Dynamic Standing Balance verbal cues;contact guard  -AC     Position/Device Used, Standing Balance supported;walker, front-wheeled  -AC               User Key  (r) = Recorded By, (t) = Taken By, (c) = Cosigned By      Initials Name Provider Type    AC Clau Anguiano, OT Occupational Therapist                   Goals/Plan       Row Name 11/14/24 4797          Transfer Goal 1 (OT)    Activity/Assistive Device (Transfer Goal 1, OT) bed-to-chair/chair-to-bed;toilet;walker, rolling  -     [place] Level/Cues Needed (Transfer Goal 1, OT) standby assist  -     Time Frame (Transfer Goal 1, OT) long term goal (LTG);10 days  -     Progress/Outcome (Transfer Goal 1, OT) new goal;goal ongoing  -AC       Row Name 11/14/24 0922          Dressing Goal 1 (OT)    Activity/Device (Dressing Goal 1, OT) lower body dressing  -      [place]/Cues Needed (Dressing Goal 1, OT) standby assist  -AC     Time Frame (Dressing Goal 1, OT) short term goal (STG);5 days  -AC     Progress/Outcome (Dressing Goal 1, OT) new goal;goal ongoing  -AC       Row Name 11/14/24 4001          Toileting Goal 1 (OT)    Activity/Device (Toileting Goal 1, OT) adjust/manage clothing;perform perineal hygiene  -AC     [place] Level/Cues Needed (Toileting Goal 1, OT) standby assist  -AC     Time Frame (Toileting Goal 1, OT) short term goal (STG);5 days  -AC     Progress/Outcome (Toileting Goal 1, OT) new goal;goal ongoing  -       Row Name 11/14/24 6721          Therapy Assessment/Plan (OT)    Planned Therapy Interventions (OT) activity tolerance training;BADL retraining;functional balance retraining;occupation/activity based interventions;patient/caregiver education/training;transfer/mobility retraining  -               User Key  (r) = Recorded By, (t) = Taken By, (c) = Cosigned By      Initials Name Provider Type    AC Clau Anguiano, OT Occupational Therapist                   Clinical Impression       Row Name 11/14/24 6577          Pain Assessment    Pretreatment Pain Rating 7/10  -     Posttreatment Pain Rating 7/10  -     Pain Location neck  -AC     Pain Management Interventions exercise or physical activity utilized  -     Response to Pain Interventions activity participation with tolerable pain  -       Row Name 11/14/24 4991          Plan of Care Review    Plan of Care Reviewed With patient;spouse;family  -     Outcome Evaluation Pt presents below baseline with self care/mobility d/t weakness, decr balance and activity tolerance.  Pt CGA to don socks, CGA to ambulate with RW.  OT will follow to advance pt toward PLOF.  Recommend home with assist and HHOT.  -       Row Name 11/14/24 7353          Therapy Assessment/Plan (OT)    Criteria for Skilled Therapeutic Interventions Met (OT) yes;skilled treatment is necessary  -     Therapy  Frequency (OT) daily  -AC       Row Name 11/14/24 1448          Therapy Plan Review/Discharge Plan (OT)    Anticipated Discharge Disposition (OT) home with assist;home with home health  -       Row Name 11/14/24 1448          Vital Signs    Pre Systolic BP Rehab 111  -AC     Pre Treatment Diastolic BP 64  -AC     Pretreatment Heart Rate (beats/min) 104  -AC     Posttreatment Heart Rate (beats/min) 91  -AC     O2 Delivery Pre Treatment supplemental O2  -AC     Intra SpO2 (%) 92  -AC     O2 Delivery Intra Treatment supplemental O2  -AC     Post SpO2 (%) 95  -AC     O2 Delivery Post Treatment supplemental O2  -AC     Pre Patient Position Sitting  -AC     Post Patient Position Supine  -AC       Row Name 11/14/24 1448          Positioning and Restraints    Pre-Treatment Position in bed  -AC     Post Treatment Position bed  -AC     In Bed supine;call light within reach;encouraged to call for assist;exit alarm on;with family/caregiver  -               User Key  (r) = Recorded By, (t) = Taken By, (c) = Cosigned By      Initials Name Provider Type    Clau Mccarty, OT Occupational Therapist                   Outcome Measures       Row Name 11/14/24 1458          How much help from another is currently needed...    Putting on and taking off regular lower body clothing? 3  -AC     Bathing (including washing, rinsing, and drying) 3  -AC     Toileting (which includes using toilet bed pan or urinal) 3  -AC     Putting on and taking off regular upper body clothing 3  -AC     Taking care of personal grooming (such as brushing teeth) 3  -AC     Eating meals 4  -AC     AM-PAC 6 Clicks Score (OT) 19  -       Row Name 11/14/24 1450          Functional Assessment    Outcome Measure Options AM-PAC 6 Clicks Daily Activity (OT)  -               User Key  (r) = Recorded By, (t) = Taken By, (c) = Cosigned By      Initials Name Provider Type    Clau Mccarty, OT Occupational Therapist                    Occupational Therapy  Education       Title: PT OT SLP Therapies (In Progress)       Topic: Occupational Therapy (In Progress)       Point: ADL training (In Progress)       Description:   Instruct learner(s) on proper safety adaptation and remediation techniques during self care or transfers.   Instruct in proper use of assistive devices.                  Learning Progress Summary            Patient Acceptance, E, NR by  at 11/14/2024 6115                      Point: Home exercise program (Not Started)       Description:   Instruct learner(s) on appropriate technique for monitoring, assisting and/or progressing therapeutic exercises/activities.                  Learner Progress:  Not documented in this visit.              Point: Precautions (Not Started)       Description:   Instruct learner(s) on prescribed precautions during self-care and functional transfers.                  Learner Progress:  Not documented in this visit.              Point: Body mechanics (Not Started)       Description:   Instruct learner(s) on proper positioning and spine alignment during self-care, functional mobility activities and/or exercises.                  Learner Progress:  Not documented in this visit.                              User Key       Initials Effective Dates Name Provider Type Discipline     02/03/23 -  Clau Anguiano, OT Occupational Therapist OT                  OT Recommendation and Plan  Planned Therapy Interventions (OT): activity tolerance training, BADL retraining, functional balance retraining, occupation/activity based interventions, patient/caregiver education/training, transfer/mobility retraining  Therapy Frequency (OT): daily  Plan of Care Review  Plan of Care Reviewed With: patient, spouse, family  Outcome Evaluation: Pt presents below baseline with self care/mobility d/t weakness, decr balance and activity tolerance.  Pt CGA to don socks, CGA to ambulate with RW.  OT will follow to advance pt toward PLOF.  Recommend home  with assist and HHOT.     Time Calculation:   Evaluation Complexity (OT)  Review Occupational Profile/Medical/Therapy History Complexity: brief/low complexity  Assessment, Occupational Performance/Identification of Deficit Complexity: 1-3 performance deficits  Clinical Decision Making Complexity (OT): problem focused assessment/low complexity  Overall Complexity of Evaluation (OT): low complexity     Time Calculation- OT       Row Name 11/14/24 1404             Time Calculation- OT    OT Start Time 1404  -AC      OT Received On 11/14/24  -AC      OT Goal Re-Cert Due Date 11/24/24  -AC         Untimed Charges    OT Eval/Re-eval Minutes 50  -AC         Total Minutes    Untimed Charges Total Minutes 50  -AC       Total Minutes 50  -AC                User Key  (r) = Recorded By, (t) = Taken By, (c) = Cosigned By      Initials Name Provider Type    AC Clau Anguiano, OT Occupational Therapist                  Therapy Charges for Today       Code Description Service Date Service Provider Modifiers Qty    [id number] HC OT EVAL LOW COMPLEXITY 4 11/14/2024 Clau Anguiano OT GO 1                 Clau Anguiano OT  11/14/2024

## 2024-11-14 NOTE — PLAN OF CARE
Problem: Adult Inpatient Plan of Care  Goal: Plan of Care Review  Outcome: Progressing  Goal: Patient-Specific Goal (Individualized)  Outcome: Progressing  Goal: Absence of Hospital-Acquired Illness or Injury  Outcome: Progressing  Intervention: Identify and Manage Fall Risk  Description: Perform standard risk assessment on admission using a validated tool or comprehensive approach appropriate to the patient; reassess fall risk frequently, with change in status or transfer to another level of care.  Communicate risk to interprofessional healthcare team; ensure fall risk visible cue.  Determine need for increased observation, equipment and environmental modification, as well as use of supportive, nonskid footwear.  Adjust safety measures to individual needs and identified risk factors.  Reinforce the importance of active participation with fall risk prevention, safety, and physical activity with the patient and family.  Perform regular intentional rounding to assess need for position change, pain assessment and personal needs, including assistance with toileting.  Recent Flowsheet Documentation  Taken 11/14/2024 1600 by Nita Diaz, RN  Safety Promotion/Fall Prevention:   activity supervised   assistive device/personal items within reach   clutter free environment maintained   fall prevention program maintained   nonskid shoes/slippers when out of bed   room organization consistent   safety round/check completed  Taken 11/14/2024 1200 by Nita Diaz, RN  Safety Promotion/Fall Prevention:   activity supervised   assistive device/personal items within reach   clutter free environment maintained   fall prevention program maintained   nonskid shoes/slippers when out of bed   room organization consistent   safety round/check completed  Intervention: Prevent Skin Injury  Description: Perform a screening for skin injury risk, such as pressure or moisture-associated skin damage on admission and at regular intervals  throughout hospital stay.  Keep all areas of skin (especially folds) clean and dry.  Maintain adequate skin hydration.  Relieve and redistribute pressure and protect bony prominences and skin at risk for injury; implement measures based on patient-specific risk factors.  Match turning and repositioning schedule to clinical condition.  Encourage weight shift frequently; assist with reposition if unable to complete independently.  Float heels off bed; avoid pressure on the Achilles tendon.  Keep skin free from extended contact with medical devices.  Optimize nutrition and hydration.  Encourage functional activity and mobility, as early as tolerated.  Use aids (e.g., slide boards, mechanical lift) during transfer.  Recent Flowsheet Documentation  Taken 11/14/2024 1600 by Nita Diaz RN  Body Position:   position changed independently   lower extremity elevated   neutral body alignment   neutral head position  Taken 11/14/2024 1200 by Nita Diaz RN  Body Position:   position changed independently   lower extremity elevated   neutral body alignment   neutral head position  Skin Protection:   incontinence pads utilized   skin sealant/moisture barrier applied   silicone foam dressing in place  Intervention: Prevent Infection  Description: Maintain skin and mucous membrane integrity; promote hand, oral and pulmonary hygiene.  Optimize fluid balance, nutrition, sleep and glycemic control to maximize infection resistance.  Identify potential sources of infection early to prevent or mitigate progression of infection (e.g., wound, lines, devices).  Evaluate ongoing need for invasive devices; remove promptly when no longer indicated.  Review vaccination status.  Recent Flowsheet Documentation  Taken 11/14/2024 1600 by Nita Daiz RN  Infection Prevention:   environmental surveillance performed   equipment surfaces disinfected   hand hygiene promoted   personal protective equipment utilized   rest/sleep promoted    single patient room provided  Taken 11/14/2024 1200 by Nita Diaz RN  Infection Prevention:   environmental surveillance performed   equipment surfaces disinfected   hand hygiene promoted   personal protective equipment utilized   single patient room provided   rest/sleep promoted  Goal: Optimal Comfort and Wellbeing  Outcome: Progressing  Intervention: Monitor Pain and Promote Comfort  Description: Assess pain level, treatment efficacy and patient response at regular intervals using a consistent pain scale.  Consider the presence and impact of preexisting chronic pain.  Encourage patient and caregiver involvement in pain assessment, interventions and safety measures.  Promote activity; balance with sleep and rest to enhance healing.  Recent Flowsheet Documentation  Taken 11/14/2024 1200 by Nita Diaz RN  Pain Management Interventions:   pillow support provided   position adjusted   quiet environment facilitated  Intervention: Provide Person-Centered Care  Description: Use a family-focused approach to care; encourage support system presence and participation.  Develop trust and rapport by proactively providing information, encouraging questions, addressing concerns and offering reassurance.  Acknowledge emotional response to hospitalization.  Recognize and utilize personal coping strategies and strengths; develop goals via shared decision-making.  Honor spiritual and cultural preferences.  Recent Flowsheet Documentation  Taken 11/14/2024 1200 by Nita Diaz RN  Trust Relationship/Rapport:   care explained   choices provided   questions answered   questions encouraged   reassurance provided   thoughts/feelings acknowledged  Goal: Readiness for Transition of Care  Outcome: Progressing  Goal: Plan of Care Review  Outcome: Progressing  Goal: Patient-Specific Goal (Individualized)  Outcome: Progressing  Goal: Absence of Hospital-Acquired Illness or Injury  Outcome: Progressing  Intervention: Identify and  Manage Fall Risk  Description: Perform standard risk assessment on admission using a validated tool or comprehensive approach appropriate to the patient; reassess fall risk frequently, with change in status or transfer to another level of care.  Communicate risk to interprofessional healthcare team; ensure fall risk visible cue.  Determine need for increased observation, equipment and environmental modification, as well as use of supportive, nonskid footwear.  Adjust safety measures to individual needs and identified risk factors.  Reinforce the importance of active participation with fall risk prevention, safety, and physical activity with the patient and family.  Perform regular intentional rounding to assess need for position change, pain assessment and personal needs, including assistance with toileting.  Recent Flowsheet Documentation  Taken 11/14/2024 1600 by Nita Diaz RN  Safety Promotion/Fall Prevention:   activity supervised   assistive device/personal items within reach   clutter free environment maintained   fall prevention program maintained   nonskid shoes/slippers when out of bed   room organization consistent   safety round/check completed  Taken 11/14/2024 1200 by Nita Diaz RN  Safety Promotion/Fall Prevention:   activity supervised   assistive device/personal items within reach   clutter free environment maintained   fall prevention program maintained   nonskid shoes/slippers when out of bed   room organization consistent   safety round/check completed  Intervention: Prevent Skin Injury  Description: Perform a screening for skin injury risk, such as pressure or moisture-associated skin damage on admission and at regular intervals throughout hospital stay.  Keep all areas of skin (especially folds) clean and dry.  Maintain adequate skin hydration.  Relieve and redistribute pressure and protect bony prominences and skin at risk for injury; implement measures based on patient-specific  risk factors.  Match turning and repositioning schedule to clinical condition.  Encourage weight shift frequently; assist with reposition if unable to complete independently.  Float heels off bed; avoid pressure on the Achilles tendon.  Keep skin free from extended contact with medical devices.  Optimize nutrition and hydration.  Encourage functional activity and mobility, as early as tolerated.  Use aids (e.g., slide boards, mechanical lift) during transfer.  Recent Flowsheet Documentation  Taken 11/14/2024 1600 by Nita Diaz RN  Body Position:   position changed independently   lower extremity elevated   neutral body alignment   neutral head position  Taken 11/14/2024 1200 by Nita Diaz RN  Body Position:   position changed independently   lower extremity elevated   neutral body alignment   neutral head position  Skin Protection:   incontinence pads utilized   skin sealant/moisture barrier applied   silicone foam dressing in place  Intervention: Prevent Infection  Description: Maintain skin and mucous membrane integrity; promote hand, oral and pulmonary hygiene.  Optimize fluid balance, nutrition, sleep and glycemic control to maximize infection resistance.  Identify potential sources of infection early to prevent or mitigate progression of infection (e.g., wound, lines, devices).  Evaluate ongoing need for invasive devices; remove promptly when no longer indicated.  Review vaccination status.  Recent Flowsheet Documentation  Taken 11/14/2024 1600 by Nita Diaz RN  Infection Prevention:   environmental surveillance performed   equipment surfaces disinfected   hand hygiene promoted   personal protective equipment utilized   rest/sleep promoted   single patient room provided  Taken 11/14/2024 1200 by Nita Diaz RN  Infection Prevention:   environmental surveillance performed   equipment surfaces disinfected   hand hygiene promoted   personal protective equipment utilized   single patient room  provided   rest/sleep promoted  Goal: Optimal Comfort and Wellbeing  Outcome: Progressing  Intervention: Monitor Pain and Promote Comfort  Description: Assess pain level, treatment efficacy and patient response at regular intervals using a consistent pain scale.  Consider the presence and impact of preexisting chronic pain.  Encourage patient and caregiver involvement in pain assessment, interventions and safety measures.  Promote activity; balance with sleep and rest to enhance healing.  Recent Flowsheet Documentation  Taken 11/14/2024 1200 by Nita Diaz RN  Pain Management Interventions:   pillow support provided   position adjusted   quiet environment facilitated  Intervention: Provide Person-Centered Care  Description: Use a family-focused approach to care; encourage support system presence and participation.  Develop trust and rapport by proactively providing information, encouraging questions, addressing concerns and offering reassurance.  Acknowledge emotional response to hospitalization.  Recognize and utilize personal coping strategies and strengths; develop goals via shared decision-making.  Honor spiritual and cultural preferences.  Recent Flowsheet Documentation  Taken 11/14/2024 1200 by Nita Diaz RN  Trust Relationship/Rapport:   care explained   choices provided   questions answered   questions encouraged   reassurance provided   thoughts/feelings acknowledged  Goal: Readiness for Transition of Care  Outcome: Progressing   Goal Outcome Evaluation:

## 2024-11-14 NOTE — PROGRESS NOTES
[facility]  PROGRESS NOTE    Patient Name: Marco Antonio Montemayor  : 1945  MRN: [id number]    Date of Admission: 2024  Primary Care Physician: Heriberto Murcia MD    Subjective   Subjective     CC:  F/u shortness of breath    HPI:  He continues to have episodes where he pulls off his oxygen. I discussed importance of keeping this on since his saturations drop dramatically. Otherwise has no chest or respiratory complaints. Seen in dialysis      Objective   Objective     Vital Signs:   Temp:  [98.2 °F (36.8 °C)-99.3 °F (37.4 °C)] 98.2 °F (36.8 °C)  Heart Rate:  [] 97  Resp:  [16-20] 20  BP: ()/(53-70) 114/59  Flow (L/min) (Oxygen Therapy):  [4-6] 6     Physical Exam:  Constitutional: No acute distress, awake, alert, obese  HENT: NCAT, mucous membranes moist  Respiratory: Clear to auscultation bilaterally, respiratory effort normal . On 6L NC  Cardiovascular: RRR, no murmurs, rubs, or gallops  Gastrointestinal: Positive bowel sounds, soft, nontender, nondistended  Musculoskeletal: No bilateral ankle edema  Psychiatric: Appropriate affect, cooperative  Neurologic: Oriented x 3, no focal deficits  Skin: No rashes      Results Reviewed:  LAB RESULTS:      Lab 24  0450 24  0503 24  2138 24  1811 24  1518 24  1627 24  1426   WBC 7.57  --   --   --  5.98  --  5.40   HEMOGLOBIN 9.8*  --   --   --  11.8*  --  10.8*   HEMATOCRIT 33.7*  --   --   --  37.8  --  35.1*   PLATELETS 143  --   --   --  129*  --  133*   NEUTROS ABS 5.72  --   --   --  4.84  --  4.00   IMMATURE GRANS (ABS) 0.15*  --   --   --  0.04  --   --    LYMPHS ABS 0.70  --   --   --  0.46*  --   --    MONOS ABS 0.98*  --   --   --  0.61  --   --    EOS ABS 0.00  --   --   --  0.01  --   --    .0*  --   --   --  109.2*  --  108.3*   PROCALCITONIN  --   --   --   --  0.63*  --   --    LACTATE  --   --   --   --  1.6  --   --    PROTIME 23.3* 24.2*  23.4*  --  23.1*  --  20.8*   HSTROP T  --   --   --  200* 207* 217* 220*         Lab 11/14/24  0831 11/13/24  0503 11/12/24  1518 11/11/24  1426   SODIUM 141 138 138 141   POTASSIUM 4.1 4.5 3.9 3.8   CHLORIDE 101 96* 96* 99   CO2 24.0 27.0 30.0* 27.7   ANION GAP 16.0* 15.0 12.0 14.3   BUN 89* 31* 22 50*   CREATININE 10.04* 6.95* 5.61* 9.69*   EGFR 4.7* 7.4* 9.5* 5.0*   GLUCOSE 115* 130* 112* 134*   CALCIUM 7.6* 8.1* 8.6 8.6   MAGNESIUM 2.3 2.1  --  2.3   PHOSPHORUS 6.1* 7.2*  --   --          Lab 11/14/24  0831 11/13/24  0503 11/12/24  1518 11/11/24  1426   TOTAL PROTEIN 5.6* 6.4 7.0 6.4   ALBUMIN 3.1* 3.3* 3.8 3.6   GLOBULIN 2.5 3.1 3.2 2.8   ALT (SGPT) 6 8 8 <5   AST (SGOT) 21 25 24 25   BILIRUBIN 0.2 0.3 0.6 0.5   ALK PHOS 83 107 123* 115         Lab 11/14/24  0450 11/13/24  0503 11/12/24  2138 11/12/24  1811 11/12/24  1518 11/11/24  1627 11/11/24  1426   PROBNP  --   --   --   --  21,630.0*  --  21,061.0*   HSTROP T  --   --   --  200* 207* 217* 220*   PROTIME 23.3* 24.2* 23.4*  --  23.1*  --  20.8*   INR 2.06* 2.17* 2.07*  --  2.04*  --  1.72*                 Lab 11/12/24  1624   PH, ARTERIAL 7.384   PCO2, ARTERIAL 53.0*   PO2 .0*   FIO2 32   HCO3 ART 31.7*   BASE EXCESS ART 5.4*   CARBOXYHEMOGLOBIN 0.5     Brief Urine Lab Results       None            Microbiology Results Abnormal       None            CT Head Without Contrast    Result Date: 11/13/2024  CT HEAD WO CONTRAST Date of Exam: 11/13/2024 11:49 AM EST Indication: headache and on coumadin. Comparison: None available. Technique: Axial CT images were obtained of the head without contrast administration.  Automated exposure control and iterative construction methods were used. Findings: There is no evidence of hemorrhage. There is no mass effect or midline shift. Diffuse brain atrophy. Tiny round hypodensity in the left basal ganglia suspicious for a chronic lacunar infarct. There is no extracerebral collection. Ventricles are normal in size and  configuration for patient's stated age. Posterior fossa is within normal limits. Calvarium and skull base appear intact.  Visualized sinuses show no air fluid levels. Visualized orbits are unremarkable.     Impression: Impression: No evidence of acute intracranial abnormality Electronically Signed: Raji Hudson MD  11/13/2024 12:46 PM EST  Workstation ID: [id number]    XR Chest 1 View    Result Date: 11/12/2024  XR CHEST 1 VW Date of Exam: 11/12/2024 4:37 PM EST Indication: SOA triage protocol Comparison: 11/11/2024. Findings: Left lower lobe consolidation. Vascular congestion. Dual-lead cardiac pacemaker. The clavicles are intact. No rib fractures. No pneumothorax or pleural effusion. Atheromatous disease of the aortic arch.     Impression: Left lower lobe consolidation and vascular congestion. Electronically Signed: Joss Daniels MD  11/12/2024 5:01 PM EST  Workstation ID: [id number]     Results for orders placed in visit on 01/17/24    Adult Transthoracic Echo Complete W/ Cont if Necessary Per Protocol    Interpretation Summary    Left ventricular systolic function is normal. Calculated left ventricular EF = 55.5% Left ventricular ejection fraction appears to be 56 - 60%.    Left ventricular wall thickness is consistent with mild concentric hypertrophy.    Left ventricular diastolic function was normal.    The left atrial cavity is mildly dilated.    Estimated right ventricular systolic pressure from tricuspid regurgitation is mildly elevated (35-45 mmHg). Calculated right ventricular systolic pressure from tricuspid regurgitation is 40 mmHg.    Septal thickness and atrial volume both improved on today's study compared to 2023      Current medications:  Scheduled Meds:atorvastatin, 40 mg, Oral, Daily  [Held by provider] bumetanide, 2 mg, Oral, Once per day on Sunday Monday Wednesday Friday  carvedilol, 12.5 mg, Oral, BID  cefTRIAXone, 1,000 mg, Intravenous, Q24H  dexAMETHasone, 6 mg, Oral, Daily    Or  dexAMETHasone, 6 mg, Intravenous, Daily  doxycycline, 100 mg, Oral, Q12H  lactobacillus acidophilus, 1 capsule, Oral, Daily  midodrine, 10 mg, Oral, Once per day on Tuesday Thursday Saturday  remdesivir, 100 mg, Intravenous, Q24H  sodium chloride, 10 mL, Intravenous, Q12H  Sucroferric Oxyhydroxide, 3 tablet, Oral, TID With Meals  Sucroferric Oxyhydroxide, 1 tablet, Oral, BID  vitamin B-12, 1,000 mcg, Oral, Daily  warfarin, 5 mg, Oral, Daily      Continuous Infusions:Pharmacy to dose warfarin,       PRN Meds:.  acetaminophen **OR** acetaminophen **OR** acetaminophen    albumin human    senna-docusate sodium **AND** polyethylene glycol **AND** bisacodyl **AND** bisacodyl    ipratropium-albuterol    nitroglycerin    ondansetron ODT **OR** ondansetron    Pharmacy to dose warfarin    sodium chloride    sodium chloride    sodium chloride    Assessment & Plan   Assessment & Plan     Active Hospital Problems    Diagnosis  POA    **Pneumonia due to COVID-19 virus [U07.1, J12.82]  Yes      Resolved Hospital Problems   No resolved problems to display.        Brief Hospital Course to date:  Marco Antonio Montemayor is a 79 y.o. male  w history of A-fib on Coumadin, CKD on hemodialysis, chronic diastolic congestive heart failure, ZULEIKA on CPAP presenting due to increased work of breathing and hypoxia. Found to COVID-19 PNA.      Sepsis due to COVID-19 PNA with concern for superimposed bacterial PNA  LLL Consolidation   Acute hypoxic respiratory failure   -CTA  on admission: no pulmonary embolus. Patchy areas of groundglass noted within both lungs. Differential considerations include atypical pneumonia   -XR: vascular congestion w LLL infiltrate  -CTH without acute abnormality  -started Ceftriaxone and doxycycline. Continue. Having some loose stools, stool studies negative, probiotic added  -Decadron and remdesivir   -wean O2 as tolerated. D/w patient supportive measures such as breathing exercises and up in chair to help with  hypoxia  -follow blood cx     Afib on Warfarin  -pharmacy consulted for dosing warfarin  -continue home BB     Thrombocytopenia  -likely due to active infection. Monitor      CHFpEF  -01/17/2024: ECHO EF: 56-60%   -Bumex 2 mg on nondialysis days.  Resume as tolerated. Held initially d/t poor appetite  -Encourage oral intake      ESRD on HD  -Tu/Th/Sat.   -On Velphoro from home   -nephrology following  -Continue midodrine on dialysis days        Expected Discharge Location and Transportation: home  Expected Discharge   Expected Discharge Date: 11/16/2024; Expected Discharge Time:      VTE Prophylaxis:  Pharmacologic & mechanical VTE prophylaxis orders are present.         AM-PAC 6 Clicks Score (PT): 17 (11/13/24 2030)    CODE STATUS:   Code Status and Medical Interventions: CPR (Attempt to Resuscitate); Full Support   Ordered at: 11/12/24 2047     Level Of Support Discussed With:    Patient     Code Status (Patient has no pulse and is not breathing):    CPR (Attempt to Resuscitate)     Medical Interventions (Patient has pulse or is breathing):    Full Support       Melody Cardozo MD  11/14/24

## 2024-11-14 NOTE — PROGRESS NOTES
" LOS: 2 days   Patient Care Team:  Heriberto Murcia MD as PCP - General (Family Medicine)  Shanell Arango, PharmD as Pharmacist (Pharmacy)  Evelina Quan, PharmD as Pharmacist (Pharmacy)  Peng Melgar, PharmD as Pharmacist (Pharmacy)  Johnathan Taylor MD as Consulting Physician (Cardiac Electrophysiology)  Chato Bo MD as Consulting Physician (Nephrology)  Ivet Beasley APRN as Nurse Practitioner (Cardiology)  Rei Decker PA as Physician Assistant (Cardiology)      Subjective     Reviewed vitals, labs and medications list  Seen and examined   HD today     Objective     Vital Sign Min/Max for last 24 hours  Temp  Min: 98.2 °F (36.8 °C)  Max: 99.3 °F (37.4 °C)   BP  Min: 89/57  Max: 143/68   Pulse  Min: 89  Max: 144   Resp  Min: 16  Max: 20   SpO2  Min: 68 %  Max: 99 %   Flow (L/min) (Oxygen Therapy)  Min: 4  Max: 6   No data recorded     Flowsheet Rows      Flowsheet Row First Filed Value   Admission Height 177.8 cm (70\") Documented at 11/12/2024 1456   Admission Weight 130 kg (287 lb) Documented at 11/12/2024 1456            No intake/output data recorded.  I/O last 3 completed shifts:  In: 580 [P.O.:580]  Out: -      General: He is not in acute distress.     Appearance: Normal appearance. He is obese. He is ill-appearing.      Comments: LUE AVF with thrill   HENT:      Mouth/Throat:      Mouth: Mucous membranes are dry.   Eyes:      Pupils: Pupils are equal, round, and reactive to light.   Cardiovascular:      Rate and Rhythm: Normal rate.   Pulmonary:      Effort: Pulmonary effort is normal.   Abdominal:      General: There is distension.      Palpations: Abdomen is soft.      Tenderness: There is no abdominal tenderness.   Musculoskeletal:      Right lower leg: Edema present.      Left lower leg: Edema present.   Neurological:      General: No focal deficit present.   Psychiatric:         Mood and Affect: Mood normal    Results Review:     I reviewed the patient's new " "clinical results.    WBC WBC   Date Value Ref Range Status   11/14/2024 7.57 3.40 - 10.80 10*3/mm3 Final   11/12/2024 5.98 3.40 - 10.80 10*3/mm3 Final   11/11/2024 5.40 3.40 - 10.80 10*3/mm3 Final      HGB Hemoglobin   Date Value Ref Range Status   11/14/2024 9.8 (L) 13.0 - 17.7 g/dL Final   11/12/2024 11.8 (L) 13.0 - 17.7 g/dL Final   11/11/2024 10.8 (L) 13.0 - 17.7 g/dL Final      HCT Hematocrit   Date Value Ref Range Status   11/14/2024 33.7 (L) 37.5 - 51.0 % Final   11/12/2024 37.8 37.5 - 51.0 % Final   11/11/2024 35.1 (L) 37.5 - 51.0 % Final      Platlets No results found for: \"LABPLAT\"   MCV MCV   Date Value Ref Range Status   11/14/2024 117.0 (H) 79.0 - 97.0 fL Final   11/12/2024 109.2 (H) 79.0 - 97.0 fL Final   11/11/2024 108.3 (H) 79.0 - 97.0 fL Final          Sodium Sodium   Date Value Ref Range Status   11/14/2024 141 136 - 145 mmol/L Final   11/13/2024 138 136 - 145 mmol/L Final   11/12/2024 138 136 - 145 mmol/L Final   11/11/2024 141 136 - 145 mmol/L Final      Potassium Potassium   Date Value Ref Range Status   11/14/2024 4.1 3.5 - 5.2 mmol/L Final   11/13/2024 4.5 3.5 - 5.2 mmol/L Final   11/12/2024 3.9 3.5 - 5.2 mmol/L Final   11/11/2024 3.8 3.5 - 5.2 mmol/L Final      Chloride Chloride   Date Value Ref Range Status   11/14/2024 101 98 - 107 mmol/L Final   11/13/2024 96 (L) 98 - 107 mmol/L Final   11/12/2024 96 (L) 98 - 107 mmol/L Final   11/11/2024 99 98 - 107 mmol/L Final      CO2 CO2   Date Value Ref Range Status   11/14/2024 24.0 22.0 - 29.0 mmol/L Final   11/13/2024 27.0 22.0 - 29.0 mmol/L Final   11/12/2024 30.0 (H) 22.0 - 29.0 mmol/L Final   11/11/2024 27.7 22.0 - 29.0 mmol/L Final      BUN BUN   Date Value Ref Range Status   11/14/2024 89 (H) 8 - 23 mg/dL Final   11/13/2024 31 (H) 8 - 23 mg/dL Final   11/12/2024 22 8 - 23 mg/dL Final   11/11/2024 50 (H) 8 - 23 mg/dL Final      Creatinine Creatinine   Date Value Ref Range Status   11/14/2024 10.04 (H) 0.76 - 1.27 mg/dL Final   11/13/2024 6.95 " "(H) 0.76 - 1.27 mg/dL Final   11/12/2024 5.61 (H) 0.76 - 1.27 mg/dL Final   11/11/2024 9.69 (H) 0.76 - 1.27 mg/dL Final      Calcium Calcium   Date Value Ref Range Status   11/14/2024 7.6 (L) 8.6 - 10.5 mg/dL Final   11/13/2024 8.1 (L) 8.6 - 10.5 mg/dL Final   11/12/2024 8.6 8.6 - 10.5 mg/dL Final   11/11/2024 8.6 8.6 - 10.5 mg/dL Final      PO4 No results found for: \"CAPO4\"   Albumin Albumin   Date Value Ref Range Status   11/14/2024 3.1 (L) 3.5 - 5.2 g/dL Final   11/13/2024 3.3 (L) 3.5 - 5.2 g/dL Final   11/12/2024 3.8 3.5 - 5.2 g/dL Final   11/11/2024 3.6 3.5 - 5.2 g/dL Final      Magnesium Magnesium   Date Value Ref Range Status   11/14/2024 2.3 1.6 - 2.4 mg/dL Final   11/13/2024 2.1 1.6 - 2.4 mg/dL Final   11/11/2024 2.3 1.6 - 2.4 mg/dL Final      Uric Acid No results found for: \"URICACID\"         Assessment & Plan       Pneumonia due to COVID-19 virus    =======================    Assessment & Plan  - ESRD on HD TTS- No missed treatment sessions  - Hypotension on midodrine   - HFpEF  - Anemia of CKD: Hgb at goal - No indications for BOAZ  - CKD MBD: Secondary hyperparathyroid , 25OHD 52, PO4   - Volume status: No overt volume overload  - COVID- 19 PNA  - Afib / rate controlled      PLAN   HD as per OP HD schedule   Dose all meds to HD   Start calcitriol and PO4 binders   Will continue managing ESRD related complications      I discussed the patients findings and my recommendations with patient      Shaina Juárez MD  11/14/24  09:38 EST            "

## 2024-11-14 NOTE — THERAPY EVALUATION
Patient Name: Marco Antonio Montemayor  : 1945    MRN: [id number]                              Today's Date: 2024       Admit Date: 2024    Visit Dx:     ICD-10-CM ICD-9-CM   1. Pneumonia due to COVID-19 virus  U07.1 480.8    J12.82 079.89   2. Acute respiratory failure with hypoxia  J96.01 518.81     Patient Active Problem List   Diagnosis    Permanent atrial fibrillation    Coronary artery disease    Dyslipidemia    Hypertension    Morbid obesity    Hypertensive heart disease with congestive heart failure    Severe obstructive sleep apnea    Dependence on nocturnal oxygen therapy    Other abnormal glucose     Stage 4 chronic kidney disease    Pacemaker    Anemia    Chronic atrial fibrillation    Bradycardia    Anemia due to stage 4 chronic kidney disease treated with erythropoietin    Stroke-like symptoms    Change in vision    ESRD on HD T,R,S since     Fatty liver    Pneumonia due to COVID-19 virus     Past Medical History:   Diagnosis Date    Abnormal ECG     Arrhythmia     Arthritis     Asthma     Cancer     skin cancer removed from various locations     Chronic diastolic congestive heart failure 2018    CKD (chronic kidney disease)     closer to stage 5    Coronary artery disease     Dry skin     Dyslipidemia 2016    Edema     Heart murmur     Hematoma     Hemodialysis access site with arteriovenous graft     History of transfusion     no transfusion reaction    Hypertension     Iron deficiency anemia     Long term current use of anticoagulant     ZULEIKA treated with BiPAP     Persistent atrial fibrillation 2016     Patient notes dyspnea, fatigue and palpitations during episodes of afib.  multiple external cardioversionsChads vasc 2, anticoagulated with Coumadin    Tinnitus     Wears glasses     readers     Past Surgical History:   Procedure Laterality Date    ABLATION OF DYSRHYTHMIC FOCUS      APPENDECTOMY      ARTERIOVENOUS FISTULA/SHUNT SURGERY Left 2022    Procedure:  UPPER EXTREMITY BRACHIOCEPHALIC ARTERIOVENOUS FISTULA FORMATION LEFT;  Surgeon: Jakob Reza MD;  Location:  [facility];  Service: Vascular;  Laterality: Left;    ARTERIOVENOUS FISTULA/SHUNT SURGERY Left 11/03/2022    Procedure: ELEVATION LEFT UPPER EXTREMITY AV FISTULA;  Surgeon: Jakob Reza MD;  Location:  [facility];  Service: Vascular;  Laterality: Left;    CARDIAC ELECTROPHYSIOLOGY PROCEDURE N/A 05/18/2018    Procedure: PPM generator change - dual       [facility] [place]/ please schedule in 8 weeks;  Surgeon: Johnathan Taylor MD;  Location:  [facility];  Service: Cardiovascular    CATARACT EXTRACTION      bilat wiht lens     COLONOSCOPY      ENDOSCOPY      Pt denies    INSERT / REPLACE / REMOVE PACEMAKER  about 20 years back    SKIN CANCER EXCISION      various locations     TONSILLECTOMY      VEIN SURGERY  for dialysis    WISDOM TOOTH EXTRACTION        General Information       Row Name 11/14/24 1604          Physical Therapy Time and Intention    Document Type evaluation  -CK     Mode of Treatment physical therapy  -CK       Row Name 11/14/24 1604          General Information    Patient Profile Reviewed yes  -CK     Prior Level of Function independent:;all household mobility;ADL's;driving  typically ind no AD  -CK     Existing Precautions/Restrictions fall  -CK     Barriers to Rehab hearing deficit;medically complex  -CK       Row Name 11/14/24 1604          Living Environment    People in Home spouse  -CK       Row Name 11/14/24 1604          Home Main Entrance    Number of Stairs, Main Entrance other (see comments);one  ramp at back entrance  -CK       Row Name 11/14/24 1604          Stairs Within Home, Primary    Number of Stairs, Within Home, Primary none  -CK       Row Name 11/14/24 1604          Cognition    Orientation Status (Cognition) oriented x 3  -CK       Row Name 11/14/24 1604          Safety Issues/Impairments Affecting Functional Mobility    Safety Issues Affecting  Function (Mobility) awareness of need for assistance;insight into deficits/self-awareness;safety precaution awareness  -CK     Impairments Affecting Function (Mobility) balance;endurance/activity tolerance;shortness of breath;strength  -CK               User Key  (r) = Recorded By, (t) = Taken By, (c) = Cosigned By      Initials Name Provider Type    CK Heidi Cr, PT Physical Therapist                   Mobility       Row Name 11/14/24 1605          Bed Mobility    Bed Mobility sit-supine;scooting/bridging  -CK     Scooting/Bridging [place] (Bed Mobility) standby assist  -CK     Sit-Supine [place] (Bed Mobility) standby assist  -CK     Assistive Device (Bed Mobility) head of bed elevated  -CK     Comment, (Bed Mobility) standing with nursing upon entry, performs sit to sup without assist but with increased effort  -CK       Row Name 11/14/24 1605          Sit-Stand Transfer    Sit-Stand [place] (Transfers) contact guard  -CK     Assistive Device (Sit-Stand Transfers) walker, front-wheeled  -CK       Row Name 11/14/24 1602          Gait/Stairs (Locomotion)    [place] Level (Gait) contact guard;1 person assist;verbal cues  -CK     Assistive Device (Gait) walker, front-wheeled  -CK     Patient was able to Ambulate yes  -CK     Distance in Feet (Gait) 20  -CK     Deviations/Abnormal Patterns (Gait) bilateral deviations;base of support, wide;malgorzata decreased;gait speed decreased;weight shifting decreased  -CK     Bilateral Gait Deviations forward flexed posture  -CK     Comment, (Gait/Stairs) Patient ambulated in room on supplemental oxygen, he demo'd good sequencing with walker with no LOB. He was able to navigate objects in room without difficulty. Did report SOA and had difficulty obtaining accurate SpO2 upon sitting. After patient returned to supine it was >90%.  -CK               User Key  (r) = Recorded By, (t) = Taken By, (c) = Cosigned By      Initials Name Provider Type    CK  Heidi Cr, REJI Physical Therapist                   Obj/Interventions       Row Name 11/14/24 1607          Range of Motion Comprehensive    General Range of Motion bilateral lower extremity ROM WFL  -CK       Row Name 11/14/24 1607          Strength Comprehensive (MMT)    General Manual Muscle Testing (MMT) Assessment lower extremity strength deficits identified  -CK     Comment, General Manual Muscle Testing (MMT) Assessment BLE grossly 4/5  -CK       Row Name 11/14/24 1607          Balance    Balance Assessment sitting static balance;standing static balance;standing dynamic balance  -CK     Static Sitting Balance standby assist  -CK     Position, Sitting Balance unsupported;sitting edge of bed  -CK     Static Standing Balance contact guard  -CK     Dynamic Standing Balance contact guard  -CK     Position/Device Used, Standing Balance supported;walker, front-wheeled  -CK     Comment, Balance no LOB  -CK       Row Name 11/14/24 1607          Sensory Assessment (Somatosensory)    Sensory Assessment (Somatosensory) LE sensation intact  -CK               User Key  (r) = Recorded By, (t) = Taken By, (c) = Cosigned By      Initials Name Provider Type    CK Heidi Cr, REJI Physical Therapist                   Goals/Plan       Row Name 11/14/24 1610          Bed Mobility Goal 1 (PT)    Activity/Assistive Device (Bed Mobility Goal 1, PT) sit to supine/supine to sit  -CK     [place] Level/Cues Needed (Bed Mobility Goal 1, PT) modified independence  -CK     Time Frame (Bed Mobility Goal 1, PT) short term goal (STG);3 days  -CK     Progress/Outcomes (Bed Mobility Goal 1, PT) new goal  -CK       Row Name 11/14/24 1610          Transfer Goal 1 (PT)    Activity/Assistive Device (Transfer Goal 1, PT) sit-to-stand/stand-to-sit;bed-to-chair/chair-to-bed  -CK     [place] Level/Cues Needed (Transfer Goal 1, PT) standby assist  -CK     Time Frame (Transfer Goal 1, PT) long term goal (LTG);10 days  -CK      Progress/Outcome (Transfer Goal 1, PT) new goal  -CK       Row Name 11/14/24 1610          Gait Training Goal 1 (PT)    Activity/Assistive Device (Gait Training Goal 1, PT) gait (walking locomotion);assistive device use  -CK     [place] Level (Gait Training Goal 1, PT) standby assist  -CK     Distance (Gait Training Goal 1, PT) 100'  -CK     Time Frame (Gait Training Goal 1, PT) long term goal (LTG);10 days  -CK     Progress/Outcome (Gait Training Goal 1, PT) new goal  -CK       Row Name 11/14/24 1610          Therapy Assessment/Plan (PT)    Planned Therapy Interventions (PT) balance training;bed mobility training;gait training;home exercise program;neuromuscular re-education;transfer training;stretching;strengthening;stair training;ROM (range of motion);postural re-education;patient/family education  -CK               User Key  (r) = Recorded By, (t) = Taken By, (c) = Cosigned By      Initials Name Provider Type    CK Heidi Cr, PT Physical Therapist                   Clinical Impression       Row Name 11/14/24 1608          Pain    Pretreatment Pain Rating 7/10  -CK     Posttreatment Pain Rating 7/10  -CK     Pain Location neck  -CK     Pain Management Interventions exercise or physical activity utilized;nursing notified  -CK     Response to Pain Interventions activity participation with tolerable pain  -CK       Row Name 11/14/24 1601          Plan of Care Review    Plan of Care Reviewed With patient;spouse;family  -CK     Outcome Evaluation Patient presents with deficits in strength, endurance, and balance. He was able to ambulate in room with CGA and FWW with increased SOA compared to his baseline. IPPT is indicated to address current deficits. Recommend D/C home with assist and HHPT when medically appropriate.  -CK       Row Name 11/14/24 1609          Therapy Assessment/Plan (PT)    Patient/Family Therapy Goals Statement (PT) feel better  -CK     Rehab Potential (PT) good  -CK     Criteria for  Skilled Interventions Met (PT) yes;meets criteria;skilled treatment is necessary  -CK     Therapy Frequency (PT) daily  -CK     Predicted Duration of Therapy Intervention (PT) 1 week  -CK       Row Name 11/14/24 1608          Vital Signs    Pre Systolic BP Rehab 111  -CK     Pre Treatment Diastolic BP 64  -CK     Pretreatment Heart Rate (beats/min) 111  -CK     Posttreatment Heart Rate (beats/min) 91  -CK     Pre SpO2 (%) 92  -CK     O2 Delivery Pre Treatment nasal cannula  -CK     Intra SpO2 (%) --  difficult to assess  -CK     O2 Delivery Intra Treatment nasal cannula  -CK     Post SpO2 (%) 95  -CK     O2 Delivery Post Treatment nasal cannula  -CK     Pre Patient Position Sitting  -CK     Intra Patient Position Sitting  -CK     Post Patient Position Supine  -CK       Row Name 11/14/24 1608          Positioning and Restraints    Pre-Treatment Position in bed  -CK     Post Treatment Position bed  -CK     In Bed fowlers;call light within reach;encouraged to call for assist;exit alarm on;notified nsg;with family/caregiver;heels elevated  -CK               User Key  (r) = Recorded By, (t) = Taken By, (c) = Cosigned By      Initials Name Provider Type    CK Heidi Cr, PT Physical Therapist                   Outcome Measures       Row Name 11/14/24 1610 11/14/24 1200       How much help from another person do you currently need...    Turning from your back to your side while in flat bed without using bedrails? 3  -CK 3  -AC    Moving from lying on back to sitting on the side of a flat bed without bedrails? 3  -CK 3  -AC    Moving to and from a bed to a chair (including a wheelchair)? 3  -CK 3  -AC    Standing up from a chair using your arms (e.g., wheelchair, bedside chair)? 3  -CK 3  -AC    Climbing 3-5 steps with a railing? 3  -CK 2  -AC    To walk in hospital room? 3  -CK 3  -AC    AM-PAC 6 Clicks Score (PT) 18  -CK 17  -AC    Highest Level of Mobility Goal 6 --> Walk 10 steps or more  -CK 5 --> Static  standing  -AC      Row Name 11/14/24 1610 11/14/24 1455       Functional Assessment    Outcome Measure Options AM-PAC 6 Clicks Basic Mobility (PT)  -CK AM-PAC 6 Clicks Daily Activity (OT)  -BARTOLOME              User Key  (r) = Recorded By, (t) = Taken By, (c) = Cosigned By      Initials Name Provider Type    BARTOLOME Clau Anguiano, OT Occupational Therapist    CK Heidi Cr, PT Physical Therapist    Nita Guillermo, RN Registered Nurse                                 Physical Therapy Education       Title: PT OT SLP Therapies (In Progress)       Topic: Physical Therapy (In Progress)       Point: Mobility training (Done)       Learning Progress Summary            Patient Acceptance, E, VU by CK at 11/14/2024 1610   Family Acceptance, E, VU by CK at 11/14/2024 1610   Significant Other Acceptance, E, VU by CK at 11/14/2024 1610                      Point: Home exercise program (Not Started)       Learner Progress:  Not documented in this visit.              Point: Body mechanics (Done)       Learning Progress Summary            Patient Acceptance, E, VU by CK at 11/14/2024 1610   Family Acceptance, E, VU by CK at 11/14/2024 1610   Significant Other Acceptance, E, VU by CK at 11/14/2024 1610                      Point: Precautions (Done)       Learning Progress Summary            Patient Acceptance, E, VU by CK at 11/14/2024 1610   Family Acceptance, E, VU by CK at 11/14/2024 1610   Significant Other Acceptance, E, VU by CK at 11/14/2024 1610                                      User Key       Initials Effective Dates Name Provider Type Discipline     02/06/24 -  Heidi Cr, PT Physical Therapist PT                  PT Recommendation and Plan  Planned Therapy Interventions (PT): balance training, bed mobility training, gait training, home exercise program, neuromuscular re-education, transfer training, stretching, strengthening, stair training, ROM (range of motion), postural re-education, patient/family  education  Outcome Evaluation: Patient presents with deficits in strength, endurance, and balance. He was able to ambulate in room with CGA and FWW with increased SOA compared to his baseline. IPPT is indicated to address current deficits. Recommend D/C home with assist and HHPT when medically appropriate.     Time Calculation:   PT Evaluation Complexity  History, PT Evaluation Complexity: 3 or more personal factors and/or comorbidities  Examination of Body Systems (PT Eval Complexity): total of 3 or more elements  Clinical Presentation (PT Evaluation Complexity): stable  Clinical Decision Making (PT Evaluation Complexity): low complexity  Overall Complexity (PT Evaluation Complexity): low complexity     PT Charges       Row Name 11/14/24 1611             Time Calculation    Start Time 1403  -CK      PT Received On 11/14/24  -CK      PT Goal Re-Cert Due Date 11/24/24  -CK         Untimed Charges    PT Eval/Re-eval Minutes 50  -CK         Total Minutes    Untimed Charges Total Minutes 50  -CK       Total Minutes 50  -CK                User Key  (r) = Recorded By, (t) = Taken By, (c) = Cosigned By      Initials Name Provider Type    CK Heidi rC, PT Physical Therapist                  Therapy Charges for Today       Code Description Service Date Service Provider Modifiers Qty    [id number] HC PT EVAL LOW COMPLEXITY 4 11/14/2024 Heidi Cr, PT GP 1            PT G-Codes  Outcome Measure Options: AM-PAC 6 Clicks Basic Mobility (PT)  AM-PAC 6 Clicks Score (PT): 18  AM-PAC 6 Clicks Score (OT): 19  PT Discharge Summary  Anticipated Discharge Disposition (PT): home with assist, home with home health    Heidi Cr PT  11/14/2024

## 2024-11-14 NOTE — PROGRESS NOTES
Pharmacy Consult - Warfarin Dosing    Marco Antonio Montemayor is a 79 y.o. male receiving warfarin therapy.    Consulting Provider: Hospitalist  Indication: Atrial fibrillation   Goal INR: 2 - 3  Home Regimen: warfarin 5mg daily except 7.5mg on Friday    Bridge Therapy: No     Drug-Drug Interactions with current regimen:  Ceftriaxone - may enhance AC effects of warfarin   Dexamethasone - may enhance AC effects of warfarin   Doxycycline - may enhance AC effects of warfarin   Remdesivir - may enhance AC effects of warfarin     Warfarin Dosing During Admission:  Date  11/13 11/14          INR  2.17 2.06          Dose  5 mg 5 mg             Education Provided: Patient follows with [facility] and has been provided warfarin education via their services. Inpatient pharmacist available for questions upon request.     Discharge Follow-Up:     Outpatient Following Provider - [facility] (last appt: 11/5/24  referring provider: JENNIFER Barroso)     Follow-Up Recommendation - 2-3 days after hospital discharge    Labs:  Results from last 7 days   Lab Units 11/14/24  0450 11/13/24  0503 11/12/24  2138 11/12/24  1518 11/11/24  1426   INR  2.06* 2.17* 2.07* 2.04* 1.72*   HEMOGLOBIN g/dL 9.8*  --   --  11.8* 10.8*   HEMATOCRIT % 33.7*  --   --  37.8 35.1*     Results from last 7 days   Lab Units 11/13/24  0503 11/12/24  1518 11/11/24  1426   SODIUM mmol/L 138 138 141   POTASSIUM mmol/L 4.5 3.9 3.8   CHLORIDE mmol/L 96* 96* 99   CO2 mmol/L 27.0 30.0* 27.7   BUN mg/dL 31* 22 50*   CREATININE mg/dL 6.95* 5.61* 9.69*   CALCIUM mg/dL 8.1* 8.6 8.6   BILIRUBIN mg/dL 0.3 0.6 0.5   ALK PHOS U/L 107 123* 115   ALT (SGPT) U/L 8 8 <5   AST (SGOT) U/L 25 24 25   GLUCOSE mg/dL 130* 112* 134*     Current dietary intake:   Diet Order   Procedures    Diet: Cardiac, Renal; Healthy Heart (2-3 Na+); Low Potassium, Low Phosphorus; Fluid Consistency: Thin (IDDSI 0)      Assessment/Plan:  Pharmacy to dose warfarin for Afib. Goal INR  2-3.  Therapeutic INR of 2.06 today. Decreased from 2.17 warfarin resumed last night after being held on 11/12.  Will order home dose of warfarin 5mg today.  Daily PT/INR ordered.  Pharmacy will continue to monitor signs/symptoms of bleeding, dietary intake, and drug-drug interactions and make dose adjustments as necessary.    Alfred Roman [facility]  11/14/2024  08:58 EST

## 2024-11-14 NOTE — PLAN OF CARE
Problem: Adult Inpatient Plan of Care  Goal: Plan of Care Review  Outcome: Progressing  Goal: Readiness for Transition of Care  Outcome: Progressing     Problem: Fall Injury Risk  Goal: Absence of Fall and Fall-Related Injury  Outcome: Progressing  Intervention: Identify and Manage Contributors  Recent Flowsheet Documentation  Taken 11/14/2024 0400 by Deepak Vera RN  Medication Review/Management: medications reviewed  Taken 11/13/2024 2030 by Deepak Vera RN  Medication Review/Management: medications reviewed  Intervention: Promote Injury-Free Environment  Recent Flowsheet Documentation  Taken 11/14/2024 0400 by Deepak Vera RN  Safety Promotion/Fall Prevention:   activity supervised   safety round/check completed  Taken 11/14/2024 0000 by Deepak Vera RN  Safety Promotion/Fall Prevention:   activity supervised   safety round/check completed  Taken 11/13/2024 2200 by Deepak Vera RN  Safety Promotion/Fall Prevention:   safety round/check completed   activity supervised   assistive device/personal items within reach   fall prevention program maintained   lighting adjusted  Taken 11/13/2024 2030 by Deepak Vera RN  Safety Promotion/Fall Prevention:   safety round/check completed   activity supervised   assistive device/personal items within reach   fall prevention program maintained   lighting adjusted   Goal Outcome Evaluation:

## 2024-11-15 NOTE — SIGNIFICANT NOTE
RRT called per nursing secondary to hypotension. Labs pending. Continued dark/tarry diarrhea throughout the day. Dialysis completed today.   Labs pending. Additional Midodrine 10mg ordered. NS 500mg   Type and screen.   Continue to monitor.

## 2024-11-15 NOTE — SIGNIFICANT NOTE
"Rapid response called on this patient during the overnight shift at approximately 9:30 PM on Thursday 11/14.  RN reports this was called due to the patient becoming hypotensive with systolic pressures in the 60s-70s.  Patient reportedly asymptomatic though we are told he has had loose stools throughout the day, \"dark, tarry\" material.  Had positive fecal occult blood test earlier this morning.  He also received a dose of midodrine earlier in the day for hypotension.  He also underwent dialysis earlier today.  We have ordered an additional dose of midodrine 10 mg as well as 500 mL bolus of 0.9 NS.  Also started IV Protonix twice daily and serial H&H's.  CBC obtained during the RR shows drop from 9.8 to 8.2.  He has responded to the above measures, systolic pressure 98 at the time of this note; will consider adding another dose of albumin IV or administering PRBCs if hypotension recurs.  We have also added GI consult, input appreciated.  Further updates to follow here as addenda to this note as warranted.    Electronically signed by Marshall Ruby III, , 11/14/24, 11:23 PM EST.    "

## 2024-11-15 NOTE — PROGRESS NOTES
Pharmacy Consult - Warfarin Dosing    Marco Antonio Montemayor is a 79 y.o. male receiving warfarin therapy.    Consulting Provider: Hospitalist  Indication: Atrial fibrillation   Goal INR: 2 - 3  Home Regimen: warfarin 5mg daily except 7.5mg on Friday    Bridge Therapy: No     Drug-Drug Interactions with current regimen:  Ceftriaxone - may enhance AC effects of warfarin   Dexamethasone - may enhance AC effects of warfarin   Doxycycline - may enhance AC effects of warfarin   Remdesivir - may enhance AC effects of warfarin     Warfarin Dosing During Admission:  Date  11/13 11/14 11/15         INR  2.17 2.06 2.13         Dose  5 mg 5 mg hold            Education Provided: Patient follows with [facility] and has been provided warfarin education via their services. Inpatient pharmacist available for questions upon request.     Discharge Follow-Up:     Outpatient Following Provider - [facility] (last appt: 11/5/24  referring provider: JENNIFER Barroso)     Follow-Up Recommendation - 2-3 days after hospital discharge    Labs:  Results from last 7 days   Lab Units 11/15/24  1247 11/15/24  0614 11/15/24  0026 11/14/24  2133 11/14/24  0450 11/13/24  0503 11/12/24  2138 11/12/24  1518 11/11/24  1426   INR   --  2.13*  --   --  2.06* 2.17* 2.07* 2.04* 1.72*   HEMOGLOBIN g/dL 8.7* 8.6* 8.1* 8.2* 9.8*  --   --  11.8* 10.8*   HEMATOCRIT % 29.1* 29.5* 28.1* 26.9* 33.7*  --   --  37.8 35.1*     Results from last 7 days   Lab Units 11/15/24  0614 11/14/24  2133 11/14/24  1030 11/14/24  0831   SODIUM mmol/L 138 135*  --  141   POTASSIUM mmol/L 4.8 4.2  --  4.1   CHLORIDE mmol/L 102 99  --  101   CO2 mmol/L 22.0 21.0*  --  24.0   BUN mg/dL 72* 61*  --  89*   CREATININE mg/dL 7.34* 6.27*  --  10.04*   CALCIUM mg/dL 8.1* 8.0* 8.3* 7.6*   BILIRUBIN mg/dL 0.3 0.3  --  0.2   ALK PHOS U/L 79 73  --  83   ALT (SGPT) U/L 6 6  --  6   AST (SGOT) U/L 22 22  --  21   GLUCOSE mg/dL 147* 144*  --  115*     Current dietary intake:   Diet Order    Procedures    Diet: Cardiac, Renal; Healthy Heart (2-3 Na+); Low Potassium, Low Phosphorus; Fluid Consistency: Thin (IDDSI 0)      Assessment/Plan:  Pharmacy to dose warfarin for Afib. Goal INR 2-3.  Therapeutic INR of 2.13 today. Increased from 2.06.  Patient with GI bleed. Will hold and follow for resuming of warfarin.  Daily PT/INR ordered.  Pharmacy will continue to monitor signs/symptoms of bleeding, dietary intake, and drug-drug interactions and make dose adjustments as necessary.    Alfred Roman [facility]  11/15/2024  14:14 EST

## 2024-11-15 NOTE — CASE MANAGEMENT/SOCIAL WORK
Continued Stay Note  [facility]     Patient Name: Marco Antonio Montemayor  MRN: [id number]  Today's Date: 11/15/2024    Admit Date: 11/12/2024    Plan: home   Discharge Plan       Row Name 11/15/24 1456       Plan    Plan home    Patient/Family in Agreement with Plan yes    Plan Comments Mr. Montemayor was discussed in MDR today. Rapid was called last night. Today he is on bipap. His discharge plan is home.  will continue to follow plan of care and assist with discharge planning needs as indicated.      Final Discharge Disposition Code 01 - home or self-care      Row Name 11/15/24 1452       Plan    Plan home    Patient/Family in Agreement with Plan yes    Plan Comments Mr. Montemayor was discussed in MDR today. He is    Final Discharge Disposition Code 01 - home or self-care                   Discharge Codes    No documentation.                 Expected Discharge Date and Time       Expected Discharge Date Expected Discharge Time    Nov 18, 2024               Radha Christina RN

## 2024-11-15 NOTE — PROGRESS NOTES
" LOS: 3 days   Patient Care Team:  Heriberto Murcia MD as PCP - General (Family Medicine)  Shanell Arango, PharmD as Pharmacist (Pharmacy)  Evelina Quan, PharmD as Pharmacist (Pharmacy)  Peng Melgar, PharmD as Pharmacist (Pharmacy)  Johnathan Taylor MD as Consulting Physician (Cardiac Electrophysiology)  Chato Bo MD as Consulting Physician (Nephrology)  Ivet Beasley APRN as Nurse Practitioner (Cardiology)  Rei Decker PA as Physician Assistant (Cardiology)      Subjective   Last night had some hypotension received IV fluids.  Patient's blood pressure remains low at this time.  He is awake alert following commands.  Objective     Vital Sign Min/Max for last 24 hours  Temp  Min: 97.1 °F (36.2 °C)  Max: 97.2 °F (36.2 °C)   BP  Min: 65/31  Max: 138/59   Pulse  Min: 96  Max: 114   Resp  Min: 18  Max: 26   SpO2  Min: 85 %  Max: 99 %   Flow (L/min) (Oxygen Therapy)  Min: 3  Max: 6   No data recorded     Flowsheet Rows      Flowsheet Row First Filed Value   Admission Height 177.8 cm (70\") Documented at 11/12/2024 1456   Admission Weight 130 kg (287 lb) Documented at 11/12/2024 1456            No intake/output data recorded.  I/O last 3 completed shifts:  In: 360 [P.O.:360]  Out: 2000     Physical examination:  COVID precautions taken.  General:  male no acute distress.  Ill-appearing.  Eyes: PERR  Pulmonary: Equal chest movement normal effort.  Cardiovascular: Normal rate.  Abdomen: Morbid obesity.  Extremities: No edema is noted.  Neurological: No focal deficit moving all extremities.  LUE-aVF in place      Results Review:     I reviewed the patient's new clinical results.    WBC WBC   Date Value Ref Range Status   11/15/2024 8.74 3.40 - 10.80 10*3/mm3 Final   11/14/2024 7.73 3.40 - 10.80 10*3/mm3 Final   11/14/2024 7.57 3.40 - 10.80 10*3/mm3 Final   11/12/2024 5.98 3.40 - 10.80 10*3/mm3 Final      HGB Hemoglobin   Date Value Ref Range Status   11/15/2024 8.6 (L) 13.0 - " "17.7 g/dL Final   11/15/2024 8.1 (L) 13.0 - 17.7 g/dL Final   11/14/2024 8.2 (L) 13.0 - 17.7 g/dL Final   11/14/2024 9.8 (L) 13.0 - 17.7 g/dL Final   11/12/2024 11.8 (L) 13.0 - 17.7 g/dL Final      HCT Hematocrit   Date Value Ref Range Status   11/15/2024 29.5 (L) 37.5 - 51.0 % Final   11/15/2024 28.1 (L) 37.5 - 51.0 % Final   11/14/2024 26.9 (L) 37.5 - 51.0 % Final   11/14/2024 33.7 (L) 37.5 - 51.0 % Final   11/12/2024 37.8 37.5 - 51.0 % Final      Platlets No results found for: \"LABPLAT\"   MCV MCV   Date Value Ref Range Status   11/15/2024 114.8 (H) 79.0 - 97.0 fL Final   11/14/2024 110.7 (H) 79.0 - 97.0 fL Final   11/14/2024 117.0 (H) 79.0 - 97.0 fL Final   11/12/2024 109.2 (H) 79.0 - 97.0 fL Final          Sodium Sodium   Date Value Ref Range Status   11/15/2024 138 136 - 145 mmol/L Final   11/14/2024 135 (L) 136 - 145 mmol/L Final   11/14/2024 141 136 - 145 mmol/L Final   11/13/2024 138 136 - 145 mmol/L Final   11/12/2024 138 136 - 145 mmol/L Final      Potassium Potassium   Date Value Ref Range Status   11/15/2024 4.8 3.5 - 5.2 mmol/L Final   11/14/2024 4.2 3.5 - 5.2 mmol/L Final   11/14/2024 4.1 3.5 - 5.2 mmol/L Final   11/13/2024 4.5 3.5 - 5.2 mmol/L Final   11/12/2024 3.9 3.5 - 5.2 mmol/L Final      Chloride Chloride   Date Value Ref Range Status   11/15/2024 102 98 - 107 mmol/L Final   11/14/2024 99 98 - 107 mmol/L Final   11/14/2024 101 98 - 107 mmol/L Final   11/13/2024 96 (L) 98 - 107 mmol/L Final   11/12/2024 96 (L) 98 - 107 mmol/L Final      CO2 CO2   Date Value Ref Range Status   11/15/2024 22.0 22.0 - 29.0 mmol/L Final   11/14/2024 21.0 (L) 22.0 - 29.0 mmol/L Final   11/14/2024 24.0 22.0 - 29.0 mmol/L Final   11/13/2024 27.0 22.0 - 29.0 mmol/L Final   11/12/2024 30.0 (H) 22.0 - 29.0 mmol/L Final      BUN BUN   Date Value Ref Range Status   11/15/2024 72 (H) 8 - 23 mg/dL Final   11/14/2024 61 (H) 8 - 23 mg/dL Final   11/14/2024 89 (H) 8 - 23 mg/dL Final   11/13/2024 31 (H) 8 - 23 mg/dL Final " "  11/12/2024 22 8 - 23 mg/dL Final      Creatinine Creatinine   Date Value Ref Range Status   11/15/2024 7.34 (H) 0.76 - 1.27 mg/dL Final   11/14/2024 6.27 (H) 0.76 - 1.27 mg/dL Final   11/14/2024 10.04 (H) 0.76 - 1.27 mg/dL Final   11/13/2024 6.95 (H) 0.76 - 1.27 mg/dL Final   11/12/2024 5.61 (H) 0.76 - 1.27 mg/dL Final      Calcium Calcium   Date Value Ref Range Status   11/15/2024 8.1 (L) 8.6 - 10.5 mg/dL Final   11/14/2024 8.0 (L) 8.6 - 10.5 mg/dL Final   11/14/2024 8.3 (L) 8.6 - 10.5 mg/dL Final   11/14/2024 7.6 (L) 8.6 - 10.5 mg/dL Final   11/13/2024 8.1 (L) 8.6 - 10.5 mg/dL Final   11/12/2024 8.6 8.6 - 10.5 mg/dL Final      PO4 No results found for: \"CAPO4\"   Albumin Albumin   Date Value Ref Range Status   11/15/2024 3.7 3.5 - 5.2 g/dL Final   11/14/2024 3.6 3.5 - 5.2 g/dL Final   11/14/2024 3.1 (L) 3.5 - 5.2 g/dL Final   11/13/2024 3.3 (L) 3.5 - 5.2 g/dL Final   11/12/2024 3.8 3.5 - 5.2 g/dL Final      Magnesium Magnesium   Date Value Ref Range Status   11/15/2024 2.3 1.6 - 2.4 mg/dL Final   11/14/2024 2.0 1.6 - 2.4 mg/dL Final   11/14/2024 2.3 1.6 - 2.4 mg/dL Final   11/13/2024 2.1 1.6 - 2.4 mg/dL Final      Uric Acid No results found for: \"URICACID\"         Assessment & Plan       Pneumonia due to COVID-19 virus    =======================    Assessment & Plan  1- ESRD on HD TTS- No missed treatment sessions  2- Hypotension on midodrine   3- HFpEF  4- Anemia of CKD: Hgb at goal - No indications for BOAZ  5- CKD MBD: Secondary hyperparathyroid , 25OHD 52, PO4   6- Volume status: No overt volume overload  7- COVID- 19 PNA  8- Afib / rate controlled      PLAN   Hemodialysis orders written for tomorrow.  Dose all medication according to HD  Continue with phosphate binders and calcitriol  Will continue manage ESRD and related complications     I discussed the patients findings and my recommendations with patient      Belem Kaye MD  11/15/24  11:47 EST            "

## 2024-11-15 NOTE — PROGRESS NOTES
[facility] Medicine Services  PROGRESS NOTE    Patient Name: Marco Antonio Montemayor  : 1945  MRN: [id number]    Date of Admission: 2024  Primary Care Physician: Heriberto Murcia MD    Subjective   Subjective     CC:  F/u shortness of breath    HPI:  Overnight a rapid response was called due to hypotension.  He was given midodrine and a fluid bolus.  Due to drop in hemoglobin as well as hypertension and positive fecal occult blood earlier a GI consult was placed for GI bleed.  This morning he is pretty drowsy and does not participate much in discussion.  I discussed with RN, he did not wear his BiPAP overnight      Objective   Objective     Vital Signs:   Temp:  [97.1 °F (36.2 °C)-97.6 °F (36.4 °C)] 97.1 °F (36.2 °C)  Heart Rate:  [] 106  Resp:  [18-26] 18  BP: ()/(28-67) 98/52  Flow (L/min) (Oxygen Therapy):  [3-6] 3     Physical Exam:  Constitutional: Somnolent, difficult to wake  HENT: NCAT, mucous membranes moist  Respiratory: Clear to auscultation bilaterally, respiratory effort normal .  On 3 L nasal cannula, no labored breaths  Cardiovascular: RRR, no murmurs, rubs, or gallops  Gastrointestinal: Positive bowel sounds, soft, nontender, nondistended  Musculoskeletal: No bilateral ankle edema  Psychiatric: MARICARMEN  Neurologic: Oriented to person only, very difficult to awaken.  Much different from yesterday  Skin: No rashes      Results Reviewed:  LAB RESULTS:      Lab 11/15/24  0614 11/15/24  0026 24  2133 24  0450 24  0503 24  2138 24  1811 24  1518 24  1627 24  1426   WBC 8.74  --  7.73 7.57  --   --   --  5.98  --  5.40   HEMOGLOBIN 8.6* 8.1* 8.2* 9.8*  --   --   --  11.8*  --  10.8*   HEMATOCRIT 29.5* 28.1* 26.9* 33.7*  --   --   --  37.8  --  35.1*   PLATELETS 141  --  133* 143  --   --   --  129*  --  133*   NEUTROS ABS 6.97  --  6.02 5.72  --   --   --  4.84  --  4.00   IMMATURE GRANS (ABS) 0.35*  --  0.35* 0.15*   --   --   --  0.04  --   --    LYMPHS ABS 0.65*  --  0.70 0.70  --   --   --  0.46*  --   --    MONOS ABS 0.76  --  0.60 0.98*  --   --   --  0.61  --   --    EOS ABS 0.00  --  0.01 0.00  --   --   --  0.01  --   --    .8*  --  110.7* 117.0*  --   --   --  109.2*  --  108.3*   PROCALCITONIN  --   --   --   --   --   --   --  0.63*  --   --    LACTATE  --   --  1.2  --   --   --   --  1.6  --   --    PROTIME 23.9*  --   --  23.3* 24.2* 23.4*  --  23.1*  --  20.8*   HSTROP T  --   --   --   --   --   --  200* 207* 217* 220*         Lab 11/15/24  0614 11/14/24  2133 11/14/24  1030 11/14/24  0831 11/13/24  0503 11/12/24  1518 11/11/24  1426   SODIUM 138 135*  --  141 138 138 141   POTASSIUM 4.8 4.2  --  4.1 4.5 3.9 3.8   CHLORIDE 102 99  --  101 96* 96* 99   CO2 22.0 21.0*  --  24.0 27.0 30.0* 27.7   ANION GAP 14.0 15.0  --  16.0* 15.0 12.0 14.3   BUN 72* 61*  --  89* 31* 22 50*   CREATININE 7.34* 6.27*  --  10.04* 6.95* 5.61* 9.69*   EGFR 6.9* 8.4*  --  4.7* 7.4* 9.5* 5.0*   GLUCOSE 147* 144*  --  115* 130* 112* 134*   CALCIUM 8.1* 8.0* 8.3* 7.6* 8.1* 8.6 8.6   MAGNESIUM 2.3 2.0  --  2.3 2.1  --  2.3   PHOSPHORUS 5.8*  --   --  6.1* 7.2*  --   --          Lab 11/15/24  0614 11/14/24 2133 11/14/24  0831 11/13/24  0503 11/12/24  1518   TOTAL PROTEIN 5.7* 5.9* 5.6* 6.4 7.0   ALBUMIN 3.7 3.6 3.1* 3.3* 3.8   GLOBULIN 2.0 2.3 2.5 3.1 3.2   ALT (SGPT) 6 6 6 8 8   AST (SGOT) 22 22 21 25 24   BILIRUBIN 0.3 0.3 0.2 0.3 0.6   ALK PHOS 79 73 83 107 123*         Lab 11/15/24  0614 11/14/24  2133 11/14/24  0450 11/13/24  0503 11/12/24 2138 11/12/24  1811 11/12/24  1518 11/11/24  1627 11/11/24  1426   PROBNP  --  15,999.0*  --   --   --   --  21,630.0*  --  21,061.0*   HSTROP T  --   --   --   --   --  200* 207* 217* 220*   PROTIME 23.9*  --  23.3* 24.2* 23.4*  --  23.1*  --  20.8*   INR 2.13*  --  2.06* 2.17* 2.07*  --  2.04*  --  1.72*             Lab 11/14/24 2133   ABO TYPING A   RH TYPING Positive   ANTIBODY SCREEN  Negative         Lab 11/15/24  0903 11/12/24  1624   PH, ARTERIAL 7.166* 7.384   PCO2, ARTERIAL 64.9* 53.0*   PO2 ART 57.5* 126.0*   FIO2 28 32   HCO3 ART 23.5 31.7*   BASE EXCESS ART -5.4* 5.4*   CARBOXYHEMOGLOBIN 1.0 0.5     Brief Urine Lab Results       None            Microbiology Results Abnormal       None            CT Head Without Contrast    Result Date: 11/13/2024  CT HEAD WO CONTRAST Date of Exam: 11/13/2024 11:49 AM EST Indication: headache and on coumadin. Comparison: None available. Technique: Axial CT images were obtained of the head without contrast administration.  Automated exposure control and iterative construction methods were used. Findings: There is no evidence of hemorrhage. There is no mass effect or midline shift. Diffuse brain atrophy. Tiny round hypodensity in the left basal ganglia suspicious for a chronic lacunar infarct. There is no extracerebral collection. Ventricles are normal in size and configuration for patient's stated age. Posterior fossa is within normal limits. Calvarium and skull base appear intact.  Visualized sinuses show no air fluid levels. Visualized orbits are unremarkable.     Impression: Impression: No evidence of acute intracranial abnormality Electronically Signed: Raji Hudson MD  11/13/2024 12:46 PM EST  Workstation ID: [id number]     Results for orders placed in visit on 01/17/24    Adult Transthoracic Echo Complete W/ Cont if Necessary Per Protocol    Interpretation Summary    Left ventricular systolic function is normal. Calculated left ventricular EF = 55.5% Left ventricular ejection fraction appears to be 56 - 60%.    Left ventricular wall thickness is consistent with mild concentric hypertrophy.    Left ventricular diastolic function was normal.    The left atrial cavity is mildly dilated.    Estimated right ventricular systolic pressure from tricuspid regurgitation is mildly elevated (35-45 mmHg). Calculated right ventricular systolic pressure from  tricuspid regurgitation is 40 mmHg.    Septal thickness and atrial volume both improved on today's study compared to 2023      Current medications:  Scheduled Meds:albumin human, 12.5 g, Intravenous, Once  atorvastatin, 40 mg, Oral, Daily  [Held by provider] bumetanide, 2 mg, Oral, Once per day on Sunday Monday Wednesday Friday  calcitriol, 0.25 mcg, Oral, Every Other Day  carvedilol, 12.5 mg, Oral, BID  cefTRIAXone, 1,000 mg, Intravenous, Q24H  dexAMETHasone, 6 mg, Oral, Daily   Or  dexAMETHasone, 6 mg, Intravenous, Daily  doxycycline, 100 mg, Oral, Q12H  lactobacillus acidophilus, 1 capsule, Oral, Daily  midodrine, 10 mg, Oral, Once per day on Tuesday Thursday Saturday  remdesivir, 100 mg, Intravenous, Q24H  sodium chloride, 10 mL, Intravenous, Q12H  Sucroferric Oxyhydroxide, 3 tablet, Oral, TID With Meals  Sucroferric Oxyhydroxide, 1 tablet, Oral, BID  vitamin B-12, 1,000 mcg, Oral, Daily  warfarin, 5 mg, Oral, Daily      Continuous Infusions:pantoprazole, 8 mg/hr  Pharmacy to dose warfarin,       PRN Meds:.  acetaminophen **OR** acetaminophen **OR** acetaminophen    albumin human    senna-docusate sodium **AND** polyethylene glycol **AND** bisacodyl **AND** bisacodyl    ipratropium-albuterol    nitroglycerin    ondansetron ODT **OR** ondansetron    Pharmacy to dose warfarin    sodium chloride    sodium chloride    sodium chloride    Assessment & Plan   Assessment & Plan     Active Hospital Problems    Diagnosis  POA    **Pneumonia due to COVID-19 virus [U07.1, J12.82]  Yes      Resolved Hospital Problems   No resolved problems to display.        Brief Hospital Course to date:  Marco Antonio Montemayor is a 79 y.o. male  w history of A-fib on Coumadin, CKD on hemodialysis, chronic diastolic congestive heart failure, ZULEIKA on CPAP presenting due to increased work of breathing and hypoxia. Found to COVID-19 PNA.  Hospital course complicated by persistent hypoxic respiratory failure.  Also by rapid response due to  hypotension.  Also with drop in hemoglobin and concern for GI bleed, GI is now following.     Sepsis due to COVID-19 PNA with concern for superimposed bacterial PNA  LLL Consolidation   Acute hypoxic respiratory failure   -CTA  on admission: no pulmonary embolus. Patchy areas of groundglass noted within both lungs. Differential considerations include atypical pneumonia   -XR: vascular congestion w LLL infiltrate  -CTH without acute abnormality  -started Ceftriaxone and doxycycline. Continue. Having some loose stools, stool studies negative, probiotic added  -Decadron and remdesivir   -wean O2 as tolerated. D/w patient supportive measures such as breathing exercises and up in chair to help with hypoxia  -follow blood cx  - On morning of 11/15 he is more somnolent, stat ABG consistent with hypercarbia.  Likely from not wearing BiPAP overnight.  He has been placed on BiPAP with close monitoring today    Hypotension  --gets midodrine on dialysis days, added TID midodrine today for persistent hypotension. BP meds held this AM. Needs respiratory status before proceeding with any scope     Afib on Warfarin  -pharmacy consulted for dosing warfarin  -continue home BB    Concern for GI bleed  - Overnight had a significant drop in hemoglobin with hypotension and concern for GI bleed.  GI was consulted, started on protonix drip     Thrombocytopenia  -likely due to active infection. Monitor      CHFpEF  -01/17/2024: ECHO EF: 56-60%   -Bumex 2 mg on nondialysis days.  Resume as tolerated. Held initially d/t poor appetite  -Encourage oral intake      ESRD on HD  -Tu/Th/Sat.   -On Velphoro from home   -nephrology following  -Continue midodrine on dialysis days        Expected Discharge Location and Transportation: home  Expected Discharge   Expected Discharge Date: 11/18/2024; Expected Discharge Time:      VTE Prophylaxis:  Pharmacologic & mechanical VTE prophylaxis orders are present.         AM-PAC 6 Clicks Score (PT): 18  (11/14/24 1947)    CODE STATUS:   Code Status and Medical Interventions: CPR (Attempt to Resuscitate); Full Support   Ordered at: 11/12/24 2047     Level Of Support Discussed With:    Patient     Code Status (Patient has no pulse and is not breathing):    CPR (Attempt to Resuscitate)     Medical Interventions (Patient has pulse or is breathing):    Full Support       Melody Cardozo MD  11/15/24

## 2024-11-15 NOTE — CONSULTS
[facility]    Referring Provider: Melody Cardozo MD     PCP: Heriberto Murcia MD    Reason for Consultation: GI bleed     Chief complaint: Shortness of breath     History of present illness:    Marco Antonio Montemayor is a 79 y.o. male who is admitted with COVID-19 pneumonia with acute hypoxic respiratory failure.  He has past medical history of ESRD on hemodialysis, atrial fibrillation on chronic anticoagulation with Coumadin and CHF.   He is somnolent this morning due to respiratory acidosis/hypercarbic respiratory failure.    He is on BiPAP.    History is obtained by speaking with his daughter at the bedside.   The patient began having diarrhea at home four days ago at onset of viral illness.   Stools are chronically dark on Ferrous sulfate.   He has history of Vitamin B12 deficiency.   He had a dark tarry bowel movement yesterday.   He became hypotensive throughout the day and a rapid response was called.     Baseline Hgb appears to be ~ 10.   H&H currently 8.6 and 29.   He has not received any blood transfusion.       No NSAID use.   He is currently on dexamethasone for his COVID-19 infection.         Allergies:  Patient has no known allergies.    Scheduled Meds:  albumin human, 12.5 g, Intravenous, Once  atorvastatin, 40 mg, Oral, Daily  [Held by provider] bumetanide, 2 mg, Oral, Once per day on Sunday Monday Wednesday Friday  calcitriol, 0.25 mcg, Oral, Every Other Day  carvedilol, 12.5 mg, Oral, BID  cefTRIAXone, 1,000 mg, Intravenous, Q24H  dexAMETHasone, 6 mg, Oral, Daily   Or  dexAMETHasone, 6 mg, Intravenous, Daily  doxycycline, 100 mg, Oral, Q12H  lactobacillus acidophilus, 1 capsule, Oral, Daily  midodrine, 10 mg, Oral, Once per day on Tuesday Thursday Saturday  remdesivir, 100 mg, Intravenous, Q24H  sodium chloride, 10 mL, Intravenous, Q12H  Sucroferric Oxyhydroxide, 3 tablet, Oral, TID With Meals  Sucroferric Oxyhydroxide, 1 tablet, Oral, BID  vitamin B-12, 1,000 mcg, Oral,  Daily  warfarin, 5 mg, Oral, Daily         Infusions:  pantoprazole, 8 mg/hr  Pharmacy to dose warfarin,         PRN Meds:    acetaminophen **OR** acetaminophen **OR** acetaminophen    albumin human    senna-docusate sodium **AND** polyethylene glycol **AND** bisacodyl **AND** bisacodyl    ipratropium-albuterol    nitroglycerin    ondansetron ODT **OR** ondansetron    Pharmacy to dose warfarin    sodium chloride    sodium chloride    sodium chloride    Home Meds:  Medications Prior to Admission   Medication Sig Dispense Refill Last Dose/Taking    acetaminophen (TYLENOL) 500 MG tablet Take 1 tablet by mouth Every 6 (Six) Hours As Needed for Mild Pain.   Past Week    atorvastatin (LIPITOR) 40 MG tablet TAKE ONE TABLET BY MOUTH DAILY 90 tablet 3 11/12/2024    bumetanide (BUMEX) 2 MG tablet TAKE 1 TABLET BY MOUTH EVERY OTHER DAY - TAKE ON DAYS OFF FROM DIALYSIS (TUESDAY, THURSDAY, SATURDAY AND SUNDAY) 48 tablet 2 11/12/2024    carvedilol (COREG) 12.5 MG tablet TAKE ONE TABLET BY MOUTH TWICE A  tablet 3 11/12/2024    cholecalciferol (VITAMIN D3) 1000 UNITS tablet Take 1 tablet by mouth Daily.   11/12/2024    midodrine (PROAMATINE) 10 MG tablet Take 1 tablet by mouth 3 (Three) Times a Day Before Meals. Before Dialysis, Tues, Thurs, Sat   11/12/2024    Sucroferric Oxyhydroxide (Velphoro) 500 MG chewable tablet Take As Directed. Crush or chew and swallow 3 tablets by mouth 3 times a day with meals and 1 tablet twice a day with snacks.   11/12/2024    vitamin B-12 (CYANOCOBALAMIN) 1000 MCG tablet Take 1 tablet by mouth Daily.   11/12/2024    warfarin (COUMADIN) 2.5 MG tablet Take 2-3 tablets by mouth daily or as directed by anticoagulation clinic 180 tablet 1 11/12/2024    cefdinir (OMNICEF) 300 MG capsule Take one capsule on day one followed by one capsule on dialysis days after dialysis is complete until they are gone. 5 capsule 0     Ferrous Sulfate (IRON) 325 (65 FE) MG tablet Take 65 mg by mouth Daily.        nitroglycerin (NITROSTAT) 0.3 MG SL tablet 1 under the tongue as needed for angina, may repeat q5mins for up three doses 25 tablet 11        ROS: Review of Systems   Unable to perform ROS: Mental status change   Somnolent on BiPAP     PAST MED HX:  Past Medical History:   Diagnosis Date    Abnormal ECG     Arrhythmia     Arthritis     Asthma     Cancer     skin cancer removed from various locations     Chronic diastolic congestive heart failure 09/14/2018    CKD (chronic kidney disease)     closer to stage 5    Coronary artery disease     Dry skin     Dyslipidemia 07/07/2016    Edema     Heart murmur     Hematoma     Hemodialysis access site with arteriovenous graft     History of transfusion     no transfusion reaction    Hypertension     Iron deficiency anemia     Long term current use of anticoagulant     ZULEIKA treated with BiPAP     Persistent atrial fibrillation 07/07/2016     Patient notes dyspnea, fatigue and palpitations during episodes of afib.  multiple external cardioversionsChads vasc 2, anticoagulated with Coumadin    Tinnitus     Wears glasses     readers       PAST SURG HX:  Past Surgical History:   Procedure Laterality Date    ABLATION OF DYSRHYTHMIC FOCUS      APPENDECTOMY      ARTERIOVENOUS FISTULA/SHUNT SURGERY Left 08/04/2022    Procedure: UPPER EXTREMITY BRACHIOCEPHALIC ARTERIOVENOUS FISTULA FORMATION LEFT;  Surgeon: Jakob Reza MD;  Location:  [facility];  Service: Vascular;  Laterality: Left;    ARTERIOVENOUS FISTULA/SHUNT SURGERY Left 11/03/2022    Procedure: ELEVATION LEFT UPPER EXTREMITY AV FISTULA;  Surgeon: Jakob Reza MD;  Location:  [facility];  Service: Vascular;  Laterality: Left;    CARDIAC ELECTROPHYSIOLOGY PROCEDURE N/A 05/18/2018    Procedure: PPM generator change - dual       [facility]/ please schedule in 8 weeks;  Surgeon: Johnathan Taylor MD;  Location:  [facility];  Service: Cardiovascular    CATARACT EXTRACTION      bilat wiht lens      "COLONOSCOPY      ENDOSCOPY      Pt denies    INSERT / REPLACE / REMOVE PACEMAKER  about 20 years back    SKIN CANCER EXCISION      various locations     TONSILLECTOMY      VEIN SURGERY  for dialysis    WISDOM TOOTH EXTRACTION         FAM HX:  Family History   Problem Relation Age of Onset    Cancer Mother     Heart attack Mother     Hypertension Mother     Arrhythmia Father     Hypertension Father     Heart attack Father        SOC HX:  Social History     Socioeconomic History    Marital status:    Tobacco Use    Smoking status: Never     Passive exposure: Never    Smokeless tobacco: Never   Vaping Use    Vaping status: Never Used   Substance and Sexual Activity    Alcohol use: No    Drug use: No    Sexual activity: Never       PHYSICAL EXAM  BP 98/52 (BP Location: Right arm, Patient Position: Lying)   Pulse 106   Temp 97.1 °F (36.2 °C) (Axillary)   Resp 18   Ht 177.8 cm (70\")   Wt 127 kg (279 lb 9.6 oz)   SpO2 95%   BMI 40.12 kg/m²   Wt Readings from Last 3 Encounters:   11/12/24 127 kg (279 lb 9.6 oz)   11/11/24 130 kg (286 lb 9.6 oz)   11/06/24 130 kg (287 lb 1.6 oz)   ,body mass index is 40.12 kg/m².  Physical Exam  Constitutional:       Appearance: He is obese. He is ill-appearing.   Cardiovascular:      Rate and Rhythm: Regular rhythm. Tachycardia present.   Pulmonary:      Effort: Accessory muscle usage present.      Comments: On Bipap   Abdominal:      General: Bowel sounds are normal.      Tenderness: There is no abdominal tenderness.      Comments: Obese abdomen.  Soft, nontender   Skin:     General: Skin is warm and dry.   Neurological:      Mental Status: He is lethargic.       Results Review:   I reviewed the patient's new clinical results.    Lab Results   Component Value Date    WBC 8.74 11/15/2024    HGB 8.6 (L) 11/15/2024    HGB 8.1 (L) 11/15/2024    HGB 8.2 (L) 11/14/2024    HCT 29.5 (L) 11/15/2024    .8 (H) 11/15/2024     11/15/2024       Lab Results   Component Value " Date    INR 2.13 (H) 11/15/2024    INR 2.06 (H) 11/14/2024    INR 2.17 (H) 11/13/2024       Lab Results   Component Value Date    GLUCOSE 147 (H) 11/15/2024    BUN 72 (H) 11/15/2024    CREATININE 7.34 (H) 11/15/2024    EGFRIFNONA 18 (L) 02/16/2022    EGFRIFAFRI 26 (L) 11/12/2018    BCR 9.8 11/15/2024     11/15/2024    K 4.8 11/15/2024    CO2 22.0 11/15/2024    CALCIUM 8.1 (L) 11/15/2024    PROTENTOTREF 6.8 01/10/2023    ALBUMIN 3.7 11/15/2024    ALKPHOS 79 11/15/2024    BILITOT 0.3 11/15/2024    ALT 6 11/15/2024    AST 22 11/15/2024     ASSESSMENTS/PLANS    Gastrointestinal bleeding with melena   Acute blood loss anemia, background of anemia of chronic disease  Macrocytosis, history of Vitamin B12 deficiency   Anticoagulation use. On Coumadin for atrial fibrillation   COVID-19 pneumonia  Respiratory acidosis   Acute respiratory failure   ESRD, on hemodialysis     Suspect upper GI source of bleeding.  He has risk factors for peptic ulcer disease and reflux esophagitis.      His current respiratory status precludes upper endoscopy.       >> Begin IV Pantoprazole drip   >> Serial H&H.  Transfuse for Hgb < 7 or symptomatic active bleeding.       I discussed the patient's findings and my recommendations with patient, his daughter whom is at the bedside.       PUJA Garcia  11/15/24  10:48 EST

## 2024-11-16 PROBLEM — J96.01 ACUTE RESPIRATORY FAILURE WITH HYPOXIA: Status: ACTIVE | Noted: 2024-11-16

## 2024-11-16 NOTE — PLAN OF CARE
Problem: Hemodialysis  Goal: Safe, Effective Therapy Delivery  Outcome: Progressing  Goal: Effective Tissue Perfusion  Outcome: Progressing  Goal: Absence of Infection Signs and Symptoms  Outcome: Progressing     HD completed. Tolerated tx of 3.5 hrs well, with the help of Levophed(Norepinephrine) on board to help keep blood pressures stable. Access functioned well. UF goal of 0.5 liters reached. Blood returned with no complications Report given to Dolores huffman,RN. Goal Outcome Evaluation:

## 2024-11-16 NOTE — PROGRESS NOTES
Intensive Care Follow-up     Hospital:  LOS: 4 days   Mr. Marco Antonio Montemayor, 79 y.o. male is followed for:   Pneumonia due to COVID-19 virus        Subjective     History is limited to review the medical records, discussion with hospital service, as well as through discussion with the patient's daughter who is at bedside.  The patient has been having progressive delirium since presentation which limits any participation in history giving.  Patient has a complicated medical history that will be outlined below.  The patient presented on 11/11/24 with increased work of breathing and hypoxia.  Initial testing identified the patient as having COVID-19 pneumonia.  He was subsequently admitted.  He was started on Decadron and remdesivir.  On the night prior to consultation the patient was noted to have low blood pressure following a hemodialysis session.  Since patient had 2 L removed a small fluid bolus was given.  In addition the patient was started on midodrine.  The patient's blood pressure has been waxing and waning for the past 24 hours resulting in multiple rapid response assessments.  On the night of consultation our service was called.  The patient once again was a rapid response.  His level of consciousness had been declining, blood pressure remained low, and the patient's respiratory status is now requiring noninvasive ventilation.  Earlier in the day had also been assessed by the GI service for possible GI bleeding in the setting of chronic Coumadin for A-fib.  Given the overall complexity of the patient's after discussing with the Hospitalist Service we agreed to have the patient transferred to the ICU for further management monitoring.   Interval History:  The chart has been reviewed.  The patient has been resting comfortably.  Currently saturating well with 50 to 55% FiO2.  Secretions are minimal.    The patient's past medical, surgical and social history were reviewed and updated in Epic as appropriate.         Objective     Infusions:  dexmedetomidine, 0.2-1.5 mcg/kg/hr, Last Rate: 0.6 mcg/kg/hr (11/16/24 1057)  fentanyl 10 mcg/mL,  mcg/hr, Last Rate: 150 mcg/hr (11/16/24 1020)  norepinephrine, 0.02-0.3 mcg/kg/min, Last Rate: Stopped (11/16/24 0945)  pantoprazole, 8 mg/hr, Last Rate: 8 mg/hr (11/16/24 0959)  propofol, 5-50 mcg/kg/min, Last Rate: Stopped (11/16/24 0930)  sodium bicarbonate 8.4 % 150 mEq in dextrose (D5W) 5 % 1,000 mL infusion (greater than 100 mEq), 150 mEq, Last Rate: 150 mEq (11/16/24 0343)      Medications:  atorvastatin, 40 mg, Nasogastric, Nightly  [Held by provider] bumetanide, 2 mg, Oral, Once per day on Sunday Monday Wednesday Friday  [Held by provider] calcitriol, 0.25 mcg, Oral, Every Other Day  [Held by provider] carvedilol, 12.5 mg, Oral, BID  chlorhexidine, 15 mL, Mouth/Throat, Q12H  doxycycline, 100 mg, Nasogastric, Q12H  hydrocortisone sodium succinate, 100 mg, Intravenous, Q8H  lactobacillus acidophilus, 1 capsule, Nasogastric, Daily  midodrine, 10 mg, Nasogastric, Once per day on Tuesday Thursday Saturday  mupirocin, 1 Application, Each Nare, BID  piperacillin-tazobactam, 4.5 g, Intravenous, Q12H  remdesivir, 100 mg, Intravenous, Q24H  [Held by provider] Sucroferric Oxyhydroxide, 3 tablet, Nasogastric, TID With Meals  [Held by provider] Sucroferric Oxyhydroxide, 1 tablet, Nasogastric, BID  [Held by provider] vitamin B-12, 1,000 mcg, Oral, Daily        Vital Sign Min/Max for last 24 hours  Temp  Min: 96.2 °F (35.7 °C)  Max: 98.1 °F (36.7 °C)   BP  Min: 49/21  Max: 167/67   Pulse  Min: 73  Max: 113   Resp  Min: 18  Max: 20   SpO2  Min: 70 %  Max: 100 %   Flow (L/min) (Oxygen Therapy)  Min: 3.5  Max: 3.5       Input/Output for last 24 hour shift  11/15 0701 - 11/16 0700  In: 498.6 [I.V.:398.6]  Out: -    Mode: VC+/AC  Objective:  General Appearance:  Uncomfortable (Sedated, intubated).    Vital signs: (most recent): Blood pressure 123/70, pulse 78, temperature 98.1 °F (36.7 °C),  "temperature source Axillary, resp. rate 20, height 177.8 cm (70\"), weight 127 kg (279 lb 9.6 oz), SpO2 98%.    HEENT: (Endotracheal tube in place OG tube in place)    Lungs:  Normal effort and normal respiratory rate.  There are decreased breath sounds.  No rales.    Heart: Normal rate.  Regular rhythm.  S1 normal and S2 normal.  No murmur.   Chest: Symmetric chest wall expansion.   Abdomen: Abdomen is soft and non-distended.  Bowel sounds are normal.   There is no abdominal tenderness.     Extremities: Normal range of motion.    Neurological: (Sedated but arousable).    Pupils:  Pupils are equal, round, and reactive to light.  Pupils are equal.   Skin:  Warm.                Results from last 7 days   Lab Units 11/16/24  0935 11/16/24  0128 11/15/24  2055 11/15/24  1247 11/15/24  0614 11/15/24  0026 11/14/24  2133   WBC 10*3/mm3  --  11.86*  --   --  8.74  --  7.73   HEMOGLOBIN g/dL 7.5* 8.4* 8.0*   < > 8.6*   < > 8.2*   PLATELETS 10*3/mm3  --  181  --   --  141  --  133*    < > = values in this interval not displayed.     Results from last 7 days   Lab Units 11/16/24  0128 11/15/24  2055 11/15/24  0614 11/14/24  2133 11/14/24  0831   SODIUM mmol/L 141 138 138   < > 141   POTASSIUM mmol/L 4.8 4.5 4.8   < > 4.1   CO2 mmol/L 22.0 21.0* 22.0   < > 24.0   BUN mg/dL 86* 86* 72*   < > 89*   CREATININE mg/dL 7.85* 8.00* 7.34*   < > 10.04*   MAGNESIUM mg/dL 2.2 2.2 2.3   < > 2.3   PHOSPHORUS mg/dL  --  5.5* 5.8*  --  6.1*   GLUCOSE mg/dL 183* 167* 147*   < > 115*    < > = values in this interval not displayed.     Estimated Creatinine Clearance: 9.9 mL/min (A) (by C-G formula based on SCr of 7.85 mg/dL (H)).    Results from last 7 days   Lab Units 11/16/24  0545   PH, ARTERIAL pH units 7.307*   PCO2, ARTERIAL mm Hg 39.2   PO2 ART mm Hg 328.0*         I reviewed the patient's results and images.     Assessment & Plan   Impression        Pneumonia due to COVID-19 virus    Severe obstructive sleep apnea    ESRD on HD T,R,S " since 2022    Acute respiratory failure with hypoxia       Plan        Continue with current ventilator support.  Continue steroids as before.  Continue remdesivir.  Hold on echocardiogram for now.  Plan for hemodialysis within the next 12 to 24 hours.  Start fentanyl drip and wean propofol.  We will assess for ability to extubate as early as tomorrow morning depending upon his progress.  Plan to start tube feedings.  Continue with glucose control as before.    Plan of care and goals reviewed with mulitdisciplinary/antibiotic stewardship team during rounds.   I discussed the patient's findings and my recommendations with patient and nursing staff     High level of risk due to:  drug(s) requiring intensive monitoring for toxicity and parenteral controlled substances.        Pankaj Vega MD, [facility]  Pulmonology and Critical Care Medicine

## 2024-11-16 NOTE — PLAN OF CARE
Problem: Hemodialysis  Goal: Safe, Effective Therapy Delivery  Outcome: Progressing  Goal: Effective Tissue Perfusion  Outcome: Progressing  Goal: Absence of Infection Signs and Symptoms  Outcome: Progressing   Goal Outcome Evaluation:  HD today via AVF, goal  mL.

## 2024-11-16 NOTE — PLAN OF CARE
"  Problem: Adult Inpatient Plan of Care  Goal: Plan of Care Review  Outcome: Not Progressing  Flowsheets (Taken 11/16/2024 0600)  Progress: declining  Outcome Evaluation: Patient arrived from floor following rapid response. Patient hypotensive on arrival. Levophed started and RN has had difficulty controlling BP tonight. Patient BiPap dependant during the night upon arrival to unit. Patient began to decline further around 0200 and at 0311 patient was intubated. MD and APRN at bedside with respiratory therapist. Patient tolerated well. Patient started on propofol, but patient had periods of anxiety associated with hypo and hypertensive episodes and fentanyl was added. Ultimately patient was placed on Precidex and propofol is being weaned. Patient now in controlled rate A-fib and tolerating well. Patient continues to follow commands but occasionally shakes his head \"no\" when asked to perform tasks. Plan is for patient to have Echo today. Continue to monitor closely. Daughter updated at bedside. Continue with current plan of care.  Plan of Care Reviewed With: patient                          "

## 2024-11-16 NOTE — PROGRESS NOTES
Events in the past day noted including transfer to the ICU for respiratory failure, subsequent mechanical ventilation, and requiring inotropic support.  Hemoglobin remains stable.  No further reports of melena.    >> Continue daily IV PPI.  >> Patient is critically ill.  In the short-term will defer EGD unless there is overt bleeding or acute drop in hemoglobin.  >> Pending clinical course, may consider EGD prior to extubation.

## 2024-11-16 NOTE — PROGRESS NOTES
" LOS: 4 days   Patient Care Team:  Heriberto Murcia MD as PCP - General (Family Medicine)  Shanell Arango, PharmD as Pharmacist (Pharmacy)  Evelina Quan, PharmD as Pharmacist (Pharmacy)  Peng Melgar, PharmD as Pharmacist (Pharmacy)  Johnathan Taylor MD as Consulting Physician (Cardiac Electrophysiology)  Chato Bo MD as Consulting Physician (Nephrology)  Ivet Beasley APRN as Nurse Practitioner (Cardiology)  Rei Decker PA as Physician Assistant (Cardiology)      Subjective   Patient seen in ICU setting.  Intubated on ventilator.    Objective     Vital Sign Min/Max for last 24 hours  Temp  Min: 96.2 °F (35.7 °C)  Max: 98.1 °F (36.7 °C)   BP  Min: 49/21  Max: 167/67   Pulse  Min: 80  Max: 113   Resp  Min: 18  Max: 20   SpO2  Min: 70 %  Max: 100 %   Flow (L/min) (Oxygen Therapy)  Min: 3.5  Max: 3.5   No data recorded     Flowsheet Rows      Flowsheet Row First Filed Value   Admission Height 177.8 cm (70\") Documented at 11/12/2024 1456   Admission Weight 130 kg (287 lb) Documented at 11/12/2024 1456            I/O this shift:  In: 197 [I.V.:147; NG/GT:50]  Out: -   I/O last 3 completed shifts:  In: 498.6 [I.V.:398.6; IV Piggyback:100]  Out: -     Physical examination:  COVID precautions taken.  General:  male no acute distress.  Ill-appearing.  Eyes: PERR  Pulmonary: Intubated on ventilator equal chest movement.  Cardiovascular: Normal rate.  Abdomen: Morbid obesity.  Extremities: No edema is noted.  Neurological: No focal deficit moving all extremities.  LUE-aVF in place      Results Review:     I reviewed the patient's new clinical results.    WBC WBC   Date Value Ref Range Status   11/16/2024 11.86 (H) 3.40 - 10.80 10*3/mm3 Final   11/15/2024 8.74 3.40 - 10.80 10*3/mm3 Final   11/14/2024 7.73 3.40 - 10.80 10*3/mm3 Final   11/14/2024 7.57 3.40 - 10.80 10*3/mm3 Final      HGB Hemoglobin   Date Value Ref Range Status   11/16/2024 8.4 (L) 13.0 - 17.7 g/dL Final " "  11/15/2024 8.0 (L) 13.0 - 17.7 g/dL Final   11/15/2024 8.2 (L) 13.0 - 17.7 g/dL Final   11/15/2024 8.7 (L) 13.0 - 17.7 g/dL Final   11/15/2024 8.6 (L) 13.0 - 17.7 g/dL Final   11/15/2024 8.1 (L) 13.0 - 17.7 g/dL Final   11/14/2024 8.2 (L) 13.0 - 17.7 g/dL Final   11/14/2024 9.8 (L) 13.0 - 17.7 g/dL Final      HCT Hematocrit   Date Value Ref Range Status   11/16/2024 28.1 (L) 37.5 - 51.0 % Final   11/15/2024 27.0 (L) 37.5 - 51.0 % Final   11/15/2024 27.4 (L) 37.5 - 51.0 % Final   11/15/2024 29.1 (L) 37.5 - 51.0 % Final   11/15/2024 29.5 (L) 37.5 - 51.0 % Final   11/15/2024 28.1 (L) 37.5 - 51.0 % Final   11/14/2024 26.9 (L) 37.5 - 51.0 % Final   11/14/2024 33.7 (L) 37.5 - 51.0 % Final      Platlets No results found for: \"LABPLAT\"   MCV MCV   Date Value Ref Range Status   11/16/2024 109.8 (H) 79.0 - 97.0 fL Final   11/15/2024 114.8 (H) 79.0 - 97.0 fL Final   11/14/2024 110.7 (H) 79.0 - 97.0 fL Final   11/14/2024 117.0 (H) 79.0 - 97.0 fL Final          Sodium Sodium   Date Value Ref Range Status   11/16/2024 141 136 - 145 mmol/L Final   11/15/2024 138 136 - 145 mmol/L Final   11/15/2024 138 136 - 145 mmol/L Final   11/14/2024 135 (L) 136 - 145 mmol/L Final   11/14/2024 141 136 - 145 mmol/L Final      Potassium Potassium   Date Value Ref Range Status   11/16/2024 4.8 3.5 - 5.2 mmol/L Final   11/15/2024 4.5 3.5 - 5.2 mmol/L Final   11/15/2024 4.8 3.5 - 5.2 mmol/L Final   11/14/2024 4.2 3.5 - 5.2 mmol/L Final   11/14/2024 4.1 3.5 - 5.2 mmol/L Final      Chloride Chloride   Date Value Ref Range Status   11/16/2024 104 98 - 107 mmol/L Final   11/15/2024 102 98 - 107 mmol/L Final   11/15/2024 102 98 - 107 mmol/L Final   11/14/2024 99 98 - 107 mmol/L Final   11/14/2024 101 98 - 107 mmol/L Final      CO2 CO2   Date Value Ref Range Status   11/16/2024 22.0 22.0 - 29.0 mmol/L Final   11/15/2024 21.0 (L) 22.0 - 29.0 mmol/L Final   11/15/2024 22.0 22.0 - 29.0 mmol/L Final   11/14/2024 21.0 (L) 22.0 - 29.0 mmol/L Final " "  11/14/2024 24.0 22.0 - 29.0 mmol/L Final      BUN BUN   Date Value Ref Range Status   11/16/2024 86 (H) 8 - 23 mg/dL Final   11/15/2024 86 (H) 8 - 23 mg/dL Final   11/15/2024 72 (H) 8 - 23 mg/dL Final   11/14/2024 61 (H) 8 - 23 mg/dL Final   11/14/2024 89 (H) 8 - 23 mg/dL Final      Creatinine Creatinine   Date Value Ref Range Status   11/16/2024 7.85 (H) 0.76 - 1.27 mg/dL Final   11/15/2024 8.00 (H) 0.76 - 1.27 mg/dL Final   11/15/2024 7.34 (H) 0.76 - 1.27 mg/dL Final   11/14/2024 6.27 (H) 0.76 - 1.27 mg/dL Final   11/14/2024 10.04 (H) 0.76 - 1.27 mg/dL Final      Calcium Calcium   Date Value Ref Range Status   11/16/2024 8.2 (L) 8.6 - 10.5 mg/dL Final   11/15/2024 8.2 (L) 8.6 - 10.5 mg/dL Final   11/15/2024 8.1 (L) 8.6 - 10.5 mg/dL Final   11/14/2024 8.0 (L) 8.6 - 10.5 mg/dL Final   11/14/2024 8.3 (L) 8.6 - 10.5 mg/dL Final   11/14/2024 7.6 (L) 8.6 - 10.5 mg/dL Final      PO4 No results found for: \"CAPO4\"   Albumin Albumin   Date Value Ref Range Status   11/16/2024 3.6 3.5 - 5.2 g/dL Final   11/15/2024 3.4 (L) 3.5 - 5.2 g/dL Final   11/15/2024 3.7 3.5 - 5.2 g/dL Final   11/14/2024 3.6 3.5 - 5.2 g/dL Final   11/14/2024 3.1 (L) 3.5 - 5.2 g/dL Final      Magnesium Magnesium   Date Value Ref Range Status   11/16/2024 2.2 1.6 - 2.4 mg/dL Final   11/15/2024 2.2 1.6 - 2.4 mg/dL Final   11/15/2024 2.3 1.6 - 2.4 mg/dL Final   11/14/2024 2.0 1.6 - 2.4 mg/dL Final   11/14/2024 2.3 1.6 - 2.4 mg/dL Final      Uric Acid No results found for: \"URICACID\"         Assessment & Plan       Pneumonia due to COVID-19 virus    =======================     1.  ESRD on HD TTS- No missed treatment sessions  2.  Hypotension on midodrine   3.  HFpEF  4.  Anemia of CKD: Hgb at goal - No indications for BOAZ  5.  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  Volume status: No overt volume overload  7.  COVID- 19 PNA  8.  Afib / rate controlled      PLAN   Hemodialysis planned for today.  Continue with calcitriol  Will " continue manage ESRD and related complications  Wife in the room.  I discussed the patients findings and my recommendations with staff.      Belem Kaye MD  11/16/24  09:25 EST

## 2024-11-16 NOTE — SIGNIFICANT NOTE
Rapid response called on this patient tonight (Friday 11/15) during early stage of the overnight shift.  RN reports continued hypotension, which I am told was being addressed during the daytime as well.  He had a rapid response called last night as well for hypotension and responded to some IV fluids, midodrine, and albumin, but during the day today has not responded and RN noticed the patient also became less responsive than his baseline, necessitating the rapid.  Patient had not worn BiPAP last night and had ABG earlier this morning with pH <7.20 and marked hypercarbia and hypoxia.  He wore BiPAP today and had improvement in his O2 but not really any significant improvement in the pH or pCO2.  On my arrival he is slightly less responsive, more ill-appearing, somnolent, systolic pressures in the 70s.  Given the pH on his most recent ABG and the lack of improvement during the day, I discussed the case with the ICU attending on-call who agreed that the patient would benefit from transfer to the ICU, possible need for pressors and optimization of O2 delivery.  Discussed with house supervisor and RN as well.    Electronically signed by Marshall Ruby III, DO, 11/15/24, 11:06 PM EST.

## 2024-11-16 NOTE — PROGRESS NOTES
Intensive Care Admission Note     Pneumonia due to COVID-19 virus    History of Present Illness     History is limited to review the medical records, discussion with hospital service, as well as through discussion with the patient's daughter who is at bedside.  The patient has been having progressive delirium since presentation which limits any participation in history giving.  Patient has a complicated medical history that will be outlined below.  The patient presented on 11/11/24 with increased work of breathing and hypoxia.  Initial testing identified the patient as having COVID-19 pneumonia.  He was subsequently admitted.  He was started on Decadron and remdesivir.  On the night prior to consultation the patient was noted to have low blood pressure following a hemodialysis session.  Since patient had 2 L removed a small fluid bolus was given.  In addition the patient was started on midodrine.  The patient's blood pressure has been waxing and waning for the past 24 hours resulting in multiple rapid response assessments.  On the night of consultation our service was called.  The patient once again was a rapid response.  His level of consciousness had been declining, blood pressure remained low, and the patient's respiratory status is now requiring noninvasive ventilation.  Earlier in the day had also been assessed by the GI service for possible GI bleeding in the setting of chronic Coumadin for A-fib.  Given the overall complexity of the patient's after discussing with the Hospitalist Service we agreed to have the patient transferred to the ICU for further management monitoring.    On initial assessment the patient was on a Levophed drip, was on BiPAP with low minute ventilation, and was very restless and not tolerating the noninvasive mask in place.    Problem List, Surgical History, Family, Social History, and ROS     Past Medical History:   Diagnosis Date    Abnormal ECG     Arrhythmia     Arthritis     Asthma      Cancer     skin cancer removed from various locations     Chronic diastolic congestive heart failure 09/14/2018    CKD (chronic kidney disease)     closer to stage 5    Coronary artery disease     Dry skin     Dyslipidemia 07/07/2016    Edema     Heart murmur     Hematoma     Hemodialysis access site with arteriovenous graft     History of transfusion     no transfusion reaction    Hypertension     Iron deficiency anemia     Long term current use of anticoagulant     ZULEIKA treated with BiPAP     Persistent atrial fibrillation 07/07/2016     Patient notes dyspnea, fatigue and palpitations during episodes of afib.  multiple external cardioversionsChads vasc 2, anticoagulated with Coumadin    Tinnitus     Wears glasses     readers      Past Surgical History:   Procedure Laterality Date    ABLATION OF DYSRHYTHMIC FOCUS      APPENDECTOMY      ARTERIOVENOUS FISTULA/SHUNT SURGERY Left 08/04/2022    Procedure: UPPER EXTREMITY BRACHIOCEPHALIC ARTERIOVENOUS FISTULA FORMATION LEFT;  Surgeon: Jakob Reza MD;  Location:  [facility];  Service: Vascular;  Laterality: Left;    ARTERIOVENOUS FISTULA/SHUNT SURGERY Left 11/03/2022    Procedure: ELEVATION LEFT UPPER EXTREMITY AV FISTULA;  Surgeon: Jakob Reza MD;  Location:  [facility];  Service: Vascular;  Laterality: Left;    CARDIAC ELECTROPHYSIOLOGY PROCEDURE N/A 05/18/2018    Procedure: PPM generator change - dual       [facility]/ please schedule in 8 weeks;  Surgeon: Johnathan Taylor MD;  Location:  [facility];  Service: Cardiovascular    CATARACT EXTRACTION      bilat wiht lens     COLONOSCOPY      ENDOSCOPY      Pt denies    INSERT / REPLACE / REMOVE PACEMAKER  about 20 years back    SKIN CANCER EXCISION      various locations     TONSILLECTOMY      VEIN SURGERY  for dialysis    WISDOM TOOTH EXTRACTION         No Known Allergies  No current facility-administered medications on file prior to encounter.     Current Outpatient Medications on  File Prior to Encounter   Medication Sig    acetaminophen (TYLENOL) 500 MG tablet Take 1 tablet by mouth Every 6 (Six) Hours As Needed for Mild Pain.    atorvastatin (LIPITOR) 40 MG tablet TAKE ONE TABLET BY MOUTH DAILY    bumetanide (BUMEX) 2 MG tablet TAKE 1 TABLET BY MOUTH EVERY OTHER DAY - TAKE ON DAYS OFF FROM DIALYSIS (TUESDAY, THURSDAY, SATURDAY AND SUNDAY)    carvedilol (COREG) 12.5 MG tablet TAKE ONE TABLET BY MOUTH TWICE A DAY    cholecalciferol (VITAMIN D3) 1000 UNITS tablet Take 1 tablet by mouth Daily.    midodrine (PROAMATINE) 10 MG tablet Take 1 tablet by mouth 3 (Three) Times a Day Before Meals. Before Dialysis, Tues, Thurs, Sat    Sucroferric Oxyhydroxide (Velphoro) 500 MG chewable tablet Take As Directed. Crush or chew and swallow 3 tablets by mouth 3 times a day with meals and 1 tablet twice a day with snacks.    vitamin B-12 (CYANOCOBALAMIN) 1000 MCG tablet Take 1 tablet by mouth Daily.    warfarin (COUMADIN) 2.5 MG tablet Take 2-3 tablets by mouth daily or as directed by anticoagulation clinic    cefdinir (OMNICEF) 300 MG capsule Take one capsule on day one followed by one capsule on dialysis days after dialysis is complete until they are gone.    Ferrous Sulfate (IRON) 325 (65 FE) MG tablet Take 65 mg by mouth Daily.    nitroglycerin (NITROSTAT) 0.3 MG SL tablet 1 under the tongue as needed for angina, may repeat q5mins for up three doses     MEDICATION LIST AND ALLERGIES REVIEWED.    Family History   Problem Relation Age of Onset    Cancer Mother     Heart attack Mother     Hypertension Mother     Arrhythmia Father     Hypertension Father     Heart attack Father      Social History     Tobacco Use    Smoking status: Never     Passive exposure: Never    Smokeless tobacco: Never   Vaping Use    Vaping status: Never Used   Substance Use Topics    Alcohol use: No    Drug use: No     Social History     Social History Narrative    Not on file     FAMILY AND SOCIAL HISTORY REVIEWED.    Review of  "Systems  As above and otherwise unavailable    Physical Exam and Clinical Information   /72   Pulse 97   Temp 96.8 °F (36 °C) (Axillary)   Resp 20   Ht 177.8 cm (70\")   Wt 127 kg (279 lb 9.6 oz)   SpO2 97%   BMI 40.12 kg/m²   Physical Exam  General: Laying in bed in room 213; somnolent but very restless; respiratory efforts appear labored  Skin: Dry with sloughing; notable global pallor; no rash, erythema, cyanosis, jaundice, breakdown noted on visualized aspects of the skin  Eyes: EOMI, PERRLA; eyes track appropriately; sclera anicteric  ENT: Limited due to noninvasive mask in place   Neck: Very thick without apparent JVD, lymphadenopathy, bruit, thyromegaly appreciated  Pulmonary: Shallow effort; diffuse coarseness with bilateral crackles; no wheeze; breath sounds significantly diminished in bilateral bases  Cardiac: Irregular/irregular with ventricular response rate in the 100s 110s; normal S1 and S2; no murmur, S3, S4  Abdomen: Obese, soft, nontender, nondistended; bowel sounds are present x 4; there are no masses or hepatosplenomegaly appreciated  Genitourinary: No suprapubic tenderness to palpation flank pain to percussion  Musculoskeletal: Extremities are warm and dry; there is no clubbing, cyanosis; pedal pulses are palpable bilaterally at 1 -; there is 1+ bilateral lower extremity pitting edema  Neurologic: Limited due to level of consciousness; patient is strong and appears to move extremities spontaneously and to painful stimuli equally strength is grossly intact; range of motion is grossly intact; the patient is minimally verbal and does not appear oriented  Hematology/oncology: No evidence of extensive bruising or active bleeding  Results from last 7 days   Lab Units 11/15/24  2055 11/15/24  1855 11/15/24  1247 11/15/24  0614 11/15/24  0026 11/14/24  2133 11/14/24  0450   WBC 10*3/mm3  --   --   --  8.74  --  7.73 7.57   HEMOGLOBIN g/dL 8.0* 8.2* 8.7* 8.6*   < > 8.2* 9.8*   PLATELETS " 10*3/mm3  --   --   --  141  --  133* 143    < > = values in this interval not displayed.     Results from last 7 days   Lab Units 11/15/24  2055 11/15/24  0614 11/14/24 2133 11/14/24  0831 11/13/24  0503   SODIUM mmol/L 138 138 135* 141 138   POTASSIUM mmol/L 4.5 4.8 4.2 4.1 4.5   CO2 mmol/L 21.0* 22.0 21.0* 24.0 27.0   BUN mg/dL 86* 72* 61* 89* 31*   CREATININE mg/dL 8.00* 7.34* 6.27* 10.04* 6.95*   MAGNESIUM mg/dL  --  2.3 2.0 2.3 2.1   PHOSPHORUS mg/dL  --  5.8*  --  6.1* 7.2*   GLUCOSE mg/dL 167* 147* 144* 115* 130*     Estimated Creatinine Clearance: 9.7 mL/min (A) (by C-G formula based on SCr of 8 mg/dL (H)).      Results from last 7 days   Lab Units 11/15/24  2053   PH, ARTERIAL pH units 7.172*   PCO2, ARTERIAL mm Hg 61.6*   PO2 ART mm Hg 119.0*     Lab Results   Component Value Date    LACTATE 1.2 11/15/2024        Images:         I reviewed the patient's results and images.     Impression     Medical Problems       Hospital Problem List       * (Principal) Pneumonia due to COVID-19 virus        Plan/Recommendations     Assessment And Plan:    1: Pulmonary: History of severe obstructive sleep apnea; acute respiratory failure with hypoxemia and hypercapnia; pneumonia; respiratory acidosis  -Patient was transition from his home noninvasive unit to a hospital-based model  -On presentation the patient's pH was relatively preserved with a PA CO2 of 53  -Since then the patient has had sequential blood gases that have shown a dramatic worsening in his overall acid-base with a mixed metabolic and respiratory acidosis  -Will once again adjust the noninvasive settings and repeat an arterial blood gas  -If this does not show improvement we will proceed with elective endotracheal intubation given the likelihood of impending respiratory failure  -At this point it is unclear as to whether the patient's respiratory decline is due to primary pulmonary problem (pneumonia), a primary cardiac issue (known A-fib with a  "significantly elevated BNP), or due to some other occult process  -X-ray done tonight shows stable cardiomegaly with stable bilateral airspace disease    2: Cardiovascular: History of hypertension, atrial fibrillation, prior \"heart attack, \"now with sustained hypotension  -Source of the patient's hypotension is not clear with possible contributing factors include volume depletion status post hemodialysis, severe sepsis with shock, acute blood loss (see below), severe mixed acidosis, cardiogenic shock  -Serum cortisol checked today was profoundly low; this was despite the patient receiving Decadron as part of his COVID-19 treatment  -Will transition Decadron to hydrocortisone  -Most recent echo was from early in 2024 that showed a preserved ejection fraction; the BNP is profoundly elevated raising concern for possible acute interval cardiac event  -Will check echocardiogram  -Despite the patient receiving dialysis on a regular schedule his metabolic acidosis persists; would have a low threshold to try to see if the patient is bicarb responsive and if so we will start the patient on a bicarb drip prior to having dialysis on Saturday  -Ideally would be able to hyperventilate the patient to some degree to offset the PaCO2 but this is thus far proven difficult  -See below regarding severe sepsis  -See below regarding acute blood loss  -Continue Levophed drip as needed at this point to maintain mean arterial pressure greater than 65 mmHg    3: Infectious disease: COVID-19 pneumonia; concern for severe sepsis with shock  -Patient presented on 11/12 with COVID; was started on systemic steroids and remdesivir  -Despite this the patient's respiratory status has declined  -Again it is not clear as to whether the patient's respiratory decline has anything to do with the COVID or other superimposed issues  -The patient was started on Rocephin and doxycycline on admission for concern for possible superimposed bacterial " pneumonia  -In hindsight however the patient's pulmonary risk factors are higher and more consistent with HCAP as the patient goes to outpatient dialysis 3 times a week  -Will adjust antibiotics to Zosyn and doxycycline  -Patient is anuric which limits ability to check strep and Legionella  -If the patient ends up getting intubated will check sputum culture  -Thus far no cultures otherwise have been positive    4: Renal/genitourinary/acid-base: End-stage renal disease on 3 times a week dialysis; metabolic acidosis  -Labs done tonight show stable metabolic acidosis with a borderline elevated anion gap  -Lactic acid done tonight is within normal limits and serum chloride is not significantly elevated  -Therefore suspect the most likely source of the patient's metabolic component of acidosis is due to renal dysfunction  -The patient is slated for dialysis tomorrow but if the acidosis does not improve would consider a slow rate sodium bicarb infusion  -The patient is an uric so output is almost exclusively dependent on dialysis    5: Gastrointestinal: Dark tarry stool; concern for acute GI bleed  -Patient was seen by the GI service yesterday for dark tarry stools in the setting of chronic iatrogenic anticoagulation  -Coumadin has been held and the patient has been transition to a Protonix drip/twice daily Protonix  -There may be some component of shock associated with blood loss but hemoglobin his been relatively stable although downtrending slowly  -N.p.o. for now  -LFTs unremarkable    6: Hematology/oncology: Anemia; chronic iatrogenic anticoagulation with Coumadin for A-fib  -At least part of the component of the patient's anemia is chronic above anemia disease in the setting of renal failure  -Hemoglobin on admission was 10.8 and his drifted down to 8.4 tonight  -If the patient has a hemoglobin below 8.0 and remains hemodynamically unstable to transfuse at that point  -Platelets are in reasonable range  -Coumadin is  on hold; will continue to check INR; once this drops below 2.0 would consider starting the patient on a heparin drip to bridge  -Monitor CBC and coags serially    7: Endocrine: Concern for adrenal insufficiency  -Diabetes mellitus: No history; blood sugars have been reasonable but anticipate these will rise with steroid adjustment; may need sliding scale insulin  -Thyroid disease: No history  -Systemic steroids: Despite being on Decadron the patient's cortisol is severely depressed; steroids therefore adjusted to hydrocortisone    8: Neuropsychiatric: Acute delirium  -Most likely associated with multiple acute medical/metabolic issues  -CT of the head done 11/13 shows no acute intracranial process  -We will try the patient on a Precedex drip to see if this calms him and allows for him to keep his mask in place more consistently    9: Fluids/electrolytes/nutrition:  -N.p.o.  -I's and O's balance for the next 24 hours = equal  -Electrolytes have been have been reasonable range; monitor    10: Prophylactic measures:  -PUD = Protonix IV twice daily  -DVT = has been on Coumadin but this is on hold (as per #6); consider transitioning to IV heparin; if the patient continues to have a declining hemoglobin may need to add SCDs    11: Disposition:  -I did speak at length with the patient's daughter who confirms the patient's full CODE STATUS if he were to decline further    Time spent 75 minutes (exclusive of procedure time)  including high complexity decision making to assess, manipulate, and support vital organ system failure in this individual who has impairment of one or more vital organ systems such that there is a high probability of imminent or life threatening deterioration in the patient’s condition.      Alfonso Walker MD  11/15/24 23:15 EST     CC: Heriberto Murcia MD    9 minutes of critical care provided by JENNIFER Joseph in addendum accordance with split/shared billing. This time excludes other billable  procedures. Time does include preparation of documents, medical consultations, review of old records, and direct bedside care. Patient is at high risk for life-threatening deterioration due to shock and respiratory failure.  Electronically signed by JENNIFER Turcios, 11/15/24, 11:15 PM EST.

## 2024-11-16 NOTE — PROCEDURES
Insert Arterial Line    Date/Time: 11/15/2024 11:20 PM    Performed by: Maryjane Gonzales APRN  Authorized by: Maryjane Gonzales APRN    Universal Protocol:     Verbal consent obtained?: Yes      Emergent situation      Risks and benefits: Risks, benefits and alternatives were discussed      Consent given by:  Patient and spouse    Patient states understanding of procedure being performed: Yes      Patient's understanding of procedure matches consent: Yes      Procedure consent matches procedure scheduled: Yes      Imaging studies available: Yes      Required items: Required blood products, implants, devices and special equipment available      Patient identity confirmed:  Provided demographic data and hospital-assigned identification number    Time out: Immediately prior to the procedure a time out was called    A time out verifies correct patient, procedure, equipment, support staff and site/side marked as required:   Preparation:     Preparation: Patient was prepped and draped in usual sterile fashion    Indications:     Indications: multiple ABGs and hemodynamic monitoring    Location:     Location:  Right radial  Anesthesia:     Patient sedated: No    Procedure Details:     Ultrasound Guidance: yes  The ultrasound was used for evaluation of possible access sites.  Vessel patency was confirmed with the ultrasound.  Needle entry into vessel was visualized in realtime with the ultrasound.       Catalino's test normal?: Yes      Needle gauge:  20    Seldinger technique: Seldinger technique used      Number of attempts:  1  Post-procedure:     Post-procedure:  Line sutured and dressing applied    Post-procedure CMS:  Normal     patient tolerated the procedure well with no immediate complications     Procedure was completed under the direct supervision of Dr. Walker.

## 2024-11-16 NOTE — PLAN OF CARE
Goal Outcome Evaluation:                 -Remains ventilated with FiO2 at 50%, respiratory culture sent  -Bicarb gtt infusing at 50  -Propofol stopped, fentanyl gtt started & currently infusing at 150, precedex gtt stopped this evening  -BP drops drastically (SBP 50s) with stimulation/when pt is awake, SBP up to 180s when Levo restarted  -TF started today, no BM, protonix gtt cont, trending H&H  -Anuric, HD completed today, BP tolerated while pt sedated   -Family updated at bedside                           Problem: Adult Inpatient Plan of Care  Goal: Absence of Hospital-Acquired Illness or Injury  Intervention: Identify and Manage Fall Risk  Recent Flowsheet Documentation  Taken 11/16/2024 1600 by Dolores Nick, RN  Safety Promotion/Fall Prevention:   activity supervised   clutter free environment maintained   fall prevention program maintained   lighting adjusted   safety round/check completed   room organization consistent  Taken 11/16/2024 1400 by Dolores Nick, CAITLIN  Safety Promotion/Fall Prevention:   activity supervised   clutter free environment maintained   fall prevention program maintained   lighting adjusted   safety round/check completed   room organization consistent  Taken 11/16/2024 1200 by Dolores Nick, RN  Safety Promotion/Fall Prevention:   activity supervised   clutter free environment maintained   fall prevention program maintained   lighting adjusted   safety round/check completed   room organization consistent  Taken 11/16/2024 1000 by Dolores Nick, RN  Safety Promotion/Fall Prevention:   activity supervised   clutter free environment maintained   fall prevention program maintained   lighting adjusted   safety round/check completed   room organization consistent  Taken 11/16/2024 0800 by Dolores Nick, RN  Safety Promotion/Fall Prevention:   activity supervised   clutter free environment maintained   fall prevention program maintained   lighting adjusted   safety round/check completed   room organization  consistent     Problem: Adult Inpatient Plan of Care  Goal: Absence of Hospital-Acquired Illness or Injury  Intervention: Prevent Skin Injury  Recent Flowsheet Documentation  Taken 11/16/2024 1600 by Dolores Nick RN  Body Position:   turned   neutral body alignment   neutral head position   lower extremity elevated   upper extremity elevated  Skin Protection:   incontinence pads utilized   transparent dressing maintained  Taken 11/16/2024 1400 by Dolores Nick RN  Body Position:   turned   neutral body alignment   lower extremity elevated   neutral head position   upper extremity elevated  Skin Protection:   incontinence pads utilized   transparent dressing maintained  Taken 11/16/2024 1200 by Dolores Nick RN  Body Position:   turned   lower extremity elevated   neutral body alignment   neutral head position   upper extremity elevated  Skin Protection:   incontinence pads utilized   transparent dressing maintained  Taken 11/16/2024 1000 by Dolores Nick RN  Body Position:   turned   right   lower extremity elevated   neutral head position   neutral body alignment   upper extremity elevated  Skin Protection:   incontinence pads utilized   silicone foam dressing in place   transparent dressing maintained  Taken 11/16/2024 0800 by Dolores Nick RN  Body Position:   turned   supine   neutral body alignment   neutral head position   lower extremity elevated   upper extremity elevated  Skin Protection:   transparent dressing maintained   incontinence pads utilized     Problem: Adult Inpatient Plan of Care  Goal: Absence of Hospital-Acquired Illness or Injury  Intervention: Identify and Manage Fall Risk  Recent Flowsheet Documentation  Taken 11/16/2024 1600 by Dolores Nick RN  Safety Promotion/Fall Prevention:   activity supervised   clutter free environment maintained   fall prevention program maintained   lighting adjusted   safety round/check completed   room organization consistent  Taken 11/16/2024 1400 by Dolores Nick  RN  Safety Promotion/Fall Prevention:   activity supervised   clutter free environment maintained   fall prevention program maintained   lighting adjusted   safety round/check completed   room organization consistent  Taken 11/16/2024 1200 by Dolores Nick RN  Safety Promotion/Fall Prevention:   activity supervised   clutter free environment maintained   fall prevention program maintained   lighting adjusted   safety round/check completed   room organization consistent  Taken 11/16/2024 1000 by Dolores Nick RN  Safety Promotion/Fall Prevention:   activity supervised   clutter free environment maintained   fall prevention program maintained   lighting adjusted   safety round/check completed   room organization consistent  Taken 11/16/2024 0800 by Dolores Nick RN  Safety Promotion/Fall Prevention:   activity supervised   clutter free environment maintained   fall prevention program maintained   lighting adjusted   safety round/check completed   room organization consistent     Problem: Fall Injury Risk  Goal: Absence of Fall and Fall-Related Injury  Intervention: Promote Injury-Free Environment  Recent Flowsheet Documentation  Taken 11/16/2024 1600 by Dolores Nick RN  Safety Promotion/Fall Prevention:   activity supervised   clutter free environment maintained   fall prevention program maintained   lighting adjusted   safety round/check completed   room organization consistent  Taken 11/16/2024 1400 by Dolores Nick RN  Safety Promotion/Fall Prevention:   activity supervised   clutter free environment maintained   fall prevention program maintained   lighting adjusted   safety round/check completed   room organization consistent  Taken 11/16/2024 1200 by Dolores Nick RN  Safety Promotion/Fall Prevention:   activity supervised   clutter free environment maintained   fall prevention program maintained   lighting adjusted   safety round/check completed   room organization consistent  Taken 11/16/2024 1000 by Dolores Nick  RN  Safety Promotion/Fall Prevention:   activity supervised   clutter free environment maintained   fall prevention program maintained   lighting adjusted   safety round/check completed   room organization consistent  Taken 11/16/2024 0800 by Dolores Nick, RN  Safety Promotion/Fall Prevention:   activity supervised   clutter free environment maintained   fall prevention program maintained   lighting adjusted   safety round/check completed   room organization consistent

## 2024-11-16 NOTE — PROCEDURES
"Insert Central Line At Bedside    Date/Time: 11/16/2024 1:56 AM    Performed by: Maryjane Gonzales APRN  Authorized by: Maryjane Gonzales APRN  Consent: Verbal consent obtained. Written consent obtained.  Risks and benefits: risks, benefits and alternatives were discussed  Consent given by: patient (daughter)  Patient understanding: patient states understanding of the procedure being performed  Patient consent: the patient's understanding of the procedure matches consent given  Procedure consent: procedure consent matches procedure scheduled  Relevant documents: relevant documents present and verified  Test results: test results available and properly labeled  Imaging studies: imaging studies available  Required items: required blood products, implants, devices, and special equipment available  Patient identity confirmed: arm band and hospital-assigned identification number  Time out: Immediately prior to procedure a \"time out\" was called to verify the correct patient, procedure, equipment, support staff and site/side marked as required.  Indications: vascular access    Anesthesia:  Local Anesthetic: lidocaine 1% without epinephrine    Sedation:  Patient sedated: yes  Sedatives: see MAR for details and midazolam  Analgesia: see MAR for details  Vitals: Vital signs were monitored during sedation.    Preparation: skin prepped with ChloraPrep  Skin prep agent dried: skin prep agent completely dried prior to procedure  Sterile barriers: all five maximum sterile barriers used - cap, mask, sterile gown, sterile gloves, and large sterile sheet  Hand hygiene: hand hygiene performed prior to central venous catheter insertion  Location details: left internal jugular  Patient position: flat  Catheter type: triple lumen  Catheter size: 7 Fr  Pre-procedure: landmarks identified  Ultrasound guidance: yes  Sterile ultrasound techniques: sterile gel and sterile probe covers were used  Number of attempts: 1  Successful placement: " yes  Post-procedure: line sutured and dressing applied  Assessment: blood return through all ports, placement verified by x-ray and no pneumothorax on x-ray  Patient tolerance: patient tolerated the procedure well with no immediate complications  Comments: Procedure was completed under the direct supervision of Dr. Walker. He also reviewed the CXR to check placement.

## 2024-11-17 PROBLEM — I46.9 CARDIAC ARREST: Status: ACTIVE | Noted: 2024-11-17

## 2024-11-17 PROBLEM — K92.2 ACUTE GI BLEEDING: Status: ACTIVE | Noted: 2024-11-17

## 2024-11-17 PROBLEM — R56.9 GENERALIZED SEIZURE: Status: ACTIVE | Noted: 2024-11-17

## 2024-11-17 NOTE — CONSULTS
Clinical Nutrition     Patient Name: Marco Antonio Montemayor  YOB: 1945  MRN: [id number]  Date of Encounter: 11/16/24 20:54 EST  Admission date: 11/12/2024  Reason for Visit: Physician consult, EN    Assessment   Nutrition Assessment   Admission Diagnosis:  Pneumonia due to COVID-19 virus [U07.1, J12.82]    Problem List:    Pneumonia due to COVID-19 virus    Severe obstructive sleep apnea    ESRD on HD T,R,S since 2022    Acute respiratory failure with hypoxia      PMH:   He  has a past medical history of Abnormal ECG, Arrhythmia, Arthritis, Asthma, Cancer, Chronic diastolic congestive heart failure (09/14/2018), CKD (chronic kidney disease), Coronary artery disease, Dry skin, Dyslipidemia (07/07/2016), Edema, Heart murmur, Hematoma, Hemodialysis access site with arteriovenous graft, History of transfusion, Hypertension, Iron deficiency anemia, Long term current use of anticoagulant, ZULEIKA treated with BiPAP, Persistent atrial fibrillation (07/07/2016), Tinnitus, and Wears glasses.    PSH:  He  has a past surgical history that includes Appendectomy; Cataract extraction; Skin cancer excision; Tonsillectomy; [place] tooth extraction; Colonoscopy; Esophagogastroduodenoscopy; Cardiac electrophysiology procedure (N/A, 05/18/2018); Ablation of dysrhythmic focus; arteriovenous fistula/shunt surgery (Left, 08/04/2022); arteriovenous fistula/shunt surgery (Left, 11/03/2022); Insert / replace / remove pacemaker (about 20 years back); and Vein Surgery (for dialysis).    Applicable Nutrition History:       Labs    Labs Reviewed: Yes    Results from last 7 days   Lab Units 11/16/24  0128 11/15/24  2055 11/15/24  0614 11/14/24  2133 11/14/24  0831 11/13/24  0503 11/12/24  1518   GLUCOSE mg/dL 183* 167* 147* 144* 115*   < > 112*   BUN mg/dL 86* 86* 72* 61* 89*   < > 22   CREATININE mg/dL 7.85* 8.00* 7.34* 6.27* 10.04*   < > 5.61*   SODIUM mmol/L 141 138 138 135* 141   < > 138   CHLORIDE mmol/L 104 102 102  99 101   < > 96*   POTASSIUM mmol/L 4.8 4.5 4.8 4.2 4.1   < > 3.9   PHOSPHORUS mg/dL  --  5.5* 5.8*  --  6.1*   < >  --    MAGNESIUM mg/dL 2.2 2.2 2.3 2.0 2.3   < >  --    ALT (SGPT) U/L 7 6 6 6 6   < > 8   LACTATE mmol/L  --  1.2  --  1.2  --   --  1.6    < > = values in this interval not displayed.       Results from last 7 days   Lab Units 11/16/24  0128 11/15/24  2055 11/15/24  0614   ALBUMIN g/dL 3.6 3.4* 3.7       Results from last 7 days   Lab Units 11/16/24  1809 11/16/24  1217 11/15/24  2045 11/14/24  2123   GLUCOSE mg/dL 177* 167* 175* 139*     Lab Results   Lab Value Date/Time    HGBA1C 5.8 (A) 11/06/2024 1415    HGBA1C 6.1 (A) 08/05/2024 1402    HGBA1C 5.90 (H) 03/01/2024 0456    HGBA1C 5.20 08/02/2022 1405         Results from last 7 days   Lab Units 11/16/24  0128 11/14/24 2133 11/12/24  1518   PROBNP pg/mL 14,237.0* 15,999.0* 21,630.0*       Medications    Medications Reviewed: yes    Scheduled Meds:atorvastatin, 40 mg, Nasogastric, Nightly  [Held by provider] bumetanide, 2 mg, Oral, Once per day on Sunday Monday Wednesday Friday  [Held by provider] calcitriol, 0.25 mcg, Oral, Every Other Day  [Held by provider] carvedilol, 12.5 mg, Oral, BID  chlorhexidine, 15 mL, Mouth/Throat, Q12H  doxycycline, 100 mg, Intravenous, Q12H  hydrocortisone sodium succinate, 100 mg, Intravenous, Q8H  lactobacillus acidophilus, 1 capsule, Nasogastric, Daily  midodrine, 10 mg, Nasogastric, Once per day on Tuesday Thursday Saturday  mupirocin, 1 Application, Each Nare, BID  piperacillin-tazobactam, 4.5 g, Intravenous, Q12H  [Held by provider] Sucroferric Oxyhydroxide, 3 tablet, Nasogastric, TID With Meals  [Held by provider] Sucroferric Oxyhydroxide, 1 tablet, Nasogastric, BID  [Held by provider] vitamin B-12, 1,000 mcg, Oral, Daily      Continuous Infusions:dexmedetomidine, 0.2-1.5 mcg/kg/hr, Last Rate: Stopped (11/16/24 1836)  fentanyl 10 mcg/mL,  mcg/hr, Last Rate: 200 mcg/hr (11/16/24 1906)  norepinephrine,  "0.02-0.3 mcg/kg/min, Last Rate: 0.14 mcg/kg/min (11/16/24 2052)  pantoprazole, 8 mg/hr, Last Rate: 8 mg/hr (11/16/24 1952)  phenylephrine, 0.5-3 mcg/kg/min, Last Rate: Stopped (11/16/24 1956)  propofol, 5-50 mcg/kg/min, Last Rate: Stopped (11/16/24 0930)      PRN Meds:.  acetaminophen **OR** acetaminophen **OR** acetaminophen    senna-docusate sodium **AND** polyethylene glycol **AND** [DISCONTINUED] bisacodyl **AND** bisacodyl    EPINEPHrine    fentaNYL    ipratropium-albuterol    magnesium sulfate    nitroglycerin    [DISCONTINUED] ondansetron ODT **OR** ondansetron    sodium chloride    Intake/Ouptut 24 hrs (0701 - 0700)   I&O's Reviewed: yes  Intake & Output (last day)         11/16 0701  11/17 0700    I.V. (mL/kg) 987 (7.8)    NG/GT 50    IV Piggyback     Total Intake(mL/kg) 1037 (8.2)    Dialysis 850    Total Output 850    Net +187             'Last stool recorded x 3 on 11/14    Anthropometrics     Height: Height: 177.8 cm (70\")  Last Filed Weight: Weight: 127 kg (279 lb 9.6 oz) (11/12/24 1900)  Method: Weight Method: Bed scale  BMI: BMI (Calculated): 40.1    UBW: Per EMR wt of 276 lbs on 7/14/23, 286 lbs on 1/17/24 and 287 lbs on 11/6/24  Weight change: Bed Wt consistent w value from 1 yr ago If bed wt accurate loss of 8 lbs 3% body wt in 1 week. Possible significance for acute malnutrition dx.     Nutrition Focused Physical Exam     Date: 11/16    Unable to perform due to COVID Isolation      Subjective   Reported/Observed/Food/Nutrition Related History:     11/16  Order to begin EN.       Needs Assessment   Date: 11/16    Height used:Height: 177.8 cm (70\")  Weights used: 127 Kg CBW and IBW 75.45 Kg     Estimated Calorie needs: ~ 2000 Kcal/day  Method:  Kcals/KGx 16 = 2032  Method:  [id number] x 1.1 = 2185  Method:  PSU on temp 36.7 Ve 9.88 = 2074    Estimated Protein needs: ~  90 g PRO/day  Method: g/Kg 1.2 x IBW    Estimated Fluid needs: ~ ml/day   Per clinical status    Current Nutrition Prescription "     PO: NPO Diet NPO Type: Tube Feeding  Oral Nutrition Supplement:  Intake: N/A    EN: NovaSource Renal  Goal Rate: initial 30ml/hr  Water Flushes: 20ml/hr  Modular: None  Route: OG  Tube: Large bore    At goal over: 20Hrs/day     Rx will supply:   Goal Volume 600  mL/day     Flush Volume 400 mL/day     Energy 1200 Kcal/day 60 % Est Need   Protein 54 g/day 60 % Est Need   Fiber 0 g/day     Water in   mL     Total Water 832 mL     Meet DRI No        --------------------------------------------------------------------------  Product/Rate verified at bedside: No  Infusing Rate at time of visit: to start today    Average Delivery from Charting: N/A   Volume  mL/day   % Goal Vol.   Flush Volume  mL/day     Energy  Kcal/day  % Est Need   Protein  g/day  % Est Need   Fiber  g/day     Water in  EN  mL     Total Water  mL     Meet DRI N/A              Assessment & Plan   Nutrition Diagnosis   Date: 11/16 Updated:   Problem Inadequate oral intake    Etiology Mechanical ventilation   Signs/Symptoms NPO   Status: New EN order placed      Goal:   Nutrition to support treatment and Initiate EN, Tolerate EN at goal      Nutrition Intervention      Follow treatment progress, Care plan reviewed, Nutrition support order placed    When ready to feed begin Novasource Renal 20 ml/hr advance w tolerance after 8 hr to initial goal 30 ml/hr Water at 20 ml/hr.   RD to re-evaluate when established at 30/hr.   At initial goal 30 ml/hr Water at 20 ml/hr rx will supply 600 ml for  1200 Kcal 60% est need, 54 g PRO 60 % est need 0 fiber 432 ml Water in  total ml Free Water.     If martin note goal Novasource Renal 50ml/hr Water at 30 ml ev 2 hr would supply 1000ml for  2000 Kcal 100% est need, 90 g % est need, 0 Fiber 720 ml Water in EN 1020 total ml Free Water.    Monitoring/Evaluation:   Per protocol, I&O, Pertinent labs, EN delivery/tolerance, Weight, Symptoms    Esther Godwin RD  Time Spent: 35 min

## 2024-11-17 NOTE — PLAN OF CARE
Goal Outcome Evaluation:         ON GOING NOT PROGRESSING-UNABLE TO WEAN FROM VENTILATORY SUPPORT

## 2024-11-17 NOTE — PROGRESS NOTES
Discussed with patient's nurse.  He remains on mechanical ventilation on low-dose pressors.  Scant blood noted in NG tube.  Hemoglobin stable.  Received CPR twice last night.     Continue PPI.    Will follow.

## 2024-11-17 NOTE — PROGRESS NOTES
Intensive Care Follow-up     Hospital:  LOS: 5 days   Mr. Marco Antonio Montemayor, 79 y.o. male is followed for:   Pneumonia due to COVID-19 virus        Subjective     History is limited to review the medical records, discussion with hospital service, as well as through discussion with the patient's daughter who is at bedside. The patient has been having progressive delirium since presentation which limits any participation in history giving. Patient has a complicated medical history that will be outlined below. The patient presented on 11/11/24 with increased work of breathing and hypoxia. Initial testing identified the patient as having COVID-19 pneumonia. He was subsequently admitted. He was started on Decadron and remdesivir. On the night prior to consultation the patient was noted to have low blood pressure following a hemodialysis session. Since patient had 2 L removed a small fluid bolus was given. In addition the patient was started on midodrine. The patient's blood pressure has been waxing and waning for the past 24 hours resulting in multiple rapid response assessments. On the night of consultation our service was called. The patient once again was a rapid response. His level of consciousness had been declining, blood pressure remained low, and the patient's respiratory status is now requiring noninvasive ventilation. Earlier in the day had also been assessed by the GI service for possible GI bleeding in the setting of chronic Coumadin for A-fib. Given the overall complexity of the patient's after discussing with the Hospitalist Service we agreed to have the patient transferred to the ICU for further management monitoring.   Interval History:  The chart has been reviewed.  Overnight the patient experienced severe bradycardia and possibly cardiac arrest x 2 requiring CPR for short amount of time.  Patient had been on Precedex at this time.  He was also very sensitive to suctioning of his airway and vagal  maneuvers.  He also was noted to experience what appeared to be tonic-clonic seizures.  He is currently intubated on minimal ventilatory support and is sedated with fentanyl.  He is undergoing EEG monitoring currently.  He did tolerate hemodialysis yesterday.  He briefly had been on pressors through the night however these have been turned off and he is now hypertensive.  Of note, in the aftermath of both of the CODE BLUE events he was able to make purposeful movements and interactive.  It has been noted by nursing that this morning he has had a slight reddening of his NG suctioning when before it was primarily bilious.    The patient's past medical, surgical and social history were reviewed and updated in Epic as appropriate.        Objective     Infusions:  fentanyl 10 mcg/mL,  mcg/hr, Last Rate: 200 mcg/hr (11/17/24 1237)  midazolam, 1-10 mg/hr, Last Rate: 3 mg/hr (11/17/24 9869)  norepinephrine, 0.02-0.3 mcg/kg/min, Last Rate: 0.02 mcg/kg/min (11/17/24 9308)  pantoprazole, 8 mg/hr, Last Rate: 8 mg/hr (11/17/24 1034)  phenylephrine, 0.5-3 mcg/kg/min, Last Rate: Stopped (11/16/24 3031)      Medications:  atorvastatin, 40 mg, Nasogastric, Nightly  [Held by provider] bumetanide, 2 mg, Oral, Once per day on Sunday Monday Wednesday Friday  [Held by provider] calcitriol, 0.25 mcg, Oral, Every Other Day  [Held by provider] carvedilol, 12.5 mg, Oral, BID  chlorhexidine, 15 mL, Mouth/Throat, Q12H  hydrocortisone sodium succinate, 100 mg, Intravenous, Q8H  lactobacillus acidophilus, 1 capsule, Nasogastric, Daily  midodrine, 10 mg, Nasogastric, Once per day on Tuesday Thursday Saturday  mupirocin, 1 Application, Each Nare, BID  piperacillin-tazobactam, 4.5 g, Intravenous, Q12H  [Held by provider] Sucroferric Oxyhydroxide, 3 tablet, Nasogastric, TID With Meals  [Held by provider] Sucroferric Oxyhydroxide, 1 tablet, Nasogastric, BID  [Held by provider] vitamin B-12, 1,000 mcg, Oral, Daily        Vital Sign Min/Max for  "last 24 hours  Temp  Min: 98.1 °F (36.7 °C)  Max: 98.7 °F (37.1 °C)   BP  Min: 83/32  Max: 193/65   Pulse  Min: 0  Max: 98   Resp  Min: 19  Max: 23   SpO2  Min: 83 %  Max: 100 %   No data recorded       Input/Output for last 24 hour shift  11/16 0701 - 11/17 0700  In: 3727.8 [I.V.:3577.8]  Out: 900    Mode: VC+/AC  FiO2 (%):  [35 %-45 %] 35 %  S RR:  [20] 20  S VT:  [500 mL] 500 mL  PEEP/CPAP (cm H2O):  [5 cm H20] 5 cm H20  MAP (cm H2O):  [11-14] 12  Objective:  General Appearance:  Uncomfortable and in no acute distress (Sedated, intubated).    Vital signs: (most recent): Blood pressure 129/41, pulse 96, temperature 98.6 °F (37 °C), temperature source Axillary, resp. rate 20, height 177.8 cm (70\"), weight 127 kg (279 lb 9.6 oz), SpO2 94%.    HEENT: (Endotracheal tube in place OG tube in place)    Lungs:  Normal effort and normal respiratory rate.  There are decreased breath sounds.  No rales, wheezes or rhonchi.    Heart: Normal rate.  Regular rhythm.  S1 normal and S2 normal.  No murmur.   Chest: Symmetric chest wall expansion.   Abdomen: Abdomen is soft and non-distended.  Bowel sounds are normal.   There is no abdominal tenderness.     Extremities: Normal range of motion.    Neurological: (Sedated, minimally arousable.).    Pupils:  Pupils are equal, round, and reactive to light.  Pupils are equal.   Skin:  Warm.                Results from last 7 days   Lab Units 11/17/24  0350 11/16/24  2015 11/16/24  1721 11/16/24  0935 11/16/24  0128   WBC 10*3/mm3 9.07  --  8.61  --  11.86*   HEMOGLOBIN g/dL 7.0*  7.0* 8.5* 7.8*  7.8*   < > 8.4*   PLATELETS 10*3/mm3 155  --  166  --  181    < > = values in this interval not displayed.     Results from last 7 days   Lab Units 11/17/24  0350 11/16/24 2015 11/16/24  0128 11/15/24  2055 11/15/24  0614   SODIUM mmol/L 138 138 141 138 138   POTASSIUM mmol/L 3.5 3.2* 4.8 4.5 4.8   CO2 mmol/L 26.0 15.0* 22.0 21.0* 22.0   BUN mg/dL 58* 45* 86* 86* 72*   CREATININE mg/dL 5.50* " 4.91* 7.85* 8.00* 7.34*   MAGNESIUM mg/dL 2.8* 3.8* 2.2 2.2 2.3   PHOSPHORUS mg/dL  --  5.1*  --  5.5* 5.8*   GLUCOSE mg/dL 172* 270* 183* 167* 147*     Estimated Creatinine Clearance: 14.1 mL/min (A) (by C-G formula based on SCr of 5.5 mg/dL (H)).    Results from last 7 days   Lab Units 11/17/24  0309   PH, ARTERIAL pH units 7.493*   PCO2, ARTERIAL mm Hg 38.4   PO2 ART mm Hg 86.1         I reviewed the patient's results and images.     Assessment & Plan   Impression        Pneumonia due to COVID-19 virus    Severe obstructive sleep apnea    ESRD on HD T,R,S since 2022    Acute respiratory failure with hypoxia    Probable cardiac arrest versus severe bradycardia requiring CPR    Generalized seizure    Acute GI bleeding, possible       Plan        We will continue to wean the ventilator as tolerated.  I do not believe that he is ready for an attempted extubation as yet.  Continue with fentanyl as well as switch over to Versed for sedation.  Assess daily for need for hemodialysis.  Does not currently appear to require this at this point today.  Continue with EEG monitoring.  Hold on anticonvulsants and we will watch closely.  No previous history of seizures.  We will continue to follow the patient's hemoglobin as well as his NG output.  He eventually is going to require EGD however I would prefer that he be a bit more stable prior to performing that.  That said, if he remains hemodynamically stable, I would likely be safer to do this while he is still intubated within the next few days.  Continue Zosyn for now and we will follow cultures.  Hopefully we can stop this within the next day or so as that will be day 7.  The patient remains critically ill and at imminent risk of death secondary to multiple systems currently in failure.    Plan of care and goals reviewed with mulitdisciplinary/antibiotic stewardship team during rounds.   I discussed the patient's findings and my recommendations with family and nursing staff      High level of risk due to:  drug(s) requiring intensive monitoring for toxicity and parenteral controlled substances.    Time spent Critical care 33 min (exclusive of procedure time)  including high complexity decision making to assess, manipulate, and support vital organ system failure in this individual who has impairment of one or more vital organ systems such that there is a high probability of imminent or life threatening deterioration in the patient’s condition.      Pankaj Vega MD, [facility]  Pulmonology and Critical Care Medicine

## 2024-11-17 NOTE — SIGNIFICANT NOTE
"Called to bedside by primary RN at 1959, due to patient having \"seizure-like activity\".  On the way to the patient's room JEFF MELCHOR was called overhead at 2000. According to the nurse, she performed oral care and suction the patient, he then had this \"tonic clonic like\" activity and his arterial line went flat but he appeared to have a paced rhythm, she checked his carotid and he was pulseless.  That is when the CODE BLUE was called and she initiated chest compressions.Patient received 1 round of CPR and was given epi and mag then ROSC was achieved.  See code documentation for details.     At 2135 a second CODE BLUE was called due to patient going asystole.  The patient seemed to have another, \"seizure-like activity\" episode prior to going asystole.  ROSC was achieved after 1 minute of chest compressions.     Patient's lactate was 12.3 reflex 4.1.  The patient was loaded with Keppra continuous EEG was ordered.  Neurology consult for a.m.  "

## 2024-11-17 NOTE — PLAN OF CARE
Goal Outcome Evaluation:                         Pt receiving sedation at this time; unable to decrease ventilator support.

## 2024-11-17 NOTE — NURSING NOTE
Jennifer EEG tech at bedside to hook up pt for Ceeg.  Skin checked with RN at 0040 with no skin issues seen.  Routine done at 0100 with CEEG started at 0125.  Will recheck later and await word for d/c.

## 2024-11-17 NOTE — PLAN OF CARE
Problem: Adult Inpatient Plan of Care  Goal: Plan of Care Review  Outcome: Not Progressing  Flowsheets (Taken 11/17/2024 0401)  Progress: declining  Outcome Evaluation: Patient coded tonight x2 at 2000 and 2134. Patient appeared to have seizure activity associated with both code episodes. Patient Loaded with Keppra and continuous EEG now underway. Patient awoke after ROSC was acheived both times, patient now follows commands and moves all extremities. Patient's family at bedside tonight and very appropriate. Patient sedated on Precidex at .4 and appears to be comfortable. Protonix gtt continues. Patient has required levophed 0.02-0.3 tonight for pressure control. Patient's pressure is very labile and patient continues to have intermittent episodes of hypotension with stimulation. RN monitoring. Patient's potassium replaced tonight. Awaiting re-check. Continue to monitor closely, continue with current plan of care.  Plan of Care Reviewed With: patient

## 2024-11-17 NOTE — NURSING NOTE
RN completed oral care on patient and suctioned patient per protocol, and patient suddenly had a seizure-like episode with tonic clonic jerking in all extremities. At this time RN called APRN to bedside. During this episode patient's HR began dropping into the 30s. The episode lasted for approximately 30 seconds. Patient then when still. RN checked for coradid pulse at this time and none felt. Arterial line flat. RN called a code blue and initiated compressions. See code charting.       Addendum 0621 patient responds to pain following code and opens eyes to stimulation

## 2024-11-17 NOTE — PROGRESS NOTES
" LOS: 5 days   Patient Care Team:  Heriberto Murcia MD as PCP - General (Family Medicine)  Shanell Arango, PharmD as Pharmacist (Pharmacy)  Evelina Quan, PharmD as Pharmacist (Pharmacy)  Peng Melgar, PharmD as Pharmacist (Pharmacy)  Johnathan Taylor MD as Consulting Physician (Cardiac Electrophysiology)  Chato Bo MD as Consulting Physician (Nephrology)  Ivet Beasley APRN as Nurse Practitioner (Cardiology)  Rei Decker PA as Physician Assistant (Cardiology)      Subjective   Patient remain in ICU setting.  Intubated on ventilator.  Last night had code x 2.  On IV levo for blood pressure.  COVID-19 precautions been taken.  Usual dialysis days TTS.      Objective     Vital Sign Min/Max for last 24 hours  Temp  Min: 98.1 °F (36.7 °C)  Max: 98.7 °F (37.1 °C)   BP  Min: 83/32  Max: 193/65   Pulse  Min: 0  Max: 98   Resp  Min: 19  Max: 23   SpO2  Min: 83 %  Max: 100 %   No data recorded   No data recorded     Flowsheet Rows      Flowsheet Row First Filed Value   Admission Height 177.8 cm (70\") Documented at 11/12/2024 1456   Admission Weight 130 kg (287 lb) Documented at 11/12/2024 1456            No intake/output data recorded.  I/O last 3 completed shifts:  In: 4226.4 [I.V.:3976.4; NG/GT:50; IV Piggyback:200]  Out: 900 [Emesis/NG output:50]    Physical examination:  COVID precautions taken.  General:  male no acute distress.  Ill-appearing.  Pulmonary: Intubated on ventilator equal chest movement.  Cardiovascular: Normal rate.  Abdomen: Morbid obesity.  Extremities: Positive edema is noted.  Neurological: Sedated on ventilator  LUE-aVF in place      Results Review:     I reviewed the patient's new clinical results.    WBC WBC   Date Value Ref Range Status   11/17/2024 9.07 3.40 - 10.80 10*3/mm3 Final   11/16/2024 8.61 3.40 - 10.80 10*3/mm3 Final   11/16/2024 11.86 (H) 3.40 - 10.80 10*3/mm3 Final   11/15/2024 8.74 3.40 - 10.80 10*3/mm3 Final   11/14/2024 7.73 3.40 - " "10.80 10*3/mm3 Final      HGB Hemoglobin   Date Value Ref Range Status   11/17/2024 7.0 (L) 13.0 - 17.7 g/dL Final   11/17/2024 7.0 (L) 13.0 - 17.7 g/dL Final   11/16/2024 8.5 (L) 13.0 - 17.7 g/dL Final   11/16/2024 7.8 (L) 13.0 - 17.7 g/dL Final   11/16/2024 7.8 (L) 13.0 - 17.7 g/dL Final   11/16/2024 7.3 (L) 13.0 - 17.7 g/dL Final   11/16/2024 7.5 (L) 13.0 - 17.7 g/dL Final   11/16/2024 8.4 (L) 13.0 - 17.7 g/dL Final   11/15/2024 8.0 (L) 13.0 - 17.7 g/dL Final   11/15/2024 8.2 (L) 13.0 - 17.7 g/dL Final   11/15/2024 8.7 (L) 13.0 - 17.7 g/dL Final   11/15/2024 8.6 (L) 13.0 - 17.7 g/dL Final   11/15/2024 8.1 (L) 13.0 - 17.7 g/dL Final   11/14/2024 8.2 (L) 13.0 - 17.7 g/dL Final      HCT Hematocrit   Date Value Ref Range Status   11/17/2024 21.1 (L) 37.5 - 51.0 % Final   11/17/2024 21.1 (L) 37.5 - 51.0 % Final   11/16/2024 27.8 (L) 37.5 - 51.0 % Final   11/16/2024 23.8 (L) 37.5 - 51.0 % Final   11/16/2024 23.8 (L) 37.5 - 51.0 % Final   11/16/2024 22.8 (L) 37.5 - 51.0 % Final   11/16/2024 23.4 (L) 37.5 - 51.0 % Final   11/16/2024 28.1 (L) 37.5 - 51.0 % Final   11/15/2024 27.0 (L) 37.5 - 51.0 % Final   11/15/2024 27.4 (L) 37.5 - 51.0 % Final   11/15/2024 29.1 (L) 37.5 - 51.0 % Final   11/15/2024 29.5 (L) 37.5 - 51.0 % Final   11/15/2024 28.1 (L) 37.5 - 51.0 % Final   11/14/2024 26.9 (L) 37.5 - 51.0 % Final      Platlets No results found for: \"LABPLAT\"   MCV MCV   Date Value Ref Range Status   11/17/2024 100.5 (H) 79.0 - 97.0 fL Final   11/16/2024 101.7 (H) 79.0 - 97.0 fL Final   11/16/2024 109.8 (H) 79.0 - 97.0 fL Final   11/15/2024 114.8 (H) 79.0 - 97.0 fL Final   11/14/2024 110.7 (H) 79.0 - 97.0 fL Final          Sodium Sodium   Date Value Ref Range Status   11/17/2024 138 136 - 145 mmol/L Final   11/16/2024 138 136 - 145 mmol/L Final   11/16/2024 141 136 - 145 mmol/L Final   11/15/2024 138 136 - 145 mmol/L Final   11/15/2024 138 136 - 145 mmol/L Final   11/14/2024 135 (L) 136 - 145 mmol/L Final      Potassium " Potassium   Date Value Ref Range Status   11/17/2024 3.5 3.5 - 5.2 mmol/L Final   11/16/2024 3.2 (L) 3.5 - 5.2 mmol/L Final     Comment:     Slight hemolysis detected by analyzer. Result may be falsely elevated.   11/16/2024 4.8 3.5 - 5.2 mmol/L Final   11/15/2024 4.5 3.5 - 5.2 mmol/L Final   11/15/2024 4.8 3.5 - 5.2 mmol/L Final   11/14/2024 4.2 3.5 - 5.2 mmol/L Final      Chloride Chloride   Date Value Ref Range Status   11/17/2024 97 (L) 98 - 107 mmol/L Final   11/16/2024 95 (L) 98 - 107 mmol/L Final   11/16/2024 104 98 - 107 mmol/L Final   11/15/2024 102 98 - 107 mmol/L Final   11/15/2024 102 98 - 107 mmol/L Final   11/14/2024 99 98 - 107 mmol/L Final      CO2 CO2   Date Value Ref Range Status   11/17/2024 26.0 22.0 - 29.0 mmol/L Final   11/16/2024 15.0 (L) 22.0 - 29.0 mmol/L Final   11/16/2024 22.0 22.0 - 29.0 mmol/L Final   11/15/2024 21.0 (L) 22.0 - 29.0 mmol/L Final   11/15/2024 22.0 22.0 - 29.0 mmol/L Final   11/14/2024 21.0 (L) 22.0 - 29.0 mmol/L Final      BUN BUN   Date Value Ref Range Status   11/17/2024 58 (H) 8 - 23 mg/dL Final   11/16/2024 45 (H) 8 - 23 mg/dL Final   11/16/2024 86 (H) 8 - 23 mg/dL Final   11/15/2024 86 (H) 8 - 23 mg/dL Final   11/15/2024 72 (H) 8 - 23 mg/dL Final   11/14/2024 61 (H) 8 - 23 mg/dL Final      Creatinine Creatinine   Date Value Ref Range Status   11/17/2024 5.50 (H) 0.76 - 1.27 mg/dL Final   11/16/2024 4.91 (H) 0.76 - 1.27 mg/dL Final   11/16/2024 7.85 (H) 0.76 - 1.27 mg/dL Final   11/15/2024 8.00 (H) 0.76 - 1.27 mg/dL Final   11/15/2024 7.34 (H) 0.76 - 1.27 mg/dL Final   11/14/2024 6.27 (H) 0.76 - 1.27 mg/dL Final      Calcium Calcium   Date Value Ref Range Status   11/17/2024 8.1 (L) 8.6 - 10.5 mg/dL Final   11/16/2024 8.6 8.6 - 10.5 mg/dL Final   11/16/2024 8.2 (L) 8.6 - 10.5 mg/dL Final   11/15/2024 8.2 (L) 8.6 - 10.5 mg/dL Final   11/15/2024 8.1 (L) 8.6 - 10.5 mg/dL Final   11/14/2024 8.0 (L) 8.6 - 10.5 mg/dL Final   11/14/2024 8.3 (L) 8.6 - 10.5 mg/dL Final     "  PO4 No results found for: \"CAPO4\"   Albumin Albumin   Date Value Ref Range Status   11/16/2024 3.5 3.5 - 5.2 g/dL Final   11/16/2024 3.6 3.5 - 5.2 g/dL Final   11/15/2024 3.4 (L) 3.5 - 5.2 g/dL Final   11/15/2024 3.7 3.5 - 5.2 g/dL Final   11/14/2024 3.6 3.5 - 5.2 g/dL Final      Magnesium Magnesium   Date Value Ref Range Status   11/17/2024 2.8 (H) 1.6 - 2.4 mg/dL Final   11/16/2024 3.8 (H) 1.6 - 2.4 mg/dL Final   11/16/2024 2.2 1.6 - 2.4 mg/dL Final   11/15/2024 2.2 1.6 - 2.4 mg/dL Final   11/15/2024 2.3 1.6 - 2.4 mg/dL Final   11/14/2024 2.0 1.6 - 2.4 mg/dL Final      Uric Acid No results found for: \"URICACID\"         Assessment & Plan       Pneumonia due to COVID-19 virus    Severe obstructive sleep apnea    ESRD on HD T,R,S since 2022    Acute respiratory failure with hypoxia    =======================     1.  ESRD on HD TTS- No missed treatment sessions  2.  Hypotension IV pressor  3.  HFpEF  4.  Anemia of CKD: Hgb at goal - No indications for BOAZ  5.  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  Volume status: No overt volume overload  7.  COVID- 19 PNA  8.  Afib / rate controlled      PLAN   Due to patient condition dialysis will be done tomorrow with ultrafiltration  Continue with calcitriol  Will continue manage ESRD and related complications  Wife in the room.  I discussed the patients findings and my recommendations with staff.  High risk complex patient with multiple medical problems.    Belem Kaye MD  11/17/24  09:08 EST            "

## 2024-11-17 NOTE — NURSING NOTE
"RN at bedside with patient's family. RN called APRN to notify her that patient was waking up and ask if fentanyl could be re-started. Per APRN bolus with fentanyl 50 mcg and restart it. Patient calmed down slightly, then at 2134 patient's eyes got \"bug eyed\", he appeared to tense up. At that same moment patient went asystole. Pacer spikes noted with no QRS complexes. Art line flat. Code blue called, see code charting. Chest compressions imitated.  "

## 2024-11-18 LAB
QT INTERVAL: 398 MS
QTC INTERVAL: 459 MS

## 2024-11-18 NOTE — CONSULTS
Neurology Note    Patient:  Marco Antonio Montemayor    YOB: 1945    REFERRING PHYSICIAN:  Dr. Vega    CHIEF COMPLAINT:    seizures    HISTORY OF PRESENT ILLNESS:   The patient is a 79 y.o. male with h/o A.fib on warfarin, CKD, CHF, ZULEIKA who on 11/12 p/w SOA, COVID PNA. He received Decadron and remdesivir. He developed hypotension and lethargy, was transferred to the ICU. Last night, he had severe bradycardia and possible cardiac arrest, received some chest compressions and atropine. He has been intubated and sedated. Around the time of his code Blue some generalized tonic clonic movement were observed. Immediately following CPR, purposeful movements were observed and he was following commands. No h/o alcohol or drug abuse.    Past Medical History:  Past Medical History:   Diagnosis Date    Abnormal ECG     Arrhythmia     Arthritis     Asthma     Cancer     skin cancer removed from various locations     Chronic diastolic congestive heart failure 09/14/2018    CKD (chronic kidney disease)     closer to stage 5    Coronary artery disease     Dry skin     Dyslipidemia 07/07/2016    Edema     Generalized seizure 11/17/2024    Heart murmur     Hematoma     Hemodialysis access site with arteriovenous graft     History of transfusion     no transfusion reaction    Hypertension     Iron deficiency anemia     Long term current use of anticoagulant     ZULEIKA treated with BiPAP     Persistent atrial fibrillation 07/07/2016     Patient notes dyspnea, fatigue and palpitations during episodes of afib.  multiple external cardioversionsChads vasc 2, anticoagulated with Coumadin    Tinnitus     Wears glasses     readers       Past Surgical History:  Past Surgical History:   Procedure Laterality Date    ABLATION OF DYSRHYTHMIC FOCUS      APPENDECTOMY      ARTERIOVENOUS FISTULA/SHUNT SURGERY Left 08/04/2022    Procedure: UPPER EXTREMITY BRACHIOCEPHALIC ARTERIOVENOUS FISTULA FORMATION LEFT;  Surgeon: Jakob Reza,  MD;  Location:  [facility];  Service: Vascular;  Laterality: Left;    ARTERIOVENOUS FISTULA/SHUNT SURGERY Left 11/03/2022    Procedure: ELEVATION LEFT UPPER EXTREMITY AV FISTULA;  Surgeon: Jakob Reza MD;  Location:  [facility];  Service: Vascular;  Laterality: Left;    CARDIAC ELECTROPHYSIOLOGY PROCEDURE N/A 05/18/2018    Procedure: PPM generator change - dual       [facility]/ please schedule in 8 weeks;  Surgeon: Johnathan Taylor MD;  Location:  [facility];  Service: Cardiovascular    CATARACT EXTRACTION      bilat wiht lens     COLONOSCOPY      ENDOSCOPY      Pt denies    INSERT / REPLACE / REMOVE PACEMAKER  about 20 years back    SKIN CANCER EXCISION      various locations     TONSILLECTOMY      VEIN SURGERY  for dialysis    WISDOM TOOTH EXTRACTION         Social History:   Social History     Socioeconomic History    Marital status:    Tobacco Use    Smoking status: Never     Passive exposure: Never    Smokeless tobacco: Never   Vaping Use    Vaping status: Never Used   Substance and Sexual Activity    Alcohol use: No    Drug use: No    Sexual activity: Never        Family History:   Family History   Problem Relation Age of Onset    Cancer Mother     Heart attack Mother     Hypertension Mother     Arrhythmia Father     Hypertension Father     Heart attack Father        Medications Prior to Admission:    Prior to Admission medications    Medication Sig Start Date End Date Taking? Authorizing Provider   acetaminophen (TYLENOL) 500 MG tablet Take 1 tablet by mouth Every 6 (Six) Hours As Needed for Mild Pain.   Yes Provider, MD Adrian   atorvastatin (LIPITOR) 40 MG tablet TAKE ONE TABLET BY MOUTH DAILY 1/15/24  Yes Ivet Beasley APRN   bumetanide (BUMEX) 2 MG tablet TAKE 1 TABLET BY MOUTH EVERY OTHER DAY - TAKE ON DAYS OFF FROM DIALYSIS (TUESDAY, THURSDAY, SATURDAY AND SUNDAY) 5/28/24  Yes Ivet Beasley APRN   carvedilol (COREG) 12.5 MG tablet TAKE ONE TABLET BY MOUTH TWICE  A DAY 9/18/23  Yes Ivet Beasley APRN   cholecalciferol (VITAMIN D3) 1000 UNITS tablet Take 1 tablet by mouth Daily.   Yes ProviderAdrian MD   midodrine (PROAMATINE) 10 MG tablet Take 1 tablet by mouth 3 (Three) Times a Day Before Meals. Before Dialysis, Tues, Thurs, Sat 3/16/24  Yes Adrian Munson MD   Sucroferric Oxyhydroxide (Velphoro) 500 MG chewable tablet Take As Directed. Crush or chew and swallow 3 tablets by mouth 3 times a day with meals and 1 tablet twice a day with snacks. 12/9/22  Yes Adrian Munson MD   vitamin B-12 (CYANOCOBALAMIN) 1000 MCG tablet Take 1 tablet by mouth Daily.   Yes ProviderAdrian MD   warfarin (COUMADIN) 2.5 MG tablet Take 2-3 tablets by mouth daily or as directed by anticoagulation clinic 10/22/24  Yes Ivet Beasley APRN   cefdinir (OMNICEF) 300 MG capsule Take one capsule on day one followed by one capsule on dialysis days after dialysis is complete until they are gone. 11/6/24   Heriberto Murcia MD   Ferrous Sulfate (IRON) 325 (65 FE) MG tablet Take 65 mg by mouth Daily.    Provider, MD Adrian   nitroglycerin (NITROSTAT) 0.3 MG SL tablet 1 under the tongue as needed for angina, may repeat q5mins for up three doses 7/14/23   Ivet Beasley APRN       Allergies:  Patient has no known allergies.      Review of system  Review of Systems   Unable to perform ROS: Intubated       Vitals:    11/18/24 1145   BP:    Pulse: 82   Resp:    Temp:    SpO2: 95%       Physical exam  Physical Exam  Not examined. The patient is in isolation, intubated and sedated.    Lab Results   Component Value Date    WBC 9.37 11/18/2024    HGB 7.7 (L) 11/18/2024    HCT 22.8 (L) 11/18/2024    .0 (H) 11/18/2024     11/18/2024     Lab Results   Component Value Date    GLUCOSE 144 (H) 11/18/2024    BUN 72 (H) 11/18/2024    CREATININE 7.01 (H) 11/18/2024    EGFRIFNONA 18 (L) 02/16/2022    EGFRIFAFRI 26 (L) 11/12/2018    BCR 10.3 11/18/2024    CO2 24.0  11/18/2024    CALCIUM 8.4 (L) 11/18/2024    PROTENTOTREF 6.8 01/10/2023    ALBUMIN 3.0 (L) 11/18/2024    LABIL2 1.5 01/10/2023    AST 19 11/18/2024    ALT 6 11/18/2024         Radiological Studies:   EEG Continuous Monitoring With Video    Result Date: 11/18/2024  Date of Procedure: 11-18 at 2 AM through 7:30 AM Reason for referral: 79 y.o.male with seizures, ICU monitoring, altered mental status Technical summary: A 19 channel digital EEG is performed using the international 10-20 placement system, including eye leads and EKG leads.  Video is available, and split screen technology with video/EEG correlation is used as needed.   A patient event button is offered. Review of the relevant video-EEG data was performed throughout the day.  Detailed review of the EEG and relevant video was performed using multiple montages, including a variety of referential and bipolar montages.  Results of the monitoring related to the treatment team frequently during the study, at least once a day.  Where available, seizure detection software was used for the detection of ictal and interictal discharges. Findings: The patient is intubated.  The background shows diffuse low to medium amplitude 2-5 Hz intermixed delta and theta activity which is present symmetrically over both hemispheres.  Occasional low amplitude faster theta frequencies are present for brief times during the study.  Over the course of the study, no focal features or epileptiform activity are seen and no electrographic seizures are present.  The patient event button is not pushed.       EEG background is moderate generalized slow without focal features seen No clinical or electrographic seizures are captured Summary: Over the course of this monitoring study, no evidence for recurrent seizures is seen This report is transcribed using the Dragon dictation system.     EEG Continuous Monitoring With Video    Result Date: 11/18/2024  Date of Procedure: 11-17 at 1:30 AM  through 11-18 at 2 AM Reason for referral: 79 y.o.male with seizure-like activity, ICU monitoring Technical summary: A 19 channel digital EEG is performed using the international 10-20 placement system, including eye leads and EKG leads.  Video is available, and split screen technology with video/EEG correlation is used as needed.   A patient event button is offered. Review of the relevant video-EEG data was performed throughout the day.  Detailed review of the EEG and relevant video was performed using multiple montages, including a variety of referential and bipolar montages.  Results of the monitoring related to the treatment team frequently during the study, at least once a day.  Where available, seizure detection software was used for the detection of ictal and interictal discharges. Findings: The patient is intubated.  She is on Keppra 500 mg daily.  The background shows diffuse medium amplitude 2-5 Hz intermixed delta and theta activity which is present symmetrically over both hemispheres.  Generalized triphasic waves are frequently seen at the beginning of the study.  As a study proceeds, these become less prominent.  Over the course of the study, no lateralizing features are seen.  No epileptiform activity or electrographic seizures are present the patient event button is not pushed.       EEG background is moderate generalized slow with triphasic waves present No focal features or epileptiform activity are seen No clinical or electrographic seizures are captured This report is transcribed using the Dragon dictation system.     XR Chest 1 View    Result Date: 11/18/2024  XR CHEST 1 VW Date of Exam: 11/18/2024 2:07 AM EST Indication: Intubated Patient Comparison: Chest x-ray 11/17/2024 Findings: Stable medical support devices. Stable mild cardiomegaly. Vague bibasilar parenchymal opacities and small scattered bilateral interstitial opacities are grossly unchanged. No significant pleural effusion. No  pneumothorax.     Impression: No significant interval change. Electronically Signed: Yahir Garcia MD  11/18/2024 7:18 AM EST  Workstation ID: [id number]    Adult Transthoracic Echo Complete W/ Cont if Necessary Per Protocol    Result Date: 11/17/2024    Left ventricular systolic function is normal. Left ventricular ejection fraction appears to be 61 - 65%.   Left ventricular wall thickness is consistent with mild concentric hypertrophy.   Left ventricular diastolic function was indeterminate.   The left atrial cavity is moderately dilated.   Mild aortic valve stenosis is present.   Peak velocity of the flow distal to the aortic valve is 216.8 cm/s. Aortic valve mean pressure gradient is 10 mmHg.   Estimated right ventricular systolic pressure from tricuspid regurgitation is normal (<35 mmHg). Calculated right ventricular systolic pressure from tricuspid regurgitation is 23 mmHg.     XR Chest 1 View    Result Date: 11/17/2024  XR CHEST 1 VW Date of Exam: 11/17/2024 1:05 AM EST Indication: Intubated Patient Comparison: 1 day prior. Findings: Support hardware projects unchanged. Lung volumes remain diminished with scattered atelectasis and small bilateral pleural effusions, with likely component of mild pulmonary edema also present. There is no distinct pneumothorax.     Impression: Support hardware projects unchanged. Lung volumes remain diminished with scattered atelectasis and small bilateral pleural effusions, with likely component of mild pulmonary edema also present. There is no distinct pneumothorax. Electronically Signed: Dipak Gregg MD  11/17/2024 9:10 AM EST  Workstation ID: [id number]    EEG    Result Date: 11/17/2024  Reason for referral: 79 y.o.male with seizure-like activity Technical Summary:  A 19 channel digital EEG was performed using the international 10-20 placement system, including eye leads and EKG leads. Duration: 20 minutes Findings: The patient is intubated.  The background shows medium  amplitude 2-5 Hz intermixed delta and theta activity which is present symmetrically over both hemispheres.  Generalized triphasic waves are variably prominent, occurring singly and in brief clusters at 1 Hz.  No electrographic seizures are seen.  Photic stimulation does not change the background.  Hyperventilation is not performed.  No focal features are seen.  No epileptiform activity is present. Video: Available Technical quality: Good EKG: Regular, 80 bpm SUMMARY: Moderate generalized slow with generalized triphasic waves No focal features or epileptiform activity are seen     Diffuse cerebral dysfunction of moderate degree, nonspecific.  These are most commonly seen due to toxic/metabolic or hypoxemic/ischemic cause No ongoing seizures are seen This report is transcribed using the Dragon dictation system.      CT Abdomen Pelvis Without Contrast    Result Date: 11/16/2024  CT ABDOMEN PELVIS WO CONTRAST Date of Exam: 11/16/2024 10:49 PM EST Indication: Elevated lactic acid. Comparison: 12/1/2023. Technique: Axial CT images were obtained of the abdomen and pelvis without the administration of contrast. Reconstructed coronal and sagittal images were also obtained. Automated exposure control and iterative construction methods were used. Findings: Lung Bases:   Patchy airspace disease present within the bilateral lower lobes and the lingula, incompletely visualized. Small bilateral pleural effusions are present. The heart appears enlarged. Limited evaluation of the solid organs due to lack of intravenous contrast.. Liver: Liver is normal in size and CT density. No focal lesions. Biliary/Gallbladder:  The gallbladder is normal without evidence of radiopaque stones. The biliary tree is nondilated. Spleen: Spleen is normal in size and CT density. Pancreas:  Pancreas is normal. There is no evidence of pancreatic mass or peripancreatic fluid. Kidneys:  The kidneys appear atrophic. Bilateral small cysts likely present..  There are no stones or hydronephrosis. Adrenals:  Adrenal glands are unremarkable. Retroperitoneal/Lymph Nodes/Vasculature:  No retroperitoneal adenopathy is identified. Gastrointestinal/Mesentery:  The bowel loops are non-dilated without wall thickening or mass. No evidence of pneumatosis. The appendix appears within normal limits. No evidence of obstruction. No free air. No mesenteric fluid collections identified. Fat-containing umbilical hernia present. No significant stool burden identified. There is diverticulosis of the left hemicolon. No significant inflammatory changes identified. Enteric tube present within the stomach. Bladder:  The bladder is normal. Genital:   Unremarkable       Bony Structures:   Visualized bony structures are consistent with the patient's age.     Impression: 1.No acute intra-abdominal or intrapelvic process. No evidence of pneumatosis or free air. 2.Patchy airspace disease present within the bilateral lower lobes and the lingula, likely related to pneumonia. Small bilateral pleural effusions present. 3.Ancillary findings as described above. Electronically Signed: Keara Diggs MD  11/16/2024 11:26 PM EST  Workstation ID: [id number]    CT Head Without Contrast    Result Date: 11/16/2024  CT HEAD WO CONTRAST Date of Exam: 11/16/2024 10:49 PM EST Indication: Post code. Comparison: 11/13/2024. Technique: Axial CT images were obtained of the head without contrast administration.  Automated exposure control and iterative construction methods were used. Findings: There is no evidence of hemorrhage. There is no mass effect or midline shift. There is no extracerebral collection. Ventricles are normal in size and configuration for patient's stated age.  Posterior fossa is within normal limits. Calvarium and skull base appear intact.   Visualized sinuses show no air fluid levels. Visualized orbits are unremarkable. Retained secretions present within the nasal passageways related to intubation.      Impression: No acute intracranial process. Electronically Signed: Keara Diggs MD  11/16/2024 11:23 PM EST  Workstation ID: [id number]    XR Chest 1 View    Result Date: 11/16/2024  XR CHEST 1 VW Date of Exam: 11/16/2024 3:29 AM EST Indication: ETT placement Comparison: 11/16/2024. Findings: Endotracheal tube tip in the mid trachea. Enteric tube present with incomplete visualization of the tip. There is a left subclavian AICD/pacemaker device. There is a left internal jugular CVC catheter with the tip in the proximal SVC. No pneumothorax. The heart appears enlarged, stable. Patchy airspace disease is seen within the right lung base with small right-sided pleural effusion, similar as compared to the previous study.     Impression: Life-support devices as above. Otherwise, no significant change. Electronically Signed: Keara Diggs MD  11/16/2024 4:33 AM EST  Workstation ID: [id number]    XR Abdomen KUB    Result Date: 11/16/2024  XR ABDOMEN KUB Date of Exam: 11/16/2024 3:28 AM EST Indication: check OG placement Comparison: None available. Findings: Enteric tube within the stomach.     Impression: Enteric tube within the stomach. Electronically Signed: Keara Diggs MD  11/16/2024 4:32 AM EST  Workstation ID: [id number]    XR Chest 1 View    Result Date: 11/16/2024  XR CHEST 1 VW Date of Exam: 11/16/2024 1:27 AM EST Indication: line placement Comparison: 11/16/2024. Findings: There is a new left internal jugular CVC catheter present with the tip likely within the distal left brachiocephalic vein. There is no evidence of pneumothorax. Stable left subclavian AICD/pacemaker device. Stable cardiomegaly. Stable airspace disease.     Impression: New left internal jugular CVC catheter with the tip likely within the distal left brachiocephalic vein. No evidence of pneumothorax. Otherwise, stable exam. Electronically Signed: Keara Diggs MD  11/16/2024 1:53 AM EST  Workstation ID: [id number]    XR Chest 1 View    Result Date:  11/16/2024  XR CHEST 1 VW Date of Exam: 11/15/2024 11:24 PM EST Indication: Hypoxic resp failure Comparison: 11/12/2024. Findings: Patchy airspace disease is present within the right lower lobe.. Probable small right-sided pleural effusion present.. No pneumothorax. The pulmonary vasculature appears within normal limits. The heart appears enlarged. There is left subclavian ICD/pacemaker device.. No acute osseous abnormality identified.     Impression: Right lower lobe pneumonia with probable small right-sided pleural effusion. Electronically Signed: Keara Diggs MD  11/16/2024 12:45 AM EST  Workstation ID: [id number]    CT Head Without Contrast    Result Date: 11/13/2024  CT HEAD WO CONTRAST Date of Exam: 11/13/2024 11:49 AM EST Indication: headache and on coumadin. Comparison: None available. Technique: Axial CT images were obtained of the head without contrast administration.  Automated exposure control and iterative construction methods were used. Findings: There is no evidence of hemorrhage. There is no mass effect or midline shift. Diffuse brain atrophy. Tiny round hypodensity in the left basal ganglia suspicious for a chronic lacunar infarct. There is no extracerebral collection. Ventricles are normal in size and configuration for patient's stated age. Posterior fossa is within normal limits. Calvarium and skull base appear intact.  Visualized sinuses show no air fluid levels. Visualized orbits are unremarkable.     Impression: No evidence of acute intracranial abnormality Electronically Signed: Raji Hudson MD  11/13/2024 12:46 PM EST  Workstation ID: [id number]    XR Chest 1 View    Result Date: 11/12/2024  XR CHEST 1 VW Date of Exam: 11/12/2024 4:37 PM EST Indication: SOA triage protocol Comparison: 11/11/2024. Findings: Left lower lobe consolidation. Vascular congestion. Dual-lead cardiac pacemaker. The clavicles are intact. No rib fractures. No pneumothorax or pleural effusion. Atheromatous disease of the  aortic arch.     Left lower lobe consolidation and vascular congestion. Electronically Signed: Joss Daniels MD  11/12/2024 5:01 PM EST  Workstation ID: [id number]    CT Angiogram Chest Pulmonary Embolism    Result Date: 11/11/2024  CT ANGIOGRAM CHEST PULMONARY EMBOLISM Date of Exam: 11/11/2024 7:00 PM EST Indication: Shortness of breath. Comparison: None available. Technique: Axial CT images were obtained of the chest after the uneventful intravenous administration of 90 cc Isovue 370 IV contrast utilizing pulmonary embolism protocol.  Reconstructed coronal and sagittal images were also obtained. Automated exposure  control and iterative construction methods were used. Findings: Patchy areas of glass within both lungs. Cardiac pacemaker/defibrillator leads. No lobar consolidation. No pneumothorax or pleural effusion. No evidence of aortic dissection. No pulmonary embolus is appreciated. No pericardial effusion. Limited  evaluation of the upper abdomen is normal. Thoracic vertebral body height and alignment are normal. No lytic or blastic disease. No rib fractures are seen.     1. No pulmonary embolus. 2. Patchy areas of groundglass noted within both lungs. Differential considerations include atypical pneumonia such as COVID-19. Additional differential considerations may include metastatic disease or developing pneumonia however these are considered less likely Electronically Signed: Joss Daniels MD  11/11/2024 7:38 PM EST  Workstation ID: [id number]    XR Chest 1 View    Result Date: 11/11/2024  XR CHEST 1 VW Date of Exam: 11/11/2024 2:18 PM EST Indication: Weak/Dizzy/AMS triage protocol Comparison: 2/29/2024 Findings: Left chest wall ICD again noted. There is continued moderately severe cardiomegaly. No acute infiltrates or effusions identified. Exam is somewhat limited by portable technique with obesity.     Impression: Stable cardiomegaly. No new abnormality. Electronically Signed: Cat Vieira MD  11/11/2024 2:56  PM EST  Workstation ID: [id number]       During this visit the following were done:  Labs Reviewed [x]    Labs Ordered []    Radiology Reports Reviewed [x]    Radiology Ordered []    EKG, echo, and/or stress test reviewed []    EEG results reviewed  [x]    EEG reviewed and interpreted per myself   []    Discussed case with neurointerventionalist or neuroradiologist []    Referring Provider Records Reviewed []    ER Records Reviewed []    Hospital Records Reviewed []    History Obtained From Family []    Radiological images view and Interpreted per myself [x]    Case Discussed with referring provider []     Decision to obtain and request outside records  []        Assessment and Plan     New onset seizure-like activity in the setting of severe bradycardia, possible cardiac arrest, suspect cerebral hypoperfusion. cEEG is w/o epileptic activity.  The patient is currently on Versed drip. Severe CKD.   - Continue Keppra 500 mg qd for now. Would consider stopping later on if no recurrence.   - Wean sedation as tolerated.   - MRI brain when stable.    Thanks.                   Electronically signed by Panfilo Valera MD on 11/18/2024 at 12:05 EST

## 2024-11-18 NOTE — PROGRESS NOTES
" LOS: 6 days   Patient Care Team:  Heriberto Murcia MD as PCP - General (Family Medicine)  Shanell Arango, PharmD as Pharmacist (Pharmacy)  Evelina Quan, PharmD as Pharmacist (Pharmacy)  Peng Melgar, PharmD as Pharmacist (Pharmacy)  Johnathan Taylor MD as Consulting Physician (Cardiac Electrophysiology)  Chato Bo MD as Consulting Physician (Nephrology)  Ivet Beasley APRN as Nurse Practitioner (Cardiology)  Rei Decker PA as Physician Assistant (Cardiology)      Subjective   Reviewed vitals, labs and medications list  Seen and examined   Intubated on ventilator FiO2 30%  Low dose pressors   COVID-19 precautions been taken.  HD today UF 3L as tolerated       Objective     Vital Sign Min/Max for last 24 hours  Temp  Min: 97.9 °F (36.6 °C)  Max: 98.9 °F (37.2 °C)   BP  Min: 74/59  Max: 182/93   Pulse  Min: 75  Max: 106   Resp  Min: 20  Max: 21   SpO2  Min: 77 %  Max: 100 %   No data recorded   Weight  Min: 127 kg (279 lb 15.8 oz)  Max: 127 kg (279 lb 15.8 oz)     Flowsheet Rows      Flowsheet Row First Filed Value   Admission Height 177.8 cm (70\") Documented at 11/12/2024 1456   Admission Weight 130 kg (287 lb) Documented at 11/12/2024 1456            No intake/output data recorded.  I/O last 3 completed shifts:  In: 4673.5 [I.V.:3886.5; Blood:297; Other:70; NG/GT:70; IV Piggyback:350]  Out: 300 [Emesis/NG output:300]    Physical examination:  COVID precautions taken.  General:  male no acute distress.  Ill-appearing.  Pulmonary: Intubated on ventilator equal chest movement.  Cardiovascular: Normal rate.  Abdomen: Morbid obesity.  Extremities: Positive edema is noted.  Neurological: Sedated on ventilator  LUE-aVF in place      Results Review:     I reviewed the patient's new clinical results.    WBC WBC   Date Value Ref Range Status   11/18/2024 9.37 3.40 - 10.80 10*3/mm3 Final   11/17/2024 9.07 3.40 - 10.80 10*3/mm3 Final   11/16/2024 8.61 3.40 - 10.80 10*3/mm3 Final " "  11/16/2024 11.86 (H) 3.40 - 10.80 10*3/mm3 Final      HGB Hemoglobin   Date Value Ref Range Status   11/18/2024 7.7 (L) 13.0 - 17.7 g/dL Final   11/17/2024 7.6 (L) 13.0 - 17.7 g/dL Final   11/17/2024 6.6 (C) 13.0 - 17.7 g/dL Final   11/17/2024 7.0 (L) 13.0 - 17.7 g/dL Final   11/17/2024 7.0 (L) 13.0 - 17.7 g/dL Final   11/17/2024 7.0 (L) 13.0 - 17.7 g/dL Final   11/16/2024 8.5 (L) 13.0 - 17.7 g/dL Final   11/16/2024 7.8 (L) 13.0 - 17.7 g/dL Final   11/16/2024 7.8 (L) 13.0 - 17.7 g/dL Final   11/16/2024 7.3 (L) 13.0 - 17.7 g/dL Final   11/16/2024 7.5 (L) 13.0 - 17.7 g/dL Final   11/16/2024 8.4 (L) 13.0 - 17.7 g/dL Final   11/15/2024 8.0 (L) 13.0 - 17.7 g/dL Final   11/15/2024 8.2 (L) 13.0 - 17.7 g/dL Final   11/15/2024 8.7 (L) 13.0 - 17.7 g/dL Final      HCT Hematocrit   Date Value Ref Range Status   11/18/2024 22.8 (L) 37.5 - 51.0 % Final   11/17/2024 22.8 (L) 37.5 - 51.0 % Final   11/17/2024 20.1 (C) 37.5 - 51.0 % Final   11/17/2024 21.9 (L) 37.5 - 51.0 % Final   11/17/2024 21.1 (L) 37.5 - 51.0 % Final   11/17/2024 21.1 (L) 37.5 - 51.0 % Final   11/16/2024 27.8 (L) 37.5 - 51.0 % Final   11/16/2024 23.8 (L) 37.5 - 51.0 % Final   11/16/2024 23.8 (L) 37.5 - 51.0 % Final   11/16/2024 22.8 (L) 37.5 - 51.0 % Final   11/16/2024 23.4 (L) 37.5 - 51.0 % Final   11/16/2024 28.1 (L) 37.5 - 51.0 % Final   11/15/2024 27.0 (L) 37.5 - 51.0 % Final   11/15/2024 27.4 (L) 37.5 - 51.0 % Final   11/15/2024 29.1 (L) 37.5 - 51.0 % Final      Platlets No results found for: \"LABPLAT\"   MCV MCV   Date Value Ref Range Status   11/18/2024 100.0 (H) 79.0 - 97.0 fL Final   11/17/2024 100.5 (H) 79.0 - 97.0 fL Final   11/16/2024 101.7 (H) 79.0 - 97.0 fL Final   11/16/2024 109.8 (H) 79.0 - 97.0 fL Final          Sodium Sodium   Date Value Ref Range Status   11/18/2024 140 136 - 145 mmol/L Final   11/17/2024 141 136 - 145 mmol/L Final   11/17/2024 138 136 - 145 mmol/L Final   11/16/2024 138 136 - 145 mmol/L Final   11/16/2024 141 136 - 145 mmol/L " Final   11/15/2024 138 136 - 145 mmol/L Final      Potassium Potassium   Date Value Ref Range Status   11/18/2024 3.4 (L) 3.5 - 5.2 mmol/L Final   11/17/2024 3.4 (L) 3.5 - 5.2 mmol/L Final   11/17/2024 3.5 3.5 - 5.2 mmol/L Final   11/16/2024 3.2 (L) 3.5 - 5.2 mmol/L Final     Comment:     Slight hemolysis detected by analyzer. Result may be falsely elevated.   11/16/2024 4.8 3.5 - 5.2 mmol/L Final   11/15/2024 4.5 3.5 - 5.2 mmol/L Final      Chloride Chloride   Date Value Ref Range Status   11/18/2024 99 98 - 107 mmol/L Final   11/17/2024 99 98 - 107 mmol/L Final   11/17/2024 97 (L) 98 - 107 mmol/L Final   11/16/2024 95 (L) 98 - 107 mmol/L Final   11/16/2024 104 98 - 107 mmol/L Final   11/15/2024 102 98 - 107 mmol/L Final      CO2 CO2   Date Value Ref Range Status   11/18/2024 24.0 22.0 - 29.0 mmol/L Final   11/17/2024 23.0 22.0 - 29.0 mmol/L Final   11/17/2024 26.0 22.0 - 29.0 mmol/L Final   11/16/2024 15.0 (L) 22.0 - 29.0 mmol/L Final   11/16/2024 22.0 22.0 - 29.0 mmol/L Final   11/15/2024 21.0 (L) 22.0 - 29.0 mmol/L Final      BUN BUN   Date Value Ref Range Status   11/18/2024 72 (H) 8 - 23 mg/dL Final   11/17/2024 71 (H) 8 - 23 mg/dL Final   11/17/2024 58 (H) 8 - 23 mg/dL Final   11/16/2024 45 (H) 8 - 23 mg/dL Final   11/16/2024 86 (H) 8 - 23 mg/dL Final   11/15/2024 86 (H) 8 - 23 mg/dL Final      Creatinine Creatinine   Date Value Ref Range Status   11/18/2024 7.01 (H) 0.76 - 1.27 mg/dL Final   11/17/2024 6.77 (H) 0.76 - 1.27 mg/dL Final   11/17/2024 5.50 (H) 0.76 - 1.27 mg/dL Final   11/16/2024 4.91 (H) 0.76 - 1.27 mg/dL Final   11/16/2024 7.85 (H) 0.76 - 1.27 mg/dL Final   11/15/2024 8.00 (H) 0.76 - 1.27 mg/dL Final      Calcium Calcium   Date Value Ref Range Status   11/18/2024 8.4 (L) 8.6 - 10.5 mg/dL Final   11/17/2024 8.4 (L) 8.6 - 10.5 mg/dL Final   11/17/2024 8.1 (L) 8.6 - 10.5 mg/dL Final   11/16/2024 8.6 8.6 - 10.5 mg/dL Final   11/16/2024 8.2 (L) 8.6 - 10.5 mg/dL Final   11/15/2024 8.2 (L) 8.6 - 10.5  "mg/dL Final      PO4 No results found for: \"CAPO4\"   Albumin Albumin   Date Value Ref Range Status   11/18/2024 3.0 (L) 3.5 - 5.2 g/dL Final   11/17/2024 3.1 (L) 3.5 - 5.2 g/dL Final   11/16/2024 3.5 3.5 - 5.2 g/dL Final   11/16/2024 3.6 3.5 - 5.2 g/dL Final   11/15/2024 3.4 (L) 3.5 - 5.2 g/dL Final      Magnesium Magnesium   Date Value Ref Range Status   11/18/2024 2.6 (H) 1.6 - 2.4 mg/dL Final   11/17/2024 2.6 (H) 1.6 - 2.4 mg/dL Final   11/17/2024 2.8 (H) 1.6 - 2.4 mg/dL Final   11/16/2024 3.8 (H) 1.6 - 2.4 mg/dL Final   11/16/2024 2.2 1.6 - 2.4 mg/dL Final   11/15/2024 2.2 1.6 - 2.4 mg/dL Final      Uric Acid No results found for: \"URICACID\"         Assessment & Plan       Pneumonia due to COVID-19 virus    Severe obstructive sleep apnea    ESRD on HD T,R,S since 2022    Acute respiratory failure with hypoxia    Probable cardiac arrest versus severe bradycardia requiring CPR    Generalized seizure    Acute GI bleeding, possible    =======================     1.  ESRD on HD TTS- No missed treatment sessions  2.  Hypotension IV pressor  3.  HFpEF  4.  Anemia of CKD: Hgb at goal - No indications for BOAZ  5.  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  Volume status: No overt volume overload  7.  COVID- 19 PNA  8.  Afib / rate controlled   9- S/p Cardiac arrest on 11/16/24      PLAN   HD today  Will assess daily for HD needs   Continue with calcitriol  Will continue manage ESRD and related complications  Daily BMP   High risk complex patient with multiple medical problems.    Shaina Juárez MD  11/18/24  09:05 EST            "

## 2024-11-18 NOTE — PLAN OF CARE
Goal Outcome Evaluation:  Plan of Care Reviewed With: child      -Pt rouses to stimulation & rests comfortably between care. Does not follow commands consistently.   -Fent 200, versed 6, levo .03, protonix gtt.   -Peep 5, FIO2 35%. Lungs coarse/wheeze/rhonchi at times. Small amount of red streaked secretions when suctioned.   -HD today; 2.5L off. Pt tolerated well.   -Minimal coffee-ground output from OG (15ml); pulled during bedside EGD. Pt unable to tolerate NJ placement at this time.   -Trending H&H; currently stable.   -Daughter updated at bedside   -BUE restraints in place for safety.       Problem: Adult Inpatient Plan of Care  Goal: Plan of Care Review  Outcome: Progressing  Flowsheets (Taken 11/18/2024 1814)  Plan of Care Reviewed With: child  Goal: Patient-Specific Goal (Individualized)  Outcome: Progressing  Goal: Absence of Hospital-Acquired Illness or Injury  Outcome: Progressing  Intervention: Identify and Manage Fall Risk  Recent Flowsheet Documentation  Taken 11/18/2024 1800 by Wendy Roberts RN  Safety Promotion/Fall Prevention:   activity supervised   assistive device/personal items within reach   clutter free environment maintained   fall prevention program maintained   lighting adjusted   safety round/check completed   toileting scheduled   room organization consistent  Taken 11/18/2024 1600 by Wendy Roberts RN  Safety Promotion/Fall Prevention:   activity supervised   assistive device/personal items within reach   clutter free environment maintained   fall prevention program maintained   lighting adjusted   toileting scheduled   safety round/check completed   room organization consistent  Taken 11/18/2024 1400 by Wendy Roberts RN  Safety Promotion/Fall Prevention:   activity supervised   assistive device/personal items within reach   clutter free environment maintained   fall prevention program maintained   lighting adjusted   toileting scheduled   safety round/check  completed   room organization consistent  Taken 11/18/2024 1200 by Wendy Roberts RN  Safety Promotion/Fall Prevention:   activity supervised   assistive device/personal items within reach   clutter free environment maintained   fall prevention program maintained   lighting adjusted   toileting scheduled   safety round/check completed   room organization consistent  Taken 11/18/2024 1000 by Wendy Roberts RN  Safety Promotion/Fall Prevention:   activity supervised   assistive device/personal items within reach   clutter free environment maintained   fall prevention program maintained   lighting adjusted   toileting scheduled   safety round/check completed   room organization consistent  Taken 11/18/2024 0800 by Wendy Roberts RN  Safety Promotion/Fall Prevention:   activity supervised   clutter free environment maintained   assistive device/personal items within reach   fall prevention program maintained   gait belt   lighting adjusted   nonskid shoes/slippers when out of bed   room organization consistent   safety round/check completed   toileting scheduled  Intervention: Prevent Skin Injury  Recent Flowsheet Documentation  Taken 11/18/2024 1800 by Wendy Roberts RN  Body Position:   right   turned  Skin Protection:   incontinence pads utilized   silicone foam dressing in place  Taken 11/18/2024 1600 by Wendy Roberts RN  Body Position:   supine   turned  Skin Protection:   incontinence pads utilized   silicone foam dressing in place  Taken 11/18/2024 1400 by Wendy Roberts RN  Body Position: (currently HD)   left   tilted   unstable with turn  Skin Protection:   incontinence pads utilized   silicone foam dressing in place  Taken 11/18/2024 1200 by Wendy Roberts RN  Body Position:   left   turned  Skin Protection:   silicone foam dressing in place   incontinence pads utilized  Taken 11/18/2024 1000 by Wendy Roberts RN  Body Position:   weight shifting   right  Skin  Protection:   incontinence pads utilized   silicone foam dressing in place  Taken 11/18/2024 0800 by Wendy Roberts RN  Body Position:   turned   supine  Skin Protection:   incontinence pads utilized   silicone foam dressing in place  Intervention: Prevent and Manage VTE (Venous Thromboembolism) Risk  Recent Flowsheet Documentation  Taken 11/18/2024 1600 by Wendy Roberts RN  VTE Prevention/Management:   bilateral   SCDs (sequential compression devices) on  Taken 11/18/2024 0800 by Wendy Roberts RN  VTE Prevention/Management:   bilateral   SCDs (sequential compression devices) on  Intervention: Prevent Infection  Recent Flowsheet Documentation  Taken 11/18/2024 1800 by Wendy Roberts RN  Infection Prevention:   environmental surveillance performed   equipment surfaces disinfected   hand hygiene promoted   personal protective equipment utilized   rest/sleep promoted   single patient room provided   visitors restricted/screened  Taken 11/18/2024 1600 by Wendy Roberts RN  Infection Prevention:   environmental surveillance performed   equipment surfaces disinfected   hand hygiene promoted   personal protective equipment utilized   rest/sleep promoted   single patient room provided   visitors restricted/screened  Taken 11/18/2024 1400 by Wendy Roberts RN  Infection Prevention:   environmental surveillance performed   equipment surfaces disinfected   hand hygiene promoted   personal protective equipment utilized   single patient room provided   visitors restricted/screened   rest/sleep promoted  Taken 11/18/2024 1200 by Wendy Roberts RN  Infection Prevention:   environmental surveillance performed   equipment surfaces disinfected   hand hygiene promoted   personal protective equipment utilized   rest/sleep promoted   single patient room provided   visitors restricted/screened  Taken 11/18/2024 1000 by Wendy Roberts RN  Infection Prevention:   environmental surveillance  performed   equipment surfaces disinfected   hand hygiene promoted   personal protective equipment utilized   rest/sleep promoted   single patient room provided   visitors restricted/screened  Taken 11/18/2024 0800 by Wendy Roberts RN  Infection Prevention:   environmental surveillance performed   equipment surfaces disinfected   hand hygiene promoted   rest/sleep promoted   single patient room provided   visitors restricted/screened   personal protective equipment utilized  Goal: Optimal Comfort and Wellbeing  Outcome: Progressing  Intervention: Monitor Pain and Promote Comfort  Recent Flowsheet Documentation  Taken 11/18/2024 1000 by Wendy Roberts RN  Pain Management Interventions:   pain medication given   quiet environment facilitated  Intervention: Provide Person-Centered Care  Recent Flowsheet Documentation  Taken 11/18/2024 1400 by Wendy Roberts RN  Trust Relationship/Rapport:   care explained   reassurance provided  Taken 11/18/2024 1200 by Wendy Roberts RN  Trust Relationship/Rapport:   care explained   reassurance provided  Taken 11/18/2024 1000 by Wendy Roberts RN  Trust Relationship/Rapport:   care explained   reassurance provided  Taken 11/18/2024 0800 by Wendy Roberts RN  Trust Relationship/Rapport:   care explained   reassurance provided  Goal: Readiness for Transition of Care  Outcome: Progressing  Goal: Plan of Care Review  Outcome: Progressing  Flowsheets (Taken 11/18/2024 1814)  Plan of Care Reviewed With: child  Goal: Patient-Specific Goal (Individualized)  Outcome: Progressing  Goal: Absence of Hospital-Acquired Illness or Injury  Outcome: Progressing  Intervention: Identify and Manage Fall Risk  Recent Flowsheet Documentation  Taken 11/18/2024 1800 by Wendy Roberts RN  Safety Promotion/Fall Prevention:   activity supervised   assistive device/personal items within reach   clutter free environment maintained   fall prevention program maintained    lighting adjusted   safety round/check completed   toileting scheduled   room organization consistent  Taken 11/18/2024 1600 by Wendy Roberts RN  Safety Promotion/Fall Prevention:   activity supervised   assistive device/personal items within reach   clutter free environment maintained   fall prevention program maintained   lighting adjusted   toileting scheduled   safety round/check completed   room organization consistent  Taken 11/18/2024 1400 by Wendy Roberts RN  Safety Promotion/Fall Prevention:   activity supervised   assistive device/personal items within reach   clutter free environment maintained   fall prevention program maintained   lighting adjusted   toileting scheduled   safety round/check completed   room organization consistent  Taken 11/18/2024 1200 by Wendy Roberts RN  Safety Promotion/Fall Prevention:   activity supervised   assistive device/personal items within reach   clutter free environment maintained   fall prevention program maintained   lighting adjusted   toileting scheduled   safety round/check completed   room organization consistent  Taken 11/18/2024 1000 by Wendy Roberts RN  Safety Promotion/Fall Prevention:   activity supervised   assistive device/personal items within reach   clutter free environment maintained   fall prevention program maintained   lighting adjusted   toileting scheduled   safety round/check completed   room organization consistent  Taken 11/18/2024 0800 by Wendy Roberts RN  Safety Promotion/Fall Prevention:   activity supervised   clutter free environment maintained   assistive device/personal items within reach   fall prevention program maintained   gait belt   lighting adjusted   nonskid shoes/slippers when out of bed   room organization consistent   safety round/check completed   toileting scheduled  Intervention: Prevent Skin Injury  Recent Flowsheet Documentation  Taken 11/18/2024 1800 by Wendy Roberts RN  Body  Position:   right   turned  Skin Protection:   incontinence pads utilized   silicone foam dressing in place  Taken 11/18/2024 1600 by Wendy Roberts RN  Body Position:   supine   turned  Skin Protection:   incontinence pads utilized   silicone foam dressing in place  Taken 11/18/2024 1400 by Wendy Roberts RN  Body Position: (currently HD)   left   tilted   unstable with turn  Skin Protection:   incontinence pads utilized   silicone foam dressing in place  Taken 11/18/2024 1200 by Wendy Roberts RN  Body Position:   left   turned  Skin Protection:   silicone foam dressing in place   incontinence pads utilized  Taken 11/18/2024 1000 by Wendy Roberts RN  Body Position:   weight shifting   right  Skin Protection:   incontinence pads utilized   silicone foam dressing in place  Taken 11/18/2024 0800 by Wendy Roberts RN  Body Position:   turned   supine  Skin Protection:   incontinence pads utilized   silicone foam dressing in place  Intervention: Prevent and Manage VTE (Venous Thromboembolism) Risk  Recent Flowsheet Documentation  Taken 11/18/2024 1600 by Wendy Roberts RN  VTE Prevention/Management:   bilateral   SCDs (sequential compression devices) on  Taken 11/18/2024 0800 by Wendy Roberts RN  VTE Prevention/Management:   bilateral   SCDs (sequential compression devices) on  Intervention: Prevent Infection  Recent Flowsheet Documentation  Taken 11/18/2024 1800 by Wendy Roberts RN  Infection Prevention:   environmental surveillance performed   equipment surfaces disinfected   hand hygiene promoted   personal protective equipment utilized   rest/sleep promoted   single patient room provided   visitors restricted/screened  Taken 11/18/2024 1600 by Wendy Roberts RN  Infection Prevention:   environmental surveillance performed   equipment surfaces disinfected   hand hygiene promoted   personal protective equipment utilized   rest/sleep promoted   single patient  room provided   visitors restricted/screened  Taken 11/18/2024 1400 by Wendy Roberts RN  Infection Prevention:   environmental surveillance performed   equipment surfaces disinfected   hand hygiene promoted   personal protective equipment utilized   single patient room provided   visitors restricted/screened   rest/sleep promoted  Taken 11/18/2024 1200 by Wedny Roberts RN  Infection Prevention:   environmental surveillance performed   equipment surfaces disinfected   hand hygiene promoted   personal protective equipment utilized   rest/sleep promoted   single patient room provided   visitors restricted/screened  Taken 11/18/2024 1000 by Wendy Roberts RN  Infection Prevention:   environmental surveillance performed   equipment surfaces disinfected   hand hygiene promoted   personal protective equipment utilized   rest/sleep promoted   single patient room provided   visitors restricted/screened  Taken 11/18/2024 0800 by Wendy Roberts RN  Infection Prevention:   environmental surveillance performed   equipment surfaces disinfected   hand hygiene promoted   rest/sleep promoted   single patient room provided   visitors restricted/screened   personal protective equipment utilized  Goal: Optimal Comfort and Wellbeing  Outcome: Progressing  Intervention: Monitor Pain and Promote Comfort  Recent Flowsheet Documentation  Taken 11/18/2024 1000 by Wendy Roberts RN  Pain Management Interventions:   pain medication given   quiet environment facilitated  Intervention: Provide Person-Centered Care  Recent Flowsheet Documentation  Taken 11/18/2024 1400 by Wendy Roberts RN  Trust Relationship/Rapport:   care explained   reassurance provided  Taken 11/18/2024 1200 by Wendy Roberts RN  Trust Relationship/Rapport:   care explained   reassurance provided  Taken 11/18/2024 1000 by Wendy Roberts RN  Trust Relationship/Rapport:   care explained   reassurance provided  Taken 11/18/2024  0800 by Wendy Roberts RN  Trust Relationship/Rapport:   care explained   reassurance provided  Goal: Readiness for Transition of Care  Outcome: Progressing     Problem: Fall Injury Risk  Goal: Absence of Fall and Fall-Related Injury  Outcome: Progressing  Intervention: Identify and Manage Contributors  Recent Flowsheet Documentation  Taken 11/18/2024 1800 by Wendy Roberts RN  Medication Review/Management: medications reviewed  Taken 11/18/2024 1600 by Wendy Roberts RN  Medication Review/Management: medications reviewed  Taken 11/18/2024 1400 by Wendy Roberts RN  Medication Review/Management: medications reviewed  Taken 11/18/2024 1200 by Wendy Roberts RN  Medication Review/Management: medications reviewed  Taken 11/18/2024 1000 by Wendy Roberts RN  Medication Review/Management: medications reviewed  Taken 11/18/2024 0800 by Wendy Roberts RN  Medication Review/Management: medications reviewed  Intervention: Promote Injury-Free Environment  Recent Flowsheet Documentation  Taken 11/18/2024 1800 by Wendy Roberts RN  Safety Promotion/Fall Prevention:   activity supervised   assistive device/personal items within reach   clutter free environment maintained   fall prevention program maintained   lighting adjusted   safety round/check completed   toileting scheduled   room organization consistent  Taken 11/18/2024 1600 by Wendy Roberts RN  Safety Promotion/Fall Prevention:   activity supervised   assistive device/personal items within reach   clutter free environment maintained   fall prevention program maintained   lighting adjusted   toileting scheduled   safety round/check completed   room organization consistent  Taken 11/18/2024 1400 by Wendy Roberts RN  Safety Promotion/Fall Prevention:   activity supervised   assistive device/personal items within reach   clutter free environment maintained   fall prevention program maintained   lighting adjusted    toileting scheduled   safety round/check completed   room organization consistent  Taken 11/18/2024 1200 by Wendy Roberts RN  Safety Promotion/Fall Prevention:   activity supervised   assistive device/personal items within reach   clutter free environment maintained   fall prevention program maintained   lighting adjusted   toileting scheduled   safety round/check completed   room organization consistent  Taken 11/18/2024 1000 by Wendy Roberts RN  Safety Promotion/Fall Prevention:   activity supervised   assistive device/personal items within reach   clutter free environment maintained   fall prevention program maintained   lighting adjusted   toileting scheduled   safety round/check completed   room organization consistent  Taken 11/18/2024 0800 by Wendy Roberts RN  Safety Promotion/Fall Prevention:   activity supervised   clutter free environment maintained   assistive device/personal items within reach   fall prevention program maintained   gait belt   lighting adjusted   nonskid shoes/slippers when out of bed   room organization consistent   safety round/check completed   toileting scheduled     Problem: Hemodialysis  Goal: Safe, Effective Therapy Delivery  Outcome: Progressing  Intervention: Optimize Device Care and Function  Recent Flowsheet Documentation  Taken 11/18/2024 1800 by Wendy Roberts RN  Medication Review/Management: medications reviewed  Taken 11/18/2024 1600 by Wendy Roberts RN  Medication Review/Management: medications reviewed  Taken 11/18/2024 1400 by Wendy Roberts RN  Medication Review/Management: medications reviewed  Taken 11/18/2024 1200 by Wendy Roberts RN  Medication Review/Management: medications reviewed  Taken 11/18/2024 1000 by Wendy Roberts RN  Medication Review/Management: medications reviewed  Taken 11/18/2024 0800 by Wendy Roberts RN  Medication Review/Management: medications reviewed  Goal: Effective Tissue  Perfusion  Outcome: Progressing  Goal: Absence of Infection Signs and Symptoms  Outcome: Progressing  Intervention: Prevent or Manage Infection  Recent Flowsheet Documentation  Taken 11/18/2024 1800 by Wendy Roberts RN  Infection Prevention:   environmental surveillance performed   equipment surfaces disinfected   hand hygiene promoted   personal protective equipment utilized   rest/sleep promoted   single patient room provided   visitors restricted/screened  Taken 11/18/2024 1600 by Wendy Roberts RN  Infection Prevention:   environmental surveillance performed   equipment surfaces disinfected   hand hygiene promoted   personal protective equipment utilized   rest/sleep promoted   single patient room provided   visitors restricted/screened  Taken 11/18/2024 1400 by Wendy Roberts RN  Infection Prevention:   environmental surveillance performed   equipment surfaces disinfected   hand hygiene promoted   personal protective equipment utilized   single patient room provided   visitors restricted/screened   rest/sleep promoted  Taken 11/18/2024 1200 by Wendy Roberts RN  Infection Prevention:   environmental surveillance performed   equipment surfaces disinfected   hand hygiene promoted   personal protective equipment utilized   rest/sleep promoted   single patient room provided   visitors restricted/screened  Taken 11/18/2024 1000 by Wendy Roberts RN  Infection Prevention:   environmental surveillance performed   equipment surfaces disinfected   hand hygiene promoted   personal protective equipment utilized   rest/sleep promoted   single patient room provided   visitors restricted/screened  Taken 11/18/2024 0800 by Wendy Roberts RN  Infection Prevention:   environmental surveillance performed   equipment surfaces disinfected   hand hygiene promoted   rest/sleep promoted   single patient room provided   visitors restricted/screened   personal protective equipment utilized      Problem: Noninvasive Ventilation Acute  Goal: Effective Unassisted Ventilation and Oxygenation  Outcome: Progressing     Problem: Skin Injury Risk Increased  Goal: Skin Health and Integrity  Outcome: Progressing  Intervention: Optimize Skin Protection  Recent Flowsheet Documentation  Taken 11/18/2024 1800 by Wendy Roberts RN  Activity Management: bedrest  Pressure Reduction Techniques:   weight shift assistance provided   pressure points protected   heels elevated off bed  Head of Bed (HOB) Positioning: [facility] elevated  Pressure Reduction Devices:   specialty bed utilized   pressure-redistributing mattress utilized   alternating pressure pump (JUWAN)  Skin Protection:   incontinence pads utilized   silicone foam dressing in place  Taken 11/18/2024 1600 by Wendy Roberts RN  Activity Management: bedrest  Pressure Reduction Techniques:   weight shift assistance provided   pressure points protected   heels elevated off bed  Head of Bed (HOB) Positioning: [facility] elevated  Pressure Reduction Devices:   specialty bed utilized   pressure-redistributing mattress utilized   alternating pressure pump (JUWAN)  Skin Protection:   incontinence pads utilized   silicone foam dressing in place  Taken 11/18/2024 1400 by Wendy Roberts RN  Activity Management: bedrest  Pressure Reduction Techniques:   weight shift assistance provided   pressure points protected   heels elevated off bed  Head of Bed (HOB) Positioning: [facility] elevated  Pressure Reduction Devices:   specialty bed utilized   pressure-redistributing mattress utilized   alternating pressure pump (JUWAN)  Skin Protection:   incontinence pads utilized   silicone foam dressing in place  Taken 11/18/2024 1200 by Wendy Roberts RN  Activity Management: bedrest  Pressure Reduction Techniques:   weight shift assistance provided   pressure points protected   heels elevated off bed  Head of Bed (HOB) Positioning: [facility] elevated  Pressure Reduction Devices:   specialty bed  utilized   pressure-redistributing mattress utilized   alternating pressure pump (JUWAN)  Skin Protection:   silicone foam dressing in place   incontinence pads utilized  Taken 11/18/2024 1000 by Wendy Roberts RN  Activity Management: bedrest  Pressure Reduction Techniques:   weight shift assistance provided   pressure points protected   heels elevated off bed  Head of Bed (HOB) Positioning: hospitals elevated  Pressure Reduction Devices:   specialty bed utilized   pressure-redistributing mattress utilized   alternating pressure pump (JUWAN)  Skin Protection:   incontinence pads utilized   silicone foam dressing in place  Taken 11/18/2024 0800 by Wendy Roberts RN  Activity Management: bedrest  Pressure Reduction Techniques:   weight shift assistance provided   pressure points protected   heels elevated off bed  Head of Bed (HOB) Positioning: hospitals elevated  Pressure Reduction Devices:   specialty bed utilized   pressure-redistributing mattress utilized   positioning supports utilized   alternating pressure pump (JUWAN)  Skin Protection:   incontinence pads utilized   silicone foam dressing in place     Problem: Sepsis/Septic Shock  Goal: Optimal Coping  Outcome: Progressing  Intervention: Support Patient and Family Response  Recent Flowsheet Documentation  Taken 11/18/2024 1000 by Wendy Roberts RN  Family/Support System Care: support provided  Taken 11/18/2024 0800 by Wendy Roberts RN  Family/Support System Care:   support provided   caregiver stress acknowledged   self-care encouraged  Goal: Absence of Bleeding  Outcome: Progressing  Goal: Blood Glucose Level Within Target Range  Outcome: Progressing  Goal: Absence of Infection Signs and Symptoms  Outcome: Progressing  Intervention: Initiate Sepsis Management  Recent Flowsheet Documentation  Taken 11/18/2024 1800 by Wendy Roberts RN  Infection Prevention:   environmental surveillance performed   equipment surfaces disinfected   hand hygiene  promoted   personal protective equipment utilized   rest/sleep promoted   single patient room provided   visitors restricted/screened  Isolation Precautions:   precautions maintained   airborne   contact  Taken 11/18/2024 1600 by Wendy Roberts RN  Infection Prevention:   environmental surveillance performed   equipment surfaces disinfected   hand hygiene promoted   personal protective equipment utilized   rest/sleep promoted   single patient room provided   visitors restricted/screened  Isolation Precautions:   precautions maintained   contact   airborne  Taken 11/18/2024 1400 by Wendy Roberts RN  Infection Prevention:   environmental surveillance performed   equipment surfaces disinfected   hand hygiene promoted   personal protective equipment utilized   single patient room provided   visitors restricted/screened   rest/sleep promoted  Isolation Precautions:   precautions maintained   airborne   contact  Taken 11/18/2024 1200 by Wendy Roberts RN  Infection Prevention:   environmental surveillance performed   equipment surfaces disinfected   hand hygiene promoted   personal protective equipment utilized   rest/sleep promoted   single patient room provided   visitors restricted/screened  Isolation Precautions:   precautions maintained   airborne   contact  Taken 11/18/2024 1000 by Wendy Roberts RN  Infection Prevention:   environmental surveillance performed   equipment surfaces disinfected   hand hygiene promoted   personal protective equipment utilized   rest/sleep promoted   single patient room provided   visitors restricted/screened  Isolation Precautions:   precautions maintained   airborne   contact  Taken 11/18/2024 0800 by Wendy Roberts RN  Infection Prevention:   environmental surveillance performed   equipment surfaces disinfected   hand hygiene promoted   rest/sleep promoted   single patient room provided   visitors restricted/screened   personal protective equipment  utilized  Isolation Precautions:   precautions maintained   airborne   contact  Intervention: Promote Recovery  Recent Flowsheet Documentation  Taken 11/18/2024 1800 by Wendy Roberts RN  Activity Management: bedrest  Taken 11/18/2024 1600 by Wendy Roberts RN  Activity Management: bedrest  Taken 11/18/2024 1400 by Wendy Roberts RN  Activity Management: bedrest  Taken 11/18/2024 1200 by Wendy Roberts RN  Activity Management: bedrest  Taken 11/18/2024 1000 by Wendy Roberts RN  Activity Management: bedrest  Taken 11/18/2024 0800 by Wendy oRberts RN  Activity Management: bedrest  Goal: Optimal Nutrition Delivery  Outcome: Progressing     Problem: Restraint, Nonviolent  Goal: Absence of Harm or Injury  Outcome: Progressing  Intervention: Implement Least Restrictive Safety Strategies  Recent Flowsheet Documentation  Taken 11/18/2024 1800 by Wendy Roberts RN  Medical Device Protection:   IV pole/bag removed from visual field   tubing secured  Taken 11/18/2024 1600 by Wendy Roberts RN  Medical Device Protection:   IV pole/bag removed from visual field   tubing secured  Diversional Activities: television  Taken 11/18/2024 1400 by Wendy Roberts RN  Medical Device Protection:   IV pole/bag removed from visual field   tubing secured  Diversional Activities: television  Taken 11/18/2024 1200 by Wendy Roberts RN  Medical Device Protection:   IV pole/bag removed from visual field   tubing secured  Diversional Activities: television  Taken 11/18/2024 1000 by Wendy Roberts RN  Medical Device Protection:   IV pole/bag removed from visual field   tubing secured  Diversional Activities: television  Taken 11/18/2024 0800 by Wendy Roberts RN  Medical Device Protection:   IV pole/bag removed from visual field   tubing secured  Diversional Activities: television  Intervention: Protect Dignity, Rights and Personal Wellbeing  Recent Flowsheet  Documentation  Taken 11/18/2024 1400 by Wendy Roberts RN  Trust Relationship/Rapport:   care explained   reassurance provided  Taken 11/18/2024 1200 by Wendy Roberts RN  Trust Relationship/Rapport:   care explained   reassurance provided  Taken 11/18/2024 1000 by Wendy Roberts RN  Trust Relationship/Rapport:   care explained   reassurance provided  Taken 11/18/2024 0800 by Wendy Roberts RN  Trust Relationship/Rapport:   care explained   reassurance provided  Intervention: Protect Skin and Joint Integrity  Recent Flowsheet Documentation  Taken 11/18/2024 1800 by Wendy Roberts RN  Body Position:   right   turned  Skin Protection:   incontinence pads utilized   silicone foam dressing in place  Taken 11/18/2024 1600 by Wendy Roberts RN  Body Position:   supine   turned  Skin Protection:   incontinence pads utilized   silicone foam dressing in place  Taken 11/18/2024 1400 by Wendy Roberts RN  Body Position: (currently HD)   left   tilted   unstable with turn  Skin Protection:   incontinence pads utilized   silicone foam dressing in place  Taken 11/18/2024 1200 by Wendy Roberts RN  Body Position:   left   turned  Skin Protection:   silicone foam dressing in place   incontinence pads utilized  Taken 11/18/2024 1000 by Wendy Roberts RN  Body Position:   weight shifting   right  Skin Protection:   incontinence pads utilized   silicone foam dressing in place  Taken 11/18/2024 0800 by Wendy Roberts RN  Body Position:   turned   supine  Skin Protection:   incontinence pads utilized   silicone foam dressing in place     Problem: Mechanical Ventilation Invasive  Goal: Effective Communication  Outcome: Progressing  Intervention: Ensure Effective Communication  Recent Flowsheet Documentation  Taken 11/18/2024 1600 by Wendy Roberts RN  Diversional Activities: television  Taken 11/18/2024 1400 by Wendy Roberts RN  Trust Relationship/Rapport:   care  explained   reassurance provided  Diversional Activities: television  Taken 11/18/2024 1200 by Wendy Roberts RN  Trust Relationship/Rapport:   care explained   reassurance provided  Diversional Activities: television  Taken 11/18/2024 1000 by Wendy Roberts RN  Trust Relationship/Rapport:   care explained   reassurance provided  Diversional Activities: television  Family/Support System Care: support provided  Taken 11/18/2024 0800 by Wendy Roberts RN  Trust Relationship/Rapport:   care explained   reassurance provided  Diversional Activities: television  Family/Support System Care:   support provided   caregiver stress acknowledged   self-care encouraged  Goal: Optimal Device Function  Outcome: Progressing  Intervention: Optimize Device Care and Function  Recent Flowsheet Documentation  Taken 11/18/2024 1800 by Wendy Roberts RN  Airway Safety Measures: manual resuscitator/mask at bedside  Taken 11/18/2024 1600 by Wendy Roberts RN  Oral Care:   lip/mouth moisturizer applied   swabbed with antiseptic solution   teeth brushed - suction toothbrush   tongue brushed  Airway Safety Measures: manual resuscitator/mask at bedside  Taken 11/18/2024 1400 by Wendy Roberts RN  Airway Safety Measures: manual resuscitator/mask at bedside  Taken 11/18/2024 1200 by Wendy Roberts RN  Oral Care:   lip/mouth moisturizer applied   swabbed with antiseptic solution   teeth brushed - suction toothbrush   tongue brushed  Taken 11/18/2024 1000 by Wendy Roberts RN  Airway Safety Measures: manual resuscitator/mask at bedside  Taken 11/18/2024 0800 by Wendy Roberts RN  Oral Care:   lip/mouth moisturizer applied   swabbed with antiseptic solution   teeth brushed - suction toothbrush   tongue brushed  Airway Safety Measures:   manual resuscitator/mask at bedside   suction at bedside  Goal: Mechanical Ventilation Liberation  Outcome: Progressing  Intervention: Promote Extubation and  Mechanical Ventilation Liberation  Recent Flowsheet Documentation  Taken 11/18/2024 1800 by Wendy Roberts RN  Medication Review/Management: medications reviewed  Taken 11/18/2024 1600 by Wendy Roberts RN  Medication Review/Management: medications reviewed  Taken 11/18/2024 1400 by Wendy Roberts RN  Medication Review/Management: medications reviewed  Taken 11/18/2024 1200 by Wendy oRberts RN  Medication Review/Management: medications reviewed  Taken 11/18/2024 1000 by Wendy Roberts RN  Medication Review/Management: medications reviewed  Taken 11/18/2024 0800 by Wendy Roberts RN  Medication Review/Management: medications reviewed  Goal: Optimal Nutrition Delivery  Outcome: Progressing  Goal: Absence of Device-Related Skin and Tissue Injury  Outcome: Progressing  Intervention: Maintain Skin and Tissue Health  Recent Flowsheet Documentation  Taken 11/18/2024 1800 by Wendy Roberts RN  Device Skin Pressure Protection:   tubing/devices free from skin contact   skin-to-device areas padded   pressure points protected   positioning supports utilized   adhesive use limited   absorbent pad utilized/changed  Taken 11/18/2024 1600 by Wendy Roberts RN  Device Skin Pressure Protection:   tubing/devices free from skin contact   skin-to-device areas padded   pressure points protected   adhesive use limited   absorbent pad utilized/changed   positioning supports utilized  Taken 11/18/2024 1400 by Wendy Roberts RN  Device Skin Pressure Protection:   tubing/devices free from skin contact   pressure points protected   positioning supports utilized   adhesive use limited   absorbent pad utilized/changed  Taken 11/18/2024 1200 by Wendy Roberts RN  Device Skin Pressure Protection:   tubing/devices free from skin contact   pressure points protected   positioning supports utilized   adhesive use limited   absorbent pad utilized/changed  Taken 11/18/2024 1000 by Armando  Wendy ALVAREZ RN  Device Skin Pressure Protection:   tubing/devices free from skin contact   skin-to-device areas padded   pressure points protected   positioning supports utilized   adhesive use limited   absorbent pad utilized/changed  Taken 11/18/2024 0800 by Wendy Roberts RN  Device Skin Pressure Protection:   tubing/devices free from skin contact   skin-to-device areas padded   pressure points protected   positioning supports utilized   adhesive use limited   absorbent pad utilized/changed  Goal: Absence of Ventilator-Induced Lung Injury  Outcome: Progressing  Intervention: Prevent Ventilator-Associated Pneumonia  Recent Flowsheet Documentation  Taken 11/18/2024 1800 by Wendy Roberts RN  Head of Bed ([facility]) Positioning: HOB elevated  Taken 11/18/2024 1600 by Wendy Roberts RN  Head of Bed ([facility]) Positioning: [facility] elevated  Oral Care:   lip/mouth moisturizer applied   swabbed with antiseptic solution   teeth brushed - suction toothbrush   tongue brushed  Taken 11/18/2024 1400 by Wendy Roberts RN  Head of Bed ([facility]) Positioning: HOB elevated  Taken 11/18/2024 1200 by Wendy Roberts RN  Head of Bed ([facility]) Positioning: [facility] elevated  Oral Care:   lip/mouth moisturizer applied   swabbed with antiseptic solution   teeth brushed - suction toothbrush   tongue brushed  Taken 11/18/2024 1000 by Wendy Roberts RN  Head of Bed ([facility]) Positioning: HOB elevated  Taken 11/18/2024 0800 by Wendy Roberts RN  Head of Bed ([facility]) Positioning: [facility] elevated  Oral Care:   lip/mouth moisturizer applied   swabbed with antiseptic solution   teeth brushed - suction toothbrush   tongue brushed

## 2024-11-18 NOTE — CONSULTS
Clinical Nutrition     Patient Name: Marco Antonio Montemayor  YOB: 1945  MRN: [id number]  Date of Encounter: 11/18/24 13:43 EST  Admission date: 11/12/2024  Reason for Visit: Follow-up protocol, EN    Assessment   Nutrition Assessment   Admission Diagnosis:  Pneumonia due to COVID-19 virus [U07.1, J12.82]    Problem List:    Pneumonia due to COVID-19 virus    Severe obstructive sleep apnea    ESRD on HD T,R,S since 2022    Acute respiratory failure with hypoxia    Probable cardiac arrest versus severe bradycardia requiring CPR    Generalized seizure    Acute GI bleeding, possible      PMH:   He  has a past medical history of Abnormal ECG, Arrhythmia, Arthritis, Asthma, Cancer, Chronic diastolic congestive heart failure (09/14/2018), CKD (chronic kidney disease), Coronary artery disease, Dry skin, Dyslipidemia (07/07/2016), Edema, Generalized seizure (11/17/2024), Heart murmur, Hematoma, Hemodialysis access site with arteriovenous graft, History of transfusion, Hypertension, Iron deficiency anemia, Long term current use of anticoagulant, ZULEIKA treated with BiPAP, Persistent atrial fibrillation (07/07/2016), Tinnitus, and Wears glasses.    PSH:  He  has a past surgical history that includes Appendectomy; Cataract extraction; Skin cancer excision; Tonsillectomy; [place] tooth extraction; Colonoscopy; Esophagogastroduodenoscopy; Cardiac electrophysiology procedure (N/A, 05/18/2018); Ablation of dysrhythmic focus; arteriovenous fistula/shunt surgery (Left, 08/04/2022); arteriovenous fistula/shunt surgery (Left, 11/03/2022); Insert / replace / remove pacemaker (about 20 years back); and Vein Surgery (for dialysis).    Applicable Nutrition History:     ARF/VENT 11-16-24    SKIN: gluteal ulcer    Labs    Labs Reviewed: Yes    Results from last 7 days   Lab Units 11/18/24  0525 11/17/24  2318 11/17/24  0350 11/17/24  0031 11/16/24 2015   GLUCOSE mg/dL 144* 165* 172*  --  270*   BUN mg/dL 72* 71* 58*   --  45*   CREATININE mg/dL 7.01* 6.77* 5.50*  --  4.91*   SODIUM mmol/L 140 141 138  --  138   CHLORIDE mmol/L 99 99 97*  --  95*   POTASSIUM mmol/L 3.4* 3.4* 3.5  --  3.2*   PHOSPHORUS mg/dL 4.5 4.3  --   --  5.1*   MAGNESIUM mg/dL 2.6* 2.6* 2.8*  --  3.8*   ALT (SGPT) U/L 6 7  --   --  7   LACTATE mmol/L  --   --  1.8 4.1* 12.3*       Results from last 7 days   Lab Units 11/18/24  0525 11/17/24  2318 11/16/24 2058 11/16/24 2015   ALBUMIN g/dL 3.0* 3.1*  --  3.5   PREALBUMIN mg/dL 14.0*  --   --   --    CRP mg/dL 3.63*  --   --   --    IONIZED CALCIUM mmol/L  --   --  1.08*  --    CHOLESTEROL mg/dL 69  --   --   --    TRIGLYCERIDES mg/dL 230*  --   --   --        Results from last 7 days   Lab Units 11/18/24  1211 11/18/24  0608 11/17/24  2338 11/17/24  1742 11/17/24  1216 11/17/24  0546   GLUCOSE mg/dL 135* 146* 165* 182* 202* 179*     Lab Results   Lab Value Date/Time    HGBA1C 5.8 (A) 11/06/2024 1415    HGBA1C 6.1 (A) 08/05/2024 1402    HGBA1C 5.90 (H) 03/01/2024 0456    HGBA1C 5.20 08/02/2022 1405         Results from last 7 days   Lab Units 11/16/24 2015 11/16/24  0128 11/14/24  2133   PROBNP pg/mL 10,442.0* 14,237.0* 15,999.0*       Medications    Medications Reviewed: yes    Scheduled Meds:atorvastatin, 40 mg, Nasogastric, Nightly  [Held by provider] bumetanide, 2 mg, Oral, Once per day on Sunday Monday Wednesday Friday  [Held by provider] calcitriol, 0.25 mcg, Oral, Every Other Day  [Held by provider] carvedilol, 12.5 mg, Oral, BID  chlorhexidine, 15 mL, Mouth/Throat, Q12H  lactobacillus acidophilus, 1 capsule, Nasogastric, Daily  levETIRAcetam, 500 mg, Intravenous, Daily  midodrine, 10 mg, Nasogastric, Once per day on Tuesday Thursday Saturday  mupirocin, 1 Application, Each Nare, BID  piperacillin-tazobactam, 4.5 g, Intravenous, Q12H  [Held by provider] Sucroferric Oxyhydroxide, 3 tablet, Nasogastric, TID With Meals  [Held by provider] Sucroferric Oxyhydroxide, 1 tablet, Nasogastric, BID  [Held by  "provider] vitamin B-12, 1,000 mcg, Oral, Daily      Continuous Infusions:fentanyl 10 mcg/mL,  mcg/hr, Last Rate: 175 mcg/hr (11/18/24 1052)  midazolam, 1-10 mg/hr, Last Rate: 4 mg/hr (11/18/24 0904)  norepinephrine, 0.02-0.3 mcg/kg/min, Last Rate: 0.04 mcg/kg/min (11/18/24 1256)  pantoprazole, 8 mg/hr, Last Rate: 8 mg/hr (11/18/24 0850)  phenylephrine, 0.5-3 mcg/kg/min, Last Rate: Stopped (11/16/24 2151)      PRN Meds:.  acetaminophen **OR** acetaminophen **OR** acetaminophen    artificial tears    senna-docusate sodium **AND** polyethylene glycol **AND** [DISCONTINUED] bisacodyl **AND** bisacodyl    fentaNYL    ipratropium-albuterol    nitroglycerin    [DISCONTINUED] ondansetron ODT **OR** ondansetron    sodium chloride    Intake/Ouptut 24 hrs (0701 - 0700)   I&O's Reviewed: yes  Intake & Output (last day)         11/17 0701 11/18 0700 11/18 0701 11/19 0700    I.V. (mL/kg) 1295.7 (10.2) 204.9 (1.6)    Blood 297     Other 70 60    NG/GT 70     IV Piggyback 250 81.7    Total Intake(mL/kg) 1982.7 (15.6) 346.6 (2.7)    Emesis/NG output 150     Dialysis      Total Output 150     Net +1832.7 +346.6              'Last stool recorded x 3 on 11/14    Anthropometrics     Height: Height: 177.8 cm (70\")  Last Filed Weight: Weight: 127 kg (279 lb 15.8 oz) (11/17/24 1454)  Method: Weight Method: Bed scale  BMI: BMI (Calculated): 40.2    UBW: Per EMR wt of 276 lbs on 7/14/23, 286 lbs on 1/17/24 and 287 lbs on 11/6/24  Weight change: Bed Wt consistent w value from 1 yr ago If bed wt accurate loss of 8 lbs 3% body wt in 1 week. Possible significance for acute malnutrition dx.     Nutrition Focused Physical Exam     Date: 11/16    Unable to perform due to COVID Isolation      Subjective   Reported/Observed/Food/Nutrition Related History:     11-18: pt intubated, sedated, currently on HD   + fentanyl, versed, levophed 0.04mcg, protonix    Per RN: TF has been on hold since pt coded on Saturday, ogt to L[facility], has coffee ground " "output, plan for possible scope today      Order to begin EN.       Needs Assessment   Date: 24    Height used:Height: 177.8 cm (70\")  Weights used /ABW: 279lb/ 127kg  IBW: 166lb/ 75kg    Estimated Calorie needs: ~1800kcal  Method:  14Kcals/KG ABW:1778kcal  Method:  MSJ ABW: kcal  Method:  PSU  ABW: kcal    Estimated Protein needs: ~150g protein  Method: 2-2.5g protein per kg IBW: 150-188g protein  Method: 1.2g protein per kg ABW: 152g protein    Current Nutrition Prescription     PO: NPO Diet NPO Type: Tube Feeding  Oral Nutrition Supplement:  Intake: N/A    EN: [employer] Renal  Goal Rate: initial 30ml/hr  Water Flushes: 20ml/hr  Modular: None  Route: OG  Tube: Large bore    At goal over: 20Hrs/day     Rx will supply:   Goal Volume 600  mL/day     Flush Volume 400 mL/day     Energy 1200 Kcal/day 67 % Est Need   Protein 54 g/day 36 % Est Need   Fiber 0 g/day     Water in   mL     Total Water 832 mL     Meet DRI No        --------------------------------------------------------------------------  Product/Rate verified at bedside: No- (covid isolation)  Infusing Rate at time of visit: off    Average Delivery from [facility] Day:   Volume 0 mL/day 0  % Goal Vol.   Flush Volume  mL/day     Energy  Kcal/day  % Est Need   Protein  g/day  % Est Need   Fiber  g/day     Water in  EN  mL     Total Water  mL     Meet DRI N/A              Assessment & Plan   Nutrition Diagnosis   Date:  Updated:   Problem Inadequate energy intake    Etiology ARF/VENT   Signs/Symptoms NPO   Status: New       Goal:   Nutrition to support treatment and Adjust EN      Nutrition Intervention      Follow treatment progress, Care plan reviewed, Nutrition support order placed    K+ 3.4, hypokalemic,  Phos 4.5 wnl    Suspected GIB, plan for possible scope today, once able to resumed feeds, will adjust EN to better meets electrolyte, kcal and protein needs    TF recs: change to Peptamen AF at 25ml/hr, gradually " advance TF to goal rate @70ml/hr, Prosource 3x/day, free water @ 20ml/hr    TF at goal volume will provide 1400ml, 1860kcal,103% kcal needs,151g protein, 101% protein needs, 1534ml free water    Consider metabolic cart once TF at goal    Monitoring/Evaluation:   Per protocol, I&O, Pertinent labs, EN delivery/tolerance, Weight, Skin status, GI status, Symptoms, Hemodynamic stability    Camryn Mendoza, GREGOR  Time Spent:45min

## 2024-11-18 NOTE — PLAN OF CARE
Goal Outcome Evaluation:      Able to wean tidal volume overnight, pt continuing to progress.

## 2024-11-18 NOTE — CASE MANAGEMENT/SOCIAL WORK
Continued Stay Note   Sivakumar     Patient Name: Marco Antonio Montemayor  MRN: [id number]  Today's Date: 11/18/2024    Admit Date: 11/12/2024    Plan: TBD   Discharge Plan       Row Name 11/18/24 1317       Plan    Plan TBD    Patient/Family in Agreement with Plan other (see comments)    Plan Comments Discussed in MDR, patient transferred to unit post RRT on 11/15.Patient @ baseline is on HD. He is currently intubated/sedated. Nephrology, neurology following. D/C TBD @ this time. CM will continue to follow.                   Discharge Codes    No documentation.                 Expected Discharge Date and Time       Expected Discharge Date Expected Discharge Time    Nov 18, 2024               Grey Meza RN

## 2024-11-18 NOTE — PLAN OF CARE
Problem: Adult Inpatient Plan of Care  Goal: Plan of Care Review  Outcome: Not Progressing  Flowsheets (Taken 11/18/2024 0644)  Progress: no change  Outcome Evaluation:   Patient remains sedated and intubated. Patient on 4 of versed, and 150 of fentanyl.   Patient arouses, follows commands and then rests well between care. Patient's BP continues to be labile tonight with BPs as high as 200s systolic and as low as 60s systolic. Levo titrated appropriately and now is running at 0.03. Protonix gtt continues. Bicarb gtt remains off. ABG improving see labs. Potassium replaced once tonight, awaiting morning lab results. Patient's daughter updated at bedside. Patient received bath tonight and tolerated well. EEG continues at this time and will complete this morning. Patient received one unit PRBCs tonight, Hgb now 7.7 on morning labs. Continue to monitor closely, continue with current plan of care.

## 2024-11-18 NOTE — PLAN OF CARE
Goal Outcome Evaluation:  Plan of Care Reviewed With: patient, child      -Pt responds to minimal stimulation; does not consistently follow commands.   -Peep 5, FIO2 35%  -Currently Fent 150, Versed 4, Levo .02, protonix gtt per GI recs.   -Slight reddening of gastric contents noted around 0830. HGB 6.6 at 1830; plan to transfuse 1u PRBC.   -Continuous EEG ongoing; intermittent tongue twitching noted; neuro notified. Keppra 500 daily started.   -Plan for HD tomorrow  -GI to reeval for possible scope in am   -BUE restraints in place for safety      Problem: Adult Inpatient Plan of Care  Goal: Plan of Care Review  Outcome: Progressing  Flowsheets (Taken 11/17/2024 1938)  Plan of Care Reviewed With:   patient   child  Goal: Patient-Specific Goal (Individualized)  Outcome: Progressing  Goal: Absence of Hospital-Acquired Illness or Injury  Outcome: Progressing  Intervention: Identify and Manage Fall Risk  Recent Flowsheet Documentation  Taken 11/17/2024 1800 by Wendy Roberts RN  Safety Promotion/Fall Prevention:   activity supervised   assistive device/personal items within reach   clutter free environment maintained   fall prevention program maintained   lighting adjusted   toileting scheduled   safety round/check completed   room organization consistent  Taken 11/17/2024 1600 by Wendy oRberts, RN  Safety Promotion/Fall Prevention:   activity supervised   assistive device/personal items within reach   clutter free environment maintained   fall prevention program maintained   lighting adjusted   toileting scheduled   safety round/check completed   room organization consistent  Taken 11/17/2024 1400 by Wendy Roberts, RN  Safety Promotion/Fall Prevention:   activity supervised   assistive device/personal items within reach   clutter free environment maintained   fall prevention program maintained   lighting adjusted   toileting scheduled   safety round/check completed   room organization  consistent  Taken 11/17/2024 1200 by Wendy Roberts RN  Safety Promotion/Fall Prevention:   activity supervised   clutter free environment maintained   assistive device/personal items within reach   fall prevention program maintained   lighting adjusted   toileting scheduled   safety round/check completed   room organization consistent  Taken 11/17/2024 1000 by Wendy Roberts RN  Safety Promotion/Fall Prevention:   activity supervised   assistive device/personal items within reach   clutter free environment maintained   fall prevention program maintained   lighting adjusted   toileting scheduled   safety round/check completed   room organization consistent  Taken 11/17/2024 0800 by Wendy Roberts RN  Safety Promotion/Fall Prevention:   activity supervised   assistive device/personal items within reach   clutter free environment maintained   fall prevention program maintained   lighting adjusted   toileting scheduled   safety round/check completed   room organization consistent  Intervention: Prevent Skin Injury  Recent Flowsheet Documentation  Taken 11/17/2024 1800 by Wendy Roberts RN  Body Position:   left   tilted  Skin Protection:   incontinence pads utilized   silicone foam dressing in place  Taken 11/17/2024 1600 by Wendy Roberts RN  Body Position:   turned   supine  Taken 11/17/2024 1400 by Wendy Roberts RN  Body Position:   right   turned  Skin Protection:   incontinence pads utilized   silicone foam dressing in place  Taken 11/17/2024 1200 by Wendy Roberts RN  Body Position:   left   tilted  Skin Protection:   incontinence pads utilized   silicone foam dressing in place  Taken 11/17/2024 1000 by Wendy Roberts RN  Body Position:   turned   supine  Taken 11/17/2024 0800 by Wendy Roberts RN  Body Position:   left   tilted   unstable with turn  Skin Protection:   incontinence pads utilized   silicone foam dressing in place  Intervention: Prevent  Infection  Recent Flowsheet Documentation  Taken 11/17/2024 1800 by Wendy Roberts RN  Infection Prevention:   environmental surveillance performed   equipment surfaces disinfected   hand hygiene promoted   personal protective equipment utilized   rest/sleep promoted   single patient room provided   visitors restricted/screened  Taken 11/17/2024 1600 by Wendy Roberts RN  Infection Prevention:   environmental surveillance performed   equipment surfaces disinfected   hand hygiene promoted   personal protective equipment utilized   rest/sleep promoted   single patient room provided   visitors restricted/screened  Taken 11/17/2024 1400 by Wendy Roberts RN  Infection Prevention:   environmental surveillance performed   equipment surfaces disinfected   hand hygiene promoted   personal protective equipment utilized   rest/sleep promoted   single patient room provided   visitors restricted/screened  Taken 11/17/2024 1200 by Wendy Roberts RN  Infection Prevention:   environmental surveillance performed   equipment surfaces disinfected   hand hygiene promoted   personal protective equipment utilized   rest/sleep promoted   single patient room provided   visitors restricted/screened  Taken 11/17/2024 1000 by Wendy Roberts RN  Infection Prevention:   environmental surveillance performed   equipment surfaces disinfected   hand hygiene promoted   personal protective equipment utilized   rest/sleep promoted   single patient room provided   visitors restricted/screened  Taken 11/17/2024 0800 by Wendy Roberts RN  Infection Prevention:   environmental surveillance performed   equipment surfaces disinfected   hand hygiene promoted   personal protective equipment utilized   rest/sleep promoted   single patient room provided   visitors restricted/screened  Goal: Optimal Comfort and Wellbeing  Outcome: Progressing  Intervention: Monitor Pain and Promote Comfort  Recent Flowsheet  Documentation  Taken 11/17/2024 1600 by Wendy Roberts RN  Pain Management Interventions:   pain medication given   quiet environment facilitated   care clustered  Intervention: Provide Person-Centered Care  Recent Flowsheet Documentation  Taken 11/17/2024 1800 by Wendy Roberts RN  Trust Relationship/Rapport:   care explained   thoughts/feelings acknowledged  Taken 11/17/2024 1600 by Wendy Roberts RN  Trust Relationship/Rapport:   care explained   reassurance provided  Taken 11/17/2024 1400 by Wendy Roberts RN  Trust Relationship/Rapport:   care explained   reassurance provided  Taken 11/17/2024 1239 by Wendy Roberts RN  Trust Relationship/Rapport:   care explained   reassurance provided  Taken 11/17/2024 1000 by Wendy Roberts RN  Trust Relationship/Rapport: care explained  Taken 11/17/2024 0800 by Wendy Roberts RN  Trust Relationship/Rapport:   reassurance provided   care explained  Goal: Readiness for Transition of Care  Outcome: Progressing  Goal: Plan of Care Review  Outcome: Progressing  Flowsheets (Taken 11/17/2024 1938)  Plan of Care Reviewed With:   patient   child  Goal: Patient-Specific Goal (Individualized)  Outcome: Progressing  Goal: Absence of Hospital-Acquired Illness or Injury  Outcome: Progressing  Intervention: Identify and Manage Fall Risk  Recent Flowsheet Documentation  Taken 11/17/2024 1800 by Wendy Roberts RN  Safety Promotion/Fall Prevention:   activity supervised   assistive device/personal items within reach   clutter free environment maintained   fall prevention program maintained   lighting adjusted   toileting scheduled   safety round/check completed   room organization consistent  Taken 11/17/2024 1600 by Wendy Roberts RN  Safety Promotion/Fall Prevention:   activity supervised   assistive device/personal items within reach   clutter free environment maintained   fall prevention program maintained   lighting adjusted    toileting scheduled   safety round/check completed   room organization consistent  Taken 11/17/2024 1400 by Wendy Roberts RN  Safety Promotion/Fall Prevention:   activity supervised   assistive device/personal items within reach   clutter free environment maintained   fall prevention program maintained   lighting adjusted   toileting scheduled   safety round/check completed   room organization consistent  Taken 11/17/2024 1200 by Wendy Robrets RN  Safety Promotion/Fall Prevention:   activity supervised   clutter free environment maintained   assistive device/personal items within reach   fall prevention program maintained   lighting adjusted   toileting scheduled   safety round/check completed   room organization consistent  Taken 11/17/2024 1000 by Wendy Roberts RN  Safety Promotion/Fall Prevention:   activity supervised   assistive device/personal items within reach   clutter free environment maintained   fall prevention program maintained   lighting adjusted   toileting scheduled   safety round/check completed   room organization consistent  Taken 11/17/2024 0800 by Wendy Roberts RN  Safety Promotion/Fall Prevention:   activity supervised   assistive device/personal items within reach   clutter free environment maintained   fall prevention program maintained   lighting adjusted   toileting scheduled   safety round/check completed   room organization consistent  Intervention: Prevent Skin Injury  Recent Flowsheet Documentation  Taken 11/17/2024 1800 by Wendy Roberts RN  Body Position:   left   tilted  Skin Protection:   incontinence pads utilized   silicone foam dressing in place  Taken 11/17/2024 1600 by Wendy Roberts RN  Body Position:   turned   supine  Taken 11/17/2024 1400 by Wendy Roberts RN  Body Position:   right   turned  Skin Protection:   incontinence pads utilized   silicone foam dressing in place  Taken 11/17/2024 1200 by Wendy Roberts RN  Body  Position:   left   tilted  Skin Protection:   incontinence pads utilized   silicone foam dressing in place  Taken 11/17/2024 1000 by Wendy Roberts RN  Body Position:   turned   supine  Taken 11/17/2024 0800 by Wendy Roberts RN  Body Position:   left   tilted   unstable with turn  Skin Protection:   incontinence pads utilized   silicone foam dressing in place  Intervention: Prevent Infection  Recent Flowsheet Documentation  Taken 11/17/2024 1800 by Wendy Roberts RN  Infection Prevention:   environmental surveillance performed   equipment surfaces disinfected   hand hygiene promoted   personal protective equipment utilized   rest/sleep promoted   single patient room provided   visitors restricted/screened  Taken 11/17/2024 1600 by Wendy Roberts RN  Infection Prevention:   environmental surveillance performed   equipment surfaces disinfected   hand hygiene promoted   personal protective equipment utilized   rest/sleep promoted   single patient room provided   visitors restricted/screened  Taken 11/17/2024 1400 by Wendy Roberts RN  Infection Prevention:   environmental surveillance performed   equipment surfaces disinfected   hand hygiene promoted   personal protective equipment utilized   rest/sleep promoted   single patient room provided   visitors restricted/screened  Taken 11/17/2024 1200 by Wendy Roberts RN  Infection Prevention:   environmental surveillance performed   equipment surfaces disinfected   hand hygiene promoted   personal protective equipment utilized   rest/sleep promoted   single patient room provided   visitors restricted/screened  Taken 11/17/2024 1000 by Wendy Roberts RN  Infection Prevention:   environmental surveillance performed   equipment surfaces disinfected   hand hygiene promoted   personal protective equipment utilized   rest/sleep promoted   single patient room provided   visitors restricted/screened  Taken 11/17/2024 0800 by Armando  Wendy ALVAREZ RN  Infection Prevention:   environmental surveillance performed   equipment surfaces disinfected   hand hygiene promoted   personal protective equipment utilized   rest/sleep promoted   single patient room provided   visitors restricted/screened  Goal: Optimal Comfort and Wellbeing  Outcome: Progressing  Intervention: Monitor Pain and Promote Comfort  Recent Flowsheet Documentation  Taken 11/17/2024 1600 by Wendy Roberts RN  Pain Management Interventions:   pain medication given   quiet environment facilitated   care clustered  Intervention: Provide Person-Centered Care  Recent Flowsheet Documentation  Taken 11/17/2024 1800 by Wendy Roberts RN  Trust Relationship/Rapport:   care explained   thoughts/feelings acknowledged  Taken 11/17/2024 1600 by Wendy Roberts RN  Trust Relationship/Rapport:   care explained   reassurance provided  Taken 11/17/2024 1400 by Wendy Roberts RN  Trust Relationship/Rapport:   care explained   reassurance provided  Taken 11/17/2024 1239 by Wendy Roberts RN  Trust Relationship/Rapport:   care explained   reassurance provided  Taken 11/17/2024 1000 by Wendy Roberts RN  Trust Relationship/Rapport: care explained  Taken 11/17/2024 0800 by Wendy Roberts RN  Trust Relationship/Rapport:   reassurance provided   care explained  Goal: Readiness for Transition of Care  Outcome: Progressing     Problem: Fall Injury Risk  Goal: Absence of Fall and Fall-Related Injury  Outcome: Progressing  Intervention: Identify and Manage Contributors  Recent Flowsheet Documentation  Taken 11/17/2024 1800 by Wendy Roberts RN  Medication Review/Management: medications reviewed  Taken 11/17/2024 1600 by Wendy Roberts RN  Medication Review/Management: medications reviewed  Taken 11/17/2024 1400 by Wendy Roberts RN  Medication Review/Management: medications reviewed  Taken 11/17/2024 1200 by Wendy Roberts RN  Medication  Review/Management: medications reviewed  Taken 11/17/2024 1000 by Wendy Roberts RN  Medication Review/Management: medications reviewed  Taken 11/17/2024 0800 by Wendy Roberts RN  Medication Review/Management: medications reviewed  Intervention: Promote Injury-Free Environment  Recent Flowsheet Documentation  Taken 11/17/2024 1800 by Wendy Roberts RN  Safety Promotion/Fall Prevention:   activity supervised   assistive device/personal items within reach   clutter free environment maintained   fall prevention program maintained   lighting adjusted   toileting scheduled   safety round/check completed   room organization consistent  Taken 11/17/2024 1600 by Wendy Roberts RN  Safety Promotion/Fall Prevention:   activity supervised   assistive device/personal items within reach   clutter free environment maintained   fall prevention program maintained   lighting adjusted   toileting scheduled   safety round/check completed   room organization consistent  Taken 11/17/2024 1400 by Wendy Roberts RN  Safety Promotion/Fall Prevention:   activity supervised   assistive device/personal items within reach   clutter free environment maintained   fall prevention program maintained   lighting adjusted   toileting scheduled   safety round/check completed   room organization consistent  Taken 11/17/2024 1200 by Wendy Roberts RN  Safety Promotion/Fall Prevention:   activity supervised   clutter free environment maintained   assistive device/personal items within reach   fall prevention program maintained   lighting adjusted   toileting scheduled   safety round/check completed   room organization consistent  Taken 11/17/2024 1000 by Wendy Roberts RN  Safety Promotion/Fall Prevention:   activity supervised   assistive device/personal items within reach   clutter free environment maintained   fall prevention program maintained   lighting adjusted   toileting scheduled   safety round/check  completed   room organization consistent  Taken 11/17/2024 0800 by Wendy Roberts RN  Safety Promotion/Fall Prevention:   activity supervised   assistive device/personal items within reach   clutter free environment maintained   fall prevention program maintained   lighting adjusted   toileting scheduled   safety round/check completed   room organization consistent     Problem: Hemodialysis  Goal: Safe, Effective Therapy Delivery  Outcome: Progressing  Intervention: Optimize Device Care and Function  Recent Flowsheet Documentation  Taken 11/17/2024 1800 by Wendy Roberts RN  Medication Review/Management: medications reviewed  Taken 11/17/2024 1600 by Wendy Roberts RN  Medication Review/Management: medications reviewed  Taken 11/17/2024 1400 by Wendy Roberts RN  Medication Review/Management: medications reviewed  Taken 11/17/2024 1200 by Wendy Roberts RN  Medication Review/Management: medications reviewed  Taken 11/17/2024 1000 by Wendy Roberts RN  Medication Review/Management: medications reviewed  Taken 11/17/2024 0800 by Wendy Roberts RN  Medication Review/Management: medications reviewed  Goal: Effective Tissue Perfusion  Outcome: Progressing  Goal: Absence of Infection Signs and Symptoms  Outcome: Progressing  Intervention: Prevent or Manage Infection  Recent Flowsheet Documentation  Taken 11/17/2024 1800 by Wendy Roberts RN  Infection Prevention:   environmental surveillance performed   equipment surfaces disinfected   hand hygiene promoted   personal protective equipment utilized   rest/sleep promoted   single patient room provided   visitors restricted/screened  Taken 11/17/2024 1600 by Wendy Roberts RN  Infection Prevention:   environmental surveillance performed   equipment surfaces disinfected   hand hygiene promoted   personal protective equipment utilized   rest/sleep promoted   single patient room provided   visitors restricted/screened  Taken  11/17/2024 1400 by Wendy Roberts RN  Infection Prevention:   environmental surveillance performed   equipment surfaces disinfected   hand hygiene promoted   personal protective equipment utilized   rest/sleep promoted   single patient room provided   visitors restricted/screened  Taken 11/17/2024 1200 by Wendy Roberts RN  Infection Prevention:   environmental surveillance performed   equipment surfaces disinfected   hand hygiene promoted   personal protective equipment utilized   rest/sleep promoted   single patient room provided   visitors restricted/screened  Taken 11/17/2024 1000 by Wendy Roberts RN  Infection Prevention:   environmental surveillance performed   equipment surfaces disinfected   hand hygiene promoted   personal protective equipment utilized   rest/sleep promoted   single patient room provided   visitors restricted/screened  Taken 11/17/2024 0800 by Wendy Roberts RN  Infection Prevention:   environmental surveillance performed   equipment surfaces disinfected   hand hygiene promoted   personal protective equipment utilized   rest/sleep promoted   single patient room provided   visitors restricted/screened     Problem: Noninvasive Ventilation Acute  Goal: Effective Unassisted Ventilation and Oxygenation  Outcome: Progressing  Intervention: Monitor and Manage Noninvasive Ventilation  Recent Flowsheet Documentation  Taken 11/17/2024 1200 by Wendy Roberts RN  Airway/Ventilation Management: airway patency maintained  Taken 11/17/2024 0800 by Wendy Roberts RN  Airway/Ventilation Management:   airway patency maintained   pulmonary hygiene promoted     Problem: Skin Injury Risk Increased  Goal: Skin Health and Integrity  Outcome: Progressing  Intervention: Optimize Skin Protection  Recent Flowsheet Documentation  Taken 11/17/2024 1800 by Wendy Roberts RN  Activity Management: bedrest  Pressure Reduction Techniques:   weight shift assistance provided    pressure points protected   heels elevated off bed  Head of Bed (HOB) Positioning: HOB elevated  Pressure Reduction Devices:   specialty bed utilized   pressure-redistributing mattress utilized   alternating pressure pump (JUWAN)  Skin Protection:   incontinence pads utilized   silicone foam dressing in place  Taken 11/17/2024 1600 by Wendy Roberts RN  Activity Management: bedrest  Head of Bed (HOB) Positioning: HOB elevated  Taken 11/17/2024 1400 by Wendy Roberts RN  Activity Management: bedrest  Pressure Reduction Techniques:   weight shift assistance provided   pressure points protected   heels elevated off bed  Head of Bed (HOB) Positioning: HOB elevated  Pressure Reduction Devices:   specialty bed utilized   pressure-redistributing mattress utilized   alternating pressure pump (JUWAN)  Skin Protection:   incontinence pads utilized   silicone foam dressing in place  Taken 11/17/2024 1200 by Wendy Roberts RN  Activity Management: bedrest  Pressure Reduction Techniques:   weight shift assistance provided   pressure points protected   heels elevated off bed  Head of Bed (HOB) Positioning: [facility] elevated  Pressure Reduction Devices:   specialty bed utilized   pressure-redistributing mattress utilized   alternating pressure pump (JUWAN)  Skin Protection:   incontinence pads utilized   silicone foam dressing in place  Taken 11/17/2024 1000 by Wendy Roberts RN  Activity Management: bedrest  Head of Bed (HOB) Positioning: HOB elevated  Taken 11/17/2024 0800 by Wendy Roberts RN  Activity Management: bedrest  Pressure Reduction Techniques:   weight shift assistance provided   pressure points protected   heels elevated off bed  Head of Bed (HOB) Positioning: [facility] elevated  Pressure Reduction Devices:   specialty bed utilized   pressure-redistributing mattress utilized   alternating pressure pump (JUWAN)  Skin Protection:   incontinence pads utilized   silicone foam dressing in place     Problem:  Sepsis/Septic Shock  Goal: Optimal Coping  Outcome: Progressing  Intervention: Support Patient and Family Response  Recent Flowsheet Documentation  Taken 11/17/2024 1800 by Wendy Roberts RN  Family/Support System Care:   support provided   self-care encouraged  Taken 11/17/2024 1400 by Wendy Roberts RN  Family/Support System Care:   support provided   self-care encouraged  Taken 11/17/2024 1200 by Wendy Roberts RN  Family/Support System Care:   support provided   self-care encouraged  Taken 11/17/2024 0800 by Wendy Roberts RN  Family/Support System Care:   support provided   self-care encouraged  Goal: Absence of Bleeding  Outcome: Progressing  Goal: Blood Glucose Level Within Target Range  Outcome: Progressing  Goal: Absence of Infection Signs and Symptoms  Outcome: Progressing  Intervention: Initiate Sepsis Management  Recent Flowsheet Documentation  Taken 11/17/2024 1800 by Wendy Roberts RN  Infection Prevention:   environmental surveillance performed   equipment surfaces disinfected   hand hygiene promoted   personal protective equipment utilized   rest/sleep promoted   single patient room provided   visitors restricted/screened  Isolation Precautions:   precautions maintained   airborne   contact  Taken 11/17/2024 1600 by Wendy Roberts RN  Infection Prevention:   environmental surveillance performed   equipment surfaces disinfected   hand hygiene promoted   personal protective equipment utilized   rest/sleep promoted   single patient room provided   visitors restricted/screened  Isolation Precautions:   precautions maintained   airborne   contact  Taken 11/17/2024 1400 by Wendy Roberts RN  Infection Prevention:   environmental surveillance performed   equipment surfaces disinfected   hand hygiene promoted   personal protective equipment utilized   rest/sleep promoted   single patient room provided   visitors restricted/screened  Isolation Precautions:    precautions maintained   airborne   contact  Taken 11/17/2024 1200 by Wendy Roberts RN  Infection Prevention:   environmental surveillance performed   equipment surfaces disinfected   hand hygiene promoted   personal protective equipment utilized   rest/sleep promoted   single patient room provided   visitors restricted/screened  Isolation Precautions:   precautions maintained   airborne   contact  Taken 11/17/2024 1000 by Wendy Roberts RN  Infection Prevention:   environmental surveillance performed   equipment surfaces disinfected   hand hygiene promoted   personal protective equipment utilized   rest/sleep promoted   single patient room provided   visitors restricted/screened  Isolation Precautions:   precautions maintained   airborne   contact  Taken 11/17/2024 0800 by Wendy Roberts RN  Infection Prevention:   environmental surveillance performed   equipment surfaces disinfected   hand hygiene promoted   personal protective equipment utilized   rest/sleep promoted   single patient room provided   visitors restricted/screened  Isolation Precautions:   precautions maintained   airborne   contact  Intervention: Promote Recovery  Recent Flowsheet Documentation  Taken 11/17/2024 1800 by Wendy Roberts RN  Activity Management: bedrest  Taken 11/17/2024 1600 by Wendy Roberts RN  Activity Management: bedrest  Taken 11/17/2024 1400 by Wendy Roberts RN  Activity Management: bedrest  Taken 11/17/2024 1200 by Wendy Roberts RN  Activity Management: bedrest  Airway/Ventilation Management: airway patency maintained  Taken 11/17/2024 1000 by Wendy Roberts RN  Activity Management: bedrest  Taken 11/17/2024 0800 by Wendy Roberts RN  Activity Management: bedrest  Airway/Ventilation Management:   airway patency maintained   pulmonary hygiene promoted  Goal: Optimal Nutrition Delivery  Outcome: Progressing     Problem: Restraint, Nonviolent  Goal: Absence of Harm or  Injury  Outcome: Progressing  Intervention: Implement Least Restrictive Safety Strategies  Recent Flowsheet Documentation  Taken 11/17/2024 1800 by Wendy Roberts RN  Medical Device Protection:   IV pole/bag removed from visual field   tubing secured  Diversional Activities: television  Taken 11/17/2024 1600 by Wendy Roberts RN  Medical Device Protection:   IV pole/bag removed from visual field   tubing secured  Diversional Activities: television  Taken 11/17/2024 1400 by Wendy Roberts RN  Medical Device Protection:   IV pole/bag removed from visual field   tubing secured  Diversional Activities: television  Taken 11/17/2024 1200 by Wendy Roberts RN  Medical Device Protection:   IV pole/bag removed from visual field   tubing secured  Diversional Activities: television  Taken 11/17/2024 1000 by Wendy Roberts RN  Medical Device Protection:   IV pole/bag removed from visual field   tubing secured  Diversional Activities: television  Taken 11/17/2024 0800 by Wendy Roberts RN  Medical Device Protection:   tubing secured   IV pole/bag removed from visual field  Diversional Activities: television  Intervention: Protect Dignity, Rights and Personal Wellbeing  Recent Flowsheet Documentation  Taken 11/17/2024 1800 by Wendy Roberts RN  Trust Relationship/Rapport:   care explained   thoughts/feelings acknowledged  Taken 11/17/2024 1600 by Wendy Roberts RN  Trust Relationship/Rapport:   care explained   reassurance provided  Taken 11/17/2024 1400 by Wendy Roberts RN  Trust Relationship/Rapport:   care explained   reassurance provided  Taken 11/17/2024 1239 by Wendy Roberts RN  Trust Relationship/Rapport:   care explained   reassurance provided  Taken 11/17/2024 1000 by Wendy Roberts RN  Trust Relationship/Rapport: care explained  Taken 11/17/2024 0800 by Wendy Roberts RN  Trust Relationship/Rapport:   reassurance provided   care  explained  Intervention: Protect Skin and Joint Integrity  Recent Flowsheet Documentation  Taken 11/17/2024 1800 by Wendy Roberts RN  Body Position:   left   tilted  Skin Protection:   incontinence pads utilized   silicone foam dressing in place  Taken 11/17/2024 1600 by eWndy Roberts RN  Body Position:   turned   supine  Taken 11/17/2024 1400 by Wendy Roberts RN  Body Position:   right   turned  Skin Protection:   incontinence pads utilized   silicone foam dressing in place  Taken 11/17/2024 1200 by Wendy Roberts RN  Body Position:   left   tilted  Skin Protection:   incontinence pads utilized   silicone foam dressing in place  Taken 11/17/2024 1000 by Wendy Roberts RN  Body Position:   turned   supine  Taken 11/17/2024 0800 by Wendy Roberts RN  Body Position:   left   tilted   unstable with turn  Skin Protection:   incontinence pads utilized   silicone foam dressing in place     Problem: Mechanical Ventilation Invasive  Goal: Effective Communication  Outcome: Progressing  Intervention: Ensure Effective Communication  Recent Flowsheet Documentation  Taken 11/17/2024 1800 by Wendy Roberts RN  Trust Relationship/Rapport:   care explained   thoughts/feelings acknowledged  Diversional Activities: television  Family/Support System Care:   support provided   self-care encouraged  Taken 11/17/2024 1600 by Wendy Roberts RN  Trust Relationship/Rapport:   care explained   reassurance provided  Diversional Activities: television  Taken 11/17/2024 1400 by Wendy Roberts RN  Trust Relationship/Rapport:   care explained   reassurance provided  Diversional Activities: television  Family/Support System Care:   support provided   self-care encouraged  Taken 11/17/2024 1239 by Wendy Roberts RN  Trust Relationship/Rapport:   care explained   reassurance provided  Taken 11/17/2024 1200 by Wendy Roberts RN  Diversional Activities: television  Family/Support  System Care:   support provided   self-care encouraged  Taken 11/17/2024 1000 by Wendy Roberts RN  Trust Relationship/Rapport: care explained  Diversional Activities: television  Taken 11/17/2024 0800 by Wendy Roberts RN  Trust Relationship/Rapport:   reassurance provided   care explained  Diversional Activities: television  Family/Support System Care:   support provided   self-care encouraged  Goal: Optimal Device Function  Outcome: Progressing  Intervention: Optimize Device Care and Function  Recent Flowsheet Documentation  Taken 11/17/2024 1800 by Wendy Roberts RN  Airway Safety Measures: manual resuscitator/mask at bedside  Taken 11/17/2024 1600 by Wendy Roberts RN  Airway Safety Measures: manual resuscitator/mask at bedside  Taken 11/17/2024 1400 by Wendy Roberts RN  Airway Safety Measures: manual resuscitator/mask at bedside  Taken 11/17/2024 1200 by Wendy Roberts RN  Airway/Ventilation Management: airway patency maintained  Oral Care:   lip/mouth moisturizer applied   swabbed with antiseptic solution   teeth brushed - suction toothbrush   tongue brushed  Airway Safety Measures: manual resuscitator/mask at bedside  Taken 11/17/2024 1000 by Wendy Roberts RN  Airway Safety Measures: manual resuscitator/mask at bedside  Taken 11/17/2024 0800 by Wendy Roberts RN  Airway/Ventilation Management:   airway patency maintained   pulmonary hygiene promoted  Oral Care:   lip/mouth moisturizer applied   swabbed with antiseptic solution   teeth brushed - suction toothbrush   tongue brushed  Airway Safety Measures: mask valve resuscitator at bedside  Goal: Mechanical Ventilation Liberation  Outcome: Progressing  Intervention: Promote Extubation and Mechanical Ventilation Liberation  Recent Flowsheet Documentation  Taken 11/17/2024 1800 by Wendy Roberts RN  Medication Review/Management: medications reviewed  Taken 11/17/2024 1600 by Wendy Roberts  RN  Medication Review/Management: medications reviewed  Taken 11/17/2024 1400 by Wendy Roberts RN  Medication Review/Management: medications reviewed  Taken 11/17/2024 1200 by Wendy Roberts RN  Medication Review/Management: medications reviewed  Taken 11/17/2024 1000 by Wendy Roberts RN  Medication Review/Management: medications reviewed  Taken 11/17/2024 0800 by Wendy Roberts RN  Medication Review/Management: medications reviewed  Goal: Optimal Nutrition Delivery  Outcome: Progressing  Goal: Absence of Device-Related Skin and Tissue Injury  Outcome: Progressing  Intervention: Maintain Skin and Tissue Health  Recent Flowsheet Documentation  Taken 11/17/2024 1800 by Wendy Roberts RN  Device Skin Pressure Protection:   tubing/devices free from skin contact   skin-to-device areas padded   pressure points protected   positioning supports utilized   adhesive use limited   absorbent pad utilized/changed  Taken 11/17/2024 1400 by Wendy Roberts RN  Device Skin Pressure Protection:   tubing/devices free from skin contact   skin-to-device areas padded   pressure points protected   positioning supports utilized   adhesive use limited   absorbent pad utilized/changed  Taken 11/17/2024 1200 by Wendy Roberts RN  Device Skin Pressure Protection:   tubing/devices free from skin contact   skin-to-device areas padded   pressure points protected   positioning supports utilized   adhesive use limited   absorbent pad utilized/changed  Taken 11/17/2024 0800 by Wendy Roberts RN  Device Skin Pressure Protection:   absorbent pad utilized/changed   adhesive use limited   positioning supports utilized   pressure points protected   tubing/devices free from skin contact   skin-to-device areas padded  Goal: Absence of Ventilator-Induced Lung Injury  Outcome: Progressing  Intervention: Prevent Ventilator-Associated Pneumonia  Recent Flowsheet Documentation  Taken 11/17/2024 1800 by Armando  Wendy ALVAREZ RN  Head of Bed ([facility]) Positioning: HOB elevated  Taken 11/17/2024 1600 by Wendy Roberts RN  Head of Bed ([facility]) Positioning: HOB elevated  Taken 11/17/2024 1400 by Wendy Roberts RN  Head of Bed ([facility]) Positioning: HOB elevated  Taken 11/17/2024 1200 by Wendy Roberts RN  Head of Bed ([facility]) Positioning: HOB elevated  Oral Care:   lip/mouth moisturizer applied   swabbed with antiseptic solution   teeth brushed - suction toothbrush   tongue brushed  Taken 11/17/2024 1000 by Wendy Roberts RN  Head of Bed ([facility]) Positioning: HOB elevated  Taken 11/17/2024 0800 by Wendy Roberts RN  Head of Bed ([facility]) Positioning: HOB elevated  VAP Prevention Measures: completed  Oral Care:   lip/mouth moisturizer applied   swabbed with antiseptic solution   teeth brushed - suction toothbrush   tongue brushed

## 2024-11-18 NOTE — PROGRESS NOTES
Intensive Care Follow-up     Hospital:  LOS: 6 days   Mr. Marco Antonio Montemayor, 79 y.o. male is followed for:   Pneumonia due to COVID-19 virus        History of present illness:   79-year-old male with coronary disease, dyslipidemia, chronic kidney disease on hemodialysis, chronic anemia, hypertension, ZULEIKA on BiPAP, atrial fibrillation, heart failure with preserved ejection fraction.  Patient presented to the hospital with increased work of breathing and hypoxemic respiratory failure.  Patient was found to have COVID-19 pneumonia.  CTA chest on admission did not show any pulmonary embolism.  Patchy area of groundglass opacities noted in both lungs.  Patient was started on ceftriaxone and doxycycline.  Patient was also given Decadron and remdesivir.  Patient started developing hypotension.  Was given fluid resuscitation and midodrine with improvement in blood pressure.  Patient was thought to be having a GI bleed so GI consultation was obtained.  Patient however declined from respiratory standpoint and was becoming more delirious so was transferred to intensive care unit.  Patient was initially supported with BiPAP but eventually needed to be intubated and placed on mechanical ventilation.  Patient required pressors to maintain hemodynamics.  On admission to ICU patient had severe bradycardia and had 2 episodes of possible cardiac arrest requiring CPR for short duration of time.  Unclear if it was PEA or chest severe bradycardia.  Precedex was held.  Patient apparently was being suctioned at that time so was thought to have vasovagal response.  Patient was also noted to have generalized tonic-clonic seizure.  Neurology consultation was obtained and EEG was obtained.  EEG did not show any ongoing seizure activity however patient was started on Keppra.  Patient however was able to follow commands post cardiac arrest.      Subjective   Interval History:  Overnight no acute events.  Patient continues to have some  coffee-ground emesis and yesterday aria bleeding from NG tube for 20 mL.  Hemoglobin has been stable however.  Remains on Protonix.  Patient is waking up on stimulation and try to follow some commands but not very consistently.  Gets very agitated on sedation medication.                 The patient's past medical, surgical and social history were reviewed and updated in Epic as appropriate.       Objective     Infusions:  fentanyl 10 mcg/mL,  mcg/hr, Last Rate: 175 mcg/hr (11/18/24 1052)  midazolam, 1-10 mg/hr, Last Rate: 4 mg/hr (11/18/24 0904)  norepinephrine, 0.02-0.3 mcg/kg/min, Last Rate: 0.04 mcg/kg/min (11/18/24 1256)  pantoprazole, 8 mg/hr, Last Rate: 8 mg/hr (11/18/24 0850)  phenylephrine, 0.5-3 mcg/kg/min, Last Rate: Stopped (11/16/24 2151)      Medications:  atorvastatin, 40 mg, Nasogastric, Nightly  [Held by provider] bumetanide, 2 mg, Oral, Once per day on Sunday Monday Wednesday Friday  [Held by provider] calcitriol, 0.25 mcg, Oral, Every Other Day  [Held by provider] carvedilol, 12.5 mg, Oral, BID  chlorhexidine, 15 mL, Mouth/Throat, Q12H  lactobacillus acidophilus, 1 capsule, Nasogastric, Daily  levETIRAcetam, 500 mg, Intravenous, Daily  midodrine, 10 mg, Nasogastric, Once per day on Tuesday Thursday Saturday  mupirocin, 1 Application, Each Nare, BID  piperacillin-tazobactam, 4.5 g, Intravenous, Q12H  [Held by provider] Sucroferric Oxyhydroxide, 3 tablet, Nasogastric, TID With Meals  [Held by provider] Sucroferric Oxyhydroxide, 1 tablet, Nasogastric, BID  [Held by provider] vitamin B-12, 1,000 mcg, Oral, Daily        Vital Sign Min/Max for last 24 hours  Temp  Min: 97.3 °F (36.3 °C)  Max: 98.9 °F (37.2 °C)   BP  Min: 74/59  Max: 182/93   Pulse  Min: 77  Max: 106   Resp  Min: 20  Max: 21   SpO2  Min: 77 %  Max: 100 %   No data recorded       Input/Output for last 24 hour shift  11/17 0701 - 11/18 0700  In: 1982.7 [I.V.:1295.7]  Out: 150    Mode: VC+/AC  FiO2 (%):  [35 %] 35 %  S RR:  [20] 20  S  "VT:  [450 mL-500 mL] 450 mL  PEEP/CPAP (cm H2O):  [5 cm H20] 5 cm H20  MAP (cm H2O):  [11-12] 11  Objective:  Vital signs: (most recent): Blood pressure 106/55, pulse 87, temperature 98.1 °F (36.7 °C), temperature source Axillary, resp. rate 20, height 177.8 cm (70\"), weight 127 kg (279 lb 15.8 oz), SpO2 98%.            General Appearance:  Not in distress, no asynchrony with mechanical ventilator.  Head:   Atraumatic, Normocephalic, Pupils reactive & symmetrical B/L.  Neck: Endotracheal tube clean.   Lungs:   B/L Breath sounds present with decreased breath sounds on bases, no wheezing heard, occasional crackles.   Heart: S1 and S2 present, no murmur  Abdomen: Obese, non tender, no guarding or rigidity, bowel sounds positive.  Extremities: + edema, warm to touch.  Neurologic:  Pt sedated on the vent but moves all four extremities.     Ventilator settings: A/C: FiO2 35%      Results from last 7 days   Lab Units 11/18/24  1212 11/18/24  0525 11/17/24  2356 11/17/24  1258 11/17/24  0350 11/16/24 2015 11/16/24  1721   WBC 10*3/mm3  --  9.37  --   --  9.07  --  8.61   HEMOGLOBIN g/dL 7.5* 7.7* 7.6*   < > 7.0*  7.0*   < > 7.8*  7.8*   PLATELETS 10*3/mm3  --  167  --   --  155  --  166    < > = values in this interval not displayed.     Results from last 7 days   Lab Units 11/18/24  0525 11/17/24  2318 11/17/24  0350 11/16/24 2015   SODIUM mmol/L 140 141 138 138   POTASSIUM mmol/L 3.4* 3.4* 3.5 3.2*   CO2 mmol/L 24.0 23.0 26.0 15.0*   BUN mg/dL 72* 71* 58* 45*   CREATININE mg/dL 7.01* 6.77* 5.50* 4.91*   MAGNESIUM mg/dL 2.6* 2.6* 2.8* 3.8*   PHOSPHORUS mg/dL 4.5 4.3  --  5.1*   GLUCOSE mg/dL 144* 165* 172* 270*     Estimated Creatinine Clearance: 11.1 mL/min (A) (by C-G formula based on SCr of 7.01 mg/dL (H)).    Results from last 7 days   Lab Units 11/18/24  0332   PH, ARTERIAL pH units 7.470*   PCO2, ARTERIAL mm Hg 35.4   PO2 ART mm Hg 87.7       Images:   CXR reviewed and ET tube above anahi. Bibasilar atelectasis " and probable small effusions.     I reviewed the patient's results and images.     Assessment & Plan   Impression        Pneumonia due to COVID-19 virus    Severe obstructive sleep apnea    ESRD on HD T,R,S since 2022    Acute respiratory failure with hypoxia    Probable cardiac arrest versus severe bradycardia requiring CPR    Generalized seizure    Acute GI bleeding, possible       Plan        1.  Patient admitted here with COVID-19 pneumonia with worsened respiratory status requiring intubation and mechanical ventilation.  Concern was for delirium state.  Wondering if it is due to COVID infection versus some other potential etiology.  Also noted to have generalized tonic-clonic seizure for which neurology is evaluating and patient is on Keppra.  Continue close monitoring for now.  2.  Patient has been treated with antibiotics for atypical infection.  Cultures have remained negative otherwise.  WBC count is normal.  No new fevers.  Monitor closely.  3.  Requiring pressors to maintain hemodynamics.  Wondering if some of it could be due to sedation related.  On hemodialysis support per nephrology.  If does not tolerate hemodialysis or require higher pressor requirements may need CRRT.  4.  Concern for GI bleeding.  Hemoglobin has been stable currently.  Discussed with GI team to see if we can perform endoscopy while patient is on mechanical ventilation as given his hospital course thus far I think it will be preferable rather than trying to do it postextubation.  And evaluated the patient and hopefully get it done.  5.  Will wean sedation to assess mental status.  Patient apparently was following commands post CPR.  CPR duration and was not prolonged either.  Will continue to monitor closely.  Will consider MRI if does not show significant improvement in neurological function.  6.  Continue midodrine.  Continue on stress dose steroids.  7.  Continue Protonix infusion for now.  8.  Hold anticoagulation with ongoing  GI bleed.  SCDs for DVT prophylaxis.    Daughter updated at bedside.  Discussed guarded prognosis with her.  Will continue to provide aggressive support at this point.  Check labs and chest x-ray in the morning.  Discussed with GI team.       Plan of care and goals reviewed with multidisciplinary/antibiotic stewardship team during rounds.   I discussed the patient's findings and my recommendations with family, nursing staff, and consulting provider       Time spent Critical care 35 min (exclusive of procedure time)  including high complexity decision making to assess, manipulate, and support vital organ system failure in this individual who has impairment of one or more vital organ systems such that there is a high probability of imminent or life threatening deterioration in the patient’s condition.      Mahin Live MD, FCCP  Pulmonary, Critical care and Sleep Medicine

## 2024-11-18 NOTE — PLAN OF CARE
Problem: Hemodialysis  Goal: Safe, Effective Therapy Delivery  11/18/2024 1708 by Joss Walter, RN  Outcome: Progressing  11/18/2024 1707 by Joss Walter RN  Outcome: Progressing  Goal: Effective Tissue Perfusion  11/18/2024 1708 by Joss Walter, RN  Outcome: Progressing  11/18/2024 1707 by Joss Walter RN  Outcome: Progressing  Goal: Absence of Infection Signs and Symptoms  11/18/2024 1708 by Joss Walter RN  Outcome: Progressing  11/18/2024 1707 by Joss Walter RN  Outcome: Progressing   Goal Outcome Evaluation:            HD completed. UF 2500ml, goal met. Tolerated well, did require pressor support for low BP. Access funtioned well. Blood returned to pt. Report to CAITLIN Sierra

## 2024-11-18 NOTE — PLAN OF CARE
Goal Outcome Evaluation:               Pt still receiving vasopressor support/ sedation. Unable to decrease ventilator support at this time.

## 2024-11-19 NOTE — PLAN OF CARE
Goal Outcome Evaluation:           Progress: no change  Outcome Evaluation: Patient VSS, remians on mechanical ventilation with a peep of 5 Fio2@ 35%. Patient not following commands but will withdrawl to pain. Versed gtt stopped, Fentanyl gtt continues. Protonix gtt stopped. TF started, patient tolerating well. Patient remains anuric and no BM. MRI unable to be completed as patients PPM is not compatible with the MRI machiene. Restraints remain in place for patients safety. H/H remains stable. Family at bedside and updated.

## 2024-11-19 NOTE — PLAN OF CARE
Problem: Adult Inpatient Plan of Care  Goal: Plan of Care Review  Outcome: Progressing  Flowsheets (Taken 11/19/2024 0633)  Progress: improving  Outcome Evaluation: Patient rested well tonight and patient is improving. Patient on 4 mg versed (weaned from 6 mg), 200 mg fentanyl, and patient arouses to stimulation and occasionally follows commands but rests comfortably between care. Patient remains on Levo for BP control and has required as low as 0.03 and as much at 0.08 to keep MAP >65. Patient does continue to have episodes of hypotension with stimulation. Patient's ABG improving. Patient remains afebrile. H&H remains stable. Plan is for patient to be assessed for dialysis today and for readiness for spontaneous breathing trials. Keofeed placed tonight, it is currently in the stomach. Patient remains in wrist restraints for safety. Patient tolerated bath well. Continue to monitor closely, continue with current plan of care.  Plan of Care Reviewed With:   patient   child

## 2024-11-19 NOTE — CONSULTS
Clinical Nutrition     Patient Name: Marco Antonio Montemayor  YOB: 1945  MRN: [id number]  Date of Encounter: 11/19/24 10:45 EST  Admission date: 11/12/2024  Reason for Visit: MDR, Follow-up protocol, EN    Assessment   Nutrition Assessment   Admission Diagnosis:  Pneumonia due to COVID-19 virus [U07.1, J12.82]    Problem List:    Pneumonia due to COVID-19 virus    Severe obstructive sleep apnea    ESRD on HD T,R,S since 2022    Acute respiratory failure with hypoxia    Probable cardiac arrest versus severe bradycardia requiring CPR    Generalized seizure    Acute GI bleeding, possible      PMH:   He  has a past medical history of Abnormal ECG, Arrhythmia, Arthritis, Asthma, Cancer, Chronic diastolic congestive heart failure (09/14/2018), CKD (chronic kidney disease), Coronary artery disease, Dry skin, Dyslipidemia (07/07/2016), Edema, Generalized seizure (11/17/2024), Heart murmur, Hematoma, Hemodialysis access site with arteriovenous graft, History of transfusion, Hypertension, Iron deficiency anemia, Long term current use of anticoagulant, ZULEIKA treated with BiPAP, Persistent atrial fibrillation (07/07/2016), Tinnitus, and Wears glasses.    PSH:  He  has a past surgical history that includes Appendectomy; Cataract extraction; Skin cancer excision; Tonsillectomy; [place] tooth extraction; Colonoscopy; Esophagogastroduodenoscopy; Cardiac electrophysiology procedure (N/A, 05/18/2018); Ablation of dysrhythmic focus; arteriovenous fistula/shunt surgery (Left, 08/04/2022); arteriovenous fistula/shunt surgery (Left, 11/03/2022); Insert / replace / remove pacemaker (about 20 years back); Vein Surgery (for dialysis); and Esophagogastroduodenoscopy (N/A, 11/18/2024).    Applicable Nutrition History:     ARF/VENT 11-16-24    s/p EGD 11-18-24:  - Normal esophagus. - Erythematous and eroded mucosa in the gastric body likely from NG tube suction trauma. - Erythematous, erosive duodenopathy. - No  specimens collected      SKIN: gluteal ulcer    Labs    Labs Reviewed: Yes    Results from last 7 days   Lab Units 11/19/24  0506 11/18/24  2101 11/18/24  0525 11/17/24 2318 11/17/24  0350 11/17/24  0031 11/16/24 2015   GLUCOSE mg/dL 118* 114* 144* 165* 172*  --  270*   BUN mg/dL 48* 42* 72* 71* 58*  --  45*   CREATININE mg/dL 5.40* 4.62* 7.01* 6.77* 5.50*  --  4.91*   SODIUM mmol/L 139 134* 140 141 138  --  138   CHLORIDE mmol/L 100 97* 99 99 97*  --  95*   POTASSIUM mmol/L 4.0 3.2* 3.4* 3.4* 3.5  --  3.2*   PHOSPHORUS mg/dL 3.9  --  4.5 4.3  --   --  5.1*   MAGNESIUM mg/dL 2.2 2.1 2.6* 2.6* 2.8*  --  3.8*   ALT (SGPT) U/L  --   --  6 7  --   --  7   LACTATE mmol/L  --   --   --   --  1.8 4.1* 12.3*       Results from last 7 days   Lab Units 11/18/24  0525 11/17/24 2318 11/16/24 2058 11/16/24 2015   ALBUMIN g/dL 3.0* 3.1*  --  3.5   PREALBUMIN mg/dL 14.0*  --   --   --    CRP mg/dL 3.63*  --   --   --    IONIZED CALCIUM mmol/L  --   --  1.08*  --    CHOLESTEROL mg/dL 69  --   --   --    TRIGLYCERIDES mg/dL 230*  --   --   --        Results from last 7 days   Lab Units 11/19/24  0517 11/18/24  2353 11/18/24  1759 11/18/24  1211 11/18/24  0608 11/17/24  2338   GLUCOSE mg/dL 114 107 124 135* 146* 165*     Lab Results   Lab Value Date/Time    HGBA1C 5.8 (A) 11/06/2024 1415    HGBA1C 6.1 (A) 08/05/2024 1402    HGBA1C 5.90 (H) 03/01/2024 0456    HGBA1C 5.20 08/02/2022 1405         Results from last 7 days   Lab Units 11/16/24 2015 11/16/24  0128 11/14/24  2133   PROBNP pg/mL 10,442.0* 14,237.0* 15,999.0*       Medications    Medications Reviewed: yes    Scheduled Meds:atorvastatin, 40 mg, Nasogastric, Nightly  [Held by provider] bumetanide, 2 mg, Oral, Once per day on Sunday Monday Wednesday Friday  [Held by provider] calcitriol, 0.25 mcg, Oral, Every Other Day  [Held by provider] carvedilol, 12.5 mg, Oral, BID  chlorhexidine, 15 mL, Mouth/Throat, Q12H  lactobacillus acidophilus, 1 capsule, Nasogastric,  "Daily  levETIRAcetam, 500 mg, Intravenous, Daily  midodrine, 10 mg, Nasogastric, Once per day on Tuesday Thursday Saturday  mupirocin, 1 Application, Each Nare, BID  pantoprazole, 40 mg, Intravenous, Daily  ProSource No Carb, 30 mL, Nasogastric, TID  [Held by provider] Sucroferric Oxyhydroxide, 3 tablet, Nasogastric, TID With Meals  [Held by provider] Sucroferric Oxyhydroxide, 1 tablet, Nasogastric, BID  [Held by provider] vitamin B-12, 1,000 mcg, Oral, Daily      Continuous Infusions:fentanyl 10 mcg/mL,  mcg/hr, Last Rate: 200 mcg/hr (11/19/24 0542)  midazolam, 1-10 mg/hr, Last Rate: 3 mg/hr (11/19/24 0815)  norepinephrine, 0.02-0.3 mcg/kg/min, Last Rate: 0.05 mcg/kg/min (11/19/24 0639)      PRN Meds:.  acetaminophen **OR** acetaminophen **OR** acetaminophen    artificial tears    senna-docusate sodium **AND** polyethylene glycol **AND** [DISCONTINUED] bisacodyl **AND** bisacodyl    fentaNYL    ipratropium-albuterol    nitroglycerin    [DISCONTINUED] ondansetron ODT **OR** ondansetron    sodium chloride    Intake/Ouptut 24 hrs (0701 - 0700)   I&O's Reviewed: yes  Intake & Output (last day)         11/18 0701 11/19 0700 11/19 0701 11/20 0700    I.V. (mL/kg) 1251.7 (9.9) 372.1 (2.9)    Blood      Other 120     NG/GT  60    IV Piggyback 300     Total Intake(mL/kg) 1671.7 (13.2) 432.1 (3.4)    Emesis/NG output 15     Dialysis 2500     Total Output 2515     Net -843.4 +432.1              'Last stool recorded x 3 on 11/14    Anthropometrics     Height: Height: 177.8 cm (70\")  Last Filed Weight: Weight: 127 kg (279 lb 15.8 oz) (11/17/24 1454)  Method: Weight Method: Bed scale  BMI: BMI (Calculated): 40.2    UBW: ?   Weight change: ? 13lb wt loss over past 3 months per EMR     Weight       Weight (kg) Weight (lbs) Weight Method Visit Report   5/25/2022 126.554 kg  279 lb      6/8/2022 122.925 kg  271 lb      6/23/2022 118.389 kg  261 lb      6/30/2022 119.477 kg  263 lb 6.4 oz   --    7/19/2022 119 kg  262 lb 5.6 " "oz      8/2/2022 119.3 kg  263 lb 0.1 oz  Standing scale     8/4/2022 117.935 kg  260 lb  Standing scale     10/18/2022 120.203 kg  265 lb      11/1/2022 122.6 kg  270 lb 4.5 oz  Standing scale     11/3/2022 122.6 kg  270 lb 4.5 oz      1/12/2023 119.75 kg  264 lb   --    2/3/2023 120 kg  264 lb 8.8 oz      3/6/2023 124.286 kg  274 lb   --    5/3/2023 126.463 kg  278 lb 12.8 oz   --    7/14/2023 125.193 kg  276 lb   --    7/28/2023 125 kg  275 lb 9.2 oz       125 kg  275 lb 9.2 oz      1/17/2024 129.729 kg  286 lb   --    2/26/2024 129.7 kg  285 lb 15 oz      2/29/2024 129.275 kg  285 lb  Stated     3/1/2024 129.275 kg  285 lb      3/20/2024 131.09 kg  289 lb   --    7/17/2024 130.364 kg  287 lb 6.4 oz   --    8/5/2024 132.45 kg  292 lb   --    8/19/2024 132.541 kg  292 lb 3.2 oz   --    11/6/2024 130.228 kg  287 lb 1.6 oz   --    11/11/2024 130 kg  286 lb 9.6 oz      11/12/2024 130.182 kg  287 lb  Stated      126.826 kg  279 lb 9.6 oz  Bed scale     11/17/2024 127 kg  279 lb 15.8 oz          Nutrition Focused Physical Exam     Date: 11/16    Unable to perform due to COVID Isolation      Subjective   Reported/Observed/Food/Nutrition Related History:     11-19: pt intubated, sedated + fentanyl, versed, levophed 0.05mcg    Per RN: pt will w/d to pain, does not follow commands, Keofeed placed, is currently in the stomach    Per MD ok to start feeding    11-18: pt intubated, sedated, currently on HD   + fentanyl, versed, levophed 0.04mcg, protonix    Per RN: TF has been on hold since pt coded on Saturday, ogt to [facility], has coffee ground output, plan for possible scope today    11/16  Order to begin EN.       Needs Assessment   Date: 11-18-24    Height used:Height: 177.8 cm (70\")  Weights used /ABW: 279lb/ 127kg  IBW: 166lb/ 75kg    Estimated Calorie needs: ~1800kcal  Method:  14Kcals/KG ABW:1778kcal  Method:  MSJ ABW: 1981kcal  Method:  PSU  ABW: 2035kcal    Estimated Protein needs: ~150g protein  Method: 2-2.5g protein " per kg IBW: 150-188g protein  Method: 1.2g protein per kg ABW: 152g protein    Current Nutrition Prescription     PO: NPO Diet NPO Type: Tube Feeding  Oral Nutrition Supplement:  Intake: N/A    EN: [employer] Renal  Goal Rate: initial 30ml/hr  Water Flushes: 20ml/hr  Modular: None  Route: OG  Tube: Large bore    At goal over: 20Hrs/day     Rx will supply:   Goal Volume 600  mL/day     Flush Volume 400 mL/day     Energy 1200 Kcal/day 67 % Est Need   Protein 54 g/day 36 % Est Need   Fiber 0 g/day     Water in   mL     Total Water 832 mL     Meet DRI No        --------------------------------------------------------------------------  Product/Rate verified at bedside: No- (covid isolation)  Infusing Rate at time of visit: off    Average Delivery from [facility] Day:   Volume 0 mL/day 0  % Goal Vol.   Flush Volume  mL/day     Energy  Kcal/day  % Est Need   Protein  g/day  % Est Need   Fiber  g/day     Water in  EN  mL     Total Water  mL     Meet DRI N/A              Assessment & Plan   Nutrition Diagnosis   Date:  Updated:   Problem Inadequate energy intake    Etiology ARF/VENT   Signs/Symptoms NPO   Status: New       Goal:   Nutrition to support treatment and Adjust EN      Nutrition Intervention      Follow treatment progress, Care plan reviewed, Nutrition support order placed    Will adjust EN to better meet electrolyte, kcal and protein needs    TF recs: change to Peptamen AF at 25ml/hr, gradually advance TF to goal rate @70ml/hr, Prosource 3x/day, free water @ 20ml/hr    TF at goal volume will provide 1400ml, 1860kcal,103% kcal needs,151g protein, 101% protein needs, 1534ml free water    Consider metabolic cart once TF at goal    Monitoring/Evaluation:   Per protocol, I&O, Pertinent labs, EN delivery/tolerance, Weight, Skin status, GI status, Symptoms, Hemodynamic stability    Camryn Mendoza, GREGOR  Time Spent:30min

## 2024-11-19 NOTE — PROGRESS NOTES
Neurology Note    Patient:  Marco Antonio Montemayor    YOB: 1945    REFERRING PHYSICIAN:  Dr. Live    CHIEF COMPLAINT:    AMS    HISTORY OF PRESENT ILLNESS:   The patient has had sedation decreased, not waking up or following commands, withdraws from pain per RN, no seizures reported, remains intubated.    Past Medical History:  Past Medical History:   Diagnosis Date    Abnormal ECG     Arrhythmia     Arthritis     Asthma     Cancer     skin cancer removed from various locations     Chronic diastolic congestive heart failure 09/14/2018    CKD (chronic kidney disease)     closer to stage 5    Coronary artery disease     Dry skin     Dyslipidemia 07/07/2016    Edema     Generalized seizure 11/17/2024    Heart murmur     Hematoma     Hemodialysis access site with arteriovenous graft     History of transfusion     no transfusion reaction    Hypertension     Iron deficiency anemia     Long term current use of anticoagulant     ZULEIKA treated with BiPAP     Persistent atrial fibrillation 07/07/2016     Patient notes dyspnea, fatigue and palpitations during episodes of afib.  multiple external cardioversionsChads vasc 2, anticoagulated with Coumadin    Tinnitus     Wears glasses     readers       Past Surgical History:  Past Surgical History:   Procedure Laterality Date    ABLATION OF DYSRHYTHMIC FOCUS      APPENDECTOMY      ARTERIOVENOUS FISTULA/SHUNT SURGERY Left 08/04/2022    Procedure: UPPER EXTREMITY BRACHIOCEPHALIC ARTERIOVENOUS FISTULA FORMATION LEFT;  Surgeon: Jakob Reza MD;  Location:  [facility];  Service: Vascular;  Laterality: Left;    ARTERIOVENOUS FISTULA/SHUNT SURGERY Left 11/03/2022    Procedure: ELEVATION LEFT UPPER EXTREMITY AV FISTULA;  Surgeon: Jakob Reza MD;  Location:  [facility];  Service: Vascular;  Laterality: Left;    CARDIAC ELECTROPHYSIOLOGY PROCEDURE N/A 05/18/2018    Procedure: PPM generator change - dual       [facility]/ please schedule in 8 weeks;  Surgeon:  Johnathan Taylor MD;  Location:  [facility];  Service: Cardiovascular    CATARACT EXTRACTION      bilat wiht lens     COLONOSCOPY      ENDOSCOPY      Pt denies    ENDOSCOPY N/A 11/18/2024    Procedure: ESOPHAGOGASTRODUODENOSCOPY;  Surgeon: Heriberto Medeiros MD;  Location:  [facility];  Service: Gastroenterology;  Laterality: N/A;    INSERT / REPLACE / REMOVE PACEMAKER  about 20 years back    SKIN CANCER EXCISION      various locations     TONSILLECTOMY      VEIN SURGERY  for dialysis    WISDOM TOOTH EXTRACTION         Social History:   Social History     Socioeconomic History    Marital status:    Tobacco Use    Smoking status: Never     Passive exposure: Never    Smokeless tobacco: Never   Vaping Use    Vaping status: Never Used   Substance and Sexual Activity    Alcohol use: No    Drug use: No    Sexual activity: Never        Family History:   Family History   Problem Relation Age of Onset    Cancer Mother     Heart attack Mother     Hypertension Mother     Arrhythmia Father     Hypertension Father     Heart attack Father        Medications Prior to Admission:    Prior to Admission medications    Medication Sig Start Date End Date Taking? Authorizing Provider   acetaminophen (TYLENOL) 500 MG tablet Take 1 tablet by mouth Every 6 (Six) Hours As Needed for Mild Pain.   Yes Adrian Munson MD   atorvastatin (LIPITOR) 40 MG tablet TAKE ONE TABLET BY MOUTH DAILY 1/15/24  Yes Ivet Beasley APRN   bumetanide (BUMEX) 2 MG tablet TAKE 1 TABLET BY MOUTH EVERY OTHER DAY - TAKE ON DAYS OFF FROM DIALYSIS (TUESDAY, THURSDAY, SATURDAY AND SUNDAY) 5/28/24  Yes Ivet Beasley APRN   carvedilol (COREG) 12.5 MG tablet TAKE ONE TABLET BY MOUTH TWICE A DAY 9/18/23  Yes Ivet Beasley APRN   cholecalciferol (VITAMIN D3) 1000 UNITS tablet Take 1 tablet by mouth Daily.   Yes Adrian Munson MD   midodrine (PROAMATINE) 10 MG tablet Take 1 tablet by mouth 3 (Three) Times a Day Before Meals.  Before Dialysis, Douglas Munoz, Sat 3/16/24  Yes ProviderAdrian MD   Sucroferric Oxyhydroxide (Velphoro) 500 MG chewable tablet Take As Directed. Crush or chew and swallow 3 tablets by mouth 3 times a day with meals and 1 tablet twice a day with snacks. 12/9/22  Yes Adrian Munson MD   vitamin B-12 (CYANOCOBALAMIN) 1000 MCG tablet Take 1 tablet by mouth Daily.   Yes ProviderAdrian MD   warfarin (COUMADIN) 2.5 MG tablet Take 2-3 tablets by mouth daily or as directed by anticoagulation clinic 10/22/24  Yes Ivet Beasley APRN   cefdinir (OMNICEF) 300 MG capsule Take one capsule on day one followed by one capsule on dialysis days after dialysis is complete until they are gone. 11/6/24   Heriberto Murcia MD   Ferrous Sulfate (IRON) 325 (65 FE) MG tablet Take 65 mg by mouth Daily.    Provider, MD Adrian   nitroglycerin (NITROSTAT) 0.3 MG SL tablet 1 under the tongue as needed for angina, may repeat q5mins for up three doses 7/14/23   Ivet Beasley APRN       Allergies:  Patient has no known allergies.      Review of system  Review of Systems   Unable to perform ROS: Intubated       Vitals:    11/19/24 1445   BP:    Pulse: 93   Resp:    Temp:    SpO2: 99%       Physical exam  Physical Exam  Constitutional:       Interventions: He is intubated.   Neck:      Comments: Slight resistence to neck flexion.  Cardiovascular:      Rate and Rhythm: Normal rate and regular rhythm.   Pulmonary:      Effort: He is intubated.   Neurological:      Deep Tendon Reflexes: Babinski sign present on the right side. Babinski sign present on the left side.      Comments: Eyes closed, no response to voice, pupils 2 mm, eyes midline, no definite facial droop, bilateral hand grasping, not clearly voluntary.           Lab Results   Component Value Date    WBC 9.37 11/19/2024    HGB 7.7 (L) 11/19/2024    HCT 23.6 (L) 11/19/2024    .9 (H) 11/19/2024     11/19/2024     Lab Results   Component Value Date     GLUCOSE 118 (H) 11/19/2024    BUN 48 (H) 11/19/2024    CREATININE 5.40 (H) 11/19/2024    EGFRIFNONA 18 (L) 02/16/2022    EGFRIFAFRI 26 (L) 11/12/2018    BCR 8.9 11/19/2024    CO2 21.0 (L) 11/19/2024    CALCIUM 8.4 (L) 11/19/2024    PROTENTOTREF 6.8 01/10/2023    ALBUMIN 3.0 (L) 11/18/2024    LABIL2 1.5 01/10/2023    AST 19 11/18/2024    ALT 6 11/18/2024   ntains abnormal data Vitamin B12  Order: [id number]  Status: Final result       Visible to patient: Yes (not seen)       Next appt: 11/20/2024 at 07:00 AM in Nephrology ([facility])    Specimen Information: Blood   0 Result Notes         Component  Ref Range & Units 4 d ago 8 mo ago 6 yr ago 9 yr ago   Vitamin B-12  211 - 946 pg/mL 996 High  362 387 R 202 Low  R        Ammonia  16 - 60 umol/L 46       Radiological Studies:   XR Chest 1 View    Result Date: 11/19/2024  XR CHEST 1 VW Date of Exam: 11/19/2024 1:59 AM EST Indication: resp failure Comparison: 11/18/2024 Findings: ET tube stable above the anahi. Esophagogastric tube tip below the diaphragm with the tube coiled in the stomach with the tip directed cranially overlying the gastric fundus. Left IJ central venous catheter tip overlies the junction of the brachiocephalic vein and upper SVC. Persistent patchy bibasilar airspace disease left greater than right. Negative for pneumothorax. Stable cardiomegaly. Left-sided AICD noted.     Impression: 1. Stable lines and support tubes. 2. No pneumothorax. 3. Persistent patchy bibasilar airspace disease left greater than right compatible with pneumonia. Electronically Signed: Kin Allen MD  11/19/2024 7:22 AM EST  Workstation ID: [id number]    XR Abdomen KUB    Result Date: 11/18/2024  XR ABDOMEN KUB Date of Exam: 11/18/2024 10:36 PM EST Indication: Keofeed placement Comparison: 11/16/2024 KUB Findings: The patient is markedly rotated to the right. The feeding tube is looped in the upper abdomen this is below the diaphragm and is likely in the stomach, the  tip of the tube appears to be in the distal esophagus, this should be repositioned.     Impression: Feeding tube appears looped in the stomach with the tip in the distal esophagus. This should be repositioned. Electronically Signed: Grey RamDO  11/18/2024 11:39 PM EST  Workstation ID: [id number]    EEG Continuous Monitoring With Video    Result Date: 11/18/2024  Date of Procedure: 11-18 at 2 AM through 7:30 AM Reason for referral: 79 y.o.male with seizures, ICU monitoring, altered mental status Technical summary: A 19 channel digital EEG is performed using the international 10-20 placement system, including eye leads and EKG leads.  Video is available, and split screen technology with video/EEG correlation is used as needed.   A patient event button is offered. Review of the relevant video-EEG data was performed throughout the day.  Detailed review of the EEG and relevant video was performed using multiple montages, including a variety of referential and bipolar montages.  Results of the monitoring related to the treatment team frequently during the study, at least once a day.  Where available, seizure detection software was used for the detection of ictal and interictal discharges. Findings: The patient is intubated.  The background shows diffuse low to medium amplitude 2-5 Hz intermixed delta and theta activity which is present symmetrically over both hemispheres.  Occasional low amplitude faster theta frequencies are present for brief times during the study.  Over the course of the study, no focal features or epileptiform activity are seen and no electrographic seizures are present.  The patient event button is not pushed.       EEG background is moderate generalized slow without focal features seen No clinical or electrographic seizures are captured Summary: Over the course of this monitoring study, no evidence for recurrent seizures is seen This report is transcribed using the Dragon dictation system.      EEG Continuous Monitoring With Video    Result Date: 11/18/2024  Date of Procedure: 11-17 at 1:30 AM through 11-18 at 2 AM Reason for referral: 79 y.o.male with seizure-like activity, ICU monitoring Technical summary: A 19 channel digital EEG is performed using the international 10-20 placement system, including eye leads and EKG leads.  Video is available, and split screen technology with video/EEG correlation is used as needed.   A patient event button is offered. Review of the relevant video-EEG data was performed throughout the day.  Detailed review of the EEG and relevant video was performed using multiple montages, including a variety of referential and bipolar montages.  Results of the monitoring related to the treatment team frequently during the study, at least once a day.  Where available, seizure detection software was used for the detection of ictal and interictal discharges. Findings: The patient is intubated.  She is on Keppra 500 mg daily.  The background shows diffuse medium amplitude 2-5 Hz intermixed delta and theta activity which is present symmetrically over both hemispheres.  Generalized triphasic waves are frequently seen at the beginning of the study.  As a study proceeds, these become less prominent.  Over the course of the study, no lateralizing features are seen.  No epileptiform activity or electrographic seizures are present the patient event button is not pushed.       EEG background is moderate generalized slow with triphasic waves present No focal features or epileptiform activity are seen No clinical or electrographic seizures are captured This report is transcribed using the Dragon dictation system.     XR Chest 1 View    Result Date: 11/18/2024  XR CHEST 1 VW Date of Exam: 11/18/2024 2:07 AM EST Indication: Intubated Patient Comparison: Chest x-ray 11/17/2024 Findings: Stable medical support devices. Stable mild cardiomegaly. Vague bibasilar parenchymal opacities and small  scattered bilateral interstitial opacities are grossly unchanged. No significant pleural effusion. No pneumothorax.     Impression: No significant interval change. Electronically Signed: Yahir Garcia MD  11/18/2024 7:18 AM EST  Workstation ID: [id number]    Adult Transthoracic Echo Complete W/ Cont if Necessary Per Protocol    Result Date: 11/17/2024    Left ventricular systolic function is normal. Left ventricular ejection fraction appears to be 61 - 65%.   Left ventricular wall thickness is consistent with mild concentric hypertrophy.   Left ventricular diastolic function was indeterminate.   The left atrial cavity is moderately dilated.   Mild aortic valve stenosis is present.   Peak velocity of the flow distal to the aortic valve is 216.8 cm/s. Aortic valve mean pressure gradient is 10 mmHg.   Estimated right ventricular systolic pressure from tricuspid regurgitation is normal (<35 mmHg). Calculated right ventricular systolic pressure from tricuspid regurgitation is 23 mmHg.     XR Chest 1 View    Result Date: 11/17/2024  XR CHEST 1 VW Date of Exam: 11/17/2024 1:05 AM EST Indication: Intubated Patient Comparison: 1 day prior. Findings: Support hardware projects unchanged. Lung volumes remain diminished with scattered atelectasis and small bilateral pleural effusions, with likely component of mild pulmonary edema also present. There is no distinct pneumothorax.     Impression: Support hardware projects unchanged. Lung volumes remain diminished with scattered atelectasis and small bilateral pleural effusions, with likely component of mild pulmonary edema also present. There is no distinct pneumothorax. Electronically Signed: Dipak Gregg MD  11/17/2024 9:10 AM EST  Workstation ID: [id number]    EEG    Result Date: 11/17/2024  Reason for referral: 79 y.o.male with seizure-like activity Technical Summary:  A 19 channel digital EEG was performed using the international 10-20 placement system, including eye leads and  EKG leads. Duration: 20 minutes Findings: The patient is intubated.  The background shows medium amplitude 2-5 Hz intermixed delta and theta activity which is present symmetrically over both hemispheres.  Generalized triphasic waves are variably prominent, occurring singly and in brief clusters at 1 Hz.  No electrographic seizures are seen.  Photic stimulation does not change the background.  Hyperventilation is not performed.  No focal features are seen.  No epileptiform activity is present. Video: Available Technical quality: Good EKG: Regular, 80 bpm SUMMARY: Moderate generalized slow with generalized triphasic waves No focal features or epileptiform activity are seen     Diffuse cerebral dysfunction of moderate degree, nonspecific.  These are most commonly seen due to toxic/metabolic or hypoxemic/ischemic cause No ongoing seizures are seen This report is transcribed using the Dragon dictation system.      CT Abdomen Pelvis Without Contrast    Result Date: 11/16/2024  CT ABDOMEN PELVIS WO CONTRAST Date of Exam: 11/16/2024 10:49 PM EST Indication: Elevated lactic acid. Comparison: 12/1/2023. Technique: Axial CT images were obtained of the abdomen and pelvis without the administration of contrast. Reconstructed coronal and sagittal images were also obtained. Automated exposure control and iterative construction methods were used. Findings: Lung Bases:   Patchy airspace disease present within the bilateral lower lobes and the lingula, incompletely visualized. Small bilateral pleural effusions are present. The heart appears enlarged. Limited evaluation of the solid organs due to lack of intravenous contrast.. Liver: Liver is normal in size and CT density. No focal lesions. Biliary/Gallbladder:  The gallbladder is normal without evidence of radiopaque stones. The biliary tree is nondilated. Spleen: Spleen is normal in size and CT density. Pancreas:  Pancreas is normal. There is no evidence of pancreatic mass or  peripancreatic fluid. Kidneys:  The kidneys appear atrophic. Bilateral small cysts likely present.. There are no stones or hydronephrosis. Adrenals:  Adrenal glands are unremarkable. Retroperitoneal/Lymph Nodes/Vasculature:  No retroperitoneal adenopathy is identified. Gastrointestinal/Mesentery:  The bowel loops are non-dilated without wall thickening or mass. No evidence of pneumatosis. The appendix appears within normal limits. No evidence of obstruction. No free air. No mesenteric fluid collections identified. Fat-containing umbilical hernia present. No significant stool burden identified. There is diverticulosis of the left hemicolon. No significant inflammatory changes identified. Enteric tube present within the stomach. Bladder:  The bladder is normal. Genital:   Unremarkable       Bony Structures:   Visualized bony structures are consistent with the patient's age.     Impression: 1.No acute intra-abdominal or intrapelvic process. No evidence of pneumatosis or free air. 2.Patchy airspace disease present within the bilateral lower lobes and the lingula, likely related to pneumonia. Small bilateral pleural effusions present. 3.Ancillary findings as described above. Electronically Signed: Keara Diggs MD  11/16/2024 11:26 PM EST  Workstation ID: [id number]    CT Head Without Contrast    Result Date: 11/16/2024  CT HEAD WO CONTRAST Date of Exam: 11/16/2024 10:49 PM EST Indication: Post code. Comparison: 11/13/2024. Technique: Axial CT images were obtained of the head without contrast administration.  Automated exposure control and iterative construction methods were used. Findings: There is no evidence of hemorrhage. There is no mass effect or midline shift. There is no extracerebral collection. Ventricles are normal in size and configuration for patient's stated age.  Posterior fossa is within normal limits. Calvarium and skull base appear intact.   Visualized sinuses show no air fluid levels. Visualized orbits  are unremarkable. Retained secretions present within the nasal passageways related to intubation.     Impression: No acute intracranial process. Electronically Signed: Keara Diggs MD  11/16/2024 11:23 PM EST  Workstation ID: [id number]    XR Chest 1 View    Result Date: 11/16/2024  XR CHEST 1 VW Date of Exam: 11/16/2024 3:29 AM EST Indication: ETT placement Comparison: 11/16/2024. Findings: Endotracheal tube tip in the mid trachea. Enteric tube present with incomplete visualization of the tip. There is a left subclavian AICD/pacemaker device. There is a left internal jugular CVC catheter with the tip in the proximal SVC. No pneumothorax. The heart appears enlarged, stable. Patchy airspace disease is seen within the right lung base with small right-sided pleural effusion, similar as compared to the previous study.     Impression: Life-support devices as above. Otherwise, no significant change. Electronically Signed: Keara Diggs MD  11/16/2024 4:33 AM EST  Workstation ID: [id number]    XR Abdomen KUB    Result Date: 11/16/2024  XR ABDOMEN KUB Date of Exam: 11/16/2024 3:28 AM EST Indication: check OG placement Comparison: None available. Findings: Enteric tube within the stomach.     Impression: Enteric tube within the stomach. Electronically Signed: Keara Diggs MD  11/16/2024 4:32 AM EST  Workstation ID: [id number]    XR Chest 1 View    Result Date: 11/16/2024  XR CHEST 1 VW Date of Exam: 11/16/2024 1:27 AM EST Indication: line placement Comparison: 11/16/2024. Findings: There is a new left internal jugular CVC catheter present with the tip likely within the distal left brachiocephalic vein. There is no evidence of pneumothorax. Stable left subclavian AICD/pacemaker device. Stable cardiomegaly. Stable airspace disease.     Impression: New left internal jugular CVC catheter with the tip likely within the distal left brachiocephalic vein. No evidence of pneumothorax. Otherwise, stable exam. Electronically Signed: Keara  MD Dontae  11/16/2024 1:53 AM EST  Workstation ID: [id number]    XR Chest 1 View    Result Date: 11/16/2024  XR CHEST 1 VW Date of Exam: 11/15/2024 11:24 PM EST Indication: Hypoxic resp failure Comparison: 11/12/2024. Findings: Patchy airspace disease is present within the right lower lobe.. Probable small right-sided pleural effusion present.. No pneumothorax. The pulmonary vasculature appears within normal limits. The heart appears enlarged. There is left subclavian ICD/pacemaker device.. No acute osseous abnormality identified.     Impression: Right lower lobe pneumonia with probable small right-sided pleural effusion. Electronically Signed: Keara Diggs MD  11/16/2024 12:45 AM EST  Workstation ID: [id number]    CT Head Without Contrast    Result Date: 11/13/2024  CT HEAD WO CONTRAST Date of Exam: 11/13/2024 11:49 AM EST Indication: headache and on coumadin. Comparison: None available. Technique: Axial CT images were obtained of the head without contrast administration.  Automated exposure control and iterative construction methods were used. Findings: There is no evidence of hemorrhage. There is no mass effect or midline shift. Diffuse brain atrophy. Tiny round hypodensity in the left basal ganglia suspicious for a chronic lacunar infarct. There is no extracerebral collection. Ventricles are normal in size and configuration for patient's stated age. Posterior fossa is within normal limits. Calvarium and skull base appear intact.  Visualized sinuses show no air fluid levels. Visualized orbits are unremarkable.     Impression: No evidence of acute intracranial abnormality Electronically Signed: Raji Hudson MD  11/13/2024 12:46 PM EST  Workstation ID: [id number]    XR Chest 1 View    Result Date: 11/12/2024  XR CHEST 1 VW Date of Exam: 11/12/2024 4:37 PM EST Indication: SOA triage protocol Comparison: 11/11/2024. Findings: Left lower lobe consolidation. Vascular congestion. Dual-lead cardiac pacemaker. The clavicles  are intact. No rib fractures. No pneumothorax or pleural effusion. Atheromatous disease of the aortic arch.     Left lower lobe consolidation and vascular congestion. Electronically Signed: Joss Daniels MD  11/12/2024 5:01 PM EST  Workstation ID: [id number]    CT Angiogram Chest Pulmonary Embolism    Result Date: 11/11/2024  CT ANGIOGRAM CHEST PULMONARY EMBOLISM Date of Exam: 11/11/2024 7:00 PM EST Indication: Shortness of breath. Comparison: None available. Technique: Axial CT images were obtained of the chest after the uneventful intravenous administration of 90 cc Isovue 370 IV contrast utilizing pulmonary embolism protocol.  Reconstructed coronal and sagittal images were also obtained. Automated exposure  control and iterative construction methods were used. Findings: Patchy areas of glass within both lungs. Cardiac pacemaker/defibrillator leads. No lobar consolidation. No pneumothorax or pleural effusion. No evidence of aortic dissection. No pulmonary embolus is appreciated. No pericardial effusion. Limited  evaluation of the upper abdomen is normal. Thoracic vertebral body height and alignment are normal. No lytic or blastic disease. No rib fractures are seen.     1. No pulmonary embolus. 2. Patchy areas of groundglass noted within both lungs. Differential considerations include atypical pneumonia such as COVID-19. Additional differential considerations may include metastatic disease or developing pneumonia however these are considered less likely Electronically Signed: Joss Daniels MD  11/11/2024 7:38 PM EST  Workstation ID: [id number]    XR Chest 1 View    Result Date: 11/11/2024  XR CHEST 1 VW Date of Exam: 11/11/2024 2:18 PM EST Indication: Weak/Dizzy/AMS triage protocol Comparison: 2/29/2024 Findings: Left chest wall ICD again noted. There is continued moderately severe cardiomegaly. No acute infiltrates or effusions identified. Exam is somewhat limited by portable technique with obesity.     Impression:  Stable cardiomegaly. No new abnormality. Electronically Signed: Cat Vieira MD  11/11/2024 2:56 PM EST  Workstation ID: [id number]       During this visit the following were done:  Labs Reviewed [x]    Labs Ordered []    Radiology Reports Reviewed [x]    Radiology Ordered [x]    EKG, echo, and/or stress test reviewed []    EEG results reviewed  []    EEG reviewed and interpreted per myself   []    Discussed case with neurointerventionalist or neuroradiologist []    Referring Provider Records Reviewed []    [facility] Records Reviewed []    Hospital Records Reviewed []    History Obtained From Family []    Radiological images view and Interpreted per myself []    Case Discussed with referring provider []     Decision to obtain and request outside records  []        Assessment and Plan       New onset seizure-like activity in the setting of severe bradycardia and hypotension, possible cardiac arrest, suspect cerebral hypoperfusion. cEEG is w/o epileptic activity. Covid-19 PNA. The patient is currently on Versed drip. Severe CKD. Suspected GIB. He has been afebrile, CNS infection seems unlikely.   - Continue Keppra 500 mg qd for now. Would consider stopping later on if no recurrence.   - Wean sedation as tolerated.   - Unable to have an MRI 22 incompatible PPM, will order a F/U CT head.  - Consider LP if MS does not improve with decrease in sedation.               Electronically signed by Panfilo Valera MD on 11/19/2024 at 14:56 EST

## 2024-11-19 NOTE — PROGRESS NOTES
Intensive Care Follow-up     Hospital:  LOS: 7 days   Mr. Marco Antonio Montemayor, 79 y.o. male is followed for:   Pneumonia due to COVID-19 virus        History of present illness:   79-year-old male with coronary disease, dyslipidemia, chronic kidney disease on hemodialysis, chronic anemia, hypertension, ZULEIKA on BiPAP, atrial fibrillation, heart failure with preserved ejection fraction.  Patient presented to the hospital with increased work of breathing and hypoxemic respiratory failure.  Patient was found to have COVID-19 pneumonia.  CTA chest on admission did not show any pulmonary embolism.  Patchy area of groundglass opacities noted in both lungs.  Patient was started on ceftriaxone and doxycycline.  Patient was also given Decadron and remdesivir.  Patient started developing hypotension.  Was given fluid resuscitation and midodrine with improvement in blood pressure.  Patient was thought to be having a GI bleed so GI consultation was obtained.  Patient however declined from respiratory standpoint and was becoming more delirious so was transferred to intensive care unit.  Patient was initially supported with BiPAP but eventually needed to be intubated and placed on mechanical ventilation.  Patient required pressors to maintain hemodynamics.  On admission to ICU patient had severe bradycardia and had 2 episodes of possible cardiac arrest requiring CPR for short duration of time.  Unclear if it was PEA or chest severe bradycardia.  Precedex was held.  Patient apparently was being suctioned at that time so was thought to have vasovagal response.  Patient was also noted to have generalized tonic-clonic seizure.  Neurology consultation was obtained and EEG was obtained.  EEG did not show any ongoing seizure activity however patient was started on Keppra.  Patient apparently was able to follow commands post cardiac arrest.  Pt underwent EGD and did not show any ulcers but had suction trauma.       Subjective   Interval  History:  Overnight no acute events.  Hgb stable. Sedated currently. No seizures noted on EEG. Had HD yesterday.                  The patient's past medical, surgical and social history were reviewed and updated in Epic as appropriate.       Objective     Infusions:  fentanyl 10 mcg/mL,  mcg/hr, Last Rate: 200 mcg/hr (11/19/24 0542)  midazolam, 1-10 mg/hr, Last Rate: 3 mg/hr (11/19/24 0815)  norepinephrine, 0.02-0.3 mcg/kg/min, Last Rate: 0.05 mcg/kg/min (11/19/24 0639)      Medications:  atorvastatin, 40 mg, Nasogastric, Nightly  [Held by provider] bumetanide, 2 mg, Oral, Once per day on Sunday Monday Wednesday Friday  [Held by provider] calcitriol, 0.25 mcg, Oral, Every Other Day  [Held by provider] carvedilol, 12.5 mg, Oral, BID  chlorhexidine, 15 mL, Mouth/Throat, Q12H  lactobacillus acidophilus, 1 capsule, Nasogastric, Daily  levETIRAcetam, 500 mg, Intravenous, Daily  midodrine, 10 mg, Nasogastric, Once per day on Tuesday Thursday Saturday  mupirocin, 1 Application, Each Nare, BID  pantoprazole, 40 mg, Intravenous, Daily  ProSource No Carb, 30 mL, Nasogastric, TID  [Held by provider] Sucroferric Oxyhydroxide, 3 tablet, Nasogastric, TID With Meals  [Held by provider] Sucroferric Oxyhydroxide, 1 tablet, Nasogastric, BID  [Held by provider] vitamin B-12, 1,000 mcg, Oral, Daily        Vital Sign Min/Max for last 24 hours  Temp  Min: 97.3 °F (36.3 °C)  Max: 99.1 °F (37.3 °C)   BP  Min: 76/42  Max: 152/41   Pulse  Min: 75  Max: 111   Resp  Min: 20  Max: 20   SpO2  Min: 93 %  Max: 100 %   No data recorded       Input/Output for last 24 hour shift  11/18 0701 - 11/19 0700  In: 1671.7 [I.V.:1251.7]  Out: 2515    Mode: VC+/AC  FiO2 (%):  [35 %] 35 %  S RR:  [20] 20  S VT:  [450 mL] 450 mL  PEEP/CPAP (cm H2O):  [5 cm H20] 5 cm H20  MAP (cm H2O):  [10-13] 13  Objective:  Vital signs: (most recent): Blood pressure (!) 122/39, pulse 90, temperature 98.8 °F (37.1 °C), temperature source Axillary, resp. rate 20, height  "177.8 cm (70\"), weight 127 kg (279 lb 15.8 oz), SpO2 99%.            General Appearance:  Not in distress, no asynchrony with mechanical ventilator.  Head:   Atraumatic, Normocephalic, Pupils reactive & symmetrical B/L.  Neck: Endotracheal tube clean.   Lungs:   B/L Breath sounds present with decreased breath sounds on bases, diffuse wheezing heard, occasional crackles.   Heart: S1 and S2 present, no murmur  Abdomen: Obese, non tender, no guarding or rigidity, bowel sounds positive.  Extremities: + edema, warm to touch.  Neurologic:  Pt sedated on the vent but moves all four extremities.     Ventilator settings: A/C: FiO2 35%      Results from last 7 days   Lab Units 11/19/24  0506 11/19/24  0015 11/18/24  1752 11/18/24  1212 11/18/24  0525 11/17/24  1258 11/17/24  0350   WBC 10*3/mm3 9.37  --   --   --  9.37  --  9.07   HEMOGLOBIN g/dL 7.7* 7.6* 7.7*   < > 7.7*   < > 7.0*  7.0*   PLATELETS 10*3/mm3 175  --   --   --  167  --  155    < > = values in this interval not displayed.     Results from last 7 days   Lab Units 11/19/24  0506 11/18/24  2101 11/18/24  0525 11/17/24  2318   SODIUM mmol/L 139 134* 140 141   POTASSIUM mmol/L 4.0 3.2* 3.4* 3.4*   CO2 mmol/L 21.0* 22.0 24.0 23.0   BUN mg/dL 48* 42* 72* 71*   CREATININE mg/dL 5.40* 4.62* 7.01* 6.77*   MAGNESIUM mg/dL 2.2 2.1 2.6* 2.6*   PHOSPHORUS mg/dL 3.9  --  4.5 4.3   GLUCOSE mg/dL 118* 114* 144* 165*     Estimated Creatinine Clearance: 14.4 mL/min (A) (by C-G formula based on SCr of 5.4 mg/dL (H)).    Results from last 7 days   Lab Units 11/19/24  0338   PH, ARTERIAL pH units 7.421   PCO2, ARTERIAL mm Hg 34.6*   PO2 ART mm Hg 79.7*       Images:   CXR reviewed and ET tube above anahi.  Bilateral patchy opacities and basilar atelectasis.  Cardiomegaly noted.    I reviewed the patient's results and images.     Assessment & Plan   Impression        Pneumonia due to COVID-19 virus    Severe obstructive sleep apnea    ESRD on HD T,R,S since 2022    Acute " respiratory failure with hypoxia    Probable cardiac arrest versus severe bradycardia requiring CPR    Generalized seizure    Acute GI bleeding, possible       Plan        1.  Patient admitted here with COVID-19 pneumonia with worsened respiratory status requiring intubation and mechanical ventilation.  Patient was in delirium state.  Wondering if it is due to COVID infection versus some other potential etiology.  Also noted to have generalized tonic-clonic seizure for which neurology is evaluating and patient remains on Keppra.  Continue close monitoring. Check with cardiology to see if pt can have MRI brain due to his pacemaker then we can proceed with MRI brain to further evaluate.  Continue current mechanical ventilation.  Patient is noted to be weaned from mechanical ventilation.  Patient is having significant wheezing.  Will go ahead and schedule DuoNeb nebulizers.  2.  Patient has been treated with antibiotics for atypical infection.  Cultures have remained negative otherwise.  WBC count is normal.  No new fevers.  Monitor closely.  3.  Patient is on low-dose norepinephrine for hypotension.  Hypotension partly sedation related.  On hemodialysis support per nephrology.  Nephrology following and hemodialysis as per them.  4.  There was concern for ongoing GI bleed.  EGD did not show any overt pathology other than suction trauma.  Will stop Protonix infusion and continue on Protonix for GI prophylaxis.  5.  Keofeed was placed and tip is in the stomach.  We will start trophic tube feeds for now..  5.  Continue to wean sedation to assess mental status.  Patient apparently was following commands post CPR.  CPR duration and was not prolonged either.    6.  Continue midodrine.  Continue on stress dose steroids.  7.  Hold anticoagulation for now.  SCDs for DVT prophylaxis.  If hemoglobin remains stable and we will start patient's anticoagulation back.    Daughter updated at bedside.  Discussed guarded prognosis with  her.  Will continue to provide aggressive support at this point.  Check labs and chest x-ray in the morning.      Plan of care and goals reviewed with multidisciplinary/antibiotic stewardship team during rounds.   I discussed the patient's findings and my recommendations with family, nursing staff, and consulting provider       Time spent Critical care 32 min (exclusive of procedure time)  including high complexity decision making to assess, manipulate, and support vital organ system failure in this individual who has impairment of one or more vital organ systems such that there is a high probability of imminent or life threatening deterioration in the patient’s condition.  Above documentation reviewed and reflect accurate information as of 11/19/2024 including copied elements of the note.       Mahin Live MD, [facility]  Pulmonary, Critical care and Sleep Medicine

## 2024-11-19 NOTE — PROGRESS NOTES
" LOS: 7 days   Patient Care Team:  Heriberto Murcia MD as PCP - General (Family Medicine)  Shanell Arango, PharmD as Pharmacist (Pharmacy)  Evelina Quan, PharmD as Pharmacist (Pharmacy)  Peng Melgar, PharmD as Pharmacist (Pharmacy)  Johnathan Taylor MD as Consulting Physician (Cardiac Electrophysiology)  Chato Bo MD as Consulting Physician (Nephrology)  Ivet Beasley APRN as Nurse Practitioner (Cardiology)  Rei Decker PA as Physician Assistant (Cardiology)      Subjective   Reviewed vitals, labs and medications list  Seen and examined   Intubated on ventilator FiO2 35%  Low dose pressor- Levophed   S/p HD yesterday      Objective     Vital Sign Min/Max for last 24 hours  Temp  Min: 97.3 °F (36.3 °C)  Max: 99.1 °F (37.3 °C)   BP  Min: 76/42  Max: 152/41   Pulse  Min: 75  Max: 111   Resp  Min: 20  Max: 20   SpO2  Min: 93 %  Max: 100 %   No data recorded   No data recorded     Flowsheet Rows      Flowsheet Row First Filed Value   Admission Height 177.8 cm (70\") Documented at 11/12/2024 1456   Admission Weight 130 kg (287 lb) Documented at 11/12/2024 1456            I/O this shift:  In: 432.1 [I.V.:372.1; NG/GT:60]  Out: -   I/O last 3 completed shifts:  In: 2510.6 [I.V.:1603.6; Blood:297; Other:190; NG/GT:70; IV Piggyback:350]  Out: 2615 [Emesis/NG output:115]    Physical examination:  COVID precautions taken..  Pulmonary: Intubated on ventilator equal chest movement.  Cardiovascular: Normal rate.  Abdomen: Morbid obesity.  Extremities: Positive edema is noted.  Neurological: Sedated on ventilator  LUE-aVF in place      Results Review:     I reviewed the patient's new clinical results.    WBC WBC   Date Value Ref Range Status   11/19/2024 9.37 3.40 - 10.80 10*3/mm3 Final   11/18/2024 9.37 3.40 - 10.80 10*3/mm3 Final   11/17/2024 9.07 3.40 - 10.80 10*3/mm3 Final   11/16/2024 8.61 3.40 - 10.80 10*3/mm3 Final      HGB Hemoglobin   Date Value Ref Range Status   11/19/2024 7.7 (L) " "13.0 - 17.7 g/dL Final   11/19/2024 7.6 (L) 13.0 - 17.7 g/dL Final   11/18/2024 7.7 (L) 13.0 - 17.7 g/dL Final   11/18/2024 7.5 (L) 13.0 - 17.7 g/dL Final   11/18/2024 7.7 (L) 13.0 - 17.7 g/dL Final   11/17/2024 7.6 (L) 13.0 - 17.7 g/dL Final   11/17/2024 6.6 (C) 13.0 - 17.7 g/dL Final   11/17/2024 7.0 (L) 13.0 - 17.7 g/dL Final   11/17/2024 7.0 (L) 13.0 - 17.7 g/dL Final   11/17/2024 7.0 (L) 13.0 - 17.7 g/dL Final   11/16/2024 8.5 (L) 13.0 - 17.7 g/dL Final   11/16/2024 7.8 (L) 13.0 - 17.7 g/dL Final   11/16/2024 7.8 (L) 13.0 - 17.7 g/dL Final   11/16/2024 7.3 (L) 13.0 - 17.7 g/dL Final   11/16/2024 7.5 (L) 13.0 - 17.7 g/dL Final      HCT Hematocrit   Date Value Ref Range Status   11/19/2024 23.2 (L) 37.5 - 51.0 % Final   11/19/2024 23.0 (L) 37.5 - 51.0 % Final   11/18/2024 23.5 (L) 37.5 - 51.0 % Final   11/18/2024 22.4 (L) 37.5 - 51.0 % Final   11/18/2024 22.8 (L) 37.5 - 51.0 % Final   11/17/2024 22.8 (L) 37.5 - 51.0 % Final   11/17/2024 20.1 (C) 37.5 - 51.0 % Final   11/17/2024 21.9 (L) 37.5 - 51.0 % Final   11/17/2024 21.1 (L) 37.5 - 51.0 % Final   11/17/2024 21.1 (L) 37.5 - 51.0 % Final   11/16/2024 27.8 (L) 37.5 - 51.0 % Final   11/16/2024 23.8 (L) 37.5 - 51.0 % Final   11/16/2024 23.8 (L) 37.5 - 51.0 % Final   11/16/2024 22.8 (L) 37.5 - 51.0 % Final   11/16/2024 23.4 (L) 37.5 - 51.0 % Final      Platlets No results found for: \"LABPLAT\"   MCV MCV   Date Value Ref Range Status   11/19/2024 105.9 (H) 79.0 - 97.0 fL Final   11/18/2024 100.0 (H) 79.0 - 97.0 fL Final   11/17/2024 100.5 (H) 79.0 - 97.0 fL Final   11/16/2024 101.7 (H) 79.0 - 97.0 fL Final          Sodium Sodium   Date Value Ref Range Status   11/19/2024 139 136 - 145 mmol/L Final   11/18/2024 134 (L) 136 - 145 mmol/L Final   11/18/2024 140 136 - 145 mmol/L Final   11/17/2024 141 136 - 145 mmol/L Final   11/17/2024 138 136 - 145 mmol/L Final   11/16/2024 138 136 - 145 mmol/L Final      Potassium Potassium   Date Value Ref Range Status   11/19/2024 4.0 " "3.5 - 5.2 mmol/L Final   11/18/2024 3.2 (L) 3.5 - 5.2 mmol/L Final   11/18/2024 3.4 (L) 3.5 - 5.2 mmol/L Final   11/17/2024 3.4 (L) 3.5 - 5.2 mmol/L Final   11/17/2024 3.5 3.5 - 5.2 mmol/L Final   11/16/2024 3.2 (L) 3.5 - 5.2 mmol/L Final     Comment:     Slight hemolysis detected by analyzer. Result may be falsely elevated.      Chloride Chloride   Date Value Ref Range Status   11/19/2024 100 98 - 107 mmol/L Final   11/18/2024 97 (L) 98 - 107 mmol/L Final   11/18/2024 99 98 - 107 mmol/L Final   11/17/2024 99 98 - 107 mmol/L Final   11/17/2024 97 (L) 98 - 107 mmol/L Final   11/16/2024 95 (L) 98 - 107 mmol/L Final      CO2 CO2   Date Value Ref Range Status   11/19/2024 21.0 (L) 22.0 - 29.0 mmol/L Final   11/18/2024 22.0 22.0 - 29.0 mmol/L Final   11/18/2024 24.0 22.0 - 29.0 mmol/L Final   11/17/2024 23.0 22.0 - 29.0 mmol/L Final   11/17/2024 26.0 22.0 - 29.0 mmol/L Final   11/16/2024 15.0 (L) 22.0 - 29.0 mmol/L Final      BUN BUN   Date Value Ref Range Status   11/19/2024 48 (H) 8 - 23 mg/dL Final   11/18/2024 42 (H) 8 - 23 mg/dL Final   11/18/2024 72 (H) 8 - 23 mg/dL Final   11/17/2024 71 (H) 8 - 23 mg/dL Final   11/17/2024 58 (H) 8 - 23 mg/dL Final   11/16/2024 45 (H) 8 - 23 mg/dL Final      Creatinine Creatinine   Date Value Ref Range Status   11/19/2024 5.40 (H) 0.76 - 1.27 mg/dL Final   11/18/2024 4.62 (H) 0.76 - 1.27 mg/dL Final   11/18/2024 7.01 (H) 0.76 - 1.27 mg/dL Final   11/17/2024 6.77 (H) 0.76 - 1.27 mg/dL Final   11/17/2024 5.50 (H) 0.76 - 1.27 mg/dL Final   11/16/2024 4.91 (H) 0.76 - 1.27 mg/dL Final      Calcium Calcium   Date Value Ref Range Status   11/19/2024 8.4 (L) 8.6 - 10.5 mg/dL Final   11/18/2024 8.1 (L) 8.6 - 10.5 mg/dL Final   11/18/2024 8.4 (L) 8.6 - 10.5 mg/dL Final   11/17/2024 8.4 (L) 8.6 - 10.5 mg/dL Final   11/17/2024 8.1 (L) 8.6 - 10.5 mg/dL Final   11/16/2024 8.6 8.6 - 10.5 mg/dL Final      PO4 No results found for: \"CAPO4\"   Albumin Albumin   Date Value Ref Range Status " "  11/18/2024 3.0 (L) 3.5 - 5.2 g/dL Final   11/17/2024 3.1 (L) 3.5 - 5.2 g/dL Final   11/16/2024 3.5 3.5 - 5.2 g/dL Final      Magnesium Magnesium   Date Value Ref Range Status   11/19/2024 2.2 1.6 - 2.4 mg/dL Final   11/18/2024 2.1 1.6 - 2.4 mg/dL Final   11/18/2024 2.6 (H) 1.6 - 2.4 mg/dL Final   11/17/2024 2.6 (H) 1.6 - 2.4 mg/dL Final   11/17/2024 2.8 (H) 1.6 - 2.4 mg/dL Final   11/16/2024 3.8 (H) 1.6 - 2.4 mg/dL Final      Uric Acid No results found for: \"URICACID\"         Assessment & Plan       Pneumonia due to COVID-19 virus    Severe obstructive sleep apnea    ESRD on HD T,R,S since 2022    Acute respiratory failure with hypoxia    Probable cardiac arrest versus severe bradycardia requiring CPR    Generalized seizure    Acute GI bleeding, possible    =======================     1.  ESRD on HD TTS- No missed treatment sessions  2.  Hypotension IV pressor  3.  HFpEF  4.  Anemia of CKD: Hgb at goal - No indications for BOAZ  5.  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  Volume status: No overt volume overload  7.  COVID- 19 PNA  8.  Afib / rate controlled   9- S/p Cardiac arrest on 11/16/24      PLAN   HD in AM   Will assess daily for HD needs   Will continue manage ESRD and related complications  Daily BMP   High risk complex patient with multiple medical problems.    Shaina Juárez MD  11/19/24  09:02 EST            "

## 2024-11-20 NOTE — PLAN OF CARE
Goal Outcome Evaluation:  Plan of Care Reviewed With: patient        Progress: no change  Outcome Evaluation: .               - Pt remains on vent at PEEP of 5 on 35% FiO2  - Continues of Fentanyl for sedation. CT of head overnight negative for acute intracranial processes.   - Pt is now following commands intermittently. Restng between care opening eyes with minimal stimulation.   - Restraints continued for pt safety  -Continues on low dose Levophed gtt to maintain MAP >65.  - Tolerating tubed feed at 50ml/hr with 20/hr flush. Plan to increase rate at 0800 to 70ml to reach goal rate.   - Anuric, plans for HD this morning.

## 2024-11-20 NOTE — PLAN OF CARE
Goal Outcome Evaluation:  Plan of Care Reviewed With: spouse, child      -Able to wean Fent from 200 to 100. Bolus x2 PRN w/ care. Follows simple commands intermittently.   -Levo generally .08-.09, titrating off art line for goal MAP >60 per orders.   -Peep 5, FIO2 35%. LS coarse/wheeze, moderate alvaro brown/red streaked sputum- culture sent.   -Tolerated HD; 3L off. Anuric at baseline.   -No sz activity observed this shift; neuro updated.   -TF at goal; tolerating well. No BM, normoactive bowel sounds.   -BUE restraints in place for safety.       Problem: Adult Inpatient Plan of Care  Goal: Plan of Care Review  Outcome: Progressing  Flowsheets (Taken 11/20/2024 1853)  Plan of Care Reviewed With:   spouse   child  Goal: Patient-Specific Goal (Individualized)  Outcome: Progressing  Goal: Absence of Hospital-Acquired Illness or Injury  Outcome: Progressing  Intervention: Identify and Manage Fall Risk  Recent Flowsheet Documentation  Taken 11/20/2024 1600 by Wendy Roberts RN  Safety Promotion/Fall Prevention:   activity supervised   assistive device/personal items within reach   clutter free environment maintained   fall prevention program maintained   lighting adjusted   room organization consistent   safety round/check completed   toileting scheduled  Taken 11/20/2024 1400 by Wendy Roberts, RN  Safety Promotion/Fall Prevention:   activity supervised   assistive device/personal items within reach   clutter free environment maintained   fall prevention program maintained   lighting adjusted   toileting scheduled   safety round/check completed   room organization consistent  Taken 11/20/2024 1200 by Wendy Roberts, RN  Safety Promotion/Fall Prevention:   activity supervised   assistive device/personal items within reach   clutter free environment maintained   fall prevention program maintained   lighting adjusted   room organization consistent   safety round/check completed   toileting scheduled  Taken  11/20/2024 1000 by Wendy Roberts RN  Safety Promotion/Fall Prevention:   activity supervised   assistive device/personal items within reach   clutter free environment maintained   fall prevention program maintained   lighting adjusted   toileting scheduled   safety round/check completed   room organization consistent  Taken 11/20/2024 0801 by Wendy Roberts RN  Safety Promotion/Fall Prevention:   activity supervised   assistive device/personal items within reach   clutter free environment maintained   fall prevention program maintained   lighting adjusted   toileting scheduled   safety round/check completed   room organization consistent  Intervention: Prevent Skin Injury  Recent Flowsheet Documentation  Taken 11/20/2024 1600 by Wendy Roberts RN  Body Position:   turned   supine  Skin Protection:   incontinence pads utilized   silicone foam dressing in place  Taken 11/20/2024 1400 by Wendy Roberts RN  Body Position:   left   turned  Skin Protection:   incontinence pads utilized   silicone foam dressing in place  Taken 11/20/2024 1200 by Wendy Roberts RN  Body Position: (weight shifted; unable to turn fully; HD ongoing) weight shifting  Skin Protection:   incontinence pads utilized   silicone foam dressing in place  Taken 11/20/2024 1000 by Wendy Roberts RN  Body Position: (unable to turn at this time; undergoing HD) supine  Skin Protection:   incontinence pads utilized   silicone foam dressing in place  Taken 11/20/2024 0801 by Wendy Roberts RN  Body Position:   turned   supine  Taken 11/20/2024 0800 by Wendy Roberts RN  Skin Protection:   incontinence pads utilized   silicone foam dressing in place  Intervention: Prevent and Manage VTE (Venous Thromboembolism) Risk  Recent Flowsheet Documentation  Taken 11/20/2024 1600 by Wendy Roberts RN  VTE Prevention/Management:   bilateral   SCDs (sequential compression devices) on  Taken 11/20/2024 1200 by Armando  Wendy ALVAREZ RN  VTE Prevention/Management:   bilateral   SCDs (sequential compression devices) on  Taken 11/20/2024 0800 by Wendy Roberts RN  VTE Prevention/Management:   bilateral   SCDs (sequential compression devices) on  Intervention: Prevent Infection  Recent Flowsheet Documentation  Taken 11/20/2024 1600 by Wendy Roberts RN  Infection Prevention:   environmental surveillance performed   equipment surfaces disinfected   hand hygiene promoted   personal protective equipment utilized   rest/sleep promoted   single patient room provided   visitors restricted/screened  Taken 11/20/2024 1400 by Wendy oRberts RN  Infection Prevention:   environmental surveillance performed   hand hygiene promoted   equipment surfaces disinfected   personal protective equipment utilized   visitors restricted/screened   single patient room provided   rest/sleep promoted  Taken 11/20/2024 1200 by Wendy Roberts RN  Infection Prevention:   environmental surveillance performed   equipment surfaces disinfected   hand hygiene promoted   personal protective equipment utilized   rest/sleep promoted   single patient room provided   visitors restricted/screened  Taken 11/20/2024 1000 by Wendy Roberts RN  Infection Prevention:   environmental surveillance performed   equipment surfaces disinfected   hand hygiene promoted   personal protective equipment utilized   rest/sleep promoted   single patient room provided   visitors restricted/screened  Taken 11/20/2024 0801 by Wendy Roberts RN  Infection Prevention:   environmental surveillance performed   equipment surfaces disinfected   hand hygiene promoted   personal protective equipment utilized   rest/sleep promoted   single patient room provided   visitors restricted/screened  Goal: Optimal Comfort and Wellbeing  Outcome: Progressing  Intervention: Monitor Pain and Promote Comfort  Recent Flowsheet Documentation  Taken 11/20/2024 1600 by Wendy Roberts  A, RN  Pain Management Interventions:   care clustered   pain medication given   position adjusted  Intervention: Provide Person-Centered Care  Recent Flowsheet Documentation  Taken 11/20/2024 1600 by Wendy Roberts RN  Trust Relationship/Rapport:   care explained   reassurance provided  Taken 11/20/2024 1400 by Wendy Roberts RN  Trust Relationship/Rapport:   care explained   reassurance provided  Taken 11/20/2024 1000 by Wendy Roberts RN  Trust Relationship/Rapport:   care explained   reassurance provided  Taken 11/20/2024 0800 by Wendy Roberts RN  Trust Relationship/Rapport:   care explained   reassurance provided  Goal: Readiness for Transition of Care  Outcome: Progressing  Goal: Plan of Care Review  Outcome: Progressing  Flowsheets (Taken 11/20/2024 1853)  Plan of Care Reviewed With:   spouse   child  Goal: Patient-Specific Goal (Individualized)  Outcome: Progressing  Goal: Absence of Hospital-Acquired Illness or Injury  Outcome: Progressing  Intervention: Identify and Manage Fall Risk  Recent Flowsheet Documentation  Taken 11/20/2024 1600 by Wendy Roberts RN  Safety Promotion/Fall Prevention:   activity supervised   assistive device/personal items within reach   clutter free environment maintained   fall prevention program maintained   lighting adjusted   room organization consistent   safety round/check completed   toileting scheduled  Taken 11/20/2024 1400 by Wendy Roberts RN  Safety Promotion/Fall Prevention:   activity supervised   assistive device/personal items within reach   clutter free environment maintained   fall prevention program maintained   lighting adjusted   toileting scheduled   safety round/check completed   room organization consistent  Taken 11/20/2024 1200 by Wendy Roberts RN  Safety Promotion/Fall Prevention:   activity supervised   assistive device/personal items within reach   clutter free environment maintained   fall prevention program  maintained   lighting adjusted   room organization consistent   safety round/check completed   toileting scheduled  Taken 11/20/2024 1000 by Wendy Roberts RN  Safety Promotion/Fall Prevention:   activity supervised   assistive device/personal items within reach   clutter free environment maintained   fall prevention program maintained   lighting adjusted   toileting scheduled   safety round/check completed   room organization consistent  Taken 11/20/2024 0801 by Wendy Roberts RN  Safety Promotion/Fall Prevention:   activity supervised   assistive device/personal items within reach   clutter free environment maintained   fall prevention program maintained   lighting adjusted   toileting scheduled   safety round/check completed   room organization consistent  Intervention: Prevent Skin Injury  Recent Flowsheet Documentation  Taken 11/20/2024 1600 by Wendy Roberts RN  Body Position:   turned   supine  Skin Protection:   incontinence pads utilized   silicone foam dressing in place  Taken 11/20/2024 1400 by Wendy Roberts RN  Body Position:   left   turned  Skin Protection:   incontinence pads utilized   silicone foam dressing in place  Taken 11/20/2024 1200 by Wendy Roberts RN  Body Position: (weight shifted; unable to turn fully; HD ongoing) weight shifting  Skin Protection:   incontinence pads utilized   silicone foam dressing in place  Taken 11/20/2024 1000 by Wendy Roberts RN  Body Position: (unable to turn at this time; undergoing HD) supine  Skin Protection:   incontinence pads utilized   silicone foam dressing in place  Taken 11/20/2024 0801 by Wendy Roberts RN  Body Position:   turned   supine  Taken 11/20/2024 0800 by Wendy Roberts RN  Skin Protection:   incontinence pads utilized   silicone foam dressing in place  Intervention: Prevent and Manage VTE (Venous Thromboembolism) Risk  Recent Flowsheet Documentation  Taken 11/20/2024 1600 by Wendy Roberts  A, RN  VTE Prevention/Management:   bilateral   SCDs (sequential compression devices) on  Taken 11/20/2024 1200 by Wendy Roberts RN  VTE Prevention/Management:   bilateral   SCDs (sequential compression devices) on  Taken 11/20/2024 0800 by Wendy Roberts RN  VTE Prevention/Management:   bilateral   SCDs (sequential compression devices) on  Intervention: Prevent Infection  Recent Flowsheet Documentation  Taken 11/20/2024 1600 by Wendy Roberts RN  Infection Prevention:   environmental surveillance performed   equipment surfaces disinfected   hand hygiene promoted   personal protective equipment utilized   rest/sleep promoted   single patient room provided   visitors restricted/screened  Taken 11/20/2024 1400 by Wendy Roberts RN  Infection Prevention:   environmental surveillance performed   hand hygiene promoted   equipment surfaces disinfected   personal protective equipment utilized   visitors restricted/screened   single patient room provided   rest/sleep promoted  Taken 11/20/2024 1200 by Wendy Roberts RN  Infection Prevention:   environmental surveillance performed   equipment surfaces disinfected   hand hygiene promoted   personal protective equipment utilized   rest/sleep promoted   single patient room provided   visitors restricted/screened  Taken 11/20/2024 1000 by Wendy Roberts RN  Infection Prevention:   environmental surveillance performed   equipment surfaces disinfected   hand hygiene promoted   personal protective equipment utilized   rest/sleep promoted   single patient room provided   visitors restricted/screened  Taken 11/20/2024 0801 by Wendy Roberts RN  Infection Prevention:   environmental surveillance performed   equipment surfaces disinfected   hand hygiene promoted   personal protective equipment utilized   rest/sleep promoted   single patient room provided   visitors restricted/screened  Goal: Optimal Comfort and Wellbeing  Outcome:  Progressing  Intervention: Monitor Pain and Promote Comfort  Recent Flowsheet Documentation  Taken 11/20/2024 1600 by Wendy Roberts RN  Pain Management Interventions:   care clustered   pain medication given   position adjusted  Intervention: Provide Person-Centered Care  Recent Flowsheet Documentation  Taken 11/20/2024 1600 by Wendy Roberts RN  Trust Relationship/Rapport:   care explained   reassurance provided  Taken 11/20/2024 1400 by Wendy Roberts RN  Trust Relationship/Rapport:   care explained   reassurance provided  Taken 11/20/2024 1000 by Wendy Roberts RN  Trust Relationship/Rapport:   care explained   reassurance provided  Taken 11/20/2024 0800 by Wendy Roberts RN  Trust Relationship/Rapport:   care explained   reassurance provided  Goal: Readiness for Transition of Care  Outcome: Progressing     Problem: Fall Injury Risk  Goal: Absence of Fall and Fall-Related Injury  Outcome: Progressing  Intervention: Identify and Manage Contributors  Recent Flowsheet Documentation  Taken 11/20/2024 1600 by Wendy Roberts RN  Medication Review/Management: medications reviewed  Taken 11/20/2024 1400 by Wendy Roberts RN  Medication Review/Management: medications reviewed  Taken 11/20/2024 1200 by Wendy Roberts RN  Medication Review/Management: medications reviewed  Taken 11/20/2024 1000 by Wendy Roberts RN  Medication Review/Management: medications reviewed  Taken 11/20/2024 0801 by Wendy Roberts RN  Medication Review/Management: medications reviewed  Intervention: Promote Injury-Free Environment  Recent Flowsheet Documentation  Taken 11/20/2024 1600 by Wendy Roberts RN  Safety Promotion/Fall Prevention:   activity supervised   assistive device/personal items within reach   clutter free environment maintained   fall prevention program maintained   lighting adjusted   room organization consistent   safety round/check completed   toileting  scheduled  Taken 11/20/2024 1400 by Wendy Roberts RN  Safety Promotion/Fall Prevention:   activity supervised   assistive device/personal items within reach   clutter free environment maintained   fall prevention program maintained   lighting adjusted   toileting scheduled   safety round/check completed   room organization consistent  Taken 11/20/2024 1200 by Wendy Roberts RN  Safety Promotion/Fall Prevention:   activity supervised   assistive device/personal items within reach   clutter free environment maintained   fall prevention program maintained   lighting adjusted   room organization consistent   safety round/check completed   toileting scheduled  Taken 11/20/2024 1000 by Wendy Roberts RN  Safety Promotion/Fall Prevention:   activity supervised   assistive device/personal items within reach   clutter free environment maintained   fall prevention program maintained   lighting adjusted   toileting scheduled   safety round/check completed   room organization consistent  Taken 11/20/2024 0801 by Wendy Roberts RN  Safety Promotion/Fall Prevention:   activity supervised   assistive device/personal items within reach   clutter free environment maintained   fall prevention program maintained   lighting adjusted   toileting scheduled   safety round/check completed   room organization consistent     Problem: Hemodialysis  Goal: Safe, Effective Therapy Delivery  Outcome: Progressing  Intervention: Optimize Device Care and Function  Recent Flowsheet Documentation  Taken 11/20/2024 1600 by Wendy Roberts RN  Medication Review/Management: medications reviewed  Taken 11/20/2024 1400 by Wendy Roberts RN  Medication Review/Management: medications reviewed  Taken 11/20/2024 1200 by Wendy Roberts RN  Medication Review/Management: medications reviewed  Taken 11/20/2024 1000 by Wendy Roberts RN  Medication Review/Management: medications reviewed  Taken 11/20/2024 0801 by  Wendy Roberts RN  Medication Review/Management: medications reviewed  Goal: Effective Tissue Perfusion  Outcome: Progressing  Goal: Absence of Infection Signs and Symptoms  Outcome: Progressing  Intervention: Prevent or Manage Infection  Recent Flowsheet Documentation  Taken 11/20/2024 1600 by Wendy Roberts RN  Infection Prevention:   environmental surveillance performed   equipment surfaces disinfected   hand hygiene promoted   personal protective equipment utilized   rest/sleep promoted   single patient room provided   visitors restricted/screened  Taken 11/20/2024 1400 by Wendy Roberts RN  Infection Prevention:   environmental surveillance performed   hand hygiene promoted   equipment surfaces disinfected   personal protective equipment utilized   visitors restricted/screened   single patient room provided   rest/sleep promoted  Taken 11/20/2024 1200 by Wendy Roberts RN  Infection Prevention:   environmental surveillance performed   equipment surfaces disinfected   hand hygiene promoted   personal protective equipment utilized   rest/sleep promoted   single patient room provided   visitors restricted/screened  Taken 11/20/2024 1000 by Wendy Roberts RN  Infection Prevention:   environmental surveillance performed   equipment surfaces disinfected   hand hygiene promoted   personal protective equipment utilized   rest/sleep promoted   single patient room provided   visitors restricted/screened  Taken 11/20/2024 0801 by Wendy Roberts RN  Infection Prevention:   environmental surveillance performed   equipment surfaces disinfected   hand hygiene promoted   personal protective equipment utilized   rest/sleep promoted   single patient room provided   visitors restricted/screened     Problem: Noninvasive Ventilation Acute  Goal: Effective Unassisted Ventilation and Oxygenation  Outcome: Progressing     Problem: Skin Injury Risk Increased  Goal: Skin Health and Integrity  Outcome:  Progressing  Intervention: Optimize Skin Protection  Recent Flowsheet Documentation  Taken 11/20/2024 1600 by Wendy Roberts RN  Activity Management: bedrest  Pressure Reduction Techniques:   weight shift assistance provided   pressure points protected   heels elevated off bed  Head of Bed (HOB) Positioning: [facility] elevated  Pressure Reduction Devices:   specialty bed utilized   pressure-redistributing mattress utilized   positioning supports utilized   heel offloading device utilized   alternating pressure pump (JUWAN)  Skin Protection:   incontinence pads utilized   silicone foam dressing in place  Taken 11/20/2024 1400 by Wendy Roberts RN  Activity Management: bedrest  Pressure Reduction Techniques:   weight shift assistance provided   pressure points protected   heels elevated off bed  Head of Bed (HOB) Positioning: [facility] elevated  Pressure Reduction Devices:   specialty bed utilized   pressure-redistributing mattress utilized   alternating pressure pump (JUWAN)   foam padding utilized  Skin Protection:   incontinence pads utilized   silicone foam dressing in place  Taken 11/20/2024 1200 by Wendy Roberts RN  Activity Management: bedrest  Pressure Reduction Techniques:   weight shift assistance provided   rest period provided between sit times   heels elevated off bed   pressure points protected  Head of Bed (HOB) Positioning: [facility] elevated  Pressure Reduction Devices:   specialty bed utilized   pressure-redistributing mattress utilized   positioning supports utilized   heel offloading device utilized   alternating pressure pump (JUWAN)  Skin Protection:   incontinence pads utilized   silicone foam dressing in place  Taken 11/20/2024 1000 by Wendy Roberts RN  Activity Management: bedrest  Pressure Reduction Techniques:   weight shift assistance provided   pressure points protected   heels elevated off bed  Head of Bed (HOB) Positioning: [facility] elevated  Pressure Reduction Devices:   alternating  pressure pump (JUWAN)   foam padding utilized   positioning supports utilized   pressure-redistributing mattress utilized   specialty bed utilized  Skin Protection:   incontinence pads utilized   silicone foam dressing in place  Taken 11/20/2024 0801 by Wendy Roberts RN  Activity Management: bedrest  Head of Bed (HOB) Positioning: HOB elevated  Taken 11/20/2024 0800 by Wendy Roberts RN  Pressure Reduction Techniques:   weight shift assistance provided   pressure points protected   heels elevated off bed  Pressure Reduction Devices:   specialty bed utilized   pressure-redistributing mattress utilized   foam padding utilized   alternating pressure pump (JUWAN)  Skin Protection:   incontinence pads utilized   silicone foam dressing in place     Problem: Sepsis/Septic Shock  Goal: Optimal Coping  Outcome: Progressing  Intervention: Support Patient and Family Response  Recent Flowsheet Documentation  Taken 11/20/2024 1400 by Wendy Roberts RN  Family/Support System Care:   support provided   self-care encouraged  Taken 11/20/2024 1200 by Wendy Roberts RN  Family/Support System Care:   support provided   self-care encouraged  Taken 11/20/2024 0800 by Wendy Roberts RN  Family/Support System Care:   support provided   self-care encouraged  Goal: Absence of Bleeding  Outcome: Progressing  Goal: Blood Glucose Level Within Target Range  Outcome: Progressing  Goal: Absence of Infection Signs and Symptoms  Outcome: Progressing  Intervention: Initiate Sepsis Management  Recent Flowsheet Documentation  Taken 11/20/2024 1600 by Wendy Roberts RN  Infection Prevention:   environmental surveillance performed   equipment surfaces disinfected   hand hygiene promoted   personal protective equipment utilized   rest/sleep promoted   single patient room provided   visitors restricted/screened  Isolation Precautions:   precautions maintained   airborne   contact  Taken 11/20/2024 1400 by Wendy Roberts  A, RN  Infection Prevention:   environmental surveillance performed   hand hygiene promoted   equipment surfaces disinfected   personal protective equipment utilized   visitors restricted/screened   single patient room provided   rest/sleep promoted  Isolation Precautions:   precautions maintained   airborne   contact  Taken 11/20/2024 1200 by Wendy Roberts RN  Infection Prevention:   environmental surveillance performed   equipment surfaces disinfected   hand hygiene promoted   personal protective equipment utilized   rest/sleep promoted   single patient room provided   visitors restricted/screened  Isolation Precautions:   precautions maintained   airborne   contact  Taken 11/20/2024 1000 by Wendy Roberts RN  Infection Prevention:   environmental surveillance performed   equipment surfaces disinfected   hand hygiene promoted   personal protective equipment utilized   rest/sleep promoted   single patient room provided   visitors restricted/screened  Isolation Precautions:   precautions maintained   airborne   contact  Taken 11/20/2024 0801 by Wendy Roberts RN  Infection Prevention:   environmental surveillance performed   equipment surfaces disinfected   hand hygiene promoted   personal protective equipment utilized   rest/sleep promoted   single patient room provided   visitors restricted/screened  Isolation Precautions:   airborne   contact   precautions maintained  Intervention: Promote Recovery  Recent Flowsheet Documentation  Taken 11/20/2024 1600 by Wendy Roberts RN  Activity Management: bedrest  Taken 11/20/2024 1400 by Wendy Roberts RN  Activity Management: bedrest  Taken 11/20/2024 1200 by Wendy Roberts RN  Activity Management: bedrest  Taken 11/20/2024 1000 by Wendy Roberts RN  Activity Management: bedrest  Taken 11/20/2024 0801 by Wendy Roberts RN  Activity Management: bedrest  Goal: Optimal Nutrition Delivery  Outcome: Progressing     Problem:  Restraint, Nonviolent  Goal: Absence of Harm or Injury  Outcome: Progressing  Intervention: Implement Least Restrictive Safety Strategies  Recent Flowsheet Documentation  Taken 11/20/2024 1600 by Wendy Roberts RN  Medical Device Protection:   IV pole/bag removed from visual field   tubing secured  Diversional Activities: television  Taken 11/20/2024 1400 by Wendy Roberts RN  Medical Device Protection:   IV pole/bag removed from visual field   tubing secured  Diversional Activities: television  Taken 11/20/2024 1200 by Wendy Roberts RN  Medical Device Protection:   IV pole/bag removed from visual field   tubing secured  Diversional Activities: television  Taken 11/20/2024 1000 by Wendy Roberts RN  Medical Device Protection:   IV pole/bag removed from visual field   tubing secured  Diversional Activities: television  Taken 11/20/2024 0801 by Wendy Roberts RN  Medical Device Protection:   IV pole/bag removed from visual field   tubing secured  Taken 11/20/2024 0800 by Wendy Roberts RN  Medical Device Protection:   IV pole/bag removed from visual field   tubing secured  Diversional Activities: television  Intervention: Protect Dignity, Rights and Personal Wellbeing  Recent Flowsheet Documentation  Taken 11/20/2024 1600 by Wendy Roberts RN  Trust Relationship/Rapport:   care explained   reassurance provided  Taken 11/20/2024 1400 by Wendy Roberts RN  Trust Relationship/Rapport:   care explained   reassurance provided  Taken 11/20/2024 1000 by Wendy Roberts RN  Trust Relationship/Rapport:   care explained   reassurance provided  Taken 11/20/2024 0800 by Wendy Roberts RN  Trust Relationship/Rapport:   care explained   reassurance provided  Intervention: Protect Skin and Joint Integrity  Recent Flowsheet Documentation  Taken 11/20/2024 1600 by Wendy Roberts RN  Body Position:   turned   supine  Skin Protection:   incontinence pads utilized    silicone foam dressing in place  Taken 11/20/2024 1400 by Wendy Roberts RN  Body Position:   left   turned  Skin Protection:   incontinence pads utilized   silicone foam dressing in place  Taken 11/20/2024 1200 by Wendy Roberts RN  Body Position: (weight shifted; unable to turn fully; HD ongoing) weight shifting  Skin Protection:   incontinence pads utilized   silicone foam dressing in place  Taken 11/20/2024 1000 by Wendy Roberts RN  Body Position: (unable to turn at this time; undergoing HD) supine  Skin Protection:   incontinence pads utilized   silicone foam dressing in place  Taken 11/20/2024 0801 by Wendy Roberts RN  Body Position:   turned   supine  Taken 11/20/2024 0800 by Wendy Roberts RN  Skin Protection:   incontinence pads utilized   silicone foam dressing in place     Problem: Mechanical Ventilation Invasive  Goal: Effective Communication  Outcome: Progressing  Intervention: Ensure Effective Communication  Recent Flowsheet Documentation  Taken 11/20/2024 1600 by Wendy Roberts RN  Trust Relationship/Rapport:   care explained   reassurance provided  Diversional Activities: television  Taken 11/20/2024 1400 by Wendy Roberts RN  Trust Relationship/Rapport:   care explained   reassurance provided  Diversional Activities: television  Family/Support System Care:   support provided   self-care encouraged  Taken 11/20/2024 1200 by Wendy Roberts RN  Diversional Activities: television  Family/Support System Care:   support provided   self-care encouraged  Taken 11/20/2024 1000 by Wendy Roberts RN  Trust Relationship/Rapport:   care explained   reassurance provided  Diversional Activities: television  Taken 11/20/2024 0800 by Wendy Roberts RN  Trust Relationship/Rapport:   care explained   reassurance provided  Diversional Activities: television  Family/Support System Care:   support provided   self-care encouraged  Goal: Optimal Device  Function  Outcome: Progressing  Intervention: Optimize Device Care and Function  Recent Flowsheet Documentation  Taken 11/20/2024 1600 by Wendy Roberts RN  Oral Care:   lip/mouth moisturizer applied   swabbed with antiseptic solution   teeth brushed - suction toothbrush   tongue brushed  Airway Safety Measures: manual resuscitator/mask at bedside  Taken 11/20/2024 1400 by Wendy Roberts RN  Oral Care:   lip/mouth moisturizer applied   teeth brushed - suction toothbrush   tongue brushed   swabbed with antiseptic solution  Airway Safety Measures: manual resuscitator/mask at bedside  Taken 11/20/2024 1200 by Wendy Roberts RN  Airway Safety Measures: manual resuscitator/mask at bedside  Taken 11/20/2024 1000 by Wendy Roberts RN  Airway Safety Measures: manual resuscitator/mask at bedside  Taken 11/20/2024 0801 by Wendy Roberts RN  Oral Care:   lip/mouth moisturizer applied   swabbed with antiseptic solution   teeth brushed - suction toothbrush   tongue brushed  Airway Safety Measures: manual resuscitator/mask at bedside  Goal: Mechanical Ventilation Liberation  Outcome: Progressing  Intervention: Promote Extubation and Mechanical Ventilation Liberation  Recent Flowsheet Documentation  Taken 11/20/2024 1600 by Wendy Roberts RN  Medication Review/Management: medications reviewed  Taken 11/20/2024 1400 by Wendy Roberts RN  Medication Review/Management: medications reviewed  Taken 11/20/2024 1200 by Wendy Roberts RN  Medication Review/Management: medications reviewed  Taken 11/20/2024 1000 by Wendy Roberts RN  Medication Review/Management: medications reviewed  Taken 11/20/2024 0801 by Wendy Roberts RN  Medication Review/Management: medications reviewed  Goal: Optimal Nutrition Delivery  Outcome: Progressing  Goal: Absence of Device-Related Skin and Tissue Injury  Outcome: Progressing  Intervention: Maintain Skin and Tissue Health  Recent Flowsheet  Documentation  Taken 11/20/2024 1600 by Wendy Roberts RN  Device Skin Pressure Protection:   tubing/devices free from skin contact   skin-to-device areas padded   pressure points protected   positioning supports utilized   adhesive use limited   absorbent pad utilized/changed  Taken 11/20/2024 1400 by Wendy Roberts RN  Device Skin Pressure Protection:   tubing/devices free from skin contact   skin-to-device areas padded   pressure points protected   positioning supports utilized   adhesive use limited   absorbent pad utilized/changed  Taken 11/20/2024 1200 by Wendy Roberts RN  Device Skin Pressure Protection:   tubing/devices free from skin contact   skin-to-device areas padded   pressure points protected   positioning supports utilized   adhesive use limited   absorbent pad utilized/changed  Taken 11/20/2024 1000 by Wendy Roberts RN  Device Skin Pressure Protection:   absorbent pad utilized/changed   adhesive use limited   positioning supports utilized   pressure points protected   skin-to-device areas padded   tubing/devices free from skin contact  Taken 11/20/2024 0800 by Wendy Roberts RN  Device Skin Pressure Protection:   absorbent pad utilized/changed   adhesive use limited   positioning supports utilized   pressure points protected   skin-to-device areas padded   tubing/devices free from skin contact  Goal: Absence of Ventilator-Induced Lung Injury  Outcome: Progressing  Intervention: Prevent Ventilator-Associated Pneumonia  Recent Flowsheet Documentation  Taken 11/20/2024 1600 by Wendy Roberts RN  Head of Bed ([facility]) Positioning: [facility] elevated  Oral Care:   lip/mouth moisturizer applied   swabbed with antiseptic solution   teeth brushed - suction toothbrush   tongue brushed  Taken 11/20/2024 1400 by Wendy Roberts RN  Head of Bed ([facility]) Positioning: [facility] elevated  Oral Care:   lip/mouth moisturizer applied   teeth brushed - suction toothbrush   tongue brushed    swabbed with antiseptic solution  Taken 11/20/2024 1200 by Wendy Roberts RN  Head of Bed ([facility]) Positioning: HOB elevated  Taken 11/20/2024 1000 by Wendy Roberts RN  Head of Bed ([facility]) Positioning: HOB elevated  Taken 11/20/2024 0801 by Wendy Roberts RN  Head of Bed ([facility]) Positioning: HOB elevated  Oral Care:   lip/mouth moisturizer applied   swabbed with antiseptic solution   teeth brushed - suction toothbrush   tongue brushed  Taken 11/20/2024 0800 by Wendy Roberts RN  VAP Prevention Measures: completed

## 2024-11-20 NOTE — CASE MANAGEMENT/SOCIAL WORK
Continued Stay Note   Isabela     Patient Name: Marco Antonio Montemayor  MRN: [id number]  Today's Date: 11/20/2024    Admit Date: 11/12/2024    Plan: TBD   Discharge Plan       Row Name 11/20/24 1056       Plan    Plan TBD    Patient/Family in Agreement with Plan other (see comments)    Plan Comments Discussed in MDR, wean sedation, ICD not compatible w/MRI.Tolerating tube feeds @ goal. HD today, baseline on HD T/TH/F. Nephrology, Neurology following. D/C TBD @ this time. CM will continue to follow.                   Discharge Codes    No documentation.                 Expected Discharge Date and Time       Expected Discharge Date Expected Discharge Time    Nov 18, 2024               Grey Meza RN

## 2024-11-20 NOTE — PROGRESS NOTES
Neurology Note    Patient:  Marco Antonio Montemayor    YOB: 1945    REFERRING PHYSICIAN:  Dr. Live    CHIEF COMPLAINT:    AMS    HISTORY OF PRESENT ILLNESS:   The patient has been responding to RN and at time following commands when sedation is decreased. Versed has been stopped, continues on fentanyl. No seizures reported.    Past Medical History:  Past Medical History:   Diagnosis Date    Abnormal ECG     Arrhythmia     Arthritis     Asthma     Cancer     skin cancer removed from various locations     Chronic diastolic congestive heart failure 09/14/2018    CKD (chronic kidney disease)     closer to stage 5    Coronary artery disease     Dry skin     Dyslipidemia 07/07/2016    Edema     Generalized seizure 11/17/2024    Heart murmur     Hematoma     Hemodialysis access site with arteriovenous graft     History of transfusion     no transfusion reaction    Hypertension     Iron deficiency anemia     Long term current use of anticoagulant     ZULEIKA treated with BiPAP     Persistent atrial fibrillation 07/07/2016     Patient notes dyspnea, fatigue and palpitations during episodes of afib.  multiple external cardioversionsChads vasc 2, anticoagulated with Coumadin    Tinnitus     Wears glasses     readers       Past Surgical History:  Past Surgical History:   Procedure Laterality Date    ABLATION OF DYSRHYTHMIC FOCUS      APPENDECTOMY      ARTERIOVENOUS FISTULA/SHUNT SURGERY Left 08/04/2022    Procedure: UPPER EXTREMITY BRACHIOCEPHALIC ARTERIOVENOUS FISTULA FORMATION LEFT;  Surgeon: Jakob Reza MD;  Location:  [facility];  Service: Vascular;  Laterality: Left;    ARTERIOVENOUS FISTULA/SHUNT SURGERY Left 11/03/2022    Procedure: ELEVATION LEFT UPPER EXTREMITY AV FISTULA;  Surgeon: Jakob Reza MD;  Location:  [facility];  Service: Vascular;  Laterality: Left;    CARDIAC ELECTROPHYSIOLOGY PROCEDURE N/A 05/18/2018    Procedure: PPM generator change - dual       [facility]/ please schedule in 8  weeks;  Surgeon: Johnathan Taylor MD;  Location:  [facility];  Service: Cardiovascular    CATARACT EXTRACTION      bilat wiht lens     COLONOSCOPY      ENDOSCOPY      Pt denies    ENDOSCOPY N/A 11/18/2024    Procedure: ESOPHAGOGASTRODUODENOSCOPY;  Surgeon: Heriberto Medeiros MD;  Location:  [facility];  Service: Gastroenterology;  Laterality: N/A;    INSERT / REPLACE / REMOVE PACEMAKER  about 20 years back    SKIN CANCER EXCISION      various locations     TONSILLECTOMY      VEIN SURGERY  for dialysis    WISDOM TOOTH EXTRACTION         Social History:   Social History     Socioeconomic History    Marital status:    Tobacco Use    Smoking status: Never     Passive exposure: Never    Smokeless tobacco: Never   Vaping Use    Vaping status: Never Used   Substance and Sexual Activity    Alcohol use: No    Drug use: No    Sexual activity: Never        Family History:   Family History   Problem Relation Age of Onset    Cancer Mother     Heart attack Mother     Hypertension Mother     Arrhythmia Father     Hypertension Father     Heart attack Father        Medications Prior to Admission:    Prior to Admission medications    Medication Sig Start Date End Date Taking? Authorizing Provider   acetaminophen (TYLENOL) 500 MG tablet Take 1 tablet by mouth Every 6 (Six) Hours As Needed for Mild Pain.   Yes Adrian Munson MD   atorvastatin (LIPITOR) 40 MG tablet TAKE ONE TABLET BY MOUTH DAILY 1/15/24  Yes Ivet Beasley APRN   bumetanide (BUMEX) 2 MG tablet TAKE 1 TABLET BY MOUTH EVERY OTHER DAY - TAKE ON DAYS OFF FROM DIALYSIS (TUESDAY, THURSDAY, SATURDAY AND SUNDAY) 5/28/24  Yes Ivet Beasley APRN   carvedilol (COREG) 12.5 MG tablet TAKE ONE TABLET BY MOUTH TWICE A DAY 9/18/23  Yes Ivet Beasley APRN   cholecalciferol (VITAMIN D3) 1000 UNITS tablet Take 1 tablet by mouth Daily.   Yes Adrian Munson MD   midodrine (PROAMATINE) 10 MG tablet Take 1 tablet by mouth 3 (Three) Times a  Day Before Meals. Before Dialysis, Douglas Munoz, Sat 3/16/24  Yes ProviderAdrian MD   Sucroferric Oxyhydroxide (Velphoro) 500 MG chewable tablet Take As Directed. Crush or chew and swallow 3 tablets by mouth 3 times a day with meals and 1 tablet twice a day with snacks. 12/9/22  Yes Adrian Munson MD   vitamin B-12 (CYANOCOBALAMIN) 1000 MCG tablet Take 1 tablet by mouth Daily.   Yes ProviderAdrian MD   warfarin (COUMADIN) 2.5 MG tablet Take 2-3 tablets by mouth daily or as directed by anticoagulation clinic 10/22/24  Yes Ivet Beasley APRN   cefdinir (OMNICEF) 300 MG capsule Take one capsule on day one followed by one capsule on dialysis days after dialysis is complete until they are gone. 11/6/24   Heriberto Murcia MD   Ferrous Sulfate (IRON) 325 (65 FE) MG tablet Take 65 mg by mouth Daily.    Provider, MD Adrian   nitroglycerin (NITROSTAT) 0.3 MG SL tablet 1 under the tongue as needed for angina, may repeat q5mins for up three doses 7/14/23   Ivet Beasley APRN       Allergies:  Patient has no known allergies.      Review of system  Review of Systems   Unable to perform ROS: Intubated       Vitals:    11/20/24 1415   BP:    Pulse: 93   Resp:    Temp:    SpO2: 93%       Physical exam  Physical Exam  Constitutional:       Interventions: He is intubated.   Cardiovascular:      Rate and Rhythm: Normal rate and regular rhythm.   Pulmonary:      Effort: He is intubated.   Musculoskeletal:      Cervical back: Neck supple.   Neurological:      Comments: Sedated, no response to voice, pupils 2 mm, tone flaccid.           Lab Results   Component Value Date    WBC 9.79 11/20/2024    HGB 8.4 (L) 11/20/2024    HCT 26.2 (L) 11/20/2024    .2 (H) 11/20/2024     11/20/2024     Lab Results   Component Value Date    GLUCOSE 146 (H) 11/20/2024    BUN 59 (H) 11/20/2024    CREATININE 6.79 (H) 11/20/2024    EGFRIFNONA 18 (L) 02/16/2022    EGFRIFAFRI 26 (L) 11/12/2018    BCR 8.7 11/20/2024     CO2 19.0 (L) 11/20/2024    CALCIUM 8.1 (L) 11/20/2024    PROTENTOTREF 6.8 01/10/2023    ALBUMIN 3.0 (L) 11/18/2024    LABIL2 1.5 01/10/2023    AST 19 11/18/2024    ALT 6 11/18/2024         Radiological Studies:   CT HEAD WO CONTRAST     Date of Exam: 11/20/2024 12:40 AM EST     Indication: AMS.     Comparison: 11/16/2024.     Technique: Axial CT images were obtained of the head without contrast administration.  Automated exposure control and iterative construction methods were used.        Findings:  There is no evidence of hemorrhage. There is no mass effect or midline shift.     There is no extracerebral collection.     Ventricles are normal in size and configuration for patient's stated age.       Posterior fossa is within normal limits.     Calvarium and skull base appear intact.   Visualized sinuses show no air fluid levels. Visualized orbits are unremarkable.     IMPRESSION:  Impression:  No acute intracranial process.           Electronically Signed: Keara Diggs MD    11/20/2024 1:06 AM EST    Workstation ID: [id number]    During this visit the following were done:  Labs Reviewed [x]    Labs Ordered []    Radiology Reports Reviewed [x]    Radiology Ordered []    EKG, echo, and/or stress test reviewed []    EEG results reviewed  []    EEG reviewed and interpreted per myself   []    Discussed case with neurointerventionalist or neuroradiologist []    Referring Provider Records Reviewed []    ER Records Reviewed []    Hospital Records Reviewed []    History Obtained From Family [x]    Radiological images view and Interpreted per myself [x]    Case Discussed with referring provider []     Decision to obtain and request outside records  []        Assessment and Plan     New onset seizure-like activity in the setting of severe bradycardia and hypotension, possible cardiac arrest, suspect cerebral hypoperfusion. cEEG is w/o epileptic activity. Covid-19 PNA. Severe CKD. Suspected GIB. He has been afebrile, CNS  infection seems unlikely. CTH x 2 negative, unable to have an MRI 22 PPM. He is now responding to RN and follows commands intermittently.   - Continue Keppra 500 mg qd.   - Wean sedation and vent as tolerated.   - ST, PT, PT when extubated.   - Outpatient neurology F/U.    Call for questions, will see prn. Thanks.                    Electronically signed by Panfilo Valera MD on 11/20/2024 at 14:37 EST

## 2024-11-20 NOTE — PROGRESS NOTES
Intensive Care Follow-up     Hospital:  LOS: 8 days   Mr. Marco Antonio Montemayor, 79 y.o. male is followed for:   Pneumonia due to COVID-19 virus        History of present illness:   79-year-old male with coronary disease, dyslipidemia, chronic kidney disease on hemodialysis, chronic anemia, hypertension, ZULEIKA on BiPAP, atrial fibrillation, heart failure with preserved ejection fraction.  Patient presented to the hospital with increased work of breathing and hypoxemic respiratory failure.  Patient was found to have COVID-19 pneumonia.  CTA chest on admission did not show any pulmonary embolism.  Patchy area of groundglass opacities noted in both lungs.  Patient was started on ceftriaxone and doxycycline.  Patient was also given Decadron and remdesivir.  Patient started developing hypotension.  Was given fluid resuscitation and midodrine with improvement in blood pressure.  Patient was thought to be having a GI bleed so GI consultation was obtained.  Patient however declined from respiratory standpoint and was becoming more delirious so was transferred to intensive care unit.  Patient was initially supported with BiPAP but eventually needed to be intubated and placed on mechanical ventilation.  Patient required pressors to maintain hemodynamics.  On admission to ICU patient had severe bradycardia and had 2 episodes of possible cardiac arrest requiring CPR for short duration of time.  Unclear if it was PEA or chest severe bradycardia.  Precedex was held.  Patient apparently was being suctioned at that time so was thought to have vasovagal response.  Patient was also noted to have generalized tonic-clonic seizure.  Neurology consultation was obtained and EEG was obtained.  EEG did not show any ongoing seizure activity however patient was started on Keppra.  Patient apparently was able to follow commands post cardiac arrest.  Pt underwent EGD and did not show any ulcers but had suction trauma.       Subjective   Interval  History:  Overnight no acute events.  Hemoglobin remained stable.  No further bleeding episodes.  Tolerating tube feeds okay and advancing to goal.  Has not had any bowel movement for couple of days now.  Unable to get MRI brain due to pacemaker incompatibility.  Got another head CT scan which did not show any acute change.  We will continue to wean sedation and see how it does.  Undergoing hemodialysis and plan is to take 3 L off today.                 The patient's past medical, surgical and social history were reviewed and updated in Epic as appropriate.       Objective     Infusions:  fentanyl 10 mcg/mL,  mcg/hr, Last Rate: 100 mcg/hr (11/20/24 1056)  norepinephrine, 0.02-0.3 mcg/kg/min, Last Rate: 0.09 mcg/kg/min (11/20/24 1123)      Medications:  atorvastatin, 40 mg, Nasogastric, Nightly  [Held by provider] bumetanide, 2 mg, Oral, Once per day on Sunday Monday Wednesday Friday  [Held by provider] calcitriol, 0.25 mcg, Oral, Every Other Day  [Held by provider] carvedilol, 12.5 mg, Oral, BID  chlorhexidine, 15 mL, Mouth/Throat, Q12H  ipratropium-albuterol, 3 mL, Nebulization, 4x Daily - RT  lactobacillus acidophilus, 1 capsule, Nasogastric, Daily  levETIRAcetam, 500 mg, Intravenous, Daily  mupirocin, 1 Application, Each Nare, BID  nystatin, , Topical, Q12H  pantoprazole, 40 mg, Intravenous, Daily  ProSource No Carb, 30 mL, Nasogastric, TID  [Held by provider] Sucroferric Oxyhydroxide, 3 tablet, Nasogastric, TID With Meals  [Held by provider] Sucroferric Oxyhydroxide, 1 tablet, Nasogastric, BID  [Held by provider] vitamin B-12, 1,000 mcg, Oral, Daily        Vital Sign Min/Max for last 24 hours  Temp  Min: 97.6 °F (36.4 °C)  Max: 100.3 °F (37.9 °C)   BP  Min: 85/29  Max: 175/57   Pulse  Min: 78  Max: 120   Resp  Min: 20  Max: 20   SpO2  Min: 79 %  Max: 100 %   No data recorded       Input/Output for last 24 hour shift  11/19 0701 - 11/20 0700  In: 2601.6 [I.V.:1339.6]  Out: -    Mode: VC+/AC  FiO2 (%):  [35  "%-40 %] 40 %  S RR:  [20] 20  S VT:  [450 mL] 450 mL  PEEP/CPAP (cm H2O):  [5 cm H20] 5 cm H20  MAP (cm H2O):  [11-15] 11  Objective:  Vital signs: (most recent): Blood pressure 175/57, pulse 95, temperature 97.6 °F (36.4 °C), temperature source Axillary, resp. rate 20, height 177.8 cm (70\"), weight 127 kg (279 lb 15.8 oz), SpO2 99%.            General Appearance:  Not in distress, no asynchrony with mechanical ventilator.  Head:  Pupils reactive & symmetrical B/L.  Neck: Endotracheal tube clean.   Lungs:   B/L Breath sounds present with decreased breath sounds on bases, wheezing improved, occasional crackles.   Heart: S1 and S2 present, no murmur  Abdomen: Obese, non tender, no guarding or rigidity, bowel sounds positive.  Extremities: + edema, warm to touch.  Neurologic:  Pt sedated on the vent but moves all four extremities.     Ventilator settings: A/C: FiO2 40%      Results from last 7 days   Lab Units 11/20/24  0356 11/20/24  0018 11/19/24  1736 11/19/24  1200 11/19/24  0506 11/18/24  1212 11/18/24  0525   WBC 10*3/mm3 9.79  --   --   --  9.37  --  9.37   HEMOGLOBIN g/dL 7.6* 7.8* 7.6*   < > 7.7*   < > 7.7*   PLATELETS 10*3/mm3 162  --   --   --  175  --  167    < > = values in this interval not displayed.     Results from last 7 days   Lab Units 11/20/24  0356 11/19/24  0506 11/18/24  2101 11/18/24  0525   SODIUM mmol/L 136 139 134* 140   POTASSIUM mmol/L 4.2 4.0 3.2* 3.4*   CO2 mmol/L 19.0* 21.0* 22.0 24.0   BUN mg/dL 59* 48* 42* 72*   CREATININE mg/dL 6.79* 5.40* 4.62* 7.01*   MAGNESIUM mg/dL 2.2 2.2 2.1 2.6*   PHOSPHORUS mg/dL 5.1* 3.9  --  4.5   GLUCOSE mg/dL 146* 118* 114* 144*     Estimated Creatinine Clearance: 11.4 mL/min (A) (by C-G formula based on SCr of 6.79 mg/dL (H)).    Results from last 7 days   Lab Units 11/20/24  0325   PH, ARTERIAL pH units 7.383   PCO2, ARTERIAL mm Hg 36.6   PO2 ART mm Hg 71.7*       Images:   CXR reviewed and ET tube above anahi.  Bilateral patchy opacities and basilar " atelectasis.  Cardiomegaly noted.  No significant interval change.    I reviewed the patient's results and images.     Head CT report reviewed and did not show any acute intracranial pathology.    Assessment & Plan   Impression        Pneumonia due to COVID-19 virus    Severe obstructive sleep apnea    ESRD on HD T,R,S since 2022    Acute respiratory failure with hypoxia    Probable cardiac arrest versus severe bradycardia requiring CPR    Generalized seizure    Acute GI bleeding, possible       Plan        1.  Patient admitted here with COVID-19 pneumonia with worsened respiratory status requiring intubation and mechanical ventilation.  Patient was in delirium state.  Wondering if it is due to COVID infection versus some other potential etiology.  Also noted to have generalized tonic-clonic seizure for which neurology is evaluating and patient remains on Keppra.  Unable to get MRI due to pacemaker incompatibility.  Repeat head CT did not show any other acute findings.  Will need to discuss with neurology regarding continuation of Keppra was wondering if that could be a neurologist encephalopathy now.  2.  Continue current mechanical ventilation. Not ready to be weaned from mechanical ventilation unless mental status improves.  Wheezing improved.  Will continue DuoNebs for now.  3.  Patient has been treated with antibiotics for atypical infection.  Cultures have remained negative otherwise.  WBC count is normal.  No new fevers.  Patient is having more thick secretions from ET tube.  Will send repeat cultures today.  4.  Patient is on low-dose norepinephrine for hypotension.  Hypotension partly sedation related.  On hemodialysis support per nephrology.  Nephrology following and hemodialysis as per them.  Undergoing hemodialysis today and plan is to take 3 L off.  5.  There was concern for ongoing GI bleed.  EGD did not show any overt pathology other than suction trauma.  Continue daily Protonix for GI prophylaxis.     6.  Tolerating enteral nutrition okay.  Monitor bowel function.  Continue to wean sedation to assess mental status.  Patient apparently was following commands post CPR.  CPR duration  was not prolonged either.    7.  Continue midodrine.  Continue on stress dose steroids.  8.  Hold anticoagulation for now.  SCDs for DVT prophylaxis.  INR remains supratherapeutic.  Could be dietary related.  Will monitor for now.  No bleeding noted.  If further worsens will consider vitamin K..    Family have been updated on a daily basis.  Will continue supportive care.      Plan of care and goals reviewed with multidisciplinary/antibiotic stewardship team during rounds.   I discussed the patient's findings and my recommendations with family, nursing staff, and consulting provider       Time spent Critical care 30 min (exclusive of procedure time)  including high complexity decision making to assess, manipulate, and support vital organ system failure in this individual who has impairment of one or more vital organ systems such that there is a high probability of imminent or life threatening deterioration in the patient’s condition.  Above documentation reviewed and reflect accurate information as of 11/20/2024 including copied elements of the note.       Mahin Live MD, [facility]  Pulmonary, Critical care and Sleep Medicine

## 2024-11-20 NOTE — PLAN OF CARE
Problem: Hemodialysis  Goal: Safe, Effective Therapy Delivery  Outcome: Progressing  Goal: Effective Tissue Perfusion  Outcome: Progressing  Goal: Absence of Infection Signs and Symptoms  Outcome: Progressing    Goal Outcome Evaluation:             HD complete, blood reinfused no complications. Report called to primary RN joanie.     UF Goal of 3 Liters reached

## 2024-11-20 NOTE — PROGRESS NOTES
" LOS: 8 days   Patient Care Team:  Heriberto Murcia MD as PCP - General (Family Medicine)  Shanell Arango, PharmD as Pharmacist (Pharmacy)  Evelina Quan, PharmD as Pharmacist (Pharmacy)  Peng Melgar, PharmD as Pharmacist (Pharmacy)  Johnathan Taylor MD as Consulting Physician (Cardiac Electrophysiology)  Chato Bo MD as Consulting Physician (Nephrology)  Ivet Beasley APRN as Nurse Practitioner (Cardiology)  Rei Decker PA as Physician Assistant (Cardiology)      Subjective   Reviewed vitals, labs and medications list  Seen and examined   Intubated on M ventilator   Low dose pressor- Levophed   For HD today with goal UF 3L as tolerated       Objective     Vital Sign Min/Max for last 24 hours  Temp  Min: 98.8 °F (37.1 °C)  Max: 100.3 °F (37.9 °C)   BP  Min: 85/29  Max: 136/58   Pulse  Min: 78  Max: 120   Resp  Min: 20  Max: 20   SpO2  Min: 79 %  Max: 100 %   No data recorded   No data recorded     Flowsheet Rows      Flowsheet Row First Filed Value   Admission Height 177.8 cm (70\") Documented at 11/12/2024 1456   Admission Weight 130 kg (287 lb) Documented at 11/12/2024 1456            No intake/output data recorded.  I/O last 3 completed shifts:  In: 3445.7 [I.V.:2023.7; Other:393; NG/GT:929; IV Piggyback:100]  Out: -     Physical examination:  COVID precautions taken..  Pulmonary: Intubated on ventilator equal chest movement.  Cardiovascular: Normal rate.  Abdomen: Morbid obesity.  Extremities: Positive edema is noted.  Neurological: Sedated on ventilator  LUE-aVF in place      Results Review:     I reviewed the patient's new clinical results.    WBC WBC   Date Value Ref Range Status   11/20/2024 9.79 3.40 - 10.80 10*3/mm3 Final   11/19/2024 9.37 3.40 - 10.80 10*3/mm3 Final   11/18/2024 9.37 3.40 - 10.80 10*3/mm3 Final      HGB Hemoglobin   Date Value Ref Range Status   11/20/2024 7.6 (L) 13.0 - 17.7 g/dL Final   11/20/2024 7.8 (L) 13.0 - 17.7 g/dL Final   11/19/2024 7.6 (L) " "13.0 - 17.7 g/dL Final   11/19/2024 7.7 (L) 13.0 - 17.7 g/dL Final   11/19/2024 7.7 (L) 13.0 - 17.7 g/dL Final   11/19/2024 7.6 (L) 13.0 - 17.7 g/dL Final   11/18/2024 7.7 (L) 13.0 - 17.7 g/dL Final   11/18/2024 7.5 (L) 13.0 - 17.7 g/dL Final   11/18/2024 7.7 (L) 13.0 - 17.7 g/dL Final   11/17/2024 7.6 (L) 13.0 - 17.7 g/dL Final   11/17/2024 6.6 (C) 13.0 - 17.7 g/dL Final   11/17/2024 7.0 (L) 13.0 - 17.7 g/dL Final      HCT Hematocrit   Date Value Ref Range Status   11/20/2024 22.5 (L) 37.5 - 51.0 % Final   11/20/2024 24.3 (L) 37.5 - 51.0 % Final   11/19/2024 23.8 (L) 37.5 - 51.0 % Final   11/19/2024 23.6 (L) 37.5 - 51.0 % Final   11/19/2024 23.2 (L) 37.5 - 51.0 % Final   11/19/2024 23.0 (L) 37.5 - 51.0 % Final   11/18/2024 23.5 (L) 37.5 - 51.0 % Final   11/18/2024 22.4 (L) 37.5 - 51.0 % Final   11/18/2024 22.8 (L) 37.5 - 51.0 % Final   11/17/2024 22.8 (L) 37.5 - 51.0 % Final   11/17/2024 20.1 (C) 37.5 - 51.0 % Final   11/17/2024 21.9 (L) 37.5 - 51.0 % Final      Platlets No results found for: \"LABPLAT\"   MCV MCV   Date Value Ref Range Status   11/20/2024 108.2 (H) 79.0 - 97.0 fL Final   11/19/2024 105.9 (H) 79.0 - 97.0 fL Final   11/18/2024 100.0 (H) 79.0 - 97.0 fL Final          Sodium Sodium   Date Value Ref Range Status   11/20/2024 136 136 - 145 mmol/L Final   11/19/2024 139 136 - 145 mmol/L Final   11/18/2024 134 (L) 136 - 145 mmol/L Final   11/18/2024 140 136 - 145 mmol/L Final   11/17/2024 141 136 - 145 mmol/L Final      Potassium Potassium   Date Value Ref Range Status   11/20/2024 4.2 3.5 - 5.2 mmol/L Final   11/19/2024 4.0 3.5 - 5.2 mmol/L Final   11/18/2024 3.2 (L) 3.5 - 5.2 mmol/L Final   11/18/2024 3.4 (L) 3.5 - 5.2 mmol/L Final   11/17/2024 3.4 (L) 3.5 - 5.2 mmol/L Final      Chloride Chloride   Date Value Ref Range Status   11/20/2024 100 98 - 107 mmol/L Final   11/19/2024 100 98 - 107 mmol/L Final   11/18/2024 97 (L) 98 - 107 mmol/L Final   11/18/2024 99 98 - 107 mmol/L Final   11/17/2024 99 98 - " "107 mmol/L Final      CO2 CO2   Date Value Ref Range Status   11/20/2024 19.0 (L) 22.0 - 29.0 mmol/L Final   11/19/2024 21.0 (L) 22.0 - 29.0 mmol/L Final   11/18/2024 22.0 22.0 - 29.0 mmol/L Final   11/18/2024 24.0 22.0 - 29.0 mmol/L Final   11/17/2024 23.0 22.0 - 29.0 mmol/L Final      BUN BUN   Date Value Ref Range Status   11/20/2024 59 (H) 8 - 23 mg/dL Final   11/19/2024 48 (H) 8 - 23 mg/dL Final   11/18/2024 42 (H) 8 - 23 mg/dL Final   11/18/2024 72 (H) 8 - 23 mg/dL Final   11/17/2024 71 (H) 8 - 23 mg/dL Final      Creatinine Creatinine   Date Value Ref Range Status   11/20/2024 6.79 (H) 0.76 - 1.27 mg/dL Final   11/19/2024 5.40 (H) 0.76 - 1.27 mg/dL Final   11/18/2024 4.62 (H) 0.76 - 1.27 mg/dL Final   11/18/2024 7.01 (H) 0.76 - 1.27 mg/dL Final   11/17/2024 6.77 (H) 0.76 - 1.27 mg/dL Final      Calcium Calcium   Date Value Ref Range Status   11/20/2024 8.1 (L) 8.6 - 10.5 mg/dL Final   11/19/2024 8.4 (L) 8.6 - 10.5 mg/dL Final   11/18/2024 8.1 (L) 8.6 - 10.5 mg/dL Final   11/18/2024 8.4 (L) 8.6 - 10.5 mg/dL Final   11/17/2024 8.4 (L) 8.6 - 10.5 mg/dL Final      PO4 No results found for: \"CAPO4\"   Albumin Albumin   Date Value Ref Range Status   11/18/2024 3.0 (L) 3.5 - 5.2 g/dL Final   11/17/2024 3.1 (L) 3.5 - 5.2 g/dL Final      Magnesium Magnesium   Date Value Ref Range Status   11/20/2024 2.2 1.6 - 2.4 mg/dL Final   11/19/2024 2.2 1.6 - 2.4 mg/dL Final   11/18/2024 2.1 1.6 - 2.4 mg/dL Final   11/18/2024 2.6 (H) 1.6 - 2.4 mg/dL Final   11/17/2024 2.6 (H) 1.6 - 2.4 mg/dL Final      Uric Acid No results found for: \"URICACID\"         Assessment & Plan       Pneumonia due to COVID-19 virus    Severe obstructive sleep apnea    ESRD on HD T,R,S since 2022    Acute respiratory failure with hypoxia    Probable cardiac arrest versus severe bradycardia requiring CPR    Generalized seizure    Acute GI bleeding, possible    =======================     1.  ESRD on HD TTS- No missed treatment sessions  2.  Hypotension IV " pressor  3.  HFpEF  4.  Anemia of CKD: Hgb at goal - No indications for BOAZ  5.  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  Volume status: No overt volume overload  7.  COVID- 19 PNA  8.  Afib / rate controlled   9- S/p Cardiac arrest on 11/16/24      PLAN   HD today then Friday  Will assess daily for HD needs: Will switch back to TTS schedule before discharge    Will continue manage ESRD and related complications  Daily BMP   High risk complex patient with multiple medical problems.    Shaina Juárez MD  11/20/24  08:37 EST

## 2024-11-21 NOTE — PLAN OF CARE
Goal Outcome Evaluation:                    Unable to decrease ventilator support at this time.

## 2024-11-21 NOTE — PROGRESS NOTES
Intensive Care Follow-up     Hospital:  LOS: 9 days   Mr. Marco Antonio Montemayor, 79 y.o. male is followed for:   Pneumonia due to COVID-19 virus        History of present illness:   79-year-old male with coronary disease, dyslipidemia, chronic kidney disease on hemodialysis, chronic anemia, hypertension, ZULEIKA on BiPAP, atrial fibrillation, heart failure with preserved ejection fraction.  Patient presented to the hospital with increased work of breathing and hypoxemic respiratory failure.  Patient was found to have COVID-19 pneumonia.  CTA chest on admission did not show any pulmonary embolism.  Patchy area of groundglass opacities noted in both lungs.  Patient was started on ceftriaxone and doxycycline.  Patient was also given Decadron and remdesivir.  Patient started developing hypotension.  Was given fluid resuscitation and midodrine with improvement in blood pressure.  Patient was thought to be having a GI bleed so GI consultation was obtained.  Patient however declined from respiratory standpoint and was becoming more delirious so was transferred to intensive care unit.  Patient was initially supported with BiPAP but eventually needed to be intubated and placed on mechanical ventilation.  Patient required pressors to maintain hemodynamics.  On admission to ICU patient had severe bradycardia and had 2 episodes of possible cardiac arrest requiring CPR for short duration of time.  Unclear if it was PEA or chest severe bradycardia.  Precedex was held.  Patient apparently was being suctioned at that time so was thought to have vasovagal response.  Patient was also noted to have generalized tonic-clonic seizure.  Neurology consultation was obtained and EEG was obtained.  EEG did not show any ongoing seizure activity however patient was started on Keppra.  Patient apparently was able to follow commands post cardiac arrest.  Pt underwent EGD and did not show any ulcers but had suction trauma.       Subjective   Interval  History:  Overnight no acute events.  Hemoglobin slowly trending down but still above 7.  And no further acute bleed noted.  Tolerating tube feeds okay.  On low-dose norepinephrine.  Has not moved bowels yet and bowel sounds are hypoactive.  Will add Relistor.  Wean sedation and patient seems to be following commands intermittently.  Still with thick secretions.  Low-grade fever.                 The patient's past medical, surgical and social history were reviewed and updated in Epic as appropriate.       Objective     Infusions:  fentanyl 10 mcg/mL,  mcg/hr, Last Rate: 50 mcg/hr (11/21/24 1137)  norepinephrine, 0.02-0.3 mcg/kg/min, Last Rate: 0.05 mcg/kg/min (11/21/24 9371)      Medications:  acetylcysteine, 2 mL, Nebulization, TID - RT  atorvastatin, 40 mg, Nasogastric, Nightly  [Held by provider] bumetanide, 2 mg, Oral, Once per day on Sunday Monday Wednesday Friday  [Held by provider] calcitriol, 0.25 mcg, Oral, Every Other Day  [Held by provider] carvedilol, 12.5 mg, Oral, BID  chlorhexidine, 15 mL, Mouth/Throat, Q12H  ipratropium-albuterol, 3 mL, Nebulization, 4x Daily - RT  lactobacillus acidophilus, 1 capsule, Nasogastric, Daily  levETIRAcetam, 500 mg, Intravenous, Daily  Naloxegol Oxalate, 25 mg, Nasogastric, QAM  nystatin, , Topical, Q12H  pantoprazole, 40 mg, Intravenous, Daily  ProSource No Carb, 30 mL, Nasogastric, TID  [Held by provider] Sucroferric Oxyhydroxide, 3 tablet, Nasogastric, TID With Meals  [Held by provider] Sucroferric Oxyhydroxide, 1 tablet, Nasogastric, BID  [Held by provider] vitamin B-12, 1,000 mcg, Oral, Daily        Vital Sign Min/Max for last 24 hours  Temp  Min: 99.2 °F (37.3 °C)  Max: 100.2 °F (37.9 °C)   BP  Min: 89/28  Max: 128/49   Pulse  Min: 79  Max: 112   Resp  Min: 20  Max: 20   SpO2  Min: 86 %  Max: 100 %   No data recorded       Input/Output for last 24 hour shift  11/20 0701 - 11/21 0700  In: 2403.2 [I.V.:824.9]  Out: 3330    Mode: VC+/AC  FiO2 (%):  [35 %] 35 %  S  "RR:  [20] 20  S VT:  [450 mL] 450 mL  PEEP/CPAP (cm H2O):  [5 cm H20] 5 cm H20  MAP (cm H2O):  [10-12] 10  Objective:  Vital signs: (most recent): Blood pressure 123/40, pulse 95, temperature 100.1 °F (37.8 °C), temperature source Axillary, resp. rate 20, height 177.8 cm (70\"), weight (!) 136 kg (300 lb 0.7 oz), SpO2 95%.            General Appearance:  Not in distress, no asynchrony with mechanical ventilator.  Head:  Pupils reactive & symmetrical B/L.  Neck: Endotracheal tube clean.   Lungs:   B/L Breath sounds present with decreased breath sounds on bases, wheezing heard again today, occasional crackles.   Heart: S1 and S2 present, no murmur  Abdomen: Obese, non tender, no guarding or rigidity, bowel sounds very hypoactive.  Extremities: + edema, warm to touch.  Neurologic:  Pt  on the vent and intermittently following commands now.    Ventilator settings: A/C: FiO2 35%      Results from last 7 days   Lab Units 11/21/24  1215 11/21/24  0648 11/20/24  2353 11/20/24  1247 11/20/24  0356 11/19/24  1200 11/19/24  0506   WBC 10*3/mm3  --  10.31  --   --  9.79  --  9.37   HEMOGLOBIN g/dL 7.2* 7.2* 7.8*   < > 7.6*   < > 7.7*   PLATELETS 10*3/mm3  --  158  --   --  162  --  175    < > = values in this interval not displayed.     Results from last 7 days   Lab Units 11/21/24  0648 11/20/24  0356 11/19/24  0506   SODIUM mmol/L 134* 136 139   POTASSIUM mmol/L 4.3 4.2 4.0   CO2 mmol/L 19.0* 19.0* 21.0*   BUN mg/dL 40* 59* 48*   CREATININE mg/dL 5.41* 6.79* 5.40*   MAGNESIUM mg/dL 2.2 2.2 2.2   PHOSPHORUS mg/dL 4.2 5.1* 3.9   GLUCOSE mg/dL 146* 146* 118*     Estimated Creatinine Clearance: 14.9 mL/min (A) (by C-G formula based on SCr of 5.41 mg/dL (H)).    Results from last 7 days   Lab Units 11/20/24  0325   PH, ARTERIAL pH units 7.383   PCO2, ARTERIAL mm Hg 36.6   PO2 ART mm Hg 71.7*       Images:   No new chest x-ray    I reviewed the patient's results and images.     Head CT report reviewed and did not show any acute " intracranial pathology.    Assessment & Plan   Impression        Pneumonia due to COVID-19 virus    Severe obstructive sleep apnea    ESRD on HD T,R,S since 2022    Acute respiratory failure with hypoxia    Probable cardiac arrest versus severe bradycardia requiring CPR    Generalized seizure    Acute GI bleeding, possible       Plan        1.  Patient admitted here with COVID-19 pneumonia with worsened respiratory status requiring intubation and mechanical ventilation.  Patient was in delirium state.  Wondering if it is due to COVID infection versus some other potential etiology.  Also noted to have generalized tonic-clonic seizure for which neurology is evaluating and patient remains on Keppra.  Unable to get MRI due to pacemaker incompatibility.  Repeat head CT did not show any other acute findings.  Weaning sedation and patient  actually is intermittently following commands now.  This is reassuring.  Discussed with nursing staff to wean completely off fentanyl and see how he does.  2.  Continue current mechanical ventilation. Not ready to be weaned from mechanical ventilation until mental status improves.  Still with intermittent wheezing.  Continue DuoNeb.  Has thick secretions.  Will add Mucomyst for few doses to see if he can get better mucociliary clearance.  3.  Patient has been treated with antibiotics for atypical infection.  Cultures have remained negative otherwise.  WBC count is normal.  Low-grade fever.  Repeat cultures are not growing any organism.  4.  Patient is on low-dose norepinephrine for hypotension.  Hypotension partly sedation related.  On hemodialysis support per nephrology.  Underwent dialysis yesterday and tolerated okay.    5.  There was concern for ongoing GI bleed.  EGD did not show any overt pathology other than suction trauma.  Continue daily Protonix for GI prophylaxis.    6.  Tolerating enteral nutrition okay.  Has not had a bowel movement for few days.  Will add Relistor.  Bowel  sounds are hypoactive.  7.  Hold anticoagulation for now.  SCDs for DVT prophylaxis.  INR remains supratherapeutic but starting to improve. No bleeding noted.  If further worsens will consider vitamin K..    Family have been updated on a daily basis.  Will continue supportive care.      Plan of care and goals reviewed with multidisciplinary/antibiotic stewardship team during rounds.   I discussed the patient's findings and my recommendations with family, nursing staff, and consulting provider       Time spent Critical care 30 min (exclusive of procedure time)  including high complexity decision making to assess, manipulate, and support vital organ system failure in this individual who has impairment of one or more vital organ systems such that there is a high probability of imminent or life threatening deterioration in the patient’s condition.  Above documentation reviewed and reflect accurate information as of 11/21/2024 including copied elements of the note.       Mahin Live MD, [facility]  Pulmonary, Critical care and Sleep Medicine

## 2024-11-21 NOTE — CONSULTS
Clinical Nutrition     Patient Name: Marco Antonio Montemayor  YOB: 1945  MRN: [id number]  Date of Encounter: 11/21/24 12:48 EST  Admission date: 11/12/2024  Reason for Visit: Follow-up protocol, EN    Assessment   Nutrition Assessment   Admission Diagnosis:  Pneumonia due to COVID-19 virus [U07.1, J12.82]    Problem List:    Pneumonia due to COVID-19 virus    Severe obstructive sleep apnea    ESRD on HD T,R,S since 2022    Acute respiratory failure with hypoxia    Probable cardiac arrest versus severe bradycardia requiring CPR    Generalized seizure    Acute GI bleeding, possible      PMH:   He  has a past medical history of Abnormal ECG, Arrhythmia, Arthritis, Asthma, Cancer, Chronic diastolic congestive heart failure (09/14/2018), CKD (chronic kidney disease), Coronary artery disease, Dry skin, Dyslipidemia (07/07/2016), Edema, Generalized seizure (11/17/2024), Heart murmur, Hematoma, Hemodialysis access site with arteriovenous graft, History of transfusion, Hypertension, Iron deficiency anemia, Long term current use of anticoagulant, ZULEIKA treated with BiPAP, Persistent atrial fibrillation (07/07/2016), Tinnitus, and Wears glasses.    PSH:  He  has a past surgical history that includes Appendectomy; Cataract extraction; Skin cancer excision; Tonsillectomy; [place] tooth extraction; Colonoscopy; Esophagogastroduodenoscopy; Cardiac electrophysiology procedure (N/A, 05/18/2018); Ablation of dysrhythmic focus; arteriovenous fistula/shunt surgery (Left, 08/04/2022); arteriovenous fistula/shunt surgery (Left, 11/03/2022); Insert / replace / remove pacemaker (about 20 years back); Vein Surgery (for dialysis); and Esophagogastroduodenoscopy (N/A, 11/18/2024).    Applicable Nutrition History:     ARF/VENT 11-16-24    s/p EGD 11-18-24:  - Normal esophagus. - Erythematous and eroded mucosa in the gastric body likely from NG tube suction trauma. - Erythematous, erosive duodenopathy. - No specimens  collected    SKIN: R ear ulcer    Labs    Labs Reviewed: Yes    Results from last 7 days   Lab Units 11/21/24  0648 11/20/24  0356 11/19/24  0506 11/18/24 2101 11/18/24 0525 11/17/24 2318 11/17/24 0350 11/17/24  0031 11/16/24 2015   GLUCOSE mg/dL 146* 146* 118*   < > 144* 165* 172*  --  270*   BUN mg/dL 40* 59* 48*   < > 72* 71* 58*  --  45*   CREATININE mg/dL 5.41* 6.79* 5.40*   < > 7.01* 6.77* 5.50*  --  4.91*   SODIUM mmol/L 134* 136 139   < > 140 141 138  --  138   CHLORIDE mmol/L 98 100 100   < > 99 99 97*  --  95*   POTASSIUM mmol/L 4.3 4.2 4.0   < > 3.4* 3.4* 3.5  --  3.2*   PHOSPHORUS mg/dL 4.2 5.1* 3.9  --  4.5 4.3  --   --  5.1*   MAGNESIUM mg/dL 2.2 2.2 2.2   < > 2.6* 2.6* 2.8*  --  3.8*   ALT (SGPT) U/L 5  --   --   --  6 7  --   --  7   LACTATE mmol/L  --   --   --   --   --   --  1.8 4.1* 12.3*    < > = values in this interval not displayed.       Results from last 7 days   Lab Units 11/21/24  0648 11/18/24 0525 11/17/24 2318 11/16/24 2058   ALBUMIN g/dL 3.3* 3.0* 3.1*  --    PREALBUMIN mg/dL  --  14.0*  --   --    CRP mg/dL  --  3.63*  --   --    IONIZED CALCIUM mmol/L  --   --   --  1.08*   CHOLESTEROL mg/dL  --  69  --   --    TRIGLYCERIDES mg/dL  --  230*  --   --        Results from last 7 days   Lab Units 11/21/24  1132 11/21/24  0652 11/20/24  2352 11/20/24  1759 11/20/24  1140 11/20/24  0655   GLUCOSE mg/dL 148* 145* 174* 180* 100 155*     Lab Results   Lab Value Date/Time    HGBA1C 5.8 (A) 11/06/2024 1415    HGBA1C 6.1 (A) 08/05/2024 1402    HGBA1C 5.90 (H) 03/01/2024 0456    HGBA1C 5.20 08/02/2022 1405         Results from last 7 days   Lab Units 11/16/24 2015 11/16/24  0128 11/14/24  2133   PROBNP pg/mL 10,442.0* 14,237.0* 15,999.0*       Medications    Medications Reviewed: yes    Scheduled Meds:acetylcysteine, 2 mL, Nebulization, TID - RT  atorvastatin, 40 mg, Nasogastric, Nightly  [Held by provider] bumetanide, 2 mg, Oral, Once per day on Sunday Monday Wednesday Friday  [Held by  "provider] calcitriol, 0.25 mcg, Oral, Every Other Day  [Held by provider] carvedilol, 12.5 mg, Oral, BID  chlorhexidine, 15 mL, Mouth/Throat, Q12H  ipratropium-albuterol, 3 mL, Nebulization, 4x Daily - RT  lactobacillus acidophilus, 1 capsule, Nasogastric, Daily  levETIRAcetam, 500 mg, Intravenous, Daily  Naloxegol Oxalate, 25 mg, Nasogastric, QAM  nystatin, , Topical, Q12H  pantoprazole, 40 mg, Intravenous, Daily  ProSource No Carb, 30 mL, Nasogastric, TID  [Held by provider] Sucroferric Oxyhydroxide, 3 tablet, Nasogastric, TID With Meals  [Held by provider] Sucroferric Oxyhydroxide, 1 tablet, Nasogastric, BID  [Held by provider] vitamin B-12, 1,000 mcg, Oral, Daily      Continuous Infusions:fentanyl 10 mcg/mL,  mcg/hr, Last Rate: 50 mcg/hr (11/21/24 1137)  norepinephrine, 0.02-0.3 mcg/kg/min, Last Rate: 0.05 mcg/kg/min (11/21/24 0828)      PRN Meds:.  acetaminophen **OR** acetaminophen **OR** acetaminophen    artificial tears    senna-docusate sodium **AND** polyethylene glycol **AND** [DISCONTINUED] bisacodyl **AND** bisacodyl    ipratropium-albuterol    midazolam    nitroglycerin    [DISCONTINUED] ondansetron ODT **OR** ondansetron    sodium chloride    Intake/Ouptut 24 hrs (0701 - 0700)   I&O's Reviewed: yes  Intake & Output (last day)         11/20 0701 11/21 0700 11/21 0701 11/22 0700    I.V. (mL/kg) 824.9 (6.1) 100.5 (0.7)    Other 471     NG/ 134    IV Piggyback 198.3     Total Intake(mL/kg) 2403.2 (17.7) 234.5 (1.7)    Dialysis 3330     Total Output 3330     Net -926.8 +234.5              'Last stool recorded x 3 on 11/14    Anthropometrics     Height: Height: 177.8 cm (70\")  Last Filed Weight: Weight: (!) 136 kg (300 lb 0.7 oz) (11/21/24 0400)  Method: Weight Method: Bed scale  BMI: BMI (Calculated): 43.1    UBW: ?   Weight change: ? 13lb wt loss over past 3 months per EMR     Weight       Weight (kg) Weight (lbs) Weight Method Visit Report   5/25/2022 126.554 kg  279 lb      6/8/2022 " 122.925 kg  271 lb      6/23/2022 118.389 kg  261 lb      6/30/2022 119.477 kg  263 lb 6.4 oz   --    7/19/2022 119 kg  262 lb 5.6 oz      8/2/2022 119.3 kg  263 lb 0.1 oz  Standing scale     8/4/2022 117.935 kg  260 lb  Standing scale     10/18/2022 120.203 kg  265 lb      11/1/2022 122.6 kg  270 lb 4.5 oz  Standing scale     11/3/2022 122.6 kg  270 lb 4.5 oz      1/12/2023 119.75 kg  264 lb   --    2/3/2023 120 kg  264 lb 8.8 oz      3/6/2023 124.286 kg  274 lb   --    5/3/2023 126.463 kg  278 lb 12.8 oz   --    7/14/2023 125.193 kg  276 lb   --    7/28/2023 125 kg  275 lb 9.2 oz       125 kg  275 lb 9.2 oz      1/17/2024 129.729 kg  286 lb   --    2/26/2024 129.7 kg  285 lb 15 oz      2/29/2024 129.275 kg  285 lb  Stated     3/1/2024 129.275 kg  285 lb      3/20/2024 131.09 kg  289 lb   --    7/17/2024 130.364 kg  287 lb 6.4 oz   --    8/5/2024 132.45 kg  292 lb   --    8/19/2024 132.541 kg  292 lb 3.2 oz   --    11/6/2024 130.228 kg  287 lb 1.6 oz   --    11/11/2024 130 kg  286 lb 9.6 oz      11/12/2024 130.182 kg  287 lb  Stated      126.826 kg  279 lb 9.6 oz  Bed scale     11/17/2024 127 kg  279 lb 15.8 oz          Nutrition Focused Physical Exam     Date: 11/16    Unable to perform due to COVID Isolation      Subjective   Reported/Observed/Food/Nutrition Related History:     11-21: pt intubated, sedated + fentanyl, levophed 0.05mcg    Per RN: pt had 750ml GRV last night, 500ml residual returned, TF held for 2 hours, TF resumed @10ml/hr, pt has not had bm since 11-15, movantik given to pt, plan to try and advance TF later if tolerates      11-19: pt intubated, sedated + fentanyl, versed, levophed 0.05mcg    Per RN: pt will w/d to pain, does not follow commands, Keofeed placed, is currently in the stomach    Per MD ok to start feeding    11-18: pt intubated, sedated, currently on HD   + fentanyl, versed, levophed 0.04mcg, protonix    Per RN: TF has been on hold since pt coded on Saturday, ogt to [facility], has coffee  "ground output, plan for possible scope today      Order to begin EN.       Needs Assessment   Date: 24    Height used:Height: 177.8 cm (70\")  Weights used /ABW: 279lb/ 127kg  IBW: 166lb/ 75kg    Estimated Calorie needs: ~1800kcal  Method:  14Kcals/KG ABW:1778kcal  Method:  MSJ ABW: kcal  Method:  PSU  ABW: kcal    Estimated Protein needs: ~150g protein  Method: 2-2.5g protein per kg IBW: 150-188g protein  Method: 1.2g protein per kg ABW: 152g protein    Current Nutrition Prescription     PO: NPO Diet NPO Type: Tube Feeding  Oral Nutrition Supplement:  Intake: N/A    EN: Peptamen AF  Goal Rate: 70ml  Water Flushes: 20ml/hr  Modular: Prosource-no carb 3/day  Route:  keofeed in the stomach  Tube: Small bore    At goal over: 20Hrs/day     Rx will supply:   Goal Volume 1400  mL/day     Flush Volume 400 mL/day     Energy 1860 Kcal/day 103 % Est Need   Protein 151 g/day 101 % Est Need   Fiber 8 g/day     Water in  EN 1134 mL     Total Water  1534mL     Meet DRI Yes        --------------------------------------------------------------------------  Product/Rate verified at bedside: No- (covid isolation)  Infusing Rate at time of visit: 10ml    Average Delivery from [facility] Day:   Volume 909 mL/day 65  % Goal Vol.   Flush Volume 471 mL/day     Energy  Kcal/day  % Est Need   Protein  g/day  % Est Need   Fiber  g/day     Water in  EN  mL     Total Water  mL     Meet DRI N/A              Assessment & Plan   Nutrition Diagnosis   Date:  Updated:   Problem Inadequate energy intake    Etiology ARF/VENT   Signs/Symptoms 65% goal volume EN   Status: Active       Goal:   Nutrition to support treatment and Tolerate EN at goal      Nutrition Intervention      Follow treatment progress, Care plan reviewed    Suggest gradually advance TF as tolerated    Consider making bowel regimen scheduled instead of PRN    If pt continues to high high residuals, > 500ml,  consider post pyloric placement of " Keofeed    Monitoring/Evaluation:   Per protocol, I&O, Pertinent labs, EN delivery/tolerance, Weight, Skin status, GI status, Symptoms, Hemodynamic stability    Camryn Mendoza RD  Time Spent:30min

## 2024-11-21 NOTE — PROGRESS NOTES
" LOS: 9 days   Patient Care Team:  Heriberto Murcia MD as PCP - General (Family Medicine)  Shanell Arango, PharmD as Pharmacist (Pharmacy)  Evelina Quan, PharmD as Pharmacist (Pharmacy)  Peng Melgar, PharmD as Pharmacist (Pharmacy)  Johnathan Taylor MD as Consulting Physician (Cardiac Electrophysiology)  Chato Bo MD as Consulting Physician (Nephrology)  Ivet Beasley APRN as Nurse Practitioner (Cardiology)  Rei Decker PA as Physician Assistant (Cardiology)      Subjective   HD yesterday 3 liter UF. On low dose pressor. Clinically stable otherwise.       Objective     Vital Sign Min/Max for last 24 hours  Temp  Min: 99.2 °F (37.3 °C)  Max: 100.2 °F (37.9 °C)   BP  Min: 89/28  Max: 128/49   Pulse  Min: 79  Max: 112   Resp  Min: 20  Max: 20   SpO2  Min: 86 %  Max: 100 %   No data recorded   Weight  Min: 136 kg (300 lb 0.7 oz)  Max: 136 kg (300 lb 0.7 oz)     Flowsheet Rows      Flowsheet Row First Filed Value   Admission Height 177.8 cm (70\") Documented at 11/12/2024 1456   Admission Weight 130 kg (287 lb) Documented at 11/12/2024 1456            I/O this shift:  In: 234.5 [I.V.:100.5; NG/GT:134]  Out: -   I/O last 3 completed shifts:  In: 3806.9 [I.V.:1360.6; Other:743; NG/GT:1505; IV Piggyback:198.3]  Out: 3330     Physical examination:  COVID precautions taken..  Pulmonary: Intubated on ventilator equal chest movement.  Cardiovascular: Normal rate.  Abdomen: Morbid obesity.  Extremities: Positive edema is noted.  Neurological: Sedated on ventilator  LUE-aVF in place      Results Review:     I reviewed the patient's new clinical results.    WBC WBC   Date Value Ref Range Status   11/21/2024 10.31 3.40 - 10.80 10*3/mm3 Final   11/20/2024 9.79 3.40 - 10.80 10*3/mm3 Final   11/19/2024 9.37 3.40 - 10.80 10*3/mm3 Final      HGB Hemoglobin   Date Value Ref Range Status   11/21/2024 7.2 (L) 13.0 - 17.7 g/dL Final   11/21/2024 7.2 (L) 13.0 - 17.7 g/dL Final   11/20/2024 7.8 (L) 13.0 " "- 17.7 g/dL Final   11/20/2024 7.9 (L) 13.0 - 17.7 g/dL Final   11/20/2024 8.4 (L) 13.0 - 17.7 g/dL Final   11/20/2024 7.6 (L) 13.0 - 17.7 g/dL Final   11/20/2024 7.8 (L) 13.0 - 17.7 g/dL Final   11/19/2024 7.6 (L) 13.0 - 17.7 g/dL Final   11/19/2024 7.7 (L) 13.0 - 17.7 g/dL Final   11/19/2024 7.7 (L) 13.0 - 17.7 g/dL Final   11/19/2024 7.6 (L) 13.0 - 17.7 g/dL Final   11/18/2024 7.7 (L) 13.0 - 17.7 g/dL Final      HCT Hematocrit   Date Value Ref Range Status   11/21/2024 22.7 (L) 37.5 - 51.0 % Final   11/21/2024 23.2 (L) 37.5 - 51.0 % Final   11/20/2024 24.8 (L) 37.5 - 51.0 % Final   11/20/2024 24.1 (L) 37.5 - 51.0 % Final   11/20/2024 26.2 (L) 37.5 - 51.0 % Final   11/20/2024 22.5 (L) 37.5 - 51.0 % Final   11/20/2024 24.3 (L) 37.5 - 51.0 % Final   11/19/2024 23.8 (L) 37.5 - 51.0 % Final   11/19/2024 23.6 (L) 37.5 - 51.0 % Final   11/19/2024 23.2 (L) 37.5 - 51.0 % Final   11/19/2024 23.0 (L) 37.5 - 51.0 % Final   11/18/2024 23.5 (L) 37.5 - 51.0 % Final      Platlets No results found for: \"LABPLAT\"   MCV MCV   Date Value Ref Range Status   11/21/2024 108.4 (H) 79.0 - 97.0 fL Final   11/20/2024 108.2 (H) 79.0 - 97.0 fL Final   11/19/2024 105.9 (H) 79.0 - 97.0 fL Final          Sodium Sodium   Date Value Ref Range Status   11/21/2024 134 (L) 136 - 145 mmol/L Final   11/20/2024 136 136 - 145 mmol/L Final   11/19/2024 139 136 - 145 mmol/L Final   11/18/2024 134 (L) 136 - 145 mmol/L Final      Potassium Potassium   Date Value Ref Range Status   11/21/2024 4.3 3.5 - 5.2 mmol/L Final   11/20/2024 4.2 3.5 - 5.2 mmol/L Final   11/19/2024 4.0 3.5 - 5.2 mmol/L Final   11/18/2024 3.2 (L) 3.5 - 5.2 mmol/L Final      Chloride Chloride   Date Value Ref Range Status   11/21/2024 98 98 - 107 mmol/L Final   11/20/2024 100 98 - 107 mmol/L Final   11/19/2024 100 98 - 107 mmol/L Final   11/18/2024 97 (L) 98 - 107 mmol/L Final      CO2 CO2   Date Value Ref Range Status   11/21/2024 19.0 (L) 22.0 - 29.0 mmol/L Final   11/20/2024 19.0 (L) " "22.0 - 29.0 mmol/L Final   11/19/2024 21.0 (L) 22.0 - 29.0 mmol/L Final   11/18/2024 22.0 22.0 - 29.0 mmol/L Final      BUN BUN   Date Value Ref Range Status   11/21/2024 40 (H) 8 - 23 mg/dL Final   11/20/2024 59 (H) 8 - 23 mg/dL Final   11/19/2024 48 (H) 8 - 23 mg/dL Final   11/18/2024 42 (H) 8 - 23 mg/dL Final      Creatinine Creatinine   Date Value Ref Range Status   11/21/2024 5.41 (H) 0.76 - 1.27 mg/dL Final   11/20/2024 6.79 (H) 0.76 - 1.27 mg/dL Final   11/19/2024 5.40 (H) 0.76 - 1.27 mg/dL Final   11/18/2024 4.62 (H) 0.76 - 1.27 mg/dL Final      Calcium Calcium   Date Value Ref Range Status   11/21/2024 8.2 (L) 8.6 - 10.5 mg/dL Final   11/20/2024 8.1 (L) 8.6 - 10.5 mg/dL Final   11/19/2024 8.4 (L) 8.6 - 10.5 mg/dL Final   11/18/2024 8.1 (L) 8.6 - 10.5 mg/dL Final      PO4 No results found for: \"CAPO4\"   Albumin Albumin   Date Value Ref Range Status   11/21/2024 3.3 (L) 3.5 - 5.2 g/dL Final      Magnesium Magnesium   Date Value Ref Range Status   11/21/2024 2.2 1.6 - 2.4 mg/dL Final   11/20/2024 2.2 1.6 - 2.4 mg/dL Final   11/19/2024 2.2 1.6 - 2.4 mg/dL Final   11/18/2024 2.1 1.6 - 2.4 mg/dL Final      Uric Acid No results found for: \"URICACID\"         Assessment & Plan       Pneumonia due to COVID-19 virus    Severe obstructive sleep apnea    ESRD on HD T,R,S since 2022    Acute respiratory failure with hypoxia    Probable cardiac arrest versus severe bradycardia requiring CPR    Generalized seizure    Acute GI bleeding, possible    =======================     1.  ESRD on HD TTS- No missed treatment sessions  2.  Hypotension IV pressor  3.  HFpEF  4.  Anemia of CKD: Hgb at goal - No indications for BOAZ  5.  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  Volume status: No overt volume overload  7.  COVID- 19 PNA  8.  Afib / rate controlled   9- S/p Cardiac arrest on 11/16/24      PLAN   Currently on HD per MWF demar. On low dose pressor support. HD yesterday tolerated well.   Will assess daily for " HD needs: Will switch back to TTS schedule before discharge    Will continue manage ESRD and related complications  Daily BMP   High risk complex patient with multiple medical problems.    Octavio Ta MD  11/21/24  12:38 EST

## 2024-11-21 NOTE — PLAN OF CARE
Goal Outcome Evaluation:    Problem: Adult Inpatient Plan of Care  Goal: Plan of Care Review  Outcome: Progressing  Flowsheets (Taken 11/21/2024 0118)  Progress: improving  Plan of Care Reviewed With: child     -Patient is still intubated, Fentanyl completely weaned off, still arousable to voice and on/off following commands.   -TMAX 100.1 axillary  -Levo at 0.05 for BP support  -No BM, Relistor started    -Very thick secretions, mucomyst nebs    -HD in the AM

## 2024-11-22 NOTE — PROGRESS NOTES
Intensive Care Follow-up     Hospital:  LOS: 10 days   Mr. Marco Antonio Montemayor, 79 y.o. male is followed for:   Pneumonia due to COVID-19 virus        History of present illness:   79-year-old male with coronary disease, dyslipidemia, chronic kidney disease on hemodialysis, chronic anemia, hypertension, ZULEIKA on BiPAP, atrial fibrillation, heart failure with preserved ejection fraction.  Patient presented to the hospital with increased work of breathing and hypoxemic respiratory failure.  Patient was found to have COVID-19 pneumonia.  CTA chest on admission did not show any pulmonary embolism.  Patchy area of groundglass opacities noted in both lungs.  Patient was started on ceftriaxone and doxycycline.  Patient was also given Decadron and remdesivir.  Patient started developing hypotension.  Was given fluid resuscitation and midodrine with improvement in blood pressure.  Patient was thought to be having a GI bleed so GI consultation was obtained.  Patient however declined from respiratory standpoint and was becoming more delirious so was transferred to intensive care unit.  Patient was initially supported with BiPAP but eventually needed to be intubated and placed on mechanical ventilation.  Patient required pressors to maintain hemodynamics.  On admission to ICU patient had severe bradycardia and had 2 episodes of possible cardiac arrest requiring CPR for short duration of time.  Unclear if it was PEA or chest severe bradycardia.  Precedex was held.  Patient apparently was being suctioned at that time so was thought to have vasovagal response.  Patient was also noted to have generalized tonic-clonic seizure.  Neurology consultation was obtained and EEG was obtained.  EEG did not show any ongoing seizure activity however patient was started on Keppra.  Patient apparently was able to follow commands post cardiac arrest.  Pt underwent EGD and did not show any ulcers but had suction trauma.       Subjective   Interval  "History:  Overnight no acute events.  Hemoglobin remained above 7.  No active bleed noted.  Also still hypoactive.  Started on Movantik yesterday.  Patient is slightly more awake and following commands remains quite lethargic however.  Will perform spontaneous breathing trial.  Undergoing hemodialysis and plan is to remove 3 to 4 L.  Off norepinephrine.               The patient's past medical, surgical and social history were reviewed and updated in Epic as appropriate.       Objective     Infusions:  fentanyl 10 mcg/mL,  mcg/hr, Last Rate: Stopped (11/21/24 1515)  norepinephrine, 0.02-0.3 mcg/kg/min, Last Rate: Stopped (11/22/24 1127)      Medications:  acetylcysteine, 2 mL, Nebulization, TID - RT  atorvastatin, 40 mg, Nasogastric, Nightly  chlorhexidine, 15 mL, Mouth/Throat, Q12H  ipratropium-albuterol, 3 mL, Nebulization, 4x Daily - RT  lactobacillus acidophilus, 1 capsule, Nasogastric, Daily  levETIRAcetam, 500 mg, Intravenous, Daily  Naloxegol Oxalate, 25 mg, Nasogastric, QAM  nystatin, , Topical, Q12H  pantoprazole, 40 mg, Intravenous, Daily  ProSource No Carb, 30 mL, Nasogastric, TID        Vital Sign Min/Max for last 24 hours  Temp  Min: 98 °F (36.7 °C)  Max: 100.1 °F (37.8 °C)   BP  Min: 108/48  Max: 173/69   Pulse  Min: 87  Max: 124   Resp  Min: 19  Max: 24   SpO2  Min: 92 %  Max: 100 %   No data recorded       Input/Output for last 24 hour shift  11/21 0701 - 11/22 0700  In: 642.5 [I.V.:260.5]  Out: -    Mode: VC+/AC  FiO2 (%):  [35 %] 35 %  S RR:  [20] 20  S VT:  [450 mL] 450 mL  PEEP/CPAP (cm H2O):  [5 cm H20] 5 cm H20  MAP (cm H2O):  [9.8-11] 9.8  Objective:  Vital signs: (most recent): Blood pressure 135/51, pulse (!) 124, temperature 98 °F (36.7 °C), temperature source Axillary, resp. rate 21, height 177.8 cm (70\"), weight (!) 137 kg (300 lb 14.9 oz), SpO2 93%.            General Appearance:  Not in distress, no asynchrony with mechanical ventilator.  Head:  Pupils reactive & symmetrical " B/L.  Neck: Endotracheal tube clean.   Lungs:   B/L Breath sounds present with decreased breath sounds on bases, no wheezing heard today, occasional crackles.   Heart: S1 and S2 present, no murmur  Abdomen: Obese, non tender, no guarding or rigidity, bowel sounds very hypoactive.  Extremities: + edema, warm to touch.  Neurologic:  Pt   wakes up and opens eyes but remains very lethargic and weak.  Follows simple commands.    Ventilator settings: A/C: FiO2 35%      Results from last 7 days   Lab Units 11/22/24 0449 11/21/24  2347 11/21/24  1915 11/21/24  1215 11/21/24  0648 11/20/24  1247 11/20/24  0356   WBC 10*3/mm3  --  12.66*  --   --  10.31  --  9.79   HEMOGLOBIN g/dL 7.2* 7.3* 7.4*   < > 7.2*   < > 7.6*   PLATELETS 10*3/mm3  --  175  --   --  158  --  162    < > = values in this interval not displayed.     Results from last 7 days   Lab Units 11/22/24 0449 11/21/24  0648 11/20/24  0356   SODIUM mmol/L 134* 134* 136   POTASSIUM mmol/L 4.9 4.3 4.2   CO2 mmol/L 21.0* 19.0* 19.0*   BUN mg/dL 57* 40* 59*   CREATININE mg/dL 7.70* 5.41* 6.79*   MAGNESIUM mg/dL 2.6* 2.2 2.2   PHOSPHORUS mg/dL 5.7* 4.2 5.1*   GLUCOSE mg/dL 150* 146* 146*     Estimated Creatinine Clearance: 10.5 mL/min (A) (by C-G formula based on SCr of 7.7 mg/dL (H)).    Results from last 7 days   Lab Units 11/20/24  0325   PH, ARTERIAL pH units 7.383   PCO2, ARTERIAL mm Hg 36.6   PO2 ART mm Hg 71.7*       Images:   No new chest x-ray    I reviewed the patient's results and images.     Head CT report reviewed and did not show any acute intracranial pathology.    Assessment & Plan   Impression        Pneumonia due to COVID-19 virus    Severe obstructive sleep apnea    ESRD on HD T,R,S since 2022    Acute respiratory failure with hypoxia    Probable cardiac arrest versus severe bradycardia requiring CPR    Generalized seizure    Acute GI bleeding, possible       Plan        1.  Patient admitted here with COVID-19 pneumonia with worsened respiratory  status requiring intubation and mechanical ventilation.  Patient was in delirium state.  Wondering if it is due to COVID infection versus some other potential etiology.  Also noted to have generalized tonic-clonic seizure for which neurology is evaluating and patient remains on Keppra.  Unable to get MRI due to pacemaker incompatibility.  Repeat head CT did not show any other acute findings.  Fentanyl resolved.  Patient remains off sedation.  Waking up more and following commands intermittently though remains quite weak Lethargic.  Wondering if it is some form of critical illness polyneuropathy.  Will keep off sedation.    2.  Continue current mechanical ventilation.  Started on Mucomyst yesterday.  On DuoNeb as well.  We are able to get more secretions out at this point.  Wheezing has improved.  Repeat cultures have been negative.  Will go ahead and perform spontaneous breathing trial to see how patient does.  Will have further discussions with family regarding extubation if patient does well on spontaneous breathing trial as he will be high risk of complications due to significant neurological weakness.    3.  Patient has been treated with antibiotics for atypical infection.  Cultures have remained negative otherwise.  WBC count is normal.  Repeat cultures are not growing any organism.    4.  Remains off pressors.  On hemodialysis currently and tolerating well.  Now getting more hypotensive.  Will add as needed labetalol.  Fluid removal as tolerated.  .    5.  There was concern for ongoing GI bleed.  EGD did not show any overt pathology other than suction trauma.  Continue daily Protonix for GI prophylaxis.      6.  Tolerating enteral nutrition okay.  Has not had a bowel movement for few days.  Movantik started.  Bowel sounds remain very hypoactive.  May have underlying ileus.    7.  Hold anticoagulation for now.  SCDs for DVT prophylaxis.  INR remains supratherapeutic but starting to improve. No bleeding noted.      8.  Will check thyroid studies.    Family have been updated on a daily basis.  Will continue supportive care.  High risk of decline.      Plan of care and goals reviewed with multidisciplinary/antibiotic stewardship team during rounds.   I discussed the patient's findings and my recommendations with family, nursing staff, and consulting provider       Time spent Critical care 30 min (exclusive of procedure time)  including high complexity decision making to assess, manipulate, and support vital organ system failure in this individual who has impairment of one or more vital organ systems such that there is a high probability of imminent or life threatening deterioration in the patient’s condition.  Above documentation reviewed and reflect accurate information as of 11/22/2024 including copied elements of the note.       Mahin Live MD, [facility]  Pulmonary, Critical care and Sleep Medicine     ADDENDUM:   Discussed case with neurology team.  Patient has not had any other seizure activity afterwards.  He remains quite lethargic and encephalopathic.  Wondering if Keppra is causing some of these neurochanges.  We discussed and decided to stop Keppra for now unless he has any more overt seizure activity.  If he does not improve neurologically will try stimulant therapy with Ritalin or amantadine.  Discussed with patient's wife and daughter.

## 2024-11-22 NOTE — CASE MANAGEMENT/SOCIAL WORK
Continued Stay Note   Sivakumar     Patient Name: Marco Antonio Montemayor  MRN: [id number]  Today's Date: 11/22/2024    Admit Date: 11/12/2024    Plan: ongoing   Discharge Plan       Row Name 11/22/24 1055       Plan    Plan ongoing    Patient/Family in Agreement with Plan other (see comments)    Plan Comments Discussed in MDR, remains intubated/ off sedation. Hd today. SBT. D/C TBD. CM will continue to follow.                   Discharge Codes    No documentation.                 Expected Discharge Date and Time       Expected Discharge Date Expected Discharge Time    Nov 27, 2024               Grey Meza RN

## 2024-11-22 NOTE — PROGRESS NOTES
" LOS: 10 days   Patient Care Team:  Heriberto Murcia MD as PCP - General (Family Medicine)  Shanell Arango, PharmD as Pharmacist (Pharmacy)  Evelina Quan, PharmD as Pharmacist (Pharmacy)  Peng Melgar, PharmD as Pharmacist (Pharmacy)  Johnathan Taylor MD as Consulting Physician (Cardiac Electrophysiology)  Chato oB MD as Consulting Physician (Nephrology)  Ivet Beasley APRN as Nurse Practitioner (Cardiology)  Rei Decker PA as Physician Assistant (Cardiology)      Subjective   Seen on HD. HR ~ 110-120. Large Bp variation. UF 3.5 liter as tolerated. Labs reviewed. Clinical status unchanged otherwise.       Objective     Vital Sign Min/Max for last 24 hours  Temp  Min: 98 °F (36.7 °C)  Max: 99 °F (37.2 °C)   BP  Min: 92/56  Max: 173/69   Pulse  Min: 87  Max: 124   Resp  Min: 19  Max: 28   SpO2  Min: 92 %  Max: 100 %   No data recorded   Weight  Min: 137 kg (300 lb 14.9 oz)  Max: 137 kg (300 lb 14.9 oz)     Flowsheet Rows      Flowsheet Row First Filed Value   Admission Height 177.8 cm (70\") Documented at 11/12/2024 1456   Admission Weight 130 kg (287 lb) Documented at 11/12/2024 1456            I/O this shift:  In: -   Out: 3350   I/O last 3 completed shifts:  In: 1654.2 [I.V.:676.9; Other:210; NG/GT:569; IV Piggyback:198.3]  Out: -     Physical examination:  COVID precautions taken..  Pulmonary: Intubated on ventilator equal chest movement.  Cardiovascular: Normal rate.  Abdomen: Morbid obesity.  Extremities: Positive edema is noted.  Neurological: Sedated on ventilator  LUE-aVF in place      Results Review:     I reviewed the patient's new clinical results.    WBC WBC   Date Value Ref Range Status   11/21/2024 12.66 (H) 3.40 - 10.80 10*3/mm3 Final   11/21/2024 10.31 3.40 - 10.80 10*3/mm3 Final   11/20/2024 9.79 3.40 - 10.80 10*3/mm3 Final      HGB Hemoglobin   Date Value Ref Range Status   11/22/2024 7.2 (L) 13.0 - 17.7 g/dL Final   11/21/2024 7.3 (L) 13.0 - 17.7 g/dL Final " "  11/21/2024 7.4 (L) 13.0 - 17.7 g/dL Final   11/21/2024 7.2 (L) 13.0 - 17.7 g/dL Final   11/21/2024 7.2 (L) 13.0 - 17.7 g/dL Final   11/20/2024 7.8 (L) 13.0 - 17.7 g/dL Final   11/20/2024 7.9 (L) 13.0 - 17.7 g/dL Final   11/20/2024 8.4 (L) 13.0 - 17.7 g/dL Final   11/20/2024 7.6 (L) 13.0 - 17.7 g/dL Final   11/20/2024 7.8 (L) 13.0 - 17.7 g/dL Final   11/19/2024 7.6 (L) 13.0 - 17.7 g/dL Final      HCT Hematocrit   Date Value Ref Range Status   11/22/2024 22.7 (L) 37.5 - 51.0 % Final   11/21/2024 23.4 (L) 37.5 - 51.0 % Final   11/21/2024 23.6 (L) 37.5 - 51.0 % Final   11/21/2024 22.7 (L) 37.5 - 51.0 % Final   11/21/2024 23.2 (L) 37.5 - 51.0 % Final   11/20/2024 24.8 (L) 37.5 - 51.0 % Final   11/20/2024 24.1 (L) 37.5 - 51.0 % Final   11/20/2024 26.2 (L) 37.5 - 51.0 % Final   11/20/2024 22.5 (L) 37.5 - 51.0 % Final   11/20/2024 24.3 (L) 37.5 - 51.0 % Final   11/19/2024 23.8 (L) 37.5 - 51.0 % Final      Platlets No results found for: \"LABPLAT\"   MCV MCV   Date Value Ref Range Status   11/21/2024 106.8 (H) 79.0 - 97.0 fL Final   11/21/2024 108.4 (H) 79.0 - 97.0 fL Final   11/20/2024 108.2 (H) 79.0 - 97.0 fL Final          Sodium Sodium   Date Value Ref Range Status   11/22/2024 134 (L) 136 - 145 mmol/L Final   11/21/2024 134 (L) 136 - 145 mmol/L Final   11/20/2024 136 136 - 145 mmol/L Final      Potassium Potassium   Date Value Ref Range Status   11/22/2024 4.9 3.5 - 5.2 mmol/L Final   11/21/2024 4.3 3.5 - 5.2 mmol/L Final   11/20/2024 4.2 3.5 - 5.2 mmol/L Final      Chloride Chloride   Date Value Ref Range Status   11/22/2024 97 (L) 98 - 107 mmol/L Final   11/21/2024 98 98 - 107 mmol/L Final   11/20/2024 100 98 - 107 mmol/L Final      CO2 CO2   Date Value Ref Range Status   11/22/2024 21.0 (L) 22.0 - 29.0 mmol/L Final   11/21/2024 19.0 (L) 22.0 - 29.0 mmol/L Final   11/20/2024 19.0 (L) 22.0 - 29.0 mmol/L Final      BUN BUN   Date Value Ref Range Status   11/22/2024 57 (H) 8 - 23 mg/dL Final   11/21/2024 40 (H) 8 - 23 " "mg/dL Final   11/20/2024 59 (H) 8 - 23 mg/dL Final      Creatinine Creatinine   Date Value Ref Range Status   11/22/2024 7.70 (H) 0.76 - 1.27 mg/dL Final   11/21/2024 5.41 (H) 0.76 - 1.27 mg/dL Final   11/20/2024 6.79 (H) 0.76 - 1.27 mg/dL Final      Calcium Calcium   Date Value Ref Range Status   11/22/2024 8.6 8.6 - 10.5 mg/dL Final   11/21/2024 8.2 (L) 8.6 - 10.5 mg/dL Final   11/20/2024 8.1 (L) 8.6 - 10.5 mg/dL Final      PO4 No results found for: \"CAPO4\"   Albumin Albumin   Date Value Ref Range Status   11/21/2024 3.3 (L) 3.5 - 5.2 g/dL Final      Magnesium Magnesium   Date Value Ref Range Status   11/22/2024 2.6 (H) 1.6 - 2.4 mg/dL Final   11/21/2024 2.2 1.6 - 2.4 mg/dL Final   11/20/2024 2.2 1.6 - 2.4 mg/dL Final      Uric Acid No results found for: \"URICACID\"         Assessment & Plan       Pneumonia due to COVID-19 virus    Severe obstructive sleep apnea    ESRD on HD T,R,S since 2022    Acute respiratory failure with hypoxia    Probable cardiac arrest versus severe bradycardia requiring CPR    Generalized seizure    Acute GI bleeding, possible    =======================     1.  ESRD on HD TTS- No missed treatment sessions  2.  Hypotension IV pressor  3.  HFpEF  4.  Anemia of CKD: Transfuse at Hb<7. Start BOAZ   5.  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  Volume status: No overt volume overload  7.  COVID- 19 PNA  8.  Afib / rate controlled   9- S/p Cardiac arrest on 11/16/24      PLAN   Currently on HD per MWF demar. On low dose pressor support.     Will assess daily for HD needs: Will switch back to TTS schedule before discharge    Will continue manage ESRD and related complications  Daily BMP   High risk complex patient with multiple medical problems.    Octavio Ta MD  11/22/24  13:10 EST            "

## 2024-11-22 NOTE — PLAN OF CARE
Goal Outcome Evaluation:                    Pt failed SBT today. Unable to wean at this time.

## 2024-11-22 NOTE — PLAN OF CARE
Goal Outcome Evaluation:  Plan of Care Reviewed With: patient.Patient unable to respond at this time. Levophed titrated to goal. Unable to fully wean patient off Levophed.         Progress: improving

## 2024-11-22 NOTE — PLAN OF CARE
Goal Outcome Evaluation:      Problem: Adult Inpatient Plan of Care  Goal: Plan of Care Review  Outcome: Progressing  Flowsheets (Taken 11/22/2024 2384)  Progress: improving  Plan of Care Reviewed With:   family   spouse   child     -Patient overall stable today, however is labile on BP, Levo weaned off  -Remains intubated, no sedation, 1x PRN fentanyl dose given for pain   -TF up to 50 hr, 20ml H20 q 1hr  -Keppra D/C'd due to patient staying very drowsy  -SBT attempted, stopped due to high BP and tachypnea   -Family updated on plan of care

## 2024-11-23 NOTE — PLAN OF CARE
Goal Outcome Evaluation:  Plan of Care Reviewed With: patient        Progress: no change        * pt remained on vent, no change in settings     Levo restarted for 1 hr after bed change with first BM r/t hypotension, remained on for 1 hr, pt rebounded, turned off after 1 hr, MAP at 60 or above with ART line, MAP with cuff above 70   Pt remained in Afib  Pt had 2 bowel movements, black and tarry stool noted  Pt follows commands  ART line positional at times  Relistor held r/t 2 loose BM's  Hgb remained stable  Blood glucose monitored

## 2024-11-23 NOTE — PROGRESS NOTES
" LOS: 11 days   Patient Care Team:  Heriberto Murcia MD as PCP - General (Family Medicine)  Shanell Arango, PharmD as Pharmacist (Pharmacy)  Evelina Quan, PharmD as Pharmacist (Pharmacy)  Peng Melgar, PharmD as Pharmacist (Pharmacy)  Johnathan Taylor MD as Consulting Physician (Cardiac Electrophysiology)  Chato Bo MD as Consulting Physician (Nephrology)  Ivet Beasley APRN as Nurse Practitioner (Cardiology)  Rei Decker PA as Physician Assistant (Cardiology)      Subjective   HD yesterday tolerated well. UF ~ 3 liter. Failed PSV. Mentation poor. HR ~ 110-130's bp stable.       Objective     Vital Sign Min/Max for last 24 hours  Temp  Min: 97.9 °F (36.6 °C)  Max: 100 °F (37.8 °C)   BP  Min: 81/29  Max: 154/69   Pulse  Min: 97  Max: 124   Resp  Min: 20  Max: 21   SpO2  Min: 89 %  Max: 100 %   No data recorded   No data recorded     Flowsheet Rows      Flowsheet Row First Filed Value   Admission Height 177.8 cm (70\") Documented at 11/12/2024 1456   Admission Weight 130 kg (287 lb) Documented at 11/12/2024 1456            I/O this shift:  In: 475 [Other:60; NG/GT:415]  Out: -   I/O last 3 completed shifts:  In: 1419.1 [I.V.:108.1; Other:181; NG/GT:1130]  Out: 3352 [Stool:2]    Physical examination:  COVID precautions taken..  Pulmonary: Intubated on ventilator equal chest movement.  Cardiovascular: Normal rate.  Abdomen: Morbid obesity.  Extremities: Positive edema is noted.  Neurological: Sedated on ventilator  LUE-aVF in place      Results Review:     I reviewed the patient's new clinical results.    WBC WBC   Date Value Ref Range Status   11/23/2024 11.92 (H) 3.40 - 10.80 10*3/mm3 Final   11/21/2024 12.66 (H) 3.40 - 10.80 10*3/mm3 Final   11/21/2024 10.31 3.40 - 10.80 10*3/mm3 Final      HGB Hemoglobin   Date Value Ref Range Status   11/23/2024 7.2 (L) 13.0 - 17.7 g/dL Final   11/22/2024 7.2 (L) 13.0 - 17.7 g/dL Final   11/21/2024 7.3 (L) 13.0 - 17.7 g/dL Final   11/21/2024 " "7.4 (L) 13.0 - 17.7 g/dL Final   11/21/2024 7.2 (L) 13.0 - 17.7 g/dL Final   11/21/2024 7.2 (L) 13.0 - 17.7 g/dL Final   11/20/2024 7.8 (L) 13.0 - 17.7 g/dL Final   11/20/2024 7.9 (L) 13.0 - 17.7 g/dL Final      HCT Hematocrit   Date Value Ref Range Status   11/23/2024 23.3 (L) 37.5 - 51.0 % Final   11/22/2024 22.7 (L) 37.5 - 51.0 % Final   11/21/2024 23.4 (L) 37.5 - 51.0 % Final   11/21/2024 23.6 (L) 37.5 - 51.0 % Final   11/21/2024 22.7 (L) 37.5 - 51.0 % Final   11/21/2024 23.2 (L) 37.5 - 51.0 % Final   11/20/2024 24.8 (L) 37.5 - 51.0 % Final   11/20/2024 24.1 (L) 37.5 - 51.0 % Final      Platlets No results found for: \"LABPLAT\"   MCV MCV   Date Value Ref Range Status   11/23/2024 108.4 (H) 79.0 - 97.0 fL Final   11/21/2024 106.8 (H) 79.0 - 97.0 fL Final   11/21/2024 108.4 (H) 79.0 - 97.0 fL Final          Sodium Sodium   Date Value Ref Range Status   11/23/2024 135 (L) 136 - 145 mmol/L Final   11/22/2024 134 (L) 136 - 145 mmol/L Final   11/21/2024 134 (L) 136 - 145 mmol/L Final      Potassium Potassium   Date Value Ref Range Status   11/23/2024 4.2 3.5 - 5.2 mmol/L Final   11/22/2024 4.9 3.5 - 5.2 mmol/L Final   11/21/2024 4.3 3.5 - 5.2 mmol/L Final      Chloride Chloride   Date Value Ref Range Status   11/23/2024 99 98 - 107 mmol/L Final   11/22/2024 97 (L) 98 - 107 mmol/L Final   11/21/2024 98 98 - 107 mmol/L Final      CO2 CO2   Date Value Ref Range Status   11/23/2024 22.0 22.0 - 29.0 mmol/L Final   11/22/2024 21.0 (L) 22.0 - 29.0 mmol/L Final   11/21/2024 19.0 (L) 22.0 - 29.0 mmol/L Final      BUN BUN   Date Value Ref Range Status   11/23/2024 47 (H) 8 - 23 mg/dL Final   11/22/2024 57 (H) 8 - 23 mg/dL Final   11/21/2024 40 (H) 8 - 23 mg/dL Final      Creatinine Creatinine   Date Value Ref Range Status   11/23/2024 4.97 (H) 0.76 - 1.27 mg/dL Final   11/22/2024 7.70 (H) 0.76 - 1.27 mg/dL Final   11/21/2024 5.41 (H) 0.76 - 1.27 mg/dL Final      Calcium Calcium   Date Value Ref Range Status   11/23/2024 8.7 8.6 " "- 10.5 mg/dL Final   11/22/2024 8.6 8.6 - 10.5 mg/dL Final   11/21/2024 8.2 (L) 8.6 - 10.5 mg/dL Final      PO4 No results found for: \"CAPO4\"   Albumin Albumin   Date Value Ref Range Status   11/21/2024 3.3 (L) 3.5 - 5.2 g/dL Final      Magnesium Magnesium   Date Value Ref Range Status   11/23/2024 2.3 1.6 - 2.4 mg/dL Final   11/22/2024 2.6 (H) 1.6 - 2.4 mg/dL Final   11/21/2024 2.2 1.6 - 2.4 mg/dL Final      Uric Acid No results found for: \"URICACID\"         Assessment & Plan       Pneumonia due to COVID-19 virus    Severe obstructive sleep apnea    ESRD on HD T,R,S since 2022    Acute respiratory failure with hypoxia    Probable cardiac arrest versus severe bradycardia requiring CPR    Generalized seizure    Acute GI bleeding, possible    =======================     1.  ESRD on HD TTS- No missed treatment sessions  2.  Hypotension IV pressor  3.  HFpEF  4.  Anemia of CKD: Transfuse at Hb<7. Start BOAZ   5.  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  Volume status: No overt volume overload  7.  COVID- 19 PNA  8.  Afib / rate controlled   9- S/p Cardiac arrest on 11/16/24      PLAN   Hold off on HD today  Currently on HD per MWF demar. On low dose pressor support.   Will assess daily for HD needs: Will switch back to TTS schedule before discharge    Will continue manage ESRD and related complications  Daily BMP   High risk complex patient with multiple medical problems.    Octavio Ta MD  11/23/24  13:02 EST            "

## 2024-11-23 NOTE — PLAN OF CARE
Goal Outcome Evaluation:   Pt stable on current settings. Attemptin SBT today. Failed. Will attempt again in AM

## 2024-11-23 NOTE — PLAN OF CARE
Problem: Adult Inpatient Plan of Care  Goal: Plan of Care Review  Outcome: Progressing  Flowsheets (Taken 11/23/2024 1830)  Progress: no change  Outcome Evaluation: Pt remains on MV- PEEP 5 and FiO2 35%. Pt drowsy, but arousing to voice and following commands. Attempted SBT this AM, but failed. Levo remains off. Remains in Afib. Pt tolearting TF at goal rate. Pt anuric. x1 black and tarry BM. Pt's daughter updated at bedside. Bilateral soft wrist restraints remain in place for pt safety.      Problem: Restraint, Nonviolent  Goal: Absence of Harm or Injury  Outcome: Progressing  Intervention: Implement Least Restrictive Safety Strategies  Recent Flowsheet Documentation  Taken 11/23/2024 1800 by Fernanda Childress RN  Medical Device Protection:   IV pole/bag removed from visual field   torso covered   tubing secured  Taken 11/23/2024 1600 by Fernanda Childress RN  Medical Device Protection:   IV pole/bag removed from visual field   torso covered   tubing secured  Diversional Activities: television  Taken 11/23/2024 1400 by Fernanda Childress RN  Medical Device Protection:   IV pole/bag removed from visual field   torso covered   tubing secured  Diversional Activities: television  Taken 11/23/2024 1200 by Fernanda Childress RN  Medical Device Protection:   IV pole/bag removed from visual field   torso covered   tubing secured  Diversional Activities: television  Taken 11/23/2024 1000 by Fernanda Childress RN  Medical Device Protection:   IV pole/bag removed from visual field   torso covered   tubing secured  Diversional Activities: television  Taken 11/23/2024 0800 by Fernanda Childress RN  Medical Device Protection:   IV pole/bag removed from visual field   torso covered   tubing secured  Diversional Activities: television  Intervention: Protect Dignity, Rights and Personal Wellbeing  Recent Flowsheet Documentation  Taken 11/23/2024 1800 by Fernanda Childress RN  Trust Relationship/Rapport:   care explained   choices provided  Taken  11/23/2024 1600 by Fernanda Childress RN  Trust Relationship/Rapport:   care explained   choices provided  Taken 11/23/2024 1400 by Fernanda Childress RN  Trust Relationship/Rapport:   care explained   choices provided  Taken 11/23/2024 1200 by Fernanda Childress RN  Trust Relationship/Rapport:   care explained   choices provided  Taken 11/23/2024 1000 by Fernanda Childress RN  Trust Relationship/Rapport:   care explained   choices provided  Taken 11/23/2024 0800 by Fernanda Childress RN  Trust Relationship/Rapport:   care explained   choices provided  Intervention: Protect Skin and Joint Integrity  Recent Flowsheet Documentation  Taken 11/23/2024 1800 by Fernanda Childress RN  Body Position:   turned   supine   upper extremity elevated   lower extremity elevated  Skin Protection:   incontinence pads utilized   silicone foam dressing in place   skin sealant/moisture barrier applied   transparent dressing maintained  Taken 11/23/2024 1600 by Fernanda Childress RN  Body Position:   turned   tilted   left   lower extremity elevated   upper extremity elevated  Skin Protection:   incontinence pads utilized   silicone foam dressing in place   skin sealant/moisture barrier applied   transparent dressing maintained  Taken 11/23/2024 1400 by Fernanda Childress RN  Body Position:   turned   supine   upper extremity elevated   lower extremity elevated  Skin Protection:   incontinence pads utilized   silicone foam dressing in place   skin sealant/moisture barrier applied   transparent dressing maintained  Taken 11/23/2024 1200 by Fernanda Childress RN  Body Position:   turned   tilted   right   lower extremity elevated   upper extremity elevated  Skin Protection:   incontinence pads utilized   silicone foam dressing in place   skin sealant/moisture barrier applied   transparent dressing maintained  Taken 11/23/2024 1000 by Fernanda Childress RN  Body Position:   turned   supine   upper extremity elevated   lower extremity elevated  Skin Protection:    incontinence pads utilized   silicone foam dressing in place   skin sealant/moisture barrier applied   transparent dressing maintained  Taken 11/23/2024 0800 by Fernanda Childress RN  Body Position:   turned   tilted   left   lower extremity elevated   upper extremity elevated  Skin Protection:   incontinence pads utilized   silicone foam dressing in place   transparent dressing maintained   skin sealant/moisture barrier applied  Range of Motion: ROM (range of motion) performed     Plan of Care Reviewed With:   patient   child

## 2024-11-23 NOTE — PROGRESS NOTES
Intensive Care Follow-up     Hospital:  LOS: 11 days   Mr. Marco Antonio Montemayor, 79 y.o. male is followed for:   Pneumonia due to COVID-19 virus        History of present illness:   79-year-old male with coronary disease, dyslipidemia, chronic kidney disease on hemodialysis, chronic anemia, hypertension, ZULEIKA on BiPAP, atrial fibrillation, heart failure with preserved ejection fraction.  Patient presented to the hospital with increased work of breathing and hypoxemic respiratory failure.  Patient was found to have COVID-19 pneumonia.  CTA chest on admission did not show any pulmonary embolism.  Patchy area of groundglass opacities noted in both lungs.  Patient was started on ceftriaxone and doxycycline.  Patient was also given Decadron and remdesivir.  Patient started developing hypotension.  Was given fluid resuscitation and midodrine with improvement in blood pressure.  Patient was thought to be having a GI bleed so GI consultation was obtained.  Patient however declined from respiratory standpoint and was becoming more delirious so was transferred to intensive care unit.  Patient was initially supported with BiPAP but eventually needed to be intubated and placed on mechanical ventilation.  Patient required pressors to maintain hemodynamics.  On admission to ICU patient had severe bradycardia and had 2 episodes of possible cardiac arrest requiring CPR for short duration of time.  Unclear if it was PEA or chest severe bradycardia.  Precedex was held.  Patient apparently was being suctioned at that time so was thought to have vasovagal response.  Patient was also noted to have generalized tonic-clonic seizure.  Neurology consultation was obtained and EEG was obtained.  EEG did not show any ongoing seizure activity however patient was started on Keppra.  Patient apparently was able to follow commands post cardiac arrest.  Pt underwent EGD and did not show any ulcers but had suction trauma.       Subjective   Interval  History:  Overnight no acute events.  Had a black tarry bowel movement yesterday x 2.  No acute bleed.  Hemoglobin remained stable however.  Remains on subcu heparin for now.  INR subtherapeutic now and will monitor for now.  Patient is following commands and now but remains very weak.  Very weak  strength and also unable to raise head of the bed either.  Attempted spontaneous breathing trial today but became quite tachypneic placed back on the ventilator.  Had discussions with daughter at bedside discussed about potential need for tracheostomy.  Yesterday Case was discussed with neurology team they recommended trial of amantadine we will go ahead and start that and see how he tolerates that.               The patient's past medical, surgical and social history were reviewed and updated in Epic as appropriate.       Objective     Infusions:  norepinephrine, 0.02-0.3 mcg/kg/min, Last Rate: Stopped (11/22/24 2300)      Medications:  acetylcysteine, 2 mL, Nebulization, TID - RT  atorvastatin, 40 mg, Nasogastric, Nightly  chlorhexidine, 15 mL, Mouth/Throat, Q12H  epoetin michelle/michelle-epbx, 10,000 Units, Subcutaneous, Once per day on Monday Wednesday Friday  ipratropium-albuterol, 3 mL, Nebulization, 4x Daily - RT  lactobacillus acidophilus, 1 capsule, Nasogastric, Daily  Naloxegol Oxalate, 25 mg, Nasogastric, QAM  nystatin, , Topical, Q12H  pantoprazole, 40 mg, Intravenous, Daily  ProSource No Carb, 30 mL, Nasogastric, TID        Vital Sign Min/Max for last 24 hours  Temp  Min: 97.9 °F (36.6 °C)  Max: 100 °F (37.8 °C)   BP  Min: 81/29  Max: 154/69   Pulse  Min: 97  Max: 124   Resp  Min: 20  Max: 28   SpO2  Min: 89 %  Max: 100 %   No data recorded       Input/Output for last 24 hour shift  11/22 0701 - 11/23 0700  In: 1152.1 [I.V.:11.1]  Out: 3352    Mode: VC+/AC  FiO2 (%):  [35 %] 35 %  S RR:  [20] 20  S VT:  [450 mL] 450 mL  PEEP/CPAP (cm H2O):  [5 cm H20] 5 cm H20  KS SUP:  [10 cm H20] 10 cm H20  MAP (cm H2O):   "[8.1-17] 11  Objective:  Vital signs: (most recent): Blood pressure 123/63, pulse 99, temperature 99.5 °F (37.5 °C), temperature source Axillary, resp. rate 20, height 177.8 cm (70\"), weight (!) 137 kg (300 lb 14.9 oz), SpO2 93%.            General Appearance:  Not in distress, no asynchrony with mechanical ventilator.  Head:  Pupils reactive & symmetrical B/L.  Neck: Endotracheal tube clean.   Lungs:   B/L Breath sounds present with decreased breath sounds on bases, no wheezing heard today, occasional crackles.   Heart: S1 and S2 present, no murmur  Abdomen: Obese, non tender, no guarding or rigidity, bowel sounds very hypoactive.  Extremities: + edema, warm to touch.  Neurologic:  Pt  wakes up and follows some simple commands.  Remains extremely weak.    Ventilator settings: A/C: FiO2 35%      Results from last 7 days   Lab Units 11/23/24  0450 11/22/24 0449 11/21/24  2347 11/21/24  1215 11/21/24  0648   WBC 10*3/mm3 11.92*  --  12.66*  --  10.31   HEMOGLOBIN g/dL 7.2* 7.2* 7.3*   < > 7.2*   PLATELETS 10*3/mm3 200  --  175  --  158    < > = values in this interval not displayed.     Results from last 7 days   Lab Units 11/23/24  0450 11/22/24  0449 11/21/24  0648 11/20/24  0356   SODIUM mmol/L 135* 134* 134* 136   POTASSIUM mmol/L 4.2 4.9 4.3 4.2   CO2 mmol/L 22.0 21.0* 19.0* 19.0*   BUN mg/dL 47* 57* 40* 59*   CREATININE mg/dL 4.97* 7.70* 5.41* 6.79*   MAGNESIUM mg/dL 2.3 2.6* 2.2 2.2   PHOSPHORUS mg/dL  --  5.7* 4.2 5.1*   GLUCOSE mg/dL 163* 150* 146* 146*     Estimated Creatinine Clearance: 16.3 mL/min (A) (by C-G formula based on SCr of 4.97 mg/dL (H)).    Results from last 7 days   Lab Units 11/20/24  0325   PH, ARTERIAL pH units 7.383   PCO2, ARTERIAL mm Hg 36.6   PO2 ART mm Hg 71.7*       Images:   No new chest x-ray    I reviewed the patient's results and images.     Head CT report reviewed and did not show any acute intracranial pathology.    Assessment & Plan   Impression        Pneumonia due to " COVID-19 virus    Severe obstructive sleep apnea    ESRD on HD T,R,S since 2022    Acute respiratory failure with hypoxia    Probable cardiac arrest versus severe bradycardia requiring CPR    Generalized seizure    Acute GI bleeding, possible       Plan        1.  Patient admitted here with COVID-19 pneumonia with worsened respiratory status requiring intubation and mechanical ventilation.  Patient was in delirium state.  Wondering if it is due to COVID infection versus some other potential etiology.  Also noted to have generalized tonic-clonic seizure for which neurology is evaluating and patient remains on Keppra.  Unable to get MRI due to pacemaker incompatibility.  Repeat head CT did not show any other acute findings. Patient remains off sedation.  Waking up more and following commands intermittently though remains quite weak.  Wondering if it is some form of critical illness polyneuropathy.  Will keep off sedation.  Case was discussed with neurology yesterday.  Will give patient a trial of amantadine.    2.  Continue current mechanical ventilation.  Started on Mucomyst yesterday.  On DuoNeb as well.  We are able to get more secretions out at this point.  Wheezing has improved.  Repeat cultures have been negative.  Spontaneous breathing trial attempted but patient remains quite weak and became quite tachypneic.  Placed back on ventilator.  Discussed with family that safe approach would be to consider tracheostomy if we want to be aggressive cares.  Will give trial of amantadine to see if he wakes up little bit more.  Discussed with family that I am concerned given his weakness and trouble clearing his secretions and he may end up back on the vent.  Apparently he was fighting the BiPAP before he got intubated as well so BiPAP support will not be a viable option either.    3.  Patient has been treated with antibiotics for atypical infection.  Cultures have remained negative otherwise.  WBC count is normal.  Repeat  cultures are not growing any organism.    4.  Remains off pressors.  Tolerating hemodialysis now.  .    5.  There was concern for ongoing GI bleed.  EGD did not show any overt pathology other than suction trauma.  Continue daily Protonix for GI prophylaxis.      6.  Tolerating enteral nutrition okay.  Movantik started.  Patient had 2 bowel movements yesterday.  Advance tube feeds to goal.    7.  Hold anticoagulation for now.  SCDs for DVT prophylaxis.  INR is subtherapeutic now.  Given patient's ongoing issues I will hold anticoagulation for now.  Patient was on warfarin for A-fib.    8.  TSH checked and normal as well.    Family have been updated on a daily basis.  Will continue supportive care.  High risk of decline.      Plan of care and goals reviewed with multidisciplinary/antibiotic stewardship team during rounds.   I discussed the patient's findings and my recommendations with family, nursing staff, and consulting provider       Time spent Critical care 35 min (exclusive of procedure time)  including high complexity decision making to assess, manipulate, and support vital organ system failure in this individual who has impairment of one or more vital organ systems such that there is a high probability of imminent or life threatening deterioration in the patient’s condition.  Above documentation reviewed and reflect accurate information as of 11/23/2024 including copied elements of the note.       Mahin Live MD, FCCP  Pulmonary, Critical care and Sleep Medicine     A

## 2024-11-24 NOTE — PLAN OF CARE
Problem: Adult Inpatient Plan of Care  Goal: Plan of Care Review  Outcome: Progressing  Flowsheets (Taken 11/24/2024 4177)  Progress: improving  Outcome Evaluation: Pt remains on MV- PEEP 5 and FiO2 45%. Pt more alert throughout shift, opening eyes spontaneously, and following commands. Attempted SBT this AM, but failed. Levo remains off. Remains in Afib. Heparin gtt initiated today- currently infusing at 7 Units/kg/hr. Pt tolearting TF at goal rate. Pt anuric. No BM this shift. Plan for HD tomorrow. Pt's spouse and daughter updated at bedside. Bilateral soft wrist restraints remain in place for pt safety.  Plan of Care Reviewed With:   patient   spouse   child     Problem: Restraint, Nonviolent  Goal: Absence of Harm or Injury  Outcome: Progressing  Intervention: Implement Least Restrictive Safety Strategies  Recent Flowsheet Documentation  Taken 11/24/2024 1800 by Fernanda Childress RN  Medical Device Protection:   IV pole/bag removed from visual field   torso covered   tubing secured  Diversional Activities: television  Taken 11/24/2024 1600 by Fernanda Childress RN  Medical Device Protection:   IV pole/bag removed from visual field   torso covered   tubing secured  Diversional Activities: television  Taken 11/24/2024 1400 by Fernanda Childress RN  Medical Device Protection:   IV pole/bag removed from visual field   torso covered   tubing secured  Diversional Activities: television  Taken 11/24/2024 1200 by Fernanda Childress RN  Medical Device Protection:   IV pole/bag removed from visual field   torso covered   tubing secured  Diversional Activities: television  Taken 11/24/2024 1000 by Fernanda Childress RN  Medical Device Protection:   IV pole/bag removed from visual field   torso covered   tubing secured  Diversional Activities: television  Taken 11/24/2024 0800 by Fernanda Childress RN  Medical Device Protection:   IV pole/bag removed from visual field   torso covered   tubing secured  Diversional Activities:  television  Intervention: Protect Dignity, Rights and Personal Wellbeing  Recent Flowsheet Documentation  Taken 11/24/2024 1800 by Fernanda Childress RN  Trust Relationship/Rapport: care explained  Taken 11/24/2024 1600 by Fernanda Childress RN  Trust Relationship/Rapport: care explained  Taken 11/24/2024 1400 by Fernanda Childress RN  Trust Relationship/Rapport: care explained  Taken 11/24/2024 1200 by Fernanda Childress RN  Trust Relationship/Rapport: care explained  Taken 11/24/2024 1000 by Fernanda Childress RN  Trust Relationship/Rapport: care explained  Taken 11/24/2024 0800 by Fernanda Childress RN  Trust Relationship/Rapport: care explained  Intervention: Protect Skin and Joint Integrity  Recent Flowsheet Documentation  Taken 11/24/2024 1800 by Fernanda Childress RN  Body Position:   turned   tilted   left   upper extremity elevated   lower extremity elevated  Skin Protection:   incontinence pads utilized   silicone foam dressing in place   skin sealant/moisture barrier applied   transparent dressing maintained  Taken 11/24/2024 1600 by Fernanda Childress RN  Body Position:   turned   supine   upper extremity elevated   lower extremity elevated  Skin Protection:   incontinence pads utilized   skin sealant/moisture barrier applied   transparent dressing maintained  Taken 11/24/2024 1400 by Fernanda Childress RN  Body Position:   turned   tilted   right   lower extremity elevated   upper extremity elevated  Skin Protection:   incontinence pads utilized   silicone foam dressing in place   skin sealant/moisture barrier applied   transparent dressing maintained  Taken 11/24/2024 1200 by Fernanda Childress RN  Body Position:   turned   supine   upper extremity elevated   lower extremity elevated  Skin Protection:   incontinence pads utilized   silicone foam dressing in place   skin sealant/moisture barrier applied   transparent dressing maintained  Taken 11/24/2024 1000 by Fernanda Chidlress RN  Body Position:   turned   tilted   left   lower  extremity elevated   upper extremity elevated  Skin Protection:   incontinence pads utilized   silicone foam dressing in place   skin sealant/moisture barrier applied   transparent dressing maintained  Taken 11/24/2024 0800 by Fernanda Childress RN  Body Position:   turned   supine   upper extremity elevated   lower extremity elevated  Skin Protection:   incontinence pads utilized   silicone foam dressing in place   skin sealant/moisture barrier applied   transparent dressing maintained  Range of Motion: ROM (range of motion) performed

## 2024-11-24 NOTE — PROGRESS NOTES
Intensive Care Follow-up     Hospital:  LOS: 12 days   Mr. Marco Antonio Montemayor, 79 y.o. male is followed for:   Pneumonia due to COVID-19 virus        History of present illness:   79-year-old male with coronary disease, dyslipidemia, chronic kidney disease on hemodialysis, chronic anemia, hypertension, ZULEIKA on BiPAP, atrial fibrillation, heart failure with preserved ejection fraction.  Patient presented to the hospital with increased work of breathing and hypoxemic respiratory failure.  Patient was found to have COVID-19 pneumonia.  CTA chest on admission did not show any pulmonary embolism.  Patchy area of groundglass opacities noted in both lungs.  Patient was started on ceftriaxone and doxycycline.  Patient was also given Decadron and remdesivir.  Patient started developing hypotension.  Was given fluid resuscitation and midodrine with improvement in blood pressure.  Patient was thought to be having a GI bleed so GI consultation was obtained.  Patient however declined from respiratory standpoint and was becoming more delirious so was transferred to intensive care unit.  Patient was initially supported with BiPAP but eventually needed to be intubated and placed on mechanical ventilation.  Patient required pressors to maintain hemodynamics.  On admission to ICU patient had severe bradycardia and had 2 episodes of possible cardiac arrest requiring CPR for short duration of time.  Unclear if it was PEA or chest severe bradycardia.  Precedex was held.  Patient apparently was being suctioned at that time so was thought to have vasovagal response.  Patient was also noted to have generalized tonic-clonic seizure.  Neurology consultation was obtained and EEG was obtained.  EEG did not show any ongoing seizure activity however patient was started on Keppra.  Patient apparently was able to follow commands post cardiac arrest.  Pt underwent EGD and did not show any ulcers but had suction trauma.   Patient had been quite  encephalopathic.  Initially was treated with Keppra but we stopped that on 11/23.  Patient did not have any seizure activity.  Patient was also started on amantadine to see if we can arouse him more.       Subjective   Interval History:   patient was started on amantadine yesterday.  Patient is waking up little bit more.  Following commands more consistently.  Attempted spontaneous breathing trial today but patient became quite tachypneic with not enough tidal volume RSBI was 140.  Placed back on mechanical ventilation               The patient's past medical, surgical and social history were reviewed and updated in Epic as appropriate.       Objective     Infusions:  norepinephrine, 0.02-0.3 mcg/kg/min, Last Rate: Stopped (11/22/24 2300)      Medications:  acetylcysteine, 2 mL, Nebulization, TID - RT  Amantadine HCl, 200 mg, Nasogastric, Weekly  atorvastatin, 40 mg, Nasogastric, Nightly  chlorhexidine, 15 mL, Mouth/Throat, Q12H  epoetin michelle/michelle-epbx, 10,000 Units, Subcutaneous, Once per day on Monday Wednesday Friday  insulin regular, 2-7 Units, Subcutaneous, Q6H  ipratropium-albuterol, 3 mL, Nebulization, 4x Daily - RT  lactobacillus acidophilus, 1 capsule, Nasogastric, Daily  Naloxegol Oxalate, 25 mg, Nasogastric, QAM  nystatin, , Topical, Q12H  pantoprazole, 40 mg, Intravenous, Daily  ProSource No Carb, 30 mL, Nasogastric, TID        Vital Sign Min/Max for last 24 hours  Temp  Min: 97.9 °F (36.6 °C)  Max: 99.7 °F (37.6 °C)   BP  Min: 95/36  Max: 145/60   Pulse  Min: 93  Max: 128   Resp  Min: 20  Max: 20   SpO2  Min: 90 %  Max: 100 %   No data recorded       Input/Output for last 24 hour shift  11/23 0701 - 11/24 0700  In: 1869   Out: 2    Mode: VC+/AC  FiO2 (%):  [35 %-45 %] 45 %  S RR:  [20] 20  S VT:  [450 mL] 450 mL  PEEP/CPAP (cm H2O):  [5 cm H20] 5 cm H20  MAP (cm H2O):  [9.6-13] 13  Objective:  Vital signs: (most recent): Blood pressure 127/57, pulse 107, temperature 98.5 °F (36.9 °C), temperature source  "Axillary, resp. rate 20, height 177.8 cm (70\"), weight (!) 137 kg (300 lb 14.9 oz), SpO2 99%.            General Appearance:  Not in distress, no asynchrony with mechanical ventilator.  Head:  Pupils reactive & symmetrical B/L.  Neck: Endotracheal tube clean.   Lungs:   B/L Breath sounds present with decreased breath sounds on bases, no wheezing heard today, occasional crackles.   Heart: S1 and S2 present, no murmur  Abdomen: Obese, non tender, no guarding or rigidity, bowel sounds very hypoactive.  Extremities: + edema, warm to touch.  Neurologic:  Pt  remains quite weak.  Following commands more consistently now..    Ventilator settings: A/C: FiO2 45%      Results from last 7 days   Lab Units 11/24/24  0540 11/23/24 0450 11/22/24 0449 11/21/24  2347   WBC 10*3/mm3 10.72 11.92*  --  12.66*   HEMOGLOBIN g/dL 7.2* 7.2* 7.2* 7.3*   PLATELETS 10*3/mm3 210 200  --  175     Results from last 7 days   Lab Units 11/24/24  0540 11/23/24  0450 11/22/24 0449 11/21/24  0648   SODIUM mmol/L 134* 135* 134* 134*   POTASSIUM mmol/L 4.8 4.2 4.9 4.3   CO2 mmol/L 21.0* 22.0 21.0* 19.0*   BUN mg/dL 76* 47* 57* 40*   CREATININE mg/dL 6.67* 4.97* 7.70* 5.41*   MAGNESIUM mg/dL 2.5* 2.3 2.6* 2.2   PHOSPHORUS mg/dL 5.7*  --  5.7* 4.2   GLUCOSE mg/dL 144* 163* 150* 146*     Estimated Creatinine Clearance: 12.1 mL/min (A) (by C-G formula based on SCr of 6.67 mg/dL (H)).    Results from last 7 days   Lab Units 11/20/24  0325   PH, ARTERIAL pH units 7.383   PCO2, ARTERIAL mm Hg 36.6   PO2 ART mm Hg 71.7*       Images:   Chest x-ray reviewed and showed bilateral patchy opacities with likely atelectasis with pleural effusions bilaterally.    I reviewed the patient's results and images.     Head CT report reviewed and did not show any acute intracranial pathology.    Assessment & Plan   Impression        Pneumonia due to COVID-19 virus    Severe obstructive sleep apnea    ESRD on HD T,R,S since 2022    Acute respiratory failure with hypoxia    " Probable cardiac arrest versus severe bradycardia requiring CPR    Generalized seizure    Acute GI bleeding, possible       Plan        1.  Patient admitted here with COVID-19 pneumonia with worsened respiratory status requiring intubation and mechanical ventilation.  Patient was in delirium state.  Wondering if it is due to COVID infection versus some other potential etiology.  Also noted to have generalized tonic-clonic seizure for which neurology evaluated and patient was started on Keppra.  Unable to get MRI due to pacemaker incompatibility.  Repeat head CT did not show any other acute findings.  EEG did not show any seizure activity.  Did not notice any other ongoing seizure activity.  Patient was more encephalopathic.  Decision was made to stop Keppra. Wondering if it is some form of critical illness polyneuropathy.  Will keep off sedation.  After discussions with neurology patient was started on amantadine trial.  Patient is slightly more awake today.    2.  Continue current mechanical ventilation.  Patient is difficult to wean from mechanical ventilation.  Continue with pulmonary toilet.  Attempted spontaneous breathing trial but patient failed again today.   have discussed with family that if patient is unable to be weaned from mechanical ventilation safely then we will need to consider tracheostomy.    3.  Patient has been treated with antibiotics for atypical infection.  Cultures have remained negative otherwise.  WBC count is normal.  Repeat cultures are not growing any organism.    4.  Remains off pressors.  Tolerating hemodialysis.  Plan is for hemodialysis round again tomorrow.  We will see if we can get some fluid off before attempting another spontaneous breathing trial tomorrow.    .    5.  There was concern for ongoing GI bleed.  EGD did not show any overt pathology other than suction trauma.  Continue daily Protonix for GI prophylaxis.      6.  Tolerating enteral nutrition okay.  Movantik  started.  Patient is moving bowels.  Continue to advance tube feeds to goal.    7.  Hold anticoagulation for now.  SCDs for DVT prophylaxis.  INR is subtherapeutic now.  Will go ahead and resume anticoagulation with heparin without bolus to see how he tolerates and if he will need any procedures such as tracheostomy then will be easily reversible.     8.  TSH checked and normal as well.    Family has been updated on a daily basis.  Will continue supportive care.  High risk of decline.      Plan of care and goals reviewed with multidisciplinary/antibiotic stewardship team during rounds.   I discussed the patient's findings and my recommendations with family, nursing staff, and consulting provider       Time spent Critical care 32 min (exclusive of procedure time)  including high complexity decision making to assess, manipulate, and support vital organ system failure in this individual who has impairment of one or more vital organ systems such that there is a high probability of imminent or life threatening deterioration in the patient’s condition.  Above documentation reviewed and reflect accurate information as of 11/24/2024 including copied elements of the note.       Mahin Live MD, [facility]  Pulmonary, Critical care and Sleep Medicine     A

## 2024-11-24 NOTE — PROGRESS NOTES
" LOS: 12 days   Patient Care Team:  Heriberto Murcia MD as PCP - General (Family Medicine)  Shanell Arango, PharmD as Pharmacist (Pharmacy)  Evelina Quan, PharmD as Pharmacist (Pharmacy)  Peng Melgar, PharmD as Pharmacist (Pharmacy)  Johnathan Taylor MD as Consulting Physician (Cardiac Electrophysiology)  Chato Bo MD as Consulting Physician (Nephrology)  Ivet Beasley APRN as Nurse Practitioner (Cardiology)  Rei Decker PA as Physician Assistant (Cardiology)      Subjective   Requiring vent support. Mentation somewhat better today       Objective     Vital Sign Min/Max for last 24 hours  Temp  Min: 97.8 °F (36.6 °C)  Max: 98.7 °F (37.1 °C)   BP  Min: 95/36  Max: 145/60   Pulse  Min: 93  Max: 128   Resp  Min: 20  Max: 20   SpO2  Min: 90 %  Max: 100 %   No data recorded   No data recorded     Flowsheet Rows      Flowsheet Row First Filed Value   Admission Height 177.8 cm (70\") Documented at 11/12/2024 1456   Admission Weight 130 kg (287 lb) Documented at 11/12/2024 1456            I/O this shift:  In: 756 [Other:40; NG/GT:716]  Out: -   I/O last 3 completed shifts:  In: 2769 [Other:302; NG/GT:2467]  Out: 4 [Stool:4]    Physical examination:  COVID precautions taken..  Pulmonary: Intubated on ventilator equal chest movement.  Cardiovascular: Normal rate.  Abdomen: Morbid obesity.  Extremities: Positive edema is noted.  Neurological: Sedated on ventilator  LUE-aVF in place      Results Review:     I reviewed the patient's new clinical results.    WBC WBC   Date Value Ref Range Status   11/24/2024 10.72 3.40 - 10.80 10*3/mm3 Final   11/23/2024 11.92 (H) 3.40 - 10.80 10*3/mm3 Final   11/21/2024 12.66 (H) 3.40 - 10.80 10*3/mm3 Final      HGB Hemoglobin   Date Value Ref Range Status   11/24/2024 7.2 (L) 13.0 - 17.7 g/dL Final   11/23/2024 7.2 (L) 13.0 - 17.7 g/dL Final   11/22/2024 7.2 (L) 13.0 - 17.7 g/dL Final   11/21/2024 7.3 (L) 13.0 - 17.7 g/dL Final   11/21/2024 7.4 (L) 13.0 - " "17.7 g/dL Final      HCT Hematocrit   Date Value Ref Range Status   11/24/2024 23.0 (L) 37.5 - 51.0 % Final   11/23/2024 23.3 (L) 37.5 - 51.0 % Final   11/22/2024 22.7 (L) 37.5 - 51.0 % Final   11/21/2024 23.4 (L) 37.5 - 51.0 % Final   11/21/2024 23.6 (L) 37.5 - 51.0 % Final      Platlets No results found for: \"LABPLAT\"   MCV MCV   Date Value Ref Range Status   11/24/2024 109.0 (H) 79.0 - 97.0 fL Final   11/23/2024 108.4 (H) 79.0 - 97.0 fL Final   11/21/2024 106.8 (H) 79.0 - 97.0 fL Final          Sodium Sodium   Date Value Ref Range Status   11/24/2024 134 (L) 136 - 145 mmol/L Final   11/23/2024 135 (L) 136 - 145 mmol/L Final   11/22/2024 134 (L) 136 - 145 mmol/L Final      Potassium Potassium   Date Value Ref Range Status   11/24/2024 4.8 3.5 - 5.2 mmol/L Final   11/23/2024 4.2 3.5 - 5.2 mmol/L Final   11/22/2024 4.9 3.5 - 5.2 mmol/L Final      Chloride Chloride   Date Value Ref Range Status   11/24/2024 94 (L) 98 - 107 mmol/L Final   11/23/2024 99 98 - 107 mmol/L Final   11/22/2024 97 (L) 98 - 107 mmol/L Final      CO2 CO2   Date Value Ref Range Status   11/24/2024 21.0 (L) 22.0 - 29.0 mmol/L Final   11/23/2024 22.0 22.0 - 29.0 mmol/L Final   11/22/2024 21.0 (L) 22.0 - 29.0 mmol/L Final      BUN BUN   Date Value Ref Range Status   11/24/2024 76 (H) 8 - 23 mg/dL Final   11/23/2024 47 (H) 8 - 23 mg/dL Final   11/22/2024 57 (H) 8 - 23 mg/dL Final      Creatinine Creatinine   Date Value Ref Range Status   11/24/2024 6.67 (H) 0.76 - 1.27 mg/dL Final   11/23/2024 4.97 (H) 0.76 - 1.27 mg/dL Final   11/22/2024 7.70 (H) 0.76 - 1.27 mg/dL Final      Calcium Calcium   Date Value Ref Range Status   11/24/2024 8.6 8.6 - 10.5 mg/dL Final   11/23/2024 8.7 8.6 - 10.5 mg/dL Final   11/22/2024 8.6 8.6 - 10.5 mg/dL Final      PO4 No results found for: \"CAPO4\"   Albumin Albumin   Date Value Ref Range Status   11/24/2024 3.0 (L) 3.5 - 5.2 g/dL Final      Magnesium Magnesium   Date Value Ref Range Status   11/24/2024 2.5 (H) 1.6 - 2.4 " "mg/dL Final   11/23/2024 2.3 1.6 - 2.4 mg/dL Final   11/22/2024 2.6 (H) 1.6 - 2.4 mg/dL Final      Uric Acid No results found for: \"URICACID\"         Assessment & Plan       Pneumonia due to COVID-19 virus    Severe obstructive sleep apnea    ESRD on HD T,R,S since 2022    Acute respiratory failure with hypoxia    Probable cardiac arrest versus severe bradycardia requiring CPR    Generalized seizure    Acute GI bleeding, possible    =======================     1.  ESRD on HD TTS- No missed treatment sessions  2.  Hypotension IV pressor  3.  HFpEF  4.  Anemia of CKD: Transfuse at Hb<7. Start BOAZ   5.  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  Volume status: No overt volume overload  7.  COVID- 19 PNA  8.  Afib / rate controlled   9- S/p Cardiac arrest on 11/16/24      PLAN   Hold off on HD today  Currently on HD per MWF demar. On low dose pressor support.   Will assess daily for HD needs: Will switch back to TTS schedule before discharge    Will continue manage ESRD and related complications  Daily BMP   High risk complex patient with multiple medical problems.    Octavio Ta MD  11/24/24  13:14 EST            "

## 2024-11-24 NOTE — PLAN OF CARE
Goal Outcome Evaluation:      NO CHANGES IN PTS CONDITION AND NO WEANING ON VENTILATOR

## 2024-11-24 NOTE — PLAN OF CARE
Goal Outcome Evaluation:  Plan of Care Reviewed With: patient        Progress: no change                   pt remained on vent, no change in settings    ART line dressing changed, zeroed, flushed, appropriate with cuff afterward   Pt remained in Afib  Pt had 2 bowel movements, black and tarry stool noted  Pt follows commands  Relistor held r/t 2 loose BM's  Hgb remained stable  Blood glucose monitored  Secretions loose and required more suctioning.  Pt more restless tonight  Pt afebrile

## 2024-11-25 NOTE — PLAN OF CARE
Goal Outcome Evaluation:            Currently unable to wean vent support

## 2024-11-25 NOTE — PLAN OF CARE
"Goal Outcome Evaluation:  Plan of Care Reviewed With: patient, spouse, child     -Pt alert, follows simple commands, & nods \"yes\" & \"no\" appropriately.   -Afib on tele. BP stable.   -1u PRBC for Hgb 6.7; redraw 7.7. No overt signs of bleeding. Heparin gtt stopped.   -No sedation/pressors. Peep 5, FIO2 30%  -HD today; 3.4L off. RT called to initiate SBT.   -No BM. Anuric.   -TF switched from Peptamen AF to Novasource Renal. Tolerating well so far.   -Covid antigen negative; isolation precautions dc'd per infection control.   -BUE restraints in place for safety.       Problem: Restraint, Nonviolent  Goal: Absence of Harm or Injury  Outcome: Progressing  Intervention: Implement Least Restrictive Safety Strategies  Recent Flowsheet Documentation  Taken 11/25/2024 1600 by Wendy Roberts RN  Medical Device Protection:   IV pole/bag removed from visual field   tubing secured  Diversional Activities: television  Taken 11/25/2024 1400 by Wendy Roberts RN  Medical Device Protection:   tubing secured   IV pole/bag removed from visual field  Diversional Activities: television  Taken 11/25/2024 1200 by Wendy Roberts RN  Medical Device Protection:   IV pole/bag removed from visual field   tubing secured  Diversional Activities: television  Taken 11/25/2024 1000 by Wendy Roberts RN  Medical Device Protection:   IV pole/bag removed from visual field   tubing secured  Diversional Activities: television  Taken 11/25/2024 0800 by Wendy Roberts RN  Medical Device Protection:   tubing secured   IV pole/bag removed from visual field  Diversional Activities: television  Intervention: Protect Dignity, Rights and Personal Wellbeing  Recent Flowsheet Documentation  Taken 11/25/2024 1600 by Wendy Roberts RN  Trust Relationship/Rapport:   care explained   reassurance provided  Taken 11/25/2024 1400 by Wendy Roberts RN  Trust Relationship/Rapport:   care explained   reassurance provided  Taken " 11/25/2024 1200 by Wendy Roberts RN  Trust Relationship/Rapport:   care explained   reassurance provided  Taken 11/25/2024 1000 by Wendy Roberts RN  Trust Relationship/Rapport:   care explained   reassurance provided  Taken 11/25/2024 0800 by Wendy Roberts RN  Trust Relationship/Rapport:   care explained   reassurance provided  Intervention: Protect Skin and Joint Integrity  Recent Flowsheet Documentation  Taken 11/25/2024 1600 by Wendy Roberts RN  Body Position: (unable to fully turn; HD onging)   weight shifting   other (see comments)  Skin Protection:   incontinence pads utilized   silicone foam dressing in place  Taken 11/25/2024 1400 by Wendy Roberts RN  Body Position: (unable to turn at this time; HD ongoing) other (see comments)  Skin Protection:   incontinence pads utilized   silicone foam dressing in place  Taken 11/25/2024 1200 by Wendy Roberts RN  Body Position:   right   turned  Skin Protection:   incontinence pads utilized   silicone foam dressing in place  Taken 11/25/2024 1000 by Wendy Roberts RN  Body Position:   turned   supine  Skin Protection:   incontinence pads utilized   silicone foam dressing in place   pulse oximeter probe site changed  Taken 11/25/2024 0800 by Wendy Roberts RN  Body Position:   right   turned  Skin Protection:   incontinence pads utilized   silicone foam dressing in place     Problem: Skin Injury Risk Increased  Goal: Skin Health and Integrity  Outcome: Progressing  Intervention: Optimize Skin Protection  Recent Flowsheet Documentation  Taken 11/25/2024 1600 by Wendy Roberts RN  Activity Management: bedrest  Pressure Reduction Techniques:   weight shift assistance provided   pressure points protected   heels elevated off bed  Head of Bed (HOB) Positioning: HOB elevated  Pressure Reduction Devices:   specialty bed utilized   pressure-redistributing mattress utilized   alternating pressure pump (JUWAN)  Skin  Protection:   incontinence pads utilized   silicone foam dressing in place  Taken 11/25/2024 1400 by Wendy Roberts RN  Activity Management: bedrest  Pressure Reduction Techniques:   pressure points protected   heels elevated off bed  Head of Bed (HOB) Positioning: [facility] elevated  Pressure Reduction Devices:   specialty bed utilized   pressure-redistributing mattress utilized   alternating pressure pump (JUWAN)  Skin Protection:   incontinence pads utilized   silicone foam dressing in place  Taken 11/25/2024 1200 by Wendy Roberts RN  Activity Management: bedrest  Pressure Reduction Techniques:   weight shift assistance provided   pressure points protected   heels elevated off bed  Head of Bed (HOB) Positioning: [facility] elevated  Pressure Reduction Devices:   specialty bed utilized   pressure-redistributing mattress utilized   alternating pressure pump (JUWAN)  Skin Protection:   incontinence pads utilized   silicone foam dressing in place  Taken 11/25/2024 1000 by Wendy Roberts RN  Activity Management: bedrest  Pressure Reduction Techniques:   weight shift assistance provided   pressure points protected   heels elevated off bed  Head of Bed (HOB) Positioning: [facility] elevated  Pressure Reduction Devices:   specialty bed utilized   pressure-redistributing mattress utilized   alternating pressure pump (JUWAN)  Skin Protection:   incontinence pads utilized   silicone foam dressing in place   pulse oximeter probe site changed  Taken 11/25/2024 0800 by Wendy Roberts RN  Activity Management: bedrest  Pressure Reduction Techniques:   weight shift assistance provided   pressure points protected   heels elevated off bed  Head of Bed (HOB) Positioning: [facility] elevated  Pressure Reduction Devices:   alternating pressure pump (JUWAN)   specialty bed utilized   pressure-redistributing mattress utilized  Skin Protection:   incontinence pads utilized   silicone foam dressing in place     Problem: Fall Injury Risk  Goal:  Absence of Fall and Fall-Related Injury  Outcome: Progressing  Intervention: Identify and Manage Contributors  Recent Flowsheet Documentation  Taken 11/25/2024 1600 by Wendy Roberts RN  Medication Review/Management: medications reviewed  Taken 11/25/2024 1400 by Wendy Roberts RN  Medication Review/Management: medications reviewed  Taken 11/25/2024 1200 by Wendy Roberts RN  Medication Review/Management: medications reviewed  Taken 11/25/2024 1000 by Wendy Roberts RN  Medication Review/Management: medications reviewed  Taken 11/25/2024 0800 by Wendy Roberts RN  Medication Review/Management: medications reviewed  Intervention: Promote Injury-Free Environment  Recent Flowsheet Documentation  Taken 11/25/2024 1600 by Wendy Roberts RN  Safety Promotion/Fall Prevention:   activity supervised   assistive device/personal items within reach   clutter free environment maintained   fall prevention program maintained   lighting adjusted   toileting scheduled   safety round/check completed   room organization consistent  Taken 11/25/2024 1400 by Wendy Roberts RN  Safety Promotion/Fall Prevention:   activity supervised   assistive device/personal items within reach   clutter free environment maintained   fall prevention program maintained   lighting adjusted   toileting scheduled   safety round/check completed   room organization consistent  Taken 11/25/2024 1200 by Wendy Roberts RN  Safety Promotion/Fall Prevention:   activity supervised   assistive device/personal items within reach   clutter free environment maintained   fall prevention program maintained   lighting adjusted   toileting scheduled   safety round/check completed   room organization consistent  Taken 11/25/2024 1000 by Wendy Roberts RN  Safety Promotion/Fall Prevention:   activity supervised   assistive device/personal items within reach   clutter free environment maintained   fall prevention program  maintained   lighting adjusted   room organization consistent   safety round/check completed   toileting scheduled  Taken 11/25/2024 0800 by Wendy Roberts RN  Safety Promotion/Fall Prevention:   activity supervised   assistive device/personal items within reach   clutter free environment maintained   fall prevention program maintained   lighting adjusted   toileting scheduled   safety round/check completed   room organization consistent     Problem: Adult Inpatient Plan of Care  Goal: Plan of Care Review  Outcome: Progressing  Flowsheets (Taken 11/25/2024 1717)  Plan of Care Reviewed With:   patient   spouse   child  Goal: Patient-Specific Goal (Individualized)  Outcome: Progressing  Goal: Absence of Hospital-Acquired Illness or Injury  Outcome: Progressing  Intervention: Identify and Manage Fall Risk  Recent Flowsheet Documentation  Taken 11/25/2024 1600 by Wendy Roberts RN  Safety Promotion/Fall Prevention:   activity supervised   assistive device/personal items within reach   clutter free environment maintained   fall prevention program maintained   lighting adjusted   toileting scheduled   safety round/check completed   room organization consistent  Taken 11/25/2024 1400 by Wendy Roberts RN  Safety Promotion/Fall Prevention:   activity supervised   assistive device/personal items within reach   clutter free environment maintained   fall prevention program maintained   lighting adjusted   toileting scheduled   safety round/check completed   room organization consistent  Taken 11/25/2024 1200 by Wendy Roberts RN  Safety Promotion/Fall Prevention:   activity supervised   assistive device/personal items within reach   clutter free environment maintained   fall prevention program maintained   lighting adjusted   toileting scheduled   safety round/check completed   room organization consistent  Taken 11/25/2024 1000 by Wendy Roberts RN  Safety Promotion/Fall Prevention:   activity  supervised   assistive device/personal items within reach   clutter free environment maintained   fall prevention program maintained   lighting adjusted   room organization consistent   safety round/check completed   toileting scheduled  Taken 11/25/2024 0800 by Wendy Roberts RN  Safety Promotion/Fall Prevention:   activity supervised   assistive device/personal items within reach   clutter free environment maintained   fall prevention program maintained   lighting adjusted   toileting scheduled   safety round/check completed   room organization consistent  Intervention: Prevent Skin Injury  Recent Flowsheet Documentation  Taken 11/25/2024 1600 by Wendy Roberts RN  Body Position: (unable to fully turn; HD onging)   weight shifting   other (see comments)  Skin Protection:   incontinence pads utilized   silicone foam dressing in place  Taken 11/25/2024 1400 by Wendy Roberts RN  Body Position: (unable to turn at this time; HD ongoing) other (see comments)  Skin Protection:   incontinence pads utilized   silicone foam dressing in place  Taken 11/25/2024 1200 by Wendy Roberts RN  Body Position:   right   turned  Skin Protection:   incontinence pads utilized   silicone foam dressing in place  Taken 11/25/2024 1000 by Wendy Roberts RN  Body Position:   turned   supine  Skin Protection:   incontinence pads utilized   silicone foam dressing in place   pulse oximeter probe site changed  Taken 11/25/2024 0800 by Wendy Roberts RN  Body Position:   right   turned  Skin Protection:   incontinence pads utilized   silicone foam dressing in place  Intervention: Prevent and Manage VTE (Venous Thromboembolism) Risk  Recent Flowsheet Documentation  Taken 11/25/2024 1600 by Wendy Roberts RN  VTE Prevention/Management:   bilateral   SCDs (sequential compression devices) on  Taken 11/25/2024 1200 by Wendy Roberts RN  VTE Prevention/Management:   bilateral   SCDs (sequential  compression devices) on  Taken 11/25/2024 0800 by Wendy Roberts RN  VTE Prevention/Management: (heparin gtt)   bilateral   SCDs (sequential compression devices) on  Intervention: Prevent Infection  Recent Flowsheet Documentation  Taken 11/25/2024 1600 by Wendy Roberts RN  Infection Prevention:   environmental surveillance performed   equipment surfaces disinfected   hand hygiene promoted   personal protective equipment utilized   rest/sleep promoted   single patient room provided   visitors restricted/screened  Taken 11/25/2024 1400 by Wendy Roberts RN  Infection Prevention:   environmental surveillance performed   equipment surfaces disinfected   hand hygiene promoted   personal protective equipment utilized   rest/sleep promoted   single patient room provided   visitors restricted/screened  Taken 11/25/2024 1200 by Wendy Roberts RN  Infection Prevention:   environmental surveillance performed   equipment surfaces disinfected   hand hygiene promoted   personal protective equipment utilized   single patient room provided   visitors restricted/screened   rest/sleep promoted  Taken 11/25/2024 1000 by Wendy Roberts RN  Infection Prevention:   environmental surveillance performed   hand hygiene promoted   equipment surfaces disinfected   personal protective equipment utilized   visitors restricted/screened   single patient room provided   rest/sleep promoted  Taken 11/25/2024 0800 by Wendy Roberts RN  Infection Prevention:   environmental surveillance performed   equipment surfaces disinfected   hand hygiene promoted   personal protective equipment utilized   rest/sleep promoted   single patient room provided   visitors restricted/screened  Goal: Optimal Comfort and Wellbeing  Outcome: Progressing  Intervention: Monitor Pain and Promote Comfort  Recent Flowsheet Documentation  Taken 11/25/2024 1600 by Wendy Roberts RN  Pain Management Interventions:   care clustered    pillow support provided  Taken 11/25/2024 1200 by Wendy Roberts RN  Pain Management Interventions:   care clustered   position adjusted  Intervention: Provide Person-Centered Care  Recent Flowsheet Documentation  Taken 11/25/2024 1600 by Wendy Roberts RN  Trust Relationship/Rapport:   care explained   reassurance provided  Taken 11/25/2024 1400 by Wendy Roberts RN  Trust Relationship/Rapport:   care explained   reassurance provided  Taken 11/25/2024 1200 by Wendy Roberts RN  Trust Relationship/Rapport:   care explained   reassurance provided  Taken 11/25/2024 1000 by Wendy Roberts RN  Trust Relationship/Rapport:   care explained   reassurance provided  Taken 11/25/2024 0800 by Wendy Roberts RN  Trust Relationship/Rapport:   care explained   reassurance provided  Goal: Readiness for Transition of Care  Outcome: Progressing     Problem: Mechanical Ventilation Invasive  Goal: Effective Communication  Outcome: Progressing  Intervention: Ensure Effective Communication  Recent Flowsheet Documentation  Taken 11/25/2024 1600 by Wendy Roberts RN  Trust Relationship/Rapport:   care explained   reassurance provided  Diversional Activities: television  Taken 11/25/2024 1400 by Wendy Roberts RN  Trust Relationship/Rapport:   care explained   reassurance provided  Diversional Activities: television  Taken 11/25/2024 1200 by Wendy Roberts RN  Trust Relationship/Rapport:   care explained   reassurance provided  Diversional Activities: television  Family/Support System Care: support provided  Taken 11/25/2024 1000 by Wendy Roberts RN  Trust Relationship/Rapport:   care explained   reassurance provided  Diversional Activities: television  Taken 11/25/2024 0800 by Wendy Roberts RN  Trust Relationship/Rapport:   care explained   reassurance provided  Diversional Activities: television  Goal: Optimal Device Function  Outcome: Progressing  Intervention: Optimize  Device Care and Function  Recent Flowsheet Documentation  Taken 11/25/2024 1600 by Wendy Roberts RN  Oral Care:   lip/mouth moisturizer applied   swabbed with antiseptic solution   teeth brushed - suction toothbrush   tongue brushed  Airway Safety Measures:   suction at bedside   manual resuscitator/mask at bedside  Taken 11/25/2024 1400 by Wendy Roberts RN  Airway Safety Measures:   suction at bedside   manual resuscitator/mask at bedside  Taken 11/25/2024 1200 by Wendy Roberts RN  Oral Care:   lip/mouth moisturizer applied   swabbed with antiseptic solution   suction provided   teeth brushed - suction toothbrush   tongue brushed  Airway Safety Measures:   suction at bedside   manual resuscitator/mask at bedside  Taken 11/25/2024 1000 by Wendy Roberts RN  Airway Safety Measures:   suction at bedside   manual resuscitator/mask at bedside  Taken 11/25/2024 0800 by Wendy Roberts RN  Oral Care:   lip/mouth moisturizer applied   suction provided   swabbed with antiseptic solution   teeth brushed - suction toothbrush   tongue brushed  Goal: Mechanical Ventilation Liberation  Outcome: Progressing  Intervention: Promote Extubation and Mechanical Ventilation Liberation  Recent Flowsheet Documentation  Taken 11/25/2024 1600 by Wendy Roberts RN  Medication Review/Management: medications reviewed  Taken 11/25/2024 1400 by Wendy Roberts RN  Medication Review/Management: medications reviewed  Taken 11/25/2024 1200 by Wendy Roberts RN  Medication Review/Management: medications reviewed  Taken 11/25/2024 1000 by Wendy Roberts RN  Medication Review/Management: medications reviewed  Taken 11/25/2024 0800 by Wendy Roberts RN  Medication Review/Management: medications reviewed  Goal: Optimal Nutrition Delivery  Outcome: Progressing  Goal: Absence of Device-Related Skin and Tissue Injury  Outcome: Progressing  Intervention: Maintain Skin and Tissue Health  Recent  Flowsheet Documentation  Taken 11/25/2024 1600 by Wendy Roberts RN  Device Skin Pressure Protection:   tubing/devices free from skin contact   skin-to-device areas padded   pressure points protected   positioning supports utilized   adhesive use limited   absorbent pad utilized/changed  Taken 11/25/2024 1400 by Wendy Roberts RN  Device Skin Pressure Protection:   tubing/devices free from skin contact   skin-to-device areas padded   pressure points protected   positioning supports utilized   adhesive use limited   absorbent pad utilized/changed  Taken 11/25/2024 1200 by Wendy Roberts RN  Device Skin Pressure Protection:   tubing/devices free from skin contact   skin-to-device areas padded   pressure points protected   positioning supports utilized   adhesive use limited   absorbent pad utilized/changed  Taken 11/25/2024 1000 by Wendy Roberts RN  Device Skin Pressure Protection:   tubing/devices free from skin contact   skin-to-device areas padded   pressure points protected   positioning supports utilized   adhesive use limited   absorbent pad utilized/changed  Taken 11/25/2024 0800 by Wendy Roberts RN  Device Skin Pressure Protection:   absorbent pad utilized/changed   adhesive use limited   positioning supports utilized   pressure points protected   skin-to-device areas padded   tubing/devices free from skin contact  Goal: Absence of Ventilator-Induced Lung Injury  Outcome: Progressing  Intervention: Prevent Ventilator-Associated Pneumonia  Recent Flowsheet Documentation  Taken 11/25/2024 1600 by Wendy Roberts RN  Head of Bed ([facility]) Positioning: [facility] elevated  Oral Care:   lip/mouth moisturizer applied   swabbed with antiseptic solution   teeth brushed - suction toothbrush   tongue brushed  Taken 11/25/2024 1400 by Wendy Roberts RN  Head of Bed ([facility]) Positioning: HOB elevated  Taken 11/25/2024 1200 by Wendy Roberts RN  Head of Bed ([facility]) Positioning: [facility]  elevated  Oral Care:   lip/mouth moisturizer applied   swabbed with antiseptic solution   suction provided   teeth brushed - suction toothbrush   tongue brushed  Taken 11/25/2024 1000 by Wendy Roberts RN  Head of Bed (hospitals) Positioning: HOB elevated  Taken 11/25/2024 0800 by Wendy Roberts RN  Head of Bed (hospitals) Positioning: hospitals elevated  VAP Prevention Measures: completed  Oral Care:   lip/mouth moisturizer applied   suction provided   swabbed with antiseptic solution   teeth brushed - suction toothbrush   tongue brushed

## 2024-11-25 NOTE — PROGRESS NOTES
"   LOS: 13 days    Patient Care Team:  Heriberto Murcia MD as PCP - General (Family Medicine)  Sahnell Arango, PharmD as Pharmacist (Pharmacy)  Evelina Quan, PharmD as Pharmacist (Pharmacy)  Peng Melgar, PharmD as Pharmacist (Pharmacy)  Johnathan Taylor MD as Consulting Physician (Cardiac Electrophysiology)  Chato Bo MD as Consulting Physician (Nephrology)  Ivet Beasley APRN as Nurse Practitioner (Cardiology)  Rei Decker PA as Physician Assistant (Cardiology)    Subjective     Nonverbal    Objective     Vital Signs:  Blood pressure 139/43, pulse 96, temperature 98.5 °F (36.9 °C), temperature source Axillary, resp. rate 20, height 177.8 cm (70\"), weight (!) 137 kg (300 lb 14.9 oz), SpO2 95%.      Intake/Output Summary (Last 24 hours) at 11/25/2024 0941  Last data filed at 11/24/2024 2000  Gross per 24 hour   Intake 1235.97 ml   Output --   Net 1235.97 ml        11/24 0701 - 11/25 0700  In: 1572 [I.V.:48]  Out: -     Physical Exam:        GENERAL: WD WM NAD  NEURO: Sedate on vent   PSYCHIATRIC: Unable to evaluate   EYE: PE, no icterus, no conjunctivitis  ENT: + ET tube + NG  NECK: , No JVD discernable,  Trachea midline  CV: Trace edema, RRR  LUNGS:  Quiet,  Nonlabored resp on ventilator.  Symmetrical expansion  ABDOMEN: Obese nondistended, soft nontender.  : No Mueller, no palp bladder  SKIN: Warm and dry without rash  + AVF    Labs:  Results from last 7 days   Lab Units 11/25/24  0549 11/24/24  0540 11/23/24  0450   WBC 10*3/mm3 9.54 10.72 11.92*   HEMOGLOBIN g/dL 6.7* 7.2* 7.2*   PLATELETS 10*3/mm3 212 210 200     Results from last 7 days   Lab Units 11/25/24  0549 11/24/24  0540 11/23/24  0450 11/22/24  0449 11/21/24  0648   SODIUM mmol/L 132* 134* 135* 134* 134*   POTASSIUM mmol/L 5.0 4.8 4.2 4.9 4.3   CHLORIDE mmol/L 92* 94* 99 97* 98   CO2 mmol/L 21.0* 21.0* 22.0 21.0* 19.0*   BUN mg/dL 112* 76* 47* 57* 40*   CREATININE mg/dL 8.47* 6.67* 4.97* 7.70* 5.41*   CALCIUM " mg/dL 8.8 8.6 8.7 8.6 8.2*   PHOSPHORUS mg/dL 5.9* 5.7*  --  5.7* 4.2   MAGNESIUM mg/dL 2.6* 2.5* 2.3 2.6* 2.2   ALBUMIN g/dL 2.9* 3.0*  --   --  3.3*     Results from last 7 days   Lab Units 11/25/24  0549   ALK PHOS U/L 109   BILIRUBIN mg/dL 0.6   ALT (SGPT) U/L 15   AST (SGOT) U/L 31     Results from last 7 days   Lab Units 11/25/24  0346   PH, ARTERIAL pH units 7.355   PO2 ART mm Hg 102.0   PCO2, ARTERIAL mm Hg 40.0   HCO3 ART mmol/L 22.3               Estimated Creatinine Clearance: 9.5 mL/min (A) (by C-G formula based on SCr of 8.47 mg/dL (H)).         A/P:      1.  ESRD on HD TTS- No missed treatment sessions  2.  Hypotension: Improved.  Patient off pressors.    3.  HFpEF  4.  Anemia of CKD: Transfuse at Hb<7. Start BOAZ   5.  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  Volume status: No overt volume overload  7.  COVID- 19 PNA  8.  Afib / rate controlled   9- S/p Cardiac arrest on 11/16/24      PLAN   Currently on HD per MWF demar.   Blood pressure improved.  UF as tolerated to assist with oxygenation.  Transition back to TTS schedule before discharge    Will continue manage ESRD and related complications    High risk and complexity patient.    Joss Peres MD  11/25/24  09:41 EST

## 2024-11-25 NOTE — PLAN OF CARE
Goal Outcome Evaluation:  Plan of Care Reviewed With: patient        Progress: no change                      pt remained on vent    Pt more alert, however, still restless   Pt remained in Afib  Pt had 1 bowel movement, black and tarry stool noted  Pt follows commands  Relistor held r/t loose BM  Hep adjusted per protocol, monitored for signs of bleeding  Blood glucose monitored  Pt afebrile

## 2024-11-25 NOTE — PROGRESS NOTES
Clinical Nutrition     Patient Name: Marco Antonio Montemayor  YOB: 1945  MRN: [id number]  Date of Encounter: 11/25/24 10:14 EST  Admission date: 11/12/2024  Reason for Visit: Follow-up protocol, EN    Assessment   Nutrition Assessment   Admission Diagnosis:  Pneumonia due to COVID-19 virus [U07.1, J12.82]    Problem List:    Pneumonia due to COVID-19 virus    Severe obstructive sleep apnea    ESRD on HD T,R,S since 2022    Acute respiratory failure with hypoxia    Probable cardiac arrest versus severe bradycardia requiring CPR    Generalized seizure    Acute GI bleeding, possible      PMH:   He  has a past medical history of Abnormal ECG, Arrhythmia, Arthritis, Asthma, Cancer, Chronic diastolic congestive heart failure (09/14/2018), CKD (chronic kidney disease), Coronary artery disease, Dry skin, Dyslipidemia (07/07/2016), Edema, Generalized seizure (11/17/2024), Heart murmur, Hematoma, Hemodialysis access site with arteriovenous graft, History of transfusion, Hypertension, Iron deficiency anemia, Long term current use of anticoagulant, ZULEIKA treated with BiPAP, Persistent atrial fibrillation (07/07/2016), Tinnitus, and Wears glasses.    PSH:  He  has a past surgical history that includes Appendectomy; Cataract extraction; Skin cancer excision; Tonsillectomy; [place] tooth extraction; Colonoscopy; Esophagogastroduodenoscopy; Cardiac electrophysiology procedure (N/A, 05/18/2018); Ablation of dysrhythmic focus; arteriovenous fistula/shunt surgery (Left, 08/04/2022); arteriovenous fistula/shunt surgery (Left, 11/03/2022); Insert / replace / remove pacemaker (about 20 years back); Vein Surgery (for dialysis); and Esophagogastroduodenoscopy (N/A, 11/18/2024).    Applicable Nutrition History:   11-16-24 ARF/VENT   11-18-24: s/p EGD   - Normal esophagus. - Erythematous and eroded mucosa in the gastric body likely from NG tube suction trauma. - Erythematous, erosive duodenopathy. - No specimens  collected    SKIN: R ear ulcer    Labs    Labs Reviewed: Yes    Results from last 7 days   Lab Units 11/25/24  0549 11/24/24  0540 11/23/24  0450 11/22/24  0449 11/21/24  0648   GLUCOSE mg/dL 139* 144* 163* 150* 146*   BUN mg/dL 112* 76* 47* 57* 40*   CREATININE mg/dL 8.47* 6.67* 4.97* 7.70* 5.41*   SODIUM mmol/L 132* 134* 135* 134* 134*   CHLORIDE mmol/L 92* 94* 99 97* 98   POTASSIUM mmol/L 5.0 4.8 4.2 4.9 4.3   PHOSPHORUS mg/dL 5.9* 5.7*  --  5.7* 4.2   MAGNESIUM mg/dL 2.6* 2.5* 2.3 2.6* 2.2   ALT (SGPT) U/L 15 12  --   --  5       Results from last 7 days   Lab Units 11/25/24  0549 11/24/24  0540 11/21/24  0648   ALBUMIN g/dL 2.9* 3.0* 3.3*   CRP mg/dL 12.45*  --   --    CHOLESTEROL mg/dL 64  --   --    TRIGLYCERIDES mg/dL 85  --   --        Results from last 7 days   Lab Units 11/25/24  0551 11/24/24  2351 11/24/24  1757 11/24/24  1122 11/24/24  0540 11/23/24  2327   GLUCOSE mg/dL 141* 130 144* 147* 137* 163*     Lab Results   Lab Value Date/Time    HGBA1C 5.8 (A) 11/06/2024 1415    HGBA1C 6.1 (A) 08/05/2024 1402    HGBA1C 5.90 (H) 03/01/2024 0456    HGBA1C 5.20 08/02/2022 1405                 Medications    Medications Reviewed: yes    Scheduled Meds:acetylcysteine, 2 mL, Nebulization, TID - RT  Amantadine HCl, 200 mg, Nasogastric, Weekly  atorvastatin, 40 mg, Nasogastric, Nightly  chlorhexidine, 15 mL, Mouth/Throat, Q12H  epoetin michelle/michelle-epbx, 10,000 Units, Subcutaneous, Once per day on Monday Wednesday Friday  insulin regular, 2-7 Units, Subcutaneous, Q6H  ipratropium-albuterol, 3 mL, Nebulization, 4x Daily - RT  lactobacillus acidophilus, 1 capsule, Nasogastric, Daily  Naloxegol Oxalate, 25 mg, Nasogastric, QAM  nystatin, , Topical, Q12H  pantoprazole, 40 mg, Intravenous, Q12H  ProSource No Carb, 30 mL, Nasogastric, TID      Continuous Infusions:heparin, 9 Units/kg/hr, Last Rate: 9 Units/kg/hr (11/25/24 0644)  norepinephrine, 0.02-0.3 mcg/kg/min, Last Rate: Stopped (11/22/24 2300)  Pharmacy to Dose  "Heparin,       PRN Meds:.  acetaminophen **OR** acetaminophen **OR** acetaminophen    artificial tears    senna-docusate sodium **AND** polyethylene glycol **AND** [DISCONTINUED] bisacodyl **AND** bisacodyl    fentaNYL citrate (PF)    ipratropium-albuterol    labetalol    midazolam    nitroglycerin    [DISCONTINUED] ondansetron ODT **OR** ondansetron    Pharmacy to Dose Heparin    sodium chloride    Intake/Ouptut 24 hrs (0701 - 0700)   I&O's Reviewed: yes  Intake & Output (last day)         11/24 0701 11/25 0700 11/25 0701 11/26 0700    I.V. (mL/kg) 48 (0.4)     Other 120     NG/GT 1404     Total Intake(mL/kg) 1572 (11.6)     Stool      Total Output      Net +1572               Last documented BM 11/24 (x 3)     Anthropometrics     Height: Height: 177.8 cm (70\")  Last Filed Weight: Weight: (!) 137 kg (300 lb 14.9 oz) (11/22/24 0600)  Method: Weight Method: Bed scale  BMI: BMI (Calculated): 43.2    UBW: ?   Weight change: ? 13lb wt loss over past 3 months per EMR    Nutrition Focused Physical Exam     Date: 11/16    Unable to perform due to COVID Isolation      Subjective   Reported/Observed/Food/Nutrition Related History:   11/25: Remains intubated.  More alert, however still restless per RN.  Plan for HD.  Plan for blood transfusion while on HD. Tolerating EN.       11-21: Patient intubated, sedated + fentanyl, levophed 0.05mcg    Per RN: pt had 750ml GRV last night, 500ml residual returned, TF held for 2 hours, TF resumed @10ml/hr, pt has not had bm since 11-15, movantik given to pt, plan to try and advance TF later if tolerates    11-19: pt intubated, sedated + fentanyl, versed, levophed 0.05mcg.  Per RN: pt will w/d to pain, does not follow commands, Keofeed placed, is currently in the stomach.  Per MD ok to start feeding    11-18: pt intubated, sedated, currently on HD.  + fentanyl, versed, levophed 0.04mcg, protonix.  Per RN: TF has been on hold since pt coded on Saturday, ogt to [facility], has coffee ground " "output, plan for possible scope today     Order to begin EN.     Needs Assessment   Date: 24, reviewed     Height used:Height: 177.8 cm (70\")  Weights used /ABW: 279lb/ 127kg  IBW: 166lb/ 75kg    Estimated Calorie needs: ~1900kcal  Method:  14Kcals/KG ABW:1778kcal  Method:  MSJ ABW: kcal  Method:  PSU  ABW: kcal    Estimated Protein needs: ~127g protein (adjusted due to consistently abiola BUN with started EN)   Method: 2-2.5g protein per kg IBW: 150-188g protein  Method: 1.0 - 1.2g protein per kg ABW: 127- 152g protein    Current Nutrition Prescription     PO: NPO Diet NPO Type: Tube Feeding  Oral Nutrition Supplement:  Intake: N/A    EN: Peptamen AF  Goal Rate: 70ml  Water Flushes: 20ml/hr  Modular: Prosource-no carb 3/day  Route:  keofeed in the stomach  Tube: Small bore    At goal over: 20Hrs/day     Rx will supply:   Goal Volume 1400  mL/day     Flush Volume 400 mL/day     Energy 1860 Kcal/day 103 % Est Need   Protein 151 g/day 101 % Est Need   Fiber 8 g/day     Water in  EN 1134 mL     Total Water  1534mL     Meet DRI Yes        --------------------------------------------------------------------------  Product/Rate verified at bedside: No- (covid isolation)  Infusing Rate at time of visit: @ goal rate pe RN.     Average Delivery from [facility] Days:   Volume 1281 mL/day 91  % Goal Vol.   Flush Volume 161 mL/day     Energy  Kcal/day  % Est Need   Protein  g/day  % Est Need   Fiber  g/day     Water in  EN  mL     Total Water  mL     Meet DRI N/A          Assessment & Plan   Nutrition Diagnosis   Date:  Updated:   Problem Inadequate energy intake    Etiology ARF/VENT   Signs/Symptoms 65% goal volume EN   Status: Active  Improved delivery     Date:   Updated:  Problem Less than optimal enteral nutrition composition or modality   Etiology Current EN Rx   Signs/Symptoms Hyperphosphatemia; hypermagnesemia    Status: New    Goal:   Nutrition to support treatment and " Adjust EN      Nutrition Intervention      Follow treatment progress, Care plan reviewed, Nutrition support order placed    Will adjust EN to the following:  Novasource renal @ 45ml/hr; Prosource protein TID. Water flushes @ 30ml Q2hr.    This will provide: 900ml (GV x 20hrs infusion); 1980kcals (100% est needs), 127g PRO (100% est needs), 648ml water from EM + 300ml water flushes.       Monitoring/Evaluation:   Per protocol, I&O, Pertinent labs, EN delivery/tolerance, Weight, Skin status, GI status, Symptoms, Hemodynamic stability    Grace Lindsay RD  Time Spent:30min

## 2024-11-25 NOTE — CASE MANAGEMENT/SOCIAL WORK
Continued Stay Note   Sivakumar     Patient Name: Marco Antonio Montemayor  MRN: [id number]  Today's Date: 11/25/2024    Admit Date: 11/12/2024    Plan: ongoing   Discharge Plan       Row Name 11/25/24 1553       Plan    Plan ongoing    Patient/Family in Agreement with Plan yes    Plan Comments Discussed in MDR, remains intubated/ off sedation. REceived blood today.Remains on TF. HD. I spoke w/spouse Raeann via phone, d/c goal is home. CM will continue to follow.    Final Discharge Disposition Code 01 - home or self-care                   Discharge Codes    No documentation.                 Expected Discharge Date and Time       Expected Discharge Date Expected Discharge Time    Nov 27, 2024               Grey Meza RN

## 2024-11-25 NOTE — PROGRESS NOTES
"INTENSIVIST NOTE     Hospital Day: 13    Mr. Marco Antonio Montemayor, 79 y.o. male is followed for:   Respiratory failure and altered mental status       SUBJECTIVE     Interval history:    Maximum temperature 99.5.  Fluid intake 2300 mL.  Dialysis planned for today.  Remains on mechanical ventilation.  Rhythm atrial fibrillation with a controlled rate.    The patient's relevant past medical, surgical and social history were reviewed and updated in Epic as appropriate.       OBJECTIVE     Vital Sign Min/Max for last 24 hours  Temp  Min: 98.5 °F (36.9 °C)  Max: 99.5 °F (37.5 °C)   BP  Min: 87/62  Max: 152/55   Pulse  Min: 91  Max: 122   Resp  Min: 20  Max: 22   SpO2  Min: 83 %  Max: 100 %   No data recorded   No data recorded      Intake/Output Summary (Last 24 hours) at 11/25/2024 1611  Last data filed at 11/25/2024 1143  Gross per 24 hour   Intake 2275.62 ml   Output --   Net 2275.62 ml      Flowsheet Rows      Flowsheet Row First Filed Value   Admission Height 177.8 cm (70\") Documented at 11/12/2024 1456   Admission Weight 130 kg (287 lb) Documented at 11/12/2024 1456               11/17/24  1454 11/21/24  0400 11/22/24  0600   Weight: 127 kg (279 lb 15.8 oz) (!) 136 kg (300 lb 0.7 oz) (!) 137 kg (300 lb 14.9 oz)            Objective:  General Appearance:  In no acute distress.    Vital signs: (most recent): Blood pressure 158/50, pulse 109, temperature 98.5 °F (36.9 °C), temperature source Axillary, resp. rate 22, height 177.8 cm (70\"), weight (!) 137 kg (300 lb 14.9 oz), SpO2 94%.    HEENT: (Oral ET tube  Keofeed  Left IJ triple-lumen)    Lungs:  No rales, wheezes or rhonchi.    Heart: Normal rate.  Irregular rhythm.  S1 normal and S2 normal.  No murmur, gallop or friction rub.   Chest: Symmetric chest wall expansion.   Abdomen: Abdomen is soft and non-distended.  Bowel sounds are normal.   There is no abdominal tenderness.   There is no mass. There is no splenomegaly. There is no hepatomegaly.   Extremities: There " is dependent edema.  There is no deformity.  (Right radial arterial line)  Pupils:  Pupils are equal, round, and reactive to light.    Skin:  Warm and dry.                I reviewed the patient's new clinical results.  I reviewed the patient's new imaging results/reports including actual images and agree with reports.    XR Chest 1 View    Result Date: 11/24/2024  Impression: 1.No significant change in bilateral lower lung opacities likely combination of atelectasis and pleural effusions. Some component of airspace disease cannot be excluded radiographically. The lungs are poorly inflated 2.Support lines and tubes are unchanged. Electronically Signed: Grey Ram DO  11/24/2024 9:21 AM EST  Workstation ID: [id number]      INFUSIONS  norepinephrine, 0.02-0.3 mcg/kg/min, Last Rate: Stopped (11/22/24 2300)        Results from last 7 days   Lab Units 11/25/24  1222 11/25/24  0549 11/24/24  0540 11/23/24  0450   WBC 10*3/mm3  --  9.54 10.72 11.92*   HEMOGLOBIN g/dL 7.7* 6.7* 7.2* 7.2*   HEMATOCRIT % 23.7* 21.8* 23.0* 23.3*   PLATELETS 10*3/mm3  --  212 210 200     Results from last 7 days   Lab Units 11/25/24  0549 11/24/24  0540 11/23/24  0450 11/22/24  0449 11/21/24  0648   SODIUM mmol/L 132* 134* 135* 134* 134*   POTASSIUM mmol/L 5.0 4.8 4.2 4.9 4.3   CHLORIDE mmol/L 92* 94* 99 97* 98   CO2 mmol/L 21.0* 21.0* 22.0 21.0* 19.0*   BUN mg/dL 112* 76* 47* 57* 40*   GLUCOSE mg/dL 139* 144* 163* 150* 146*   CREATININE mg/dL 8.47* 6.67* 4.97* 7.70* 5.41*   MAGNESIUM mg/dL 2.6* 2.5* 2.3 2.6* 2.2   PHOSPHORUS mg/dL 5.9* 5.7*  --  5.7* 4.2   CALCIUM mg/dL 8.8 8.6 8.7 8.6 8.2*   ALBUMIN g/dL 2.9* 3.0*  --   --  3.3*         Results from last 7 days   Lab Units 11/25/24  0346 11/20/24  0325 11/19/24  0338   PH, ARTERIAL pH units 7.355 7.383 7.421   PCO2, ARTERIAL mm Hg 40.0 36.6 34.6*   PO2 ART mm Hg 102.0 71.7* 79.7*   FIO2 % 35 35 35       Tube Feeding: Formula: Novasource Renal (Electrolyte Restricted); Feeding Type:  Continuous; Start at: 45 mL/hr; Then Advance By: Do Not Advance; Total Daily Feeding Volume (mL/day): 900; Water Flush: 30 mL; Every: 2 hours; Water Bolus: None   /h  Patient doesn't have any tube feeding modular orders    Mechanical Ventilator:   Settings: Observed:   Mode: VC+/AC (11/25/24 1250)    Vt (Set, mL): 450 mL (11/25/24 1600) Vt Mandatory Ins (observed, mL): 508 mL (11/25/24 1600)   Resp Rate (Set): 20 (11/25/24 1600) Resp Rate (Observed) Vent: 21 (11/25/24 1600)   Pressure Support (cm H2O): 10 cm H20 (11/22/24 1230) Minute Ventilation (L/min) (Obs): 9.12 L/min (11/25/24 1600)       FiO2 (%): 30 % (11/25/24 1600) Plateau Pressure (cm H2O): (S) 16 cm H2O (11/25/24 0659)   PEEP/CPAP (cm H2O): 5 cm H20 (11/25/24 1600) I:E Ratio (Obs): 1:2 (11/25/24 1600)         I reviewed the patient's medications.    Assessment & Plan   ASSESSMENT/PLAN     Active Hospital Problems    Diagnosis     **Pneumonia due to COVID-19 virus     Severe obstructive sleep apnea     Probable cardiac arrest versus severe bradycardia requiring CPR     Generalized seizure     Acute GI bleeding, possible     Acute respiratory failure with hypoxia     ESRD on HD T,R,S since 2022        79-year-old male with coronary disease, dyslipidemia, chronic kidney disease on hemodialysis, chronic anemia, hypertension, ZULEIKA on BiPAP, atrial fibrillation, heart failure with preserved ejection fraction.  Patient presented to the hospital with increased work of breathing and hypoxemic respiratory failure.  Patient was found to have COVID-19 pneumonia.  CTA chest on admission did not show any pulmonary embolism.  Patchy area of groundglass opacities noted in both lungs.  Patient was started on ceftriaxone and doxycycline.  Patient was also given Decadron and remdesivir.  Patient developed hypotension and was given fluid resuscitation and midodrine with improvement in blood pressure.  Patient was thought to be having a GI bleed so GI consultation was  obtained.  Patient however declined from respiratory standpoint and was becoming more delirious so was transferred to intensive care unit.  Patient was initially supported with BiPAP but eventually needed to be intubated and placed on mechanical ventilation.  Patient required pressors to maintain hemodynamics.  On admission to ICU patient had severe bradycardia and had 2 episodes of possible cardiac arrest requiring CPR for short duration of time.  Unclear if it was PEA or just severe bradycardia.  Precedex was held.  Patient apparently was being suctioned at that time so was thought to have vasovagal response.  Patient was also noted to have generalized tonic-clonic seizure.  Neurology consultation was obtained and EEG was obtained.  EEG did not show any ongoing seizure activity however patient was started on Keppra.  He was able to follow commands postarrest.  Pt underwent EGD and did not show any ulcers but had suction trauma.   Patient had been quite encephalopathic.  Initially was treated with Keppra but we stopped that on 11/23.  Amantadine was initiated in an attempt to increase level of consciousness.  This has been dosed weekly due to his ESRD.    Heparin drip was initiated on a trial basis and hemoglobin decreased overnight.  1 unit PRBC to be transfused along with dialysis today.  Stools have remained loose so we will DC Movantik and reinitiate standard bowel regimen.    Plan:    Hemodialysis today and then assess wean ability thereafter  1 unit PRBCs  DC Movantik  Standard bowel regimen  Current ventilator settings acceptable for support based upon ABG this morning.  Rate 20, tidal volume 450, FiO2 30%, and PEEP 5  Continue amantadine  DC heparin  Mechanical DVT prophylaxis  Sliding-scale insulin     I discussed the patient's findings and my recommendations with nursing staff     Plan of care and goals reviewed with multidisciplinary team at daily rounds.    Critical Care time spent in direct patient  care: 32 minutes (excluding procedure time, if applicable) including high complexity decision making to assess, manipulate, and support vital organ system failure in this individual who has impairment of one or more vital organ systems such that there is a high probability of imminent or life threatening deterioration in the patient's condition.    Panfilo Martinez MD  Pulmonary and Critical Care Medicine  11/25/24 16:11 EST

## 2024-11-25 NOTE — PLAN OF CARE
Goal Outcome Evaluation:Scheduled HD completed. Pt tolerated well. Goal reached. Blood reinfused back to pt. Bedside report to CAITLIN Nguyen

## 2024-11-26 NOTE — PLAN OF CARE
Goal Outcome Evaluation:      Extubated and unable to maintain -- reintubated shortly after.

## 2024-11-26 NOTE — PROGRESS NOTES
"INTENSIVIST NOTE     Hospital Day: 14    Mr. Marco Antonio Montemayor, 79 y.o. male is followed for:   Respiratory failure and altered mental status       SUBJECTIVE     Interval history:    Maximum temperature 100.2.  Dialysis performed yesterday afternoon.  Fluid balance -2 L.  Awake this morning and tolerated 1 hour pressure support trial but then developed drowsiness and was placed back on mechanical ventilation.  Responding currently though slowly    The patient's relevant past medical, surgical and social history were reviewed and updated in Epic as appropriate.       OBJECTIVE     Vital Sign Min/Max for last 24 hours  Temp  Min: 98.3 °F (36.8 °C)  Max: 100.2 °F (37.9 °C)   BP  Min: 105/58  Max: 158/50   Pulse  Min: 98  Max: 125   Resp  Min: 20  Max: 22   SpO2  Min: 90 %  Max: 100 %   No data recorded   No data recorded      Intake/Output Summary (Last 24 hours) at 11/26/2024 1522  Last data filed at 11/26/2024 0858  Gross per 24 hour   Intake 1372 ml   Output 3400 ml   Net -2028 ml      Flowsheet Rows      Flowsheet Row First Filed Value   Admission Height 177.8 cm (70\") Documented at 11/12/2024 1456   Admission Weight 130 kg (287 lb) Documented at 11/12/2024 1456               11/17/24  1454 11/21/24  0400 11/22/24  0600   Weight: 127 kg (279 lb 15.8 oz) (!) 136 kg (300 lb 0.7 oz) (!) 137 kg (300 lb 14.9 oz)            Objective:  General Appearance:  In no acute distress.    Vital signs: (most recent): Blood pressure 129/54, pulse 112, temperature 100.2 °F (37.9 °C), temperature source Axillary, resp. rate 20, height 177.8 cm (70\"), weight (!) 137 kg (300 lb 14.9 oz), SpO2 93%.    HEENT: (Oral ET tube  Keofeed  Left IJ triple-lumen)    Lungs:  No rales, wheezes or rhonchi.    Heart: Normal rate.  Irregular rhythm.  S1 normal and S2 normal.  No murmur, gallop or friction rub.   Chest: Symmetric chest wall expansion.   Abdomen: Abdomen is soft and non-distended.  Bowel sounds are normal.   There is no abdominal " tenderness.   There is no mass. There is no splenomegaly. There is no hepatomegaly.   Extremities: There is dependent edema.  There is no deformity.  (Right radial arterial line)  Pupils:  Pupils are equal, round, and reactive to light.    Skin:  Warm and dry.                I reviewed the patient's new clinical results.  I reviewed the patient's new imaging results/reports including actual images and agree with reports.    XR Chest 1 View    Result Date: 11/26/2024  Impression: Redemonstration of medical support devices. Endotracheal tube terminates in the mid trachea. Electronically Signed: Yahir Garcia MD  11/26/2024 2:32 PM EST  Workstation ID: [id number]    XR Chest 1 View    Result Date: 11/26/2024  Impression: Stable exam with cardiomegaly, pulmonary vascular congestion and left lower lobe atelectasis or consolidation with small effusion. Electronically Signed: Raji Hudson MD  11/26/2024 8:06 AM EST  Workstation ID: [id number]      INFUSIONS  norepinephrine, 0.02-0.3 mcg/kg/min, Last Rate: Stopped (11/22/24 2300)  propofol, 5-50 mcg/kg/min, Last Rate: 20 mcg/kg/min (11/26/24 1427)        Results from last 7 days   Lab Units 11/26/24  0325 11/25/24  1222 11/25/24  0549 11/24/24  0540   WBC 10*3/mm3 7.64  --  9.54 10.72   HEMOGLOBIN g/dL 7.4* 7.7* 6.7* 7.2*   HEMATOCRIT % 23.3* 23.7* 21.8* 23.0*   PLATELETS 10*3/mm3 181  --  212 210     Results from last 7 days   Lab Units 11/26/24  0325 11/25/24  0549 11/24/24  0540 11/23/24  0450   SODIUM mmol/L 134* 132* 134* 135*   POTASSIUM mmol/L 4.6 5.0 4.8 4.2   CHLORIDE mmol/L 96* 92* 94* 99   CO2 mmol/L 24.0 21.0* 21.0* 22.0   BUN mg/dL 68* 112* 76* 47*   GLUCOSE mg/dL 135* 139* 144* 163*   CREATININE mg/dL 4.72* 8.47* 6.67* 4.97*   MAGNESIUM mg/dL  --  2.6* 2.5* 2.3   PHOSPHORUS mg/dL 4.3 5.9* 5.7*  --    CALCIUM mg/dL 9.3 8.8 8.6 8.7   ALBUMIN g/dL 3.4* 2.9* 3.0*  --          Results from last 7 days   Lab Units 11/26/24  1442 11/26/24  1241 11/26/24  0818  11/25/24  0346 11/20/24  0325   PH, ARTERIAL pH units 7.385 7.402 7.399 7.355 7.383   PCO2, ARTERIAL mm Hg 39.1 39.8 41.1 40.0 36.6   PO2 ART mm Hg 296.0* 79.3* 81.8* 102.0 71.7*   FIO2 % 90 30 30 35 35       Tube Feeding: Formula: Novasource Renal (Electrolyte Restricted); Feeding Type: Continuous; Start at: 45 mL/hr; Then Advance By: Do Not Advance; Total Daily Feeding Volume (mL/day): 900; Water Flush: 30 mL; Every: 2 hours; Water Bolus: None   /h  Patient doesn't have any tube feeding modular orders    Mechanical Ventilator:   Settings: Observed:   Mode: VC+/AC (11/26/24 1357)    Vt (Set, mL): 450 mL (11/26/24 1357) Vt Mandatory Ins (observed, mL): 454 mL (11/26/24 1357)   Resp Rate (Set): 20 (11/26/24 1357) Resp Rate (Observed) Vent: 24 (11/26/24 1410)   Pressure Support (cm H2O): 10 cm H20 (11/26/24 1247) Minute Ventilation (L/min) (Obs): 6.01 L/min (11/26/24 1357)       FiO2 (%): 100 % (11/26/24 1357) Plateau Pressure (cm H2O): (S) 18 cm H2O (11/26/24 0656)   PEEP/CPAP (cm H2O): 5 cm H20 (11/26/24 1357) I:E Ratio (Obs): 1:2.3 (11/26/24 1357)         I reviewed the patient's medications.    Assessment & Plan   ASSESSMENT/PLAN     Active Hospital Problems    Diagnosis     **Pneumonia due to COVID-19 virus     Severe obstructive sleep apnea     Probable cardiac arrest versus severe bradycardia requiring CPR     Generalized seizure     Acute GI bleeding, possible     Acute respiratory failure with hypoxia     ESRD on HD T,R,S since 2022        79-year-old male with coronary disease, dyslipidemia, chronic kidney disease on hemodialysis, chronic anemia, hypertension, ZULEIKA on BiPAP, atrial fibrillation, heart failure with preserved ejection fraction.  Patient presented to the hospital with increased work of breathing and hypoxemic respiratory failure.  Patient was found to have COVID-19 pneumonia.  CTA chest on admission did not show any pulmonary embolism.  Patchy area of groundglass opacities noted in both lungs.   Patient was started on ceftriaxone and doxycycline.  Patient was also given Decadron and remdesivir.  Patient developed hypotension and was given fluid resuscitation and midodrine with improvement in blood pressure.  Patient was thought to be having a GI bleed so GI consultation was obtained.  Patient however declined from respiratory standpoint and was becoming more delirious so was transferred to intensive care unit.  Patient was initially supported with BiPAP but eventually needed to be intubated and placed on mechanical ventilation.  Patient required pressors to maintain hemodynamics.  On admission to ICU patient had severe bradycardia and had 2 episodes of possible cardiac arrest requiring CPR for short duration of time.  Unclear if it was PEA or just severe bradycardia.  Precedex was held.  Patient apparently was being suctioned at that time so was thought to have vasovagal response.  Patient was also noted to have generalized tonic-clonic seizure.  Neurology consultation was obtained and EEG was obtained.  EEG did not show any ongoing seizure activity however patient was started on Keppra.  He was able to follow commands postarrest.  Pt underwent EGD and did not show any ulcers but had suction trauma.   Patient had been quite encephalopathic.  Initially was treated with Keppra but we stopped that on 11/23.  Amantadine was initiated in an attempt to increase level of consciousness.  This has been dosed weekly due to his ESRD.    Developed a decreased H&H when heparin was reinitiated on 11/24 and heparin was discontinued and he was transfused 1 unit PRBCs.    Had dialysis yesterday afternoon and plans were to attempt ventilator liberation after dialysis and this was attempted this morning.  He was placed on a for support trial for an hour and did well though had decreased mental status towards the end of the trial.  We let him rest for about 4 hours and then extubated him after another trial this afternoon.  He  lasted several hours and then had decreased mental status and increased respiratory difficulty requiring reintubation.    Plan:    Resume mechanical ventilation at previous settings as noted below  Rate 20, tidal volume 450, FiO2 30%, and PEEP 5  Continue amantadine  Mechanical DVT prophylaxis  Sliding-scale insulin  Protonix 40 mg twice daily  Resume tube feedings  Discussed with family today at the bedside     I discussed the patient's findings and my recommendations with family and nursing staff     Plan of care and goals reviewed with multidisciplinary team at daily rounds.    Critical Care time spent in direct patient care: 30 minutes (excluding procedure time, if applicable) including high complexity decision making to assess, manipulate, and support vital organ system failure in this individual who has impairment of one or more vital organ systems such that there is a high probability of imminent or life threatening deterioration in the patient's condition.    Panfilo Martinez MD  Pulmonary and Critical Care Medicine  11/26/24 15:22 EST

## 2024-11-26 NOTE — PROGRESS NOTES
"   LOS: 14 days    Patient Care Team:  Heriberto Murcia MD as PCP - General (Family Medicine)  Shanell Arango, PharmD as Pharmacist (Pharmacy)  Evelina Quan, PharmD as Pharmacist (Pharmacy)  Peng Melgar, PharmD as Pharmacist (Pharmacy)  Johnathan Taylor MD as Consulting Physician (Cardiac Electrophysiology)  Chato Bo MD as Consulting Physician (Nephrology)  Ivet Beasley APRN as Nurse Practitioner (Cardiology)  Rei Decker PA as Physician Assistant (Cardiology)    Subjective     Nonverbal    Objective     Vital Signs:  Blood pressure 128/53, pulse 105, temperature 99.4 °F (37.4 °C), temperature source Axillary, resp. rate 20, height 177.8 cm (70\"), weight (!) 137 kg (300 lb 14.9 oz), SpO2 93%.      Intake/Output Summary (Last 24 hours) at 11/26/2024 1039  Last data filed at 11/26/2024 0858  Gross per 24 hour   Intake 2057.3 ml   Output 3400 ml   Net -1342.7 ml        11/25 0701 - 11/26 0700  In: 2746.8 [I.V.:225.8]  Out: 3400     Physical Exam:        GENERAL: WD WM NAD  NEURO: Sedate on vent   PSYCHIATRIC: Unable to evaluate   EYE: PE, no icterus, no conjunctivitis  ENT: + ET tube + NG  NECK: , No JVD discernable,  Trachea midline  CV: Trace edema, RRR  LUNGS:  Quiet,  Nonlabored resp on ventilator.  Symmetrical expansion  ABDOMEN: Obese nondistended, soft nontender.  : No Mueller, no palp bladder  SKIN: Warm and dry without rash  + AVF    Labs:  Results from last 7 days   Lab Units 11/26/24  0325 11/25/24  1222 11/25/24  0549 11/24/24  0540   WBC 10*3/mm3 7.64  --  9.54 10.72   HEMOGLOBIN g/dL 7.4* 7.7* 6.7* 7.2*   PLATELETS 10*3/mm3 181  --  212 210     Results from last 7 days   Lab Units 11/26/24  0325 11/25/24  0549 11/24/24  0540 11/23/24  0450 11/22/24  0449 11/21/24  0648   SODIUM mmol/L 134* 132* 134* 135* 134* 134*   POTASSIUM mmol/L 4.6 5.0 4.8 4.2 4.9 4.3   CHLORIDE mmol/L 96* 92* 94* 99 97* 98   CO2 mmol/L 24.0 21.0* 21.0* 22.0 21.0* 19.0*   BUN mg/dL 68* 112* " 76* 47* 57* 40*   CREATININE mg/dL 4.72* 8.47* 6.67* 4.97* 7.70* 5.41*   CALCIUM mg/dL 9.3 8.8 8.6 8.7 8.6 8.2*   PHOSPHORUS mg/dL 4.3 5.9* 5.7*  --  5.7* 4.2   MAGNESIUM mg/dL  --  2.6* 2.5* 2.3 2.6* 2.2   ALBUMIN g/dL 3.4* 2.9* 3.0*  --   --  3.3*     Results from last 7 days   Lab Units 11/25/24  0549   ALK PHOS U/L 109   BILIRUBIN mg/dL 0.6   ALT (SGPT) U/L 15   AST (SGOT) U/L 31     Results from last 7 days   Lab Units 11/26/24  0818   PH, ARTERIAL pH units 7.399   PO2 ART mm Hg 81.8*   PCO2, ARTERIAL mm Hg 41.1   HCO3 ART mmol/L 25.4               Estimated Creatinine Clearance: 17.1 mL/min (A) (by C-G formula based on SCr of 4.72 mg/dL (H)).         A/P:      1.  ESRD on HD TTS- No missed treatment sessions  2.  Hypotension: Improved.  Patient off pressors.    3.  HFpEF  4.  Anemia of CKD: Transfuse at Hb<7. Start BOAZ   5.  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  Volume status: No overt volume overload  7.  COVID- 19 PNA  8.  Afib / rate controlled   9- S/p Cardiac arrest on 11/16/24      PLAN   Currently on HD per MWF demar.   Blood pressure improved.  UF as tolerated to assist with oxygenation.  Transition back to TTS schedule before discharge    Will continue manage ESRD and related complications    High risk and complexity patient.    Joss Peres MD  11/26/24  10:39 EST

## 2024-11-26 NOTE — PLAN OF CARE
Goal Outcome Evaluation:     Planning to perform SBT after dialysis on next shift.

## 2024-11-27 NOTE — CASE MANAGEMENT/SOCIAL WORK
Continued Stay Note  AREN Shaver     Patient Name: Marco Antonio Montemayor  MRN: [id number]  Today's Date: 11/27/2024    Admit Date: 11/12/2024    Plan: ongoing   Discharge Plan       Row Name 11/27/24 1559       Plan    Plan ongoing    Patient/Family in Agreement with Plan yes    Plan Comments Discussed in MDR, patient was extubated on 11/26 off for about 2 hours and required reintubation, remains on sedation,no pressors.HD today.  I met daughter in room, she stated that she brings her mother about every other day due to her medical issues, awaiting to speak w/MD. D/C TBD @ this time.  CM will continue to follow. Out of isolation.                   Discharge Codes    No documentation.                 Expected Discharge Date and Time       Expected Discharge Date Expected Discharge Time    Dec 3, 2024               Grey Meza RN

## 2024-11-27 NOTE — PLAN OF CARE
Goal Outcome Evaluation:      Unable to wean mechanical ventilation.

## 2024-11-27 NOTE — PLAN OF CARE
Problem: Hemodialysis  Goal: Safe, Effective Therapy Delivery  Outcome: Progressing  Goal: Effective Tissue Perfusion  Outcome: Progressing  Goal: Absence of Infection Signs and Symptoms  Outcome: Progressing   Goal Outcome Evaluation:HD completed. Tolerated well. 2.5 liters UF goal reached. Blood returned. Report given to primary RN Eda.

## 2024-11-27 NOTE — PLAN OF CARE
Goal Outcome Evaluation:  Plan of Care Reviewed With: patient, spouse, child      -Pt subdued & responsive to stimuli (suction, repositioning, etc) on Prop 20. Was following simple commands this am prior to sedation.  -Extubated after doing well w/ SBT, shortly thereafter became labored, tachypneic, SOA, unable to manage secretions despite NT suction, tachycardia/hypertension, & decreased LOC. Pt reintubated. Currently Peep 5, FIO2 65%.   -Levo @ .02 for MAP >60.   -Anuric. No BM. Currently holding TF.   -Plan for HD tomorrow.   -wife & daughter at bedside most of shift.   -BUE restraints in place for safety     Problem: Restraint, Nonviolent  Goal: Absence of Harm or Injury  Outcome: Progressing  Intervention: Implement Least Restrictive Safety Strategies  Recent Flowsheet Documentation  Taken 11/26/2024 1800 by Wendy Roberts RN  Medical Device Protection:   IV pole/bag removed from visual field   tubing secured  Diversional Activities: television  Taken 11/26/2024 1600 by Wendy Roberts RN  Medical Device Protection:   IV pole/bag removed from visual field   tubing secured  Diversional Activities: television  Taken 11/26/2024 1400 by Wendy Roberts RN  Medical Device Protection:   IV pole/bag removed from visual field   tubing secured  Diversional Activities: television  Taken 11/26/2024 1200 by Wendy Roberts RN  Medical Device Protection:   IV pole/bag removed from visual field   tubing secured  Diversional Activities: television  Taken 11/26/2024 1000 by Wendy Roberts RN  Medical Device Protection:   IV pole/bag removed from visual field   tubing secured  Diversional Activities: television  Taken 11/26/2024 0800 by Wendy Roberts RN  Medical Device Protection:   IV pole/bag removed from visual field   tubing secured  Diversional Activities: television  Intervention: Protect Dignity, Rights and Personal Wellbeing  Recent Flowsheet Documentation  Taken 11/26/2024 1800 by  Wendy Roberts RN  Trust Relationship/Rapport:   care explained   reassurance provided  Taken 11/26/2024 1600 by Wendy Roberts RN  Trust Relationship/Rapport: care explained  Taken 11/26/2024 1200 by Wendy Roberts RN  Trust Relationship/Rapport:   choices provided   reassurance provided  Taken 11/26/2024 1000 by Wendy Roberts RN  Trust Relationship/Rapport:   choices provided   reassurance provided  Taken 11/26/2024 0800 by Wendy Roberts RN  Trust Relationship/Rapport:   care explained   reassurance provided  Intervention: Protect Skin and Joint Integrity  Recent Flowsheet Documentation  Taken 11/26/2024 1800 by Wendy Roberts RN  Body Position:   turned   right  Skin Protection:   incontinence pads utilized   silicone foam dressing in place  Taken 11/26/2024 1600 by Wendy Roberts RN  Body Position:   turned   supine  Skin Protection:   incontinence pads utilized   silicone foam dressing in place  Taken 11/26/2024 1400 by Wendy Roberts RN  Body Position:   right   turned  Skin Protection:   incontinence pads utilized   silicone foam dressing in place  Taken 11/26/2024 1200 by Wendy Roberts RN  Body Position:   left   weight shifting   tilted  Skin Protection:   incontinence pads utilized   silicone foam dressing in place  Taken 11/26/2024 1000 by Wendy Roberts RN  Body Position:   turned   left  Skin Protection:   incontinence pads utilized   silicone foam dressing in place  Taken 11/26/2024 0800 by Wendy Roberts RN  Body Position:   turned   supine  Skin Protection:   incontinence pads utilized   silicone foam dressing in place     Problem: Skin Injury Risk Increased  Goal: Skin Health and Integrity  Outcome: Progressing  Intervention: Optimize Skin Protection  Recent Flowsheet Documentation  Taken 11/26/2024 1800 by Wendy Roberts RN  Activity Management: bedrest  Pressure Reduction Techniques:   weight shift assistance provided    pressure points protected   heels elevated off bed  Head of Bed (HOB) Positioning: [facility] elevated  Pressure Reduction Devices:   specialty bed utilized   pressure-redistributing mattress utilized   alternating pressure pump (JUWAN)  Skin Protection:   incontinence pads utilized   silicone foam dressing in place  Taken 11/26/2024 1600 by Wendy Roberts RN  Activity Management: bedrest  Pressure Reduction Techniques:   weight shift assistance provided   pressure points protected   heels elevated off bed  Head of Bed (HOB) Positioning: [facility] elevated  Pressure Reduction Devices:   specialty bed utilized   pressure-redistributing mattress utilized   alternating pressure pump (JUWAN)  Skin Protection:   incontinence pads utilized   silicone foam dressing in place  Taken 11/26/2024 1400 by Wendy Roberts RN  Activity Management: bedrest  Pressure Reduction Techniques:   weight shift assistance provided   pressure points protected   heels elevated off bed  Head of Bed (HOB) Positioning: [facility] elevated  Pressure Reduction Devices:   specialty bed utilized   pressure-redistributing mattress utilized   alternating pressure pump (JUWAN)  Skin Protection:   incontinence pads utilized   silicone foam dressing in place  Taken 11/26/2024 1200 by Wendy Roberts RN  Activity Management: bedrest  Pressure Reduction Techniques:   weight shift assistance provided   pressure points protected   heels elevated off bed  Head of Bed (HOB) Positioning: [facility] elevated  Pressure Reduction Devices:   specialty bed utilized   pressure-redistributing mattress utilized   alternating pressure pump (JUWAN)  Skin Protection:   incontinence pads utilized   silicone foam dressing in place  Taken 11/26/2024 1000 by Wendy Roberts RN  Activity Management: bedrest  Pressure Reduction Techniques:   weight shift assistance provided   pressure points protected   heels elevated off bed  Head of Bed (HOB) Positioning: [facility] elevated  Pressure Reduction  Devices:   specialty bed utilized   pressure-redistributing mattress utilized   alternating pressure pump (JUWAN)  Skin Protection:   incontinence pads utilized   silicone foam dressing in place  Taken 11/26/2024 0800 by Wendy Roberts RN  Activity Management: bedrest  Pressure Reduction Techniques:   weight shift assistance provided   pressure points protected   heels elevated off bed  Head of Bed (HOB) Positioning: HOB elevated  Pressure Reduction Devices:   specialty bed utilized   pressure-redistributing mattress utilized   alternating pressure pump (JUWAN)  Skin Protection:   incontinence pads utilized   silicone foam dressing in place     Problem: Fall Injury Risk  Goal: Absence of Fall and Fall-Related Injury  Outcome: Progressing  Intervention: Identify and Manage Contributors  Recent Flowsheet Documentation  Taken 11/26/2024 1800 by Wendy Roberts RN  Medication Review/Management: medications reviewed  Taken 11/26/2024 1600 by Wendy Roberts RN  Medication Review/Management: medications reviewed  Taken 11/26/2024 1400 by Wendy Roberts RN  Medication Review/Management: medications reviewed  Taken 11/26/2024 1200 by Wendy Roberts RN  Medication Review/Management: medications reviewed  Taken 11/26/2024 1000 by Wendy Roberts RN  Medication Review/Management: medications reviewed  Taken 11/26/2024 0800 by Wendy Roberts RN  Medication Review/Management: medications reviewed  Intervention: Promote Injury-Free Environment  Recent Flowsheet Documentation  Taken 11/26/2024 1800 by Wendy Roberts RN  Safety Promotion/Fall Prevention:   activity supervised   assistive device/personal items within reach   clutter free environment maintained   fall prevention program maintained   lighting adjusted   toileting scheduled   safety round/check completed   room organization consistent  Taken 11/26/2024 1600 by Wendy Roberts RN  Safety Promotion/Fall Prevention:   activity  supervised   assistive device/personal items within reach   clutter free environment maintained   fall prevention program maintained   lighting adjusted   toileting scheduled   safety round/check completed   room organization consistent  Taken 11/26/2024 1400 by Wendy Roberts RN  Safety Promotion/Fall Prevention:   activity supervised   assistive device/personal items within reach   clutter free environment maintained   fall prevention program maintained   lighting adjusted   toileting scheduled   safety round/check completed   room organization consistent  Taken 11/26/2024 1200 by Wendy Roberts RN  Safety Promotion/Fall Prevention:   activity supervised   assistive device/personal items within reach   clutter free environment maintained   fall prevention program maintained   lighting adjusted   toileting scheduled   safety round/check completed   room organization consistent  Taken 11/26/2024 1000 by Wendy Roberts RN  Safety Promotion/Fall Prevention:   activity supervised   assistive device/personal items within reach   clutter free environment maintained   fall prevention program maintained   lighting adjusted   toileting scheduled   safety round/check completed   room organization consistent  Taken 11/26/2024 0800 by Wendy Roberts RN  Safety Promotion/Fall Prevention:   activity supervised   assistive device/personal items within reach   clutter free environment maintained   fall prevention program maintained   lighting adjusted   toileting scheduled   safety round/check completed   room organization consistent     Problem: Adult Inpatient Plan of Care  Goal: Plan of Care Review  Outcome: Progressing  Flowsheets (Taken 11/26/2024 2017)  Plan of Care Reviewed With:   patient   spouse   child  Goal: Patient-Specific Goal (Individualized)  Outcome: Progressing  Goal: Absence of Hospital-Acquired Illness or Injury  Outcome: Progressing  Intervention: Identify and Manage Fall Risk  Recent  Flowsheet Documentation  Taken 11/26/2024 1800 by Wendy Roberts RN  Safety Promotion/Fall Prevention:   activity supervised   assistive device/personal items within reach   clutter free environment maintained   fall prevention program maintained   lighting adjusted   toileting scheduled   safety round/check completed   room organization consistent  Taken 11/26/2024 1600 by Wendy Roberts RN  Safety Promotion/Fall Prevention:   activity supervised   assistive device/personal items within reach   clutter free environment maintained   fall prevention program maintained   lighting adjusted   toileting scheduled   safety round/check completed   room organization consistent  Taken 11/26/2024 1400 by Wendy Roberts RN  Safety Promotion/Fall Prevention:   activity supervised   assistive device/personal items within reach   clutter free environment maintained   fall prevention program maintained   lighting adjusted   toileting scheduled   safety round/check completed   room organization consistent  Taken 11/26/2024 1200 by Wendy Roberts RN  Safety Promotion/Fall Prevention:   activity supervised   assistive device/personal items within reach   clutter free environment maintained   fall prevention program maintained   lighting adjusted   toileting scheduled   safety round/check completed   room organization consistent  Taken 11/26/2024 1000 by Wendy Roberts RN  Safety Promotion/Fall Prevention:   activity supervised   assistive device/personal items within reach   clutter free environment maintained   fall prevention program maintained   lighting adjusted   toileting scheduled   safety round/check completed   room organization consistent  Taken 11/26/2024 0800 by Wendy Roberts RN  Safety Promotion/Fall Prevention:   activity supervised   assistive device/personal items within reach   clutter free environment maintained   fall prevention program maintained   lighting adjusted   toileting  scheduled   safety round/check completed   room organization consistent  Intervention: Prevent Skin Injury  Recent Flowsheet Documentation  Taken 11/26/2024 1800 by Wendy Roberts RN  Body Position:   turned   right  Skin Protection:   incontinence pads utilized   silicone foam dressing in place  Taken 11/26/2024 1600 by Wendy Roberts RN  Body Position:   turned   supine  Skin Protection:   incontinence pads utilized   silicone foam dressing in place  Taken 11/26/2024 1400 by Wendy Roberts RN  Body Position:   right   turned  Skin Protection:   incontinence pads utilized   silicone foam dressing in place  Taken 11/26/2024 1200 by Wendy Roberts RN  Body Position:   left   weight shifting   tilted  Skin Protection:   incontinence pads utilized   silicone foam dressing in place  Taken 11/26/2024 1000 by Wendy Roberts RN  Body Position:   turned   left  Skin Protection:   incontinence pads utilized   silicone foam dressing in place  Taken 11/26/2024 0800 by Wendy Roberts RN  Body Position:   turned   supine  Skin Protection:   incontinence pads utilized   silicone foam dressing in place  Intervention: Prevent and Manage VTE (Venous Thromboembolism) Risk  Recent Flowsheet Documentation  Taken 11/26/2024 1600 by Wendy Roberts RN  VTE Prevention/Management:   bilateral   SCDs (sequential compression devices) on  Taken 11/26/2024 1200 by Wendy Roberts RN  VTE Prevention/Management:   bilateral   SCDs (sequential compression devices) on  Taken 11/26/2024 0800 by Wendy Roberts RN  VTE Prevention/Management:   bilateral   SCDs (sequential compression devices) on  Intervention: Prevent Infection  Recent Flowsheet Documentation  Taken 11/26/2024 1800 by Wendy Roberts RN  Infection Prevention:   environmental surveillance performed   equipment surfaces disinfected   hand hygiene promoted   personal protective equipment utilized   rest/sleep promoted   single  patient room provided   visitors restricted/screened  Taken 11/26/2024 1600 by Wendy Roberts RN  Infection Prevention:   environmental surveillance performed   equipment surfaces disinfected   hand hygiene promoted   personal protective equipment utilized   rest/sleep promoted   single patient room provided   visitors restricted/screened  Taken 11/26/2024 1400 by Wendy Roberts RN  Infection Prevention:   environmental surveillance performed   equipment surfaces disinfected   hand hygiene promoted   personal protective equipment utilized   rest/sleep promoted   single patient room provided   visitors restricted/screened  Taken 11/26/2024 1200 by Wendy Roberts RN  Infection Prevention:   environmental surveillance performed   equipment surfaces disinfected   hand hygiene promoted   personal protective equipment utilized   rest/sleep promoted   single patient room provided   visitors restricted/screened  Taken 11/26/2024 1000 by Wendy Roberts RN  Infection Prevention:   environmental surveillance performed   equipment surfaces disinfected   hand hygiene promoted   personal protective equipment utilized   rest/sleep promoted   visitors restricted/screened   single patient room provided  Taken 11/26/2024 0800 by Wendy Roberts RN  Infection Prevention:   environmental surveillance performed   hand hygiene promoted   equipment surfaces disinfected   personal protective equipment utilized   rest/sleep promoted   single patient room provided   visitors restricted/screened  Goal: Optimal Comfort and Wellbeing  Outcome: Progressing  Intervention: Monitor Pain and Promote Comfort  Recent Flowsheet Documentation  Taken 11/26/2024 1200 by Wendy Roberts RN  Pain Management Interventions:   care clustered   position adjusted  Taken 11/26/2024 1000 by Wendy Roberts RN  Pain Management Interventions:   care clustered   pillow support provided   position adjusted  Taken 11/26/2024 0800  by Wendy Roberts RN  Pain Management Interventions:   care clustered   pillow support provided   position adjusted  Intervention: Provide Person-Centered Care  Recent Flowsheet Documentation  Taken 11/26/2024 1800 by Wendy Roberts RN  Trust Relationship/Rapport:   care explained   reassurance provided  Taken 11/26/2024 1600 by Wendy Roberts RN  Trust Relationship/Rapport: care explained  Taken 11/26/2024 1200 by Wendy Roberts RN  Trust Relationship/Rapport:   choices provided   reassurance provided  Taken 11/26/2024 1000 by Wendy Roberts RN  Trust Relationship/Rapport:   choices provided   reassurance provided  Taken 11/26/2024 0800 by Wendy Roberts RN  Trust Relationship/Rapport:   care explained   reassurance provided  Goal: Readiness for Transition of Care  Outcome: Progressing  Goal: Plan of Care Review  Outcome: Progressing  Flowsheets (Taken 11/26/2024 2017)  Plan of Care Reviewed With:   patient   spouse   child  Goal: Patient-Specific Goal (Individualized)  Outcome: Progressing  Goal: Absence of Hospital-Acquired Illness or Injury  Outcome: Progressing  Intervention: Identify and Manage Fall Risk  Recent Flowsheet Documentation  Taken 11/26/2024 1800 by Wendy Roberts RN  Safety Promotion/Fall Prevention:   activity supervised   assistive device/personal items within reach   clutter free environment maintained   fall prevention program maintained   lighting adjusted   toileting scheduled   safety round/check completed   room organization consistent  Taken 11/26/2024 1600 by Wendy Roberts RN  Safety Promotion/Fall Prevention:   activity supervised   assistive device/personal items within reach   clutter free environment maintained   fall prevention program maintained   lighting adjusted   toileting scheduled   safety round/check completed   room organization consistent  Taken 11/26/2024 1400 by Wendy Roberts RN  Safety Promotion/Fall Prevention:    activity supervised   assistive device/personal items within reach   clutter free environment maintained   fall prevention program maintained   lighting adjusted   toileting scheduled   safety round/check completed   room organization consistent  Taken 11/26/2024 1200 by Wendy Roberts RN  Safety Promotion/Fall Prevention:   activity supervised   assistive device/personal items within reach   clutter free environment maintained   fall prevention program maintained   lighting adjusted   toileting scheduled   safety round/check completed   room organization consistent  Taken 11/26/2024 1000 by Wendy Roberts RN  Safety Promotion/Fall Prevention:   activity supervised   assistive device/personal items within reach   clutter free environment maintained   fall prevention program maintained   lighting adjusted   toileting scheduled   safety round/check completed   room organization consistent  Taken 11/26/2024 0800 by Wendy Roberts RN  Safety Promotion/Fall Prevention:   activity supervised   assistive device/personal items within reach   clutter free environment maintained   fall prevention program maintained   lighting adjusted   toileting scheduled   safety round/check completed   room organization consistent  Intervention: Prevent Skin Injury  Recent Flowsheet Documentation  Taken 11/26/2024 1800 by Wendy Roberts RN  Body Position:   turned   right  Skin Protection:   incontinence pads utilized   silicone foam dressing in place  Taken 11/26/2024 1600 by Wendy Roberts RN  Body Position:   turned   supine  Skin Protection:   incontinence pads utilized   silicone foam dressing in place  Taken 11/26/2024 1400 by Wendy Roberts RN  Body Position:   right   turned  Skin Protection:   incontinence pads utilized   silicone foam dressing in place  Taken 11/26/2024 1200 by Wendy Roberts RN  Body Position:   left   weight shifting   tilted  Skin Protection:   incontinence pads  utilized   silicone foam dressing in place  Taken 11/26/2024 1000 by Wendy Roberts RN  Body Position:   turned   left  Skin Protection:   incontinence pads utilized   silicone foam dressing in place  Taken 11/26/2024 0800 by Wendy Roberts RN  Body Position:   turned   supine  Skin Protection:   incontinence pads utilized   silicone foam dressing in place  Intervention: Prevent and Manage VTE (Venous Thromboembolism) Risk  Recent Flowsheet Documentation  Taken 11/26/2024 1600 by Wendy Roberts RN  VTE Prevention/Management:   bilateral   SCDs (sequential compression devices) on  Taken 11/26/2024 1200 by Wendy Roberts RN  VTE Prevention/Management:   bilateral   SCDs (sequential compression devices) on  Taken 11/26/2024 0800 by Wendy Roberts RN  VTE Prevention/Management:   bilateral   SCDs (sequential compression devices) on  Intervention: Prevent Infection  Recent Flowsheet Documentation  Taken 11/26/2024 1800 by Wendy Roberts RN  Infection Prevention:   environmental surveillance performed   equipment surfaces disinfected   hand hygiene promoted   personal protective equipment utilized   rest/sleep promoted   single patient room provided   visitors restricted/screened  Taken 11/26/2024 1600 by Wendy Roberts RN  Infection Prevention:   environmental surveillance performed   equipment surfaces disinfected   hand hygiene promoted   personal protective equipment utilized   rest/sleep promoted   single patient room provided   visitors restricted/screened  Taken 11/26/2024 1400 by Wendy Roberts RN  Infection Prevention:   environmental surveillance performed   equipment surfaces disinfected   hand hygiene promoted   personal protective equipment utilized   rest/sleep promoted   single patient room provided   visitors restricted/screened  Taken 11/26/2024 1200 by Wendy Roberts RN  Infection Prevention:   environmental surveillance performed   equipment surfaces  disinfected   hand hygiene promoted   personal protective equipment utilized   rest/sleep promoted   single patient room provided   visitors restricted/screened  Taken 11/26/2024 1000 by Wendy Roberts RN  Infection Prevention:   environmental surveillance performed   equipment surfaces disinfected   hand hygiene promoted   personal protective equipment utilized   rest/sleep promoted   visitors restricted/screened   single patient room provided  Taken 11/26/2024 0800 by Wendy Roberts RN  Infection Prevention:   environmental surveillance performed   hand hygiene promoted   equipment surfaces disinfected   personal protective equipment utilized   rest/sleep promoted   single patient room provided   visitors restricted/screened  Goal: Optimal Comfort and Wellbeing  Outcome: Progressing  Intervention: Monitor Pain and Promote Comfort  Recent Flowsheet Documentation  Taken 11/26/2024 1200 by Wendy Roberts RN  Pain Management Interventions:   care clustered   position adjusted  Taken 11/26/2024 1000 by Wendy Roberts RN  Pain Management Interventions:   care clustered   pillow support provided   position adjusted  Taken 11/26/2024 0800 by Wendy Roberts RN  Pain Management Interventions:   care clustered   pillow support provided   position adjusted  Intervention: Provide Person-Centered Care  Recent Flowsheet Documentation  Taken 11/26/2024 1800 by Wendy Roberts RN  Trust Relationship/Rapport:   care explained   reassurance provided  Taken 11/26/2024 1600 by Wendy Roberts RN  Trust Relationship/Rapport: care explained  Taken 11/26/2024 1200 by Wendy Roberts RN  Trust Relationship/Rapport:   choices provided   reassurance provided  Taken 11/26/2024 1000 by Wendy Roberts RN  Trust Relationship/Rapport:   choices provided   reassurance provided  Taken 11/26/2024 0800 by Wendy Roberts RN  Trust Relationship/Rapport:   care explained   reassurance  provided  Goal: Readiness for Transition of Care  Outcome: Progressing     Problem: Mechanical Ventilation Invasive  Goal: Effective Communication  Outcome: Progressing  Intervention: Ensure Effective Communication  Recent Flowsheet Documentation  Taken 11/26/2024 1800 by Wendy Roberts RN  Trust Relationship/Rapport:   care explained   reassurance provided  Diversional Activities: television  Taken 11/26/2024 1600 by Wendy Roberts RN  Trust Relationship/Rapport: care explained  Diversional Activities: television  Taken 11/26/2024 1400 by Wendy Roberts RN  Diversional Activities: television  Taken 11/26/2024 1200 by Wendy Roberts RN  Trust Relationship/Rapport:   choices provided   reassurance provided  Diversional Activities: television  Family/Support System Care:   support provided   self-care encouraged  Taken 11/26/2024 1000 by Wendy Roberts RN  Trust Relationship/Rapport:   choices provided   reassurance provided  Diversional Activities: television  Family/Support System Care:   support provided   self-care encouraged  Taken 11/26/2024 0800 by Wendy Roberts RN  Trust Relationship/Rapport:   care explained   reassurance provided  Diversional Activities: television  Goal: Optimal Device Function  Outcome: Progressing  Intervention: Optimize Device Care and Function  Recent Flowsheet Documentation  Taken 11/26/2024 1800 by Wendy Roberts RN  Airway Safety Measures:   manual resuscitator/mask at bedside   suction at bedside  Taken 11/26/2024 1600 by Wendy Roberts RN  Oral Care:   lip/mouth moisturizer applied   swabbed with antiseptic solution   teeth brushed - suction toothbrush   tongue brushed  Airway Safety Measures:   suction at bedside   manual resuscitator/mask at bedside  Taken 11/26/2024 1400 by Wendy Roberts RN  Airway Safety Measures:   suction at bedside   manual resuscitator/mask at bedside  Taken 11/26/2024 1200 by Wendy Roberts  RN  Oral Care:   lip/mouth moisturizer applied   suction provided   swabbed with antiseptic solution   teeth brushed - suction toothbrush   tongue brushed  Airway Safety Measures:   manual resuscitator/mask at bedside   suction at bedside  Taken 11/26/2024 1000 by Wendy Roberts RN  Airway Safety Measures:   manual resuscitator/mask at bedside   suction at bedside  Taken 11/26/2024 0800 by Wendy Roberts RN  Oral Care:   lip/mouth moisturizer applied   swabbed with antiseptic solution   teeth brushed - suction toothbrush   tongue brushed  Airway Safety Measures:   manual resuscitator/mask at bedside   oxygen flowmeter at bedside   suction at bedside  Goal: Optimal Nutrition Delivery  Outcome: Progressing  Goal: Absence of Device-Related Skin and Tissue Injury  Outcome: Progressing  Intervention: Maintain Skin and Tissue Health  Recent Flowsheet Documentation  Taken 11/26/2024 1800 by Wendy Roberts RN  Device Skin Pressure Protection:   tubing/devices free from skin contact   skin-to-skin areas padded   pressure points protected   positioning supports utilized   adhesive use limited   absorbent pad utilized/changed  Taken 11/26/2024 1600 by Wendy Roberts RN  Device Skin Pressure Protection:   tubing/devices free from skin contact   skin-to-device areas padded   pressure points protected   positioning supports utilized   adhesive use limited   absorbent pad utilized/changed  Taken 11/26/2024 1400 by Wendy Roberts RN  Device Skin Pressure Protection:   tubing/devices free from skin contact   skin-to-device areas padded   pressure points protected   positioning supports utilized   adhesive use limited   absorbent pad utilized/changed  Taken 11/26/2024 1200 by Wendy Roberts RN  Device Skin Pressure Protection:   tubing/devices free from skin contact   skin-to-device areas padded   pressure points protected   positioning supports utilized   adhesive use limited   absorbent pad  utilized/changed  Taken 11/26/2024 1000 by Wendy Roberts RN  Device Skin Pressure Protection:   tubing/devices free from skin contact   skin-to-device areas padded   pressure points protected   positioning supports utilized   adhesive use limited   absorbent pad utilized/changed  Taken 11/26/2024 0800 by Wendy Roberts RN  Device Skin Pressure Protection:   tubing/devices free from skin contact   pressure points protected   skin-to-device areas padded   positioning supports utilized   adhesive use limited   absorbent pad utilized/changed  Goal: Absence of Ventilator-Induced Lung Injury  Outcome: Progressing  Intervention: Prevent Ventilator-Associated Pneumonia  Recent Flowsheet Documentation  Taken 11/26/2024 1800 by Wendy Roberts RN  Head of Bed ([facility]) Positioning: HOB elevated  Taken 11/26/2024 1600 by Wendy Roberts RN  Head of Bed ([facility]) Positioning: [facility] elevated  VAP Prevention Measures: completed  Oral Care:   lip/mouth moisturizer applied   swabbed with antiseptic solution   teeth brushed - suction toothbrush   tongue brushed  Taken 11/26/2024 1400 by Wendy Roberts RN  Head of Bed ([facility]) Positioning: HOB elevated  Taken 11/26/2024 1200 by Wendy Roberts RN  Head of Bed ([facility]) Positioning: HOB elevated  Oral Care:   lip/mouth moisturizer applied   suction provided   swabbed with antiseptic solution   teeth brushed - suction toothbrush   tongue brushed  Taken 11/26/2024 1000 by Wendy Roberts RN  Head of Bed ([facility]) Positioning: HOB elevated  Taken 11/26/2024 0800 by Wendy Roberts RN  Head of Bed ([facility]) Positioning: [facility] elevated  VAP Prevention Measures: completed  Oral Care:   lip/mouth moisturizer applied   swabbed with antiseptic solution   teeth brushed - suction toothbrush   tongue brushed

## 2024-11-27 NOTE — PROGRESS NOTES
"   LOS: 15 days    Patient Care Team:  Heriberto Murcia MD as PCP - General (Family Medicine)  Shanell Arango, PharmD as Pharmacist (Pharmacy)  Evelina Quan, PharmD as Pharmacist (Pharmacy)  Peng Melgar, PharmD as Pharmacist (Pharmacy)  Johnathan Taylor MD as Consulting Physician (Cardiac Electrophysiology)  Chato Bo MD as Consulting Physician (Nephrology)  Ivet Beasley APRN as Nurse Practitioner (Cardiology)  Rei Decker PA as Physician Assistant (Cardiology)    Subjective     Seen and examined at bedside while on dialysis.  Intubated, currently on low-dose pressor.     Patient was briefly extubated yesterday and then re intubated.     Objective     Vital Signs:  Blood pressure 117/63, pulse 105, temperature 98.5 °F (36.9 °C), temperature source Axillary, resp. rate 20, height 177.8 cm (70\"), weight (!) 137 kg (300 lb 14.9 oz), SpO2 99%.      Intake/Output Summary (Last 24 hours) at 11/27/2024 1609  Last data filed at 11/27/2024 1513  Gross per 24 hour   Intake 1693.94 ml   Output 2500 ml   Net -806.06 ml        11/26 0701 - 11/27 0700  In: 1010.2 [I.V.:252.2]  Out: -     Physical Exam:  GENERAL: WD WM NAD  NEURO: Sedate on vent   PSYCHIATRIC: Unable to evaluate   EYE: PE, no icterus, no conjunctivitis  ENT: + ET tube + NG  NECK: , No JVD discernable,  Trachea midline  CV: Trace edema, RRR  LUNGS:  Quiet,  Nonlabored resp on ventilator.  Symmetrical expansion  ABDOMEN: Obese nondistended, soft nontender.  : No Mueller, no palp bladder  SKIN: Warm and dry without rash  + AVF    Labs:  Results from last 7 days   Lab Units 11/27/24  1138 11/27/24  0405 11/26/24  0325 11/25/24  1222 11/25/24  0549   WBC 10*3/mm3  --  7.76 7.64  --  9.54   HEMOGLOBIN g/dL 8.0* 7.1* 7.4*   < > 6.7*   PLATELETS 10*3/mm3  --  186 181  --  212    < > = values in this interval not displayed.     Results from last 7 days   Lab Units 11/27/24  0405 11/26/24  0325 11/25/24  0549 11/24/24  0540 " 11/23/24  0450 11/22/24  0449   SODIUM mmol/L 131* 134* 132* 134* 135* 134*   POTASSIUM mmol/L 5.0 4.6 5.0 4.8 4.2 4.9   CHLORIDE mmol/L 93* 96* 92* 94* 99 97*   CO2 mmol/L 18.0* 24.0 21.0* 21.0* 22.0 21.0*   BUN mg/dL 98* 68* 112* 76* 47* 57*   CREATININE mg/dL 6.84* 4.72* 8.47* 6.67* 4.97* 7.70*   CALCIUM mg/dL 9.5 9.3 8.8 8.6 8.7 8.6   PHOSPHORUS mg/dL  --  4.3 5.9* 5.7*  --  5.7*   MAGNESIUM mg/dL  --   --  2.6* 2.5* 2.3 2.6*   ALBUMIN g/dL 3.0* 3.4* 2.9* 3.0*  --   --      Results from last 7 days   Lab Units 11/27/24  0405   ALK PHOS U/L 96   BILIRUBIN mg/dL 0.7   ALT (SGPT) U/L 17   AST (SGOT) U/L 35     Results from last 7 days   Lab Units 11/26/24  1442   PH, ARTERIAL pH units 7.385   PO2 ART mm Hg 296.0*   PCO2, ARTERIAL mm Hg 39.1   HCO3 ART mmol/L 23.4       A/P:      1.  ESRD on HD TTS- No missed treatment sessions  2.  Hypotension: Improved.  Patient off pressors.    3.  HFpEF  4.  Anemia of CKD: Transfuse at Hb<7. Start BOAZ   5.  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  Volume status: No overt volume overload  7.  COVID- 19 PNA  8.  Afib / rate controlled   9- S/p Cardiac arrest on 11/16/24      PLAN   [facility]emodialysis today[facility]   Blood pressure improved.  UF as tolerated to assist with oxygenation.  Transition back to TTS schedule before discharge    Will continue manage ESRD and related complications    High risk and complexity patient.    Willy Capellan MD  11/27/24  16:09 EST

## 2024-11-27 NOTE — PROGRESS NOTES
Clinical Nutrition     Patient Name: Marco Antonio Montemayor  YOB: 1945  MRN: [id number]  Date of Encounter: 11/27/24 12:27 EST  Admission date: 11/12/2024  Reason for Visit: Follow-up protocol, EN    Assessment   Nutrition Assessment   Admission Diagnosis:  Pneumonia due to COVID-19 virus [U07.1, J12.82]    Problem List:    Pneumonia due to COVID-19 virus    Severe obstructive sleep apnea    ESRD on HD T,R,S since 2022    Acute respiratory failure with hypoxia    Probable cardiac arrest versus severe bradycardia requiring CPR    Generalized seizure    Acute GI bleeding, possible      PMH:   He  has a past medical history of Abnormal ECG, Arrhythmia, Arthritis, Asthma, Cancer, Chronic diastolic congestive heart failure (09/14/2018), CKD (chronic kidney disease), Coronary artery disease, Dry skin, Dyslipidemia (07/07/2016), Edema, Generalized seizure (11/17/2024), Heart murmur, Hematoma, Hemodialysis access site with arteriovenous graft, History of transfusion, Hypertension, Iron deficiency anemia, Long term current use of anticoagulant, ZULEIKA treated with BiPAP, Persistent atrial fibrillation (07/07/2016), Tinnitus, and Wears glasses.    PSH:  He  has a past surgical history that includes Appendectomy; Cataract extraction; Skin cancer excision; Tonsillectomy; [place] tooth extraction; Colonoscopy; Esophagogastroduodenoscopy; Cardiac electrophysiology procedure (N/A, 05/18/2018); Ablation of dysrhythmic focus; arteriovenous fistula/shunt surgery (Left, 08/04/2022); arteriovenous fistula/shunt surgery (Left, 11/03/2022); Insert / replace / remove pacemaker (about 20 years back); Vein Surgery (for dialysis); and Esophagogastroduodenoscopy (N/A, 11/18/2024).    Applicable Nutrition History:   11-16-24 ARF/VENT   11-18-24: s/p EGD   - Normal esophagus. - Erythematous and eroded mucosa in the gastric body likely from NG tube suction trauma. - Erythematous, erosive duodenopathy. - No specimens  collected  11/26: Extubated; re-intubated     SKIN: R ear ulcer    Labs    Labs Reviewed: Yes    Results from last 7 days   Lab Units 11/27/24  0405 11/26/24  0325 11/25/24  0549 11/24/24  0540 11/23/24  0450   GLUCOSE mg/dL 118* 135* 139* 144* 163*   BUN mg/dL 98* 68* 112* 76* 47*   CREATININE mg/dL 6.84* 4.72* 8.47* 6.67* 4.97*   SODIUM mmol/L 131* 134* 132* 134* 135*   CHLORIDE mmol/L 93* 96* 92* 94* 99   POTASSIUM mmol/L 5.0 4.6 5.0 4.8 4.2   PHOSPHORUS mg/dL  --  4.3 5.9* 5.7*  --    MAGNESIUM mg/dL  --   --  2.6* 2.5* 2.3   ALT (SGPT) U/L 17  --  15 12  --        Results from last 7 days   Lab Units 11/27/24  0405 11/26/24  0325 11/25/24  0549   ALBUMIN g/dL 3.0* 3.4* 2.9*   PREALBUMIN mg/dL  --   --  11.2*   CRP mg/dL  --   --  12.45*   CHOLESTEROL mg/dL  --   --  64   TRIGLYCERIDES mg/dL  --   --  85       Results from last 7 days   Lab Units 11/27/24  1201 11/27/24  0539 11/26/24  2308 11/26/24  1722 11/26/24  1151 11/26/24  0516   GLUCOSE mg/dL 133* 122 99 114 120 149*     Lab Results   Lab Value Date/Time    HGBA1C 5.8 (A) 11/06/2024 1415    HGBA1C 6.1 (A) 08/05/2024 1402    HGBA1C 5.90 (H) 03/01/2024 0456    HGBA1C 5.20 08/02/2022 1405                 Medications    Medications Reviewed: yes    Scheduled Meds:acetylcysteine, 2 mL, Nebulization, TID - RT  Amantadine HCl, 200 mg, Nasogastric, Weekly  atorvastatin, 40 mg, Nasogastric, Nightly  chlorhexidine, 15 mL, Mouth/Throat, Q12H  docusate sodium, 100 mg, Nasogastric, BID  epoetin michelle/michelle-epbx, 10,000 Units, Subcutaneous, Once per day on Monday Wednesday Friday  insulin regular, 2-7 Units, Subcutaneous, Q6H  ipratropium-albuterol, 3 mL, Nebulization, 4x Daily - RT  lactobacillus acidophilus, 1 capsule, Nasogastric, Daily  nystatin, , Topical, Q12H  pantoprazole, 40 mg, Intravenous, Q12H  ProSource No Carb, 30 mL, Nasogastric, TID      Continuous Infusions:norepinephrine, 0.02-0.3 mcg/kg/min, Last Rate: 0.01 mcg/kg/min (11/27/24 1033)  propofol, 5-50  "mcg/kg/min, Last Rate: 15 mcg/kg/min (11/27/24 0730)      PRN Meds:.  acetaminophen **OR** acetaminophen **OR** acetaminophen    artificial tears    senna-docusate sodium **AND** polyethylene glycol **AND** [DISCONTINUED] bisacodyl **AND** bisacodyl    fentaNYL citrate (PF)    ipratropium-albuterol    labetalol    midazolam    nitroglycerin    [DISCONTINUED] ondansetron ODT **OR** ondansetron    sodium chloride    Intake/Ouptut 24 hrs (0701 - 0700)   I&O's Reviewed: yes  Intake & Output (last day)         11/26 0701 11/27 0700 11/27 0701 11/28 0700    I.V. (mL/kg) 252.2 (1.9) 78.1 (0.6)    Blood  360    Other 180 90    NG/ 256    Total Intake(mL/kg) 1010.2 (7.4) 784.1 (5.8)    Dialysis      Total Output      Net +1010.2 +784.1          Stool Unmeasured Occurrence 1 x         Last documented BM 11/27 (x 1)     Anthropometrics     Height: Height: 177.8 cm (70\")  Last Filed Weight: Weight: (!) 137 kg (300 lb 14.9 oz) (11/22/24 0600)  Method: Weight Method: Bed scale  BMI: BMI (Calculated): 43.2    UBW: ?   Weight change: ? 13lb wt loss over past 3 months per EMR    Nutrition Focused Physical Exam     Date: 11/16    Unable to perform due to COVID Isolation      Subjective   Reported/Observed/Food/Nutrition Related History:   11/27: Briefly extubated yesterday and then has to be intubated.  EN restarted after intubated. Plan for HD today.     11/25: Remains intubated.  More alert, however still restless per RN.  Plan for HD.  Plan for blood transfusion while on HD. Tolerating EN.       11-21: Patient intubated, sedated + fentanyl, levophed 0.05mcg    Per RN: pt had 750ml GRV last night, 500ml residual returned, TF held for 2 hours, TF resumed @10ml/hr, pt has not had bm since 11-15, movantik given to pt, plan to try and advance TF later if tolerates    11-19: pt intubated, sedated + fentanyl, versed, levophed 0.05mcg.  Per RN: pt will w/d to pain, does not follow commands, Keofeed placed, is currently in the " "stomach.  Per MD ok to start feeding    : pt intubated, sedated, currently on HD.  + fentanyl, versed, levophed 0.04mcg, protonix.  Per RN: TF has been on hold since pt coded on Saturday, ogt to LWS, has coffee ground output, plan for possible scope today     Order to begin EN.     Needs Assessment   Date: 24, reviewed     Height used:Height: 177.8 cm (70\")  Weights used /ABW: 279lb/ 127kg  IBW: 166lb/ 75kg    Estimated Calorie needs: ~1900kcal  Method:  14Kcals/KG ABW:8kcal  Method:  MSJ ABW: kcal  Method:  PSU  ABW: kcal    Estimated Protein needs: ~127g protein (adjusted due to consistently abiola BUN with started EN)   Method: 2-2.5g protein per kg IBW: 150-188g protein  Method: 1.0 - 1.2g protein per kg ABW: 127- 152g protein    Current Nutrition Prescription     PO: NPO Diet NPO Type: Tube Feeding  Oral Nutrition Supplement:  Intake: N/A    EN: NovaSource Renal  Goal Rate: 45ml  Water Flushes: 30ml/2hr  Modular: Prosource-no carb 3/day  Route:  keofeed in the stomach  Tube: Small bore    At goal over: 20Hrs/day     Rx will supply:   Goal Volume 900  mL/day     Flush Volume 300 mL/day     Energy 1980 Kcal/day 100* % Est Need   Protein 127 g/day 100 % Est Need   Fiber  g/day     Water in   mL     Total Water 948 mL     Meet DRI Yes      Current propofol dose provideds additional 326kcals for a total of 2306kcals.   --------------------------------------------------------------------------  Product/Rate verified at bedside: No  Infusing Rate at time of visit: @ goal rate pe RN.     Average Delivery from [facility] Days:   Volume 1022 mL/day 114  % Goal Vol.   Flush Volume  mL/day     Energy  Kcal/day  % Est Need   Protein  g/day  % Est Need   Fiber  g/day     Water in  EN  mL     Total Water  mL     Meet DRI N/A          Assessment & Plan   Nutrition Diagnosis   Date:  Updated:   Problem Inadequate energy intake    Etiology ARF/VENT   Signs/Symptoms 65% goal " volume EN   Status: Active 11/25 Improved delivery     Date:  11/25 Updated: 11/27  Problem Less than optimal enteral nutrition composition or modality   Etiology Current EN Rx   Signs/Symptoms Hyperphosphatemia; hypermagnesemia    Status: Active  EN changed, patient tolerated     Goal:   Nutrition to support treatment and Adjust EN      Nutrition Intervention      Follow treatment progress, Care plan reviewed, Nutrition support order placed    Patient on propofol, Will adjust EN to the following:  Novasource renal @ 40ml/hr; Prosource protein TID. Water + current does of propofol; flushes @ 30ml Q2hr will provide:   This will provide: 800ml (GV x 20hrs infusion); 2106kcals (110% est needs), 118g PRO (93% est needs), 576ml water from EM + 300ml water flushes.       Monitoring/Evaluation:   Per protocol, I&O, Pertinent labs, EN delivery/tolerance, Weight, Skin status, GI status, Symptoms, Hemodynamic stability    Grace Lindsay RD  Time Spent:30min

## 2024-11-27 NOTE — PROGRESS NOTES
"INTENSIVIST NOTE     Hospital Day: 15    Mr. Marco Antonio Montemayor, 79 y.o. male is followed for:   Respiratory failure and altered mental status       SUBJECTIVE     Interval history:    Failed extubation yesterday after several hours.  Reintubated.  Maximum temperature overnight 100.  Fluid balance -800 mL.    The patient's relevant past medical, surgical and social history were reviewed and updated in Epic as appropriate.       OBJECTIVE     Vital Sign Min/Max for last 24 hours  Temp  Min: 98.3 °F (36.8 °C)  Max: 100 °F (37.8 °C)   BP  Min: 86/30  Max: 153/76   Pulse  Min: 77  Max: 111   Resp  Min: 20  Max: 21   SpO2  Min: 94 %  Max: 100 %   No data recorded   No data recorded      Intake/Output Summary (Last 24 hours) at 11/27/2024 1630  Last data filed at 11/27/2024 1513  Gross per 24 hour   Intake 1693.94 ml   Output 2500 ml   Net -806.06 ml      Flowsheet Rows      Flowsheet Row First Filed Value   Admission Height 177.8 cm (70\") Documented at 11/12/2024 1456   Admission Weight 130 kg (287 lb) Documented at 11/12/2024 1456               11/17/24  1454 11/21/24  0400 11/22/24  0600   Weight: 127 kg (279 lb 15.8 oz) (!) 136 kg (300 lb 0.7 oz) (!) 137 kg (300 lb 14.9 oz)            Objective:  General Appearance:  In no acute distress.    Vital signs: (most recent): Blood pressure 117/63, pulse 105, temperature 98.5 °F (36.9 °C), temperature source Axillary, resp. rate 20, height 177.8 cm (70\"), weight (!) 137 kg (300 lb 14.9 oz), SpO2 99%.    HEENT: (Oral ET tube  Keofeed  Left IJ triple-lumen)    Lungs:  There are rales and rhonchi.  No wheezes.    Heart: Normal rate.  Irregular rhythm.  S1 normal and S2 normal.  No murmur, gallop or friction rub.   Chest: Symmetric chest wall expansion.   Abdomen: Abdomen is soft and non-distended.  Bowel sounds are normal.   There is no abdominal tenderness.   There is no mass. There is no splenomegaly. There is no hepatomegaly.   Extremities: There is dependent edema.  There " is no deformity.  (Right radial arterial line)  Pupils:  Pupils are equal, round, and reactive to light.    Skin:  Warm and dry.                I reviewed the patient's new clinical results.  I reviewed the patient's new imaging results/reports including actual images and agree with reports.    XR Chest 1 View    Result Date: 11/27/2024  Impression: No significant change. Electronically Signed: Aman Scott MD  11/27/2024 7:24 AM EST  Workstation ID: [id number]    XR Chest 1 View    Result Date: 11/26/2024  Impression: Redemonstration of medical support devices. Endotracheal tube terminates in the mid trachea. Electronically Signed: Yahir Garcia MD  11/26/2024 2:32 PM EST  Workstation ID: [id number]    XR Chest 1 View    Result Date: 11/26/2024  Impression: Stable exam with cardiomegaly, pulmonary vascular congestion and left lower lobe atelectasis or consolidation with small effusion. Electronically Signed: Raji Hudson MD  11/26/2024 8:06 AM EST  Workstation ID: [id number]      INFUSIONS  norepinephrine, 0.02-0.3 mcg/kg/min, Last Rate: Stopped (11/27/24 1235)  propofol, 5-50 mcg/kg/min, Last Rate: 15 mcg/kg/min (11/27/24 1523)        Results from last 7 days   Lab Units 11/27/24  1138 11/27/24  0405 11/26/24  0325 11/25/24  1222 11/25/24  0549   WBC 10*3/mm3  --  7.76 7.64  --  9.54   HEMOGLOBIN g/dL 8.0* 7.1* 7.4*   < > 6.7*   HEMATOCRIT % 24.8* 22.8* 23.3*   < > 21.8*   PLATELETS 10*3/mm3  --  186 181  --  212    < > = values in this interval not displayed.     Results from last 7 days   Lab Units 11/27/24  0405 11/26/24  0325 11/25/24  0549 11/24/24  0540 11/23/24  0450   SODIUM mmol/L 131* 134* 132* 134* 135*   POTASSIUM mmol/L 5.0 4.6 5.0 4.8 4.2   CHLORIDE mmol/L 93* 96* 92* 94* 99   CO2 mmol/L 18.0* 24.0 21.0* 21.0* 22.0   BUN mg/dL 98* 68* 112* 76* 47*   GLUCOSE mg/dL 118* 135* 139* 144* 163*   CREATININE mg/dL 6.84* 4.72* 8.47* 6.67* 4.97*   MAGNESIUM mg/dL  --   --  2.6* 2.5* 2.3   PHOSPHORUS mg/dL  --   4.3 5.9* 5.7*  --    CALCIUM mg/dL 9.5 9.3 8.8 8.6 8.7   ALBUMIN g/dL 3.0* 3.4* 2.9* 3.0*  --          Results from last 7 days   Lab Units 11/26/24  1442 11/26/24  1241 11/26/24  0818 11/25/24  0346   PH, ARTERIAL pH units 7.385 7.402 7.399 7.355   PCO2, ARTERIAL mm Hg 39.1 39.8 41.1 40.0   PO2 ART mm Hg 296.0* 79.3* 81.8* 102.0   FIO2 % 90 30 30 35       Tube Feeding: Formula: Novasource Renal (Electrolyte Restricted); Feeding Type: Continuous; Start at: 40 mL/hr; Then Advance By: Do Not Advance; Total Daily Feeding Volume (mL/day): 800; Water Flush: 30 mL; Every: 2 hours; Water Bolus: None   /h  Patient doesn't have any tube feeding modular orders    Mechanical Ventilator:   Settings: Observed:   Mode: VC+/AC (11/27/24 1530)    Vt (Set, mL): 450 mL (11/27/24 1530) Vt Mandatory Ins (observed, mL): 433 mL (11/27/24 1530)   Resp Rate (Set): 20 (11/27/24 1530) Resp Rate (Observed) Vent: 20 (11/27/24 1600)   Pressure Support (cm H2O): 10 cm H20 (11/26/24 1247) Minute Ventilation (L/min) (Obs): 9 L/min (11/27/24 1530)       FiO2 (%): 30 % (11/27/24 1530) Plateau Pressure (cm H2O): (S) 18 cm H2O (11/27/24 0734)   PEEP/CPAP (cm H2O): 5 cm H20 (11/27/24 1530) I:E Ratio (Obs): 1:2.3 (11/27/24 1530)         I reviewed the patient's medications.    Assessment & Plan   ASSESSMENT/PLAN     Active Hospital Problems    Diagnosis     **Pneumonia due to COVID-19 virus     Severe obstructive sleep apnea     Probable cardiac arrest versus severe bradycardia requiring CPR     Generalized seizure     Acute GI bleeding, possible     Acute respiratory failure with hypoxia     ESRD on HD T,R,S since 2022        79-year-old male with coronary disease, dyslipidemia, chronic kidney disease on hemodialysis, chronic anemia, hypertension, ZULEIKA on BiPAP, atrial fibrillation, heart failure with preserved ejection fraction.  Patient presented to the hospital with increased work of breathing and hypoxemic respiratory failure.  Patient was found to  have COVID-19 pneumonia.  CTA chest on admission did not show any pulmonary embolism.  Patchy area of groundglass opacities noted in both lungs.  Patient was started on ceftriaxone and doxycycline.  Patient was also given Decadron and remdesivir.  Patient developed hypotension and was given fluid resuscitation and midodrine with improvement in blood pressure.  Patient was thought to be having a GI bleed so GI consultation was obtained.  Patient however declined from respiratory standpoint and was becoming more delirious so was transferred to intensive care unit.  Patient was initially supported with BiPAP but eventually needed to be intubated and placed on mechanical ventilation.  Patient required pressors to maintain hemodynamics.  On admission to ICU patient had severe bradycardia and had 2 episodes of possible cardiac arrest requiring CPR for short duration of time.  Unclear if it was PEA or just severe bradycardia.  Precedex was held.  Patient apparently was being suctioned at that time so was thought to have vasovagal response.  Patient was also noted to have generalized tonic-clonic seizure.  Neurology consultation was obtained and EEG was obtained.  EEG did not show any ongoing seizure activity however patient was started on Keppra.  He was able to follow commands postarrest.  Pt underwent EGD and did not show any ulcers but had suction trauma.   Patient had been quite encephalopathic.  Initially was treated with Keppra but we stopped that on 11/23.  Amantadine was initiated in an attempt to increase level of consciousness.  This has been dosed weekly due to his ESRD.    Developed a decreased H&H when heparin was reinitiated on 11/24 and heparin was discontinued and he was transfused 1 unit PRBCs.    Had dialysis Monday afternoon and plans were to attempt ventilator liberation after dialysis and this was attempted yesterday morning.  He was placed on a pressure support trial for an hour and did well though  had decreased mental status towards the end of the trial.  We let him rest for about 4 hours and then extubated him after another trial midday.  He lasted several hours and then had decreased mental status and increased respiratory difficulty requiring reintubation.    He had dialysis this morning.  I had a long discussion with his daughter this afternoon regarding his wishes regarding continued mechanical ventilation or potential tracheostomy and feeding tube placement.  We will certainly continue to try to wean him f from mechanical ventilation but I think the odds of that happening successfully are low    Plan:    As above  Rate 20, tidal volume 450, FiO2 30%, and PEEP 5  Continue amantadine  Mechanical DVT prophylaxis due to apparent bleeding with anticoagulation earlier this week  Sliding-scale insulin  Protonix 40 mg twice daily  Tube feeds currently at goal     I discussed the patient's findings and my recommendations with family and nursing staff     Plan of care and goals reviewed with multidisciplinary team at daily rounds.    Critical Care time spent in direct patient care: 30 minutes (excluding procedure time, if applicable) including high complexity decision making to assess, manipulate, and support vital organ system failure in this individual who has impairment of one or more vital organ systems such that there is a high probability of imminent or life threatening deterioration in the patient's condition.    Panfilo Martinez MD  Pulmonary and Critical Care Medicine  11/27/24 16:30 EST

## 2024-11-27 NOTE — PLAN OF CARE
Goal Outcome Evaluation:           Progress:   Outcome Evaluation:   -Pt intubated (PEEP 5, FiO2 30%), sedated w Prop @ 15  -Follows commands and opens eyes to voice / repeated stimulation, pupils equal, GARDNER  -Remains in A-Fib, rate < 100  -Levo gtt off since 0339  - black/mame BM x1  -AM H&H 7.1 / 22.8

## 2024-11-28 NOTE — PROGRESS NOTES
"INTENSIVIST   PROGRESS NOTE        SUBJECTIVE     Marco Antonio \"Rand\" 79 y.o. male is followed for: Shortness of Breath and Exposure To Known Illness       Pneumonia due to COVID-19 virus    Severe obstructive sleep apnea    ESRD on HD T,R,S since     Acute respiratory failure with hypoxia    Probable cardiac arrest versus severe bradycardia requiring CPR    Generalized seizure    Acute GI bleeding, possible    As an Intensivist, we provide an integrated approach to the ICU patient and family, medical management of comorbid conditions, including but not limited to electrolytes, glycemic control, organ dysfunction, lead interdisciplinary rounds and coordinate the care with all other services, including those from other specialists.     Interval History:  POD: 10 Days Post-Op Procedure(s) (LRB):  ESOPHAGOGASTRODUODENOSCOPY (N/A)     Open eyes. Follows commands.    Intubated.    Temp  Min: 98 °F (36.7 °C)  Max: 100.5 °F (38.1 °C)       History     Last Reviewed by Raphael Jimenez MD on 2024 at  4:49 PM    Sections Reviewed    Medical, Surgical, Family, Tobacco, Alcohol, Drug Use, Sexual Activity,   Social Documentation    Problem list reviewed by Raphael Jimenez MD on 2024 at  4:49 PM  Medicines reviewed by Raphael Jimenez MD on 2024 at  4:49 PM  Allergies reviewed by Raphael Jimenez MD on 2024 at  4:49 PM       The patient's relevant past medical, surgical and social history were reviewed and updated in Epic as appropriate.         OBJECTIVE     Vitals:  Temp: 99.7 °F (37.6 °C) (24 1545) Temp  Min: 98 °F (36.7 °C)  Max: 100.5 °F (38.1 °C)   Temp core:      BP: 92/52 (24 1632) BP  Min: 79/47  Max: 136/68   MAP (non-invasive) Noninvasive MAP (mmHg): 67 (24 1632) Noninvasive MAP (mmHg)  Av.2  Min: 3  Max: 115   Pulse: 102 (24 1632) Pulse  Min: 91  Max: 113   Resp: 21 (24 0600) Resp  Min: 20  Max: 25   SpO2: 94 % (24 1632) SpO2  Min: 83 %  Max: 100 %   Device: " ventilator (11/28/24 1600)    Flow Rate: 3.5 (11/15/24 2057) No data recorded         11/21/24  0400 11/22/24  0600   Weight: (!) 136 kg (300 lb 0.7 oz) (!) 137 kg (300 lb 14.9 oz)        Intake/Ouptut 24 hrs (7:00AM - 6:59 AM)  Intake & Output (last 2 days)         11/26 0701 11/27 0700 11/27 0701 11/28 0700 11/28 0701 11/29 0700    I.V. (mL/kg) 252.2 (1.9) 318.3 (2.3) 72.1 (0.5)    Blood  360     Other 180 360 120    NG/ 991 423    Total Intake(mL/kg) 1010.2 (7.4) 2029.3 (14.9) 615.1 (4.5)    Dialysis  2500     Total Output  2500     Net +1010.2 -470.7 +615.1           Stool Unmeasured Occurrence 1 x              Medications (drips):  fentanyl 10 mcg/mL, Last Rate: 125 mcg/hr (11/28/24 1550)        Invasive Mechanical Ventilator   Settings: Observed:   Mode: VC+/AC (11/28/24 1632)    Resp Rate (Set): 20 (11/28/24 1632) Resp Rate (Observed) Vent: 20 (11/28/24 1632)   Vt (Set, mL): 450 mL (11/28/24 1632) Vt, Exp (observed, mL): 437 mL (11/28/24 1632)    Minute Ventilation (L/min) (Obs): 8.77 L/min (11/28/24 1632)    I:E Ratio (Obs): 1:2.3 (11/28/24 1632)       FiO2 (%): 30 % (11/28/24 1632) Plateau Pressure (cm H2O): (S) 19 cm H2O (11/28/24 0735)   PEEP/CPAP (cm H2O): 5 cm H20 (11/28/24 1632) Driving Pressure (cm H2O): 14.1 cm H2O (11/28/24 0735)    Static Compliance (L/cm H2O): 35 (11/27/24 0734)      Physical Examination  Telemetry:  Rhythm: atrial rhythm (11/28/24 1600)      Constitutional:  No acute distress.   Cardiovascular: IRR.    Respiratory: Normal breath sounds  (+) Rhonchi   Abdominal:  Soft with no tenderness.   Extremities: Edema   Neurological:   Awake.    Best Eye Response: 3-->(E3) to speech (11/28/24 1600)  Best Motor Response: 6-->(M6) obeys commands (11/28/24 1600)  Best Verbal Response: 1-->(V1) none (11/28/24 1600)  [place] Coma Scale Score: 10 (11/28/24 1600)    RASS (Treviño Agitation-Sedation Scale): -2-->light sedation (11/28/24 1600)    Confusion Assessment Method-ICU  (CAM-ICU)  Feature 3: Altered Level of Consciousness: Positive (11/28/24 1600)     Results Reviewed:  Laboratory  Microbiology  Radiology  Pathology    Hematology:  Results from last 7 days   Lab Units 11/28/24 0411 11/27/24  1138 11/27/24 0405 11/26/24  0325   WBC 10*3/mm3 6.94  --  7.76 7.64   HEMOGLOBIN g/dL 7.8* 8.0* 7.1* 7.4*   MCV fL 106.4*  --  106.5* 107.4*   PLATELETS 10*3/mm3 172  --  186 181     Results from last 7 days   Lab Units 11/28/24 0411 11/27/24 0405 11/26/24  0325   NEUTROS ABS 10*3/mm3 5.41 5.90 6.06   LYMPHS ABS 10*3/mm3 0.52* 0.66* 0.47*   EOS ABS 10*3/mm3 0.43* 0.39 0.29     Chemistry:  Estimated Creatinine Clearance: 16.5 mL/min (A) (by C-G formula based on SCr of 4.89 mg/dL (H)).    Results from last 7 days   Lab Units 11/28/24 0411 11/27/24  0405   SODIUM mmol/L 133* 131*   POTASSIUM mmol/L 4.3 5.0   CHLORIDE mmol/L 94* 93*   CO2 mmol/L 22.0 18.0*   BUN mg/dL 71* 98*   CREATININE mg/dL 4.89* 6.84*   GLUCOSE mg/dL 130* 118*     Results from last 7 days   Lab Units 11/28/24 0411 11/27/24 0405 11/26/24 0325 11/25/24  0549 11/24/24  0540   CALCIUM mg/dL 9.6 9.5 9.3 8.8 8.6   MAGNESIUM mg/dL  --   --   --  2.6* 2.5*   PHOSPHORUS mg/dL 5.3*  --  4.3 5.9* 5.7*     Hepatic Panel:  Results from last 7 days   Lab Units 11/28/24 0411 11/27/24 0405 11/26/24 0325 11/25/24  0549 11/24/24  0540   ALBUMIN g/dL 3.1* 3.0*   < > 2.9* 3.0*   TOTAL PROTEIN g/dL  --  6.2  --  5.7* 5.8*   BILIRUBIN mg/dL  --  0.7  --  0.6 0.7   AST (SGOT) U/L  --  35  --  31 30   ALT (SGPT) U/L  --  17  --  15 12   ALK PHOS U/L  --  96  --  109 108    < > = values in this interval not displayed.     Biomarkers:  Results from last 7 days   Lab Units 11/25/24  0549   CRP mg/dL 12.45*     COVID-19  Lab Results   Component Value Date    COVID19 Presumptive Negative 11/25/2024    COVID19 Detected (C) 11/11/2024    COVID19 Not Detected 08/02/2022    COVID19 Not Detected 09/22/2020       Arterial Blood Gases:  Results from  last 7 days   Lab Units 11/28/24  0951 11/26/24  1442 11/26/24  1241   PH, ARTERIAL pH units 7.445 7.385 7.402   PCO2, ARTERIAL mm Hg 34.6* 39.1 39.8   PO2 ART mm Hg 85.6 296.0* 79.3*   FIO2 % 30 90 30       Images:  XR Chest 1 View    Result Date: 11/27/2024  Impression: No significant change. Electronically Signed: Aman Scott MD  11/27/2024 7:24 AM EST  Workstation ID: [id number]     Echo:  Results for orders placed during the hospital encounter of 11/12/24    Adult Transthoracic Echo Complete W/ Cont if Necessary Per Protocol    Interpretation Summary    Left ventricular systolic function is normal. Left ventricular ejection fraction appears to be 61 - 65%.    Left ventricular wall thickness is consistent with mild concentric hypertrophy.    Left ventricular diastolic function was indeterminate.    The left atrial cavity is moderately dilated.    Mild aortic valve stenosis is present.    Peak velocity of the flow distal to the aortic valve is 216.8 cm/s. Aortic valve mean pressure gradient is 10 mmHg.    Estimated right ventricular systolic pressure from tricuspid regurgitation is normal (<35 mmHg). Calculated right ventricular systolic pressure from tricuspid regurgitation is 23 mmHg.      Results: Reviewed.  I reviewed the patient's new laboratory and imaging results.  I independently reviewed the patient's new images.    Medications: Reviewed.     Assessment & Plan   ASSESSMENT/PLAN     Active Hospital Problems    Diagnosis  POA    **Pneumonia due to COVID-19 virus [U07.1, J12.82]  Yes    Probable cardiac arrest versus severe bradycardia requiring CPR [I46.9]  Yes    Generalized seizure [R56.9]  Yes    Acute GI bleeding, possible [K92.2]  Yes    Acute respiratory failure with hypoxia [J96.01]  Yes    ESRD on HD T,R,S since 2022 [N18.6]  Yes    Severe obstructive sleep apnea [G47.33]  Yes     Auto BIPAP with O2            Marco Antonio Montemayor is a 79 y.o. male who presented to the hospital with increased work  of breathing and hypoxemic respiratory failure.      He was found to have COVID-19 pneumonia.      CTA chest on admission did not show any pulmonary embolism.  Patchy area of groundglass opacities noted in both lungs.  Patient was started on ceftriaxone and doxycycline. He was also given Decadron and remdesivir.      He developed hypotension and was given fluid resuscitation and midodrine with improvement in blood pressure.  Patient was thought to be having a GI bleed so GI consultation was obtained.      However he declined from respiratory standpoint and was becoming more delirious so was transferred to intensive care unit.      He was initially supported with BiPAP but eventually needed to be intubated and placed on mechanical ventilation.  Patient required pressors to maintain hemodynamics.      On admission to ICU he had severe bradycardia and had 2 episodes of possible cardiac arrest requiring CPR for short duration of time.  Unclear if it was PEA or just severe bradycardia. Patient apparently was being suctioned at that time so was thought to have vasovagal response.  Patient was also noted to have generalized tonic-clonic seizure.  Neurology consultation was obtained and EEG was obtained.  EEG did not show any ongoing seizure activity however patient was started on Keppra.  He was able to follow commands postarrest.    Pt underwent EGD and did not show any ulcers but had suction trauma.     Patient had been quite encephalopathic.  Initially was treated with Keppra but we stopped that on 11/23.  Amantadine was initiated in an attempt to increase level of consciousness.  This has been dosed weekly due to his ESRD.    He developed a decreased H&H when heparin was reinitiated on 11/24 and heparin was discontinued and he was transfused 1 unit PRBCs.    11/26/24: He was placed on a pressure support trial for an hour and did well though had decreased mental status towards the end of the trial.  We let him rest for  about 4 hours and then extubated him after another trial midday.  He lasted several hours and then had decreased mental status and increased respiratory difficulty requiring reintubation.    Comorbidities: coronary disease, dyslipidemia, chronic kidney disease on hemodialysis, chronic anemia, hypertension, ZULEIKA on BiPAP, atrial fibrillation, heart failure with preserved ejection fraction.    Respiratory Failure, Type 1. Acute,  Intubated  Invasive Mechanical Ventilation   Covid-19  Sleep Medicine  ZULEIKA per History  Cardiovascular  CAD  HFpEF  HTN  A Fib  Dyslipidemia  ESRD on iHD    Plan:  Adjust analgesics, sedatives. Discontinue omega 6 lipids (Propofol)  Lung protective ventilation.  Goal: Glucose < 180 mg/dL.   Insulin SQ  May need trach. He has failed extubation trial.  Continue Enteral Nutrition   iHD tomorrow Friday (Current schedule is M/W/F, but outpatient schedule was T/T/S)  Dr. Martinez discussed with his daughter on 11/27/24 regarding his wishes regarding continued mechanical ventilation or potential tracheostomy and feeding tube placement.    Prognosis very poor. Re-assess GOC with family.    Time: was greater than 35 minutes. This is non-concurrent time.     Critical care time was exclusive of:  Separately billable procedures and treating other patients and teaching time.     Critical care was time spent personally by me on the following activities: Development of treatment plan with patient or surrogate, ordering and performing treatments and interventions, ordering and review of laboratory studies, ordering and review of radiographic studies, evaluation of patient's response to treatment, examination of patient and ventilator management.    Time spent with family or surrogate(s) is included only if the patient was incapable of providing the necessary information or participating in medical decision making.              [x] Primary Attending Intensive Care Medicine - Nutrition Support   []  Consultant

## 2024-11-28 NOTE — PLAN OF CARE
Goal Outcome Evaluation:  Plan of Care Reviewed With: patient, child, grandchild(paul)        Progress:   Outcome Evaluation:   -Pt remains intubated (PEEP 5, FiO2 30%), sedated w Prop @ 15  -Follows commands, nods appropriately, opens eyes to voice, pupils equal, GARDNER  -Remains in A-Fib  -Levo gtt remains off  -No uop nor BM  -AM H&H 7.8 / 24.9

## 2024-11-28 NOTE — PLAN OF CARE
Goal Outcome Evaluation:      Unable to wean mechanical ventilation at this time.

## 2024-11-28 NOTE — PROGRESS NOTES
" LOS: 16 days    Patient Care Team:  Heriberto Murcia MD as PCP - General (Family Medicine)  Shanell Arango, PharmD as Pharmacist (Pharmacy)  Evelina Quan, PharmD as Pharmacist (Pharmacy)  Peng Melgar, PharmD as Pharmacist (Pharmacy)  Johnathan Taylor MD as Consulting Physician (Cardiac Electrophysiology)  Chato Bo MD as Consulting Physician (Nephrology)  Ivet Beasley APRN as Nurse Practitioner (Cardiology)  Rei Decker PA as Physician Assistant (Cardiology)    Subjective     Seen and examined at bedside, family present.   Remains intubated, on low-dose propofol drip.   Pressors are off.  He got dialysis yesterday.     Re-intubated 11/26.     Objective     Vital Signs:  Blood pressure 94/57, pulse 96, temperature 100.2 °F (37.9 °C), temperature source Axillary, resp. rate 21, height 177.8 cm (70\"), weight (!) 137 kg (300 lb 14.9 oz), SpO2 95%.      Intake/Output Summary (Last 24 hours) at 11/28/2024 1430  Last data filed at 11/28/2024 1248  Gross per 24 hour   Intake 1714.33 ml   Output --   Net 1714.33 ml        11/27 0701 - 11/28 0700  In: 2029.3 [I.V.:318.3]  Out: 2500     Physical Exam:  GENERAL: NAD  NEURO: Sedate on vent   PSYCHIATRIC: Unable to evaluate   EYE: PE, no icterus, no conjunctivitis  ENT: + ET tube + NG  NECK: , No JVD discernable,  Trachea midline  CV: Trace edema, RRR  LUNGS:  Quiet,  Nonlabored resp on ventilator.  Symmetrical expansion  ABDOMEN: Obese nondistended, soft nontender.  : No Mueller, no palp bladder  SKIN: Warm and dry without rash  + AVF    Labs:  Results from last 7 days   Lab Units 11/28/24  0411 11/27/24  1138 11/27/24  0405 11/26/24  0325   WBC 10*3/mm3 6.94  --  7.76 7.64   HEMOGLOBIN g/dL 7.8* 8.0* 7.1* 7.4*   PLATELETS 10*3/mm3 172  --  186 181     Results from last 7 days   Lab Units 11/28/24  0411 11/27/24  0405 11/26/24  0325 11/25/24  0549 11/24/24  0540 11/23/24  0450 11/23/24  0450 11/22/24  0449   SODIUM mmol/L 133* 131* 134* " 132* 134*  --  135* 134*   POTASSIUM mmol/L 4.3 5.0 4.6 5.0 4.8  --  4.2 4.9   CHLORIDE mmol/L 94* 93* 96* 92* 94*  --  99 97*   CO2 mmol/L 22.0 18.0* 24.0 21.0* 21.0*  --  22.0 21.0*   BUN mg/dL 71* 98* 68* 112* 76*  --  47* 57*   CREATININE mg/dL 4.89* 6.84* 4.72* 8.47* 6.67*  --  4.97* 7.70*   CALCIUM mg/dL 9.6 9.5 9.3 8.8 8.6  --  8.7 8.6   PHOSPHORUS mg/dL 5.3*  --  4.3 5.9* 5.7*  --   --  5.7*   MAGNESIUM mg/dL  --   --   --  2.6* 2.5*  --  2.3 2.6*   ALBUMIN g/dL 3.1* 3.0* 3.4* 2.9* 3.0*   < >  --   --     < > = values in this interval not displayed.     Results from last 7 days   Lab Units 11/27/24  0405   ALK PHOS U/L 96   BILIRUBIN mg/dL 0.7   ALT (SGPT) U/L 17   AST (SGOT) U/L 35     Results from last 7 days   Lab Units 11/28/24  0951   PH, ARTERIAL pH units 7.445   PO2 ART mm Hg 85.6   PCO2, ARTERIAL mm Hg 34.6*   HCO3 ART mmol/L 23.7       A/P:      1.  ESRD on HD TTS- No missed treatment sessions  2.  Hypotension: Improved.  Patient off pressors.    3.  HFpEF  4.  Anemia of CKD: Transfuse at Hb<7. Started on BOAZ   5.  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  Volume status: No overt volume overload  7.  COVID- 19 PNA  8.  Afib / rate controlled   9- S/p Cardiac arrest on 11/16/24      PLAN   No need for dialysis today, currently on HD per MW demar.  Next hemodialysis tomorrow  Blood pressure improved.  UF as tolerated to assist with oxygenation.  Transition back to TTS schedule before discharge    Will continue manage ESRD and related complications    High risk and complexity patient.    Willy Capellan MD  11/28/24  14:30 EST

## 2024-11-28 NOTE — PLAN OF CARE
Problem: Adult Inpatient Plan of Care  Goal: Plan of Care Review  Outcome: Not Progressing  Flowsheets (Taken 11/27/2024 1926)  Progress: no change  Outcome Evaluation: Pt remains on MV- PEEP 5 and FiO2 30%. Pt arousing to voice/stimulation and following commands. Prop infusing @15. HD this AM- 2.5L fluid removed. 7.1 hgb this AM- 1 unit PRBCs transfused during HD- 8.0/24.8 redraw. Remains in Afib. Pt tolearting TF at goal rate. Pt anuric. No BM this shift. Arterial line removed. Pt's daughter updated at bedside. Bilateral soft wrist restraints remain in place for pt safety.  Plan of Care Reviewed With:   patient   child      Problem: Restraint, Nonviolent  Goal: Absence of Harm or Injury  Outcome: Not Progressing  Intervention: Implement Least Restrictive Safety Strategies  Recent Flowsheet Documentation  Taken 11/27/2024 1800 by Fernanda Childress RN  Medical Device Protection:   IV pole/bag removed from visual field   torso covered   tubing secured  Diversional Activities: television  Taken 11/27/2024 1600 by Fernanda Childress RN  Medical Device Protection:   IV pole/bag removed from visual field   torso covered   tubing secured  Diversional Activities: television  Taken 11/27/2024 1400 by Fernanda Childress RN  Medical Device Protection:   IV pole/bag removed from visual field   torso covered   tubing secured  Diversional Activities: television  Taken 11/27/2024 1200 by Fernanda Childress RN  Medical Device Protection:   IV pole/bag removed from visual field   torso covered   tubing secured  Diversional Activities: television  Taken 11/27/2024 1000 by Fernanda Childress RN  Medical Device Protection:   IV pole/bag removed from visual field   torso covered   tubing secured  Diversional Activities: television  Taken 11/27/2024 0800 by Fernanda Childress RN  Medical Device Protection:   IV pole/bag removed from visual field   tubing secured   torso covered  Diversional Activities: television  Intervention: Protect Dignity, Rights  and Personal Wellbeing  Recent Flowsheet Documentation  Taken 11/27/2024 1800 by Fernanda Childress RN  Trust Relationship/Rapport: care explained  Taken 11/27/2024 1600 by Fernanda Childress RN  Trust Relationship/Rapport: care explained  Taken 11/27/2024 1400 by Fernanda Childrses RN  Trust Relationship/Rapport: care explained  Taken 11/27/2024 1200 by Fernanda Childress RN  Trust Relationship/Rapport: care explained  Taken 11/27/2024 1000 by Fernanda Childress RN  Trust Relationship/Rapport: care explained  Taken 11/27/2024 0800 by Fernanda Childress RN  Trust Relationship/Rapport: care explained  Intervention: Protect Skin and Joint Integrity  Recent Flowsheet Documentation  Taken 11/27/2024 1800 by Fernanda Childress RN  Body Position:   turned   supine   upper extremity elevated   lower extremity elevated  Skin Protection:   incontinence pads utilized   silicone foam dressing in place   skin sealant/moisture barrier applied   transparent dressing maintained  Taken 11/27/2024 1600 by Fernanda Childress RN  Body Position:   turned   upper extremity elevated   lower extremity elevated   tilted   right  Skin Protection:   incontinence pads utilized   silicone foam dressing in place   skin sealant/moisture barrier applied   transparent dressing maintained  Taken 11/27/2024 1400 by Fernanda Childress RN  Body Position:   turned   tilted   left   upper extremity elevated   lower extremity elevated  Skin Protection:   incontinence pads utilized   silicone foam dressing in place   skin sealant/moisture barrier applied   transparent dressing maintained  Taken 11/27/2024 1200 by Fernanda Childress RN  Body Position:   turned   supine   upper extremity elevated   lower extremity elevated  Taken 11/27/2024 1000 by Fernanda Childress RN  Body Position:   turned   tilted   right   lower extremity elevated   upper extremity elevated  Skin Protection:   incontinence pads utilized   silicone foam dressing in place   skin sealant/moisture barrier applied    transparent dressing maintained  Taken 11/27/2024 0800 by Fernanda Childress, RN  Body Position:   supine   upper extremity elevated   lower extremity elevated  Skin Protection:   incontinence pads utilized   silicone foam dressing in place   skin sealant/moisture barrier applied   transparent dressing maintained  Range of Motion: ROM (range of motion) performed

## 2024-11-28 NOTE — PLAN OF CARE
Problem: Adult Inpatient Plan of Care  Goal: Plan of Care Review  Outcome: Not Progressing  Flowsheets (Taken 11/28/2024 1826)  Progress: no change  Outcome Evaluation: Pt remains on MV- PEEP 5 and FiO2 30%. Pt arousing to voice/stimulation and following commands. Propofol discontinued. Fentanyl initiated and infusing @125. x1 PRN fentanyl bolus administered. Remains in Afib. Tmax 100.5- PRN tylenol administered and fever reduced. Pt tolearting TF at goal rate. Pt anuric. No BM this shift. Plan for HD tomorrow. Pt's spouse and family members updated at bedside. Bilateral soft wrist restraints remain in place for pt safety.  Plan of Care Reviewed With:   patient   spouse   child   grandchild(paul)        Problem: Restraint, Nonviolent  Goal: Absence of Harm or Injury  Outcome: Not Progressing  Intervention: Implement Least Restrictive Safety Strategies  Recent Flowsheet Documentation  Taken 11/28/2024 1800 by Fernanda Childress RN  Medical Device Protection:   IV pole/bag removed from visual field   tubing secured   torso covered  Diversional Activities: television  Taken 11/28/2024 1600 by Fernanda Childress RN  Medical Device Protection:   IV pole/bag removed from visual field   torso covered   tubing secured  Diversional Activities: television  Taken 11/28/2024 1400 by Fernanda Childress RN  Medical Device Protection:   IV pole/bag removed from visual field   torso covered   tubing secured  Diversional Activities: television  Taken 11/28/2024 1200 by Fernanda Childress RN  Medical Device Protection:   IV pole/bag removed from visual field   torso covered   tubing secured  Diversional Activities: television  Taken 11/28/2024 1000 by Fernanda Childress RN  Medical Device Protection:   IV pole/bag removed from visual field   torso covered   tubing secured  Diversional Activities: television  Taken 11/28/2024 0800 by Fernanda Childress RN  Medical Device Protection:   IV pole/bag removed from visual field   torso covered   tubing  secured  Diversional Activities: television  Intervention: Protect Dignity, Rights and Personal Wellbeing  Recent Flowsheet Documentation  Taken 11/28/2024 1800 by Fernanda Childress RN  Trust Relationship/Rapport: care explained  Taken 11/28/2024 1600 by Fernanda Childress RN  Trust Relationship/Rapport: care explained  Taken 11/28/2024 1400 by Fernanda Childress RN  Trust Relationship/Rapport: care explained  Taken 11/28/2024 1200 by Fernanda Childress RN  Trust Relationship/Rapport: care explained  Taken 11/28/2024 1000 by Fernanda Childress RN  Trust Relationship/Rapport: care explained  Taken 11/28/2024 0800 by Fernanda Childress RN  Trust Relationship/Rapport: care explained  Intervention: Protect Skin and Joint Integrity  Recent Flowsheet Documentation  Taken 11/28/2024 1800 by Fernanda Childress RN  Body Position:   turned   tilted   left   lower extremity elevated   upper extremity elevated  Skin Protection:   incontinence pads utilized   skin sealant/moisture barrier applied   silicone foam dressing in place   transparent dressing maintained  Taken 11/28/2024 1600 by Fernanda Childress RN  Body Position:   turned   supine   upper extremity elevated   lower extremity elevated  Skin Protection:   incontinence pads utilized   silicone foam dressing in place   skin sealant/moisture barrier applied   transparent dressing maintained  Taken 11/28/2024 1400 by Fernanda Childress RN  Body Position:   turned   tilted   left   upper extremity elevated   lower extremity elevated  Skin Protection:   incontinence pads utilized   silicone foam dressing in place   skin sealant/moisture barrier applied   transparent dressing maintained  Taken 11/28/2024 1200 by Fernanda Childress RN  Body Position:   turned   supine   upper extremity elevated   lower extremity elevated  Skin Protection:   incontinence pads utilized   silicone foam dressing in place   skin sealant/moisture barrier applied   transparent dressing maintained  Taken 11/28/2024 1000 by  Fernanda Childress RN  Body Position:   turned   tilted   right   lower extremity elevated   upper extremity elevated  Skin Protection:   incontinence pads utilized   silicone foam dressing in place   skin sealant/moisture barrier applied   transparent dressing maintained  Taken 11/28/2024 0800 by Fernanda Childress RN  Body Position:   turned   supine   upper extremity elevated   lower extremity elevated  Skin Protection:   incontinence pads utilized   silicone foam dressing in place   skin sealant/moisture barrier applied   transparent dressing maintained

## 2024-11-29 NOTE — CONSULTS
Patient on ventilator, did make eye contact with .     Patient's spouse (Raeann), daughter (Farheen), and granddaughter (Paige) present during pastoral care visit.     I previously met Raeann & Farheen on Tuesday as he was emergently re-intuibated. Patient and family seem to be doing better now than they were that afternoon.     The family looks forward to their meeting with the Palliative Care team tomorrow at 1000.     Family in good spirits. Active listening used.     Chaplains will continue to follow patient and family.

## 2024-11-29 NOTE — PLAN OF CARE
"Goal Outcome Evaluation:  Plan of Care Reviewed With: patient        Progress: no change  Outcome Evaluation: Palliative RN saw pt at 1124.  New palliative referral for assistance with GOC per JENNIFER Lovelace.  NOK is spouse Raeann Montemayor and daughter Jojo Nguyen.  Pt. was asleep on MV and did not demonstrate any visible signs of discomfort.  No family at BS.  Spoke with primary RN to gather more information.  She stated that generally pt. is not uncomfortable unless they attempt to reposition him and he will shake his head \"no\".  Per report, he is able to follow some commands at times as well.  Palliative brochure and meal discount card left with primary RN to provide to family when they arrive.  Dr. SHAD Mcghee reached out to patient's spouse and set up family meeting tomorrow at 1000.  Palliative care to continue to follow along for support and ongoing GOC.       Problem: Palliative Care  Goal: Enhanced Quality of Life  Intervention: Promote Advance Care Planning  Flowsheets (Taken 11/29/2024 1322)  Life Transition/Adjustment:   palliative care initiated   palliative care discussed                           "

## 2024-11-29 NOTE — PROGRESS NOTES
"INTENSIVIST   PROGRESS NOTE        SUBJECTIVE     Marco Antonio \"Rand\" 79 y.o. male is followed for: Shortness of Breath and Exposure To Known Illness       Pneumonia due to COVID-19 virus    Severe obstructive sleep apnea    ESRD on HD T,R,S since     Acute respiratory failure with hypoxia    Probable cardiac arrest versus severe bradycardia requiring CPR    Generalized seizure    Acute GI bleeding, possible    As an Intensivist, we provide an integrated approach to the ICU patient and family, medical management of comorbid conditions, including but not limited to electrolytes, glycemic control, organ dysfunction, lead interdisciplinary rounds and coordinate the care with all other services, including those from other specialists.     Interval History:  POD: 11 Days Post-Op Procedure(s) (LRB):  ESOPHAGOGASTRODUODENOSCOPY (N/A)     He seems even more awake than yesterday. Trying to make gestures with his arms and hands.    Temp  Min: 97.4 °F (36.3 °C)  Max: 100.5 °F (38.1 °C)       History     Last Reviewed by Raphael Jimenez MD on 2024 at  4:49 PM    Sections Reviewed    Medical, Surgical, Family, Tobacco, Alcohol, Drug Use, Sexual Activity,   Social Documentation    Problem list reviewed by Raphael Jimenez MD on 2024 at  4:49 PM  Medicines reviewed by Raphael Jimenez MD on 2024 at  4:49 PM  Allergies reviewed by Raphael Jimenez MD on 2024 at  4:49 PM       The patient's relevant past medical, surgical and social history were reviewed and updated in Epic as appropriate.         OBJECTIVE     Vitals:  Temp: 98 °F (36.7 °C) (24 0800) Temp  Min: 97.4 °F (36.3 °C)  Max: 100.5 °F (38.1 °C)   Temp core:      BP: 111/60 (24 1000) BP  Min: 79/47  Max: 165/82   MAP (non-invasive) Noninvasive MAP (mmHg): 77 (24 1000) Noninvasive MAP (mmHg)  Av.8  Min: 3  Max: 115   Pulse: 93 (24 1015) Pulse  Min: 89  Max: 118   Resp: 20 (24 1000) Resp  Min: 20  Max: 20   SpO2: 94 % " (11/29/24 1015) SpO2  Min: 91 %  Max: 100 %   Device: ventilator (11/29/24 1000)    Flow Rate: 3.5 (11/15/24 2057) No data recorded         11/21/24  0400 11/22/24  0600   Weight: (!) 136 kg (300 lb 0.7 oz) (!) 137 kg (300 lb 14.9 oz)        Intake/Ouptut 24 hrs (7:00AM - 6:59 AM)  Intake & Output (last 2 days)         11/27 0701 11/28 0700 11/28 0701 11/29 0700 11/29 0701 11/30 0700    I.V. (mL/kg) 318.3 (2.3) 343.8 (2.5) 37.3 (0.3)    Blood 360      Other 360 360 30    NG/ 950 113    Total Intake(mL/kg) 2029.3 (14.9) 1653.8 (12.2) 180.3 (1.3)    Dialysis 2500      Total Output 2500      Net -470.7 +1653.8 +180.3                   Medications (drips):  fentanyl 10 mcg/mL, Last Rate: 175 mcg/hr (11/29/24 0317)        Invasive Mechanical Ventilator   Settings: Observed:   Mode: VC+/AC (11/29/24 0940)    Resp Rate (Set): 20 (11/29/24 0940) Resp Rate (Observed) Vent: 20 (11/29/24 1015)   Vt (Set, mL): 450 mL (11/29/24 0940) Vt, Exp (observed, mL): 456 mL (11/29/24 0940)    Minute Ventilation (L/min) (Obs): 9.14 L/min (11/29/24 0940)    I:E Ratio (Obs): 1:2.3 (11/29/24 0940)       FiO2 (%): 30 % (11/29/24 0940) Plateau Pressure (cm H2O): (S) 18 cm H2O (11/28/24 1933)   PEEP/CPAP (cm H2O): 5 cm H20 (11/29/24 0940) Driving Pressure (cm H2O): 12.7 cm H2O (11/28/24 1933)    Static Compliance (L/cm H2O): 32 (11/28/24 1933)      Physical Examination  Telemetry:  Rhythm: atrial rhythm (11/29/24 1000)      Constitutional:  No acute distress.   Cardiovascular: IRR.    Respiratory: Normal breath sounds  (+) Rhonchi   Abdominal:  Soft with no tenderness.   Extremities: Edema   Neurological:   Awake.    Best Eye Response: 3-->(E3) to speech (11/29/24 1000)  Best Motor Response: 6-->(M6) obeys commands (11/29/24 1000)  Best Verbal Response: 1-->(V1) none (11/29/24 1000)  Rolf Coma Scale Score: 10 (11/29/24 1000)    RASS (Treviño Agitation-Sedation Scale): -2-->light sedation (11/29/24 1000)    Confusion Assessment  Method-ICU (CAM-ICU)  Feature 3: Altered Level of Consciousness: Positive (11/29/24 1000)     Results Reviewed:  Laboratory  Microbiology  Radiology  Pathology    Hematology:  Results from last 7 days   Lab Units 11/29/24  0346 11/28/24 0411 11/27/24  1138 11/27/24  0405   WBC 10*3/mm3 7.53 6.94  --  7.76   HEMOGLOBIN g/dL 7.7* 7.8* 8.0* 7.1*   MCV fL 105.1* 106.4*  --  106.5*   PLATELETS 10*3/mm3 159 172  --  186     Results from last 7 days   Lab Units 11/29/24 0346 11/28/24 0411 11/27/24  0405   NEUTROS ABS 10*3/mm3 5.57 5.41 5.90   LYMPHS ABS 10*3/mm3 0.76 0.52* 0.66*   EOS ABS 10*3/mm3 0.61* 0.43* 0.39     Chemistry:  Estimated Creatinine Clearance: 13.1 mL/min (A) (by C-G formula based on SCr of 6.15 mg/dL (H)).    Results from last 7 days   Lab Units 11/29/24 0346 11/28/24  0411   SODIUM mmol/L 133* 133*   POTASSIUM mmol/L 4.4 4.3   CHLORIDE mmol/L 93* 94*   CO2 mmol/L 19.0* 22.0   BUN mg/dL 97* 71*   CREATININE mg/dL 6.15* 4.89*   GLUCOSE mg/dL 122* 130*     Results from last 7 days   Lab Units 11/29/24 0346 11/28/24 0411 11/26/24 0325 11/25/24  0549   CALCIUM mg/dL 9.4 9.6   < > 8.8   MAGNESIUM mg/dL 2.6*  --   --  2.6*   PHOSPHORUS mg/dL 6.4* 5.3*   < > 5.9*    < > = values in this interval not displayed.     Hepatic Panel:  Results from last 7 days   Lab Units 11/28/24 0411 11/27/24 0405 11/26/24  0325 11/25/24  0549 11/24/24  0540   ALBUMIN g/dL 3.1* 3.0*   < > 2.9* 3.0*   TOTAL PROTEIN g/dL  --  6.2  --  5.7* 5.8*   BILIRUBIN mg/dL  --  0.7  --  0.6 0.7   AST (SGOT) U/L  --  35  --  31 30   ALT (SGPT) U/L  --  17  --  15 12   ALK PHOS U/L  --  96  --  109 108    < > = values in this interval not displayed.     Biomarkers:  Results from last 7 days   Lab Units 11/29/24  0346 11/25/24  0549   CRP mg/dL  --  12.45*   PROCALCITONIN ng/mL 1.67*  --      COVID-19  Lab Results   Component Value Date    COVID19 Presumptive Negative 11/25/2024    COVID19 Detected (C) 11/11/2024    COVID19 Not Detected  08/02/2022    COVID19 Not Detected 09/22/2020       Arterial Blood Gases:  Results from last 7 days   Lab Units 11/29/24  0344 11/28/24  0951 11/26/24  1442   PH, ARTERIAL pH units 7.364 7.445 7.385   PCO2, ARTERIAL mm Hg 40.0 34.6* 39.1   PO2 ART mm Hg 82.5* 85.6 296.0*   FIO2 % 30 30 90       Images:  XR Chest 1 View    Result Date: 11/29/2024  Impression: Stable medical support devices. Stable cardiomegaly. Similar diffuse interstitial prominence compatible with interstitial edema. Redemonstration of small bilateral pleural effusions. No pneumothorax. Electronically Signed: Yahir Garcia MD  11/29/2024 7:25 AM EST  Workstation ID: [id number]     Echo:  Results for orders placed during the hospital encounter of 11/12/24    Adult Transthoracic Echo Complete W/ Cont if Necessary Per Protocol    Interpretation Summary    Left ventricular systolic function is normal. Left ventricular ejection fraction appears to be 61 - 65%.    Left ventricular wall thickness is consistent with mild concentric hypertrophy.    Left ventricular diastolic function was indeterminate.    The left atrial cavity is moderately dilated.    Mild aortic valve stenosis is present.    Peak velocity of the flow distal to the aortic valve is 216.8 cm/s. Aortic valve mean pressure gradient is 10 mmHg.    Estimated right ventricular systolic pressure from tricuspid regurgitation is normal (<35 mmHg). Calculated right ventricular systolic pressure from tricuspid regurgitation is 23 mmHg.      Results: Reviewed.  I reviewed the patient's new laboratory and imaging results.  I independently reviewed the patient's new images.    Medications: Reviewed.     Assessment & Plan    ASSESSMENT/PLAN     Active Hospital Problems    Diagnosis  POA    **Pneumonia due to COVID-19 virus [U07.1, J12.82]  Yes    Probable cardiac arrest versus severe bradycardia requiring CPR [I46.9]  Yes    Generalized seizure [R56.9]  Yes    Acute GI bleeding, possible [K92.2]  Yes     Acute respiratory failure with hypoxia [J96.01]  Yes    ESRD on HD T,R,S since 2022 [N18.6]  Yes    Severe obstructive sleep apnea [G47.33]  Yes     Auto BIPAP with O2            Marco Antonio Montemayor is a 79 y.o. male who presented to the hospital with increased work of breathing and hypoxemic respiratory failure.      He was found to have COVID-19 pneumonia.      CTA chest on admission did not show any pulmonary embolism.  Patchy area of groundglass opacities noted in both lungs.  Patient was started on ceftriaxone and doxycycline. He was also given Decadron and remdesivir.      He developed hypotension and was given fluid resuscitation and midodrine with improvement in blood pressure.  Patient was thought to be having a GI bleed so GI consultation was obtained.      However he declined from respiratory standpoint and was becoming more delirious so was transferred to intensive care unit.      He was initially supported with BiPAP but eventually needed to be intubated and placed on mechanical ventilation.  Patient required pressors to maintain hemodynamics.      On admission to ICU he had severe bradycardia and had 2 episodes of possible cardiac arrest requiring CPR for short duration of time.  Unclear if it was PEA or just severe bradycardia. Patient apparently was being suctioned at that time so was thought to have vasovagal response.  Patient was also noted to have generalized tonic-clonic seizure.  Neurology consultation was obtained and EEG was obtained.  EEG did not show any ongoing seizure activity however patient was started on Keppra.  He was able to follow commands postarrest.    Pt underwent EGD and did not show any ulcers but had suction trauma.     Patient had been quite encephalopathic.  Initially was treated with Keppra but we stopped that on 11/23.  Amantadine was initiated in an attempt to increase level of consciousness.  This has been dosed weekly due to his ESRD.    He developed a decreased H&H  "when heparin was reinitiated on 11/24 and heparin was discontinued and he was transfused 1 unit PRBCs.    11/26/24: He was placed on a pressure support trial for an hour and did well though had decreased mental status towards the end of the trial.  We let him rest for about 4 hours and then extubated him after another trial midday.  He lasted several hours and then had decreased mental status and increased respiratory difficulty requiring reintubation.    Comorbidities: coronary disease, dyslipidemia, chronic kidney disease on hemodialysis, chronic anemia, hypertension, ZULEIKA on BiPAP, atrial fibrillation, heart failure with preserved ejection fraction.    Respiratory Failure, Type 1. Acute,  Intubated  Invasive Mechanical Ventilation   Covid-19  Sleep Medicine  ZULEIKA per History  Cardiovascular  CAD  HFpEF  HTN  A Fib  Dyslipidemia  ESRD on iHD    Plan:  Discussed with wife and daughter at bedside. 11/29/24   Wife is worried, he could not tolerate surgery and getting a trach done.  She is also worried about his quality of life, if he survives this hospitalization, and requires LTACH, SNF etd.  She would not want him to live at a Nursing Home.  When he started dialysis, about 2 years ago, they were told, his life expectancy was 2 to 5 years, so she knows he does not have much time left, and he would not like to be bedridden the rest of his life.  She thinks that with his renal failure on dialysis, atrial fib and weak heart, and now on the ventilator, that may be too much for him to overcome, and have the quality of life that he enjoys. He has always be \"on the go\", doing something.  She thinks that if his heart stops again, we should not do Chest Compressions, but wants to talk to her son, and rest of family, before changing his Code Status.  Plant to do another Family meeting tomorrow along Dr. Mcghee, from Palliative Medicine.  Lung protective ventilation.  PCT elevated  today.  Goal: Glucose < 180 mg/dL.   Insulin " SQ  May need trach. He has failed extubation trial.  Continue Enteral Nutrition   iHD today (Current schedule is M/W/F, but outpatient schedule was T/T/S)   Prognosis very poor. Re-assess GOC with family.    Time: was greater than 60 minutes. This is non-concurrent time.     Critical care time was exclusive of:  Separately billable procedures and treating other patients and teaching time.     Critical care was time spent personally by me on the following activities: Development of treatment plan with patient or surrogate, ordering and performing treatments and interventions, ordering and review of laboratory studies, ordering and review of radiographic studies, evaluation of patient's response to treatment, examination of patient and ventilator management.    Time spent with family or surrogate(s) is included only if the patient was incapable of providing the necessary information or participating in medical decision making.              [x] Primary Attending Intensive Care Medicine - Nutrition Support   [] Consultant

## 2024-11-29 NOTE — PLAN OF CARE
Goal Outcome Evaluation:    -Patient remains intubated and sedated on Fentanyl @ 175  -No SBT today  -Remains drowsy but arousable to voice, follows commands, agitated and shakes head when not comfortable in bed  -Family spoke w/ Dr. Jimenez at length about goals of care, no changes to plan/code status at this time  -Palliative meeting at 10am tomorrow 11/30 (Discuss code status/plan for trach etc.)   -HD in progress this evening, patient appears to be tolerating well   -No other major events

## 2024-11-29 NOTE — PROGRESS NOTES
" LOS: 17 days    Patient Care Team:  Heriberto Murcia MD as PCP - General (Family Medicine)  Shanell Arango, PharmD as Pharmacist (Pharmacy)  Evelina Quan, PharmD as Pharmacist (Pharmacy)  Peng Melgar, PharmD as Pharmacist (Pharmacy)  Johnathan Taylor MD as Consulting Physician (Cardiac Electrophysiology)  Chato Bo MD as Consulting Physician (Nephrology)  Ivet Beasley APRN as Nurse Practitioner (Cardiology)  Rei Decker PA as Physician Assistant (Cardiology)    Subjective     Seen and examined at bedside, no overnight issues.  Remains intubated.   Pending hemodialysis today.     Objective     Vital Signs:  Blood pressure 124/59, pulse 105, temperature 98.9 °F (37.2 °C), temperature source Axillary, resp. rate 20, height 177.8 cm (70\"), weight (!) 137 kg (300 lb 14.9 oz), SpO2 93%.      Intake/Output Summary (Last 24 hours) at 11/29/2024 1541  Last data filed at 11/29/2024 1200  Gross per 24 hour   Intake 1701.93 ml   Output --   Net 1701.93 ml        11/28 0701 - 11/29 0700  In: 1653.8 [I.V.:343.8]  Out: -     Physical Exam:  GENERAL: NAD  NEURO: Sedate on vent   PSYCHIATRIC: Unable to evaluate   EYE: PE, no icterus, no conjunctivitis  ENT: + ET tube  NECK: , No JVD discernable,  Trachea midline  CV: Trace edema, RRR  LUNGS:  Quiet,  Nonlabored resp on ventilator.  Symmetrical expansion  ABDOMEN: Obese nondistended, soft nontender.  : No Mueller, no palp bladder  SKIN: Warm and dry without rash  + AVF    Labs:  Results from last 7 days   Lab Units 11/29/24  0346 11/28/24  0411 11/27/24  1138 11/27/24  0405   WBC 10*3/mm3 7.53 6.94  --  7.76   HEMOGLOBIN g/dL 7.7* 7.8* 8.0* 7.1*   PLATELETS 10*3/mm3 159 172  --  186     Results from last 7 days   Lab Units 11/29/24  0346 11/28/24  0411 11/27/24  0405 11/26/24  0325 11/25/24  0549 11/24/24  0540 11/23/24  0450 11/23/24  0450   SODIUM mmol/L 133* 133* 131* 134* 132* 134*  --  135*   POTASSIUM mmol/L 4.4 4.3 5.0 4.6 5.0 4.8  -- "  4.2   CHLORIDE mmol/L 93* 94* 93* 96* 92* 94*  --  99   CO2 mmol/L 19.0* 22.0 18.0* 24.0 21.0* 21.0*  --  22.0   BUN mg/dL 97* 71* 98* 68* 112* 76*  --  47*   CREATININE mg/dL 6.15* 4.89* 6.84* 4.72* 8.47* 6.67*  --  4.97*   CALCIUM mg/dL 9.4 9.6 9.5 9.3 8.8 8.6  --  8.7   PHOSPHORUS mg/dL 6.4* 5.3*  --  4.3 5.9* 5.7*   < >  --    MAGNESIUM mg/dL 2.6*  --   --   --  2.6* 2.5*  --  2.3   ALBUMIN g/dL  --  3.1* 3.0* 3.4* 2.9* 3.0*   < >  --     < > = values in this interval not displayed.     Results from last 7 days   Lab Units 11/27/24  0405   ALK PHOS U/L 96   BILIRUBIN mg/dL 0.7   ALT (SGPT) U/L 17   AST (SGOT) U/L 35     Results from last 7 days   Lab Units 11/29/24  0344   PH, ARTERIAL pH units 7.364   PO2 ART mm Hg 82.5*   PCO2, ARTERIAL mm Hg 40.0   HCO3 ART mmol/L 22.8       A/P:      1.  ESRD on HD TTS- No missed treatment sessions  2.  Hypotension: Improved.  Patient off pressors.    3.  HFpEF  4.  Anemia of CKD: Transfuse at Hb<7. Started on BOAZ   5.  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  Volume status: No overt volume overload  7.  COVID- 19 PNA  8.  Afib / rate controlled   9- S/p Cardiac arrest on 11/16/24      PLAN   Hemodialysis today as per MWF demar.   Blood pressure improved,   Off pressors.  UF as tolerated to assist with oxygenation.  Transition back to TTS schedule before discharge    Will continue manage ESRD and related complications    High risk and complexity patient.    Willy Capellan MD  11/29/24  15:41 EST

## 2024-11-29 NOTE — CONSULTS
Heriberto Murcia MD  Consulting physician: Barbara    Chief Complaint   Patient presents with    Shortness of Breath    Exposure To Known Illness       Reason for consult: [facility]    HPI: Patient is an 79-year-old male who is currently intubated.  HPI comes from chart review as well as talking to other medical staff.  Patient was brought hospital found to have dyspnea and pneumonia.  Ultimately patient went into respiratory failure requiring intubation.  About 3 days ago he was able to be extubated, was only extubated for a few hours before he started going back in the respiratory failure and was reintubated.  Patient does wake up and open his eyes, will occasionally follow some commands.    Pain assesment: No visible signs of pain    Dyspnea: Positive    N/V: No visible signs of vomiting    PPS: 20      Past Medical History:   Diagnosis Date    Abnormal ECG     Arrhythmia     Arthritis     Asthma     Cancer     skin cancer removed from various locations     Chronic diastolic congestive heart failure 09/14/2018    CKD (chronic kidney disease)     closer to stage 5    Coronary artery disease     Dry skin     Dyslipidemia 07/07/2016    Edema     Generalized seizure 11/17/2024    Heart murmur     Hematoma     Hemodialysis access site with arteriovenous graft     History of transfusion     no transfusion reaction    Hypertension     Iron deficiency anemia     Long term current use of anticoagulant     ZULEIKA treated with BiPAP     Persistent atrial fibrillation 07/07/2016     Patient notes dyspnea, fatigue and palpitations during episodes of afib.  multiple external cardioversionsChads vasc 2, anticoagulated with Coumadin    Tinnitus     Wears glasses     readers     Past Surgical History:   Procedure Laterality Date    ABLATION OF DYSRHYTHMIC FOCUS      APPENDECTOMY      ARTERIOVENOUS FISTULA/SHUNT SURGERY Left 08/04/2022    Procedure: UPPER EXTREMITY BRACHIOCEPHALIC ARTERIOVENOUS FISTULA FORMATION LEFT;  Surgeon: Libia  Jakob Haider MD;  Location:  [facility] OR;  Service: Vascular;  Laterality: Left;    ARTERIOVENOUS FISTULA/SHUNT SURGERY Left 11/03/2022    Procedure: ELEVATION LEFT UPPER EXTREMITY AV FISTULA;  Surgeon: Jakob Reza MD;  Location:  [facility] OR;  Service: Vascular;  Laterality: Left;    CARDIAC ELECTROPHYSIOLOGY PROCEDURE N/A 05/18/2018    Procedure: PPM generator change - dual       [facility] [place]/ please schedule in 8 weeks;  Surgeon: Johnathan Taylor MD;  Location:  [facility];  Service: Cardiovascular    CATARACT EXTRACTION      bilat wiht lens     COLONOSCOPY      ENDOSCOPY      Pt denies    ENDOSCOPY N/A 11/18/2024    Procedure: ESOPHAGOGASTRODUODENOSCOPY;  Surgeon: Heriberto Medeiros MD;  Location:  [facility];  Service: Gastroenterology;  Laterality: N/A;    INSERT / REPLACE / REMOVE PACEMAKER  about 20 years back    SKIN CANCER EXCISION      various locations     TONSILLECTOMY      VEIN SURGERY  for dialysis    WISDOM TOOTH EXTRACTION       Current Code Status       Date Active Code Status Order ID Comments User Context       11/12/2024 2047 CPR (Attempt to Resuscitate) [id number]  Augusta Cortes MD Inpatient        Question Answer    Code Status (Patient has no pulse and is not breathing) CPR (Attempt to Resuscitate)    Medical Interventions (Patient has pulse or is breathing) Full Support    Level Of Support Discussed With Patient                  Current Facility-Administered Medications   Medication Dose Route Frequency Provider Last Rate Last Admin    acetaminophen (TYLENOL) 160 MG/5ML oral solution 650 mg  650 mg Nasogastric Q6H PRN Alfonso Walker MD   650 mg at 11/28/24 1437    Amantadine HCl (SYMMETREL) oral solution 200 mg  200 mg Nasogastric Weekly Mahin Live MD   200 mg at 11/23/24 1320    artificial tears ophthalmic ointment   Both Eyes Q1H PRN Pankaj Vega MD   Given at 11/18/24 0852    atorvastatin (LIPITOR) tablet 40 mg  40 mg Nasogastric Nightly Aaron  MD Alfonso   40 mg at 11/28/24 2153    chlorhexidine (PERIDEX) 0.12 % solution 15 mL  15 mL Mouth/Throat Q12H Maryjane Gonzales APRN   15 mL at 11/29/24 0844    docusate sodium (COLACE) liquid 100 mg  100 mg Nasogastric BID Raphael Jimenez MD   100 mg at 11/29/24 0844    epoetin michelle-epbx (RETACRIT) injection 10,000 Units  10,000 Units Subcutaneous Once per day on Monday Wednesday Friday Octavio Ta MD   10,000 Units at 11/29/24 0844    fentaNYL (SUBLIMAZE) bolus from bag 10 mcg/mL injection 50 mcg  50 mcg Intravenous Q30 Min PRN Raphael Jimenez MD   50 mcg at 11/28/24 2023    fentaNYL 2500 mcg/250 mL NS infusion   mcg/hr Intravenous Titrated Raphael Jimenez MD 17.5 mL/hr at 11/29/24 0317 175 mcg/hr at 11/29/24 0317    insulin regular (humuLIN R,novoLIN R) injection 2-7 Units  2-7 Units Subcutaneous Q6H Domonique Merlos APRN   2 Units at 11/27/24 2345    ipratropium-albuterol (DUO-NEB) nebulizer solution 3 mL  3 mL Nebulization Q4H PRN Augusta Cortes MD   3 mL at 11/21/24 1113    ipratropium-albuterol (DUO-NEB) nebulizer solution 3 mL  3 mL Nebulization 4x Daily - RT Mahin Live MD   3 mL at 11/29/24 0805    labetalol (NORMODYNE,TRANDATE) injection 10 mg  10 mg Intravenous Q4H PRN Mahin Live MD        lactulose (CHRONULAC) 10 GM/15ML solution 20 g  20 g Nasogastric TID PRN Raphael Jimenez MD        LORazepam (ATIVAN) injection 2 mg  2 mg Intravenous Q4H PRN Raphael Jimenez MD        nystatin (MYCOSTATIN) powder   Topical Q12H Maryjane Gonzales APRN   Given at 11/29/24 0844    ondansetron (ZOFRAN) injection 4 mg  4 mg Intravenous Q6H PRN Augusta Cortes MD        pantoprazole (PROTONIX) injection 40 mg  40 mg Intravenous Q12H Panfilo Martinez MD   40 mg at 11/29/24 0844    ProSource No Carb oral solution 30 mL  30 mL Nasogastric TID Mahin Live MD   30 mL at 11/29/24 0852     fentanyl 10 mcg/mL,  mcg/hr, Last Rate: 175 mcg/hr (11/29/24 0317)        [DISCONTINUED]  "acetaminophen **OR** acetaminophen **OR** [DISCONTINUED] acetaminophen    artificial tears    fentaNYL    ipratropium-albuterol    labetalol    lactulose    LORazepam    [DISCONTINUED] ondansetron ODT **OR** ondansetron  No Known Allergies  Family History   Problem Relation Age of Onset    Cancer Mother     Heart attack Mother     Hypertension Mother     Arrhythmia Father     Hypertension Father     Heart attack Father      Social History     Socioeconomic History    Marital status:    Tobacco Use    Smoking status: Never     Passive exposure: Never    Smokeless tobacco: Never   Vaping Use    Vaping status: Never Used   Substance and Sexual Activity    Alcohol use: No    Drug use: No    Sexual activity: Never     Review of Systems -all others reviewed and found negative except as mentioned in the HPI      /60 (BP Location: Right arm, Patient Position: Lying)   Pulse 93   Temp 98 °F (36.7 °C) (Axillary)   Resp 20   Ht 177.8 cm (70\")   Wt (!) 137 kg (300 lb 14.9 oz)   SpO2 94%   BMI 43.18 kg/m²     Intake/Output Summary (Last 24 hours) at 11/29/2024 1128  Last data filed at 11/29/2024 0800  Gross per 24 hour   Intake 1635.95 ml   Output --   Net 1635.95 ml     Physical Exam:      General Appearance:  Intubated   Head:  Normocephalic, without obvious abnormality, atraumatic   Eyes:  Lids and lashes normal, conjunctivae and sclerae normal, no icterus, no pallor, corneas clear, PERRLA   Ears:  Ears appear intact with no abnormalities noted   Throat:  No oral lesions, no thrush, oral mucosa moist   Neck:  No adenopathy, supple, trachea midline, no thyromegaly, no carotid bruit, no JVD   Back:  No kyphosis present, no scoliosis present, no skin lesions, erythema or scars, no tenderness to percussion or palpation, range of motion normal   Lungs:  Decreased breath sounds throughout    Heart:  Regular rhythm and normal rate, normal S1 and S2, no murmur, no gallop, no rub, no click   Chest Wall:  No " abnormalities observed   Abdomen:  Normal bowel sounds, no masses, no organomegaly, soft non-tender, non-distended, no guarding, no rebound tenderness   Rectal:  Deferred   Extremities:  Lower extremity edema noted bilaterally   Pulses:  Pulses palpable and equal bilaterally   Skin:  No bleeding, bruising or rash   Lymph nodes:  No palpable adenopathy                                                                                    Results from last 7 days   Lab Units 11/29/24  0346   WBC 10*3/mm3 7.53   HEMOGLOBIN g/dL 7.7*   HEMATOCRIT % 24.8*   PLATELETS 10*3/mm3 159     Results from last 7 days   Lab Units 11/29/24  0346 11/28/24  0411 11/27/24  0405   SODIUM mmol/L 133*   < > 131*   POTASSIUM mmol/L 4.4   < > 5.0   CHLORIDE mmol/L 93*   < > 93*   CO2 mmol/L 19.0*   < > 18.0*   BUN mg/dL 97*   < > 98*   CREATININE mg/dL 6.15*   < > 6.84*   CALCIUM mg/dL 9.4   < > 9.5   BILIRUBIN mg/dL  --   --  0.7   ALK PHOS U/L  --   --  96   ALT (SGPT) U/L  --   --  17   AST (SGOT) U/L  --   --  35   GLUCOSE mg/dL 122*   < > 118*    < > = values in this interval not displayed.     Results from last 7 days   Lab Units 11/29/24  0346   SODIUM mmol/L 133*   POTASSIUM mmol/L 4.4   CHLORIDE mmol/L 93*   CO2 mmol/L 19.0*   BUN mg/dL 97*   CREATININE mg/dL 6.15*   GLUCOSE mg/dL 122*   CALCIUM mg/dL 9.4     Imaging Results (Last 72 Hours)       Procedure Component Value Units Date/Time    XR Chest 1 View [[id number]] Collected: 11/29/24 0722     Updated: 11/29/24 0729    Narrative:      XR CHEST 1 VW    Date of Exam: 11/29/2024 2:33 AM EST    Indication: respiratory failure    Comparison: Chest x-ray 11/27/2024    Findings:  Stable medical support devices. Stable cardiomegaly. Similar diffuse interstitial prominence compatible with interstitial edema. Redemonstration of small bilateral pleural effusions. No pneumothorax.      Impression:      Impression:  Stable medical support devices. Stable cardiomegaly. Similar diffuse  interstitial prominence compatible with interstitial edema. Redemonstration of small bilateral pleural effusions. No pneumothorax.        Electronically Signed: Yahir Garcia MD    11/29/2024 7:25 AM EST    Workstation ID: [id number]    XR Chest 1 View [[id number]] Collected: 11/27/24 0723     Updated: 11/27/24 0727    Narrative:      XR CHEST 1 VW    Date of Exam: 11/27/2024 1:32 AM EST    Indication: Respiratory failure    Comparison: 11/26/2024    Findings:  Cardiomediastinal silhouette and support lines and tubes are unchanged. There is persistent pulmonary vascular congestion with interstitial prominence, basilar airspace disease and small to moderate effusions. No pneumothorax. No acute osseous   abnormality identified.      Impression:      Impression:  No significant change.        Electronically Signed: Aman Scott MD    11/27/2024 7:24 AM EST    Workstation ID: [id number]    XR Chest 1 View [[id number]] Collected: 11/26/24 1430     Updated: 11/26/24 1435    Narrative:      XR CHEST 1 VW    Date of Exam: 11/26/2024 2:00 PM EST    Indication: ETT placement    Comparison: Chest x-ray 11/26/2024    Findings:  Endotracheal tube terminates in the midtrachea 4.0 cm above the anahi. Redemonstration of cardiac pacer with left chest pulse generator, left IJ CVC, and enteric feeding tube. Stable cardiomegaly and similar bibasilar opacities.      Impression:      Impression:  Redemonstration of medical support devices. Endotracheal tube terminates in the mid trachea.      Electronically Signed: Yahir Garcia MD    11/26/2024 2:32 PM EST    Workstation ID: [id number]          Impression: Respiratory failure  Pneumonia  Dyspnea  Lower extremity edema  Goals of care  Plan: Did talk to the patient's wife briefly on the phone.  Plan is for us to have a family meeting tomorrow at 10:00.  Did also touch base with the intensivist and updated them on this plan.  For the time being the patient will remain a full code but again  we will plan on having further discussions about this tomorrow.        David Mcghee, DO  11/29/24  11:28 EST

## 2024-11-29 NOTE — PLAN OF CARE
Goal Outcome Evaluation:  Plan of Care Reviewed With: patient        Progress  Outcome Evaluation:   -Pt remains intubated (PEEP 5, FiO2 30%), sedated w Fent @ 175  -Follows commands, nods appropriately, opens eyes to voice, pupils equal, GARDNER  -Remains in A-Fib  -Afebrile overnight  -No BM, PRN lactulose given x1  -AM H&H 7.7 / 24.8  -Palliative consult in AM

## 2024-11-30 NOTE — PLAN OF CARE
Goal Outcome Evaluation: Scheduled HD completed. Pt tolerated well  Blood reinfused.to [pt. Goal reached. Bedside report to CAITLIN Wiggins      Problem: Hemodialysis  Goal: Safe, Effective Therapy Delivery  Outcome: Progressing  Goal: Effective Tissue Perfusion  Outcome: Progressing  Goal: Absence of Infection Signs and Symptoms  Outcome: Progressing

## 2024-11-30 NOTE — PROGRESS NOTES
" LOS: 18 days    Patient Care Team:  Heriberto Murcia MD as PCP - General (Family Medicine)  Shanell Arango, PharmD as Pharmacist (Pharmacy)  Evelina Quan, PharmD as Pharmacist (Pharmacy)  Peng Melgar, PharmD as Pharmacist (Pharmacy)  Johnathan Taylor MD as Consulting Physician (Cardiac Electrophysiology)  Chato Bo MD as Consulting Physician (Nephrology)  Ivet Beasley APRN as Nurse Practitioner (Cardiology)  Rei Decker PA as Physician Assistant (Cardiology)    Subjective     Seen and examined at bedside, intubated.   More alert and awake today.  Following commands.   Family at bedside.  Had dialysis yesterday.   Goals of care ongoing. [Per discussion with the nurse; family is leaning towards palliative extubation on Monday, need to clarify about dialysis]     Objective     Vital Signs:  Blood pressure 113/65, pulse 111, temperature 98.2 °F (36.8 °C), temperature source Axillary, resp. rate 20, height 177.8 cm (70\"), weight (!) 137 kg (300 lb 14.9 oz), SpO2 98%.      Intake/Output Summary (Last 24 hours) at 11/30/2024 1542  Last data filed at 11/30/2024 0400  Gross per 24 hour   Intake 1109.1 ml   Output 2800 ml   Net -1690.9 ml        11/29 0701 - 11/30 0700  In: 1626.4 [I.V.:395.4]  Out: 2800     Physical Exam:  GENERAL: NAD  NEURO: Moving all extremities.   PSYCHIATRIC: Unable to evaluate   EYE: PE, no icterus, no conjunctivitis  ENT: + ET tube  NECK: , No JVD discernable,  Trachea midline  CV: Trace edema, RRR  LUNGS:  Quiet,  Nonlabored resp on ventilator.  Symmetrical expansion  ABDOMEN: Obese nondistended, soft nontender.  : No Mueller, no palp bladder  SKIN: Warm and dry without rash  + AVF    Labs:  Results from last 7 days   Lab Units 11/30/24  0316 11/29/24  2105 11/29/24  0346 11/28/24  0411   WBC 10*3/mm3 7.78  --  7.53 6.94   HEMOGLOBIN g/dL 7.4* 8.7* 7.7* 7.8*   PLATELETS 10*3/mm3 133*  --  159 172     Results from last 7 days   Lab Units 11/30/24  0756 " 11/29/24  0346 11/28/24  0411 11/27/24  0405 11/26/24  0325 11/25/24  0549 11/24/24  0540   SODIUM mmol/L 133* 133* 133* 131* 134* 132* 134*   POTASSIUM mmol/L 4.2 4.4 4.3 5.0 4.6 5.0 4.8   CHLORIDE mmol/L 95* 93* 94* 93* 96* 92* 94*   CO2 mmol/L 22.0 19.0* 22.0 18.0* 24.0 21.0* 21.0*   BUN mg/dL 84* 97* 71* 98* 68* 112* 76*   CREATININE mg/dL 5.26* 6.15* 4.89* 6.84* 4.72* 8.47* 6.67*   CALCIUM mg/dL 10.2 9.4 9.6 9.5 9.3 8.8 8.6   PHOSPHORUS mg/dL 5.1* 6.4* 5.3*  --  4.3 5.9* 5.7*   MAGNESIUM mg/dL 2.6* 2.6*  --   --   --  2.6* 2.5*   ALBUMIN g/dL 3.2*  --  3.1* 3.0* 3.4* 2.9* 3.0*     Results from last 7 days   Lab Units 11/30/24  0756   ALK PHOS U/L 121*   BILIRUBIN mg/dL 1.3*   ALT (SGPT) U/L 24   AST (SGOT) U/L 89*     Results from last 7 days   Lab Units 11/30/24  0356   PH, ARTERIAL pH units 7.392   PO2 ART mm Hg 96.5   PCO2, ARTERIAL mm Hg 40.8   HCO3 ART mmol/L 24.8       A/P:      1.  ESRD on HD TTS- No missed treatment sessions  2.  Hypotension: Improved.  Patient off pressors.    3.  HFpEF  4.  Anemia of CKD: Transfuse at Hb<7. Started on BOAZ   5.  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  Volume status: No overt volume overload  7.  COVID- 19 PNA  8.  Afib / rate controlled   9- S/p Cardiac arrest on 11/16/24      PLAN   Hemodialysis as per MWF demar.  No need for dialysis today  Blood pressure improved,   Off pressors.  UF as tolerated to assist with oxygenation.  Transition back to TTS schedule before discharge    Will continue manage ESRD and related complications    High risk and complexity patient.    Willy Capellan MD  11/30/24  15:42 EST

## 2024-11-30 NOTE — PROGRESS NOTES
Clinical Nutrition     Patient Name: Marco Antonio Montemayor  YOB: 1945  MRN: [id number]  Date of Encounter: 11/30/24 01:08 EST  Admission date: 11/12/2024  Reason for Visit: Follow-up protocol, EN    Assessment   Nutrition Assessment   Admission Diagnosis:  Pneumonia due to COVID-19 virus [U07.1, J12.82]    Problem List:    Pneumonia due to COVID-19 virus    Severe obstructive sleep apnea    ESRD on HD T,R,S since 2022    Acute respiratory failure with hypoxia    Probable cardiac arrest versus severe bradycardia requiring CPR    Generalized seizure    Acute GI bleeding, possible      PMH:   He  has a past medical history of Abnormal ECG, Arrhythmia, Arthritis, Asthma, Cancer, Chronic diastolic congestive heart failure (09/14/2018), CKD (chronic kidney disease), Coronary artery disease, Dry skin, Dyslipidemia (07/07/2016), Edema, Generalized seizure (11/17/2024), Heart murmur, Hematoma, Hemodialysis access site with arteriovenous graft, History of transfusion, Hypertension, Iron deficiency anemia, Long term current use of anticoagulant, ZULEIKA treated with BiPAP, Persistent atrial fibrillation (07/07/2016), Tinnitus, and Wears glasses.    PSH:  He  has a past surgical history that includes Appendectomy; Cataract extraction; Skin cancer excision; Tonsillectomy; [place] tooth extraction; Colonoscopy; Esophagogastroduodenoscopy; Cardiac electrophysiology procedure (N/A, 05/18/2018); Ablation of dysrhythmic focus; arteriovenous fistula/shunt surgery (Left, 08/04/2022); arteriovenous fistula/shunt surgery (Left, 11/03/2022); Insert / replace / remove pacemaker (about 20 years back); Vein Surgery (for dialysis); and Esophagogastroduodenoscopy (N/A, 11/18/2024).    Applicable Nutrition History:   11-16-24 ARF/VENT   11-18-24: s/p EGD   - Normal esophagus. - Erythematous and eroded mucosa in the gastric body likely from NG tube suction trauma. - Erythematous, erosive duodenopathy. - No specimens  collected  11/26: Extubated; re-intubated     SKIN: R ear ulcer    Labs    Labs Reviewed: Yes    Results from last 7 days   Lab Units 11/29/24  0346 11/28/24 0411 11/27/24  0405 11/26/24  0325 11/25/24  0549 11/24/24  0540   GLUCOSE mg/dL 122* 130* 118* 135* 139* 144*   BUN mg/dL 97* 71* 98* 68* 112* 76*   CREATININE mg/dL 6.15* 4.89* 6.84* 4.72* 8.47* 6.67*   SODIUM mmol/L 133* 133* 131* 134* 132* 134*   CHLORIDE mmol/L 93* 94* 93* 96* 92* 94*   POTASSIUM mmol/L 4.4 4.3 5.0 4.6 5.0 4.8   PHOSPHORUS mg/dL 6.4* 5.3*  --  4.3 5.9* 5.7*   MAGNESIUM mg/dL 2.6*  --   --   --  2.6* 2.5*   ALT (SGPT) U/L  --   --  17  --  15 12       Results from last 7 days   Lab Units 11/28/24  0411 11/27/24  0405 11/26/24  0325 11/25/24  0549   ALBUMIN g/dL 3.1* 3.0* 3.4* 2.9*   PREALBUMIN mg/dL  --   --   --  11.2*   CRP mg/dL  --   --   --  12.45*   CHOLESTEROL mg/dL  --   --   --  64   TRIGLYCERIDES mg/dL  --   --   --  85       Results from last 7 days   Lab Units 11/29/24  2311 11/29/24  1759 11/29/24  1131 11/29/24  0559 11/28/24  2340 11/28/24  1751   GLUCOSE mg/dL 170* 140* 139* 141* 129 132*     Lab Results   Lab Value Date/Time    HGBA1C 5.10 11/29/2024 2105    HGBA1C 5.8 (A) 11/06/2024 1415    HGBA1C 6.1 (A) 08/05/2024 1402    HGBA1C 5.90 (H) 03/01/2024 0456                 Medications    Medications Reviewed: yes    Scheduled Meds:Amantadine HCl, 200 mg, Nasogastric, Weekly  atorvastatin, 40 mg, Nasogastric, Nightly  chlorhexidine, 15 mL, Mouth/Throat, Q12H  docusate sodium, 100 mg, Nasogastric, BID  epoetin michelle/michelle-epbx, 10,000 Units, Subcutaneous, Once per day on Monday Wednesday Friday  insulin regular, 2-7 Units, Subcutaneous, Q6H  ipratropium-albuterol, 3 mL, Nebulization, 4x Daily - RT  nystatin, , Topical, Q12H  pantoprazole, 40 mg, Intravenous, Q12H  ProSource No Carb, 30 mL, Nasogastric, TID      Continuous Infusions:fentanyl 10 mcg/mL,  mcg/hr, Last Rate: 175 mcg/hr (11/29/24 9177)      PRN Meds:.   "[DISCONTINUED] acetaminophen **OR** acetaminophen **OR** [DISCONTINUED] acetaminophen    artificial tears    fentaNYL    ipratropium-albuterol    labetalol    lactulose    LORazepam    [DISCONTINUED] ondansetron ODT **OR** ondansetron    Intake/Ouptut 24 hrs (0701 - 0700)   I&O's Reviewed: yes  Intake & Output (last day)         11/29 0701  11/30 0700    I.V. (mL/kg) 260.7 (1.9)    Other 30    NG/    Total Intake(mL/kg) 902.7 (6.6)    Dialysis 2800    Total Output 2800    Net -1897.3             Last documented BM 11/27 (x 1)     Anthropometrics     Height: Height: 177.8 cm (70\")  Last Filed Weight: Weight: (!) 137 kg (300 lb 14.9 oz) (11/22/24 0600)  Method: Weight Method: Bed scale  BMI: BMI (Calculated): 43.2    UBW: ?   Weight change: ? 13lb wt loss over past 3 months per EMR    Nutrition Focused Physical Exam     Date: 11/16    Unable to perform due to COVID Isolation      Subjective   Reported/Observed/Food/Nutrition Related History:     11/29: pt off propofol. Meeting tomorrow to consider GOC.    11/27: Briefly extubated yesterday and then has to be intubated.  EN restarted after intubated. Plan for HD today.     11/25: Remains intubated.  More alert, however still restless per RN.  Plan for HD.  Plan for blood transfusion while on HD. Tolerating EN.       11-21: Patient intubated, sedated + fentanyl, levophed 0.05mcg    Per RN: pt had 750ml GRV last night, 500ml residual returned, TF held for 2 hours, TF resumed @10ml/hr, pt has not had bm since 11-15, movantik given to pt, plan to try and advance TF later if tolerates    11-19: pt intubated, sedated + fentanyl, versed, levophed 0.05mcg.  Per RN: pt will w/d to pain, does not follow commands, Keofeed placed, is currently in the stomach.  Per MD ok to start feeding    11-18: pt intubated, sedated, currently on HD.  + fentanyl, versed, levophed 0.04mcg, protonix.  Per RN: TF has been on hold since pt coded on Saturday, ogt to LWS, has coffee ground " "output, plan for possible scope today    11/16 Order to begin EN.     Needs Assessment   Date: 11-18-24, reviewed 11/25    Height used:Height: 177.8 cm (70\")  Weights used /ABW: 279lb/ 127kg  IBW: 166lb/ 75kg    Estimated Calorie needs: ~1900kcal  Method:  14Kcals/KG ABW:1778kcal  Method:  MSJ ABW: 1981kcal  Method:  PSU  ABW: 2035kcal    Estimated Protein needs: ~127g protein (adjusted due to consistently abiola BUN with started EN)   Method: 2-2.5g protein per kg IBW: 150-188g protein  Method: 1.0 - 1.2g protein per kg ABW: 127- 152g protein    Current Nutrition Prescription     PO: NPO Diet NPO Type: Tube Feeding  Oral Nutrition Supplement:  Intake: N/A    EN: NovaSource Renal  Goal Rate: 40ml  Water Flushes: 30ml/2hr  Modular: Prosource-no carb 3/day  Route:  keofeed in the stomach  Tube: Small bore    At goal over: 20Hrs/day     Rx will supply:   Goal Volume 800  mL/day     Flush Volume 300 mL/day     Energy 1780 Kcal/day 94* % Est Need   Protein 118 g/day 93 % Est Need   Fiber 0 g/day     Water in   mL     Total Water 876 mL     Meet DRI No      --------------------------------------------------------------------------  Product/Rate verified at bedside: Yes  Infusing Rate at time of visit: Novasource Renal 40 ml/hr Water 30ml ev 2 hr     Average Delivery from Charting: 3 Days:   Volume 775 mL/day 97  % Goal Vol.   Flush Volume 300 mL/day     Energy 1710 Kcal/day 90 % Est Need   Protein 110 g/day 87 % Est Need   Fiber 0 g/day     Water in   mL     Total Water 858 mL     Meet DRI N/A      + 222 Kcal from Propofol = 1932 Kcal 102% est need    Assessment & Plan   Nutrition Diagnosis   Date: 11/16 Updated: 11-21, 11/25, 11/29  Problem Inadequate energy intake    Etiology ARF/VENT   Signs/Symptoms 65% goal volume EN   Status: Active 11/25 Improved delivery  to 97%    Date:  11/25 Updated: 11/27  Problem Less than optimal enteral nutrition composition or modality   Etiology Current EN Rx   Signs/Symptoms " Hyperphosphatemia; hypermagnesemia    Status: Active  EN changed, patient tolerated     Goal:   Nutrition to support treatment and Adjust EN      Nutrition Intervention      Follow treatment progress, Care plan reviewed    Current EN Rx:  Novasource Renal goal 40 ml/hr Water at 30 ml ev 2 hr 3 Prosource no carb/da   Over 20 hr to supply 800 ml for  1780 Kcal 94% est need, 118 g  est need, 0 Fiber, 573 ml Water in  total ml Free Water.     Pending GOC consider increase goal to 45 ml/hr Water 30 ml ev 2hr 3 Prosource no carb/da to supply 900 ml for  1980 Kcal 104% est need, 127 g % est need,0 Fiber 648 ml Water in  total ml Free Water.     Monitoring/Evaluation:   Per protocol, I&O, Pertinent labs, EN delivery/tolerance, Weight, Skin status, GI status, Symptoms    Esther Godwin RD  Time Spent:30min

## 2024-11-30 NOTE — PROGRESS NOTES
"INTENSIVIST   PROGRESS NOTE        SUBJECTIVE     Marco Antonio \"Rand\" 79 y.o. male is followed for: Shortness of Breath and Exposure To Known Illness       Pneumonia due to COVID-19 virus    Severe obstructive sleep apnea    ESRD on HD T,R,S since     Acute respiratory failure with hypoxia    Probable cardiac arrest versus severe bradycardia requiring CPR    Generalized seizure    Acute GI bleeding, possible    As an Intensivist, we provide an integrated approach to the ICU patient and family, medical management of comorbid conditions, including but not limited to electrolytes, glycemic control, organ dysfunction, lead interdisciplinary rounds and coordinate the care with all other services, including those from other specialists.     Interval History:  POD: 12 Days Post-Op Procedure(s) (LRB):  ESOPHAGOGASTRODUODENOSCOPY (N/A)     Awake. Wearing his glasses.    Temp  Min: 97.7 °F (36.5 °C)  Max: 100 °F (37.8 °C)       History     Last Reviewed by Raphael Jimenez MD on 2024 at  4:49 PM    Sections Reviewed    Medical, Surgical, Family, Tobacco, Alcohol, Drug Use, Sexual Activity,   Social Documentation    Problem list reviewed by Raphael Jimenez MD on 2024 at  4:49 PM  Medicines reviewed by Raphael Jimenez MD on 2024 at  4:49 PM  Allergies reviewed by Raphael Jimenez MD on 2024 at  4:49 PM       The patient's relevant past medical, surgical and social history were reviewed and updated in Epic as appropriate.         OBJECTIVE     Vitals:  Temp: 98.2 °F (36.8 °C) (24 0800) Temp  Min: 97.7 °F (36.5 °C)  Max: 100 °F (37.8 °C)   Temp core:      BP: 118/64 (24 1345) BP  Min: 76/43  Max: 204/114   MAP (non-invasive) Noninvasive MAP (mmHg): 76 (24 1345) Noninvasive MAP (mmHg)  Av.5  Min: 3  Max: 134   Pulse: 109 (24 1345) Pulse  Min: 87  Max: 126   Resp: 20 (24 0847) Resp  Min: 20  Max: 20   SpO2: 96 % (24 1345) SpO2  Min: 85 %  Max: 100 %   Device: ventilator " (11/30/24 1200)    Flow Rate: 3.5 (11/15/24 2057) No data recorded         11/21/24  0400 11/22/24  0600   Weight: (!) 136 kg (300 lb 0.7 oz) (!) 137 kg (300 lb 14.9 oz)        Intake/Ouptut 24 hrs (7:00AM - 6:59 AM)  Intake & Output (last 2 days)         11/28 0701 11/29 0700 11/29 0701 11/30 0700 11/30 0701 12/01 0700    I.V. (mL/kg) 343.8 (2.5) 395.4 (2.9)     Blood       Other 360 30     NG/ 1201     Total Intake(mL/kg) 1653.8 (12.2) 1626.4 (12)     Dialysis  2800     Total Output  2800     Net +1653.8 -1173.6                    Medications (drips):  fentanyl 10 mcg/mL, Last Rate: 125 mcg/hr (11/30/24 1151)        Invasive Mechanical Ventilator   Settings: Observed:   Mode: VC+/AC (11/30/24 1250)    Resp Rate (Set): 17 (11/30/24 1250) Resp Rate (Observed) Vent: 20 (11/30/24 1345)   Vt (Set, mL): 450 mL (11/30/24 1250) Vt, Exp (observed, mL): 464 mL (11/30/24 1250)    Minute Ventilation (L/min) (Obs): 7.94 L/min (11/30/24 1250)    I:E Ratio (Obs): 1:2.9 (11/30/24 1250)       FiO2 (%): 40 % (11/30/24 1250) Plateau Pressure (cm H2O): 21 cm H2O (11/30/24 0725)   PEEP/CPAP (cm H2O): 5 cm H20 (11/30/24 1250) Driving Pressure (cm H2O): 15.9 cm H2O (11/30/24 0725)    Static Compliance (L/cm H2O): 28 (11/29/24 1938)      Physical Examination  Telemetry:  Rhythm: atrial rhythm (11/30/24 1200)      Constitutional:  No acute distress.   Cardiovascular: IRR.    Respiratory: Normal breath sounds  (+) Rhonchi   Abdominal:  Soft with no tenderness.   Extremities: Edema   Neurological:   Awake.    Best Eye Response: 4-->(E4) spontaneous (11/30/24 1200)  Best Motor Response: 6-->(M6) obeys commands (11/30/24 1200)  Best Verbal Response: 1-->(V1) none (11/30/24 1200)  [place] Coma Scale Score: 11 (11/30/24 1200)    RASS (Treviño Agitation-Sedation Scale): 3-->very agitated (11/30/24 1200)    Confusion Assessment Method-ICU (CAM-ICU)  Feature 3: Altered Level of Consciousness: Positive (11/30/24 1200)     Results  Reviewed:  Laboratory  Microbiology  Radiology  Pathology    Hematology:  Results from last 7 days   Lab Units 11/30/24  0316 11/29/24  2105 11/29/24 0346 11/28/24 0411   WBC 10*3/mm3 7.78  --  7.53 6.94   HEMOGLOBIN g/dL 7.4* 8.7* 7.7* 7.8*   MCV fL 105.9*  --  105.1* 106.4*   PLATELETS 10*3/mm3 133*  --  159 172     Results from last 7 days   Lab Units 11/30/24  0316 11/29/24 0346 11/28/24 0411   NEUTROS ABS 10*3/mm3 6.22 5.57 5.41   LYMPHS ABS 10*3/mm3 0.49* 0.76 0.52*   EOS ABS 10*3/mm3 0.25 0.61* 0.43*     Chemistry:  Estimated Creatinine Clearance: 15.4 mL/min (A) (by C-G formula based on SCr of 5.26 mg/dL (H)).    Results from last 7 days   Lab Units 11/30/24 0756 11/29/24  0346   SODIUM mmol/L 133* 133*   POTASSIUM mmol/L 4.2 4.4   CHLORIDE mmol/L 95* 93*   CO2 mmol/L 22.0 19.0*   BUN mg/dL 84* 97*   CREATININE mg/dL 5.26* 6.15*   GLUCOSE mg/dL 135* 122*     Results from last 7 days   Lab Units 11/30/24 0756 11/29/24  0346   CALCIUM mg/dL 10.2 9.4   MAGNESIUM mg/dL 2.6* 2.6*   PHOSPHORUS mg/dL 5.1* 6.4*     Hepatic Panel:  Results from last 7 days   Lab Units 11/30/24  0756 11/28/24  0411 11/27/24  0405 11/26/24  0325 11/25/24  0549   ALBUMIN g/dL 3.2* 3.1* 3.0*   < > 2.9*   TOTAL PROTEIN g/dL 6.6  --  6.2  --  5.7*   BILIRUBIN mg/dL 1.3*  --  0.7  --  0.6   AST (SGOT) U/L 89*  --  35  --  31   ALT (SGPT) U/L 24  --  17  --  15   ALK PHOS U/L 121*  --  96  --  109    < > = values in this interval not displayed.     Biomarkers:  Results from last 7 days   Lab Units 11/30/24  0756 11/29/24  0346 11/25/24  0549   CRP mg/dL  --   --  12.45*   PROCALCITONIN ng/mL 2.74* 1.67*  --      COVID-19  Lab Results   Component Value Date    COVID19 Presumptive Negative 11/25/2024    COVID19 Detected (C) 11/11/2024    COVID19 Not Detected 08/02/2022    COVID19 Not Detected 09/22/2020       Arterial Blood Gases:  Results from last 7 days   Lab Units 11/30/24  0356 11/29/24  0344 11/28/24  0951   PH, ARTERIAL pH units  7.392 7.364 7.445   PCO2, ARTERIAL mm Hg 40.8 40.0 34.6*   PO2 ART mm Hg 96.5 82.5* 85.6   FIO2 % 35 30 30       Images:  XR Chest 1 View    Result Date: 11/30/2024  Impression: Low lung volumes with mild pulmonary edema and small bilateral pleural effusions. Electronically Signed: Kushal Cannon MD  11/30/2024 10:50 AM EST  Workstation ID: [id number]    XR Chest 1 View    Result Date: 11/29/2024  Impression: Stable medical support devices. Stable cardiomegaly. Similar diffuse interstitial prominence compatible with interstitial edema. Redemonstration of small bilateral pleural effusions. No pneumothorax. Electronically Signed: Yahir Garcia MD  11/29/2024 7:25 AM EST  Workstation ID: [id number]     Echo:  Results for orders placed during the hospital encounter of 11/12/24    Adult Transthoracic Echo Complete W/ Cont if Necessary Per Protocol    Interpretation Summary    Left ventricular systolic function is normal. Left ventricular ejection fraction appears to be 61 - 65%.    Left ventricular wall thickness is consistent with mild concentric hypertrophy.    Left ventricular diastolic function was indeterminate.    The left atrial cavity is moderately dilated.    Mild aortic valve stenosis is present.    Peak velocity of the flow distal to the aortic valve is 216.8 cm/s. Aortic valve mean pressure gradient is 10 mmHg.    Estimated right ventricular systolic pressure from tricuspid regurgitation is normal (<35 mmHg). Calculated right ventricular systolic pressure from tricuspid regurgitation is 23 mmHg.      Results: Reviewed.  I reviewed the patient's new laboratory and imaging results.  I independently reviewed the patient's new images.    Medications: Reviewed.     Assessment & Plan    ASSESSMENT/PLAN     Active Hospital Problems    Diagnosis  POA    **Pneumonia due to COVID-19 virus [U07.1, J12.82]  Yes    Probable cardiac arrest versus severe bradycardia requiring CPR [I46.9]  Yes    Generalized seizure [R56.9]  Yes     Acute GI bleeding, possible [K92.2]  Yes    Acute respiratory failure with hypoxia [J96.01]  Yes    ESRD on HD T,R,S since 2022 [N18.6]  Yes    Severe obstructive sleep apnea [G47.33]  Yes     Auto BIPAP with O2            Marco Antonio Montemayor is a 79 y.o. male who presented to the hospital with increased work of breathing and hypoxemic respiratory failure.      He was found to have COVID-19 pneumonia.      CTA chest on admission did not show any pulmonary embolism.  Patchy area of groundglass opacities noted in both lungs.  Patient was started on ceftriaxone and doxycycline. He was also given Decadron and remdesivir.      He developed hypotension and was given fluid resuscitation and midodrine with improvement in blood pressure.  Patient was thought to be having a GI bleed so GI consultation was obtained.      However he declined from respiratory standpoint and was becoming more delirious so was transferred to intensive care unit.      He was initially supported with BiPAP but eventually needed to be intubated and placed on mechanical ventilation.  Patient required pressors to maintain hemodynamics.      On admission to ICU he had severe bradycardia and had 2 episodes of possible cardiac arrest requiring CPR for short duration of time.  Unclear if it was PEA or just severe bradycardia. Patient apparently was being suctioned at that time so was thought to have vasovagal response.  Patient was also noted to have generalized tonic-clonic seizure.  Neurology consultation was obtained and EEG was obtained.  EEG did not show any ongoing seizure activity however patient was started on Keppra.  He was able to follow commands postarrest.    Pt underwent EGD and did not show any ulcers but had suction trauma.     Patient had been quite encephalopathic.  Initially was treated with Keppra but we stopped that on 11/23.  Amantadine was initiated in an attempt to increase level of consciousness.  This has been dosed weekly due  to his ESRD.    He developed a decreased H&H when heparin was reinitiated on 11/24 and heparin was discontinued and he was transfused 1 unit PRBCs.    11/26/24: He was placed on a pressure support trial for an hour and did well though had decreased mental status towards the end of the trial.  We let him rest for about 4 hours and then extubated him after another trial midday.  He lasted several hours and then had decreased mental status and increased respiratory difficulty requiring reintubation.    Comorbidities: coronary disease, dyslipidemia, chronic kidney disease on hemodialysis, chronic anemia, hypertension, ZULEIKA on BiPAP, atrial fibrillation, heart failure with preserved ejection fraction.    Respiratory Failure, Type 1. Acute,  Intubated  Invasive Mechanical Ventilation   Covid-19  Sleep Medicine  ZULEIKA per History  Cardiovascular  CAD  HFpEF  HTN  A Fib  Dyslipidemia  ESRD on iHD    Plan:  Discussed with Dr. Mcghee after his family conference today 11/30/24  Possible compassionate extubation on Monday 12/02/24  Maximize comfort measures.  Do-not escalate care.  Discontinue Enteral Nutrition, He has had abdominal distention, and seems uncomfortable with Enteral Nutrition   No labs  May continue iHD until Monday inclusive, and then re-evaluate.  Discussed with wife and daughter at bedside. 11/29/24   Wife is worried, he could not tolerate surgery and getting a trach done.  She is also worried about his quality of life, if he survives this hospitalization, and requires LTACH, SNF etd.  She would not want him to live at a Nursing Home.  When he started dialysis, about 2 years ago, they were told, his life expectancy was 2 to 5 years, so she knows he does not have much time left, and he would not like to be bedridden the rest of his life.  She thinks that with his renal failure on dialysis, atrial fib and weak heart, and now on the ventilator, that may be too much for him to overcome, and have the quality of life  "that he enjoys. He has always be \"on the go\", doing something.  She thinks that if his heart stops again, we should not do Chest Compressions, but wants to talk to her son, and rest of family, before changing his Code Status.  Plant to do another Family meeting tomorrow along Dr. Mcghee, from Palliative Medicine.  iHD (Current schedule is M/W/F, but outpatient schedule was T/T/S)   Disposition: Keep in ICU    MDM:    Problem(s) High due to: Acute or Chronic illness or injury that may poses a threat to life or bodily function  Data: High due to: Review of prior external records from each unique source, Review of the result(s) of each unique test, and Discuss management or test interpretation with external physician or NPP (not separately reported)  Risk: High due to: drug(s) requiring intensive monitoring for toxicity    High            [x] Primary Attending Intensive Care Medicine - Nutrition Support   [] Consultant    "

## 2024-11-30 NOTE — PROGRESS NOTES
Palliative Care Progress Note    Date of Admission: 11/12/2024    Subjective: Patient remains intubated but is able to answer some yes or no questions.  Nursing staff does report patient has been somewhat more restless today compared to yesterday.  Current Code Status       Date Active Code Status Order ID Comments User Context       11/30/2024 1329 No CPR (Do Not Attempt to Resuscitate) [id number]  David Mcghee, DO Inpatient        Question Answer    Code Status (Patient has no pulse and is not breathing) No CPR (Do Not Attempt to Resuscitate)    Medical Interventions (Patient has pulse or is breathing) Limited Support    Medical Intervention Limits: No cardioversion     No vasopressors     No blood products     No artificial nutrition    Comments No escalation of care                  No current facility-administered medications on file prior to encounter.     Current Outpatient Medications on File Prior to Encounter   Medication Sig Dispense Refill    atorvastatin (LIPITOR) 40 MG tablet TAKE ONE TABLET BY MOUTH DAILY 90 tablet 3    bumetanide (BUMEX) 2 MG tablet TAKE 1 TABLET BY MOUTH EVERY OTHER DAY - TAKE ON DAYS OFF FROM DIALYSIS (TUESDAY, THURSDAY, SATURDAY AND SUNDAY) 48 tablet 2    carvedilol (COREG) 12.5 MG tablet TAKE ONE TABLET BY MOUTH TWICE A  tablet 3    cholecalciferol (VITAMIN D3) 1000 UNITS tablet Take 1 tablet by mouth Daily.      midodrine (PROAMATINE) 10 MG tablet Take 1 tablet by mouth 3 (Three) Times a Day Before Meals. Before Dialysis, Tues, Thurs, Sat      Sucroferric Oxyhydroxide (Velphoro) 500 MG chewable tablet Take As Directed. Crush or chew and swallow 3 tablets by mouth 3 times a day with meals and 1 tablet twice a day with snacks.      vitamin B-12 (CYANOCOBALAMIN) 1000 MCG tablet Take 1 tablet by mouth Daily.      warfarin (COUMADIN) 2.5 MG tablet Take 2-3 tablets by mouth daily or as directed by anticoagulation clinic 180 tablet 1    Ferrous Sulfate (IRON) 325 (65 FE) MG  "tablet Take 65 mg by mouth Daily.      nitroglycerin (NITROSTAT) 0.3 MG SL tablet 1 under the tongue as needed for angina, may repeat q5mins for up three doses 25 tablet 11     fentanyl 10 mcg/mL,  mcg/hr, Last Rate: 125 mcg/hr (11/30/24 1151)        [DISCONTINUED] acetaminophen **OR** acetaminophen **OR** [DISCONTINUED] acetaminophen    artificial tears    fentaNYL    ipratropium-albuterol    labetalol    lactulose    LORazepam    [DISCONTINUED] ondansetron ODT **OR** ondansetron    Objective: /59   Pulse 99   Temp 98.2 °F (36.8 °C) (Axillary)   Resp 20   Ht 177.8 cm (70\")   Wt (!) 137 kg (300 lb 14.9 oz)   SpO2 95%   BMI 43.18 kg/m²      Intake/Output Summary (Last 24 hours) at 11/30/2024 1329  Last data filed at 11/30/2024 0400  Gross per 24 hour   Intake 1109.1 ml   Output 2800 ml   Net -1690.9 ml     Physical Exam:      General Appearance:  Intubated, able to answer some simple yes or no questions   Head:  Normocephalic, without obvious abnormality, atraumatic   Eyes:  Lids and lashes normal, conjunctivae and sclerae normal, no icterus, no pallor, corneas clear, PERRLA   Ears:  Ears appear intact with no abnormalities noted   Throat:  No oral lesions, no thrush, oral mucosa moist   Neck:  No adenopathy, supple, trachea midline, no thyromegaly, no carotid bruit, no JVD   Back:  No kyphosis present, no scoliosis present, no skin lesions, erythema or scars, no tenderness to percussion or palpation, range of motion normal   Lungs:  Clear to auscultation, respirations regular, even and unlabored    Heart:  Regular rhythm and normal rate, normal S1 and S2, no murmur, no gallop, no rub, no click   Chest Wall:  No abnormalities observed   Abdomen:  Distended   Rectal:  Deferred   Extremities:  Edema noted both lower extremities   Pulses:  Pulses palpable and equal bilaterally   Skin:  No bleeding, bruising or rash   Lymph nodes:  No palpable adenopathy                                                  "                                 Results from last 7 days   Lab Units 11/30/24  0316   WBC 10*3/mm3 7.78   HEMOGLOBIN g/dL 7.4*   HEMATOCRIT % 23.4*   PLATELETS 10*3/mm3 133*     Results from last 7 days   Lab Units 11/30/24  0756   SODIUM mmol/L 133*   POTASSIUM mmol/L 4.2   CHLORIDE mmol/L 95*   CO2 mmol/L 22.0   BUN mg/dL 84*   CREATININE mg/dL 5.26*   CALCIUM mg/dL 10.2   BILIRUBIN mg/dL 1.3*   ALK PHOS U/L 121*   ALT (SGPT) U/L 24   AST (SGOT) U/L 89*   GLUCOSE mg/dL 135*       Impression: Respiratory failure  Pneumonia  Dyspnea  Lower extremity edema  Goals of care  Plan: Did have a discussion with the patient's family including wife and children.  After a long discussion the family states that they would like to proceed with a palliative extubation, however they probably want to wait until Monday for this.  They would be okay with the patient getting dialysis on Monday.  Family do agree with DNR no escalation of care at this point in time.        David Mcghee,   11/30/24  13:29 EST

## 2024-11-30 NOTE — PLAN OF CARE
Goal Outcome Evaluation:  Plan of Care Reviewed With: patient        Progress: no change  Outcome Evaluation: Remains intubated, FiO2 40% and peep of 5. Fentanyl continued for sedation. Pt able to follow commands and nod appropriately. TF continued without complications. H&H recheck placed by dialysis at beginning of shift, H&H trending up. Palliative meeting planned for 10 am today.

## 2024-12-01 NOTE — PLAN OF CARE
Goal Outcome Evaluation:         Met with Pt, family, Intensivist MD, palliative MD, iris, and RN to discuss GOC. Pt and family agreed to plan for extubation on Monday, possibly following dialysis. Pt is not able to speak, but nods and shakes head appropriately and is able to use the communication board to communicate. No UOP or BM. VSS on Fi2 40%, PEEP 5, rate 17.  Fent @125.

## 2024-12-01 NOTE — PLAN OF CARE
Goal Outcome Evaluation:  Plan of Care Reviewed With: patient        Progress: no change  Outcome Evaluation: Remains intubated, minimal settings. Fentanyl gtt increased due to agitation and excessive coughing. Pt continues to follow commands and nod appropriately.

## 2024-12-01 NOTE — PROGRESS NOTES
"INTENSIVIST   PROGRESS NOTE        SUBJECTIVE     Marco Antonio \"Rand\" 79 y.o. male is followed for: Shortness of Breath and Exposure To Known Illness       Pneumonia due to COVID-19 virus    Severe obstructive sleep apnea    ESRD on HD T,R,S since     Acute respiratory failure with hypoxia    Probable cardiac arrest versus severe bradycardia requiring CPR    Generalized seizure    Acute GI bleeding, possible    As an Intensivist, we provide an integrated approach to the ICU patient and family, medical management of comorbid conditions, including but not limited to electrolytes, glycemic control, organ dysfunction, lead interdisciplinary rounds and coordinate the care with all other services, including those from other specialists.     Interval History:  POD: 13 Days Post-Op Procedure(s) (LRB):  ESOPHAGOGASTRODUODENOSCOPY (N/A)     Comfortable. Awake. Wife at bedside.    Temp  Min: 96.8 °F (36 °C)  Max: 97.6 °F (36.4 °C)       History     Last Reviewed by Raphael Jimenez MD on 2024 at  4:49 PM    Sections Reviewed    Medical, Surgical, Family, Tobacco, Alcohol, Drug Use, Sexual Activity,   Social Documentation    Problem list reviewed by Raphael Jimenez MD on 2024 at  4:49 PM  Medicines reviewed by Raphael Jimenez MD on 2024 at  4:49 PM  Allergies reviewed by Raphael Jimenez MD on 2024 at  4:49 PM       The patient's relevant past medical, surgical and social history were reviewed and updated in Epic as appropriate.         OBJECTIVE     Vitals:  Temp: 96.8 °F (36 °C) (24 1200) Temp  Min: 96.8 °F (36 °C)  Max: 97.6 °F (36.4 °C)   Temp core:      BP: 110/70 (24 1300) BP  Min: 76/21  Max: 153/58   MAP (non-invasive) Noninvasive MAP (mmHg): 89 (24 1300) Noninvasive MAP (mmHg)  Av.6  Min: 3  Max: 134   Pulse: 104 (24 1300) Pulse  Min: 92  Max: 128   Resp: 17 (24 1200) Resp  Min: 17  Max: 19   SpO2: 98 % (24 1300) SpO2  Min: 91 %  Max: 100 %   Device: " ventilator (12/01/24 1200)    Flow Rate: 3.5 (11/15/24 2057) No data recorded         11/21/24  0400 11/22/24  0600   Weight: (!) 136 kg (300 lb 0.7 oz) (!) 137 kg (300 lb 14.9 oz)        Intake/Ouptut 24 hrs (7:00AM - 6:59 AM)  Intake & Output (last 2 days)         11/29 0701 11/30 0700 11/30 0701 12/01 0700 12/01 0701 12/02 0700    I.V. (mL/kg) 395.4 (2.9) 368 (2.7)     Other 30 60     NG/GT 1201 119 60    Total Intake(mL/kg) 1626.4 (12) 547 (4) 60 (0.4)    Dialysis 2800      Total Output 2800      Net -1173.6 +547 +60                   Medications (drips):  fentanyl 10 mcg/mL, Last Rate: 175 mcg/hr (12/01/24 0430)        Invasive Mechanical Ventilator   Settings: Observed:   Mode: VC+/AC (12/01/24 1204)    Resp Rate (Set): 15 (12/01/24 1204) Resp Rate (Observed) Vent: 15 (12/01/24 1300)   Vt (Set, mL): 450 mL (12/01/24 1204) Vt, Exp (observed, mL): 767 mL (12/01/24 1204)    Minute Ventilation (L/min) (Obs): 8.45 L/min (12/01/24 1204)    I:E Ratio (Obs): 1:3.2 (12/01/24 1204)       FiO2 (%): 40 % (12/01/24 1204) Plateau Pressure (cm H2O): 20 cm H2O (12/01/24 0742)   PEEP/CPAP (cm H2O): 5 cm H20 (12/01/24 1204) Driving Pressure (cm H2O): 14.9 cm H2O (12/01/24 0742)    Static Compliance (L/cm H2O): 37 (11/30/24 1931)      Physical Examination  Telemetry:  Rhythm: atrial rhythm (12/01/24 1200)      Constitutional:  No acute distress.   Cardiovascular: IRR.    Respiratory: Normal breath sounds  (+) Rhonchi   Abdominal:  Soft with no tenderness.   Extremities: Edema   Neurological:   Awake.    Best Eye Response: 4-->(E4) spontaneous (12/01/24 1200)  Best Motor Response: 6-->(M6) obeys commands (12/01/24 1200)  Best Verbal Response: 1-->(V1) none (12/01/24 1200)  [place] Coma Scale Score: 11 (12/01/24 1200)    RASS (Treviño Agitation-Sedation Scale): -1-->drowsy (12/01/24 1200)    Confusion Assessment Method-ICU (CAM-ICU)  Feature 3: Altered Level of Consciousness: Positive (12/01/24 1200)     Results  Reviewed:  Laboratory  Microbiology  Radiology  Pathology    Hematology:  Results from last 7 days   Lab Units 11/30/24  0316 11/29/24  2105 11/29/24  0346 11/28/24  0411   WBC 10*3/mm3 7.78  --  7.53 6.94   HEMOGLOBIN g/dL 7.4* 8.7* 7.7* 7.8*   MCV fL 105.9*  --  105.1* 106.4*   PLATELETS 10*3/mm3 133*  --  159 172     Chemistry:  Estimated Creatinine Clearance: 15.4 mL/min (A) (by C-G formula based on SCr of 5.26 mg/dL (H)).    Results from last 7 days   Lab Units 11/30/24  0756 11/29/24  0346   SODIUM mmol/L 133* 133*   POTASSIUM mmol/L 4.2 4.4   CHLORIDE mmol/L 95* 93*   CO2 mmol/L 22.0 19.0*   BUN mg/dL 84* 97*   CREATININE mg/dL 5.26* 6.15*   GLUCOSE mg/dL 135* 122*     Results from last 7 days   Lab Units 11/30/24  0756 11/29/24  0346   CALCIUM mg/dL 10.2 9.4   MAGNESIUM mg/dL 2.6* 2.6*   PHOSPHORUS mg/dL 5.1* 6.4*     COVID-19  Lab Results   Component Value Date    COVID19 Presumptive Negative 11/25/2024    COVID19 Detected (C) 11/11/2024    COVID19 Not Detected 08/02/2022    COVID19 Not Detected 09/22/2020     Images:  XR Chest 1 View    Result Date: 11/30/2024  Impression: Low lung volumes with mild pulmonary edema and small bilateral pleural effusions. Electronically Signed: Kushal Cannon MD  11/30/2024 10:50 AM EST  Workstation ID: [id number]     Echo:  Results for orders placed during the hospital encounter of 11/12/24    Adult Transthoracic Echo Complete W/ Cont if Necessary Per Protocol    Interpretation Summary    Left ventricular systolic function is normal. Left ventricular ejection fraction appears to be 61 - 65%.    Left ventricular wall thickness is consistent with mild concentric hypertrophy.    Left ventricular diastolic function was indeterminate.    The left atrial cavity is moderately dilated.    Mild aortic valve stenosis is present.    Peak velocity of the flow distal to the aortic valve is 216.8 cm/s. Aortic valve mean pressure gradient is 10 mmHg.    Estimated right ventricular systolic  pressure from tricuspid regurgitation is normal (<35 mmHg). Calculated right ventricular systolic pressure from tricuspid regurgitation is 23 mmHg.      Results: Reviewed.  I reviewed the patient's new laboratory and imaging results.  I independently reviewed the patient's new images.    Medications: Reviewed.     Assessment & Plan    ASSESSMENT/PLAN     Active Hospital Problems    Diagnosis  POA    **Pneumonia due to COVID-19 virus [U07.1, J12.82]  Yes    Probable cardiac arrest versus severe bradycardia requiring CPR [I46.9]  Yes    Generalized seizure [R56.9]  Yes    Acute GI bleeding, possible [K92.2]  Yes    Acute respiratory failure with hypoxia [J96.01]  Yes    ESRD on HD T,R,S since 2022 [N18.6]  Yes    Severe obstructive sleep apnea [G47.33]  Yes     Auto BIPAP with O2            Marco Antonio Montemayor is a 79 y.o. male who presented to the hospital with increased work of breathing and hypoxemic respiratory failure.      He was found to have COVID-19 pneumonia.      CTA chest on admission did not show any pulmonary embolism.  Patchy area of groundglass opacities noted in both lungs.  Patient was started on ceftriaxone and doxycycline. He was also given Decadron and remdesivir.      He developed hypotension and was given fluid resuscitation and midodrine with improvement in blood pressure.  Patient was thought to be having a GI bleed so GI consultation was obtained.      However he declined from respiratory standpoint and was becoming more delirious so was transferred to intensive care unit.      He was initially supported with BiPAP but eventually needed to be intubated and placed on mechanical ventilation.  Patient required pressors to maintain hemodynamics.      On admission to ICU he had severe bradycardia and had 2 episodes of possible cardiac arrest requiring CPR for short duration of time.  Unclear if it was PEA or just severe bradycardia. Patient apparently was being suctioned at that time so was  thought to have vasovagal response.  Patient was also noted to have generalized tonic-clonic seizure.  Neurology consultation was obtained and EEG was obtained.  EEG did not show any ongoing seizure activity however patient was started on Keppra.  He was able to follow commands postarrest.    Pt underwent EGD and did not show any ulcers but had suction trauma.     Patient had been quite encephalopathic.  Initially was treated with Keppra but we stopped that on 11/23.  Amantadine was initiated in an attempt to increase level of consciousness.  This has been dosed weekly due to his ESRD.    He developed a decreased H&H when heparin was reinitiated on 11/24 and heparin was discontinued and he was transfused 1 unit PRBCs.    11/26/24: He was placed on a pressure support trial for an hour and did well though had decreased mental status towards the end of the trial.  We let him rest for about 4 hours and then extubated him after another trial midday.  He lasted several hours and then had decreased mental status and increased respiratory difficulty requiring reintubation.    Comorbidities: coronary disease, dyslipidemia, chronic kidney disease on hemodialysis, chronic anemia, hypertension, ZULEIKA on BiPAP, atrial fibrillation, heart failure with preserved ejection fraction.    Respiratory Failure, Type 1. Acute,  Intubated  Invasive Mechanical Ventilation   Covid-19  Sleep Medicine  ZULEIKA per History  Cardiovascular  CAD  HFpEF  HTN  A Fib  Dyslipidemia  ESRD on iHD    Plan:    Discussed with wife 12/01/24 - Awaiting on rest of family to be here by tomorrow, weather permitted. Then compassionate extubation.  If he fails, NO REINTUBATION.  Discussed with Dr. Mcghee after his family conference today 11/30/24  Possible compassionate extubation on Monday 12/02/24  Maximize comfort measures.  Do-not escalate care.  Discontinue Enteral Nutrition, He has had abdominal distention, and seems uncomfortable with Enteral Nutrition   No  "labs  May continue iHD until Monday inclusive, and then re-evaluate.  Discussed with wife and daughter at bedside. 11/29/24   Wife is worried, he could not tolerate surgery and getting a trach done.  She is also worried about his quality of life, if he survives this hospitalization, and requires LTACH, SNF etd.  She would not want him to live at a Nursing Home.  When he started dialysis, about 2 years ago, they were told, his life expectancy was 2 to 5 years, so she knows he does not have much time left, and he would not like to be bedridden the rest of his life.  She thinks that with his renal failure on dialysis, atrial fib and weak heart, and now on the ventilator, that may be too much for him to overcome, and have the quality of life that he enjoys. He has always be \"on the go\", doing something.  She thinks that if his heart stops again, we should not do Chest Compressions, but wants to talk to her son, and rest of family, before changing his Code Status.  Plant to do another Family meeting tomorrow along Dr. Mcghee, from Palliative Medicine.  iHD - (Current schedule is M/W/F, but outpatient schedule was T/T/S)   Disposition: Keep in ICU    MDM:    Problem(s) High due to: Acute or Chronic illness or injury that may poses a threat to life or bodily function  Data: High due to: Review of prior external records from each unique source, Review of the result(s) of each unique test, and Discuss management or test interpretation with external physician or NPP (not separately reported)  Risk: High due to: drug(s) requiring intensive monitoring for toxicity and decision regarding escalation of level of hospital care    High            [x] Primary Attending Intensive Care Medicine - Nutrition Support   [] Consultant    "

## 2024-12-01 NOTE — PROGRESS NOTES
" LOS: 19 days    Patient Care Team:  Heriberto Murcia MD as PCP - General (Family Medicine)  Shanell Arango, PharmD as Pharmacist (Pharmacy)  Evelina Quan, PharmD as Pharmacist (Pharmacy)  Peng Melgar, PharmD as Pharmacist (Pharmacy)  Johnathan Taylor MD as Consulting Physician (Cardiac Electrophysiology)  Chato Bo MD as Consulting Physician (Nephrology)  Ivet Beasley APRN as Nurse Practitioner (Cardiology)  Rei Decker PA as Physician Assistant (Cardiology)    Subjective     Seen and examined at bedside.  Intubated and on fentanyl drip.     Family at bedside.    Goals of care ongoing. [Per discussion with the nurse; family is leaning towards palliative extubation on Monday, after dialysis]     Objective     Vital Signs:  Blood pressure 126/70, pulse 99, temperature 96.8 °F (36 °C), temperature source Axillary, resp. rate 17, height 177.8 cm (70\"), weight (!) 137 kg (300 lb 14.9 oz), SpO2 98%.      Intake/Output Summary (Last 24 hours) at 12/1/2024 1600  Last data filed at 12/1/2024 0800  Gross per 24 hour   Intake 606.98 ml   Output --   Net 606.98 ml        11/30 0701 - 12/01 0700  In: 547 [I.V.:368]  Out: -     Physical Exam:  GENERAL: NAD  NEURO: Unable to assess  PSYCHIATRIC: Unable to evaluate   EYE: PE, no icterus, no conjunctivitis  ENT: + ET tube  NECK: No JVD discernable,  Trachea midline  CV: Trace edema, RRR  LUNGS:  Quiet,  Nonlabored resp on ventilator.  Symmetrical expansion  ABDOMEN: Obese nondistended, soft nontender.  : No Mueller, no palp bladder  SKIN: Warm and dry without rash  + AVF    Labs:  Results from last 7 days   Lab Units 11/30/24  0316 11/29/24  2105 11/29/24  0346 11/28/24  0411   WBC 10*3/mm3 7.78  --  7.53 6.94   HEMOGLOBIN g/dL 7.4* 8.7* 7.7* 7.8*   PLATELETS 10*3/mm3 133*  --  159 172     Results from last 7 days   Lab Units 11/30/24  0756 11/29/24  0346 11/28/24  0411 11/27/24  0405 11/26/24  0325 11/25/24  0549   SODIUM mmol/L 133* 133* " 133* 131* 134* 132*   POTASSIUM mmol/L 4.2 4.4 4.3 5.0 4.6 5.0   CHLORIDE mmol/L 95* 93* 94* 93* 96* 92*   CO2 mmol/L 22.0 19.0* 22.0 18.0* 24.0 21.0*   BUN mg/dL 84* 97* 71* 98* 68* 112*   CREATININE mg/dL 5.26* 6.15* 4.89* 6.84* 4.72* 8.47*   CALCIUM mg/dL 10.2 9.4 9.6 9.5 9.3 8.8   PHOSPHORUS mg/dL 5.1* 6.4* 5.3*  --  4.3 5.9*   MAGNESIUM mg/dL 2.6* 2.6*  --   --   --  2.6*   ALBUMIN g/dL 3.2*  --  3.1* 3.0* 3.4* 2.9*     Results from last 7 days   Lab Units 11/30/24  0756   ALK PHOS U/L 121*   BILIRUBIN mg/dL 1.3*   ALT (SGPT) U/L 24   AST (SGOT) U/L 89*     Results from last 7 days   Lab Units 11/30/24  0356   PH, ARTERIAL pH units 7.392   PO2 ART mm Hg 96.5   PCO2, ARTERIAL mm Hg 40.8   HCO3 ART mmol/L 24.8       A/P:      1.  ESRD on HD TTS- No missed treatment sessions  2.  Hypotension: Improved.  Patient off pressors.    3.  HFpEF  4.  Anemia of CKD: Transfuse at Hb<7. Started on BOAZ   5.  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  Volume status: No overt volume overload  7.  COVID- 19 PNA  8.  Afib / rate controlled   9- S/p Cardiac arrest on 11/16/24      PLAN   Hemodialysis as per MWF demar.  No need for dialysis today  Blood pressure improved,   Off pressors.  UF as tolerated to assist with oxygenation.  Transition back to TTS schedule before discharge    Will continue manage ESRD and related complications    High risk and complexity patient.    Willy Capellan MD  12/01/24  16:00 EST

## 2024-12-01 NOTE — PLAN OF CARE
Goal Outcome Evaluation:      VSS on FiO2 40%, rate 15. Fent @175. BUE soft wrist restraints remain in place. Family updated at bedside.

## 2024-12-02 NOTE — CASE MANAGEMENT/SOCIAL WORK
Continued Stay Note   [facility]     Patient Name: Marco Antonio Montemayor  MRN: [id number]  Today's Date: 12/2/2024    Admit Date: 11/12/2024    Plan: Palliative extubation   Discharge Plan       Row Name 12/02/24 1421       Plan    Plan Palliative extubation    Patient/Family in Agreement with Plan other (see comments)    Plan Comments Discussed in MDR, patient had HD today. Palliative following, plan for Palliative extubation today once family here and post HD. CM will follow for any other needs.                   Discharge Codes    No documentation.                 Expected Discharge Date and Time       Expected Discharge Date Expected Discharge Time    Dec 3, 2024               Grey Meza RN

## 2024-12-02 NOTE — DISCHARGE SUMMARY
Death Summary    Patient name: Marco Antonio Montemayor  CSN: [id number]  MRN: [id number]  : 1945    Date of Admission: 2024  Date and Time of Death:  2024 at 1725    Admitting Physician: Augusta Cortes MD     Primary Care Provider: Heriberto Murcia MD    Consultations:   Shaina Juárez MD  Nephrology   Brunner, Mark I., MD   Gastroenterology   Panfilo Valera MD  Neurology   David Mcghee,   Palliative Care     Admission Diagnosis: Pneumonia due to COVID-19 virus      Diagnoses at the Time of Death:     Pneumonia due to COVID-19 virus    Severe obstructive sleep apnea    ESRD on HD T,R,S since     Acute respiratory failure with hypoxia    Probable cardiac arrest versus severe bradycardia requiring CPR    Generalized seizure    Acute GI bleeding, possible    Procedures:  11/15/24: Arterial Line Insertion   11/15/24: Central Line Insertion   24: Intubation   24: Upper GI Endoscopy   24: Intubation     History of Present Illness (copied from H&P note on 24):  Marco Antonio Montemayor is a 79 y.o. male history of A-fib on Coumadin, CKD on hemodialysis, chronic diastolic congestive heart failure, ZULEIKA on CPAP presenting due to increased work of breathing and hypoxia. Patient found to COVID-19 PNA.      At the bedside is patient's wife and daughter. Symptoms started a few days ago which included diarrhea, HA, and productive clear cough. He does have a fever today. Denies head trauma. Did have dialysis today. Of note, patient left AMA from [facility] ED yesterday 2024 (end of copied text).     Hospital Course:  Patient was admitted to Hospital Medicine 2* issues discussed in the above HPI and placed on decadron and remdesivir as well as empiric ABX (Rocephin and doxycycline). Nephrology was consulted who continued HD.     On 11/15 patient became hypotensive with coinciding drop in H&H and a rapid response was called. He was transferred to ICU with central and  arterial lines placed and was initiated on pressor therapy. Early the following morning declined further from a respiratory standpoint and was ultimately intubated / placed on mechanical ventilatory support.     On the evening of  patient suffered two CODE BLUE events (see code documentation). ROSC was ultimately achieved and he was able to interact and follow commands after the event, thus targeted temperature management was not initiated. Due to concerns for seizures at the time of event Neurology was consulted and he was loaded with Keppra with placement on continuous EEG monitoring. EEG results were ultimately negative for seizure activity.     In regard to drop in H&H with concern for GI bleeding Gastroenterology was consulted and patient underwent upper GI endoscopy on  which showed erythematous and eroded mucosa in the gastric body (felt 2* NG tube suction trauma) as well as erythematous erosive duodenopathy. BID PPI was continued.     Over the next several days sedation was weaned and patient was able to follow some commands intermittently. Spontaneous breathing trials were attempted and a trial of extubation was performed on . After several hours he declined with decreased mental status and increased respiratory distress and was promptly reintubated.     Following reintubation further discussions were had with the family regarding possible tracheostomy and further goals of care. They ultimately elected to transition care to comfort measures and proceed with compassionate extubation. Mr. Vargas ultimately  on 24 at 1725.     Shruthi Hudson, DNP, APRN, AGACNP-BC  Pulmonary and Critical Care Medicine     Please note that portions of this note were completed with a voice recognition program.

## 2024-12-02 NOTE — PROGRESS NOTES
"Palliative Care Daily Progress Note     Referring: Raphael Jimenez    C/C: wants tube out    S: Medical record reviewed. Events noted. Reported that family looking to extubation today.  Seen earlier today and discussed with RN and returned to speak with spouse after completion of HD and arrival of spouse.  Only wants pt to be comfortable at this time.  Awaiting arrival of additional family before extubating but agreeing to prep work so they just tell RN when ready.     ROS:   Via Y/N wanted tube out and expressed frustration and discomfort with.    O: Code Status:   Code Status and Medical Interventions: No CPR (Do Not Attempt to Resuscitate); Limited Support; No cardioversion, No vasopressors, No blood products, No artificial nutrition; No escalation of care   Ordered at: 11/30/24 1329     Medical Intervention Limits:    No cardioversion    No vasopressors    No blood products    No artificial nutrition     Code Status (Patient has no pulse and is not breathing):    No CPR (Do Not Attempt to Resuscitate)     Medical Interventions (Patient has pulse or is breathing):    Limited Support     Comments:    No escalation of care      Advanced Directives: Advance Directive Status: Patient has advance directive, copy in chart   Goals of Care: Ongoing.   Palliative Performance Scale Score: 20%     BP 99/48   Pulse (!) 124   Temp 97.2 °F (36.2 °C) (Axillary)   Resp 15   Ht 177.8 cm (70\")   Wt (!) 137 kg (300 lb 14.9 oz)   SpO2 92%   BMI 43.18 kg/m²     Intake/Output Summary (Last 24 hours) at 12/2/2024 1237  Last data filed at 12/2/2024 0631  Gross per 24 hour   Intake 565.11 ml   Output --   Net 565.11 ml       Physical Exam:    General Appearance:    Alert earlier then resting, cooperative, NAD   HEENT:    NC/AT, EOMI, anicteric, MMM, face relaxed   Neck:   supple, trachea midline, no JVD   Lungs:     CTA bilat, diminished in bases; respirations regular, even     and unlabored    Heart:    RRR, normal S1 and S2, no " M/R/G   Abdomen:     Normal bowel sounds, soft, nontender, nondistended   G/U:   Deferred   MSK/Extremities:   No clubbing , cyanosis or edema, No wasting   Pulses:   Pulses palpable and equal bilaterally   Skin:   Warm, dry, no mottling   Neurologic:   A/Ox3 via Y/N, cooperative, moves extremities x 4 weakly, no tremor, nl     tone   Psych:   Calm       Current Medications:      Current Facility-Administered Medications:     [DISCONTINUED] acetaminophen (TYLENOL) tablet 650 mg, 650 mg, Nasogastric, Q6H PRN **OR** acetaminophen (TYLENOL) 160 MG/5ML oral solution 650 mg, 650 mg, Nasogastric, Q6H PRN, 650 mg at 11/28/24 1437 **OR** [DISCONTINUED] acetaminophen (TYLENOL) suppository 650 mg, 650 mg, Rectal, Q4H PRN, Alfonso Walker MD    albuterol (PROVENTIL) nebulizer solution 0.083% 2.5 mg/3mL, 2.5 mg, Nebulization, 4x Daily PRN, Raphael Jimenez MD    Amantadine HCl (SYMMETREL) oral solution 200 mg, 200 mg, Nasogastric, Weekly, Mahin Live MD, 200 mg at 11/30/24 0819    artificial tears ophthalmic ointment, , Both Eyes, Q1H PRN, Pankaj Vega MD, Given at 11/18/24 0852    atorvastatin (LIPITOR) tablet 40 mg, 40 mg, Nasogastric, Nightly, Alfonso Walker MD, 40 mg at 12/01/24 2046    docusate sodium (COLACE) liquid 100 mg, 100 mg, Nasogastric, BID, Raphael Jimenez MD, 100 mg at 12/01/24 2046    epoetin michelle-epbx (RETACRIT) injection 10,000 Units, 10,000 Units, Subcutaneous, Once per day on Monday Wednesday Friday, Octavio Ta MD, 10,000 Units at 11/29/24 0844    fentaNYL (SUBLIMAZE) bolus from bag 10 mcg/mL injection 50 mcg, 50 mcg, Intravenous, Q30 Min PRN, Raphael Jimenez MD, 50 mcg at 12/02/24 1230    fentaNYL 2500 mcg/250 mL NS infusion,  mcg/hr, Intravenous, Titrated, Raphael Jimenez MD, Last Rate: 20 mL/hr at 12/02/24 1234, 200 mcg/hr at 12/02/24 1234    hydrOXYzine (ATARAX) tablet 25 mg, 25 mg, Nasogastric, TID PRN, Rapheal Jimenez MD    ipratropium-albuterol (DUO-NEB) nebulizer solution 3 mL,  3 mL, Nebulization, 4x Daily - RT, Mahin Live MD, 3 mL at 12/02/24 0717    labetalol (NORMODYNE,TRANDATE) injection 10 mg, 10 mg, Intravenous, Q4H PRN, Mahin Live MD, 10 mg at 11/29/24 2103    lactulose (CHRONULAC) 10 GM/15ML solution 20 g, 20 g, Nasogastric, TID PRN, Raphael Jimenez MD    LORazepam (ATIVAN) injection 2 mg, 2 mg, Intravenous, Q4H PRN, Raphael Jimenez MD    nystatin (MYCOSTATIN) powder, , Topical, Q12H, Maryjane Gonzales APRN, Given at 12/01/24 2047    [DISCONTINUED] ondansetron ODT (ZOFRAN-ODT) disintegrating tablet 4 mg, 4 mg, Oral, Q6H PRN **OR** ondansetron (ZOFRAN) injection 4 mg, 4 mg, Intravenous, Q6H PRN, Augusta Cortes MD, 4 mg at 11/30/24 0807    pantoprazole (PROTONIX) injection 40 mg, 40 mg, Intravenous, Q12H, Panfilo Martinez MD, 40 mg at 12/01/24 2046     Labs:   Results from last 7 days   Lab Units 11/30/24  0316   WBC 10*3/mm3 7.78   HEMOGLOBIN g/dL 7.4*   HEMATOCRIT % 23.4*   PLATELETS 10*3/mm3 133*     Results from last 7 days   Lab Units 11/30/24  0756   SODIUM mmol/L 133*   POTASSIUM mmol/L 4.2   CHLORIDE mmol/L 95*   CO2 mmol/L 22.0   BUN mg/dL 84*   CREATININE mg/dL 5.26*   CALCIUM mg/dL 10.2   BILIRUBIN mg/dL 1.3*   ALK PHOS U/L 121*   ALT (SGPT) U/L 24   AST (SGOT) U/L 89*   GLUCOSE mg/dL 135*     Imaging Results (Last 72 Hours)       Procedure Component Value Units Date/Time    XR Chest 1 View [[id number]] Collected: 11/30/24 1049     Updated: 11/30/24 1053    Narrative:      XR CHEST 1 VW    Date of Exam: 11/30/2024 7:26 AM EST    Indication: respiratory failure    Comparison: 11/29/2024    Findings:  Left chest wall cardiac device in unchanged position. Endotracheal tube overlies the upper thoracic trachea. Enteric tube distal tip excluded by collimation. Left approach central venous catheter is in unchanged position. Unchanged enlarged cardiac   silhouette. Low lung volumes. Mild pulmonary edema with small bilateral pleural effusions. No  pneumothorax. No acute osseous abnormality.      Impression:      Impression:  Low lung volumes with mild pulmonary edema and small bilateral pleural effusions.        Electronically Signed: Kushal Cannon MD    11/30/2024 10:50 AM EST    Workstation ID: [id number]              Lab 11/29/24 2105   HEMOGLOBIN A1C 5.10         Diagnostics: Reviewed    A:     Pneumonia due to COVID-19 virus    Severe obstructive sleep apnea    ESRD on HD T,R,S since 2022    Acute respiratory failure with hypoxia    Probable cardiac arrest versus severe bradycardia requiring CPR    Generalized seizure    Acute GI bleeding, possible       Impression:  Acute hypoxic resp failure  Covid pna  ESRD stopping HD      Symptoms:   Dyspnea  Debility    P:   Updated staff.  Adjusted code status.  Orders adjusted to reflect same.  Extubate when family ready.Palliative Care Team will continue to follow patient.     Malini Rivera MD, 12/2/2024, 12:37 EST

## 2024-12-02 NOTE — PLAN OF CARE
Goal Outcome Evaluation:           Progress: no change  Outcome Evaluation: Vital signs stable; afebrile. Fentanyl increased to 200 mcg/hr due to agitation with suctioning. No other changes through the night. Plans for compassionate extubation after hemodialysis today.

## 2024-12-02 NOTE — PROGRESS NOTES
"   LOS: 20 days    Patient Care Team:  Heriberto Murcia MD as PCP - General (Family Medicine)  Shanell Arango, PharmD as Pharmacist (Pharmacy)  Evelina Quan, PharmD as Pharmacist (Pharmacy)  Peng Melgar, PharmD as Pharmacist (Pharmacy)  Johnathan Taylor MD as Consulting Physician (Cardiac Electrophysiology)  Chato Bo MD as Consulting Physician (Nephrology)  Ivet Beasley APRN as Nurse Practitioner (Cardiology)  Rei Decker PA as Physician Assistant (Cardiology)    Subjective     Nonverbal    Objective     Vital Signs:  Blood pressure 106/74, pulse (!) 121, temperature 97.2 °F (36.2 °C), temperature source Axillary, resp. rate 15, height 177.8 cm (70\"), weight (!) 137 kg (300 lb 14.9 oz), SpO2 97%.      Intake/Output Summary (Last 24 hours) at 12/2/2024 0912  Last data filed at 12/2/2024 0631  Gross per 24 hour   Intake 565.11 ml   Output --   Net 565.11 ml        12/01 0701 - 12/02 0700  In: 625.1 [I.V.:500.1]  Out: -     Physical Exam:        GENERAL: WD WM NAD  NEURO: Sedate on vent   PSYCHIATRIC: Unable to evaluate   EYE: PE, no icterus, no conjunctivitis  ENT: + ET tube + NG  NECK: , No JVD discernable,  Trachea midline  CV: Trace edema, RRR  LUNGS:  Quiet,  Nonlabored resp on ventilator.  Symmetrical expansion  ABDOMEN: Obese nondistended, soft nontender.  : No Mueller, no palp bladder  SKIN: Warm and dry without rash  + AVF    Labs:  Results from last 7 days   Lab Units 11/30/24  0316 11/29/24  2105 11/29/24  0346 11/28/24  0411   WBC 10*3/mm3 7.78  --  7.53 6.94   HEMOGLOBIN g/dL 7.4* 8.7* 7.7* 7.8*   PLATELETS 10*3/mm3 133*  --  159 172     Results from last 7 days   Lab Units 11/30/24  0756 11/29/24  0346 11/28/24  0411 11/27/24  0405 11/26/24  0325   SODIUM mmol/L 133* 133* 133* 131* 134*   POTASSIUM mmol/L 4.2 4.4 4.3 5.0 4.6   CHLORIDE mmol/L 95* 93* 94* 93* 96*   CO2 mmol/L 22.0 19.0* 22.0 18.0* 24.0   BUN mg/dL 84* 97* 71* 98* 68*   CREATININE mg/dL 5.26* 6.15* " 4.89* 6.84* 4.72*   CALCIUM mg/dL 10.2 9.4 9.6 9.5 9.3   PHOSPHORUS mg/dL 5.1* 6.4* 5.3*  --  4.3   MAGNESIUM mg/dL 2.6* 2.6*  --   --   --    ALBUMIN g/dL 3.2*  --  3.1* 3.0* 3.4*     Results from last 7 days   Lab Units 11/30/24  0756   ALK PHOS U/L 121*   BILIRUBIN mg/dL 1.3*   ALT (SGPT) U/L 24   AST (SGOT) U/L 89*     Results from last 7 days   Lab Units 11/30/24  0356   PH, ARTERIAL pH units 7.392   PO2 ART mm Hg 96.5   PCO2, ARTERIAL mm Hg 40.8   HCO3 ART mmol/L 24.8               Estimated Creatinine Clearance: 15.4 mL/min (A) (by C-G formula based on SCr of 5.26 mg/dL (H)).         A/P:      1.  ESRD on HD TTS- No missed treatment sessions  2.  Hypotension: Improved.  Patient off pressors.    3.  HFpEF  4.  Anemia of CKD: Transfuse at Hb<7. Start BOAZ   5.  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  Volume status: No overt volume overload  7.  COVID- 19 PNA  8.  Afib / rate controlled   9- S/p Cardiac arrest on 11/16/24      PLAN   Currently on HD per MWF demar.   Patient post HD this a.m.  Plan for terminal extubation later today.  Will continue manage ESRD and related complications    High risk and complexity patient.    Joss Peres MD  12/02/24  09:12 EST

## 2024-12-02 NOTE — PROGRESS NOTES
"INTENSIVIST   PROGRESS NOTE        SUBJECTIVE     Marco Antonio \"Rand\" 79 y.o. male is followed for: Shortness of Breath and Exposure To Known Illness       Pneumonia due to COVID-19 virus    Severe obstructive sleep apnea    ESRD on HD T,R,S since     Acute respiratory failure with hypoxia    Probable cardiac arrest versus severe bradycardia requiring CPR    Generalized seizure    Acute GI bleeding, possible    As an Intensivist, we provide an integrated approach to the ICU patient and family, medical management of comorbid conditions, including but not limited to electrolytes, glycemic control, organ dysfunction, lead interdisciplinary rounds and coordinate the care with all other services, including those from other specialists.     Interval History:  POD: 14 Days Post-Op Procedure(s) (LRB):  ESOPHAGOGASTRODUODENOSCOPY (N/A)     Uneventful night.    Awake.    iHD done this morning.    C/O itching    Temp  Min: 96.5 °F (35.8 °C)  Max: 97.9 °F (36.6 °C)       History     Last Reviewed by Raphael Jimenez MD on 2024 at  4:49 PM    Sections Reviewed    Medical, Surgical, Family, Tobacco, Alcohol, Drug Use, Sexual Activity,   Social Documentation    Problem list reviewed by Raphael Jimenez MD on 2024 at  4:49 PM  Medicines reviewed by Raphael Jimenez MD on 2024 at  4:49 PM  Allergies reviewed by Raphael Jimenez MD on 2024 at  4:49 PM       The patient's relevant past medical, surgical and social history were reviewed and updated in Epic as appropriate.         OBJECTIVE     Vitals:  Temp: 96.5 °F (35.8 °C) (24 1200) Temp  Min: 96.5 °F (35.8 °C)  Max: 97.9 °F (36.6 °C)   Temp core:      BP: 105/62 (24 1400) BP  Min: 75/49  Max: 136/65   MAP (non-invasive) Noninvasive MAP (mmHg): 82 (24 1400) Noninvasive MAP (mmHg)  Av.8  Min: 3  Max: 134   Pulse: (!) 133 (24 1400) Pulse  Min: 101  Max: 134   Resp: 15 (24 0827) Resp  Min: 15  Max: 20   SpO2: 93 % (24 1400) " SpO2  Min: 82 %  Max: 100 %   Device: ventilator (12/02/24 1310)    Flow Rate: 3.5 (11/15/24 2057) No data recorded         11/21/24  0400 11/22/24 0600   Weight: (!) 136 kg (300 lb 0.7 oz) (!) 137 kg (300 lb 14.9 oz)        Intake/Ouptut 24 hrs (7:00AM - 6:59 AM)  Intake & Output (last 2 days)         11/30 0701 12/01 0700 12/01 0701 12/02 0700 12/02 0701 12/03 0700    I.V. (mL/kg) 368 (2.7) 500.1 (3.7)     Other 60 65     NG/ 60     Total Intake(mL/kg) 547 (4) 625.1 (4.6)     Dialysis   2000    Total Output   2000    Net +547 +625.1 -2000                   Medications (drips):  fentanyl 10 mcg/mL, Last Rate: 200 mcg/hr (12/02/24 1234)        Invasive Mechanical Ventilator   Settings: Observed:   Mode: VC+/AC (12/02/24 1310)    Resp Rate (Set): 13 (12/02/24 1311) Resp Rate (Observed) Vent: 15 (12/02/24 1400)   Vt (Set, mL): 450 mL (12/02/24 1311) Vt, Exp (observed, mL): 484 mL (12/02/24 1311)    Minute Ventilation (L/min) (Obs): 6.36 L/min (12/02/24 1311)    I:E Ratio (Obs): 1:3.8 (12/02/24 1311)       FiO2 (%): 30 % (12/02/24 1311) Plateau Pressure (cm H2O): (S) 14 cm H2O (12/02/24 0715)   PEEP/CPAP (cm H2O): 5 cm H20 (12/02/24 1311) Driving Pressure (cm H2O): 8.7 cm H2O (12/02/24 0715)    Static Compliance (L/cm H2O): 32 (12/02/24 0715)      Physical Examination  Telemetry:  Rhythm: atrial rhythm (12/02/24 1155)      Constitutional:  No acute distress.   Cardiovascular: IRR.    Respiratory: Normal breath sounds  (+) Rhonchi   Abdominal:  Soft with no tenderness.   Extremities: Edema   Neurological:   Awake.    Best Eye Response: 3-->(E3) to speech (12/02/24 0400)  Best Motor Response: 6-->(M6) obeys commands (12/02/24 0400)  Best Verbal Response: 1-->(V1) none (12/02/24 0400)  [place] Coma Scale Score: 10 (12/02/24 0400)    RASS (Treviño Agitation-Sedation Scale): -1-->drowsy (12/02/24 0600)    Confusion Assessment Method-ICU (CAM-ICU)  Feature 3: Altered Level of Consciousness: Positive (12/02/24  0600)     Results Reviewed:  Laboratory  Microbiology  Radiology  Pathology    No labs.    COVID-19  Lab Results   Component Value Date    COVID19 Presumptive Negative 11/25/2024    COVID19 Detected (C) 11/11/2024    COVID19 Not Detected 08/02/2022    COVID19 Not Detected 09/22/2020     Images:  No radiology results for the last day    Echo:  Results for orders placed during the hospital encounter of 11/12/24    Adult Transthoracic Echo Complete W/ Cont if Necessary Per Protocol    Interpretation Summary    Left ventricular systolic function is normal. Left ventricular ejection fraction appears to be 61 - 65%.    Left ventricular wall thickness is consistent with mild concentric hypertrophy.    Left ventricular diastolic function was indeterminate.    The left atrial cavity is moderately dilated.    Mild aortic valve stenosis is present.    Peak velocity of the flow distal to the aortic valve is 216.8 cm/s. Aortic valve mean pressure gradient is 10 mmHg.    Estimated right ventricular systolic pressure from tricuspid regurgitation is normal (<35 mmHg). Calculated right ventricular systolic pressure from tricuspid regurgitation is 23 mmHg.      Results: Reviewed.  I reviewed the patient's new laboratory and imaging results.  I independently reviewed the patient's new images.    Medications: Reviewed.     Assessment & Plan    ASSESSMENT/PLAN     Active Hospital Problems    Diagnosis  POA    **Pneumonia due to COVID-19 virus [U07.1, J12.82]  Yes    Probable cardiac arrest versus severe bradycardia requiring CPR [I46.9]  Yes    Generalized seizure [R56.9]  Yes    Acute GI bleeding, possible [K92.2]  Yes    Acute respiratory failure with hypoxia [J96.01]  Yes    ESRD on HD T,R,S since 2022 [N18.6]  Yes    Severe obstructive sleep apnea [G47.33]  Yes     Auto BIPAP with O2            Marco Antonio Montemayor is a 79 y.o. male who presented to the hospital with increased work of breathing and hypoxemic respiratory failure.       He was found to have COVID-19 pneumonia.      CTA chest on admission did not show any pulmonary embolism.  Patchy area of groundglass opacities noted in both lungs.  Patient was started on ceftriaxone and doxycycline. He was also given Decadron and remdesivir.      He developed hypotension and was given fluid resuscitation and midodrine with improvement in blood pressure.  Patient was thought to be having a GI bleed so GI consultation was obtained.      However he declined from respiratory standpoint and was becoming more delirious so was transferred to intensive care unit.      He was initially supported with BiPAP but eventually needed to be intubated and placed on mechanical ventilation.  Patient required pressors to maintain hemodynamics.      On admission to ICU he had severe bradycardia and had 2 episodes of possible cardiac arrest requiring CPR for short duration of time.  Unclear if it was PEA or just severe bradycardia. Patient apparently was being suctioned at that time so was thought to have vasovagal response.  Patient was also noted to have generalized tonic-clonic seizure.  Neurology consultation was obtained and EEG was obtained.  EEG did not show any ongoing seizure activity however patient was started on Keppra.  He was able to follow commands postarrest.    Pt underwent EGD and did not show any ulcers but had suction trauma.     Patient had been quite encephalopathic.  Initially was treated with Keppra but we stopped that on 11/23.  Amantadine was initiated in an attempt to increase level of consciousness.  This has been dosed weekly due to his ESRD.    He developed a decreased H&H when heparin was reinitiated on 11/24 and heparin was discontinued and he was transfused 1 unit PRBCs.    11/26/24: He was placed on a pressure support trial for an hour and did well though had decreased mental status towards the end of the trial.  We let him rest for about 4 hours and then extubated him after  "another trial midday.  He lasted several hours and then had decreased mental status and increased respiratory difficulty requiring reintubation.    Comorbidities: coronary disease, dyslipidemia, chronic kidney disease on hemodialysis, chronic anemia, hypertension, ZULEIKA on BiPAP, atrial fibrillation, heart failure with preserved ejection fraction.    Respiratory Failure, Type 1. Acute,  Intubated  Invasive Mechanical Ventilation   Covid-19  Sleep Medicine  ZULEIKA per History  Cardiovascular  CAD  HFpEF  HTN  A Fib  Dyslipidemia  ESRD on iHD    Plan:    Discussed with wife 12/01/24 - Awaiting on rest of family to be here by tomorrow, weather permitted. Then compassionate extubation.  If he fails, NO REINTUBATION.  Discussed with Dr. Mcghee after his family conference today 11/30/24  Possible compassionate extubation on Monday 12/02/24  Maximize comfort measures.  Do-not escalate care.  Discontinue Enteral Nutrition, He has had abdominal distention, and seems uncomfortable with Enteral Nutrition   No labs  May continue iHD until Monday inclusive, and then re-evaluate.  Discussed with wife and daughter at bedside. 11/29/24   Wife is worried, he could not tolerate surgery and getting a trach done.  She is also worried about his quality of life, if he survives this hospitalization, and requires LTACH, SNF etd.  She would not want him to live at a Nursing Home.  When he started dialysis, about 2 years ago, they were told, his life expectancy was 2 to 5 years, so she knows he does not have much time left, and he would not like to be bedridden the rest of his life.  She thinks that with his renal failure on dialysis, atrial fib and weak heart, and now on the ventilator, that may be too much for him to overcome, and have the quality of life that he enjoys. He has always be \"on the go\", doing something.  She thinks that if his heart stops again, we should not do Chest Compressions, but wants to talk to her son, and rest of family, " before changing his Code Status.  Plant to do another Family meeting tomorrow along Dr. Mcghee, from Palliative Medicine.  As per Dr. Rivera, plan is for compassionate extubation today.  iHD today (Current schedule is M/W/F, but outpatient schedule was T/T/S)   Disposition: Keep in ICU    MDM:    Problem(s) High due to: Acute or Chronic illness or injury that may poses a threat to life or bodily function  Data: High due to: Review of prior external records from each unique source, Review of the result(s) of each unique test, and Discuss management or test interpretation with external physician or NPP (not separately reported)  Risk: High due to: drug(s) requiring intensive monitoring for toxicity and decision regarding escalation of level of hospital care    High            [x] Primary Attending Intensive Care Medicine - Nutrition Support   [] Consultant

## 2024-12-02 NOTE — SIGNIFICANT NOTE
Exam confirms with auscultation zero audible heart tones and zero audible respirations. Mr.Howard Deepika Montemayor was pronounced dead at 1725.  MD notified by Patient's RN.    Noris Dunn RN  Clinical House Supervisor  12/2/2024 18:00 EST

## 2024-12-03 NOTE — PLAN OF CARE
Goal Outcome Evaluation:         HD done, 2L removed. Family at bedside after HD, met with palliative care team and agreed to palliative extubation. Pt agreeable to plan of care. [facility] notified @ 0915. Transition to comfort care, extubated @1648. TOD verified by two RNs @ 1725. Pt sent with  home. Pt belongings sent with family except wedding ring still on Pt.

## (undated) DEVICE — SYR LUERLOK 50ML

## (undated) DEVICE — PROXIMATE RH ROTATING HEAD SKIN STAPLERS (35 WIDE) CONTAINS 35 STAINLESS STEEL STAPLES: Brand: PROXIMATE

## (undated) DEVICE — PK VASC 10

## (undated) DEVICE — IRRIGATOR BULB ASEPTO 60CC STRL

## (undated) DEVICE — LIMB HOLDERS: Brand: DEROYAL

## (undated) DEVICE — ELECTRD BLD EZ CLN STD 2.5IN

## (undated) DEVICE — ADHS SKIN PREMIERPRO EXOFIN TOPICAL HI/VISC .5ML

## (undated) DEVICE — 3M™ IOBAN™ 2 ANTIMICROBIAL INCISE DRAPE 6650EZ: Brand: IOBAN™ 2

## (undated) DEVICE — SUT SILK 4/0 TIES 18IN A183H

## (undated) DEVICE — DRAPE,HAND,STERILE: Brand: MEDLINE

## (undated) DEVICE — PENCL E/S HNDSWCH ROCKRBTN HOLSTR 10FT

## (undated) DEVICE — SUT MNCRYL PLS ANTIB UD 4/0 PC3 18IN

## (undated) DEVICE — GLV SURG PREMIERPRO MIC LTX PF SZ7.5 BRN

## (undated) DEVICE — PATIENT RETURN ELECTRODE, SINGLE-USE, CONTACT QUALITY MONITORING, ADULT, WITH 9FT CORD, FOR PATIENTS WEIGING OVER 33LBS. (15KG): Brand: MEGADYNE

## (undated) DEVICE — DRSNG SURESITE123 4X4.8IN

## (undated) DEVICE — TUBING, SUCTION, 1/4" X 10', STRAIGHT: Brand: MEDLINE

## (undated) DEVICE — HYBRID CO2 TUBING/CAP SET FOR OLYMPUS® SCOPES & CO2 SOURCE: Brand: ERBE

## (undated) DEVICE — APPL CHLORAPREP TINTED 26ML TEAL

## (undated) DEVICE — SUT SILK 2/0 TIES 18IN A185H

## (undated) DEVICE — GW JAG STR .035IN 450CM

## (undated) DEVICE — LEX ELECTRO PHYSIOLOGY: Brand: MEDLINE INDUSTRIES, INC.

## (undated) DEVICE — SUT SILK 3/0 TIES 18IN A184H

## (undated) DEVICE — ADULT, W/LG. BACK PAD, RADIOTRANSPARENT ELEMENT AND LEAD WIRE: Brand: DEFIBRILLATION ELECTRODES

## (undated) DEVICE — SAFELINER SUCTION CANISTER 1000CC: Brand: DEROYAL

## (undated) DEVICE — STRIP,CLOSURE,WOUND,MEDI-STRIP,1/2X4: Brand: MEDLINE

## (undated) DEVICE — SUT PROLN 7/0 BV1 D/A 24IN 8702H

## (undated) DEVICE — DECANTER: Brand: UNBRANDED

## (undated) DEVICE — DECANTER BAG 9": Brand: MEDLINE INDUSTRIES, INC.

## (undated) DEVICE — ANTIBACTERIAL UNDYED BRAIDED (POLYGLACTIN 910), SYNTHETIC ABSORBABLE SURGICAL SUTURE: Brand: COATED VICRYL

## (undated) DEVICE — THE BITE BLOCK MAXI, LATEX FREE STRAP IS USED TO PROTECT THE ENDOSCOPE INSERTION TUBE FROM BEING BITTEN BY THE PATIENT.

## (undated) DEVICE — CAUTERY TIP POLISHER: Brand: DEVON

## (undated) DEVICE — GLV SURG PREMIERPRO MIC LTX PF SZ8 BRN

## (undated) DEVICE — SOL IRR H2O BO 1000ML STRL

## (undated) DEVICE — TRAP FLD MINIVAC MEGADYNE 100ML

## (undated) DEVICE — SOL NACL 0.9PCT 1000ML

## (undated) DEVICE — SOLIDIFIER LIQ PREMISORB 1500CC

## (undated) DEVICE — ST INF PRI SMRTSTE 20DRP 2VLV 24ML 117

## (undated) DEVICE — SUT SILK 2/0 SH 30IN K833H

## (undated) DEVICE — ANTIBACTERIAL UNDYED BRAIDED (POLYGLACTIN 910), SYNTHETIC ABSORBABLE SUTURE: Brand: COATED VICRYL

## (undated) DEVICE — MEDI-VAC YANKAUER SUCTION HANDLE W/BULBOUS TIP: Brand: CARDINAL HEALTH

## (undated) DEVICE — SUT PROLN 6/0 BV1 D/A 30IN 8709H

## (undated) DEVICE — TBG PENCL TELESCP MEGADYNE SMOKE EVAC 10FT

## (undated) DEVICE — KT ORCA ORCAPOD DISP STRL

## (undated) DEVICE — FIRST STEP BEDSIDE ADD WATER KIT - RESEALABLE STAND-UP POUCH, ENDOSCOPIC CLEANING PAD - 1 POUCH: Brand: FIRST STEP BEDSIDE ADD WATER KIT - RESEALABLE STAND-UP POUCH, ENDOSCOPIC CLEANIN

## (undated) DEVICE — INTRO ACCSR BLNT TP

## (undated) DEVICE — PENCL ROCKRSWCH MEGADYNE W/HOLSTR 10FT SS

## (undated) DEVICE — CONTN GRAD MEAS TRIANG 32OZ BLK

## (undated) DEVICE — DECANT BG O JET

## (undated) DEVICE — 3M™ STERI-STRIP™ REINFORCED ADHESIVE SKIN CLOSURES, R1547, 1/2 IN X 4 IN (12 MM X 100 MM), 6 STRIPS/ENVELOPE: Brand: 3M™ STERI-STRIP™

## (undated) DEVICE — ST EXT IV SMARTSITE 2VLV SP M LL 5ML IV1

## (undated) DEVICE — LUBE JELLY FOIL PACK 1.4 OZ: Brand: MEDLINE INDUSTRIES, INC.

## (undated) DEVICE — SET PRIMARY GRVTY 10DP MALE LL 104IN

## (undated) DEVICE — AIR/WATER CLEANING VALVES: Brand: AIR/WATER CLEANING VALVES

## (undated) DEVICE — CANN NASL CO2 DIVIDED A/